# Patient Record
Sex: FEMALE | Race: WHITE | Employment: OTHER | URBAN - METROPOLITAN AREA
[De-identification: names, ages, dates, MRNs, and addresses within clinical notes are randomized per-mention and may not be internally consistent; named-entity substitution may affect disease eponyms.]

---

## 2017-02-03 ENCOUNTER — HOSPITAL ENCOUNTER (OUTPATIENT)
Dept: GENERAL RADIOLOGY | Age: 57
Discharge: HOME | End: 2017-02-03
Attending: INTERNAL MEDICINE | Admitting: INTERNAL MEDICINE

## 2017-02-03 LAB
ALBUMIN SERPL-MCNC: 4.6 GM/DL (ref 3.4–5)
ALP BLD-CCNC: 105 IU/L (ref 40–128)
ALT SERPL-CCNC: 29 U/L (ref 10–40)
ANION GAP SERPL CALCULATED.3IONS-SCNC: 16 MMOL/L (ref 4–16)
APTT: 28.2 SECONDS (ref 21.2–33)
AST SERPL-CCNC: 30 IU/L (ref 15–37)
BASOPHILS ABSOLUTE: 0.1 K/CU MM
BASOPHILS RELATIVE PERCENT: 0.7 % (ref 0–1)
BILIRUB SERPL-MCNC: 0.2 MG/DL (ref 0–1)
BUN BLDV-MCNC: 11 MG/DL (ref 6–23)
CALCIUM SERPL-MCNC: 10.2 MG/DL (ref 8.3–10.6)
CHLORIDE BLD-SCNC: 93 MMOL/L (ref 99–110)
CO2: 28 MMOL/L (ref 21–32)
CREAT SERPL-MCNC: 1 MG/DL (ref 0.6–1.1)
DIFFERENTIAL TYPE: ABNORMAL
EOSINOPHILS ABSOLUTE: 0.4 K/CU MM
EOSINOPHILS RELATIVE PERCENT: 4.3 % (ref 0–3)
GFR AFRICAN AMERICAN: >60 ML/MIN/1.73M2
GFR NON-AFRICAN AMERICAN: 58 ML/MIN/1.73M2
GLUCOSE FASTING: 128 MG/DL (ref 70–99)
HCT VFR BLD CALC: 41.2 % (ref 37–47)
HEMOGLOBIN: 12.4 GM/DL (ref 12.5–16)
IMMATURE NEUTROPHIL %: 0.3 % (ref 0–0.43)
INR BLD: 0.97 INDEX
LYMPHOCYTES ABSOLUTE: 2 K/CU MM
LYMPHOCYTES RELATIVE PERCENT: 20.6 % (ref 24–44)
Lab: 5 MINS (ref 2.3–9.5)
MCH RBC QN AUTO: 28.8 PG (ref 27–31)
MCHC RBC AUTO-ENTMCNC: 30.1 % (ref 32–36)
MCV RBC AUTO: 95.6 FL (ref 78–100)
MONOCYTES ABSOLUTE: 0.6 K/CU MM
MONOCYTES RELATIVE PERCENT: 5.8 % (ref 0–4)
NUCLEATED RBC %: 0 %
PDW BLD-RTO: 13.6 % (ref 11.7–14.9)
PLATELET # BLD: 268 K/CU MM (ref 140–440)
PMV BLD AUTO: 10.6 FL (ref 7.5–11.1)
POTASSIUM SERPL-SCNC: 4.2 MMOL/L (ref 3.5–5.1)
PROTHROMBIN TIME: 11 SECONDS
RBC # BLD: 4.31 M/CU MM (ref 4.2–5.4)
SEGMENTED NEUTROPHILS ABSOLUTE COUNT: 6.7 K/CU MM
SEGMENTED NEUTROPHILS RELATIVE PERCENT: 68.3 % (ref 36–66)
SODIUM BLD-SCNC: 137 MMOL/L (ref 135–145)
TOTAL IMMATURE NEUTOROPHIL: 0.03 K/CU MM
TOTAL NUCLEATED RBC: 0 K/CU MM
TOTAL PROTEIN: 7.4 GM/DL (ref 6.4–8.2)
WBC # BLD: 9.7 K/CU MM (ref 4–10.5)

## 2017-02-13 PROBLEM — J43.2 CENTRILOBULAR EMPHYSEMA (HCC): Status: ACTIVE | Noted: 2017-02-13

## 2017-02-14 ENCOUNTER — HOSPITAL ENCOUNTER (OUTPATIENT)
Dept: GENERAL RADIOLOGY | Age: 57
Discharge: HOME | End: 2017-02-14
Attending: INTERNAL MEDICINE | Admitting: INTERNAL MEDICINE

## 2017-02-14 DIAGNOSIS — J43.2 CENTRILOBULAR EMPHYSEMA (HCC): ICD-10-CM

## 2017-02-16 ENCOUNTER — INITIAL CONSULT (OUTPATIENT)
Dept: CARDIOLOGY CLINIC | Age: 57
End: 2017-02-16

## 2017-02-16 VITALS
BODY MASS INDEX: 32.06 KG/M2 | SYSTOLIC BLOOD PRESSURE: 116 MMHG | HEIGHT: 62 IN | HEART RATE: 82 BPM | WEIGHT: 174.2 LBS | DIASTOLIC BLOOD PRESSURE: 72 MMHG

## 2017-02-16 DIAGNOSIS — F41.9 ANXIETY: ICD-10-CM

## 2017-02-16 DIAGNOSIS — Z82.49 FH: CAD (CORONARY ARTERY DISEASE): ICD-10-CM

## 2017-02-16 DIAGNOSIS — R00.2 PALPITATIONS: ICD-10-CM

## 2017-02-16 DIAGNOSIS — R94.31 ABNORMAL EKG: ICD-10-CM

## 2017-02-16 DIAGNOSIS — I10 ESSENTIAL HYPERTENSION: ICD-10-CM

## 2017-02-16 DIAGNOSIS — E78.5 HYPERLIPIDEMIA LDL GOAL <100: ICD-10-CM

## 2017-02-16 DIAGNOSIS — M54.50 BACK PAIN AT L4-L5 LEVEL: ICD-10-CM

## 2017-02-16 DIAGNOSIS — M79.7 FIBROMYALGIA: ICD-10-CM

## 2017-02-16 DIAGNOSIS — Z01.818 PRE-OP EVALUATION: Primary | ICD-10-CM

## 2017-02-16 PROCEDURE — 3014F SCREEN MAMMO DOC REV: CPT | Performed by: INTERNAL MEDICINE

## 2017-02-16 PROCEDURE — G8484 FLU IMMUNIZE NO ADMIN: HCPCS | Performed by: INTERNAL MEDICINE

## 2017-02-16 PROCEDURE — 99203 OFFICE O/P NEW LOW 30 MIN: CPT | Performed by: INTERNAL MEDICINE

## 2017-02-16 PROCEDURE — G8427 DOCREV CUR MEDS BY ELIG CLIN: HCPCS | Performed by: INTERNAL MEDICINE

## 2017-02-16 PROCEDURE — 93000 ELECTROCARDIOGRAM COMPLETE: CPT | Performed by: INTERNAL MEDICINE

## 2017-02-16 PROCEDURE — 3017F COLORECTAL CA SCREEN DOC REV: CPT | Performed by: INTERNAL MEDICINE

## 2017-02-16 PROCEDURE — 1036F TOBACCO NON-USER: CPT | Performed by: INTERNAL MEDICINE

## 2017-02-16 PROCEDURE — G8417 CALC BMI ABV UP PARAM F/U: HCPCS | Performed by: INTERNAL MEDICINE

## 2017-02-16 RX ORDER — AMLODIPINE BESYLATE 10 MG/1
10 TABLET ORAL DAILY
Status: ON HOLD | COMMUNITY
End: 2017-07-21

## 2017-02-17 ENCOUNTER — TELEPHONE (OUTPATIENT)
Dept: CARDIOLOGY CLINIC | Age: 57
End: 2017-02-17

## 2017-02-20 ENCOUNTER — HOSPITAL ENCOUNTER (OUTPATIENT)
Dept: CARDIOLOGY | Age: 57
Discharge: HOME | End: 2017-02-20
Attending: INTERNAL MEDICINE | Admitting: INTERNAL MEDICINE

## 2017-02-20 DIAGNOSIS — Z01.818 PREOP EXAMINATION: ICD-10-CM

## 2017-02-20 DIAGNOSIS — R94.31 ABNORMAL ECG: ICD-10-CM

## 2017-02-20 LAB
LV EF: 76 %
LVEF MODALITY: NORMAL

## 2017-02-20 RX ORDER — SODIUM CHLORIDE 0.9 % (FLUSH) 0.9 %
10 SYRINGE (ML) INJECTION PRN
Status: DISCONTINUED | OUTPATIENT
Start: 2017-02-20 | End: 2017-02-21 | Stop reason: HOSPADM

## 2017-02-20 RX ORDER — AMINOPHYLLINE DIHYDRATE 25 MG/ML
75 INJECTION, SOLUTION INTRAVENOUS ONCE
Status: COMPLETED | OUTPATIENT
Start: 2017-02-20 | End: 2017-02-20

## 2017-02-20 RX ADMIN — Medication 30 MILLICURIE: at 12:19

## 2017-02-20 RX ADMIN — Medication 10 MILLICURIE: at 12:19

## 2017-02-20 RX ADMIN — AMINOPHYLLINE DIHYDRATE 75 MG: 25 INJECTION, SOLUTION INTRAVENOUS at 11:29

## 2017-02-20 RX ADMIN — Medication 10 ML: at 11:23

## 2017-02-22 ENCOUNTER — TELEPHONE (OUTPATIENT)
Dept: CARDIOLOGY CLINIC | Age: 57
End: 2017-02-22

## 2017-03-09 ENCOUNTER — TELEPHONE (OUTPATIENT)
Dept: CARDIOLOGY CLINIC | Age: 57
End: 2017-03-09

## 2017-03-30 ENCOUNTER — HOSPITAL ENCOUNTER (OUTPATIENT)
Dept: GENERAL RADIOLOGY | Age: 57
Discharge: HOME | End: 2017-03-30
Attending: INTERNAL MEDICINE | Admitting: INTERNAL MEDICINE

## 2017-03-30 LAB — THEOPHYLLINE LEVEL: 1.5 UG/ML (ref 10–20)

## 2017-07-13 ENCOUNTER — HOSPITAL ENCOUNTER (OUTPATIENT)
Dept: GENERAL RADIOLOGY | Age: 57
Discharge: HOME | End: 2017-07-13
Attending: INTERNAL MEDICINE | Admitting: INTERNAL MEDICINE

## 2017-07-13 LAB
THEOPHYLLINE LEVEL: 6.8 UG/ML (ref 10–20)
TSH HIGH SENSITIVITY: 1.4 UIU/ML (ref 0.27–4.2)

## 2017-08-04 ENCOUNTER — HOSPITAL ENCOUNTER (OUTPATIENT)
Dept: ULTRASOUND IMAGING | Age: 57
Discharge: HOME | End: 2017-08-04
Attending: INTERNAL MEDICINE | Admitting: INTERNAL MEDICINE

## 2017-08-04 DIAGNOSIS — R10.9 ABDOMINAL PAIN: ICD-10-CM

## 2017-08-04 DIAGNOSIS — R10.11 RUQ PAIN: ICD-10-CM

## 2017-09-01 ENCOUNTER — HOSPITAL ENCOUNTER (OUTPATIENT)
Dept: WOMENS IMAGING | Age: 57
Discharge: HOME | End: 2017-09-01
Attending: INTERNAL MEDICINE | Admitting: INTERNAL MEDICINE

## 2017-09-01 DIAGNOSIS — Z78.0 MENOPAUSE: ICD-10-CM

## 2017-09-01 DIAGNOSIS — Z12.31 VISIT FOR SCREENING MAMMOGRAM: ICD-10-CM

## 2017-09-18 ENCOUNTER — OFFICE VISIT (OUTPATIENT)
Dept: SURGERY | Age: 57
End: 2017-09-18

## 2017-09-18 VITALS
WEIGHT: 190.8 LBS | BODY MASS INDEX: 34.34 KG/M2 | HEART RATE: 108 BPM | SYSTOLIC BLOOD PRESSURE: 118 MMHG | RESPIRATION RATE: 16 BRPM | DIASTOLIC BLOOD PRESSURE: 76 MMHG

## 2017-09-18 DIAGNOSIS — R10.13 EPIGASTRIC ABDOMINAL PAIN: ICD-10-CM

## 2017-09-18 DIAGNOSIS — K80.20 CALCULUS OF GALLBLADDER WITHOUT CHOLECYSTITIS WITHOUT OBSTRUCTION: Primary | ICD-10-CM

## 2017-09-18 DIAGNOSIS — Z87.11 HISTORY OF GASTRIC ULCER: ICD-10-CM

## 2017-09-18 PROCEDURE — 3017F COLORECTAL CA SCREEN DOC REV: CPT | Performed by: SURGERY

## 2017-09-18 PROCEDURE — 1036F TOBACCO NON-USER: CPT | Performed by: SURGERY

## 2017-09-18 PROCEDURE — G8417 CALC BMI ABV UP PARAM F/U: HCPCS | Performed by: SURGERY

## 2017-09-18 PROCEDURE — G8427 DOCREV CUR MEDS BY ELIG CLIN: HCPCS | Performed by: SURGERY

## 2017-09-18 PROCEDURE — 99244 OFF/OP CNSLTJ NEW/EST MOD 40: CPT | Performed by: SURGERY

## 2017-09-18 PROCEDURE — 3014F SCREEN MAMMO DOC REV: CPT | Performed by: SURGERY

## 2017-09-18 RX ORDER — BUSPIRONE HYDROCHLORIDE 10 MG/1
10 TABLET ORAL 3 TIMES DAILY
Status: ON HOLD | COMMUNITY
End: 2019-03-08 | Stop reason: HOSPADM

## 2017-09-19 ASSESSMENT — ENCOUNTER SYMPTOMS
WHEEZING: 0
DIARRHEA: 0
RECTAL PAIN: 0
ANAL BLEEDING: 0
VOMITING: 0
CONSTIPATION: 0
ABDOMINAL DISTENTION: 0
SHORTNESS OF BREATH: 1
BLOOD IN STOOL: 0
COLOR CHANGE: 0
VOICE CHANGE: 0
ABDOMINAL PAIN: 1
COUGH: 1
NAUSEA: 1

## 2017-09-20 ENCOUNTER — HOSPITAL ENCOUNTER (OUTPATIENT)
Dept: GENERAL RADIOLOGY | Age: 57
Discharge: HOME | End: 2017-09-20
Attending: INTERNAL MEDICINE | Admitting: INTERNAL MEDICINE

## 2017-09-20 DIAGNOSIS — J43.2 CENTRILOBULAR EMPHYSEMA (HCC): ICD-10-CM

## 2017-09-20 LAB — THEOPHYLLINE LEVEL: 11.4 UG/ML (ref 10–20)

## 2017-10-09 ENCOUNTER — OFFICE VISIT (OUTPATIENT)
Dept: SURGERY | Age: 57
End: 2017-10-09

## 2017-10-09 VITALS
WEIGHT: 192.9 LBS | SYSTOLIC BLOOD PRESSURE: 136 MMHG | RESPIRATION RATE: 16 BRPM | HEIGHT: 63 IN | BODY MASS INDEX: 34.18 KG/M2 | OXYGEN SATURATION: 91 % | DIASTOLIC BLOOD PRESSURE: 80 MMHG | HEART RATE: 112 BPM

## 2017-10-09 DIAGNOSIS — K80.10 CCC (CHRONIC CALCULOUS CHOLECYSTITIS): Primary | ICD-10-CM

## 2017-10-09 DIAGNOSIS — J44.9 COPD, SEVERE (HCC): ICD-10-CM

## 2017-10-09 PROCEDURE — G8926 SPIRO NO PERF OR DOC: HCPCS | Performed by: SURGERY

## 2017-10-09 PROCEDURE — G8417 CALC BMI ABV UP PARAM F/U: HCPCS | Performed by: SURGERY

## 2017-10-09 PROCEDURE — 99214 OFFICE O/P EST MOD 30 MIN: CPT | Performed by: SURGERY

## 2017-10-09 PROCEDURE — 3017F COLORECTAL CA SCREEN DOC REV: CPT | Performed by: SURGERY

## 2017-10-09 PROCEDURE — G8427 DOCREV CUR MEDS BY ELIG CLIN: HCPCS | Performed by: SURGERY

## 2017-10-09 PROCEDURE — G8484 FLU IMMUNIZE NO ADMIN: HCPCS | Performed by: SURGERY

## 2017-10-09 PROCEDURE — 3023F SPIROM DOC REV: CPT | Performed by: SURGERY

## 2017-10-09 PROCEDURE — 3014F SCREEN MAMMO DOC REV: CPT | Performed by: SURGERY

## 2017-10-09 PROCEDURE — 1036F TOBACCO NON-USER: CPT | Performed by: SURGERY

## 2017-10-09 ASSESSMENT — ENCOUNTER SYMPTOMS
SHORTNESS OF BREATH: 1
WHEEZING: 0
VOICE CHANGE: 0
BLOOD IN STOOL: 0
COLOR CHANGE: 0
NAUSEA: 1
RECTAL PAIN: 0
VOMITING: 1
COUGH: 1
CONSTIPATION: 0
DIARRHEA: 0
ABDOMINAL DISTENTION: 0
ABDOMINAL PAIN: 1
ANAL BLEEDING: 0

## 2017-10-09 NOTE — PATIENT INSTRUCTIONS
Stay on a very low fat diet. LOW FAT DIET: Regular    FOODS ALLOWED FOODS TO AVOID   BEVERAGES: skim milk, buttermilk made from skim   milk, coffee, tea, carbonated beverages BEVERAGES:  Whole milk, evaporated milk, buttermilk made from whole milk, chocolate beverages. BREAD: White, whole wheat, rye, Bol[place]ian or [place], rolls BREAD:  Biscuits, muffins, cornbread, waffles, pancakes, sweet rolls  any made with large quantities of fat. CEREAL: All except those containing chocolate CEREAL:  None except those containing chocolate. DESSERTS: Fruits allowed, sherbets and puddings   made with, skim milk, gelatin desserts, shelbie food   cake, sugar wafers, popsicles, hard candy DESSERTS:  Ice cream, pies, butter cakes, sponge cakes, rich cookies, doughnuts, any others not listed under foods allowed. FATS:  Little or none FATS:  No oil, or butter, or fried foods. FRUIT: All except avocado and coconut, all fruit juices FRUIT:  Avocado and coconut;   MEAT & MEAT SUBSTITUTE:  Lean meat, fish & fowl, dry cottage cheese,  MEAT AND MEAT SUBSTITUTE:  All meat high in fat such as pork, werner, ham, goose, duck, fatty fish, sausage, bologna, ham-cheese loaf, etc.  cream and hard cheese, peanut butter, more egg yolks than allowance. POTATO OR SUBSTITUTE:  Baked, mashed (no butter or cream) or   boiled white potato, macaroni, rice, spaghetti, noodles. POTATO OR SUBSTITUTE:  Fried potatoes, creamed or escalloped potatoes, potato chips, corn chips; sweet potatoes if not tolerated by patient. SOUPS:  Fat free broths, cream soups made with   skim milk, vegetable soup made with vegetables   tolerated by patient. SOUPS:  Fatty broths, cream soups made with whole milk. VEGETABLES:  All vegetables, raw or cooked,   except gas forming vegetables not tolerated by patient   listed under Foods to Avoid.   (Green beans, green peas,  carrots, beets, squash, spinach, asparagus, tomatoes, lettuce) VEGETABLES:  Gas forming vegetables  if not tolerated by patient  such as broccoli, brussels sprouts, cabbage, cauliflower, green peppers, dried beans, dried peas, lima beans, turnips, radishes, onions, cucumbers, etc.    MISCELLANEOUS:  Sugar, jelly, honey, syrup,   mild tomato catsup, herbs, salt, vinegar-if tolerated. MISCELLANEOUS:  Pepper, spices, horseradish, mustard, hot catsup, olives, nuts, gravies, chocolate; highly seasoned foods (pizza and chili).

## 2017-10-09 NOTE — PROGRESS NOTES
Subjective:     Chief Complaint:    Chief Complaint   Patient presents with    Follow-up     consult donal pepe      HPI:  Tricia Paula presents for follow up from her previous appointment. Patient presents with RUQ abdominal pain. The pain is described as aching, intermittent, and occurs after eating fatty foods. The patient complains of associated nausea and vomiting. She has had an ultrasound which shows sludge. Patient also has a history of gastric ulcers. She has not had an EGD in several years. The patient was seen by her pulmonologist for pulmonary clearance for surgery. The patient presents today to discuss her surgical options. She continues to eat a high fat diet. Review of Systems   Constitutional: Negative for appetite change, fatigue, fever and unexpected weight change. HENT: Negative for voice change. Eyes: Negative for visual disturbance. Respiratory: Positive for cough and shortness of breath. Negative for wheezing. Cardiovascular: Negative for chest pain and leg swelling. Gastrointestinal: Positive for abdominal pain, nausea and vomiting. Negative for abdominal distention, anal bleeding, blood in stool, constipation, diarrhea and rectal pain. Endocrine: Negative for polydipsia and polyuria. Genitourinary: Negative for dysuria and flank pain. Musculoskeletal: Negative for arthralgias. Skin: Negative for color change, rash and wound. Allergic/Immunologic: Negative for environmental allergies and food allergies. Neurological: Negative for seizures, weakness and headaches. Hematological: Negative for adenopathy. Does not bruise/bleed easily. Psychiatric/Behavioral: Negative for confusion. The patient is nervous/anxious. Objective:      /80   Pulse 112   Resp 16   Ht 5' 2.52\" (1.588 m)   Wt 192 lb 14.4 oz (87.5 kg)   SpO2 91%   Breastfeeding? No   BMI 34.70 kg/m²     Physical Exam   Constitutional: She is oriented to person, place, and time.  She appears well-developed and well-nourished. HENT:   Head: Normocephalic and atraumatic. Neck: No thyromegaly present. Cardiovascular: Normal rate, regular rhythm and normal heart sounds. Pulmonary/Chest: Effort normal. She has wheezes. Abdominal: Soft. Bowel sounds are normal. She exhibits no distension and no mass. There is no hepatosplenomegaly. There is tenderness in the right upper quadrant. There is no rigidity, no rebound, no guarding and negative Soto's sign. No hernia. Musculoskeletal: She exhibits no edema. Lymphadenopathy:     She has no cervical adenopathy. Neurological: She is alert and oriented to person, place, and time. Skin: Skin is warm and dry. No rash noted. No erythema. Psychiatric: She has a normal mood and affect. Nursing note and vitals reviewed.       DATA:  CBC with Differential:    Lab Results   Component Value Date    WBC 13.3 07/21/2017    RBC 4.07 07/21/2017    HGB 11.6 07/21/2017    HCT 37.1 07/21/2017     07/21/2017    MCV 91.2 07/21/2017    MCH 28.5 07/21/2017    MCHC 31.3 07/21/2017    RDW 13.1 07/21/2017    SEGSPCT 81.0 07/21/2017    BANDSPCT 4 07/21/2017    LYMPHOPCT 9.0 07/21/2017    MONOPCT 5.0 07/21/2017    MYELOPCT 1 03/15/2011    BASOPCT 0.0 07/20/2017    MONOSABS 0.7 07/21/2017    LYMPHSABS 1.2 07/21/2017    EOSABS 0.0 07/20/2017    BASOSABS 0.0 07/20/2017    DIFFTYPE MANUAL DIFFERENTIAL 07/21/2017     BMP:    Lab Results   Component Value Date     07/21/2017    K 3.8 07/21/2017    CL 94 07/21/2017    CO2 34 07/21/2017    BUN 14 07/21/2017    LABALBU 3.8 07/18/2017    CREATININE 0.8 07/21/2017    CALCIUM 10.0 07/21/2017    GFRAA >60 07/21/2017    LABGLOM >60 07/21/2017    GLUCOSE 98 07/21/2017     Hepatic Function Panel:    Lab Results   Component Value Date    ALKPHOS 116 07/18/2017    ALT 30 07/18/2017    AST 23 07/18/2017    PROT 7.9 07/18/2017    PROT 6.9 01/23/2013    BILITOT 0.3 07/18/2017    BILIDIR 0.2 07/08/2016    IBILI 0.0 07/08/2016    LABALBU 3.8 07/18/2017     Radiology Review:  US RUQ;     Pulmonary function study results reviewed, as well as pulmonary clearance. I independently reviewed the above labs and images, as well as the radiology reports. Assessment & Plan:      1. CCC (chronic calculous cholecystitis)  -had long discussion with patient regarding her risk of surgery due to her lungs. -patient is extremely high risk from a pulmonary standpoint  -patient's FEV1 is 0.65  -due to her pulmonary function studies, I feel patient is too high risk to have surgery locally. Recommended patient go to a tertiary care facility like [facility].   -patient voiced understanding  - External Referral To General Surgery  -also discussed need to rule out peptic ulcer disease as cause for patient's symptoms, although, this is less likely  -last visit, patient was advised to stay on a very low fat diet. Since her last visit, she ate fried tomatoes today and experienced pain. She also has been eating ice cream, whole milk, and fried hamberger meat. After discussing her diet at length, it appears that the patient eats a very high fat diet. -reviewed low fat diet information with patient; patient provided with guidelines and examples of low fat food choices on her AVS  -greater than 50% of the visit was spent discussing patient's pulmonary risk factors for elective surgery and discussing dietary recommendations.     2. COPD, severe ([address]  -as above       Electronically signed by Michelet Marks DO on 10/9/2017 at 4:12 PM     Routed to referring provider

## 2017-12-19 ENCOUNTER — HOSPITAL ENCOUNTER (OUTPATIENT)
Dept: GENERAL RADIOLOGY | Age: 57
Discharge: HOME | End: 2017-12-19
Attending: INTERNAL MEDICINE | Admitting: INTERNAL MEDICINE

## 2017-12-19 LAB — TSH HIGH SENSITIVITY: 0.91 UIU/ML (ref 0.27–4.2)

## 2017-12-26 VITALS — BODY MASS INDEX: 34.39 KG/M2 | WEIGHT: 182 LBS

## 2017-12-26 RX ORDER — 0.9 % SODIUM CHLORIDE 0.9 %
10 VIAL (ML) INJECTION
Status: DISCONTINUED | OUTPATIENT
Start: 2017-12-26 | End: 2017-12-27 | Stop reason: HOSPADM

## 2017-12-26 RX ADMIN — Medication 6 MILLICURIE: at 09:00

## 2017-12-27 ENCOUNTER — HOSPITAL ENCOUNTER (OUTPATIENT)
Dept: NUCLEAR MEDICINE | Age: 57
Discharge: HOME | End: 2017-12-26
Attending: SURGERY | Admitting: SURGERY

## 2017-12-27 DIAGNOSIS — R10.13 ABDOMINAL PAIN, EPIGASTRIC: ICD-10-CM

## 2017-12-27 DIAGNOSIS — K25.9 GASTRIC ULCER, UNSPECIFIED CHRONICITY, UNSPECIFIED WHETHER GASTRIC ULCER HEMORRHAGE OR PERFORATION PRESENT: ICD-10-CM

## 2017-12-27 DIAGNOSIS — R10.13 EPIGASTRIC PAIN: ICD-10-CM

## 2018-03-09 ENCOUNTER — HOSPITAL ENCOUNTER (OUTPATIENT)
Dept: MRI IMAGING | Age: 58
Discharge: HOME | End: 2018-03-09
Attending: PSYCHIATRY & NEUROLOGY | Admitting: PSYCHIATRY & NEUROLOGY

## 2018-03-09 DIAGNOSIS — R25.8 POSTHEMIPLEGIC ATHETOSIS: ICD-10-CM

## 2018-03-09 DIAGNOSIS — G50.0 TRIGEMINAL NEURALGIA: ICD-10-CM

## 2018-03-09 LAB
GFR AFRICAN AMERICAN: >60 ML/MIN/1.73M2
GFR NON-AFRICAN AMERICAN: >60 ML/MIN/1.73M2
POC CREATININE: 0.9 MG/DL (ref 0.6–1.1)

## 2018-03-19 PROBLEM — R06.89 DYSPNEA AND RESPIRATORY ABNORMALITIES: Status: ACTIVE | Noted: 2018-03-19

## 2018-03-19 PROBLEM — R06.00 DYSPNEA AND RESPIRATORY ABNORMALITIES: Status: ACTIVE | Noted: 2018-03-19

## 2018-03-19 PROBLEM — J20.9 BRONCHITIS, ACUTE: Status: ACTIVE | Noted: 2018-03-19

## 2018-03-20 PROBLEM — G47.30 SLEEP APNEA: Status: ACTIVE | Noted: 2018-03-20

## 2018-03-20 PROBLEM — J96.91 RESPIRATORY FAILURE WITH HYPOXIA (HCC): Status: ACTIVE | Noted: 2018-03-20

## 2018-03-27 PROBLEM — J96.91 RESPIRATORY FAILURE WITH HYPOXIA (HCC): Status: RESOLVED | Noted: 2018-03-20 | Resolved: 2018-03-27

## 2018-03-27 PROBLEM — R06.89 DYSPNEA AND RESPIRATORY ABNORMALITIES: Status: RESOLVED | Noted: 2018-03-19 | Resolved: 2018-03-27

## 2018-03-27 PROBLEM — R06.00 DYSPNEA AND RESPIRATORY ABNORMALITIES: Status: RESOLVED | Noted: 2018-03-19 | Resolved: 2018-03-27

## 2018-03-27 PROBLEM — J20.9 BRONCHITIS, ACUTE: Status: RESOLVED | Noted: 2018-03-19 | Resolved: 2018-03-27

## 2018-08-30 PROBLEM — R41.82 ALTERED MENTAL STATE: Status: ACTIVE | Noted: 2018-08-30

## 2018-08-30 PROBLEM — R41.82 CHANGE IN MENTAL STATUS: Status: ACTIVE | Noted: 2018-08-30

## 2018-09-06 PROBLEM — E87.6 HYPOKALEMIA: Status: RESOLVED | Noted: 2018-09-02 | Resolved: 2018-09-06

## 2018-09-06 PROBLEM — E87.1 HYPONATREMIA: Status: RESOLVED | Noted: 2018-08-30 | Resolved: 2018-09-06

## 2018-09-06 PROBLEM — E87.1 HYPONATREMIA: Status: ACTIVE | Noted: 2018-08-30

## 2018-09-06 PROBLEM — E87.6 HYPOKALEMIA: Status: ACTIVE | Noted: 2018-09-02

## 2018-09-26 PROBLEM — Z01.818 PRE-OP EVALUATION: Status: RESOLVED | Noted: 2017-02-16 | Resolved: 2018-09-26

## 2018-10-22 ENCOUNTER — HOSPITAL ENCOUNTER (OUTPATIENT)
Dept: WOMENS IMAGING | Age: 58
Discharge: HOME | End: 2018-10-22
Payer: COMMERCIAL

## 2018-10-22 DIAGNOSIS — Z12.31 ENCOUNTER FOR SCREENING MAMMOGRAM FOR BREAST CANCER: ICD-10-CM

## 2018-10-22 PROCEDURE — 77067 SCR MAMMO BI INCL CAD: CPT

## 2018-12-14 ENCOUNTER — APPOINTMENT (OUTPATIENT)
Dept: GENERAL RADIOLOGY | Age: 58
DRG: 191 | End: 2018-12-14
Payer: COMMERCIAL

## 2018-12-14 ENCOUNTER — HOSPITAL ENCOUNTER (INPATIENT)
Age: 58
LOS: 4 days | Discharge: HOME | DRG: 191 | End: 2018-12-18
Attending: EMERGENCY MEDICINE | Admitting: INTERNAL MEDICINE
Payer: COMMERCIAL

## 2018-12-14 DIAGNOSIS — J44.1 COPD EXACERBATION (HCC): ICD-10-CM

## 2018-12-14 DIAGNOSIS — E87.6 HYPOKALEMIA: Primary | ICD-10-CM

## 2018-12-14 LAB
ALBUMIN SERPL-MCNC: 4.5 GM/DL (ref 3.4–5)
ALP BLD-CCNC: 91 IU/L (ref 40–129)
ALT SERPL-CCNC: 28 U/L (ref 10–40)
ANION GAP SERPL CALCULATED.3IONS-SCNC: 15 MMOL/L (ref 4–16)
AST SERPL-CCNC: 29 IU/L (ref 15–37)
BACTERIA: NEGATIVE /HPF
BASOPHILS ABSOLUTE: 0.1 K/CU MM
BASOPHILS RELATIVE PERCENT: 1.4 % (ref 0–1)
BILIRUB SERPL-MCNC: 0.2 MG/DL (ref 0–1)
BILIRUBIN URINE: NEGATIVE MG/DL
BLOOD, URINE: NEGATIVE
BUN BLDV-MCNC: 6 MG/DL (ref 6–23)
CALCIUM SERPL-MCNC: 9.4 MG/DL (ref 8.3–10.6)
CHLORIDE BLD-SCNC: 78 MMOL/L (ref 99–110)
CLARITY: CLEAR
CO2: 35 MMOL/L (ref 21–32)
COLOR: COLORLESS
CREAT SERPL-MCNC: 0.7 MG/DL (ref 0.6–1.1)
DIFFERENTIAL TYPE: ABNORMAL
EOSINOPHILS ABSOLUTE: 0.6 K/CU MM
EOSINOPHILS RELATIVE PERCENT: 7.5 % (ref 0–3)
GFR AFRICAN AMERICAN: >60 ML/MIN/1.73M2
GFR NON-AFRICAN AMERICAN: >60 ML/MIN/1.73M2
GLUCOSE BLD-MCNC: 104 MG/DL (ref 70–99)
GLUCOSE, URINE: NEGATIVE MG/DL
HCT VFR BLD CALC: 40.1 % (ref 37–47)
HEMOGLOBIN: 12.9 GM/DL (ref 12.5–16)
IMMATURE NEUTROPHIL %: 0.4 % (ref 0–0.43)
KETONES, URINE: NEGATIVE MG/DL
LEUKOCYTE ESTERASE, URINE: NEGATIVE
LYMPHOCYTES ABSOLUTE: 1.5 K/CU MM
LYMPHOCYTES RELATIVE PERCENT: 18.7 % (ref 24–44)
MCH RBC QN AUTO: 29.3 PG (ref 27–31)
MCHC RBC AUTO-ENTMCNC: 32.2 % (ref 32–36)
MCV RBC AUTO: 91.1 FL (ref 78–100)
MONOCYTES ABSOLUTE: 0.7 K/CU MM
MONOCYTES RELATIVE PERCENT: 8.4 % (ref 0–4)
NITRITE URINE, QUANTITATIVE: NEGATIVE
NUCLEATED RBC %: 0 %
PDW BLD-RTO: 12.7 % (ref 11.7–14.9)
PH, URINE: 7 (ref 5–8)
PLATELET # BLD: 310 K/CU MM (ref 140–440)
PMV BLD AUTO: 9.4 FL (ref 7.5–11.1)
POTASSIUM SERPL-SCNC: 2.5 MMOL/L (ref 3.5–5.1)
PRO-BNP: 507.6 PG/ML
PROTEIN UA: NEGATIVE MG/DL
RBC # BLD: 4.4 M/CU MM (ref 4.2–5.4)
RBC URINE: ABNORMAL /HPF (ref 0–6)
SEGMENTED NEUTROPHILS ABSOLUTE COUNT: 5.2 K/CU MM
SEGMENTED NEUTROPHILS RELATIVE PERCENT: 63.6 % (ref 36–66)
SODIUM BLD-SCNC: 128 MMOL/L (ref 135–145)
SPECIFIC GRAVITY UA: 1 (ref 1–1.03)
SQUAMOUS EPITHELIAL: <1 /HPF
TOTAL IMMATURE NEUTOROPHIL: 0.03 K/CU MM
TOTAL NUCLEATED RBC: 0 K/CU MM
TOTAL PROTEIN: 7.5 GM/DL (ref 6.4–8.2)
TRICHOMONAS: ABNORMAL /HPF
TROPONIN T: <0.01 NG/ML
UROBILINOGEN, URINE: NORMAL MG/DL (ref 0.2–1)
WBC # BLD: 8.1 K/CU MM (ref 4–10.5)
WBC UA: <1 /HPF (ref 0–5)

## 2018-12-14 PROCEDURE — C9113 INJ PANTOPRAZOLE SODIUM, VIA: HCPCS | Performed by: EMERGENCY MEDICINE

## 2018-12-14 PROCEDURE — 6370000000 HC RX 637 (ALT 250 FOR IP): Performed by: PHYSICIAN ASSISTANT

## 2018-12-14 PROCEDURE — 6360000002 HC RX W HCPCS: Performed by: EMERGENCY MEDICINE

## 2018-12-14 PROCEDURE — 96375 TX/PRO/DX INJ NEW DRUG ADDON: CPT

## 2018-12-14 PROCEDURE — 85025 COMPLETE CBC W/AUTO DIFF WBC: CPT

## 2018-12-14 PROCEDURE — 71046 X-RAY EXAM CHEST 2 VIEWS: CPT

## 2018-12-14 PROCEDURE — 80053 COMPREHEN METABOLIC PANEL: CPT

## 2018-12-14 PROCEDURE — 93005 ELECTROCARDIOGRAM TRACING: CPT | Performed by: EMERGENCY MEDICINE

## 2018-12-14 PROCEDURE — 84484 ASSAY OF TROPONIN QUANT: CPT

## 2018-12-14 PROCEDURE — 2700000000 HC OXYGEN THERAPY PER DAY

## 2018-12-14 PROCEDURE — 94761 N-INVAS EAR/PLS OXIMETRY MLT: CPT

## 2018-12-14 PROCEDURE — 2580000003 HC RX 258: Performed by: EMERGENCY MEDICINE

## 2018-12-14 PROCEDURE — 81001 URINALYSIS AUTO W/SCOPE: CPT

## 2018-12-14 PROCEDURE — 83880 ASSAY OF NATRIURETIC PEPTIDE: CPT

## 2018-12-14 PROCEDURE — 36415 COLL VENOUS BLD VENIPUNCTURE: CPT

## 2018-12-14 PROCEDURE — 6360000002 HC RX W HCPCS: Performed by: PHYSICIAN ASSISTANT

## 2018-12-14 PROCEDURE — 99285 EMERGENCY DEPT VISIT HI MDM: CPT

## 2018-12-14 PROCEDURE — 96365 THER/PROPH/DIAG IV INF INIT: CPT

## 2018-12-14 PROCEDURE — 6370000000 HC RX 637 (ALT 250 FOR IP): Performed by: EMERGENCY MEDICINE

## 2018-12-14 PROCEDURE — 1200000000 HC SEMI PRIVATE

## 2018-12-14 RX ORDER — 0.9 % SODIUM CHLORIDE 0.9 %
1000 INTRAVENOUS SOLUTION INTRAVENOUS ONCE
Status: COMPLETED | OUTPATIENT
Start: 2018-12-14 | End: 2018-12-15

## 2018-12-14 RX ORDER — LEVOFLOXACIN 5 MG/ML
500 INJECTION, SOLUTION INTRAVENOUS ONCE
Status: COMPLETED | OUTPATIENT
Start: 2018-12-14 | End: 2018-12-15

## 2018-12-14 RX ORDER — ACETAMINOPHEN 325 MG/1
650 TABLET ORAL EVERY 4 HOURS PRN
Status: DISCONTINUED | OUTPATIENT
Start: 2018-12-14 | End: 2018-12-18 | Stop reason: HOSPADM

## 2018-12-14 RX ORDER — PANTOPRAZOLE SODIUM 40 MG/10ML
40 INJECTION, POWDER, LYOPHILIZED, FOR SOLUTION INTRAVENOUS ONCE
Status: COMPLETED | OUTPATIENT
Start: 2018-12-14 | End: 2018-12-14

## 2018-12-14 RX ORDER — SODIUM CHLORIDE 0.9 % (FLUSH) 0.9 %
10 SYRINGE (ML) INJECTION EVERY 12 HOURS SCHEDULED
Status: DISCONTINUED | OUTPATIENT
Start: 2018-12-15 | End: 2018-12-18 | Stop reason: HOSPADM

## 2018-12-14 RX ORDER — METHYLPREDNISOLONE SODIUM SUCCINATE 125 MG/2ML
125 INJECTION, POWDER, LYOPHILIZED, FOR SOLUTION INTRAMUSCULAR; INTRAVENOUS ONCE
Status: COMPLETED | OUTPATIENT
Start: 2018-12-14 | End: 2018-12-14

## 2018-12-14 RX ORDER — POTASSIUM CHLORIDE 750 MG/1
40 TABLET, FILM COATED, EXTENDED RELEASE ORAL ONCE
Status: COMPLETED | OUTPATIENT
Start: 2018-12-14 | End: 2018-12-14

## 2018-12-14 RX ORDER — SODIUM CHLORIDE 0.9 % (FLUSH) 0.9 %
10 SYRINGE (ML) INJECTION PRN
Status: DISCONTINUED | OUTPATIENT
Start: 2018-12-14 | End: 2018-12-18 | Stop reason: HOSPADM

## 2018-12-14 RX ORDER — POTASSIUM CHLORIDE 7.45 MG/ML
10 INJECTION INTRAVENOUS
Status: DISPENSED | OUTPATIENT
Start: 2018-12-14 | End: 2018-12-15

## 2018-12-14 RX ORDER — MAGNESIUM HYDROXIDE/ALUMINUM HYDROXICE/SIMETHICONE 120; 1200; 1200 MG/30ML; MG/30ML; MG/30ML
15 SUSPENSION ORAL ONCE
Status: COMPLETED | OUTPATIENT
Start: 2018-12-14 | End: 2018-12-14

## 2018-12-14 RX ORDER — ONDANSETRON 2 MG/ML
4 INJECTION INTRAMUSCULAR; INTRAVENOUS EVERY 30 MIN PRN
Status: DISCONTINUED | OUTPATIENT
Start: 2018-12-14 | End: 2018-12-18 | Stop reason: HOSPADM

## 2018-12-14 RX ORDER — IPRATROPIUM BROMIDE AND ALBUTEROL SULFATE 2.5; .5 MG/3ML; MG/3ML
3 SOLUTION RESPIRATORY (INHALATION) ONCE
Status: COMPLETED | OUTPATIENT
Start: 2018-12-14 | End: 2018-12-14

## 2018-12-14 RX ADMIN — PANTOPRAZOLE SODIUM 40 MG: 40 INJECTION, POWDER, FOR SOLUTION INTRAVENOUS at 22:17

## 2018-12-14 RX ADMIN — ALUMINUM HYDROXIDE, MAGNESIUM HYDROXIDE, AND SIMETHICONE 15 ML: 200; 200; 20 SUSPENSION ORAL at 22:18

## 2018-12-14 RX ADMIN — METHYLPREDNISOLONE SODIUM SUCCINATE 125 MG: 125 INJECTION, POWDER, LYOPHILIZED, FOR SOLUTION INTRAMUSCULAR; INTRAVENOUS at 22:18

## 2018-12-14 RX ADMIN — IPRATROPIUM BROMIDE AND ALBUTEROL SULFATE 3 AMPULE: .5; 3 SOLUTION RESPIRATORY (INHALATION) at 22:00

## 2018-12-14 RX ADMIN — POTASSIUM CHLORIDE 10 MEQ: 7.46 INJECTION, SOLUTION INTRAVENOUS at 22:17

## 2018-12-14 RX ADMIN — POTASSIUM CHLORIDE 40 MEQ: 750 TABLET, FILM COATED, EXTENDED RELEASE ORAL at 22:18

## 2018-12-14 RX ADMIN — LIDOCAINE HYDROCHLORIDE 15 ML: 20 SOLUTION ORAL; TOPICAL at 22:18

## 2018-12-14 RX ADMIN — ONDANSETRON 4 MG: 2 INJECTION INTRAMUSCULAR; INTRAVENOUS at 22:20

## 2018-12-14 RX ADMIN — SODIUM CHLORIDE 1000 ML: 9 INJECTION, SOLUTION INTRAVENOUS at 22:17

## 2018-12-14 ASSESSMENT — PAIN DESCRIPTION - ORIENTATION: ORIENTATION: UPPER

## 2018-12-14 ASSESSMENT — PAIN DESCRIPTION - LOCATION: LOCATION: ABDOMEN

## 2018-12-14 ASSESSMENT — PAIN DESCRIPTION - PAIN TYPE: TYPE: ACUTE PAIN

## 2018-12-14 ASSESSMENT — PAIN SCALES - GENERAL: PAINLEVEL_OUTOF10: 2

## 2018-12-15 PROBLEM — E87.8 ELECTROLYTE IMBALANCE: Status: ACTIVE | Noted: 2018-12-15

## 2018-12-15 LAB
ADENOVIRUS DETECTION BY PCR: NOT DETECTED
ANION GAP SERPL CALCULATED.3IONS-SCNC: 11 MMOL/L (ref 4–16)
BORDETELLA PERTUSSIS PCR: NOT DETECTED
BUN BLDV-MCNC: 7 MG/DL (ref 6–23)
CALCIUM SERPL-MCNC: 9.2 MG/DL (ref 8.3–10.6)
CHLAMYDOPHILA PNEUMONIA PCR: NOT DETECTED
CHLORIDE BLD-SCNC: 86 MMOL/L (ref 99–110)
CO2: 36 MMOL/L (ref 21–32)
CORONAVIRUS 229E PCR: NOT DETECTED
CORONAVIRUS HKU1 PCR: NOT DETECTED
CORONAVIRUS NL63 PCR: NOT DETECTED
CORONAVIRUS OC43 PCR: NOT DETECTED
CREAT SERPL-MCNC: 0.8 MG/DL (ref 0.6–1.1)
GFR AFRICAN AMERICAN: >60 ML/MIN/1.73M2
GFR NON-AFRICAN AMERICAN: >60 ML/MIN/1.73M2
GLUCOSE BLD-MCNC: 186 MG/DL (ref 70–99)
HUMAN METAPNEUMOVIRUS PCR: NOT DETECTED
INFLUENZA A BY PCR: NOT DETECTED
INFLUENZA A H1 (2009) PCR: NOT DETECTED
INFLUENZA A H1 PANDEMIC PCR: NOT DETECTED
INFLUENZA A H3 PCR: NOT DETECTED
INFLUENZA B BY PCR: NOT DETECTED
MYCOPLASMA PNEUMONIAE PCR: NOT DETECTED
PARAINFLUENZA 1 PCR: NOT DETECTED
PARAINFLUENZA 2 PCR: NOT DETECTED
PARAINFLUENZA 3 PCR: NOT DETECTED
PARAINFLUENZA 4 PCR: NOT DETECTED
POTASSIUM SERPL-SCNC: 3.4 MMOL/L (ref 3.5–5.1)
RHINOVIRUS ENTEROVIRUS PCR: NOT DETECTED
RSV PCR: NOT DETECTED
SODIUM BLD-SCNC: 133 MMOL/L (ref 135–145)
THEOPHYLLINE LEVEL: 4 UG/ML (ref 10–20)

## 2018-12-15 PROCEDURE — 2580000003 HC RX 258: Performed by: INTERNAL MEDICINE

## 2018-12-15 PROCEDURE — 87077 CULTURE AEROBIC IDENTIFY: CPT

## 2018-12-15 PROCEDURE — 2580000003 HC RX 258

## 2018-12-15 PROCEDURE — 6360000002 HC RX W HCPCS: Performed by: INTERNAL MEDICINE

## 2018-12-15 PROCEDURE — 80198 ASSAY OF THEOPHYLLINE: CPT

## 2018-12-15 PROCEDURE — 87186 SC STD MICRODIL/AGAR DIL: CPT

## 2018-12-15 PROCEDURE — 94761 N-INVAS EAR/PLS OXIMETRY MLT: CPT

## 2018-12-15 PROCEDURE — 87070 CULTURE OTHR SPECIMN AEROBIC: CPT

## 2018-12-15 PROCEDURE — 80048 BASIC METABOLIC PNL TOTAL CA: CPT

## 2018-12-15 PROCEDURE — 6370000000 HC RX 637 (ALT 250 FOR IP): Performed by: INTERNAL MEDICINE

## 2018-12-15 PROCEDURE — G0378 HOSPITAL OBSERVATION PER HR: HCPCS

## 2018-12-15 PROCEDURE — 87205 SMEAR GRAM STAIN: CPT

## 2018-12-15 PROCEDURE — 94640 AIRWAY INHALATION TREATMENT: CPT

## 2018-12-15 PROCEDURE — 36415 COLL VENOUS BLD VENIPUNCTURE: CPT

## 2018-12-15 PROCEDURE — 2700000000 HC OXYGEN THERAPY PER DAY

## 2018-12-15 PROCEDURE — 6370000000 HC RX 637 (ALT 250 FOR IP): Performed by: PHYSICIAN ASSISTANT

## 2018-12-15 PROCEDURE — 87798 DETECT AGENT NOS DNA AMP: CPT

## 2018-12-15 PROCEDURE — 1200000000 HC SEMI PRIVATE

## 2018-12-15 PROCEDURE — 2580000003 HC RX 258: Performed by: PHYSICIAN ASSISTANT

## 2018-12-15 PROCEDURE — 6360000002 HC RX W HCPCS: Performed by: PHYSICIAN ASSISTANT

## 2018-12-15 RX ORDER — CARBAMAZEPINE 200 MG/1
200 TABLET ORAL 3 TIMES DAILY
Status: DISCONTINUED | OUTPATIENT
Start: 2018-12-15 | End: 2018-12-17

## 2018-12-15 RX ORDER — ORPHENADRINE CITRATE 100 MG/1
100 TABLET, EXTENDED RELEASE ORAL
Status: ON HOLD | COMMUNITY
End: 2018-12-18 | Stop reason: HOSPADM

## 2018-12-15 RX ORDER — POTASSIUM CHLORIDE 20 MEQ/1
40 TABLET, EXTENDED RELEASE ORAL PRN
Status: DISCONTINUED | OUTPATIENT
Start: 2018-12-15 | End: 2018-12-18 | Stop reason: HOSPADM

## 2018-12-15 RX ORDER — SODIUM CHLORIDE 9 MG/ML
INJECTION, SOLUTION INTRAVENOUS
Status: COMPLETED
Start: 2018-12-15 | End: 2018-12-15

## 2018-12-15 RX ORDER — METHYLPREDNISOLONE SODIUM SUCCINATE 40 MG/ML
40 INJECTION, POWDER, LYOPHILIZED, FOR SOLUTION INTRAMUSCULAR; INTRAVENOUS EVERY 12 HOURS
Status: DISCONTINUED | OUTPATIENT
Start: 2018-12-15 | End: 2018-12-15

## 2018-12-15 RX ORDER — FLUTICASONE PROPIONATE 50 MCG
2 SPRAY, SUSPENSION (ML) NASAL DAILY
Status: DISCONTINUED | OUTPATIENT
Start: 2018-12-15 | End: 2018-12-15

## 2018-12-15 RX ORDER — ORPHENADRINE CITRATE 100 MG/1
100 TABLET, EXTENDED RELEASE ORAL 2 TIMES DAILY
Status: DISCONTINUED | OUTPATIENT
Start: 2018-12-15 | End: 2018-12-18 | Stop reason: HOSPADM

## 2018-12-15 RX ORDER — BACLOFEN 10 MG/1
10 TABLET ORAL 3 TIMES DAILY
Status: DISCONTINUED | OUTPATIENT
Start: 2018-12-15 | End: 2018-12-18 | Stop reason: HOSPADM

## 2018-12-15 RX ORDER — MONTELUKAST SODIUM 10 MG/1
10 TABLET ORAL DAILY
Status: DISCONTINUED | OUTPATIENT
Start: 2018-12-15 | End: 2018-12-18 | Stop reason: HOSPADM

## 2018-12-15 RX ORDER — POTASSIUM CHLORIDE 20MEQ/15ML
40 LIQUID (ML) ORAL PRN
Status: DISCONTINUED | OUTPATIENT
Start: 2018-12-15 | End: 2018-12-18 | Stop reason: HOSPADM

## 2018-12-15 RX ORDER — DOCUSATE SODIUM 100 MG/1
100 CAPSULE, LIQUID FILLED ORAL 2 TIMES DAILY PRN
Status: DISCONTINUED | OUTPATIENT
Start: 2018-12-15 | End: 2018-12-18 | Stop reason: HOSPADM

## 2018-12-15 RX ORDER — TRIAMTERENE AND HYDROCHLOROTHIAZIDE 37.5; 25 MG/1; MG/1
TABLET ORAL
Status: ON HOLD | COMMUNITY
End: 2018-12-18 | Stop reason: HOSPADM

## 2018-12-15 RX ORDER — DOXYCYCLINE HYCLATE 100 MG
100 TABLET ORAL EVERY 12 HOURS SCHEDULED
Status: DISCONTINUED | OUTPATIENT
Start: 2018-12-15 | End: 2018-12-18 | Stop reason: HOSPADM

## 2018-12-15 RX ORDER — LIDOCAINE 4 G/G
1 PATCH TOPICAL DAILY
Status: DISCONTINUED | OUTPATIENT
Start: 2018-12-15 | End: 2018-12-18 | Stop reason: HOSPADM

## 2018-12-15 RX ORDER — THEOPHYLLINE 400 MG/1
400 TABLET, EXTENDED RELEASE ORAL DAILY
Status: DISCONTINUED | OUTPATIENT
Start: 2018-12-15 | End: 2018-12-18 | Stop reason: HOSPADM

## 2018-12-15 RX ORDER — AMLODIPINE BESYLATE 10 MG/1
10 TABLET ORAL DAILY
Status: DISCONTINUED | OUTPATIENT
Start: 2018-12-15 | End: 2018-12-18 | Stop reason: HOSPADM

## 2018-12-15 RX ORDER — CLONAZEPAM 0.5 MG/1
1 TABLET ORAL 3 TIMES DAILY
Status: DISCONTINUED | OUTPATIENT
Start: 2018-12-15 | End: 2018-12-18 | Stop reason: HOSPADM

## 2018-12-15 RX ORDER — TRIAMTERENE AND HYDROCHLOROTHIAZIDE 37.5; 25 MG/1; MG/1
1 TABLET ORAL DAILY
Status: DISCONTINUED | OUTPATIENT
Start: 2018-12-15 | End: 2018-12-15

## 2018-12-15 RX ORDER — SODIUM CHLORIDE 9 MG/ML
INJECTION, SOLUTION INTRAVENOUS CONTINUOUS
Status: DISCONTINUED | OUTPATIENT
Start: 2018-12-15 | End: 2018-12-15

## 2018-12-15 RX ORDER — IBUPROFEN 400 MG/1
400 TABLET ORAL
Status: DISCONTINUED | OUTPATIENT
Start: 2018-12-15 | End: 2018-12-15

## 2018-12-15 RX ORDER — PANTOPRAZOLE SODIUM 40 MG/1
40 TABLET, DELAYED RELEASE ORAL
Status: DISCONTINUED | OUTPATIENT
Start: 2018-12-16 | End: 2018-12-18 | Stop reason: HOSPADM

## 2018-12-15 RX ORDER — IPRATROPIUM BROMIDE AND ALBUTEROL SULFATE 2.5; .5 MG/3ML; MG/3ML
1 SOLUTION RESPIRATORY (INHALATION) EVERY 4 HOURS
Status: DISCONTINUED | OUTPATIENT
Start: 2018-12-15 | End: 2018-12-16

## 2018-12-15 RX ORDER — LEVOFLOXACIN 5 MG/ML
500 INJECTION, SOLUTION INTRAVENOUS EVERY 24 HOURS
Status: DISCONTINUED | OUTPATIENT
Start: 2018-12-15 | End: 2018-12-15 | Stop reason: ALTCHOICE

## 2018-12-15 RX ORDER — GUAIFENESIN 600 MG/1
600 TABLET, EXTENDED RELEASE ORAL 2 TIMES DAILY
Status: DISCONTINUED | OUTPATIENT
Start: 2018-12-15 | End: 2018-12-18 | Stop reason: HOSPADM

## 2018-12-15 RX ORDER — POTASSIUM CHLORIDE 20 MEQ/1
40 TABLET, EXTENDED RELEASE ORAL 2 TIMES DAILY WITH MEALS
Status: DISCONTINUED | OUTPATIENT
Start: 2018-12-15 | End: 2018-12-18 | Stop reason: HOSPADM

## 2018-12-15 RX ORDER — ASPIRIN 81 MG/1
81 TABLET, CHEWABLE ORAL DAILY
Status: DISCONTINUED | OUTPATIENT
Start: 2018-12-15 | End: 2018-12-18 | Stop reason: HOSPADM

## 2018-12-15 RX ORDER — TRAZODONE HYDROCHLORIDE 50 MG/1
50 TABLET ORAL NIGHTLY
Status: DISCONTINUED | OUTPATIENT
Start: 2018-12-15 | End: 2018-12-18 | Stop reason: HOSPADM

## 2018-12-15 RX ORDER — POTASSIUM CHLORIDE 7.45 MG/ML
10 INJECTION INTRAVENOUS PRN
Status: DISCONTINUED | OUTPATIENT
Start: 2018-12-15 | End: 2018-12-18 | Stop reason: HOSPADM

## 2018-12-15 RX ORDER — ATORVASTATIN CALCIUM 40 MG/1
40 TABLET, FILM COATED ORAL DAILY
Status: DISCONTINUED | OUTPATIENT
Start: 2018-12-15 | End: 2018-12-18 | Stop reason: HOSPADM

## 2018-12-15 RX ORDER — LEVOTHYROXINE SODIUM 0.07 MG/1
150 TABLET ORAL DAILY
Status: DISCONTINUED | OUTPATIENT
Start: 2018-12-15 | End: 2018-12-18 | Stop reason: HOSPADM

## 2018-12-15 RX ORDER — POTASSIUM CHLORIDE 7.45 MG/ML
10 INJECTION INTRAVENOUS
Status: COMPLETED | OUTPATIENT
Start: 2018-12-15 | End: 2018-12-15

## 2018-12-15 RX ORDER — METHYLPREDNISOLONE SODIUM SUCCINATE 40 MG/ML
40 INJECTION, POWDER, LYOPHILIZED, FOR SOLUTION INTRAMUSCULAR; INTRAVENOUS EVERY 6 HOURS
Status: DISCONTINUED | OUTPATIENT
Start: 2018-12-15 | End: 2018-12-16

## 2018-12-15 RX ORDER — MONTELUKAST SODIUM 10 MG/1
10 TABLET ORAL
Status: ON HOLD | COMMUNITY
End: 2018-12-18 | Stop reason: HOSPADM

## 2018-12-15 RX ORDER — IBUPROFEN 400 MG/1
400 TABLET ORAL
Status: DISCONTINUED | OUTPATIENT
Start: 2018-12-15 | End: 2018-12-16

## 2018-12-15 RX ORDER — BUSPIRONE HYDROCHLORIDE 10 MG/1
10 TABLET ORAL 3 TIMES DAILY
Status: DISCONTINUED | OUTPATIENT
Start: 2018-12-15 | End: 2018-12-18 | Stop reason: HOSPADM

## 2018-12-15 RX ADMIN — POTASSIUM CHLORIDE 10 MEQ: 7.46 INJECTION, SOLUTION INTRAVENOUS at 06:24

## 2018-12-15 RX ADMIN — LEVOTHYROXINE SODIUM 150 MCG: 75 TABLET ORAL at 12:43

## 2018-12-15 RX ADMIN — GUAIFENESIN 600 MG: 600 TABLET, EXTENDED RELEASE ORAL at 16:38

## 2018-12-15 RX ADMIN — ATORVASTATIN CALCIUM 40 MG: 40 TABLET, FILM COATED ORAL at 12:43

## 2018-12-15 RX ADMIN — CARBAMAZEPINE 200 MG: 200 TABLET ORAL at 20:01

## 2018-12-15 RX ADMIN — SODIUM CHLORIDE, PRESERVATIVE FREE 10 ML: 5 INJECTION INTRAVENOUS at 20:00

## 2018-12-15 RX ADMIN — AMLODIPINE BESYLATE 10 MG: 10 TABLET ORAL at 12:45

## 2018-12-15 RX ADMIN — METHYLPREDNISOLONE SODIUM SUCCINATE 40 MG: 40 INJECTION, POWDER, LYOPHILIZED, FOR SOLUTION INTRAMUSCULAR; INTRAVENOUS at 12:43

## 2018-12-15 RX ADMIN — IPRATROPIUM BROMIDE AND ALBUTEROL SULFATE 3 ML: .5; 3 SOLUTION RESPIRATORY (INHALATION) at 22:25

## 2018-12-15 RX ADMIN — BACLOFEN 10 MG: 10 TABLET ORAL at 16:38

## 2018-12-15 RX ADMIN — CARBAMAZEPINE 200 MG: 200 TABLET ORAL at 12:43

## 2018-12-15 RX ADMIN — MONTELUKAST SODIUM 10 MG: 10 TABLET, COATED ORAL at 12:44

## 2018-12-15 RX ADMIN — BUSPIRONE HYDROCHLORIDE 10 MG: 5 TABLET ORAL at 12:43

## 2018-12-15 RX ADMIN — SODIUM CHLORIDE, PRESERVATIVE FREE 10 ML: 5 INJECTION INTRAVENOUS at 09:21

## 2018-12-15 RX ADMIN — ASPIRIN 81 MG 81 MG: 81 TABLET ORAL at 12:44

## 2018-12-15 RX ADMIN — SODIUM CHLORIDE 250 ML: 9 INJECTION, SOLUTION INTRAVENOUS at 05:28

## 2018-12-15 RX ADMIN — BACLOFEN 10 MG: 10 TABLET ORAL at 20:01

## 2018-12-15 RX ADMIN — LEVOFLOXACIN 500 MG: 5 INJECTION, SOLUTION INTRAVENOUS at 03:02

## 2018-12-15 RX ADMIN — ORPHENADRINE CITRATE 100 MG: 100 TABLET, EXTENDED RELEASE ORAL at 12:44

## 2018-12-15 RX ADMIN — POTASSIUM CHLORIDE 40 MEQ: 20 TABLET, EXTENDED RELEASE ORAL at 12:43

## 2018-12-15 RX ADMIN — POTASSIUM CHLORIDE 10 MEQ: 7.46 INJECTION, SOLUTION INTRAVENOUS at 05:22

## 2018-12-15 RX ADMIN — GUAIFENESIN 600 MG: 600 TABLET, EXTENDED RELEASE ORAL at 20:01

## 2018-12-15 RX ADMIN — CLONAZEPAM 1 MG: 0.5 TABLET ORAL at 20:02

## 2018-12-15 RX ADMIN — DOXYCYCLINE HYCLATE 100 MG: 100 TABLET, COATED ORAL at 20:01

## 2018-12-15 RX ADMIN — METHYLPREDNISOLONE SODIUM SUCCINATE 40 MG: 40 INJECTION, POWDER, LYOPHILIZED, FOR SOLUTION INTRAMUSCULAR; INTRAVENOUS at 17:16

## 2018-12-15 RX ADMIN — TRAZODONE HYDROCHLORIDE 50 MG: 50 TABLET ORAL at 20:00

## 2018-12-15 RX ADMIN — POTASSIUM CHLORIDE 10 MEQ: 7.46 INJECTION, SOLUTION INTRAVENOUS at 04:11

## 2018-12-15 RX ADMIN — IPRATROPIUM BROMIDE AND ALBUTEROL SULFATE 3 ML: .5; 3 SOLUTION RESPIRATORY (INHALATION) at 16:07

## 2018-12-15 RX ADMIN — ACETAMINOPHEN 650 MG: 325 TABLET ORAL at 05:36

## 2018-12-15 RX ADMIN — BUSPIRONE HYDROCHLORIDE 10 MG: 5 TABLET ORAL at 20:00

## 2018-12-15 RX ADMIN — IPRATROPIUM BROMIDE AND ALBUTEROL SULFATE 3 ML: .5; 3 SOLUTION RESPIRATORY (INHALATION) at 12:09

## 2018-12-15 RX ADMIN — CLONAZEPAM 1 MG: 0.5 TABLET ORAL at 12:44

## 2018-12-15 RX ADMIN — POTASSIUM CHLORIDE 40 MEQ: 20 TABLET, EXTENDED RELEASE ORAL at 17:16

## 2018-12-15 RX ADMIN — CEFTRIAXONE 1 G: 1 INJECTION, POWDER, FOR SOLUTION INTRAMUSCULAR; INTRAVENOUS at 16:38

## 2018-12-15 RX ADMIN — THEOPHYLLINE 400 MG: 400 TABLET, EXTENDED RELEASE ORAL at 12:41

## 2018-12-15 RX ADMIN — TRIAMTERENE AND HYDROCHLOROTHIAZIDE 1 TABLET: 37.5; 25 TABLET ORAL at 12:41

## 2018-12-15 ASSESSMENT — PAIN DESCRIPTION - PAIN TYPE
TYPE: CHRONIC PAIN
TYPE: CHRONIC PAIN

## 2018-12-15 ASSESSMENT — PAIN DESCRIPTION - LOCATION
LOCATION: BACK;HIP;LEG
LOCATION: BACK;LEG;HIP

## 2018-12-15 ASSESSMENT — PAIN SCALES - GENERAL
PAINLEVEL_OUTOF10: 5
PAINLEVEL_OUTOF10: 6
PAINLEVEL_OUTOF10: 0

## 2018-12-15 ASSESSMENT — PAIN DESCRIPTION - ORIENTATION
ORIENTATION: RIGHT;LEFT
ORIENTATION: RIGHT;LEFT

## 2018-12-15 NOTE — H&P
O2SAT 94.8 03/26/2018       Radiology:  Chest Xray is normal  EKG:  Normal sinus rhythm   Biatrial enlargement   Right axis deviation   Pulmonary disease pattern   Incomplete right bundle branch block   Nonspecific ST and T wave abnormality   Abnormal ECG   ASSESSMENT AND PLAN:      Principal Problem:    Electrolyte imbalance  Active Problems:    Sleep apnea    Centrilobular emphysema (HCC)    Hypertension    Hyperlipidemia LDL goal <100    Anxiety    Fibromyalgia    Back pain at L4-L5 level    COPD exacerbation (HCC)  Resolved Problems:    * No resolved hospital problems.  *    IV NS-Na back to normal. Will stop it today  Home meds  Stop Maxide  IV Solumedrol  Duoneb  Hold Dulera and flonase while on IV Solumedrol  Consult Dr. Taniya Paul  12/15/2018  4:17 PM

## 2018-12-15 NOTE — ED PROVIDER NOTES
I independently examined and evaluated Rociokoffi Amor. In brief their history revealed COPD history has recently been given steorids, two zpacks. Not feeling better. Wears oxygen at home. Sees Dr. Dandre Gunderson. . States she has some epigastric burning. Their exam revealed anxious appearing female on 2 L of oxygen. Soft abdomen. Mild epigastric tenderness. No guarding or rebound. Normal bowel sounds. No pitting leg edema. . All diagnostic, treatment, and disposition decisions were made by myself in conjunction with the mid-level provider. Before disposition, questions were sought and answered with the patient. They have voiced understanding and agree with the plan: Patient's CBC is within normal limits. Elect brisa has a sodium of 128, potassium of 2.5. CO2 of 35.  UA clear. .  Troponin, BNP normal.  Chest x-ray is clear. Patient will be admitted for electrolyte abnormalities and further eval and treatment. For all further details of the patient's emergency department visit, please see the mid-level practitioner's documentation. The Ekg interpreted by me shows  normal sinus rhythm with a rate of 94  Axis is   Normal  QTc is  normal  Intervals and Durations are unremarkable.       ST Segments: no acute change  No significant change from prior EKG dated 8-           Ramona Onofre MD  12/15/18 9094
tablet Take 40 mg by mouth daily         ALLERGIES    Allergies   Allergen Reactions    Lisinopril Swelling     angioedema       SOCIAL & FAMILY HISTORY    Social History     Social History    Marital status:      Spouse name: N/A    Number of children: N/A    Years of education: N/A     Social History Main Topics    Smoking status: Former Smoker     Packs/day: 1.50     Years: 20.00     Quit date: 1/1/2008    Smokeless tobacco: Never Used    Alcohol use 1.2 oz/week     2 Shots of liquor per week      Comment: 3 shots of vodka 2-3 times per week     Drug use: No    Sexual activity: Yes     Partners: Male     Other Topics Concern    None     Social History Narrative    None     History reviewed. No pertinent family history. PHYSICAL EXAM    VITAL SIGNS: BP (!) 158/90   Pulse 91   Temp 98.4 °F (36.9 °C) (Oral)   Resp 20   Ht 5' 1.5\" (1.562 m)   Wt 171 lb (77.6 kg)   SpO2 95%   BMI 31.79 kg/m²    Constitutional:  Well developed, well nourished, no acute distress   HENT:  NC/AT. Ears, nose, mouth normal.  Neck:  Supple. Respiratory:  Lungs with expiratory wheezing bilaterally. No rhonchi. Respirations mildly labored. On nasal canula oxygen. Cardiovascular:  RRR. GI:  Soft, non-tender, bowel sounds active. Musculoskeletal:  No edema, no tenderness. DP intact and symmetric. Integument:  Warm and dry. Neurologic:  Alert & oriented. Psych:  Pleasant affect. EKG    Please see Dr. Pablo Bose note for interpretation.     RADIOLOGY/PROCEDURES    Labs Reviewed   CBC WITH AUTO DIFFERENTIAL - Abnormal; Notable for the following:        Result Value    Lymphocytes % 18.7 (*)     Monocytes % 8.4 (*)     Eosinophils % 7.5 (*)     Basophils % 1.4 (*)     All other components within normal limits   COMPREHENSIVE METABOLIC PANEL - Abnormal; Notable for the following:     Sodium 128 (*)     Potassium 2.5 (*)     Chloride 78 (*)     CO2 35 (*)     Glucose 104 (*)     All other components within

## 2018-12-16 PROBLEM — R10.13 EPIGASTRIC PAIN: Status: ACTIVE | Noted: 2018-12-14

## 2018-12-16 LAB
ALBUMIN SERPL-MCNC: 4.3 GM/DL (ref 3.4–5)
ALP BLD-CCNC: 78 IU/L (ref 40–129)
ALT SERPL-CCNC: 23 U/L (ref 10–40)
ANION GAP SERPL CALCULATED.3IONS-SCNC: 11 MMOL/L (ref 4–16)
AST SERPL-CCNC: 20 IU/L (ref 15–37)
BASOPHILS ABSOLUTE: 0 K/CU MM
BASOPHILS RELATIVE PERCENT: 0.1 % (ref 0–1)
BILIRUB SERPL-MCNC: 0.1 MG/DL (ref 0–1)
BUN BLDV-MCNC: 10 MG/DL (ref 6–23)
CALCIUM SERPL-MCNC: 9.3 MG/DL (ref 8.3–10.6)
CHLORIDE BLD-SCNC: 87 MMOL/L (ref 99–110)
CO2: 34 MMOL/L (ref 21–32)
CREAT SERPL-MCNC: 0.7 MG/DL (ref 0.6–1.1)
DIFFERENTIAL TYPE: ABNORMAL
EOSINOPHILS ABSOLUTE: 0 K/CU MM
EOSINOPHILS RELATIVE PERCENT: 0 % (ref 0–3)
GFR AFRICAN AMERICAN: >60 ML/MIN/1.73M2
GFR NON-AFRICAN AMERICAN: >60 ML/MIN/1.73M2
GLUCOSE BLD-MCNC: 175 MG/DL (ref 70–99)
HCT VFR BLD CALC: 36.7 % (ref 37–47)
HEMOGLOBIN: 11.6 GM/DL (ref 12.5–16)
IMMATURE NEUTROPHIL %: 0.8 % (ref 0–0.43)
LYMPHOCYTES ABSOLUTE: 0.4 K/CU MM
LYMPHOCYTES RELATIVE PERCENT: 4.6 % (ref 24–44)
MAGNESIUM: 1.6 MG/DL (ref 1.8–2.4)
MCH RBC QN AUTO: 29.4 PG (ref 27–31)
MCHC RBC AUTO-ENTMCNC: 31.6 % (ref 32–36)
MCV RBC AUTO: 93.1 FL (ref 78–100)
MONOCYTES ABSOLUTE: 0.4 K/CU MM
MONOCYTES RELATIVE PERCENT: 4.8 % (ref 0–4)
NUCLEATED RBC %: 0 %
PDW BLD-RTO: 12.9 % (ref 11.7–14.9)
PHOSPHORUS: 2.4 MG/DL (ref 2.5–4.9)
PLATELET # BLD: 269 K/CU MM (ref 140–440)
PMV BLD AUTO: 9.9 FL (ref 7.5–11.1)
POTASSIUM SERPL-SCNC: 3.7 MMOL/L (ref 3.5–5.1)
PROCALCITONIN: 0.02
RBC # BLD: 3.94 M/CU MM (ref 4.2–5.4)
SEGMENTED NEUTROPHILS ABSOLUTE COUNT: 6.8 K/CU MM
SEGMENTED NEUTROPHILS RELATIVE PERCENT: 89.7 % (ref 36–66)
SODIUM BLD-SCNC: 132 MMOL/L (ref 135–145)
THEOPHYLLINE LEVEL: 6.8 UG/ML (ref 10–20)
TOTAL IMMATURE NEUTOROPHIL: 0.06 K/CU MM
TOTAL NUCLEATED RBC: 0 K/CU MM
TOTAL PROTEIN: 6.3 GM/DL (ref 6.4–8.2)
TOTAL RETICULOCYTE COUNT: 0.08 K/CU MM
WBC # BLD: 7.6 K/CU MM (ref 4–10.5)

## 2018-12-16 PROCEDURE — 1200000000 HC SEMI PRIVATE

## 2018-12-16 PROCEDURE — 83735 ASSAY OF MAGNESIUM: CPT

## 2018-12-16 PROCEDURE — 94640 AIRWAY INHALATION TREATMENT: CPT

## 2018-12-16 PROCEDURE — 6370000000 HC RX 637 (ALT 250 FOR IP): Performed by: INTERNAL MEDICINE

## 2018-12-16 PROCEDURE — 85025 COMPLETE CBC W/AUTO DIFF WBC: CPT

## 2018-12-16 PROCEDURE — 94667 MNPJ CHEST WALL 1ST: CPT

## 2018-12-16 PROCEDURE — 94660 CPAP INITIATION&MGMT: CPT

## 2018-12-16 PROCEDURE — 93010 ELECTROCARDIOGRAM REPORT: CPT | Performed by: INTERNAL MEDICINE

## 2018-12-16 PROCEDURE — 84145 PROCALCITONIN (PCT): CPT

## 2018-12-16 PROCEDURE — 6360000002 HC RX W HCPCS: Performed by: INTERNAL MEDICINE

## 2018-12-16 PROCEDURE — 80053 COMPREHEN METABOLIC PANEL: CPT

## 2018-12-16 PROCEDURE — 36415 COLL VENOUS BLD VENIPUNCTURE: CPT

## 2018-12-16 PROCEDURE — 2700000000 HC OXYGEN THERAPY PER DAY

## 2018-12-16 PROCEDURE — 2580000003 HC RX 258: Performed by: PHYSICIAN ASSISTANT

## 2018-12-16 PROCEDURE — 94761 N-INVAS EAR/PLS OXIMETRY MLT: CPT

## 2018-12-16 PROCEDURE — 6370000000 HC RX 637 (ALT 250 FOR IP): Performed by: PHYSICIAN ASSISTANT

## 2018-12-16 PROCEDURE — 2580000003 HC RX 258: Performed by: INTERNAL MEDICINE

## 2018-12-16 PROCEDURE — 84100 ASSAY OF PHOSPHORUS: CPT

## 2018-12-16 PROCEDURE — 80198 ASSAY OF THEOPHYLLINE: CPT

## 2018-12-16 RX ORDER — IPRATROPIUM BROMIDE AND ALBUTEROL SULFATE 2.5; .5 MG/3ML; MG/3ML
1 SOLUTION RESPIRATORY (INHALATION)
Status: DISCONTINUED | OUTPATIENT
Start: 2018-12-16 | End: 2018-12-18 | Stop reason: HOSPADM

## 2018-12-16 RX ORDER — IPRATROPIUM BROMIDE AND ALBUTEROL SULFATE 2.5; .5 MG/3ML; MG/3ML
SOLUTION RESPIRATORY (INHALATION)
Status: DISCONTINUED
Start: 2018-12-16 | End: 2018-12-16

## 2018-12-16 RX ORDER — MAGNESIUM SULFATE IN WATER 40 MG/ML
2 INJECTION, SOLUTION INTRAVENOUS ONCE
Status: COMPLETED | OUTPATIENT
Start: 2018-12-16 | End: 2018-12-16

## 2018-12-16 RX ORDER — METHYLPREDNISOLONE SODIUM SUCCINATE 40 MG/ML
40 INJECTION, POWDER, LYOPHILIZED, FOR SOLUTION INTRAMUSCULAR; INTRAVENOUS EVERY 8 HOURS
Status: DISCONTINUED | OUTPATIENT
Start: 2018-12-16 | End: 2018-12-17

## 2018-12-16 RX ADMIN — BACLOFEN 10 MG: 10 TABLET ORAL at 20:13

## 2018-12-16 RX ADMIN — BACLOFEN 10 MG: 10 TABLET ORAL at 14:30

## 2018-12-16 RX ADMIN — METHYLPREDNISOLONE SODIUM SUCCINATE 40 MG: 40 INJECTION, POWDER, LYOPHILIZED, FOR SOLUTION INTRAMUSCULAR; INTRAVENOUS at 00:29

## 2018-12-16 RX ADMIN — CARBAMAZEPINE 200 MG: 200 TABLET ORAL at 20:13

## 2018-12-16 RX ADMIN — CLONAZEPAM 1 MG: 0.5 TABLET ORAL at 20:13

## 2018-12-16 RX ADMIN — SODIUM CHLORIDE, PRESERVATIVE FREE 10 ML: 5 INJECTION INTRAVENOUS at 14:31

## 2018-12-16 RX ADMIN — METHYLPREDNISOLONE SODIUM SUCCINATE 40 MG: 40 INJECTION, POWDER, LYOPHILIZED, FOR SOLUTION INTRAMUSCULAR; INTRAVENOUS at 14:30

## 2018-12-16 RX ADMIN — THEOPHYLLINE 400 MG: 400 TABLET, EXTENDED RELEASE ORAL at 08:57

## 2018-12-16 RX ADMIN — SODIUM CHLORIDE, PRESERVATIVE FREE 10 ML: 5 INJECTION INTRAVENOUS at 08:57

## 2018-12-16 RX ADMIN — IPRATROPIUM BROMIDE AND ALBUTEROL SULFATE 3 ML: .5; 3 SOLUTION RESPIRATORY (INHALATION) at 08:25

## 2018-12-16 RX ADMIN — CLONAZEPAM 1 MG: 0.5 TABLET ORAL at 08:54

## 2018-12-16 RX ADMIN — MONTELUKAST SODIUM 10 MG: 10 TABLET, COATED ORAL at 08:57

## 2018-12-16 RX ADMIN — GUAIFENESIN 600 MG: 600 TABLET, EXTENDED RELEASE ORAL at 09:03

## 2018-12-16 RX ADMIN — LEVOTHYROXINE SODIUM 150 MCG: 75 TABLET ORAL at 06:01

## 2018-12-16 RX ADMIN — POTASSIUM & SODIUM PHOSPHATES POWDER PACK 280-160-250 MG 250 MG: 280-160-250 PACK at 17:19

## 2018-12-16 RX ADMIN — METHYLPREDNISOLONE SODIUM SUCCINATE 40 MG: 40 INJECTION, POWDER, LYOPHILIZED, FOR SOLUTION INTRAMUSCULAR; INTRAVENOUS at 20:15

## 2018-12-16 RX ADMIN — ASPIRIN 81 MG 81 MG: 81 TABLET ORAL at 08:57

## 2018-12-16 RX ADMIN — DOXYCYCLINE HYCLATE 100 MG: 100 TABLET, COATED ORAL at 08:57

## 2018-12-16 RX ADMIN — IPRATROPIUM BROMIDE AND ALBUTEROL SULFATE 3 ML: .5; 3 SOLUTION RESPIRATORY (INHALATION) at 15:54

## 2018-12-16 RX ADMIN — ORPHENADRINE CITRATE 100 MG: 100 TABLET, EXTENDED RELEASE ORAL at 20:14

## 2018-12-16 RX ADMIN — DOXYCYCLINE HYCLATE 100 MG: 100 TABLET, COATED ORAL at 20:13

## 2018-12-16 RX ADMIN — BUSPIRONE HYDROCHLORIDE 10 MG: 5 TABLET ORAL at 14:30

## 2018-12-16 RX ADMIN — IPRATROPIUM BROMIDE AND ALBUTEROL SULFATE 3 ML: .5; 3 SOLUTION RESPIRATORY (INHALATION) at 12:07

## 2018-12-16 RX ADMIN — BACLOFEN 10 MG: 10 TABLET ORAL at 08:54

## 2018-12-16 RX ADMIN — CEFTRIAXONE 1 G: 1 INJECTION, POWDER, FOR SOLUTION INTRAMUSCULAR; INTRAVENOUS at 17:18

## 2018-12-16 RX ADMIN — AMLODIPINE BESYLATE 10 MG: 10 TABLET ORAL at 08:56

## 2018-12-16 RX ADMIN — BUSPIRONE HYDROCHLORIDE 10 MG: 5 TABLET ORAL at 20:13

## 2018-12-16 RX ADMIN — METHYLPREDNISOLONE SODIUM SUCCINATE 40 MG: 40 INJECTION, POWDER, LYOPHILIZED, FOR SOLUTION INTRAMUSCULAR; INTRAVENOUS at 06:01

## 2018-12-16 RX ADMIN — ATORVASTATIN CALCIUM 40 MG: 40 TABLET, FILM COATED ORAL at 08:57

## 2018-12-16 RX ADMIN — ACETAMINOPHEN 650 MG: 325 TABLET ORAL at 03:13

## 2018-12-16 RX ADMIN — POTASSIUM CHLORIDE 40 MEQ: 20 TABLET, EXTENDED RELEASE ORAL at 17:19

## 2018-12-16 RX ADMIN — SODIUM CHLORIDE, PRESERVATIVE FREE 10 ML: 5 INJECTION INTRAVENOUS at 20:18

## 2018-12-16 RX ADMIN — POTASSIUM CHLORIDE 40 MEQ: 20 TABLET, EXTENDED RELEASE ORAL at 08:57

## 2018-12-16 RX ADMIN — IBUPROFEN 400 MG: 400 TABLET ORAL at 08:55

## 2018-12-16 RX ADMIN — GUAIFENESIN 600 MG: 600 TABLET, EXTENDED RELEASE ORAL at 20:13

## 2018-12-16 RX ADMIN — IPRATROPIUM BROMIDE AND ALBUTEROL SULFATE 3 ML: .5; 3 SOLUTION RESPIRATORY (INHALATION) at 19:33

## 2018-12-16 RX ADMIN — CARBAMAZEPINE 200 MG: 200 TABLET ORAL at 08:57

## 2018-12-16 RX ADMIN — BUSPIRONE HYDROCHLORIDE 10 MG: 5 TABLET ORAL at 08:56

## 2018-12-16 RX ADMIN — SODIUM CHLORIDE, PRESERVATIVE FREE 10 ML: 5 INJECTION INTRAVENOUS at 06:01

## 2018-12-16 RX ADMIN — CLONAZEPAM 1 MG: 0.5 TABLET ORAL at 14:30

## 2018-12-16 RX ADMIN — CARBAMAZEPINE 200 MG: 200 TABLET ORAL at 14:30

## 2018-12-16 RX ADMIN — ORPHENADRINE CITRATE 100 MG: 100 TABLET, EXTENDED RELEASE ORAL at 08:56

## 2018-12-16 RX ADMIN — PANTOPRAZOLE SODIUM 40 MG: 40 TABLET, DELAYED RELEASE ORAL at 06:01

## 2018-12-16 RX ADMIN — MAGNESIUM SULFATE IN WATER 2 G: 40 INJECTION, SOLUTION INTRAVENOUS at 12:36

## 2018-12-16 RX ADMIN — IBUPROFEN 400 MG: 400 TABLET ORAL at 12:35

## 2018-12-16 ASSESSMENT — PAIN SCALES - GENERAL
PAINLEVEL_OUTOF10: 6
PAINLEVEL_OUTOF10: 0
PAINLEVEL_OUTOF10: 0
PAINLEVEL_OUTOF10: 4

## 2018-12-16 ASSESSMENT — PAIN DESCRIPTION - LOCATION: LOCATION: BACK

## 2018-12-16 ASSESSMENT — PAIN DESCRIPTION - PAIN TYPE: TYPE: CHRONIC PAIN

## 2018-12-16 NOTE — CONSULTS
[address] [place] [address], [address] [facility]                                  CONSULTATION    PATIENT NAME: Millicent Garcia                :        1960  MED REC NO:   [id number]                          ROOM:       6520  ACCOUNT NO:   [de-identified]                           ADMIT DATE: 2018  PROVIDER:     Yoana Kapoor MD    CONSULT DATE:  12/15/2018    HISTORY OF PRESENT ILLNESS:  The patient is a 59-year-old lady with  multiple medical problems including COPD, chronic respiratory failure,  obstructive sleep apnea, on CPAP, who was admitted through the emergency  room with complaints of increasing shortness of breath, cough productive  of whitish to yellow phlegm. She denies any hemoptysis. She denies any  fever or chills. She denies any nausea or vomiting. She denies any  chest pain. She received two courses of Z-Eber and steroids as  outpatient without much improvement. In the emergency room, she was  given breathing treatment with some relief, but she continued to have  shortness of breath and so it was decided to admit her for aggressive  therapy. PAST MEDICAL HISTORY:  Significant for COPD, chronic respiratory  failure, bipolar disorder, arthritis, obstructive sleep apnea, on CPAP,  fibromyalgia. PAST SURGICAL HISTORY:  Remarkable for tubal ligation, carpal tunnel  release surgery. FAMILY HISTORY:  Noncontributory. SOCIAL HISTORY:  Reveals that she quit smoking in 2008, but used to  smoke one and a half packs per day for 20 years prior to that. She  drinks alcohol off and on. No history of drug abuse. MEDICATIONS:  Reviewed. ALLERGIES:  She is allergic to LISINOPRIL. REVIEW OF SYSTEMS:  A 10-point review of systems is reviewed and is  negative except for what is mentioned in the history of present illness.     PHYSICAL EXAMINATION:  GENERAL:  The patient is alert, oriented x3, in no acute
Regular rate and rhythm, S1 and S2 normal, no murmur, rub   or gallop   Abdomen:     Soft, non-tender, bowel sounds active all four quadrants,     no masses, no organomegaly, no ascites    Rectal:    Deferred   Extremities:   Extremities normal, atraumatic, no cyanosis or edema   Pulses:   2+ and symmetric all extremities   Skin:   Skin color, texture, turgor normal, no rashes or lesions   Lymph nodes:   No abnormality   Neurologic:   No focal deficits, moving all four extremities            DATA:    ABGs: Lab Results   Component Value Date    PO2ART 76 03/26/2018    RXX8FFL 67.0 03/26/2018     CBC:   Recent Labs      12/14/18 2027 12/16/18 0447   WBC  8.1  7.6   HGB  12.9  11.6*   PLT  310  269     BMP:  Recent Labs      12/14/18   2027  12/15/18   1043  12/16/18 0447   NA  128*  133*  132*   K  2.5*  3.4*  3.7   CL  78*  86*  87*   CO2  35*  36*  34*   BUN  6  7  10   CREATININE  0.7  0.8  0.7   GLUCOSE  104*  186*  175*     Magnesium:   Lab Results   Component Value Date    MG 1.6 12/16/2018     Hepatic: Recent Labs      12/14/18 2027 12/16/18 0447   AST  29  20   ALT  28  23   BILITOT  0.2  0.1   ALKPHOS  91  78     No results for input(s): LIPASE, AMYLASE in the last 72 hours. No results for input(s): PROTIME, INR in the last 72 hours. No results for input(s): PTT in the last 72 hours. Lipids: No results for input(s): CHOL, HDL in the last 72 hours. Invalid input(s): LDLCALCU  INR: No results for input(s): INR in the last 72 hours.   TSH: No results found for: TSH    Intake/Output Summary (Last 24 hours) at 12/16/18 0843  Last data filed at 12/16/18 0458   Gross per 24 hour   Intake              460 ml   Output             1300 ml   Net             -840 ml         Imaging Studies:    Reviewed     IMPRESSION:      Patient Active Problem List   Diagnosis Code    Centrilobular emphysema ([facility] [address] J43.2    Hypertension I10    Hyperlipidemia LDL goal <100 E78.5    Abnormal EKG R94.31   

## 2018-12-17 LAB
EKG ATRIAL RATE: 94 BPM
EKG DIAGNOSIS: NORMAL
EKG P AXIS: 72 DEGREES
EKG P-R INTERVAL: 146 MS
EKG Q-T INTERVAL: 422 MS
EKG QRS DURATION: 106 MS
EKG QTC CALCULATION (BAZETT): 527 MS
EKG R AXIS: 110 DEGREES
EKG T AXIS: 56 DEGREES
EKG VENTRICULAR RATE: 94 BPM

## 2018-12-17 PROCEDURE — 2580000003 HC RX 258: Performed by: INTERNAL MEDICINE

## 2018-12-17 PROCEDURE — 94668 MNPJ CHEST WALL SBSQ: CPT

## 2018-12-17 PROCEDURE — 2700000000 HC OXYGEN THERAPY PER DAY

## 2018-12-17 PROCEDURE — 1200000000 HC SEMI PRIVATE

## 2018-12-17 PROCEDURE — 2580000003 HC RX 258: Performed by: PHYSICIAN ASSISTANT

## 2018-12-17 PROCEDURE — 6370000000 HC RX 637 (ALT 250 FOR IP): Performed by: INTERNAL MEDICINE

## 2018-12-17 PROCEDURE — 94761 N-INVAS EAR/PLS OXIMETRY MLT: CPT

## 2018-12-17 PROCEDURE — 6370000000 HC RX 637 (ALT 250 FOR IP): Performed by: PHYSICIAN ASSISTANT

## 2018-12-17 PROCEDURE — 6360000002 HC RX W HCPCS: Performed by: INTERNAL MEDICINE

## 2018-12-17 PROCEDURE — 94640 AIRWAY INHALATION TREATMENT: CPT

## 2018-12-17 RX ORDER — METHYLPREDNISOLONE SODIUM SUCCINATE 40 MG/ML
40 INJECTION, POWDER, LYOPHILIZED, FOR SOLUTION INTRAMUSCULAR; INTRAVENOUS EVERY 12 HOURS
Status: DISCONTINUED | OUTPATIENT
Start: 2018-12-17 | End: 2018-12-18 | Stop reason: HOSPADM

## 2018-12-17 RX ADMIN — ACETAMINOPHEN 650 MG: 325 TABLET ORAL at 16:23

## 2018-12-17 RX ADMIN — DOXYCYCLINE HYCLATE 100 MG: 100 TABLET, COATED ORAL at 09:09

## 2018-12-17 RX ADMIN — POTASSIUM & SODIUM PHOSPHATES POWDER PACK 280-160-250 MG 250 MG: 280-160-250 PACK at 09:16

## 2018-12-17 RX ADMIN — BUSPIRONE HYDROCHLORIDE 10 MG: 5 TABLET ORAL at 14:51

## 2018-12-17 RX ADMIN — ACETAMINOPHEN 650 MG: 325 TABLET ORAL at 09:08

## 2018-12-17 RX ADMIN — CEFTRIAXONE 1 G: 1 INJECTION, POWDER, FOR SOLUTION INTRAMUSCULAR; INTRAVENOUS at 16:27

## 2018-12-17 RX ADMIN — BUSPIRONE HYDROCHLORIDE 10 MG: 5 TABLET ORAL at 20:37

## 2018-12-17 RX ADMIN — IPRATROPIUM BROMIDE AND ALBUTEROL SULFATE 3 ML: .5; 3 SOLUTION RESPIRATORY (INHALATION) at 23:13

## 2018-12-17 RX ADMIN — CLONAZEPAM 1 MG: 0.5 TABLET ORAL at 22:55

## 2018-12-17 RX ADMIN — DOCUSATE SODIUM 100 MG: 100 CAPSULE, LIQUID FILLED ORAL at 09:09

## 2018-12-17 RX ADMIN — IPRATROPIUM BROMIDE AND ALBUTEROL SULFATE 3 ML: .5; 3 SOLUTION RESPIRATORY (INHALATION) at 07:14

## 2018-12-17 RX ADMIN — ATORVASTATIN CALCIUM 40 MG: 40 TABLET, FILM COATED ORAL at 09:09

## 2018-12-17 RX ADMIN — POTASSIUM CHLORIDE 40 MEQ: 20 TABLET, EXTENDED RELEASE ORAL at 09:17

## 2018-12-17 RX ADMIN — SODIUM CHLORIDE, PRESERVATIVE FREE 10 ML: 5 INJECTION INTRAVENOUS at 09:09

## 2018-12-17 RX ADMIN — GUAIFENESIN 600 MG: 600 TABLET, EXTENDED RELEASE ORAL at 09:09

## 2018-12-17 RX ADMIN — BUSPIRONE HYDROCHLORIDE 10 MG: 5 TABLET ORAL at 09:17

## 2018-12-17 RX ADMIN — METHYLPREDNISOLONE SODIUM SUCCINATE 40 MG: 40 INJECTION, POWDER, LYOPHILIZED, FOR SOLUTION INTRAMUSCULAR; INTRAVENOUS at 06:26

## 2018-12-17 RX ADMIN — BACLOFEN 10 MG: 10 TABLET ORAL at 20:37

## 2018-12-17 RX ADMIN — IPRATROPIUM BROMIDE AND ALBUTEROL SULFATE 3 ML: .5; 3 SOLUTION RESPIRATORY (INHALATION) at 15:20

## 2018-12-17 RX ADMIN — CARBAMAZEPINE 200 MG: 200 TABLET ORAL at 09:09

## 2018-12-17 RX ADMIN — CLONAZEPAM 1 MG: 0.5 TABLET ORAL at 14:43

## 2018-12-17 RX ADMIN — BACLOFEN 10 MG: 10 TABLET ORAL at 14:51

## 2018-12-17 RX ADMIN — POTASSIUM CHLORIDE 40 MEQ: 20 TABLET, EXTENDED RELEASE ORAL at 16:27

## 2018-12-17 RX ADMIN — AMLODIPINE BESYLATE 10 MG: 10 TABLET ORAL at 09:09

## 2018-12-17 RX ADMIN — MAGNESIUM HYDROXIDE 30 ML: 400 SUSPENSION ORAL at 09:08

## 2018-12-17 RX ADMIN — ORPHENADRINE CITRATE 100 MG: 100 TABLET, EXTENDED RELEASE ORAL at 09:18

## 2018-12-17 RX ADMIN — ORPHENADRINE CITRATE 100 MG: 100 TABLET, EXTENDED RELEASE ORAL at 20:39

## 2018-12-17 RX ADMIN — GUAIFENESIN 600 MG: 600 TABLET, EXTENDED RELEASE ORAL at 20:38

## 2018-12-17 RX ADMIN — TRAZODONE HYDROCHLORIDE 50 MG: 50 TABLET ORAL at 22:54

## 2018-12-17 RX ADMIN — CLONAZEPAM 1 MG: 0.5 TABLET ORAL at 09:08

## 2018-12-17 RX ADMIN — BACLOFEN 10 MG: 10 TABLET ORAL at 09:09

## 2018-12-17 RX ADMIN — MONTELUKAST SODIUM 10 MG: 10 TABLET, COATED ORAL at 09:09

## 2018-12-17 RX ADMIN — IPRATROPIUM BROMIDE AND ALBUTEROL SULFATE 3 ML: .5; 3 SOLUTION RESPIRATORY (INHALATION) at 11:15

## 2018-12-17 RX ADMIN — LEVOTHYROXINE SODIUM 150 MCG: 75 TABLET ORAL at 06:26

## 2018-12-17 RX ADMIN — THEOPHYLLINE 400 MG: 400 TABLET, EXTENDED RELEASE ORAL at 09:18

## 2018-12-17 RX ADMIN — DOXYCYCLINE HYCLATE 100 MG: 100 TABLET, COATED ORAL at 20:38

## 2018-12-17 RX ADMIN — METHYLPREDNISOLONE SODIUM SUCCINATE 40 MG: 40 INJECTION, POWDER, LYOPHILIZED, FOR SOLUTION INTRAMUSCULAR; INTRAVENOUS at 16:27

## 2018-12-17 RX ADMIN — ASPIRIN 81 MG 81 MG: 81 TABLET ORAL at 09:09

## 2018-12-17 RX ADMIN — PANTOPRAZOLE SODIUM 40 MG: 40 TABLET, DELAYED RELEASE ORAL at 06:26

## 2018-12-17 ASSESSMENT — PAIN SCALES - GENERAL
PAINLEVEL_OUTOF10: 7
PAINLEVEL_OUTOF10: 0
PAINLEVEL_OUTOF10: 0

## 2018-12-18 VITALS
TEMPERATURE: 98 F | WEIGHT: 177.19 LBS | OXYGEN SATURATION: 91 % | DIASTOLIC BLOOD PRESSURE: 66 MMHG | RESPIRATION RATE: 20 BRPM | SYSTOLIC BLOOD PRESSURE: 147 MMHG | HEIGHT: 62 IN | BODY MASS INDEX: 32.61 KG/M2 | HEART RATE: 79 BPM

## 2018-12-18 PROCEDURE — 6370000000 HC RX 637 (ALT 250 FOR IP): Performed by: INTERNAL MEDICINE

## 2018-12-18 PROCEDURE — 2580000003 HC RX 258: Performed by: PHYSICIAN ASSISTANT

## 2018-12-18 PROCEDURE — 6360000002 HC RX W HCPCS: Performed by: INTERNAL MEDICINE

## 2018-12-18 PROCEDURE — 94640 AIRWAY INHALATION TREATMENT: CPT

## 2018-12-18 PROCEDURE — 94761 N-INVAS EAR/PLS OXIMETRY MLT: CPT

## 2018-12-18 RX ORDER — DOXYCYCLINE HYCLATE 100 MG
100 TABLET ORAL EVERY 12 HOURS SCHEDULED
Qty: 20 TABLET | Refills: 0 | Status: SHIPPED | OUTPATIENT
Start: 2018-12-18 | End: 2018-12-28

## 2018-12-18 RX ORDER — CEFDINIR 300 MG/1
300 CAPSULE ORAL 2 TIMES DAILY
Qty: 14 CAPSULE | Refills: 0 | Status: SHIPPED | OUTPATIENT
Start: 2018-12-18 | End: 2018-12-25

## 2018-12-18 RX ORDER — GUAIFENESIN 600 MG/1
600 TABLET, EXTENDED RELEASE ORAL 2 TIMES DAILY
Qty: 60 TABLET | Refills: 2 | Status: SHIPPED | OUTPATIENT
Start: 2018-12-18 | End: 2019-03-18

## 2018-12-18 RX ORDER — BACLOFEN 10 MG/1
10 TABLET ORAL 3 TIMES DAILY
Qty: 30 TABLET | Refills: 0 | Status: ON HOLD | OUTPATIENT
Start: 2018-12-18 | End: 2021-05-04

## 2018-12-18 RX ORDER — METHYLPREDNISOLONE 4 MG/1
TABLET ORAL
Qty: 1 KIT | Refills: 0 | Status: SHIPPED | OUTPATIENT
Start: 2018-12-18 | End: 2018-12-24

## 2018-12-18 RX ADMIN — BACLOFEN 10 MG: 10 TABLET ORAL at 09:57

## 2018-12-18 RX ADMIN — CLONAZEPAM 1 MG: 0.5 TABLET ORAL at 13:32

## 2018-12-18 RX ADMIN — BUSPIRONE HYDROCHLORIDE 10 MG: 5 TABLET ORAL at 13:32

## 2018-12-18 RX ADMIN — THEOPHYLLINE 400 MG: 400 TABLET, EXTENDED RELEASE ORAL at 09:58

## 2018-12-18 RX ADMIN — MONTELUKAST SODIUM 10 MG: 10 TABLET, COATED ORAL at 09:57

## 2018-12-18 RX ADMIN — DOXYCYCLINE HYCLATE 100 MG: 100 TABLET, COATED ORAL at 09:57

## 2018-12-18 RX ADMIN — SODIUM CHLORIDE, PRESERVATIVE FREE 10 ML: 5 INJECTION INTRAVENOUS at 09:56

## 2018-12-18 RX ADMIN — CLONAZEPAM 1 MG: 0.5 TABLET ORAL at 09:57

## 2018-12-18 RX ADMIN — METHYLPREDNISOLONE SODIUM SUCCINATE 40 MG: 40 INJECTION, POWDER, LYOPHILIZED, FOR SOLUTION INTRAMUSCULAR; INTRAVENOUS at 06:06

## 2018-12-18 RX ADMIN — BUSPIRONE HYDROCHLORIDE 10 MG: 5 TABLET ORAL at 09:57

## 2018-12-18 RX ADMIN — ORPHENADRINE CITRATE 100 MG: 100 TABLET, EXTENDED RELEASE ORAL at 09:57

## 2018-12-18 RX ADMIN — ASPIRIN 81 MG 81 MG: 81 TABLET ORAL at 09:57

## 2018-12-18 RX ADMIN — GUAIFENESIN 600 MG: 600 TABLET, EXTENDED RELEASE ORAL at 09:57

## 2018-12-18 RX ADMIN — IPRATROPIUM BROMIDE AND ALBUTEROL SULFATE 3 ML: .5; 3 SOLUTION RESPIRATORY (INHALATION) at 07:48

## 2018-12-18 RX ADMIN — LEVOTHYROXINE SODIUM 150 MCG: 75 TABLET ORAL at 06:05

## 2018-12-18 RX ADMIN — POTASSIUM CHLORIDE 40 MEQ: 20 TABLET, EXTENDED RELEASE ORAL at 09:56

## 2018-12-18 RX ADMIN — IPRATROPIUM BROMIDE AND ALBUTEROL SULFATE 3 ML: .5; 3 SOLUTION RESPIRATORY (INHALATION) at 11:10

## 2018-12-18 RX ADMIN — ATORVASTATIN CALCIUM 40 MG: 40 TABLET, FILM COATED ORAL at 09:57

## 2018-12-18 RX ADMIN — AMLODIPINE BESYLATE 10 MG: 10 TABLET ORAL at 09:57

## 2018-12-18 RX ADMIN — PANTOPRAZOLE SODIUM 40 MG: 40 TABLET, DELAYED RELEASE ORAL at 06:08

## 2018-12-18 RX ADMIN — BACLOFEN 10 MG: 10 TABLET ORAL at 13:32

## 2018-12-18 RX ADMIN — POTASSIUM & SODIUM PHOSPHATES POWDER PACK 280-160-250 MG 250 MG: 280-160-250 PACK at 09:57

## 2018-12-18 ASSESSMENT — PAIN SCALES - GENERAL: PAINLEVEL_OUTOF10: 0

## 2018-12-18 NOTE — DISCHARGE SUMMARY
release tablet Comments:   Reason for Stopping:             Activity: activity as tolerated  Diet: regular diet      Follow-up with Dr. Amie Mason in 1 week    Signed:  Ghassna Calle  12/20/2018 12/20/2018  6:16 PM

## 2018-12-18 NOTE — PROGRESS NOTES
12/17/2018  2320  Pt wearing her own Cpap mchine throughout the night. Pt on room air. Pt tolerating well.  Pt wearing nasal pillows
rate and rhythm, S1, S2 normal, no murmur, click, rub or gallop  Abdomen: soft, no tenderness; bowel sounds normal; no masses,  no organomegaly  Extremities: extremities normal, atraumatic, no cyanosis or edema  Skin: Skin color, texture, turgor normal. No rashes or lesions        Labs and imaging reviewed.    CBC with Differential:    Lab Results   Component Value Date    WBC 7.6 12/16/2018    RBC 3.94 12/16/2018    HGB 11.6 12/16/2018    HCT 36.7 12/16/2018     12/16/2018    MCV 93.1 12/16/2018    MCH 29.4 12/16/2018    MCHC 31.6 12/16/2018    RDW 12.9 12/16/2018    SEGSPCT 89.7 12/16/2018    BANDSPCT 3 03/19/2018    LYMPHOPCT 4.6 12/16/2018    MONOPCT 4.8 12/16/2018    MYELOPCT 1 03/15/2011    BASOPCT 0.1 12/16/2018    MONOSABS 0.4 12/16/2018    LYMPHSABS 0.4 12/16/2018    EOSABS 0.0 12/16/2018    BASOSABS 0.0 12/16/2018    DIFFTYPE AUTOMATED DIFFERENTIAL 12/16/2018     CMP:    Lab Results   Component Value Date     12/16/2018    K 3.7 12/16/2018    CL 87 12/16/2018    CO2 34 12/16/2018    BUN 10 12/16/2018    CREATININE 0.7 12/16/2018    GFRAA >60 12/16/2018    LABGLOM >60 12/16/2018    GLUCOSE 175 12/16/2018    PROT 6.3 12/16/2018    PROT 6.9 01/23/2013    LABALBU 4.3 12/16/2018    CALCIUM 9.3 12/16/2018    BILITOT 0.1 12/16/2018    ALKPHOS 78 12/16/2018    AST 20 12/16/2018    ALT 23 12/16/2018     BMP:    Lab Results   Component Value Date     12/16/2018    K 3.7 12/16/2018    CL 87 12/16/2018    CO2 34 12/16/2018    BUN 10 12/16/2018    LABALBU 4.3 12/16/2018    CREATININE 0.7 12/16/2018    CALCIUM 9.3 12/16/2018    GFRAA >60 12/16/2018    LABGLOM >60 12/16/2018    GLUCOSE 175 12/16/2018     Sodium:    Lab Results   Component Value Date     12/16/2018     Potassium:    Lab Results   Component Value Date    K 3.7 12/16/2018     Calcium:    Lab Results   Component Value Date    CALCIUM 9.3 12/16/2018     Warfarin PT/INR:  No components found for: PTPATWAR, PTINRWAR  PTT:    Lab Results

## 2018-12-19 LAB
CULTURE: NORMAL
GRAM SMEAR: NORMAL
Lab: NORMAL
ORGANISM: NORMAL
REPORT STATUS: NORMAL
SPECIMEN: NORMAL

## 2019-02-02 ENCOUNTER — APPOINTMENT (OUTPATIENT)
Dept: GENERAL RADIOLOGY | Age: 59
DRG: 191 | End: 2019-02-02
Payer: COMMERCIAL

## 2019-02-02 ENCOUNTER — HOSPITAL ENCOUNTER (INPATIENT)
Age: 59
LOS: 12 days | Discharge: HOME | DRG: 191 | End: 2019-02-14
Attending: EMERGENCY MEDICINE | Admitting: INTERNAL MEDICINE
Payer: COMMERCIAL

## 2019-02-02 DIAGNOSIS — J44.1 COPD EXACERBATION (HCC): Primary | ICD-10-CM

## 2019-02-02 PROBLEM — J44.9 COPD (CHRONIC OBSTRUCTIVE PULMONARY DISEASE) (HCC): Status: ACTIVE | Noted: 2019-02-02

## 2019-02-02 LAB
ANION GAP SERPL CALCULATED.3IONS-SCNC: 14 MMOL/L (ref 4–16)
BASE EXCESS MIXED: 13.8 (ref 0–2.3)
BUN BLDV-MCNC: 9 MG/DL (ref 6–23)
CALCIUM SERPL-MCNC: 9.6 MG/DL (ref 8.3–10.6)
CHLORIDE BLD-SCNC: 83 MMOL/L (ref 99–110)
CO2: 34 MMOL/L (ref 21–32)
CREAT SERPL-MCNC: 0.8 MG/DL (ref 0.6–1.1)
GFR AFRICAN AMERICAN: >60 ML/MIN/1.73M2
GFR NON-AFRICAN AMERICAN: >60 ML/MIN/1.73M2
GLUCOSE BLD-MCNC: 82 MG/DL (ref 70–99)
HCO3 VENOUS: 40 MMOL/L (ref 19–25)
HCT VFR BLD CALC: 40 % (ref 37–47)
HEMOGLOBIN: 12.7 GM/DL (ref 12.5–16)
MAGNESIUM: 1.5 MG/DL (ref 1.8–2.4)
MCH RBC QN AUTO: 28.4 PG (ref 27–31)
MCHC RBC AUTO-ENTMCNC: 31.8 % (ref 32–36)
MCV RBC AUTO: 89.5 FL (ref 78–100)
O2 SAT, VEN: 82.9 % (ref 50–70)
PCO2, VEN: 55 MMHG (ref 38–52)
PDW BLD-RTO: 13 % (ref 11.7–14.9)
PH VENOUS: 7.47 (ref 7.32–7.42)
PLATELET # BLD: 275 K/CU MM (ref 140–440)
PMV BLD AUTO: 10.1 FL (ref 7.5–11.1)
PO2, VEN: 43 MMHG (ref 28–48)
POTASSIUM SERPL-SCNC: 3.4 MMOL/L (ref 3.5–5.1)
PRO-BNP: 485.7 PG/ML
RBC # BLD: 4.47 M/CU MM (ref 4.2–5.4)
SODIUM BLD-SCNC: 131 MMOL/L (ref 135–145)
TROPONIN T: <0.01 NG/ML
WBC # BLD: 5.3 K/CU MM (ref 4–10.5)

## 2019-02-02 PROCEDURE — 94761 N-INVAS EAR/PLS OXIMETRY MLT: CPT

## 2019-02-02 PROCEDURE — 6370000000 HC RX 637 (ALT 250 FOR IP): Performed by: EMERGENCY MEDICINE

## 2019-02-02 PROCEDURE — 94660 CPAP INITIATION&MGMT: CPT

## 2019-02-02 PROCEDURE — 6360000002 HC RX W HCPCS: Performed by: EMERGENCY MEDICINE

## 2019-02-02 PROCEDURE — 96375 TX/PRO/DX INJ NEW DRUG ADDON: CPT

## 2019-02-02 PROCEDURE — 83880 ASSAY OF NATRIURETIC PEPTIDE: CPT

## 2019-02-02 PROCEDURE — 96365 THER/PROPH/DIAG IV INF INIT: CPT

## 2019-02-02 PROCEDURE — 84484 ASSAY OF TROPONIN QUANT: CPT

## 2019-02-02 PROCEDURE — 94640 AIRWAY INHALATION TREATMENT: CPT

## 2019-02-02 PROCEDURE — 99285 EMERGENCY DEPT VISIT HI MDM: CPT

## 2019-02-02 PROCEDURE — 2060000000 HC ICU INTERMEDIATE R&B

## 2019-02-02 PROCEDURE — 83735 ASSAY OF MAGNESIUM: CPT

## 2019-02-02 PROCEDURE — 2700000000 HC OXYGEN THERAPY PER DAY

## 2019-02-02 PROCEDURE — 80048 BASIC METABOLIC PNL TOTAL CA: CPT

## 2019-02-02 PROCEDURE — 71046 X-RAY EXAM CHEST 2 VIEWS: CPT

## 2019-02-02 PROCEDURE — 85027 COMPLETE CBC AUTOMATED: CPT

## 2019-02-02 PROCEDURE — 82805 BLOOD GASES W/O2 SATURATION: CPT

## 2019-02-02 RX ORDER — SODIUM CHLORIDE 0.9 % (FLUSH) 0.9 %
10 SYRINGE (ML) INJECTION EVERY 12 HOURS SCHEDULED
Status: DISCONTINUED | OUTPATIENT
Start: 2019-02-02 | End: 2019-02-03

## 2019-02-02 RX ORDER — SODIUM CHLORIDE 0.9 % (FLUSH) 0.9 %
10 SYRINGE (ML) INJECTION PRN
Status: DISCONTINUED | OUTPATIENT
Start: 2019-02-02 | End: 2019-02-03

## 2019-02-02 RX ORDER — ALBUTEROL SULFATE 2.5 MG/3ML
SOLUTION RESPIRATORY (INHALATION)
Status: DISCONTINUED
Start: 2019-02-02 | End: 2019-02-03

## 2019-02-02 RX ORDER — ALBUTEROL SULFATE 2.5 MG/3ML
2.5 SOLUTION RESPIRATORY (INHALATION)
Status: DISCONTINUED | OUTPATIENT
Start: 2019-02-02 | End: 2019-02-14 | Stop reason: HOSPADM

## 2019-02-02 RX ORDER — MAGNESIUM SULFATE IN WATER 40 MG/ML
2 INJECTION, SOLUTION INTRAVENOUS ONCE
Status: COMPLETED | OUTPATIENT
Start: 2019-02-02 | End: 2019-02-02

## 2019-02-02 RX ORDER — IPRATROPIUM BROMIDE AND ALBUTEROL SULFATE 2.5; .5 MG/3ML; MG/3ML
1 SOLUTION RESPIRATORY (INHALATION) ONCE
Status: COMPLETED | OUTPATIENT
Start: 2019-02-02 | End: 2019-02-02

## 2019-02-02 RX ORDER — METHYLPREDNISOLONE SODIUM SUCCINATE 125 MG/2ML
80 INJECTION, POWDER, LYOPHILIZED, FOR SOLUTION INTRAMUSCULAR; INTRAVENOUS ONCE
Status: COMPLETED | OUTPATIENT
Start: 2019-02-02 | End: 2019-02-02

## 2019-02-02 RX ORDER — ALBUTEROL SULFATE 2.5 MG/3ML
5 SOLUTION RESPIRATORY (INHALATION) ONCE
Status: COMPLETED | OUTPATIENT
Start: 2019-02-02 | End: 2019-02-02

## 2019-02-02 RX ORDER — ACETAMINOPHEN 325 MG/1
650 TABLET ORAL EVERY 4 HOURS PRN
Status: DISCONTINUED | OUTPATIENT
Start: 2019-02-02 | End: 2019-02-14 | Stop reason: HOSPADM

## 2019-02-02 RX ORDER — ACETAMINOPHEN 500 MG
1000 TABLET ORAL ONCE
Status: COMPLETED | OUTPATIENT
Start: 2019-02-02 | End: 2019-02-02

## 2019-02-02 RX ADMIN — IPRATROPIUM BROMIDE AND ALBUTEROL SULFATE 1 AMPULE: .5; 3 SOLUTION RESPIRATORY (INHALATION) at 20:13

## 2019-02-02 RX ADMIN — IPRATROPIUM BROMIDE AND ALBUTEROL SULFATE 1 AMPULE: .5; 3 SOLUTION RESPIRATORY (INHALATION) at 20:12

## 2019-02-02 RX ADMIN — ALBUTEROL SULFATE 5 MG: 2.5 SOLUTION RESPIRATORY (INHALATION) at 22:05

## 2019-02-02 RX ADMIN — ACETAMINOPHEN 1000 MG: 500 TABLET ORAL at 21:30

## 2019-02-02 RX ADMIN — MAGNESIUM SULFATE HEPTAHYDRATE 2 G: 40 INJECTION, SOLUTION INTRAVENOUS at 21:52

## 2019-02-02 RX ADMIN — METHYLPREDNISOLONE SODIUM SUCCINATE 80 MG: 125 INJECTION, POWDER, LYOPHILIZED, FOR SOLUTION INTRAMUSCULAR; INTRAVENOUS at 20:47

## 2019-02-02 ASSESSMENT — ENCOUNTER SYMPTOMS
SHORTNESS OF BREATH: 1
VOMITING: 0
ABDOMINAL PAIN: 0
DIARRHEA: 0
WHEEZING: 1
BACK PAIN: 1
COUGH: 1

## 2019-02-02 ASSESSMENT — PAIN DESCRIPTION - LOCATION: LOCATION: HIP;BACK

## 2019-02-02 ASSESSMENT — PAIN SCALES - GENERAL: PAINLEVEL_OUTOF10: 3

## 2019-02-02 ASSESSMENT — PAIN - FUNCTIONAL ASSESSMENT: PAIN_FUNCTIONAL_ASSESSMENT: ACTIVITIES ARE NOT PREVENTED

## 2019-02-02 ASSESSMENT — PAIN DESCRIPTION - FREQUENCY: FREQUENCY: CONTINUOUS

## 2019-02-02 ASSESSMENT — PAIN DESCRIPTION - PAIN TYPE: TYPE: CHRONIC PAIN

## 2019-02-02 ASSESSMENT — PAIN DESCRIPTION - PROGRESSION: CLINICAL_PROGRESSION: NOT CHANGED

## 2019-02-02 ASSESSMENT — PAIN DESCRIPTION - DESCRIPTORS: DESCRIPTORS: ACHING

## 2019-02-02 ASSESSMENT — PAIN DESCRIPTION - ONSET: ONSET: ON-GOING

## 2019-02-03 PROBLEM — J44.1 COPD WITH ACUTE EXACERBATION (HCC): Status: ACTIVE | Noted: 2019-02-03

## 2019-02-03 PROBLEM — M48.061 SPINAL STENOSIS OF LUMBAR REGION WITH RADICULOPATHY: Status: ACTIVE | Noted: 2017-03-17

## 2019-02-03 PROBLEM — M48.061 SPINAL STENOSIS, LUMBAR REGION, WITHOUT NEUROGENIC CLAUDICATION: Status: ACTIVE | Noted: 2017-03-17

## 2019-02-03 PROBLEM — J18.9 PNEUMONIA DUE TO INFECTIOUS ORGANISM: Status: ACTIVE | Noted: 2019-02-03

## 2019-02-03 PROBLEM — K25.9 GASTRIC ULCER: Status: ACTIVE | Noted: 2017-11-29

## 2019-02-03 PROBLEM — M54.16 SPINAL STENOSIS OF LUMBAR REGION WITH RADICULOPATHY: Status: ACTIVE | Noted: 2017-03-17

## 2019-02-03 LAB
BASOPHILS ABSOLUTE: 0 K/CU MM
BASOPHILS RELATIVE PERCENT: 0 % (ref 0–1)
DIFFERENTIAL TYPE: ABNORMAL
EOSINOPHILS ABSOLUTE: 0 K/CU MM
EOSINOPHILS RELATIVE PERCENT: 0 % (ref 0–3)
HCT VFR BLD CALC: 38.5 % (ref 37–47)
HEMOGLOBIN: 12.1 GM/DL (ref 12.5–16)
IMMATURE NEUTROPHIL %: 0.5 % (ref 0–0.43)
LYMPHOCYTES ABSOLUTE: 0.7 K/CU MM
LYMPHOCYTES RELATIVE PERCENT: 11.5 % (ref 24–44)
MCH RBC QN AUTO: 28.9 PG (ref 27–31)
MCHC RBC AUTO-ENTMCNC: 31.4 % (ref 32–36)
MCV RBC AUTO: 91.9 FL (ref 78–100)
MONOCYTES ABSOLUTE: 0.8 K/CU MM
MONOCYTES RELATIVE PERCENT: 14.1 % (ref 0–4)
NUCLEATED RBC %: 0 %
PDW BLD-RTO: 13.1 % (ref 11.7–14.9)
PLATELET # BLD: 290 K/CU MM (ref 140–440)
PMV BLD AUTO: 9.9 FL (ref 7.5–11.1)
RBC # BLD: 4.19 M/CU MM (ref 4.2–5.4)
SEGMENTED NEUTROPHILS ABSOLUTE COUNT: 4.4 K/CU MM
SEGMENTED NEUTROPHILS RELATIVE PERCENT: 73.9 % (ref 36–66)
TOTAL IMMATURE NEUTOROPHIL: 0.03 K/CU MM
TOTAL NUCLEATED RBC: 0 K/CU MM
WBC # BLD: 5.9 K/CU MM (ref 4–10.5)

## 2019-02-03 PROCEDURE — 2700000000 HC OXYGEN THERAPY PER DAY

## 2019-02-03 PROCEDURE — 2500000003 HC RX 250 WO HCPCS: Performed by: INTERNAL MEDICINE

## 2019-02-03 PROCEDURE — 6370000000 HC RX 637 (ALT 250 FOR IP): Performed by: INTERNAL MEDICINE

## 2019-02-03 PROCEDURE — 2580000003 HC RX 258: Performed by: INTERNAL MEDICINE

## 2019-02-03 PROCEDURE — 94640 AIRWAY INHALATION TREATMENT: CPT

## 2019-02-03 PROCEDURE — 2060000000 HC ICU INTERMEDIATE R&B

## 2019-02-03 PROCEDURE — 94664 DEMO&/EVAL PT USE INHALER: CPT

## 2019-02-03 PROCEDURE — 85025 COMPLETE CBC W/AUTO DIFF WBC: CPT

## 2019-02-03 PROCEDURE — 36415 COLL VENOUS BLD VENIPUNCTURE: CPT

## 2019-02-03 PROCEDURE — 6360000002 HC RX W HCPCS: Performed by: INTERNAL MEDICINE

## 2019-02-03 PROCEDURE — 94761 N-INVAS EAR/PLS OXIMETRY MLT: CPT

## 2019-02-03 RX ORDER — IBUPROFEN 400 MG/1
400 TABLET ORAL
Status: DISCONTINUED | OUTPATIENT
Start: 2019-02-03 | End: 2019-02-14 | Stop reason: HOSPADM

## 2019-02-03 RX ORDER — CLONAZEPAM 1 MG/1
1 TABLET ORAL 3 TIMES DAILY
Status: DISCONTINUED | OUTPATIENT
Start: 2019-02-03 | End: 2019-02-03

## 2019-02-03 RX ORDER — BACLOFEN 10 MG/1
10 TABLET ORAL 3 TIMES DAILY
Status: DISCONTINUED | OUTPATIENT
Start: 2019-02-03 | End: 2019-02-14 | Stop reason: HOSPADM

## 2019-02-03 RX ORDER — BUSPIRONE HYDROCHLORIDE 7.5 MG/1
15 TABLET ORAL 2 TIMES DAILY
Status: DISCONTINUED | OUTPATIENT
Start: 2019-02-03 | End: 2019-02-03

## 2019-02-03 RX ORDER — LEVOTHYROXINE SODIUM 0.15 MG/1
150 TABLET ORAL DAILY
Status: DISCONTINUED | OUTPATIENT
Start: 2019-02-03 | End: 2019-02-14 | Stop reason: HOSPADM

## 2019-02-03 RX ORDER — BUSPIRONE HYDROCHLORIDE 7.5 MG/1
15 TABLET ORAL 2 TIMES DAILY
Status: DISCONTINUED | OUTPATIENT
Start: 2019-02-03 | End: 2019-02-14 | Stop reason: HOSPADM

## 2019-02-03 RX ORDER — CLONAZEPAM 1 MG/1
1 TABLET ORAL 2 TIMES DAILY
Status: DISCONTINUED | OUTPATIENT
Start: 2019-02-03 | End: 2019-02-14 | Stop reason: HOSPADM

## 2019-02-03 RX ORDER — PANTOPRAZOLE SODIUM 40 MG/1
40 TABLET, DELAYED RELEASE ORAL
Status: DISCONTINUED | OUTPATIENT
Start: 2019-02-03 | End: 2019-02-14 | Stop reason: HOSPADM

## 2019-02-03 RX ORDER — IPRATROPIUM BROMIDE AND ALBUTEROL SULFATE 2.5; .5 MG/3ML; MG/3ML
1 SOLUTION RESPIRATORY (INHALATION) EVERY 4 HOURS
Status: DISCONTINUED | OUTPATIENT
Start: 2019-02-03 | End: 2019-02-04

## 2019-02-03 RX ORDER — DEXTROSE, SODIUM CHLORIDE, AND POTASSIUM CHLORIDE 5; .45; .15 G/100ML; G/100ML; G/100ML
INJECTION INTRAVENOUS CONTINUOUS
Status: DISCONTINUED | OUTPATIENT
Start: 2019-02-03 | End: 2019-02-06

## 2019-02-03 RX ORDER — CITALOPRAM 40 MG/1
40 TABLET ORAL DAILY
Status: DISCONTINUED | OUTPATIENT
Start: 2019-02-03 | End: 2019-02-14 | Stop reason: HOSPADM

## 2019-02-03 RX ORDER — TRAZODONE HYDROCHLORIDE 50 MG/1
50 TABLET ORAL NIGHTLY
Status: DISCONTINUED | OUTPATIENT
Start: 2019-02-03 | End: 2019-02-14 | Stop reason: HOSPADM

## 2019-02-03 RX ORDER — BUSPIRONE HYDROCHLORIDE 7.5 MG/1
15 TABLET ORAL 3 TIMES DAILY
Status: DISCONTINUED | OUTPATIENT
Start: 2019-02-03 | End: 2019-02-03

## 2019-02-03 RX ORDER — ATORVASTATIN CALCIUM 40 MG/1
40 TABLET, FILM COATED ORAL DAILY
Status: DISCONTINUED | OUTPATIENT
Start: 2019-02-03 | End: 2019-02-14 | Stop reason: HOSPADM

## 2019-02-03 RX ORDER — ONDANSETRON 2 MG/ML
4 INJECTION INTRAMUSCULAR; INTRAVENOUS EVERY 6 HOURS PRN
Status: DISCONTINUED | OUTPATIENT
Start: 2019-02-03 | End: 2019-02-14 | Stop reason: HOSPADM

## 2019-02-03 RX ORDER — MONTELUKAST SODIUM 10 MG/1
10 TABLET ORAL DAILY
Status: DISCONTINUED | OUTPATIENT
Start: 2019-02-03 | End: 2019-02-14 | Stop reason: HOSPADM

## 2019-02-03 RX ORDER — SODIUM CHLORIDE 0.9 % (FLUSH) 0.9 %
10 SYRINGE (ML) INJECTION PRN
Status: DISCONTINUED | OUTPATIENT
Start: 2019-02-03 | End: 2019-02-14 | Stop reason: HOSPADM

## 2019-02-03 RX ORDER — LIDOCAINE 4 G/G
1 PATCH TOPICAL DAILY
Status: DISCONTINUED | OUTPATIENT
Start: 2019-02-03 | End: 2019-02-14 | Stop reason: HOSPADM

## 2019-02-03 RX ORDER — GUAIFENESIN 600 MG/1
600 TABLET, EXTENDED RELEASE ORAL 2 TIMES DAILY
Status: DISCONTINUED | OUTPATIENT
Start: 2019-02-03 | End: 2019-02-14 | Stop reason: HOSPADM

## 2019-02-03 RX ORDER — ASPIRIN 81 MG/1
81 TABLET, CHEWABLE ORAL DAILY
Status: DISCONTINUED | OUTPATIENT
Start: 2019-02-03 | End: 2019-02-14 | Stop reason: HOSPADM

## 2019-02-03 RX ORDER — AMLODIPINE BESYLATE 10 MG/1
10 TABLET ORAL DAILY
Status: DISCONTINUED | OUTPATIENT
Start: 2019-02-03 | End: 2019-02-14 | Stop reason: HOSPADM

## 2019-02-03 RX ORDER — METHYLPREDNISOLONE SODIUM SUCCINATE 40 MG/ML
40 INJECTION, POWDER, LYOPHILIZED, FOR SOLUTION INTRAMUSCULAR; INTRAVENOUS EVERY 12 HOURS
Status: DISCONTINUED | OUTPATIENT
Start: 2019-02-03 | End: 2019-02-04

## 2019-02-03 RX ORDER — SODIUM CHLORIDE 0.9 % (FLUSH) 0.9 %
10 SYRINGE (ML) INJECTION EVERY 12 HOURS SCHEDULED
Status: DISCONTINUED | OUTPATIENT
Start: 2019-02-03 | End: 2019-02-14 | Stop reason: HOSPADM

## 2019-02-03 RX ORDER — MAGNESIUM SULFATE 1 G/100ML
1 INJECTION INTRAVENOUS ONCE
Status: COMPLETED | OUTPATIENT
Start: 2019-02-03 | End: 2019-02-03

## 2019-02-03 RX ORDER — METHYLPREDNISOLONE SODIUM SUCCINATE 40 MG/ML
40 INJECTION, POWDER, LYOPHILIZED, FOR SOLUTION INTRAMUSCULAR; INTRAVENOUS DAILY
Status: DISCONTINUED | OUTPATIENT
Start: 2019-02-03 | End: 2019-02-03

## 2019-02-03 RX ORDER — TRIAMTERENE AND HYDROCHLOROTHIAZIDE 37.5; 25 MG/1; MG/1
1 TABLET ORAL DAILY
Status: DISCONTINUED | OUTPATIENT
Start: 2019-02-03 | End: 2019-02-14 | Stop reason: HOSPADM

## 2019-02-03 RX ORDER — POTASSIUM CHLORIDE 20 MEQ/1
40 TABLET, EXTENDED RELEASE ORAL 2 TIMES DAILY WITH MEALS
Status: DISCONTINUED | OUTPATIENT
Start: 2019-02-03 | End: 2019-02-14 | Stop reason: HOSPADM

## 2019-02-03 RX ORDER — CITALOPRAM 40 MG/1
40 TABLET ORAL DAILY
Status: ON HOLD | COMMUNITY
Start: 2018-12-24 | End: 2019-03-02

## 2019-02-03 RX ADMIN — IPRATROPIUM BROMIDE AND ALBUTEROL SULFATE 3 ML: .5; 3 SOLUTION RESPIRATORY (INHALATION) at 03:50

## 2019-02-03 RX ADMIN — POTASSIUM CHLORIDE, DEXTROSE MONOHYDRATE AND SODIUM CHLORIDE: 150; 5; 450 INJECTION, SOLUTION INTRAVENOUS at 15:46

## 2019-02-03 RX ADMIN — LEVOTHYROXINE SODIUM 150 MCG: 150 TABLET ORAL at 05:26

## 2019-02-03 RX ADMIN — SODIUM CHLORIDE, PRESERVATIVE FREE 10 ML: 5 INJECTION INTRAVENOUS at 20:05

## 2019-02-03 RX ADMIN — BACLOFEN 10 MG: 10 TABLET ORAL at 09:26

## 2019-02-03 RX ADMIN — TRAZODONE HYDROCHLORIDE 50 MG: 50 TABLET ORAL at 20:59

## 2019-02-03 RX ADMIN — SODIUM CHLORIDE, PRESERVATIVE FREE 10 ML: 5 INJECTION INTRAVENOUS at 00:08

## 2019-02-03 RX ADMIN — MONTELUKAST SODIUM 10 MG: 10 TABLET, COATED ORAL at 09:26

## 2019-02-03 RX ADMIN — IBUPROFEN 400 MG: 400 TABLET ORAL at 15:46

## 2019-02-03 RX ADMIN — ACETAMINOPHEN 650 MG: 325 TABLET ORAL at 00:07

## 2019-02-03 RX ADMIN — CITALOPRAM HYDROBROMIDE 40 MG: 40 TABLET ORAL at 09:25

## 2019-02-03 RX ADMIN — GUAIFENESIN 600 MG: 600 TABLET, EXTENDED RELEASE ORAL at 09:25

## 2019-02-03 RX ADMIN — BACLOFEN 10 MG: 10 TABLET ORAL at 13:27

## 2019-02-03 RX ADMIN — GUAIFENESIN 600 MG: 600 TABLET, EXTENDED RELEASE ORAL at 01:12

## 2019-02-03 RX ADMIN — CEFTRIAXONE 1 G: 1 INJECTION, POWDER, FOR SOLUTION INTRAMUSCULAR; INTRAVENOUS at 15:49

## 2019-02-03 RX ADMIN — MAGNESIUM SULFATE HEPTAHYDRATE 1 G: 1 INJECTION, SOLUTION INTRAVENOUS at 01:19

## 2019-02-03 RX ADMIN — TRIAMTERENE AND HYDROCHLOROTHIAZIDE 1 TABLET: 37.5; 25 TABLET ORAL at 09:24

## 2019-02-03 RX ADMIN — IPRATROPIUM BROMIDE AND ALBUTEROL SULFATE 3 ML: .5; 3 SOLUTION RESPIRATORY (INHALATION) at 12:12

## 2019-02-03 RX ADMIN — GUAIFENESIN 600 MG: 600 TABLET, EXTENDED RELEASE ORAL at 20:06

## 2019-02-03 RX ADMIN — TRAZODONE HYDROCHLORIDE 50 MG: 50 TABLET ORAL at 01:12

## 2019-02-03 RX ADMIN — BACLOFEN 10 MG: 10 TABLET ORAL at 20:06

## 2019-02-03 RX ADMIN — IPRATROPIUM BROMIDE AND ALBUTEROL SULFATE 3 ML: .5; 3 SOLUTION RESPIRATORY (INHALATION) at 15:58

## 2019-02-03 RX ADMIN — IPRATROPIUM BROMIDE AND ALBUTEROL SULFATE 3 ML: .5; 3 SOLUTION RESPIRATORY (INHALATION) at 19:55

## 2019-02-03 RX ADMIN — BUSPIRONE HYDROCHLORIDE 15 MG: 7.5 TABLET ORAL at 09:23

## 2019-02-03 RX ADMIN — POTASSIUM CHLORIDE 40 MEQ: 20 TABLET, EXTENDED RELEASE ORAL at 09:25

## 2019-02-03 RX ADMIN — PANTOPRAZOLE SODIUM 40 MG: 40 TABLET, DELAYED RELEASE ORAL at 05:27

## 2019-02-03 RX ADMIN — IPRATROPIUM BROMIDE AND ALBUTEROL SULFATE 3 ML: .5; 3 SOLUTION RESPIRATORY (INHALATION) at 08:12

## 2019-02-03 RX ADMIN — THEOPHYLLINE ANHYDROUS 400 MG: 200 CAPSULE, EXTENDED RELEASE ORAL at 09:24

## 2019-02-03 RX ADMIN — CLONAZEPAM 1 MG: 1 TABLET ORAL at 20:06

## 2019-02-03 RX ADMIN — BUSPIRONE HYDROCHLORIDE 15 MG: 7.5 TABLET ORAL at 20:59

## 2019-02-03 RX ADMIN — IBUPROFEN 400 MG: 400 TABLET ORAL at 12:02

## 2019-02-03 RX ADMIN — AZITHROMYCIN MONOHYDRATE 500 MG: 500 INJECTION, POWDER, LYOPHILIZED, FOR SOLUTION INTRAVENOUS at 01:32

## 2019-02-03 RX ADMIN — METHYLPREDNISOLONE SODIUM SUCCINATE 40 MG: 40 INJECTION, POWDER, LYOPHILIZED, FOR SOLUTION INTRAMUSCULAR; INTRAVENOUS at 09:24

## 2019-02-03 RX ADMIN — CLONAZEPAM 1 MG: 1 TABLET ORAL at 09:26

## 2019-02-03 RX ADMIN — POTASSIUM CHLORIDE, DEXTROSE MONOHYDRATE AND SODIUM CHLORIDE: 150; 5; 450 INJECTION, SOLUTION INTRAVENOUS at 01:13

## 2019-02-03 RX ADMIN — AMLODIPINE BESYLATE 10 MG: 10 TABLET ORAL at 09:25

## 2019-02-03 RX ADMIN — METHYLPREDNISOLONE SODIUM SUCCINATE 40 MG: 40 INJECTION, POWDER, LYOPHILIZED, FOR SOLUTION INTRAMUSCULAR; INTRAVENOUS at 20:05

## 2019-02-03 RX ADMIN — ATORVASTATIN CALCIUM 40 MG: 40 TABLET, FILM COATED ORAL at 09:25

## 2019-02-03 RX ADMIN — ASPIRIN 81 MG 81 MG: 81 TABLET ORAL at 09:25

## 2019-02-03 RX ADMIN — POTASSIUM CHLORIDE 40 MEQ: 20 TABLET, EXTENDED RELEASE ORAL at 15:46

## 2019-02-03 RX ADMIN — IBUPROFEN 400 MG: 400 TABLET ORAL at 09:26

## 2019-02-03 RX ADMIN — IPRATROPIUM BROMIDE AND ALBUTEROL SULFATE 3 ML: .5; 3 SOLUTION RESPIRATORY (INHALATION) at 00:56

## 2019-02-03 RX ADMIN — SODIUM CHLORIDE, PRESERVATIVE FREE 10 ML: 5 INJECTION INTRAVENOUS at 09:23

## 2019-02-03 ASSESSMENT — PAIN DESCRIPTION - FREQUENCY
FREQUENCY: INTERMITTENT
FREQUENCY: INTERMITTENT

## 2019-02-03 ASSESSMENT — PAIN SCALES - GENERAL
PAINLEVEL_OUTOF10: 0
PAINLEVEL_OUTOF10: 2
PAINLEVEL_OUTOF10: 2
PAINLEVEL_OUTOF10: 0
PAINLEVEL_OUTOF10: 2
PAINLEVEL_OUTOF10: 0
PAINLEVEL_OUTOF10: 3

## 2019-02-03 ASSESSMENT — PAIN DESCRIPTION - PROGRESSION
CLINICAL_PROGRESSION: NOT CHANGED
CLINICAL_PROGRESSION: NOT CHANGED

## 2019-02-03 ASSESSMENT — PAIN DESCRIPTION - PAIN TYPE
TYPE: CHRONIC PAIN
TYPE: CHRONIC PAIN

## 2019-02-03 ASSESSMENT — PAIN DESCRIPTION - LOCATION
LOCATION: BACK
LOCATION: BACK

## 2019-02-03 ASSESSMENT — PAIN DESCRIPTION - DESCRIPTORS
DESCRIPTORS: ACHING
DESCRIPTORS: ACHING

## 2019-02-03 ASSESSMENT — PAIN DESCRIPTION - ONSET: ONSET: ON-GOING

## 2019-02-04 ENCOUNTER — APPOINTMENT (OUTPATIENT)
Dept: GENERAL RADIOLOGY | Age: 59
DRG: 191 | End: 2019-02-04
Payer: COMMERCIAL

## 2019-02-04 PROBLEM — J40 BRONCHITIS: Status: ACTIVE | Noted: 2018-03-19

## 2019-02-04 LAB
ANION GAP SERPL CALCULATED.3IONS-SCNC: 10 MMOL/L (ref 4–16)
BUN BLDV-MCNC: 17 MG/DL (ref 6–23)
CALCIUM SERPL-MCNC: 9.8 MG/DL (ref 8.3–10.6)
CHLORIDE BLD-SCNC: 93 MMOL/L (ref 99–110)
CO2: 35 MMOL/L (ref 21–32)
CREAT SERPL-MCNC: 0.9 MG/DL (ref 0.6–1.1)
GFR AFRICAN AMERICAN: >60 ML/MIN/1.73M2
GFR NON-AFRICAN AMERICAN: >60 ML/MIN/1.73M2
GLUCOSE BLD-MCNC: 161 MG/DL (ref 70–99)
MAGNESIUM: 1.8 MG/DL (ref 1.8–2.4)
POTASSIUM SERPL-SCNC: 4.3 MMOL/L (ref 3.5–5.1)
SODIUM BLD-SCNC: 138 MMOL/L (ref 135–145)

## 2019-02-04 PROCEDURE — 80048 BASIC METABOLIC PNL TOTAL CA: CPT

## 2019-02-04 PROCEDURE — 94640 AIRWAY INHALATION TREATMENT: CPT

## 2019-02-04 PROCEDURE — 94761 N-INVAS EAR/PLS OXIMETRY MLT: CPT

## 2019-02-04 PROCEDURE — 6370000000 HC RX 637 (ALT 250 FOR IP): Performed by: INTERNAL MEDICINE

## 2019-02-04 PROCEDURE — 6360000002 HC RX W HCPCS: Performed by: INTERNAL MEDICINE

## 2019-02-04 PROCEDURE — 94668 MNPJ CHEST WALL SBSQ: CPT

## 2019-02-04 PROCEDURE — 83735 ASSAY OF MAGNESIUM: CPT

## 2019-02-04 PROCEDURE — 94667 MNPJ CHEST WALL 1ST: CPT

## 2019-02-04 PROCEDURE — 2500000003 HC RX 250 WO HCPCS: Performed by: INTERNAL MEDICINE

## 2019-02-04 PROCEDURE — 2700000000 HC OXYGEN THERAPY PER DAY

## 2019-02-04 PROCEDURE — 36415 COLL VENOUS BLD VENIPUNCTURE: CPT

## 2019-02-04 PROCEDURE — 2580000003 HC RX 258: Performed by: INTERNAL MEDICINE

## 2019-02-04 PROCEDURE — 71046 X-RAY EXAM CHEST 2 VIEWS: CPT

## 2019-02-04 PROCEDURE — 2060000000 HC ICU INTERMEDIATE R&B

## 2019-02-04 RX ORDER — IPRATROPIUM BROMIDE AND ALBUTEROL SULFATE 2.5; .5 MG/3ML; MG/3ML
1 SOLUTION RESPIRATORY (INHALATION) EVERY 4 HOURS PRN
Status: DISCONTINUED | OUTPATIENT
Start: 2019-02-04 | End: 2019-02-14 | Stop reason: HOSPADM

## 2019-02-04 RX ORDER — METHYLPREDNISOLONE SODIUM SUCCINATE 40 MG/ML
40 INJECTION, POWDER, LYOPHILIZED, FOR SOLUTION INTRAMUSCULAR; INTRAVENOUS EVERY 8 HOURS
Status: DISCONTINUED | OUTPATIENT
Start: 2019-02-04 | End: 2019-02-07

## 2019-02-04 RX ORDER — IPRATROPIUM BROMIDE AND ALBUTEROL SULFATE 2.5; .5 MG/3ML; MG/3ML
1 SOLUTION RESPIRATORY (INHALATION)
Status: DISCONTINUED | OUTPATIENT
Start: 2019-02-04 | End: 2019-02-14 | Stop reason: HOSPADM

## 2019-02-04 RX ADMIN — CITALOPRAM HYDROBROMIDE 40 MG: 40 TABLET ORAL at 08:55

## 2019-02-04 RX ADMIN — ASPIRIN 81 MG 81 MG: 81 TABLET ORAL at 08:55

## 2019-02-04 RX ADMIN — IBUPROFEN 400 MG: 400 TABLET ORAL at 13:30

## 2019-02-04 RX ADMIN — CLONAZEPAM 1 MG: 1 TABLET ORAL at 20:31

## 2019-02-04 RX ADMIN — CLONAZEPAM 1 MG: 1 TABLET ORAL at 09:53

## 2019-02-04 RX ADMIN — TRAZODONE HYDROCHLORIDE 50 MG: 50 TABLET ORAL at 20:31

## 2019-02-04 RX ADMIN — POTASSIUM CHLORIDE, DEXTROSE MONOHYDRATE AND SODIUM CHLORIDE: 150; 5; 450 INJECTION, SOLUTION INTRAVENOUS at 05:46

## 2019-02-04 RX ADMIN — IPRATROPIUM BROMIDE AND ALBUTEROL SULFATE 3 ML: .5; 3 SOLUTION RESPIRATORY (INHALATION) at 05:21

## 2019-02-04 RX ADMIN — IBUPROFEN 400 MG: 400 TABLET ORAL at 09:53

## 2019-02-04 RX ADMIN — IPRATROPIUM BROMIDE AND ALBUTEROL SULFATE 3 ML: .5; 3 SOLUTION RESPIRATORY (INHALATION) at 08:29

## 2019-02-04 RX ADMIN — SODIUM CHLORIDE, PRESERVATIVE FREE 10 ML: 5 INJECTION INTRAVENOUS at 10:20

## 2019-02-04 RX ADMIN — PANTOPRAZOLE SODIUM 40 MG: 40 TABLET, DELAYED RELEASE ORAL at 05:46

## 2019-02-04 RX ADMIN — BUSPIRONE HYDROCHLORIDE 15 MG: 7.5 TABLET ORAL at 09:52

## 2019-02-04 RX ADMIN — POTASSIUM CHLORIDE 40 MEQ: 20 TABLET, EXTENDED RELEASE ORAL at 16:26

## 2019-02-04 RX ADMIN — ENOXAPARIN SODIUM 40 MG: 40 INJECTION SUBCUTANEOUS at 08:56

## 2019-02-04 RX ADMIN — SODIUM CHLORIDE, PRESERVATIVE FREE 10 ML: 5 INJECTION INTRAVENOUS at 21:00

## 2019-02-04 RX ADMIN — BACLOFEN 10 MG: 10 TABLET ORAL at 13:30

## 2019-02-04 RX ADMIN — BENZOCAINE, MENTHOL 1 LOZENGE: 15; 3.6 LOZENGE ORAL at 22:16

## 2019-02-04 RX ADMIN — GUAIFENESIN 600 MG: 600 TABLET, EXTENDED RELEASE ORAL at 08:55

## 2019-02-04 RX ADMIN — BUSPIRONE HYDROCHLORIDE 15 MG: 7.5 TABLET ORAL at 20:31

## 2019-02-04 RX ADMIN — THEOPHYLLINE ANHYDROUS 400 MG: 200 CAPSULE, EXTENDED RELEASE ORAL at 08:53

## 2019-02-04 RX ADMIN — CEFTRIAXONE 1 G: 1 INJECTION, POWDER, FOR SOLUTION INTRAMUSCULAR; INTRAVENOUS at 13:30

## 2019-02-04 RX ADMIN — METHYLPREDNISOLONE SODIUM SUCCINATE 40 MG: 40 INJECTION, POWDER, LYOPHILIZED, FOR SOLUTION INTRAMUSCULAR; INTRAVENOUS at 16:26

## 2019-02-04 RX ADMIN — AZITHROMYCIN MONOHYDRATE 500 MG: 500 INJECTION, POWDER, LYOPHILIZED, FOR SOLUTION INTRAVENOUS at 01:52

## 2019-02-04 RX ADMIN — IBUPROFEN 400 MG: 400 TABLET ORAL at 17:38

## 2019-02-04 RX ADMIN — LEVOTHYROXINE SODIUM 150 MCG: 150 TABLET ORAL at 05:46

## 2019-02-04 RX ADMIN — BACLOFEN 10 MG: 10 TABLET ORAL at 08:54

## 2019-02-04 RX ADMIN — IPRATROPIUM BROMIDE AND ALBUTEROL SULFATE 3 ML: .5; 3 SOLUTION RESPIRATORY (INHALATION) at 20:46

## 2019-02-04 RX ADMIN — POTASSIUM CHLORIDE 40 MEQ: 20 TABLET, EXTENDED RELEASE ORAL at 08:56

## 2019-02-04 RX ADMIN — IPRATROPIUM BROMIDE AND ALBUTEROL SULFATE 3 ML: .5; 3 SOLUTION RESPIRATORY (INHALATION) at 12:29

## 2019-02-04 RX ADMIN — ATORVASTATIN CALCIUM 40 MG: 40 TABLET, FILM COATED ORAL at 08:54

## 2019-02-04 RX ADMIN — BACLOFEN 10 MG: 10 TABLET ORAL at 20:31

## 2019-02-04 RX ADMIN — AMLODIPINE BESYLATE 10 MG: 10 TABLET ORAL at 08:55

## 2019-02-04 RX ADMIN — GUAIFENESIN 600 MG: 600 TABLET, EXTENDED RELEASE ORAL at 20:31

## 2019-02-04 RX ADMIN — TRIAMTERENE AND HYDROCHLOROTHIAZIDE 1 TABLET: 37.5; 25 TABLET ORAL at 08:54

## 2019-02-04 RX ADMIN — MONTELUKAST SODIUM 10 MG: 10 TABLET, COATED ORAL at 08:54

## 2019-02-04 RX ADMIN — IPRATROPIUM BROMIDE AND ALBUTEROL SULFATE 3 ML: .5; 3 SOLUTION RESPIRATORY (INHALATION) at 16:44

## 2019-02-04 RX ADMIN — MAGNESIUM HYDROXIDE 30 ML: 400 SUSPENSION ORAL at 13:25

## 2019-02-04 ASSESSMENT — PAIN SCALES - GENERAL
PAINLEVEL_OUTOF10: 0
PAINLEVEL_OUTOF10: 4
PAINLEVEL_OUTOF10: 0
PAINLEVEL_OUTOF10: 6
PAINLEVEL_OUTOF10: 7

## 2019-02-05 LAB
ANION GAP SERPL CALCULATED.3IONS-SCNC: 8 MMOL/L (ref 4–16)
BUN BLDV-MCNC: 15 MG/DL (ref 6–23)
CALCIUM SERPL-MCNC: 9.1 MG/DL (ref 8.3–10.6)
CHLORIDE BLD-SCNC: 89 MMOL/L (ref 99–110)
CO2: 36 MMOL/L (ref 21–32)
CREAT SERPL-MCNC: 0.8 MG/DL (ref 0.6–1.1)
GFR AFRICAN AMERICAN: >60 ML/MIN/1.73M2
GFR NON-AFRICAN AMERICAN: >60 ML/MIN/1.73M2
GLUCOSE BLD-MCNC: 159 MG/DL (ref 70–99)
MAGNESIUM: 1.9 MG/DL (ref 1.8–2.4)
PHOSPHORUS: 3.2 MG/DL (ref 2.5–4.9)
POTASSIUM SERPL-SCNC: 4.2 MMOL/L (ref 3.5–5.1)
SODIUM BLD-SCNC: 133 MMOL/L (ref 135–145)
THEOPHYLLINE LEVEL: 6.9 UG/ML (ref 10–20)

## 2019-02-05 PROCEDURE — 2500000003 HC RX 250 WO HCPCS: Performed by: INTERNAL MEDICINE

## 2019-02-05 PROCEDURE — 84100 ASSAY OF PHOSPHORUS: CPT

## 2019-02-05 PROCEDURE — 2060000000 HC ICU INTERMEDIATE R&B

## 2019-02-05 PROCEDURE — 6360000002 HC RX W HCPCS: Performed by: INTERNAL MEDICINE

## 2019-02-05 PROCEDURE — 80048 BASIC METABOLIC PNL TOTAL CA: CPT

## 2019-02-05 PROCEDURE — 94640 AIRWAY INHALATION TREATMENT: CPT

## 2019-02-05 PROCEDURE — 6370000000 HC RX 637 (ALT 250 FOR IP): Performed by: INTERNAL MEDICINE

## 2019-02-05 PROCEDURE — 2580000003 HC RX 258: Performed by: INTERNAL MEDICINE

## 2019-02-05 PROCEDURE — 2700000000 HC OXYGEN THERAPY PER DAY

## 2019-02-05 PROCEDURE — 94668 MNPJ CHEST WALL SBSQ: CPT

## 2019-02-05 PROCEDURE — 80198 ASSAY OF THEOPHYLLINE: CPT

## 2019-02-05 PROCEDURE — 83735 ASSAY OF MAGNESIUM: CPT

## 2019-02-05 PROCEDURE — 94761 N-INVAS EAR/PLS OXIMETRY MLT: CPT

## 2019-02-05 PROCEDURE — 36415 COLL VENOUS BLD VENIPUNCTURE: CPT

## 2019-02-05 RX ADMIN — IBUPROFEN 400 MG: 400 TABLET ORAL at 16:24

## 2019-02-05 RX ADMIN — LEVOTHYROXINE SODIUM 150 MCG: 150 TABLET ORAL at 05:47

## 2019-02-05 RX ADMIN — IPRATROPIUM BROMIDE AND ALBUTEROL SULFATE 3 ML: .5; 3 SOLUTION RESPIRATORY (INHALATION) at 02:35

## 2019-02-05 RX ADMIN — CEFTRIAXONE 1 G: 1 INJECTION, POWDER, FOR SOLUTION INTRAMUSCULAR; INTRAVENOUS at 13:09

## 2019-02-05 RX ADMIN — METHYLPREDNISOLONE SODIUM SUCCINATE 40 MG: 40 INJECTION, POWDER, LYOPHILIZED, FOR SOLUTION INTRAMUSCULAR; INTRAVENOUS at 16:24

## 2019-02-05 RX ADMIN — TRIAMTERENE AND HYDROCHLOROTHIAZIDE 1 TABLET: 37.5; 25 TABLET ORAL at 08:28

## 2019-02-05 RX ADMIN — CITALOPRAM HYDROBROMIDE 40 MG: 40 TABLET ORAL at 08:28

## 2019-02-05 RX ADMIN — BUSPIRONE HYDROCHLORIDE 15 MG: 7.5 TABLET ORAL at 08:27

## 2019-02-05 RX ADMIN — IPRATROPIUM BROMIDE AND ALBUTEROL SULFATE 3 ML: .5; 3 SOLUTION RESPIRATORY (INHALATION) at 10:56

## 2019-02-05 RX ADMIN — POTASSIUM CHLORIDE 40 MEQ: 20 TABLET, EXTENDED RELEASE ORAL at 08:26

## 2019-02-05 RX ADMIN — BACLOFEN 10 MG: 10 TABLET ORAL at 20:34

## 2019-02-05 RX ADMIN — AZITHROMYCIN MONOHYDRATE 500 MG: 500 INJECTION, POWDER, LYOPHILIZED, FOR SOLUTION INTRAVENOUS at 02:11

## 2019-02-05 RX ADMIN — THEOPHYLLINE ANHYDROUS 200 MG: 200 CAPSULE, EXTENDED RELEASE ORAL at 20:34

## 2019-02-05 RX ADMIN — IBUPROFEN 400 MG: 400 TABLET ORAL at 08:27

## 2019-02-05 RX ADMIN — IBUPROFEN 400 MG: 400 TABLET ORAL at 11:20

## 2019-02-05 RX ADMIN — MONTELUKAST SODIUM 10 MG: 10 TABLET, COATED ORAL at 08:28

## 2019-02-05 RX ADMIN — ATORVASTATIN CALCIUM 40 MG: 40 TABLET, FILM COATED ORAL at 08:27

## 2019-02-05 RX ADMIN — TRAZODONE HYDROCHLORIDE 50 MG: 50 TABLET ORAL at 21:36

## 2019-02-05 RX ADMIN — ENOXAPARIN SODIUM 40 MG: 40 INJECTION SUBCUTANEOUS at 08:27

## 2019-02-05 RX ADMIN — CLONAZEPAM 1 MG: 1 TABLET ORAL at 08:27

## 2019-02-05 RX ADMIN — METHYLPREDNISOLONE SODIUM SUCCINATE 40 MG: 40 INJECTION, POWDER, LYOPHILIZED, FOR SOLUTION INTRAMUSCULAR; INTRAVENOUS at 02:11

## 2019-02-05 RX ADMIN — SODIUM CHLORIDE, PRESERVATIVE FREE 10 ML: 5 INJECTION INTRAVENOUS at 08:28

## 2019-02-05 RX ADMIN — GUAIFENESIN 600 MG: 600 TABLET, EXTENDED RELEASE ORAL at 08:26

## 2019-02-05 RX ADMIN — IPRATROPIUM BROMIDE AND ALBUTEROL SULFATE 3 ML: .5; 3 SOLUTION RESPIRATORY (INHALATION) at 07:58

## 2019-02-05 RX ADMIN — POTASSIUM CHLORIDE 40 MEQ: 20 TABLET, EXTENDED RELEASE ORAL at 16:24

## 2019-02-05 RX ADMIN — ASPIRIN 81 MG 81 MG: 81 TABLET ORAL at 08:26

## 2019-02-05 RX ADMIN — BACLOFEN 10 MG: 10 TABLET ORAL at 13:09

## 2019-02-05 RX ADMIN — METHYLPREDNISOLONE SODIUM SUCCINATE 40 MG: 40 INJECTION, POWDER, LYOPHILIZED, FOR SOLUTION INTRAMUSCULAR; INTRAVENOUS at 08:24

## 2019-02-05 RX ADMIN — PANTOPRAZOLE SODIUM 40 MG: 40 TABLET, DELAYED RELEASE ORAL at 05:47

## 2019-02-05 RX ADMIN — THEOPHYLLINE ANHYDROUS 400 MG: 200 CAPSULE, EXTENDED RELEASE ORAL at 08:27

## 2019-02-05 RX ADMIN — BACLOFEN 10 MG: 10 TABLET ORAL at 08:27

## 2019-02-05 RX ADMIN — BUSPIRONE HYDROCHLORIDE 15 MG: 7.5 TABLET ORAL at 20:34

## 2019-02-05 RX ADMIN — CLONAZEPAM 1 MG: 1 TABLET ORAL at 20:34

## 2019-02-05 RX ADMIN — IPRATROPIUM BROMIDE AND ALBUTEROL SULFATE 3 ML: .5; 3 SOLUTION RESPIRATORY (INHALATION) at 15:09

## 2019-02-05 RX ADMIN — IPRATROPIUM BROMIDE AND ALBUTEROL SULFATE 3 ML: .5; 3 SOLUTION RESPIRATORY (INHALATION) at 21:22

## 2019-02-05 RX ADMIN — GUAIFENESIN 600 MG: 600 TABLET, EXTENDED RELEASE ORAL at 20:34

## 2019-02-05 RX ADMIN — POTASSIUM CHLORIDE, DEXTROSE MONOHYDRATE AND SODIUM CHLORIDE: 150; 5; 450 INJECTION, SOLUTION INTRAVENOUS at 21:37

## 2019-02-05 RX ADMIN — POTASSIUM CHLORIDE, DEXTROSE MONOHYDRATE AND SODIUM CHLORIDE: 150; 5; 450 INJECTION, SOLUTION INTRAVENOUS at 05:47

## 2019-02-05 RX ADMIN — AMLODIPINE BESYLATE 10 MG: 10 TABLET ORAL at 08:28

## 2019-02-05 ASSESSMENT — PAIN SCALES - GENERAL
PAINLEVEL_OUTOF10: 0
PAINLEVEL_OUTOF10: 1
PAINLEVEL_OUTOF10: 1
PAINLEVEL_OUTOF10: 3

## 2019-02-06 LAB
ANION GAP SERPL CALCULATED.3IONS-SCNC: 9 MMOL/L (ref 4–16)
BUN BLDV-MCNC: 17 MG/DL (ref 6–23)
CALCIUM SERPL-MCNC: 9.3 MG/DL (ref 8.3–10.6)
CHLORIDE BLD-SCNC: 90 MMOL/L (ref 99–110)
CO2: 37 MMOL/L (ref 21–32)
CREAT SERPL-MCNC: 0.8 MG/DL (ref 0.6–1.1)
GFR AFRICAN AMERICAN: >60 ML/MIN/1.73M2
GFR NON-AFRICAN AMERICAN: >60 ML/MIN/1.73M2
GLUCOSE BLD-MCNC: 171 MG/DL (ref 70–99)
POTASSIUM SERPL-SCNC: 3.9 MMOL/L (ref 3.5–5.1)
SODIUM BLD-SCNC: 136 MMOL/L (ref 135–145)
THEOPHYLLINE LEVEL: 6.3 UG/ML (ref 10–20)

## 2019-02-06 PROCEDURE — 2060000000 HC ICU INTERMEDIATE R&B

## 2019-02-06 PROCEDURE — 6370000000 HC RX 637 (ALT 250 FOR IP): Performed by: INTERNAL MEDICINE

## 2019-02-06 PROCEDURE — 6360000002 HC RX W HCPCS: Performed by: INTERNAL MEDICINE

## 2019-02-06 PROCEDURE — 2500000003 HC RX 250 WO HCPCS: Performed by: INTERNAL MEDICINE

## 2019-02-06 PROCEDURE — 94761 N-INVAS EAR/PLS OXIMETRY MLT: CPT

## 2019-02-06 PROCEDURE — 94667 MNPJ CHEST WALL 1ST: CPT

## 2019-02-06 PROCEDURE — 80198 ASSAY OF THEOPHYLLINE: CPT

## 2019-02-06 PROCEDURE — 80048 BASIC METABOLIC PNL TOTAL CA: CPT

## 2019-02-06 PROCEDURE — 2580000003 HC RX 258: Performed by: INTERNAL MEDICINE

## 2019-02-06 PROCEDURE — 36415 COLL VENOUS BLD VENIPUNCTURE: CPT

## 2019-02-06 PROCEDURE — 2700000000 HC OXYGEN THERAPY PER DAY

## 2019-02-06 PROCEDURE — 94640 AIRWAY INHALATION TREATMENT: CPT

## 2019-02-06 PROCEDURE — 94668 MNPJ CHEST WALL SBSQ: CPT

## 2019-02-06 RX ORDER — FUROSEMIDE 10 MG/ML
20 INJECTION INTRAMUSCULAR; INTRAVENOUS ONCE
Status: COMPLETED | OUTPATIENT
Start: 2019-02-06 | End: 2019-02-06

## 2019-02-06 RX ORDER — CODEINE PHOSPHATE AND GUAIFENESIN 10; 100 MG/5ML; MG/5ML
5 SOLUTION ORAL EVERY 4 HOURS PRN
Status: DISCONTINUED | OUTPATIENT
Start: 2019-02-06 | End: 2019-02-11 | Stop reason: ALTCHOICE

## 2019-02-06 RX ORDER — CLONIDINE HYDROCHLORIDE 0.1 MG/1
0.1 TABLET ORAL 3 TIMES DAILY PRN
Status: DISCONTINUED | OUTPATIENT
Start: 2019-02-06 | End: 2019-02-14 | Stop reason: HOSPADM

## 2019-02-06 RX ORDER — MAGNESIUM HYDROXIDE/ALUMINUM HYDROXICE/SIMETHICONE 120; 1200; 1200 MG/30ML; MG/30ML; MG/30ML
30 SUSPENSION ORAL EVERY 6 HOURS PRN
Status: DISCONTINUED | OUTPATIENT
Start: 2019-02-06 | End: 2019-02-14 | Stop reason: HOSPADM

## 2019-02-06 RX ADMIN — BACLOFEN 10 MG: 10 TABLET ORAL at 14:05

## 2019-02-06 RX ADMIN — AZITHROMYCIN MONOHYDRATE 500 MG: 500 INJECTION, POWDER, LYOPHILIZED, FOR SOLUTION INTRAVENOUS at 01:28

## 2019-02-06 RX ADMIN — THEOPHYLLINE ANHYDROUS 200 MG: 200 CAPSULE, EXTENDED RELEASE ORAL at 09:22

## 2019-02-06 RX ADMIN — METHYLPREDNISOLONE SODIUM SUCCINATE 40 MG: 40 INJECTION, POWDER, LYOPHILIZED, FOR SOLUTION INTRAMUSCULAR; INTRAVENOUS at 16:41

## 2019-02-06 RX ADMIN — CEFTRIAXONE 1 G: 1 INJECTION, POWDER, FOR SOLUTION INTRAMUSCULAR; INTRAVENOUS at 14:05

## 2019-02-06 RX ADMIN — ONDANSETRON 4 MG: 2 INJECTION INTRAMUSCULAR; INTRAVENOUS at 19:08

## 2019-02-06 RX ADMIN — CITALOPRAM HYDROBROMIDE 40 MG: 40 TABLET ORAL at 08:21

## 2019-02-06 RX ADMIN — POTASSIUM CHLORIDE 40 MEQ: 20 TABLET, EXTENDED RELEASE ORAL at 08:21

## 2019-02-06 RX ADMIN — MAGNESIUM HYDROXIDE 30 ML: 400 SUSPENSION ORAL at 11:28

## 2019-02-06 RX ADMIN — IPRATROPIUM BROMIDE AND ALBUTEROL SULFATE 3 ML: .5; 3 SOLUTION RESPIRATORY (INHALATION) at 08:55

## 2019-02-06 RX ADMIN — CLONAZEPAM 1 MG: 1 TABLET ORAL at 20:15

## 2019-02-06 RX ADMIN — BUSPIRONE HYDROCHLORIDE 15 MG: 7.5 TABLET ORAL at 20:15

## 2019-02-06 RX ADMIN — POTASSIUM CHLORIDE, DEXTROSE MONOHYDRATE AND SODIUM CHLORIDE: 150; 5; 450 INJECTION, SOLUTION INTRAVENOUS at 11:12

## 2019-02-06 RX ADMIN — ALUMINUM HYDROXIDE, MAGNESIUM HYDROXIDE, AND SIMETHICONE 30 ML: 200; 200; 20 SUSPENSION ORAL at 23:17

## 2019-02-06 RX ADMIN — METHYLPREDNISOLONE SODIUM SUCCINATE 40 MG: 40 INJECTION, POWDER, LYOPHILIZED, FOR SOLUTION INTRAMUSCULAR; INTRAVENOUS at 08:29

## 2019-02-06 RX ADMIN — ENOXAPARIN SODIUM 40 MG: 40 INJECTION SUBCUTANEOUS at 08:29

## 2019-02-06 RX ADMIN — METHYLPREDNISOLONE SODIUM SUCCINATE 40 MG: 40 INJECTION, POWDER, LYOPHILIZED, FOR SOLUTION INTRAMUSCULAR; INTRAVENOUS at 01:28

## 2019-02-06 RX ADMIN — POTASSIUM CHLORIDE 40 MEQ: 20 TABLET, EXTENDED RELEASE ORAL at 16:41

## 2019-02-06 RX ADMIN — LEVOTHYROXINE SODIUM 150 MCG: 150 TABLET ORAL at 07:31

## 2019-02-06 RX ADMIN — IPRATROPIUM BROMIDE AND ALBUTEROL SULFATE 3 ML: .5; 3 SOLUTION RESPIRATORY (INHALATION) at 16:55

## 2019-02-06 RX ADMIN — THEOPHYLLINE ANHYDROUS 200 MG: 200 CAPSULE, EXTENDED RELEASE ORAL at 20:15

## 2019-02-06 RX ADMIN — TRAZODONE HYDROCHLORIDE 50 MG: 50 TABLET ORAL at 22:24

## 2019-02-06 RX ADMIN — BACLOFEN 10 MG: 10 TABLET ORAL at 20:16

## 2019-02-06 RX ADMIN — THEOPHYLLINE ANHYDROUS 200 MG: 200 CAPSULE, EXTENDED RELEASE ORAL at 14:05

## 2019-02-06 RX ADMIN — IPRATROPIUM BROMIDE AND ALBUTEROL SULFATE 3 ML: .5; 3 SOLUTION RESPIRATORY (INHALATION) at 13:25

## 2019-02-06 RX ADMIN — GUAIFENESIN 600 MG: 600 TABLET, EXTENDED RELEASE ORAL at 20:15

## 2019-02-06 RX ADMIN — AMLODIPINE BESYLATE 10 MG: 10 TABLET ORAL at 08:23

## 2019-02-06 RX ADMIN — IBUPROFEN 400 MG: 400 TABLET ORAL at 11:25

## 2019-02-06 RX ADMIN — FUROSEMIDE 20 MG: 10 INJECTION, SOLUTION INTRAVENOUS at 11:26

## 2019-02-06 RX ADMIN — ATORVASTATIN CALCIUM 40 MG: 40 TABLET, FILM COATED ORAL at 09:23

## 2019-02-06 RX ADMIN — PANTOPRAZOLE SODIUM 40 MG: 40 TABLET, DELAYED RELEASE ORAL at 07:31

## 2019-02-06 RX ADMIN — SODIUM CHLORIDE, PRESERVATIVE FREE 10 ML: 5 INJECTION INTRAVENOUS at 08:30

## 2019-02-06 RX ADMIN — BACLOFEN 10 MG: 10 TABLET ORAL at 08:20

## 2019-02-06 RX ADMIN — IBUPROFEN 400 MG: 400 TABLET ORAL at 08:21

## 2019-02-06 RX ADMIN — GUAIFENESIN AND CODEINE PHOSPHATE 5 ML: 10; 100 LIQUID ORAL at 22:24

## 2019-02-06 RX ADMIN — SODIUM CHLORIDE, PRESERVATIVE FREE 10 ML: 5 INJECTION INTRAVENOUS at 20:19

## 2019-02-06 RX ADMIN — IBUPROFEN 400 MG: 400 TABLET ORAL at 16:41

## 2019-02-06 RX ADMIN — ASPIRIN 81 MG 81 MG: 81 TABLET ORAL at 08:22

## 2019-02-06 RX ADMIN — MONTELUKAST SODIUM 10 MG: 10 TABLET, COATED ORAL at 09:22

## 2019-02-06 RX ADMIN — IPRATROPIUM BROMIDE AND ALBUTEROL SULFATE 3 ML: .5; 3 SOLUTION RESPIRATORY (INHALATION) at 20:55

## 2019-02-06 RX ADMIN — CLONAZEPAM 1 MG: 1 TABLET ORAL at 09:26

## 2019-02-06 RX ADMIN — BUSPIRONE HYDROCHLORIDE 15 MG: 7.5 TABLET ORAL at 08:31

## 2019-02-06 RX ADMIN — TRIAMTERENE AND HYDROCHLOROTHIAZIDE 1 TABLET: 37.5; 25 TABLET ORAL at 09:22

## 2019-02-06 RX ADMIN — GUAIFENESIN 600 MG: 600 TABLET, EXTENDED RELEASE ORAL at 08:20

## 2019-02-06 RX ADMIN — Medication 5 ML: at 23:17

## 2019-02-06 ASSESSMENT — PAIN SCALES - GENERAL
PAINLEVEL_OUTOF10: 4
PAINLEVEL_OUTOF10: 2
PAINLEVEL_OUTOF10: 6

## 2019-02-07 LAB
ANION GAP SERPL CALCULATED.3IONS-SCNC: 11 MMOL/L (ref 4–16)
BUN BLDV-MCNC: 21 MG/DL (ref 6–23)
CALCIUM SERPL-MCNC: 10 MG/DL (ref 8.3–10.6)
CHLORIDE BLD-SCNC: 85 MMOL/L (ref 99–110)
CO2: 38 MMOL/L (ref 21–32)
CREAT SERPL-MCNC: 0.8 MG/DL (ref 0.6–1.1)
GFR AFRICAN AMERICAN: >60 ML/MIN/1.73M2
GFR NON-AFRICAN AMERICAN: >60 ML/MIN/1.73M2
GLUCOSE BLD-MCNC: 219 MG/DL (ref 70–99)
POTASSIUM SERPL-SCNC: 4.1 MMOL/L (ref 3.5–5.1)
PRO-BNP: 606.2 PG/ML
SODIUM BLD-SCNC: 134 MMOL/L (ref 135–145)
THEOPHYLLINE LEVEL: 9.1 UG/ML (ref 10–20)
TROPONIN T: <0.01 NG/ML
TROPONIN T: <0.01 NG/ML

## 2019-02-07 PROCEDURE — 84484 ASSAY OF TROPONIN QUANT: CPT

## 2019-02-07 PROCEDURE — 6370000000 HC RX 637 (ALT 250 FOR IP): Performed by: INTERNAL MEDICINE

## 2019-02-07 PROCEDURE — 2700000000 HC OXYGEN THERAPY PER DAY

## 2019-02-07 PROCEDURE — 83880 ASSAY OF NATRIURETIC PEPTIDE: CPT

## 2019-02-07 PROCEDURE — 36415 COLL VENOUS BLD VENIPUNCTURE: CPT

## 2019-02-07 PROCEDURE — 2060000000 HC ICU INTERMEDIATE R&B

## 2019-02-07 PROCEDURE — 94761 N-INVAS EAR/PLS OXIMETRY MLT: CPT

## 2019-02-07 PROCEDURE — 80198 ASSAY OF THEOPHYLLINE: CPT

## 2019-02-07 PROCEDURE — 6360000002 HC RX W HCPCS: Performed by: INTERNAL MEDICINE

## 2019-02-07 PROCEDURE — 2580000003 HC RX 258: Performed by: INTERNAL MEDICINE

## 2019-02-07 PROCEDURE — 94640 AIRWAY INHALATION TREATMENT: CPT

## 2019-02-07 PROCEDURE — 94668 MNPJ CHEST WALL SBSQ: CPT

## 2019-02-07 PROCEDURE — 80048 BASIC METABOLIC PNL TOTAL CA: CPT

## 2019-02-07 RX ORDER — FUROSEMIDE 10 MG/ML
20 INJECTION INTRAMUSCULAR; INTRAVENOUS ONCE
Status: COMPLETED | OUTPATIENT
Start: 2019-02-07 | End: 2019-02-07

## 2019-02-07 RX ORDER — METHYLPREDNISOLONE SODIUM SUCCINATE 40 MG/ML
40 INJECTION, POWDER, LYOPHILIZED, FOR SOLUTION INTRAMUSCULAR; INTRAVENOUS EVERY 6 HOURS
Status: DISCONTINUED | OUTPATIENT
Start: 2019-02-07 | End: 2019-02-09

## 2019-02-07 RX ADMIN — PANTOPRAZOLE SODIUM 40 MG: 40 TABLET, DELAYED RELEASE ORAL at 06:10

## 2019-02-07 RX ADMIN — SODIUM CHLORIDE, PRESERVATIVE FREE 10 ML: 5 INJECTION INTRAVENOUS at 20:37

## 2019-02-07 RX ADMIN — BISACODYL 5 MG: 5 TABLET, COATED ORAL at 20:36

## 2019-02-07 RX ADMIN — GUAIFENESIN AND CODEINE PHOSPHATE 5 ML: 10; 100 LIQUID ORAL at 12:17

## 2019-02-07 RX ADMIN — GUAIFENESIN AND CODEINE PHOSPHATE 5 ML: 10; 100 LIQUID ORAL at 02:24

## 2019-02-07 RX ADMIN — IBUPROFEN 400 MG: 400 TABLET ORAL at 12:17

## 2019-02-07 RX ADMIN — CEFTRIAXONE 1 G: 1 INJECTION, POWDER, FOR SOLUTION INTRAMUSCULAR; INTRAVENOUS at 14:06

## 2019-02-07 RX ADMIN — Medication 5 ML: at 20:36

## 2019-02-07 RX ADMIN — BUSPIRONE HYDROCHLORIDE 15 MG: 7.5 TABLET ORAL at 20:36

## 2019-02-07 RX ADMIN — ALUMINUM HYDROXIDE, MAGNESIUM HYDROXIDE, AND SIMETHICONE 30 ML: 200; 200; 20 SUSPENSION ORAL at 06:17

## 2019-02-07 RX ADMIN — Medication 5 ML: at 12:17

## 2019-02-07 RX ADMIN — CITALOPRAM HYDROBROMIDE 40 MG: 40 TABLET ORAL at 08:18

## 2019-02-07 RX ADMIN — FUROSEMIDE 20 MG: 10 INJECTION, SOLUTION INTRAVENOUS at 14:23

## 2019-02-07 RX ADMIN — BUSPIRONE HYDROCHLORIDE 15 MG: 7.5 TABLET ORAL at 08:20

## 2019-02-07 RX ADMIN — AMLODIPINE BESYLATE 10 MG: 10 TABLET ORAL at 08:19

## 2019-02-07 RX ADMIN — GUAIFENESIN 600 MG: 600 TABLET, EXTENDED RELEASE ORAL at 20:36

## 2019-02-07 RX ADMIN — BACLOFEN 10 MG: 10 TABLET ORAL at 08:18

## 2019-02-07 RX ADMIN — LEVOTHYROXINE SODIUM 150 MCG: 150 TABLET ORAL at 06:10

## 2019-02-07 RX ADMIN — BACLOFEN 10 MG: 10 TABLET ORAL at 20:36

## 2019-02-07 RX ADMIN — METHYLPREDNISOLONE SODIUM SUCCINATE 40 MG: 40 INJECTION, POWDER, LYOPHILIZED, FOR SOLUTION INTRAMUSCULAR; INTRAVENOUS at 20:36

## 2019-02-07 RX ADMIN — CLONAZEPAM 1 MG: 1 TABLET ORAL at 08:18

## 2019-02-07 RX ADMIN — GUAIFENESIN AND CODEINE PHOSPHATE 5 ML: 10; 100 LIQUID ORAL at 20:36

## 2019-02-07 RX ADMIN — IPRATROPIUM BROMIDE AND ALBUTEROL SULFATE 3 ML: .5; 3 SOLUTION RESPIRATORY (INHALATION) at 12:36

## 2019-02-07 RX ADMIN — THEOPHYLLINE ANHYDROUS 200 MG: 200 CAPSULE, EXTENDED RELEASE ORAL at 08:18

## 2019-02-07 RX ADMIN — AZITHROMYCIN MONOHYDRATE 500 MG: 500 INJECTION, POWDER, LYOPHILIZED, FOR SOLUTION INTRAVENOUS at 02:12

## 2019-02-07 RX ADMIN — MONTELUKAST SODIUM 10 MG: 10 TABLET, COATED ORAL at 08:18

## 2019-02-07 RX ADMIN — METHYLPREDNISOLONE SODIUM SUCCINATE 40 MG: 40 INJECTION, POWDER, LYOPHILIZED, FOR SOLUTION INTRAMUSCULAR; INTRAVENOUS at 08:19

## 2019-02-07 RX ADMIN — THEOPHYLLINE ANHYDROUS 200 MG: 200 CAPSULE, EXTENDED RELEASE ORAL at 20:36

## 2019-02-07 RX ADMIN — IPRATROPIUM BROMIDE AND ALBUTEROL SULFATE 3 ML: .5; 3 SOLUTION RESPIRATORY (INHALATION) at 16:32

## 2019-02-07 RX ADMIN — ATORVASTATIN CALCIUM 40 MG: 40 TABLET, FILM COATED ORAL at 08:18

## 2019-02-07 RX ADMIN — POTASSIUM CHLORIDE 40 MEQ: 20 TABLET, EXTENDED RELEASE ORAL at 08:18

## 2019-02-07 RX ADMIN — THEOPHYLLINE ANHYDROUS 200 MG: 200 CAPSULE, EXTENDED RELEASE ORAL at 14:05

## 2019-02-07 RX ADMIN — ASPIRIN 81 MG 81 MG: 81 TABLET ORAL at 08:18

## 2019-02-07 RX ADMIN — GUAIFENESIN AND CODEINE PHOSPHATE 5 ML: 10; 100 LIQUID ORAL at 06:17

## 2019-02-07 RX ADMIN — TRIAMTERENE AND HYDROCHLOROTHIAZIDE 1 TABLET: 37.5; 25 TABLET ORAL at 08:19

## 2019-02-07 RX ADMIN — POTASSIUM CHLORIDE 40 MEQ: 20 TABLET, EXTENDED RELEASE ORAL at 18:41

## 2019-02-07 RX ADMIN — IPRATROPIUM BROMIDE AND ALBUTEROL SULFATE 3 ML: .5; 3 SOLUTION RESPIRATORY (INHALATION) at 20:22

## 2019-02-07 RX ADMIN — SODIUM CHLORIDE, PRESERVATIVE FREE 10 ML: 5 INJECTION INTRAVENOUS at 02:12

## 2019-02-07 RX ADMIN — IPRATROPIUM BROMIDE AND ALBUTEROL SULFATE 3 ML: .5; 3 SOLUTION RESPIRATORY (INHALATION) at 03:09

## 2019-02-07 RX ADMIN — BACLOFEN 10 MG: 10 TABLET ORAL at 14:05

## 2019-02-07 RX ADMIN — METHYLPREDNISOLONE SODIUM SUCCINATE 40 MG: 40 INJECTION, POWDER, LYOPHILIZED, FOR SOLUTION INTRAMUSCULAR; INTRAVENOUS at 14:05

## 2019-02-07 RX ADMIN — METHYLPREDNISOLONE SODIUM SUCCINATE 40 MG: 40 INJECTION, POWDER, LYOPHILIZED, FOR SOLUTION INTRAMUSCULAR; INTRAVENOUS at 02:12

## 2019-02-07 RX ADMIN — IBUPROFEN 400 MG: 400 TABLET ORAL at 08:17

## 2019-02-07 RX ADMIN — SODIUM CHLORIDE, PRESERVATIVE FREE 10 ML: 5 INJECTION INTRAVENOUS at 08:25

## 2019-02-07 RX ADMIN — GUAIFENESIN 600 MG: 600 TABLET, EXTENDED RELEASE ORAL at 08:18

## 2019-02-07 RX ADMIN — IPRATROPIUM BROMIDE AND ALBUTEROL SULFATE 3 ML: .5; 3 SOLUTION RESPIRATORY (INHALATION) at 08:17

## 2019-02-07 RX ADMIN — CLONAZEPAM 1 MG: 1 TABLET ORAL at 20:36

## 2019-02-07 RX ADMIN — ENOXAPARIN SODIUM 40 MG: 40 INJECTION SUBCUTANEOUS at 08:19

## 2019-02-07 RX ADMIN — TRAZODONE HYDROCHLORIDE 50 MG: 50 TABLET ORAL at 22:50

## 2019-02-07 RX ADMIN — IBUPROFEN 400 MG: 400 TABLET ORAL at 18:41

## 2019-02-07 ASSESSMENT — PAIN SCALES - GENERAL
PAINLEVEL_OUTOF10: 6
PAINLEVEL_OUTOF10: 4

## 2019-02-08 LAB
ANION GAP SERPL CALCULATED.3IONS-SCNC: 8 MMOL/L (ref 4–16)
BUN BLDV-MCNC: 20 MG/DL (ref 6–23)
CALCIUM SERPL-MCNC: 9.7 MG/DL (ref 8.3–10.6)
CHLORIDE BLD-SCNC: 87 MMOL/L (ref 99–110)
CO2: 40 MMOL/L (ref 21–32)
CREAT SERPL-MCNC: 0.9 MG/DL (ref 0.6–1.1)
GFR AFRICAN AMERICAN: >60 ML/MIN/1.73M2
GFR NON-AFRICAN AMERICAN: >60 ML/MIN/1.73M2
GLUCOSE BLD-MCNC: 157 MG/DL (ref 70–99)
POTASSIUM SERPL-SCNC: 4.4 MMOL/L (ref 3.5–5.1)
SODIUM BLD-SCNC: 135 MMOL/L (ref 135–145)
TROPONIN T: <0.01 NG/ML

## 2019-02-08 PROCEDURE — 2580000003 HC RX 258: Performed by: INTERNAL MEDICINE

## 2019-02-08 PROCEDURE — 2060000000 HC ICU INTERMEDIATE R&B

## 2019-02-08 PROCEDURE — 6360000002 HC RX W HCPCS: Performed by: INTERNAL MEDICINE

## 2019-02-08 PROCEDURE — 94640 AIRWAY INHALATION TREATMENT: CPT

## 2019-02-08 PROCEDURE — 94668 MNPJ CHEST WALL SBSQ: CPT

## 2019-02-08 PROCEDURE — 94761 N-INVAS EAR/PLS OXIMETRY MLT: CPT

## 2019-02-08 PROCEDURE — 6370000000 HC RX 637 (ALT 250 FOR IP): Performed by: INTERNAL MEDICINE

## 2019-02-08 PROCEDURE — 84484 ASSAY OF TROPONIN QUANT: CPT

## 2019-02-08 PROCEDURE — 36415 COLL VENOUS BLD VENIPUNCTURE: CPT

## 2019-02-08 PROCEDURE — 2700000000 HC OXYGEN THERAPY PER DAY

## 2019-02-08 PROCEDURE — 80048 BASIC METABOLIC PNL TOTAL CA: CPT

## 2019-02-08 RX ADMIN — BUSPIRONE HYDROCHLORIDE 15 MG: 7.5 TABLET ORAL at 07:46

## 2019-02-08 RX ADMIN — IPRATROPIUM BROMIDE AND ALBUTEROL SULFATE 3 ML: .5; 3 SOLUTION RESPIRATORY (INHALATION) at 08:26

## 2019-02-08 RX ADMIN — PANTOPRAZOLE SODIUM 40 MG: 40 TABLET, DELAYED RELEASE ORAL at 06:33

## 2019-02-08 RX ADMIN — IPRATROPIUM BROMIDE AND ALBUTEROL SULFATE 3 ML: .5; 3 SOLUTION RESPIRATORY (INHALATION) at 02:23

## 2019-02-08 RX ADMIN — BACLOFEN 10 MG: 10 TABLET ORAL at 21:03

## 2019-02-08 RX ADMIN — CLONAZEPAM 1 MG: 1 TABLET ORAL at 07:44

## 2019-02-08 RX ADMIN — THEOPHYLLINE ANHYDROUS 200 MG: 200 CAPSULE, EXTENDED RELEASE ORAL at 13:35

## 2019-02-08 RX ADMIN — Medication 5 ML: at 21:03

## 2019-02-08 RX ADMIN — LEVOTHYROXINE SODIUM 150 MCG: 150 TABLET ORAL at 06:33

## 2019-02-08 RX ADMIN — BUSPIRONE HYDROCHLORIDE 15 MG: 7.5 TABLET ORAL at 21:03

## 2019-02-08 RX ADMIN — IPRATROPIUM BROMIDE AND ALBUTEROL SULFATE 3 ML: .5; 3 SOLUTION RESPIRATORY (INHALATION) at 11:50

## 2019-02-08 RX ADMIN — SODIUM CHLORIDE, PRESERVATIVE FREE 10 ML: 5 INJECTION INTRAVENOUS at 07:47

## 2019-02-08 RX ADMIN — GUAIFENESIN AND CODEINE PHOSPHATE 5 ML: 10; 100 LIQUID ORAL at 11:57

## 2019-02-08 RX ADMIN — IBUPROFEN 400 MG: 400 TABLET ORAL at 07:44

## 2019-02-08 RX ADMIN — SODIUM CHLORIDE, PRESERVATIVE FREE 10 ML: 5 INJECTION INTRAVENOUS at 21:04

## 2019-02-08 RX ADMIN — IPRATROPIUM BROMIDE AND ALBUTEROL SULFATE 3 ML: .5; 3 SOLUTION RESPIRATORY (INHALATION) at 20:57

## 2019-02-08 RX ADMIN — CITALOPRAM HYDROBROMIDE 40 MG: 40 TABLET ORAL at 07:44

## 2019-02-08 RX ADMIN — THEOPHYLLINE ANHYDROUS 200 MG: 200 CAPSULE, EXTENDED RELEASE ORAL at 21:03

## 2019-02-08 RX ADMIN — CLONAZEPAM 1 MG: 1 TABLET ORAL at 21:03

## 2019-02-08 RX ADMIN — AMLODIPINE BESYLATE 10 MG: 10 TABLET ORAL at 07:46

## 2019-02-08 RX ADMIN — METHYLPREDNISOLONE SODIUM SUCCINATE 40 MG: 40 INJECTION, POWDER, LYOPHILIZED, FOR SOLUTION INTRAMUSCULAR; INTRAVENOUS at 21:03

## 2019-02-08 RX ADMIN — METHYLPREDNISOLONE SODIUM SUCCINATE 40 MG: 40 INJECTION, POWDER, LYOPHILIZED, FOR SOLUTION INTRAMUSCULAR; INTRAVENOUS at 02:38

## 2019-02-08 RX ADMIN — TRAZODONE HYDROCHLORIDE 50 MG: 50 TABLET ORAL at 23:49

## 2019-02-08 RX ADMIN — GUAIFENESIN 600 MG: 600 TABLET, EXTENDED RELEASE ORAL at 07:44

## 2019-02-08 RX ADMIN — MONTELUKAST SODIUM 10 MG: 10 TABLET, COATED ORAL at 07:44

## 2019-02-08 RX ADMIN — POTASSIUM CHLORIDE 40 MEQ: 20 TABLET, EXTENDED RELEASE ORAL at 07:43

## 2019-02-08 RX ADMIN — TRIAMTERENE AND HYDROCHLOROTHIAZIDE 1 TABLET: 37.5; 25 TABLET ORAL at 07:43

## 2019-02-08 RX ADMIN — IBUPROFEN 400 MG: 400 TABLET ORAL at 16:30

## 2019-02-08 RX ADMIN — GUAIFENESIN AND CODEINE PHOSPHATE 5 ML: 10; 100 LIQUID ORAL at 02:07

## 2019-02-08 RX ADMIN — METHYLPREDNISOLONE SODIUM SUCCINATE 40 MG: 40 INJECTION, POWDER, LYOPHILIZED, FOR SOLUTION INTRAMUSCULAR; INTRAVENOUS at 07:47

## 2019-02-08 RX ADMIN — GUAIFENESIN AND CODEINE PHOSPHATE 5 ML: 10; 100 LIQUID ORAL at 21:03

## 2019-02-08 RX ADMIN — Medication 5 ML: at 02:08

## 2019-02-08 RX ADMIN — IPRATROPIUM BROMIDE AND ALBUTEROL SULFATE 3 ML: .5; 3 SOLUTION RESPIRATORY (INHALATION) at 16:01

## 2019-02-08 RX ADMIN — POTASSIUM CHLORIDE 40 MEQ: 20 TABLET, EXTENDED RELEASE ORAL at 16:29

## 2019-02-08 RX ADMIN — THEOPHYLLINE ANHYDROUS 200 MG: 200 CAPSULE, EXTENDED RELEASE ORAL at 07:43

## 2019-02-08 RX ADMIN — ASPIRIN 81 MG 81 MG: 81 TABLET ORAL at 07:45

## 2019-02-08 RX ADMIN — Medication 5 ML: at 11:55

## 2019-02-08 RX ADMIN — ATORVASTATIN CALCIUM 40 MG: 40 TABLET, FILM COATED ORAL at 07:44

## 2019-02-08 RX ADMIN — ENOXAPARIN SODIUM 40 MG: 40 INJECTION SUBCUTANEOUS at 07:46

## 2019-02-08 RX ADMIN — IBUPROFEN 400 MG: 400 TABLET ORAL at 11:54

## 2019-02-08 RX ADMIN — GUAIFENESIN 600 MG: 600 TABLET, EXTENDED RELEASE ORAL at 21:03

## 2019-02-08 RX ADMIN — Medication 5 ML: at 06:35

## 2019-02-08 RX ADMIN — BACLOFEN 10 MG: 10 TABLET ORAL at 13:35

## 2019-02-08 RX ADMIN — CEFTRIAXONE 1 G: 1 INJECTION, POWDER, FOR SOLUTION INTRAMUSCULAR; INTRAVENOUS at 13:35

## 2019-02-08 RX ADMIN — METHYLPREDNISOLONE SODIUM SUCCINATE 40 MG: 40 INJECTION, POWDER, LYOPHILIZED, FOR SOLUTION INTRAMUSCULAR; INTRAVENOUS at 13:35

## 2019-02-08 RX ADMIN — BACLOFEN 10 MG: 10 TABLET ORAL at 07:45

## 2019-02-08 RX ADMIN — GUAIFENESIN AND CODEINE PHOSPHATE 5 ML: 10; 100 LIQUID ORAL at 06:33

## 2019-02-08 ASSESSMENT — PAIN SCALES - GENERAL
PAINLEVEL_OUTOF10: 3
PAINLEVEL_OUTOF10: 0
PAINLEVEL_OUTOF10: 2
PAINLEVEL_OUTOF10: 0

## 2019-02-09 LAB
ANION GAP SERPL CALCULATED.3IONS-SCNC: 10 MMOL/L (ref 4–16)
BUN BLDV-MCNC: 16 MG/DL (ref 6–23)
CALCIUM SERPL-MCNC: 9.7 MG/DL (ref 8.3–10.6)
CHLORIDE BLD-SCNC: 86 MMOL/L (ref 99–110)
CO2: 40 MMOL/L (ref 21–32)
CREAT SERPL-MCNC: 0.7 MG/DL (ref 0.6–1.1)
GFR AFRICAN AMERICAN: >60 ML/MIN/1.73M2
GFR NON-AFRICAN AMERICAN: >60 ML/MIN/1.73M2
GLUCOSE BLD-MCNC: 183 MG/DL (ref 70–99)
POTASSIUM SERPL-SCNC: 4 MMOL/L (ref 3.5–5.1)
SODIUM BLD-SCNC: 136 MMOL/L (ref 135–145)
THEOPHYLLINE LEVEL: 9.6 UG/ML (ref 10–20)

## 2019-02-09 PROCEDURE — 6370000000 HC RX 637 (ALT 250 FOR IP): Performed by: INTERNAL MEDICINE

## 2019-02-09 PROCEDURE — 6360000002 HC RX W HCPCS: Performed by: INTERNAL MEDICINE

## 2019-02-09 PROCEDURE — 76937 US GUIDE VASCULAR ACCESS: CPT

## 2019-02-09 PROCEDURE — 80048 BASIC METABOLIC PNL TOTAL CA: CPT

## 2019-02-09 PROCEDURE — 2060000000 HC ICU INTERMEDIATE R&B

## 2019-02-09 PROCEDURE — 94640 AIRWAY INHALATION TREATMENT: CPT

## 2019-02-09 PROCEDURE — 80198 ASSAY OF THEOPHYLLINE: CPT

## 2019-02-09 PROCEDURE — 2580000003 HC RX 258: Performed by: INTERNAL MEDICINE

## 2019-02-09 PROCEDURE — 94668 MNPJ CHEST WALL SBSQ: CPT

## 2019-02-09 PROCEDURE — 2700000000 HC OXYGEN THERAPY PER DAY

## 2019-02-09 PROCEDURE — 94761 N-INVAS EAR/PLS OXIMETRY MLT: CPT

## 2019-02-09 PROCEDURE — 36415 COLL VENOUS BLD VENIPUNCTURE: CPT

## 2019-02-09 RX ORDER — METHYLPREDNISOLONE SODIUM SUCCINATE 40 MG/ML
40 INJECTION, POWDER, LYOPHILIZED, FOR SOLUTION INTRAMUSCULAR; INTRAVENOUS EVERY 8 HOURS
Status: DISCONTINUED | OUTPATIENT
Start: 2019-02-09 | End: 2019-02-10

## 2019-02-09 RX ADMIN — CEFTRIAXONE 1 G: 1 INJECTION, POWDER, FOR SOLUTION INTRAMUSCULAR; INTRAVENOUS at 15:28

## 2019-02-09 RX ADMIN — AMLODIPINE BESYLATE 10 MG: 10 TABLET ORAL at 09:37

## 2019-02-09 RX ADMIN — IPRATROPIUM BROMIDE AND ALBUTEROL SULFATE 3 ML: .5; 3 SOLUTION RESPIRATORY (INHALATION) at 21:50

## 2019-02-09 RX ADMIN — METHYLPREDNISOLONE SODIUM SUCCINATE 40 MG: 40 INJECTION, POWDER, LYOPHILIZED, FOR SOLUTION INTRAMUSCULAR; INTRAVENOUS at 17:02

## 2019-02-09 RX ADMIN — TRAZODONE HYDROCHLORIDE 50 MG: 50 TABLET ORAL at 21:35

## 2019-02-09 RX ADMIN — GUAIFENESIN AND CODEINE PHOSPHATE 5 ML: 10; 100 LIQUID ORAL at 07:31

## 2019-02-09 RX ADMIN — THEOPHYLLINE ANHYDROUS 200 MG: 200 CAPSULE, EXTENDED RELEASE ORAL at 21:35

## 2019-02-09 RX ADMIN — Medication 5 ML: at 07:32

## 2019-02-09 RX ADMIN — PANTOPRAZOLE SODIUM 40 MG: 40 TABLET, DELAYED RELEASE ORAL at 07:31

## 2019-02-09 RX ADMIN — SODIUM CHLORIDE, PRESERVATIVE FREE 10 ML: 5 INJECTION INTRAVENOUS at 09:42

## 2019-02-09 RX ADMIN — ATORVASTATIN CALCIUM 40 MG: 40 TABLET, FILM COATED ORAL at 09:38

## 2019-02-09 RX ADMIN — LEVOTHYROXINE SODIUM 150 MCG: 150 TABLET ORAL at 07:31

## 2019-02-09 RX ADMIN — SODIUM CHLORIDE, PRESERVATIVE FREE 10 ML: 5 INJECTION INTRAVENOUS at 21:36

## 2019-02-09 RX ADMIN — ASPIRIN 81 MG 81 MG: 81 TABLET ORAL at 09:37

## 2019-02-09 RX ADMIN — Medication 5 ML: at 03:17

## 2019-02-09 RX ADMIN — SODIUM CHLORIDE, PRESERVATIVE FREE 10 ML: 5 INJECTION INTRAVENOUS at 16:01

## 2019-02-09 RX ADMIN — BUSPIRONE HYDROCHLORIDE 15 MG: 7.5 TABLET ORAL at 09:37

## 2019-02-09 RX ADMIN — BACLOFEN 10 MG: 10 TABLET ORAL at 21:35

## 2019-02-09 RX ADMIN — BUSPIRONE HYDROCHLORIDE 15 MG: 7.5 TABLET ORAL at 21:35

## 2019-02-09 RX ADMIN — IPRATROPIUM BROMIDE AND ALBUTEROL SULFATE 3 ML: .5; 3 SOLUTION RESPIRATORY (INHALATION) at 09:16

## 2019-02-09 RX ADMIN — IPRATROPIUM BROMIDE AND ALBUTEROL SULFATE 3 ML: .5; 3 SOLUTION RESPIRATORY (INHALATION) at 03:34

## 2019-02-09 RX ADMIN — CLONAZEPAM 1 MG: 1 TABLET ORAL at 10:05

## 2019-02-09 RX ADMIN — IPRATROPIUM BROMIDE AND ALBUTEROL SULFATE 3 ML: .5; 3 SOLUTION RESPIRATORY (INHALATION) at 12:25

## 2019-02-09 RX ADMIN — BACLOFEN 10 MG: 10 TABLET ORAL at 09:37

## 2019-02-09 RX ADMIN — GUAIFENESIN AND CODEINE PHOSPHATE 5 ML: 10; 100 LIQUID ORAL at 03:17

## 2019-02-09 RX ADMIN — THEOPHYLLINE ANHYDROUS 200 MG: 200 CAPSULE, EXTENDED RELEASE ORAL at 09:37

## 2019-02-09 RX ADMIN — ENOXAPARIN SODIUM 40 MG: 40 INJECTION SUBCUTANEOUS at 09:39

## 2019-02-09 RX ADMIN — METHYLPREDNISOLONE SODIUM SUCCINATE 40 MG: 40 INJECTION, POWDER, LYOPHILIZED, FOR SOLUTION INTRAMUSCULAR; INTRAVENOUS at 09:40

## 2019-02-09 RX ADMIN — BACLOFEN 10 MG: 10 TABLET ORAL at 14:26

## 2019-02-09 RX ADMIN — GUAIFENESIN AND CODEINE PHOSPHATE 5 ML: 10; 100 LIQUID ORAL at 21:40

## 2019-02-09 RX ADMIN — IBUPROFEN 400 MG: 400 TABLET ORAL at 12:35

## 2019-02-09 RX ADMIN — GUAIFENESIN 600 MG: 600 TABLET, EXTENDED RELEASE ORAL at 09:37

## 2019-02-09 RX ADMIN — CITALOPRAM HYDROBROMIDE 40 MG: 40 TABLET ORAL at 09:38

## 2019-02-09 RX ADMIN — POTASSIUM CHLORIDE 40 MEQ: 20 TABLET, EXTENDED RELEASE ORAL at 09:38

## 2019-02-09 RX ADMIN — GUAIFENESIN 600 MG: 600 TABLET, EXTENDED RELEASE ORAL at 21:35

## 2019-02-09 RX ADMIN — GUAIFENESIN AND CODEINE PHOSPHATE 5 ML: 10; 100 LIQUID ORAL at 14:05

## 2019-02-09 RX ADMIN — THEOPHYLLINE ANHYDROUS 200 MG: 200 CAPSULE, EXTENDED RELEASE ORAL at 14:26

## 2019-02-09 RX ADMIN — IBUPROFEN 400 MG: 400 TABLET ORAL at 17:02

## 2019-02-09 RX ADMIN — IPRATROPIUM BROMIDE AND ALBUTEROL SULFATE 3 ML: .5; 3 SOLUTION RESPIRATORY (INHALATION) at 16:52

## 2019-02-09 RX ADMIN — SODIUM CHLORIDE, PRESERVATIVE FREE 10 ML: 5 INJECTION INTRAVENOUS at 17:02

## 2019-02-09 RX ADMIN — Medication 5 ML: at 14:27

## 2019-02-09 RX ADMIN — CLONAZEPAM 1 MG: 1 TABLET ORAL at 21:35

## 2019-02-09 RX ADMIN — POTASSIUM CHLORIDE 40 MEQ: 20 TABLET, EXTENDED RELEASE ORAL at 17:02

## 2019-02-09 RX ADMIN — Medication 5 ML: at 21:36

## 2019-02-09 RX ADMIN — METHYLPREDNISOLONE SODIUM SUCCINATE 40 MG: 40 INJECTION, POWDER, LYOPHILIZED, FOR SOLUTION INTRAMUSCULAR; INTRAVENOUS at 03:13

## 2019-02-09 RX ADMIN — MONTELUKAST SODIUM 10 MG: 10 TABLET, COATED ORAL at 10:07

## 2019-02-09 RX ADMIN — IBUPROFEN 400 MG: 400 TABLET ORAL at 09:38

## 2019-02-09 RX ADMIN — TRIAMTERENE AND HYDROCHLOROTHIAZIDE 1 TABLET: 37.5; 25 TABLET ORAL at 09:37

## 2019-02-09 RX ADMIN — SODIUM CHLORIDE, PRESERVATIVE FREE 10 ML: 5 INJECTION INTRAVENOUS at 15:28

## 2019-02-09 ASSESSMENT — PAIN DESCRIPTION - DESCRIPTORS
DESCRIPTORS: TENDER;ACHING
DESCRIPTORS: ACHING;TENDER

## 2019-02-09 ASSESSMENT — PAIN DESCRIPTION - PROGRESSION

## 2019-02-09 ASSESSMENT — PAIN DESCRIPTION - LOCATION
LOCATION: GENERALIZED;NECK
LOCATION: GENERALIZED
LOCATION: GENERALIZED
LOCATION: GENERALIZED;NECK;BACK
LOCATION: GENERALIZED
LOCATION: GENERALIZED

## 2019-02-09 ASSESSMENT — PAIN SCALES - GENERAL
PAINLEVEL_OUTOF10: 3
PAINLEVEL_OUTOF10: 5
PAINLEVEL_OUTOF10: 5
PAINLEVEL_OUTOF10: 3
PAINLEVEL_OUTOF10: 0
PAINLEVEL_OUTOF10: 3
PAINLEVEL_OUTOF10: 3
PAINLEVEL_OUTOF10: 4

## 2019-02-09 ASSESSMENT — PAIN DESCRIPTION - FREQUENCY
FREQUENCY: CONTINUOUS

## 2019-02-09 ASSESSMENT — PAIN DESCRIPTION - PAIN TYPE
TYPE: CHRONIC PAIN

## 2019-02-09 ASSESSMENT — PAIN DESCRIPTION - ONSET
ONSET: ON-GOING

## 2019-02-09 ASSESSMENT — PAIN - FUNCTIONAL ASSESSMENT
PAIN_FUNCTIONAL_ASSESSMENT: ACTIVITIES ARE NOT PREVENTED

## 2019-02-09 ASSESSMENT — PAIN DESCRIPTION - ORIENTATION
ORIENTATION: MID;UPPER
ORIENTATION: MID;UPPER

## 2019-02-10 LAB
ANION GAP SERPL CALCULATED.3IONS-SCNC: 8 MMOL/L (ref 4–16)
BANDED NEUTROPHILS ABSOLUTE COUNT: 0.21 K/CU MM
BANDED NEUTROPHILS RELATIVE PERCENT: 2 % (ref 5–11)
BUN BLDV-MCNC: 18 MG/DL (ref 6–23)
CALCIUM SERPL-MCNC: 9.7 MG/DL (ref 8.3–10.6)
CHLORIDE BLD-SCNC: 86 MMOL/L (ref 99–110)
CO2: 41 MMOL/L (ref 21–32)
CREAT SERPL-MCNC: 0.9 MG/DL (ref 0.6–1.1)
DIFFERENTIAL TYPE: ABNORMAL
GFR AFRICAN AMERICAN: >60 ML/MIN/1.73M2
GFR NON-AFRICAN AMERICAN: >60 ML/MIN/1.73M2
GLUCOSE BLD-MCNC: 179 MG/DL (ref 70–99)
HCT VFR BLD CALC: 38.2 % (ref 37–47)
HEMOGLOBIN: 11.8 GM/DL (ref 12.5–16)
LYMPHOCYTES ABSOLUTE: 0.2 K/CU MM
LYMPHOCYTES RELATIVE PERCENT: 2 % (ref 24–44)
MCH RBC QN AUTO: 28.2 PG (ref 27–31)
MCHC RBC AUTO-ENTMCNC: 30.9 % (ref 32–36)
MCV RBC AUTO: 91.4 FL (ref 78–100)
METAMYELOCYTES ABSOLUTE COUNT: 0.1 K/CU MM
METAMYELOCYTES PERCENT: 1 %
MONOCYTES ABSOLUTE: 1.4 K/CU MM
MONOCYTES RELATIVE PERCENT: 13 % (ref 0–4)
MYELOCYTE PERCENT: 1 %
MYELOCYTES ABSOLUTE COUNT: 0.1 K/CU MM
PDW BLD-RTO: 13.2 % (ref 11.7–14.9)
PLATELET # BLD: 371 K/CU MM (ref 140–440)
PMV BLD AUTO: 9.8 FL (ref 7.5–11.1)
POTASSIUM SERPL-SCNC: 3.8 MMOL/L (ref 3.5–5.1)
RBC # BLD: 4.18 M/CU MM (ref 4.2–5.4)
RBC # BLD: SLIGHT 10*6/UL
SEGMENTED NEUTROPHILS ABSOLUTE COUNT: 8.4 K/CU MM
SEGMENTED NEUTROPHILS RELATIVE PERCENT: 81 % (ref 36–66)
SODIUM BLD-SCNC: 135 MMOL/L (ref 135–145)
WBC # BLD: 10.4 K/CU MM (ref 4–10.5)

## 2019-02-10 PROCEDURE — 85007 BL SMEAR W/DIFF WBC COUNT: CPT

## 2019-02-10 PROCEDURE — 6360000002 HC RX W HCPCS: Performed by: INTERNAL MEDICINE

## 2019-02-10 PROCEDURE — 2700000000 HC OXYGEN THERAPY PER DAY

## 2019-02-10 PROCEDURE — 80048 BASIC METABOLIC PNL TOTAL CA: CPT

## 2019-02-10 PROCEDURE — 94668 MNPJ CHEST WALL SBSQ: CPT

## 2019-02-10 PROCEDURE — 2580000003 HC RX 258: Performed by: INTERNAL MEDICINE

## 2019-02-10 PROCEDURE — 94640 AIRWAY INHALATION TREATMENT: CPT

## 2019-02-10 PROCEDURE — 6370000000 HC RX 637 (ALT 250 FOR IP): Performed by: INTERNAL MEDICINE

## 2019-02-10 PROCEDURE — 94761 N-INVAS EAR/PLS OXIMETRY MLT: CPT

## 2019-02-10 PROCEDURE — 2060000000 HC ICU INTERMEDIATE R&B

## 2019-02-10 PROCEDURE — 85027 COMPLETE CBC AUTOMATED: CPT

## 2019-02-10 PROCEDURE — 36415 COLL VENOUS BLD VENIPUNCTURE: CPT

## 2019-02-10 RX ORDER — METHYLPREDNISOLONE SODIUM SUCCINATE 40 MG/ML
40 INJECTION, POWDER, LYOPHILIZED, FOR SOLUTION INTRAMUSCULAR; INTRAVENOUS EVERY 12 HOURS
Status: DISCONTINUED | OUTPATIENT
Start: 2019-02-10 | End: 2019-02-14 | Stop reason: HOSPADM

## 2019-02-10 RX ADMIN — BACLOFEN 10 MG: 10 TABLET ORAL at 20:13

## 2019-02-10 RX ADMIN — GUAIFENESIN 600 MG: 600 TABLET, EXTENDED RELEASE ORAL at 08:17

## 2019-02-10 RX ADMIN — Medication 5 ML: at 20:13

## 2019-02-10 RX ADMIN — IBUPROFEN 400 MG: 400 TABLET ORAL at 08:16

## 2019-02-10 RX ADMIN — ENOXAPARIN SODIUM 40 MG: 40 INJECTION SUBCUTANEOUS at 08:18

## 2019-02-10 RX ADMIN — GUAIFENESIN AND CODEINE PHOSPHATE 5 ML: 10; 100 LIQUID ORAL at 14:10

## 2019-02-10 RX ADMIN — IBUPROFEN 400 MG: 400 TABLET ORAL at 17:53

## 2019-02-10 RX ADMIN — LEVOTHYROXINE SODIUM 150 MCG: 150 TABLET ORAL at 06:09

## 2019-02-10 RX ADMIN — AMLODIPINE BESYLATE 10 MG: 10 TABLET ORAL at 08:17

## 2019-02-10 RX ADMIN — ASPIRIN 81 MG 81 MG: 81 TABLET ORAL at 08:17

## 2019-02-10 RX ADMIN — METHYLPREDNISOLONE SODIUM SUCCINATE 40 MG: 40 INJECTION, POWDER, LYOPHILIZED, FOR SOLUTION INTRAMUSCULAR; INTRAVENOUS at 20:13

## 2019-02-10 RX ADMIN — THEOPHYLLINE ANHYDROUS 200 MG: 200 CAPSULE, EXTENDED RELEASE ORAL at 20:13

## 2019-02-10 RX ADMIN — ATORVASTATIN CALCIUM 40 MG: 40 TABLET, FILM COATED ORAL at 08:16

## 2019-02-10 RX ADMIN — GUAIFENESIN AND CODEINE PHOSPHATE 5 ML: 10; 100 LIQUID ORAL at 08:18

## 2019-02-10 RX ADMIN — Medication 5 ML: at 08:24

## 2019-02-10 RX ADMIN — IPRATROPIUM BROMIDE AND ALBUTEROL SULFATE 3 ML: .5; 3 SOLUTION RESPIRATORY (INHALATION) at 21:20

## 2019-02-10 RX ADMIN — THEOPHYLLINE ANHYDROUS 200 MG: 200 CAPSULE, EXTENDED RELEASE ORAL at 08:15

## 2019-02-10 RX ADMIN — CLONAZEPAM 1 MG: 1 TABLET ORAL at 08:17

## 2019-02-10 RX ADMIN — POTASSIUM CHLORIDE 40 MEQ: 20 TABLET, EXTENDED RELEASE ORAL at 08:16

## 2019-02-10 RX ADMIN — BUSPIRONE HYDROCHLORIDE 15 MG: 7.5 TABLET ORAL at 20:13

## 2019-02-10 RX ADMIN — BACLOFEN 10 MG: 10 TABLET ORAL at 14:04

## 2019-02-10 RX ADMIN — GUAIFENESIN AND CODEINE PHOSPHATE 5 ML: 10; 100 LIQUID ORAL at 20:15

## 2019-02-10 RX ADMIN — METHYLPREDNISOLONE SODIUM SUCCINATE 40 MG: 40 INJECTION, POWDER, LYOPHILIZED, FOR SOLUTION INTRAMUSCULAR; INTRAVENOUS at 01:31

## 2019-02-10 RX ADMIN — POTASSIUM CHLORIDE 40 MEQ: 20 TABLET, EXTENDED RELEASE ORAL at 17:54

## 2019-02-10 RX ADMIN — THEOPHYLLINE ANHYDROUS 200 MG: 200 CAPSULE, EXTENDED RELEASE ORAL at 14:03

## 2019-02-10 RX ADMIN — IPRATROPIUM BROMIDE AND ALBUTEROL SULFATE 3 ML: .5; 3 SOLUTION RESPIRATORY (INHALATION) at 15:31

## 2019-02-10 RX ADMIN — CLONAZEPAM 1 MG: 1 TABLET ORAL at 20:13

## 2019-02-10 RX ADMIN — TRAZODONE HYDROCHLORIDE 50 MG: 50 TABLET ORAL at 20:13

## 2019-02-10 RX ADMIN — BACLOFEN 10 MG: 10 TABLET ORAL at 08:17

## 2019-02-10 RX ADMIN — MONTELUKAST SODIUM 10 MG: 10 TABLET, COATED ORAL at 08:16

## 2019-02-10 RX ADMIN — IBUPROFEN 400 MG: 400 TABLET ORAL at 12:44

## 2019-02-10 RX ADMIN — Medication 5 ML: at 14:07

## 2019-02-10 RX ADMIN — SODIUM CHLORIDE, PRESERVATIVE FREE 10 ML: 5 INJECTION INTRAVENOUS at 20:13

## 2019-02-10 RX ADMIN — SODIUM CHLORIDE, PRESERVATIVE FREE 10 ML: 5 INJECTION INTRAVENOUS at 08:18

## 2019-02-10 RX ADMIN — CITALOPRAM HYDROBROMIDE 40 MG: 40 TABLET ORAL at 08:16

## 2019-02-10 RX ADMIN — METHYLPREDNISOLONE SODIUM SUCCINATE 40 MG: 40 INJECTION, POWDER, LYOPHILIZED, FOR SOLUTION INTRAMUSCULAR; INTRAVENOUS at 08:18

## 2019-02-10 RX ADMIN — TRIAMTERENE AND HYDROCHLOROTHIAZIDE 1 TABLET: 37.5; 25 TABLET ORAL at 08:15

## 2019-02-10 RX ADMIN — IPRATROPIUM BROMIDE AND ALBUTEROL SULFATE 3 ML: .5; 3 SOLUTION RESPIRATORY (INHALATION) at 11:41

## 2019-02-10 RX ADMIN — BUSPIRONE HYDROCHLORIDE 15 MG: 7.5 TABLET ORAL at 08:15

## 2019-02-10 RX ADMIN — CEFTRIAXONE 1 G: 1 INJECTION, POWDER, FOR SOLUTION INTRAMUSCULAR; INTRAVENOUS at 15:46

## 2019-02-10 RX ADMIN — GUAIFENESIN 600 MG: 600 TABLET, EXTENDED RELEASE ORAL at 20:13

## 2019-02-10 RX ADMIN — PANTOPRAZOLE SODIUM 40 MG: 40 TABLET, DELAYED RELEASE ORAL at 06:09

## 2019-02-10 RX ADMIN — IPRATROPIUM BROMIDE AND ALBUTEROL SULFATE 3 ML: .5; 3 SOLUTION RESPIRATORY (INHALATION) at 08:01

## 2019-02-10 ASSESSMENT — PAIN SCALES - GENERAL
PAINLEVEL_OUTOF10: 4
PAINLEVEL_OUTOF10: 4
PAINLEVEL_OUTOF10: 0
PAINLEVEL_OUTOF10: 4

## 2019-02-10 ASSESSMENT — PAIN DESCRIPTION - PAIN TYPE: TYPE: ACUTE PAIN

## 2019-02-10 ASSESSMENT — PAIN DESCRIPTION - LOCATION: LOCATION: GENERALIZED

## 2019-02-10 ASSESSMENT — PAIN DESCRIPTION - ONSET: ONSET: ON-GOING

## 2019-02-11 LAB
ANION GAP SERPL CALCULATED.3IONS-SCNC: 8 MMOL/L (ref 4–16)
BUN BLDV-MCNC: 21 MG/DL (ref 6–23)
CALCIUM SERPL-MCNC: 9.7 MG/DL (ref 8.3–10.6)
CHLORIDE BLD-SCNC: 88 MMOL/L (ref 99–110)
CO2: 38 MMOL/L (ref 21–32)
CREAT SERPL-MCNC: 0.8 MG/DL (ref 0.6–1.1)
GFR AFRICAN AMERICAN: >60 ML/MIN/1.73M2
GFR NON-AFRICAN AMERICAN: >60 ML/MIN/1.73M2
GLUCOSE BLD-MCNC: 220 MG/DL (ref 70–99)
POTASSIUM SERPL-SCNC: 3.7 MMOL/L (ref 3.5–5.1)
SODIUM BLD-SCNC: 134 MMOL/L (ref 135–145)

## 2019-02-11 PROCEDURE — 6370000000 HC RX 637 (ALT 250 FOR IP): Performed by: INTERNAL MEDICINE

## 2019-02-11 PROCEDURE — 36415 COLL VENOUS BLD VENIPUNCTURE: CPT

## 2019-02-11 PROCEDURE — 6360000002 HC RX W HCPCS: Performed by: INTERNAL MEDICINE

## 2019-02-11 PROCEDURE — 76937 US GUIDE VASCULAR ACCESS: CPT

## 2019-02-11 PROCEDURE — 94761 N-INVAS EAR/PLS OXIMETRY MLT: CPT

## 2019-02-11 PROCEDURE — 2060000000 HC ICU INTERMEDIATE R&B

## 2019-02-11 PROCEDURE — 2580000003 HC RX 258: Performed by: INTERNAL MEDICINE

## 2019-02-11 PROCEDURE — 94640 AIRWAY INHALATION TREATMENT: CPT

## 2019-02-11 PROCEDURE — 80048 BASIC METABOLIC PNL TOTAL CA: CPT

## 2019-02-11 PROCEDURE — 94668 MNPJ CHEST WALL SBSQ: CPT

## 2019-02-11 PROCEDURE — 2700000000 HC OXYGEN THERAPY PER DAY

## 2019-02-11 RX ORDER — PROMETHAZINE HYDROCHLORIDE AND CODEINE PHOSPHATE 6.25; 1 MG/5ML; MG/5ML
5 SYRUP ORAL EVERY 4 HOURS PRN
Status: DISCONTINUED | OUTPATIENT
Start: 2019-02-11 | End: 2019-02-14 | Stop reason: HOSPADM

## 2019-02-11 RX ORDER — FUROSEMIDE 10 MG/ML
20 INJECTION INTRAMUSCULAR; INTRAVENOUS ONCE
Status: COMPLETED | OUTPATIENT
Start: 2019-02-11 | End: 2019-02-11

## 2019-02-11 RX ADMIN — AMLODIPINE BESYLATE 10 MG: 10 TABLET ORAL at 09:02

## 2019-02-11 RX ADMIN — Medication 5 ML: at 09:00

## 2019-02-11 RX ADMIN — BACLOFEN 10 MG: 10 TABLET ORAL at 09:02

## 2019-02-11 RX ADMIN — ATORVASTATIN CALCIUM 40 MG: 40 TABLET, FILM COATED ORAL at 09:02

## 2019-02-11 RX ADMIN — IBUPROFEN 400 MG: 400 TABLET ORAL at 12:45

## 2019-02-11 RX ADMIN — METHYLPREDNISOLONE SODIUM SUCCINATE 40 MG: 40 INJECTION, POWDER, LYOPHILIZED, FOR SOLUTION INTRAMUSCULAR; INTRAVENOUS at 09:40

## 2019-02-11 RX ADMIN — FUROSEMIDE 20 MG: 10 INJECTION, SOLUTION INTRAVENOUS at 12:45

## 2019-02-11 RX ADMIN — THEOPHYLLINE ANHYDROUS 200 MG: 200 CAPSULE, EXTENDED RELEASE ORAL at 21:08

## 2019-02-11 RX ADMIN — THEOPHYLLINE ANHYDROUS 200 MG: 200 CAPSULE, EXTENDED RELEASE ORAL at 09:02

## 2019-02-11 RX ADMIN — CITALOPRAM HYDROBROMIDE 40 MG: 40 TABLET ORAL at 09:01

## 2019-02-11 RX ADMIN — GUAIFENESIN 600 MG: 600 TABLET, EXTENDED RELEASE ORAL at 09:02

## 2019-02-11 RX ADMIN — IPRATROPIUM BROMIDE AND ALBUTEROL SULFATE 3 ML: .5; 3 SOLUTION RESPIRATORY (INHALATION) at 16:26

## 2019-02-11 RX ADMIN — SODIUM CHLORIDE, PRESERVATIVE FREE 10 ML: 5 INJECTION INTRAVENOUS at 09:43

## 2019-02-11 RX ADMIN — IPRATROPIUM BROMIDE AND ALBUTEROL SULFATE 3 ML: .5; 3 SOLUTION RESPIRATORY (INHALATION) at 12:10

## 2019-02-11 RX ADMIN — TRIAMTERENE AND HYDROCHLOROTHIAZIDE 1 TABLET: 37.5; 25 TABLET ORAL at 09:02

## 2019-02-11 RX ADMIN — POTASSIUM CHLORIDE 40 MEQ: 20 TABLET, EXTENDED RELEASE ORAL at 18:30

## 2019-02-11 RX ADMIN — BACLOFEN 10 MG: 10 TABLET ORAL at 21:09

## 2019-02-11 RX ADMIN — Medication 5 ML: at 10:48

## 2019-02-11 RX ADMIN — CLONAZEPAM 1 MG: 1 TABLET ORAL at 21:09

## 2019-02-11 RX ADMIN — TRAZODONE HYDROCHLORIDE 50 MG: 50 TABLET ORAL at 21:09

## 2019-02-11 RX ADMIN — CLONAZEPAM 1 MG: 1 TABLET ORAL at 09:40

## 2019-02-11 RX ADMIN — MONTELUKAST SODIUM 10 MG: 10 TABLET, COATED ORAL at 09:02

## 2019-02-11 RX ADMIN — GUAIFENESIN 600 MG: 600 TABLET, EXTENDED RELEASE ORAL at 21:09

## 2019-02-11 RX ADMIN — ENOXAPARIN SODIUM 40 MG: 40 INJECTION SUBCUTANEOUS at 09:08

## 2019-02-11 RX ADMIN — LEVOTHYROXINE SODIUM 150 MCG: 150 TABLET ORAL at 06:27

## 2019-02-11 RX ADMIN — BACLOFEN 10 MG: 10 TABLET ORAL at 15:39

## 2019-02-11 RX ADMIN — PANTOPRAZOLE SODIUM 40 MG: 40 TABLET, DELAYED RELEASE ORAL at 06:27

## 2019-02-11 RX ADMIN — CEFTRIAXONE 1 G: 1 INJECTION, POWDER, FOR SOLUTION INTRAMUSCULAR; INTRAVENOUS at 15:30

## 2019-02-11 RX ADMIN — ASPIRIN 81 MG 81 MG: 81 TABLET ORAL at 09:02

## 2019-02-11 RX ADMIN — IPRATROPIUM BROMIDE AND ALBUTEROL SULFATE 3 ML: .5; 3 SOLUTION RESPIRATORY (INHALATION) at 20:13

## 2019-02-11 RX ADMIN — Medication 5 ML: at 21:17

## 2019-02-11 RX ADMIN — BUSPIRONE HYDROCHLORIDE 15 MG: 7.5 TABLET ORAL at 09:01

## 2019-02-11 RX ADMIN — BUSPIRONE HYDROCHLORIDE 15 MG: 7.5 TABLET ORAL at 21:58

## 2019-02-11 RX ADMIN — METHYLPREDNISOLONE SODIUM SUCCINATE 40 MG: 40 INJECTION, POWDER, LYOPHILIZED, FOR SOLUTION INTRAMUSCULAR; INTRAVENOUS at 21:17

## 2019-02-11 RX ADMIN — POTASSIUM CHLORIDE 40 MEQ: 20 TABLET, EXTENDED RELEASE ORAL at 09:01

## 2019-02-11 RX ADMIN — SODIUM CHLORIDE, PRESERVATIVE FREE 10 ML: 5 INJECTION INTRAVENOUS at 21:09

## 2019-02-11 RX ADMIN — IPRATROPIUM BROMIDE AND ALBUTEROL SULFATE 3 ML: .5; 3 SOLUTION RESPIRATORY (INHALATION) at 08:23

## 2019-02-11 RX ADMIN — THEOPHYLLINE ANHYDROUS 200 MG: 200 CAPSULE, EXTENDED RELEASE ORAL at 15:30

## 2019-02-11 RX ADMIN — IBUPROFEN 400 MG: 400 TABLET ORAL at 18:29

## 2019-02-11 RX ADMIN — IBUPROFEN 400 MG: 400 TABLET ORAL at 09:03

## 2019-02-11 ASSESSMENT — PAIN SCALES - GENERAL
PAINLEVEL_OUTOF10: 1
PAINLEVEL_OUTOF10: 3
PAINLEVEL_OUTOF10: 0

## 2019-02-12 LAB
ANION GAP SERPL CALCULATED.3IONS-SCNC: 7 MMOL/L (ref 4–16)
BUN BLDV-MCNC: 21 MG/DL (ref 6–23)
CALCIUM SERPL-MCNC: 9.3 MG/DL (ref 8.3–10.6)
CHLORIDE BLD-SCNC: 84 MMOL/L (ref 99–110)
CO2: 41 MMOL/L (ref 21–32)
CREAT SERPL-MCNC: 0.9 MG/DL (ref 0.6–1.1)
GFR AFRICAN AMERICAN: >60 ML/MIN/1.73M2
GFR NON-AFRICAN AMERICAN: >60 ML/MIN/1.73M2
GLUCOSE BLD-MCNC: 227 MG/DL (ref 70–99)
POTASSIUM SERPL-SCNC: 3.6 MMOL/L (ref 3.5–5.1)
PRO-BNP: 457.3 PG/ML
SODIUM BLD-SCNC: 132 MMOL/L (ref 135–145)
THEOPHYLLINE LEVEL: 10.7 UG/ML (ref 10–20)

## 2019-02-12 PROCEDURE — 6370000000 HC RX 637 (ALT 250 FOR IP): Performed by: INTERNAL MEDICINE

## 2019-02-12 PROCEDURE — 94668 MNPJ CHEST WALL SBSQ: CPT

## 2019-02-12 PROCEDURE — 6360000002 HC RX W HCPCS: Performed by: INTERNAL MEDICINE

## 2019-02-12 PROCEDURE — 94640 AIRWAY INHALATION TREATMENT: CPT

## 2019-02-12 PROCEDURE — 80198 ASSAY OF THEOPHYLLINE: CPT

## 2019-02-12 PROCEDURE — 80048 BASIC METABOLIC PNL TOTAL CA: CPT

## 2019-02-12 PROCEDURE — 83880 ASSAY OF NATRIURETIC PEPTIDE: CPT

## 2019-02-12 PROCEDURE — 36415 COLL VENOUS BLD VENIPUNCTURE: CPT

## 2019-02-12 PROCEDURE — 2580000003 HC RX 258: Performed by: INTERNAL MEDICINE

## 2019-02-12 PROCEDURE — 94761 N-INVAS EAR/PLS OXIMETRY MLT: CPT

## 2019-02-12 PROCEDURE — 2060000000 HC ICU INTERMEDIATE R&B

## 2019-02-12 PROCEDURE — 2700000000 HC OXYGEN THERAPY PER DAY

## 2019-02-12 RX ORDER — POTASSIUM CHLORIDE 7.45 MG/ML
10 INJECTION INTRAVENOUS PRN
Status: DISCONTINUED | OUTPATIENT
Start: 2019-02-12 | End: 2019-02-14 | Stop reason: HOSPADM

## 2019-02-12 RX ORDER — POTASSIUM CHLORIDE 20MEQ/15ML
40 LIQUID (ML) ORAL PRN
Status: DISCONTINUED | OUTPATIENT
Start: 2019-02-12 | End: 2019-02-14 | Stop reason: HOSPADM

## 2019-02-12 RX ORDER — FUROSEMIDE 10 MG/ML
20 INJECTION INTRAMUSCULAR; INTRAVENOUS ONCE
Status: COMPLETED | OUTPATIENT
Start: 2019-02-12 | End: 2019-02-12

## 2019-02-12 RX ORDER — POTASSIUM CHLORIDE 20 MEQ/1
40 TABLET, EXTENDED RELEASE ORAL PRN
Status: DISCONTINUED | OUTPATIENT
Start: 2019-02-12 | End: 2019-02-14 | Stop reason: HOSPADM

## 2019-02-12 RX ADMIN — ATORVASTATIN CALCIUM 40 MG: 40 TABLET, FILM COATED ORAL at 08:28

## 2019-02-12 RX ADMIN — Medication 5 ML: at 08:40

## 2019-02-12 RX ADMIN — MONTELUKAST SODIUM 10 MG: 10 TABLET, COATED ORAL at 13:54

## 2019-02-12 RX ADMIN — IPRATROPIUM BROMIDE AND ALBUTEROL SULFATE 3 ML: .5; 3 SOLUTION RESPIRATORY (INHALATION) at 11:24

## 2019-02-12 RX ADMIN — ASPIRIN 81 MG 81 MG: 81 TABLET ORAL at 08:28

## 2019-02-12 RX ADMIN — CLONAZEPAM 1 MG: 1 TABLET ORAL at 08:27

## 2019-02-12 RX ADMIN — IBUPROFEN 400 MG: 400 TABLET ORAL at 08:27

## 2019-02-12 RX ADMIN — BACLOFEN 10 MG: 10 TABLET ORAL at 13:54

## 2019-02-12 RX ADMIN — BUSPIRONE HYDROCHLORIDE 15 MG: 7.5 TABLET ORAL at 08:27

## 2019-02-12 RX ADMIN — POTASSIUM CHLORIDE 40 MEQ: 20 TABLET, EXTENDED RELEASE ORAL at 18:26

## 2019-02-12 RX ADMIN — CITALOPRAM HYDROBROMIDE 40 MG: 40 TABLET ORAL at 08:28

## 2019-02-12 RX ADMIN — Medication 5 ML: at 20:08

## 2019-02-12 RX ADMIN — Medication 5 ML: at 13:55

## 2019-02-12 RX ADMIN — METHYLPREDNISOLONE SODIUM SUCCINATE 40 MG: 40 INJECTION, POWDER, LYOPHILIZED, FOR SOLUTION INTRAMUSCULAR; INTRAVENOUS at 20:08

## 2019-02-12 RX ADMIN — THEOPHYLLINE ANHYDROUS 200 MG: 200 CAPSULE, EXTENDED RELEASE ORAL at 13:54

## 2019-02-12 RX ADMIN — BACLOFEN 10 MG: 10 TABLET ORAL at 20:08

## 2019-02-12 RX ADMIN — PANTOPRAZOLE SODIUM 40 MG: 40 TABLET, DELAYED RELEASE ORAL at 08:28

## 2019-02-12 RX ADMIN — IPRATROPIUM BROMIDE AND ALBUTEROL SULFATE 3 ML: .5; 3 SOLUTION RESPIRATORY (INHALATION) at 16:04

## 2019-02-12 RX ADMIN — IBUPROFEN 400 MG: 400 TABLET ORAL at 18:26

## 2019-02-12 RX ADMIN — IBUPROFEN 400 MG: 400 TABLET ORAL at 13:54

## 2019-02-12 RX ADMIN — TRAZODONE HYDROCHLORIDE 50 MG: 50 TABLET ORAL at 20:08

## 2019-02-12 RX ADMIN — GUAIFENESIN 600 MG: 600 TABLET, EXTENDED RELEASE ORAL at 08:28

## 2019-02-12 RX ADMIN — SODIUM CHLORIDE, PRESERVATIVE FREE 10 ML: 5 INJECTION INTRAVENOUS at 20:08

## 2019-02-12 RX ADMIN — METHYLPREDNISOLONE SODIUM SUCCINATE 40 MG: 40 INJECTION, POWDER, LYOPHILIZED, FOR SOLUTION INTRAMUSCULAR; INTRAVENOUS at 08:27

## 2019-02-12 RX ADMIN — LEVOTHYROXINE SODIUM 150 MCG: 150 TABLET ORAL at 08:39

## 2019-02-12 RX ADMIN — CEFTRIAXONE 1 G: 1 INJECTION, POWDER, FOR SOLUTION INTRAMUSCULAR; INTRAVENOUS at 13:55

## 2019-02-12 RX ADMIN — AMLODIPINE BESYLATE 10 MG: 10 TABLET ORAL at 08:27

## 2019-02-12 RX ADMIN — BACLOFEN 10 MG: 10 TABLET ORAL at 08:27

## 2019-02-12 RX ADMIN — IPRATROPIUM BROMIDE AND ALBUTEROL SULFATE 3 ML: .5; 3 SOLUTION RESPIRATORY (INHALATION) at 08:19

## 2019-02-12 RX ADMIN — Medication 5 ML: at 20:09

## 2019-02-12 RX ADMIN — THEOPHYLLINE ANHYDROUS 200 MG: 200 CAPSULE, EXTENDED RELEASE ORAL at 08:27

## 2019-02-12 RX ADMIN — THEOPHYLLINE ANHYDROUS 200 MG: 200 CAPSULE, EXTENDED RELEASE ORAL at 20:08

## 2019-02-12 RX ADMIN — POTASSIUM CHLORIDE 40 MEQ: 20 TABLET, EXTENDED RELEASE ORAL at 08:28

## 2019-02-12 RX ADMIN — CLONAZEPAM 1 MG: 1 TABLET ORAL at 20:08

## 2019-02-12 RX ADMIN — FUROSEMIDE 20 MG: 10 INJECTION, SOLUTION INTRAVENOUS at 13:55

## 2019-02-12 RX ADMIN — BUSPIRONE HYDROCHLORIDE 15 MG: 7.5 TABLET ORAL at 20:08

## 2019-02-12 RX ADMIN — GUAIFENESIN 600 MG: 600 TABLET, EXTENDED RELEASE ORAL at 20:08

## 2019-02-12 RX ADMIN — IPRATROPIUM BROMIDE AND ALBUTEROL SULFATE 3 ML: .5; 3 SOLUTION RESPIRATORY (INHALATION) at 19:59

## 2019-02-12 RX ADMIN — ENOXAPARIN SODIUM 40 MG: 40 INJECTION SUBCUTANEOUS at 08:28

## 2019-02-12 RX ADMIN — TRIAMTERENE AND HYDROCHLOROTHIAZIDE 1 TABLET: 37.5; 25 TABLET ORAL at 08:27

## 2019-02-12 ASSESSMENT — PAIN SCALES - GENERAL
PAINLEVEL_OUTOF10: 3
PAINLEVEL_OUTOF10: 1

## 2019-02-13 ENCOUNTER — APPOINTMENT (OUTPATIENT)
Dept: NUCLEAR MEDICINE | Age: 59
DRG: 191 | End: 2019-02-13
Payer: COMMERCIAL

## 2019-02-13 LAB
ANION GAP SERPL CALCULATED.3IONS-SCNC: 8 MMOL/L (ref 4–16)
BUN BLDV-MCNC: 29 MG/DL (ref 6–23)
CALCIUM SERPL-MCNC: 9.5 MG/DL (ref 8.3–10.6)
CHLORIDE BLD-SCNC: 86 MMOL/L (ref 99–110)
CO2: 40 MMOL/L (ref 21–32)
CREAT SERPL-MCNC: 0.9 MG/DL (ref 0.6–1.1)
GFR AFRICAN AMERICAN: >60 ML/MIN/1.73M2
GFR NON-AFRICAN AMERICAN: >60 ML/MIN/1.73M2
GLUCOSE BLD-MCNC: 235 MG/DL (ref 70–99)
LV EF: 66 %
LVEF MODALITY: NORMAL
POTASSIUM SERPL-SCNC: 4.5 MMOL/L (ref 3.5–5.1)
PRO-BNP: 367.2 PG/ML
SODIUM BLD-SCNC: 134 MMOL/L (ref 135–145)

## 2019-02-13 PROCEDURE — 3430000000 HC RX DIAGNOSTIC RADIOPHARMACEUTICAL: Performed by: INTERNAL MEDICINE

## 2019-02-13 PROCEDURE — 94640 AIRWAY INHALATION TREATMENT: CPT

## 2019-02-13 PROCEDURE — 6370000000 HC RX 637 (ALT 250 FOR IP): Performed by: INTERNAL MEDICINE

## 2019-02-13 PROCEDURE — 99254 IP/OBS CNSLTJ NEW/EST MOD 60: CPT | Performed by: INTERNAL MEDICINE

## 2019-02-13 PROCEDURE — 6360000002 HC RX W HCPCS: Performed by: INTERNAL MEDICINE

## 2019-02-13 PROCEDURE — 2700000000 HC OXYGEN THERAPY PER DAY

## 2019-02-13 PROCEDURE — 2580000003 HC RX 258: Performed by: INTERNAL MEDICINE

## 2019-02-13 PROCEDURE — 94668 MNPJ CHEST WALL SBSQ: CPT

## 2019-02-13 PROCEDURE — A9500 TC99M SESTAMIBI: HCPCS | Performed by: INTERNAL MEDICINE

## 2019-02-13 PROCEDURE — 83880 ASSAY OF NATRIURETIC PEPTIDE: CPT

## 2019-02-13 PROCEDURE — 78452 HT MUSCLE IMAGE SPECT MULT: CPT

## 2019-02-13 PROCEDURE — 80048 BASIC METABOLIC PNL TOTAL CA: CPT

## 2019-02-13 PROCEDURE — 6370000000 HC RX 637 (ALT 250 FOR IP): Performed by: NURSE PRACTITIONER

## 2019-02-13 PROCEDURE — APPSS60 APP SPLIT SHARED TIME 46-60 MINUTES: Performed by: NURSE PRACTITIONER

## 2019-02-13 PROCEDURE — 36415 COLL VENOUS BLD VENIPUNCTURE: CPT

## 2019-02-13 PROCEDURE — 2060000000 HC ICU INTERMEDIATE R&B

## 2019-02-13 PROCEDURE — 93017 CV STRESS TEST TRACING ONLY: CPT

## 2019-02-13 PROCEDURE — 94761 N-INVAS EAR/PLS OXIMETRY MLT: CPT

## 2019-02-13 RX ORDER — DILTIAZEM HYDROCHLORIDE 120 MG/1
120 CAPSULE, COATED, EXTENDED RELEASE ORAL DAILY
Status: DISCONTINUED | OUTPATIENT
Start: 2019-02-13 | End: 2019-02-14 | Stop reason: HOSPADM

## 2019-02-13 RX ADMIN — Medication 5 ML: at 11:48

## 2019-02-13 RX ADMIN — THEOPHYLLINE ANHYDROUS 200 MG: 200 CAPSULE, EXTENDED RELEASE ORAL at 20:23

## 2019-02-13 RX ADMIN — TRAZODONE HYDROCHLORIDE 50 MG: 50 TABLET ORAL at 20:23

## 2019-02-13 RX ADMIN — CITALOPRAM HYDROBROMIDE 40 MG: 40 TABLET ORAL at 11:42

## 2019-02-13 RX ADMIN — THEOPHYLLINE ANHYDROUS 200 MG: 200 CAPSULE, EXTENDED RELEASE ORAL at 14:33

## 2019-02-13 RX ADMIN — POTASSIUM CHLORIDE 40 MEQ: 20 TABLET, EXTENDED RELEASE ORAL at 11:43

## 2019-02-13 RX ADMIN — SODIUM CHLORIDE, PRESERVATIVE FREE 10 ML: 5 INJECTION INTRAVENOUS at 20:24

## 2019-02-13 RX ADMIN — IPRATROPIUM BROMIDE AND ALBUTEROL SULFATE 3 ML: .5; 3 SOLUTION RESPIRATORY (INHALATION) at 19:53

## 2019-02-13 RX ADMIN — Medication 5 ML: at 21:37

## 2019-02-13 RX ADMIN — DILTIAZEM HYDROCHLORIDE 120 MG: 120 CAPSULE, COATED, EXTENDED RELEASE ORAL at 18:04

## 2019-02-13 RX ADMIN — PANTOPRAZOLE SODIUM 40 MG: 40 TABLET, DELAYED RELEASE ORAL at 06:08

## 2019-02-13 RX ADMIN — CLONAZEPAM 1 MG: 1 TABLET ORAL at 20:24

## 2019-02-13 RX ADMIN — ASPIRIN 81 MG 81 MG: 81 TABLET ORAL at 11:43

## 2019-02-13 RX ADMIN — SODIUM CHLORIDE, PRESERVATIVE FREE 10 ML: 5 INJECTION INTRAVENOUS at 11:44

## 2019-02-13 RX ADMIN — IBUPROFEN 400 MG: 400 TABLET ORAL at 17:01

## 2019-02-13 RX ADMIN — LEVOTHYROXINE SODIUM 150 MCG: 150 TABLET ORAL at 06:08

## 2019-02-13 RX ADMIN — Medication 10 MILLICURIE: at 08:55

## 2019-02-13 RX ADMIN — BUSPIRONE HYDROCHLORIDE 15 MG: 7.5 TABLET ORAL at 20:23

## 2019-02-13 RX ADMIN — POTASSIUM CHLORIDE 40 MEQ: 20 TABLET, EXTENDED RELEASE ORAL at 17:01

## 2019-02-13 RX ADMIN — BUSPIRONE HYDROCHLORIDE 15 MG: 7.5 TABLET ORAL at 11:44

## 2019-02-13 RX ADMIN — Medication 5 ML: at 11:55

## 2019-02-13 RX ADMIN — TRIAMTERENE AND HYDROCHLOROTHIAZIDE 1 TABLET: 37.5; 25 TABLET ORAL at 11:41

## 2019-02-13 RX ADMIN — Medication 5 ML: at 17:01

## 2019-02-13 RX ADMIN — ATORVASTATIN CALCIUM 40 MG: 40 TABLET, FILM COATED ORAL at 11:42

## 2019-02-13 RX ADMIN — ACETAMINOPHEN 650 MG: 325 TABLET ORAL at 20:29

## 2019-02-13 RX ADMIN — METHYLPREDNISOLONE SODIUM SUCCINATE 40 MG: 40 INJECTION, POWDER, LYOPHILIZED, FOR SOLUTION INTRAMUSCULAR; INTRAVENOUS at 11:44

## 2019-02-13 RX ADMIN — GUAIFENESIN 600 MG: 600 TABLET, EXTENDED RELEASE ORAL at 11:43

## 2019-02-13 RX ADMIN — IPRATROPIUM BROMIDE AND ALBUTEROL SULFATE 3 ML: .5; 3 SOLUTION RESPIRATORY (INHALATION) at 16:48

## 2019-02-13 RX ADMIN — IPRATROPIUM BROMIDE AND ALBUTEROL SULFATE 3 ML: .5; 3 SOLUTION RESPIRATORY (INHALATION) at 08:47

## 2019-02-13 RX ADMIN — GUAIFENESIN 600 MG: 600 TABLET, EXTENDED RELEASE ORAL at 20:23

## 2019-02-13 RX ADMIN — BACLOFEN 10 MG: 10 TABLET ORAL at 20:23

## 2019-02-13 RX ADMIN — ACETAMINOPHEN 650 MG: 325 TABLET ORAL at 06:07

## 2019-02-13 RX ADMIN — BACLOFEN 10 MG: 10 TABLET ORAL at 14:33

## 2019-02-13 RX ADMIN — METHYLPREDNISOLONE SODIUM SUCCINATE 40 MG: 40 INJECTION, POWDER, LYOPHILIZED, FOR SOLUTION INTRAMUSCULAR; INTRAVENOUS at 20:23

## 2019-02-13 RX ADMIN — Medication 30 MILLICURIE: at 10:20

## 2019-02-13 RX ADMIN — CLONAZEPAM 1 MG: 1 TABLET ORAL at 11:44

## 2019-02-13 RX ADMIN — IBUPROFEN 400 MG: 400 TABLET ORAL at 11:43

## 2019-02-13 RX ADMIN — CEFTRIAXONE 1 G: 1 INJECTION, POWDER, FOR SOLUTION INTRAMUSCULAR; INTRAVENOUS at 14:33

## 2019-02-13 RX ADMIN — REGADENOSON 0.4 MG: 0.08 INJECTION, SOLUTION INTRAVENOUS at 10:17

## 2019-02-13 RX ADMIN — AMLODIPINE BESYLATE 10 MG: 10 TABLET ORAL at 11:43

## 2019-02-13 RX ADMIN — IPRATROPIUM BROMIDE AND ALBUTEROL SULFATE 3 ML: .5; 3 SOLUTION RESPIRATORY (INHALATION) at 12:03

## 2019-02-13 ASSESSMENT — ENCOUNTER SYMPTOMS
SINUS PRESSURE: 0
EYE ITCHING: 0
COLOR CHANGE: 0
BACK PAIN: 0
DIARRHEA: 0
COUGH: 1
BLOOD IN STOOL: 0
NAUSEA: 0
SHORTNESS OF BREATH: 1
EYE REDNESS: 0
CONSTIPATION: 0
CHEST TIGHTNESS: 0
VOMITING: 0
SINUS PAIN: 0

## 2019-02-13 ASSESSMENT — PAIN SCALES - GENERAL
PAINLEVEL_OUTOF10: 2
PAINLEVEL_OUTOF10: 1
PAINLEVEL_OUTOF10: 0
PAINLEVEL_OUTOF10: 5
PAINLEVEL_OUTOF10: 0
PAINLEVEL_OUTOF10: 5
PAINLEVEL_OUTOF10: 0
PAINLEVEL_OUTOF10: 2

## 2019-02-13 ASSESSMENT — PAIN DESCRIPTION - LOCATION: LOCATION: HEAD;NECK

## 2019-02-14 VITALS
HEIGHT: 62 IN | TEMPERATURE: 98.7 F | HEART RATE: 98 BPM | WEIGHT: 171.3 LBS | DIASTOLIC BLOOD PRESSURE: 59 MMHG | BODY MASS INDEX: 31.52 KG/M2 | RESPIRATION RATE: 20 BRPM | SYSTOLIC BLOOD PRESSURE: 159 MMHG | OXYGEN SATURATION: 95 %

## 2019-02-14 LAB
ANION GAP SERPL CALCULATED.3IONS-SCNC: 9 MMOL/L (ref 4–16)
BUN BLDV-MCNC: 25 MG/DL (ref 6–23)
CALCIUM SERPL-MCNC: 9.2 MG/DL (ref 8.3–10.6)
CHLORIDE BLD-SCNC: 87 MMOL/L (ref 99–110)
CO2: 37 MMOL/L (ref 21–32)
CREAT SERPL-MCNC: 1.1 MG/DL (ref 0.6–1.1)
GFR AFRICAN AMERICAN: >60 ML/MIN/1.73M2
GFR NON-AFRICAN AMERICAN: 51 ML/MIN/1.73M2
GLUCOSE BLD-MCNC: 238 MG/DL (ref 70–99)
POTASSIUM SERPL-SCNC: 3.9 MMOL/L (ref 3.5–5.1)
SODIUM BLD-SCNC: 133 MMOL/L (ref 135–145)

## 2019-02-14 PROCEDURE — 6370000000 HC RX 637 (ALT 250 FOR IP): Performed by: INTERNAL MEDICINE

## 2019-02-14 PROCEDURE — 2580000003 HC RX 258: Performed by: INTERNAL MEDICINE

## 2019-02-14 PROCEDURE — 6360000002 HC RX W HCPCS: Performed by: INTERNAL MEDICINE

## 2019-02-14 PROCEDURE — 36415 COLL VENOUS BLD VENIPUNCTURE: CPT

## 2019-02-14 PROCEDURE — 94640 AIRWAY INHALATION TREATMENT: CPT

## 2019-02-14 PROCEDURE — 6370000000 HC RX 637 (ALT 250 FOR IP): Performed by: NURSE PRACTITIONER

## 2019-02-14 PROCEDURE — 94761 N-INVAS EAR/PLS OXIMETRY MLT: CPT

## 2019-02-14 PROCEDURE — 80048 BASIC METABOLIC PNL TOTAL CA: CPT

## 2019-02-14 PROCEDURE — 99232 SBSQ HOSP IP/OBS MODERATE 35: CPT | Performed by: INTERNAL MEDICINE

## 2019-02-14 RX ORDER — METHYLPREDNISOLONE 4 MG/1
TABLET ORAL
Qty: 1 KIT | Refills: 0 | Status: SHIPPED | OUTPATIENT
Start: 2019-02-14 | End: 2019-02-20

## 2019-02-14 RX ORDER — PROMETHAZINE HYDROCHLORIDE AND CODEINE PHOSPHATE 6.25; 1 MG/5ML; MG/5ML
5 SYRUP ORAL EVERY 4 HOURS PRN
Qty: 200 ML | Refills: 0 | Status: SHIPPED | OUTPATIENT
Start: 2019-02-14 | End: 2019-02-21

## 2019-02-14 RX ORDER — DILTIAZEM HYDROCHLORIDE 120 MG/1
120 CAPSULE, COATED, EXTENDED RELEASE ORAL DAILY
Qty: 30 CAPSULE | Refills: 2 | Status: SHIPPED | OUTPATIENT
Start: 2019-02-15 | End: 2019-03-18

## 2019-02-14 RX ADMIN — IBUPROFEN 400 MG: 400 TABLET ORAL at 12:04

## 2019-02-14 RX ADMIN — AMLODIPINE BESYLATE 10 MG: 10 TABLET ORAL at 08:16

## 2019-02-14 RX ADMIN — Medication 5 ML: at 17:49

## 2019-02-14 RX ADMIN — IPRATROPIUM BROMIDE AND ALBUTEROL SULFATE 3 ML: .5; 3 SOLUTION RESPIRATORY (INHALATION) at 11:37

## 2019-02-14 RX ADMIN — Medication 5 ML: at 13:01

## 2019-02-14 RX ADMIN — ASPIRIN 81 MG 81 MG: 81 TABLET ORAL at 08:17

## 2019-02-14 RX ADMIN — IPRATROPIUM BROMIDE AND ALBUTEROL SULFATE 3 ML: .5; 3 SOLUTION RESPIRATORY (INHALATION) at 08:43

## 2019-02-14 RX ADMIN — ATORVASTATIN CALCIUM 40 MG: 40 TABLET, FILM COATED ORAL at 08:17

## 2019-02-14 RX ADMIN — MONTELUKAST SODIUM 10 MG: 10 TABLET, COATED ORAL at 08:16

## 2019-02-14 RX ADMIN — BUSPIRONE HYDROCHLORIDE 15 MG: 7.5 TABLET ORAL at 08:16

## 2019-02-14 RX ADMIN — GUAIFENESIN 600 MG: 600 TABLET, EXTENDED RELEASE ORAL at 08:17

## 2019-02-14 RX ADMIN — Medication 5 ML: at 08:17

## 2019-02-14 RX ADMIN — CEFTRIAXONE 1 G: 1 INJECTION, POWDER, FOR SOLUTION INTRAMUSCULAR; INTRAVENOUS at 13:56

## 2019-02-14 RX ADMIN — THEOPHYLLINE ANHYDROUS 200 MG: 200 CAPSULE, EXTENDED RELEASE ORAL at 13:56

## 2019-02-14 RX ADMIN — THEOPHYLLINE ANHYDROUS 200 MG: 200 CAPSULE, EXTENDED RELEASE ORAL at 08:17

## 2019-02-14 RX ADMIN — TRIAMTERENE AND HYDROCHLOROTHIAZIDE 1 TABLET: 37.5; 25 TABLET ORAL at 08:17

## 2019-02-14 RX ADMIN — SODIUM CHLORIDE, PRESERVATIVE FREE 10 ML: 5 INJECTION INTRAVENOUS at 08:16

## 2019-02-14 RX ADMIN — POTASSIUM CHLORIDE 40 MEQ: 20 TABLET, EXTENDED RELEASE ORAL at 16:09

## 2019-02-14 RX ADMIN — POTASSIUM CHLORIDE 40 MEQ: 20 TABLET, EXTENDED RELEASE ORAL at 08:29

## 2019-02-14 RX ADMIN — BACLOFEN 10 MG: 10 TABLET ORAL at 13:56

## 2019-02-14 RX ADMIN — CLONAZEPAM 1 MG: 1 TABLET ORAL at 08:16

## 2019-02-14 RX ADMIN — PANTOPRAZOLE SODIUM 40 MG: 40 TABLET, DELAYED RELEASE ORAL at 06:42

## 2019-02-14 RX ADMIN — Medication 5 ML: at 08:29

## 2019-02-14 RX ADMIN — DILTIAZEM HYDROCHLORIDE 120 MG: 120 CAPSULE, COATED, EXTENDED RELEASE ORAL at 08:17

## 2019-02-14 RX ADMIN — IBUPROFEN 400 MG: 400 TABLET ORAL at 08:29

## 2019-02-14 RX ADMIN — CITALOPRAM HYDROBROMIDE 40 MG: 40 TABLET ORAL at 08:17

## 2019-02-14 RX ADMIN — METHYLPREDNISOLONE SODIUM SUCCINATE 40 MG: 40 INJECTION, POWDER, LYOPHILIZED, FOR SOLUTION INTRAMUSCULAR; INTRAVENOUS at 08:17

## 2019-02-14 RX ADMIN — ACETAMINOPHEN 650 MG: 325 TABLET ORAL at 16:10

## 2019-02-14 RX ADMIN — LEVOTHYROXINE SODIUM 150 MCG: 150 TABLET ORAL at 06:42

## 2019-02-14 RX ADMIN — BACLOFEN 10 MG: 10 TABLET ORAL at 08:16

## 2019-02-14 ASSESSMENT — PAIN SCALES - GENERAL
PAINLEVEL_OUTOF10: 2
PAINLEVEL_OUTOF10: 3
PAINLEVEL_OUTOF10: 0

## 2019-02-17 ENCOUNTER — APPOINTMENT (OUTPATIENT)
Dept: GENERAL RADIOLOGY | Age: 59
DRG: 191 | End: 2019-02-17
Payer: COMMERCIAL

## 2019-02-17 ENCOUNTER — HOSPITAL ENCOUNTER (INPATIENT)
Age: 59
LOS: 2 days | Discharge: HOME | DRG: 191 | End: 2019-02-19
Attending: EMERGENCY MEDICINE | Admitting: INTERNAL MEDICINE
Payer: COMMERCIAL

## 2019-02-17 DIAGNOSIS — J44.1 COPD EXACERBATION (HCC): ICD-10-CM

## 2019-02-17 DIAGNOSIS — E87.1 HYPONATREMIA: Primary | ICD-10-CM

## 2019-02-17 PROBLEM — E87.6 ACUTE HYPOKALEMIA: Status: ACTIVE | Noted: 2019-02-17

## 2019-02-17 PROBLEM — E66.09 CLASS 1 OBESITY DUE TO EXCESS CALORIES WITH BODY MASS INDEX (BMI) OF 31.0 TO 31.9 IN ADULT: Status: ACTIVE | Noted: 2019-02-17

## 2019-02-17 LAB
ANION GAP SERPL CALCULATED.3IONS-SCNC: 13 MMOL/L (ref 4–16)
BASOPHILS ABSOLUTE: 0 K/CU MM
BASOPHILS RELATIVE PERCENT: 0.2 % (ref 0–1)
BUN BLDV-MCNC: 17 MG/DL (ref 6–23)
CALCIUM SERPL-MCNC: 8.7 MG/DL (ref 8.3–10.6)
CHLORIDE BLD-SCNC: 78 MMOL/L (ref 99–110)
CO2: 32 MMOL/L (ref 21–32)
CREAT SERPL-MCNC: 0.8 MG/DL (ref 0.6–1.1)
DIFFERENTIAL TYPE: ABNORMAL
EOSINOPHILS ABSOLUTE: 0 K/CU MM
EOSINOPHILS RELATIVE PERCENT: 0.1 % (ref 0–3)
GFR AFRICAN AMERICAN: >60 ML/MIN/1.73M2
GFR NON-AFRICAN AMERICAN: >60 ML/MIN/1.73M2
GLUCOSE BLD-MCNC: 134 MG/DL (ref 70–99)
HCT VFR BLD CALC: 38.6 % (ref 37–47)
HEMOGLOBIN: 12.7 GM/DL (ref 12.5–16)
IMMATURE NEUTROPHIL %: 2.5 % (ref 0–0.43)
LYMPHOCYTES ABSOLUTE: 0.9 K/CU MM
LYMPHOCYTES RELATIVE PERCENT: 4.9 % (ref 24–44)
MCH RBC QN AUTO: 28.7 PG (ref 27–31)
MCHC RBC AUTO-ENTMCNC: 32.9 % (ref 32–36)
MCV RBC AUTO: 87.1 FL (ref 78–100)
MONOCYTES ABSOLUTE: 1 K/CU MM
MONOCYTES RELATIVE PERCENT: 5.7 % (ref 0–4)
NUCLEATED RBC %: 0 %
PDW BLD-RTO: 13.5 % (ref 11.7–14.9)
PLATELET # BLD: 385 K/CU MM (ref 140–440)
PMV BLD AUTO: 9.5 FL (ref 7.5–11.1)
POTASSIUM SERPL-SCNC: 3.4 MMOL/L (ref 3.5–5.1)
RBC # BLD: 4.43 M/CU MM (ref 4.2–5.4)
SEGMENTED NEUTROPHILS ABSOLUTE COUNT: 15.8 K/CU MM
SEGMENTED NEUTROPHILS RELATIVE PERCENT: 86.6 % (ref 36–66)
SODIUM BLD-SCNC: 123 MMOL/L (ref 135–145)
TOTAL IMMATURE NEUTOROPHIL: 0.46 K/CU MM
TOTAL NUCLEATED RBC: 0 K/CU MM
WBC # BLD: 18.2 K/CU MM (ref 4–10.5)

## 2019-02-17 PROCEDURE — 6370000000 HC RX 637 (ALT 250 FOR IP): Performed by: NURSE PRACTITIONER

## 2019-02-17 PROCEDURE — 99285 EMERGENCY DEPT VISIT HI MDM: CPT

## 2019-02-17 PROCEDURE — 94761 N-INVAS EAR/PLS OXIMETRY MLT: CPT

## 2019-02-17 PROCEDURE — 2580000003 HC RX 258: Performed by: NURSE PRACTITIONER

## 2019-02-17 PROCEDURE — 85025 COMPLETE CBC W/AUTO DIFF WBC: CPT

## 2019-02-17 PROCEDURE — 36415 COLL VENOUS BLD VENIPUNCTURE: CPT

## 2019-02-17 PROCEDURE — 6370000000 HC RX 637 (ALT 250 FOR IP): Performed by: EMERGENCY MEDICINE

## 2019-02-17 PROCEDURE — 96361 HYDRATE IV INFUSION ADD-ON: CPT

## 2019-02-17 PROCEDURE — 94640 AIRWAY INHALATION TREATMENT: CPT

## 2019-02-17 PROCEDURE — 80048 BASIC METABOLIC PNL TOTAL CA: CPT

## 2019-02-17 PROCEDURE — 71045 X-RAY EXAM CHEST 1 VIEW: CPT

## 2019-02-17 PROCEDURE — 6360000002 HC RX W HCPCS: Performed by: NURSE PRACTITIONER

## 2019-02-17 PROCEDURE — 2700000000 HC OXYGEN THERAPY PER DAY

## 2019-02-17 PROCEDURE — 1200000000 HC SEMI PRIVATE

## 2019-02-17 PROCEDURE — 2580000003 HC RX 258: Performed by: EMERGENCY MEDICINE

## 2019-02-17 PROCEDURE — 93005 ELECTROCARDIOGRAM TRACING: CPT | Performed by: EMERGENCY MEDICINE

## 2019-02-17 PROCEDURE — 96360 HYDRATION IV INFUSION INIT: CPT

## 2019-02-17 RX ORDER — CITALOPRAM 40 MG/1
40 TABLET ORAL DAILY
Status: DISCONTINUED | OUTPATIENT
Start: 2019-02-18 | End: 2019-02-19 | Stop reason: HOSPADM

## 2019-02-17 RX ORDER — METHYLPREDNISOLONE SODIUM SUCCINATE 40 MG/ML
40 INJECTION, POWDER, LYOPHILIZED, FOR SOLUTION INTRAMUSCULAR; INTRAVENOUS DAILY
Status: DISCONTINUED | OUTPATIENT
Start: 2019-02-17 | End: 2019-02-18

## 2019-02-17 RX ORDER — PROMETHAZINE HYDROCHLORIDE AND CODEINE PHOSPHATE 6.25; 1 MG/5ML; MG/5ML
5 SYRUP ORAL EVERY 4 HOURS PRN
Status: DISCONTINUED | OUTPATIENT
Start: 2019-02-17 | End: 2019-02-19 | Stop reason: HOSPADM

## 2019-02-17 RX ORDER — POTASSIUM CHLORIDE 20 MEQ/1
40 TABLET, EXTENDED RELEASE ORAL 2 TIMES DAILY WITH MEALS
Status: DISCONTINUED | OUTPATIENT
Start: 2019-02-17 | End: 2019-02-19 | Stop reason: HOSPADM

## 2019-02-17 RX ORDER — SODIUM CHLORIDE 0.9 % (FLUSH) 0.9 %
10 SYRINGE (ML) INJECTION PRN
Status: DISCONTINUED | OUTPATIENT
Start: 2019-02-17 | End: 2019-02-19 | Stop reason: HOSPADM

## 2019-02-17 RX ORDER — IBUPROFEN 400 MG/1
400 TABLET ORAL
Status: DISCONTINUED | OUTPATIENT
Start: 2019-02-18 | End: 2019-02-19 | Stop reason: HOSPADM

## 2019-02-17 RX ORDER — TRAZODONE HYDROCHLORIDE 50 MG/1
50 TABLET ORAL NIGHTLY
Status: DISCONTINUED | OUTPATIENT
Start: 2019-02-17 | End: 2019-02-19 | Stop reason: HOSPADM

## 2019-02-17 RX ORDER — CLONAZEPAM 1 MG/1
1 TABLET ORAL 3 TIMES DAILY
Status: DISCONTINUED | OUTPATIENT
Start: 2019-02-17 | End: 2019-02-19 | Stop reason: HOSPADM

## 2019-02-17 RX ORDER — BACLOFEN 10 MG/1
10 TABLET ORAL 3 TIMES DAILY
Status: DISCONTINUED | OUTPATIENT
Start: 2019-02-17 | End: 2019-02-19 | Stop reason: HOSPADM

## 2019-02-17 RX ORDER — HYDRALAZINE HYDROCHLORIDE 20 MG/ML
10 INJECTION INTRAMUSCULAR; INTRAVENOUS EVERY 6 HOURS PRN
Status: DISCONTINUED | OUTPATIENT
Start: 2019-02-17 | End: 2019-02-19 | Stop reason: HOSPADM

## 2019-02-17 RX ORDER — MONTELUKAST SODIUM 10 MG/1
10 TABLET ORAL DAILY
Status: DISCONTINUED | OUTPATIENT
Start: 2019-02-17 | End: 2019-02-19 | Stop reason: HOSPADM

## 2019-02-17 RX ORDER — IPRATROPIUM BROMIDE AND ALBUTEROL SULFATE 2.5; .5 MG/3ML; MG/3ML
1 SOLUTION RESPIRATORY (INHALATION) ONCE
Status: COMPLETED | OUTPATIENT
Start: 2019-02-17 | End: 2019-02-17

## 2019-02-17 RX ORDER — BUSPIRONE HYDROCHLORIDE 7.5 MG/1
7.5 TABLET ORAL 4 TIMES DAILY
Status: DISCONTINUED | OUTPATIENT
Start: 2019-02-17 | End: 2019-02-19 | Stop reason: HOSPADM

## 2019-02-17 RX ORDER — SODIUM CHLORIDE 9 MG/ML
INJECTION, SOLUTION INTRAVENOUS CONTINUOUS
Status: DISCONTINUED | OUTPATIENT
Start: 2019-02-17 | End: 2019-02-17

## 2019-02-17 RX ORDER — MONTELUKAST SODIUM 10 MG/1
10 TABLET ORAL DAILY
Status: DISCONTINUED | OUTPATIENT
Start: 2019-02-17 | End: 2019-02-17

## 2019-02-17 RX ORDER — SODIUM CHLORIDE 9 MG/ML
INJECTION, SOLUTION INTRAVENOUS CONTINUOUS
Status: DISCONTINUED | OUTPATIENT
Start: 2019-02-17 | End: 2019-02-18

## 2019-02-17 RX ORDER — ATORVASTATIN CALCIUM 40 MG/1
40 TABLET, FILM COATED ORAL DAILY
Status: DISCONTINUED | OUTPATIENT
Start: 2019-02-17 | End: 2019-02-19 | Stop reason: HOSPADM

## 2019-02-17 RX ORDER — PANTOPRAZOLE SODIUM 40 MG/1
40 TABLET, DELAYED RELEASE ORAL
Status: DISCONTINUED | OUTPATIENT
Start: 2019-02-18 | End: 2019-02-19 | Stop reason: HOSPADM

## 2019-02-17 RX ORDER — LEVOTHYROXINE SODIUM 0.15 MG/1
150 TABLET ORAL DAILY
Status: DISCONTINUED | OUTPATIENT
Start: 2019-02-18 | End: 2019-02-19 | Stop reason: HOSPADM

## 2019-02-17 RX ORDER — AMLODIPINE BESYLATE 10 MG/1
10 TABLET ORAL DAILY
Status: DISCONTINUED | OUTPATIENT
Start: 2019-02-17 | End: 2019-02-19 | Stop reason: HOSPADM

## 2019-02-17 RX ORDER — METHYLPREDNISOLONE 4 MG/1
4 TABLET ORAL SEE ADMIN INSTRUCTIONS
Status: DISCONTINUED | OUTPATIENT
Start: 2019-02-17 | End: 2019-02-17

## 2019-02-17 RX ORDER — SODIUM CHLORIDE 0.9 % (FLUSH) 0.9 %
10 SYRINGE (ML) INJECTION EVERY 12 HOURS SCHEDULED
Status: DISCONTINUED | OUTPATIENT
Start: 2019-02-17 | End: 2019-02-19 | Stop reason: HOSPADM

## 2019-02-17 RX ORDER — BUSPIRONE HYDROCHLORIDE 10 MG/1
10 TABLET ORAL 3 TIMES DAILY
Status: DISCONTINUED | OUTPATIENT
Start: 2019-02-17 | End: 2019-02-17 | Stop reason: RX

## 2019-02-17 RX ORDER — ONDANSETRON 2 MG/ML
4 INJECTION INTRAMUSCULAR; INTRAVENOUS EVERY 6 HOURS PRN
Status: DISCONTINUED | OUTPATIENT
Start: 2019-02-17 | End: 2019-02-19 | Stop reason: HOSPADM

## 2019-02-17 RX ORDER — IPRATROPIUM BROMIDE AND ALBUTEROL SULFATE 2.5; .5 MG/3ML; MG/3ML
1 SOLUTION RESPIRATORY (INHALATION)
Status: DISCONTINUED | OUTPATIENT
Start: 2019-02-18 | End: 2019-02-19 | Stop reason: HOSPADM

## 2019-02-17 RX ORDER — ASPIRIN 81 MG/1
81 TABLET, CHEWABLE ORAL DAILY
Status: DISCONTINUED | OUTPATIENT
Start: 2019-02-17 | End: 2019-02-19 | Stop reason: HOSPADM

## 2019-02-17 RX ORDER — DILTIAZEM HYDROCHLORIDE 120 MG/1
120 CAPSULE, COATED, EXTENDED RELEASE ORAL DAILY
Status: DISCONTINUED | OUTPATIENT
Start: 2019-02-17 | End: 2019-02-19 | Stop reason: HOSPADM

## 2019-02-17 RX ORDER — GUAIFENESIN 600 MG/1
600 TABLET, EXTENDED RELEASE ORAL 2 TIMES DAILY
Status: DISCONTINUED | OUTPATIENT
Start: 2019-02-17 | End: 2019-02-19 | Stop reason: HOSPADM

## 2019-02-17 RX ORDER — FLUTICASONE PROPIONATE 50 MCG
2 SPRAY, SUSPENSION (ML) NASAL DAILY
Status: DISCONTINUED | OUTPATIENT
Start: 2019-02-18 | End: 2019-02-19 | Stop reason: HOSPADM

## 2019-02-17 RX ADMIN — SODIUM CHLORIDE: 9 INJECTION, SOLUTION INTRAVENOUS at 18:00

## 2019-02-17 RX ADMIN — ENOXAPARIN SODIUM 40 MG: 40 INJECTION SUBCUTANEOUS at 23:06

## 2019-02-17 RX ADMIN — METHYLPREDNISOLONE SODIUM SUCCINATE 40 MG: 40 INJECTION, POWDER, LYOPHILIZED, FOR SOLUTION INTRAMUSCULAR; INTRAVENOUS at 23:06

## 2019-02-17 RX ADMIN — IPRATROPIUM BROMIDE AND ALBUTEROL SULFATE 1 AMPULE: .5; 3 SOLUTION RESPIRATORY (INHALATION) at 16:45

## 2019-02-17 RX ADMIN — SODIUM CHLORIDE: 900 INJECTION INTRAVENOUS at 23:03

## 2019-02-17 RX ADMIN — BUSPIRONE HYDROCHLORIDE 7.5 MG: 7.5 TABLET ORAL at 23:05

## 2019-02-17 RX ADMIN — POTASSIUM CHLORIDE 40 MEQ: 20 TABLET, EXTENDED RELEASE ORAL at 23:04

## 2019-02-17 RX ADMIN — GUAIFENESIN 600 MG: 600 TABLET, EXTENDED RELEASE ORAL at 23:07

## 2019-02-17 RX ADMIN — CLONAZEPAM 1 MG: 1 TABLET ORAL at 23:05

## 2019-02-17 RX ADMIN — TRAZODONE HYDROCHLORIDE 50 MG: 50 TABLET ORAL at 23:06

## 2019-02-17 RX ADMIN — NYSTATIN 500000 UNITS: 100000 SUSPENSION ORAL at 23:03

## 2019-02-17 RX ADMIN — SODIUM CHLORIDE, PRESERVATIVE FREE 10 ML: 5 INJECTION INTRAVENOUS at 23:08

## 2019-02-17 RX ADMIN — BACLOFEN 10 MG: 10 TABLET ORAL at 23:04

## 2019-02-17 ASSESSMENT — PAIN DESCRIPTION - LOCATION: LOCATION: HEAD

## 2019-02-17 ASSESSMENT — PAIN SCALES - GENERAL
PAINLEVEL_OUTOF10: 6
PAINLEVEL_OUTOF10: 4

## 2019-02-17 ASSESSMENT — PAIN DESCRIPTION - PAIN TYPE: TYPE: ACUTE PAIN

## 2019-02-18 ENCOUNTER — APPOINTMENT (OUTPATIENT)
Dept: CT IMAGING | Age: 59
DRG: 191 | End: 2019-02-18
Payer: COMMERCIAL

## 2019-02-18 LAB
ADENOVIRUS DETECTION BY PCR: NOT DETECTED
ANION GAP SERPL CALCULATED.3IONS-SCNC: 9 MMOL/L (ref 4–16)
BASOPHILS ABSOLUTE: 0 K/CU MM
BASOPHILS RELATIVE PERCENT: 0.1 % (ref 0–1)
BORDETELLA PERTUSSIS PCR: NOT DETECTED
BUN BLDV-MCNC: 15 MG/DL (ref 6–23)
CALCIUM SERPL-MCNC: 8.6 MG/DL (ref 8.3–10.6)
CHLAMYDOPHILA PNEUMONIA PCR: NOT DETECTED
CHLORIDE BLD-SCNC: 89 MMOL/L (ref 99–110)
CO2: 35 MMOL/L (ref 21–32)
CORONAVIRUS 229E PCR: NOT DETECTED
CORONAVIRUS HKU1 PCR: NOT DETECTED
CORONAVIRUS NL63 PCR: NOT DETECTED
CORONAVIRUS OC43 PCR: NOT DETECTED
CREAT SERPL-MCNC: 1 MG/DL (ref 0.6–1.1)
D DIMER: <200 NG/ML(DDU)
DIFFERENTIAL TYPE: ABNORMAL
EOSINOPHILS ABSOLUTE: 0 K/CU MM
EOSINOPHILS RELATIVE PERCENT: 0.3 % (ref 0–3)
GFR AFRICAN AMERICAN: >60 ML/MIN/1.73M2
GFR NON-AFRICAN AMERICAN: 57 ML/MIN/1.73M2
GLUCOSE BLD-MCNC: 193 MG/DL (ref 70–99)
HCT VFR BLD CALC: 35.5 % (ref 37–47)
HEMOGLOBIN: 11.3 GM/DL (ref 12.5–16)
HUMAN METAPNEUMOVIRUS PCR: NOT DETECTED
IMMATURE NEUTROPHIL %: 2.1 % (ref 0–0.43)
INFLUENZA A BY PCR: NOT DETECTED
INFLUENZA A H1 (2009) PCR: NOT DETECTED
INFLUENZA A H1 PANDEMIC PCR: NOT DETECTED
INFLUENZA A H3 PCR: NOT DETECTED
INFLUENZA B BY PCR: NOT DETECTED
LYMPHOCYTES ABSOLUTE: 0.4 K/CU MM
LYMPHOCYTES RELATIVE PERCENT: 4.4 % (ref 24–44)
MCH RBC QN AUTO: 28.7 PG (ref 27–31)
MCHC RBC AUTO-ENTMCNC: 31.8 % (ref 32–36)
MCV RBC AUTO: 90.1 FL (ref 78–100)
MONOCYTES ABSOLUTE: 0.3 K/CU MM
MONOCYTES RELATIVE PERCENT: 3.4 % (ref 0–4)
MYCOPLASMA PNEUMONIAE PCR: NOT DETECTED
NUCLEATED RBC %: 0 %
OSMOLALITY URINE: 591 MOS/L (ref 292–1090)
OSMOLALITY: 288 MOS/L (ref 280–300)
PARAINFLUENZA 1 PCR: NOT DETECTED
PARAINFLUENZA 2 PCR: NOT DETECTED
PARAINFLUENZA 3 PCR: NOT DETECTED
PARAINFLUENZA 4 PCR: NOT DETECTED
PDW BLD-RTO: 13.4 % (ref 11.7–14.9)
PLATELET # BLD: 287 K/CU MM (ref 140–440)
PMV BLD AUTO: 9.6 FL (ref 7.5–11.1)
POTASSIUM SERPL-SCNC: 3.6 MMOL/L (ref 3.5–5.1)
RBC # BLD: 3.94 M/CU MM (ref 4.2–5.4)
RHINOVIRUS ENTEROVIRUS PCR: NOT DETECTED
RSV PCR: NOT DETECTED
SEGMENTED NEUTROPHILS ABSOLUTE COUNT: 8.7 K/CU MM
SEGMENTED NEUTROPHILS RELATIVE PERCENT: 89.7 % (ref 36–66)
SODIUM BLD-SCNC: 133 MMOL/L (ref 135–145)
SODIUM URINE: 39 MMOL/L (ref 35–167)
TOTAL IMMATURE NEUTOROPHIL: 0.2 K/CU MM
TOTAL NUCLEATED RBC: 0 K/CU MM
WBC # BLD: 9.7 K/CU MM (ref 4–10.5)

## 2019-02-18 PROCEDURE — 94667 MNPJ CHEST WALL 1ST: CPT

## 2019-02-18 PROCEDURE — 2580000003 HC RX 258: Performed by: NURSE PRACTITIONER

## 2019-02-18 PROCEDURE — 6360000002 HC RX W HCPCS: Performed by: NURSE PRACTITIONER

## 2019-02-18 PROCEDURE — 87798 DETECT AGENT NOS DNA AMP: CPT

## 2019-02-18 PROCEDURE — 87486 CHLMYD PNEUM DNA AMP PROBE: CPT

## 2019-02-18 PROCEDURE — 1200000000 HC SEMI PRIVATE

## 2019-02-18 PROCEDURE — 80048 BASIC METABOLIC PNL TOTAL CA: CPT

## 2019-02-18 PROCEDURE — 85025 COMPLETE CBC W/AUTO DIFF WBC: CPT

## 2019-02-18 PROCEDURE — 6370000000 HC RX 637 (ALT 250 FOR IP): Performed by: INTERNAL MEDICINE

## 2019-02-18 PROCEDURE — 6370000000 HC RX 637 (ALT 250 FOR IP): Performed by: NURSE PRACTITIONER

## 2019-02-18 PROCEDURE — 93010 ELECTROCARDIOGRAM REPORT: CPT | Performed by: INTERNAL MEDICINE

## 2019-02-18 PROCEDURE — 85379 FIBRIN DEGRADATION QUANT: CPT

## 2019-02-18 PROCEDURE — 94761 N-INVAS EAR/PLS OXIMETRY MLT: CPT

## 2019-02-18 PROCEDURE — 71275 CT ANGIOGRAPHY CHEST: CPT

## 2019-02-18 PROCEDURE — 84300 ASSAY OF URINE SODIUM: CPT

## 2019-02-18 PROCEDURE — 6360000002 HC RX W HCPCS: Performed by: INTERNAL MEDICINE

## 2019-02-18 PROCEDURE — 87581 M.PNEUMON DNA AMP PROBE: CPT

## 2019-02-18 PROCEDURE — C1751 CATH, INF, PER/CENT/MIDLINE: HCPCS

## 2019-02-18 PROCEDURE — 76937 US GUIDE VASCULAR ACCESS: CPT

## 2019-02-18 PROCEDURE — 94640 AIRWAY INHALATION TREATMENT: CPT

## 2019-02-18 PROCEDURE — 83935 ASSAY OF URINE OSMOLALITY: CPT

## 2019-02-18 PROCEDURE — 2580000003 HC RX 258: Performed by: INTERNAL MEDICINE

## 2019-02-18 PROCEDURE — 2700000000 HC OXYGEN THERAPY PER DAY

## 2019-02-18 PROCEDURE — 83930 ASSAY OF BLOOD OSMOLALITY: CPT

## 2019-02-18 PROCEDURE — 36415 COLL VENOUS BLD VENIPUNCTURE: CPT

## 2019-02-18 PROCEDURE — 6360000004 HC RX CONTRAST MEDICATION: Performed by: INTERNAL MEDICINE

## 2019-02-18 RX ORDER — ACETAMINOPHEN 325 MG/1
650 TABLET ORAL EVERY 6 HOURS PRN
Status: DISCONTINUED | OUTPATIENT
Start: 2019-02-18 | End: 2019-02-19 | Stop reason: HOSPADM

## 2019-02-18 RX ORDER — SODIUM CHLORIDE 0.9 % (FLUSH) 0.9 %
10 SYRINGE (ML) INJECTION EVERY 12 HOURS SCHEDULED
Status: DISCONTINUED | OUTPATIENT
Start: 2019-02-18 | End: 2019-02-19 | Stop reason: HOSPADM

## 2019-02-18 RX ORDER — SODIUM CHLORIDE 0.9 % (FLUSH) 0.9 %
10 SYRINGE (ML) INJECTION 2 TIMES DAILY
Status: DISCONTINUED | OUTPATIENT
Start: 2019-02-18 | End: 2019-02-19 | Stop reason: HOSPADM

## 2019-02-18 RX ORDER — SODIUM CHLORIDE 0.9 % (FLUSH) 0.9 %
10 SYRINGE (ML) INJECTION PRN
Status: DISCONTINUED | OUTPATIENT
Start: 2019-02-18 | End: 2019-02-19 | Stop reason: HOSPADM

## 2019-02-18 RX ORDER — METHYLPREDNISOLONE SODIUM SUCCINATE 40 MG/ML
40 INJECTION, POWDER, LYOPHILIZED, FOR SOLUTION INTRAMUSCULAR; INTRAVENOUS EVERY 12 HOURS
Status: DISCONTINUED | OUTPATIENT
Start: 2019-02-18 | End: 2019-02-19 | Stop reason: HOSPADM

## 2019-02-18 RX ORDER — LIDOCAINE HYDROCHLORIDE 10 MG/ML
5 INJECTION, SOLUTION EPIDURAL; INFILTRATION; INTRACAUDAL; PERINEURAL ONCE
Status: DISCONTINUED | OUTPATIENT
Start: 2019-02-18 | End: 2019-02-19 | Stop reason: HOSPADM

## 2019-02-18 RX ADMIN — IOPAMIDOL 75 ML: 755 INJECTION, SOLUTION INTRAVENOUS at 11:51

## 2019-02-18 RX ADMIN — BACLOFEN 10 MG: 10 TABLET ORAL at 14:12

## 2019-02-18 RX ADMIN — NYSTATIN 500000 UNITS: 100000 SUSPENSION ORAL at 13:24

## 2019-02-18 RX ADMIN — DILTIAZEM HYDROCHLORIDE 120 MG: 120 CAPSULE, COATED, EXTENDED RELEASE ORAL at 09:29

## 2019-02-18 RX ADMIN — ATORVASTATIN CALCIUM 40 MG: 40 TABLET, FILM COATED ORAL at 20:37

## 2019-02-18 RX ADMIN — PANTOPRAZOLE SODIUM 40 MG: 40 TABLET, DELAYED RELEASE ORAL at 06:28

## 2019-02-18 RX ADMIN — SODIUM CHLORIDE: 900 INJECTION INTRAVENOUS at 04:15

## 2019-02-18 RX ADMIN — FLUTICASONE PROPIONATE 2 SPRAY: 50 SPRAY, METERED NASAL at 09:30

## 2019-02-18 RX ADMIN — BUSPIRONE HYDROCHLORIDE 7.5 MG: 7.5 TABLET ORAL at 22:26

## 2019-02-18 RX ADMIN — BUSPIRONE HYDROCHLORIDE 7.5 MG: 7.5 TABLET ORAL at 18:27

## 2019-02-18 RX ADMIN — CLONAZEPAM 1 MG: 1 TABLET ORAL at 20:37

## 2019-02-18 RX ADMIN — ACETAMINOPHEN 650 MG: 325 TABLET ORAL at 10:55

## 2019-02-18 RX ADMIN — IBUPROFEN 400 MG: 400 TABLET ORAL at 18:27

## 2019-02-18 RX ADMIN — BUSPIRONE HYDROCHLORIDE 7.5 MG: 7.5 TABLET ORAL at 06:28

## 2019-02-18 RX ADMIN — POTASSIUM CHLORIDE 40 MEQ: 20 TABLET, EXTENDED RELEASE ORAL at 09:27

## 2019-02-18 RX ADMIN — IBUPROFEN 400 MG: 400 TABLET ORAL at 13:25

## 2019-02-18 RX ADMIN — ASPIRIN 81 MG 81 MG: 81 TABLET ORAL at 09:29

## 2019-02-18 RX ADMIN — CITALOPRAM HYDROBROMIDE 40 MG: 40 TABLET ORAL at 09:28

## 2019-02-18 RX ADMIN — GUAIFENESIN 600 MG: 600 TABLET, EXTENDED RELEASE ORAL at 09:29

## 2019-02-18 RX ADMIN — CLONAZEPAM 1 MG: 1 TABLET ORAL at 15:12

## 2019-02-18 RX ADMIN — AZITHROMYCIN MONOHYDRATE 500 MG: 500 INJECTION, POWDER, LYOPHILIZED, FOR SOLUTION INTRAVENOUS at 15:12

## 2019-02-18 RX ADMIN — MAGNESIUM HYDROXIDE 30 ML: 400 SUSPENSION ORAL at 10:54

## 2019-02-18 RX ADMIN — LEVOTHYROXINE SODIUM 150 MCG: 150 TABLET ORAL at 06:28

## 2019-02-18 RX ADMIN — MONTELUKAST SODIUM 10 MG: 10 TABLET, FILM COATED ORAL at 20:37

## 2019-02-18 RX ADMIN — CLONAZEPAM 1 MG: 1 TABLET ORAL at 10:03

## 2019-02-18 RX ADMIN — POTASSIUM CHLORIDE 40 MEQ: 20 TABLET, EXTENDED RELEASE ORAL at 18:27

## 2019-02-18 RX ADMIN — IPRATROPIUM BROMIDE AND ALBUTEROL SULFATE 1 AMPULE: .5; 3 SOLUTION RESPIRATORY (INHALATION) at 15:36

## 2019-02-18 RX ADMIN — IPRATROPIUM BROMIDE AND ALBUTEROL SULFATE 1 AMPULE: .5; 3 SOLUTION RESPIRATORY (INHALATION) at 11:58

## 2019-02-18 RX ADMIN — IBUPROFEN 400 MG: 400 TABLET ORAL at 09:29

## 2019-02-18 RX ADMIN — TRAZODONE HYDROCHLORIDE 50 MG: 50 TABLET ORAL at 20:37

## 2019-02-18 RX ADMIN — AMLODIPINE BESYLATE 10 MG: 10 TABLET ORAL at 09:29

## 2019-02-18 RX ADMIN — THEOPHYLLINE ANHYDROUS 400 MG: 200 CAPSULE, EXTENDED RELEASE ORAL at 18:27

## 2019-02-18 RX ADMIN — GUAIFENESIN 600 MG: 600 TABLET, EXTENDED RELEASE ORAL at 20:37

## 2019-02-18 RX ADMIN — BACLOFEN 10 MG: 10 TABLET ORAL at 20:37

## 2019-02-18 RX ADMIN — IPRATROPIUM BROMIDE AND ALBUTEROL SULFATE 1 AMPULE: .5; 3 SOLUTION RESPIRATORY (INHALATION) at 08:52

## 2019-02-18 RX ADMIN — BUSPIRONE HYDROCHLORIDE 7.5 MG: 7.5 TABLET ORAL at 10:54

## 2019-02-18 RX ADMIN — NYSTATIN 500000 UNITS: 100000 SUSPENSION ORAL at 09:47

## 2019-02-18 RX ADMIN — NYSTATIN 500000 UNITS: 100000 SUSPENSION ORAL at 22:26

## 2019-02-18 RX ADMIN — BACLOFEN 10 MG: 10 TABLET ORAL at 09:28

## 2019-02-18 RX ADMIN — ENOXAPARIN SODIUM 40 MG: 40 INJECTION SUBCUTANEOUS at 09:30

## 2019-02-18 RX ADMIN — METHYLPREDNISOLONE SODIUM SUCCINATE 40 MG: 40 INJECTION, POWDER, LYOPHILIZED, FOR SOLUTION INTRAMUSCULAR; INTRAVENOUS at 20:37

## 2019-02-18 RX ADMIN — SODIUM CHLORIDE, PRESERVATIVE FREE 10 ML: 5 INJECTION INTRAVENOUS at 20:38

## 2019-02-18 ASSESSMENT — PAIN DESCRIPTION - LOCATION: LOCATION: HEAD

## 2019-02-18 ASSESSMENT — PAIN SCALES - GENERAL
PAINLEVEL_OUTOF10: 4
PAINLEVEL_OUTOF10: 5
PAINLEVEL_OUTOF10: 0
PAINLEVEL_OUTOF10: 3
PAINLEVEL_OUTOF10: 3
PAINLEVEL_OUTOF10: 4

## 2019-02-18 ASSESSMENT — PAIN DESCRIPTION - PAIN TYPE
TYPE: ACUTE PAIN
TYPE: ACUTE PAIN

## 2019-02-18 ASSESSMENT — PAIN DESCRIPTION - FREQUENCY: FREQUENCY: CONTINUOUS

## 2019-02-18 ASSESSMENT — PAIN DESCRIPTION - DESCRIPTORS: DESCRIPTORS: ACHING;DULL

## 2019-02-18 ASSESSMENT — PAIN DESCRIPTION - ORIENTATION: ORIENTATION: MID;UPPER

## 2019-02-19 VITALS
TEMPERATURE: 98.2 F | HEIGHT: 62 IN | RESPIRATION RATE: 20 BRPM | OXYGEN SATURATION: 94 % | DIASTOLIC BLOOD PRESSURE: 86 MMHG | WEIGHT: 174 LBS | SYSTOLIC BLOOD PRESSURE: 148 MMHG | HEART RATE: 104 BPM | BODY MASS INDEX: 32.02 KG/M2

## 2019-02-19 LAB
ALBUMIN SERPL-MCNC: 4.1 GM/DL (ref 3.4–5)
ALP BLD-CCNC: 63 IU/L (ref 40–128)
ALT SERPL-CCNC: 35 U/L (ref 10–40)
ANION GAP SERPL CALCULATED.3IONS-SCNC: 9 MMOL/L (ref 4–16)
AST SERPL-CCNC: 16 IU/L (ref 15–37)
BASOPHILS ABSOLUTE: 0 K/CU MM
BASOPHILS RELATIVE PERCENT: 0.1 % (ref 0–1)
BILIRUB SERPL-MCNC: 0.4 MG/DL (ref 0–1)
BUN BLDV-MCNC: 12 MG/DL (ref 6–23)
CALCIUM SERPL-MCNC: 9.2 MG/DL (ref 8.3–10.6)
CHLORIDE BLD-SCNC: 89 MMOL/L (ref 99–110)
CO2: 38 MMOL/L (ref 21–32)
CREAT SERPL-MCNC: 0.7 MG/DL (ref 0.6–1.1)
DIFFERENTIAL TYPE: ABNORMAL
EOSINOPHILS ABSOLUTE: 0 K/CU MM
EOSINOPHILS RELATIVE PERCENT: 0 % (ref 0–3)
GFR AFRICAN AMERICAN: >60 ML/MIN/1.73M2
GFR NON-AFRICAN AMERICAN: >60 ML/MIN/1.73M2
GLUCOSE BLD-MCNC: 180 MG/DL (ref 70–99)
HCT VFR BLD CALC: 37.1 % (ref 37–47)
HEMOGLOBIN: 11.8 GM/DL (ref 12.5–16)
IMMATURE NEUTROPHIL %: 1.8 % (ref 0–0.43)
LYMPHOCYTES ABSOLUTE: 0.7 K/CU MM
LYMPHOCYTES RELATIVE PERCENT: 5.2 % (ref 24–44)
MAGNESIUM: 2.1 MG/DL (ref 1.8–2.4)
MCH RBC QN AUTO: 28.8 PG (ref 27–31)
MCHC RBC AUTO-ENTMCNC: 31.8 % (ref 32–36)
MCV RBC AUTO: 90.5 FL (ref 78–100)
MONOCYTES ABSOLUTE: 0.6 K/CU MM
MONOCYTES RELATIVE PERCENT: 4.9 % (ref 0–4)
NUCLEATED RBC %: 0 %
PDW BLD-RTO: 13.7 % (ref 11.7–14.9)
PHOSPHORUS: 2.3 MG/DL (ref 2.5–4.9)
PLATELET # BLD: 332 K/CU MM (ref 140–440)
PMV BLD AUTO: 9.9 FL (ref 7.5–11.1)
POTASSIUM SERPL-SCNC: 3.7 MMOL/L (ref 3.5–5.1)
PRO-BNP: 687.3 PG/ML
PROCALCITONIN: 0.04
RBC # BLD: 4.1 M/CU MM (ref 4.2–5.4)
SEGMENTED NEUTROPHILS ABSOLUTE COUNT: 11.3 K/CU MM
SEGMENTED NEUTROPHILS RELATIVE PERCENT: 88 % (ref 36–66)
SODIUM BLD-SCNC: 136 MMOL/L (ref 135–145)
THEOPHYLLINE LEVEL: 9.4 UG/ML (ref 10–20)
TOTAL IMMATURE NEUTOROPHIL: 0.23 K/CU MM
TOTAL NUCLEATED RBC: 0 K/CU MM
TOTAL PROTEIN: 6 GM/DL (ref 6.4–8.2)
WBC # BLD: 12.8 K/CU MM (ref 4–10.5)

## 2019-02-19 PROCEDURE — 84145 PROCALCITONIN (PCT): CPT

## 2019-02-19 PROCEDURE — 6370000000 HC RX 637 (ALT 250 FOR IP): Performed by: INTERNAL MEDICINE

## 2019-02-19 PROCEDURE — 6360000002 HC RX W HCPCS: Performed by: INTERNAL MEDICINE

## 2019-02-19 PROCEDURE — 83735 ASSAY OF MAGNESIUM: CPT

## 2019-02-19 PROCEDURE — 2580000003 HC RX 258: Performed by: INTERNAL MEDICINE

## 2019-02-19 PROCEDURE — 37799 UNLISTED PX VASCULAR SURGERY: CPT

## 2019-02-19 PROCEDURE — 80053 COMPREHEN METABOLIC PANEL: CPT

## 2019-02-19 PROCEDURE — 94668 MNPJ CHEST WALL SBSQ: CPT

## 2019-02-19 PROCEDURE — 6370000000 HC RX 637 (ALT 250 FOR IP): Performed by: NURSE PRACTITIONER

## 2019-02-19 PROCEDURE — 80198 ASSAY OF THEOPHYLLINE: CPT

## 2019-02-19 PROCEDURE — 83880 ASSAY OF NATRIURETIC PEPTIDE: CPT

## 2019-02-19 PROCEDURE — 94640 AIRWAY INHALATION TREATMENT: CPT

## 2019-02-19 PROCEDURE — 84100 ASSAY OF PHOSPHORUS: CPT

## 2019-02-19 PROCEDURE — 6360000002 HC RX W HCPCS: Performed by: NURSE PRACTITIONER

## 2019-02-19 PROCEDURE — 85025 COMPLETE CBC W/AUTO DIFF WBC: CPT

## 2019-02-19 RX ORDER — HYDRALAZINE HYDROCHLORIDE 50 MG/1
50 TABLET, FILM COATED ORAL 3 TIMES DAILY
Qty: 90 TABLET | Refills: 1 | Status: SHIPPED | OUTPATIENT
Start: 2019-02-19 | End: 2019-03-21 | Stop reason: SDUPTHER

## 2019-02-19 RX ORDER — AZITHROMYCIN 250 MG/1
250 TABLET, FILM COATED ORAL DAILY
Qty: 5 TABLET | Refills: 0 | Status: SHIPPED | OUTPATIENT
Start: 2019-02-19 | End: 2019-02-24

## 2019-02-19 RX ADMIN — GUAIFENESIN 600 MG: 600 TABLET, EXTENDED RELEASE ORAL at 09:48

## 2019-02-19 RX ADMIN — SODIUM CHLORIDE, PRESERVATIVE FREE 10 ML: 5 INJECTION INTRAVENOUS at 09:50

## 2019-02-19 RX ADMIN — POTASSIUM CHLORIDE 40 MEQ: 20 TABLET, EXTENDED RELEASE ORAL at 09:49

## 2019-02-19 RX ADMIN — NYSTATIN 500000 UNITS: 100000 SUSPENSION ORAL at 09:51

## 2019-02-19 RX ADMIN — NYSTATIN 500000 UNITS: 100000 SUSPENSION ORAL at 12:31

## 2019-02-19 RX ADMIN — BUSPIRONE HYDROCHLORIDE 7.5 MG: 7.5 TABLET ORAL at 06:14

## 2019-02-19 RX ADMIN — METHYLPREDNISOLONE SODIUM SUCCINATE 40 MG: 40 INJECTION, POWDER, LYOPHILIZED, FOR SOLUTION INTRAMUSCULAR; INTRAVENOUS at 09:50

## 2019-02-19 RX ADMIN — BACLOFEN 10 MG: 10 TABLET ORAL at 09:50

## 2019-02-19 RX ADMIN — AMLODIPINE BESYLATE 10 MG: 10 TABLET ORAL at 09:49

## 2019-02-19 RX ADMIN — SODIUM CHLORIDE, PRESERVATIVE FREE 10 ML: 5 INJECTION INTRAVENOUS at 09:52

## 2019-02-19 RX ADMIN — ASPIRIN 81 MG 81 MG: 81 TABLET ORAL at 09:48

## 2019-02-19 RX ADMIN — FLUTICASONE PROPIONATE 2 SPRAY: 50 SPRAY, METERED NASAL at 09:50

## 2019-02-19 RX ADMIN — IPRATROPIUM BROMIDE AND ALBUTEROL SULFATE 1 AMPULE: .5; 3 SOLUTION RESPIRATORY (INHALATION) at 11:17

## 2019-02-19 RX ADMIN — DILTIAZEM HYDROCHLORIDE 120 MG: 120 CAPSULE, COATED, EXTENDED RELEASE ORAL at 09:49

## 2019-02-19 RX ADMIN — BUSPIRONE HYDROCHLORIDE 7.5 MG: 7.5 TABLET ORAL at 12:30

## 2019-02-19 RX ADMIN — CITALOPRAM HYDROBROMIDE 40 MG: 40 TABLET ORAL at 09:48

## 2019-02-19 RX ADMIN — IPRATROPIUM BROMIDE AND ALBUTEROL SULFATE 1 AMPULE: .5; 3 SOLUTION RESPIRATORY (INHALATION) at 07:42

## 2019-02-19 RX ADMIN — POTASSIUM & SODIUM PHOSPHATES POWDER PACK 280-160-250 MG 250 MG: 280-160-250 PACK at 12:31

## 2019-02-19 RX ADMIN — CLONAZEPAM 1 MG: 1 TABLET ORAL at 09:50

## 2019-02-19 RX ADMIN — MAGNESIUM HYDROXIDE 30 ML: 400 SUSPENSION ORAL at 04:16

## 2019-02-19 RX ADMIN — ENOXAPARIN SODIUM 40 MG: 40 INJECTION SUBCUTANEOUS at 09:50

## 2019-02-19 RX ADMIN — IBUPROFEN 400 MG: 400 TABLET ORAL at 09:48

## 2019-02-19 RX ADMIN — IBUPROFEN 400 MG: 400 TABLET ORAL at 12:30

## 2019-02-19 RX ADMIN — LEVOTHYROXINE SODIUM 150 MCG: 150 TABLET ORAL at 06:14

## 2019-02-19 RX ADMIN — PANTOPRAZOLE SODIUM 40 MG: 40 TABLET, DELAYED RELEASE ORAL at 06:14

## 2019-02-19 ASSESSMENT — PAIN DESCRIPTION - ORIENTATION: ORIENTATION: RIGHT;LEFT

## 2019-02-19 ASSESSMENT — PAIN SCALES - GENERAL
PAINLEVEL_OUTOF10: 0
PAINLEVEL_OUTOF10: 4
PAINLEVEL_OUTOF10: 0

## 2019-02-19 ASSESSMENT — PAIN DESCRIPTION - PAIN TYPE: TYPE: CHRONIC PAIN

## 2019-02-19 ASSESSMENT — PAIN DESCRIPTION - DESCRIPTORS: DESCRIPTORS: ACHING;OTHER (COMMENT)

## 2019-02-19 ASSESSMENT — PAIN DESCRIPTION - LOCATION: LOCATION: ELBOW

## 2019-02-22 LAB
EKG ATRIAL RATE: 100 BPM
EKG DIAGNOSIS: NORMAL
EKG P AXIS: 64 DEGREES
EKG P-R INTERVAL: 118 MS
EKG Q-T INTERVAL: 366 MS
EKG QRS DURATION: 96 MS
EKG QTC CALCULATION (BAZETT): 472 MS
EKG R AXIS: 119 DEGREES
EKG T AXIS: 36 DEGREES
EKG VENTRICULAR RATE: 100 BPM

## 2019-02-24 ENCOUNTER — APPOINTMENT (OUTPATIENT)
Dept: GENERAL RADIOLOGY | Age: 59
End: 2019-02-24
Payer: COMMERCIAL

## 2019-02-24 ENCOUNTER — APPOINTMENT (OUTPATIENT)
Dept: ULTRASOUND IMAGING | Age: 59
End: 2019-02-24
Payer: COMMERCIAL

## 2019-02-24 ENCOUNTER — HOSPITAL ENCOUNTER (EMERGENCY)
Age: 59
Discharge: HOME | End: 2019-02-24
Attending: EMERGENCY MEDICINE
Payer: COMMERCIAL

## 2019-02-24 VITALS
HEIGHT: 62 IN | SYSTOLIC BLOOD PRESSURE: 158 MMHG | DIASTOLIC BLOOD PRESSURE: 77 MMHG | RESPIRATION RATE: 18 BRPM | HEART RATE: 98 BPM | BODY MASS INDEX: 32.2 KG/M2 | WEIGHT: 175 LBS | OXYGEN SATURATION: 98 % | TEMPERATURE: 98.3 F

## 2019-02-24 DIAGNOSIS — R52 ACHES: ICD-10-CM

## 2019-02-24 DIAGNOSIS — E87.1 HYPONATREMIA: ICD-10-CM

## 2019-02-24 DIAGNOSIS — E87.6 HYPOKALEMIA: ICD-10-CM

## 2019-02-24 DIAGNOSIS — M79.89 LEG SWELLING: Primary | ICD-10-CM

## 2019-02-24 LAB
ALBUMIN SERPL-MCNC: 3.5 GM/DL (ref 3.4–5)
ALP BLD-CCNC: 52 IU/L (ref 40–129)
ALT SERPL-CCNC: 39 U/L (ref 10–40)
ANION GAP SERPL CALCULATED.3IONS-SCNC: 15 MMOL/L (ref 4–16)
AST SERPL-CCNC: 19 IU/L (ref 15–37)
BACTERIA: NEGATIVE /HPF
BASOPHILS ABSOLUTE: 0 K/CU MM
BASOPHILS RELATIVE PERCENT: 0.2 % (ref 0–1)
BILIRUB SERPL-MCNC: 0.5 MG/DL (ref 0–1)
BILIRUBIN URINE: NEGATIVE MG/DL
BLOOD, URINE: NEGATIVE
BUN BLDV-MCNC: 12 MG/DL (ref 6–23)
CALCIUM SERPL-MCNC: 8 MG/DL (ref 8.3–10.6)
CHLORIDE BLD-SCNC: 91 MMOL/L (ref 99–110)
CLARITY: CLEAR
CO2: 26 MMOL/L (ref 21–32)
COLOR: ABNORMAL
CREAT SERPL-MCNC: 0.9 MG/DL (ref 0.6–1.1)
DIFFERENTIAL TYPE: ABNORMAL
EOSINOPHILS ABSOLUTE: 0.8 K/CU MM
EOSINOPHILS RELATIVE PERCENT: 6.5 % (ref 0–3)
GFR AFRICAN AMERICAN: >60 ML/MIN/1.73M2
GFR NON-AFRICAN AMERICAN: >60 ML/MIN/1.73M2
GLUCOSE BLD-MCNC: 77 MG/DL (ref 70–99)
GLUCOSE, URINE: NEGATIVE MG/DL
HCT VFR BLD CALC: 33.6 % (ref 37–47)
HEMOGLOBIN: 10.5 GM/DL (ref 12.5–16)
IMMATURE NEUTROPHIL %: 0.5 % (ref 0–0.43)
KETONES, URINE: NEGATIVE MG/DL
LEUKOCYTE ESTERASE, URINE: NEGATIVE
LYMPHOCYTES ABSOLUTE: 1.5 K/CU MM
LYMPHOCYTES RELATIVE PERCENT: 12.5 % (ref 24–44)
MCH RBC QN AUTO: 28.9 PG (ref 27–31)
MCHC RBC AUTO-ENTMCNC: 31.3 % (ref 32–36)
MCV RBC AUTO: 92.6 FL (ref 78–100)
MONOCYTES ABSOLUTE: 0.8 K/CU MM
MONOCYTES RELATIVE PERCENT: 6.8 % (ref 0–4)
MUCUS: ABNORMAL HPF
NITRITE URINE, QUANTITATIVE: NEGATIVE
NUCLEATED RBC %: 0 %
PDW BLD-RTO: 15.3 % (ref 11.7–14.9)
PH, URINE: 5 (ref 5–8)
PLATELET # BLD: 232 K/CU MM (ref 140–440)
PMV BLD AUTO: 9.4 FL (ref 7.5–11.1)
POTASSIUM SERPL-SCNC: 2.7 MMOL/L (ref 3.5–5.1)
PRO-BNP: 879.7 PG/ML
PROTEIN UA: NEGATIVE MG/DL
RAPID INFLUENZA  B AGN: NEGATIVE
RAPID INFLUENZA A AGN: NEGATIVE
RBC # BLD: 3.63 M/CU MM (ref 4.2–5.4)
RBC URINE: <1 /HPF (ref 0–6)
SEGMENTED NEUTROPHILS ABSOLUTE COUNT: 9 K/CU MM
SEGMENTED NEUTROPHILS RELATIVE PERCENT: 73.5 % (ref 36–66)
SODIUM BLD-SCNC: 132 MMOL/L (ref 135–145)
SPECIFIC GRAVITY UA: 1.01 (ref 1–1.03)
TOTAL IMMATURE NEUTOROPHIL: 0.06 K/CU MM
TOTAL NUCLEATED RBC: 0 K/CU MM
TOTAL PROTEIN: 5.5 GM/DL (ref 6.4–8.2)
TRICHOMONAS: ABNORMAL /HPF
TROPONIN T: <0.01 NG/ML
UROBILINOGEN, URINE: NORMAL MG/DL (ref 0.2–1)
WBC # BLD: 12.2 K/CU MM (ref 4–10.5)
WBC UA: <1 /HPF (ref 0–5)

## 2019-02-24 PROCEDURE — 80053 COMPREHEN METABOLIC PANEL: CPT

## 2019-02-24 PROCEDURE — 84484 ASSAY OF TROPONIN QUANT: CPT

## 2019-02-24 PROCEDURE — 93005 ELECTROCARDIOGRAM TRACING: CPT | Performed by: PHYSICIAN ASSISTANT

## 2019-02-24 PROCEDURE — 87804 INFLUENZA ASSAY W/OPTIC: CPT

## 2019-02-24 PROCEDURE — 6360000002 HC RX W HCPCS: Performed by: PHYSICIAN ASSISTANT

## 2019-02-24 PROCEDURE — 83880 ASSAY OF NATRIURETIC PEPTIDE: CPT

## 2019-02-24 PROCEDURE — 93970 EXTREMITY STUDY: CPT

## 2019-02-24 PROCEDURE — 4500000002 HC ER NO CHARGE

## 2019-02-24 PROCEDURE — 96360 HYDRATION IV INFUSION INIT: CPT

## 2019-02-24 PROCEDURE — 71046 X-RAY EXAM CHEST 2 VIEWS: CPT

## 2019-02-24 PROCEDURE — 6370000000 HC RX 637 (ALT 250 FOR IP): Performed by: PHYSICIAN ASSISTANT

## 2019-02-24 PROCEDURE — 96361 HYDRATE IV INFUSION ADD-ON: CPT

## 2019-02-24 PROCEDURE — 2580000003 HC RX 258: Performed by: PHYSICIAN ASSISTANT

## 2019-02-24 PROCEDURE — 81001 URINALYSIS AUTO W/SCOPE: CPT

## 2019-02-24 PROCEDURE — 85025 COMPLETE CBC W/AUTO DIFF WBC: CPT

## 2019-02-24 PROCEDURE — 99284 EMERGENCY DEPT VISIT MOD MDM: CPT

## 2019-02-24 RX ORDER — 0.9 % SODIUM CHLORIDE 0.9 %
1000 INTRAVENOUS SOLUTION INTRAVENOUS ONCE
Status: COMPLETED | OUTPATIENT
Start: 2019-02-24 | End: 2019-02-24

## 2019-02-24 RX ORDER — POTASSIUM CHLORIDE 20 MEQ/1
40 TABLET, EXTENDED RELEASE ORAL ONCE
Status: COMPLETED | OUTPATIENT
Start: 2019-02-24 | End: 2019-02-24

## 2019-02-24 RX ORDER — POTASSIUM CHLORIDE 7.45 MG/ML
10 INJECTION INTRAVENOUS
Status: DISPENSED | OUTPATIENT
Start: 2019-02-24 | End: 2019-02-24

## 2019-02-24 RX ADMIN — POTASSIUM CHLORIDE 40 MEQ: 20 TABLET, EXTENDED RELEASE ORAL at 20:37

## 2019-02-24 RX ADMIN — POTASSIUM CHLORIDE 10 MEQ: 7.46 INJECTION, SOLUTION INTRAVENOUS at 20:37

## 2019-02-24 RX ADMIN — SODIUM CHLORIDE 1000 ML: 9 INJECTION, SOLUTION INTRAVENOUS at 20:37

## 2019-02-24 ASSESSMENT — PAIN SCALES - GENERAL: PAINLEVEL_OUTOF10: 7

## 2019-02-24 ASSESSMENT — PAIN DESCRIPTION - LOCATION: LOCATION: MOUTH

## 2019-02-25 PROCEDURE — 93010 ELECTROCARDIOGRAM REPORT: CPT | Performed by: INTERNAL MEDICINE

## 2019-02-28 ENCOUNTER — APPOINTMENT (OUTPATIENT)
Dept: ULTRASOUND IMAGING | Age: 59
DRG: 193 | End: 2019-02-28
Payer: COMMERCIAL

## 2019-02-28 ENCOUNTER — HOSPITAL ENCOUNTER (EMERGENCY)
Age: 59
Discharge: HOME | DRG: 193 | End: 2019-02-28
Attending: EMERGENCY MEDICINE
Payer: COMMERCIAL

## 2019-02-28 ENCOUNTER — APPOINTMENT (OUTPATIENT)
Dept: GENERAL RADIOLOGY | Age: 59
DRG: 193 | End: 2019-02-28
Payer: COMMERCIAL

## 2019-02-28 VITALS
WEIGHT: 175 LBS | OXYGEN SATURATION: 98 % | DIASTOLIC BLOOD PRESSURE: 70 MMHG | TEMPERATURE: 97.9 F | SYSTOLIC BLOOD PRESSURE: 123 MMHG | BODY MASS INDEX: 32.01 KG/M2 | HEART RATE: 83 BPM | RESPIRATION RATE: 15 BRPM

## 2019-02-28 DIAGNOSIS — L03.113 CELLULITIS OF RIGHT UPPER EXTREMITY: ICD-10-CM

## 2019-02-28 DIAGNOSIS — L03.113 RIGHT ARM CELLULITIS: Primary | ICD-10-CM

## 2019-02-28 DIAGNOSIS — R07.9 CHEST PAIN, UNSPECIFIED TYPE: ICD-10-CM

## 2019-02-28 DIAGNOSIS — M79.89 LEFT LEG SWELLING: ICD-10-CM

## 2019-02-28 DIAGNOSIS — R53.83 FATIGUE, UNSPECIFIED TYPE: ICD-10-CM

## 2019-02-28 LAB
ALBUMIN SERPL-MCNC: 3.6 GM/DL (ref 3.4–5)
ALP BLD-CCNC: 61 IU/L (ref 40–129)
ALT SERPL-CCNC: 45 U/L (ref 10–40)
ANION GAP SERPL CALCULATED.3IONS-SCNC: 12 MMOL/L (ref 4–16)
AST SERPL-CCNC: 25 IU/L (ref 15–37)
AST SERPL-CCNC: 25 IU/L (ref 15–37)
BACTERIA: NEGATIVE /HPF
BASOPHILS ABSOLUTE: 0 K/CU MM
BASOPHILS RELATIVE PERCENT: 0.3 % (ref 0–1)
BILIRUB SERPL-MCNC: 0.2 MG/DL (ref 0–1)
BILIRUBIN URINE: NEGATIVE MG/DL
BLOOD, URINE: NEGATIVE
BUN BLDV-MCNC: 9 MG/DL (ref 6–23)
CALCIUM SERPL-MCNC: 8.5 MG/DL (ref 8.3–10.6)
CHLORIDE BLD-SCNC: 97 MMOL/L (ref 99–110)
CLARITY: CLEAR
CO2: 28 MMOL/L (ref 21–32)
COLOR: YELLOW
CREAT SERPL-MCNC: 0.7 MG/DL (ref 0.6–1.1)
DIFFERENTIAL TYPE: ABNORMAL
EKG ATRIAL RATE: 90 BPM
EKG ATRIAL RATE: 94 BPM
EKG DIAGNOSIS: NORMAL
EKG DIAGNOSIS: NORMAL
EKG P AXIS: 81 DEGREES
EKG P AXIS: 82 DEGREES
EKG P-R INTERVAL: 118 MS
EKG P-R INTERVAL: 144 MS
EKG Q-T INTERVAL: 390 MS
EKG Q-T INTERVAL: 434 MS
EKG QRS DURATION: 84 MS
EKG QRS DURATION: 96 MS
EKG QTC CALCULATION (BAZETT): 487 MS
EKG QTC CALCULATION (BAZETT): 530 MS
EKG R AXIS: 102 DEGREES
EKG R AXIS: 107 DEGREES
EKG T AXIS: 68 DEGREES
EKG T AXIS: 76 DEGREES
EKG VENTRICULAR RATE: 90 BPM
EKG VENTRICULAR RATE: 94 BPM
EOSINOPHILS ABSOLUTE: 0.3 K/CU MM
EOSINOPHILS RELATIVE PERCENT: 4.6 % (ref 0–3)
ERYTHROCYTE SEDIMENTATION RATE: 49 MM/HR (ref 0–30)
GFR AFRICAN AMERICAN: >60 ML/MIN/1.73M2
GFR NON-AFRICAN AMERICAN: >60 ML/MIN/1.73M2
GLUCOSE BLD-MCNC: 102 MG/DL (ref 70–99)
GLUCOSE, URINE: NEGATIVE MG/DL
HCT VFR BLD CALC: 32.9 % (ref 37–47)
HEMOGLOBIN: 10 GM/DL (ref 12.5–16)
IMMATURE NEUTROPHIL %: 0.3 % (ref 0–0.43)
KETONES, URINE: NEGATIVE MG/DL
LEUKOCYTE ESTERASE, URINE: NEGATIVE
LYMPHOCYTES ABSOLUTE: 0.9 K/CU MM
LYMPHOCYTES RELATIVE PERCENT: 12.8 % (ref 24–44)
MCH RBC QN AUTO: 28.9 PG (ref 27–31)
MCHC RBC AUTO-ENTMCNC: 30.4 % (ref 32–36)
MCV RBC AUTO: 95.1 FL (ref 78–100)
MONOCYTES ABSOLUTE: 0.6 K/CU MM
MONOCYTES RELATIVE PERCENT: 8.4 % (ref 0–4)
MUCUS: ABNORMAL HPF
NITRITE URINE, QUANTITATIVE: NEGATIVE
NUCLEATED RBC %: 0 %
PDW BLD-RTO: 16 % (ref 11.7–14.9)
PH, URINE: 6 (ref 5–8)
PLATELET # BLD: 173 K/CU MM (ref 140–440)
PMV BLD AUTO: 9.2 FL (ref 7.5–11.1)
POTASSIUM SERPL-SCNC: 3.5 MMOL/L (ref 3.5–5.1)
PROTEIN UA: NEGATIVE MG/DL
RBC # BLD: 3.46 M/CU MM (ref 4.2–5.4)
RBC URINE: ABNORMAL /HPF (ref 0–6)
SEGMENTED NEUTROPHILS ABSOLUTE COUNT: 5 K/CU MM
SEGMENTED NEUTROPHILS RELATIVE PERCENT: 73.6 % (ref 36–66)
SODIUM BLD-SCNC: 137 MMOL/L (ref 135–145)
SPECIFIC GRAVITY UA: 1.01 (ref 1–1.03)
SQUAMOUS EPITHELIAL: 1 /HPF
TOTAL CK: 74 IU/L (ref 26–140)
TOTAL IMMATURE NEUTOROPHIL: 0.02 K/CU MM
TOTAL NUCLEATED RBC: 0 K/CU MM
TOTAL PROTEIN: 6 GM/DL (ref 6.4–8.2)
TRICHOMONAS: ABNORMAL /HPF
TROPONIN T: <0.01 NG/ML
TROPONIN T: <0.01 NG/ML
UROBILINOGEN, URINE: NORMAL MG/DL (ref 0.2–1)
WBC # BLD: 6.8 K/CU MM (ref 4–10.5)
WBC UA: <1 /HPF (ref 0–5)

## 2019-02-28 PROCEDURE — 93971 EXTREMITY STUDY: CPT

## 2019-02-28 PROCEDURE — 71046 X-RAY EXAM CHEST 2 VIEWS: CPT

## 2019-02-28 PROCEDURE — 80053 COMPREHEN METABOLIC PANEL: CPT

## 2019-02-28 PROCEDURE — 6370000000 HC RX 637 (ALT 250 FOR IP): Performed by: PHYSICIAN ASSISTANT

## 2019-02-28 PROCEDURE — 84484 ASSAY OF TROPONIN QUANT: CPT

## 2019-02-28 PROCEDURE — 93010 ELECTROCARDIOGRAM REPORT: CPT | Performed by: INTERNAL MEDICINE

## 2019-02-28 PROCEDURE — 82550 ASSAY OF CK (CPK): CPT

## 2019-02-28 PROCEDURE — 99285 EMERGENCY DEPT VISIT HI MDM: CPT

## 2019-02-28 PROCEDURE — 93005 ELECTROCARDIOGRAM TRACING: CPT | Performed by: EMERGENCY MEDICINE

## 2019-02-28 PROCEDURE — 84450 TRANSFERASE (AST) (SGOT): CPT

## 2019-02-28 PROCEDURE — 85652 RBC SED RATE AUTOMATED: CPT

## 2019-02-28 PROCEDURE — 6370000000 HC RX 637 (ALT 250 FOR IP): Performed by: EMERGENCY MEDICINE

## 2019-02-28 PROCEDURE — 81001 URINALYSIS AUTO W/SCOPE: CPT

## 2019-02-28 PROCEDURE — 36415 COLL VENOUS BLD VENIPUNCTURE: CPT

## 2019-02-28 PROCEDURE — 85025 COMPLETE CBC W/AUTO DIFF WBC: CPT

## 2019-02-28 RX ORDER — CEPHALEXIN 250 MG/1
500 CAPSULE ORAL ONCE
Status: COMPLETED | OUTPATIENT
Start: 2019-02-28 | End: 2019-02-28

## 2019-02-28 RX ORDER — FAMOTIDINE 20 MG/1
20 TABLET, FILM COATED ORAL ONCE
Status: COMPLETED | OUTPATIENT
Start: 2019-02-28 | End: 2019-02-28

## 2019-02-28 RX ORDER — FAMOTIDINE 20 MG/1
20 TABLET, FILM COATED ORAL 2 TIMES DAILY
Qty: 60 TABLET | Refills: 0 | Status: ON HOLD | OUTPATIENT
Start: 2019-02-28 | End: 2019-03-02

## 2019-02-28 RX ORDER — SULFAMETHOXAZOLE AND TRIMETHOPRIM 800; 160 MG/1; MG/1
TABLET ORAL
Qty: 40 TABLET | Refills: 0 | Status: ON HOLD | OUTPATIENT
Start: 2019-02-28 | End: 2019-03-08 | Stop reason: HOSPADM

## 2019-02-28 RX ORDER — SULFAMETHOXAZOLE AND TRIMETHOPRIM 800; 160 MG/1; MG/1
2 TABLET ORAL ONCE
Status: COMPLETED | OUTPATIENT
Start: 2019-02-28 | End: 2019-02-28

## 2019-02-28 RX ORDER — CEPHALEXIN 500 MG/1
500 CAPSULE ORAL 4 TIMES DAILY
Qty: 40 CAPSULE | Refills: 0 | Status: ON HOLD | OUTPATIENT
Start: 2019-02-28 | End: 2019-03-08 | Stop reason: HOSPADM

## 2019-02-28 RX ORDER — MAGNESIUM HYDROXIDE/ALUMINUM HYDROXICE/SIMETHICONE 120; 1200; 1200 MG/30ML; MG/30ML; MG/30ML
30 SUSPENSION ORAL ONCE
Status: COMPLETED | OUTPATIENT
Start: 2019-02-28 | End: 2019-02-28

## 2019-02-28 RX ADMIN — FAMOTIDINE 20 MG: 20 TABLET ORAL at 19:51

## 2019-02-28 RX ADMIN — SULFAMETHOXAZOLE AND TRIMETHOPRIM 2 TABLET: 800; 160 TABLET ORAL at 20:33

## 2019-02-28 RX ADMIN — CEPHALEXIN 500 MG: 250 CAPSULE ORAL at 20:33

## 2019-02-28 RX ADMIN — ALUMINUM HYDROXIDE, MAGNESIUM HYDROXIDE, AND SIMETHICONE 30 ML: 200; 200; 20 SUSPENSION ORAL at 19:51

## 2019-02-28 ASSESSMENT — PAIN DESCRIPTION - LOCATION: LOCATION: ABDOMEN;CHEST

## 2019-02-28 ASSESSMENT — PAIN DESCRIPTION - PAIN TYPE: TYPE: ACUTE PAIN

## 2019-02-28 ASSESSMENT — PAIN SCALES - GENERAL: PAINLEVEL_OUTOF10: 6

## 2019-02-28 ASSESSMENT — HEART SCORE: ECG: 0

## 2019-03-01 LAB
EKG ATRIAL RATE: 88 BPM
EKG DIAGNOSIS: NORMAL
EKG P-R INTERVAL: 136 MS
EKG Q-T INTERVAL: 378 MS
EKG QRS DURATION: 76 MS
EKG QTC CALCULATION (BAZETT): 457 MS
EKG R AXIS: -51 DEGREES
EKG T AXIS: -28 DEGREES
EKG VENTRICULAR RATE: 88 BPM

## 2019-03-01 PROCEDURE — 93010 ELECTROCARDIOGRAM REPORT: CPT | Performed by: INTERNAL MEDICINE

## 2019-03-02 ENCOUNTER — HOSPITAL ENCOUNTER (INPATIENT)
Age: 59
LOS: 6 days | Discharge: HOME | DRG: 193 | End: 2019-03-08
Attending: EMERGENCY MEDICINE | Admitting: INTERNAL MEDICINE
Payer: COMMERCIAL

## 2019-03-02 ENCOUNTER — APPOINTMENT (OUTPATIENT)
Dept: GENERAL RADIOLOGY | Age: 59
DRG: 193 | End: 2019-03-02
Payer: COMMERCIAL

## 2019-03-02 DIAGNOSIS — J18.9 PNEUMONIA DUE TO ORGANISM: ICD-10-CM

## 2019-03-02 DIAGNOSIS — J44.1 COPD EXACERBATION (HCC): Primary | ICD-10-CM

## 2019-03-02 DIAGNOSIS — E87.5 HYPERKALEMIA: ICD-10-CM

## 2019-03-02 LAB
ALBUMIN SERPL-MCNC: 4.2 GM/DL (ref 3.4–5)
ALP BLD-CCNC: 86 IU/L (ref 40–129)
ALT SERPL-CCNC: 65 U/L (ref 10–40)
ANION GAP SERPL CALCULATED.3IONS-SCNC: 9 MMOL/L (ref 4–16)
AST SERPL-CCNC: 33 IU/L (ref 15–37)
BASE EXCESS MIXED: 2.4 (ref 0–2.3)
BASOPHILS ABSOLUTE: 0 K/CU MM
BASOPHILS RELATIVE PERCENT: 0.3 % (ref 0–1)
BILIRUB SERPL-MCNC: 0.1 MG/DL (ref 0–1)
BUN BLDV-MCNC: 10 MG/DL (ref 6–23)
CALCIUM SERPL-MCNC: 8.9 MG/DL (ref 8.3–10.6)
CHLORIDE BLD-SCNC: 98 MMOL/L (ref 99–110)
CO2: 28 MMOL/L (ref 21–32)
CREAT SERPL-MCNC: 0.9 MG/DL (ref 0.6–1.1)
DIFFERENTIAL TYPE: ABNORMAL
EOSINOPHILS ABSOLUTE: 0.2 K/CU MM
EOSINOPHILS RELATIVE PERCENT: 2 % (ref 0–3)
GFR AFRICAN AMERICAN: >60 ML/MIN/1.73M2
GFR NON-AFRICAN AMERICAN: >60 ML/MIN/1.73M2
GLUCOSE BLD-MCNC: 161 MG/DL (ref 70–99)
HCO3 VENOUS: 29.9 MMOL/L (ref 19–25)
HCT VFR BLD CALC: 34.3 % (ref 37–47)
HEMOGLOBIN: 9.9 GM/DL (ref 12.5–16)
IMMATURE NEUTROPHIL %: 0.4 % (ref 0–0.43)
LACTATE: 1.6 MMOL/L (ref 0.4–2)
LYMPHOCYTES ABSOLUTE: 0.5 K/CU MM
LYMPHOCYTES RELATIVE PERCENT: 6.5 % (ref 24–44)
MCH RBC QN AUTO: 28.3 PG (ref 27–31)
MCHC RBC AUTO-ENTMCNC: 28.9 % (ref 32–36)
MCV RBC AUTO: 98 FL (ref 78–100)
MONOCYTES ABSOLUTE: 0.4 K/CU MM
MONOCYTES RELATIVE PERCENT: 5.4 % (ref 0–4)
NUCLEATED RBC %: 0 %
O2 SAT, VEN: 52.2 % (ref 50–70)
PCO2, VEN: 58 MMHG (ref 38–52)
PDW BLD-RTO: 16.2 % (ref 11.7–14.9)
PH VENOUS: 7.32 (ref 7.32–7.42)
PLATELET # BLD: 182 K/CU MM (ref 140–440)
PMV BLD AUTO: 9.5 FL (ref 7.5–11.1)
PO2, VEN: 27 MMHG (ref 28–48)
POTASSIUM SERPL-SCNC: 5.5 MMOL/L (ref 3.5–5.1)
POTASSIUM SERPL-SCNC: 6.3 MMOL/L (ref 3.5–5.1)
RBC # BLD: 3.5 M/CU MM (ref 4.2–5.4)
SEGMENTED NEUTROPHILS ABSOLUTE COUNT: 6.5 K/CU MM
SEGMENTED NEUTROPHILS RELATIVE PERCENT: 85.4 % (ref 36–66)
SODIUM BLD-SCNC: 135 MMOL/L (ref 135–145)
TOTAL IMMATURE NEUTOROPHIL: 0.03 K/CU MM
TOTAL NUCLEATED RBC: 0 K/CU MM
TOTAL PROTEIN: 6.7 GM/DL (ref 6.4–8.2)
TROPONIN T: <0.01 NG/ML
WBC # BLD: 7.6 K/CU MM (ref 4–10.5)

## 2019-03-02 PROCEDURE — 71045 X-RAY EXAM CHEST 1 VIEW: CPT

## 2019-03-02 PROCEDURE — 2580000003 HC RX 258: Performed by: EMERGENCY MEDICINE

## 2019-03-02 PROCEDURE — 6370000000 HC RX 637 (ALT 250 FOR IP): Performed by: EMERGENCY MEDICINE

## 2019-03-02 PROCEDURE — 94761 N-INVAS EAR/PLS OXIMETRY MLT: CPT

## 2019-03-02 PROCEDURE — 83605 ASSAY OF LACTIC ACID: CPT

## 2019-03-02 PROCEDURE — 94660 CPAP INITIATION&MGMT: CPT

## 2019-03-02 PROCEDURE — 82805 BLOOD GASES W/O2 SATURATION: CPT

## 2019-03-02 PROCEDURE — 84132 ASSAY OF SERUM POTASSIUM: CPT

## 2019-03-02 PROCEDURE — 85025 COMPLETE CBC W/AUTO DIFF WBC: CPT

## 2019-03-02 PROCEDURE — 94640 AIRWAY INHALATION TREATMENT: CPT

## 2019-03-02 PROCEDURE — 51798 US URINE CAPACITY MEASURE: CPT

## 2019-03-02 PROCEDURE — 80053 COMPREHEN METABOLIC PANEL: CPT

## 2019-03-02 PROCEDURE — 2060000000 HC ICU INTERMEDIATE R&B

## 2019-03-02 PROCEDURE — 96365 THER/PROPH/DIAG IV INF INIT: CPT

## 2019-03-02 PROCEDURE — 2700000000 HC OXYGEN THERAPY PER DAY

## 2019-03-02 PROCEDURE — 87040 BLOOD CULTURE FOR BACTERIA: CPT

## 2019-03-02 PROCEDURE — 6360000002 HC RX W HCPCS: Performed by: EMERGENCY MEDICINE

## 2019-03-02 PROCEDURE — 99285 EMERGENCY DEPT VISIT HI MDM: CPT

## 2019-03-02 PROCEDURE — 6360000002 HC RX W HCPCS: Performed by: INTERNAL MEDICINE

## 2019-03-02 PROCEDURE — 36415 COLL VENOUS BLD VENIPUNCTURE: CPT

## 2019-03-02 PROCEDURE — 84484 ASSAY OF TROPONIN QUANT: CPT

## 2019-03-02 PROCEDURE — 2580000003 HC RX 258: Performed by: INTERNAL MEDICINE

## 2019-03-02 PROCEDURE — 6370000000 HC RX 637 (ALT 250 FOR IP): Performed by: INTERNAL MEDICINE

## 2019-03-02 RX ORDER — MONTELUKAST SODIUM 10 MG/1
10 TABLET ORAL DAILY
COMMUNITY
End: 2020-07-21 | Stop reason: SDUPTHER

## 2019-03-02 RX ORDER — MAGNESIUM SULFATE IN WATER 40 MG/ML
2 INJECTION, SOLUTION INTRAVENOUS ONCE
Status: COMPLETED | OUTPATIENT
Start: 2019-03-02 | End: 2019-03-02

## 2019-03-02 RX ORDER — ACETAMINOPHEN 325 MG/1
650 TABLET ORAL EVERY 4 HOURS PRN
Status: DISCONTINUED | OUTPATIENT
Start: 2019-03-02 | End: 2019-03-08 | Stop reason: HOSPADM

## 2019-03-02 RX ORDER — GUAIFENESIN 600 MG/1
600 TABLET, EXTENDED RELEASE ORAL 2 TIMES DAILY
Status: DISCONTINUED | OUTPATIENT
Start: 2019-03-02 | End: 2019-03-03 | Stop reason: SDUPTHER

## 2019-03-02 RX ORDER — CITALOPRAM 40 MG/1
40 TABLET ORAL DAILY
COMMUNITY
End: 2020-01-20 | Stop reason: CLARIF

## 2019-03-02 RX ORDER — TRAZODONE HYDROCHLORIDE 50 MG/1
50 TABLET ORAL NIGHTLY
Status: DISCONTINUED | OUTPATIENT
Start: 2019-03-02 | End: 2019-03-08 | Stop reason: HOSPADM

## 2019-03-02 RX ORDER — HYDRALAZINE HYDROCHLORIDE 50 MG/1
50 TABLET, FILM COATED ORAL 3 TIMES DAILY
Status: DISCONTINUED | OUTPATIENT
Start: 2019-03-02 | End: 2019-03-08 | Stop reason: HOSPADM

## 2019-03-02 RX ORDER — POTASSIUM CHLORIDE 20 MEQ/1
40 TABLET, EXTENDED RELEASE ORAL PRN
Status: DISCONTINUED | OUTPATIENT
Start: 2019-03-02 | End: 2019-03-08 | Stop reason: HOSPADM

## 2019-03-02 RX ORDER — CELECOXIB 200 MG/1
200 CAPSULE ORAL 2 TIMES DAILY
Status: DISCONTINUED | OUTPATIENT
Start: 2019-03-02 | End: 2019-03-08 | Stop reason: HOSPADM

## 2019-03-02 RX ORDER — ASPIRIN 81 MG/1
81 TABLET, CHEWABLE ORAL DAILY
Status: DISCONTINUED | OUTPATIENT
Start: 2019-03-03 | End: 2019-03-08 | Stop reason: HOSPADM

## 2019-03-02 RX ORDER — ATORVASTATIN CALCIUM 40 MG/1
40 TABLET, FILM COATED ORAL DAILY
Status: DISCONTINUED | OUTPATIENT
Start: 2019-03-03 | End: 2019-03-08 | Stop reason: HOSPADM

## 2019-03-02 RX ORDER — DILTIAZEM HYDROCHLORIDE 120 MG/1
120 CAPSULE, COATED, EXTENDED RELEASE ORAL DAILY
Status: DISCONTINUED | OUTPATIENT
Start: 2019-03-03 | End: 2019-03-08 | Stop reason: HOSPADM

## 2019-03-02 RX ORDER — CLONAZEPAM 1 MG/1
1 TABLET ORAL 3 TIMES DAILY PRN
COMMUNITY
End: 2020-12-10

## 2019-03-02 RX ORDER — SODIUM CHLORIDE 0.9 % (FLUSH) 0.9 %
10 SYRINGE (ML) INJECTION PRN
Status: DISCONTINUED | OUTPATIENT
Start: 2019-03-02 | End: 2019-03-02

## 2019-03-02 RX ORDER — CLONAZEPAM 0.5 MG/1
1 TABLET ORAL 3 TIMES DAILY PRN
Status: DISCONTINUED | OUTPATIENT
Start: 2019-03-02 | End: 2019-03-08 | Stop reason: HOSPADM

## 2019-03-02 RX ORDER — BUSPIRONE HYDROCHLORIDE 7.5 MG/1
15 TABLET ORAL 2 TIMES DAILY
Status: DISCONTINUED | OUTPATIENT
Start: 2019-03-02 | End: 2019-03-08 | Stop reason: HOSPADM

## 2019-03-02 RX ORDER — CELECOXIB 200 MG/1
200 CAPSULE ORAL 2 TIMES DAILY
Status: ON HOLD | COMMUNITY
End: 2020-10-30 | Stop reason: HOSPADM

## 2019-03-02 RX ORDER — MONTELUKAST SODIUM 10 MG/1
10 TABLET ORAL DAILY
Status: DISCONTINUED | OUTPATIENT
Start: 2019-03-03 | End: 2019-03-08 | Stop reason: HOSPADM

## 2019-03-02 RX ORDER — IPRATROPIUM BROMIDE AND ALBUTEROL SULFATE 2.5; .5 MG/3ML; MG/3ML
1 SOLUTION RESPIRATORY (INHALATION) ONCE
Status: COMPLETED | OUTPATIENT
Start: 2019-03-02 | End: 2019-03-02

## 2019-03-02 RX ORDER — LEVOTHYROXINE SODIUM 0.15 MG/1
150 TABLET ORAL EVERY MORNING
Status: DISCONTINUED | OUTPATIENT
Start: 2019-03-03 | End: 2019-03-08 | Stop reason: HOSPADM

## 2019-03-02 RX ORDER — ESOMEPRAZOLE MAGNESIUM 40 MG/1
40 FOR SUSPENSION ORAL DAILY
Status: ON HOLD | COMMUNITY
End: 2019-03-02

## 2019-03-02 RX ORDER — SODIUM CHLORIDE 0.9 % (FLUSH) 0.9 %
10 SYRINGE (ML) INJECTION PRN
Status: DISCONTINUED | OUTPATIENT
Start: 2019-03-02 | End: 2019-03-08 | Stop reason: HOSPADM

## 2019-03-02 RX ORDER — TRAZODONE HYDROCHLORIDE 50 MG/1
100 TABLET ORAL NIGHTLY
COMMUNITY
End: 2022-09-22

## 2019-03-02 RX ORDER — TRIAMTERENE AND HYDROCHLOROTHIAZIDE 37.5; 25 MG/1; MG/1
1 TABLET ORAL DAILY
Status: DISCONTINUED | OUTPATIENT
Start: 2019-03-03 | End: 2019-03-05

## 2019-03-02 RX ORDER — ONDANSETRON 2 MG/ML
4 INJECTION INTRAMUSCULAR; INTRAVENOUS EVERY 6 HOURS PRN
Status: DISCONTINUED | OUTPATIENT
Start: 2019-03-02 | End: 2019-03-08 | Stop reason: HOSPADM

## 2019-03-02 RX ORDER — BUSPIRONE HYDROCHLORIDE 30 MG/1
10 TABLET ORAL 3 TIMES DAILY
Refills: 3 | Status: ON HOLD | COMMUNITY
Start: 2019-02-20 | End: 2019-03-02

## 2019-03-02 RX ORDER — SODIUM CHLORIDE 9 MG/ML
INJECTION, SOLUTION INTRAVENOUS CONTINUOUS
Status: DISCONTINUED | OUTPATIENT
Start: 2019-03-02 | End: 2019-03-08 | Stop reason: HOSPADM

## 2019-03-02 RX ORDER — PREDNISONE 20 MG/1
60 TABLET ORAL ONCE
Status: COMPLETED | OUTPATIENT
Start: 2019-03-02 | End: 2019-03-02

## 2019-03-02 RX ORDER — METHYLPREDNISOLONE SODIUM SUCCINATE 40 MG/ML
40 INJECTION, POWDER, LYOPHILIZED, FOR SOLUTION INTRAMUSCULAR; INTRAVENOUS EVERY 12 HOURS
Status: DISCONTINUED | OUTPATIENT
Start: 2019-03-02 | End: 2019-03-04

## 2019-03-02 RX ORDER — ORPHENADRINE CITRATE 100 MG/1
100 TABLET, EXTENDED RELEASE ORAL 2 TIMES DAILY PRN
Status: DISCONTINUED | OUTPATIENT
Start: 2019-03-02 | End: 2019-03-08 | Stop reason: HOSPADM

## 2019-03-02 RX ORDER — ACETAMINOPHEN 325 MG/1
650 TABLET ORAL EVERY 4 HOURS PRN
Status: DISCONTINUED | OUTPATIENT
Start: 2019-03-02 | End: 2019-03-02

## 2019-03-02 RX ORDER — ORPHENADRINE CITRATE 100 MG/1
100 TABLET, EXTENDED RELEASE ORAL 2 TIMES DAILY PRN
Status: ON HOLD | COMMUNITY
End: 2019-03-08 | Stop reason: HOSPADM

## 2019-03-02 RX ORDER — IPRATROPIUM BROMIDE AND ALBUTEROL SULFATE 2.5; .5 MG/3ML; MG/3ML
1 SOLUTION RESPIRATORY (INHALATION) EVERY 4 HOURS
Status: DISCONTINUED | OUTPATIENT
Start: 2019-03-03 | End: 2019-03-08 | Stop reason: HOSPADM

## 2019-03-02 RX ORDER — FLUTICASONE PROPIONATE 50 MCG
2 SPRAY, SUSPENSION (ML) NASAL DAILY
Status: DISCONTINUED | OUTPATIENT
Start: 2019-03-03 | End: 2019-03-08 | Stop reason: HOSPADM

## 2019-03-02 RX ORDER — POTASSIUM CHLORIDE 7.45 MG/ML
10 INJECTION INTRAVENOUS PRN
Status: DISCONTINUED | OUTPATIENT
Start: 2019-03-02 | End: 2019-03-08 | Stop reason: HOSPADM

## 2019-03-02 RX ORDER — POTASSIUM CHLORIDE 1.5 G/1.77G
40 POWDER, FOR SOLUTION ORAL PRN
Status: DISCONTINUED | OUTPATIENT
Start: 2019-03-02 | End: 2019-03-08 | Stop reason: HOSPADM

## 2019-03-02 RX ORDER — AZITHROMYCIN 250 MG/1
500 TABLET, FILM COATED ORAL ONCE
Status: COMPLETED | OUTPATIENT
Start: 2019-03-02 | End: 2019-03-02

## 2019-03-02 RX ORDER — CITALOPRAM 40 MG/1
40 TABLET ORAL DAILY
Status: DISCONTINUED | OUTPATIENT
Start: 2019-03-03 | End: 2019-03-08 | Stop reason: HOSPADM

## 2019-03-02 RX ORDER — CLOTRIMAZOLE 10 MG/1
10 LOZENGE ORAL; TOPICAL
Status: DISCONTINUED | OUTPATIENT
Start: 2019-03-02 | End: 2019-03-05 | Stop reason: ALTCHOICE

## 2019-03-02 RX ORDER — AMLODIPINE BESYLATE 10 MG/1
10 TABLET ORAL DAILY
Status: DISCONTINUED | OUTPATIENT
Start: 2019-03-03 | End: 2019-03-08 | Stop reason: HOSPADM

## 2019-03-02 RX ORDER — TRIAMTERENE AND HYDROCHLOROTHIAZIDE 37.5; 25 MG/1; MG/1
1 TABLET ORAL DAILY
COMMUNITY
End: 2019-04-08

## 2019-03-02 RX ORDER — SODIUM CHLORIDE 0.9 % (FLUSH) 0.9 %
10 SYRINGE (ML) INJECTION EVERY 12 HOURS SCHEDULED
Status: DISCONTINUED | OUTPATIENT
Start: 2019-03-02 | End: 2019-03-08 | Stop reason: HOSPADM

## 2019-03-02 RX ORDER — SODIUM CHLORIDE 0.9 % (FLUSH) 0.9 %
10 SYRINGE (ML) INJECTION EVERY 12 HOURS SCHEDULED
Status: DISCONTINUED | OUTPATIENT
Start: 2019-03-02 | End: 2019-03-02

## 2019-03-02 RX ORDER — BUSPIRONE HYDROCHLORIDE 10 MG/1
10 TABLET ORAL 3 TIMES DAILY
Status: DISCONTINUED | OUTPATIENT
Start: 2019-03-02 | End: 2019-03-02

## 2019-03-02 RX ORDER — LEVOTHYROXINE SODIUM 0.15 MG/1
150 TABLET ORAL EVERY MORNING
Refills: 0 | COMMUNITY
Start: 2019-01-18 | End: 2019-09-26 | Stop reason: CLARIF

## 2019-03-02 RX ORDER — FUROSEMIDE 10 MG/ML
40 INJECTION INTRAMUSCULAR; INTRAVENOUS ONCE
Status: COMPLETED | OUTPATIENT
Start: 2019-03-02 | End: 2019-03-02

## 2019-03-02 RX ADMIN — CLOTRIMAZOLE 10 MG: 10 LOZENGE ORAL at 22:02

## 2019-03-02 RX ADMIN — CELECOXIB 200 MG: 200 CAPSULE ORAL at 21:59

## 2019-03-02 RX ADMIN — FUROSEMIDE 40 MG: 10 INJECTION, SOLUTION INTRAMUSCULAR; INTRAVENOUS at 21:57

## 2019-03-02 RX ADMIN — IPRATROPIUM BROMIDE AND ALBUTEROL SULFATE 1 AMPULE: .5; 3 SOLUTION RESPIRATORY (INHALATION) at 15:38

## 2019-03-02 RX ADMIN — GUAIFENESIN 600 MG: 600 TABLET, EXTENDED RELEASE ORAL at 21:57

## 2019-03-02 RX ADMIN — SODIUM CHLORIDE, PRESERVATIVE FREE 10 ML: 5 INJECTION INTRAVENOUS at 22:00

## 2019-03-02 RX ADMIN — MAGNESIUM SULFATE HEPTAHYDRATE 2 G: 40 INJECTION, SOLUTION INTRAVENOUS at 15:51

## 2019-03-02 RX ADMIN — HYDRALAZINE HYDROCHLORIDE 50 MG: 50 TABLET, FILM COATED ORAL at 21:58

## 2019-03-02 RX ADMIN — AZITHROMYCIN 500 MG: 250 TABLET, FILM COATED ORAL at 18:10

## 2019-03-02 RX ADMIN — SODIUM CHLORIDE: 9 INJECTION, SOLUTION INTRAVENOUS at 22:01

## 2019-03-02 RX ADMIN — IPRATROPIUM BROMIDE AND ALBUTEROL SULFATE 1 AMPULE: .5; 3 SOLUTION RESPIRATORY (INHALATION) at 15:37

## 2019-03-02 RX ADMIN — TRAZODONE HYDROCHLORIDE 50 MG: 50 TABLET ORAL at 21:57

## 2019-03-02 RX ADMIN — CEFTRIAXONE SODIUM 1 G: 1 INJECTION, POWDER, FOR SOLUTION INTRAMUSCULAR; INTRAVENOUS at 18:10

## 2019-03-02 RX ADMIN — BUSPIRONE HYDROCHLORIDE 15 MG: 7.5 TABLET ORAL at 21:59

## 2019-03-02 RX ADMIN — PREDNISONE 60 MG: 20 TABLET ORAL at 15:55

## 2019-03-02 RX ADMIN — METHYLPREDNISOLONE SODIUM SUCCINATE 40 MG: 40 INJECTION, POWDER, LYOPHILIZED, FOR SOLUTION INTRAMUSCULAR; INTRAVENOUS at 21:58

## 2019-03-02 RX ADMIN — CLONAZEPAM 1 MG: 1 TABLET ORAL at 21:58

## 2019-03-02 RX ADMIN — IPRATROPIUM BROMIDE AND ALBUTEROL SULFATE 1 AMPULE: .5; 3 SOLUTION RESPIRATORY (INHALATION) at 15:35

## 2019-03-02 RX ADMIN — ORPHENADRINE CITRATE 100 MG: 100 TABLET, EXTENDED RELEASE ORAL at 21:57

## 2019-03-02 ASSESSMENT — PAIN DESCRIPTION - PROGRESSION: CLINICAL_PROGRESSION: NOT CHANGED

## 2019-03-02 ASSESSMENT — PAIN DESCRIPTION - FREQUENCY: FREQUENCY: CONTINUOUS

## 2019-03-02 ASSESSMENT — PAIN SCALES - GENERAL
PAINLEVEL_OUTOF10: 5
PAINLEVEL_OUTOF10: 0

## 2019-03-02 ASSESSMENT — PAIN DESCRIPTION - LOCATION: LOCATION: BACK;GENERALIZED

## 2019-03-02 ASSESSMENT — PAIN - FUNCTIONAL ASSESSMENT: PAIN_FUNCTIONAL_ASSESSMENT: PREVENTS OR INTERFERES WITH ALL ACTIVE AND SOME PASSIVE ACTIVITIES

## 2019-03-02 ASSESSMENT — PAIN DESCRIPTION - ONSET: ONSET: ON-GOING

## 2019-03-02 ASSESSMENT — PAIN DESCRIPTION - DESCRIPTORS: DESCRIPTORS: ACHING;CONSTANT

## 2019-03-02 ASSESSMENT — PAIN DESCRIPTION - PAIN TYPE: TYPE: CHRONIC PAIN

## 2019-03-03 LAB
ADENOVIRUS DETECTION BY PCR: NOT DETECTED
ALBUMIN SERPL-MCNC: 3.9 GM/DL (ref 3.4–5)
ALP BLD-CCNC: 84 IU/L (ref 40–129)
ALT SERPL-CCNC: 57 U/L (ref 10–40)
ANION GAP SERPL CALCULATED.3IONS-SCNC: 11 MMOL/L (ref 4–16)
ANION GAP SERPL CALCULATED.3IONS-SCNC: 11 MMOL/L (ref 4–16)
AST SERPL-CCNC: 26 IU/L (ref 15–37)
BACTERIA: NEGATIVE /HPF
BASOPHILS ABSOLUTE: 0 K/CU MM
BASOPHILS RELATIVE PERCENT: 0 % (ref 0–1)
BILIRUB SERPL-MCNC: 0.2 MG/DL (ref 0–1)
BILIRUBIN URINE: NEGATIVE MG/DL
BLOOD, URINE: NEGATIVE
BORDETELLA PERTUSSIS PCR: NOT DETECTED
BUN BLDV-MCNC: 10 MG/DL (ref 6–23)
BUN BLDV-MCNC: 10 MG/DL (ref 6–23)
CALCIUM SERPL-MCNC: 8.8 MG/DL (ref 8.3–10.6)
CALCIUM SERPL-MCNC: 8.9 MG/DL (ref 8.3–10.6)
CHLAMYDOPHILA PNEUMONIA PCR: NOT DETECTED
CHLORIDE BLD-SCNC: 95 MMOL/L (ref 99–110)
CHLORIDE BLD-SCNC: 96 MMOL/L (ref 99–110)
CLARITY: CLEAR
CO2: 28 MMOL/L (ref 21–32)
CO2: 29 MMOL/L (ref 21–32)
COLOR: COLORLESS
CORONAVIRUS 229E PCR: NOT DETECTED
CORONAVIRUS HKU1 PCR: NOT DETECTED
CORONAVIRUS NL63 PCR: NOT DETECTED
CORONAVIRUS OC43 PCR: NOT DETECTED
CREAT SERPL-MCNC: 0.9 MG/DL (ref 0.6–1.1)
CREAT SERPL-MCNC: 0.9 MG/DL (ref 0.6–1.1)
DIFFERENTIAL TYPE: ABNORMAL
EOSINOPHILS ABSOLUTE: 0 K/CU MM
EOSINOPHILS RELATIVE PERCENT: 0 % (ref 0–3)
GFR AFRICAN AMERICAN: >60 ML/MIN/1.73M2
GFR AFRICAN AMERICAN: >60 ML/MIN/1.73M2
GFR NON-AFRICAN AMERICAN: >60 ML/MIN/1.73M2
GFR NON-AFRICAN AMERICAN: >60 ML/MIN/1.73M2
GLUCOSE BLD-MCNC: 147 MG/DL (ref 70–99)
GLUCOSE BLD-MCNC: 149 MG/DL (ref 70–99)
GLUCOSE, URINE: NEGATIVE MG/DL
HCT VFR BLD CALC: 31.3 % (ref 37–47)
HEMOGLOBIN: 9.4 GM/DL (ref 12.5–16)
HUMAN METAPNEUMOVIRUS PCR: NOT DETECTED
IMMATURE NEUTROPHIL %: 0.5 % (ref 0–0.43)
INFLUENZA A BY PCR: NOT DETECTED
INFLUENZA A H1 (2009) PCR: NOT DETECTED
INFLUENZA A H1 PANDEMIC PCR: NOT DETECTED
INFLUENZA A H3 PCR: NOT DETECTED
INFLUENZA B BY PCR: NOT DETECTED
KETONES, URINE: NEGATIVE MG/DL
LEUKOCYTE ESTERASE, URINE: NEGATIVE
LYMPHOCYTES ABSOLUTE: 0.2 K/CU MM
LYMPHOCYTES RELATIVE PERCENT: 3.4 % (ref 24–44)
MCH RBC QN AUTO: 28.8 PG (ref 27–31)
MCHC RBC AUTO-ENTMCNC: 30 % (ref 32–36)
MCV RBC AUTO: 96 FL (ref 78–100)
MONOCYTES ABSOLUTE: 0 K/CU MM
MONOCYTES RELATIVE PERCENT: 0.9 % (ref 0–4)
MYCOPLASMA PNEUMONIAE PCR: NOT DETECTED
NITRITE URINE, QUANTITATIVE: NEGATIVE
NUCLEATED RBC %: 0 %
PARAINFLUENZA 1 PCR: NOT DETECTED
PARAINFLUENZA 2 PCR: NOT DETECTED
PARAINFLUENZA 3 PCR: NOT DETECTED
PARAINFLUENZA 4 PCR: NOT DETECTED
PDW BLD-RTO: 15.9 % (ref 11.7–14.9)
PH, URINE: 6 (ref 5–8)
PLATELET # BLD: 154 K/CU MM (ref 140–440)
PMV BLD AUTO: 9.5 FL (ref 7.5–11.1)
POTASSIUM SERPL-SCNC: 4 MMOL/L (ref 3.5–5.1)
POTASSIUM SERPL-SCNC: 4.4 MMOL/L (ref 3.5–5.1)
PROTEIN UA: NEGATIVE MG/DL
RBC # BLD: 3.26 M/CU MM (ref 4.2–5.4)
RBC URINE: ABNORMAL /HPF (ref 0–6)
RHINOVIRUS ENTEROVIRUS PCR: NOT DETECTED
RSV PCR: NOT DETECTED
SEGMENTED NEUTROPHILS ABSOLUTE COUNT: 4.2 K/CU MM
SEGMENTED NEUTROPHILS RELATIVE PERCENT: 95.2 % (ref 36–66)
SODIUM BLD-SCNC: 134 MMOL/L (ref 135–145)
SODIUM BLD-SCNC: 136 MMOL/L (ref 135–145)
SPECIFIC GRAVITY UA: 1 (ref 1–1.03)
TOTAL IMMATURE NEUTOROPHIL: 0.02 K/CU MM
TOTAL NUCLEATED RBC: 0 K/CU MM
TOTAL PROTEIN: 5.9 GM/DL (ref 6.4–8.2)
TRICHOMONAS: ABNORMAL /HPF
UROBILINOGEN, URINE: NORMAL MG/DL (ref 0.2–1)
WBC # BLD: 4.4 K/CU MM (ref 4–10.5)
WBC UA: ABNORMAL /HPF (ref 0–5)

## 2019-03-03 PROCEDURE — 87205 SMEAR GRAM STAIN: CPT

## 2019-03-03 PROCEDURE — 2580000003 HC RX 258: Performed by: INTERNAL MEDICINE

## 2019-03-03 PROCEDURE — 36415 COLL VENOUS BLD VENIPUNCTURE: CPT

## 2019-03-03 PROCEDURE — 94660 CPAP INITIATION&MGMT: CPT

## 2019-03-03 PROCEDURE — 2700000000 HC OXYGEN THERAPY PER DAY

## 2019-03-03 PROCEDURE — 51702 INSERT TEMP BLADDER CATH: CPT

## 2019-03-03 PROCEDURE — 94640 AIRWAY INHALATION TREATMENT: CPT

## 2019-03-03 PROCEDURE — 87798 DETECT AGENT NOS DNA AMP: CPT

## 2019-03-03 PROCEDURE — 80048 BASIC METABOLIC PNL TOTAL CA: CPT

## 2019-03-03 PROCEDURE — 81001 URINALYSIS AUTO W/SCOPE: CPT

## 2019-03-03 PROCEDURE — 87486 CHLMYD PNEUM DNA AMP PROBE: CPT

## 2019-03-03 PROCEDURE — 94761 N-INVAS EAR/PLS OXIMETRY MLT: CPT

## 2019-03-03 PROCEDURE — 85025 COMPLETE CBC W/AUTO DIFF WBC: CPT

## 2019-03-03 PROCEDURE — 87581 M.PNEUMON DNA AMP PROBE: CPT

## 2019-03-03 PROCEDURE — 6370000000 HC RX 637 (ALT 250 FOR IP): Performed by: INTERNAL MEDICINE

## 2019-03-03 PROCEDURE — 6360000002 HC RX W HCPCS: Performed by: INTERNAL MEDICINE

## 2019-03-03 PROCEDURE — 2060000000 HC ICU INTERMEDIATE R&B

## 2019-03-03 PROCEDURE — 87070 CULTURE OTHR SPECIMN AEROBIC: CPT

## 2019-03-03 PROCEDURE — 80053 COMPREHEN METABOLIC PANEL: CPT

## 2019-03-03 PROCEDURE — 87086 URINE CULTURE/COLONY COUNT: CPT

## 2019-03-03 RX ORDER — GUAIFENESIN 600 MG/1
600 TABLET, EXTENDED RELEASE ORAL 2 TIMES DAILY
Status: DISCONTINUED | OUTPATIENT
Start: 2019-03-03 | End: 2019-03-08 | Stop reason: HOSPADM

## 2019-03-03 RX ORDER — HYDROCODONE BITARTRATE AND ACETAMINOPHEN 5; 325 MG/1; MG/1
1 TABLET ORAL EVERY 6 HOURS PRN
Status: DISCONTINUED | OUTPATIENT
Start: 2019-03-03 | End: 2019-03-08 | Stop reason: HOSPADM

## 2019-03-03 RX ORDER — CALCIUM CARBONATE 200(500)MG
500 TABLET,CHEWABLE ORAL 3 TIMES DAILY PRN
Status: DISCONTINUED | OUTPATIENT
Start: 2019-03-03 | End: 2019-03-08 | Stop reason: HOSPADM

## 2019-03-03 RX ORDER — PANTOPRAZOLE SODIUM 40 MG/1
40 TABLET, DELAYED RELEASE ORAL
Status: DISCONTINUED | OUTPATIENT
Start: 2019-03-03 | End: 2019-03-08 | Stop reason: HOSPADM

## 2019-03-03 RX ORDER — FUROSEMIDE 10 MG/ML
40 INJECTION INTRAMUSCULAR; INTRAVENOUS ONCE
Status: COMPLETED | OUTPATIENT
Start: 2019-03-03 | End: 2019-03-03

## 2019-03-03 RX ADMIN — HYDRALAZINE HYDROCHLORIDE 50 MG: 50 TABLET, FILM COATED ORAL at 21:19

## 2019-03-03 RX ADMIN — SODIUM CHLORIDE, PRESERVATIVE FREE 10 ML: 5 INJECTION INTRAVENOUS at 21:38

## 2019-03-03 RX ADMIN — CLONAZEPAM 1 MG: 1 TABLET ORAL at 12:45

## 2019-03-03 RX ADMIN — METHYLPREDNISOLONE SODIUM SUCCINATE 40 MG: 40 INJECTION, POWDER, LYOPHILIZED, FOR SOLUTION INTRAMUSCULAR; INTRAVENOUS at 21:37

## 2019-03-03 RX ADMIN — DILTIAZEM HYDROCHLORIDE 120 MG: 120 CAPSULE, COATED, EXTENDED RELEASE ORAL at 09:27

## 2019-03-03 RX ADMIN — TRIAMTERENE AND HYDROCHLOROTHIAZIDE 1 TABLET: 37.5; 25 TABLET ORAL at 09:27

## 2019-03-03 RX ADMIN — IPRATROPIUM BROMIDE AND ALBUTEROL SULFATE 3 ML: .5; 3 SOLUTION RESPIRATORY (INHALATION) at 15:42

## 2019-03-03 RX ADMIN — ASPIRIN 81 MG 81 MG: 81 TABLET ORAL at 09:27

## 2019-03-03 RX ADMIN — CALCIUM CARBONATE 500 MG: 500 TABLET, CHEWABLE ORAL at 17:42

## 2019-03-03 RX ADMIN — BUSPIRONE HYDROCHLORIDE 15 MG: 7.5 TABLET ORAL at 21:13

## 2019-03-03 RX ADMIN — IPRATROPIUM BROMIDE AND ALBUTEROL SULFATE 3 ML: .5; 3 SOLUTION RESPIRATORY (INHALATION) at 11:53

## 2019-03-03 RX ADMIN — BUSPIRONE HYDROCHLORIDE 15 MG: 7.5 TABLET ORAL at 09:27

## 2019-03-03 RX ADMIN — CELECOXIB 200 MG: 200 CAPSULE ORAL at 09:26

## 2019-03-03 RX ADMIN — CELECOXIB 200 MG: 200 CAPSULE ORAL at 21:13

## 2019-03-03 RX ADMIN — ORPHENADRINE CITRATE 100 MG: 100 TABLET, EXTENDED RELEASE ORAL at 21:13

## 2019-03-03 RX ADMIN — HYDRALAZINE HYDROCHLORIDE 50 MG: 50 TABLET, FILM COATED ORAL at 09:27

## 2019-03-03 RX ADMIN — PANTOPRAZOLE SODIUM 40 MG: 40 TABLET, DELAYED RELEASE ORAL at 17:42

## 2019-03-03 RX ADMIN — CITALOPRAM HYDROBROMIDE 40 MG: 40 TABLET ORAL at 09:27

## 2019-03-03 RX ADMIN — CLOTRIMAZOLE 10 MG: 10 LOZENGE ORAL at 19:09

## 2019-03-03 RX ADMIN — METHYLPREDNISOLONE SODIUM SUCCINATE 40 MG: 40 INJECTION, POWDER, LYOPHILIZED, FOR SOLUTION INTRAMUSCULAR; INTRAVENOUS at 09:27

## 2019-03-03 RX ADMIN — MONTELUKAST SODIUM 10 MG: 10 TABLET, COATED ORAL at 09:27

## 2019-03-03 RX ADMIN — IPRATROPIUM BROMIDE AND ALBUTEROL SULFATE 3 ML: .5; 3 SOLUTION RESPIRATORY (INHALATION) at 19:49

## 2019-03-03 RX ADMIN — AMLODIPINE BESYLATE 10 MG: 10 TABLET ORAL at 09:28

## 2019-03-03 RX ADMIN — NYSTATIN 500000 UNITS: 100000 SUSPENSION ORAL at 17:08

## 2019-03-03 RX ADMIN — ATORVASTATIN CALCIUM 40 MG: 40 TABLET, FILM COATED ORAL at 09:27

## 2019-03-03 RX ADMIN — NYSTATIN 500000 UNITS: 100000 SUSPENSION ORAL at 12:45

## 2019-03-03 RX ADMIN — FUROSEMIDE 40 MG: 10 INJECTION, SOLUTION INTRAMUSCULAR; INTRAVENOUS at 12:45

## 2019-03-03 RX ADMIN — CLOTRIMAZOLE 10 MG: 10 LOZENGE ORAL at 09:26

## 2019-03-03 RX ADMIN — AZITHROMYCIN MONOHYDRATE 500 MG: 500 INJECTION, POWDER, LYOPHILIZED, FOR SOLUTION INTRAVENOUS at 17:08

## 2019-03-03 RX ADMIN — ENOXAPARIN SODIUM 40 MG: 40 INJECTION SUBCUTANEOUS at 09:28

## 2019-03-03 RX ADMIN — FLUTICASONE PROPIONATE 2 SPRAY: 50 SPRAY, METERED NASAL at 09:27

## 2019-03-03 RX ADMIN — TRAZODONE HYDROCHLORIDE 50 MG: 50 TABLET ORAL at 21:13

## 2019-03-03 RX ADMIN — IPRATROPIUM BROMIDE AND ALBUTEROL SULFATE 3 ML: .5; 3 SOLUTION RESPIRATORY (INHALATION) at 23:36

## 2019-03-03 RX ADMIN — GUAIFENESIN 600 MG: 600 TABLET, EXTENDED RELEASE ORAL at 21:13

## 2019-03-03 RX ADMIN — CEFTRIAXONE 1 G: 1 INJECTION, POWDER, FOR SOLUTION INTRAMUSCULAR; INTRAVENOUS at 17:08

## 2019-03-03 RX ADMIN — CLOTRIMAZOLE 10 MG: 10 LOZENGE ORAL at 21:37

## 2019-03-03 RX ADMIN — HYDROCODONE BITARTRATE AND ACETAMINOPHEN 1 TABLET: 5; 325 TABLET ORAL at 17:48

## 2019-03-03 RX ADMIN — CLOTRIMAZOLE 10 MG: 10 LOZENGE ORAL at 14:50

## 2019-03-03 RX ADMIN — HYDRALAZINE HYDROCHLORIDE 50 MG: 50 TABLET, FILM COATED ORAL at 14:50

## 2019-03-03 RX ADMIN — LEVOTHYROXINE SODIUM 150 MCG: 150 TABLET ORAL at 07:44

## 2019-03-03 RX ADMIN — IPRATROPIUM BROMIDE AND ALBUTEROL SULFATE 3 ML: .5; 3 SOLUTION RESPIRATORY (INHALATION) at 08:10

## 2019-03-03 RX ADMIN — SODIUM CHLORIDE: 9 INJECTION, SOLUTION INTRAVENOUS at 12:16

## 2019-03-03 RX ADMIN — CLONAZEPAM 1 MG: 1 TABLET ORAL at 03:35

## 2019-03-03 RX ADMIN — SODIUM CHLORIDE: 9 INJECTION, SOLUTION INTRAVENOUS at 21:49

## 2019-03-03 RX ADMIN — ACETAMINOPHEN 650 MG: 325 TABLET ORAL at 14:50

## 2019-03-03 RX ADMIN — NYSTATIN 500000 UNITS: 100000 SUSPENSION ORAL at 09:27

## 2019-03-03 RX ADMIN — CLONAZEPAM 1 MG: 1 TABLET ORAL at 21:46

## 2019-03-03 RX ADMIN — THEOPHYLLINE ANHYDROUS 400 MG: 200 CAPSULE, EXTENDED RELEASE ORAL at 09:27

## 2019-03-03 RX ADMIN — SODIUM CHLORIDE, PRESERVATIVE FREE 10 ML: 5 INJECTION INTRAVENOUS at 09:28

## 2019-03-03 RX ADMIN — CLOTRIMAZOLE 10 MG: 10 LOZENGE ORAL at 07:44

## 2019-03-03 RX ADMIN — GUAIFENESIN 600 MG: 600 TABLET, EXTENDED RELEASE ORAL at 09:27

## 2019-03-03 ASSESSMENT — PAIN SCALES - GENERAL
PAINLEVEL_OUTOF10: 7
PAINLEVEL_OUTOF10: 5
PAINLEVEL_OUTOF10: 3
PAINLEVEL_OUTOF10: 0

## 2019-03-03 ASSESSMENT — PAIN DESCRIPTION - LOCATION
LOCATION: HEAD
LOCATION: HEAD

## 2019-03-03 ASSESSMENT — PAIN DESCRIPTION - ORIENTATION: ORIENTATION: LEFT

## 2019-03-03 ASSESSMENT — PAIN DESCRIPTION - DESCRIPTORS
DESCRIPTORS: ACHING
DESCRIPTORS: ACHING

## 2019-03-03 ASSESSMENT — PAIN DESCRIPTION - PAIN TYPE
TYPE: ACUTE PAIN
TYPE: ACUTE PAIN

## 2019-03-04 LAB
CULTURE: NORMAL
Lab: NORMAL
REPORT STATUS: NORMAL
SPECIMEN: NORMAL

## 2019-03-04 PROCEDURE — 6370000000 HC RX 637 (ALT 250 FOR IP): Performed by: INTERNAL MEDICINE

## 2019-03-04 PROCEDURE — 6360000002 HC RX W HCPCS: Performed by: INTERNAL MEDICINE

## 2019-03-04 PROCEDURE — 94640 AIRWAY INHALATION TREATMENT: CPT

## 2019-03-04 PROCEDURE — 90670 PCV13 VACCINE IM: CPT | Performed by: INTERNAL MEDICINE

## 2019-03-04 PROCEDURE — 2060000000 HC ICU INTERMEDIATE R&B

## 2019-03-04 PROCEDURE — 2580000003 HC RX 258: Performed by: INTERNAL MEDICINE

## 2019-03-04 PROCEDURE — 2700000000 HC OXYGEN THERAPY PER DAY

## 2019-03-04 PROCEDURE — G0009 ADMIN PNEUMOCOCCAL VACCINE: HCPCS | Performed by: INTERNAL MEDICINE

## 2019-03-04 PROCEDURE — 94660 CPAP INITIATION&MGMT: CPT

## 2019-03-04 PROCEDURE — 94761 N-INVAS EAR/PLS OXIMETRY MLT: CPT

## 2019-03-04 RX ORDER — METHYLPREDNISOLONE SODIUM SUCCINATE 40 MG/ML
40 INJECTION, POWDER, LYOPHILIZED, FOR SOLUTION INTRAMUSCULAR; INTRAVENOUS EVERY 8 HOURS
Status: DISCONTINUED | OUTPATIENT
Start: 2019-03-04 | End: 2019-03-06

## 2019-03-04 RX ADMIN — HYDRALAZINE HYDROCHLORIDE 50 MG: 50 TABLET, FILM COATED ORAL at 19:54

## 2019-03-04 RX ADMIN — HYDROCODONE BITARTRATE AND ACETAMINOPHEN 1 TABLET: 5; 325 TABLET ORAL at 12:59

## 2019-03-04 RX ADMIN — TRIAMTERENE AND HYDROCHLOROTHIAZIDE 1 TABLET: 37.5; 25 TABLET ORAL at 09:04

## 2019-03-04 RX ADMIN — METHYLPREDNISOLONE SODIUM SUCCINATE 40 MG: 40 INJECTION, POWDER, LYOPHILIZED, FOR SOLUTION INTRAMUSCULAR; INTRAVENOUS at 09:02

## 2019-03-04 RX ADMIN — PANTOPRAZOLE SODIUM 40 MG: 40 TABLET, DELAYED RELEASE ORAL at 06:08

## 2019-03-04 RX ADMIN — SODIUM CHLORIDE: 9 INJECTION, SOLUTION INTRAVENOUS at 18:20

## 2019-03-04 RX ADMIN — SODIUM CHLORIDE: 9 INJECTION, SOLUTION INTRAVENOUS at 06:10

## 2019-03-04 RX ADMIN — HYDRALAZINE HYDROCHLORIDE 50 MG: 50 TABLET, FILM COATED ORAL at 09:04

## 2019-03-04 RX ADMIN — FLUTICASONE PROPIONATE 2 SPRAY: 50 SPRAY, METERED NASAL at 09:02

## 2019-03-04 RX ADMIN — HYDROCODONE BITARTRATE AND ACETAMINOPHEN 1 TABLET: 5; 325 TABLET ORAL at 06:07

## 2019-03-04 RX ADMIN — CITALOPRAM HYDROBROMIDE 40 MG: 40 TABLET ORAL at 09:04

## 2019-03-04 RX ADMIN — CLOTRIMAZOLE 10 MG: 10 LOZENGE ORAL at 21:03

## 2019-03-04 RX ADMIN — AZITHROMYCIN MONOHYDRATE 500 MG: 500 INJECTION, POWDER, LYOPHILIZED, FOR SOLUTION INTRAVENOUS at 19:16

## 2019-03-04 RX ADMIN — HYDROCODONE BITARTRATE AND ACETAMINOPHEN 1 TABLET: 5; 325 TABLET ORAL at 19:54

## 2019-03-04 RX ADMIN — HYDRALAZINE HYDROCHLORIDE 50 MG: 50 TABLET, FILM COATED ORAL at 15:43

## 2019-03-04 RX ADMIN — CLONAZEPAM 1 MG: 1 TABLET ORAL at 09:06

## 2019-03-04 RX ADMIN — CLOTRIMAZOLE 10 MG: 10 LOZENGE ORAL at 19:55

## 2019-03-04 RX ADMIN — ATORVASTATIN CALCIUM 40 MG: 40 TABLET, FILM COATED ORAL at 09:03

## 2019-03-04 RX ADMIN — GUAIFENESIN 600 MG: 600 TABLET, EXTENDED RELEASE ORAL at 19:54

## 2019-03-04 RX ADMIN — BUSPIRONE HYDROCHLORIDE 15 MG: 7.5 TABLET ORAL at 19:53

## 2019-03-04 RX ADMIN — GUAIFENESIN 600 MG: 600 TABLET, EXTENDED RELEASE ORAL at 09:04

## 2019-03-04 RX ADMIN — ORPHENADRINE CITRATE 100 MG: 100 TABLET, EXTENDED RELEASE ORAL at 15:59

## 2019-03-04 RX ADMIN — CEFTRIAXONE 1 G: 1 INJECTION, POWDER, FOR SOLUTION INTRAMUSCULAR; INTRAVENOUS at 18:19

## 2019-03-04 RX ADMIN — IPRATROPIUM BROMIDE AND ALBUTEROL SULFATE 3 ML: .5; 3 SOLUTION RESPIRATORY (INHALATION) at 11:52

## 2019-03-04 RX ADMIN — NYSTATIN 500000 UNITS: 100000 SUSPENSION ORAL at 18:18

## 2019-03-04 RX ADMIN — IPRATROPIUM BROMIDE AND ALBUTEROL SULFATE 3 ML: .5; 3 SOLUTION RESPIRATORY (INHALATION) at 03:36

## 2019-03-04 RX ADMIN — CLOTRIMAZOLE 10 MG: 10 LOZENGE ORAL at 06:08

## 2019-03-04 RX ADMIN — SODIUM CHLORIDE, PRESERVATIVE FREE 10 ML: 5 INJECTION INTRAVENOUS at 09:13

## 2019-03-04 RX ADMIN — LEVOTHYROXINE SODIUM 150 MCG: 150 TABLET ORAL at 06:08

## 2019-03-04 RX ADMIN — IPRATROPIUM BROMIDE AND ALBUTEROL SULFATE 3 ML: .5; 3 SOLUTION RESPIRATORY (INHALATION) at 22:05

## 2019-03-04 RX ADMIN — IPRATROPIUM BROMIDE AND ALBUTEROL SULFATE 3 ML: .5; 3 SOLUTION RESPIRATORY (INHALATION) at 15:10

## 2019-03-04 RX ADMIN — PNEUMOCOCCAL 13-VALENT CONJUGATE VACCINE 0.5 ML: 2.2; 2.2; 2.2; 2.2; 2.2; 4.4; 2.2; 2.2; 2.2; 2.2; 2.2; 2.2; 2.2 INJECTION, SUSPENSION INTRAMUSCULAR at 19:16

## 2019-03-04 RX ADMIN — IPRATROPIUM BROMIDE AND ALBUTEROL SULFATE 3 ML: .5; 3 SOLUTION RESPIRATORY (INHALATION) at 07:46

## 2019-03-04 RX ADMIN — MONTELUKAST SODIUM 10 MG: 10 TABLET, COATED ORAL at 09:06

## 2019-03-04 RX ADMIN — SODIUM CHLORIDE, PRESERVATIVE FREE 10 ML: 5 INJECTION INTRAVENOUS at 21:00

## 2019-03-04 RX ADMIN — DILTIAZEM HYDROCHLORIDE 120 MG: 120 CAPSULE, COATED, EXTENDED RELEASE ORAL at 09:04

## 2019-03-04 RX ADMIN — CELECOXIB 200 MG: 200 CAPSULE ORAL at 09:03

## 2019-03-04 RX ADMIN — BUSPIRONE HYDROCHLORIDE 15 MG: 7.5 TABLET ORAL at 09:04

## 2019-03-04 RX ADMIN — TRAZODONE HYDROCHLORIDE 50 MG: 50 TABLET ORAL at 19:55

## 2019-03-04 RX ADMIN — ENOXAPARIN SODIUM 40 MG: 40 INJECTION SUBCUTANEOUS at 09:02

## 2019-03-04 RX ADMIN — CELECOXIB 200 MG: 200 CAPSULE ORAL at 19:54

## 2019-03-04 RX ADMIN — THEOPHYLLINE ANHYDROUS 400 MG: 200 CAPSULE, EXTENDED RELEASE ORAL at 09:04

## 2019-03-04 RX ADMIN — NYSTATIN 500000 UNITS: 100000 SUSPENSION ORAL at 09:02

## 2019-03-04 RX ADMIN — METHYLPREDNISOLONE SODIUM SUCCINATE 40 MG: 40 INJECTION, POWDER, LYOPHILIZED, FOR SOLUTION INTRAMUSCULAR; INTRAVENOUS at 18:18

## 2019-03-04 RX ADMIN — ASPIRIN 81 MG 81 MG: 81 TABLET ORAL at 09:04

## 2019-03-04 RX ADMIN — AMLODIPINE BESYLATE 10 MG: 10 TABLET ORAL at 09:04

## 2019-03-04 RX ADMIN — NYSTATIN 500000 UNITS: 100000 SUSPENSION ORAL at 12:59

## 2019-03-04 RX ADMIN — CLONAZEPAM 1 MG: 1 TABLET ORAL at 18:18

## 2019-03-04 ASSESSMENT — PAIN DESCRIPTION - PROGRESSION
CLINICAL_PROGRESSION: NOT CHANGED

## 2019-03-04 ASSESSMENT — PAIN DESCRIPTION - DESCRIPTORS
DESCRIPTORS: ACHING
DESCRIPTORS: ACHING

## 2019-03-04 ASSESSMENT — PAIN DESCRIPTION - ONSET
ONSET: ON-GOING
ONSET: ON-GOING

## 2019-03-04 ASSESSMENT — PAIN DESCRIPTION - PAIN TYPE
TYPE: ACUTE PAIN
TYPE: ACUTE PAIN

## 2019-03-04 ASSESSMENT — PAIN DESCRIPTION - LOCATION
LOCATION: GENERALIZED
LOCATION: GENERALIZED

## 2019-03-04 ASSESSMENT — PAIN SCALES - GENERAL
PAINLEVEL_OUTOF10: 3
PAINLEVEL_OUTOF10: 3
PAINLEVEL_OUTOF10: 6
PAINLEVEL_OUTOF10: 6
PAINLEVEL_OUTOF10: 5
PAINLEVEL_OUTOF10: 5

## 2019-03-04 ASSESSMENT — PAIN DESCRIPTION - ORIENTATION
ORIENTATION: RIGHT;LEFT
ORIENTATION: RIGHT;LEFT

## 2019-03-04 ASSESSMENT — PAIN DESCRIPTION - FREQUENCY
FREQUENCY: CONTINUOUS
FREQUENCY: CONTINUOUS

## 2019-03-05 LAB
ALBUMIN SERPL-MCNC: 3.7 GM/DL (ref 3.4–5)
ALP BLD-CCNC: 70 IU/L (ref 40–129)
ALT SERPL-CCNC: 42 U/L (ref 10–40)
ANION GAP SERPL CALCULATED.3IONS-SCNC: 10 MMOL/L (ref 4–16)
AST SERPL-CCNC: 18 IU/L (ref 15–37)
BILIRUB SERPL-MCNC: 0.1 MG/DL (ref 0–1)
BUN BLDV-MCNC: 13 MG/DL (ref 6–23)
CALCIUM SERPL-MCNC: 8.7 MG/DL (ref 8.3–10.6)
CHLORIDE BLD-SCNC: 93 MMOL/L (ref 99–110)
CO2: 34 MMOL/L (ref 21–32)
CREAT SERPL-MCNC: 0.8 MG/DL (ref 0.6–1.1)
CULTURE: NORMAL
ERYTHROCYTE SEDIMENTATION RATE: 25 MM/HR (ref 0–30)
GFR AFRICAN AMERICAN: >60 ML/MIN/1.73M2
GFR NON-AFRICAN AMERICAN: >60 ML/MIN/1.73M2
GLUCOSE BLD-MCNC: 205 MG/DL (ref 70–99)
GRAM SMEAR: NORMAL
Lab: NORMAL
POTASSIUM SERPL-SCNC: 3.2 MMOL/L (ref 3.5–5.1)
POTASSIUM SERPL-SCNC: 3.3 MMOL/L (ref 3.5–5.1)
PRO-BNP: 2019 PG/ML
PROCALCITONIN: 0.04
REPORT STATUS: NORMAL
SODIUM BLD-SCNC: 137 MMOL/L (ref 135–145)
SPECIMEN: NORMAL
THEOPHYLLINE LEVEL: 11.6 UG/ML (ref 10–20)
TOTAL PROTEIN: 5.4 GM/DL (ref 6.4–8.2)

## 2019-03-05 PROCEDURE — 94761 N-INVAS EAR/PLS OXIMETRY MLT: CPT

## 2019-03-05 PROCEDURE — 84132 ASSAY OF SERUM POTASSIUM: CPT

## 2019-03-05 PROCEDURE — 6370000000 HC RX 637 (ALT 250 FOR IP): Performed by: INTERNAL MEDICINE

## 2019-03-05 PROCEDURE — 2700000000 HC OXYGEN THERAPY PER DAY

## 2019-03-05 PROCEDURE — 83880 ASSAY OF NATRIURETIC PEPTIDE: CPT

## 2019-03-05 PROCEDURE — 6360000002 HC RX W HCPCS: Performed by: INTERNAL MEDICINE

## 2019-03-05 PROCEDURE — 80053 COMPREHEN METABOLIC PANEL: CPT

## 2019-03-05 PROCEDURE — 2060000000 HC ICU INTERMEDIATE R&B

## 2019-03-05 PROCEDURE — 2580000003 HC RX 258: Performed by: INTERNAL MEDICINE

## 2019-03-05 PROCEDURE — 36415 COLL VENOUS BLD VENIPUNCTURE: CPT

## 2019-03-05 PROCEDURE — 85652 RBC SED RATE AUTOMATED: CPT

## 2019-03-05 PROCEDURE — 94660 CPAP INITIATION&MGMT: CPT

## 2019-03-05 PROCEDURE — 94640 AIRWAY INHALATION TREATMENT: CPT

## 2019-03-05 PROCEDURE — 80198 ASSAY OF THEOPHYLLINE: CPT

## 2019-03-05 PROCEDURE — 84145 PROCALCITONIN (PCT): CPT

## 2019-03-05 RX ORDER — FUROSEMIDE 10 MG/ML
20 INJECTION INTRAMUSCULAR; INTRAVENOUS ONCE
Status: COMPLETED | OUTPATIENT
Start: 2019-03-05 | End: 2019-03-05

## 2019-03-05 RX ADMIN — METHYLPREDNISOLONE SODIUM SUCCINATE 40 MG: 40 INJECTION, POWDER, LYOPHILIZED, FOR SOLUTION INTRAMUSCULAR; INTRAVENOUS at 08:22

## 2019-03-05 RX ADMIN — IPRATROPIUM BROMIDE AND ALBUTEROL SULFATE 3 ML: .5; 3 SOLUTION RESPIRATORY (INHALATION) at 08:03

## 2019-03-05 RX ADMIN — CALCIUM CARBONATE 500 MG: 500 TABLET, CHEWABLE ORAL at 18:39

## 2019-03-05 RX ADMIN — SODIUM CHLORIDE, PRESERVATIVE FREE 10 ML: 5 INJECTION INTRAVENOUS at 20:11

## 2019-03-05 RX ADMIN — CLOTRIMAZOLE 10 MG: 10 LOZENGE ORAL at 16:57

## 2019-03-05 RX ADMIN — CEFTRIAXONE 1 G: 1 INJECTION, POWDER, FOR SOLUTION INTRAMUSCULAR; INTRAVENOUS at 16:56

## 2019-03-05 RX ADMIN — AZITHROMYCIN MONOHYDRATE 500 MG: 500 INJECTION, POWDER, LYOPHILIZED, FOR SOLUTION INTRAVENOUS at 18:33

## 2019-03-05 RX ADMIN — AMLODIPINE BESYLATE 10 MG: 10 TABLET ORAL at 08:21

## 2019-03-05 RX ADMIN — HYDRALAZINE HYDROCHLORIDE 50 MG: 50 TABLET, FILM COATED ORAL at 16:57

## 2019-03-05 RX ADMIN — LEVOTHYROXINE SODIUM 150 MCG: 150 TABLET ORAL at 06:28

## 2019-03-05 RX ADMIN — POTASSIUM CHLORIDE 40 MEQ: 1500 TABLET, EXTENDED RELEASE ORAL at 10:49

## 2019-03-05 RX ADMIN — METHYLPREDNISOLONE SODIUM SUCCINATE 40 MG: 40 INJECTION, POWDER, LYOPHILIZED, FOR SOLUTION INTRAMUSCULAR; INTRAVENOUS at 01:51

## 2019-03-05 RX ADMIN — GUAIFENESIN 600 MG: 600 TABLET, EXTENDED RELEASE ORAL at 08:21

## 2019-03-05 RX ADMIN — BUSPIRONE HYDROCHLORIDE 15 MG: 7.5 TABLET ORAL at 08:20

## 2019-03-05 RX ADMIN — ASPIRIN 81 MG 81 MG: 81 TABLET ORAL at 08:20

## 2019-03-05 RX ADMIN — SODIUM CHLORIDE, PRESERVATIVE FREE 10 ML: 5 INJECTION INTRAVENOUS at 08:23

## 2019-03-05 RX ADMIN — FUROSEMIDE 20 MG: 10 INJECTION, SOLUTION INTRAVENOUS at 13:26

## 2019-03-05 RX ADMIN — TRIAMTERENE AND HYDROCHLOROTHIAZIDE 1 TABLET: 37.5; 25 TABLET ORAL at 08:20

## 2019-03-05 RX ADMIN — METHYLPREDNISOLONE SODIUM SUCCINATE 40 MG: 40 INJECTION, POWDER, LYOPHILIZED, FOR SOLUTION INTRAMUSCULAR; INTRAVENOUS at 16:56

## 2019-03-05 RX ADMIN — DILTIAZEM HYDROCHLORIDE 120 MG: 120 CAPSULE, COATED, EXTENDED RELEASE ORAL at 08:20

## 2019-03-05 RX ADMIN — IPRATROPIUM BROMIDE AND ALBUTEROL SULFATE 3 ML: .5; 3 SOLUTION RESPIRATORY (INHALATION) at 20:25

## 2019-03-05 RX ADMIN — CLOTRIMAZOLE 10 MG: 10 LOZENGE ORAL at 06:28

## 2019-03-05 RX ADMIN — HYDROCODONE BITARTRATE AND ACETAMINOPHEN 1 TABLET: 5; 325 TABLET ORAL at 10:49

## 2019-03-05 RX ADMIN — CLONAZEPAM 1 MG: 1 TABLET ORAL at 18:39

## 2019-03-05 RX ADMIN — CELECOXIB 200 MG: 200 CAPSULE ORAL at 08:20

## 2019-03-05 RX ADMIN — CLONAZEPAM 1 MG: 1 TABLET ORAL at 10:49

## 2019-03-05 RX ADMIN — NYSTATIN 500000 UNITS: 100000 SUSPENSION ORAL at 13:30

## 2019-03-05 RX ADMIN — HYDRALAZINE HYDROCHLORIDE 50 MG: 50 TABLET, FILM COATED ORAL at 08:21

## 2019-03-05 RX ADMIN — CLOTRIMAZOLE 10 MG: 10 LOZENGE ORAL at 13:26

## 2019-03-05 RX ADMIN — CITALOPRAM HYDROBROMIDE 40 MG: 40 TABLET ORAL at 08:21

## 2019-03-05 RX ADMIN — SODIUM CHLORIDE: 9 INJECTION, SOLUTION INTRAVENOUS at 06:31

## 2019-03-05 RX ADMIN — IPRATROPIUM BROMIDE AND ALBUTEROL SULFATE 3 ML: .5; 3 SOLUTION RESPIRATORY (INHALATION) at 00:38

## 2019-03-05 RX ADMIN — GUAIFENESIN 600 MG: 600 TABLET, EXTENDED RELEASE ORAL at 20:09

## 2019-03-05 RX ADMIN — HYDRALAZINE HYDROCHLORIDE 50 MG: 50 TABLET, FILM COATED ORAL at 20:10

## 2019-03-05 RX ADMIN — PANTOPRAZOLE SODIUM 40 MG: 40 TABLET, DELAYED RELEASE ORAL at 06:28

## 2019-03-05 RX ADMIN — FLUTICASONE PROPIONATE 2 SPRAY: 50 SPRAY, METERED NASAL at 08:23

## 2019-03-05 RX ADMIN — IPRATROPIUM BROMIDE AND ALBUTEROL SULFATE 3 ML: .5; 3 SOLUTION RESPIRATORY (INHALATION) at 04:28

## 2019-03-05 RX ADMIN — CELECOXIB 200 MG: 200 CAPSULE ORAL at 20:09

## 2019-03-05 RX ADMIN — ATORVASTATIN CALCIUM 40 MG: 40 TABLET, FILM COATED ORAL at 08:21

## 2019-03-05 RX ADMIN — NYSTATIN 500000 UNITS: 100000 SUSPENSION ORAL at 16:56

## 2019-03-05 RX ADMIN — NYSTATIN 500000 UNITS: 100000 SUSPENSION ORAL at 08:22

## 2019-03-05 RX ADMIN — MONTELUKAST SODIUM 10 MG: 10 TABLET, COATED ORAL at 08:21

## 2019-03-05 RX ADMIN — ENOXAPARIN SODIUM 40 MG: 40 INJECTION SUBCUTANEOUS at 08:22

## 2019-03-05 RX ADMIN — IPRATROPIUM BROMIDE AND ALBUTEROL SULFATE 3 ML: .5; 3 SOLUTION RESPIRATORY (INHALATION) at 11:36

## 2019-03-05 RX ADMIN — TRAZODONE HYDROCHLORIDE 50 MG: 50 TABLET ORAL at 20:10

## 2019-03-05 RX ADMIN — THEOPHYLLINE ANHYDROUS 400 MG: 200 CAPSULE, EXTENDED RELEASE ORAL at 08:20

## 2019-03-05 RX ADMIN — IPRATROPIUM BROMIDE AND ALBUTEROL SULFATE 3 ML: .5; 3 SOLUTION RESPIRATORY (INHALATION) at 23:55

## 2019-03-05 RX ADMIN — IPRATROPIUM BROMIDE AND ALBUTEROL SULFATE 3 ML: .5; 3 SOLUTION RESPIRATORY (INHALATION) at 16:05

## 2019-03-05 RX ADMIN — BUSPIRONE HYDROCHLORIDE 15 MG: 7.5 TABLET ORAL at 20:09

## 2019-03-05 ASSESSMENT — PAIN DESCRIPTION - PAIN TYPE: TYPE: ACUTE PAIN

## 2019-03-05 ASSESSMENT — PAIN DESCRIPTION - ONSET: ONSET: ON-GOING

## 2019-03-05 ASSESSMENT — PAIN DESCRIPTION - FREQUENCY: FREQUENCY: CONTINUOUS

## 2019-03-05 ASSESSMENT — PAIN DESCRIPTION - LOCATION: LOCATION: HEAD

## 2019-03-05 ASSESSMENT — PAIN DESCRIPTION - DESCRIPTORS: DESCRIPTORS: ACHING

## 2019-03-05 ASSESSMENT — PAIN DESCRIPTION - PROGRESSION
CLINICAL_PROGRESSION: NOT CHANGED

## 2019-03-05 ASSESSMENT — PAIN SCALES - GENERAL
PAINLEVEL_OUTOF10: 5
PAINLEVEL_OUTOF10: 5
PAINLEVEL_OUTOF10: 4
PAINLEVEL_OUTOF10: 4

## 2019-03-05 ASSESSMENT — PAIN - FUNCTIONAL ASSESSMENT: PAIN_FUNCTIONAL_ASSESSMENT: PREVENTS OR INTERFERES SOME ACTIVE ACTIVITIES AND ADLS

## 2019-03-06 LAB
ANION GAP SERPL CALCULATED.3IONS-SCNC: 9 MMOL/L (ref 4–16)
BUN BLDV-MCNC: 13 MG/DL (ref 6–23)
CALCIUM SERPL-MCNC: 8.7 MG/DL (ref 8.3–10.6)
CHLORIDE BLD-SCNC: 90 MMOL/L (ref 99–110)
CO2: 41 MMOL/L (ref 21–32)
CREAT SERPL-MCNC: 0.7 MG/DL (ref 0.6–1.1)
GFR AFRICAN AMERICAN: >60 ML/MIN/1.73M2
GFR NON-AFRICAN AMERICAN: >60 ML/MIN/1.73M2
GLUCOSE BLD-MCNC: 139 MG/DL (ref 70–99)
LV EF: 53 %
LVEF MODALITY: NORMAL
POTASSIUM SERPL-SCNC: 3.2 MMOL/L (ref 3.5–5.1)
POTASSIUM SERPL-SCNC: 3.4 MMOL/L (ref 3.5–5.1)
SODIUM BLD-SCNC: 140 MMOL/L (ref 135–145)

## 2019-03-06 PROCEDURE — 6370000000 HC RX 637 (ALT 250 FOR IP): Performed by: INTERNAL MEDICINE

## 2019-03-06 PROCEDURE — 93306 TTE W/DOPPLER COMPLETE: CPT

## 2019-03-06 PROCEDURE — 36415 COLL VENOUS BLD VENIPUNCTURE: CPT

## 2019-03-06 PROCEDURE — 94660 CPAP INITIATION&MGMT: CPT

## 2019-03-06 PROCEDURE — 6360000002 HC RX W HCPCS: Performed by: INTERNAL MEDICINE

## 2019-03-06 PROCEDURE — 2700000000 HC OXYGEN THERAPY PER DAY

## 2019-03-06 PROCEDURE — 94761 N-INVAS EAR/PLS OXIMETRY MLT: CPT

## 2019-03-06 PROCEDURE — 80048 BASIC METABOLIC PNL TOTAL CA: CPT

## 2019-03-06 PROCEDURE — 84132 ASSAY OF SERUM POTASSIUM: CPT

## 2019-03-06 PROCEDURE — 2060000000 HC ICU INTERMEDIATE R&B

## 2019-03-06 PROCEDURE — 2580000003 HC RX 258: Performed by: INTERNAL MEDICINE

## 2019-03-06 PROCEDURE — 94640 AIRWAY INHALATION TREATMENT: CPT

## 2019-03-06 RX ORDER — FUROSEMIDE 10 MG/ML
40 INJECTION INTRAMUSCULAR; INTRAVENOUS ONCE
Status: COMPLETED | OUTPATIENT
Start: 2019-03-06 | End: 2019-03-06

## 2019-03-06 RX ORDER — METHYLPREDNISOLONE SODIUM SUCCINATE 40 MG/ML
40 INJECTION, POWDER, LYOPHILIZED, FOR SOLUTION INTRAMUSCULAR; INTRAVENOUS EVERY 12 HOURS
Status: DISCONTINUED | OUTPATIENT
Start: 2019-03-06 | End: 2019-03-08 | Stop reason: HOSPADM

## 2019-03-06 RX ADMIN — CLONAZEPAM 1 MG: 1 TABLET ORAL at 08:53

## 2019-03-06 RX ADMIN — IPRATROPIUM BROMIDE AND ALBUTEROL SULFATE 3 ML: .5; 3 SOLUTION RESPIRATORY (INHALATION) at 08:51

## 2019-03-06 RX ADMIN — GUAIFENESIN 600 MG: 600 TABLET, EXTENDED RELEASE ORAL at 08:53

## 2019-03-06 RX ADMIN — SODIUM CHLORIDE, PRESERVATIVE FREE 10 ML: 5 INJECTION INTRAVENOUS at 21:01

## 2019-03-06 RX ADMIN — HYDRALAZINE HYDROCHLORIDE 50 MG: 50 TABLET, FILM COATED ORAL at 21:09

## 2019-03-06 RX ADMIN — IPRATROPIUM BROMIDE AND ALBUTEROL SULFATE 3 ML: .5; 3 SOLUTION RESPIRATORY (INHALATION) at 03:40

## 2019-03-06 RX ADMIN — THEOPHYLLINE ANHYDROUS 400 MG: 200 CAPSULE, EXTENDED RELEASE ORAL at 08:52

## 2019-03-06 RX ADMIN — ATORVASTATIN CALCIUM 40 MG: 40 TABLET, FILM COATED ORAL at 08:53

## 2019-03-06 RX ADMIN — NYSTATIN 500000 UNITS: 100000 SUSPENSION ORAL at 16:21

## 2019-03-06 RX ADMIN — CLONAZEPAM 1 MG: 1 TABLET ORAL at 16:21

## 2019-03-06 RX ADMIN — METHYLPREDNISOLONE SODIUM SUCCINATE 40 MG: 40 INJECTION, POWDER, LYOPHILIZED, FOR SOLUTION INTRAMUSCULAR; INTRAVENOUS at 01:19

## 2019-03-06 RX ADMIN — SODIUM CHLORIDE, PRESERVATIVE FREE 10 ML: 5 INJECTION INTRAVENOUS at 08:54

## 2019-03-06 RX ADMIN — AMLODIPINE BESYLATE 10 MG: 10 TABLET ORAL at 08:53

## 2019-03-06 RX ADMIN — CLONAZEPAM 1 MG: 1 TABLET ORAL at 21:09

## 2019-03-06 RX ADMIN — FLUTICASONE PROPIONATE 2 SPRAY: 50 SPRAY, METERED NASAL at 09:16

## 2019-03-06 RX ADMIN — NYSTATIN 500000 UNITS: 100000 SUSPENSION ORAL at 14:09

## 2019-03-06 RX ADMIN — POTASSIUM CHLORIDE 40 MEQ: 1500 TABLET, EXTENDED RELEASE ORAL at 09:16

## 2019-03-06 RX ADMIN — MONTELUKAST SODIUM 10 MG: 10 TABLET, COATED ORAL at 08:53

## 2019-03-06 RX ADMIN — CELECOXIB 200 MG: 200 CAPSULE ORAL at 08:53

## 2019-03-06 RX ADMIN — GUAIFENESIN 600 MG: 600 TABLET, EXTENDED RELEASE ORAL at 20:59

## 2019-03-06 RX ADMIN — PANTOPRAZOLE SODIUM 40 MG: 40 TABLET, DELAYED RELEASE ORAL at 09:11

## 2019-03-06 RX ADMIN — BUSPIRONE HYDROCHLORIDE 15 MG: 7.5 TABLET ORAL at 20:59

## 2019-03-06 RX ADMIN — FUROSEMIDE 40 MG: 10 INJECTION, SOLUTION INTRAMUSCULAR; INTRAVENOUS at 16:21

## 2019-03-06 RX ADMIN — NYSTATIN 500000 UNITS: 100000 SUSPENSION ORAL at 08:54

## 2019-03-06 RX ADMIN — AZITHROMYCIN MONOHYDRATE 500 MG: 500 INJECTION, POWDER, LYOPHILIZED, FOR SOLUTION INTRAVENOUS at 17:05

## 2019-03-06 RX ADMIN — CITALOPRAM HYDROBROMIDE 40 MG: 40 TABLET ORAL at 08:52

## 2019-03-06 RX ADMIN — HYDROCODONE BITARTRATE AND ACETAMINOPHEN 1 TABLET: 5; 325 TABLET ORAL at 21:02

## 2019-03-06 RX ADMIN — HYDRALAZINE HYDROCHLORIDE 50 MG: 50 TABLET, FILM COATED ORAL at 08:53

## 2019-03-06 RX ADMIN — HYDRALAZINE HYDROCHLORIDE 50 MG: 50 TABLET, FILM COATED ORAL at 14:24

## 2019-03-06 RX ADMIN — ASPIRIN 81 MG 81 MG: 81 TABLET ORAL at 08:52

## 2019-03-06 RX ADMIN — CALCIUM CARBONATE 500 MG: 500 TABLET, CHEWABLE ORAL at 11:27

## 2019-03-06 RX ADMIN — IPRATROPIUM BROMIDE AND ALBUTEROL SULFATE 3 ML: .5; 3 SOLUTION RESPIRATORY (INHALATION) at 11:45

## 2019-03-06 RX ADMIN — CELECOXIB 200 MG: 200 CAPSULE ORAL at 20:59

## 2019-03-06 RX ADMIN — BUSPIRONE HYDROCHLORIDE 15 MG: 7.5 TABLET ORAL at 09:11

## 2019-03-06 RX ADMIN — METHYLPREDNISOLONE SODIUM SUCCINATE 40 MG: 40 INJECTION, POWDER, LYOPHILIZED, FOR SOLUTION INTRAMUSCULAR; INTRAVENOUS at 08:52

## 2019-03-06 RX ADMIN — LEVOTHYROXINE SODIUM 150 MCG: 150 TABLET ORAL at 09:11

## 2019-03-06 RX ADMIN — NYSTATIN 500000 UNITS: 100000 SUSPENSION ORAL at 21:09

## 2019-03-06 RX ADMIN — DILTIAZEM HYDROCHLORIDE 120 MG: 120 CAPSULE, COATED, EXTENDED RELEASE ORAL at 08:53

## 2019-03-06 RX ADMIN — TRAZODONE HYDROCHLORIDE 50 MG: 50 TABLET ORAL at 21:01

## 2019-03-06 RX ADMIN — CEFTRIAXONE 1 G: 1 INJECTION, POWDER, FOR SOLUTION INTRAMUSCULAR; INTRAVENOUS at 16:21

## 2019-03-06 RX ADMIN — METHYLPREDNISOLONE SODIUM SUCCINATE 40 MG: 40 INJECTION, POWDER, LYOPHILIZED, FOR SOLUTION INTRAMUSCULAR; INTRAVENOUS at 21:00

## 2019-03-06 RX ADMIN — IPRATROPIUM BROMIDE AND ALBUTEROL SULFATE 3 ML: .5; 3 SOLUTION RESPIRATORY (INHALATION) at 20:05

## 2019-03-06 RX ADMIN — ENOXAPARIN SODIUM 40 MG: 40 INJECTION SUBCUTANEOUS at 08:53

## 2019-03-06 ASSESSMENT — PAIN SCALES - GENERAL
PAINLEVEL_OUTOF10: 6
PAINLEVEL_OUTOF10: 5
PAINLEVEL_OUTOF10: 6

## 2019-03-06 ASSESSMENT — PAIN DESCRIPTION - LOCATION: LOCATION: BACK;GENERALIZED

## 2019-03-06 ASSESSMENT — PAIN DESCRIPTION - PAIN TYPE: TYPE: CHRONIC PAIN

## 2019-03-06 ASSESSMENT — PAIN DESCRIPTION - FREQUENCY: FREQUENCY: INTERMITTENT

## 2019-03-06 ASSESSMENT — PAIN DESCRIPTION - DESCRIPTORS: DESCRIPTORS: ACHING

## 2019-03-07 ENCOUNTER — APPOINTMENT (OUTPATIENT)
Dept: ULTRASOUND IMAGING | Age: 59
DRG: 193 | End: 2019-03-07
Payer: COMMERCIAL

## 2019-03-07 LAB
ANION GAP SERPL CALCULATED.3IONS-SCNC: 8 MMOL/L (ref 4–16)
BUN BLDV-MCNC: 15 MG/DL (ref 6–23)
CALCIUM SERPL-MCNC: 9.1 MG/DL (ref 8.3–10.6)
CHLORIDE BLD-SCNC: 84 MMOL/L (ref 99–110)
CO2: 45 MMOL/L (ref 21–32)
CREAT SERPL-MCNC: 1 MG/DL (ref 0.6–1.1)
CULTURE: NORMAL
CULTURE: NORMAL
GFR AFRICAN AMERICAN: >60 ML/MIN/1.73M2
GFR NON-AFRICAN AMERICAN: 57 ML/MIN/1.73M2
GLUCOSE BLD-MCNC: 282 MG/DL (ref 70–99)
Lab: NORMAL
Lab: NORMAL
POTASSIUM SERPL-SCNC: 3.3 MMOL/L (ref 3.5–5.1)
POTASSIUM SERPL-SCNC: 3.6 MMOL/L (ref 3.5–5.1)
PRO-BNP: 1776 PG/ML
REPORT STATUS: NORMAL
REPORT STATUS: NORMAL
SODIUM BLD-SCNC: 137 MMOL/L (ref 135–145)
SPECIMEN: NORMAL
SPECIMEN: NORMAL

## 2019-03-07 PROCEDURE — 6360000002 HC RX W HCPCS: Performed by: INTERNAL MEDICINE

## 2019-03-07 PROCEDURE — 6370000000 HC RX 637 (ALT 250 FOR IP): Performed by: INTERNAL MEDICINE

## 2019-03-07 PROCEDURE — 2580000003 HC RX 258: Performed by: INTERNAL MEDICINE

## 2019-03-07 PROCEDURE — 97535 SELF CARE MNGMENT TRAINING: CPT

## 2019-03-07 PROCEDURE — 83880 ASSAY OF NATRIURETIC PEPTIDE: CPT

## 2019-03-07 PROCEDURE — 93970 EXTREMITY STUDY: CPT

## 2019-03-07 PROCEDURE — 80048 BASIC METABOLIC PNL TOTAL CA: CPT

## 2019-03-07 PROCEDURE — 94761 N-INVAS EAR/PLS OXIMETRY MLT: CPT

## 2019-03-07 PROCEDURE — 84132 ASSAY OF SERUM POTASSIUM: CPT

## 2019-03-07 PROCEDURE — 97162 PT EVAL MOD COMPLEX 30 MIN: CPT

## 2019-03-07 PROCEDURE — 97165 OT EVAL LOW COMPLEX 30 MIN: CPT

## 2019-03-07 PROCEDURE — 94640 AIRWAY INHALATION TREATMENT: CPT

## 2019-03-07 PROCEDURE — 97116 GAIT TRAINING THERAPY: CPT

## 2019-03-07 PROCEDURE — 6360000002 HC RX W HCPCS

## 2019-03-07 PROCEDURE — 6360000004 HC RX CONTRAST MEDICATION

## 2019-03-07 PROCEDURE — 36415 COLL VENOUS BLD VENIPUNCTURE: CPT

## 2019-03-07 PROCEDURE — 2700000000 HC OXYGEN THERAPY PER DAY

## 2019-03-07 PROCEDURE — 1200000000 HC SEMI PRIVATE

## 2019-03-07 RX ORDER — FUROSEMIDE 10 MG/ML
20 INJECTION INTRAMUSCULAR; INTRAVENOUS ONCE
Status: COMPLETED | OUTPATIENT
Start: 2019-03-07 | End: 2019-03-07

## 2019-03-07 RX ADMIN — IPRATROPIUM BROMIDE AND ALBUTEROL SULFATE 3 ML: .5; 3 SOLUTION RESPIRATORY (INHALATION) at 07:05

## 2019-03-07 RX ADMIN — GUAIFENESIN 600 MG: 600 TABLET, EXTENDED RELEASE ORAL at 09:07

## 2019-03-07 RX ADMIN — METHYLPREDNISOLONE SODIUM SUCCINATE 40 MG: 40 INJECTION, POWDER, LYOPHILIZED, FOR SOLUTION INTRAMUSCULAR; INTRAVENOUS at 22:23

## 2019-03-07 RX ADMIN — MONTELUKAST SODIUM 10 MG: 10 TABLET, COATED ORAL at 09:08

## 2019-03-07 RX ADMIN — HYDRALAZINE HYDROCHLORIDE 50 MG: 50 TABLET, FILM COATED ORAL at 09:07

## 2019-03-07 RX ADMIN — IPRATROPIUM BROMIDE AND ALBUTEROL SULFATE 3 ML: .5; 3 SOLUTION RESPIRATORY (INHALATION) at 00:28

## 2019-03-07 RX ADMIN — IPRATROPIUM BROMIDE AND ALBUTEROL SULFATE 3 ML: .5; 3 SOLUTION RESPIRATORY (INHALATION) at 11:00

## 2019-03-07 RX ADMIN — HYDROCODONE BITARTRATE AND ACETAMINOPHEN 1 TABLET: 5; 325 TABLET ORAL at 09:07

## 2019-03-07 RX ADMIN — DILTIAZEM HYDROCHLORIDE 120 MG: 120 CAPSULE, COATED, EXTENDED RELEASE ORAL at 09:07

## 2019-03-07 RX ADMIN — ASPIRIN 81 MG 81 MG: 81 TABLET ORAL at 09:08

## 2019-03-07 RX ADMIN — NYSTATIN 500000 UNITS: 100000 SUSPENSION ORAL at 22:22

## 2019-03-07 RX ADMIN — BUSPIRONE HYDROCHLORIDE 15 MG: 7.5 TABLET ORAL at 09:15

## 2019-03-07 RX ADMIN — POTASSIUM CHLORIDE 40 MEQ: 1500 TABLET, EXTENDED RELEASE ORAL at 03:54

## 2019-03-07 RX ADMIN — ATORVASTATIN CALCIUM 40 MG: 40 TABLET, FILM COATED ORAL at 09:07

## 2019-03-07 RX ADMIN — HYDROCODONE BITARTRATE AND ACETAMINOPHEN 1 TABLET: 5; 325 TABLET ORAL at 22:27

## 2019-03-07 RX ADMIN — NYSTATIN 500000 UNITS: 100000 SUSPENSION ORAL at 09:07

## 2019-03-07 RX ADMIN — FUROSEMIDE 20 MG: 10 INJECTION, SOLUTION INTRAVENOUS at 12:32

## 2019-03-07 RX ADMIN — SODIUM CHLORIDE, PRESERVATIVE FREE 10 ML: 5 INJECTION INTRAVENOUS at 09:08

## 2019-03-07 RX ADMIN — CELECOXIB 200 MG: 200 CAPSULE ORAL at 09:07

## 2019-03-07 RX ADMIN — CEFTRIAXONE 1 G: 1 INJECTION, POWDER, FOR SOLUTION INTRAMUSCULAR; INTRAVENOUS at 17:24

## 2019-03-07 RX ADMIN — PANTOPRAZOLE SODIUM 40 MG: 40 TABLET, DELAYED RELEASE ORAL at 03:56

## 2019-03-07 RX ADMIN — FLUTICASONE PROPIONATE 2 SPRAY: 50 SPRAY, METERED NASAL at 09:15

## 2019-03-07 RX ADMIN — IPRATROPIUM BROMIDE AND ALBUTEROL SULFATE 3 ML: .5; 3 SOLUTION RESPIRATORY (INHALATION) at 16:40

## 2019-03-07 RX ADMIN — ENOXAPARIN SODIUM 40 MG: 40 INJECTION SUBCUTANEOUS at 09:08

## 2019-03-07 RX ADMIN — NYSTATIN 500000 UNITS: 100000 SUSPENSION ORAL at 17:24

## 2019-03-07 RX ADMIN — HYDRALAZINE HYDROCHLORIDE 50 MG: 50 TABLET, FILM COATED ORAL at 22:21

## 2019-03-07 RX ADMIN — HYDROCODONE BITARTRATE AND ACETAMINOPHEN 1 TABLET: 5; 325 TABLET ORAL at 15:07

## 2019-03-07 RX ADMIN — LEVOTHYROXINE SODIUM 150 MCG: 150 TABLET ORAL at 03:55

## 2019-03-07 RX ADMIN — CLONAZEPAM 1 MG: 1 TABLET ORAL at 17:45

## 2019-03-07 RX ADMIN — TRAZODONE HYDROCHLORIDE 50 MG: 50 TABLET ORAL at 22:22

## 2019-03-07 RX ADMIN — METHYLPREDNISOLONE SODIUM SUCCINATE 40 MG: 40 INJECTION, POWDER, LYOPHILIZED, FOR SOLUTION INTRAMUSCULAR; INTRAVENOUS at 09:07

## 2019-03-07 RX ADMIN — SODIUM CHLORIDE, PRESERVATIVE FREE 10 ML: 5 INJECTION INTRAVENOUS at 22:27

## 2019-03-07 RX ADMIN — CELECOXIB 200 MG: 200 CAPSULE ORAL at 22:20

## 2019-03-07 RX ADMIN — AZITHROMYCIN MONOHYDRATE 500 MG: 500 INJECTION, POWDER, LYOPHILIZED, FOR SOLUTION INTRAVENOUS at 17:24

## 2019-03-07 RX ADMIN — BUSPIRONE HYDROCHLORIDE 15 MG: 7.5 TABLET ORAL at 22:20

## 2019-03-07 RX ADMIN — CITALOPRAM HYDROBROMIDE 40 MG: 40 TABLET ORAL at 09:07

## 2019-03-07 RX ADMIN — THEOPHYLLINE ANHYDROUS 400 MG: 200 CAPSULE, EXTENDED RELEASE ORAL at 09:08

## 2019-03-07 RX ADMIN — IPRATROPIUM BROMIDE AND ALBUTEROL SULFATE 3 ML: .5; 3 SOLUTION RESPIRATORY (INHALATION) at 21:51

## 2019-03-07 RX ADMIN — CLONAZEPAM 1 MG: 1 TABLET ORAL at 09:07

## 2019-03-07 RX ADMIN — AMLODIPINE BESYLATE 10 MG: 10 TABLET ORAL at 09:07

## 2019-03-07 RX ADMIN — NYSTATIN 500000 UNITS: 100000 SUSPENSION ORAL at 12:27

## 2019-03-07 RX ADMIN — GUAIFENESIN 600 MG: 600 TABLET, EXTENDED RELEASE ORAL at 22:21

## 2019-03-07 RX ADMIN — HYDRALAZINE HYDROCHLORIDE 50 MG: 50 TABLET, FILM COATED ORAL at 13:58

## 2019-03-07 ASSESSMENT — PAIN SCALES - WONG BAKER
WONGBAKER_NUMERICALRESPONSE: 0

## 2019-03-07 ASSESSMENT — PAIN DESCRIPTION - LOCATION
LOCATION: BACK
LOCATION: GENERALIZED
LOCATION: GENERALIZED

## 2019-03-07 ASSESSMENT — PAIN DESCRIPTION - PAIN TYPE: TYPE: CHRONIC PAIN

## 2019-03-07 ASSESSMENT — PAIN SCALES - GENERAL
PAINLEVEL_OUTOF10: 1
PAINLEVEL_OUTOF10: 5
PAINLEVEL_OUTOF10: 6
PAINLEVEL_OUTOF10: 5
PAINLEVEL_OUTOF10: 6

## 2019-03-07 ASSESSMENT — PAIN DESCRIPTION - ORIENTATION: ORIENTATION: MID;UPPER

## 2019-03-07 ASSESSMENT — PAIN DESCRIPTION - DESCRIPTORS: DESCRIPTORS: ACHING

## 2019-03-08 VITALS
HEART RATE: 105 BPM | DIASTOLIC BLOOD PRESSURE: 74 MMHG | TEMPERATURE: 99.2 F | SYSTOLIC BLOOD PRESSURE: 150 MMHG | OXYGEN SATURATION: 92 % | WEIGHT: 189.82 LBS | RESPIRATION RATE: 16 BRPM | BODY MASS INDEX: 35.84 KG/M2 | HEIGHT: 61 IN

## 2019-03-08 PROBLEM — R41.82 ALTERED MENTAL STATE: Status: RESOLVED | Noted: 2018-08-30 | Resolved: 2019-03-08

## 2019-03-08 PROBLEM — K25.9 GASTRIC ULCER: Status: RESOLVED | Noted: 2017-11-29 | Resolved: 2019-03-08

## 2019-03-08 PROBLEM — E87.6 ACUTE HYPOKALEMIA: Status: RESOLVED | Noted: 2019-02-17 | Resolved: 2019-03-08

## 2019-03-08 PROBLEM — R41.82 CHANGE IN MENTAL STATUS: Status: RESOLVED | Noted: 2018-08-30 | Resolved: 2019-03-08

## 2019-03-08 PROBLEM — E87.1 CHRONIC HYPONATREMIA: Status: RESOLVED | Noted: 2019-02-17 | Resolved: 2019-03-08

## 2019-03-08 PROBLEM — J40 BRONCHITIS: Status: RESOLVED | Noted: 2018-03-19 | Resolved: 2019-03-08

## 2019-03-08 PROCEDURE — 6370000000 HC RX 637 (ALT 250 FOR IP): Performed by: INTERNAL MEDICINE

## 2019-03-08 PROCEDURE — 6360000002 HC RX W HCPCS: Performed by: INTERNAL MEDICINE

## 2019-03-08 PROCEDURE — 94640 AIRWAY INHALATION TREATMENT: CPT

## 2019-03-08 PROCEDURE — 2700000000 HC OXYGEN THERAPY PER DAY

## 2019-03-08 PROCEDURE — 94761 N-INVAS EAR/PLS OXIMETRY MLT: CPT

## 2019-03-08 RX ORDER — CEFDINIR 300 MG/1
300 CAPSULE ORAL 2 TIMES DAILY
Qty: 14 CAPSULE | Refills: 0 | Status: SHIPPED | OUTPATIENT
Start: 2019-03-08 | End: 2019-03-15

## 2019-03-08 RX ORDER — METHYLPREDNISOLONE 4 MG/1
TABLET ORAL
Qty: 1 KIT | Refills: 0 | Status: SHIPPED | OUTPATIENT
Start: 2019-03-08 | End: 2019-03-14

## 2019-03-08 RX ORDER — FAMOTIDINE 20 MG/1
20 TABLET, FILM COATED ORAL 2 TIMES DAILY
Qty: 60 TABLET | Refills: 3 | Status: ON HOLD | OUTPATIENT
Start: 2019-03-08 | End: 2020-11-26 | Stop reason: HOSPADM

## 2019-03-08 RX ORDER — AZITHROMYCIN 500 MG/1
500 TABLET, FILM COATED ORAL DAILY
Qty: 7 TABLET | Refills: 0 | Status: SHIPPED | OUTPATIENT
Start: 2019-03-08 | End: 2019-03-15

## 2019-03-08 RX ORDER — BUSPIRONE HYDROCHLORIDE 15 MG/1
15 TABLET ORAL 2 TIMES DAILY
Qty: 60 TABLET | Refills: 2 | Status: ON HOLD | OUTPATIENT
Start: 2019-03-08 | End: 2019-04-23 | Stop reason: HOSPADM

## 2019-03-08 RX ORDER — FUROSEMIDE 40 MG/1
40 TABLET ORAL DAILY
Qty: 30 TABLET | Refills: 0 | Status: SHIPPED | OUTPATIENT
Start: 2019-03-08 | End: 2019-03-20 | Stop reason: SDUPTHER

## 2019-03-08 RX ADMIN — THEOPHYLLINE ANHYDROUS 400 MG: 200 CAPSULE, EXTENDED RELEASE ORAL at 09:16

## 2019-03-08 RX ADMIN — AMLODIPINE BESYLATE 10 MG: 10 TABLET ORAL at 09:17

## 2019-03-08 RX ADMIN — BUSPIRONE HYDROCHLORIDE 15 MG: 7.5 TABLET ORAL at 09:17

## 2019-03-08 RX ADMIN — ASPIRIN 81 MG 81 MG: 81 TABLET ORAL at 09:17

## 2019-03-08 RX ADMIN — CITALOPRAM HYDROBROMIDE 40 MG: 40 TABLET ORAL at 09:17

## 2019-03-08 RX ADMIN — HYDRALAZINE HYDROCHLORIDE 50 MG: 50 TABLET, FILM COATED ORAL at 14:08

## 2019-03-08 RX ADMIN — NYSTATIN 500000 UNITS: 100000 SUSPENSION ORAL at 14:03

## 2019-03-08 RX ADMIN — CLONAZEPAM 1 MG: 1 TABLET ORAL at 14:11

## 2019-03-08 RX ADMIN — DILTIAZEM HYDROCHLORIDE 120 MG: 120 CAPSULE, COATED, EXTENDED RELEASE ORAL at 09:17

## 2019-03-08 RX ADMIN — NYSTATIN 500000 UNITS: 100000 SUSPENSION ORAL at 09:23

## 2019-03-08 RX ADMIN — PANTOPRAZOLE SODIUM 40 MG: 40 TABLET, DELAYED RELEASE ORAL at 09:17

## 2019-03-08 RX ADMIN — HYDRALAZINE HYDROCHLORIDE 50 MG: 50 TABLET, FILM COATED ORAL at 09:16

## 2019-03-08 RX ADMIN — LEVOTHYROXINE SODIUM 150 MCG: 150 TABLET ORAL at 09:23

## 2019-03-08 RX ADMIN — IPRATROPIUM BROMIDE AND ALBUTEROL SULFATE 3 ML: .5; 3 SOLUTION RESPIRATORY (INHALATION) at 16:25

## 2019-03-08 RX ADMIN — ENOXAPARIN SODIUM 40 MG: 40 INJECTION SUBCUTANEOUS at 09:17

## 2019-03-08 RX ADMIN — ATORVASTATIN CALCIUM 40 MG: 40 TABLET, FILM COATED ORAL at 09:17

## 2019-03-08 RX ADMIN — MONTELUKAST SODIUM 10 MG: 10 TABLET, COATED ORAL at 09:16

## 2019-03-08 RX ADMIN — IPRATROPIUM BROMIDE AND ALBUTEROL SULFATE 3 ML: .5; 3 SOLUTION RESPIRATORY (INHALATION) at 12:20

## 2019-03-08 RX ADMIN — CELECOXIB 200 MG: 200 CAPSULE ORAL at 09:23

## 2019-03-08 RX ADMIN — GUAIFENESIN 600 MG: 600 TABLET, EXTENDED RELEASE ORAL at 09:17

## 2019-03-08 RX ADMIN — IPRATROPIUM BROMIDE AND ALBUTEROL SULFATE 3 ML: .5; 3 SOLUTION RESPIRATORY (INHALATION) at 04:25

## 2019-03-08 RX ADMIN — HYDROCODONE BITARTRATE AND ACETAMINOPHEN 1 TABLET: 5; 325 TABLET ORAL at 18:08

## 2019-03-08 RX ADMIN — IPRATROPIUM BROMIDE AND ALBUTEROL SULFATE 3 ML: .5; 3 SOLUTION RESPIRATORY (INHALATION) at 08:38

## 2019-03-08 RX ADMIN — METHYLPREDNISOLONE SODIUM SUCCINATE 40 MG: 40 INJECTION, POWDER, LYOPHILIZED, FOR SOLUTION INTRAMUSCULAR; INTRAVENOUS at 09:17

## 2019-03-08 ASSESSMENT — PAIN SCALES - GENERAL
PAINLEVEL_OUTOF10: 2
PAINLEVEL_OUTOF10: 0
PAINLEVEL_OUTOF10: 4
PAINLEVEL_OUTOF10: 0
PAINLEVEL_OUTOF10: 8

## 2019-03-08 ASSESSMENT — PAIN DESCRIPTION - PROGRESSION
CLINICAL_PROGRESSION: GRADUALLY IMPROVING
CLINICAL_PROGRESSION: NOT CHANGED

## 2019-03-08 ASSESSMENT — PAIN DESCRIPTION - PAIN TYPE
TYPE: CHRONIC PAIN
TYPE: CHRONIC PAIN

## 2019-03-08 ASSESSMENT — PAIN DESCRIPTION - ONSET
ONSET: ON-GOING
ONSET: ON-GOING

## 2019-03-08 ASSESSMENT — PAIN - FUNCTIONAL ASSESSMENT
PAIN_FUNCTIONAL_ASSESSMENT: PREVENTS OR INTERFERES SOME ACTIVE ACTIVITIES AND ADLS
PAIN_FUNCTIONAL_ASSESSMENT: PREVENTS OR INTERFERES WITH MANY ACTIVE NOT PASSIVE ACTIVITIES

## 2019-03-08 ASSESSMENT — PAIN DESCRIPTION - FREQUENCY
FREQUENCY: INTERMITTENT
FREQUENCY: INTERMITTENT

## 2019-03-08 ASSESSMENT — PAIN DESCRIPTION - LOCATION
LOCATION: GENERALIZED;HEAD
LOCATION: GENERALIZED;HEAD

## 2019-03-08 ASSESSMENT — PAIN DESCRIPTION - DESCRIPTORS
DESCRIPTORS: ACHING
DESCRIPTORS: ACHING

## 2019-03-18 ENCOUNTER — NURSE ONLY (OUTPATIENT)
Dept: CARDIOLOGY CLINIC | Age: 59
End: 2019-03-18
Payer: COMMERCIAL

## 2019-03-18 ENCOUNTER — OFFICE VISIT (OUTPATIENT)
Dept: CARDIOLOGY CLINIC | Age: 59
End: 2019-03-18
Payer: COMMERCIAL

## 2019-03-18 VITALS
HEART RATE: 92 BPM | DIASTOLIC BLOOD PRESSURE: 70 MMHG | HEIGHT: 62 IN | BODY MASS INDEX: 31.83 KG/M2 | SYSTOLIC BLOOD PRESSURE: 120 MMHG | WEIGHT: 173 LBS

## 2019-03-18 DIAGNOSIS — R00.2 PALPITATIONS: Primary | ICD-10-CM

## 2019-03-18 PROCEDURE — G8428 CUR MEDS NOT DOCUMENT: HCPCS | Performed by: INTERNAL MEDICINE

## 2019-03-18 PROCEDURE — G8417 CALC BMI ABV UP PARAM F/U: HCPCS | Performed by: INTERNAL MEDICINE

## 2019-03-18 PROCEDURE — 1111F DSCHRG MED/CURRENT MED MERGE: CPT | Performed by: INTERNAL MEDICINE

## 2019-03-18 PROCEDURE — 0298T PR EXT ECG > 48HR TO 21 DAY REVIEW AND INTERPRETATN: CPT | Performed by: INTERNAL MEDICINE

## 2019-03-18 PROCEDURE — 99214 OFFICE O/P EST MOD 30 MIN: CPT | Performed by: INTERNAL MEDICINE

## 2019-03-18 PROCEDURE — 0296T PR EXT ECG > 48HR TO 21 DAY RCRD W/CONECT INTL RCRD: CPT | Performed by: INTERNAL MEDICINE

## 2019-03-18 PROCEDURE — 1036F TOBACCO NON-USER: CPT | Performed by: INTERNAL MEDICINE

## 2019-03-18 PROCEDURE — 3017F COLORECTAL CA SCREEN DOC REV: CPT | Performed by: INTERNAL MEDICINE

## 2019-03-18 PROCEDURE — G8482 FLU IMMUNIZE ORDER/ADMIN: HCPCS | Performed by: INTERNAL MEDICINE

## 2019-03-18 RX ORDER — METOPROLOL SUCCINATE 25 MG/1
25 TABLET, EXTENDED RELEASE ORAL DAILY
Qty: 30 TABLET | Refills: 3 | Status: SHIPPED | OUTPATIENT
Start: 2019-03-18 | End: 2020-12-10

## 2019-03-21 ENCOUNTER — NURSE ONLY (OUTPATIENT)
Dept: CARDIOLOGY CLINIC | Age: 59
End: 2019-03-21

## 2019-03-21 ENCOUNTER — TELEPHONE (OUTPATIENT)
Dept: CARDIOLOGY CLINIC | Age: 59
End: 2019-03-21

## 2019-03-21 DIAGNOSIS — R00.2 PALPITATIONS: Primary | ICD-10-CM

## 2019-03-21 RX ORDER — FUROSEMIDE 40 MG/1
40 TABLET ORAL DAILY
Qty: 30 TABLET | Refills: 1 | Status: SHIPPED | OUTPATIENT
Start: 2019-03-21 | End: 2019-09-26 | Stop reason: CLARIF

## 2019-03-21 RX ORDER — HYDRALAZINE HYDROCHLORIDE 50 MG/1
50 TABLET, FILM COATED ORAL 3 TIMES DAILY
Qty: 90 TABLET | Refills: 1 | Status: ON HOLD
Start: 2019-03-21 | End: 2020-07-15 | Stop reason: HOSPADM

## 2019-04-05 ENCOUNTER — PROCEDURE VISIT (OUTPATIENT)
Dept: CARDIOLOGY CLINIC | Age: 59
End: 2019-04-05
Payer: COMMERCIAL

## 2019-04-05 DIAGNOSIS — M79.89 LEG SWELLING: Primary | ICD-10-CM

## 2019-04-05 DIAGNOSIS — R00.2 PALPITATIONS: ICD-10-CM

## 2019-04-05 PROCEDURE — 93970 EXTREMITY STUDY: CPT | Performed by: INTERNAL MEDICINE

## 2019-04-08 ENCOUNTER — APPOINTMENT (OUTPATIENT)
Dept: GENERAL RADIOLOGY | Age: 59
DRG: 189 | End: 2019-04-08
Payer: COMMERCIAL

## 2019-04-08 ENCOUNTER — HOSPITAL ENCOUNTER (INPATIENT)
Age: 59
LOS: 7 days | Discharge: HOME | DRG: 189 | End: 2019-04-15
Attending: EMERGENCY MEDICINE | Admitting: INTERNAL MEDICINE
Payer: COMMERCIAL

## 2019-04-08 ENCOUNTER — APPOINTMENT (OUTPATIENT)
Dept: CT IMAGING | Age: 59
DRG: 189 | End: 2019-04-08
Payer: COMMERCIAL

## 2019-04-08 DIAGNOSIS — J44.1 COPD EXACERBATION (HCC): ICD-10-CM

## 2019-04-08 DIAGNOSIS — R09.02 HYPOXIA: ICD-10-CM

## 2019-04-08 DIAGNOSIS — R06.89 HYPERCARBIA: ICD-10-CM

## 2019-04-08 DIAGNOSIS — J90 LOCULATED PLEURAL EFFUSION: ICD-10-CM

## 2019-04-08 DIAGNOSIS — J18.9 PNEUMONIA DUE TO ORGANISM: Primary | ICD-10-CM

## 2019-04-08 LAB
ALBUMIN SERPL-MCNC: 3.5 GM/DL (ref 3.4–5)
ALP BLD-CCNC: 115 IU/L (ref 40–129)
ALT SERPL-CCNC: 40 U/L (ref 10–40)
ANION GAP SERPL CALCULATED.3IONS-SCNC: 8 MMOL/L (ref 4–16)
AST SERPL-CCNC: 52 IU/L (ref 15–37)
BASE EXCESS MIXED: ABNORMAL (ref 0–2.3)
BASE EXCESS MIXED: ABNORMAL (ref 0–2.3)
BASOPHILS ABSOLUTE: 0.1 K/CU MM
BASOPHILS RELATIVE PERCENT: 0.6 % (ref 0–1)
BILIRUB SERPL-MCNC: 0.3 MG/DL (ref 0–1)
BUN BLDV-MCNC: 12 MG/DL (ref 6–23)
CALCIUM SERPL-MCNC: 8.6 MG/DL (ref 8.3–10.6)
CARBON MONOXIDE, BLOOD: 1.8 % (ref 0–5)
CHLORIDE BLD-SCNC: 97 MMOL/L (ref 99–110)
CO2 CONTENT: 37.3 MMOL/L (ref 19–24)
CO2: 34 MMOL/L (ref 21–32)
COMMENT: ABNORMAL
CREAT SERPL-MCNC: 1 MG/DL (ref 0.6–1.1)
DIFFERENTIAL TYPE: ABNORMAL
EOSINOPHILS ABSOLUTE: 0.2 K/CU MM
EOSINOPHILS RELATIVE PERCENT: 1.9 % (ref 0–3)
GFR AFRICAN AMERICAN: >60 ML/MIN/1.73M2
GFR NON-AFRICAN AMERICAN: 57 ML/MIN/1.73M2
GLUCOSE BLD-MCNC: 125 MG/DL (ref 70–99)
HCG QUALITATIVE: NEGATIVE
HCO3 ARTERIAL: 35.5 MMOL/L (ref 18–23)
HCO3 VENOUS: 38.1 MMOL/L (ref 19–25)
HCT VFR BLD CALC: 34.1 % (ref 37–47)
HEMOGLOBIN: 9.6 GM/DL (ref 12.5–16)
IMMATURE NEUTROPHIL %: 1.3 % (ref 0–0.43)
LACTATE: 0.6 MMOL/L (ref 0.4–2)
LIPASE: 15 IU/L (ref 13–60)
LYMPHOCYTES ABSOLUTE: 0.9 K/CU MM
LYMPHOCYTES RELATIVE PERCENT: 11.7 % (ref 24–44)
MCH RBC QN AUTO: 27.5 PG (ref 27–31)
MCHC RBC AUTO-ENTMCNC: 28.2 % (ref 32–36)
MCV RBC AUTO: 97.7 FL (ref 78–100)
METHEMOGLOBIN ARTERIAL: 1.9 %
MONOCYTES ABSOLUTE: 0.8 K/CU MM
MONOCYTES RELATIVE PERCENT: 10.3 % (ref 0–4)
NUCLEATED RBC %: 0 %
O2 SAT, VEN: 77.4 % (ref 50–70)
O2 SATURATION: 94.2 % (ref 96–97)
PCO2 ARTERIAL: 60 MMHG (ref 32–45)
PCO2, VEN: 83 MMHG (ref 38–52)
PDW BLD-RTO: 15.3 % (ref 11.7–14.9)
PH BLOOD: 7.38 (ref 7.34–7.45)
PH VENOUS: 7.27 (ref 7.32–7.42)
PLATELET # BLD: 321 K/CU MM (ref 140–440)
PMV BLD AUTO: 9.4 FL (ref 7.5–11.1)
PO2 ARTERIAL: 84 MMHG (ref 75–100)
PO2, VEN: 46 MMHG (ref 28–48)
POTASSIUM SERPL-SCNC: 4.9 MMOL/L (ref 3.5–5.1)
PRO-BNP: 925 PG/ML
RBC # BLD: 3.49 M/CU MM (ref 4.2–5.4)
SEGMENTED NEUTROPHILS ABSOLUTE COUNT: 5.9 K/CU MM
SEGMENTED NEUTROPHILS RELATIVE PERCENT: 74.2 % (ref 36–66)
SODIUM BLD-SCNC: 139 MMOL/L (ref 135–145)
TOTAL IMMATURE NEUTOROPHIL: 0.1 K/CU MM
TOTAL NUCLEATED RBC: 0 K/CU MM
TOTAL PROTEIN: 6.4 GM/DL (ref 6.4–8.2)
TROPONIN T: <0.01 NG/ML
WBC # BLD: 7.9 K/CU MM (ref 4–10.5)

## 2019-04-08 PROCEDURE — 96375 TX/PRO/DX INJ NEW DRUG ADDON: CPT

## 2019-04-08 PROCEDURE — 2580000003 HC RX 258: Performed by: INTERNAL MEDICINE

## 2019-04-08 PROCEDURE — 2060000000 HC ICU INTERMEDIATE R&B

## 2019-04-08 PROCEDURE — 6360000002 HC RX W HCPCS: Performed by: INTERNAL MEDICINE

## 2019-04-08 PROCEDURE — 93010 ELECTROCARDIOGRAM REPORT: CPT | Performed by: INTERNAL MEDICINE

## 2019-04-08 PROCEDURE — 94761 N-INVAS EAR/PLS OXIMETRY MLT: CPT

## 2019-04-08 PROCEDURE — 84484 ASSAY OF TROPONIN QUANT: CPT

## 2019-04-08 PROCEDURE — 74177 CT ABD & PELVIS W/CONTRAST: CPT

## 2019-04-08 PROCEDURE — 2580000003 HC RX 258: Performed by: PHYSICIAN ASSISTANT

## 2019-04-08 PROCEDURE — 6360000004 HC RX CONTRAST MEDICATION: Performed by: EMERGENCY MEDICINE

## 2019-04-08 PROCEDURE — 83690 ASSAY OF LIPASE: CPT

## 2019-04-08 PROCEDURE — 6370000000 HC RX 637 (ALT 250 FOR IP): Performed by: INTERNAL MEDICINE

## 2019-04-08 PROCEDURE — 6360000002 HC RX W HCPCS: Performed by: PHYSICIAN ASSISTANT

## 2019-04-08 PROCEDURE — 6370000000 HC RX 637 (ALT 250 FOR IP): Performed by: PHYSICIAN ASSISTANT

## 2019-04-08 PROCEDURE — 94660 CPAP INITIATION&MGMT: CPT

## 2019-04-08 PROCEDURE — 80053 COMPREHEN METABOLIC PANEL: CPT

## 2019-04-08 PROCEDURE — 82803 BLOOD GASES ANY COMBINATION: CPT

## 2019-04-08 PROCEDURE — 93005 ELECTROCARDIOGRAM TRACING: CPT | Performed by: PHYSICIAN ASSISTANT

## 2019-04-08 PROCEDURE — 94640 AIRWAY INHALATION TREATMENT: CPT

## 2019-04-08 PROCEDURE — 82805 BLOOD GASES W/O2 SATURATION: CPT

## 2019-04-08 PROCEDURE — 84703 CHORIONIC GONADOTROPIN ASSAY: CPT

## 2019-04-08 PROCEDURE — 99285 EMERGENCY DEPT VISIT HI MDM: CPT

## 2019-04-08 PROCEDURE — 71045 X-RAY EXAM CHEST 1 VIEW: CPT

## 2019-04-08 PROCEDURE — 83880 ASSAY OF NATRIURETIC PEPTIDE: CPT

## 2019-04-08 PROCEDURE — 83605 ASSAY OF LACTIC ACID: CPT

## 2019-04-08 PROCEDURE — 96374 THER/PROPH/DIAG INJ IV PUSH: CPT

## 2019-04-08 PROCEDURE — 85025 COMPLETE CBC W/AUTO DIFF WBC: CPT

## 2019-04-08 RX ORDER — HYDRALAZINE HYDROCHLORIDE 50 MG/1
50 TABLET, FILM COATED ORAL 3 TIMES DAILY
Status: DISCONTINUED | OUTPATIENT
Start: 2019-04-08 | End: 2019-04-15 | Stop reason: HOSPADM

## 2019-04-08 RX ORDER — AMLODIPINE BESYLATE 10 MG/1
10 TABLET ORAL DAILY
Status: DISCONTINUED | OUTPATIENT
Start: 2019-04-08 | End: 2019-04-15 | Stop reason: HOSPADM

## 2019-04-08 RX ORDER — CELECOXIB 200 MG/1
200 CAPSULE ORAL 2 TIMES DAILY
Status: DISCONTINUED | OUTPATIENT
Start: 2019-04-08 | End: 2019-04-15 | Stop reason: HOSPADM

## 2019-04-08 RX ORDER — ATORVASTATIN CALCIUM 40 MG/1
40 TABLET, FILM COATED ORAL NIGHTLY
Status: DISCONTINUED | OUTPATIENT
Start: 2019-04-08 | End: 2019-04-15 | Stop reason: HOSPADM

## 2019-04-08 RX ORDER — BACLOFEN 10 MG/1
10 TABLET ORAL ONCE
Status: COMPLETED | OUTPATIENT
Start: 2019-04-08 | End: 2019-04-08

## 2019-04-08 RX ORDER — FAMOTIDINE 20 MG/1
40 TABLET, FILM COATED ORAL 2 TIMES DAILY
Status: DISCONTINUED | OUTPATIENT
Start: 2019-04-08 | End: 2019-04-15 | Stop reason: HOSPADM

## 2019-04-08 RX ORDER — LEVOTHYROXINE SODIUM 0.15 MG/1
150 TABLET ORAL EVERY MORNING
Status: DISCONTINUED | OUTPATIENT
Start: 2019-04-09 | End: 2019-04-11

## 2019-04-08 RX ORDER — TRAZODONE HYDROCHLORIDE 50 MG/1
75 TABLET ORAL NIGHTLY
Status: DISCONTINUED | OUTPATIENT
Start: 2019-04-08 | End: 2019-04-15 | Stop reason: HOSPADM

## 2019-04-08 RX ORDER — ACETAMINOPHEN 80 MG
TABLET,CHEWABLE ORAL
Status: COMPLETED
Start: 2019-04-08 | End: 2019-04-08

## 2019-04-08 RX ORDER — MONTELUKAST SODIUM 10 MG/1
10 TABLET ORAL DAILY
Status: DISCONTINUED | OUTPATIENT
Start: 2019-04-08 | End: 2019-04-15 | Stop reason: HOSPADM

## 2019-04-08 RX ORDER — LEVOFLOXACIN 5 MG/ML
750 INJECTION, SOLUTION INTRAVENOUS ONCE
Status: COMPLETED | OUTPATIENT
Start: 2019-04-08 | End: 2019-04-08

## 2019-04-08 RX ORDER — SODIUM CHLORIDE 0.9 % (FLUSH) 0.9 %
10 SYRINGE (ML) INJECTION PRN
Status: DISCONTINUED | OUTPATIENT
Start: 2019-04-08 | End: 2019-04-15 | Stop reason: HOSPADM

## 2019-04-08 RX ORDER — CLONAZEPAM 0.5 MG/1
1 TABLET ORAL ONCE
Status: COMPLETED | OUTPATIENT
Start: 2019-04-08 | End: 2019-04-08

## 2019-04-08 RX ORDER — IPRATROPIUM BROMIDE AND ALBUTEROL SULFATE 2.5; .5 MG/3ML; MG/3ML
1 SOLUTION RESPIRATORY (INHALATION) EVERY 4 HOURS
Status: DISCONTINUED | OUTPATIENT
Start: 2019-04-08 | End: 2019-04-15 | Stop reason: HOSPADM

## 2019-04-08 RX ORDER — METHYLPREDNISOLONE SODIUM SUCCINATE 125 MG/2ML
80 INJECTION, POWDER, LYOPHILIZED, FOR SOLUTION INTRAMUSCULAR; INTRAVENOUS ONCE
Status: COMPLETED | OUTPATIENT
Start: 2019-04-08 | End: 2019-04-08

## 2019-04-08 RX ORDER — CLONAZEPAM 1 MG/1
1 TABLET ORAL 3 TIMES DAILY PRN
Status: DISCONTINUED | OUTPATIENT
Start: 2019-04-08 | End: 2019-04-15 | Stop reason: HOSPADM

## 2019-04-08 RX ORDER — METOPROLOL SUCCINATE 25 MG/1
25 TABLET, EXTENDED RELEASE ORAL DAILY
Status: DISCONTINUED | OUTPATIENT
Start: 2019-04-08 | End: 2019-04-15 | Stop reason: HOSPADM

## 2019-04-08 RX ORDER — BACLOFEN 10 MG/1
10 TABLET ORAL 3 TIMES DAILY
Status: DISCONTINUED | OUTPATIENT
Start: 2019-04-08 | End: 2019-04-15 | Stop reason: HOSPADM

## 2019-04-08 RX ORDER — METHYLPREDNISOLONE SODIUM SUCCINATE 40 MG/ML
40 INJECTION, POWDER, LYOPHILIZED, FOR SOLUTION INTRAMUSCULAR; INTRAVENOUS EVERY 8 HOURS
Status: DISCONTINUED | OUTPATIENT
Start: 2019-04-08 | End: 2019-04-09

## 2019-04-08 RX ORDER — IPRATROPIUM BROMIDE AND ALBUTEROL SULFATE 2.5; .5 MG/3ML; MG/3ML
3 SOLUTION RESPIRATORY (INHALATION) ONCE
Status: COMPLETED | OUTPATIENT
Start: 2019-04-08 | End: 2019-04-08

## 2019-04-08 RX ORDER — ONDANSETRON 2 MG/ML
4 INJECTION INTRAMUSCULAR; INTRAVENOUS EVERY 6 HOURS PRN
Status: DISCONTINUED | OUTPATIENT
Start: 2019-04-08 | End: 2019-04-15 | Stop reason: HOSPADM

## 2019-04-08 RX ORDER — FUROSEMIDE 40 MG/1
40 TABLET ORAL DAILY
Status: DISCONTINUED | OUTPATIENT
Start: 2019-04-08 | End: 2019-04-15 | Stop reason: HOSPADM

## 2019-04-08 RX ORDER — SODIUM CHLORIDE 0.9 % (FLUSH) 0.9 %
10 SYRINGE (ML) INJECTION EVERY 12 HOURS SCHEDULED
Status: DISCONTINUED | OUTPATIENT
Start: 2019-04-08 | End: 2019-04-15 | Stop reason: HOSPADM

## 2019-04-08 RX ORDER — BUSPIRONE HYDROCHLORIDE 10 MG/1
10 TABLET ORAL 3 TIMES DAILY
Status: DISCONTINUED | OUTPATIENT
Start: 2019-04-08 | End: 2019-04-15 | Stop reason: HOSPADM

## 2019-04-08 RX ORDER — FLUTICASONE PROPIONATE 50 MCG
2 SPRAY, SUSPENSION (ML) NASAL DAILY
Status: DISCONTINUED | OUTPATIENT
Start: 2019-04-08 | End: 2019-04-15 | Stop reason: HOSPADM

## 2019-04-08 RX ORDER — CITALOPRAM 40 MG/1
40 TABLET ORAL DAILY
Status: DISCONTINUED | OUTPATIENT
Start: 2019-04-08 | End: 2019-04-15 | Stop reason: HOSPADM

## 2019-04-08 RX ORDER — ASPIRIN 81 MG/1
81 TABLET, CHEWABLE ORAL DAILY
Status: DISCONTINUED | OUTPATIENT
Start: 2019-04-08 | End: 2019-04-15 | Stop reason: HOSPADM

## 2019-04-08 RX ORDER — IPRATROPIUM BROMIDE AND ALBUTEROL SULFATE 2.5; .5 MG/3ML; MG/3ML
1 SOLUTION RESPIRATORY (INHALATION) EVERY 4 HOURS
Status: DISCONTINUED | OUTPATIENT
Start: 2019-04-08 | End: 2019-04-08

## 2019-04-08 RX ADMIN — SODIUM CHLORIDE, PRESERVATIVE FREE 10 ML: 5 INJECTION INTRAVENOUS at 21:07

## 2019-04-08 RX ADMIN — MONTELUKAST SODIUM 10 MG: 10 TABLET, FILM COATED ORAL at 21:08

## 2019-04-08 RX ADMIN — IPRATROPIUM BROMIDE AND ALBUTEROL SULFATE 3 ML: .5; 3 SOLUTION RESPIRATORY (INHALATION) at 19:25

## 2019-04-08 RX ADMIN — IPRATROPIUM BROMIDE AND ALBUTEROL SULFATE 3 ML: .5; 3 SOLUTION RESPIRATORY (INHALATION) at 23:32

## 2019-04-08 RX ADMIN — CEFEPIME 2 G: 2 INJECTION, POWDER, FOR SOLUTION INTRAVENOUS at 13:27

## 2019-04-08 RX ADMIN — ATORVASTATIN CALCIUM 40 MG: 40 TABLET, FILM COATED ORAL at 19:55

## 2019-04-08 RX ADMIN — IOPAMIDOL 80 ML: 755 INJECTION, SOLUTION INTRAVENOUS at 12:00

## 2019-04-08 RX ADMIN — CITALOPRAM HYDROBROMIDE 40 MG: 40 TABLET ORAL at 21:09

## 2019-04-08 RX ADMIN — HYDRALAZINE HYDROCHLORIDE 50 MG: 50 TABLET, FILM COATED ORAL at 19:56

## 2019-04-08 RX ADMIN — IPRATROPIUM BROMIDE AND ALBUTEROL SULFATE 3 AMPULE: .5; 3 SOLUTION RESPIRATORY (INHALATION) at 10:24

## 2019-04-08 RX ADMIN — Medication: at 21:30

## 2019-04-08 RX ADMIN — BACLOFEN 10 MG: 10 TABLET ORAL at 19:55

## 2019-04-08 RX ADMIN — METHYLPREDNISOLONE SODIUM SUCCINATE 40 MG: 40 INJECTION, POWDER, LYOPHILIZED, FOR SOLUTION INTRAMUSCULAR; INTRAVENOUS at 21:09

## 2019-04-08 RX ADMIN — TRAZODONE HYDROCHLORIDE 75 MG: 50 TABLET ORAL at 21:08

## 2019-04-08 RX ADMIN — CELECOXIB 200 MG: 200 CAPSULE ORAL at 19:56

## 2019-04-08 RX ADMIN — CEFTRIAXONE 1 G: 1 INJECTION, POWDER, FOR SOLUTION INTRAMUSCULAR; INTRAVENOUS at 19:57

## 2019-04-08 RX ADMIN — METOPROLOL SUCCINATE 25 MG: 25 TABLET, EXTENDED RELEASE ORAL at 21:09

## 2019-04-08 RX ADMIN — CLONAZEPAM 1 MG: 1 TABLET ORAL at 21:08

## 2019-04-08 RX ADMIN — FAMOTIDINE 40 MG: 20 TABLET ORAL at 19:55

## 2019-04-08 RX ADMIN — ENOXAPARIN SODIUM 40 MG: 40 INJECTION SUBCUTANEOUS at 21:09

## 2019-04-08 RX ADMIN — CLONAZEPAM 1 MG: 0.5 TABLET ORAL at 13:47

## 2019-04-08 RX ADMIN — SODIUM CHLORIDE, PRESERVATIVE FREE 10 ML: 5 INJECTION INTRAVENOUS at 19:57

## 2019-04-08 RX ADMIN — METHYLPREDNISOLONE SODIUM SUCCINATE 80 MG: 125 INJECTION, POWDER, LYOPHILIZED, FOR SOLUTION INTRAMUSCULAR; INTRAVENOUS at 10:36

## 2019-04-08 RX ADMIN — THEOPHYLLINE ANHYDROUS 400 MG: 200 CAPSULE, EXTENDED RELEASE ORAL at 21:08

## 2019-04-08 RX ADMIN — ENOXAPARIN SODIUM 40 MG: 40 INJECTION SUBCUTANEOUS at 16:56

## 2019-04-08 RX ADMIN — BACLOFEN 10 MG: 10 TABLET ORAL at 13:47

## 2019-04-08 RX ADMIN — FUROSEMIDE 40 MG: 40 TABLET ORAL at 19:15

## 2019-04-08 RX ADMIN — LEVOFLOXACIN 750 MG: 5 INJECTION, SOLUTION INTRAVENOUS at 14:07

## 2019-04-08 RX ADMIN — VANCOMYCIN HYDROCHLORIDE 1500 MG: 5 INJECTION, POWDER, LYOPHILIZED, FOR SOLUTION INTRAVENOUS at 16:55

## 2019-04-08 RX ADMIN — FLUTICASONE PROPIONATE 2 SPRAY: 50 SPRAY, METERED NASAL at 21:14

## 2019-04-08 ASSESSMENT — PAIN DESCRIPTION - LOCATION
LOCATION: ABDOMEN
LOCATION: ABDOMEN

## 2019-04-08 ASSESSMENT — PAIN DESCRIPTION - ORIENTATION
ORIENTATION: MID;UPPER
ORIENTATION: MID;UPPER

## 2019-04-08 ASSESSMENT — PAIN DESCRIPTION - DESCRIPTORS
DESCRIPTORS: DULL;DISCOMFORT;CONSTANT
DESCRIPTORS: DULL

## 2019-04-08 ASSESSMENT — PAIN DESCRIPTION - PAIN TYPE
TYPE: ACUTE PAIN
TYPE: ACUTE PAIN

## 2019-04-08 ASSESSMENT — PAIN SCALES - GENERAL
PAINLEVEL_OUTOF10: 6
PAINLEVEL_OUTOF10: 4
PAINLEVEL_OUTOF10: 5
PAINLEVEL_OUTOF10: 5
PAINLEVEL_OUTOF10: 3
PAINLEVEL_OUTOF10: 3

## 2019-04-08 NOTE — CONSULTS
PHARMACY TO DOSE VANCOMYCIN PER KIRBY JEFFERSON    HISTORY OF PRESENT ILLNESS AND CONSULT:   Ms. Victor M Cerrato is a 66 y.o. (185 lb (83.9 kg);5' 1\" (1.549 m)) who presented to [facility] with the below chief complaint. Chief Complaint   Patient presents with    Abdominal Pain    Shortness of Breath        Pharmacy has been consulted to dose the vancomycin for empiric treatment of possible pneumonia with goal trough b/t 15-20 mg/L. Concurrent antimicrobials: Cefepime, levofloxacin. Past Medical History:   Diagnosis Date    Anxiety 2/16/2017    Arthritis     Back pain at L4-L5 level 2/16/2017    Dr Lui Esters    Bipolar 1 disorder [facility])     COPD (chronic obstructive pulmonary disease) ([facility])     Emphysema of lung ([facility])     FH: CAD (coronary artery disease) 2/16/2017    Father had in his forties, sister in her thirties     Fibromyalgia 2/16/2017    Fibromyalgia     H/O echocardiogram 01/14/2015    EF50-55% Normal- see media    Hyperlipidemia LDL goal <100     Hypertension     Obstructive sleep apnea     Osteoarthritis     Thyroid disease        OBJECTIVE FINDINGS:   Pertinent clinical findings include:   Recent Labs     04/08/19  1033   WBC 7.9   BUN 12   CREATININE 1.0   LACTATE 0.6     Creatinine Clearance = 62 mL/min (based on Cockcroft-Gault)  -Radiological transcription/findings per ChestXR taken 4/8/2019: New dense right lower lobe airspace consolidation likely representing pneumonia. Small associated right pleural effusion. Recommend follow-up to resolution.    -Cultures: No cultures collect at time of consult    VITALS:  /66   Pulse 93   Temp 98.3 °F (36.8 °C) (Oral)   Resp 21   Ht 5' 1\" (1.549 m)   Wt 185 lb (83.9 kg)   SpO2 100%   BMI 34.96 kg/m²    No intake or output data in the 24 hours ending 04/08/19 1233    ASSESSMENT:   Ms. Victor M Cerrato (23 y.o.) is presenting to the ED with pneumonia as evident by ChestXR and SOB.      VANCOMYCIN DOSING PLAN:   (1) Administer one-time loading dose of vancomycin 1500mg (18 mg/kg) while in the Emergency Department. Please note, Pharmacy will order a one-time dose of vancomycin for the Emergency Department. The [facility] pharmacy to dose consult will need to be re-ordered if vanco is to continue upon admision. Thank you for the consult & the opportunity to serve you and your patient.   Celeste Alexis, PharmD Candidate  Phone: [phone]  4/8/2019   12:33 PM  _______________________________________

## 2019-04-08 NOTE — PROGRESS NOTES
Medication History  [facility]    Patient Name: Ronel Reeder 1960     Medication history has been completed by: Mayur Rogers CPhT    Source(s) of information: Patient and Insurance claims    Primary Care Physician: Sophie Tucker MD     Pharmacy: [facility]    Allergies as of 04/08/2019 - Review Complete 04/08/2019   Allergen Reaction Noted    Lisinopril Swelling and Rash 07/18/2017        Prior to Admission medications    Medication Sig Start Date End Date Taking? Authorizing Provider   hydrALAZINE (APRESOLINE) 50 MG tablet Take 1 tablet by mouth 3 times daily 3/21/19   BRIAN Cevallos CNP   furosemide (LASIX) 40 MG tablet Take 1 tablet by mouth daily 3/21/19   BRIAN Cevallos CNP   metoprolol succinate (TOPROL XL) 25 MG extended release tablet Take 1 tablet by mouth daily 3/18/19   Stevie President, MD   nystatin (MYCOSTATIN) 594104 UNIT/ML suspension Take 5 mLs by mouth 4 times daily 3/8/19   Souleymane Wilson MD   famotidine (PEPCID) 20 MG tablet Take 1 tablet by mouth 2 times daily 3/8/19   Souleymane Wilson MD   busPIRone (BUSPAR) 15 MG tablet Take 15 mg by mouth 2 times daily 3/8/19   Souleymaen Wilson MD   celecoxib (CELEBREX) 200 MG capsule Take 200 mg by mouth 2 times daily    Historical Provider, MD   traZODone (DESYREL) 50 MG tablet Take 75 mg by mouth nightly     Historical Provider, MD   levothyroxine (SYNTHROID) 150 MCG tablet Take 150 mcg by mouth every morning 1/18/19   Historical Provider, MD   triamterene-hydrochlorothiazide (MAXZIDE-25) 37.5-25 MG per tablet Take 1 tablet by mouth daily    Historical Provider, MD   citalopram (CELEXA) 40 MG tablet Take 40 mg by mouth daily    Historical Provider, MD   clonazePAM (KLONOPIN) 1 MG tablet Take 1 mg by mouth 3 times daily as needed.     Historical Provider, MD   montelukast (SINGULAIR) 10 MG tablet Take 10 mg by mouth daily    Historical Provider, MD   theophylline (MIAH-24) 400 MG extended release capsule Take 400 mg by mouth daily    Historical Provider, MD   albuterol-ipratropium (COMBIVENT RESPIMAT)  MCG/ACT AERS inhaler Inhale 1 puff into the lungs every 4 hours as needed for Wheezing 2/14/19   Antonietta Velazco MD   baclofen (LIORESAL) 10 MG tablet Take 1 tablet by mouth 3 times daily 12/18/18   Antonietta Velazco MD   aspirin 81 MG chewable tablet Take 81 mg by mouth daily    Historical Provider, MD   budesonide-formoterol (SYMBICORT) 160-4.5 MCG/ACT AERO Inhale 2 puffs into the lungs 2 times daily    Historical Provider, MD   fluticasone (FLONASE) 50 MCG/ACT nasal spray 2 sprays by Nasal route daily 3/28/18   Antonietta Velazco MD   amLODIPine (NORVASC) 10 MG tablet Take 1 tablet by mouth daily 7/21/17   Shyanne Unger MD   ipratropium-albuterol (DUONEB) 0.5-2.5 (3) MG/3ML SOLN nebulizer solution Inhale 1 vial into the lungs every 4 hours    Historical Provider, MD   atorvastatin (LIPITOR) 40 MG tablet Take 40 mg by mouth daily    Historical Provider, MD       Other Comments:  · Unable to assess patient as she does not know what medications she is taking and states she was just released and med list should be up to date  · Patient has recent claims for potassium and carbamazepine but unknown if she is taking  · Patient recently filled 2 different strengths of levothyroxine.  150 mcg on 3/12/19 for 90 day supply and 125 mcg on 3/26/19 for 30 day supply    To my knowledge the above medication history is accurate as of 4/8/2019 12:44 PM.   Ruthell Nissen, CPhT   4/8/2019 12:44 PM

## 2019-04-08 NOTE — ED PROVIDER NOTES
I independently examined and evaluated Flaco Viveros. In brief, Patient presenting with multiple complaints, presents on [facility] which to BiPAP here. Focused exam revealed Tachypnea,, Slight distress,Is hypoxic, tachypneic    ED course: Workup initiated, she was placed on BiPAP on arrival, imaging ordered labs ordered she is retaining CO2,Questionable pneumonia on x-ray broad-spectrum antibiotic Initiated, culture sent Will admit for further workup and treatment    EKG shows sinus rhythm rate of 82 normal [place] and QRS intervals no ST elevation no old EKG is available for comparison    All diagnostic, treatment, and disposition decisions were made by myself in conjunction with the advanced practice provider. For all further details of the patient's emergency department visit, please see the advanced practice provider's documentation. Comment: Please note this report has been produced using speech recognition software and may contain errors related to that system including errors in grammar, punctuation, and spelling, as well as words and phrases that may be inappropriate. If there are any questions or concerns please feel free to contact the dictating provider for clarification.         Krzysztof Roque MD  04/08/19 [id number]

## 2019-04-08 NOTE — ED NOTES
Patient presents to ER with c/o shortness of breath and upper abdominal pain for the past couple days.       Douglas Jacob RN  04/08/19 1015

## 2019-04-08 NOTE — ED PROVIDER NOTES
history:  has a past surgical history that includes Carpal tunnel release and Tubal ligation. Home medications:   Prior to Admission medications    Medication Sig Start Date End Date Taking? Authorizing Provider   hydrALAZINE (APRESOLINE) 50 MG tablet Take 1 tablet by mouth 3 times daily 3/21/19  Yes BRIAN HUSSEIN - CNP   furosemide (LASIX) 40 MG tablet Take 1 tablet by mouth daily 3/21/19  Yes BRIAN HUSSEIN - CNP   metoprolol succinate (TOPROL XL) 25 MG extended release tablet Take 1 tablet by mouth daily 3/18/19  Yes Shell Mohamud MD   famotidine (PEPCID) 20 MG tablet Take 1 tablet by mouth 2 times daily  Patient taking differently: Take 40 mg by mouth 2 times daily  3/8/19  Yes Nga Barnes MD   busPIRone (BUSPAR) 15 MG tablet Take 15 mg by mouth 2 times daily  Patient taking differently: Take 10 mg by mouth 3 times daily  3/8/19  Yes Nga Barnes MD   celecoxib (CELEBREX) 200 MG capsule Take 200 mg by mouth 2 times daily   Yes Historical Provider, MD   traZODone (DESYREL) 50 MG tablet Take 75 mg by mouth nightly    Yes Historical Provider, MD   levothyroxine (SYNTHROID) 150 MCG tablet Take 150 mcg by mouth every morning 1/18/19  Yes Historical Provider, MD   citalopram (CELEXA) 40 MG tablet Take 40 mg by mouth daily   Yes Historical Provider, MD   clonazePAM (KLONOPIN) 1 MG tablet Take 1 mg by mouth 3 times daily as needed.    Yes Historical Provider, MD   montelukast (SINGULAIR) 10 MG tablet Take 10 mg by mouth daily   Yes Historical Provider, MD   theophylline (MIAH-24) 400 MG extended release capsule Take 400 mg by mouth daily   Yes Historical Provider, MD   albuterol-ipratropium (COMBIVENT RESPIMAT)  MCG/ACT AERS inhaler Inhale 1 puff into the lungs every 4 hours as needed for Wheezing 2/14/19  Yes Nga Barnes MD   baclofen (LIORESAL) 10 MG tablet Take 1 tablet by mouth 3 times daily 12/18/18  Yes Nga Barnes MD   aspirin 81 MG chewable tablet Take 81 mg by mouth daily Yes Historical Provider, MD   budesonide-formoterol (SYMBICORT) 160-4.5 MCG/ACT AERO Inhale 2 puffs into the lungs 2 times daily   Yes Historical Provider, MD   fluticasone (FLONASE) 50 MCG/ACT nasal spray 2 sprays by Nasal route daily 3/28/18  Yes Murali Rizo MD   amLODIPine (NORVASC) 10 MG tablet Take 1 tablet by mouth daily 7/21/17  Yes Zeus Cole MD   ipratropium-albuterol (DUONEB) 0.5-2.5 (3) MG/3ML SOLN nebulizer solution Inhale 1 vial into the lungs every 4 hours   Yes Historical Provider, MD   atorvastatin (LIPITOR) 40 MG tablet Take 40 mg by mouth daily   Yes Historical Provider, MD       Social history:  reports that she quit smoking about 11 years ago. She has a 30.00 pack-year smoking history. She has never used smokeless tobacco. She reports that she drank about 1.2 oz of alcohol per week. She reports that she does not use drugs. Family history:    Family History   Problem Relation Age of Onset    No Known Problems Mother     No Known Problems Father          Exam  /66   Pulse 93   Temp 98.3 °F (36.8 °C) (Oral)   Resp 21   Ht 5' 1\" (1.549 m)   Wt 185 lb (83.9 kg)   SpO2 100%   BMI 34.96 kg/m²   Nursing note and vitals reviewed. Constitutional: Well developed, well nourished. No acute distress. HENT:      Head: Normocephalic and atraumatic. Ears: External ears normal.      Nose: Nose normal.     Mouth: Membrane mucosa moist and pink. No posterior oropharynx erythema or tonsillar edema  Eyes: Anicteric sclera. No discharge, PERRL  Neck: Supple. Trachea midline. Cardiovascular: RRR, no murmurs, rubs, or gallops, radial pulses 2+ bilaterally. Pulmonary/Chest: Increased effort. Lung sounds decreased throughout. No wheezing or rales or stridor. Abdominal: Soft. Moderate epigastric TTP noted. No distension. No guarding, rebound tenderness, or evidence of ascites. : No CVA tenderness. Musculoskeletal: Moves all extremities. No gross deformity.     Neurological: Alert and oriented to person, place, and time. Normal muscle tone. Skin: Warm and dry. No rash. Psychiatric: Normal mood and affect. Behavior is normal.    Procedures      EKG   Please see Dr. Aline Torrez note for EKG read. Radiographs (if obtained):  [] The following radiograph was interpreted by myself in the absence of a radiologist:   [x] Radiologist's Report Reviewed:  CT ABDOMEN PELVIS W IV CONTRAST   Preliminary Result   1. Diverticulosis. 2. Right renal cortical atrophy and thinning, nonspecific. Multiple small   right renal cortical cysts. 3. Partially loculated right pleural effusion with right middle and lower   lobe scarring and/or atelectasis. XR CHEST PORTABLE   Final Result   New dense right lower lobe airspace consolidation likely representing   pneumonia. Small associated right pleural effusion. Recommend follow-up to   resolution.                 Labs  Results for orders placed or performed during the hospital encounter of 04/08/19   CBC Auto Differential   Result Value Ref Range    WBC 7.9 4.0 - 10.5 K/CU MM    RBC 3.49 (L) 4.2 - 5.4 M/CU MM    Hemoglobin 9.6 (L) 12.5 - 16.0 GM/DL    Hematocrit 34.1 (L) 37 - 47 %    MCV 97.7 78 - 100 FL    MCH 27.5 27 - 31 PG    MCHC 28.2 (L) 32.0 - 36.0 %    RDW 15.3 (H) 11.7 - 14.9 %    Platelets 832 168 - 957 K/CU MM    MPV 9.4 7.5 - 11.1 FL    Differential Type AUTOMATED DIFFERENTIAL     Segs Relative 74.2 (H) 36 - 66 %    Lymphocytes % 11.7 (L) 24 - 44 %    Monocytes % 10.3 (H) 0 - 4 %    Eosinophils % 1.9 0 - 3 %    Basophils % 0.6 0 - 1 %    Segs Absolute 5.9 K/CU MM    Lymphocytes # 0.9 K/CU MM    Monocytes # 0.8 K/CU MM    Eosinophils # 0.2 K/CU MM    Basophils # 0.1 K/CU MM    Nucleated RBC % 0.0 %    Total Nucleated RBC 0.0 K/CU MM    Total Immature Neutrophil 0.10 K/CU MM    Immature Neutrophil % 1.3 (H) 0 - 0.43 %   CMP   Result Value Ref Range    Sodium 139 135 - 145 MMOL/L    Potassium 4.9 3.5 - 5.1 MMOL/L    Chloride 97 (L) 99 - 110 mMol/L    CO2 34 (H) 21 - 32 MMOL/L    BUN 12 6 - 23 MG/DL    CREATININE 1.0 0.6 - 1.1 MG/DL    Glucose 125 (H) 70 - 99 MG/DL    Calcium 8.6 8.3 - 10.6 MG/DL    Alb 3.5 3.4 - 5.0 GM/DL    Total Protein 6.4 6.4 - 8.2 GM/DL    Total Bilirubin 0.3 0.0 - 1.0 MG/DL    ALT 40 10 - 40 U/L    AST 52 (H) 15 - 37 IU/L    Alkaline Phosphatase 115 40 - 129 IU/L    GFR Non- 57 (L) >60 mL/min/1.73m2    GFR African American >60 >60 mL/min/1.73m2    Anion Gap 8 4 - 16   Lactic Acid, Plasma   Result Value Ref Range    Lactate 0.6 0.4 - 2.0 mMOL/L   Blood Gas, Venous   Result Value Ref Range    pH, Topher 7.27 (L) 7.32 - 7.42    pCO2, Topher 83 (H) 38 - 52 mmHG    pO2, Topher 46 28 - 48 mmHG    Base Exc, Mixed 7.9  PLUS   (H) 0 - 2.3    HCO3, Venous 38.1 (H) 19 - 25 MMOL/L    O2 Sat, Topher 77.4 (H) 50 - 70 %    Comment VB    Troponin   Result Value Ref Range    Troponin T <0.010 <0.01 NG/ML   Brain Natriuretic Peptide   Result Value Ref Range    Pro-.0 (H) <300 PG/ML   HCG Serum, Qualitative   Result Value Ref Range    hCG Qual NEGATIVE    Lipase   Result Value Ref Range    Lipase 15 13 - 60 IU/L   EKG 12 Lead   Result Value Ref Range    Ventricular Rate 82 BPM    Atrial Rate 82 BPM    P-R Interval 120 ms    QRS Duration 90 ms    Q-T Interval 388 ms    QTc Calculation (Bazett) 453 ms    P Axis 66 degrees    R Axis 95 degrees    T Axis 39 degrees    Diagnosis       Normal sinus rhythm  Rightward axis  Low voltage QRS  Borderline ECG  When compared with ECG of 28-FEB-2019 20:01,  QRS axis shifted right  Criteria for Septal infarct are no longer present  Criteria for Inferior infarct are no longer present  Nonspecific T wave abnormality has replaced inverted T waves in Inferior leads           MDM  Patient presents for shortness of breath primarily with some chronic epigastric pain. She is hypoxic in the fields, improved here. Placed on BiPAP and then was weaned to 3 L which is her home oxygen level.   After multiple breathing treatments and steroids patient was ambulated, dropped almost 50% going from bed to commode. Placed back on BiPAP after venous blood gas returned showing hypercarbia. Chest x-ray shows right lower lobe pneumonia, CT abdomen shows a loculated pleural effusion without pneumonia in the same region. Plan is to admit for pneumonia versus COPD exacerbation and hypoxia with CO2 retention. Consult placed to Dr. Chang Garcia who agreed to admit. This patient was also seen and evaluated by Dr. Delia Patel. Final Impression  1. Pneumonia due to organism    2. Loculated pleural effusion    3. Hypoxia    4. Hypercarbia    5. COPD exacerbation    Blood pressure 124/66, pulse 93, temperature 98.3 °F (36.8 °C), temperature source Oral, resp. rate 21, height 5' 1\" (1.549 m), weight 185 lb (83.9 kg), SpO2 100 %, not currently breastfeeding. Disposition:  Admit to step-down telemetry in stable condition. Patient was given scripts for the following medications. I counseled patient how to take these medications. New Prescriptions    No medications on file       This chart was generated using the [address] [place] [address] dictation system. I created this record but it may contain dictation errors given the limitations of this technology.         Diony Burch PA-C  04/08/19 7066

## 2019-04-08 NOTE — ED NOTES
Report called to Wiliam Allan on 2E. Patient to be transported to room 2018. Levaquin infusion to continue.       Albert Adhikari RN  04/08/19 [id number]

## 2019-04-08 NOTE — ED NOTES
Patient updated on plan of care at this time. Denies needs. Call light within reach.       Durga Donnelly RN  04/08/19 6250

## 2019-04-08 NOTE — PROGRESS NOTES
Pt arrived to room 2018 via cart. Pt A&Ox4 and on a non-rebreather at 15L and a Levaquin infusion running. Pt son at bedside. Pt able to walk from cart to bed with no assistance needed. Pt in street clothes and changed into hospital gown. 2 person skin assessment done with Jeniffer Mera from ED. No open areas. Skin is clean with scattered bruising. RT came up with BiPAP and placed on pt. Pt doing well at this time. Pt has a cell phone with her along with pants, shirt, underwear, socks, slippers, and a jacket. Pt oriented to room. Bed alarm on, bed in lowest position, call light in reach.

## 2019-04-09 ENCOUNTER — TELEPHONE (OUTPATIENT)
Dept: CARDIOLOGY CLINIC | Age: 59
End: 2019-04-09

## 2019-04-09 ENCOUNTER — APPOINTMENT (OUTPATIENT)
Dept: CT IMAGING | Age: 59
DRG: 189 | End: 2019-04-09
Payer: COMMERCIAL

## 2019-04-09 PROBLEM — J90 PLEURAL EFFUSION: Status: ACTIVE | Noted: 2019-04-08

## 2019-04-09 PROBLEM — J98.11 ATELECTASIS: Status: ACTIVE | Noted: 2019-04-08

## 2019-04-09 PROBLEM — J96.92 RESPIRATORY FAILURE WITH HYPOXIA AND HYPERCAPNIA (HCC): Status: ACTIVE | Noted: 2019-04-08

## 2019-04-09 PROBLEM — R91.8 PULMONARY NODULES: Status: ACTIVE | Noted: 2019-04-09

## 2019-04-09 PROBLEM — J96.91 RESPIRATORY FAILURE WITH HYPOXIA AND HYPERCAPNIA (HCC): Status: ACTIVE | Noted: 2019-04-08

## 2019-04-09 PROCEDURE — 71250 CT THORAX DX C-: CPT

## 2019-04-09 PROCEDURE — 6370000000 HC RX 637 (ALT 250 FOR IP): Performed by: INTERNAL MEDICINE

## 2019-04-09 PROCEDURE — 2060000000 HC ICU INTERMEDIATE R&B

## 2019-04-09 PROCEDURE — 2580000003 HC RX 258: Performed by: INTERNAL MEDICINE

## 2019-04-09 PROCEDURE — 94640 AIRWAY INHALATION TREATMENT: CPT

## 2019-04-09 PROCEDURE — 94761 N-INVAS EAR/PLS OXIMETRY MLT: CPT

## 2019-04-09 PROCEDURE — 6360000002 HC RX W HCPCS: Performed by: INTERNAL MEDICINE

## 2019-04-09 PROCEDURE — 87081 CULTURE SCREEN ONLY: CPT

## 2019-04-09 PROCEDURE — 2700000000 HC OXYGEN THERAPY PER DAY

## 2019-04-09 PROCEDURE — 94660 CPAP INITIATION&MGMT: CPT

## 2019-04-09 RX ORDER — METHYLPREDNISOLONE SODIUM SUCCINATE 40 MG/ML
40 INJECTION, POWDER, LYOPHILIZED, FOR SOLUTION INTRAMUSCULAR; INTRAVENOUS EVERY 6 HOURS
Status: DISCONTINUED | OUTPATIENT
Start: 2019-04-09 | End: 2019-04-11

## 2019-04-09 RX ORDER — ACETAMINOPHEN 325 MG/1
650 TABLET ORAL EVERY 6 HOURS PRN
Status: DISCONTINUED | OUTPATIENT
Start: 2019-04-09 | End: 2019-04-15 | Stop reason: HOSPADM

## 2019-04-09 RX ORDER — GUAIFENESIN 600 MG/1
600 TABLET, EXTENDED RELEASE ORAL 2 TIMES DAILY
Status: DISCONTINUED | OUTPATIENT
Start: 2019-04-09 | End: 2019-04-15 | Stop reason: HOSPADM

## 2019-04-09 RX ADMIN — CLONAZEPAM 1 MG: 1 TABLET ORAL at 17:16

## 2019-04-09 RX ADMIN — IPRATROPIUM BROMIDE AND ALBUTEROL SULFATE 3 ML: .5; 3 SOLUTION RESPIRATORY (INHALATION) at 19:30

## 2019-04-09 RX ADMIN — METHYLPREDNISOLONE SODIUM SUCCINATE 40 MG: 40 INJECTION, POWDER, LYOPHILIZED, FOR SOLUTION INTRAMUSCULAR; INTRAVENOUS at 23:52

## 2019-04-09 RX ADMIN — ACETAMINOPHEN 650 MG: 325 TABLET ORAL at 19:25

## 2019-04-09 RX ADMIN — IPRATROPIUM BROMIDE AND ALBUTEROL SULFATE 3 ML: .5; 3 SOLUTION RESPIRATORY (INHALATION) at 09:15

## 2019-04-09 RX ADMIN — HYDRALAZINE HYDROCHLORIDE 50 MG: 50 TABLET, FILM COATED ORAL at 21:31

## 2019-04-09 RX ADMIN — CLONAZEPAM 1 MG: 1 TABLET ORAL at 09:23

## 2019-04-09 RX ADMIN — CEFEPIME HYDROCHLORIDE 1 G: 1 INJECTION, POWDER, FOR SOLUTION INTRAMUSCULAR; INTRAVENOUS at 14:00

## 2019-04-09 RX ADMIN — BUSPIRONE HYDROCHLORIDE 10 MG: 10 TABLET ORAL at 09:29

## 2019-04-09 RX ADMIN — GUAIFENESIN 600 MG: 600 TABLET, EXTENDED RELEASE ORAL at 21:30

## 2019-04-09 RX ADMIN — CELECOXIB 200 MG: 200 CAPSULE ORAL at 09:22

## 2019-04-09 RX ADMIN — FUROSEMIDE 40 MG: 40 TABLET ORAL at 09:23

## 2019-04-09 RX ADMIN — ATORVASTATIN CALCIUM 40 MG: 40 TABLET, FILM COATED ORAL at 21:30

## 2019-04-09 RX ADMIN — BACLOFEN 10 MG: 10 TABLET ORAL at 21:31

## 2019-04-09 RX ADMIN — CELECOXIB 200 MG: 200 CAPSULE ORAL at 21:31

## 2019-04-09 RX ADMIN — BACLOFEN 10 MG: 10 TABLET ORAL at 09:23

## 2019-04-09 RX ADMIN — TRAZODONE HYDROCHLORIDE 75 MG: 50 TABLET ORAL at 21:30

## 2019-04-09 RX ADMIN — SODIUM CHLORIDE, PRESERVATIVE FREE 10 ML: 5 INJECTION INTRAVENOUS at 21:47

## 2019-04-09 RX ADMIN — FAMOTIDINE 40 MG: 20 TABLET ORAL at 09:24

## 2019-04-09 RX ADMIN — METHYLPREDNISOLONE SODIUM SUCCINATE 40 MG: 40 INJECTION, POWDER, LYOPHILIZED, FOR SOLUTION INTRAMUSCULAR; INTRAVENOUS at 11:41

## 2019-04-09 RX ADMIN — FAMOTIDINE 40 MG: 20 TABLET ORAL at 21:31

## 2019-04-09 RX ADMIN — AMLODIPINE BESYLATE 10 MG: 10 TABLET ORAL at 09:23

## 2019-04-09 RX ADMIN — METHYLPREDNISOLONE SODIUM SUCCINATE 40 MG: 40 INJECTION, POWDER, LYOPHILIZED, FOR SOLUTION INTRAMUSCULAR; INTRAVENOUS at 17:16

## 2019-04-09 RX ADMIN — IPRATROPIUM BROMIDE AND ALBUTEROL SULFATE 3 ML: .5; 3 SOLUTION RESPIRATORY (INHALATION) at 16:25

## 2019-04-09 RX ADMIN — IPRATROPIUM BROMIDE AND ALBUTEROL SULFATE 3 ML: .5; 3 SOLUTION RESPIRATORY (INHALATION) at 12:28

## 2019-04-09 RX ADMIN — IPRATROPIUM BROMIDE AND ALBUTEROL SULFATE 3 ML: .5; 3 SOLUTION RESPIRATORY (INHALATION) at 03:40

## 2019-04-09 RX ADMIN — IPRATROPIUM BROMIDE AND ALBUTEROL SULFATE 3 ML: .5; 3 SOLUTION RESPIRATORY (INHALATION) at 23:42

## 2019-04-09 RX ADMIN — ACETAMINOPHEN 650 MG: 325 TABLET ORAL at 23:58

## 2019-04-09 RX ADMIN — MONTELUKAST SODIUM 10 MG: 10 TABLET, FILM COATED ORAL at 09:23

## 2019-04-09 RX ADMIN — SODIUM CHLORIDE, PRESERVATIVE FREE 10 ML: 5 INJECTION INTRAVENOUS at 09:24

## 2019-04-09 RX ADMIN — ASPIRIN 81 MG 81 MG: 81 TABLET ORAL at 09:24

## 2019-04-09 RX ADMIN — GUAIFENESIN 600 MG: 600 TABLET, EXTENDED RELEASE ORAL at 11:41

## 2019-04-09 RX ADMIN — THEOPHYLLINE ANHYDROUS 400 MG: 200 CAPSULE, EXTENDED RELEASE ORAL at 09:29

## 2019-04-09 RX ADMIN — METOPROLOL SUCCINATE 25 MG: 25 TABLET, EXTENDED RELEASE ORAL at 09:23

## 2019-04-09 RX ADMIN — HYDRALAZINE HYDROCHLORIDE 50 MG: 50 TABLET, FILM COATED ORAL at 14:01

## 2019-04-09 RX ADMIN — CEFEPIME HYDROCHLORIDE 1 G: 1 INJECTION, POWDER, FOR SOLUTION INTRAMUSCULAR; INTRAVENOUS at 21:32

## 2019-04-09 RX ADMIN — METHYLPREDNISOLONE SODIUM SUCCINATE 40 MG: 40 INJECTION, POWDER, LYOPHILIZED, FOR SOLUTION INTRAMUSCULAR; INTRAVENOUS at 02:24

## 2019-04-09 RX ADMIN — BUSPIRONE HYDROCHLORIDE 10 MG: 10 TABLET ORAL at 21:32

## 2019-04-09 RX ADMIN — BUSPIRONE HYDROCHLORIDE 10 MG: 10 TABLET ORAL at 13:59

## 2019-04-09 RX ADMIN — SODIUM CHLORIDE, PRESERVATIVE FREE 10 ML: 5 INJECTION INTRAVENOUS at 00:30

## 2019-04-09 RX ADMIN — FLUTICASONE PROPIONATE 2 SPRAY: 50 SPRAY, METERED NASAL at 09:26

## 2019-04-09 RX ADMIN — CITALOPRAM HYDROBROMIDE 40 MG: 40 TABLET ORAL at 09:23

## 2019-04-09 RX ADMIN — BACLOFEN 10 MG: 10 TABLET ORAL at 13:59

## 2019-04-09 RX ADMIN — LEVOTHYROXINE SODIUM 150 MCG: 150 TABLET ORAL at 09:23

## 2019-04-09 RX ADMIN — HYDRALAZINE HYDROCHLORIDE 50 MG: 50 TABLET, FILM COATED ORAL at 09:23

## 2019-04-09 ASSESSMENT — PAIN SCALES - GENERAL
PAINLEVEL_OUTOF10: 5
PAINLEVEL_OUTOF10: 3
PAINLEVEL_OUTOF10: 0
PAINLEVEL_OUTOF10: 0

## 2019-04-09 ASSESSMENT — PAIN DESCRIPTION - LOCATION: LOCATION: HEAD

## 2019-04-09 ASSESSMENT — PAIN DESCRIPTION - PAIN TYPE: TYPE: ACUTE PAIN

## 2019-04-09 ASSESSMENT — PAIN DESCRIPTION - ORIENTATION: ORIENTATION: RIGHT;LEFT

## 2019-04-09 ASSESSMENT — PAIN DESCRIPTION - DESCRIPTORS: DESCRIPTORS: ACHING

## 2019-04-09 NOTE — H&P
stated age  EYES:  Lids and lashes normal, pupils equal, round and reactive to light, extra ocular muscles intact, sclera clear, conjunctiva normal  ENT:  Normocephalic, without obvious abnormality, atraumatic, sinuses nontender on palpation, external ears without lesions, oral pharynx with moist mucus membranes, tonsils without erythema or exudates, gums normal and good dentition. NECK:  Supple, symmetrical, trachea midline, no adenopathy, thyroid symmetric, not enlarged and no tenderness, skin normal  HEMATOLOGIC/LYMPHATICS:  no cervical lymphadenopathy  BACK:  Symmetric, no curvature, spinous processes are non-tender on palpation, paraspinous muscles are non-tender on palpation, no costal vertebral tenderness  LUNGS:  No increased work of breathing, good air exchange, clear to auscultation bilaterally, no crackles or wheezing  CARDIOVASCULAR:  Normal apical impulse, regular rate and rhythm, normal S1 and S2, no S3 or S4, and no murmur noted  ABDOMEN:  No scars, normal bowel sounds, soft, non-distended, non-tender, no masses palpated, no hepatosplenomegally  MUSCULOSKELETAL:  There is no redness, warmth, or swelling of the joints. Full range of motion noted. Motor strength is 5 out of 5 all extremities bilaterally. Tone is normal.  NEUROLOGIC:  Awake, alert, oriented to name, place and time. Cranial nerves II-XII are grossly intact. Motor is 5 out of 5 bilaterally. Cerebellar finger to nose, heel to shin intact. Sensory is intact.   Babinski down going, Romberg negative, and gait is normal.  SKIN:  no bruising or bleeding    DATA:  CBC with Differential:    Lab Results   Component Value Date    WBC 7.9 04/08/2019    RBC 3.49 04/08/2019    HGB 9.6 04/08/2019    HCT 34.1 04/08/2019     04/08/2019    MCV 97.7 04/08/2019    MCH 27.5 04/08/2019    MCHC 28.2 04/08/2019    RDW 15.3 04/08/2019    SEGSPCT 74.2 04/08/2019    BANDSPCT 2 02/10/2019    LYMPHOPCT 11.7 04/08/2019    MONOPCT 10.3 04/08/2019 with atelectasis   in the right middle lobe   2. Trace left pleural effusion   3. New right upper lobe pulmonary nodules with a dominant 1.1 cm spiculated   lesion.  These are likely inflammatory given the rate of appearance. Recommend follow-up chest CT in 3-4 months         EKG:  Normal sinus rhythm   Rightward axis   Low voltage QRS   Borderline ECG   ASSESSMENT AND PLAN:      Principal Problem:    Respiratory failure with hypoxia and hypercapnia (HCC)  Active Problems:    Sleep apnea    Pleural effusion    Atelectasis    Pulmonary nodules    Centrilobular emphysema (HCC)    Hypertension    Hyperlipidemia LDL goal <100    Anxiety    FH: CAD (coronary artery disease)    Fibromyalgia    Back pain at L4-L5 level    COPD exacerbation (HCC)    Spinal stenosis of lumbar region with radiculopathy    COPD with acute exacerbation (HCC)  Resolved Problems:    * No resolved hospital problems.  *      IV Rocephin  IV Solumedrol  Breathing treatments  Mable Joaquin covering for me from 04/10/2019 to 04/15/2019 AM    [address]  4/9/2019  2:47 PM

## 2019-04-09 NOTE — TELEPHONE ENCOUNTER
No evidence of DVT or SVT in the bilateral common femoral vein, femoral    vein, popliteal vein, greater saphenous vein or small saphenous vein.    No evidence of significant venous insufficiency noted in the bilateral lower    extremities .         Patient currently in hospital

## 2019-04-09 NOTE — CONSULTS
[address] [place] [address], [address] [facility]                                  CONSULTATION    PATIENT NAME: Lidia Santos                :        1960  MED REC NO:   [id number]                          ROOM:       2018  ACCOUNT NO:   [de-identified]                           ADMIT DATE: 2019  PROVIDER:     Jaret Ramos MD    CONSULT DATE:  2019    HISTORY OF PRESENT ILLNESS:  The patient is a 82-year-old lady with  multiple medical problems including COPD, chronic respiratory failure,  on home oxygen; hypertension, bipolar disorder, arthritis, who was  admitted through the emergency room with complaints of increasing  shortness of breath. She was also complaining of wheezing. She denies  any hemoptysis. She denies any fever or chills. She denies any nausea  or vomiting. She denies any chest pains. In the emergency room, she  was hypoxic. She was placed on the CPAP with improvement. She was  given breathing treatment with some relief, but she continued to have  shortness of breath and so it was decided to admit her for aggressive  therapy. PAST MEDICAL HISTORY:  Significant for COPD, chronic respiratory  failure, on home oxygen; coronary artery disease, hypertension,  hyperlipidemia, arthritis and anxiety disorder. PAST SURGICAL HISTORY:  Remarkable for tubal ligation, carpal tunnel  release surgery. FAMILY HISTORY:  Noncontributory. SOCIAL HISTORY:  Reveals that she quit smoking in , but used to  smoke one and half packs per day for 20 years prior to that. She drinks  alcohol off and on. No history of drug abuse. MEDICATIONS:  Reviewed. ALLERGIES:  She is allergic to LISINOPRIL. REVIEW OF SYSTEMS:  A 10- to 14-point review of systems were reviewed  and are negative except for what is mentioned in the history of present  illness.     PHYSICAL EXAMINATION:  GENERAL:  The patient is alert, oriented x3, in no acute respiratory  distress. VITAL SIGNS:  Her blood pressure is 134/86 mmHg, pulse of 87 per minute  and respiratory rate of 20 per minute. She is afebrile. Her saturation  is 99% on 4 L nasal cannula. HEENT:  Essentially unremarkable. NECK:  There is no JVD. No lymphadenopathy. The neck is supple. LUNGS:  Revealed diminished breath sounds, occasional bilateral rhonchi  and right lower lobe crackles. HEART:  Showed normal S1, S2. There was no S3 or S4 noted. ABDOMEN:  Benign. There is no evidence of any organomegaly. The bowel  sounds are present. NEUROLOGIC:  Reveals that she is awake and responsive, answering  questions appropriately and moving her extremities. DIAGNOSTIC DATA:  Her chest x-ray showed right lower lobe infiltrate and  possible small right effusion. Her CAT scan of the abdomen showed partially loculated right pleural  effusion and right mid lobe and lower lobe scarring versus atelectasis. Her ABG showed a pH of 7.38, pCO2 of 60, pO2 of 84 with 94% saturation. Her proBNP was 925. Her electrolytes showed a sodium of 139, potassium  4.9, chloride 97, carbon dioxide 34, BUN 12 and creatinine 1.0. Her CBC  showed a white count of 7.9, hemoglobin 9.6 and hematocrit of 34.1. IMPRESSION:  1. Acute-on-chronic respiratory failure secondary to acute exacerbation  of COPD and right lower lobe pneumonia. 2.  Acute exacerbation of COPD. 3.  Right lower lobe pneumonia. 4.  Small right pleural effusion. PLAN:  1. We will start the patient on Maxipime. 2.  Increase Solu-Medrol to 40 q.6 hours. 3. MRSA nasal screen. 4.  We will get CAT scan of the chest without contrast.  5.  Check theophylline level. 6.  Respiratory disease panel, PCR.  7.  Sputum for culture and sensitivity. 8.  We will get home BiPAP eval prior to discharge. 9.  As per orders.         Kade Arellano MD    D: 04/09/2019 10:43:23       T: 04/09/2019 16:03:51     [email or web address]  Job#: [id number]     Doc#: [id number]    CC:

## 2019-04-10 LAB
ALBUMIN SERPL-MCNC: 3.4 GM/DL (ref 3.4–5)
ALP BLD-CCNC: 84 IU/L (ref 40–129)
ALT SERPL-CCNC: 24 U/L (ref 10–40)
ANION GAP SERPL CALCULATED.3IONS-SCNC: 7 MMOL/L (ref 4–16)
AST SERPL-CCNC: 18 IU/L (ref 15–37)
BASOPHILS ABSOLUTE: 0 K/CU MM
BASOPHILS RELATIVE PERCENT: 0.1 % (ref 0–1)
BILIRUB SERPL-MCNC: 0.2 MG/DL (ref 0–1)
BUN BLDV-MCNC: 17 MG/DL (ref 6–23)
CALCIUM SERPL-MCNC: 8.7 MG/DL (ref 8.3–10.6)
CHLORIDE BLD-SCNC: 87 MMOL/L (ref 99–110)
CO2: 40 MMOL/L (ref 21–32)
CREAT SERPL-MCNC: 1.1 MG/DL (ref 0.6–1.1)
CULTURE: NORMAL
DIFFERENTIAL TYPE: ABNORMAL
DOSE AMOUNT: NORMAL
DOSE TIME: NORMAL
EOSINOPHILS ABSOLUTE: 0 K/CU MM
EOSINOPHILS RELATIVE PERCENT: 0 % (ref 0–3)
GFR AFRICAN AMERICAN: >60 ML/MIN/1.73M2
GFR NON-AFRICAN AMERICAN: 51 ML/MIN/1.73M2
GLUCOSE BLD-MCNC: 217 MG/DL (ref 70–99)
HCT VFR BLD CALC: 30.2 % (ref 37–47)
HEMOGLOBIN: 8.8 GM/DL (ref 12.5–16)
IMMATURE NEUTROPHIL %: 0.7 % (ref 0–0.43)
LYMPHOCYTES ABSOLUTE: 0.4 K/CU MM
LYMPHOCYTES RELATIVE PERCENT: 5 % (ref 24–44)
Lab: NORMAL
MAGNESIUM: 1.8 MG/DL (ref 1.8–2.4)
MCH RBC QN AUTO: 27.2 PG (ref 27–31)
MCHC RBC AUTO-ENTMCNC: 29.1 % (ref 32–36)
MCV RBC AUTO: 93.2 FL (ref 78–100)
MONOCYTES ABSOLUTE: 0.5 K/CU MM
MONOCYTES RELATIVE PERCENT: 6.1 % (ref 0–4)
NUCLEATED RBC %: 0 %
PDW BLD-RTO: 14.9 % (ref 11.7–14.9)
PHOSPHORUS: 3.1 MG/DL (ref 2.5–4.9)
PLATELET # BLD: 325 K/CU MM (ref 140–440)
PMV BLD AUTO: 10 FL (ref 7.5–11.1)
POTASSIUM SERPL-SCNC: 3.5 MMOL/L (ref 3.5–5.1)
PRO-BNP: 1717 PG/ML
PROCALCITONIN: 0.04
RBC # BLD: 3.24 M/CU MM (ref 4.2–5.4)
SEGMENTED NEUTROPHILS ABSOLUTE COUNT: 7.4 K/CU MM
SEGMENTED NEUTROPHILS RELATIVE PERCENT: 88.1 % (ref 36–66)
SODIUM BLD-SCNC: 134 MMOL/L (ref 135–145)
SPECIMEN: NORMAL
THEOPHYLLINE LEVEL: 17.5 UG/ML (ref 10–20)
TOTAL IMMATURE NEUTOROPHIL: 0.06 K/CU MM
TOTAL NUCLEATED RBC: 0 K/CU MM
TOTAL PROTEIN: 5.5 GM/DL (ref 6.4–8.2)
WBC # BLD: 8.4 K/CU MM (ref 4–10.5)

## 2019-04-10 PROCEDURE — 85025 COMPLETE CBC W/AUTO DIFF WBC: CPT

## 2019-04-10 PROCEDURE — 2060000000 HC ICU INTERMEDIATE R&B

## 2019-04-10 PROCEDURE — 6370000000 HC RX 637 (ALT 250 FOR IP): Performed by: INTERNAL MEDICINE

## 2019-04-10 PROCEDURE — 83880 ASSAY OF NATRIURETIC PEPTIDE: CPT

## 2019-04-10 PROCEDURE — 6360000002 HC RX W HCPCS: Performed by: INTERNAL MEDICINE

## 2019-04-10 PROCEDURE — 2580000003 HC RX 258: Performed by: INTERNAL MEDICINE

## 2019-04-10 PROCEDURE — 2700000000 HC OXYGEN THERAPY PER DAY

## 2019-04-10 PROCEDURE — 94660 CPAP INITIATION&MGMT: CPT

## 2019-04-10 PROCEDURE — 94761 N-INVAS EAR/PLS OXIMETRY MLT: CPT

## 2019-04-10 PROCEDURE — 94640 AIRWAY INHALATION TREATMENT: CPT

## 2019-04-10 PROCEDURE — 83735 ASSAY OF MAGNESIUM: CPT

## 2019-04-10 PROCEDURE — 80053 COMPREHEN METABOLIC PANEL: CPT

## 2019-04-10 PROCEDURE — 84145 PROCALCITONIN (PCT): CPT

## 2019-04-10 PROCEDURE — 80198 ASSAY OF THEOPHYLLINE: CPT

## 2019-04-10 PROCEDURE — 94669 MECHANICAL CHEST WALL OSCILL: CPT

## 2019-04-10 PROCEDURE — 94667 MNPJ CHEST WALL 1ST: CPT

## 2019-04-10 PROCEDURE — 36415 COLL VENOUS BLD VENIPUNCTURE: CPT

## 2019-04-10 PROCEDURE — 84100 ASSAY OF PHOSPHORUS: CPT

## 2019-04-10 RX ADMIN — MONTELUKAST SODIUM 10 MG: 10 TABLET, FILM COATED ORAL at 08:36

## 2019-04-10 RX ADMIN — ENOXAPARIN SODIUM 40 MG: 40 INJECTION SUBCUTANEOUS at 08:40

## 2019-04-10 RX ADMIN — IPRATROPIUM BROMIDE AND ALBUTEROL SULFATE 3 ML: .5; 3 SOLUTION RESPIRATORY (INHALATION) at 11:10

## 2019-04-10 RX ADMIN — TRAZODONE HYDROCHLORIDE 75 MG: 50 TABLET ORAL at 20:32

## 2019-04-10 RX ADMIN — CLONAZEPAM 1 MG: 1 TABLET ORAL at 12:15

## 2019-04-10 RX ADMIN — SODIUM CHLORIDE, PRESERVATIVE FREE 10 ML: 5 INJECTION INTRAVENOUS at 20:34

## 2019-04-10 RX ADMIN — THEOPHYLLINE ANHYDROUS 400 MG: 200 CAPSULE, EXTENDED RELEASE ORAL at 08:36

## 2019-04-10 RX ADMIN — FLUTICASONE PROPIONATE 2 SPRAY: 50 SPRAY, METERED NASAL at 08:49

## 2019-04-10 RX ADMIN — BACLOFEN 10 MG: 10 TABLET ORAL at 14:12

## 2019-04-10 RX ADMIN — CEFEPIME HYDROCHLORIDE 1 G: 1 INJECTION, POWDER, FOR SOLUTION INTRAMUSCULAR; INTRAVENOUS at 14:14

## 2019-04-10 RX ADMIN — LEVOTHYROXINE SODIUM 150 MCG: 150 TABLET ORAL at 08:38

## 2019-04-10 RX ADMIN — IPRATROPIUM BROMIDE AND ALBUTEROL SULFATE 3 ML: .5; 3 SOLUTION RESPIRATORY (INHALATION) at 07:37

## 2019-04-10 RX ADMIN — ASPIRIN 81 MG 81 MG: 81 TABLET ORAL at 08:39

## 2019-04-10 RX ADMIN — BUSPIRONE HYDROCHLORIDE 10 MG: 10 TABLET ORAL at 08:40

## 2019-04-10 RX ADMIN — HYDRALAZINE HYDROCHLORIDE 50 MG: 50 TABLET, FILM COATED ORAL at 08:37

## 2019-04-10 RX ADMIN — CELECOXIB 200 MG: 200 CAPSULE ORAL at 20:33

## 2019-04-10 RX ADMIN — IPRATROPIUM BROMIDE AND ALBUTEROL SULFATE 3 ML: .5; 3 SOLUTION RESPIRATORY (INHALATION) at 19:45

## 2019-04-10 RX ADMIN — BACLOFEN 10 MG: 10 TABLET ORAL at 08:38

## 2019-04-10 RX ADMIN — ATORVASTATIN CALCIUM 40 MG: 40 TABLET, FILM COATED ORAL at 20:31

## 2019-04-10 RX ADMIN — ACETAMINOPHEN 650 MG: 325 TABLET ORAL at 12:14

## 2019-04-10 RX ADMIN — BUSPIRONE HYDROCHLORIDE 10 MG: 10 TABLET ORAL at 14:12

## 2019-04-10 RX ADMIN — CELECOXIB 200 MG: 200 CAPSULE ORAL at 08:37

## 2019-04-10 RX ADMIN — GUAIFENESIN 600 MG: 600 TABLET, EXTENDED RELEASE ORAL at 08:38

## 2019-04-10 RX ADMIN — CEFEPIME HYDROCHLORIDE 1 G: 1 INJECTION, POWDER, FOR SOLUTION INTRAMUSCULAR; INTRAVENOUS at 20:32

## 2019-04-10 RX ADMIN — METHYLPREDNISOLONE SODIUM SUCCINATE 40 MG: 40 INJECTION, POWDER, LYOPHILIZED, FOR SOLUTION INTRAMUSCULAR; INTRAVENOUS at 11:50

## 2019-04-10 RX ADMIN — CEFEPIME HYDROCHLORIDE 1 G: 1 INJECTION, POWDER, FOR SOLUTION INTRAMUSCULAR; INTRAVENOUS at 04:56

## 2019-04-10 RX ADMIN — IPRATROPIUM BROMIDE AND ALBUTEROL SULFATE 3 ML: .5; 3 SOLUTION RESPIRATORY (INHALATION) at 23:28

## 2019-04-10 RX ADMIN — FAMOTIDINE 40 MG: 20 TABLET ORAL at 08:39

## 2019-04-10 RX ADMIN — HYDRALAZINE HYDROCHLORIDE 50 MG: 50 TABLET, FILM COATED ORAL at 14:12

## 2019-04-10 RX ADMIN — AMLODIPINE BESYLATE 10 MG: 10 TABLET ORAL at 08:35

## 2019-04-10 RX ADMIN — METOPROLOL SUCCINATE 25 MG: 25 TABLET, EXTENDED RELEASE ORAL at 08:38

## 2019-04-10 RX ADMIN — FUROSEMIDE 40 MG: 40 TABLET ORAL at 08:39

## 2019-04-10 RX ADMIN — CLONAZEPAM 1 MG: 1 TABLET ORAL at 20:48

## 2019-04-10 RX ADMIN — SODIUM CHLORIDE, PRESERVATIVE FREE 10 ML: 5 INJECTION INTRAVENOUS at 08:42

## 2019-04-10 RX ADMIN — BUSPIRONE HYDROCHLORIDE 10 MG: 10 TABLET ORAL at 20:32

## 2019-04-10 RX ADMIN — METHYLPREDNISOLONE SODIUM SUCCINATE 40 MG: 40 INJECTION, POWDER, LYOPHILIZED, FOR SOLUTION INTRAMUSCULAR; INTRAVENOUS at 17:35

## 2019-04-10 RX ADMIN — BACLOFEN 10 MG: 10 TABLET ORAL at 20:31

## 2019-04-10 RX ADMIN — GUAIFENESIN 600 MG: 600 TABLET, EXTENDED RELEASE ORAL at 20:33

## 2019-04-10 RX ADMIN — IPRATROPIUM BROMIDE AND ALBUTEROL SULFATE 3 ML: .5; 3 SOLUTION RESPIRATORY (INHALATION) at 15:00

## 2019-04-10 RX ADMIN — FAMOTIDINE 40 MG: 20 TABLET ORAL at 20:31

## 2019-04-10 RX ADMIN — METHYLPREDNISOLONE SODIUM SUCCINATE 40 MG: 40 INJECTION, POWDER, LYOPHILIZED, FOR SOLUTION INTRAMUSCULAR; INTRAVENOUS at 05:34

## 2019-04-10 RX ADMIN — CITALOPRAM HYDROBROMIDE 40 MG: 40 TABLET ORAL at 08:38

## 2019-04-10 RX ADMIN — IPRATROPIUM BROMIDE AND ALBUTEROL SULFATE 3 ML: .5; 3 SOLUTION RESPIRATORY (INHALATION) at 03:22

## 2019-04-10 ASSESSMENT — PAIN SCALES - GENERAL
PAINLEVEL_OUTOF10: 3
PAINLEVEL_OUTOF10: 3
PAINLEVEL_OUTOF10: 0
PAINLEVEL_OUTOF10: 6
PAINLEVEL_OUTOF10: 0
PAINLEVEL_OUTOF10: 4
PAINLEVEL_OUTOF10: 3
PAINLEVEL_OUTOF10: 3
PAINLEVEL_OUTOF10: 0
PAINLEVEL_OUTOF10: 6
PAINLEVEL_OUTOF10: 0
PAINLEVEL_OUTOF10: 0
PAINLEVEL_OUTOF10: 4
PAINLEVEL_OUTOF10: 6

## 2019-04-10 ASSESSMENT — PAIN DESCRIPTION - LOCATION
LOCATION: ABDOMEN;LEG
LOCATION: ABDOMEN;LEG
LOCATION: NECK

## 2019-04-10 ASSESSMENT — PAIN DESCRIPTION - PROGRESSION

## 2019-04-10 ASSESSMENT — PAIN DESCRIPTION - PAIN TYPE
TYPE: ACUTE PAIN

## 2019-04-10 ASSESSMENT — PAIN DESCRIPTION - FREQUENCY: FREQUENCY: CONTINUOUS

## 2019-04-10 ASSESSMENT — PAIN - FUNCTIONAL ASSESSMENT: PAIN_FUNCTIONAL_ASSESSMENT: ACTIVITIES ARE NOT PREVENTED

## 2019-04-10 ASSESSMENT — PAIN DESCRIPTION - DESCRIPTORS
DESCRIPTORS: ACHING;CONSTANT;DISCOMFORT
DESCRIPTORS: ACHING

## 2019-04-10 ASSESSMENT — PAIN DESCRIPTION - ORIENTATION
ORIENTATION: MID;LOWER
ORIENTATION: LOWER;MID

## 2019-04-10 ASSESSMENT — PAIN DESCRIPTION - ONSET: ONSET: ON-GOING

## 2019-04-10 NOTE — CARE COORDINATION
Went to talk to [facility]. She has IMDI, nebulizer, C-Pap and oxygen at home. She uses her oxygen continuously at 3 lpm.  Her  washes her C-Pap in shampoo/conditioner all in one. Discussed what to use and how to clean the C-Pap mask and nebulizer. Went over when to use, how to use and what order to use a IMDI. Shake first, how to synchronize the inhalation with each puff, holding breath, waiting time between puffs, rinse mouth and how to make sure the canister is not empty. Went over nebulizer how the pieces come apart and go back together, care of the nebulizer set between each treatment, washing it each day, cold sterilization (distilled white vinegar and water) once or twice a week and air dry. Gave a paper hand-out with these instructions. Went over how to take apart the C-Pap mask and washing it each day. Weekly care of the mask assembly. Gave a paper hand-out with these instructions. Reviewed using the incentive spirometer and the benefits of using it along with deep breathing and coughing throughout the day. Explained using Pursed lip breathing and the Tri Pod Position when short of breath. Gave a COPD stop light. Reviewed what to look for in the beginning of a flare-up and when to call the doctor. Reviewed COPD, gave him a blue booklet and my business card in case he came up with any questions.     COPD CHECKLIST    Was the COPD Order set used      Yes and Pneumonia  Pneumonia/COPD Stoplight Education   Yes  Blood Cultures drawn before 1st antibiotic given   No  Antibiotic given within 24 hours    Yes  O2 Saturation on admission     100% Bi-Pap    Consults:             Smoking Cessation      N/A   Pulmonary       Yes  Med Assist Consult      No  Home Health Care Consult     No pt refused  Palliative Care Consult     No  Respiratory Consult      Yes  (including bedside instructions on nebulizers, inhalers, and assessment of oxygen and equipment needs at

## 2019-04-10 NOTE — PLAN OF CARE
Problem: Pain:  Goal: Pain level will decrease  Description  Pain level will decrease  Outcome: Ongoing  Goal: Control of acute pain  Description  Control of acute pain  Outcome: Ongoing  Goal: Control of chronic pain  Description  Control of chronic pain  Outcome: Ongoing     Problem: Discharge Planning:  Goal: Discharged to appropriate level of care  Description  Discharged to appropriate level of care  Outcome: Ongoing     Problem:  Activity Intolerance:  Goal: Ability to tolerate increased activity will improve  Description  Ability to tolerate increased activity will improve  Outcome: Ongoing     Problem: Airway Clearance - Ineffective:  Goal: Ability to maintain a clear airway will improve  Description  Ability to maintain a clear airway will improve  Outcome: Ongoing     Problem: Breathing Pattern - Ineffective:  Goal: Ability to achieve and maintain a regular respiratory rate will improve  Description  Ability to achieve and maintain a regular respiratory rate will improve  Outcome: Ongoing     Problem: Gas Exchange - Impaired:  Goal: Levels of oxygenation will improve  Description  Levels of oxygenation will improve  Outcome: Ongoing     Problem: Falls - Risk of:  Goal: Will remain free from falls  Description  Will remain free from falls  Outcome: Ongoing  Goal: Absence of physical injury  Description  Absence of physical injury  Outcome: Ongoing

## 2019-04-10 NOTE — PROGRESS NOTES
Pulmonary and Critical Care  Progress Note    Subjective: The patient has improved. CT chest reviewed. Shortness of breath has improved  Chest pain none  Addressing respiratory complaints Patient is negative for  hemoptysis and cyanosis  CONSTITUTIONAL:  negative for fevers and chills      Past Medical History:     has a past medical history of Anxiety, Arthritis, Back pain at L4-L5 level, Bipolar 1 disorder (HCC), COPD (chronic obstructive pulmonary disease) ([address] Emphysema of lung [address] [address] FH: CAD (coronary artery disease), Fibromyalgia, Fibromyalgia, H/O Doppler ultrasound, H/O echocardiogram, Hyperlipidemia LDL goal <100, Hypertension, Obstructive sleep apnea, Osteoarthritis, and Thyroid disease. has a past surgical history that includes Carpal tunnel release and Tubal ligation. reports that she quit smoking about 11 years ago. She has a 30.00 pack-year smoking history. She has never used smokeless tobacco. She reports that she drank about 1.2 oz of alcohol per week. She reports that she does not use drugs. Family history:  family history includes No Known Problems in her father and mother. Allergies   Allergen Reactions    Lisinopril Swelling and Rash     angioedema     Social History:    Reviewed; no changes    Objective:   PHYSICAL EXAM:        VITALS:  /63   Pulse 79   Temp 98.2 °F (36.8 °C) (Oral)   Resp 19   Ht 5' 1\" (1.549 m)   Wt 172 lb 2.9 oz (78.1 kg)   SpO2 98%   BMI 32.53 kg/m²     24HR INTAKE/OUTPUT:      Intake/Output Summary (Last 24 hours) at 4/10/2019 1050  Last data filed at 4/10/2019 0745  Gross per 24 hour   Intake 550 ml   Output 1050 ml   Net -500 ml       CONSTITUTIONAL:  awake, alert, cooperative, no apparent distress, and appears stated age  LUNGS:  Moving air better. CARDIOVASCULAR:  normal S1 and S2 and positive JVD  ABD:Abdomen soft, non-tender. BS normal. No masses,  No organomegaly  NEURO:Alert and oriented x3.  Gait normal. Reflexes and motor strength normal and symmetric. Cranial nerves 2-12 and sensation grossly intact. DATA:    CBC:  Recent Labs     04/08/19  1033 04/10/19  0309   WBC 7.9 8.4   RBC 3.49* 3.24*   HGB 9.6* 8.8*   HCT 34.1* 30.2*    325   MCV 97.7 93.2   MCH 27.5 27.2   MCHC 28.2* 29.1*   RDW 15.3* 14.9   SEGSPCT 74.2* 88.1*      BMP:  Recent Labs     04/08/19  1033 04/10/19  0309    134*   K 4.9 3.5   CL 97* 87*   CO2 34* 40*   BUN 12 17   CREATININE 1.0 1.1   CALCIUM 8.6 8.7   GLUCOSE 125* 217*      ABG:  Recent Labs     04/08/19  1230   PH 7.38   PO2ART 84   PBF1IRU 60.0*   O2SAT 94.2*     Lab Results   Component Value Date    PROBNP 1,717 (H) 04/10/2019    PROBNP 925.0 (H) 04/08/2019    PROBNP 1,776 (H) 03/07/2019    THEOPH 17.5 04/10/2019     No results found for: [facility]    Radiology Review:  Pertinent images / reports were reviewed as a part of this visit. Assessment:     Patient Active Problem List   Diagnosis    Centrilobular emphysema ([address]    Hypertension    Hyperlipidemia LDL goal <100    Abnormal EKG    Palpitations    Anxiety    FH: CAD (coronary artery disease)    Fibromyalgia    Back pain at L4-L5 level    Sleep apnea    COPD exacerbation (HCC)    Electrolyte imbalance    Epigastric pain    COPD (chronic obstructive pulmonary disease) ([address]    Spinal stenosis of lumbar region with radiculopathy    Spinal stenosis, lumbar region, without neurogenic claudication    COPD with acute exacerbation ([address]    Pneumonia due to infectious organism    SVT [facility]ra[facility] tachycardia) ([facility])    Class 1 obesity due to excess calories with body mass index (BMI) of 31.0 to 31.9 in adult    Pneumonia    Pleural effusion    Atelectasis    Pulmonary nodules    Respiratory failure with hypoxia and hypercapnia ([address]       Plan:   1. Overall the patient has improved. 2. Gentle diuresis. 3. Home Bipap eval prior to D/C.     Ulysses Hacking MD  4/10/2019  10:50 AM

## 2019-04-10 NOTE — PROGRESS NOTES
Pt doing well at this time. Pt resting quietly in bed watching TV. Call light in reach, bed alarm on, bed in lowest position.

## 2019-04-10 NOTE — PROGRESS NOTES
Family Medicine Progress Note  4/10/2019 7:14 AM  Subjective:   Admit Date: 4/8/2019  PCP: Melonie Scott MD  Diet: DIET GENERAL;  Pain is:None  Nausea:None  Bowel Movement/Flatus yes    Interval History: Admitted for COPD execration and acute on chronic Respiratory failure. Currently on antibiotics and IV steroids and O2. On home O2, but states she uses it prn. . Pulmonary consult by Candi Trotter reviewed. Has h/o arthritis an currently on multiple meds, requests narcotics, explained policy on narcotics and that DJD was a chronic condition that needs managed with alternate therapies. Will consider Toradol injection if needed. Denies any chest pains,  palpitations. Denies nausea or vomiting. No bowel or bladder symptoms. Restful night. Rest of ROS -ve    Data:   Scheduled Meds:   methylPREDNISolone  40 mg Intravenous Q6H    cefepime  1 g Intravenous Q8H    guaiFENesin  600 mg Oral BID    sodium chloride flush  10 mL Intravenous 2 times per day    amLODIPine  10 mg Oral Daily    aspirin  81 mg Oral Daily    atorvastatin  40 mg Oral Nightly    baclofen  10 mg Oral TID    busPIRone  10 mg Oral TID    celecoxib  200 mg Oral BID    citalopram  40 mg Oral Daily    famotidine  40 mg Oral BID    fluticasone  2 spray Nasal Daily    furosemide  40 mg Oral Daily    hydrALAZINE  50 mg Oral TID    levothyroxine  150 mcg Oral QAM    metoprolol succinate  25 mg Oral Daily    montelukast  10 mg Oral Daily    theophylline  400 mg Oral Daily    traZODone  75 mg Oral Nightly    sodium chloride flush  10 mL Intravenous 2 times per day    enoxaparin  40 mg Subcutaneous Daily    ipratropium-albuterol  1 vial Inhalation Q4H     Continuous Infusions:  PRN Meds:acetaminophen, sodium chloride flush, clonazePAM, sodium chloride flush, magnesium hydroxide, ondansetron  I/O last 3 completed shifts: In: 550 [P.O.:450; IV Piggyback:100]  Out: 900 [Urine:900]  No intake/output data recorded.     Intake/Output Summary (Last 24 hours) at 4/10/2019 0714  Last data filed at 4/10/2019 0534  Gross per 24 hour   Intake 550 ml   Output 900 ml   Net -350 ml     CBC:   Recent Labs     04/08/19  1033 04/10/19  0309   WBC 7.9 8.4   HGB 9.6* 8.8*    325     BMP:    Recent Labs     04/08/19  1033 04/10/19  0309    134*   K 4.9 3.5   CL 97* 87*   CO2 34* 40*   BUN 12 17   CREATININE 1.0 1.1   GLUCOSE 125* 217*     Hepatic:   Recent Labs     04/08/19  1033 04/10/19  0309   AST 52* 18   ALT 40 24   BILITOT 0.3 0.2   ALKPHOS 115 84     Troponin:   Recent Labs     04/08/19  1033   TROPONINT <0.010     BNP:   Recent Labs     04/08/19  1033 04/10/19  0309   PROBNP 925.0* 1,717*       Objective:   Vitals: BP (!) 105/48   Pulse 77   Temp 98.2 °F (36.8 °C) (Oral)   Resp 17   Ht 5' 1\" (1.549 m)   Wt 172 lb 2.9 oz (78.1 kg)   SpO2 97%   BMI 32.53 kg/m²   General appearance: alert and cooperative with exam. On Nasal O2  HEENT: Head: Normal, normocephalic, atraumatic. Eye: Normal external eye, conjunctiva, lids cornea, VERÓNICA. Nose: Normal external nose, mucus membranes and septum. Neck: no adenopathy,  supple, symmetrical, trachea midline and thyroid not enlarged, symmetric, no tenderness/mass/nodules  Lungs: distant breath sounds  Heart:  regular rate and rhythm  Abdomen: soft, non-tender; bowel sounds normal; no masses,  no organomegaly  Extremities: extremities normal, atraumatic, no cyanosis or edema  Neurologic: Mental status: Alert, oriented, thought content appropriate    Assessment and Plan:   Principal Problem:   Respiratory failure with hypoxia and hypercapnia ([address] - Secondary to COPD  Consult notes reviewed, will continue monitoring.     Electronically signed by Milton Balbuena MD on 4/10/2019 at 7:14 AM

## 2019-04-11 PROBLEM — D64.9 ANEMIA: Status: ACTIVE | Noted: 2019-04-11

## 2019-04-11 LAB
ANION GAP SERPL CALCULATED.3IONS-SCNC: 7 MMOL/L (ref 4–16)
BACTERIA: NEGATIVE /HPF
BILIRUB SERPL-MCNC: 0.3 MG/DL (ref 0–1)
BILIRUBIN DIRECT: 0.2 MG/DL (ref 0–0.3)
BILIRUBIN URINE: NEGATIVE MG/DL
BILIRUBIN, INDIRECT: 0.1 MG/DL (ref 0–0.7)
BLOOD, URINE: NEGATIVE
BUN BLDV-MCNC: 21 MG/DL (ref 6–23)
CALCIUM SERPL-MCNC: 9.2 MG/DL (ref 8.3–10.6)
CHLORIDE BLD-SCNC: 92 MMOL/L (ref 99–110)
CLARITY: CLEAR
CO2: 39 MMOL/L (ref 21–32)
COLOR: YELLOW
CREAT SERPL-MCNC: 1.1 MG/DL (ref 0.6–1.1)
FERRITIN: 39 NG/ML (ref 15–150)
GFR AFRICAN AMERICAN: >60 ML/MIN/1.73M2
GFR NON-AFRICAN AMERICAN: 51 ML/MIN/1.73M2
GLUCOSE BLD-MCNC: 249 MG/DL (ref 70–99)
GLUCOSE, URINE: NEGATIVE MG/DL
IRON: 30 UG/DL (ref 37–145)
KETONES, URINE: NEGATIVE MG/DL
LACTATE DEHYDROGENASE: 328 IU/L (ref 120–246)
LEUKOCYTE ESTERASE, URINE: NEGATIVE
NITRITE URINE, QUANTITATIVE: NEGATIVE
PCT TRANSFERRIN: 10 % (ref 10–44)
PH, URINE: 6 (ref 5–8)
POTASSIUM SERPL-SCNC: 3.9 MMOL/L (ref 3.5–5.1)
PRO-BNP: 1322 PG/ML
PROTEIN UA: NEGATIVE MG/DL
RBC URINE: NORMAL /HPF (ref 0–6)
RETICULOCYTE COUNT PCT: 4.2 % (ref 0.2–2.2)
SODIUM BLD-SCNC: 138 MMOL/L (ref 135–145)
SPECIFIC GRAVITY UA: 1.01 (ref 1–1.03)
SQUAMOUS EPITHELIAL: <1 /HPF
TOTAL IRON BINDING CAPACITY: 310 UG/DL (ref 250–450)
TRICHOMONAS: NORMAL /HPF
TSH HIGH SENSITIVITY: <0.01 UIU/ML (ref 0.27–4.2)
UNSATURATED IRON BINDING CAPACITY: 280 UG/DL (ref 110–370)
UROBILINOGEN, URINE: NORMAL MG/DL (ref 0.2–1)
WBC UA: 1 /HPF (ref 0–5)

## 2019-04-11 PROCEDURE — 94667 MNPJ CHEST WALL 1ST: CPT

## 2019-04-11 PROCEDURE — 82248 BILIRUBIN DIRECT: CPT

## 2019-04-11 PROCEDURE — 83615 LACTATE (LD) (LDH) ENZYME: CPT

## 2019-04-11 PROCEDURE — 6360000002 HC RX W HCPCS: Performed by: INTERNAL MEDICINE

## 2019-04-11 PROCEDURE — 6370000000 HC RX 637 (ALT 250 FOR IP): Performed by: INTERNAL MEDICINE

## 2019-04-11 PROCEDURE — 94761 N-INVAS EAR/PLS OXIMETRY MLT: CPT

## 2019-04-11 PROCEDURE — 80048 BASIC METABOLIC PNL TOTAL CA: CPT

## 2019-04-11 PROCEDURE — 2060000000 HC ICU INTERMEDIATE R&B

## 2019-04-11 PROCEDURE — 2700000000 HC OXYGEN THERAPY PER DAY

## 2019-04-11 PROCEDURE — 2580000003 HC RX 258: Performed by: INTERNAL MEDICINE

## 2019-04-11 PROCEDURE — 36415 COLL VENOUS BLD VENIPUNCTURE: CPT

## 2019-04-11 PROCEDURE — 94640 AIRWAY INHALATION TREATMENT: CPT

## 2019-04-11 PROCEDURE — 87077 CULTURE AEROBIC IDENTIFY: CPT

## 2019-04-11 PROCEDURE — 87186 SC STD MICRODIL/AGAR DIL: CPT

## 2019-04-11 PROCEDURE — 81001 URINALYSIS AUTO W/SCOPE: CPT

## 2019-04-11 PROCEDURE — 83540 ASSAY OF IRON: CPT

## 2019-04-11 PROCEDURE — 87205 SMEAR GRAM STAIN: CPT

## 2019-04-11 PROCEDURE — 82803 BLOOD GASES ANY COMBINATION: CPT

## 2019-04-11 PROCEDURE — 82247 BILIRUBIN TOTAL: CPT

## 2019-04-11 PROCEDURE — 94668 MNPJ CHEST WALL SBSQ: CPT

## 2019-04-11 PROCEDURE — 85045 AUTOMATED RETICULOCYTE COUNT: CPT

## 2019-04-11 PROCEDURE — 84165 PROTEIN E-PHORESIS SERUM: CPT

## 2019-04-11 PROCEDURE — 87070 CULTURE OTHR SPECIMN AEROBIC: CPT

## 2019-04-11 PROCEDURE — 84443 ASSAY THYROID STIM HORMONE: CPT

## 2019-04-11 PROCEDURE — 83010 ASSAY OF HAPTOGLOBIN QUANT: CPT

## 2019-04-11 PROCEDURE — 82728 ASSAY OF FERRITIN: CPT

## 2019-04-11 PROCEDURE — 83880 ASSAY OF NATRIURETIC PEPTIDE: CPT

## 2019-04-11 PROCEDURE — 83550 IRON BINDING TEST: CPT

## 2019-04-11 RX ORDER — METHYLPREDNISOLONE SODIUM SUCCINATE 40 MG/ML
40 INJECTION, POWDER, LYOPHILIZED, FOR SOLUTION INTRAMUSCULAR; INTRAVENOUS EVERY 8 HOURS
Status: DISCONTINUED | OUTPATIENT
Start: 2019-04-11 | End: 2019-04-13

## 2019-04-11 RX ORDER — LEVOTHYROXINE SODIUM 0.15 MG/1
150 TABLET ORAL EVERY MORNING
Status: DISCONTINUED | OUTPATIENT
Start: 2019-04-11 | End: 2019-04-15 | Stop reason: HOSPADM

## 2019-04-11 RX ORDER — FERROUS GLUCONATE 324(37.5)
324 TABLET ORAL 2 TIMES DAILY
Status: DISCONTINUED | OUTPATIENT
Start: 2019-04-11 | End: 2019-04-15 | Stop reason: HOSPADM

## 2019-04-11 RX ADMIN — GUAIFENESIN 600 MG: 600 TABLET, EXTENDED RELEASE ORAL at 21:49

## 2019-04-11 RX ADMIN — FERROUS GLUCONATE TAB 324 MG (37.5 MG ELEMENTAL IRON) 324 MG: 324 (37.5 FE) TAB at 21:49

## 2019-04-11 RX ADMIN — CEFEPIME HYDROCHLORIDE 1 G: 1 INJECTION, POWDER, FOR SOLUTION INTRAMUSCULAR; INTRAVENOUS at 05:29

## 2019-04-11 RX ADMIN — BACLOFEN 10 MG: 10 TABLET ORAL at 09:12

## 2019-04-11 RX ADMIN — CEFEPIME HYDROCHLORIDE 1 G: 1 INJECTION, POWDER, FOR SOLUTION INTRAMUSCULAR; INTRAVENOUS at 21:50

## 2019-04-11 RX ADMIN — CLONAZEPAM 1 MG: 1 TABLET ORAL at 16:57

## 2019-04-11 RX ADMIN — MONTELUKAST SODIUM 10 MG: 10 TABLET, FILM COATED ORAL at 09:12

## 2019-04-11 RX ADMIN — SODIUM CHLORIDE, PRESERVATIVE FREE 10 ML: 5 INJECTION INTRAVENOUS at 09:14

## 2019-04-11 RX ADMIN — CITALOPRAM HYDROBROMIDE 40 MG: 40 TABLET ORAL at 09:12

## 2019-04-11 RX ADMIN — AMLODIPINE BESYLATE 10 MG: 10 TABLET ORAL at 09:12

## 2019-04-11 RX ADMIN — FAMOTIDINE 40 MG: 20 TABLET ORAL at 21:50

## 2019-04-11 RX ADMIN — FLUTICASONE PROPIONATE 2 SPRAY: 50 SPRAY, METERED NASAL at 09:13

## 2019-04-11 RX ADMIN — BACLOFEN 10 MG: 10 TABLET ORAL at 13:42

## 2019-04-11 RX ADMIN — BUSPIRONE HYDROCHLORIDE 10 MG: 10 TABLET ORAL at 13:49

## 2019-04-11 RX ADMIN — CEFEPIME HYDROCHLORIDE 1 G: 1 INJECTION, POWDER, FOR SOLUTION INTRAMUSCULAR; INTRAVENOUS at 13:42

## 2019-04-11 RX ADMIN — METHYLPREDNISOLONE SODIUM SUCCINATE 40 MG: 40 INJECTION, POWDER, LYOPHILIZED, FOR SOLUTION INTRAMUSCULAR; INTRAVENOUS at 05:30

## 2019-04-11 RX ADMIN — BUSPIRONE HYDROCHLORIDE 10 MG: 10 TABLET ORAL at 21:50

## 2019-04-11 RX ADMIN — HYDRALAZINE HYDROCHLORIDE 50 MG: 50 TABLET, FILM COATED ORAL at 09:12

## 2019-04-11 RX ADMIN — SODIUM CHLORIDE, PRESERVATIVE FREE 10 ML: 5 INJECTION INTRAVENOUS at 09:13

## 2019-04-11 RX ADMIN — CELECOXIB 200 MG: 200 CAPSULE ORAL at 09:12

## 2019-04-11 RX ADMIN — TRAZODONE HYDROCHLORIDE 75 MG: 50 TABLET ORAL at 21:49

## 2019-04-11 RX ADMIN — METHYLPREDNISOLONE SODIUM SUCCINATE 40 MG: 40 INJECTION, POWDER, LYOPHILIZED, FOR SOLUTION INTRAMUSCULAR; INTRAVENOUS at 21:50

## 2019-04-11 RX ADMIN — IPRATROPIUM BROMIDE AND ALBUTEROL SULFATE 3 ML: .5; 3 SOLUTION RESPIRATORY (INHALATION) at 12:18

## 2019-04-11 RX ADMIN — SODIUM CHLORIDE, PRESERVATIVE FREE 10 ML: 5 INJECTION INTRAVENOUS at 22:14

## 2019-04-11 RX ADMIN — BACLOFEN 10 MG: 10 TABLET ORAL at 21:50

## 2019-04-11 RX ADMIN — METHYLPREDNISOLONE SODIUM SUCCINATE 40 MG: 40 INJECTION, POWDER, LYOPHILIZED, FOR SOLUTION INTRAMUSCULAR; INTRAVENOUS at 00:28

## 2019-04-11 RX ADMIN — ENOXAPARIN SODIUM 40 MG: 40 INJECTION SUBCUTANEOUS at 09:13

## 2019-04-11 RX ADMIN — GUAIFENESIN 600 MG: 600 TABLET, EXTENDED RELEASE ORAL at 09:12

## 2019-04-11 RX ADMIN — CLONAZEPAM 1 MG: 1 TABLET ORAL at 22:12

## 2019-04-11 RX ADMIN — IPRATROPIUM BROMIDE AND ALBUTEROL SULFATE 3 ML: .5; 3 SOLUTION RESPIRATORY (INHALATION) at 23:55

## 2019-04-11 RX ADMIN — ASPIRIN 81 MG 81 MG: 81 TABLET ORAL at 09:12

## 2019-04-11 RX ADMIN — FUROSEMIDE 40 MG: 40 TABLET ORAL at 09:12

## 2019-04-11 RX ADMIN — ACETAMINOPHEN 650 MG: 325 TABLET ORAL at 22:12

## 2019-04-11 RX ADMIN — ACETAMINOPHEN 650 MG: 325 TABLET ORAL at 14:36

## 2019-04-11 RX ADMIN — ATORVASTATIN CALCIUM 40 MG: 40 TABLET, FILM COATED ORAL at 21:50

## 2019-04-11 RX ADMIN — LEVOTHYROXINE SODIUM 150 MCG: 150 TABLET ORAL at 06:15

## 2019-04-11 RX ADMIN — FAMOTIDINE 40 MG: 20 TABLET ORAL at 09:12

## 2019-04-11 RX ADMIN — BUSPIRONE HYDROCHLORIDE 10 MG: 10 TABLET ORAL at 09:13

## 2019-04-11 RX ADMIN — IPRATROPIUM BROMIDE AND ALBUTEROL SULFATE 3 ML: .5; 3 SOLUTION RESPIRATORY (INHALATION) at 03:15

## 2019-04-11 RX ADMIN — IPRATROPIUM BROMIDE AND ALBUTEROL SULFATE 3 ML: .5; 3 SOLUTION RESPIRATORY (INHALATION) at 07:32

## 2019-04-11 RX ADMIN — SODIUM CHLORIDE, PRESERVATIVE FREE 10 ML: 5 INJECTION INTRAVENOUS at 21:50

## 2019-04-11 RX ADMIN — METOPROLOL SUCCINATE 25 MG: 25 TABLET, EXTENDED RELEASE ORAL at 09:12

## 2019-04-11 RX ADMIN — CELECOXIB 200 MG: 200 CAPSULE ORAL at 21:49

## 2019-04-11 RX ADMIN — CLONAZEPAM 1 MG: 1 TABLET ORAL at 09:24

## 2019-04-11 RX ADMIN — METHYLPREDNISOLONE SODIUM SUCCINATE 40 MG: 40 INJECTION, POWDER, LYOPHILIZED, FOR SOLUTION INTRAMUSCULAR; INTRAVENOUS at 13:42

## 2019-04-11 RX ADMIN — SODIUM CHLORIDE, PRESERVATIVE FREE 10 ML: 5 INJECTION INTRAVENOUS at 05:29

## 2019-04-11 RX ADMIN — IPRATROPIUM BROMIDE AND ALBUTEROL SULFATE 3 ML: .5; 3 SOLUTION RESPIRATORY (INHALATION) at 20:05

## 2019-04-11 ASSESSMENT — PAIN DESCRIPTION - PROGRESSION
CLINICAL_PROGRESSION: NOT CHANGED

## 2019-04-11 ASSESSMENT — PAIN SCALES - GENERAL
PAINLEVEL_OUTOF10: 3
PAINLEVEL_OUTOF10: 0
PAINLEVEL_OUTOF10: 0
PAINLEVEL_OUTOF10: 5
PAINLEVEL_OUTOF10: 5

## 2019-04-11 ASSESSMENT — PAIN DESCRIPTION - PAIN TYPE: TYPE: ACUTE PAIN

## 2019-04-11 ASSESSMENT — PAIN DESCRIPTION - FREQUENCY: FREQUENCY: INTERMITTENT

## 2019-04-11 ASSESSMENT — PAIN DESCRIPTION - LOCATION: LOCATION: LEG

## 2019-04-11 ASSESSMENT — PAIN DESCRIPTION - ORIENTATION: ORIENTATION: LEFT

## 2019-04-11 ASSESSMENT — PAIN DESCRIPTION - DESCRIPTORS: DESCRIPTORS: ACHING;DISCOMFORT

## 2019-04-11 NOTE — PROGRESS NOTES
Patient was seen in hospital for acute on chronic hypoxic hypercarbic resp. failure. I am prescribing Non-invasive ventilator because the diagnosis and testing requires the patient to have Non-invasive ventilator in the home. Conditions will improve or be benefited by the use of Non-invasive ventilator. CPAP/Bipap will not effectively treat this patient's hypercapnia. I have determined through testing and evaluation that DALTON is not the primary cause of this patients hypercapnia.

## 2019-04-11 NOTE — CARE COORDINATION
attempt to speak with patient regarding discharge planning. Patient up in bedside chair chatting with a female visitor. Patient request for CM to come back another time when her visitor is not here. Per chart review patient is from home with her spouse Nirmal Weinberg and son . Patient  wears home oxygen. Patient has PCP Dr Malissa Marti and [facility] and was independent at home prior to admission. No needs identified at this time. Case Management to follow in case needs would arise.

## 2019-04-11 NOTE — CONSULTS
[facility]  Consult Note  2019  1:53 PM    Patient:    Vikas Mcmillan  : 1960   66 y.o. MRN: [id number]  Admitted: 2019 10:10 AM ATT: Kesha Jacome MD   -A  AdmitDx: Hypercarbia [R06.89]  Pneumonia due to organism [J18.9]  Hypoxia [R09.02]  COPD exacerbation ([address] [J44.1]  Loculated pleural effusion [J90]  COPD exacerbation (HCC) [J44.1]  PCP: Kar Madrid MD    Reason for Consult:  Anemia    Requesting Physician:  Kesha Jacome MD      History Obtained From:  Patient and review of all records    HISTORY OF PRESENT ILLNESS:    Very pleasant female reported increased sob, LE edema, chest pressure, palpitations and dizziness. Denied any cough or any fever. Reported lower abdominal pain. No nausea or emesis. Reported LE edema. No overt bleeding. Bruises easily. She was being followed by Dr Quique Montiel for anemia, last seen . At one point she developed cold agglutinins associated with pna    19: Ct abdomen and pelvis:   Impression   1. Diverticulosis. 2. Right renal cortical atrophy and thinning, nonspecific.  Multiple small   right renal cortical cysts. 3. Partially loculated right pleural effusion with right middle and lower   lobe scarring and/or atelectasis. 19: CT chest:  1. Small right pleural effusion which is likely loculated, with atelectasis   in the right middle lobe   2. Trace left pleural effusion   3. New right upper lobe pulmonary nodules with a dominant 1.1 cm spiculated   lesion.  These are likely inflammatory given the rate of appearance. Recommend follow-up chest CT in 3-4 months     19:   Left Impression   No evidence of DVT or SVT in the left common femoral vein, femoral vein,   popliteal vein, greater saphenous vein or small saphenous vein. Normal compressibility of veins visualized in the left lower extremity. Respirophasic venous flow of the veins visualized in the left leg.    No significant reflux noted in the veins of the BID    fluticasone  2 spray Nasal Daily    furosemide  40 mg Oral Daily    hydrALAZINE  50 mg Oral TID    metoprolol succinate  25 mg Oral Daily    montelukast  10 mg Oral Daily    traZODone  75 mg Oral Nightly    sodium chloride flush  10 mL Intravenous 2 times per day    enoxaparin  40 mg Subcutaneous Daily    ipratropium-albuterol  1 vial Inhalation Q4H     PRN Medications: acetaminophen, sodium chloride flush, clonazePAM, sodium chloride flush, magnesium hydroxide, ondansetron      Allergies:  Lisinopril    Social History:   TOBACCO:   reports that she quit smoking about 11 years ago. She has a 30.00 pack-year smoking history. She has never used smokeless tobacco.  ETOH:   reports that she drank about 1.2 oz of alcohol per week. Family History:       Problem Relation Age of Onset    No Known Problems Mother     No Known Problems Father          REVIEW OF SYSTEMS:      PHYSICAL EXAM:      Vitals:    BP (!) 98/53   Pulse 83   Temp 98.9 °F (37.2 °C) (Oral)   Resp 18   Ht 5' 1\" (1.549 m)   Wt 175 lb 4.3 oz (79.5 kg)   SpO2 100%   BMI 33.12 kg/m²   CONSTITUTIONAL: awake, alert, tired appearing  EYES: eusebia, no palor  ENT:AT NC  HEMATOLOGIC/LYMPHATIC: no cervical, supraclavicular or axillary lymphadenopathy   LUNGS: poor ins effort  CARDIOVASCULAR: regular rate and rhythm, normal S1 and S2, no murmur noted  ABDOMEN:soft mild ttp lower abdomen, bs pos, no masses  NEUROLOGIC: awake, alert, oriented to name, place and time.  Motor skills grossly intact.   SKIN:Echymosis  EXTREMITIES:mild LE edema    DATA:    ABGs:   Lab Results   Component Value Date    PO2ART 84 04/08/2019    GHN0SCY 60.0 04/08/2019     CBC:   Recent Labs     04/10/19  0309   WBC 8.4   HGB 8.8*        BMP:    Recent Labs     04/10/19  0309 04/11/19  0206   * 138   K 3.5 3.9   CL 87* 92*   CO2 40* 39*   BUN 17 21   CREATININE 1.1 1.1   GLUCOSE 217* 249*     Magnesium:   Lab Results   Component Value Date    MG 1.8 04/10/2019     Hepatic:   Recent Labs     04/10/19  0309 04/11/19  0940   AST 18  --    ALT 24  --    BILITOT 0.2 0.3   ALKPHOS 84  --      No results for input(s): LIPASE, AMYLASE in the last 72 hours. No results for input(s): PROTIME, INR in the last 72 hours. No results for input(s): PTT in the last 72 hours. Lipids: No results for input(s): CHOL, HDL in the last 72 hours. Invalid input(s): LDLCALCU  INR: No results for input(s): INR in the last 72 hours. TSH: No results found for: TSH    Intake/Output Summary (Last 24 hours) at 4/11/2019 1353  Last data filed at 4/11/2019 0556  Gross per 24 hour   Intake 870 ml   Output 500 ml   Net 370 ml   PS with anisocytosis, poikilocytosis, polychromasia , hypochromia. . Few plt clumping, few giant platelets. Anemia: Normocytic. Iron indices and ferritin suggestive of NEVIN. R/O blood loss. Oral iron. R/O hemolysis, monoclonal gammopathy. Other w/u in progress. Transfuse if Hb <8. SIDNEY pending. Easy bruising: Pt/aptt pending    Continue other medical care    Thank you for letting us be part of the care and will follow along    Discussed the findings and plan and she verbalized understanding.      Martina Lama MD  4/11/2019  1:53 PM

## 2019-04-11 NOTE — PROGRESS NOTES
Pulmonary and Critical Care  Progress Note    Subjective: The patient is better. Shortness of breath has improved. Chest pain none. Addressing respiratory complaints Patient is negative for  hemoptysis and cyanosis. CONSTITUTIONAL:  negative for fevers and chills. Past Medical History:     has a past medical history of Anxiety, Arthritis, Back pain at L4-L5 level, Bipolar 1 disorder ([address] COPD (chronic obstructive pulmonary disease) ([facility] [address] [facility] Emphysema of lung ([facility] [address] FH: CAD (coronary artery disease), Fibromyalgia, Fibromyalgia, H/O Doppler ultrasound, H/O echocardiogram, Hyperlipidemia LDL goal <100, Hypertension, Obstructive sleep apnea, Osteoarthritis, and Thyroid disease. has a past surgical history that includes Carpal tunnel release and Tubal ligation. reports that she quit smoking about 11 years ago. She has a 30.00 pack-year smoking history. She has never used smokeless tobacco. She reports that she drank about 1.2 oz of alcohol per week. She reports that she does not use drugs. Family history:  family history includes No Known Problems in her father and mother. Allergies   Allergen Reactions    Lisinopril Swelling and Rash     angioedema     Social History:    Reviewed; no changes    Objective:   PHYSICAL EXAM:        VITALS:  /70   Pulse 83   Temp 97.9 °F (36.6 °C) (Oral)   Resp 21   Ht 5' 1\" (1.549 m)   Wt 175 lb 4.3 oz (79.5 kg)   SpO2 95%   BMI 33.12 kg/m²     24HR INTAKE/OUTPUT:      Intake/Output Summary (Last 24 hours) at 4/11/2019 1030  Last data filed at 4/11/2019 0556  Gross per 24 hour   Intake 1110 ml   Output 500 ml   Net 610 ml       CONSTITUTIONAL:  awake, alert, cooperative, no apparent distress, and appears stated age  LUNGS:  Moving air better. CARDIOVASCULAR:  normal S1 and S2 and positive JVD  ABD:Abdomen soft, non-tender. BS normal. No masses,  No organomegaly  NEURO:Alert and oriented x3. Gait normal. Reflexes and motor strength normal and symmetric.

## 2019-04-12 LAB
ALBUMIN ELP: 3.3 GM/DL (ref 3.2–5.6)
ALPHA-1-GLOBULIN: 0.3 GM/DL (ref 0.1–0.4)
ALPHA-2-GLOBULIN: 0.9 GM/DL (ref 0.4–1.2)
APTT: 25.3 SECONDS (ref 21.2–33)
BETA GLOBULIN: 0.9 GM/DL (ref 0.5–1.3)
DAT IGG: NEGATIVE
GAMMA GLOBULIN: 0.5 GM/DL (ref 0.5–1.6)
HAPTOGLOBIN: 274 MG/DL (ref 30–200)
HAPTOGLOBIN: ABNORMAL MG/DL (ref 30–200)
INR BLD: 1.01 INDEX
PROTHROMBIN TIME: 11.5 SECONDS (ref 9.12–12.5)
SPEP INTERPRETATION: ABNORMAL
TOTAL PROTEIN: 5.9 GM/DL (ref 6.4–8.2)

## 2019-04-12 PROCEDURE — 36415 COLL VENOUS BLD VENIPUNCTURE: CPT

## 2019-04-12 PROCEDURE — 85730 THROMBOPLASTIN TIME PARTIAL: CPT

## 2019-04-12 PROCEDURE — 85610 PROTHROMBIN TIME: CPT

## 2019-04-12 PROCEDURE — 2700000000 HC OXYGEN THERAPY PER DAY

## 2019-04-12 PROCEDURE — G0328 FECAL BLOOD SCRN IMMUNOASSAY: HCPCS

## 2019-04-12 PROCEDURE — 6360000002 HC RX W HCPCS: Performed by: INTERNAL MEDICINE

## 2019-04-12 PROCEDURE — 6370000000 HC RX 637 (ALT 250 FOR IP): Performed by: INTERNAL MEDICINE

## 2019-04-12 PROCEDURE — 2060000000 HC ICU INTERMEDIATE R&B

## 2019-04-12 PROCEDURE — 94668 MNPJ CHEST WALL SBSQ: CPT

## 2019-04-12 PROCEDURE — 94640 AIRWAY INHALATION TREATMENT: CPT

## 2019-04-12 PROCEDURE — 86880 COOMBS TEST DIRECT: CPT

## 2019-04-12 PROCEDURE — 94761 N-INVAS EAR/PLS OXIMETRY MLT: CPT

## 2019-04-12 PROCEDURE — 2580000003 HC RX 258: Performed by: INTERNAL MEDICINE

## 2019-04-12 RX ADMIN — BACLOFEN 10 MG: 10 TABLET ORAL at 10:43

## 2019-04-12 RX ADMIN — BUSPIRONE HYDROCHLORIDE 10 MG: 10 TABLET ORAL at 22:04

## 2019-04-12 RX ADMIN — FAMOTIDINE 40 MG: 20 TABLET ORAL at 22:05

## 2019-04-12 RX ADMIN — FERROUS GLUCONATE TAB 324 MG (37.5 MG ELEMENTAL IRON) 324 MG: 324 (37.5 FE) TAB at 22:05

## 2019-04-12 RX ADMIN — ACETAMINOPHEN 650 MG: 325 TABLET ORAL at 22:35

## 2019-04-12 RX ADMIN — AMLODIPINE BESYLATE 10 MG: 10 TABLET ORAL at 10:42

## 2019-04-12 RX ADMIN — CELECOXIB 200 MG: 200 CAPSULE ORAL at 22:03

## 2019-04-12 RX ADMIN — BACLOFEN 10 MG: 10 TABLET ORAL at 13:17

## 2019-04-12 RX ADMIN — CEFEPIME HYDROCHLORIDE 1 G: 1 INJECTION, POWDER, FOR SOLUTION INTRAMUSCULAR; INTRAVENOUS at 22:59

## 2019-04-12 RX ADMIN — IPRATROPIUM BROMIDE AND ALBUTEROL SULFATE 3 ML: .5; 3 SOLUTION RESPIRATORY (INHALATION) at 17:25

## 2019-04-12 RX ADMIN — BACLOFEN 10 MG: 10 TABLET ORAL at 22:05

## 2019-04-12 RX ADMIN — CITALOPRAM HYDROBROMIDE 40 MG: 40 TABLET ORAL at 10:43

## 2019-04-12 RX ADMIN — CEFEPIME HYDROCHLORIDE 1 G: 1 INJECTION, POWDER, FOR SOLUTION INTRAMUSCULAR; INTRAVENOUS at 13:13

## 2019-04-12 RX ADMIN — CLONAZEPAM 1 MG: 1 TABLET ORAL at 22:35

## 2019-04-12 RX ADMIN — FLUTICASONE PROPIONATE 2 SPRAY: 50 SPRAY, METERED NASAL at 10:57

## 2019-04-12 RX ADMIN — GUAIFENESIN 600 MG: 600 TABLET, EXTENDED RELEASE ORAL at 22:04

## 2019-04-12 RX ADMIN — IPRATROPIUM BROMIDE AND ALBUTEROL SULFATE 3 ML: .5; 3 SOLUTION RESPIRATORY (INHALATION) at 19:37

## 2019-04-12 RX ADMIN — SODIUM CHLORIDE, PRESERVATIVE FREE 10 ML: 5 INJECTION INTRAVENOUS at 10:47

## 2019-04-12 RX ADMIN — SODIUM CHLORIDE, PRESERVATIVE FREE 10 ML: 5 INJECTION INTRAVENOUS at 10:48

## 2019-04-12 RX ADMIN — METHYLPREDNISOLONE SODIUM SUCCINATE 40 MG: 40 INJECTION, POWDER, LYOPHILIZED, FOR SOLUTION INTRAMUSCULAR; INTRAVENOUS at 13:13

## 2019-04-12 RX ADMIN — CLONAZEPAM 1 MG: 1 TABLET ORAL at 11:05

## 2019-04-12 RX ADMIN — METOPROLOL SUCCINATE 25 MG: 25 TABLET, EXTENDED RELEASE ORAL at 11:04

## 2019-04-12 RX ADMIN — ASPIRIN 81 MG 81 MG: 81 TABLET ORAL at 10:43

## 2019-04-12 RX ADMIN — HYDRALAZINE HYDROCHLORIDE 50 MG: 50 TABLET, FILM COATED ORAL at 11:04

## 2019-04-12 RX ADMIN — HYDRALAZINE HYDROCHLORIDE 50 MG: 50 TABLET, FILM COATED ORAL at 22:04

## 2019-04-12 RX ADMIN — ENOXAPARIN SODIUM 40 MG: 40 INJECTION SUBCUTANEOUS at 10:45

## 2019-04-12 RX ADMIN — FERROUS GLUCONATE TAB 324 MG (37.5 MG ELEMENTAL IRON) 324 MG: 324 (37.5 FE) TAB at 10:45

## 2019-04-12 RX ADMIN — CELECOXIB 200 MG: 200 CAPSULE ORAL at 10:42

## 2019-04-12 RX ADMIN — IRON SUCROSE 300 MG: 20 INJECTION, SOLUTION INTRAVENOUS at 14:12

## 2019-04-12 RX ADMIN — BUSPIRONE HYDROCHLORIDE 10 MG: 10 TABLET ORAL at 13:18

## 2019-04-12 RX ADMIN — CEFEPIME HYDROCHLORIDE 1 G: 1 INJECTION, POWDER, FOR SOLUTION INTRAMUSCULAR; INTRAVENOUS at 04:41

## 2019-04-12 RX ADMIN — FUROSEMIDE 40 MG: 40 TABLET ORAL at 10:45

## 2019-04-12 RX ADMIN — MONTELUKAST SODIUM 10 MG: 10 TABLET, FILM COATED ORAL at 11:05

## 2019-04-12 RX ADMIN — FAMOTIDINE 40 MG: 20 TABLET ORAL at 10:44

## 2019-04-12 RX ADMIN — IPRATROPIUM BROMIDE AND ALBUTEROL SULFATE 3 ML: .5; 3 SOLUTION RESPIRATORY (INHALATION) at 23:17

## 2019-04-12 RX ADMIN — METHYLPREDNISOLONE SODIUM SUCCINATE 40 MG: 40 INJECTION, POWDER, LYOPHILIZED, FOR SOLUTION INTRAMUSCULAR; INTRAVENOUS at 05:15

## 2019-04-12 RX ADMIN — METHYLPREDNISOLONE SODIUM SUCCINATE 40 MG: 40 INJECTION, POWDER, LYOPHILIZED, FOR SOLUTION INTRAMUSCULAR; INTRAVENOUS at 22:59

## 2019-04-12 RX ADMIN — LEVOTHYROXINE SODIUM 150 MCG: 150 TABLET ORAL at 07:31

## 2019-04-12 RX ADMIN — ACETAMINOPHEN 650 MG: 325 TABLET ORAL at 11:05

## 2019-04-12 RX ADMIN — BUSPIRONE HYDROCHLORIDE 10 MG: 10 TABLET ORAL at 10:45

## 2019-04-12 RX ADMIN — IPRATROPIUM BROMIDE AND ALBUTEROL SULFATE 3 ML: .5; 3 SOLUTION RESPIRATORY (INHALATION) at 03:40

## 2019-04-12 RX ADMIN — GUAIFENESIN 600 MG: 600 TABLET, EXTENDED RELEASE ORAL at 11:05

## 2019-04-12 RX ADMIN — IPRATROPIUM BROMIDE AND ALBUTEROL SULFATE 3 ML: .5; 3 SOLUTION RESPIRATORY (INHALATION) at 12:10

## 2019-04-12 RX ADMIN — TRAZODONE HYDROCHLORIDE 75 MG: 50 TABLET ORAL at 22:05

## 2019-04-12 RX ADMIN — ATORVASTATIN CALCIUM 40 MG: 40 TABLET, FILM COATED ORAL at 22:05

## 2019-04-12 ASSESSMENT — PAIN DESCRIPTION - ONSET
ONSET: ON-GOING
ONSET: ON-GOING

## 2019-04-12 ASSESSMENT — PAIN SCALES - GENERAL
PAINLEVEL_OUTOF10: 0
PAINLEVEL_OUTOF10: 0
PAINLEVEL_OUTOF10: 3
PAINLEVEL_OUTOF10: 0
PAINLEVEL_OUTOF10: 0
PAINLEVEL_OUTOF10: 5
PAINLEVEL_OUTOF10: 5
PAINLEVEL_OUTOF10: 3
PAINLEVEL_OUTOF10: 0

## 2019-04-12 ASSESSMENT — PAIN DESCRIPTION - FREQUENCY
FREQUENCY: CONTINUOUS
FREQUENCY: CONTINUOUS

## 2019-04-12 ASSESSMENT — PAIN DESCRIPTION - PAIN TYPE
TYPE: CHRONIC PAIN
TYPE: CHRONIC PAIN

## 2019-04-12 ASSESSMENT — PAIN - FUNCTIONAL ASSESSMENT
PAIN_FUNCTIONAL_ASSESSMENT: ACTIVITIES ARE NOT PREVENTED
PAIN_FUNCTIONAL_ASSESSMENT: ACTIVITIES ARE NOT PREVENTED

## 2019-04-12 ASSESSMENT — PAIN DESCRIPTION - DESCRIPTORS
DESCRIPTORS: ACHING
DESCRIPTORS: ACHING

## 2019-04-12 ASSESSMENT — PAIN DESCRIPTION - PROGRESSION
CLINICAL_PROGRESSION: GRADUALLY WORSENING
CLINICAL_PROGRESSION: GRADUALLY WORSENING

## 2019-04-12 ASSESSMENT — PAIN DESCRIPTION - LOCATION
LOCATION: ARM
LOCATION: BACK

## 2019-04-12 ASSESSMENT — PAIN DESCRIPTION - ORIENTATION
ORIENTATION: LEFT
ORIENTATION: RIGHT;LEFT;MID;LOWER

## 2019-04-12 NOTE — PROGRESS NOTES
Pulmonary and Critical Care  Progress Note    Subjective: The patient has improved. NIV being arranged. Shortness of breath has improved  Chest pain none  Addressing respiratory complaints Patient is negative for  hemoptysis and cyanosis  CONSTITUTIONAL:  negative for fevers and chills      Past Medical History:     has a past medical history of Anxiety, Arthritis, Back pain at L4-L5 level, Bipolar 1 disorder (HCC), COPD (chronic obstructive pulmonary disease) ([facility] [address] Emphysema of lung ([facility] [address] FH: CAD (coronary artery disease), Fibromyalgia, Fibromyalgia, H/O Doppler ultrasound, H/O echocardiogram, Hyperlipidemia LDL goal <100, Hypertension, Obstructive sleep apnea, Osteoarthritis, and Thyroid disease. has a past surgical history that includes Carpal tunnel release and Tubal ligation. reports that she quit smoking about 11 years ago. She has a 30.00 pack-year smoking history. She has never used smokeless tobacco. She reports that she drank about 1.2 oz of alcohol per week. She reports that she does not use drugs. Family history:  family history includes No Known Problems in her father and mother. Allergies   Allergen Reactions    Lisinopril Swelling and Rash     angioedema     Social History:    Reviewed; no changes    Objective:   PHYSICAL EXAM:        VITALS:  /69   Pulse 80   Temp 98.4 °F (36.9 °C) (Oral)   Resp 22   Ht 5' 1\" (1.549 m)   Wt 177 lb 7.5 oz (80.5 kg)   SpO2 97%   BMI 33.53 kg/m²     24HR INTAKE/OUTPUT:      Intake/Output Summary (Last 24 hours) at 4/12/2019 1245  Last data filed at 4/12/2019 1118  Gross per 24 hour   Intake 1320 ml   Output 700 ml   Net 620 ml       CONSTITUTIONAL:  awake, alert, cooperative, no apparent distress, and appears stated age  LUNGS:  Moving air better. CARDIOVASCULAR:  normal S1 and S2 and positive JVD  ABD:Abdomen soft, non-tender. BS normal. No masses,  No organomegaly  NEURO:Alert and oriented x3.  Gait normal. Reflexes and motor strength normal and symmetric. Cranial nerves 2-12 and sensation grossly intact. DATA:    CBC:  Recent Labs     04/10/19  0309   WBC 8.4   RBC 3.24*   HGB 8.8*   HCT 30.2*      MCV 93.2   MCH 27.2   MCHC 29.1*   RDW 14.9   SEGSPCT 88.1*      BMP:  Recent Labs     04/10/19  0309 04/11/19  0206   * 138   K 3.5 3.9   CL 87* 92*   CO2 40* 39*   BUN 17 21   CREATININE 1.1 1.1   CALCIUM 8.7 9.2   GLUCOSE 217* 249*      ABG:  No results for input(s): PH, PO2ART, ALA6XYH, HCO3, BEART, O2SAT in the last 72 hours. Lab Results   Component Value Date    PROBNP 1,322 (H) 04/11/2019    PROBNP 1,717 (H) 04/10/2019    PROBNP 925.0 (H) 04/08/2019    THEOPH 17.5 04/10/2019     No results found for: [facility]    Radiology Review:  Pertinent images / reports were reviewed as a part of this visit. Assessment:     Patient Active Problem List   Diagnosis    Centrilobular emphysema ([address]    Hypertension    Hyperlipidemia LDL goal <100    Abnormal EKG    Palpitations    Anxiety    FH: CAD (coronary artery disease)    Fibromyalgia    Back pain at L4-L5 level    Sleep apnea    COPD exacerbation (HCC)    Electrolyte imbalance    Epigastric pain    COPD (chronic obstructive pulmonary disease) ([address]    Spinal stenosis of lumbar region with radiculopathy    Spinal stenosis, lumbar region, without neurogenic claudication    COPD with acute exacerbation ([address]    Pneumonia due to infectious organism    SVT (supraventricular tachycardia) (HCC)    Class 1 obesity due to excess calories with body mass index (BMI) of 31.0 to 31.9 in adult    Pneumonia    Pleural effusion    Atelectasis    Pulmonary nodules    Respiratory failure with hypoxia and hypercapnia (HCC)    Anemia       Plan:   1. Overall the patient has improved. 2. Inc. activity. 3. Arrange NIV Trelegy.     Pilar Reza MD  4/12/2019  12:45 PM

## 2019-04-12 NOTE — DISCHARGE INSTR - OTHER ORDERS
Follow-up With  Details  Why  Contact Info   Kaye Ortiz MD  Go on 4/16/2019  appointment at 2:45 pm  [address]  [phone]

## 2019-04-12 NOTE — PROGRESS NOTES
Family Medicine Progress Note  4/12/2019 7:10 AM  Subjective:   Admit Date: 4/8/2019  PCP: Ronnie Aschoff, MD  Diet: DIET GENERAL;  Pain is:None  Nausea:None  Bowel Movement/Flatus yes    Interval History: Admitted for COPD execration and acute on chronic Respiratory failure. Currently on antibiotics and IV steroids and O2. On home O2, but states she uses it prn. . Pulmonary consult by Itzel Gill reviewed. Has h/o arthritis an currently on multiple meds, requests narcotics, explained policy on narcotics and that DJD was a chronic condition that needs managed with alternate therapies. Will consider Toradol injection if needed. H/H has dropped, appears she has a long history of anemia and been treated by Madalyn Boss in 2014. D/w . Denies any chest pains,  palpitations. Denies nausea or vomiting. No bowel or bladder symptoms. Restful night. Rest of ROS -ve    Data:   Scheduled Meds:   levothyroxine  150 mcg Oral QAM    methylPREDNISolone  40 mg Intravenous Q8H    ferrous gluconate  324 mg Oral BID    cefepime  1 g Intravenous Q8H    guaiFENesin  600 mg Oral BID    sodium chloride flush  10 mL Intravenous 2 times per day    amLODIPine  10 mg Oral Daily    aspirin  81 mg Oral Daily    atorvastatin  40 mg Oral Nightly    baclofen  10 mg Oral TID    busPIRone  10 mg Oral TID    celecoxib  200 mg Oral BID    citalopram  40 mg Oral Daily    famotidine  40 mg Oral BID    fluticasone  2 spray Nasal Daily    furosemide  40 mg Oral Daily    hydrALAZINE  50 mg Oral TID    metoprolol succinate  25 mg Oral Daily    montelukast  10 mg Oral Daily    traZODone  75 mg Oral Nightly    sodium chloride flush  10 mL Intravenous 2 times per day    enoxaparin  40 mg Subcutaneous Daily    ipratropium-albuterol  1 vial Inhalation Q4H     Continuous Infusions:  PRN Meds:acetaminophen, sodium chloride flush, clonazePAM, sodium chloride flush, magnesium hydroxide, ondansetron  I/O last 3 completed shifts:   In: 1620 [P.O.:1520; I.V.:50; IV Piggyback:50]  Out: -   No intake/output data recorded. Intake/Output Summary (Last 24 hours) at 4/12/2019 0710  Last data filed at 4/12/2019 0601  Gross per 24 hour   Intake 1620 ml   Output --   Net 1620 ml     CBC:   Recent Labs     04/10/19  0309   WBC 8.4   HGB 8.8*        BMP:    Recent Labs     04/10/19  0309 04/11/19  0206   * 138   K 3.5 3.9   CL 87* 92*   CO2 40* 39*   BUN 17 21   CREATININE 1.1 1.1   GLUCOSE 217* 249*     Hepatic:   Recent Labs     04/10/19  0309 04/11/19  0940   AST 18  --    ALT 24  --    BILITOT 0.2 0.3   ALKPHOS 84  --      Troponin:   No results for input(s): TROPONINT in the last 72 hours. BNP:   Recent Labs     04/10/19  0309 04/11/19  0206   PROBNP 1,717* 1,322*       Objective:   Vitals: /68   Pulse 83   Temp 98.3 °F (36.8 °C) (Oral)   Resp 14   Ht 5' 1\" (1.549 m)   Wt 177 lb 7.5 oz (80.5 kg)   SpO2 96%   BMI 33.53 kg/m²   General appearance: alert and cooperative with exam. On Nasal O2  HEENT: Head: Normal, normocephalic, atraumatic. Eye: Normal external eye, conjunctiva, lids cornea, VERÓNICA. Nose: Normal external nose, mucus membranes and septum. Neck: no adenopathy,  supple, symmetrical, trachea midline and thyroid not enlarged, symmetric, no tenderness/mass/nodules  Lungs: distant breath sounds  Heart:  regular rate and rhythm  Abdomen: soft, non-tender; bowel sounds normal; no masses,  no organomegaly  Extremities: extremities normal, atraumatic, no cyanosis or edema  Neurologic: Mental status: Alert, oriented, thought content appropriate    Assessment and Plan:   Principal Problem:   Respiratory failure with hypoxia and hypercapnia ([address] - Secondary to COPD  Anemia -  Hematology consult reviewed, d/w ,   Consult notes reviewed, will continue monitoring.     Electronically signed by Jose Noonan MD on 4/12/2019 at 7:10 AM

## 2019-04-12 NOTE — PROGRESS NOTES
Pt seen and examined  Continues to feel tired  No bleeding    aao x 3  ctab  s1s2   Soft nd bs pos  LE Edema      SIDNEY pending  FOB pending  UA No blood  SPEP pending  TSH <0.010  Bili normal, RC 4.2, , Hapto pending  Sats 10%, ferritin 39   PT 11.5, PTT 25.3    4/8/19: Ct abdomen and pelvis:   Impression   1. Diverticulosis. 2. Right renal cortical atrophy and thinning, nonspecific.  Multiple small   right renal cortical cysts. 3. Partially loculated right pleural effusion with right middle and lower   lobe scarring and/or atelectasis.      4/9/19: CT chest:  1. Small right pleural effusion which is likely loculated, with atelectasis   in the right middle lobe   2. Trace left pleural effusion   3. New right upper lobe pulmonary nodules with a dominant 1.1 cm spiculated   lesion.  These are likely inflammatory given the rate of appearance. Recommend follow-up chest CT in 3-4 months      4/5/19:   Left Impression   No evidence of DVT or SVT in the left common femoral vein, femoral vein,   popliteal vein, greater saphenous vein or small saphenous vein. Normal compressibility of veins visualized in the left lower extremity. Respirophasic venous flow of the veins visualized in the left leg. No significant reflux noted in the veins of the left lower extremity.      Right Impression   No evidence of DVT or SVT in the right common femoral vein, femoral vein,   popliteal vein, greater saphenous vein or small saphenous vein. Normal compressibility of veins visualized in the right lower extremity. Respirophasic venous flow of the veins visualized in the right leg.    No significant reflux noted in the veins of the right lower extremity.      2/18/2019 CBC with a WBC of 9.7 hemoglobin of 11.3 hematocrit of 35.5 MCV of 90.1 platelets of 141     3/2/2019 CBC with hemoglobin of 9.9 hematocrit 34.3.    4/10/2019 CBC with WBC of 8.4 hemoglobin of 8.8 hematocrit 30.2 MCV of 93.2 platelets of 204      PS with

## 2019-04-13 LAB
ALBUMIN SERPL-MCNC: 4.1 GM/DL (ref 3.4–5)
ALP BLD-CCNC: 89 IU/L (ref 40–129)
ALT SERPL-CCNC: 26 U/L (ref 10–40)
ANION GAP SERPL CALCULATED.3IONS-SCNC: 10 MMOL/L (ref 4–16)
AST SERPL-CCNC: 15 IU/L (ref 15–37)
BILIRUB SERPL-MCNC: 0.3 MG/DL (ref 0–1)
BUN BLDV-MCNC: 18 MG/DL (ref 6–23)
CALCIUM SERPL-MCNC: 9.3 MG/DL (ref 8.3–10.6)
CHLORIDE BLD-SCNC: 91 MMOL/L (ref 99–110)
CO2: 40 MMOL/L (ref 21–32)
CREAT SERPL-MCNC: 0.7 MG/DL (ref 0.6–1.1)
DIFFERENTIAL TYPE: ABNORMAL
GFR AFRICAN AMERICAN: >60 ML/MIN/1.73M2
GFR NON-AFRICAN AMERICAN: >60 ML/MIN/1.73M2
GLUCOSE BLD-MCNC: 160 MG/DL (ref 70–99)
HCT VFR BLD CALC: 36 % (ref 37–47)
HEMOCCULT SP1 STL QL: NEGATIVE
HEMOGLOBIN: 10.4 GM/DL (ref 12.5–16)
LYMPHOCYTES ABSOLUTE: 0.9 K/CU MM
LYMPHOCYTES RELATIVE PERCENT: 7 % (ref 24–44)
MCH RBC QN AUTO: 26.9 PG (ref 27–31)
MCHC RBC AUTO-ENTMCNC: 28.9 % (ref 32–36)
MCV RBC AUTO: 93 FL (ref 78–100)
MONOCYTES ABSOLUTE: 1.1 K/CU MM
MONOCYTES RELATIVE PERCENT: 9 % (ref 0–4)
PDW BLD-RTO: 14.6 % (ref 11.7–14.9)
PLATELET # BLD: 337 K/CU MM (ref 140–440)
PLT MORPHOLOGY: ABNORMAL
PMV BLD AUTO: 10 FL (ref 7.5–11.1)
POTASSIUM SERPL-SCNC: 3.2 MMOL/L (ref 3.5–5.1)
RBC # BLD: 3.87 M/CU MM (ref 4.2–5.4)
RBC # BLD: ABNORMAL 10*6/UL
SEGMENTED NEUTROPHILS ABSOLUTE COUNT: 10.2 K/CU MM
SEGMENTED NEUTROPHILS RELATIVE PERCENT: 84 % (ref 36–66)
SODIUM BLD-SCNC: 141 MMOL/L (ref 135–145)
TOTAL PROTEIN: 6.2 GM/DL (ref 6.4–8.2)
WBC # BLD: 12.2 K/CU MM (ref 4–10.5)

## 2019-04-13 PROCEDURE — 80053 COMPREHEN METABOLIC PANEL: CPT

## 2019-04-13 PROCEDURE — 94761 N-INVAS EAR/PLS OXIMETRY MLT: CPT

## 2019-04-13 PROCEDURE — 6360000002 HC RX W HCPCS: Performed by: INTERNAL MEDICINE

## 2019-04-13 PROCEDURE — 94660 CPAP INITIATION&MGMT: CPT

## 2019-04-13 PROCEDURE — 85007 BL SMEAR W/DIFF WBC COUNT: CPT

## 2019-04-13 PROCEDURE — 2580000003 HC RX 258: Performed by: INTERNAL MEDICINE

## 2019-04-13 PROCEDURE — 94668 MNPJ CHEST WALL SBSQ: CPT

## 2019-04-13 PROCEDURE — 36415 COLL VENOUS BLD VENIPUNCTURE: CPT

## 2019-04-13 PROCEDURE — 2060000000 HC ICU INTERMEDIATE R&B

## 2019-04-13 PROCEDURE — 85027 COMPLETE CBC AUTOMATED: CPT

## 2019-04-13 PROCEDURE — 6370000000 HC RX 637 (ALT 250 FOR IP): Performed by: INTERNAL MEDICINE

## 2019-04-13 PROCEDURE — 94640 AIRWAY INHALATION TREATMENT: CPT

## 2019-04-13 PROCEDURE — 2700000000 HC OXYGEN THERAPY PER DAY

## 2019-04-13 RX ORDER — METHYLPREDNISOLONE SODIUM SUCCINATE 40 MG/ML
40 INJECTION, POWDER, LYOPHILIZED, FOR SOLUTION INTRAMUSCULAR; INTRAVENOUS EVERY 12 HOURS
Status: DISCONTINUED | OUTPATIENT
Start: 2019-04-13 | End: 2019-04-15 | Stop reason: HOSPADM

## 2019-04-13 RX ORDER — ACETAMINOPHEN 80 MG
TABLET,CHEWABLE ORAL
Status: COMPLETED
Start: 2019-04-13 | End: 2019-04-14

## 2019-04-13 RX ORDER — POTASSIUM CHLORIDE 20 MEQ/1
20 TABLET, EXTENDED RELEASE ORAL
Status: DISCONTINUED | OUTPATIENT
Start: 2019-04-14 | End: 2019-04-15 | Stop reason: HOSPADM

## 2019-04-13 RX ADMIN — HYDRALAZINE HYDROCHLORIDE 50 MG: 50 TABLET, FILM COATED ORAL at 20:41

## 2019-04-13 RX ADMIN — FERROUS GLUCONATE TAB 324 MG (37.5 MG ELEMENTAL IRON) 324 MG: 324 (37.5 FE) TAB at 10:54

## 2019-04-13 RX ADMIN — TRAZODONE HYDROCHLORIDE 75 MG: 50 TABLET ORAL at 20:40

## 2019-04-13 RX ADMIN — AMLODIPINE BESYLATE 10 MG: 10 TABLET ORAL at 10:55

## 2019-04-13 RX ADMIN — CEFEPIME HYDROCHLORIDE 1 G: 1 INJECTION, POWDER, FOR SOLUTION INTRAMUSCULAR; INTRAVENOUS at 14:43

## 2019-04-13 RX ADMIN — IPRATROPIUM BROMIDE AND ALBUTEROL SULFATE 3 ML: .5; 3 SOLUTION RESPIRATORY (INHALATION) at 04:10

## 2019-04-13 RX ADMIN — IPRATROPIUM BROMIDE AND ALBUTEROL SULFATE 3 ML: .5; 3 SOLUTION RESPIRATORY (INHALATION) at 11:14

## 2019-04-13 RX ADMIN — ATORVASTATIN CALCIUM 40 MG: 40 TABLET, FILM COATED ORAL at 20:41

## 2019-04-13 RX ADMIN — SODIUM CHLORIDE, PRESERVATIVE FREE 10 ML: 5 INJECTION INTRAVENOUS at 10:59

## 2019-04-13 RX ADMIN — THEOPHYLLINE ANHYDROUS 100 MG: 100 CAPSULE, EXTENDED RELEASE ORAL at 20:40

## 2019-04-13 RX ADMIN — THEOPHYLLINE ANHYDROUS 100 MG: 100 CAPSULE, EXTENDED RELEASE ORAL at 14:49

## 2019-04-13 RX ADMIN — METHYLPREDNISOLONE SODIUM SUCCINATE 40 MG: 40 INJECTION, POWDER, LYOPHILIZED, FOR SOLUTION INTRAMUSCULAR; INTRAVENOUS at 18:00

## 2019-04-13 RX ADMIN — SODIUM CHLORIDE, PRESERVATIVE FREE 10 ML: 5 INJECTION INTRAVENOUS at 20:42

## 2019-04-13 RX ADMIN — CEFEPIME HYDROCHLORIDE 1 G: 1 INJECTION, POWDER, FOR SOLUTION INTRAMUSCULAR; INTRAVENOUS at 05:02

## 2019-04-13 RX ADMIN — FAMOTIDINE 40 MG: 20 TABLET ORAL at 20:40

## 2019-04-13 RX ADMIN — MAGNESIUM HYDROXIDE 30 ML: 400 SUSPENSION ORAL at 22:28

## 2019-04-13 RX ADMIN — CEFEPIME HYDROCHLORIDE 1 G: 1 INJECTION, POWDER, FOR SOLUTION INTRAMUSCULAR; INTRAVENOUS at 20:41

## 2019-04-13 RX ADMIN — BUSPIRONE HYDROCHLORIDE 10 MG: 10 TABLET ORAL at 14:42

## 2019-04-13 RX ADMIN — CELECOXIB 200 MG: 200 CAPSULE ORAL at 10:54

## 2019-04-13 RX ADMIN — FERROUS GLUCONATE TAB 324 MG (37.5 MG ELEMENTAL IRON) 324 MG: 324 (37.5 FE) TAB at 20:41

## 2019-04-13 RX ADMIN — BACLOFEN 10 MG: 10 TABLET ORAL at 10:53

## 2019-04-13 RX ADMIN — HYDRALAZINE HYDROCHLORIDE 50 MG: 50 TABLET, FILM COATED ORAL at 14:42

## 2019-04-13 RX ADMIN — IPRATROPIUM BROMIDE AND ALBUTEROL SULFATE 3 ML: .5; 3 SOLUTION RESPIRATORY (INHALATION) at 19:36

## 2019-04-13 RX ADMIN — ENOXAPARIN SODIUM 40 MG: 40 INJECTION SUBCUTANEOUS at 10:57

## 2019-04-13 RX ADMIN — SODIUM CHLORIDE, PRESERVATIVE FREE 10 ML: 5 INJECTION INTRAVENOUS at 20:41

## 2019-04-13 RX ADMIN — HYDRALAZINE HYDROCHLORIDE 50 MG: 50 TABLET, FILM COATED ORAL at 10:55

## 2019-04-13 RX ADMIN — MONTELUKAST SODIUM 10 MG: 10 TABLET, FILM COATED ORAL at 10:53

## 2019-04-13 RX ADMIN — BACLOFEN 10 MG: 10 TABLET ORAL at 14:42

## 2019-04-13 RX ADMIN — ACETAMINOPHEN 650 MG: 325 TABLET ORAL at 22:24

## 2019-04-13 RX ADMIN — GUAIFENESIN 600 MG: 600 TABLET, EXTENDED RELEASE ORAL at 10:53

## 2019-04-13 RX ADMIN — GUAIFENESIN 600 MG: 600 TABLET, EXTENDED RELEASE ORAL at 20:41

## 2019-04-13 RX ADMIN — ACETAMINOPHEN 650 MG: 325 TABLET ORAL at 10:53

## 2019-04-13 RX ADMIN — METOPROLOL SUCCINATE 25 MG: 25 TABLET, EXTENDED RELEASE ORAL at 10:56

## 2019-04-13 RX ADMIN — FLUTICASONE PROPIONATE 2 SPRAY: 50 SPRAY, METERED NASAL at 10:57

## 2019-04-13 RX ADMIN — BACLOFEN 10 MG: 10 TABLET ORAL at 20:41

## 2019-04-13 RX ADMIN — SODIUM CHLORIDE, PRESERVATIVE FREE 10 ML: 5 INJECTION INTRAVENOUS at 10:58

## 2019-04-13 RX ADMIN — CITALOPRAM HYDROBROMIDE 40 MG: 40 TABLET ORAL at 10:55

## 2019-04-13 RX ADMIN — FAMOTIDINE 40 MG: 20 TABLET ORAL at 10:54

## 2019-04-13 RX ADMIN — LEVOTHYROXINE SODIUM 150 MCG: 150 TABLET ORAL at 05:40

## 2019-04-13 RX ADMIN — CLONAZEPAM 1 MG: 1 TABLET ORAL at 10:55

## 2019-04-13 RX ADMIN — CLONAZEPAM 1 MG: 1 TABLET ORAL at 22:24

## 2019-04-13 RX ADMIN — SODIUM CHLORIDE, PRESERVATIVE FREE 10 ML: 5 INJECTION INTRAVENOUS at 05:02

## 2019-04-13 RX ADMIN — CELECOXIB 200 MG: 200 CAPSULE ORAL at 20:41

## 2019-04-13 RX ADMIN — FUROSEMIDE 40 MG: 40 TABLET ORAL at 10:55

## 2019-04-13 RX ADMIN — IPRATROPIUM BROMIDE AND ALBUTEROL SULFATE 3 ML: .5; 3 SOLUTION RESPIRATORY (INHALATION) at 08:45

## 2019-04-13 RX ADMIN — IPRATROPIUM BROMIDE AND ALBUTEROL SULFATE 3 ML: .5; 3 SOLUTION RESPIRATORY (INHALATION) at 15:37

## 2019-04-13 RX ADMIN — BUSPIRONE HYDROCHLORIDE 10 MG: 10 TABLET ORAL at 10:56

## 2019-04-13 RX ADMIN — ASPIRIN 81 MG 81 MG: 81 TABLET ORAL at 10:55

## 2019-04-13 RX ADMIN — BUSPIRONE HYDROCHLORIDE 10 MG: 10 TABLET ORAL at 20:41

## 2019-04-13 RX ADMIN — METHYLPREDNISOLONE SODIUM SUCCINATE 40 MG: 40 INJECTION, POWDER, LYOPHILIZED, FOR SOLUTION INTRAMUSCULAR; INTRAVENOUS at 05:02

## 2019-04-13 ASSESSMENT — PAIN SCALES - GENERAL
PAINLEVEL_OUTOF10: 0
PAINLEVEL_OUTOF10: 2
PAINLEVEL_OUTOF10: 0
PAINLEVEL_OUTOF10: 3
PAINLEVEL_OUTOF10: 0

## 2019-04-13 ASSESSMENT — PAIN DESCRIPTION - PROGRESSION
CLINICAL_PROGRESSION: NOT CHANGED
CLINICAL_PROGRESSION: NOT CHANGED

## 2019-04-13 ASSESSMENT — PAIN DESCRIPTION - LOCATION
LOCATION: HEAD
LOCATION: OTHER (COMMENT)

## 2019-04-13 ASSESSMENT — PAIN DESCRIPTION - DESCRIPTORS
DESCRIPTORS: ACHING;CONSTANT
DESCRIPTORS: ACHING

## 2019-04-13 ASSESSMENT — PAIN - FUNCTIONAL ASSESSMENT: PAIN_FUNCTIONAL_ASSESSMENT: ACTIVITIES ARE NOT PREVENTED

## 2019-04-13 ASSESSMENT — PAIN DESCRIPTION - ONSET
ONSET: ON-GOING
ONSET: GRADUAL

## 2019-04-13 ASSESSMENT — PAIN DESCRIPTION - FREQUENCY
FREQUENCY: INTERMITTENT
FREQUENCY: CONTINUOUS

## 2019-04-13 ASSESSMENT — PAIN DESCRIPTION - ORIENTATION
ORIENTATION: MID
ORIENTATION: OTHER (COMMENT)

## 2019-04-13 ASSESSMENT — PAIN DESCRIPTION - PAIN TYPE
TYPE: CHRONIC PAIN
TYPE: ACUTE PAIN

## 2019-04-13 NOTE — PROGRESS NOTES
Pulmonary and Critical Care  Progress Note    Subjective: The patient is better. Shortness of breath has improved. Chest pain none. Addressing respiratory complaints Patient is negative for  hemoptysis and cyanosis. CONSTITUTIONAL:  negative for fevers and chills. Past Medical History:     has a past medical history of Anxiety, Arthritis, Back pain at L4-L5 level, Bipolar 1 disorder ([address] COPD (chronic obstructive pulmonary disease) ([facility] [address] Emphysema of lung ([facility] [address] FH: CAD (coronary artery disease), Fibromyalgia, Fibromyalgia, H/O Doppler ultrasound, H/O echocardiogram, Hyperlipidemia LDL goal <100, Hypertension, Obstructive sleep apnea, Osteoarthritis, and Thyroid disease. has a past surgical history that includes Carpal tunnel release and Tubal ligation. reports that she quit smoking about 11 years ago. She has a 30.00 pack-year smoking history. She has never used smokeless tobacco. She reports that she drank about 1.2 oz of alcohol per week. She reports that she does not use drugs. Family history:  family history includes No Known Problems in her father and mother. Allergies   Allergen Reactions    Lisinopril Swelling and Rash     angioedema     Social History:    Reviewed; no changes    Objective:   PHYSICAL EXAM:        VITALS:  /70   Pulse 80   Temp 98.8 °F (37.1 °C) (Oral)   Resp 18   Ht 5' 1\" (1.549 m)   Wt 173 lb 4.5 oz (78.6 kg)   SpO2 100%   BMI 32.74 kg/m²     24HR INTAKE/OUTPUT:      Intake/Output Summary (Last 24 hours) at 4/13/2019 1043  Last data filed at 4/13/2019 0827  Gross per 24 hour   Intake 853.39 ml   Output 2600 ml   Net -1746.61 ml       CONSTITUTIONAL:  awake, alert, cooperative, no apparent distress, and appears stated age  LUNGS:  Moving air better. CARDIOVASCULAR:  normal S1 and S2 and positive JVD  ABD:Abdomen soft, non-tender. BS normal. No masses,  No organomegaly  NEURO:Alert and oriented x3.  Gait normal. Reflexes and motor strength normal and

## 2019-04-13 NOTE — PROGRESS NOTES
4/13/2019 0827  Gross per 24 hour   Intake 853.39 ml   Output 2900 ml   Net -2046.61 ml     CBC:   No results for input(s): WBC, HGB, PLT in the last 72 hours. BMP:    Recent Labs     04/11/19  0206      K 3.9   CL 92*   CO2 39*   BUN 21   CREATININE 1.1   GLUCOSE 249*     Hepatic:   Recent Labs     04/11/19  0940   BILITOT 0.3     Troponin:   No results for input(s): TROPONINT in the last 72 hours. BNP:   Recent Labs     04/11/19  0206   PROBNP 1,322*       Objective:   Vitals: /70   Pulse 80   Temp 98.8 °F (37.1 °C) (Oral)   Resp 18   Ht 5' 1\" (1.549 m)   Wt 173 lb 4.5 oz (78.6 kg)   SpO2 100%   BMI 32.74 kg/m²   General appearance: alert and cooperative with exam. On Nasal O2  HEENT: Head: Normal, normocephalic, atraumatic. Eye: Normal external eye, conjunctiva, lids cornea, VERÓNICA. Nose: Normal external nose, mucus membranes and septum. Neck: no adenopathy,  supple, symmetrical, trachea midline and thyroid not enlarged, symmetric, no tenderness/mass/nodules  Lungs: distant breath sounds  Heart:  regular rate and rhythm  Abdomen: soft, non-tender; bowel sounds normal; no masses,  no organomegaly  Extremities: extremities normal, atraumatic, no cyanosis or edema  Neurologic: Mental status: Alert, oriented, thought content appropriate    Assessment and Plan:   Principal Problem:   Respiratory failure with hypoxia and hypercapnia ([address] - Secondary to COPD  Anemia -  Hematology consult per ,   Consult notes reviewed, will continue monitoring.   Will consult case management for discharge planning    Electronically signed by Jose Noonan MD on 4/13/2019 at 8:30 AM

## 2019-04-13 NOTE — PROGRESS NOTES
Dr. Jus Garcia paged per request of lab d/t direct antiglobulin test ordered today. Per lab, this test was done on 4/11 and results are in the chart. Dr. Jus Garcia does not want test redone.

## 2019-04-13 NOTE — CARE COORDINATION
Consult received for discharge planning. Patient seen by CM on 4.11; no needs identified. CM will revisit if need identified.  Monster Hua RN

## 2019-04-14 PROCEDURE — 2060000000 HC ICU INTERMEDIATE R&B

## 2019-04-14 PROCEDURE — 6360000002 HC RX W HCPCS: Performed by: INTERNAL MEDICINE

## 2019-04-14 PROCEDURE — 94668 MNPJ CHEST WALL SBSQ: CPT

## 2019-04-14 PROCEDURE — 6370000000 HC RX 637 (ALT 250 FOR IP): Performed by: INTERNAL MEDICINE

## 2019-04-14 PROCEDURE — 2580000003 HC RX 258: Performed by: INTERNAL MEDICINE

## 2019-04-14 PROCEDURE — 94660 CPAP INITIATION&MGMT: CPT

## 2019-04-14 PROCEDURE — 94640 AIRWAY INHALATION TREATMENT: CPT

## 2019-04-14 PROCEDURE — 2700000000 HC OXYGEN THERAPY PER DAY

## 2019-04-14 PROCEDURE — 94761 N-INVAS EAR/PLS OXIMETRY MLT: CPT

## 2019-04-14 RX ORDER — POTASSIUM CHLORIDE 20 MEQ/1
40 TABLET, EXTENDED RELEASE ORAL PRN
Status: DISCONTINUED | OUTPATIENT
Start: 2019-04-14 | End: 2019-04-15 | Stop reason: HOSPADM

## 2019-04-14 RX ORDER — POTASSIUM CHLORIDE 1.5 G/1.77G
40 POWDER, FOR SOLUTION ORAL PRN
Status: DISCONTINUED | OUTPATIENT
Start: 2019-04-14 | End: 2019-04-15 | Stop reason: HOSPADM

## 2019-04-14 RX ORDER — POTASSIUM CHLORIDE 7.45 MG/ML
10 INJECTION INTRAVENOUS PRN
Status: DISCONTINUED | OUTPATIENT
Start: 2019-04-14 | End: 2019-04-15 | Stop reason: HOSPADM

## 2019-04-14 RX ADMIN — ENOXAPARIN SODIUM 40 MG: 40 INJECTION SUBCUTANEOUS at 10:06

## 2019-04-14 RX ADMIN — FAMOTIDINE 40 MG: 20 TABLET ORAL at 21:08

## 2019-04-14 RX ADMIN — MONTELUKAST SODIUM 10 MG: 10 TABLET, FILM COATED ORAL at 10:06

## 2019-04-14 RX ADMIN — BACLOFEN 10 MG: 10 TABLET ORAL at 15:01

## 2019-04-14 RX ADMIN — Medication: at 06:20

## 2019-04-14 RX ADMIN — GUAIFENESIN 600 MG: 600 TABLET, EXTENDED RELEASE ORAL at 10:06

## 2019-04-14 RX ADMIN — HYDRALAZINE HYDROCHLORIDE 50 MG: 50 TABLET, FILM COATED ORAL at 15:02

## 2019-04-14 RX ADMIN — CEFEPIME HYDROCHLORIDE 1 G: 1 INJECTION, POWDER, FOR SOLUTION INTRAMUSCULAR; INTRAVENOUS at 13:37

## 2019-04-14 RX ADMIN — FUROSEMIDE 40 MG: 40 TABLET ORAL at 10:04

## 2019-04-14 RX ADMIN — LEVOTHYROXINE SODIUM 150 MCG: 150 TABLET ORAL at 05:19

## 2019-04-14 RX ADMIN — BACLOFEN 10 MG: 10 TABLET ORAL at 10:05

## 2019-04-14 RX ADMIN — IPRATROPIUM BROMIDE AND ALBUTEROL SULFATE 3 ML: .5; 3 SOLUTION RESPIRATORY (INHALATION) at 08:37

## 2019-04-14 RX ADMIN — CLONAZEPAM 1 MG: 1 TABLET ORAL at 15:09

## 2019-04-14 RX ADMIN — BACLOFEN 10 MG: 10 TABLET ORAL at 21:08

## 2019-04-14 RX ADMIN — IPRATROPIUM BROMIDE AND ALBUTEROL SULFATE 3 ML: .5; 3 SOLUTION RESPIRATORY (INHALATION) at 12:15

## 2019-04-14 RX ADMIN — CEFEPIME HYDROCHLORIDE 1 G: 1 INJECTION, POWDER, FOR SOLUTION INTRAMUSCULAR; INTRAVENOUS at 21:08

## 2019-04-14 RX ADMIN — HYDRALAZINE HYDROCHLORIDE 50 MG: 50 TABLET, FILM COATED ORAL at 21:08

## 2019-04-14 RX ADMIN — METHYLPREDNISOLONE SODIUM SUCCINATE 40 MG: 40 INJECTION, POWDER, LYOPHILIZED, FOR SOLUTION INTRAMUSCULAR; INTRAVENOUS at 05:19

## 2019-04-14 RX ADMIN — METOPROLOL SUCCINATE 25 MG: 25 TABLET, EXTENDED RELEASE ORAL at 10:05

## 2019-04-14 RX ADMIN — CITALOPRAM HYDROBROMIDE 40 MG: 40 TABLET ORAL at 10:04

## 2019-04-14 RX ADMIN — CEFEPIME HYDROCHLORIDE 1 G: 1 INJECTION, POWDER, FOR SOLUTION INTRAMUSCULAR; INTRAVENOUS at 05:19

## 2019-04-14 RX ADMIN — METHYLPREDNISOLONE SODIUM SUCCINATE 40 MG: 40 INJECTION, POWDER, LYOPHILIZED, FOR SOLUTION INTRAMUSCULAR; INTRAVENOUS at 17:32

## 2019-04-14 RX ADMIN — HYDRALAZINE HYDROCHLORIDE 50 MG: 50 TABLET, FILM COATED ORAL at 10:04

## 2019-04-14 RX ADMIN — FAMOTIDINE 40 MG: 20 TABLET ORAL at 10:04

## 2019-04-14 RX ADMIN — THEOPHYLLINE ANHYDROUS 100 MG: 100 CAPSULE, EXTENDED RELEASE ORAL at 10:03

## 2019-04-14 RX ADMIN — FERROUS GLUCONATE TAB 324 MG (37.5 MG ELEMENTAL IRON) 324 MG: 324 (37.5 FE) TAB at 10:04

## 2019-04-14 RX ADMIN — BUSPIRONE HYDROCHLORIDE 10 MG: 10 TABLET ORAL at 15:01

## 2019-04-14 RX ADMIN — FERROUS GLUCONATE TAB 324 MG (37.5 MG ELEMENTAL IRON) 324 MG: 324 (37.5 FE) TAB at 21:08

## 2019-04-14 RX ADMIN — SODIUM CHLORIDE, PRESERVATIVE FREE 10 ML: 5 INJECTION INTRAVENOUS at 10:07

## 2019-04-14 RX ADMIN — CELECOXIB 200 MG: 200 CAPSULE ORAL at 21:08

## 2019-04-14 RX ADMIN — SODIUM CHLORIDE, PRESERVATIVE FREE 10 ML: 5 INJECTION INTRAVENOUS at 21:08

## 2019-04-14 RX ADMIN — BUSPIRONE HYDROCHLORIDE 10 MG: 10 TABLET ORAL at 10:06

## 2019-04-14 RX ADMIN — ACETAMINOPHEN 650 MG: 325 TABLET ORAL at 10:03

## 2019-04-14 RX ADMIN — ACETAMINOPHEN 650 MG: 325 TABLET ORAL at 16:13

## 2019-04-14 RX ADMIN — FLUTICASONE PROPIONATE 2 SPRAY: 50 SPRAY, METERED NASAL at 10:11

## 2019-04-14 RX ADMIN — AMLODIPINE BESYLATE 10 MG: 10 TABLET ORAL at 10:05

## 2019-04-14 RX ADMIN — BUSPIRONE HYDROCHLORIDE 10 MG: 10 TABLET ORAL at 21:08

## 2019-04-14 RX ADMIN — ASPIRIN 81 MG 81 MG: 81 TABLET ORAL at 10:06

## 2019-04-14 RX ADMIN — POTASSIUM CHLORIDE 20 MEQ: 20 TABLET, EXTENDED RELEASE ORAL at 10:05

## 2019-04-14 RX ADMIN — TRAZODONE HYDROCHLORIDE 75 MG: 50 TABLET ORAL at 21:07

## 2019-04-14 RX ADMIN — IPRATROPIUM BROMIDE AND ALBUTEROL SULFATE 3 ML: .5; 3 SOLUTION RESPIRATORY (INHALATION) at 15:41

## 2019-04-14 RX ADMIN — IPRATROPIUM BROMIDE AND ALBUTEROL SULFATE 3 ML: .5; 3 SOLUTION RESPIRATORY (INHALATION) at 19:44

## 2019-04-14 RX ADMIN — THEOPHYLLINE ANHYDROUS 100 MG: 100 CAPSULE, EXTENDED RELEASE ORAL at 21:07

## 2019-04-14 RX ADMIN — CLONAZEPAM 1 MG: 1 TABLET ORAL at 10:03

## 2019-04-14 RX ADMIN — CELECOXIB 200 MG: 200 CAPSULE ORAL at 10:05

## 2019-04-14 RX ADMIN — ATORVASTATIN CALCIUM 40 MG: 40 TABLET, FILM COATED ORAL at 21:08

## 2019-04-14 RX ADMIN — GUAIFENESIN 600 MG: 600 TABLET, EXTENDED RELEASE ORAL at 21:08

## 2019-04-14 ASSESSMENT — PAIN SCALES - GENERAL
PAINLEVEL_OUTOF10: 0
PAINLEVEL_OUTOF10: 3
PAINLEVEL_OUTOF10: 0
PAINLEVEL_OUTOF10: 3
PAINLEVEL_OUTOF10: 3
PAINLEVEL_OUTOF10: 0
PAINLEVEL_OUTOF10: 0

## 2019-04-14 ASSESSMENT — PAIN DESCRIPTION - LOCATION
LOCATION: HEAD
LOCATION: OTHER (COMMENT)
LOCATION: HEAD

## 2019-04-14 ASSESSMENT — PAIN DESCRIPTION - PROGRESSION
CLINICAL_PROGRESSION: NOT CHANGED
CLINICAL_PROGRESSION: NOT CHANGED
CLINICAL_PROGRESSION: GRADUALLY WORSENING

## 2019-04-14 ASSESSMENT — PAIN DESCRIPTION - ONSET
ONSET: GRADUAL
ONSET: ON-GOING
ONSET: GRADUAL

## 2019-04-14 ASSESSMENT — PAIN DESCRIPTION - FREQUENCY
FREQUENCY: INTERMITTENT
FREQUENCY: CONTINUOUS
FREQUENCY: CONTINUOUS

## 2019-04-14 ASSESSMENT — PAIN DESCRIPTION - PAIN TYPE
TYPE: ACUTE PAIN
TYPE: CHRONIC PAIN
TYPE: ACUTE PAIN

## 2019-04-14 ASSESSMENT — PAIN DESCRIPTION - ORIENTATION: ORIENTATION: MID

## 2019-04-14 ASSESSMENT — PAIN DESCRIPTION - DESCRIPTORS
DESCRIPTORS: HEADACHE
DESCRIPTORS: ACHING
DESCRIPTORS: ACHING

## 2019-04-14 NOTE — PROGRESS NOTES
Family Medicine Progress Note  4/14/2019 10:27 AM  Subjective:   Admit Date: 4/8/2019  PCP: Elizabeth Leonard MD  Diet: DIET GENERAL;  Pain is:None  Nausea:None  Bowel Movement/Flatus yes    Interval History: Admitted for COPD execration and acute on chronic Respiratory failure. Currently on antibiotics and IV steroids and O2. On home O2, but states she uses it prn. . Pulmonary consult by Norris Smith reviewed. H/H has dropped, appears she has a long history of anemia and been treated by Akila Dickson in 2014. D/w , sp iron tranfusion. Denies any chest pains,  palpitations. Denies nausea or vomiting. No bowel or bladder symptoms. Potssium low, being supplemented. Restful night.    Rest of ROS -ve    Data:   Scheduled Meds:   methylPREDNISolone  40 mg Intravenous Q12H    theophylline  100 mg Oral BID    potassium chloride  20 mEq Oral Daily with breakfast    levothyroxine  150 mcg Oral QAM    ferrous gluconate  324 mg Oral BID    cefepime  1 g Intravenous Q8H    guaiFENesin  600 mg Oral BID    sodium chloride flush  10 mL Intravenous 2 times per day    amLODIPine  10 mg Oral Daily    aspirin  81 mg Oral Daily    atorvastatin  40 mg Oral Nightly    baclofen  10 mg Oral TID    busPIRone  10 mg Oral TID    celecoxib  200 mg Oral BID    citalopram  40 mg Oral Daily    famotidine  40 mg Oral BID    fluticasone  2 spray Nasal Daily    furosemide  40 mg Oral Daily    hydrALAZINE  50 mg Oral TID    metoprolol succinate  25 mg Oral Daily    montelukast  10 mg Oral Daily    traZODone  75 mg Oral Nightly    sodium chloride flush  10 mL Intravenous 2 times per day    enoxaparin  40 mg Subcutaneous Daily    ipratropium-albuterol  1 vial Inhalation Q4H     Continuous Infusions:  PRN Meds:potassium chloride **OR** potassium alternative oral replacement **OR** potassium chloride, acetaminophen, sodium chloride flush, clonazePAM, sodium chloride flush, magnesium hydroxide, ondansetron  I/O last 3 completed shifts: In: 56 [P.O.:440; IV Piggyback:50]  Out: 1200 [Urine:1200]  No intake/output data recorded. Intake/Output Summary (Last 24 hours) at 4/14/2019 1027  Last data filed at 4/14/2019 0419  Gross per 24 hour   Intake 490 ml   Output 900 ml   Net -410 ml     CBC:   Recent Labs     04/13/19  1432   WBC 12.2*   HGB 10.4*        BMP:    Recent Labs     04/13/19  1432      K 3.2*   CL 91*   CO2 40*   BUN 18   CREATININE 0.7   GLUCOSE 160*     Hepatic:   Recent Labs     04/13/19  1432   AST 15   ALT 26   BILITOT 0.3   ALKPHOS 89     Troponin:   No results for input(s): TROPONINT in the last 72 hours. BNP:   No results for input(s): PROBNP in the last 72 hours. Objective:   Vitals: /68   Pulse 75   Temp 98.8 °F (37.1 °C) (Oral)   Resp 19   Ht 5' 1\" (1.549 m)   Wt 127 lb 10.3 oz (57.9 kg)   SpO2 94%   BMI 24.12 kg/m²   General appearance: alert and cooperative with exam. On Nasal O2  HEENT: Head: Normal, normocephalic, atraumatic. Eye: Normal external eye, conjunctiva, lids cornea, VERÓNICA. Nose: Normal external nose, mucus membranes and septum. Neck: no adenopathy,  supple, symmetrical, trachea midline and thyroid not enlarged, symmetric, no tenderness/mass/nodules  Lungs: distant breath sounds  Heart:  regular rate and rhythm  Abdomen: soft, non-tender; bowel sounds normal; no masses,  no organomegaly  Extremities: extremities normal, atraumatic, no cyanosis or edema  Neurologic: Mental status: Alert, oriented, thought content appropriate    Assessment and Plan:   Principal Problem:   Respiratory failure with hypoxia and hypercapnia (HCC) - Secondary to COPD  Anemia -  Hematology consult per , - s/p iron transfusion, H/H improved  Hypokalemia - being supplemented   Consult notes reviewed, will continue monitoring.   Discharge planning - discharge when released by pulmonary, d/w spouse, states she is at baseline    Electronically signed by Dion Driver MD on 4/14/2019 at

## 2019-04-14 NOTE — PROGRESS NOTES
Pulmonary and Critical Care  Progress Note    Subjective: The patient has improved. Ambulating. Shortness of breath has improved. Chest pain none  Addressing respiratory complaints Patient is negative for  hemoptysis and cyanosis  CONSTITUTIONAL:  negative for fevers and chills      Past Medical History:     has a past medical history of Anxiety, Arthritis, Back pain at L4-L5 level, Bipolar 1 disorder (HCC), COPD (chronic obstructive pulmonary disease) ([facility] [address] Emphysema of lung ([facility] [address] FH: CAD (coronary artery disease), Fibromyalgia, Fibromyalgia, H/O Doppler ultrasound, H/O echocardiogram, Hyperlipidemia LDL goal <100, Hypertension, Obstructive sleep apnea, Osteoarthritis, and Thyroid disease. has a past surgical history that includes Carpal tunnel release and Tubal ligation. reports that she quit smoking about 11 years ago. She has a 30.00 pack-year smoking history. She has never used smokeless tobacco. She reports that she drank about 1.2 oz of alcohol per week. She reports that she does not use drugs. Family history:  family history includes No Known Problems in her father and mother. Allergies   Allergen Reactions    Lisinopril Swelling and Rash     angioedema     Social History:    Reviewed; no changes    Objective:   PHYSICAL EXAM:        VITALS:  /73   Pulse 72   Temp 98.8 °F (37.1 °C) (Oral)   Resp 19   Ht 5' 1\" (1.549 m)   Wt 127 lb 10.3 oz (57.9 kg)   SpO2 96%   BMI 24.12 kg/m²     24HR INTAKE/OUTPUT:      Intake/Output Summary (Last 24 hours) at 4/14/2019 1205  Last data filed at 4/14/2019 0419  Gross per 24 hour   Intake 490 ml   Output 900 ml   Net -410 ml       CONSTITUTIONAL:  awake, alert, cooperative, no apparent distress, and appears stated age  LUNGS:  Moving air better. CARDIOVASCULAR:  normal S1 and S2 and positive JVD  ABD:Abdomen soft, non-tender. BS normal. No masses,  No organomegaly  NEURO:Alert and oriented x3.  Gait normal. Reflexes and motor strength normal and symmetric. Cranial nerves 2-12 and sensation grossly intact. DATA:    CBC:  Recent Labs     04/13/19  1432   WBC 12.2*   RBC 3.87*   HGB 10.4*   HCT 36.0*      MCV 93.0   MCH 26.9*   MCHC 28.9*   RDW 14.6   SEGSPCT 84.0*      BMP:  Recent Labs     04/13/19  1432      K 3.2*   CL 91*   CO2 40*   BUN 18   CREATININE 0.7   CALCIUM 9.3   GLUCOSE 160*      ABG:  No results for input(s): PH, PO2ART, XNY0WVW, HCO3, BEART, O2SAT in the last 72 hours. Lab Results   Component Value Date    PROBNP 1,322 (H) 04/11/2019    PROBNP 1,717 (H) 04/10/2019    PROBNP 925.0 (H) 04/08/2019    THEOPH 17.5 04/10/2019     No results found for: [facility]    Radiology Review:  Pertinent images / reports were reviewed as a part of this visit. Assessment:     Patient Active Problem List   Diagnosis    Centrilobular emphysema ([address]    Hypertension    Hyperlipidemia LDL goal <100    Abnormal EKG    Palpitations    Anxiety    FH: CAD (coronary artery disease)    Fibromyalgia    Back pain at L4-L5 level    Sleep apnea    COPD exacerbation (HCC)    Electrolyte imbalance    Epigastric pain    COPD (chronic obstructive pulmonary disease) ([address]    Spinal stenosis of lumbar region with radiculopathy    Spinal stenosis, lumbar region, without neurogenic claudication    COPD with acute exacerbation ([address]    Pneumonia due to infectious organism    SV[facility]ra[facility]ricular tachycardia) ([facility])    Class 1 obesity due to excess calories with body mass index (BMI) of 31.0 to 31.9 in adult    Pneumonia    Pleural effusion    Atelectasis    Pulmonary nodules    Respiratory failure with hypoxia and hypercapnia (HCC)    Anemia       Plan:   1. Overall the patient has improved. 2. Inc. activity. 3. NIV Trelegy being arranged. 4. Discussed with the family.   Robin Cortés MD  4/14/2019  12:05 PM

## 2019-04-15 VITALS
HEART RATE: 86 BPM | DIASTOLIC BLOOD PRESSURE: 60 MMHG | WEIGHT: 127.65 LBS | HEIGHT: 61 IN | RESPIRATION RATE: 20 BRPM | TEMPERATURE: 97.6 F | BODY MASS INDEX: 24.1 KG/M2 | SYSTOLIC BLOOD PRESSURE: 94 MMHG | OXYGEN SATURATION: 100 %

## 2019-04-15 LAB
BASOPHILS ABSOLUTE: 0 K/CU MM
BASOPHILS RELATIVE PERCENT: 0.1 % (ref 0–1)
CULTURE: ABNORMAL
DIFFERENTIAL TYPE: ABNORMAL
DOSE AMOUNT: ABNORMAL
DOSE TIME: ABNORMAL
EOSINOPHILS ABSOLUTE: 0 K/CU MM
EOSINOPHILS RELATIVE PERCENT: 0 % (ref 0–3)
GRAM SMEAR: ABNORMAL
HCT VFR BLD CALC: 32.8 % (ref 37–47)
HEMOGLOBIN: 9.5 GM/DL (ref 12.5–16)
IMMATURE NEUTROPHIL %: 1.2 % (ref 0–0.43)
LYMPHOCYTES ABSOLUTE: 0.4 K/CU MM
LYMPHOCYTES RELATIVE PERCENT: 3.4 % (ref 24–44)
Lab: ABNORMAL
MCH RBC QN AUTO: 27.4 PG (ref 27–31)
MCHC RBC AUTO-ENTMCNC: 29 % (ref 32–36)
MCV RBC AUTO: 94.5 FL (ref 78–100)
MONOCYTES ABSOLUTE: 0.7 K/CU MM
MONOCYTES RELATIVE PERCENT: 6.4 % (ref 0–4)
NUCLEATED RBC %: 0 %
PDW BLD-RTO: 14.7 % (ref 11.7–14.9)
PLATELET # BLD: 262 K/CU MM (ref 140–440)
PMV BLD AUTO: 10.8 FL (ref 7.5–11.1)
RBC # BLD: 3.47 M/CU MM (ref 4.2–5.4)
SEGMENTED NEUTROPHILS ABSOLUTE COUNT: 10.2 K/CU MM
SEGMENTED NEUTROPHILS RELATIVE PERCENT: 88.9 % (ref 36–66)
SPECIMEN: ABNORMAL
THEOPHYLLINE LEVEL: 5 UG/ML (ref 10–20)
TOTAL IMMATURE NEUTOROPHIL: 0.14 K/CU MM
TOTAL NUCLEATED RBC: 0 K/CU MM
WBC # BLD: 11.4 K/CU MM (ref 4–10.5)

## 2019-04-15 PROCEDURE — 36415 COLL VENOUS BLD VENIPUNCTURE: CPT

## 2019-04-15 PROCEDURE — 94761 N-INVAS EAR/PLS OXIMETRY MLT: CPT

## 2019-04-15 PROCEDURE — 6360000002 HC RX W HCPCS: Performed by: INTERNAL MEDICINE

## 2019-04-15 PROCEDURE — 2580000003 HC RX 258: Performed by: INTERNAL MEDICINE

## 2019-04-15 PROCEDURE — 94640 AIRWAY INHALATION TREATMENT: CPT

## 2019-04-15 PROCEDURE — 85025 COMPLETE CBC W/AUTO DIFF WBC: CPT

## 2019-04-15 PROCEDURE — 2700000000 HC OXYGEN THERAPY PER DAY

## 2019-04-15 PROCEDURE — 80198 ASSAY OF THEOPHYLLINE: CPT

## 2019-04-15 PROCEDURE — 6370000000 HC RX 637 (ALT 250 FOR IP): Performed by: INTERNAL MEDICINE

## 2019-04-15 RX ORDER — POTASSIUM CHLORIDE 20 MEQ/1
20 TABLET, EXTENDED RELEASE ORAL
Qty: 60 TABLET | Refills: 0 | Status: ON HOLD | OUTPATIENT
Start: 2019-04-16 | End: 2020-07-15 | Stop reason: HOSPADM

## 2019-04-15 RX ORDER — LEVOFLOXACIN 500 MG/1
500 TABLET, FILM COATED ORAL DAILY
Qty: 10 TABLET | Refills: 0 | Status: ON HOLD | OUTPATIENT
Start: 2019-04-15 | End: 2019-04-23 | Stop reason: HOSPADM

## 2019-04-15 RX ORDER — METHYLPREDNISOLONE 4 MG/1
TABLET ORAL
Qty: 1 KIT | Refills: 0 | Status: ON HOLD | OUTPATIENT
Start: 2019-04-15 | End: 2019-04-23

## 2019-04-15 RX ORDER — CEFDINIR 300 MG/1
300 CAPSULE ORAL 2 TIMES DAILY
Qty: 14 CAPSULE | Refills: 0 | Status: ON HOLD | OUTPATIENT
Start: 2019-04-15 | End: 2019-04-23 | Stop reason: HOSPADM

## 2019-04-15 RX ORDER — FERROUS GLUCONATE 324(37.5)
324 TABLET ORAL 2 TIMES DAILY
Qty: 60 TABLET | Refills: 2 | Status: SHIPPED | OUTPATIENT
Start: 2019-04-15 | End: 2020-12-10

## 2019-04-15 RX ORDER — GUAIFENESIN 600 MG/1
600 TABLET, EXTENDED RELEASE ORAL 2 TIMES DAILY
Qty: 60 TABLET | Refills: 2 | Status: ON HOLD | OUTPATIENT
Start: 2019-04-15 | End: 2019-04-23 | Stop reason: HOSPADM

## 2019-04-15 RX ADMIN — SODIUM CHLORIDE, PRESERVATIVE FREE 10 ML: 5 INJECTION INTRAVENOUS at 09:11

## 2019-04-15 RX ADMIN — AMLODIPINE BESYLATE 10 MG: 10 TABLET ORAL at 09:00

## 2019-04-15 RX ADMIN — IPRATROPIUM BROMIDE AND ALBUTEROL SULFATE 3 ML: .5; 3 SOLUTION RESPIRATORY (INHALATION) at 11:20

## 2019-04-15 RX ADMIN — ENOXAPARIN SODIUM 40 MG: 40 INJECTION SUBCUTANEOUS at 09:00

## 2019-04-15 RX ADMIN — FUROSEMIDE 40 MG: 40 TABLET ORAL at 08:59

## 2019-04-15 RX ADMIN — FAMOTIDINE 40 MG: 20 TABLET ORAL at 08:58

## 2019-04-15 RX ADMIN — BUSPIRONE HYDROCHLORIDE 10 MG: 10 TABLET ORAL at 09:00

## 2019-04-15 RX ADMIN — FERROUS GLUCONATE TAB 324 MG (37.5 MG ELEMENTAL IRON) 324 MG: 324 (37.5 FE) TAB at 08:59

## 2019-04-15 RX ADMIN — CLONAZEPAM 1 MG: 1 TABLET ORAL at 09:09

## 2019-04-15 RX ADMIN — CELECOXIB 200 MG: 200 CAPSULE ORAL at 08:59

## 2019-04-15 RX ADMIN — IRON SUCROSE 300 MG: 20 INJECTION, SOLUTION INTRAVENOUS at 11:52

## 2019-04-15 RX ADMIN — THEOPHYLLINE ANHYDROUS 100 MG: 100 CAPSULE, EXTENDED RELEASE ORAL at 08:59

## 2019-04-15 RX ADMIN — LEVOTHYROXINE SODIUM 150 MCG: 150 TABLET ORAL at 05:34

## 2019-04-15 RX ADMIN — HYDRALAZINE HYDROCHLORIDE 50 MG: 50 TABLET, FILM COATED ORAL at 08:59

## 2019-04-15 RX ADMIN — IPRATROPIUM BROMIDE AND ALBUTEROL SULFATE 3 ML: .5; 3 SOLUTION RESPIRATORY (INHALATION) at 07:38

## 2019-04-15 RX ADMIN — IPRATROPIUM BROMIDE AND ALBUTEROL SULFATE 3 ML: .5; 3 SOLUTION RESPIRATORY (INHALATION) at 15:48

## 2019-04-15 RX ADMIN — METHYLPREDNISOLONE SODIUM SUCCINATE 40 MG: 40 INJECTION, POWDER, LYOPHILIZED, FOR SOLUTION INTRAMUSCULAR; INTRAVENOUS at 05:34

## 2019-04-15 RX ADMIN — ACETAMINOPHEN 650 MG: 325 TABLET ORAL at 09:09

## 2019-04-15 RX ADMIN — BACLOFEN 10 MG: 10 TABLET ORAL at 16:14

## 2019-04-15 RX ADMIN — SODIUM CHLORIDE, PRESERVATIVE FREE 10 ML: 5 INJECTION INTRAVENOUS at 09:12

## 2019-04-15 RX ADMIN — GUAIFENESIN 600 MG: 600 TABLET, EXTENDED RELEASE ORAL at 08:59

## 2019-04-15 RX ADMIN — METOPROLOL SUCCINATE 25 MG: 25 TABLET, EXTENDED RELEASE ORAL at 08:59

## 2019-04-15 RX ADMIN — CITALOPRAM HYDROBROMIDE 40 MG: 40 TABLET ORAL at 08:59

## 2019-04-15 RX ADMIN — BACLOFEN 10 MG: 10 TABLET ORAL at 08:59

## 2019-04-15 RX ADMIN — POTASSIUM CHLORIDE 20 MEQ: 20 TABLET, EXTENDED RELEASE ORAL at 08:58

## 2019-04-15 RX ADMIN — FLUTICASONE PROPIONATE 2 SPRAY: 50 SPRAY, METERED NASAL at 09:10

## 2019-04-15 RX ADMIN — ASPIRIN 81 MG 81 MG: 81 TABLET ORAL at 09:00

## 2019-04-15 RX ADMIN — CEFEPIME HYDROCHLORIDE 1 G: 1 INJECTION, POWDER, FOR SOLUTION INTRAMUSCULAR; INTRAVENOUS at 05:34

## 2019-04-15 RX ADMIN — MONTELUKAST SODIUM 10 MG: 10 TABLET, FILM COATED ORAL at 09:00

## 2019-04-15 RX ADMIN — HYDRALAZINE HYDROCHLORIDE 50 MG: 50 TABLET, FILM COATED ORAL at 16:14

## 2019-04-15 RX ADMIN — BUSPIRONE HYDROCHLORIDE 10 MG: 10 TABLET ORAL at 16:16

## 2019-04-15 RX ADMIN — THEOPHYLLINE ANHYDROUS 100 MG: 100 CAPSULE, EXTENDED RELEASE ORAL at 16:14

## 2019-04-15 ASSESSMENT — PAIN DESCRIPTION - FREQUENCY: FREQUENCY: CONTINUOUS

## 2019-04-15 ASSESSMENT — PAIN SCALES - GENERAL
PAINLEVEL_OUTOF10: 0
PAINLEVEL_OUTOF10: 3
PAINLEVEL_OUTOF10: 0

## 2019-04-15 ASSESSMENT — PAIN DESCRIPTION - PROGRESSION: CLINICAL_PROGRESSION: GRADUALLY WORSENING

## 2019-04-15 ASSESSMENT — PAIN - FUNCTIONAL ASSESSMENT: PAIN_FUNCTIONAL_ASSESSMENT: ACTIVITIES ARE NOT PREVENTED

## 2019-04-15 ASSESSMENT — PAIN DESCRIPTION - DESCRIPTORS: DESCRIPTORS: HEADACHE

## 2019-04-15 ASSESSMENT — PAIN DESCRIPTION - PAIN TYPE: TYPE: ACUTE PAIN

## 2019-04-15 ASSESSMENT — PAIN DESCRIPTION - LOCATION: LOCATION: HEAD

## 2019-04-15 ASSESSMENT — PAIN DESCRIPTION - ONSET: ONSET: GRADUAL

## 2019-04-15 NOTE — DISCHARGE SUMMARY
Physician Discharge Summary     Patient ID:  Rosaura Echevarria  [id number]  66 y.o.  1960    Admit date: 4/8/2019    Discharge date and time: 4/15/2019  4:27 PM     Admitting Physician: Antonietta Velazco MD     Discharge Physician: Quique Larsen      Admission Diagnoses: Hypercarbia [R06.89]  Pneumonia due to organism [J18.9]  Hypoxia [R09.02]  COPD exacerbation ([address] [J44.1]  Loculated pleural effusion [J90]  COPD exacerbation ([address] [J44.1]    Discharge Diagnoses: Hypercarbia [R06.89]  Pneumonia due to organism [J18.9]  Hypoxia [R09.02]  COPD exacerbation ([address] [J44.1]  Loculated pleural effusion [J90]  COPD exacerbation ([address] [J44.1]      Hospital Course: Patient admitted with shortness of breath and admitted for pneumonia and Hypoxia. She was treated with IV antibiotics, IV Solumedrol, Duo neb and BIPAP. She improved and did well during hospital and was discharged in stable condition on po levaquin and medrol dose pack and CPAP. Discharged Condition: good    Consults: pulmonary/intensive care    Significant Diagnostic Studies: labs: Sputum cultureBordetella bronchiseptica     and radiology: CT Abdomen:    1. Diverticulosis. 2. Right renal cortical atrophy and thinning, nonspecific.  Multiple small   right renal cortical cysts. 3. Partially loculated right pleural effusion with right middle and lower   lobe scarring and/or atelectasis. CT Chest:   1. Small right pleural effusion which is likely loculated, with atelectasis   in the right middle lobe   2. Trace left pleural effusion   3. New right upper lobe pulmonary nodules with a dominant 1.1 cm spiculated   lesion.  These are likely inflammatory given the rate of appearance.    Recommend follow-up chest CT in 3-4 months         Treatments: antibiotics: ceftriaxone and Maxipime, steroids: solu-medrol and respiratory therapy: O2 and albuterol/atropine nebulizer    Disposition: home    Patient Instructions:   Current Discharge Medication List START taking these medications    Details   guaiFENesin (MUCINEX) 600 MG extended release tablet Take 1 tablet by mouth 2 times daily  Qty: 60 tablet, Refills: 2      ferrous gluconate 324 (37.5 Fe) MG TABS Take 1 tablet by mouth 2 times daily  Qty: 60 tablet, Refills: 2      potassium chloride (KLOR-CON M) 20 MEQ extended release tablet Take 1 tablet by mouth daily (with breakfast)  Qty: 60 tablet, Refills: 0      cefdinir (OMNICEF) 300 MG capsule Take 1 capsule by mouth 2 times daily for 7 days  Qty: 14 capsule, Refills: 0      methylPREDNISolone (MEDROL DOSEPACK) 4 MG tablet Take by mouth. Qty: 1 kit, Refills: 0         CONTINUE these medications which have NOT CHANGED    Details   hydrALAZINE (APRESOLINE) 50 MG tablet Take 1 tablet by mouth 3 times daily  Qty: 90 tablet, Refills: 1      furosemide (LASIX) 40 MG tablet Take 1 tablet by mouth daily  Qty: 30 tablet, Refills: 1      metoprolol succinate (TOPROL XL) 25 MG extended release tablet Take 1 tablet by mouth daily  Qty: 30 tablet, Refills: 3      famotidine (PEPCID) 20 MG tablet Take 1 tablet by mouth 2 times daily  Qty: 60 tablet, Refills: 3      busPIRone (BUSPAR) 15 MG tablet Take 15 mg by mouth 2 times daily  Qty: 60 tablet, Refills: 2      celecoxib (CELEBREX) 200 MG capsule Take 200 mg by mouth 2 times daily      traZODone (DESYREL) 50 MG tablet Take 75 mg by mouth nightly       levothyroxine (SYNTHROID) 150 MCG tablet Take 150 mcg by mouth every morning  Refills: 0      citalopram (CELEXA) 40 MG tablet Take 40 mg by mouth daily      clonazePAM (KLONOPIN) 1 MG tablet Take 1 mg by mouth 3 times daily as needed.       montelukast (SINGULAIR) 10 MG tablet Take 10 mg by mouth daily      theophylline (MIAH-24) 400 MG extended release capsule Take 400 mg by mouth daily      albuterol-ipratropium (COMBIVENT RESPIMAT)  MCG/ACT AERS inhaler Inhale 1 puff into the lungs every 4 hours as needed for Wheezing  Qty: 3 Inhaler, Refills: 3      baclofen (LIORESAL) 10 MG tablet Take 1 tablet by mouth 3 times daily  Qty: 30 tablet, Refills: 0      aspirin 81 MG chewable tablet Take 81 mg by mouth daily      budesonide-formoterol (SYMBICORT) 160-4.5 MCG/ACT AERO Inhale 2 puffs into the lungs 2 times daily      fluticasone (FLONASE) 50 MCG/ACT nasal spray 2 sprays by Nasal route daily  Qty: 1 Bottle, Refills: 0      amLODIPine (NORVASC) 10 MG tablet Take 1 tablet by mouth daily  Qty: 90 tablet, Refills: 3      ipratropium-albuterol (DUONEB) 0.5-2.5 (3) MG/3ML SOLN nebulizer solution Inhale 1 vial into the lungs every 4 hours      atorvastatin (LIPITOR) 40 MG tablet Take 40 mg by mouth daily         STOP taking these medications       nystatin (MYCOSTATIN) 510556 UNIT/ML suspension Comments:   Reason for Stopping:         triamterene-hydrochlorothiazide (MAXZIDE-25) 37.5-25 MG per tablet Comments:   Reason for Stopping:             Activity: activity as tolerated  Diet: regular diet      Follow-up with Dr. Janelle Raman in 1 week    Signed:  Jeremy Rudolph    4/25/2019  3:56 PM

## 2019-04-15 NOTE — PROGRESS NOTES
Discharge instructions and prescriptions given to patient, all questions answered, verbalized understanding. Patient taken to car in wheelchair with all belongings, including cell phone, by Opal Gander, to be driven home by .

## 2019-04-15 NOTE — PROGRESS NOTES
Pulmonary and Critical Care  Progress Note    Subjective: The patient is better. Shortness of breath has improved. Chest pain none. Addressing respiratory complaints Patient is negative for  hemoptysis and cyanosis. CONSTITUTIONAL:  negative for fevers and chills. Past Medical History:     has a past medical history of Anxiety, Arthritis, Back pain at L4-L5 level, Bipolar 1 disorder ([address] COPD (chronic obstructive pulmonary disease) ([facility] Emphysema of lung ([facility] FH: CAD (coronary artery disease), Fibromyalgia, Fibromyalgia, H/O Doppler ultrasound, H/O echocardiogram, Hyperlipidemia LDL goal <100, Hypertension, Obstructive sleep apnea, Osteoarthritis, and Thyroid disease. has a past surgical history that includes Carpal tunnel release and Tubal ligation. reports that she quit smoking about 11 years ago. She has a 30.00 pack-year smoking history. She has never used smokeless tobacco. She reports that she drank about 1.2 oz of alcohol per week. She reports that she does not use drugs. Family history:  family history includes No Known Problems in her father and mother. Allergies   Allergen Reactions    Lisinopril Swelling and Rash     angioedema     Social History:    Reviewed; no changes    Objective:   PHYSICAL EXAM:        VITALS:  BP (!) 145/71   Pulse 83   Temp 98.8 °F (37.1 °C) (Oral)   Resp 17   Ht 5' 1\" (1.549 m)   Wt 127 lb 10.3 oz (57.9 kg)   SpO2 93%   BMI 24.12 kg/m²     24HR INTAKE/OUTPUT:      Intake/Output Summary (Last 24 hours) at 4/15/2019 0948  Last data filed at 4/14/2019 2100  Gross per 24 hour   Intake 170.73 ml   Output --   Net 170.73 ml       CONSTITUTIONAL:  awake, alert, cooperative, no apparent distress, and appears stated age  LUNGS:  Moving air better. CARDIOVASCULAR:  normal S1 and S2 and positive JVD  ABD:Abdomen soft, non-tender. BS normal. No masses,  No organomegaly  NEURO:Alert and oriented x3.  Gait normal. Reflexes and motor strength normal and symmetric. Cranial nerves 2-12 and sensation grossly intact. DATA:    CBC:  Recent Labs     04/13/19  1432   WBC 12.2*   RBC 3.87*   HGB 10.4*   HCT 36.0*      MCV 93.0   MCH 26.9*   MCHC 28.9*   RDW 14.6   SEGSPCT 84.0*      BMP:  Recent Labs     04/13/19  1432      K 3.2*   CL 91*   CO2 40*   BUN 18   CREATININE 0.7   CALCIUM 9.3   GLUCOSE 160*      ABG:  No results for input(s): PH, PO2ART, SWT2PFG, HCO3, BEART, O2SAT in the last 72 hours. Lab Results   Component Value Date    PROBNP 1,322 (H) 04/11/2019    PROBNP 1,717 (H) 04/10/2019    PROBNP 925.0 (H) 04/08/2019    THEOPH 5.0 (L) 04/15/2019     No results found for: [facility]    Radiology Review:  Pertinent images / reports were reviewed as a part of this visit. Assessment:     Patient Active Problem List   Diagnosis    Centrilobular emphysema ([address]    Hypertension    Hyperlipidemia LDL goal <100    Abnormal EKG    Palpitations    Anxiety    FH: CAD (coronary artery disease)    Fibromyalgia    Back pain at L4-L5 level    Sleep apnea    COPD exacerbation (HCC)    Electrolyte imbalance    Epigastric pain    COPD (chronic obstructive pulmonary disease) ([address]    Spinal stenosis of lumbar region with radiculopathy    Spinal stenosis, lumbar region, without neurogenic claudication    COPD with acute exacerbation ([address]    Pneumonia due to infectious organism    SVT (supraventricular tachycardia) (HCC)    Class 1 obesity due to excess calories with body mass index (BMI) of 31.0 to 31.9 in adult    Pneumonia    Pleural effusion    Atelectasis    Pulmonary nodules    Respiratory failure with hypoxia and hypercapnia (HCC)    Anemia       Plan:   1. Overall the patient has improved. 2. Inc. theodur. 3. Inc. Activity.   Alie Horvath MD  4/15/2019  9:48 AM

## 2019-04-15 NOTE — PROGRESS NOTES
Pt seen and examined  Was feeling better but this am sob  Cough with green sputum  No LE edema  No fever     aao x 3  Dec air entry bilaterally  s1s2   Soft nd bs pos  LE Edema min        SIDNEY nrg  FOB neg  UA No blood  SPEP wnl  TSH <0.010  Bili normal, RC 4.2, , Hapto pending  Sats 10%, ferritin 39   PT 11.5, PTT 25.3     4/8/19: Ct abdomen and pelvis:   Impression   1. Diverticulosis. 2. Right renal cortical atrophy and thinning, nonspecific.  Multiple small   right renal cortical cysts. 3. Partially loculated right pleural effusion with right middle and lower   lobe scarring and/or atelectasis.      4/9/19: CT chest:  1. Small right pleural effusion which is likely loculated, with atelectasis   in the right middle lobe   2. Trace left pleural effusion   3. New right upper lobe pulmonary nodules with a dominant 1.1 cm spiculated   lesion.  These are likely inflammatory given the rate of appearance. Recommend follow-up chest CT in 3-4 months      4/5/19:   Left Impression   No evidence of DVT or SVT in the left common femoral vein, femoral vein,   popliteal vein, greater saphenous vein or small saphenous vein. Normal compressibility of veins visualized in the left lower extremity. Respirophasic venous flow of the veins visualized in the left leg. No significant reflux noted in the veins of the left lower extremity.      Right Impression   No evidence of DVT or SVT in the right common femoral vein, femoral vein,   popliteal vein, greater saphenous vein or small saphenous vein. Normal compressibility of veins visualized in the right lower extremity. Respirophasic venous flow of the veins visualized in the right leg. No significant reflux noted in the veins of the right lower extremity.      2/18/2019 CBC with a WBC of 9.7 hemoglobin of 11.3 hematocrit of 35.5 MCV of 90.1 platelets of 829     3/2/2019 CBC with hemoglobin of 9.9 hematocrit 34. 3.     4/10/2019 CBC with WBC of 8.4 hemoglobin of

## 2019-04-16 LAB
EKG ATRIAL RATE: 82 BPM
EKG DIAGNOSIS: NORMAL
EKG P AXIS: 66 DEGREES
EKG P-R INTERVAL: 120 MS
EKG Q-T INTERVAL: 388 MS
EKG QRS DURATION: 90 MS
EKG QTC CALCULATION (BAZETT): 453 MS
EKG R AXIS: 95 DEGREES
EKG T AXIS: 39 DEGREES
EKG VENTRICULAR RATE: 82 BPM

## 2019-04-18 ENCOUNTER — APPOINTMENT (OUTPATIENT)
Dept: ULTRASOUND IMAGING | Age: 59
DRG: 193 | End: 2019-04-18
Payer: COMMERCIAL

## 2019-04-18 ENCOUNTER — APPOINTMENT (OUTPATIENT)
Dept: CT IMAGING | Age: 59
DRG: 193 | End: 2019-04-18
Payer: COMMERCIAL

## 2019-04-18 ENCOUNTER — APPOINTMENT (OUTPATIENT)
Dept: GENERAL RADIOLOGY | Age: 59
DRG: 193 | End: 2019-04-18
Payer: COMMERCIAL

## 2019-04-18 ENCOUNTER — HOSPITAL ENCOUNTER (INPATIENT)
Age: 59
LOS: 4 days | Discharge: HOME | DRG: 193 | End: 2019-04-23
Attending: EMERGENCY MEDICINE | Admitting: INTERNAL MEDICINE
Payer: COMMERCIAL

## 2019-04-18 DIAGNOSIS — R06.03 ACUTE RESPIRATORY DISTRESS: ICD-10-CM

## 2019-04-18 DIAGNOSIS — J18.9 PNEUMONIA DUE TO ORGANISM: ICD-10-CM

## 2019-04-18 DIAGNOSIS — J44.1 COPD EXACERBATION (HCC): Primary | ICD-10-CM

## 2019-04-18 LAB
ALBUMIN SERPL-MCNC: 3.7 GM/DL (ref 3.4–5)
ALP BLD-CCNC: 81 IU/L (ref 40–129)
ALT SERPL-CCNC: 44 U/L (ref 10–40)
ANION GAP SERPL CALCULATED.3IONS-SCNC: 5 MMOL/L (ref 4–16)
AST SERPL-CCNC: 25 IU/L (ref 15–37)
BANDED NEUTROPHILS ABSOLUTE COUNT: 0.32 K/CU MM
BANDED NEUTROPHILS RELATIVE PERCENT: 3 % (ref 5–11)
BASE EXCESS MIXED: ABNORMAL (ref 0–2.3)
BASOPHILIC STIPPLING: ABNORMAL
BILIRUB SERPL-MCNC: 0.3 MG/DL (ref 0–1)
BUN BLDV-MCNC: 10 MG/DL (ref 6–23)
CALCIUM SERPL-MCNC: 9.2 MG/DL (ref 8.3–10.6)
CHLORIDE BLD-SCNC: 92 MMOL/L (ref 99–110)
CO2: 42 MMOL/L (ref 21–32)
COMMENT: ABNORMAL
CREAT SERPL-MCNC: 0.6 MG/DL (ref 0.6–1.1)
DIFFERENTIAL TYPE: ABNORMAL
GFR AFRICAN AMERICAN: >60 ML/MIN/1.73M2
GFR NON-AFRICAN AMERICAN: >60 ML/MIN/1.73M2
GLUCOSE BLD-MCNC: 210 MG/DL (ref 70–99)
HCO3 VENOUS: 46.4 MMOL/L (ref 19–25)
HCT VFR BLD CALC: 37.3 % (ref 37–47)
HEMOGLOBIN: 10.5 GM/DL (ref 12.5–16)
HYPOCHROMIA: ABNORMAL
LACTATE: 1.3 MMOL/L (ref 0.4–2)
LYMPHOCYTES ABSOLUTE: 0.8 K/CU MM
LYMPHOCYTES RELATIVE PERCENT: 8 % (ref 24–44)
MCH RBC QN AUTO: 27.1 PG (ref 27–31)
MCHC RBC AUTO-ENTMCNC: 28.2 % (ref 32–36)
MCV RBC AUTO: 96.1 FL (ref 78–100)
MONOCYTES ABSOLUTE: 0.4 K/CU MM
MONOCYTES RELATIVE PERCENT: 4 % (ref 0–4)
O2 SAT, VEN: 93.8 % (ref 50–70)
PCO2, VEN: 53 MMHG (ref 38–52)
PDW BLD-RTO: 15.9 % (ref 11.7–14.9)
PH VENOUS: 7.55 (ref 7.32–7.42)
PLATELET # BLD: 217 K/CU MM (ref 140–440)
PMV BLD AUTO: 10.6 FL (ref 7.5–11.1)
PO2, VEN: 122 MMHG (ref 28–48)
POTASSIUM SERPL-SCNC: 4.7 MMOL/L (ref 3.5–5.1)
PRO-BNP: 870.6 PG/ML
RBC # BLD: 3.88 M/CU MM (ref 4.2–5.4)
SEGMENTED NEUTROPHILS ABSOLUTE COUNT: 9.1 K/CU MM
SEGMENTED NEUTROPHILS RELATIVE PERCENT: 85 % (ref 36–66)
SODIUM BLD-SCNC: 139 MMOL/L (ref 135–145)
TOTAL PROTEIN: 6.2 GM/DL (ref 6.4–8.2)
TROPONIN T: <0.01 NG/ML
WBC # BLD: 10.6 K/CU MM (ref 4–10.5)
WBC # BLD: ABNORMAL 10*3/UL

## 2019-04-18 PROCEDURE — 71275 CT ANGIOGRAPHY CHEST: CPT

## 2019-04-18 PROCEDURE — 83880 ASSAY OF NATRIURETIC PEPTIDE: CPT

## 2019-04-18 PROCEDURE — 94761 N-INVAS EAR/PLS OXIMETRY MLT: CPT

## 2019-04-18 PROCEDURE — 96367 TX/PROPH/DG ADDL SEQ IV INF: CPT

## 2019-04-18 PROCEDURE — 6370000000 HC RX 637 (ALT 250 FOR IP): Performed by: EMERGENCY MEDICINE

## 2019-04-18 PROCEDURE — 84484 ASSAY OF TROPONIN QUANT: CPT

## 2019-04-18 PROCEDURE — 80053 COMPREHEN METABOLIC PANEL: CPT

## 2019-04-18 PROCEDURE — 93005 ELECTROCARDIOGRAM TRACING: CPT | Performed by: EMERGENCY MEDICINE

## 2019-04-18 PROCEDURE — 6360000004 HC RX CONTRAST MEDICATION: Performed by: EMERGENCY MEDICINE

## 2019-04-18 PROCEDURE — 99285 EMERGENCY DEPT VISIT HI MDM: CPT

## 2019-04-18 PROCEDURE — 71046 X-RAY EXAM CHEST 2 VIEWS: CPT

## 2019-04-18 PROCEDURE — 85007 BL SMEAR W/DIFF WBC COUNT: CPT

## 2019-04-18 PROCEDURE — 93971 EXTREMITY STUDY: CPT

## 2019-04-18 PROCEDURE — 94640 AIRWAY INHALATION TREATMENT: CPT

## 2019-04-18 PROCEDURE — 83605 ASSAY OF LACTIC ACID: CPT

## 2019-04-18 PROCEDURE — 2580000003 HC RX 258: Performed by: EMERGENCY MEDICINE

## 2019-04-18 PROCEDURE — 6360000002 HC RX W HCPCS: Performed by: EMERGENCY MEDICINE

## 2019-04-18 PROCEDURE — 87040 BLOOD CULTURE FOR BACTERIA: CPT

## 2019-04-18 PROCEDURE — 85027 COMPLETE CBC AUTOMATED: CPT

## 2019-04-18 PROCEDURE — 36415 COLL VENOUS BLD VENIPUNCTURE: CPT

## 2019-04-18 PROCEDURE — 96365 THER/PROPH/DIAG IV INF INIT: CPT

## 2019-04-18 PROCEDURE — 82805 BLOOD GASES W/O2 SATURATION: CPT

## 2019-04-18 PROCEDURE — 2700000000 HC OXYGEN THERAPY PER DAY

## 2019-04-18 RX ORDER — LEVOFLOXACIN 5 MG/ML
750 INJECTION, SOLUTION INTRAVENOUS ONCE
Status: COMPLETED | OUTPATIENT
Start: 2019-04-18 | End: 2019-04-19

## 2019-04-18 RX ORDER — IPRATROPIUM BROMIDE AND ALBUTEROL SULFATE 2.5; .5 MG/3ML; MG/3ML
1 SOLUTION RESPIRATORY (INHALATION) ONCE
Status: COMPLETED | OUTPATIENT
Start: 2019-04-18 | End: 2019-04-18

## 2019-04-18 RX ORDER — PREDNISONE 20 MG/1
60 TABLET ORAL ONCE
Status: COMPLETED | OUTPATIENT
Start: 2019-04-18 | End: 2019-04-18

## 2019-04-18 RX ORDER — MAGNESIUM SULFATE IN WATER 40 MG/ML
2 INJECTION, SOLUTION INTRAVENOUS ONCE
Status: COMPLETED | OUTPATIENT
Start: 2019-04-18 | End: 2019-04-18

## 2019-04-18 RX ORDER — SODIUM CHLORIDE 0.9 % (FLUSH) 0.9 %
10 SYRINGE (ML) INJECTION
Status: COMPLETED | OUTPATIENT
Start: 2019-04-18 | End: 2019-04-18

## 2019-04-18 RX ADMIN — IOPAMIDOL 80 ML: 755 INJECTION, SOLUTION INTRAVENOUS at 21:13

## 2019-04-18 RX ADMIN — PREDNISONE 60 MG: 20 TABLET ORAL at 18:07

## 2019-04-18 RX ADMIN — CEFEPIME HYDROCHLORIDE 2 G: 2 INJECTION, POWDER, FOR SOLUTION INTRAVENOUS at 23:45

## 2019-04-18 RX ADMIN — IPRATROPIUM BROMIDE AND ALBUTEROL SULFATE 1 AMPULE: .5; 3 SOLUTION RESPIRATORY (INHALATION) at 22:42

## 2019-04-18 RX ADMIN — IPRATROPIUM BROMIDE AND ALBUTEROL SULFATE 1 AMPULE: .5; 3 SOLUTION RESPIRATORY (INHALATION) at 17:36

## 2019-04-18 RX ADMIN — SODIUM CHLORIDE, PRESERVATIVE FREE 10 ML: 5 INJECTION INTRAVENOUS at 21:13

## 2019-04-18 RX ADMIN — MAGNESIUM SULFATE HEPTAHYDRATE 2 G: 40 INJECTION, SOLUTION INTRAVENOUS at 22:34

## 2019-04-18 NOTE — ED PROVIDER NOTES
eMERGENCY dEPARTMENT eNCOUnter        PCP: Delicia Castanon MD    [facility]    Chief Complaint   Patient presents with    Shortness of Breath     Hx of COPD and d/c on Monday. Patient displays dyspnea and slight tachypnea at rest. Lungs diminished with expiratory wheezing. Patient has a non-productive cough. Patient wears 3 lpm of O2 via NC 24/7 and SpO2 is 95%. No CP. Patient nauseated. pa       HPI      Brook Florentino is a 66 y.o. female who presents with shortness of breath. She states she was just recent discharged from the hospital after admission for COPD. She states that ever since she went home she feels more short of breath. She states that she been wheezing more. She states she feels like she is up with fluid. Reports cough which is weak and only sometimes productive. Date that she was supposed to get a BiPAP for home, but they came out and asked for $165 which they did not have, thus they did not give than the BiPAP. She states that then they were supposed to communicate with the doctor's office to get a different BiPAP, but that has not happened yet. Reports increase in lower extremity edema, left more than right. REVIEW OF SYSTEMS    Constitutional:  Denies fever, chills. HENT:  Denies sore throat or ear pain   Cardiovascular:  Denies Chest Pain, Denies palpitations,  Denies syncope, no extremity edema.   Respiratory:   + cough, shortness of breath  GI:  Denies abdominal pain, nausea, vomiting, or diarrhea  :  Denies any urinary symptoms  Musculoskeletal:  Denies back pain, + extremity swelling  Skin:  Denies rash  Neurologic:  Denies lightheadedness, dizziness, headache, focal weakness or sensory changes   Endocrine:  Denies polyuria or polydypsia   Lymphatic:  Denies edema    All other review of systems are negative  See HPI and nursing notes for additional information         PAST MEDICAL & SURGICAL HISTORY    Past Medical History:   Diagnosis Date    Anxiety 2/16/2017    Arthritis     Back pain at L4-L5 level 2/16/2017    Dr Ally Alcantara   [facility] Bipolar 1 disorder ([facility]     COPD (chronic obstructive pulmonary disease) ([facility] [facility]     [facility]     FH: CAD (coronary artery disease) 2/16/2017    Father had in his forties, sister in her thirties    [facility] Fibromyalgia 2/16/2017    Fibromyalgia     H/O Doppler ultrasound 04/05/2019    venous- no DVT or reflux    H/O echocardiogram 01/14/2015    EF50-55% Normal- see media    Hyperlipidemia LDL goal <100     Hypertension     Obstructive sleep apnea     Osteoarthritis     Thyroid disease      Past Surgical History:   Procedure Laterality Date    CARPAL TUNNEL RELEASE      TUBAL LIGATION         CURRENT MEDICATIONS    Current Outpatient Rx   Medication Sig Dispense Refill    guaiFENesin (MUCINEX) 600 MG extended release tablet Take 1 tablet by mouth 2 times daily 60 tablet 2    ferrous gluconate 324 (37.5 Fe) MG TABS Take 1 tablet by mouth 2 times daily 60 tablet 2    potassium chloride (KLOR-CON M) 20 MEQ extended release tablet Take 1 tablet by mouth daily (with breakfast) 60 tablet 0    cefdinir (OMNICEF) 300 MG capsule Take 1 capsule by mouth 2 times daily for 7 days 14 capsule 0    methylPREDNISolone (MEDROL DOSEPACK) 4 MG tablet Take by mouth.  1 kit 0    levofloxacin (LEVAQUIN) 500 MG tablet Take 1 tablet by mouth daily for 10 days Stop Omnicef 10 tablet 0    hydrALAZINE (APRESOLINE) 50 MG tablet Take 1 tablet by mouth 3 times daily 90 tablet 1    furosemide (LASIX) 40 MG tablet Take 1 tablet by mouth daily 30 tablet 1    metoprolol succinate (TOPROL XL) 25 MG extended release tablet Take 1 tablet by mouth daily 30 tablet 3    famotidine (PEPCID) 20 MG tablet Take 1 tablet by mouth 2 times daily (Patient taking differently: Take 40 mg by mouth 2 times daily ) 60 tablet 3    busPIRone (BUSPAR) 15 MG tablet Take 15 mg by mouth 2 times daily (Patient taking differently: Take 10 mg by mouth 3 times daily ) 60 tablet 2 Abnormal; Notable for the following components:    WBC 10.6 (*)     RBC 3.88 (*)     Hemoglobin 10.5 (*)     MCHC 28.2 (*)     RDW 15.9 (*)     Bands Relative 3 (*)     Segs Relative 85.0 (*)     Lymphocytes % 8.0 (*)     All other components within normal limits   BRAIN NATRIURETIC PEPTIDE - Abnormal; Notable for the following components:    Pro-.6 (*)     All other components within normal limits   TROPONIN       EKG Interpretation  This EKG was interpreted by me. Rate is 86, rhythm is sinus. NH and QT intervals are within normal limits. There is no ST segment or T wave changes. Incomplete right bundle-branch block. This EKG was compared to previous EKG from date 4/8/19. No significant change from previous EKG. RADIOLOGY/PROCEDURES      CXR:   Xr Chest Standard (2 Vw)    Result Date: 4/18/2019  EXAMINATION: TWO VIEWS OF THE CHEST 4/18/2019 5:21 pm COMPARISON: April 8, 2019 HISTORY: ORDERING SYSTEM PROVIDED HISTORY: Chest pain TECHNOLOGIST PROVIDED HISTORY: Reason for exam:->Chest pain Ordering Physician Provided Reason for Exam: cough, cp Acuity: Unknown Type of Exam: Initial Additional signs and symptoms: none Relevant Medical/Surgical History: copd FINDINGS: Frontal and lateral view of the chest demonstrates no lines or tubes. Stable cardiomediastinal silhouette. Emphysematous changes are seen. Redemonstration of a right base opacity, improved since the prior study. The left lung is grossly clear. No pneumothorax. No pulmonary edema. No acute osseous abnormality. 1. Improving right base opacity, compatible with resolving infection. 2. COPD. Ct Chest Wo Contrast    Result Date: 4/9/2019  EXAMINATION: CT OF THE CHEST WITHOUT CONTRAST 4/9/2019 11:57 am TECHNIQUE: CT of the chest was performed without the administration of intravenous contrast. Multiplanar reformatted images are provided for review.  Dose modulation, iterative reconstruction, and/or weight based adjustment of the mA/kV was Patient Name       Nai Uribe  Date of study       04/05/2019                     JORGE   Date of Birth      1960         Gender              Female   Age                66                 Race                   Patient Number     [id number]           Room Number   Visit Number       [id number]          Height   Corporate ID       [id number]           Weight   Accession Number   [id number]   Ordering Physician Soo Singh MD                          RDCS, RVT                                         Interpreting        Bushra Clipjulio                                        Physician           Yanelis Peter MD  Procedure Type of Study:   Veins:Lower Extremities Venous Insufficiency, VL LOWER EXTREMITY BILATERAL  VENOUS. Indications for Study:Leg swelling. Conclusions   Summary   No evidence of DVT or SVT in the bilateral common femoral vein, femoral  vein, popliteal vein, greater saphenous vein or small saphenous vein. No evidence of significant venous insufficiency noted in the bilateral lower  extremities . Recommendations   optimize medications   Signature   ------------------------------------------------------------------  Electronically signed by Sorin Wetzel MD  (Interpreting physician) on 04/08/2019 at 09:11 AM  ------------------------------------------------------------------  Impressions Right Impression No evidence of DVT or SVT in the right common femoral vein, femoral vein, popliteal vein, greater saphenous vein or small saphenous vein. Normal compressibility of veins visualized in the right lower extremity. Respirophasic venous flow of the veins visualized in the right leg. No significant reflux noted in the veins of the right lower extremity.  Diameters (cm): GSV at Junction: 0.48 GSV Mid Thigh: 0.34 GSV Knee: 0.38 GSV Mid Calf: 0.28 GSV Distal Calf: 0.30 SSV Prox: 0.20 SSV Mid: 0.18 SSV Distal: 0.16 Left Impression No evidence of DVT or SVT in the left common femoral vein, femoral vein, popliteal vein, greater saphenous vein or small saphenous vein. Normal compressibility of veins visualized in the left lower extremity. Respirophasic venous flow of the veins visualized in the left leg. No significant reflux noted in the veins of the left lower extremity. Diameters (cm): GSV at Junction: 0.39 GSV Mid Thigh: 0.35 GSV Knee: 0.31 GSV Mid Calf: 0.15 GSV Distal Calf: 0.21 SSV Prox: 0.25 SSV Mid: 0.31 SSV Distal: 0.24 Study Location:[facility]. Technical Quality:Adequate visualization. Velocities are measured in cm/s ; Diameters are measured in cm Right Doppler Measurements +--------------+------+------+------------+ ! Location      ! Signal!Reflux! Reflux (sec)! +--------------+------+------+------------+ ! GSV Thigh     ! Phasic! No    !            ! +--------------+------+------+------------+ ! Femoral       !Phasic! No    !            ! +--------------+------+------+------------+ ! Mid Femoral   !Phasic! No    !            ! +--------------+------+------+------------+ ! Popliteal     !Phasic! No    !            ! +--------------+------+------+------------+ ! GSV Below Knee! Phasic! No    !            ! +--------------+------+------+------------+ ! GSV Calf      ! Phasic! No    !            ! +--------------+------+------+------------+ ! SSV           ! Phasic! No    !            ! +--------------+------+------+------------+ Left Doppler Measurements +--------------+------+------+------------+ ! Location      ! Signal!Reflux! Reflux (sec)! +--------------+------+------+------------+ ! GSV Thigh     ! Phasic! No    !            ! +--------------+------+------+------------+ ! Femoral       !Phasic! No    !            ! +--------------+------+------+------------+ ! Mid Femoral   !Phasic! No    !            ! +--------------+------+------+------------+ ! Popliteal     !Phasic! No    !            ! +--------------+------+------+------------+ !GSV Below Knee! Phasic! No    !            ! +--------------+------+------+------------+ ! GSV Calf      ! Phasic! No    !            ! +--------------+------+------+------------+ ! SSV           ! Phasic! No    !            ! +--------------+------+------+------------+          ED COURSE & MEDICAL DECISION MAKING    Pertinent Labs & Imaging studies reviewed and interpreted. (See chart for details)     Vital signs and nursing notes reviewed during ED course. All pertinent Lab data and radiographic results reviewed with patient at bedside. The patient and/or the family were informed of the results of any tests/labs/imaging, the treatment plan, and time was allotted to answer questions. 66year old female with history of COPD and CHF who presented with shortness of breath. She has pursed lip breathing on initial evaluation, poor air entry and wheezing. She is given 3 breathing treatments, dose of steroids. Workup initiated. Laboratory workup reveals chronically elevated CO2. Hemoglobin stable at 10.5. Troponin is negative. BNP within normal range for age. Chest x-ray shows improvement of previous opacity. Patient does have lower extremity edema, left much greater than right, the CTA was ordered as well as venous ultrasound given the patient's recent hospitalization. Care endorsed to oncoming provider Dr. Diego Pedro for disposition.      Clinical  IMPRESSION    Shortness of breath, history of COPD           Lulu Jay DO  04/18/19 1065

## 2019-04-18 NOTE — DISCHARGE INSTR - COC
Continuity of Care Form    Patient Name: Michael Ledezma   :  1960  MRN:  [id number]    Admit date:  2019  Discharge date:  ***    Code Status Order: Prior   Advance Directives:     Admitting Physician:  No admitting provider for patient encounter.   PCP: Janice Arnold MD    Discharging Nurse: [facility] Unit/Room#: ED30/ED-30  Discharging Unit Phone Number: ***    Emergency Contact:   Extended Emergency Contact Information  Primary Emergency Contact: Eddie Torres  Address: [address] [place] [address], [address] Phone: [phone]  Relation: Spouse  Secondary Emergency Contact: Lisch,Shawn  Address: [address] [place] [address], [address] [place] [address] Phone: [phone]  Relation: Child    Past Surgical History:  Past Surgical History:   Procedure Laterality Date    CARPAL TUNNEL RELEASE      TUBAL LIGATION         Immunization History:   Immunization History   Administered Date(s) Administered    Influenza, Triv, 3 Years and older, IM (Afluria (5 yrs and older) 10/09/2018    Pneumococcal 13-valent Conjugate (Myrla Dieudonne) 2019       Active Problems:  Patient Active Problem List   Diagnosis Code    Centrilobular emphysema ([facility] J43.2    Hypertension I10    Hyperlipidemia LDL goal <100 E78.5    Abnormal EKG R94.31    Palpitations R00.2    Anxiety F41.9    FH: CAD (coronary artery disease) Z82.49    Fibromyalgia M79.7    Back pain at L4-L5 level M54.5    Sleep apnea G47.30    COPD exacerbation ([address] J44.1    Electrolyte imbalance E87.8    Epigastric pain R10.13    COPD (chronic obstructive pulmonary disease) ([address] J44.9    Spinal stenosis of lumbar region with radiculopathy M48.061, M54.16    Spinal stenosis, lumbar region, without neurogenic claudication M48.061    COPD with acute exacerbation ([address] J44.1    Pneumonia due to infectious organism J18.9    SVT (supraventricular tachycardia) ([facility] I47.1    Class 1 obesity due to excess calories with body mass index (BMI) of 31.0 to 31.9 in adult E66.09, Z68.31    Pneumonia J18.9    Pleural effusion J90    Atelectasis J98.11    Pulmonary nodules R91.8    Respiratory failure with hypoxia and hypercapnia (HCC) J96.91, J96.92    Anemia D64.9       Isolation/Infection:   Isolation          No Isolation            Nurse Assessment:  Last Vital Signs: /73   Pulse 82   Temp 98 °F (36.7 °C) (Oral)   Resp 20   Ht 5' 1\" (1.549 m)   Wt 127 lb (57.6 kg)   SpO2 98%   BMI 24.00 kg/m²     Last documented pain score (0-10 scale):    Last Weight:   Wt Readings from Last 1 Encounters:   04/18/19 127 lb (57.6 kg)     Mental Status:  {IP PT MENTAL STATUS:23854}    IV Access:  { JAIMIE IV ACCESS:541704641}    Nursing Mobility/ADLs:  Walking   {CHP DME NUHN:334779257}  Transfer  {CHP DME PAJB:789901598}  Bathing  {CHP DME XAXM:421559773}  Dressing  {CHP DME MGDQ:801300483}  Toileting  {CHP DME YBBX:729124821}  Feeding  {CHP DME LIQF:456577084}  Med Admin  {P DME LVND:454897919}  Med Delivery   { JAIMIE MED Delivery:781899600}    Wound Care Documentation and Therapy:        Elimination:  Continence:   · Bowel: {YES / CE:33756}  · Bladder: {YES / PJ:59862}  Urinary Catheter: {Urinary Catheter:024601809}   Colostomy/Ileostomy/Ileal Conduit: {YES / GR:43041}       Date of Last BM: ***  No intake or output data in the 24 hours ending 04/18/19 1848  No intake/output data recorded.     Safety Concerns:     [employer] Safety Concerns:313912350}    Impairments/Disabilities:      [employer] Impairments/Disabilities:133072933}    Nutrition Therapy:  Current Nutrition Therapy:   [employer] Diet List:879671000}    Routes of Feeding: {P DME Other Feedings:553265594}  Liquids: {Slp liquid thickness:08456}  Daily Fluid Restriction: {CHP DME Yes amt example:174442473}  Last Modified Barium Swallow with Video (Video Swallowing Test): {Done Not Done KNIA:591294747}    Treatments at the Time of Hospital Discharge:   Respiratory Treatments: ***  Oxygen Therapy:  {Therapy; copd oxygen:75224}  Ventilator:    {[facility] Vent DGOQ:125999725}    Rehab Therapies: {THERAPEUTIC INTERVENTION:6957019709}  Weight Bearing Status/Restrictions: {[facility] Weight Bearin}  Other Medical Equipment (for information only, NOT a DME order):  {EQUIPMENT:076481034}  Other Treatments: ***    Patient's personal belongings (please select all that are sent with patient):  {[facility] DME Belongings:133811543}    RN SIGNATURE:  {Esignature:066463223}    CASE MANAGEMENT/SOCIAL WORK SECTION    Inpatient Status Date: ***    Readmission Risk Assessment Score:  Readmission Risk              Risk of Unplanned Readmission:        0           Discharging to Facility/ Agency   · Name:   · Address:  · Phone:  · Fax:    Dialysis Facility (if applicable)   · Name:  · Address:  · Dialysis Schedule:  · Phone:  · Fax:    / signature: {Esignature:935969593}    PHYSICIAN SECTION    Prognosis: {Prognosis:3031156121}    Condition at Discharge: [address] [place] [address] Patient Condition:932427763}    Rehab Potential (if transferring to Rehab): {Prognosis:3508607016}    Recommended Labs or Other Treatments After Discharge: ***    Physician Certification: I certify the above information and transfer of Brittanie Zhu  is necessary for the continuing treatment of the diagnosis listed and that she requires {Admit to Appropriate Level of Care:18177} for {GREATER/LESS:332792337} 30 days.      Update Admission H&P: {CHP DME Changes in NNUXF:579406381}    PHYSICIAN SIGNATURE:  {Esignature:453285943}

## 2019-04-18 NOTE — ED NOTES
Bed: H-07  Expected date:   Expected time:   Means of arrival:   Comments:  Medic: MERT Blackmon RN  04/18/19 2105

## 2019-04-18 NOTE — ED PROVIDER NOTES
6:51 PM  Yaneth Walsh was checked out to me by Dr. Elayne Baron for CT chest, dopplers. Recent dc home with bipap for home but company required money up front and she couldn't afford it. Wears 3 L oxygen a;; the time, pursed lip breathing, SOB. Deidre Melanie Please see his/her initial documentation for details of the patient's ED presentation, physical exam and completed studies. Patient had previously been given prednisone p.o. and 3 DuoNeb treatments. Her Doppler is negative. Her CT scan of her chest shows no PE but does show increased bilateral infiltrates and no nodularities since her admission. Started her on Levaquin and Maxipime as she states she does have a productive cough. States she still feels short of breath. Did order another DuoNeb and magnesium IV. We'll admit her PCP for further eval and treatment. CRITICAL CARE NOTE:  There was a high probability of clinically significant life-threatening deterioration of the patient's condition requiring my urgent intervention due to dyspnea, concern for pneumonia. IV antibiotics, DuoNeb treatment, reevaluation, chart review, admit was performed to address this. Total critical care time is at least  30 minutes. This includes vital sign monitoring, pulse oximetry monitoring, telemetry monitoring, clinical response to the IV medications, reviewing the nursing notes, consultation time, dictation/documentation time, and interpretation of the lab work. This time excludes time spent performing procedures and separately billable procedures and family discussion time.               Wilbur Brannon MD  04/18/19 [id number]       Wilbur Brannon MD  04/19/19 Miles Conley MD  04/19/19 8110

## 2019-04-19 LAB
ANION GAP SERPL CALCULATED.3IONS-SCNC: 11 MMOL/L (ref 4–16)
BUN BLDV-MCNC: 10 MG/DL (ref 6–23)
CALCIUM SERPL-MCNC: 9.2 MG/DL (ref 8.3–10.6)
CHLORIDE BLD-SCNC: 91 MMOL/L (ref 99–110)
CO2: 39 MMOL/L (ref 21–32)
CREAT SERPL-MCNC: 0.5 MG/DL (ref 0.6–1.1)
GFR AFRICAN AMERICAN: >60 ML/MIN/1.73M2
GFR NON-AFRICAN AMERICAN: >60 ML/MIN/1.73M2
GLUCOSE BLD-MCNC: 176 MG/DL (ref 70–99)
HCT VFR BLD CALC: 37.6 % (ref 37–47)
HEMOGLOBIN: 10.7 GM/DL (ref 12.5–16)
MCH RBC QN AUTO: 26.9 PG (ref 27–31)
MCHC RBC AUTO-ENTMCNC: 28.5 % (ref 32–36)
MCV RBC AUTO: 94.5 FL (ref 78–100)
PDW BLD-RTO: 15.5 % (ref 11.7–14.9)
PLATELET # BLD: 191 K/CU MM (ref 140–440)
PMV BLD AUTO: 10.7 FL (ref 7.5–11.1)
POTASSIUM SERPL-SCNC: 4.1 MMOL/L (ref 3.5–5.1)
RBC # BLD: 3.98 M/CU MM (ref 4.2–5.4)
SODIUM BLD-SCNC: 141 MMOL/L (ref 135–145)
WBC # BLD: 8.2 K/CU MM (ref 4–10.5)

## 2019-04-19 PROCEDURE — 80048 BASIC METABOLIC PNL TOTAL CA: CPT

## 2019-04-19 PROCEDURE — 2580000003 HC RX 258

## 2019-04-19 PROCEDURE — 85027 COMPLETE CBC AUTOMATED: CPT

## 2019-04-19 PROCEDURE — 94660 CPAP INITIATION&MGMT: CPT

## 2019-04-19 PROCEDURE — 2500000003 HC RX 250 WO HCPCS: Performed by: INTERNAL MEDICINE

## 2019-04-19 PROCEDURE — 6370000000 HC RX 637 (ALT 250 FOR IP): Performed by: INTERNAL MEDICINE

## 2019-04-19 PROCEDURE — 6360000002 HC RX W HCPCS: Performed by: INTERNAL MEDICINE

## 2019-04-19 PROCEDURE — 2580000003 HC RX 258: Performed by: INTERNAL MEDICINE

## 2019-04-19 PROCEDURE — 96367 TX/PROPH/DG ADDL SEQ IV INF: CPT

## 2019-04-19 PROCEDURE — 2700000000 HC OXYGEN THERAPY PER DAY

## 2019-04-19 PROCEDURE — 87040 BLOOD CULTURE FOR BACTERIA: CPT

## 2019-04-19 PROCEDURE — 94669 MECHANICAL CHEST WALL OSCILL: CPT

## 2019-04-19 PROCEDURE — 6360000002 HC RX W HCPCS: Performed by: EMERGENCY MEDICINE

## 2019-04-19 PROCEDURE — 94640 AIRWAY INHALATION TREATMENT: CPT

## 2019-04-19 PROCEDURE — 94761 N-INVAS EAR/PLS OXIMETRY MLT: CPT

## 2019-04-19 PROCEDURE — 2140000000 HC CCU INTERMEDIATE R&B

## 2019-04-19 RX ORDER — METHYLPREDNISOLONE SODIUM SUCCINATE 40 MG/ML
40 INJECTION, POWDER, LYOPHILIZED, FOR SOLUTION INTRAMUSCULAR; INTRAVENOUS EVERY 8 HOURS
Status: DISCONTINUED | OUTPATIENT
Start: 2019-04-19 | End: 2019-04-23 | Stop reason: HOSPADM

## 2019-04-19 RX ORDER — CITALOPRAM 40 MG/1
40 TABLET ORAL DAILY
Status: DISCONTINUED | OUTPATIENT
Start: 2019-04-19 | End: 2019-04-23 | Stop reason: HOSPADM

## 2019-04-19 RX ORDER — SODIUM CHLORIDE 0.9 % (FLUSH) 0.9 %
10 SYRINGE (ML) INJECTION PRN
Status: DISCONTINUED | OUTPATIENT
Start: 2019-04-19 | End: 2019-04-19 | Stop reason: SDUPTHER

## 2019-04-19 RX ORDER — FUROSEMIDE 40 MG/1
40 TABLET ORAL DAILY
Status: DISCONTINUED | OUTPATIENT
Start: 2019-04-19 | End: 2019-04-23 | Stop reason: HOSPADM

## 2019-04-19 RX ORDER — TRAZODONE HYDROCHLORIDE 50 MG/1
75 TABLET ORAL NIGHTLY
Status: DISCONTINUED | OUTPATIENT
Start: 2019-04-19 | End: 2019-04-23 | Stop reason: HOSPADM

## 2019-04-19 RX ORDER — LEVOTHYROXINE SODIUM 0.12 MG/1
125 TABLET ORAL
Status: DISCONTINUED | OUTPATIENT
Start: 2019-04-19 | End: 2019-04-23 | Stop reason: HOSPADM

## 2019-04-19 RX ORDER — SODIUM CHLORIDE 9 MG/ML
INJECTION, SOLUTION INTRAVENOUS
Status: COMPLETED
Start: 2019-04-19 | End: 2019-04-19

## 2019-04-19 RX ORDER — ACETAMINOPHEN 325 MG/1
650 TABLET ORAL EVERY 4 HOURS PRN
Status: DISCONTINUED | OUTPATIENT
Start: 2019-04-19 | End: 2019-04-19 | Stop reason: SDUPTHER

## 2019-04-19 RX ORDER — LEVOTHYROXINE SODIUM 0.12 MG/1
125 TABLET ORAL
Status: DISCONTINUED | OUTPATIENT
Start: 2019-04-19 | End: 2019-04-19

## 2019-04-19 RX ORDER — GUAIFENESIN 600 MG/1
600 TABLET, EXTENDED RELEASE ORAL 2 TIMES DAILY
Status: DISCONTINUED | OUTPATIENT
Start: 2019-04-19 | End: 2019-04-23 | Stop reason: HOSPADM

## 2019-04-19 RX ORDER — FERROUS GLUCONATE 324(37.5)
324 TABLET ORAL 2 TIMES DAILY WITH MEALS
Status: DISCONTINUED | OUTPATIENT
Start: 2019-04-19 | End: 2019-04-23 | Stop reason: HOSPADM

## 2019-04-19 RX ORDER — SODIUM CHLORIDE 0.9 % (FLUSH) 0.9 %
10 SYRINGE (ML) INJECTION EVERY 12 HOURS SCHEDULED
Status: DISCONTINUED | OUTPATIENT
Start: 2019-04-19 | End: 2019-04-23 | Stop reason: HOSPADM

## 2019-04-19 RX ORDER — CELECOXIB 200 MG/1
200 CAPSULE ORAL 2 TIMES DAILY WITH MEALS
Status: DISCONTINUED | OUTPATIENT
Start: 2019-04-19 | End: 2019-04-23 | Stop reason: HOSPADM

## 2019-04-19 RX ORDER — METHYLPREDNISOLONE SODIUM SUCCINATE 40 MG/ML
40 INJECTION, POWDER, LYOPHILIZED, FOR SOLUTION INTRAMUSCULAR; INTRAVENOUS EVERY 12 HOURS
Status: DISCONTINUED | OUTPATIENT
Start: 2019-04-19 | End: 2019-04-19

## 2019-04-19 RX ORDER — BACLOFEN 10 MG/1
10 TABLET ORAL 3 TIMES DAILY
Status: DISCONTINUED | OUTPATIENT
Start: 2019-04-19 | End: 2019-04-23 | Stop reason: HOSPADM

## 2019-04-19 RX ORDER — MONTELUKAST SODIUM 10 MG/1
10 TABLET ORAL NIGHTLY
Status: DISCONTINUED | OUTPATIENT
Start: 2019-04-19 | End: 2019-04-23 | Stop reason: HOSPADM

## 2019-04-19 RX ORDER — HYDRALAZINE HYDROCHLORIDE 50 MG/1
50 TABLET, FILM COATED ORAL 3 TIMES DAILY
Status: DISCONTINUED | OUTPATIENT
Start: 2019-04-19 | End: 2019-04-23 | Stop reason: HOSPADM

## 2019-04-19 RX ORDER — IPRATROPIUM BROMIDE AND ALBUTEROL SULFATE 2.5; .5 MG/3ML; MG/3ML
1 SOLUTION RESPIRATORY (INHALATION)
Status: DISCONTINUED | OUTPATIENT
Start: 2019-04-19 | End: 2019-04-23 | Stop reason: HOSPADM

## 2019-04-19 RX ORDER — CLONAZEPAM 1 MG/1
1 TABLET ORAL 3 TIMES DAILY PRN
Status: DISCONTINUED | OUTPATIENT
Start: 2019-04-19 | End: 2019-04-23 | Stop reason: HOSPADM

## 2019-04-19 RX ORDER — THEOPHYLLINE 400 MG/1
400 TABLET, EXTENDED RELEASE ORAL DAILY
Status: DISCONTINUED | OUTPATIENT
Start: 2019-04-19 | End: 2019-04-23 | Stop reason: HOSPADM

## 2019-04-19 RX ORDER — IPRATROPIUM BROMIDE AND ALBUTEROL SULFATE 2.5; .5 MG/3ML; MG/3ML
1 SOLUTION RESPIRATORY (INHALATION) EVERY 4 HOURS
Status: DISCONTINUED | OUTPATIENT
Start: 2019-04-19 | End: 2019-04-19 | Stop reason: SDUPTHER

## 2019-04-19 RX ORDER — ONDANSETRON 2 MG/ML
4 INJECTION INTRAMUSCULAR; INTRAVENOUS EVERY 6 HOURS PRN
Status: DISCONTINUED | OUTPATIENT
Start: 2019-04-19 | End: 2019-04-23 | Stop reason: HOSPADM

## 2019-04-19 RX ORDER — FLUTICASONE PROPIONATE 50 MCG
2 SPRAY, SUSPENSION (ML) NASAL DAILY
Status: DISCONTINUED | OUTPATIENT
Start: 2019-04-19 | End: 2019-04-23 | Stop reason: HOSPADM

## 2019-04-19 RX ORDER — ACETAMINOPHEN 325 MG/1
650 TABLET ORAL EVERY 4 HOURS PRN
Status: DISCONTINUED | OUTPATIENT
Start: 2019-04-19 | End: 2019-04-23 | Stop reason: HOSPADM

## 2019-04-19 RX ORDER — BUSPIRONE HYDROCHLORIDE 10 MG/1
10 TABLET ORAL 3 TIMES DAILY
Status: DISCONTINUED | OUTPATIENT
Start: 2019-04-19 | End: 2019-04-23 | Stop reason: HOSPADM

## 2019-04-19 RX ORDER — ATORVASTATIN CALCIUM 40 MG/1
40 TABLET, FILM COATED ORAL NIGHTLY
Status: DISCONTINUED | OUTPATIENT
Start: 2019-04-19 | End: 2019-04-23 | Stop reason: HOSPADM

## 2019-04-19 RX ORDER — LEVOFLOXACIN 5 MG/ML
750 INJECTION, SOLUTION INTRAVENOUS EVERY 24 HOURS
Status: DISCONTINUED | OUTPATIENT
Start: 2019-04-20 | End: 2019-04-21

## 2019-04-19 RX ORDER — SODIUM CHLORIDE 0.9 % (FLUSH) 0.9 %
10 SYRINGE (ML) INJECTION EVERY 12 HOURS SCHEDULED
Status: DISCONTINUED | OUTPATIENT
Start: 2019-04-19 | End: 2019-04-19 | Stop reason: SDUPTHER

## 2019-04-19 RX ORDER — AMLODIPINE BESYLATE 10 MG/1
10 TABLET ORAL DAILY
Status: DISCONTINUED | OUTPATIENT
Start: 2019-04-19 | End: 2019-04-23 | Stop reason: HOSPADM

## 2019-04-19 RX ORDER — SODIUM CHLORIDE 9 MG/ML
1000 INJECTION, SOLUTION INTRAVENOUS CONTINUOUS
Status: DISCONTINUED | OUTPATIENT
Start: 2019-04-19 | End: 2019-04-20

## 2019-04-19 RX ORDER — FAMOTIDINE 20 MG/1
40 TABLET, FILM COATED ORAL DAILY
Status: DISCONTINUED | OUTPATIENT
Start: 2019-04-19 | End: 2019-04-23 | Stop reason: HOSPADM

## 2019-04-19 RX ORDER — METOPROLOL SUCCINATE 25 MG/1
25 TABLET, EXTENDED RELEASE ORAL DAILY
Status: DISCONTINUED | OUTPATIENT
Start: 2019-04-19 | End: 2019-04-23 | Stop reason: HOSPADM

## 2019-04-19 RX ORDER — LEVOTHYROXINE SODIUM 0.15 MG/1
150 TABLET ORAL
Status: DISCONTINUED | OUTPATIENT
Start: 2019-04-19 | End: 2019-04-19

## 2019-04-19 RX ORDER — ASPIRIN 81 MG/1
81 TABLET, CHEWABLE ORAL DAILY
Status: DISCONTINUED | OUTPATIENT
Start: 2019-04-19 | End: 2019-04-23 | Stop reason: HOSPADM

## 2019-04-19 RX ORDER — SODIUM CHLORIDE 0.9 % (FLUSH) 0.9 %
10 SYRINGE (ML) INJECTION PRN
Status: DISCONTINUED | OUTPATIENT
Start: 2019-04-19 | End: 2019-04-23 | Stop reason: HOSPADM

## 2019-04-19 RX ORDER — POTASSIUM CHLORIDE 20 MEQ/1
20 TABLET, EXTENDED RELEASE ORAL
Status: DISCONTINUED | OUTPATIENT
Start: 2019-04-19 | End: 2019-04-23 | Stop reason: HOSPADM

## 2019-04-19 RX ADMIN — IPRATROPIUM BROMIDE AND ALBUTEROL SULFATE 1 AMPULE: .5; 3 SOLUTION RESPIRATORY (INHALATION) at 16:33

## 2019-04-19 RX ADMIN — LEVOFLOXACIN 750 MG: 5 INJECTION, SOLUTION INTRAVENOUS at 00:19

## 2019-04-19 RX ADMIN — FLUTICASONE PROPIONATE 2 SPRAY: 50 SPRAY, METERED NASAL at 15:19

## 2019-04-19 RX ADMIN — GUAIFENESIN 600 MG: 600 TABLET, EXTENDED RELEASE ORAL at 11:23

## 2019-04-19 RX ADMIN — FUROSEMIDE 40 MG: 40 TABLET ORAL at 11:23

## 2019-04-19 RX ADMIN — BACLOFEN 10 MG: 10 TABLET ORAL at 21:14

## 2019-04-19 RX ADMIN — METHYLPREDNISOLONE SODIUM SUCCINATE 40 MG: 40 INJECTION, POWDER, LYOPHILIZED, FOR SOLUTION INTRAMUSCULAR; INTRAVENOUS at 15:19

## 2019-04-19 RX ADMIN — SODIUM CHLORIDE, PRESERVATIVE FREE 10 ML: 5 INJECTION INTRAVENOUS at 21:15

## 2019-04-19 RX ADMIN — METHYLPREDNISOLONE SODIUM SUCCINATE 40 MG: 40 INJECTION, POWDER, FOR SOLUTION INTRAMUSCULAR; INTRAVENOUS at 06:25

## 2019-04-19 RX ADMIN — CEFEPIME HYDROCHLORIDE 2 G: 2 INJECTION, POWDER, FOR SOLUTION INTRAVENOUS at 10:10

## 2019-04-19 RX ADMIN — FERROUS GLUCONATE TAB 324 MG (37.5 MG ELEMENTAL IRON) 324 MG: 324 (37.5 FE) TAB at 18:29

## 2019-04-19 RX ADMIN — SODIUM CHLORIDE 1000 ML: 9 INJECTION, SOLUTION INTRAVENOUS at 11:30

## 2019-04-19 RX ADMIN — THEOPHYLLINE 400 MG: 400 TABLET, EXTENDED RELEASE ORAL at 11:44

## 2019-04-19 RX ADMIN — IPRATROPIUM BROMIDE AND ALBUTEROL SULFATE 1 AMPULE: .5; 3 SOLUTION RESPIRATORY (INHALATION) at 21:10

## 2019-04-19 RX ADMIN — AMLODIPINE BESYLATE 10 MG: 10 TABLET ORAL at 11:20

## 2019-04-19 RX ADMIN — ACETAMINOPHEN 650 MG: 325 TABLET ORAL at 18:32

## 2019-04-19 RX ADMIN — ASPIRIN 81 MG 81 MG: 81 TABLET ORAL at 11:20

## 2019-04-19 RX ADMIN — CLONAZEPAM 1 MG: 0.5 TABLET ORAL at 11:19

## 2019-04-19 RX ADMIN — ATORVASTATIN CALCIUM 40 MG: 40 TABLET, FILM COATED ORAL at 21:14

## 2019-04-19 RX ADMIN — CELECOXIB 200 MG: 200 CAPSULE ORAL at 18:29

## 2019-04-19 RX ADMIN — ENOXAPARIN SODIUM 40 MG: 40 INJECTION SUBCUTANEOUS at 10:10

## 2019-04-19 RX ADMIN — LEVOTHYROXINE SODIUM 125 MCG: 0.12 TABLET ORAL at 15:18

## 2019-04-19 RX ADMIN — POTASSIUM CHLORIDE 20 MEQ: 1500 TABLET, EXTENDED RELEASE ORAL at 11:41

## 2019-04-19 RX ADMIN — CLONAZEPAM 1 MG: 0.5 TABLET ORAL at 18:32

## 2019-04-19 RX ADMIN — TAZOBACTAM SODIUM AND PIPERACILLIN SODIUM 3.38 G: 375; 3 INJECTION, SOLUTION INTRAVENOUS at 11:41

## 2019-04-19 RX ADMIN — IPRATROPIUM BROMIDE AND ALBUTEROL SULFATE 1 AMPULE: .5; 3 SOLUTION RESPIRATORY (INHALATION) at 08:18

## 2019-04-19 RX ADMIN — CITALOPRAM HYDROBROMIDE 40 MG: 40 TABLET ORAL at 11:19

## 2019-04-19 RX ADMIN — SODIUM CHLORIDE, PRESERVATIVE FREE 10 ML: 5 INJECTION INTRAVENOUS at 10:10

## 2019-04-19 RX ADMIN — METOPROLOL SUCCINATE 25 MG: 25 TABLET, EXTENDED RELEASE ORAL at 11:41

## 2019-04-19 RX ADMIN — METHYLPREDNISOLONE SODIUM SUCCINATE 40 MG: 40 INJECTION, POWDER, LYOPHILIZED, FOR SOLUTION INTRAMUSCULAR; INTRAVENOUS at 21:15

## 2019-04-19 RX ADMIN — TAZOBACTAM SODIUM AND PIPERACILLIN SODIUM 3.38 G: 375; 3 INJECTION, SOLUTION INTRAVENOUS at 21:14

## 2019-04-19 RX ADMIN — BUSPIRONE HYDROCHLORIDE 10 MG: 10 TABLET ORAL at 15:18

## 2019-04-19 RX ADMIN — HYDRALAZINE HYDROCHLORIDE 50 MG: 50 TABLET, FILM COATED ORAL at 21:15

## 2019-04-19 RX ADMIN — MONTELUKAST 10 MG: 10 TABLET, FILM COATED ORAL at 18:29

## 2019-04-19 RX ADMIN — HYDRALAZINE HYDROCHLORIDE 50 MG: 50 TABLET, FILM COATED ORAL at 15:18

## 2019-04-19 RX ADMIN — BACLOFEN 10 MG: 10 TABLET ORAL at 15:18

## 2019-04-19 RX ADMIN — FAMOTIDINE 40 MG: 20 TABLET ORAL at 11:23

## 2019-04-19 RX ADMIN — IPRATROPIUM BROMIDE AND ALBUTEROL SULFATE 1 AMPULE: .5; 3 SOLUTION RESPIRATORY (INHALATION) at 12:04

## 2019-04-19 RX ADMIN — GUAIFENESIN 600 MG: 600 TABLET, EXTENDED RELEASE ORAL at 21:14

## 2019-04-19 RX ADMIN — IPRATROPIUM BROMIDE AND ALBUTEROL SULFATE 1 AMPULE: .5; 3 SOLUTION RESPIRATORY (INHALATION) at 04:30

## 2019-04-19 RX ADMIN — TRAZODONE HYDROCHLORIDE 75 MG: 50 TABLET ORAL at 21:15

## 2019-04-19 RX ADMIN — BUSPIRONE HYDROCHLORIDE 10 MG: 10 TABLET ORAL at 21:14

## 2019-04-19 ASSESSMENT — PAIN DESCRIPTION - PROGRESSION: CLINICAL_PROGRESSION: GRADUALLY WORSENING

## 2019-04-19 ASSESSMENT — PAIN SCALES - GENERAL
PAINLEVEL_OUTOF10: 0
PAINLEVEL_OUTOF10: 3
PAINLEVEL_OUTOF10: 3

## 2019-04-19 NOTE — CARE COORDINATION
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 She told her that she did not want her help. Matt Diane left info and told pt to call [facility] if she changed her mind.   TE

## 2019-04-19 NOTE — CARE COORDINATION
Went to talk to [facility]. She is sleeping. [facility] has her Trilogy NIV set up at home for when she is discharged. Charmaine Fall 04/23/19    Went to talk with [facility]. She appeared up set. She does not want to talk now.   Will follow Anuradha Vergara 04/19/19    COPD CHECKLIST    Was the COPD Order set used      No-Pneumonia  Pneumonia/COPD Stoplight Education   No  Blood Cultures drawn before 1st antibiotic given   Yes  Antibiotic given within 24 hours    Yes  O2 Saturation on admission     98% 3 lpm    Consults:             Smoking Cessation      N/A Quit 1-1-2008  Pulmonary       Yes  Med [address] Consult     No  Palliative Care Consult     No  Respiratory Consult      Yes  (including bedside instructions on nebulizers, inhalers, and assessment of oxygen and equipment needs at home)    Discharge:             Pneumococcal Vaccine given before discharge  UTD  Flu Vaccine given before discharge    UTD  Inhalers       Yes  Spacer        Yes  Steroids       No  Follow-up appointment scheduled within 5-7 days  Yes  Cardio/Pulmonary Wellness program consult  No  Home Oxygen       Yes  Nebulizer, bi-pap, c-pap at home    Yes  Home air quality assessment ([facility]) No    Where do you live     Primary Physician:               [x] Home     [x] Dr. Cash Lei               [] Independent [facility]              [facility]               [] Assisted Living    [] [address] [facility]               [] [address]       [] Homeless/Homeless Shelter              Pulmonary Physician:    Consulted:  [] Charisma Moerno     [x] Yes   [] Stacy     [] No  [x] [facility]  [] Eloisa Shelton  [] Other:     Pharmacy:      Meds to Beds:  [] [facility]                               [] Yes   [] [facility]       [x] No   [] [facility]   [] [employer]      [] [facility]    [] [facility]   [] [facility]   [] [facility]   [x] Other: [address]:       SNF:  [] Yes, Name:                  [] Yes, Name:  [x] No                     [x] No     Do you have            DME Company:  [x] [address] [facility]    [] CM DME    [x] Trilogy NIV - [place]         [x] [employer]  [x] Nebulizer      [x] [place]  [] None of the above     [] Other    Home COPD Medications:  Inhalers:   yes               Nebulizers:      yes               Other: oxygen and Trilogy NIV    Do you have all your home medications? Can you pay for medication?   [x] Yes         [x] Yes Had trouble with making Bi-Pap monthly fee, was ordered Trilogy   []  No [] No     What time do you prefer your appointments:  Do you have transportation:    [] AM                                                                          [x] Yes  [x] PM                                                                          [] No  [] Anytime

## 2019-04-19 NOTE — PROGRESS NOTES
Dr. Pedro Keller notified via [employer] of need to order home medications due to pt being unable to verify all home medications and dosages with this RN

## 2019-04-19 NOTE — ED NOTES
Respiratory and lab at bedside      Jordan [facility]  04/18/19 2573

## 2019-04-19 NOTE — PROGRESS NOTES
Pt states, \"I'm ready to be pt on the bipap, that's what I came here for\". Reviewed with patient that this has not been ordered at this time and eval from ER physician is consistent with oxygen per nasal cannula. Advised pt that RT will come to eval for breathing treatments as ordered and provide further assessment.

## 2019-04-19 NOTE — PROGRESS NOTES
Secure message sent to Dr. Mali Asif regarding additional admission orders. New orders received including consultation with Dr. Grayson Clifton. Pt updated on plan of care and RT notified.

## 2019-04-19 NOTE — CARE COORDINATION
- room # 30 for pt with ER complaint of shortness of breath . Pt at risk of readmission although pt is still in the evaluation process ,it does not appear that  will be able to bring a higher level l of care for this pt --pt does not want or need  [facility] at present -her insurance is private as she is unsure of deductibles. Pt is  , living with her  at home who is still working , and he holds [employer]. Pt was suggested to get C-Pap -but again has difficulty with the deductibles from private insurance   . Pt is currently on 3 liters of Oxygen at home and has stated no further needs at this time . Pt is currently a potential admission .   Anushka West / [facility] / [facility]

## 2019-04-19 NOTE — PROGRESS NOTES
04/19/19 0130   NIV Type   $NIV $Daily Charge   Equipment Type Autopap   Mode CPAP   Mask Type Full face mask   Mask Size Medium   Bonnet size Medium   Settings/Measurements   Comfort Level Good   Using Accessory Muscles No   CPAP 5 cmH2O   Resp 19   SpO2 93   Vt Exhaled 452 mL

## 2019-04-19 NOTE — ED NOTES
Respiratory called for breathing treatment. Lab called for culture and lactic.       Shahid Irby RN  04/18/19 1391

## 2019-04-19 NOTE — CARE COORDINATION
Referral from [address] briefly with patient and her . 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 She denied any assistance but her  was willing to take a [facility] brochure and our contact information should the need arises.

## 2019-04-19 NOTE — DISCHARGE INSTR - OTHER ORDERS
You have a follow up appointment with Dr. America Farrell April 25, 2019 at 3:30 pm.  If you are not able to make this appointment call the office as soon as possible.   [address]  [phone]

## 2019-04-19 NOTE — H&P
History and Physical        CHIEF COMPLAINT:  Increasing shortness of breath      HISTORY OF PRESENT ILLNESS:      The patient is a 66 y.o. female with significant past medical history of COPD on home oxygen, anxiety, Bipolar disorder, fibromyalgia and sleep apnea was released last week and was set up to see me in my office today. However when she went to ambulte she became very shortness of breath and could not come in to see me. Also she says she was unable to get CPAP machine over the weekend. She was advised to go to ER and she was admitted for further management.      Past Medical History:        Diagnosis Date    Anxiety 2/16/2017    Arthritis     Back pain at L4-L5 level 2/16/2017    Dr Eufemia Curry Bipolar 1 disorder ([facility] [address]     COPD (chronic obstructive pulmonary disease) ([facility] [address]     Emphysema of lung ([facility] [address] [facility]     FH: CAD (coronary artery disease) 2/16/2017    Father had in his forties, sister in her thirties    Marta Curry Fibromyalgia 2/16/2017    Fibromyalgia     H/O Doppler ultrasound 04/05/2019    venous- no DVT or reflux    H/O echocardiogram 01/14/2015    EF50-55% Normal- see media    Hyperlipidemia LDL goal <100     Hypertension     Obstructive sleep apnea     Osteoarthritis     Thyroid disease      Past Surgical History:        Procedure Laterality Date    CARPAL TUNNEL RELEASE      TUBAL LIGATION         Medications Prior to Admission:   Medications Prior to Admission: guaiFENesin (MUCINEX) 600 MG extended release tablet, Take 1 tablet by mouth 2 times daily  ferrous gluconate 324 (37.5 Fe) MG TABS, Take 1 tablet by mouth 2 times daily  potassium chloride (KLOR-CON M) 20 MEQ extended release tablet, Take 1 tablet by mouth daily (with breakfast)  cefdinir (OMNICEF) 300 MG capsule, Take 1 capsule by mouth 2 times daily for 7 days  methylPREDNISolone (MEDROL DOSEPACK) 4 MG tablet, Take by mouth.  levofloxacin (LEVAQUIN) 500 MG tablet, Take 1 tablet by mouth daily for 10 days Stop Omnicef  hydrALAZINE (APRESOLINE) 50 MG tablet, Take 1 tablet by mouth 3 times daily  furosemide (LASIX) 40 MG tablet, Take 1 tablet by mouth daily  metoprolol succinate (TOPROL XL) 25 MG extended release tablet, Take 1 tablet by mouth daily  famotidine (PEPCID) 20 MG tablet, Take 1 tablet by mouth 2 times daily (Patient taking differently: Take 40 mg by mouth 2 times daily )  busPIRone (BUSPAR) 15 MG tablet, Take 15 mg by mouth 2 times daily (Patient taking differently: Take 10 mg by mouth 3 times daily )  celecoxib (CELEBREX) 200 MG capsule, Take 200 mg by mouth 2 times daily  traZODone (DESYREL) 50 MG tablet, Take 75 mg by mouth nightly   levothyroxine (SYNTHROID) 150 MCG tablet, Take 150 mcg by mouth every morning  citalopram (CELEXA) 40 MG tablet, Take 40 mg by mouth daily  clonazePAM (KLONOPIN) 1 MG tablet, Take 1 mg by mouth 3 times daily as needed. montelukast (SINGULAIR) 10 MG tablet, Take 10 mg by mouth daily  theophylline (MIAH-24) 400 MG extended release capsule, Take 400 mg by mouth daily  albuterol-ipratropium (COMBIVENT RESPIMAT)  MCG/ACT AERS inhaler, Inhale 1 puff into the lungs every 4 hours as needed for Wheezing  baclofen (LIORESAL) 10 MG tablet, Take 1 tablet by mouth 3 times daily  aspirin 81 MG chewable tablet, Take 81 mg by mouth daily  budesonide-formoterol (SYMBICORT) 160-4.5 MCG/ACT AERO, Inhale 2 puffs into the lungs 2 times daily  fluticasone (FLONASE) 50 MCG/ACT nasal spray, 2 sprays by Nasal route daily  amLODIPine (NORVASC) 10 MG tablet, Take 1 tablet by mouth daily  atorvastatin (LIPITOR) 40 MG tablet, Take 40 mg by mouth daily  ipratropium-albuterol (DUONEB) 0.5-2.5 (3) MG/3ML SOLN nebulizer solution, Inhale 1 vial into the lungs every 4 hours    Allergies:  Lisinopril    Social History:   TOBACCO:   reports that she quit smoking about 11 years ago. She has a 30.00 pack-year smoking history.  She has never used smokeless tobacco.  ETOH:   reports that she drank about 1.2 oz of alcohol per to name, place and time. Cranial nerves II-XII are grossly intact. Motor is 5 out of 5 bilaterally. Cerebellar finger to nose, heel to shin intact. Sensory is intact.   Babinski down going, Romberg negative, and gait is normal.  SKIN:  no bruising or bleeding    DATA:  CBC with Differential:    Lab Results   Component Value Date    WBC 8.2 04/19/2019    RBC 3.98 04/19/2019    HGB 10.7 04/19/2019    HCT 37.6 04/19/2019     04/19/2019    MCV 94.5 04/19/2019    MCH 26.9 04/19/2019    MCHC 28.5 04/19/2019    RDW 15.5 04/19/2019    SEGSPCT 85.0 04/18/2019    BANDSPCT 3 04/18/2019    LYMPHOPCT 8.0 04/18/2019    MONOPCT 4.0 04/18/2019    MYELOPCT 1 02/10/2019    BASOPCT 0.1 04/15/2019    MONOSABS 0.4 04/18/2019    LYMPHSABS 0.8 04/18/2019    EOSABS 0.0 04/15/2019    BASOSABS 0.0 04/15/2019    DIFFTYPE MANUAL DIFFERENTIAL 04/18/2019     CMP:    Lab Results   Component Value Date     04/19/2019    K 4.1 04/19/2019    CL 91 04/19/2019    CO2 39 04/19/2019    BUN 10 04/19/2019    CREATININE 0.5 04/19/2019    GFRAA >60 04/19/2019    LABGLOM >60 04/19/2019    GLUCOSE 176 04/19/2019    PROT 6.2 04/18/2019    PROT 6.9 01/23/2013    LABALBU 3.7 04/18/2019    CALCIUM 9.2 04/19/2019    BILITOT 0.3 04/18/2019    ALKPHOS 81 04/18/2019    AST 25 04/18/2019    ALT 44 04/18/2019     PT/INR:    Lab Results   Component Value Date    PROTIME 11.5 04/12/2019    INR 1.01 04/12/2019     Last 3 Troponin:    Lab Results   Component Value Date    TROPONINI 0.007 03/25/2014     U/A:    Lab Results   Component Value Date    COLORU YELLOW 04/11/2019    PROTEINU NEGATIVE 04/11/2019    WBCUA 1 04/11/2019    RBCUA NONE SEEN 04/11/2019    MUCUS RARE 02/28/2019    TRICHOMONAS NONE SEEN 04/11/2019    BACTERIA NEGATIVE 04/11/2019    CLARITYU CLEAR 04/11/2019    SPECGRAV 1.008 04/11/2019    LEUKOCYTESUR NEGATIVE 04/11/2019    UROBILINOGEN NORMAL 04/11/2019    BILIRUBINUR NEGATIVE 04/11/2019    BLOODU NEGATIVE 04/11/2019     ABG:    Lab Results   Component Value Date    PH 7.38 04/08/2019    O2SAT 94.2 04/08/2019     TSH:  No results found for: TSH    Radiology:  Chest Xray:    1. Improving right base opacity, compatible with resolving infection. 2. COPD.           CT Chest:    No PE identified.       Bilateral interstitial and nodular pulmonary infiltrates, significantly   increased since the previous exam of 04/09/2019. Venous doppler-negative for DVT      EKG:  Normal sinus rhythm   Rightward axis   Low voltage QRS   Borderline ECG   ASSESSMENT AND PLAN:      Principal Problem:    Respiratory failure with hypoxia and hypercapnia (HCC)  Active Problems:    Sleep apnea    Centrilobular emphysema (HCC)    Hypertension    Hyperlipidemia LDL goal <100    Anxiety    Fibromyalgia    COPD exacerbation (HCC)    Pneumonia  Resolved Problems:    * No resolved hospital problems.  *      IV Zosyn and Levaquin started based on last admission sensitivity results  IV Solumedrol  Breathing treatments  Destiny Velasquez covering for me from 04/20/2019 to 04/22/2019 AM    [address]  4/19/2019  7:09 PM

## 2019-04-19 NOTE — PROGRESS NOTES
Pt admitted to room 3121 from ED. Skin assessment complete with Donnella Hatchet RN. Pt with multiple areas of ecchymosis on arms and admission from recent medical interventions.  No other skin issues identified at this time

## 2019-04-19 NOTE — CONSULTS
[address], [address] [facility]                                  CONSULTATION    PATIENT NAME: Oscar Metz                :        1960  MED REC NO:   [id number]                          ROOM:       3968  ACCOUNT NO:   [de-identified]                           ADMIT DATE: 2019  PROVIDER:     Vicki Montoya MD    CONSULT DATE:  2019    HISTORY OF PRESENT ILLNESS:  The patient is a 53-year-old lady with  multiple medical problems including COPD, chronic respiratory failure,  on home oxygen; bipolar disorder, fibromyalgia, obstructive sleep apnea,  who was recently discharged from the hospital few days ago. She was  arranged with the BiPAP with [facility]. The patient had some more balance  from [address], which she was not able to pay and so she did not get the  BiPAP. While at home, the patient became increasingly short of breath,  she was having nonproductive cough and wheezing, so she came back to the  emergency room. In the emergency room, she was given breathing  treatment with some relief, but she continued to have shortness of  breath and so it was decided to admit her for aggressive therapy. She  had a chest x-ray, which showed improving right lower lobe infiltrate  and COPD changes. There was no history of hemoptysis, hematemesis,  nausea or vomiting. No history of chest pains. PAST MEDICAL HISTORY:  Significant for COPD, chronic respiratory  failure, on home oxygen; bipolar disorder, hypertension, hyperlipidemia  and fibromyalgia. PAST SURGICAL HISTORY:  Remarkable for tubal ligation and carpal tunnel  release surgery. FAMILY HISTORY:  Noncontributory. SOCIAL HISTORY:  Reveals that she quit smoking in , but used to  smoke one and half packs per day for 20 years prior to that. She has  history of alcohol abuse in the past.  No history of drug abuse.     MEDICATIONS: Reviewed. ALLERGIES:  She is allergic to LISINOPRIL. REVIEW OF SYSTEMS:  A 10- to 14-point review of systems were reviewed  and are negative except for what is mentioned in the history of present  illness. PHYSICAL EXAMINATION:  GENERAL:  The patient is alert, oriented x3, in no acute respiratory  distress. VITAL SIGNS:  Her blood pressure was 152/72 mmHg, pulse of 85 per minute  and respiratory rate of 19 per minute. She is afebrile. Her saturation  is 96% to 97% on 3 L nasal cannula. HEENT:  Essentially unremarkable. NECK:  There is no JVD. No lymphadenopathy. The neck is supple. LUNGS:  Revealed diminished breath sounds, very occasional bilateral  rhonchi. HEART:  Showed normal S1, S2. There was no S3 or S4 noted. ABDOMEN:  Benign. There is no evidence of any organomegaly. The bowel  sounds are present. NEUROLOGIC:  Reveals that she is awake and responsive, answering  questions appropriately and moving her extremities. DIAGNOSTIC DATA:  Her electrolytes showed a sodium of 139, potassium  4.7, chloride 92, carbon dioxide 42, BUN 10 and creatinine 0.6. Her  proBNP was 870. Her CBC showed a white count of 10.6, hemoglobin of  10.5 and hematocrit of 37.3. Her chest x-ray showed improving right lower lobe infiltrates. Her duplex leg scan was negative for DVT in the left lower extremity. Her ABGs showed a pH of 7.55, pCO2 of 53, pO2 of 122 with 93%  saturation. Her CAT scan of the chest showed no evidence of PE and interstitial  changes and pulmonary infiltrates, slightly increased compared to the  CAT scan which was done a year ago. IMPRESSION:  1. Acute-on-chronic respiratory failure secondary to acute exacerbation  of COPD. 2.  Acute exacerbation of COPD. 3.  Improving right lower lobe pneumonia. 4.  Noncompliance with BiPAP. 5.  History of bipolar disorder. PLAN:  1. Continue present antibiotic therapy. 2.  Solu-Medrol 40 q.6 hours.   3.  We will try to arrange for the BiPAP again with a different company. 4.  Check theophylline level. 5.  Check BNP level. 6.  Check procalcitonin level. 7.  Check mag and phos. 8.  Increase activity. 9.  BiPAP 15/5. 10.  As per orders.         Devorah Thurman MD    D: 04/19/2019 11:03:56       T: 04/19/2019 14:23:09     [email or web address]  Job#: [id number]     Doc#: [id number]    CC:

## 2019-04-19 NOTE — CARE COORDINATION
ED CM Michele Odom RN met with pt for d/c planning. Pt is from home with spouse. Noted that pt is unable to afford the co-pay for her bi-pap. Referral made to Velma/Kevyn Lopez to see if [facility] can help pt. Pt had denied any other needs and refused [address] per ED CM's note. D/c plan is home with spouse, no other needs. CM will continue to follow.    TE

## 2019-04-19 NOTE — ED NOTES
Narrative   EXAMINATION:   DUPLEX VENOUS ULTRASOUND OF THE LEFT LOWER EXTREMITY       4/18/2019 7:26 pm       TECHNIQUE:   Duplex ultrasound and Doppler images were obtained of the left lower   extremity.       COMPARISON:   02/28/2019, 03/07/2019       HISTORY:   ORDERING SYSTEM PROVIDED HISTORY: lower extremity edema   TECHNOLOGIST PROVIDED HISTORY:   Reason for exam:->lower extremity edema   Ordering Physician Provided Reason for Exam: left leg swelling   Acuity: Unknown   Type of Exam: Initial       FINDINGS:   The visualized veins of the left lower extremity are patent and free of   echogenic thrombus. The veins are normally compressible and have normal   phasic flow.           Impression   No evidence of DVT in the left lower extremity.         Dorothy Moreno RN  04/18/19 2019

## 2019-04-20 LAB
ADENOVIRUS DETECTION BY PCR: NOT DETECTED
ALBUMIN SERPL-MCNC: 3.9 GM/DL (ref 3.4–5)
ALP BLD-CCNC: 78 IU/L (ref 40–128)
ALT SERPL-CCNC: 38 U/L (ref 10–40)
ANION GAP SERPL CALCULATED.3IONS-SCNC: 11 MMOL/L (ref 4–16)
ANISOCYTOSIS: ABNORMAL
AST SERPL-CCNC: 19 IU/L (ref 15–37)
BANDED NEUTROPHILS ABSOLUTE COUNT: 0.44 K/CU MM
BANDED NEUTROPHILS RELATIVE PERCENT: 5 % (ref 5–11)
BASOPHILIC STIPPLING: PRESENT
BILIRUB SERPL-MCNC: 0.4 MG/DL (ref 0–1)
BORDETELLA PERTUSSIS PCR: NOT DETECTED
BUN BLDV-MCNC: 12 MG/DL (ref 6–23)
CALCIUM SERPL-MCNC: 9.4 MG/DL (ref 8.3–10.6)
CHLAMYDOPHILA PNEUMONIA PCR: NOT DETECTED
CHLORIDE BLD-SCNC: 90 MMOL/L (ref 99–110)
CO2: 40 MMOL/L (ref 21–32)
CORONAVIRUS 229E PCR: NOT DETECTED
CORONAVIRUS HKU1 PCR: NOT DETECTED
CORONAVIRUS NL63 PCR: NOT DETECTED
CORONAVIRUS OC43 PCR: NOT DETECTED
CREAT SERPL-MCNC: 0.7 MG/DL (ref 0.6–1.1)
DIFFERENTIAL TYPE: ABNORMAL
DOSE AMOUNT: NORMAL
DOSE TIME: NORMAL
GFR AFRICAN AMERICAN: >60 ML/MIN/1.73M2
GFR NON-AFRICAN AMERICAN: >60 ML/MIN/1.73M2
GLUCOSE BLD-MCNC: 102 MG/DL (ref 70–99)
HCT VFR BLD CALC: 36.7 % (ref 37–47)
HEMOGLOBIN: 10.5 GM/DL (ref 12.5–16)
HUMAN METAPNEUMOVIRUS PCR: ABNORMAL
INFLUENZA A BY PCR: NOT DETECTED
INFLUENZA A H1 (2009) PCR: NOT DETECTED
INFLUENZA A H1 PANDEMIC PCR: NOT DETECTED
INFLUENZA A H3 PCR: NOT DETECTED
INFLUENZA B BY PCR: NOT DETECTED
LYMPHOCYTES ABSOLUTE: 0.9 K/CU MM
LYMPHOCYTES RELATIVE PERCENT: 10 % (ref 24–44)
MAGNESIUM: 1.9 MG/DL (ref 1.8–2.4)
MCH RBC QN AUTO: 26.9 PG (ref 27–31)
MCHC RBC AUTO-ENTMCNC: 28.6 % (ref 32–36)
MCV RBC AUTO: 94.1 FL (ref 78–100)
METAMYELOCYTES ABSOLUTE COUNT: 0.18 K/CU MM
METAMYELOCYTES PERCENT: 2 %
MONOCYTES ABSOLUTE: 0.6 K/CU MM
MONOCYTES RELATIVE PERCENT: 7 % (ref 0–4)
MYCOPLASMA PNEUMONIAE PCR: NOT DETECTED
MYELOCYTE PERCENT: 1 %
MYELOCYTES ABSOLUTE COUNT: 0.09 K/CU MM
PARAINFLUENZA 1 PCR: NOT DETECTED
PARAINFLUENZA 2 PCR: NOT DETECTED
PARAINFLUENZA 3 PCR: NOT DETECTED
PARAINFLUENZA 4 PCR: NOT DETECTED
PDW BLD-RTO: 15.3 % (ref 11.7–14.9)
PHOSPHORUS: 3.2 MG/DL (ref 2.5–4.9)
PLATELET # BLD: 219 K/CU MM (ref 140–440)
PLT MORPHOLOGY: ABNORMAL
PMV BLD AUTO: 10.7 FL (ref 7.5–11.1)
POLYCHROMASIA: ABNORMAL
POTASSIUM SERPL-SCNC: 3.6 MMOL/L (ref 3.5–5.1)
PRO-BNP: 865.3 PG/ML
PROCALCITONIN: 0.03
RBC # BLD: 3.9 M/CU MM (ref 4.2–5.4)
RHINOVIRUS ENTEROVIRUS PCR: NOT DETECTED
RSV PCR: NOT DETECTED
SEGMENTED NEUTROPHILS ABSOLUTE COUNT: 6.6 K/CU MM
SEGMENTED NEUTROPHILS RELATIVE PERCENT: 75 % (ref 36–66)
SODIUM BLD-SCNC: 141 MMOL/L (ref 135–145)
STOMATOCYTES: ABNORMAL
THEOPHYLLINE LEVEL: 10.4 UG/ML (ref 10–20)
TOTAL PROTEIN: 5.7 GM/DL (ref 6.4–8.2)
WBC # BLD: 8.8 K/CU MM (ref 4–10.5)
WBC # BLD: ABNORMAL 10*3/UL

## 2019-04-20 PROCEDURE — 84145 PROCALCITONIN (PCT): CPT

## 2019-04-20 PROCEDURE — 6360000002 HC RX W HCPCS: Performed by: INTERNAL MEDICINE

## 2019-04-20 PROCEDURE — 94669 MECHANICAL CHEST WALL OSCILL: CPT

## 2019-04-20 PROCEDURE — 2140000000 HC CCU INTERMEDIATE R&B

## 2019-04-20 PROCEDURE — 36415 COLL VENOUS BLD VENIPUNCTURE: CPT

## 2019-04-20 PROCEDURE — 87486 CHLMYD PNEUM DNA AMP PROBE: CPT

## 2019-04-20 PROCEDURE — 94761 N-INVAS EAR/PLS OXIMETRY MLT: CPT

## 2019-04-20 PROCEDURE — 94660 CPAP INITIATION&MGMT: CPT

## 2019-04-20 PROCEDURE — 2500000003 HC RX 250 WO HCPCS: Performed by: INTERNAL MEDICINE

## 2019-04-20 PROCEDURE — 6370000000 HC RX 637 (ALT 250 FOR IP): Performed by: FAMILY MEDICINE

## 2019-04-20 PROCEDURE — 87798 DETECT AGENT NOS DNA AMP: CPT

## 2019-04-20 PROCEDURE — 83735 ASSAY OF MAGNESIUM: CPT

## 2019-04-20 PROCEDURE — 84100 ASSAY OF PHOSPHORUS: CPT

## 2019-04-20 PROCEDURE — 80198 ASSAY OF THEOPHYLLINE: CPT

## 2019-04-20 PROCEDURE — 83880 ASSAY OF NATRIURETIC PEPTIDE: CPT

## 2019-04-20 PROCEDURE — 87581 M.PNEUMON DNA AMP PROBE: CPT

## 2019-04-20 PROCEDURE — 85027 COMPLETE CBC AUTOMATED: CPT

## 2019-04-20 PROCEDURE — 94667 MNPJ CHEST WALL 1ST: CPT

## 2019-04-20 PROCEDURE — 2700000000 HC OXYGEN THERAPY PER DAY

## 2019-04-20 PROCEDURE — 80048 BASIC METABOLIC PNL TOTAL CA: CPT

## 2019-04-20 PROCEDURE — 94640 AIRWAY INHALATION TREATMENT: CPT

## 2019-04-20 PROCEDURE — 2580000003 HC RX 258

## 2019-04-20 PROCEDURE — 80053 COMPREHEN METABOLIC PANEL: CPT

## 2019-04-20 PROCEDURE — 6370000000 HC RX 637 (ALT 250 FOR IP): Performed by: INTERNAL MEDICINE

## 2019-04-20 PROCEDURE — 94668 MNPJ CHEST WALL SBSQ: CPT

## 2019-04-20 PROCEDURE — 85007 BL SMEAR W/DIFF WBC COUNT: CPT

## 2019-04-20 PROCEDURE — 2580000003 HC RX 258: Performed by: INTERNAL MEDICINE

## 2019-04-20 RX ORDER — LACTOBACILLUS RHAMNOSUS GG 10B CELL
1 CAPSULE ORAL
Status: DISCONTINUED | OUTPATIENT
Start: 2019-04-20 | End: 2019-04-23 | Stop reason: HOSPADM

## 2019-04-20 RX ADMIN — IPRATROPIUM BROMIDE AND ALBUTEROL SULFATE 1 AMPULE: .5; 3 SOLUTION RESPIRATORY (INHALATION) at 12:59

## 2019-04-20 RX ADMIN — METHYLPREDNISOLONE SODIUM SUCCINATE 40 MG: 40 INJECTION, POWDER, LYOPHILIZED, FOR SOLUTION INTRAMUSCULAR; INTRAVENOUS at 14:26

## 2019-04-20 RX ADMIN — HYDRALAZINE HYDROCHLORIDE 50 MG: 50 TABLET, FILM COATED ORAL at 14:26

## 2019-04-20 RX ADMIN — Medication 1 CAPSULE: at 10:15

## 2019-04-20 RX ADMIN — POTASSIUM CHLORIDE 20 MEQ: 1500 TABLET, EXTENDED RELEASE ORAL at 08:17

## 2019-04-20 RX ADMIN — CELECOXIB 200 MG: 200 CAPSULE ORAL at 17:11

## 2019-04-20 RX ADMIN — CLONAZEPAM 1 MG: 0.5 TABLET ORAL at 21:05

## 2019-04-20 RX ADMIN — ENOXAPARIN SODIUM 40 MG: 40 INJECTION SUBCUTANEOUS at 08:17

## 2019-04-20 RX ADMIN — FERROUS GLUCONATE TAB 324 MG (37.5 MG ELEMENTAL IRON) 324 MG: 324 (37.5 FE) TAB at 17:11

## 2019-04-20 RX ADMIN — CELECOXIB 200 MG: 200 CAPSULE ORAL at 08:16

## 2019-04-20 RX ADMIN — METOPROLOL SUCCINATE 25 MG: 25 TABLET, EXTENDED RELEASE ORAL at 08:17

## 2019-04-20 RX ADMIN — ASPIRIN 81 MG 81 MG: 81 TABLET ORAL at 08:17

## 2019-04-20 RX ADMIN — CLONAZEPAM 1 MG: 0.5 TABLET ORAL at 17:11

## 2019-04-20 RX ADMIN — HYDRALAZINE HYDROCHLORIDE 50 MG: 50 TABLET, FILM COATED ORAL at 08:16

## 2019-04-20 RX ADMIN — ATORVASTATIN CALCIUM 40 MG: 40 TABLET, FILM COATED ORAL at 21:06

## 2019-04-20 RX ADMIN — FUROSEMIDE 40 MG: 40 TABLET ORAL at 08:17

## 2019-04-20 RX ADMIN — BUSPIRONE HYDROCHLORIDE 10 MG: 10 TABLET ORAL at 21:06

## 2019-04-20 RX ADMIN — MONTELUKAST 10 MG: 10 TABLET, FILM COATED ORAL at 17:11

## 2019-04-20 RX ADMIN — BACLOFEN 10 MG: 10 TABLET ORAL at 08:17

## 2019-04-20 RX ADMIN — LEVOTHYROXINE SODIUM 125 MCG: 0.12 TABLET ORAL at 05:55

## 2019-04-20 RX ADMIN — CITALOPRAM HYDROBROMIDE 40 MG: 40 TABLET ORAL at 08:16

## 2019-04-20 RX ADMIN — NYSTATIN 500000 UNITS: 100000 SUSPENSION ORAL at 10:55

## 2019-04-20 RX ADMIN — IPRATROPIUM BROMIDE AND ALBUTEROL SULFATE 1 AMPULE: .5; 3 SOLUTION RESPIRATORY (INHALATION) at 17:09

## 2019-04-20 RX ADMIN — SODIUM CHLORIDE, PRESERVATIVE FREE 10 ML: 5 INJECTION INTRAVENOUS at 21:06

## 2019-04-20 RX ADMIN — BUSPIRONE HYDROCHLORIDE 10 MG: 10 TABLET ORAL at 14:32

## 2019-04-20 RX ADMIN — IPRATROPIUM BROMIDE AND ALBUTEROL SULFATE 1 AMPULE: .5; 3 SOLUTION RESPIRATORY (INHALATION) at 08:31

## 2019-04-20 RX ADMIN — THEOPHYLLINE 400 MG: 400 TABLET, EXTENDED RELEASE ORAL at 08:16

## 2019-04-20 RX ADMIN — IPRATROPIUM BROMIDE AND ALBUTEROL SULFATE 1 AMPULE: .5; 3 SOLUTION RESPIRATORY (INHALATION) at 05:13

## 2019-04-20 RX ADMIN — LEVOFLOXACIN 750 MG: 5 INJECTION, SOLUTION INTRAVENOUS at 23:21

## 2019-04-20 RX ADMIN — ACETAMINOPHEN 650 MG: 325 TABLET ORAL at 09:00

## 2019-04-20 RX ADMIN — METHYLPREDNISOLONE SODIUM SUCCINATE 40 MG: 40 INJECTION, POWDER, LYOPHILIZED, FOR SOLUTION INTRAMUSCULAR; INTRAVENOUS at 05:55

## 2019-04-20 RX ADMIN — ACETAMINOPHEN 650 MG: 325 TABLET ORAL at 21:05

## 2019-04-20 RX ADMIN — GUAIFENESIN 600 MG: 600 TABLET, EXTENDED RELEASE ORAL at 21:06

## 2019-04-20 RX ADMIN — GUAIFENESIN 600 MG: 600 TABLET, EXTENDED RELEASE ORAL at 08:17

## 2019-04-20 RX ADMIN — TAZOBACTAM SODIUM AND PIPERACILLIN SODIUM 3.38 G: 375; 3 INJECTION, SOLUTION INTRAVENOUS at 11:46

## 2019-04-20 RX ADMIN — TAZOBACTAM SODIUM AND PIPERACILLIN SODIUM 3.38 G: 375; 3 INJECTION, SOLUTION INTRAVENOUS at 04:31

## 2019-04-20 RX ADMIN — IPRATROPIUM BROMIDE AND ALBUTEROL SULFATE 1 AMPULE: .5; 3 SOLUTION RESPIRATORY (INHALATION) at 19:55

## 2019-04-20 RX ADMIN — FERROUS GLUCONATE TAB 324 MG (37.5 MG ELEMENTAL IRON) 324 MG: 324 (37.5 FE) TAB at 08:17

## 2019-04-20 RX ADMIN — HYDRALAZINE HYDROCHLORIDE 50 MG: 50 TABLET, FILM COATED ORAL at 21:06

## 2019-04-20 RX ADMIN — TRAZODONE HYDROCHLORIDE 75 MG: 50 TABLET ORAL at 21:06

## 2019-04-20 RX ADMIN — BACLOFEN 10 MG: 10 TABLET ORAL at 14:25

## 2019-04-20 RX ADMIN — LEVOFLOXACIN 750 MG: 5 INJECTION, SOLUTION INTRAVENOUS at 01:15

## 2019-04-20 RX ADMIN — METHYLPREDNISOLONE SODIUM SUCCINATE 40 MG: 40 INJECTION, POWDER, LYOPHILIZED, FOR SOLUTION INTRAMUSCULAR; INTRAVENOUS at 21:06

## 2019-04-20 RX ADMIN — FLUTICASONE PROPIONATE 2 SPRAY: 50 SPRAY, METERED NASAL at 08:52

## 2019-04-20 RX ADMIN — AMLODIPINE BESYLATE 10 MG: 10 TABLET ORAL at 08:16

## 2019-04-20 RX ADMIN — FAMOTIDINE 40 MG: 20 TABLET ORAL at 08:17

## 2019-04-20 RX ADMIN — NYSTATIN 500000 UNITS: 100000 SUSPENSION ORAL at 17:17

## 2019-04-20 RX ADMIN — TAZOBACTAM SODIUM AND PIPERACILLIN SODIUM 3.38 G: 375; 3 INJECTION, SOLUTION INTRAVENOUS at 21:12

## 2019-04-20 RX ADMIN — BACLOFEN 10 MG: 10 TABLET ORAL at 21:06

## 2019-04-20 RX ADMIN — CLONAZEPAM 1 MG: 0.5 TABLET ORAL at 09:00

## 2019-04-20 RX ADMIN — BUSPIRONE HYDROCHLORIDE 10 MG: 10 TABLET ORAL at 08:51

## 2019-04-20 ASSESSMENT — PAIN SCALES - GENERAL
PAINLEVEL_OUTOF10: 2
PAINLEVEL_OUTOF10: 0
PAINLEVEL_OUTOF10: 0
PAINLEVEL_OUTOF10: 3
PAINLEVEL_OUTOF10: 0
PAINLEVEL_OUTOF10: 3

## 2019-04-20 NOTE — PROGRESS NOTES
Pulmonary and Critical Care  Progress Note    Subjective: The patient is better. Shortness of breath has improved. Chest pain none. Addressing respiratory complaints Patient is negative for  hemoptysis and cyanosis. CONSTITUTIONAL:  negative for fevers and chills. Past Medical History:     has a past medical history of Anxiety, Arthritis, Back pain at L4-L5 level, Bipolar 1 disorder ([address] COPD (chronic obstructive pulmonary disease) ([facility] [address] Emphysema of lung ([facility] [address] FH: CAD (coronary artery disease), Fibromyalgia, Fibromyalgia, H/O Doppler ultrasound, H/O echocardiogram, Hyperlipidemia LDL goal <100, Hypertension, Obstructive sleep apnea, Osteoarthritis, and Thyroid disease. has a past surgical history that includes Carpal tunnel release and Tubal ligation. reports that she quit smoking about 11 years ago. She has a 30.00 pack-year smoking history. She has never used smokeless tobacco. She reports that she drank about 1.2 oz of alcohol per week. She reports that she does not use drugs. Family history:  family history includes No Known Problems in her father and mother. Allergies   Allergen Reactions    Lisinopril Swelling and Rash     angioedema     Social History:    Reviewed; no changes    Objective:   PHYSICAL EXAM:        VITALS:  BP (!) 140/73   Pulse 86   Temp 98 °F (36.7 °C) (Oral)   Resp 18   Ht 5' 1\" (1.549 m)   Wt 171 lb 9.6 oz (77.8 kg)   SpO2 100%   BMI 32.42 kg/m²     24HR INTAKE/OUTPUT:      Intake/Output Summary (Last 24 hours) at 4/20/2019 1139  Last data filed at 4/20/2019 1016  Gross per 24 hour   Intake 1560.5 ml   Output 600 ml   Net 960.5 ml       CONSTITUTIONAL:  awake, alert, cooperative, no apparent distress, and appears stated age  LUNGS:  Moving air better. CARDIOVASCULAR:  normal S1 and S2 and positive JVD  ABD:Abdomen soft, non-tender. BS normal. No masses,  No organomegaly  NEURO:Alert and oriented x3.  Gait normal. Reflexes and motor strength normal and

## 2019-04-20 NOTE — PROGRESS NOTES
Family Medicine Progress Note  4/20/2019 8:16 AM  Subjective:   Admit Date: 4/18/2019  PCP: Deborah Lamb MD  Diet: DIET GENERAL;  Pain is:None  Nausea:None  Bowel Movement/Flatus yes    Interval History: admitted for COPD exacerbation and failure of ability to get outpatient BIPAP. Restarted on intensive pulmonary treatments by Ambika Palmer  Denies any chest pains, palpitations. Denies nausea or vomiting. No bowel or bladder symptoms. Restful night. Rest of ROS -ve    Data:   Scheduled Meds:   enoxaparin  40 mg Subcutaneous Daily    levofloxacin  750 mg Intravenous Q24H    ipratropium-albuterol  1 ampule Inhalation Q4H WA    amLODIPine  10 mg Oral Daily    aspirin  81 mg Oral Daily    atorvastatin  40 mg Oral Nightly    baclofen  10 mg Oral TID    busPIRone  10 mg Oral TID    celecoxib  200 mg Oral BID WC    citalopram  40 mg Oral Daily    famotidine  40 mg Oral Daily    ferrous gluconate  324 mg Oral BID WC    fluticasone  2 spray Nasal Daily    furosemide  40 mg Oral Daily    guaiFENesin  600 mg Oral BID    hydrALAZINE  50 mg Oral TID    metoprolol succinate  25 mg Oral Daily    montelukast  10 mg Oral Nightly    potassium chloride  20 mEq Oral Daily with breakfast    theophylline  400 mg Oral Daily    traZODone  75 mg Oral Nightly    sodium chloride flush  10 mL Intravenous 2 times per day    piperacillin-tazobactam  3.375 g Intravenous Q8H    methylPREDNISolone  40 mg Intravenous Q8H    levothyroxine  125 mcg Oral QAM AC     Continuous Infusions:   sodium chloride 1,000 mL (04/19/19 1130)     PRN Meds:clonazePAM, sodium chloride flush, magnesium hydroxide, ondansetron, acetaminophen  I/O last 3 completed shifts: In: 1560.5 [P.O.:240; I.V.:1270.5; IV Piggyback:50]  Out: -   No intake/output data recorded.     Intake/Output Summary (Last 24 hours) at 4/20/2019 0816  Last data filed at 4/20/2019 0331  Gross per 24 hour   Intake 1560.5 ml   Output --   Net 1560.5 ml     CBC:   Recent Labs     04/18/19  1734 04/19/19  0408 04/20/19  0533   WBC 10.6* 8.2 8.8   HGB 10.5* 10.7* 10.5*    191 219     BMP:    Recent Labs     04/18/19  1734 04/19/19  0408 04/20/19  0533    141 141   K 4.7 4.1 3.6   CL 92* 91* 90*   CO2 42* 39* 40*   BUN 10 10 12   CREATININE 0.6 0.5* 0.7   GLUCOSE 210* 176* 102*     Hepatic:   Recent Labs     04/18/19  1734 04/20/19  0533   AST 25 19   ALT 44* 38   BILITOT 0.3 0.4   ALKPHOS 81 78     Troponin:   Recent Labs     04/18/19 1734   TROPONINT <0.010     BNP:   Recent Labs     04/18/19 1734 04/20/19  0533   PROBNP 870.6* 865.3*     Lipids: No results for input(s): CHOL, HDL in the last 72 hours. Invalid input(s): LDLCALCU  INR: No results for input(s): INR in the last 72 hours. Objective:   Vitals: BP (!) 140/73   Pulse 87   Temp 98 °F (36.7 °C) (Oral)   Resp 18   Ht 5' 1\" (1.549 m)   Wt 171 lb 9.6 oz (77.8 kg)   SpO2 100%   BMI 32.42 kg/m²   General appearance: alert and cooperative with exam  HEENT: Head: Normal, normocephalic, atraumatic. Eye: Normal external eye, conjunctiva, lids cornea, VER[place]NICA. Nose: Normal external nose, mucus membranes and septum. Neck: no adenopathy,  supple, symmetrical, trachea midline and thyroid not enlarged, symmetric, no tenderness/mass/nodules  Lungs: distant coarse  to auscultation bilaterally  Heart:  regular rate and rhythm  Abdomen: soft, non-tender; bowel sounds normal; no masses,  no organomegaly  Extremities: extremities normal, atraumatic, no cyanosis or edema  Neurologic: Mental status: Alert, oriented, thought content appropriate    Assessment and Plan:   Principal Problem:   Respiratory failure with hypoxia and hypercapnia ([address]   Thrush - start   Plan to improve ability to breathe and make arrangements for Bipap again, will consult case management. Appears she just got it delivered home from [facility].   Electronically signed by Aurea Odonnlel MD on 4/20/2019 at 8:16 AM

## 2019-04-20 NOTE — CARE COORDINATION
Consult received for assistance with Bipap. Med Assist offered assistance with cost of Bipap but patient refused assistance. RT will set up for home Bipap.  Ronaldo Hammond RN

## 2019-04-21 PROCEDURE — 2140000000 HC CCU INTERMEDIATE R&B

## 2019-04-21 PROCEDURE — 6370000000 HC RX 637 (ALT 250 FOR IP): Performed by: INTERNAL MEDICINE

## 2019-04-21 PROCEDURE — 94761 N-INVAS EAR/PLS OXIMETRY MLT: CPT

## 2019-04-21 PROCEDURE — 2580000003 HC RX 258: Performed by: INTERNAL MEDICINE

## 2019-04-21 PROCEDURE — 2700000000 HC OXYGEN THERAPY PER DAY

## 2019-04-21 PROCEDURE — 94660 CPAP INITIATION&MGMT: CPT

## 2019-04-21 PROCEDURE — 6370000000 HC RX 637 (ALT 250 FOR IP): Performed by: FAMILY MEDICINE

## 2019-04-21 PROCEDURE — 6360000002 HC RX W HCPCS: Performed by: INTERNAL MEDICINE

## 2019-04-21 PROCEDURE — 94669 MECHANICAL CHEST WALL OSCILL: CPT

## 2019-04-21 PROCEDURE — 94640 AIRWAY INHALATION TREATMENT: CPT

## 2019-04-21 PROCEDURE — 2500000003 HC RX 250 WO HCPCS: Performed by: INTERNAL MEDICINE

## 2019-04-21 PROCEDURE — 93010 ELECTROCARDIOGRAM REPORT: CPT | Performed by: INTERNAL MEDICINE

## 2019-04-21 PROCEDURE — 76937 US GUIDE VASCULAR ACCESS: CPT

## 2019-04-21 RX ADMIN — IPRATROPIUM BROMIDE AND ALBUTEROL SULFATE 1 AMPULE: .5; 3 SOLUTION RESPIRATORY (INHALATION) at 16:30

## 2019-04-21 RX ADMIN — CITALOPRAM HYDROBROMIDE 40 MG: 40 TABLET ORAL at 08:58

## 2019-04-21 RX ADMIN — METHYLPREDNISOLONE SODIUM SUCCINATE 40 MG: 40 INJECTION, POWDER, LYOPHILIZED, FOR SOLUTION INTRAMUSCULAR; INTRAVENOUS at 06:18

## 2019-04-21 RX ADMIN — METHYLPREDNISOLONE SODIUM SUCCINATE 40 MG: 40 INJECTION, POWDER, LYOPHILIZED, FOR SOLUTION INTRAMUSCULAR; INTRAVENOUS at 21:17

## 2019-04-21 RX ADMIN — FERROUS GLUCONATE TAB 324 MG (37.5 MG ELEMENTAL IRON) 324 MG: 324 (37.5 FE) TAB at 18:40

## 2019-04-21 RX ADMIN — METHYLPREDNISOLONE SODIUM SUCCINATE 40 MG: 40 INJECTION, POWDER, LYOPHILIZED, FOR SOLUTION INTRAMUSCULAR; INTRAVENOUS at 15:29

## 2019-04-21 RX ADMIN — HYDRALAZINE HYDROCHLORIDE 50 MG: 50 TABLET, FILM COATED ORAL at 13:36

## 2019-04-21 RX ADMIN — LEVOTHYROXINE SODIUM 125 MCG: 0.12 TABLET ORAL at 06:18

## 2019-04-21 RX ADMIN — BACLOFEN 10 MG: 10 TABLET ORAL at 13:36

## 2019-04-21 RX ADMIN — BACLOFEN 10 MG: 10 TABLET ORAL at 09:01

## 2019-04-21 RX ADMIN — SODIUM CHLORIDE, PRESERVATIVE FREE 10 ML: 5 INJECTION INTRAVENOUS at 09:03

## 2019-04-21 RX ADMIN — FAMOTIDINE 40 MG: 20 TABLET ORAL at 09:00

## 2019-04-21 RX ADMIN — ASPIRIN 81 MG 81 MG: 81 TABLET ORAL at 09:01

## 2019-04-21 RX ADMIN — CELECOXIB 200 MG: 200 CAPSULE ORAL at 18:39

## 2019-04-21 RX ADMIN — TRAZODONE HYDROCHLORIDE 75 MG: 50 TABLET ORAL at 21:18

## 2019-04-21 RX ADMIN — IPRATROPIUM BROMIDE AND ALBUTEROL SULFATE 1 AMPULE: .5; 3 SOLUTION RESPIRATORY (INHALATION) at 07:31

## 2019-04-21 RX ADMIN — HYDRALAZINE HYDROCHLORIDE 50 MG: 50 TABLET, FILM COATED ORAL at 08:58

## 2019-04-21 RX ADMIN — TAZOBACTAM SODIUM AND PIPERACILLIN SODIUM 3.38 G: 375; 3 INJECTION, SOLUTION INTRAVENOUS at 06:18

## 2019-04-21 RX ADMIN — THEOPHYLLINE 400 MG: 400 TABLET, EXTENDED RELEASE ORAL at 09:00

## 2019-04-21 RX ADMIN — CELECOXIB 200 MG: 200 CAPSULE ORAL at 09:01

## 2019-04-21 RX ADMIN — ENOXAPARIN SODIUM 40 MG: 40 INJECTION SUBCUTANEOUS at 09:01

## 2019-04-21 RX ADMIN — NYSTATIN 500000 UNITS: 100000 SUSPENSION ORAL at 21:17

## 2019-04-21 RX ADMIN — IPRATROPIUM BROMIDE AND ALBUTEROL SULFATE 1 AMPULE: .5; 3 SOLUTION RESPIRATORY (INHALATION) at 12:04

## 2019-04-21 RX ADMIN — CLONAZEPAM 1 MG: 0.5 TABLET ORAL at 13:49

## 2019-04-21 RX ADMIN — NYSTATIN 500000 UNITS: 100000 SUSPENSION ORAL at 13:38

## 2019-04-21 RX ADMIN — Medication 1 CAPSULE: at 08:58

## 2019-04-21 RX ADMIN — GUAIFENESIN 600 MG: 600 TABLET, EXTENDED RELEASE ORAL at 21:21

## 2019-04-21 RX ADMIN — HYDRALAZINE HYDROCHLORIDE 50 MG: 50 TABLET, FILM COATED ORAL at 21:21

## 2019-04-21 RX ADMIN — TAZOBACTAM SODIUM AND PIPERACILLIN SODIUM 3.38 G: 375; 3 INJECTION, SOLUTION INTRAVENOUS at 21:16

## 2019-04-21 RX ADMIN — SODIUM CHLORIDE, PRESERVATIVE FREE 10 ML: 5 INJECTION INTRAVENOUS at 21:21

## 2019-04-21 RX ADMIN — GUAIFENESIN 600 MG: 600 TABLET, EXTENDED RELEASE ORAL at 09:00

## 2019-04-21 RX ADMIN — CLONAZEPAM 1 MG: 0.5 TABLET ORAL at 09:26

## 2019-04-21 RX ADMIN — METOPROLOL SUCCINATE 25 MG: 25 TABLET, EXTENDED RELEASE ORAL at 09:00

## 2019-04-21 RX ADMIN — ATORVASTATIN CALCIUM 40 MG: 40 TABLET, FILM COATED ORAL at 21:21

## 2019-04-21 RX ADMIN — FUROSEMIDE 40 MG: 40 TABLET ORAL at 09:01

## 2019-04-21 RX ADMIN — FLUTICASONE PROPIONATE 2 SPRAY: 50 SPRAY, METERED NASAL at 09:35

## 2019-04-21 RX ADMIN — NYSTATIN 500000 UNITS: 100000 SUSPENSION ORAL at 18:39

## 2019-04-21 RX ADMIN — CLONAZEPAM 1 MG: 0.5 TABLET ORAL at 21:32

## 2019-04-21 RX ADMIN — IPRATROPIUM BROMIDE AND ALBUTEROL SULFATE 1 AMPULE: .5; 3 SOLUTION RESPIRATORY (INHALATION) at 21:55

## 2019-04-21 RX ADMIN — POTASSIUM CHLORIDE 20 MEQ: 1500 TABLET, EXTENDED RELEASE ORAL at 09:01

## 2019-04-21 RX ADMIN — NYSTATIN 500000 UNITS: 100000 SUSPENSION ORAL at 08:58

## 2019-04-21 RX ADMIN — BACLOFEN 10 MG: 10 TABLET ORAL at 21:17

## 2019-04-21 RX ADMIN — BUSPIRONE HYDROCHLORIDE 10 MG: 10 TABLET ORAL at 09:00

## 2019-04-21 RX ADMIN — AMLODIPINE BESYLATE 10 MG: 10 TABLET ORAL at 09:01

## 2019-04-21 RX ADMIN — FERROUS GLUCONATE TAB 324 MG (37.5 MG ELEMENTAL IRON) 324 MG: 324 (37.5 FE) TAB at 09:00

## 2019-04-21 RX ADMIN — MONTELUKAST 10 MG: 10 TABLET, FILM COATED ORAL at 18:39

## 2019-04-21 RX ADMIN — BUSPIRONE HYDROCHLORIDE 10 MG: 10 TABLET ORAL at 21:17

## 2019-04-21 RX ADMIN — IPRATROPIUM BROMIDE AND ALBUTEROL SULFATE 1 AMPULE: .5; 3 SOLUTION RESPIRATORY (INHALATION) at 02:11

## 2019-04-21 RX ADMIN — TAZOBACTAM SODIUM AND PIPERACILLIN SODIUM 3.38 G: 375; 3 INJECTION, SOLUTION INTRAVENOUS at 15:28

## 2019-04-21 RX ADMIN — BUSPIRONE HYDROCHLORIDE 10 MG: 10 TABLET ORAL at 13:44

## 2019-04-21 ASSESSMENT — PAIN SCALES - GENERAL
PAINLEVEL_OUTOF10: 3
PAINLEVEL_OUTOF10: 3
PAINLEVEL_OUTOF10: 4
PAINLEVEL_OUTOF10: 0

## 2019-04-21 ASSESSMENT — PAIN DESCRIPTION - LOCATION: LOCATION: HEAD

## 2019-04-21 ASSESSMENT — PAIN DESCRIPTION - FREQUENCY: FREQUENCY: CONTINUOUS

## 2019-04-21 ASSESSMENT — PAIN DESCRIPTION - DESCRIPTORS: DESCRIPTORS: ACHING

## 2019-04-21 ASSESSMENT — PAIN DESCRIPTION - PAIN TYPE: TYPE: ACUTE PAIN

## 2019-04-21 ASSESSMENT — PAIN - FUNCTIONAL ASSESSMENT: PAIN_FUNCTIONAL_ASSESSMENT: PREVENTS OR INTERFERES SOME ACTIVE ACTIVITIES AND ADLS

## 2019-04-21 NOTE — CONSULTS
Consult for IV access. #20 angio inserted into left forearm on first attempt with ultrasound guidance.  Froy Mcdonald

## 2019-04-21 NOTE — PLAN OF CARE
Problem: Falls - Risk of:  Goal: Will remain free from falls  Description  Will remain free from falls  Outcome: Ongoing  Goal: Absence of physical injury  Description  Absence of physical injury  Outcome: Ongoing     Problem: Pain:  Goal: Pain level will decrease  Description  Pain level will decrease  Outcome: Ongoing  Goal: Control of acute pain  Description  Control of acute pain  Outcome: Ongoing  Goal: Control of chronic pain  Description  Control of chronic pain  Outcome: Ongoing     Problem: Breathing Pattern - Ineffective:  Goal: Ability to achieve and maintain a regular respiratory rate will improve  Description  Ability to achieve and maintain a regular respiratory rate will improve  Outcome: Ongoing

## 2019-04-22 ENCOUNTER — TELEPHONE (OUTPATIENT)
Dept: CARDIOLOGY CLINIC | Age: 59
End: 2019-04-22

## 2019-04-22 DIAGNOSIS — I47.1 SVT (SUPRAVENTRICULAR TACHYCARDIA) (HCC): Primary | ICD-10-CM

## 2019-04-22 DIAGNOSIS — I47.20 V-TACH: ICD-10-CM

## 2019-04-22 LAB
EKG ATRIAL RATE: 86 BPM
EKG DIAGNOSIS: NORMAL
EKG P AXIS: 82 DEGREES
EKG P-R INTERVAL: 116 MS
EKG Q-T INTERVAL: 388 MS
EKG QRS DURATION: 96 MS
EKG QTC CALCULATION (BAZETT): 464 MS
EKG R AXIS: 113 DEGREES
EKG T AXIS: 72 DEGREES
EKG VENTRICULAR RATE: 86 BPM

## 2019-04-22 PROCEDURE — 94640 AIRWAY INHALATION TREATMENT: CPT

## 2019-04-22 PROCEDURE — 2580000003 HC RX 258: Performed by: INTERNAL MEDICINE

## 2019-04-22 PROCEDURE — 6360000002 HC RX W HCPCS: Performed by: INTERNAL MEDICINE

## 2019-04-22 PROCEDURE — 94761 N-INVAS EAR/PLS OXIMETRY MLT: CPT

## 2019-04-22 PROCEDURE — 2500000003 HC RX 250 WO HCPCS: Performed by: INTERNAL MEDICINE

## 2019-04-22 PROCEDURE — 6370000000 HC RX 637 (ALT 250 FOR IP): Performed by: FAMILY MEDICINE

## 2019-04-22 PROCEDURE — 2700000000 HC OXYGEN THERAPY PER DAY

## 2019-04-22 PROCEDURE — 94668 MNPJ CHEST WALL SBSQ: CPT

## 2019-04-22 PROCEDURE — 6370000000 HC RX 637 (ALT 250 FOR IP): Performed by: INTERNAL MEDICINE

## 2019-04-22 PROCEDURE — 2140000000 HC CCU INTERMEDIATE R&B

## 2019-04-22 RX ORDER — ACETAMINOPHEN 80 MG
TABLET,CHEWABLE ORAL
Status: COMPLETED
Start: 2019-04-22 | End: 2019-04-23

## 2019-04-22 RX ORDER — GUAIFENESIN/DEXTROMETHORPHAN 100-10MG/5
5 SYRUP ORAL EVERY 6 HOURS PRN
Status: DISCONTINUED | OUTPATIENT
Start: 2019-04-22 | End: 2019-04-23 | Stop reason: HOSPADM

## 2019-04-22 RX ADMIN — LEVOTHYROXINE SODIUM 125 MCG: 0.12 TABLET ORAL at 05:18

## 2019-04-22 RX ADMIN — HYDRALAZINE HYDROCHLORIDE 50 MG: 50 TABLET, FILM COATED ORAL at 14:24

## 2019-04-22 RX ADMIN — TAZOBACTAM SODIUM AND PIPERACILLIN SODIUM 3.38 G: 375; 3 INJECTION, SOLUTION INTRAVENOUS at 14:24

## 2019-04-22 RX ADMIN — BACLOFEN 10 MG: 10 TABLET ORAL at 21:58

## 2019-04-22 RX ADMIN — ACETAMINOPHEN 650 MG: 325 TABLET ORAL at 17:16

## 2019-04-22 RX ADMIN — Medication 1 CAPSULE: at 09:38

## 2019-04-22 RX ADMIN — BUSPIRONE HYDROCHLORIDE 10 MG: 10 TABLET ORAL at 14:24

## 2019-04-22 RX ADMIN — CLONAZEPAM 1 MG: 0.5 TABLET ORAL at 22:18

## 2019-04-22 RX ADMIN — FLUTICASONE PROPIONATE 2 SPRAY: 50 SPRAY, METERED NASAL at 09:37

## 2019-04-22 RX ADMIN — NYSTATIN 500000 UNITS: 100000 SUSPENSION ORAL at 09:38

## 2019-04-22 RX ADMIN — METHYLPREDNISOLONE SODIUM SUCCINATE 40 MG: 40 INJECTION, POWDER, LYOPHILIZED, FOR SOLUTION INTRAMUSCULAR; INTRAVENOUS at 05:18

## 2019-04-22 RX ADMIN — BACLOFEN 10 MG: 10 TABLET ORAL at 09:37

## 2019-04-22 RX ADMIN — GUAIFENESIN 600 MG: 600 TABLET, EXTENDED RELEASE ORAL at 09:38

## 2019-04-22 RX ADMIN — ASPIRIN 81 MG 81 MG: 81 TABLET ORAL at 09:38

## 2019-04-22 RX ADMIN — METHYLPREDNISOLONE SODIUM SUCCINATE 40 MG: 40 INJECTION, POWDER, LYOPHILIZED, FOR SOLUTION INTRAMUSCULAR; INTRAVENOUS at 21:57

## 2019-04-22 RX ADMIN — TAZOBACTAM SODIUM AND PIPERACILLIN SODIUM 3.38 G: 375; 3 INJECTION, SOLUTION INTRAVENOUS at 21:59

## 2019-04-22 RX ADMIN — METHYLPREDNISOLONE SODIUM SUCCINATE 40 MG: 40 INJECTION, POWDER, LYOPHILIZED, FOR SOLUTION INTRAMUSCULAR; INTRAVENOUS at 14:24

## 2019-04-22 RX ADMIN — TRAZODONE HYDROCHLORIDE 75 MG: 50 TABLET ORAL at 21:58

## 2019-04-22 RX ADMIN — POTASSIUM CHLORIDE 20 MEQ: 1500 TABLET, EXTENDED RELEASE ORAL at 09:37

## 2019-04-22 RX ADMIN — CLONAZEPAM 1 MG: 0.5 TABLET ORAL at 09:37

## 2019-04-22 RX ADMIN — CELECOXIB 200 MG: 200 CAPSULE ORAL at 09:37

## 2019-04-22 RX ADMIN — CLONAZEPAM 1 MG: 0.5 TABLET ORAL at 17:16

## 2019-04-22 RX ADMIN — THEOPHYLLINE 400 MG: 400 TABLET, EXTENDED RELEASE ORAL at 09:37

## 2019-04-22 RX ADMIN — SODIUM CHLORIDE, PRESERVATIVE FREE 10 ML: 5 INJECTION INTRAVENOUS at 05:18

## 2019-04-22 RX ADMIN — AMLODIPINE BESYLATE 10 MG: 10 TABLET ORAL at 09:37

## 2019-04-22 RX ADMIN — GUAIFENESIN 600 MG: 600 TABLET, EXTENDED RELEASE ORAL at 21:58

## 2019-04-22 RX ADMIN — CELECOXIB 200 MG: 200 CAPSULE ORAL at 17:16

## 2019-04-22 RX ADMIN — SODIUM CHLORIDE, PRESERVATIVE FREE 10 ML: 5 INJECTION INTRAVENOUS at 09:39

## 2019-04-22 RX ADMIN — TAZOBACTAM SODIUM AND PIPERACILLIN SODIUM 3.38 G: 375; 3 INJECTION, SOLUTION INTRAVENOUS at 05:18

## 2019-04-22 RX ADMIN — METOPROLOL SUCCINATE 25 MG: 25 TABLET, EXTENDED RELEASE ORAL at 09:37

## 2019-04-22 RX ADMIN — IPRATROPIUM BROMIDE AND ALBUTEROL SULFATE 1 AMPULE: .5; 3 SOLUTION RESPIRATORY (INHALATION) at 16:45

## 2019-04-22 RX ADMIN — NYSTATIN 500000 UNITS: 100000 SUSPENSION ORAL at 17:16

## 2019-04-22 RX ADMIN — BUSPIRONE HYDROCHLORIDE 10 MG: 10 TABLET ORAL at 09:38

## 2019-04-22 RX ADMIN — BUSPIRONE HYDROCHLORIDE 10 MG: 10 TABLET ORAL at 22:18

## 2019-04-22 RX ADMIN — MONTELUKAST 10 MG: 10 TABLET, FILM COATED ORAL at 17:16

## 2019-04-22 RX ADMIN — IPRATROPIUM BROMIDE AND ALBUTEROL SULFATE 1 AMPULE: .5; 3 SOLUTION RESPIRATORY (INHALATION) at 12:00

## 2019-04-22 RX ADMIN — FERROUS GLUCONATE TAB 324 MG (37.5 MG ELEMENTAL IRON) 324 MG: 324 (37.5 FE) TAB at 17:16

## 2019-04-22 RX ADMIN — SODIUM CHLORIDE, PRESERVATIVE FREE 10 ML: 5 INJECTION INTRAVENOUS at 21:59

## 2019-04-22 RX ADMIN — ATORVASTATIN CALCIUM 40 MG: 40 TABLET, FILM COATED ORAL at 21:58

## 2019-04-22 RX ADMIN — HYDRALAZINE HYDROCHLORIDE 50 MG: 50 TABLET, FILM COATED ORAL at 09:38

## 2019-04-22 RX ADMIN — NYSTATIN 500000 UNITS: 100000 SUSPENSION ORAL at 14:24

## 2019-04-22 RX ADMIN — FUROSEMIDE 40 MG: 40 TABLET ORAL at 09:37

## 2019-04-22 RX ADMIN — IPRATROPIUM BROMIDE AND ALBUTEROL SULFATE 1 AMPULE: .5; 3 SOLUTION RESPIRATORY (INHALATION) at 21:07

## 2019-04-22 RX ADMIN — NYSTATIN 500000 UNITS: 100000 SUSPENSION ORAL at 22:17

## 2019-04-22 RX ADMIN — HYDRALAZINE HYDROCHLORIDE 50 MG: 50 TABLET, FILM COATED ORAL at 21:58

## 2019-04-22 RX ADMIN — IPRATROPIUM BROMIDE AND ALBUTEROL SULFATE 1 AMPULE: .5; 3 SOLUTION RESPIRATORY (INHALATION) at 08:18

## 2019-04-22 RX ADMIN — IPRATROPIUM BROMIDE AND ALBUTEROL SULFATE 1 AMPULE: .5; 3 SOLUTION RESPIRATORY (INHALATION) at 03:47

## 2019-04-22 RX ADMIN — FAMOTIDINE 40 MG: 20 TABLET ORAL at 09:37

## 2019-04-22 RX ADMIN — FERROUS GLUCONATE TAB 324 MG (37.5 MG ELEMENTAL IRON) 324 MG: 324 (37.5 FE) TAB at 09:38

## 2019-04-22 RX ADMIN — BACLOFEN 10 MG: 10 TABLET ORAL at 14:24

## 2019-04-22 RX ADMIN — CITALOPRAM HYDROBROMIDE 40 MG: 40 TABLET ORAL at 09:37

## 2019-04-22 RX ADMIN — ACETAMINOPHEN 650 MG: 325 TABLET ORAL at 22:18

## 2019-04-22 ASSESSMENT — PAIN SCALES - GENERAL
PAINLEVEL_OUTOF10: 3

## 2019-04-22 ASSESSMENT — PAIN DESCRIPTION - PAIN TYPE: TYPE: ACUTE PAIN

## 2019-04-22 ASSESSMENT — PAIN DESCRIPTION - DESCRIPTORS: DESCRIPTORS: ACHING

## 2019-04-22 NOTE — PROGRESS NOTES
Progress note    Perla Howe    4/22/2019    Subjective:     Patient has complaints of shortness of breath. Medication side effects: none. Scheduled Meds:   nystatin  5 mL Oral 4x Daily    lactobacillus  1 capsule Oral Daily with breakfast    enoxaparin  40 mg Subcutaneous Daily    ipratropium-albuterol  1 ampule Inhalation Q4H WA    amLODIPine  10 mg Oral Daily    aspirin  81 mg Oral Daily    atorvastatin  40 mg Oral Nightly    baclofen  10 mg Oral TID    busPIRone  10 mg Oral TID    celecoxib  200 mg Oral BID WC    citalopram  40 mg Oral Daily    famotidine  40 mg Oral Daily    ferrous gluconate  324 mg Oral BID WC    fluticasone  2 spray Nasal Daily    furosemide  40 mg Oral Daily    guaiFENesin  600 mg Oral BID    hydrALAZINE  50 mg Oral TID    metoprolol succinate  25 mg Oral Daily    montelukast  10 mg Oral Nightly    potassium chloride  20 mEq Oral Daily with breakfast    theophylline  400 mg Oral Daily    traZODone  75 mg Oral Nightly    sodium chloride flush  10 mL Intravenous 2 times per day    piperacillin-tazobactam  3.375 g Intravenous Q8H    methylPREDNISolone  40 mg Intravenous Q8H    levothyroxine  125 mcg Oral QAM AC     Continuous Infusions:  PRN Meds:clonazePAM, sodium chloride flush, magnesium hydroxide, ondansetron, acetaminophen    Review of Systems  Pertinent items are noted in HPI. Objective:     Patient Vitals for the past 8 hrs:   BP Temp Temp src Pulse Resp SpO2   04/22/19 0930 (!) 161/76 97.8 °F (36.6 °C) Oral 103 21 95 %   04/22/19 0818 -- -- -- -- 18 95 %   04/22/19 0500 -- -- -- 78 12 96 %     I/O last 3 completed shifts: In: 48 [IV Piggyback:50]  Out: -   No intake/output data recorded. BP (!) 161/76   Pulse 103   Temp 97.8 °F (36.6 °C) (Oral)   Resp 21   Ht 5' 1\" (1.549 m)   Wt 177 lb 1.6 oz (80.3 kg)   SpO2 95%   BMI 33.46 kg/m²   General appearance: alert and cooperative.   Lungs:  Patient shortness of breath unable to complete a sentence. Bilateral rhonchi present  Heart: regular rate and rhythm, S1, S2 normal, no murmur, click, rub or gallop  Abdomen: soft, non-tender; bowel sounds normal; no masses,  no organomegaly  Extremities: extremities normal, atraumatic, no cyanosis or edema  Skin: Skin color, texture, turgor normal. No rashes or lesions        Labs and imaging reviewed.     CBC with Differential:    Lab Results   Component Value Date    WBC 8.8 04/20/2019    RBC 3.90 04/20/2019    HGB 10.5 04/20/2019    HCT 36.7 04/20/2019     04/20/2019    MCV 94.1 04/20/2019    MCH 26.9 04/20/2019    MCHC 28.6 04/20/2019    RDW 15.3 04/20/2019    SEGSPCT 75.0 04/20/2019    BANDSPCT 5 04/20/2019    LYMPHOPCT 10.0 04/20/2019    MONOPCT 7.0 04/20/2019    MYELOPCT 1 04/20/2019    BASOPCT 0.1 04/15/2019    MONOSABS 0.6 04/20/2019    LYMPHSABS 0.9 04/20/2019    EOSABS 0.0 04/15/2019    BASOSABS 0.0 04/15/2019    DIFFTYPE MANUAL DIFFERENTIAL 04/20/2019     CMP:    Lab Results   Component Value Date     04/20/2019    K 3.6 04/20/2019    CL 90 04/20/2019    CO2 40 04/20/2019    BUN 12 04/20/2019    CREATININE 0.7 04/20/2019    GFRAA >60 04/20/2019    LABGLOM >60 04/20/2019    GLUCOSE 102 04/20/2019    PROT 5.7 04/20/2019    PROT 6.9 01/23/2013    LABALBU 3.9 04/20/2019    CALCIUM 9.4 04/20/2019    BILITOT 0.4 04/20/2019    ALKPHOS 78 04/20/2019    AST 19 04/20/2019    ALT 38 04/20/2019     BMP:    Lab Results   Component Value Date     04/20/2019    K 3.6 04/20/2019    CL 90 04/20/2019    CO2 40 04/20/2019    BUN 12 04/20/2019    LABALBU 3.9 04/20/2019    CREATININE 0.7 04/20/2019    CALCIUM 9.4 04/20/2019    GFRAA >60 04/20/2019    LABGLOM >60 04/20/2019    GLUCOSE 102 04/20/2019     Sodium:    Lab Results   Component Value Date     04/20/2019     Potassium:    Lab Results   Component Value Date    K 3.6 04/20/2019     Calcium:    Lab Results   Component Value Date    CALCIUM 9.4 04/20/2019     Warfarin PT/INR:  No components found for: Roger Friend  PTT:    Lab Results   Component Value Date    APTT 25.3 04/12/2019   [APTT  Last 3 Troponin:    Lab Results   Component Value Date    TROPONINI 0.007 03/25/2014     ABG:    Lab Results   Component Value Date    LJH4YBD 60.0 04/08/2019    PO2ART 84 04/08/2019    XFJ7JTT 35.5 04/08/2019     TSH:  No results found for: TSH  VITAMIN B12: No components found for: B12  FOLATE:    Lab Results   Component Value Date    FOLATE >24.0 07/30/2013     IRON:    Lab Results   Component Value Date    IRON 30 04/11/2019     Iron Saturation:  No components found for: PERCENTFE  TIBC:    Lab Results   Component Value Date    TIBC 310 04/11/2019     FERRITIN:    Lab Results   Component Value Date    FERRITIN 39 04/11/2019       Assessment:     Principal Problem:    Respiratory failure with hypoxia and hypercapnia (HCC)  Active Problems:    Sleep apnea    Centrilobular emphysema (HCC)    Hypertension    Hyperlipidemia LDL goal <100    Anxiety    Fibromyalgia    COPD exacerbation (HCC)    Pneumonia  Resolved Problems:    * No resolved hospital problems. *      Plan:   Noted Dr. Gina Shen recommendation  Steroid taper  Increase activity and if stable can discharge home tomorrow.       Hallie DE SANTIAGO M.D.  4/22/2019

## 2019-04-22 NOTE — PLAN OF CARE
Problem: Falls - Risk of:  Goal: Will remain free from falls  Description  Will remain free from falls  4/22/2019 1005 by Violet Loyd RN  Outcome: Ongoing  4/21/2019 2325 by Cyndee Douglas RN  Outcome: Ongoing  Goal: Absence of physical injury  Description  Absence of physical injury  4/22/2019 1005 by Violet Loyd RN  Outcome: Ongoing  4/21/2019 2325 by Cyndee Douglas RN  Outcome: Ongoing     Problem: Pain:  Goal: Pain level will decrease  Description  Pain level will decrease  4/22/2019 1005 by Violet Loyd RN  Outcome: Ongoing  4/21/2019 2325 by Cyndee Douglas RN  Outcome: Ongoing  Goal: Control of acute pain  Description  Control of acute pain  4/22/2019 1005 by Violet Loyd RN  Outcome: Ongoing  4/21/2019 2325 by Cyndee Douglas RN  Outcome: Ongoing  Goal: Control of chronic pain  Description  Control of chronic pain  4/22/2019 1005 by Violet Loyd RN  Outcome: Ongoing  4/21/2019 2325 by Cyndee Douglas RN  Outcome: Ongoing     Problem: Breathing Pattern - Ineffective:  Goal: Ability to achieve and maintain a regular respiratory rate will improve  Description  Ability to achieve and maintain a regular respiratory rate will improve  4/22/2019 1005 by Violet Loyd RN  Outcome: Ongoing  4/21/2019 2325 by Cyndee Douglas RN  Outcome: Ongoing     Problem: Discharge Planning:  Goal: Discharged to appropriate level of care  Description  Discharged to appropriate level of care  Outcome: Ongoing     Problem:  Activity Intolerance:  Goal: Ability to tolerate increased activity will improve  Description  Ability to tolerate increased activity will improve  Outcome: Ongoing     Problem: Airway Clearance - Ineffective:  Goal: Ability to maintain a clear airway will improve  Description  Ability to maintain a clear airway will improve  Outcome: Ongoing     Problem: Gas Exchange - Impaired:  Goal: Levels of oxygenation will improve  Description  Levels of oxygenation will improve  Outcome: Ongoing

## 2019-04-22 NOTE — PROGRESS NOTES
Pulmonary and Critical Care  Progress Note    Subjective: The patient has improved. Shortness of breath has improved. Chest pain none. Addressing respiratory complaints Patient is negative for  hemoptysis and cyanosis. CONSTITUTIONAL:  negative for fevers and chills. Past Medical History:     has a past medical history of Anxiety, Arthritis, Back pain at L4-L5 level, Bipolar 1 disorder ([address] COPD (chronic obstructive pulmonary disease) ([facility] Emphysema of lung ([facility] FH: CAD (coronary artery disease), Fibromyalgia, Fibromyalgia, H/O Doppler ultrasound, H/O echocardiogram, Hyperlipidemia LDL goal <100, Hypertension, Obstructive sleep apnea, Osteoarthritis, and Thyroid disease. has a past surgical history that includes Carpal tunnel release and Tubal ligation. reports that she quit smoking about 11 years ago. She has a 30.00 pack-year smoking history. She has never used smokeless tobacco. She reports that she drank about 1.2 oz of alcohol per week. She reports that she does not use drugs. Family history:  family history includes No Known Problems in her father and mother. Allergies   Allergen Reactions    Lisinopril Swelling and Rash     angioedema     Social History:    Reviewed; no changes    Objective:   PHYSICAL EXAM:        VITALS:  BP (!) 161/76   Pulse 103   Temp 97.8 °F (36.6 °C) (Oral)   Resp 21   Ht 5' 1\" (1.549 m)   Wt 177 lb 1.6 oz (80.3 kg)   SpO2 95%   BMI 33.46 kg/m²     24HR INTAKE/OUTPUT:      Intake/Output Summary (Last 24 hours) at 4/22/2019 1049  Last data filed at 4/22/2019 0117  Gross per 24 hour   Intake 50 ml   Output --   Net 50 ml       CONSTITUTIONAL:  awake, alert, cooperative, no apparent distress, and appears stated age  LUNGS:  Moving air better. CARDIOVASCULAR:  normal S1 and S2 and positive JVD  ABD:Abdomen soft, non-tender. BS normal. No masses,  No organomegaly  NEURO:Alert and oriented x3. Gait normal. Reflexes and motor strength normal and symmetric. Cranial nerves 2-12 and sensation grossly intact. DATA:    CBC:  Recent Labs     04/20/19  0533   WBC 8.8   RBC 3.90*   HGB 10.5*   HCT 36.7*      MCV 94.1   MCH 26.9*   MCHC 28.6*   RDW 15.3*   SEGSPCT 75.0*   BANDSPCT 5      BMP:  Recent Labs     04/20/19  0533      K 3.6   CL 90*   CO2 40*   BUN 12   CREATININE 0.7   CALCIUM 9.4   GLUCOSE 102*      ABG:  No results for input(s): PH, PO2ART, NXR2WVG, HCO3, BEART, O2SAT in the last 72 hours. Lab Results   Component Value Date    PROBNP 865.3 (H) 04/20/2019    PROBNP 870.6 (H) 04/18/2019    PROBNP 1,322 (H) 04/11/2019    THEOPH 10.4 04/20/2019     No results found for: [facility]    Radiology Review:  Pertinent images / reports were reviewed as a part of this visit. Assessment:     Patient Active Problem List   Diagnosis    Centrilobular emphysema ([address]    Hypertension    Hyperlipidemia LDL goal <100    Abnormal EKG    Palpitations    Anxiety    FH: CAD (coronary artery disease)    Fibromyalgia    Back pain at L4-L5 level    Sleep apnea    COPD exacerbation (HCC)    Electrolyte imbalance    Epigastric pain    COPD (chronic obstructive pulmonary disease) ([address]    Spinal stenosis of lumbar region with radiculopathy    Spinal stenosis, lumbar region, without neurogenic claudication    COPD with acute exacerbation ([address]    Pneumonia due to infectious organism    SV[facility]ra[facility]    Class 1 obesity due to excess calories with body mass index (BMI) of 31.0 to 31.9 in adult    Pneumonia    Pleural effusion    Atelectasis    Pulmonary nodules    Respiratory failure with hypoxia and hypercapnia ([facility])    Anemia       Plan:   1. Overall the patient has improved. 2. Taper steroids. 3. Inc. Activity. 4. D/C soon.   Sheryl Segovia MD  4/22/2019  10:49 AM

## 2019-04-23 VITALS
RESPIRATION RATE: 22 BRPM | DIASTOLIC BLOOD PRESSURE: 55 MMHG | HEIGHT: 61 IN | BODY MASS INDEX: 33.44 KG/M2 | OXYGEN SATURATION: 99 % | TEMPERATURE: 97.1 F | SYSTOLIC BLOOD PRESSURE: 121 MMHG | HEART RATE: 92 BPM | WEIGHT: 177.1 LBS

## 2019-04-23 LAB
CULTURE: NORMAL
CULTURE: NORMAL
Lab: NORMAL
Lab: NORMAL
SPECIMEN: NORMAL
SPECIMEN: NORMAL

## 2019-04-23 PROCEDURE — 6370000000 HC RX 637 (ALT 250 FOR IP): Performed by: FAMILY MEDICINE

## 2019-04-23 PROCEDURE — 2500000003 HC RX 250 WO HCPCS: Performed by: INTERNAL MEDICINE

## 2019-04-23 PROCEDURE — 94660 CPAP INITIATION&MGMT: CPT

## 2019-04-23 PROCEDURE — 94669 MECHANICAL CHEST WALL OSCILL: CPT

## 2019-04-23 PROCEDURE — 6370000000 HC RX 637 (ALT 250 FOR IP): Performed by: INTERNAL MEDICINE

## 2019-04-23 PROCEDURE — 94640 AIRWAY INHALATION TREATMENT: CPT

## 2019-04-23 PROCEDURE — 6360000002 HC RX W HCPCS: Performed by: INTERNAL MEDICINE

## 2019-04-23 PROCEDURE — 94761 N-INVAS EAR/PLS OXIMETRY MLT: CPT

## 2019-04-23 PROCEDURE — 2700000000 HC OXYGEN THERAPY PER DAY

## 2019-04-23 RX ORDER — AMOXICILLIN AND CLAVULANATE POTASSIUM 875; 125 MG/1; MG/1
1 TABLET, FILM COATED ORAL 2 TIMES DAILY
Qty: 14 TABLET | Refills: 0 | Status: SHIPPED | OUTPATIENT
Start: 2019-04-23 | End: 2019-04-30

## 2019-04-23 RX ORDER — GUAIFENESIN/DEXTROMETHORPHAN 100-10MG/5
5 SYRUP ORAL EVERY 6 HOURS PRN
Qty: 120 ML | Refills: 0 | Status: SHIPPED | OUTPATIENT
Start: 2019-04-23 | End: 2019-05-03

## 2019-04-23 RX ORDER — ACETAMINOPHEN 80 MG
TABLET,CHEWABLE ORAL
Status: DISCONTINUED
Start: 2019-04-23 | End: 2019-04-23 | Stop reason: WASHOUT

## 2019-04-23 RX ORDER — BUSPIRONE HYDROCHLORIDE 10 MG/1
10 TABLET ORAL 3 TIMES DAILY
Qty: 90 TABLET | Refills: 0 | Status: ON HOLD | OUTPATIENT
Start: 2019-04-23 | End: 2021-05-04 | Stop reason: SDUPTHER

## 2019-04-23 RX ORDER — METHYLPREDNISOLONE 4 MG/1
TABLET ORAL
Qty: 1 KIT | Refills: 0 | Status: SHIPPED | OUTPATIENT
Start: 2019-04-23 | End: 2019-04-29

## 2019-04-23 RX ADMIN — CITALOPRAM HYDROBROMIDE 40 MG: 40 TABLET ORAL at 09:53

## 2019-04-23 RX ADMIN — ACETAMINOPHEN 650 MG: 325 TABLET ORAL at 09:49

## 2019-04-23 RX ADMIN — NYSTATIN 500000 UNITS: 100000 SUSPENSION ORAL at 16:13

## 2019-04-23 RX ADMIN — TAZOBACTAM SODIUM AND PIPERACILLIN SODIUM 3.38 G: 375; 3 INJECTION, SOLUTION INTRAVENOUS at 06:30

## 2019-04-23 RX ADMIN — POTASSIUM CHLORIDE 20 MEQ: 1500 TABLET, EXTENDED RELEASE ORAL at 09:50

## 2019-04-23 RX ADMIN — CLONAZEPAM 1 MG: 0.5 TABLET ORAL at 16:13

## 2019-04-23 RX ADMIN — BUSPIRONE HYDROCHLORIDE 10 MG: 10 TABLET ORAL at 16:13

## 2019-04-23 RX ADMIN — IPRATROPIUM BROMIDE AND ALBUTEROL SULFATE 1 AMPULE: .5; 3 SOLUTION RESPIRATORY (INHALATION) at 12:18

## 2019-04-23 RX ADMIN — ASPIRIN 81 MG 81 MG: 81 TABLET ORAL at 09:52

## 2019-04-23 RX ADMIN — HYDRALAZINE HYDROCHLORIDE 50 MG: 50 TABLET, FILM COATED ORAL at 09:51

## 2019-04-23 RX ADMIN — AMLODIPINE BESYLATE 10 MG: 10 TABLET ORAL at 09:57

## 2019-04-23 RX ADMIN — METHYLPREDNISOLONE SODIUM SUCCINATE 40 MG: 40 INJECTION, POWDER, LYOPHILIZED, FOR SOLUTION INTRAMUSCULAR; INTRAVENOUS at 16:13

## 2019-04-23 RX ADMIN — CLONAZEPAM 1 MG: 0.5 TABLET ORAL at 09:49

## 2019-04-23 RX ADMIN — FUROSEMIDE 40 MG: 40 TABLET ORAL at 09:52

## 2019-04-23 RX ADMIN — IPRATROPIUM BROMIDE AND ALBUTEROL SULFATE 1 AMPULE: .5; 3 SOLUTION RESPIRATORY (INHALATION) at 16:23

## 2019-04-23 RX ADMIN — NYSTATIN 500000 UNITS: 100000 SUSPENSION ORAL at 11:41

## 2019-04-23 RX ADMIN — FAMOTIDINE 40 MG: 20 TABLET ORAL at 09:53

## 2019-04-23 RX ADMIN — ACETAMINOPHEN 650 MG: 325 TABLET ORAL at 16:13

## 2019-04-23 RX ADMIN — FLUTICASONE PROPIONATE 2 SPRAY: 50 SPRAY, METERED NASAL at 11:42

## 2019-04-23 RX ADMIN — TAZOBACTAM SODIUM AND PIPERACILLIN SODIUM 3.38 G: 375; 3 INJECTION, SOLUTION INTRAVENOUS at 14:30

## 2019-04-23 RX ADMIN — IPRATROPIUM BROMIDE AND ALBUTEROL SULFATE 1 AMPULE: .5; 3 SOLUTION RESPIRATORY (INHALATION) at 08:03

## 2019-04-23 RX ADMIN — BACLOFEN 10 MG: 10 TABLET ORAL at 09:52

## 2019-04-23 RX ADMIN — METOPROLOL SUCCINATE 25 MG: 25 TABLET, EXTENDED RELEASE ORAL at 09:53

## 2019-04-23 RX ADMIN — BACLOFEN 10 MG: 10 TABLET ORAL at 14:26

## 2019-04-23 RX ADMIN — GUAIFENESIN AND DEXTROMETHORPHAN 5 ML: 100; 10 SYRUP ORAL at 02:33

## 2019-04-23 RX ADMIN — HYDRALAZINE HYDROCHLORIDE 50 MG: 50 TABLET, FILM COATED ORAL at 14:27

## 2019-04-23 RX ADMIN — FERROUS GLUCONATE TAB 324 MG (37.5 MG ELEMENTAL IRON) 324 MG: 324 (37.5 FE) TAB at 09:52

## 2019-04-23 RX ADMIN — CELECOXIB 200 MG: 200 CAPSULE ORAL at 16:14

## 2019-04-23 RX ADMIN — BUSPIRONE HYDROCHLORIDE 10 MG: 10 TABLET ORAL at 11:42

## 2019-04-23 RX ADMIN — Medication 1 CAPSULE: at 09:50

## 2019-04-23 RX ADMIN — LEVOTHYROXINE SODIUM 125 MCG: 0.12 TABLET ORAL at 06:30

## 2019-04-23 RX ADMIN — MONTELUKAST 10 MG: 10 TABLET, FILM COATED ORAL at 17:26

## 2019-04-23 RX ADMIN — METHYLPREDNISOLONE SODIUM SUCCINATE 40 MG: 40 INJECTION, POWDER, LYOPHILIZED, FOR SOLUTION INTRAMUSCULAR; INTRAVENOUS at 06:30

## 2019-04-23 RX ADMIN — Medication: at 02:22

## 2019-04-23 RX ADMIN — FERROUS GLUCONATE TAB 324 MG (37.5 MG ELEMENTAL IRON) 324 MG: 324 (37.5 FE) TAB at 16:16

## 2019-04-23 RX ADMIN — ACETAMINOPHEN 650 MG: 325 TABLET ORAL at 14:26

## 2019-04-23 RX ADMIN — THEOPHYLLINE 400 MG: 400 TABLET, EXTENDED RELEASE ORAL at 09:52

## 2019-04-23 RX ADMIN — CELECOXIB 200 MG: 200 CAPSULE ORAL at 09:51

## 2019-04-23 RX ADMIN — GUAIFENESIN 600 MG: 600 TABLET, EXTENDED RELEASE ORAL at 09:50

## 2019-04-23 ASSESSMENT — PAIN SCALES - GENERAL
PAINLEVEL_OUTOF10: 0
PAINLEVEL_OUTOF10: 4
PAINLEVEL_OUTOF10: 3

## 2019-04-23 NOTE — PLAN OF CARE
Problem: Falls - Risk of:  Goal: Will remain free from falls  Description  Will remain free from falls  Outcome: Ongoing  Goal: Absence of physical injury  Description  Absence of physical injury  Outcome: Ongoing     Problem: Pain:  Goal: Pain level will decrease  Description  Pain level will decrease  Outcome: Ongoing  Goal: Control of acute pain  Description  Control of acute pain  Outcome: Ongoing  Goal: Control of chronic pain  Description  Control of chronic pain  Outcome: Ongoing     Problem: Breathing Pattern - Ineffective:  Goal: Ability to achieve and maintain a regular respiratory rate will improve  Description  Ability to achieve and maintain a regular respiratory rate will improve  4/23/2019 1513 by Paul Frausto RN  Outcome: Ongoing  4/23/2019 1024 by Talia Amaya  Outcome: Ongoing     Problem: Discharge Planning:  Goal: Discharged to appropriate level of care  Description  Discharged to appropriate level of care  Outcome: Ongoing     Problem:  Activity Intolerance:  Goal: Ability to tolerate increased activity will improve  Description  Ability to tolerate increased activity will improve  Outcome: Ongoing     Problem: Airway Clearance - Ineffective:  Goal: Ability to maintain a clear airway will improve  Description  Ability to maintain a clear airway will improve  Outcome: Ongoing     Problem: Gas Exchange - Impaired:  Goal: Levels of oxygenation will improve  Description  Levels of oxygenation will improve  Outcome: Ongoing

## 2019-04-23 NOTE — PROGRESS NOTES
Pulmonary and Critical Care  Progress Note    Subjective: The patient is better. Shortness of breath has improved. Chest pain none. Addressing respiratory complaints Patient is negative for  hemoptysis and cyanosis. CONSTITUTIONAL:  negative for fevers and chills. Past Medical History:     has a past medical history of Anxiety, Arthritis, Back pain at L4-L5 level, Bipolar 1 disorder ([address] COPD (chronic obstructive pulmonary disease) ([facility] [address] Emphysema of lung ([facility] [address] FH: CAD (coronary artery disease), Fibromyalgia, Fibromyalgia, H/O Doppler ultrasound, H/O echocardiogram, Hyperlipidemia LDL goal <100, Hypertension, Obstructive sleep apnea, Osteoarthritis, and Thyroid disease. has a past surgical history that includes Carpal tunnel release and Tubal ligation. reports that she quit smoking about 11 years ago. She has a 30.00 pack-year smoking history. She has never used smokeless tobacco. She reports that she drank about 1.2 oz of alcohol per week. She reports that she does not use drugs. Family history:  family history includes No Known Problems in her father and mother. Allergies   Allergen Reactions    Lisinopril Swelling and Rash     angioedema     Social History:    Reviewed; no changes    Objective:   PHYSICAL EXAM:        VITALS:  /78   Pulse 92   Temp 97.8 °F (36.6 °C) (Oral)   Resp 26   Ht 5' 1\" (1.549 m)   Wt 177 lb 1.6 oz (80.3 kg)   SpO2 99%   BMI 33.46 kg/m²     24HR INTAKE/OUTPUT:    No intake or output data in the 24 hours ending 04/23/19 1045    CONSTITUTIONAL:  awake, alert, cooperative, no apparent distress, and appears stated age  LUNGS:  Moving air better. CARDIOVASCULAR:  normal S1 and S2 and positive JVD  ABD:Abdomen soft, non-tender. BS normal. No masses,  No organomegaly  NEURO:Alert and oriented x3. Gait normal. Reflexes and motor strength normal and symmetric. Cranial nerves 2-12 and sensation grossly intact.   DATA:    CBC:  No results for input(s): WBC, RBC,

## 2019-04-23 NOTE — CARE COORDINATION
Spoke with Brie Sheriff at [employer]. She said Trilogy NIV was delivered to patient in the hospital.  Patients  took machine home and [facility] RT went to the home to set-up and educate. Brie Sheriff stated that the patient's home O2 is provided by [employer], but they provide the patients vent machine.

## 2019-04-23 NOTE — PLAN OF CARE
Problem: Breathing Pattern - Ineffective:  Goal: Ability to achieve and maintain a regular respiratory rate will improve  Description  Ability to achieve and maintain a regular respiratory rate will improve  Outcome: Ongoing

## 2019-04-23 NOTE — PROGRESS NOTES
04/23/19 0327   NIV Type   Equipment Type v60   Mode BIPAP   Mask Type Full face mask   Mask Size Medium   Bonnet size Medium   Settings/Measurements   Comfort Level Good   Using Accessory Muscles No   IPAP 15 cmH20   EPAP 5 cmH2O   Rate Ordered 12   Resp 14   SpO2 98   FiO2  30 %   I Time/ I Time % 1.2 s   Vt Exhaled 768 mL   Mask Leak (lpm) 0 lpm   Patient Observation   Observations pt sleeping   Alarm Settings   Alarms On Y   Press Low Alarm 4 cmH2O   High Pressure Alarm 20 cmH2O   Delay Alarm 20 sec(s)   Apnea (secs) 20 secs   Resp Rate Low Alarm 12   High Respiratory Rate 40 br/min

## 2019-04-23 NOTE — DISCHARGE SUMMARY
Physician Discharge Summary     Patient ID:  Jose Ramon Mccormick  [id number]  66 y.o.  1960    Admit date: 4/18/2019    Discharge date and time: 4/23/2019  6:35 PM     Admitting Physician: Herbert Damon MD     Discharge Physician: Magen Andrade      Admission Diagnoses: Acute respiratory distress [R06.03]  Pneumonia due to organism [J18.9]  COPD exacerbation ([address] [J44.1]  Pneumonia [J18.9]    Discharge Diagnoses:   Acute on chronic respiratory failure  Acute respiratory distress [R06.03]  Pneumonia due to organism [J18.9]  COPD exacerbation ([address] [J44.1]  Anxiety  HTN  Oral candidiasis    Hospital Course: Patient readmitted for respiratory failure as she could not get CPAP machine at home when she left the hospital due to financial issues. She was placed on BIPAP and given Zosyn and Levaquin, Solumedrol and Duo neb treatments. She did well and recovered and discharged home in stable condition and she had the CPAP machine arranged when she left hospital.  Advised ehr to follow up as outpatient with in 1 week. Discharged Condition: good    Consults: pulmonary/intensive care    Significant Diagnostic Studies: radiology: CXR: 1. Improving right base opacity, compatible with resolving infection. CTA:    No PE identified.       Bilateral interstitial and nodular pulmonary infiltrates, significantly   increased since the previous exam of 04/09/2019.        Sputum Culture:  Human Metapneumovirus     Treatments: antibiotics: Zosyn and Levaquin, steroids: solu-medrol and respiratory therapy: O2 and albuterol/atropine nebulizer    Disposition: home    Patient Instructions:   Current Discharge Medication List      START taking these medications    Details   guaiFENesin-dextromethorphan (ROBITUSSIN DM) 100-10 MG/5ML syrup Take 5 mLs by mouth every 6 hours as needed for Cough  Qty: 120 mL, Refills: 0      nystatin (MYCOSTATIN) 505065 UNIT/ML suspension Take 5 mLs by mouth 4 times daily for 10 days  Qty: 200 mL, Refills: 0      amoxicillin-clavulanate (AUGMENTIN) 875-125 MG per tablet Take 1 tablet by mouth 2 times daily for 7 days  Qty: 14 tablet, Refills: 0         CONTINUE these medications which have CHANGED    Details   busPIRone (BUSPAR) 10 MG tablet Take 1 tablet by mouth 3 times daily  Qty: 90 tablet, Refills: 0      methylPREDNISolone (MEDROL DOSEPACK) 4 MG tablet Take by mouth. Qty: 1 kit, Refills: 0         CONTINUE these medications which have NOT CHANGED    Details   ferrous gluconate 324 (37.5 Fe) MG TABS Take 1 tablet by mouth 2 times daily  Qty: 60 tablet, Refills: 2      potassium chloride (KLOR-CON M) 20 MEQ extended release tablet Take 1 tablet by mouth daily (with breakfast)  Qty: 60 tablet, Refills: 0      hydrALAZINE (APRESOLINE) 50 MG tablet Take 1 tablet by mouth 3 times daily  Qty: 90 tablet, Refills: 1      furosemide (LASIX) 40 MG tablet Take 1 tablet by mouth daily  Qty: 30 tablet, Refills: 1      metoprolol succinate (TOPROL XL) 25 MG extended release tablet Take 1 tablet by mouth daily  Qty: 30 tablet, Refills: 3      famotidine (PEPCID) 20 MG tablet Take 1 tablet by mouth 2 times daily  Qty: 60 tablet, Refills: 3      celecoxib (CELEBREX) 200 MG capsule Take 200 mg by mouth 2 times daily      traZODone (DESYREL) 50 MG tablet Take 75 mg by mouth nightly       levothyroxine (SYNTHROID) 150 MCG tablet Take 150 mcg by mouth every morning  Refills: 0      citalopram (CELEXA) 40 MG tablet Take 40 mg by mouth daily      clonazePAM (KLONOPIN) 1 MG tablet Take 1 mg by mouth 3 times daily as needed.       montelukast (SINGULAIR) 10 MG tablet Take 10 mg by mouth daily      theophylline (MIAH-24) 400 MG extended release capsule Take 400 mg by mouth daily      albuterol-ipratropium (COMBIVENT RESPIMAT)  MCG/ACT AERS inhaler Inhale 1 puff into the lungs every 4 hours as needed for Wheezing  Qty: 3 Inhaler, Refills: 3      baclofen (LIORESAL) 10 MG tablet Take 1 tablet by mouth 3 times daily  Qty: 30 tablet, Refills: 0      aspirin 81 MG chewable tablet Take 81 mg by mouth daily      budesonide-formoterol (SYMBICORT) 160-4.5 MCG/ACT AERO Inhale 2 puffs into the lungs 2 times daily      fluticasone (FLONASE) 50 MCG/ACT nasal spray 2 sprays by Nasal route daily  Qty: 1 Bottle, Refills: 0      amLODIPine (NORVASC) 10 MG tablet Take 1 tablet by mouth daily  Qty: 90 tablet, Refills: 3      atorvastatin (LIPITOR) 40 MG tablet Take 40 mg by mouth daily      ipratropium-albuterol (DUONEB) 0.5-2.5 (3) MG/3ML SOLN nebulizer solution Inhale 1 vial into the lungs every 4 hours         STOP taking these medications       guaiFENesin (MUCINEX) 600 MG extended release tablet Comments:   Reason for Stopping:         cefdinir (OMNICEF) 300 MG capsule Comments:   Reason for Stopping:         levofloxacin (LEVAQUIN) 500 MG tablet Comments:   Reason for Stopping:             Activity: activity as tolerated  Diet: regular diet      Follow-up with Dr. Weber Form in 1 week    Signed:  Sukhjinder Donnelly    4/25/2019  3:42 PM

## 2019-04-23 NOTE — PROGRESS NOTES
Progress note    Rosaura Echevarria    4/23/2019    Subjective:     Patient is doing better, ready to go home. Per nursing she has been ambulating well. Medication side effects: none. Scheduled Meds:   nystatin  5 mL Oral 4x Daily    lactobacillus  1 capsule Oral Daily with breakfast    enoxaparin  40 mg Subcutaneous Daily    ipratropium-albuterol  1 ampule Inhalation Q4H WA    amLODIPine  10 mg Oral Daily    aspirin  81 mg Oral Daily    atorvastatin  40 mg Oral Nightly    baclofen  10 mg Oral TID    busPIRone  10 mg Oral TID    celecoxib  200 mg Oral BID WC    citalopram  40 mg Oral Daily    famotidine  40 mg Oral Daily    ferrous gluconate  324 mg Oral BID WC    fluticasone  2 spray Nasal Daily    furosemide  40 mg Oral Daily    guaiFENesin  600 mg Oral BID    hydrALAZINE  50 mg Oral TID    metoprolol succinate  25 mg Oral Daily    montelukast  10 mg Oral Nightly    potassium chloride  20 mEq Oral Daily with breakfast    theophylline  400 mg Oral Daily    traZODone  75 mg Oral Nightly    sodium chloride flush  10 mL Intravenous 2 times per day    piperacillin-tazobactam  3.375 g Intravenous Q8H    methylPREDNISolone  40 mg Intravenous Q8H    levothyroxine  125 mcg Oral QAM AC     Continuous Infusions:  PRN Meds:guaiFENesin-dextromethorphan, clonazePAM, sodium chloride flush, magnesium hydroxide, ondansetron, acetaminophen    Review of Systems  Pertinent items are noted in HPI. Objective:     Patient Vitals for the past 8 hrs:   BP Temp Temp src Pulse Resp SpO2   04/23/19 1632 -- -- -- -- 22 --   04/23/19 1426 (!) 121/55 -- -- -- -- --   04/23/19 1218 -- -- -- -- -- 99 %   04/23/19 0949 136/78 97.1 °F (36.2 °C) Oral 92 26 --   04/23/19 0945 -- -- -- 93 26 99 %   04/23/19 0930 -- -- -- 97 -- --     No intake/output data recorded. No intake/output data recorded.     BP (!) 121/55   Pulse 92   Temp 97.1 °F (36.2 °C) (Oral)   Resp 22   Ht 5' 1\" (1.549 m)   Wt 177 lb 1.6 oz (80.3 kg)   SpO2 99%   BMI 33.46 kg/m²   General appearance: alert and cooperative. Lungs:  Clear to auscultation  Heart: regular rate and rhythm, S1, S2 normal, no murmur, click, rub or gallop  Abdomen: soft, non-tender; bowel sounds normal; no masses,  no organomegaly  Extremities: extremities normal, atraumatic, no cyanosis or edema  Skin: Skin color, texture, turgor normal. No rashes or lesions        Labs and imaging reviewed.     CBC with Differential:    Lab Results   Component Value Date    WBC 8.8 04/20/2019    RBC 3.90 04/20/2019    HGB 10.5 04/20/2019    HCT 36.7 04/20/2019     04/20/2019    MCV 94.1 04/20/2019    MCH 26.9 04/20/2019    MCHC 28.6 04/20/2019    RDW 15.3 04/20/2019    SEGSPCT 75.0 04/20/2019    BANDSPCT 5 04/20/2019    LYMPHOPCT 10.0 04/20/2019    MONOPCT 7.0 04/20/2019    MYELOPCT 1 04/20/2019    BASOPCT 0.1 04/15/2019    MONOSABS 0.6 04/20/2019    LYMPHSABS 0.9 04/20/2019    EOSABS 0.0 04/15/2019    BASOSABS 0.0 04/15/2019    DIFFTYPE MANUAL DIFFERENTIAL 04/20/2019     CMP:    Lab Results   Component Value Date     04/20/2019    K 3.6 04/20/2019    CL 90 04/20/2019    CO2 40 04/20/2019    BUN 12 04/20/2019    CREATININE 0.7 04/20/2019    GFRAA >60 04/20/2019    LABGLOM >60 04/20/2019    GLUCOSE 102 04/20/2019    PROT 5.7 04/20/2019    PROT 6.9 01/23/2013    LABALBU 3.9 04/20/2019    CALCIUM 9.4 04/20/2019    BILITOT 0.4 04/20/2019    ALKPHOS 78 04/20/2019    AST 19 04/20/2019    ALT 38 04/20/2019     BMP:    Lab Results   Component Value Date     04/20/2019    K 3.6 04/20/2019    CL 90 04/20/2019    CO2 40 04/20/2019    BUN 12 04/20/2019    LABALBU 3.9 04/20/2019    CREATININE 0.7 04/20/2019    CALCIUM 9.4 04/20/2019    GFRAA >60 04/20/2019    LABGLOM >60 04/20/2019    GLUCOSE 102 04/20/2019     Sodium:    Lab Results   Component Value Date     04/20/2019     Potassium:    Lab Results   Component Value Date    K 3.6 04/20/2019     Calcium:    Lab Results   Component Value Date    CALCIUM 9.4 04/20/2019     Warfarin PT/INR:  No components found for: Dagmar Chavez  PTT:    Lab Results   Component Value Date    APTT 25.3 04/12/2019   [APTT  Last 3 Troponin:    Lab Results   Component Value Date    TROPONINI 0.007 03/25/2014     ABG:    Lab Results   Component Value Date    VNK5PSZ 60.0 04/08/2019    PO2ART 84 04/08/2019    POD1VET 35.5 04/08/2019     TSH:  No results found for: TSH  VITAMIN B12: No components found for: B12  FOLATE:    Lab Results   Component Value Date    FOLATE >24.0 07/30/2013     IRON:    Lab Results   Component Value Date    IRON 30 04/11/2019     Iron Saturation:  No components found for: PERCENTFE  TIBC:    Lab Results   Component Value Date    TIBC 310 04/11/2019     FERRITIN:    Lab Results   Component Value Date    FERRITIN 39 04/11/2019       Assessment:     Principal Problem:    Respiratory failure with hypoxia and hypercapnia (HCC)  Active Problems:    Sleep apnea    Centrilobular emphysema (HCC)    Hypertension    Hyperlipidemia LDL goal <100    Anxiety    Fibromyalgia    COPD exacerbation (HCC)    Pneumonia  Resolved Problems:    * No resolved hospital problems.  *      Plan:   Discharge home today on Augmentin and medrol dose pack  Continue CPAP at home    Emily DE SANTIAGO M.D.  4/23/2019

## 2019-04-26 ENCOUNTER — OFFICE VISIT (OUTPATIENT)
Dept: CARDIOLOGY CLINIC | Age: 59
End: 2019-04-26
Payer: COMMERCIAL

## 2019-04-26 VITALS
WEIGHT: 173 LBS | RESPIRATION RATE: 24 BRPM | HEART RATE: 64 BPM | BODY MASS INDEX: 32.69 KG/M2 | DIASTOLIC BLOOD PRESSURE: 58 MMHG | SYSTOLIC BLOOD PRESSURE: 116 MMHG

## 2019-04-26 DIAGNOSIS — I10 ESSENTIAL HYPERTENSION: Primary | ICD-10-CM

## 2019-04-26 PROCEDURE — 1111F DSCHRG MED/CURRENT MED MERGE: CPT | Performed by: INTERNAL MEDICINE

## 2019-04-26 PROCEDURE — 3017F COLORECTAL CA SCREEN DOC REV: CPT | Performed by: INTERNAL MEDICINE

## 2019-04-26 PROCEDURE — 1036F TOBACCO NON-USER: CPT | Performed by: INTERNAL MEDICINE

## 2019-04-26 PROCEDURE — G8427 DOCREV CUR MEDS BY ELIG CLIN: HCPCS | Performed by: INTERNAL MEDICINE

## 2019-04-26 PROCEDURE — 99214 OFFICE O/P EST MOD 30 MIN: CPT | Performed by: INTERNAL MEDICINE

## 2019-04-26 PROCEDURE — G8417 CALC BMI ABV UP PARAM F/U: HCPCS | Performed by: INTERNAL MEDICINE

## 2019-04-26 NOTE — PROGRESS NOTES
Jason Gutierrez MD        OFFICE  FOLLOWUP NOTE    Chief complaints: patient is here for management of leg swelling,SVT, bipolar, fibromyalgia, chest pain, rt lung pneumonia      Subjective: patient feels better, no chest pain, no shortness of breath, no dizziness, no palpitations    HPI Alysha Hernandez is a 66 y. o.year old who  has a past medical history of Anxiety, Arthritis, Back pain at L4-L5 level, Bipolar 1 disorder ([address] COPD (chronic obstructive pulmonary disease) ([address] Emphysema of lung ([address] FH: CAD (coronary artery disease), Fibromyalgia, Fibromyalgia, H/O Doppler ultrasound, H/O echocardiogram, Hyperlipidemia LDL goal <100, Hypertension, Obstructive sleep apnea, Osteoarthritis, and Thyroid disease. and presents for management of leg swelling,SVT, bipolar, fibromyalgia, chest pain, rt lung pneumonia which are well controlled, she is recently discharged from hospital after treatment of pneumonia, she also has anemia, she feels tired and had IV iron. Current Outpatient Medications   Medication Sig Dispense Refill    busPIRone (BUSPAR) 10 MG tablet Take 1 tablet by mouth 3 times daily 90 tablet 0    methylPREDNISolone (MEDROL DOSEPACK) 4 MG tablet Take by mouth.  1 kit 0    guaiFENesin-dextromethorphan (ROBITUSSIN DM) 100-10 MG/5ML syrup Take 5 mLs by mouth every 6 hours as needed for Cough 120 mL 0    nystatin (MYCOSTATIN) 949391 UNIT/ML suspension Take 5 mLs by mouth 4 times daily for 10 days 200 mL 0    amoxicillin-clavulanate (AUGMENTIN) 875-125 MG per tablet Take 1 tablet by mouth 2 times daily for 7 days 14 tablet 0    ferrous gluconate 324 (37.5 Fe) MG TABS Take 1 tablet by mouth 2 times daily 60 tablet 2    potassium chloride (KLOR-CON M) 20 MEQ extended release tablet Take 1 tablet by mouth daily (with breakfast) 60 tablet 0    hydrALAZINE (APRESOLINE) 50 MG tablet Take 1 tablet by mouth 3 times daily 90 tablet 1    metoprolol succinate (TOPROL XL) 25 MG extended release TRIG 173 (H) 09/30/2016    HDL 80 09/30/2016    LDLDIRECT 85 09/30/2016     Lab Results   Component Value Date    ALT 38 04/20/2019    AST 19 04/20/2019     TSH:  No results found for: TSH    Impression:  Yaneth Kramer is a 66 y. o.year old who  has a past medical history of Anxiety, Arthritis, Back pain at L4-L5 level, Bipolar 1 disorder ([address] COPD (chronic obstructive pulmonary disease) [address] Emphysema of lung ([facility] FH: CAD (coronary artery disease), Fibromyalgia, Fibromyalgia, H/O Doppler ultrasound, H/O echocardiogram, Hyperlipidemia LDL goal <100, Hypertension, Obstructive sleep apnea, Osteoarthritis, and Thyroid disease. and presents with     Plan:  1. Pnuemonia: stable, continue augmentin and steroids  2. COPD: stable, continue inhalers, and steroids  3. Anemia: agree with follow up with hematology  4. Bipolar: stable  5. Fibromyalgia: stable  6. Health maintenance: exerise and diet  All labs, medications and tests reviewed, continue all other medications of all above medical condition listed as is.     @[facility]@

## 2019-04-29 ENCOUNTER — TELEPHONE (OUTPATIENT)
Dept: CARDIOLOGY CLINIC | Age: 59
End: 2019-04-29

## 2019-05-13 ENCOUNTER — HOSPITAL ENCOUNTER (OUTPATIENT)
Age: 59
Setting detail: SPECIMEN
Discharge: HOME | End: 2019-05-13
Payer: COMMERCIAL

## 2019-05-13 LAB
ALBUMIN SERPL-MCNC: 4.3 GM/DL (ref 3.4–5)
ALP BLD-CCNC: 91 IU/L (ref 40–129)
ALT SERPL-CCNC: 15 U/L (ref 10–40)
ANION GAP SERPL CALCULATED.3IONS-SCNC: 9 MMOL/L (ref 4–16)
AST SERPL-CCNC: 13 IU/L (ref 15–37)
BILIRUB SERPL-MCNC: 0.2 MG/DL (ref 0–1)
BUN BLDV-MCNC: 10 MG/DL (ref 6–23)
CALCIUM SERPL-MCNC: 9.1 MG/DL (ref 8.3–10.6)
CHLORIDE BLD-SCNC: 97 MMOL/L (ref 99–110)
CO2: 37 MMOL/L (ref 21–32)
CREAT SERPL-MCNC: 0.9 MG/DL (ref 0.6–1.1)
FERRITIN: 148 NG/ML (ref 15–150)
GFR AFRICAN AMERICAN: >60 ML/MIN/1.73M2
GFR NON-AFRICAN AMERICAN: >60 ML/MIN/1.73M2
GLUCOSE BLD-MCNC: 100 MG/DL (ref 70–99)
IRON: 43 UG/DL (ref 37–145)
PCT TRANSFERRIN: 15 % (ref 10–44)
POTASSIUM SERPL-SCNC: 3.6 MMOL/L (ref 3.5–5.1)
RETICULOCYTE COUNT PCT: 3.8 % (ref 0.2–2.2)
SODIUM BLD-SCNC: 143 MMOL/L (ref 135–145)
TOTAL IRON BINDING CAPACITY: 293 UG/DL (ref 250–450)
TOTAL PROTEIN: 6.2 GM/DL (ref 6.4–8.2)
UNSATURATED IRON BINDING CAPACITY: 250 UG/DL (ref 110–370)

## 2019-05-13 PROCEDURE — 85045 AUTOMATED RETICULOCYTE COUNT: CPT

## 2019-05-13 PROCEDURE — 80053 COMPREHEN METABOLIC PANEL: CPT

## 2019-05-13 PROCEDURE — 83540 ASSAY OF IRON: CPT

## 2019-05-13 PROCEDURE — 82728 ASSAY OF FERRITIN: CPT

## 2019-05-13 PROCEDURE — 83550 IRON BINDING TEST: CPT

## 2019-05-15 ENCOUNTER — HOSPITAL ENCOUNTER (OUTPATIENT)
Dept: ULTRASOUND IMAGING | Age: 59
Discharge: HOME | End: 2019-05-15
Payer: COMMERCIAL

## 2019-05-15 DIAGNOSIS — R60.0 LOWER EXTREMITY EDEMA: ICD-10-CM

## 2019-05-15 PROCEDURE — 93971 EXTREMITY STUDY: CPT

## 2019-05-22 ENCOUNTER — OFFICE VISIT (OUTPATIENT)
Dept: CARDIOLOGY CLINIC | Age: 59
End: 2019-05-22
Payer: COMMERCIAL

## 2019-05-22 VITALS
SYSTOLIC BLOOD PRESSURE: 116 MMHG | DIASTOLIC BLOOD PRESSURE: 76 MMHG | BODY MASS INDEX: 32.68 KG/M2 | OXYGEN SATURATION: 92 % | HEART RATE: 76 BPM | HEIGHT: 62 IN | WEIGHT: 177.6 LBS

## 2019-05-22 DIAGNOSIS — I47.1 SVT (SUPRAVENTRICULAR TACHYCARDIA) (HCC): Primary | ICD-10-CM

## 2019-05-22 PROCEDURE — 99213 OFFICE O/P EST LOW 20 MIN: CPT | Performed by: INTERNAL MEDICINE

## 2019-05-22 PROCEDURE — 3017F COLORECTAL CA SCREEN DOC REV: CPT | Performed by: INTERNAL MEDICINE

## 2019-05-22 PROCEDURE — G8428 CUR MEDS NOT DOCUMENT: HCPCS | Performed by: INTERNAL MEDICINE

## 2019-05-22 PROCEDURE — 1036F TOBACCO NON-USER: CPT | Performed by: INTERNAL MEDICINE

## 2019-05-22 PROCEDURE — 93000 ELECTROCARDIOGRAM COMPLETE: CPT | Performed by: INTERNAL MEDICINE

## 2019-05-22 PROCEDURE — 1111F DSCHRG MED/CURRENT MED MERGE: CPT | Performed by: INTERNAL MEDICINE

## 2019-05-22 PROCEDURE — G8417 CALC BMI ABV UP PARAM F/U: HCPCS | Performed by: INTERNAL MEDICINE

## 2019-05-22 NOTE — PROGRESS NOTES
Patient here in office and educated on EP Study/SVT ablation , schedule for 6/13/19 @ 1300, with arrival @ 1100, @ [facility]; risk explained; and consents signed. Also copy of orders given for labs and CXR due 6/11/19 at [facility]. Instruction given to patient to :  NPO after midnight the night before procedure; call hospital at [phone] to pre-register. May take rest of morning meds of procedure. Hold anti-anxiety medications the morning before procedure. Patient voiced understanding. Copies of consent & info scanned in chart.

## 2019-05-22 NOTE — LETTER
[facility]     Dr. [facility] Bui     PROCEDURE: Electrophysiology Study/Supraventricular Tachycardia Ablation      Date of Procedure: 19  Time: 1:00  Arrival Time: 11:00    Patient Name: Tammie Montano  : 1960   MRN# [id number]    HOSPITAL: [facility])  Call to [facility] at [phone]  2 days before your procedure    x Please have blood work and chest-x-ray done 19 at [facility], [address] [place] [address] or [facility].    x Please do not have anything by mouth after midnight prior to or 8 hours  before the procedure.    x You may take your medications with a sip of water in the morning before your procedure or take them with you unless listed below. X  Hold anti-anxiety medications the day if the procedure. Patient Signature:  _______________________  Staff: Cecy Gonzalez     Staff Given Instructions:___________________________                                      [place] [facility]     Dr. Majano Coop:  Electrophysiology Study/Supraventricular Tachycardia Ablation     Date of Procedure: 19 Time: 1300 Arrival Time: 1100    Patient Name: Tammie Montano  : 1960   MRN# [id number]    Day of Procedure Cath Lab Holding area Pre op Orders:      ? IV peripheral saline lock x 2 sites (at least one inpreferred left arm). ? Type & Screen STAT on arrival.  ? If taking Coumadin, PT INR STAT on arrival day of procedure. ? Insert Degroot catheter (male patients >>please use coude degroot catheter). ? Female patients <=48years of age >> Please do urine HCG test.  ? Diabetic patients >> Accu check Blood sugar check. ? Document home medications in EPIC and include date and time of last dose.  ? NPO  ? Notify Dr. Harjit Robles of abnormal lab results. ? Chest Prep> Clip hair anterior chest and posterior back. ? Groin Prep> Clip hair bilateral groins. Physician Signature:_____________________Date:__________Time:________                                           [facility] ([facility]) Informed Consent for Anesthesia/Sedation, Surgery, Invasive Procedures, and other High-risk Interventions and Medication use     *This consent is applicable for 30 days following patient signature*    Procedure(s)   IJatin authorize, Dr. Jacklyn Foster   and the associate(s) or assistant(s) of his/her choice, to perform the following procedure(s):Electrophysiology Study/Supraventricular Tachycardia Ablation    I know that unexpected conditions may require additional or different procedures than those above. I authorize the above named practitioner(s) perform these as necessary and desirable. This is based on the practitioners professional judgment. The above named practitioner has discussed the above procedure(s) with me, including:  ? Potential benefits, including likelihood of success of the procedure(s) goals  ? Risks  ? Side effects, risk of death, and risk of infection  ? Any potential problems that might occur during recuperation or healing post-procedure  ? Reasonable alternatives  ? Risks of NOT performing the procedure(s)    I acknowledge that no warranty or guarantee has been made to the results the procedure(s). I consent to the above named practitioner(s) providing additional services to me as deemed reasonable and necessary, including but not limited to:    ? Use of medications for anesthesia or sedation. ? All anesthesia and sedation carry risks. My practitioner has discussed my anticipated anesthesia and/or sedation and the risks of using, risk of not using, benefits, side effects, and alternatives. ? Use of pathology  ? I authorize [facility]) to dispose of tissues, specimens or organs when pathology is complete. ? Use of radiology  ? A contrast agent may be required for radiology procedures.   My is a minor, or has a court-appointed Guardian:  Patients Representative Name (Print):  ____________________________________      Relationship (Akhiok one):    Guardian   Parent    Spouse    HCPOA   Child   Sibling  Next-of-Kin Friend    Patients Representative Signature: _______________________________________              Date: ______________  Time: __________    An  was used.  name/ID: _________________________________      [facility] ([facility]) Witness________________________  Date: ________   Time: _________    Physician/Practitioner _______________________  Date: ________   Time: _________           Revision 2017      Ruperto Martins   PATIENT NAME:    Isaac Ahn                               :   1960  PROCEDURE:  Electrophysiology Study/Supraventricular Tachycardia Ablation       DATE OF PROCEDURE: 19  DIAGNOSIS:   Supraventricular Tachycardia    X MAG    X PHOS       X CBC       X  BMP    X    PT   X  PTT       X     Chest x-Ray PA & Lateral View      ? PLEASE CALL ABNORMAL RESULTS TO THE REQUESTING PHYSICIAN? ATTENTION PATIENTS:  You do not have to fast for the lab work.       You must go to the [facility], [address] [place] [address] or [facility]         Physician Signature:________________________Date:_________Time:_________                                      Ruperto Martins      PROCEDURE: Electrophysiology Study/Supraventricular Tachycardia Ablation    Patient Name: Isaac Ahn  : 1960   MRN# [id number]    Home Phone Number: [phone]   Weight:    Wt Readings from Last 3 Encounters:   19 177 lb 9.6 oz (80.6 kg)   19 173 lb (78.5 kg)   19 173 lb (78.5 kg)        Insurance: Payor: [employer] / Plan: [address] [place] [address] / Product Type: *No Product type* /     Date of Procedure: 19 Time: 1300 Arrival Time: 1100 Diagnosis:  Supraventricular Tachycardia  Allergies:    Allergies   Allergen Reactions    Lisinopril Swelling and Rash     angioedema        1) Call [facility] scheduling ([phone]) or Instant Message  CONFIRMED WITH    PHONE OR   INSTANT MESSAGE  2) PREAUTHORIZATION NUMBER:   Spoke to:     From date:    expiration date:       Dinesh Calix

## 2019-05-22 NOTE — PROGRESS NOTES
Emphysema of lung ([facility] [address]     FH: CAD (coronary artery disease) 2/16/2017    Father had in his forties, sister in her thirties    [facility] Fibromyalgia 2/16/2017    Fibromyalgia     H/O Doppler ultrasound 04/05/2019    venous- no DVT or reflux    H/O echocardiogram 01/14/2015    EF50-55% Normal- see media    Hyperlipidemia LDL goal <100     Hypertension     Obstructive sleep apnea     Osteoarthritis     Thyroid disease        Surgical history :  Past Surgical History:   Procedure Laterality Date    CARPAL TUNNEL RELEASE      TUBAL LIGATION         Family history: no sudden cardiac death  Social history :  reports that she quit smoking about 11 years ago. She has a 30.00 pack-year smoking history. She has never used smokeless tobacco. She reports that she drank about 1.2 oz of alcohol per week. She reports that she does not use drugs.     Allergies   Allergen Reactions    Lisinopril Swelling and Rash     angioedema       Current Outpatient Medications on File Prior to Visit   Medication Sig Dispense Refill    albuterol-ipratropium (COMBIVENT RESPIMAT)  MCG/ACT AERS inhaler Inhale 1 puff into the lungs every 4 hours as needed for Wheezing 3 Inhaler 3    busPIRone (BUSPAR) 10 MG tablet Take 1 tablet by mouth 3 times daily 90 tablet 0    ferrous gluconate 324 (37.5 Fe) MG TABS Take 1 tablet by mouth 2 times daily 60 tablet 2    potassium chloride (KLOR-CON M) 20 MEQ extended release tablet Take 1 tablet by mouth daily (with breakfast) 60 tablet 0    hydrALAZINE (APRESOLINE) 50 MG tablet Take 1 tablet by mouth 3 times daily 90 tablet 1    furosemide (LASIX) 40 MG tablet Take 1 tablet by mouth daily 30 tablet 1    metoprolol succinate (TOPROL XL) 25 MG extended release tablet Take 1 tablet by mouth daily 30 tablet 3    famotidine (PEPCID) 20 MG tablet Take 1 tablet by mouth 2 times daily (Patient taking differently: Take 40 mg by mouth 2 times daily ) 60 tablet 3    celecoxib (CELEBREX) 200 MG capsule Take 200 mg by mouth 2 times daily      traZODone (DESYREL) 50 MG tablet Take 75 mg by mouth nightly       levothyroxine (SYNTHROID) 150 MCG tablet Take 150 mcg by mouth every morning  0    citalopram (CELEXA) 40 MG tablet Take 40 mg by mouth daily      clonazePAM (KLONOPIN) 1 MG tablet Take 1 mg by mouth 3 times daily as needed.  montelukast (SINGULAIR) 10 MG tablet Take 10 mg by mouth daily      theophylline (MIAH-24) 400 MG extended release capsule Take 400 mg by mouth daily      baclofen (LIORESAL) 10 MG tablet Take 1 tablet by mouth 3 times daily 30 tablet 0    aspirin 81 MG chewable tablet Take 81 mg by mouth daily      budesonide-formoterol (SYMBICORT) 160-4.5 MCG/ACT AERO Inhale 2 puffs into the lungs 2 times daily      fluticasone (FLONASE) 50 MCG/ACT nasal spray 2 sprays by Nasal route daily 1 Bottle 0    amLODIPine (NORVASC) 10 MG tablet Take 1 tablet by mouth daily 90 tablet 3    ipratropium-albuterol (DUONEB) 0.5-2.5 (3) MG/3ML SOLN nebulizer solution Inhale 1 vial into the lungs every 4 hours      atorvastatin (LIPITOR) 40 MG tablet Take 40 mg by mouth daily       No current facility-administered medications on file prior to visit. Review of Systems:   Review of Systems   Constitutional: Negative for activity change, appetite change, chills, fatigue and fever. HENT: Negative for congestion, sinus pain and sinus pressure. Eyes: Negative for redness and itching. Respiratory: shortness of breath. Negative for chest tightness. Cardiovascular: Positive for palpitations. Negative for leg swelling. Gastrointestinal: Negative for blood in stool, constipation, diarrhea, nausea and vomiting. Genitourinary: Negative for difficulty urinating and dysuria. Musculoskeletal: Negative for arthralgias, back pain and gait problem. Skin: Negative for color change, pallor, rash and wound. Neurological: Negative for dizziness, syncope, weakness and light-headedness. Psychiatric/Behavioral: Negative for confusion. The patient is nervous/anxious. Physical Examination:    /76   Pulse 76   Ht 5' 1.5\" (1.562 m)   Wt 177 lb 9.6 oz (80.6 kg)   SpO2 92%   BMI 33.01 kg/m²    Wt Readings from Last 3 Encounters:   05/22/19 177 lb 9.6 oz (80.6 kg)   05/09/19 173 lb (78.5 kg)   04/26/19 173 lb (78.5 kg)     Body mass index is 33.01 kg/m². Physical Exam   Constitutional: She is oriented to person, place, and time. She appears well-developed and well-nourished. HENT:   Head: Normocephalic and atraumatic. Eyes: Pupils are equal, round, and reactive to light. Conjunctivae are normal. Right eye exhibits no discharge. Left eye exhibits no discharge. Neck: Neck supple. No JVD present. No thyromegaly present. Cardiovascular: Normal rate, regular rhythm, normal heart sounds and intact distal pulses. Exam reveals no gallop and no friction rub. No murmur heard. Pulmonary/Chest: Effort normal and breath sounds normal. No respiratory distress. She has no wheezes. She has no rales. She exhibits no tenderness. Abdominal: Soft. Bowel sounds are normal. She exhibits no distension. There is no tenderness. Musculoskeletal: She exhibits no edema or deformity. Neurological: She is alert and oriented to person, place, and time. Skin: Skin is warm and dry.    Psychiatric: Judgment and thought content normal.       CBC:   Lab Results   Component Value Date    WBC 8.8 04/20/2019    HGB 10.5 04/20/2019    HCT 36.7 04/20/2019     04/20/2019     Lipids:   Lab Results   Component Value Date    CHOL 193 09/30/2016    TRIG 173 (H) 09/30/2016    HDL 80 09/30/2016    LDLDIRECT 85 09/30/2016     PT/INR:   Lab Results   Component Value Date    INR 1.01 04/12/2019        BMP:    Lab Results   Component Value Date     05/13/2019    K 3.6 05/13/2019    CL 97 (L) 05/13/2019    CO2 37 (H) 05/13/2019    BUN 10 05/13/2019     CMP:   Lab Results   Component Value Date    AST 13

## 2019-06-05 ENCOUNTER — TELEPHONE (OUTPATIENT)
Dept: CARDIOLOGY CLINIC | Age: 59
End: 2019-06-05

## 2019-06-06 NOTE — TELEPHONE ENCOUNTER
Spoke with patient regarding scheduled procedure of Electorphysiology Study/Supraventricular Tachycardia Ablation scheduled with Dr Alie Patel on 6/13/2019 with an arrival time of 1100 and a scheduled start time of 1300. Patient confirmed date and time. Also discussed: Patient is to have lab work and chest xray completed on 6/11/2019. Also patient is to call pre registration at number provided to pre register 2 days prior to procedure. Patient voiced understanding. Patient advised where to check in upon arriving to [facility] morning of procedure. Patient instructed to not have anything by mouth after midnight night prior to or at least 8 hours before procedure. Patient may take medications morning of procedure with a sip of water, except patient is to HOLD all anxiety medication morning of procedure. Patient again voiced understanding. Patient advised to prepare to stay overnight following procedure for observation. Patient also advised that once back to pre op holding IV will be initiated, medical history and medications reviewed, may insert a degroot catheter at this time as well as complete any other testing that may be ordered. Patient voiced understanding. Also discussed medical history of:    Patient does report using a CPAP. Patient advised to bring to hospital morning of procedure to use during overnight stay. Patient also currently using 3L 02 via nasal cannula continuous. Patient denies history of diabetes. Medications discussed, please see above. Denies history of Implant or Device. Denies history of bypass or valve repair. Does report has been told she has leaky valves. Denies history of knee or hip replacement. Reports following back surgery her BP dropped very low and she was kept in recovery for 6 hours. Denies difficulty swallowing. Denies recent or active bleeding.     Denies history of urinary problems or problems with degroot catheter in the past.    Denies open skin areas sores or rashes. Denies recent or current use of antibiotics. Denies recent illness, fever, or infection.

## 2019-06-11 ENCOUNTER — HOSPITAL ENCOUNTER (OUTPATIENT)
Age: 59
Discharge: HOME | End: 2019-06-11
Payer: COMMERCIAL

## 2019-06-11 ENCOUNTER — HOSPITAL ENCOUNTER (OUTPATIENT)
Dept: GENERAL RADIOLOGY | Age: 59
Discharge: HOME | End: 2019-06-11
Payer: COMMERCIAL

## 2019-06-11 DIAGNOSIS — I47.1 SUPRAVENTRICULAR TACHYCARDIA (HCC): ICD-10-CM

## 2019-06-11 LAB
ALBUMIN SERPL-MCNC: 4.3 GM/DL (ref 3.4–5)
ALP BLD-CCNC: 84 IU/L (ref 40–129)
ALT SERPL-CCNC: 11 U/L (ref 10–40)
ANION GAP SERPL CALCULATED.3IONS-SCNC: 9 MMOL/L (ref 4–16)
APTT: 30.7 SECONDS (ref 21.2–33)
AST SERPL-CCNC: 13 IU/L (ref 15–37)
BASOPHILS ABSOLUTE: 0.1 K/CU MM
BASOPHILS RELATIVE PERCENT: 0.9 % (ref 0–1)
BILIRUB SERPL-MCNC: 0.1 MG/DL (ref 0–1)
BUN BLDV-MCNC: 12 MG/DL (ref 6–23)
CALCIUM SERPL-MCNC: 9.7 MG/DL (ref 8.3–10.6)
CHLORIDE BLD-SCNC: 98 MMOL/L (ref 99–110)
CO2: 36 MMOL/L (ref 21–32)
CREAT SERPL-MCNC: 1 MG/DL (ref 0.6–1.1)
DIFFERENTIAL TYPE: ABNORMAL
EOSINOPHILS ABSOLUTE: 0.3 K/CU MM
EOSINOPHILS RELATIVE PERCENT: 3.3 % (ref 0–3)
GFR AFRICAN AMERICAN: >60 ML/MIN/1.73M2
GFR NON-AFRICAN AMERICAN: 57 ML/MIN/1.73M2
GLUCOSE BLD-MCNC: 99 MG/DL (ref 70–99)
HCT VFR BLD CALC: 36.1 % (ref 37–47)
HEMOGLOBIN: 10.3 GM/DL (ref 12.5–16)
IMMATURE NEUTROPHIL %: 0.4 % (ref 0–0.43)
INR BLD: 0.94 INDEX
LYMPHOCYTES ABSOLUTE: 1.3 K/CU MM
LYMPHOCYTES RELATIVE PERCENT: 17.5 % (ref 24–44)
MAGNESIUM: 1.7 MG/DL (ref 1.8–2.4)
MCH RBC QN AUTO: 27.2 PG (ref 27–31)
MCHC RBC AUTO-ENTMCNC: 28.5 % (ref 32–36)
MCV RBC AUTO: 95.3 FL (ref 78–100)
MONOCYTES ABSOLUTE: 0.7 K/CU MM
MONOCYTES RELATIVE PERCENT: 9.2 % (ref 0–4)
NUCLEATED RBC %: 0 %
PDW BLD-RTO: 14.5 % (ref 11.7–14.9)
PHOSPHORUS: 3.2 MG/DL (ref 2.5–4.9)
PLATELET # BLD: 178 K/CU MM (ref 140–440)
PMV BLD AUTO: 10.3 FL (ref 7.5–11.1)
POTASSIUM SERPL-SCNC: 3.9 MMOL/L (ref 3.5–5.1)
PROTHROMBIN TIME: 10.7 SECONDS (ref 9.12–12.5)
RBC # BLD: 3.79 M/CU MM (ref 4.2–5.4)
SEGMENTED NEUTROPHILS ABSOLUTE COUNT: 5.2 K/CU MM
SEGMENTED NEUTROPHILS RELATIVE PERCENT: 68.7 % (ref 36–66)
SODIUM BLD-SCNC: 143 MMOL/L (ref 135–145)
TOTAL IMMATURE NEUTOROPHIL: 0.03 K/CU MM
TOTAL NUCLEATED RBC: 0 K/CU MM
TOTAL PROTEIN: 6.3 GM/DL (ref 6.4–8.2)
TSH HIGH SENSITIVITY: 0.01 UIU/ML (ref 0.27–4.2)
WBC # BLD: 7.6 K/CU MM (ref 4–10.5)

## 2019-06-11 PROCEDURE — 36415 COLL VENOUS BLD VENIPUNCTURE: CPT

## 2019-06-11 PROCEDURE — 85730 THROMBOPLASTIN TIME PARTIAL: CPT

## 2019-06-11 PROCEDURE — 71046 X-RAY EXAM CHEST 2 VIEWS: CPT

## 2019-06-11 PROCEDURE — 85025 COMPLETE CBC W/AUTO DIFF WBC: CPT

## 2019-06-11 PROCEDURE — 80053 COMPREHEN METABOLIC PANEL: CPT

## 2019-06-11 PROCEDURE — 84100 ASSAY OF PHOSPHORUS: CPT

## 2019-06-11 PROCEDURE — 83735 ASSAY OF MAGNESIUM: CPT

## 2019-06-11 PROCEDURE — 84443 ASSAY THYROID STIM HORMONE: CPT

## 2019-06-11 PROCEDURE — 85610 PROTHROMBIN TIME: CPT

## 2019-06-13 ENCOUNTER — CLINICAL DOCUMENTATION (OUTPATIENT)
Dept: CARDIOLOGY CLINIC | Age: 59
End: 2019-06-13

## 2019-06-13 ENCOUNTER — TELEPHONE (OUTPATIENT)
Dept: CARDIOLOGY CLINIC | Age: 59
End: 2019-06-13

## 2019-06-13 NOTE — TELEPHONE ENCOUNTER
Ana Howell from [facility] called aabout the patient procedure being cancelled and they need to speak with someone about why and what is going on with the patient so they can let her PCP know what is going on as well.      Ana Howell- [phone]

## 2019-06-19 ENCOUNTER — TELEPHONE (OUTPATIENT)
Dept: CARDIOLOGY CLINIC | Age: 59
End: 2019-06-19

## 2019-06-19 NOTE — TELEPHONE ENCOUNTER
Left message for patient not sure who called but no one from our office. If any questions she can call us back when available.

## 2019-06-19 NOTE — TELEPHONE ENCOUNTER
Patient stated that someone was trying to reach her wasn't sure if you were tyring to set up a procedure

## 2019-06-21 ENCOUNTER — HOSPITAL ENCOUNTER (OUTPATIENT)
Age: 59
Discharge: HOME | End: 2019-06-21
Payer: COMMERCIAL

## 2019-06-21 LAB
ESTIMATED AVERAGE GLUCOSE: 100 MG/DL
FERRITIN: 97 NG/ML (ref 15–150)
FOLATE: 6.7 NG/ML (ref 3.1–17.5)
HBA1C MFR BLD: 5.1 % (ref 4.2–6.3)
HCT VFR BLD CALC: 37.2 % (ref 37–47)
MAGNESIUM: 2 MG/DL (ref 1.8–2.4)
T4 FREE: 1.51 NG/DL (ref 0.9–1.8)
TSH HIGH SENSITIVITY: 0.03 UIU/ML (ref 0.27–4.2)
VITAMIN B-12: 494 PG/ML (ref 211–911)

## 2019-06-21 PROCEDURE — 36415 COLL VENOUS BLD VENIPUNCTURE: CPT

## 2019-06-21 PROCEDURE — 82607 VITAMIN B-12: CPT

## 2019-06-21 PROCEDURE — 82746 ASSAY OF FOLIC ACID SERUM: CPT

## 2019-06-21 PROCEDURE — 85014 HEMATOCRIT: CPT

## 2019-06-21 PROCEDURE — 83036 HEMOGLOBIN GLYCOSYLATED A1C: CPT

## 2019-06-21 PROCEDURE — 83735 ASSAY OF MAGNESIUM: CPT

## 2019-06-21 PROCEDURE — 82728 ASSAY OF FERRITIN: CPT

## 2019-06-21 PROCEDURE — 84439 ASSAY OF FREE THYROXINE: CPT

## 2019-06-21 PROCEDURE — 83540 ASSAY OF IRON: CPT

## 2019-06-21 PROCEDURE — 84443 ASSAY THYROID STIM HORMONE: CPT

## 2019-06-21 PROCEDURE — 83550 IRON BINDING TEST: CPT

## 2019-06-22 LAB
IRON: 39 UG/DL (ref 37–145)
PCT TRANSFERRIN: 13 % (ref 10–44)
TOTAL IRON BINDING CAPACITY: 308 UG/DL (ref 250–450)
UNSATURATED IRON BINDING CAPACITY: 269 UG/DL (ref 110–370)

## 2019-07-01 ENCOUNTER — HOSPITAL ENCOUNTER (OUTPATIENT)
Age: 59
Setting detail: SPECIMEN
Discharge: HOME | End: 2019-07-01
Payer: COMMERCIAL

## 2019-07-01 LAB
ALBUMIN SERPL-MCNC: 4.6 GM/DL (ref 3.4–5)
ALP BLD-CCNC: 90 IU/L (ref 40–129)
ALT SERPL-CCNC: 11 U/L (ref 10–40)
ANION GAP SERPL CALCULATED.3IONS-SCNC: 10 MMOL/L (ref 4–16)
AST SERPL-CCNC: 14 IU/L (ref 15–37)
BILIRUB SERPL-MCNC: 0.2 MG/DL (ref 0–1)
BUN BLDV-MCNC: 17 MG/DL (ref 6–23)
CALCIUM SERPL-MCNC: 9.7 MG/DL (ref 8.3–10.6)
CHLORIDE BLD-SCNC: 95 MMOL/L (ref 99–110)
CO2: 35 MMOL/L (ref 21–32)
CREAT SERPL-MCNC: 1.1 MG/DL (ref 0.6–1.1)
FERRITIN: 96 NG/ML (ref 15–150)
FOLATE: 11.3 NG/ML (ref 3.1–17.5)
GFR AFRICAN AMERICAN: >60 ML/MIN/1.73M2
GFR NON-AFRICAN AMERICAN: 51 ML/MIN/1.73M2
GLUCOSE BLD-MCNC: 95 MG/DL (ref 70–99)
IRON: 32 UG/DL (ref 37–145)
PCT TRANSFERRIN: 11 % (ref 10–44)
POTASSIUM SERPL-SCNC: 3.9 MMOL/L (ref 3.5–5.1)
RETICULOCYTE COUNT PCT: 1.4 % (ref 0.2–2.2)
SODIUM BLD-SCNC: 140 MMOL/L (ref 135–145)
TOTAL IRON BINDING CAPACITY: 292 UG/DL (ref 250–450)
TOTAL PROTEIN: 6.5 GM/DL (ref 6.4–8.2)
UNSATURATED IRON BINDING CAPACITY: 260 UG/DL (ref 110–370)
VITAMIN B-12: 495.8 PG/ML (ref 211–911)

## 2019-07-01 PROCEDURE — 82728 ASSAY OF FERRITIN: CPT

## 2019-07-01 PROCEDURE — 82607 VITAMIN B-12: CPT

## 2019-07-01 PROCEDURE — 85045 AUTOMATED RETICULOCYTE COUNT: CPT

## 2019-07-01 PROCEDURE — 80053 COMPREHEN METABOLIC PANEL: CPT

## 2019-07-01 PROCEDURE — 83550 IRON BINDING TEST: CPT

## 2019-07-01 PROCEDURE — 83540 ASSAY OF IRON: CPT

## 2019-07-01 PROCEDURE — 82746 ASSAY OF FOLIC ACID SERUM: CPT

## 2019-07-02 ENCOUNTER — HOSPITAL ENCOUNTER (OUTPATIENT)
Dept: ULTRASOUND IMAGING | Age: 59
Discharge: HOME | End: 2019-07-02
Payer: COMMERCIAL

## 2019-07-02 DIAGNOSIS — E03.8 SUBCLINICAL HYPOTHYROIDISM: ICD-10-CM

## 2019-07-02 PROCEDURE — 76536 US EXAM OF HEAD AND NECK: CPT

## 2019-07-12 ENCOUNTER — HOSPITAL ENCOUNTER (OUTPATIENT)
Age: 59
Discharge: HOME | End: 2019-07-12
Payer: COMMERCIAL

## 2019-07-12 LAB — TSH HIGH SENSITIVITY: 6.97 UIU/ML (ref 0.27–4.2)

## 2019-07-12 PROCEDURE — 36415 COLL VENOUS BLD VENIPUNCTURE: CPT

## 2019-07-12 PROCEDURE — 84443 ASSAY THYROID STIM HORMONE: CPT

## 2019-08-19 ENCOUNTER — HOSPITAL ENCOUNTER (OUTPATIENT)
Age: 59
Discharge: HOME | End: 2019-08-19
Payer: COMMERCIAL

## 2019-08-19 LAB
ALBUMIN SERPL-MCNC: 4.5 GM/DL (ref 3.4–5)
ALP BLD-CCNC: 87 IU/L (ref 40–128)
ALT SERPL-CCNC: 23 U/L (ref 10–40)
ANION GAP SERPL CALCULATED.3IONS-SCNC: 10 MMOL/L (ref 4–16)
AST SERPL-CCNC: 17 IU/L (ref 15–37)
BANDED NEUTROPHILS ABSOLUTE COUNT: 0.4 K/CU MM
BANDED NEUTROPHILS RELATIVE PERCENT: 4 % (ref 5–11)
BASOPHILS ABSOLUTE: 0.1 K/CU MM
BASOPHILS RELATIVE PERCENT: 1 % (ref 0–1)
BILIRUB SERPL-MCNC: 0.2 MG/DL (ref 0–1)
BUN BLDV-MCNC: 12 MG/DL (ref 6–23)
CALCIUM SERPL-MCNC: 9.7 MG/DL (ref 8.3–10.6)
CHLORIDE BLD-SCNC: 100 MMOL/L (ref 99–110)
CO2: 32 MMOL/L (ref 21–32)
CREAT SERPL-MCNC: 1 MG/DL (ref 0.6–1.1)
DIFFERENTIAL TYPE: ABNORMAL
FERRITIN: 74 NG/ML (ref 15–150)
GFR AFRICAN AMERICAN: >60 ML/MIN/1.73M2
GFR NON-AFRICAN AMERICAN: 57 ML/MIN/1.73M2
GLUCOSE BLD-MCNC: 107 MG/DL (ref 70–99)
HCT VFR BLD CALC: 37.2 % (ref 37–47)
HEMOGLOBIN: 10.7 GM/DL (ref 12.5–16)
HYPOCHROMIA: ABNORMAL
IRON: 44 UG/DL (ref 37–145)
LYMPHOCYTES ABSOLUTE: 0.9 K/CU MM
LYMPHOCYTES RELATIVE PERCENT: 9 % (ref 24–44)
MCH RBC QN AUTO: 27 PG (ref 27–31)
MCHC RBC AUTO-ENTMCNC: 28.8 % (ref 32–36)
MCV RBC AUTO: 93.9 FL (ref 78–100)
MONOCYTES ABSOLUTE: 0.3 K/CU MM
MONOCYTES RELATIVE PERCENT: 3 % (ref 0–4)
PCT TRANSFERRIN: 15 % (ref 10–44)
PDW BLD-RTO: 14.8 % (ref 11.7–14.9)
PLATELET # BLD: 236 K/CU MM (ref 140–440)
PMV BLD AUTO: 10.2 FL (ref 7.5–11.1)
POLYCHROMASIA: ABNORMAL
POTASSIUM SERPL-SCNC: 4.5 MMOL/L (ref 3.5–5.1)
RBC # BLD: 3.96 M/CU MM (ref 4.2–5.4)
RETICULOCYTE COUNT PCT: 2.4 % (ref 0.2–2.2)
SEGMENTED NEUTROPHILS ABSOLUTE COUNT: 8.4 K/CU MM
SEGMENTED NEUTROPHILS RELATIVE PERCENT: 83 % (ref 36–66)
SODIUM BLD-SCNC: 142 MMOL/L (ref 135–145)
TOTAL IRON BINDING CAPACITY: 291 UG/DL (ref 250–450)
TOTAL PROTEIN: 6.4 GM/DL (ref 6.4–8.2)
TSH HIGH SENSITIVITY: 0.83 UIU/ML (ref 0.27–4.2)
UNSATURATED IRON BINDING CAPACITY: 247 UG/DL (ref 110–370)
VITAMIN D 25-HYDROXY: 35.58 NG/ML
WBC # BLD: 10.1 K/CU MM (ref 4–10.5)

## 2019-08-19 PROCEDURE — 85007 BL SMEAR W/DIFF WBC COUNT: CPT

## 2019-08-19 PROCEDURE — 36415 COLL VENOUS BLD VENIPUNCTURE: CPT

## 2019-08-19 PROCEDURE — 82728 ASSAY OF FERRITIN: CPT

## 2019-08-19 PROCEDURE — 83550 IRON BINDING TEST: CPT

## 2019-08-19 PROCEDURE — 82306 VITAMIN D 25 HYDROXY: CPT

## 2019-08-19 PROCEDURE — 85027 COMPLETE CBC AUTOMATED: CPT

## 2019-08-19 PROCEDURE — 84443 ASSAY THYROID STIM HORMONE: CPT

## 2019-08-19 PROCEDURE — 85045 AUTOMATED RETICULOCYTE COUNT: CPT

## 2019-08-19 PROCEDURE — 83540 ASSAY OF IRON: CPT

## 2019-08-19 PROCEDURE — 82652 VIT D 1 25-DIHYDROXY: CPT

## 2019-08-19 PROCEDURE — 80053 COMPREHEN METABOLIC PANEL: CPT

## 2019-08-21 LAB
VITAMIN D 1,25-DIHYDROXY: 47.1 PG/ML (ref 19.9–79.3)
VITAMIN D 1,25-DIHYDROXY: NORMAL PG/ML (ref 19.9–79.3)

## 2019-10-09 ENCOUNTER — APPOINTMENT (OUTPATIENT)
Dept: GENERAL RADIOLOGY | Age: 59
DRG: 190 | End: 2019-10-09
Payer: COMMERCIAL

## 2019-10-09 ENCOUNTER — HOSPITAL ENCOUNTER (INPATIENT)
Age: 59
LOS: 3 days | Discharge: HOME | DRG: 190 | End: 2019-10-13
Attending: EMERGENCY MEDICINE | Admitting: INTERNAL MEDICINE
Payer: COMMERCIAL

## 2019-10-09 ENCOUNTER — APPOINTMENT (OUTPATIENT)
Dept: ULTRASOUND IMAGING | Age: 59
DRG: 190 | End: 2019-10-09
Payer: COMMERCIAL

## 2019-10-09 ENCOUNTER — APPOINTMENT (OUTPATIENT)
Dept: CT IMAGING | Age: 59
DRG: 190 | End: 2019-10-09
Payer: COMMERCIAL

## 2019-10-09 DIAGNOSIS — J44.1 COPD EXACERBATION (HCC): Primary | ICD-10-CM

## 2019-10-09 LAB
ALBUMIN SERPL-MCNC: 4.4 GM/DL (ref 3.4–5)
ALP BLD-CCNC: 65 IU/L (ref 40–128)
ALT SERPL-CCNC: 14 U/L (ref 10–40)
ANION GAP SERPL CALCULATED.3IONS-SCNC: 8 MMOL/L (ref 4–16)
AST SERPL-CCNC: 18 IU/L (ref 15–37)
BASE EXCESS MIXED: ABNORMAL (ref 0–2.3)
BASOPHILS ABSOLUTE: 0.1 K/CU MM
BASOPHILS RELATIVE PERCENT: 1 % (ref 0–1)
BILIRUB SERPL-MCNC: 0.2 MG/DL (ref 0–1)
BUN BLDV-MCNC: 12 MG/DL (ref 6–23)
CALCIUM SERPL-MCNC: 9.4 MG/DL (ref 8.3–10.6)
CHLORIDE BLD-SCNC: 91 MMOL/L (ref 99–110)
CO2: 33 MMOL/L (ref 21–32)
COMMENT: ABNORMAL
CREAT SERPL-MCNC: 0.9 MG/DL (ref 0.6–1.1)
DIFFERENTIAL TYPE: ABNORMAL
EOSINOPHILS ABSOLUTE: 0.5 K/CU MM
EOSINOPHILS RELATIVE PERCENT: 5.4 % (ref 0–3)
GFR AFRICAN AMERICAN: >60 ML/MIN/1.73M2
GFR NON-AFRICAN AMERICAN: >60 ML/MIN/1.73M2
GLUCOSE BLD-MCNC: 98 MG/DL (ref 70–99)
HCO3 VENOUS: 36.9 MMOL/L (ref 19–25)
HCT VFR BLD CALC: 34.8 % (ref 37–47)
HEMOGLOBIN: 10.1 GM/DL (ref 12.5–16)
IMMATURE NEUTROPHIL %: 0.4 % (ref 0–0.43)
LACTATE: 0.8 MMOL/L (ref 0.4–2)
LYMPHOCYTES ABSOLUTE: 1.1 K/CU MM
LYMPHOCYTES RELATIVE PERCENT: 13.5 % (ref 24–44)
MCH RBC QN AUTO: 28.8 PG (ref 27–31)
MCHC RBC AUTO-ENTMCNC: 29 % (ref 32–36)
MCV RBC AUTO: 99.1 FL (ref 78–100)
MONOCYTES ABSOLUTE: 0.9 K/CU MM
MONOCYTES RELATIVE PERCENT: 10.6 % (ref 0–4)
NUCLEATED RBC %: 0 %
O2 SAT, VEN: 84.1 % (ref 50–70)
PCO2, VEN: 75 MMHG (ref 38–52)
PDW BLD-RTO: 14.4 % (ref 11.7–14.9)
PH VENOUS: 7.3 (ref 7.32–7.42)
PLATELET # BLD: 206 K/CU MM (ref 140–440)
PMV BLD AUTO: 9.5 FL (ref 7.5–11.1)
PO2, VEN: 46 MMHG (ref 28–48)
POTASSIUM SERPL-SCNC: 4.6 MMOL/L (ref 3.5–5.1)
PRO-BNP: 1023 PG/ML
RBC # BLD: 3.51 M/CU MM (ref 4.2–5.4)
SEGMENTED NEUTROPHILS ABSOLUTE COUNT: 5.7 K/CU MM
SEGMENTED NEUTROPHILS RELATIVE PERCENT: 69.1 % (ref 36–66)
SODIUM BLD-SCNC: 132 MMOL/L (ref 135–145)
TOTAL IMMATURE NEUTOROPHIL: 0.03 K/CU MM
TOTAL NUCLEATED RBC: 0 K/CU MM
TOTAL PROTEIN: 7 GM/DL (ref 6.4–8.2)
TROPONIN T: 0.01 NG/ML
WBC # BLD: 8.3 K/CU MM (ref 4–10.5)

## 2019-10-09 PROCEDURE — 85025 COMPLETE CBC W/AUTO DIFF WBC: CPT

## 2019-10-09 PROCEDURE — 93005 ELECTROCARDIOGRAM TRACING: CPT | Performed by: FAMILY MEDICINE

## 2019-10-09 PROCEDURE — 71046 X-RAY EXAM CHEST 2 VIEWS: CPT

## 2019-10-09 PROCEDURE — 71250 CT THORAX DX C-: CPT

## 2019-10-09 PROCEDURE — 83605 ASSAY OF LACTIC ACID: CPT

## 2019-10-09 PROCEDURE — 84484 ASSAY OF TROPONIN QUANT: CPT

## 2019-10-09 PROCEDURE — 6370000000 HC RX 637 (ALT 250 FOR IP): Performed by: EMERGENCY MEDICINE

## 2019-10-09 PROCEDURE — 83880 ASSAY OF NATRIURETIC PEPTIDE: CPT

## 2019-10-09 PROCEDURE — 93971 EXTREMITY STUDY: CPT

## 2019-10-09 PROCEDURE — 82805 BLOOD GASES W/O2 SATURATION: CPT

## 2019-10-09 PROCEDURE — 80053 COMPREHEN METABOLIC PANEL: CPT

## 2019-10-09 PROCEDURE — 6370000000 HC RX 637 (ALT 250 FOR IP)

## 2019-10-09 PROCEDURE — 94640 AIRWAY INHALATION TREATMENT: CPT

## 2019-10-09 PROCEDURE — 99285 EMERGENCY DEPT VISIT HI MDM: CPT

## 2019-10-09 PROCEDURE — 36415 COLL VENOUS BLD VENIPUNCTURE: CPT

## 2019-10-09 RX ORDER — ACETAMINOPHEN 325 MG/1
650 TABLET ORAL ONCE
Status: COMPLETED | OUTPATIENT
Start: 2019-10-09 | End: 2019-10-09

## 2019-10-09 RX ORDER — PREDNISONE 20 MG/1
60 TABLET ORAL ONCE
Status: COMPLETED | OUTPATIENT
Start: 2019-10-09 | End: 2019-10-09

## 2019-10-09 RX ORDER — IPRATROPIUM BROMIDE AND ALBUTEROL SULFATE 2.5; .5 MG/3ML; MG/3ML
1 SOLUTION RESPIRATORY (INHALATION) ONCE
Status: COMPLETED | OUTPATIENT
Start: 2019-10-09 | End: 2019-10-09

## 2019-10-09 RX ORDER — ACETAMINOPHEN 325 MG/1
TABLET ORAL
Status: COMPLETED
Start: 2019-10-09 | End: 2019-10-09

## 2019-10-09 RX ADMIN — IPRATROPIUM BROMIDE AND ALBUTEROL SULFATE 1 AMPULE: .5; 3 SOLUTION RESPIRATORY (INHALATION) at 21:05

## 2019-10-09 RX ADMIN — IPRATROPIUM BROMIDE AND ALBUTEROL SULFATE 1 AMPULE: .5; 3 SOLUTION RESPIRATORY (INHALATION) at 21:00

## 2019-10-09 RX ADMIN — PREDNISONE 60 MG: 20 TABLET ORAL at 21:32

## 2019-10-09 RX ADMIN — ACETAMINOPHEN 650 MG: 325 TABLET ORAL at 23:13

## 2019-10-09 ASSESSMENT — PAIN SCALES - GENERAL: PAINLEVEL_OUTOF10: 8

## 2019-10-10 PROBLEM — J44.1 COPD WITH EXACERBATION (HCC): Status: ACTIVE | Noted: 2019-10-10

## 2019-10-10 LAB
ADENOVIRUS DETECTION BY PCR: NOT DETECTED
BORDETELLA PERTUSSIS PCR: NOT DETECTED
CHLAMYDOPHILA PNEUMONIA PCR: NOT DETECTED
CORONAVIRUS 229E PCR: NOT DETECTED
CORONAVIRUS HKU1 PCR: NOT DETECTED
CORONAVIRUS NL63 PCR: NOT DETECTED
CORONAVIRUS OC43 PCR: NOT DETECTED
HUMAN METAPNEUMOVIRUS PCR: NOT DETECTED
INFLUENZA A BY PCR: NOT DETECTED
INFLUENZA A H1 (2009) PCR: NOT DETECTED
INFLUENZA A H1 PANDEMIC PCR: NOT DETECTED
INFLUENZA A H3 PCR: NOT DETECTED
INFLUENZA B BY PCR: NOT DETECTED
MYCOPLASMA PNEUMONIAE PCR: NOT DETECTED
PARAINFLUENZA 1 PCR: NOT DETECTED
PARAINFLUENZA 2 PCR: NOT DETECTED
PARAINFLUENZA 3 PCR: NOT DETECTED
PARAINFLUENZA 4 PCR: NOT DETECTED
RHINOVIRUS ENTEROVIRUS PCR: NOT DETECTED
RSV PCR: NOT DETECTED
TROPONIN T: <0.01 NG/ML

## 2019-10-10 PROCEDURE — 6370000000 HC RX 637 (ALT 250 FOR IP): Performed by: INTERNAL MEDICINE

## 2019-10-10 PROCEDURE — 87798 DETECT AGENT NOS DNA AMP: CPT

## 2019-10-10 PROCEDURE — 2580000003 HC RX 258: Performed by: INTERNAL MEDICINE

## 2019-10-10 PROCEDURE — 94761 N-INVAS EAR/PLS OXIMETRY MLT: CPT

## 2019-10-10 PROCEDURE — 1200000000 HC SEMI PRIVATE

## 2019-10-10 PROCEDURE — 87633 RESP VIRUS 12-25 TARGETS: CPT

## 2019-10-10 PROCEDURE — 94660 CPAP INITIATION&MGMT: CPT

## 2019-10-10 PROCEDURE — 87486 CHLMYD PNEUM DNA AMP PROBE: CPT

## 2019-10-10 PROCEDURE — 94640 AIRWAY INHALATION TREATMENT: CPT

## 2019-10-10 PROCEDURE — 93010 ELECTROCARDIOGRAM REPORT: CPT | Performed by: INTERNAL MEDICINE

## 2019-10-10 PROCEDURE — 87581 M.PNEUMON DNA AMP PROBE: CPT

## 2019-10-10 PROCEDURE — 6360000002 HC RX W HCPCS: Performed by: INTERNAL MEDICINE

## 2019-10-10 PROCEDURE — 94664 DEMO&/EVAL PT USE INHALER: CPT

## 2019-10-10 PROCEDURE — 94667 MNPJ CHEST WALL 1ST: CPT

## 2019-10-10 RX ORDER — CLONAZEPAM 1 MG/1
1 TABLET ORAL 3 TIMES DAILY
Status: DISCONTINUED | OUTPATIENT
Start: 2019-10-10 | End: 2019-10-13 | Stop reason: HOSPADM

## 2019-10-10 RX ORDER — ONDANSETRON 2 MG/ML
4 INJECTION INTRAMUSCULAR; INTRAVENOUS EVERY 6 HOURS PRN
Status: DISCONTINUED | OUTPATIENT
Start: 2019-10-10 | End: 2019-10-13 | Stop reason: HOSPADM

## 2019-10-10 RX ORDER — IPRATROPIUM BROMIDE AND ALBUTEROL SULFATE 2.5; .5 MG/3ML; MG/3ML
1 SOLUTION RESPIRATORY (INHALATION) EVERY 4 HOURS
Status: DISCONTINUED | OUTPATIENT
Start: 2019-10-10 | End: 2019-10-10

## 2019-10-10 RX ORDER — SODIUM CHLORIDE 0.9 % (FLUSH) 0.9 %
10 SYRINGE (ML) INJECTION PRN
Status: DISCONTINUED | OUTPATIENT
Start: 2019-10-10 | End: 2019-10-13 | Stop reason: HOSPADM

## 2019-10-10 RX ORDER — GUAIFENESIN 600 MG/1
600 TABLET, EXTENDED RELEASE ORAL 2 TIMES DAILY
Status: DISCONTINUED | OUTPATIENT
Start: 2019-10-10 | End: 2019-10-13 | Stop reason: HOSPADM

## 2019-10-10 RX ORDER — MONTELUKAST SODIUM 10 MG/1
10 TABLET ORAL NIGHTLY
Status: DISCONTINUED | OUTPATIENT
Start: 2019-10-10 | End: 2019-10-13 | Stop reason: HOSPADM

## 2019-10-10 RX ORDER — IPRATROPIUM BROMIDE AND ALBUTEROL SULFATE 2.5; .5 MG/3ML; MG/3ML
1 SOLUTION RESPIRATORY (INHALATION) EVERY 4 HOURS
Status: DISCONTINUED | OUTPATIENT
Start: 2019-10-10 | End: 2019-10-13 | Stop reason: HOSPADM

## 2019-10-10 RX ORDER — SODIUM CHLORIDE 0.9 % (FLUSH) 0.9 %
10 SYRINGE (ML) INJECTION EVERY 12 HOURS SCHEDULED
Status: DISCONTINUED | OUTPATIENT
Start: 2019-10-10 | End: 2019-10-13 | Stop reason: HOSPADM

## 2019-10-10 RX ORDER — CELECOXIB 200 MG/1
200 CAPSULE ORAL 2 TIMES DAILY
Status: DISCONTINUED | OUTPATIENT
Start: 2019-10-10 | End: 2019-10-13 | Stop reason: HOSPADM

## 2019-10-10 RX ORDER — AMLODIPINE BESYLATE 10 MG/1
10 TABLET ORAL DAILY
Status: DISCONTINUED | OUTPATIENT
Start: 2019-10-10 | End: 2019-10-10

## 2019-10-10 RX ORDER — ATORVASTATIN CALCIUM 40 MG/1
40 TABLET, FILM COATED ORAL NIGHTLY
Status: DISCONTINUED | OUTPATIENT
Start: 2019-10-10 | End: 2019-10-13 | Stop reason: HOSPADM

## 2019-10-10 RX ORDER — METHYLPREDNISOLONE SODIUM SUCCINATE 40 MG/ML
40 INJECTION, POWDER, LYOPHILIZED, FOR SOLUTION INTRAMUSCULAR; INTRAVENOUS EVERY 8 HOURS
Status: DISCONTINUED | OUTPATIENT
Start: 2019-10-10 | End: 2019-10-13 | Stop reason: HOSPADM

## 2019-10-10 RX ORDER — CITALOPRAM 40 MG/1
40 TABLET ORAL DAILY
Status: DISCONTINUED | OUTPATIENT
Start: 2019-10-10 | End: 2019-10-13 | Stop reason: HOSPADM

## 2019-10-10 RX ORDER — ASPIRIN 81 MG/1
81 TABLET, CHEWABLE ORAL DAILY
Status: DISCONTINUED | OUTPATIENT
Start: 2019-10-10 | End: 2019-10-13 | Stop reason: HOSPADM

## 2019-10-10 RX ORDER — LEVOTHYROXINE SODIUM 0.1 MG/1
100 TABLET ORAL DAILY
Status: DISCONTINUED | OUTPATIENT
Start: 2019-10-10 | End: 2019-10-10

## 2019-10-10 RX ORDER — TRAZODONE HYDROCHLORIDE 50 MG/1
75 TABLET ORAL NIGHTLY
Status: DISCONTINUED | OUTPATIENT
Start: 2019-10-10 | End: 2019-10-13 | Stop reason: HOSPADM

## 2019-10-10 RX ORDER — FERROUS GLUCONATE 324(37.5)
324 TABLET ORAL 2 TIMES DAILY
Status: DISCONTINUED | OUTPATIENT
Start: 2019-10-10 | End: 2019-10-13 | Stop reason: HOSPADM

## 2019-10-10 RX ORDER — PREDNISONE 20 MG/1
60 TABLET ORAL DAILY
Qty: 12 TABLET | Refills: 0 | Status: SHIPPED | OUTPATIENT
Start: 2019-10-10 | End: 2019-10-10

## 2019-10-10 RX ORDER — AMLODIPINE BESYLATE 10 MG/1
10 TABLET ORAL DAILY
Status: DISCONTINUED | OUTPATIENT
Start: 2019-10-10 | End: 2019-10-13 | Stop reason: HOSPADM

## 2019-10-10 RX ORDER — BUSPIRONE HYDROCHLORIDE 10 MG/1
10 TABLET ORAL 3 TIMES DAILY
Status: DISCONTINUED | OUTPATIENT
Start: 2019-10-10 | End: 2019-10-13 | Stop reason: HOSPADM

## 2019-10-10 RX ORDER — LEVOTHYROXINE SODIUM 0.1 MG/1
100 TABLET ORAL DAILY
Status: DISCONTINUED | OUTPATIENT
Start: 2019-10-10 | End: 2019-10-13 | Stop reason: HOSPADM

## 2019-10-10 RX ORDER — POTASSIUM CHLORIDE 20 MEQ/1
20 TABLET, EXTENDED RELEASE ORAL
Status: DISCONTINUED | OUTPATIENT
Start: 2019-10-10 | End: 2019-10-13 | Stop reason: HOSPADM

## 2019-10-10 RX ORDER — HYDRALAZINE HYDROCHLORIDE 50 MG/1
50 TABLET, FILM COATED ORAL 3 TIMES DAILY
Status: DISCONTINUED | OUTPATIENT
Start: 2019-10-10 | End: 2019-10-13 | Stop reason: HOSPADM

## 2019-10-10 RX ORDER — METOPROLOL SUCCINATE 25 MG/1
25 TABLET, EXTENDED RELEASE ORAL DAILY
Status: DISCONTINUED | OUTPATIENT
Start: 2019-10-10 | End: 2019-10-13 | Stop reason: HOSPADM

## 2019-10-10 RX ORDER — BACLOFEN 10 MG/1
10 TABLET ORAL 3 TIMES DAILY
Status: DISCONTINUED | OUTPATIENT
Start: 2019-10-10 | End: 2019-10-13 | Stop reason: HOSPADM

## 2019-10-10 RX ORDER — CLONAZEPAM 1 MG/1
1 TABLET ORAL 3 TIMES DAILY PRN
Status: DISCONTINUED | OUTPATIENT
Start: 2019-10-10 | End: 2019-10-13 | Stop reason: HOSPADM

## 2019-10-10 RX ORDER — FAMOTIDINE 20 MG/1
40 TABLET, FILM COATED ORAL NIGHTLY
Status: DISCONTINUED | OUTPATIENT
Start: 2019-10-10 | End: 2019-10-13 | Stop reason: HOSPADM

## 2019-10-10 RX ORDER — ACETAMINOPHEN 325 MG/1
650 TABLET ORAL EVERY 4 HOURS PRN
Status: DISCONTINUED | OUTPATIENT
Start: 2019-10-10 | End: 2019-10-13 | Stop reason: HOSPADM

## 2019-10-10 RX ORDER — POTASSIUM CHLORIDE 20 MEQ/1
20 TABLET, EXTENDED RELEASE ORAL
Status: DISCONTINUED | OUTPATIENT
Start: 2019-10-11 | End: 2019-10-10

## 2019-10-10 RX ORDER — FLUTICASONE PROPIONATE 50 MCG
2 SPRAY, SUSPENSION (ML) NASAL DAILY
Status: DISCONTINUED | OUTPATIENT
Start: 2019-10-10 | End: 2019-10-13 | Stop reason: HOSPADM

## 2019-10-10 RX ORDER — FAMOTIDINE 20 MG/1
40 TABLET, FILM COATED ORAL NIGHTLY
Status: DISCONTINUED | OUTPATIENT
Start: 2019-10-10 | End: 2019-10-10

## 2019-10-10 RX ORDER — BACLOFEN 10 MG/1
10 TABLET ORAL 3 TIMES DAILY
Status: DISCONTINUED | OUTPATIENT
Start: 2019-10-10 | End: 2019-10-10

## 2019-10-10 RX ORDER — HYDRALAZINE HYDROCHLORIDE 50 MG/1
50 TABLET, FILM COATED ORAL EVERY 8 HOURS SCHEDULED
Status: DISCONTINUED | OUTPATIENT
Start: 2019-10-10 | End: 2019-10-10

## 2019-10-10 RX ADMIN — ACETAMINOPHEN 650 MG: 325 TABLET ORAL at 12:29

## 2019-10-10 RX ADMIN — CELECOXIB 200 MG: 200 CAPSULE ORAL at 12:30

## 2019-10-10 RX ADMIN — BACLOFEN 10 MG: 10 TABLET ORAL at 21:12

## 2019-10-10 RX ADMIN — METOPROLOL SUCCINATE 25 MG: 25 TABLET, EXTENDED RELEASE ORAL at 12:30

## 2019-10-10 RX ADMIN — METHYLPREDNISOLONE SODIUM SUCCINATE 40 MG: 40 INJECTION, POWDER, LYOPHILIZED, FOR SOLUTION INTRAMUSCULAR; INTRAVENOUS at 02:57

## 2019-10-10 RX ADMIN — HYDRALAZINE HYDROCHLORIDE 50 MG: 50 TABLET, FILM COATED ORAL at 06:55

## 2019-10-10 RX ADMIN — CELECOXIB 200 MG: 200 CAPSULE ORAL at 21:12

## 2019-10-10 RX ADMIN — ASPIRIN 81 MG 81 MG: 81 TABLET ORAL at 12:30

## 2019-10-10 RX ADMIN — FAMOTIDINE 40 MG: 20 TABLET ORAL at 02:57

## 2019-10-10 RX ADMIN — AZITHROMYCIN MONOHYDRATE 500 MG: 500 INJECTION, POWDER, LYOPHILIZED, FOR SOLUTION INTRAVENOUS at 12:31

## 2019-10-10 RX ADMIN — IPRATROPIUM BROMIDE AND ALBUTEROL SULFATE 1 AMPULE: .5; 3 SOLUTION RESPIRATORY (INHALATION) at 12:01

## 2019-10-10 RX ADMIN — SODIUM CHLORIDE, PRESERVATIVE FREE 10 ML: 5 INJECTION INTRAVENOUS at 10:07

## 2019-10-10 RX ADMIN — POTASSIUM CHLORIDE 20 MEQ: 1500 TABLET, EXTENDED RELEASE ORAL at 18:59

## 2019-10-10 RX ADMIN — BUSPIRONE HYDROCHLORIDE 10 MG: 10 TABLET ORAL at 12:30

## 2019-10-10 RX ADMIN — ATORVASTATIN CALCIUM 40 MG: 40 TABLET, FILM COATED ORAL at 21:13

## 2019-10-10 RX ADMIN — BACLOFEN 10 MG: 10 TABLET ORAL at 15:39

## 2019-10-10 RX ADMIN — CLONAZEPAM 1 MG: 1 TABLET ORAL at 15:38

## 2019-10-10 RX ADMIN — SODIUM CHLORIDE, PRESERVATIVE FREE 10 ML: 5 INJECTION INTRAVENOUS at 21:14

## 2019-10-10 RX ADMIN — FERROUS GLUCONATE TAB 324 MG (37.5 MG ELEMENTAL IRON) 324 MG: 324 (37.5 FE) TAB at 12:30

## 2019-10-10 RX ADMIN — IPRATROPIUM BROMIDE AND ALBUTEROL SULFATE 1 AMPULE: .5; 3 SOLUTION RESPIRATORY (INHALATION) at 08:36

## 2019-10-10 RX ADMIN — BUSPIRONE HYDROCHLORIDE 10 MG: 10 TABLET ORAL at 15:43

## 2019-10-10 RX ADMIN — CITALOPRAM HYDROBROMIDE 40 MG: 40 TABLET ORAL at 12:30

## 2019-10-10 RX ADMIN — TRAZODONE HYDROCHLORIDE 75 MG: 50 TABLET ORAL at 21:11

## 2019-10-10 RX ADMIN — BUSPIRONE HYDROCHLORIDE 10 MG: 10 TABLET ORAL at 21:13

## 2019-10-10 RX ADMIN — THEOPHYLLINE ANHYDROUS 400 MG: 200 CAPSULE, EXTENDED RELEASE ORAL at 12:29

## 2019-10-10 RX ADMIN — IPRATROPIUM BROMIDE AND ALBUTEROL SULFATE 1 AMPULE: .5; 3 SOLUTION RESPIRATORY (INHALATION) at 15:55

## 2019-10-10 RX ADMIN — IPRATROPIUM BROMIDE AND ALBUTEROL SULFATE 1 AMPULE: .5; 3 SOLUTION RESPIRATORY (INHALATION) at 01:51

## 2019-10-10 RX ADMIN — MAGNESIUM HYDROXIDE 30 ML: 400 SUSPENSION ORAL at 12:29

## 2019-10-10 RX ADMIN — BACLOFEN 10 MG: 10 TABLET ORAL at 10:04

## 2019-10-10 RX ADMIN — LEVOTHYROXINE SODIUM 100 MCG: 100 TABLET ORAL at 06:55

## 2019-10-10 RX ADMIN — CLONAZEPAM 1 MG: 1 TABLET ORAL at 10:03

## 2019-10-10 RX ADMIN — IPRATROPIUM BROMIDE AND ALBUTEROL SULFATE 1 AMPULE: .5; 3 SOLUTION RESPIRATORY (INHALATION) at 20:33

## 2019-10-10 RX ADMIN — MONTELUKAST 10 MG: 10 TABLET, FILM COATED ORAL at 21:13

## 2019-10-10 RX ADMIN — FERROUS GLUCONATE TAB 324 MG (37.5 MG ELEMENTAL IRON) 324 MG: 324 (37.5 FE) TAB at 21:12

## 2019-10-10 RX ADMIN — AMLODIPINE BESYLATE 10 MG: 10 TABLET ORAL at 10:04

## 2019-10-10 RX ADMIN — HYDRALAZINE HYDROCHLORIDE 50 MG: 50 TABLET, FILM COATED ORAL at 21:13

## 2019-10-10 RX ADMIN — HYDRALAZINE HYDROCHLORIDE 50 MG: 50 TABLET, FILM COATED ORAL at 15:38

## 2019-10-10 RX ADMIN — CLONAZEPAM 1 MG: 1 TABLET ORAL at 21:14

## 2019-10-10 RX ADMIN — METHYLPREDNISOLONE SODIUM SUCCINATE 40 MG: 40 INJECTION, POWDER, LYOPHILIZED, FOR SOLUTION INTRAMUSCULAR; INTRAVENOUS at 18:59

## 2019-10-10 RX ADMIN — FAMOTIDINE 40 MG: 20 TABLET ORAL at 21:13

## 2019-10-10 RX ADMIN — GUAIFENESIN 600 MG: 600 TABLET, EXTENDED RELEASE ORAL at 21:11

## 2019-10-10 RX ADMIN — METHYLPREDNISOLONE SODIUM SUCCINATE 40 MG: 40 INJECTION, POWDER, LYOPHILIZED, FOR SOLUTION INTRAMUSCULAR; INTRAVENOUS at 10:06

## 2019-10-10 RX ADMIN — GUAIFENESIN 600 MG: 600 TABLET, EXTENDED RELEASE ORAL at 12:30

## 2019-10-10 RX ADMIN — FLUTICASONE PROPIONATE 2 SPRAY: 50 SPRAY, METERED NASAL at 12:41

## 2019-10-10 ASSESSMENT — PAIN SCALES - GENERAL
PAINLEVEL_OUTOF10: 8
PAINLEVEL_OUTOF10: 4
PAINLEVEL_OUTOF10: 6
PAINLEVEL_OUTOF10: 4

## 2019-10-10 ASSESSMENT — PAIN DESCRIPTION - PROGRESSION
CLINICAL_PROGRESSION: NOT CHANGED
CLINICAL_PROGRESSION: NOT CHANGED

## 2019-10-10 ASSESSMENT — PAIN DESCRIPTION - PAIN TYPE
TYPE: ACUTE PAIN
TYPE: CHRONIC PAIN

## 2019-10-10 ASSESSMENT — PAIN DESCRIPTION - ONSET
ONSET: ON-GOING
ONSET: ON-GOING

## 2019-10-10 ASSESSMENT — PAIN DESCRIPTION - LOCATION
LOCATION: KNEE
LOCATION: KNEE
LOCATION: HEAD
LOCATION: KNEE;LEG

## 2019-10-10 ASSESSMENT — PAIN DESCRIPTION - ORIENTATION
ORIENTATION: LEFT

## 2019-10-10 ASSESSMENT — PAIN DESCRIPTION - DESCRIPTORS
DESCRIPTORS: ACHING;CONSTANT
DESCRIPTORS: ACHING;CONSTANT;DISCOMFORT

## 2019-10-10 ASSESSMENT — PAIN DESCRIPTION - FREQUENCY
FREQUENCY: CONTINUOUS
FREQUENCY: CONTINUOUS

## 2019-10-11 LAB
ALBUMIN SERPL-MCNC: 4.1 GM/DL (ref 3.4–5)
ALP BLD-CCNC: 63 IU/L (ref 40–128)
ALT SERPL-CCNC: 14 U/L (ref 10–40)
ANION GAP SERPL CALCULATED.3IONS-SCNC: 8 MMOL/L (ref 4–16)
AST SERPL-CCNC: 16 IU/L (ref 15–37)
BASE EXCESS MIXED: ABNORMAL (ref 0–2.3)
BASOPHILS ABSOLUTE: 0 K/CU MM
BASOPHILS RELATIVE PERCENT: 0 % (ref 0–1)
BILIRUB SERPL-MCNC: 0.2 MG/DL (ref 0–1)
BUN BLDV-MCNC: 20 MG/DL (ref 6–23)
CALCIUM SERPL-MCNC: 9.1 MG/DL (ref 8.3–10.6)
CARBON MONOXIDE, BLOOD: 1.9 % (ref 0–5)
CHLORIDE BLD-SCNC: 97 MMOL/L (ref 99–110)
CO2 CONTENT: 38.6 MMOL/L (ref 19–24)
CO2: 34 MMOL/L (ref 21–32)
COMMENT: ABNORMAL
CREAT SERPL-MCNC: 0.9 MG/DL (ref 0.6–1.1)
DIFFERENTIAL TYPE: ABNORMAL
EOSINOPHILS ABSOLUTE: 0 K/CU MM
EOSINOPHILS RELATIVE PERCENT: 0 % (ref 0–3)
GFR AFRICAN AMERICAN: >60 ML/MIN/1.73M2
GFR NON-AFRICAN AMERICAN: >60 ML/MIN/1.73M2
GLUCOSE BLD-MCNC: 211 MG/DL (ref 70–99)
HCO3 ARTERIAL: 36.7 MMOL/L (ref 18–23)
HCT VFR BLD CALC: 31.8 % (ref 37–47)
HEMOGLOBIN: 9.2 GM/DL (ref 12.5–16)
IMMATURE NEUTROPHIL %: 0.5 % (ref 0–0.43)
LYMPHOCYTES ABSOLUTE: 0.4 K/CU MM
LYMPHOCYTES RELATIVE PERCENT: 5.7 % (ref 24–44)
MAGNESIUM: 2.2 MG/DL (ref 1.8–2.4)
MCH RBC QN AUTO: 28.1 PG (ref 27–31)
MCHC RBC AUTO-ENTMCNC: 28.9 % (ref 32–36)
MCV RBC AUTO: 97.2 FL (ref 78–100)
METHEMOGLOBIN ARTERIAL: 1.3 %
MONOCYTES ABSOLUTE: 0.4 K/CU MM
MONOCYTES RELATIVE PERCENT: 6 % (ref 0–4)
O2 SATURATION: 76.6 % (ref 96–97)
PCO2 ARTERIAL: 62 MMHG (ref 32–45)
PDW BLD-RTO: 13.8 % (ref 11.7–14.9)
PH BLOOD: 7.38 (ref 7.34–7.45)
PHOSPHORUS: 2.5 MG/DL (ref 2.5–4.9)
PLATELET # BLD: 206 K/CU MM (ref 140–440)
PMV BLD AUTO: 10.2 FL (ref 7.5–11.1)
PO2 ARTERIAL: 37 MMHG (ref 75–100)
POTASSIUM SERPL-SCNC: 4.6 MMOL/L (ref 3.5–5.1)
RBC # BLD: 3.27 M/CU MM (ref 4.2–5.4)
SEGMENTED NEUTROPHILS ABSOLUTE COUNT: 5.7 K/CU MM
SEGMENTED NEUTROPHILS RELATIVE PERCENT: 87.8 % (ref 36–66)
SODIUM BLD-SCNC: 139 MMOL/L (ref 135–145)
TOTAL IMMATURE NEUTOROPHIL: 0.03 K/CU MM
TOTAL PROTEIN: 5.8 GM/DL (ref 6.4–8.2)
WBC # BLD: 6.5 K/CU MM (ref 4–10.5)

## 2019-10-11 PROCEDURE — 6370000000 HC RX 637 (ALT 250 FOR IP): Performed by: INTERNAL MEDICINE

## 2019-10-11 PROCEDURE — 82803 BLOOD GASES ANY COMBINATION: CPT

## 2019-10-11 PROCEDURE — 80053 COMPREHEN METABOLIC PANEL: CPT

## 2019-10-11 PROCEDURE — 84100 ASSAY OF PHOSPHORUS: CPT

## 2019-10-11 PROCEDURE — 2580000003 HC RX 258: Performed by: INTERNAL MEDICINE

## 2019-10-11 PROCEDURE — 94640 AIRWAY INHALATION TREATMENT: CPT

## 2019-10-11 PROCEDURE — 85025 COMPLETE CBC W/AUTO DIFF WBC: CPT

## 2019-10-11 PROCEDURE — 94761 N-INVAS EAR/PLS OXIMETRY MLT: CPT

## 2019-10-11 PROCEDURE — 94668 MNPJ CHEST WALL SBSQ: CPT

## 2019-10-11 PROCEDURE — 83735 ASSAY OF MAGNESIUM: CPT

## 2019-10-11 PROCEDURE — 1200000000 HC SEMI PRIVATE

## 2019-10-11 PROCEDURE — 6360000002 HC RX W HCPCS: Performed by: INTERNAL MEDICINE

## 2019-10-11 PROCEDURE — 2700000000 HC OXYGEN THERAPY PER DAY

## 2019-10-11 PROCEDURE — 36415 COLL VENOUS BLD VENIPUNCTURE: CPT

## 2019-10-11 PROCEDURE — 36600 WITHDRAWAL OF ARTERIAL BLOOD: CPT

## 2019-10-11 RX ADMIN — SODIUM CHLORIDE, PRESERVATIVE FREE 10 ML: 5 INJECTION INTRAVENOUS at 21:29

## 2019-10-11 RX ADMIN — BACLOFEN 10 MG: 10 TABLET ORAL at 14:06

## 2019-10-11 RX ADMIN — METOPROLOL SUCCINATE 25 MG: 25 TABLET, EXTENDED RELEASE ORAL at 09:16

## 2019-10-11 RX ADMIN — ACETAMINOPHEN 650 MG: 325 TABLET ORAL at 03:07

## 2019-10-11 RX ADMIN — GUAIFENESIN 600 MG: 600 TABLET, EXTENDED RELEASE ORAL at 21:27

## 2019-10-11 RX ADMIN — ASPIRIN 81 MG 81 MG: 81 TABLET ORAL at 09:15

## 2019-10-11 RX ADMIN — HYDRALAZINE HYDROCHLORIDE 50 MG: 50 TABLET, FILM COATED ORAL at 14:07

## 2019-10-11 RX ADMIN — METHYLPREDNISOLONE SODIUM SUCCINATE 40 MG: 40 INJECTION, POWDER, LYOPHILIZED, FOR SOLUTION INTRAMUSCULAR; INTRAVENOUS at 02:59

## 2019-10-11 RX ADMIN — FERROUS GLUCONATE TAB 324 MG (37.5 MG ELEMENTAL IRON) 324 MG: 324 (37.5 FE) TAB at 09:17

## 2019-10-11 RX ADMIN — BUSPIRONE HYDROCHLORIDE 10 MG: 10 TABLET ORAL at 09:16

## 2019-10-11 RX ADMIN — BACLOFEN 10 MG: 10 TABLET ORAL at 09:14

## 2019-10-11 RX ADMIN — IPRATROPIUM BROMIDE AND ALBUTEROL SULFATE 1 AMPULE: .5; 3 SOLUTION RESPIRATORY (INHALATION) at 02:10

## 2019-10-11 RX ADMIN — HYDRALAZINE HYDROCHLORIDE 50 MG: 50 TABLET, FILM COATED ORAL at 21:27

## 2019-10-11 RX ADMIN — CLONAZEPAM 1 MG: 1 TABLET ORAL at 21:27

## 2019-10-11 RX ADMIN — LEVOTHYROXINE SODIUM 100 MCG: 100 TABLET ORAL at 06:37

## 2019-10-11 RX ADMIN — IPRATROPIUM BROMIDE AND ALBUTEROL SULFATE 1 AMPULE: .5; 3 SOLUTION RESPIRATORY (INHALATION) at 12:51

## 2019-10-11 RX ADMIN — CLONAZEPAM 1 MG: 1 TABLET ORAL at 10:30

## 2019-10-11 RX ADMIN — BUSPIRONE HYDROCHLORIDE 10 MG: 10 TABLET ORAL at 21:27

## 2019-10-11 RX ADMIN — BACLOFEN 10 MG: 10 TABLET ORAL at 21:27

## 2019-10-11 RX ADMIN — GUAIFENESIN 600 MG: 600 TABLET, EXTENDED RELEASE ORAL at 09:16

## 2019-10-11 RX ADMIN — FLUTICASONE PROPIONATE 2 SPRAY: 50 SPRAY, METERED NASAL at 09:36

## 2019-10-11 RX ADMIN — CELECOXIB 200 MG: 200 CAPSULE ORAL at 09:14

## 2019-10-11 RX ADMIN — AMLODIPINE BESYLATE 10 MG: 10 TABLET ORAL at 09:17

## 2019-10-11 RX ADMIN — AZITHROMYCIN MONOHYDRATE 500 MG: 500 INJECTION, POWDER, LYOPHILIZED, FOR SOLUTION INTRAVENOUS at 12:27

## 2019-10-11 RX ADMIN — ATORVASTATIN CALCIUM 40 MG: 40 TABLET, FILM COATED ORAL at 21:27

## 2019-10-11 RX ADMIN — IPRATROPIUM BROMIDE AND ALBUTEROL SULFATE 1 AMPULE: .5; 3 SOLUTION RESPIRATORY (INHALATION) at 16:46

## 2019-10-11 RX ADMIN — FERROUS GLUCONATE TAB 324 MG (37.5 MG ELEMENTAL IRON) 324 MG: 324 (37.5 FE) TAB at 21:27

## 2019-10-11 RX ADMIN — MONTELUKAST 10 MG: 10 TABLET, FILM COATED ORAL at 21:27

## 2019-10-11 RX ADMIN — ACETAMINOPHEN 650 MG: 325 TABLET ORAL at 21:28

## 2019-10-11 RX ADMIN — POTASSIUM CHLORIDE 20 MEQ: 1500 TABLET, EXTENDED RELEASE ORAL at 09:16

## 2019-10-11 RX ADMIN — SODIUM CHLORIDE, PRESERVATIVE FREE 10 ML: 5 INJECTION INTRAVENOUS at 09:27

## 2019-10-11 RX ADMIN — IPRATROPIUM BROMIDE AND ALBUTEROL SULFATE 1 AMPULE: .5; 3 SOLUTION RESPIRATORY (INHALATION) at 08:54

## 2019-10-11 RX ADMIN — BUSPIRONE HYDROCHLORIDE 10 MG: 10 TABLET ORAL at 14:07

## 2019-10-11 RX ADMIN — FAMOTIDINE 40 MG: 20 TABLET ORAL at 21:27

## 2019-10-11 RX ADMIN — CITALOPRAM HYDROBROMIDE 40 MG: 40 TABLET ORAL at 09:16

## 2019-10-11 RX ADMIN — HYDRALAZINE HYDROCHLORIDE 50 MG: 50 TABLET, FILM COATED ORAL at 09:17

## 2019-10-11 RX ADMIN — METHYLPREDNISOLONE SODIUM SUCCINATE 40 MG: 40 INJECTION, POWDER, LYOPHILIZED, FOR SOLUTION INTRAMUSCULAR; INTRAVENOUS at 11:38

## 2019-10-11 RX ADMIN — IPRATROPIUM BROMIDE AND ALBUTEROL SULFATE 1 AMPULE: .5; 3 SOLUTION RESPIRATORY (INHALATION) at 20:15

## 2019-10-11 RX ADMIN — ACETAMINOPHEN 650 MG: 325 TABLET ORAL at 12:26

## 2019-10-11 RX ADMIN — THEOPHYLLINE ANHYDROUS 400 MG: 200 CAPSULE, EXTENDED RELEASE ORAL at 09:36

## 2019-10-11 RX ADMIN — TRAZODONE HYDROCHLORIDE 75 MG: 50 TABLET ORAL at 21:27

## 2019-10-11 RX ADMIN — METHYLPREDNISOLONE SODIUM SUCCINATE 40 MG: 40 INJECTION, POWDER, LYOPHILIZED, FOR SOLUTION INTRAMUSCULAR; INTRAVENOUS at 18:24

## 2019-10-11 RX ADMIN — CLONAZEPAM 1 MG: 1 TABLET ORAL at 15:58

## 2019-10-11 RX ADMIN — SODIUM CHLORIDE, PRESERVATIVE FREE 10 ML: 5 INJECTION INTRAVENOUS at 10:30

## 2019-10-11 RX ADMIN — CELECOXIB 200 MG: 200 CAPSULE ORAL at 21:27

## 2019-10-11 ASSESSMENT — PAIN DESCRIPTION - ONSET
ONSET: ON-GOING
ONSET: ON-GOING

## 2019-10-11 ASSESSMENT — PAIN DESCRIPTION - PROGRESSION
CLINICAL_PROGRESSION: NOT CHANGED

## 2019-10-11 ASSESSMENT — PAIN DESCRIPTION - PAIN TYPE
TYPE: CHRONIC PAIN;ACUTE PAIN
TYPE: CHRONIC PAIN

## 2019-10-11 ASSESSMENT — PAIN DESCRIPTION - DESCRIPTORS
DESCRIPTORS: ACHING;CONSTANT
DESCRIPTORS: ACHING;CONSTANT

## 2019-10-11 ASSESSMENT — PAIN DESCRIPTION - FREQUENCY
FREQUENCY: CONTINUOUS
FREQUENCY: CONTINUOUS

## 2019-10-11 ASSESSMENT — PAIN SCALES - GENERAL
PAINLEVEL_OUTOF10: 4
PAINLEVEL_OUTOF10: 5
PAINLEVEL_OUTOF10: 3
PAINLEVEL_OUTOF10: 6
PAINLEVEL_OUTOF10: 3

## 2019-10-11 ASSESSMENT — PAIN DESCRIPTION - LOCATION
LOCATION: KNEE;LEG
LOCATION: BACK;HEAD

## 2019-10-11 ASSESSMENT — PAIN DESCRIPTION - ORIENTATION: ORIENTATION: LEFT

## 2019-10-12 LAB
DOSE AMOUNT: ABNORMAL
DOSE TIME: ABNORMAL
THEOPHYLLINE LEVEL: 8.1 UG/ML (ref 10–20)

## 2019-10-12 PROCEDURE — 2700000000 HC OXYGEN THERAPY PER DAY

## 2019-10-12 PROCEDURE — 1200000000 HC SEMI PRIVATE

## 2019-10-12 PROCEDURE — 94668 MNPJ CHEST WALL SBSQ: CPT

## 2019-10-12 PROCEDURE — 2580000003 HC RX 258: Performed by: INTERNAL MEDICINE

## 2019-10-12 PROCEDURE — 94761 N-INVAS EAR/PLS OXIMETRY MLT: CPT

## 2019-10-12 PROCEDURE — 80198 ASSAY OF THEOPHYLLINE: CPT

## 2019-10-12 PROCEDURE — 36415 COLL VENOUS BLD VENIPUNCTURE: CPT

## 2019-10-12 PROCEDURE — 6370000000 HC RX 637 (ALT 250 FOR IP): Performed by: INTERNAL MEDICINE

## 2019-10-12 PROCEDURE — 6360000002 HC RX W HCPCS: Performed by: INTERNAL MEDICINE

## 2019-10-12 PROCEDURE — 94660 CPAP INITIATION&MGMT: CPT

## 2019-10-12 PROCEDURE — 94640 AIRWAY INHALATION TREATMENT: CPT

## 2019-10-12 RX ADMIN — SODIUM CHLORIDE, PRESERVATIVE FREE 10 ML: 5 INJECTION INTRAVENOUS at 10:10

## 2019-10-12 RX ADMIN — TRAZODONE HYDROCHLORIDE 75 MG: 50 TABLET ORAL at 21:09

## 2019-10-12 RX ADMIN — FAMOTIDINE 40 MG: 20 TABLET ORAL at 21:09

## 2019-10-12 RX ADMIN — CLONAZEPAM 1 MG: 1 TABLET ORAL at 21:09

## 2019-10-12 RX ADMIN — CELECOXIB 200 MG: 200 CAPSULE ORAL at 08:40

## 2019-10-12 RX ADMIN — MONTELUKAST 10 MG: 10 TABLET, FILM COATED ORAL at 21:07

## 2019-10-12 RX ADMIN — IPRATROPIUM BROMIDE AND ALBUTEROL SULFATE 1 AMPULE: .5; 3 SOLUTION RESPIRATORY (INHALATION) at 01:00

## 2019-10-12 RX ADMIN — CELECOXIB 200 MG: 200 CAPSULE ORAL at 21:08

## 2019-10-12 RX ADMIN — FLUTICASONE PROPIONATE 2 SPRAY: 50 SPRAY, METERED NASAL at 08:37

## 2019-10-12 RX ADMIN — BACLOFEN 10 MG: 10 TABLET ORAL at 08:40

## 2019-10-12 RX ADMIN — ATORVASTATIN CALCIUM 40 MG: 40 TABLET, FILM COATED ORAL at 21:09

## 2019-10-12 RX ADMIN — IPRATROPIUM BROMIDE AND ALBUTEROL SULFATE 1 AMPULE: .5; 3 SOLUTION RESPIRATORY (INHALATION) at 19:44

## 2019-10-12 RX ADMIN — GUAIFENESIN 600 MG: 600 TABLET, EXTENDED RELEASE ORAL at 21:08

## 2019-10-12 RX ADMIN — IPRATROPIUM BROMIDE AND ALBUTEROL SULFATE 1 AMPULE: .5; 3 SOLUTION RESPIRATORY (INHALATION) at 04:35

## 2019-10-12 RX ADMIN — AMLODIPINE BESYLATE 10 MG: 10 TABLET ORAL at 08:39

## 2019-10-12 RX ADMIN — IPRATROPIUM BROMIDE AND ALBUTEROL SULFATE 1 AMPULE: .5; 3 SOLUTION RESPIRATORY (INHALATION) at 11:54

## 2019-10-12 RX ADMIN — ACETAMINOPHEN 650 MG: 325 TABLET ORAL at 17:34

## 2019-10-12 RX ADMIN — HYDRALAZINE HYDROCHLORIDE 50 MG: 50 TABLET, FILM COATED ORAL at 14:29

## 2019-10-12 RX ADMIN — BACLOFEN 10 MG: 10 TABLET ORAL at 14:29

## 2019-10-12 RX ADMIN — POTASSIUM CHLORIDE 20 MEQ: 1500 TABLET, EXTENDED RELEASE ORAL at 07:48

## 2019-10-12 RX ADMIN — CLONAZEPAM 1 MG: 1 TABLET ORAL at 15:21

## 2019-10-12 RX ADMIN — BUSPIRONE HYDROCHLORIDE 10 MG: 10 TABLET ORAL at 21:09

## 2019-10-12 RX ADMIN — FERROUS GLUCONATE TAB 324 MG (37.5 MG ELEMENTAL IRON) 324 MG: 324 (37.5 FE) TAB at 08:39

## 2019-10-12 RX ADMIN — LEVOTHYROXINE SODIUM 100 MCG: 100 TABLET ORAL at 06:54

## 2019-10-12 RX ADMIN — ACETAMINOPHEN 650 MG: 325 TABLET ORAL at 12:42

## 2019-10-12 RX ADMIN — IPRATROPIUM BROMIDE AND ALBUTEROL SULFATE 1 AMPULE: .5; 3 SOLUTION RESPIRATORY (INHALATION) at 09:01

## 2019-10-12 RX ADMIN — ACETAMINOPHEN 650 MG: 325 TABLET ORAL at 21:17

## 2019-10-12 RX ADMIN — FERROUS GLUCONATE TAB 324 MG (37.5 MG ELEMENTAL IRON) 324 MG: 324 (37.5 FE) TAB at 21:08

## 2019-10-12 RX ADMIN — GUAIFENESIN 600 MG: 600 TABLET, EXTENDED RELEASE ORAL at 08:40

## 2019-10-12 RX ADMIN — THEOPHYLLINE ANHYDROUS 400 MG: 200 CAPSULE, EXTENDED RELEASE ORAL at 08:39

## 2019-10-12 RX ADMIN — BUSPIRONE HYDROCHLORIDE 10 MG: 10 TABLET ORAL at 14:29

## 2019-10-12 RX ADMIN — BACLOFEN 10 MG: 10 TABLET ORAL at 21:08

## 2019-10-12 RX ADMIN — AZITHROMYCIN MONOHYDRATE 500 MG: 500 INJECTION, POWDER, LYOPHILIZED, FOR SOLUTION INTRAVENOUS at 11:34

## 2019-10-12 RX ADMIN — IPRATROPIUM BROMIDE AND ALBUTEROL SULFATE 1 AMPULE: .5; 3 SOLUTION RESPIRATORY (INHALATION) at 16:35

## 2019-10-12 RX ADMIN — CLONAZEPAM 1 MG: 1 TABLET ORAL at 10:13

## 2019-10-12 RX ADMIN — METOPROLOL SUCCINATE 25 MG: 25 TABLET, EXTENDED RELEASE ORAL at 08:39

## 2019-10-12 RX ADMIN — METHYLPREDNISOLONE SODIUM SUCCINATE 40 MG: 40 INJECTION, POWDER, LYOPHILIZED, FOR SOLUTION INTRAMUSCULAR; INTRAVENOUS at 17:56

## 2019-10-12 RX ADMIN — HYDRALAZINE HYDROCHLORIDE 50 MG: 50 TABLET, FILM COATED ORAL at 08:39

## 2019-10-12 RX ADMIN — BUSPIRONE HYDROCHLORIDE 10 MG: 10 TABLET ORAL at 08:39

## 2019-10-12 RX ADMIN — METHYLPREDNISOLONE SODIUM SUCCINATE 40 MG: 40 INJECTION, POWDER, LYOPHILIZED, FOR SOLUTION INTRAMUSCULAR; INTRAVENOUS at 02:24

## 2019-10-12 RX ADMIN — CITALOPRAM HYDROBROMIDE 40 MG: 40 TABLET ORAL at 08:39

## 2019-10-12 RX ADMIN — ASPIRIN 81 MG 81 MG: 81 TABLET ORAL at 08:39

## 2019-10-12 RX ADMIN — METHYLPREDNISOLONE SODIUM SUCCINATE 40 MG: 40 INJECTION, POWDER, LYOPHILIZED, FOR SOLUTION INTRAMUSCULAR; INTRAVENOUS at 11:29

## 2019-10-12 ASSESSMENT — PAIN SCALES - GENERAL
PAINLEVEL_OUTOF10: 5
PAINLEVEL_OUTOF10: 0
PAINLEVEL_OUTOF10: 5
PAINLEVEL_OUTOF10: 3
PAINLEVEL_OUTOF10: 0
PAINLEVEL_OUTOF10: 6
PAINLEVEL_OUTOF10: 0

## 2019-10-12 ASSESSMENT — PAIN DESCRIPTION - LOCATION: LOCATION: BACK;HEAD

## 2019-10-12 ASSESSMENT — PAIN DESCRIPTION - PROGRESSION

## 2019-10-12 ASSESSMENT — PAIN DESCRIPTION - DESCRIPTORS: DESCRIPTORS: ACHING;CONSTANT;DISCOMFORT

## 2019-10-12 ASSESSMENT — PAIN DESCRIPTION - FREQUENCY: FREQUENCY: CONTINUOUS

## 2019-10-12 ASSESSMENT — PAIN DESCRIPTION - ONSET: ONSET: ON-GOING

## 2019-10-12 ASSESSMENT — PAIN DESCRIPTION - PAIN TYPE: TYPE: CHRONIC PAIN;ACUTE PAIN

## 2019-10-12 ASSESSMENT — PAIN DESCRIPTION - ORIENTATION: ORIENTATION: LEFT

## 2019-10-12 ASSESSMENT — PAIN - FUNCTIONAL ASSESSMENT: PAIN_FUNCTIONAL_ASSESSMENT: ACTIVITIES ARE NOT PREVENTED

## 2019-10-13 VITALS
BODY MASS INDEX: 36.59 KG/M2 | TEMPERATURE: 97.6 F | OXYGEN SATURATION: 97 % | DIASTOLIC BLOOD PRESSURE: 68 MMHG | HEART RATE: 89 BPM | WEIGHT: 193.8 LBS | SYSTOLIC BLOOD PRESSURE: 126 MMHG | HEIGHT: 61 IN | RESPIRATION RATE: 18 BRPM

## 2019-10-13 PROCEDURE — 2700000000 HC OXYGEN THERAPY PER DAY

## 2019-10-13 PROCEDURE — 94761 N-INVAS EAR/PLS OXIMETRY MLT: CPT

## 2019-10-13 PROCEDURE — 6370000000 HC RX 637 (ALT 250 FOR IP): Performed by: INTERNAL MEDICINE

## 2019-10-13 PROCEDURE — 94660 CPAP INITIATION&MGMT: CPT

## 2019-10-13 PROCEDURE — 2580000003 HC RX 258: Performed by: INTERNAL MEDICINE

## 2019-10-13 PROCEDURE — 94640 AIRWAY INHALATION TREATMENT: CPT

## 2019-10-13 PROCEDURE — 6360000002 HC RX W HCPCS: Performed by: INTERNAL MEDICINE

## 2019-10-13 PROCEDURE — 94668 MNPJ CHEST WALL SBSQ: CPT

## 2019-10-13 RX ORDER — METHYLPREDNISOLONE 4 MG/1
TABLET ORAL
Qty: 1 KIT | Refills: 0 | Status: SHIPPED | OUTPATIENT
Start: 2019-10-13 | End: 2019-10-19

## 2019-10-13 RX ORDER — AZITHROMYCIN 500 MG/1
500 TABLET, FILM COATED ORAL DAILY
Qty: 1 PACKET | Refills: 0 | Status: SHIPPED | OUTPATIENT
Start: 2019-10-13 | End: 2019-10-16

## 2019-10-13 RX ORDER — GUAIFENESIN 600 MG/1
600 TABLET, EXTENDED RELEASE ORAL 2 TIMES DAILY
Qty: 60 TABLET | Refills: 0 | Status: SHIPPED | OUTPATIENT
Start: 2019-10-13 | End: 2019-11-12

## 2019-10-13 RX ADMIN — HYDRALAZINE HYDROCHLORIDE 50 MG: 50 TABLET, FILM COATED ORAL at 08:57

## 2019-10-13 RX ADMIN — SODIUM CHLORIDE, PRESERVATIVE FREE 10 ML: 5 INJECTION INTRAVENOUS at 03:58

## 2019-10-13 RX ADMIN — ACETAMINOPHEN 650 MG: 325 TABLET ORAL at 15:42

## 2019-10-13 RX ADMIN — CITALOPRAM HYDROBROMIDE 40 MG: 40 TABLET ORAL at 08:56

## 2019-10-13 RX ADMIN — BACLOFEN 10 MG: 10 TABLET ORAL at 08:57

## 2019-10-13 RX ADMIN — THEOPHYLLINE ANHYDROUS 400 MG: 200 CAPSULE, EXTENDED RELEASE ORAL at 08:56

## 2019-10-13 RX ADMIN — CELECOXIB 200 MG: 200 CAPSULE ORAL at 08:56

## 2019-10-13 RX ADMIN — ASPIRIN 81 MG 81 MG: 81 TABLET ORAL at 08:56

## 2019-10-13 RX ADMIN — CLONAZEPAM 1 MG: 1 TABLET ORAL at 14:11

## 2019-10-13 RX ADMIN — BUSPIRONE HYDROCHLORIDE 10 MG: 10 TABLET ORAL at 14:11

## 2019-10-13 RX ADMIN — BACLOFEN 10 MG: 10 TABLET ORAL at 14:11

## 2019-10-13 RX ADMIN — CLONAZEPAM 1 MG: 1 TABLET ORAL at 08:56

## 2019-10-13 RX ADMIN — FERROUS GLUCONATE TAB 324 MG (37.5 MG ELEMENTAL IRON) 324 MG: 324 (37.5 FE) TAB at 08:56

## 2019-10-13 RX ADMIN — SODIUM CHLORIDE, PRESERVATIVE FREE 10 ML: 5 INJECTION INTRAVENOUS at 09:00

## 2019-10-13 RX ADMIN — FLUTICASONE PROPIONATE 2 SPRAY: 50 SPRAY, METERED NASAL at 08:57

## 2019-10-13 RX ADMIN — METHYLPREDNISOLONE SODIUM SUCCINATE 40 MG: 40 INJECTION, POWDER, LYOPHILIZED, FOR SOLUTION INTRAMUSCULAR; INTRAVENOUS at 11:08

## 2019-10-13 RX ADMIN — IPRATROPIUM BROMIDE AND ALBUTEROL SULFATE 1 AMPULE: .5; 3 SOLUTION RESPIRATORY (INHALATION) at 12:44

## 2019-10-13 RX ADMIN — AMLODIPINE BESYLATE 10 MG: 10 TABLET ORAL at 08:57

## 2019-10-13 RX ADMIN — METOPROLOL SUCCINATE 25 MG: 25 TABLET, EXTENDED RELEASE ORAL at 08:56

## 2019-10-13 RX ADMIN — LEVOTHYROXINE SODIUM 100 MCG: 100 TABLET ORAL at 06:22

## 2019-10-13 RX ADMIN — IPRATROPIUM BROMIDE AND ALBUTEROL SULFATE 1 AMPULE: .5; 3 SOLUTION RESPIRATORY (INHALATION) at 08:02

## 2019-10-13 RX ADMIN — AZITHROMYCIN MONOHYDRATE 500 MG: 500 INJECTION, POWDER, LYOPHILIZED, FOR SOLUTION INTRAVENOUS at 11:08

## 2019-10-13 RX ADMIN — GUAIFENESIN 600 MG: 600 TABLET, EXTENDED RELEASE ORAL at 08:57

## 2019-10-13 RX ADMIN — ACETAMINOPHEN 650 MG: 325 TABLET ORAL at 11:30

## 2019-10-13 RX ADMIN — HYDRALAZINE HYDROCHLORIDE 50 MG: 50 TABLET, FILM COATED ORAL at 14:11

## 2019-10-13 RX ADMIN — METHYLPREDNISOLONE SODIUM SUCCINATE 40 MG: 40 INJECTION, POWDER, LYOPHILIZED, FOR SOLUTION INTRAMUSCULAR; INTRAVENOUS at 03:04

## 2019-10-13 RX ADMIN — BUSPIRONE HYDROCHLORIDE 10 MG: 10 TABLET ORAL at 08:57

## 2019-10-13 RX ADMIN — Medication 10 ML: at 03:04

## 2019-10-13 RX ADMIN — POTASSIUM CHLORIDE 20 MEQ: 1500 TABLET, EXTENDED RELEASE ORAL at 08:57

## 2019-10-13 ASSESSMENT — PAIN SCALES - GENERAL
PAINLEVEL_OUTOF10: 4
PAINLEVEL_OUTOF10: 3
PAINLEVEL_OUTOF10: 4
PAINLEVEL_OUTOF10: 6

## 2019-10-15 LAB
EKG ATRIAL RATE: 80 BPM
EKG DIAGNOSIS: NORMAL
EKG P AXIS: 73 DEGREES
EKG P-R INTERVAL: 150 MS
EKG Q-T INTERVAL: 378 MS
EKG QRS DURATION: 82 MS
EKG QTC CALCULATION (BAZETT): 435 MS
EKG R AXIS: 110 DEGREES
EKG T AXIS: 84 DEGREES
EKG VENTRICULAR RATE: 80 BPM

## 2019-10-16 ENCOUNTER — TELEPHONE (OUTPATIENT)
Dept: CARDIOLOGY CLINIC | Age: 59
End: 2019-10-16

## 2019-10-23 ENCOUNTER — HOSPITAL ENCOUNTER (OUTPATIENT)
Dept: WOMENS IMAGING | Age: 59
Discharge: HOME | End: 2019-10-23
Payer: COMMERCIAL

## 2019-10-23 DIAGNOSIS — N95.1 SYMPTOMATIC MENOPAUSAL OR FEMALE CLIMACTERIC STATES: ICD-10-CM

## 2019-10-23 DIAGNOSIS — Z13.820 SCREENING FOR OSTEOPOROSIS: ICD-10-CM

## 2019-10-23 DIAGNOSIS — Z12.39 BREAST CANCER SCREENING: ICD-10-CM

## 2019-10-23 PROCEDURE — 77067 SCR MAMMO BI INCL CAD: CPT

## 2019-10-23 PROCEDURE — 77080 DXA BONE DENSITY AXIAL: CPT

## 2019-11-06 ENCOUNTER — HOSPITAL ENCOUNTER (OUTPATIENT)
Dept: CARDIAC REHAB | Age: 59
Setting detail: THERAPIES SERIES
Discharge: HOME | End: 2019-11-06
Payer: COMMERCIAL

## 2019-11-15 ENCOUNTER — HOSPITAL ENCOUNTER (OUTPATIENT)
Age: 59
Discharge: HOME | End: 2019-11-15
Payer: COMMERCIAL

## 2019-11-15 LAB — TSH HIGH SENSITIVITY: 2 UIU/ML (ref 0.27–4.2)

## 2019-11-15 PROCEDURE — 36415 COLL VENOUS BLD VENIPUNCTURE: CPT

## 2019-11-15 PROCEDURE — 84443 ASSAY THYROID STIM HORMONE: CPT

## 2019-12-10 ENCOUNTER — ANESTHESIA EVENT (OUTPATIENT)
Dept: ENDOSCOPY | Age: 59
End: 2019-12-10
Payer: COMMERCIAL

## 2019-12-11 RX ORDER — DULOXETIN HYDROCHLORIDE 60 MG/1
60 CAPSULE, DELAYED RELEASE ORAL DAILY
COMMUNITY

## 2019-12-11 ASSESSMENT — COPD QUESTIONNAIRES: CAT_SEVERITY: MODERATE

## 2019-12-12 ENCOUNTER — ANESTHESIA (OUTPATIENT)
Dept: ENDOSCOPY | Age: 59
End: 2019-12-12
Payer: COMMERCIAL

## 2019-12-12 ENCOUNTER — HOSPITAL ENCOUNTER (OUTPATIENT)
Age: 59
Setting detail: OUTPATIENT SURGERY
Discharge: HOME | End: 2019-12-12
Attending: INTERNAL MEDICINE | Admitting: INTERNAL MEDICINE
Payer: COMMERCIAL

## 2019-12-12 VITALS
DIASTOLIC BLOOD PRESSURE: 68 MMHG | WEIGHT: 188 LBS | SYSTOLIC BLOOD PRESSURE: 130 MMHG | HEIGHT: 62 IN | OXYGEN SATURATION: 94 % | BODY MASS INDEX: 34.6 KG/M2 | RESPIRATION RATE: 16 BRPM | TEMPERATURE: 98.4 F | HEART RATE: 80 BPM

## 2019-12-12 VITALS — SYSTOLIC BLOOD PRESSURE: 105 MMHG | DIASTOLIC BLOOD PRESSURE: 77 MMHG | OXYGEN SATURATION: 100 %

## 2019-12-12 PROCEDURE — 6360000002 HC RX W HCPCS: Performed by: NURSE ANESTHETIST, CERTIFIED REGISTERED

## 2019-12-12 PROCEDURE — 2500000003 HC RX 250 WO HCPCS: Performed by: NURSE ANESTHETIST, CERTIFIED REGISTERED

## 2019-12-12 PROCEDURE — 3700000000 HC ANESTHESIA ATTENDED CARE: Performed by: INTERNAL MEDICINE

## 2019-12-12 PROCEDURE — 3700000001 HC ADD 15 MINUTES (ANESTHESIA): Performed by: INTERNAL MEDICINE

## 2019-12-12 PROCEDURE — 7100000010 HC PHASE II RECOVERY - FIRST 15 MIN: Performed by: INTERNAL MEDICINE

## 2019-12-12 PROCEDURE — 2709999900 HC NON-CHARGEABLE SUPPLY: Performed by: INTERNAL MEDICINE

## 2019-12-12 PROCEDURE — 7100000011 HC PHASE II RECOVERY - ADDTL 15 MIN: Performed by: INTERNAL MEDICINE

## 2019-12-12 PROCEDURE — 2580000003 HC RX 258: Performed by: INTERNAL MEDICINE

## 2019-12-12 PROCEDURE — 2580000003 HC RX 258: Performed by: ANESTHESIOLOGY

## 2019-12-12 PROCEDURE — 3609027000 HC COLONOSCOPY: Performed by: INTERNAL MEDICINE

## 2019-12-12 RX ORDER — PROPOFOL 10 MG/ML
INJECTION, EMULSION INTRAVENOUS PRN
Status: DISCONTINUED | OUTPATIENT
Start: 2019-12-12 | End: 2019-12-12 | Stop reason: SDUPTHER

## 2019-12-12 RX ORDER — SODIUM CHLORIDE, SODIUM LACTATE, POTASSIUM CHLORIDE, CALCIUM CHLORIDE 600; 310; 30; 20 MG/100ML; MG/100ML; MG/100ML; MG/100ML
INJECTION, SOLUTION INTRAVENOUS CONTINUOUS
Status: DISCONTINUED | OUTPATIENT
Start: 2019-12-12 | End: 2019-12-12 | Stop reason: HOSPADM

## 2019-12-12 RX ORDER — LIDOCAINE HYDROCHLORIDE 20 MG/ML
INJECTION, SOLUTION EPIDURAL; INFILTRATION; INTRACAUDAL; PERINEURAL PRN
Status: DISCONTINUED | OUTPATIENT
Start: 2019-12-12 | End: 2019-12-12 | Stop reason: SDUPTHER

## 2019-12-12 RX ADMIN — PROPOFOL 20 MG: 10 INJECTION, EMULSION INTRAVENOUS at 10:00

## 2019-12-12 RX ADMIN — SODIUM CHLORIDE, POTASSIUM CHLORIDE, SODIUM LACTATE AND CALCIUM CHLORIDE: 600; 310; 30; 20 INJECTION, SOLUTION INTRAVENOUS at 09:50

## 2019-12-12 RX ADMIN — PROPOFOL 30 MG: 10 INJECTION, EMULSION INTRAVENOUS at 09:57

## 2019-12-12 RX ADMIN — PROPOFOL 50 MG: 10 INJECTION, EMULSION INTRAVENOUS at 09:58

## 2019-12-12 RX ADMIN — PROPOFOL 100 MG: 10 INJECTION, EMULSION INTRAVENOUS at 09:55

## 2019-12-12 RX ADMIN — SODIUM CHLORIDE, POTASSIUM CHLORIDE, SODIUM LACTATE AND CALCIUM CHLORIDE: 600; 310; 30; 20 INJECTION, SOLUTION INTRAVENOUS at 08:45

## 2019-12-12 RX ADMIN — LIDOCAINE HYDROCHLORIDE 200 MG: 20 INJECTION, SOLUTION EPIDURAL; INFILTRATION; INTRACAUDAL; PERINEURAL at 09:55

## 2019-12-12 ASSESSMENT — PAIN SCALES - GENERAL
PAINLEVEL_OUTOF10: 0

## 2019-12-12 ASSESSMENT — PAIN - FUNCTIONAL ASSESSMENT: PAIN_FUNCTIONAL_ASSESSMENT: 0-10

## 2019-12-20 ENCOUNTER — HOSPITAL ENCOUNTER (OUTPATIENT)
Age: 59
Setting detail: SPECIMEN
Discharge: HOME | End: 2019-12-20
Payer: COMMERCIAL

## 2019-12-20 LAB
ALBUMIN SERPL-MCNC: 4.5 GM/DL (ref 3.4–5)
ALP BLD-CCNC: 66 IU/L (ref 40–128)
ALT SERPL-CCNC: 12 U/L (ref 10–40)
ANION GAP SERPL CALCULATED.3IONS-SCNC: 10 MMOL/L (ref 4–16)
AST SERPL-CCNC: 17 IU/L (ref 15–37)
BILIRUB SERPL-MCNC: 0.2 MG/DL (ref 0–1)
BUN BLDV-MCNC: 13 MG/DL (ref 6–23)
CALCIUM SERPL-MCNC: 9.3 MG/DL (ref 8.3–10.6)
CHLORIDE BLD-SCNC: 91 MMOL/L (ref 99–110)
CO2: 30 MMOL/L (ref 21–32)
CREAT SERPL-MCNC: 1 MG/DL (ref 0.6–1.1)
FERRITIN: 110 NG/ML (ref 15–150)
FOLATE: 11.7 NG/ML (ref 3.1–17.5)
GFR AFRICAN AMERICAN: >60 ML/MIN/1.73M2
GFR NON-AFRICAN AMERICAN: 57 ML/MIN/1.73M2
GLUCOSE BLD-MCNC: 100 MG/DL (ref 70–99)
IRON: 57 UG/DL (ref 37–145)
LACTATE DEHYDROGENASE: 187 IU/L (ref 120–246)
PCT TRANSFERRIN: 19 % (ref 10–44)
POTASSIUM SERPL-SCNC: 4.5 MMOL/L (ref 3.5–5.1)
SODIUM BLD-SCNC: 131 MMOL/L (ref 135–145)
TOTAL IRON BINDING CAPACITY: 305 UG/DL (ref 250–450)
TOTAL PROTEIN: 6.3 GM/DL (ref 6.4–8.2)
UNSATURATED IRON BINDING CAPACITY: 248 UG/DL (ref 110–370)
VITAMIN B-12: 397.3 PG/ML (ref 211–911)

## 2019-12-20 PROCEDURE — 83540 ASSAY OF IRON: CPT

## 2019-12-20 PROCEDURE — 83550 IRON BINDING TEST: CPT

## 2019-12-20 PROCEDURE — 80053 COMPREHEN METABOLIC PANEL: CPT

## 2019-12-20 PROCEDURE — 82607 VITAMIN B-12: CPT

## 2019-12-20 PROCEDURE — 83615 LACTATE (LD) (LDH) ENZYME: CPT

## 2019-12-20 PROCEDURE — 82746 ASSAY OF FOLIC ACID SERUM: CPT

## 2019-12-20 PROCEDURE — 82728 ASSAY OF FERRITIN: CPT

## 2019-12-20 PROCEDURE — 83010 ASSAY OF HAPTOGLOBIN QUANT: CPT

## 2019-12-22 LAB
HAPTOGLOBIN: 133 MG/DL (ref 30–200)
HAPTOGLOBIN: NORMAL MG/DL (ref 30–200)

## 2020-04-08 ENCOUNTER — HOSPITAL ENCOUNTER (OUTPATIENT)
Age: 60
Discharge: HOME | End: 2020-04-08
Payer: COMMERCIAL

## 2020-04-08 LAB
ALBUMIN SERPL-MCNC: 4.4 GM/DL (ref 3.4–5)
ALP BLD-CCNC: 81 IU/L (ref 40–128)
ALT SERPL-CCNC: 13 U/L (ref 10–40)
ANION GAP SERPL CALCULATED.3IONS-SCNC: 12 MMOL/L (ref 4–16)
AST SERPL-CCNC: 18 IU/L (ref 15–37)
BILIRUB SERPL-MCNC: 0.2 MG/DL (ref 0–1)
BUN BLDV-MCNC: 10 MG/DL (ref 6–23)
CALCIUM SERPL-MCNC: 10.2 MG/DL (ref 8.3–10.6)
CHLORIDE BLD-SCNC: 96 MMOL/L (ref 99–110)
CHOLESTEROL, FASTING: 180 MG/DL
CO2: 32 MMOL/L (ref 21–32)
CREAT SERPL-MCNC: 1 MG/DL (ref 0.6–1.1)
GFR AFRICAN AMERICAN: >60 ML/MIN/1.73M2
GFR NON-AFRICAN AMERICAN: 57 ML/MIN/1.73M2
GLUCOSE BLD-MCNC: 91 MG/DL (ref 70–99)
HDLC SERPL-MCNC: 83 MG/DL
LDL CHOLESTEROL DIRECT: 86 MG/DL
POTASSIUM SERPL-SCNC: 4.1 MMOL/L (ref 3.5–5.1)
SODIUM BLD-SCNC: 140 MMOL/L (ref 135–145)
TOTAL PROTEIN: 6.5 GM/DL (ref 6.4–8.2)
TRIGLYCERIDE, FASTING: 102 MG/DL
TSH HIGH SENSITIVITY: 11.27 UIU/ML (ref 0.27–4.2)

## 2020-04-08 PROCEDURE — 80061 LIPID PANEL: CPT

## 2020-04-08 PROCEDURE — 84443 ASSAY THYROID STIM HORMONE: CPT

## 2020-04-08 PROCEDURE — 36415 COLL VENOUS BLD VENIPUNCTURE: CPT

## 2020-04-08 PROCEDURE — 80053 COMPREHEN METABOLIC PANEL: CPT

## 2020-04-14 PROBLEM — D59.9 ACQUIRED HEMOLYTIC ANEMIA, UNSPECIFIED (HCC): Status: ACTIVE | Noted: 2020-04-14

## 2020-04-14 PROBLEM — N18.1 CHRONIC KIDNEY DISEASE, STAGE I: Status: ACTIVE | Noted: 2020-04-14

## 2020-04-14 PROBLEM — D72.829 LEUCOCYTOSIS: Status: ACTIVE | Noted: 2020-04-14

## 2020-05-07 ENCOUNTER — HOSPITAL ENCOUNTER (OUTPATIENT)
Age: 60
Discharge: HOME | End: 2020-05-07
Payer: COMMERCIAL

## 2020-05-07 LAB
ALBUMIN SERPL-MCNC: 4.7 GM/DL (ref 3.4–5)
ALP BLD-CCNC: 90 IU/L (ref 40–129)
ALT SERPL-CCNC: 11 U/L (ref 10–40)
ANION GAP SERPL CALCULATED.3IONS-SCNC: 11 MMOL/L (ref 4–16)
AST SERPL-CCNC: 15 IU/L (ref 15–37)
BILIRUB SERPL-MCNC: 0.2 MG/DL (ref 0–1)
BUN BLDV-MCNC: 16 MG/DL (ref 6–23)
CALCIUM SERPL-MCNC: 9.8 MG/DL (ref 8.3–10.6)
CHLORIDE BLD-SCNC: 96 MMOL/L (ref 99–110)
CO2: 33 MMOL/L (ref 21–32)
CREAT SERPL-MCNC: 0.9 MG/DL (ref 0.6–1.1)
FERRITIN: 87 NG/ML (ref 15–150)
FOLATE: 12.2 NG/ML (ref 3.1–17.5)
GFR AFRICAN AMERICAN: >60 ML/MIN/1.73M2
GFR NON-AFRICAN AMERICAN: >60 ML/MIN/1.73M2
GLUCOSE BLD-MCNC: 100 MG/DL (ref 70–99)
IRON: 48 UG/DL (ref 37–145)
LACTATE DEHYDROGENASE: 195 IU/L (ref 120–246)
PCT TRANSFERRIN: 16 % (ref 10–44)
POTASSIUM SERPL-SCNC: 4.6 MMOL/L (ref 3.5–5.1)
SODIUM BLD-SCNC: 140 MMOL/L (ref 135–145)
TOTAL IRON BINDING CAPACITY: 300 UG/DL (ref 250–450)
TOTAL PROTEIN: 6.8 GM/DL (ref 6.4–8.2)
TSH HIGH SENSITIVITY: 4.04 UIU/ML (ref 0.27–4.2)
UNSATURATED IRON BINDING CAPACITY: 252 UG/DL (ref 110–370)
VITAMIN B-12: 518.1 PG/ML (ref 211–911)

## 2020-05-07 PROCEDURE — 85025 COMPLETE CBC W/AUTO DIFF WBC: CPT

## 2020-05-07 PROCEDURE — 82728 ASSAY OF FERRITIN: CPT

## 2020-05-07 PROCEDURE — 82607 VITAMIN B-12: CPT

## 2020-05-07 PROCEDURE — 82746 ASSAY OF FOLIC ACID SERUM: CPT

## 2020-05-07 PROCEDURE — 83615 LACTATE (LD) (LDH) ENZYME: CPT

## 2020-05-07 PROCEDURE — 83540 ASSAY OF IRON: CPT

## 2020-05-07 PROCEDURE — 36415 COLL VENOUS BLD VENIPUNCTURE: CPT

## 2020-05-07 PROCEDURE — 84443 ASSAY THYROID STIM HORMONE: CPT

## 2020-05-07 PROCEDURE — 80053 COMPREHEN METABOLIC PANEL: CPT

## 2020-05-07 PROCEDURE — 83550 IRON BINDING TEST: CPT

## 2020-05-08 LAB
BASOPHILIC STIPPLING: PRESENT
BASOPHILS ABSOLUTE: 0.1 K/CU MM
BASOPHILS RELATIVE PERCENT: 1 % (ref 0–1)
DIFFERENTIAL TYPE: ABNORMAL
EOSINOPHILS ABSOLUTE: 0.6 K/CU MM
EOSINOPHILS RELATIVE PERCENT: 5 % (ref 0–3)
HCT VFR BLD CALC: 36.9 % (ref 37–47)
HEMOGLOBIN: 11.1 GM/DL (ref 12.5–16)
LYMPHOCYTES ABSOLUTE: 1.6 K/CU MM
LYMPHOCYTES RELATIVE PERCENT: 13 % (ref 24–44)
MCH RBC QN AUTO: 29.6 PG (ref 27–31)
MCHC RBC AUTO-ENTMCNC: 30.1 % (ref 32–36)
MCV RBC AUTO: 98.4 FL (ref 78–100)
MONOCYTES ABSOLUTE: 0.6 K/CU MM
MONOCYTES RELATIVE PERCENT: 5 % (ref 0–4)
PDW BLD-RTO: 12.8 % (ref 11.7–14.9)
PLATELET # BLD: 238 K/CU MM (ref 140–440)
PMV BLD AUTO: 11.1 FL (ref 7.5–11.1)
POLYCHROMASIA: ABNORMAL
RBC # BLD: 3.75 M/CU MM (ref 4.2–5.4)
SEGMENTED NEUTROPHILS ABSOLUTE COUNT: 9.3 K/CU MM
SEGMENTED NEUTROPHILS RELATIVE PERCENT: 76 % (ref 36–66)
STOMATOCYTES: ABNORMAL
WBC # BLD: 12.2 K/CU MM (ref 4–10.5)

## 2020-07-11 ENCOUNTER — HOSPITAL ENCOUNTER (INPATIENT)
Age: 60
LOS: 3 days | Discharge: HOME | DRG: 190 | End: 2020-07-15
Attending: EMERGENCY MEDICINE | Admitting: INTERNAL MEDICINE
Payer: COMMERCIAL

## 2020-07-11 PROCEDURE — 99285 EMERGENCY DEPT VISIT HI MDM: CPT

## 2020-07-12 ENCOUNTER — APPOINTMENT (OUTPATIENT)
Dept: CT IMAGING | Age: 60
DRG: 190 | End: 2020-07-12
Payer: COMMERCIAL

## 2020-07-12 ENCOUNTER — APPOINTMENT (OUTPATIENT)
Dept: GENERAL RADIOLOGY | Age: 60
DRG: 190 | End: 2020-07-12
Payer: COMMERCIAL

## 2020-07-12 PROBLEM — E87.1 HYPONATREMIA: Status: ACTIVE | Noted: 2020-07-12

## 2020-07-12 LAB
ALBUMIN SERPL-MCNC: 4.3 GM/DL (ref 3.4–5)
ALCOHOL SCREEN SERUM: 0.09 %WT/VOL
ALP BLD-CCNC: 80 IU/L (ref 40–129)
ALT SERPL-CCNC: 27 U/L (ref 10–40)
ANION GAP SERPL CALCULATED.3IONS-SCNC: 8 MMOL/L (ref 4–16)
ANION GAP SERPL CALCULATED.3IONS-SCNC: 9 MMOL/L (ref 4–16)
AST SERPL-CCNC: 33 IU/L (ref 15–37)
BASOPHILS ABSOLUTE: 0.1 K/CU MM
BASOPHILS RELATIVE PERCENT: 0.7 % (ref 0–1)
BILIRUB SERPL-MCNC: 0.3 MG/DL (ref 0–1)
BUN BLDV-MCNC: 7 MG/DL (ref 6–23)
BUN BLDV-MCNC: 8 MG/DL (ref 6–23)
CALCIUM SERPL-MCNC: 8.7 MG/DL (ref 8.3–10.6)
CALCIUM SERPL-MCNC: 9.3 MG/DL (ref 8.3–10.6)
CHLORIDE BLD-SCNC: 76 MMOL/L (ref 99–110)
CHLORIDE BLD-SCNC: 81 MMOL/L (ref 99–110)
CO2: 34 MMOL/L (ref 21–32)
CO2: 35 MMOL/L (ref 21–32)
CORTISOL, PLASMA: 11.6
CREAT SERPL-MCNC: 0.6 MG/DL (ref 0.6–1.1)
CREAT SERPL-MCNC: 0.7 MG/DL (ref 0.6–1.1)
DIFFERENTIAL TYPE: ABNORMAL
EKG ATRIAL RATE: 92 BPM
EKG DIAGNOSIS: NORMAL
EKG P AXIS: 67 DEGREES
EKG P-R INTERVAL: 162 MS
EKG Q-T INTERVAL: 398 MS
EKG QRS DURATION: 92 MS
EKG QTC CALCULATION (BAZETT): 492 MS
EKG R AXIS: 102 DEGREES
EKG T AXIS: 70 DEGREES
EKG VENTRICULAR RATE: 92 BPM
EOSINOPHILS ABSOLUTE: 0.4 K/CU MM
EOSINOPHILS RELATIVE PERCENT: 2.6 % (ref 0–3)
GFR AFRICAN AMERICAN: >60 ML/MIN/1.73M2
GFR AFRICAN AMERICAN: >60 ML/MIN/1.73M2
GFR NON-AFRICAN AMERICAN: >60 ML/MIN/1.73M2
GFR NON-AFRICAN AMERICAN: >60 ML/MIN/1.73M2
GLUCOSE BLD-MCNC: 108 MG/DL (ref 70–99)
GLUCOSE BLD-MCNC: 109 MG/DL (ref 70–99)
HCT VFR BLD CALC: 34.2 % (ref 37–47)
HEMOGLOBIN: 10.8 GM/DL (ref 12.5–16)
IMMATURE NEUTROPHIL %: 0.4 % (ref 0–0.43)
LYMPHOCYTES ABSOLUTE: 2.4 K/CU MM
LYMPHOCYTES RELATIVE PERCENT: 15.7 % (ref 24–44)
MAGNESIUM: 1.7 MG/DL (ref 1.8–2.4)
MAGNESIUM: 1.9 MG/DL (ref 1.8–2.4)
MCH RBC QN AUTO: 28.6 PG (ref 27–31)
MCHC RBC AUTO-ENTMCNC: 31.6 % (ref 32–36)
MCV RBC AUTO: 90.5 FL (ref 78–100)
MONOCYTES ABSOLUTE: 1.4 K/CU MM
MONOCYTES RELATIVE PERCENT: 9.2 % (ref 0–4)
NUCLEATED RBC %: 0 %
OSMOLALITY URINE: 462 MOS/L (ref 292–1090)
PDW BLD-RTO: 12.2 % (ref 11.7–14.9)
PLATELET # BLD: 324 K/CU MM (ref 140–440)
PMV BLD AUTO: 9.3 FL (ref 7.5–11.1)
POTASSIUM SERPL-SCNC: 3 MMOL/L (ref 3.5–5.1)
POTASSIUM SERPL-SCNC: 4 MMOL/L (ref 3.5–5.1)
PRO-BNP: 331.9 PG/ML
RBC # BLD: 3.78 M/CU MM (ref 4.2–5.4)
SEGMENTED NEUTROPHILS ABSOLUTE COUNT: 11 K/CU MM
SEGMENTED NEUTROPHILS RELATIVE PERCENT: 71.4 % (ref 36–66)
SODIUM BLD-SCNC: 120 MMOL/L (ref 135–145)
SODIUM BLD-SCNC: 123 MMOL/L (ref 135–145)
TOTAL IMMATURE NEUTOROPHIL: 0.06 K/CU MM
TOTAL NUCLEATED RBC: 0 K/CU MM
TOTAL PROTEIN: 7.1 GM/DL (ref 6.4–8.2)
TROPONIN T: <0.01 NG/ML
TSH HIGH SENSITIVITY: 0.65 UIU/ML (ref 0.27–4.2)
WBC # BLD: 15.4 K/CU MM (ref 4–10.5)

## 2020-07-12 PROCEDURE — 2580000003 HC RX 258: Performed by: INTERNAL MEDICINE

## 2020-07-12 PROCEDURE — 6370000000 HC RX 637 (ALT 250 FOR IP): Performed by: INTERNAL MEDICINE

## 2020-07-12 PROCEDURE — G0480 DRUG TEST DEF 1-7 CLASSES: HCPCS

## 2020-07-12 PROCEDURE — 6360000002 HC RX W HCPCS: Performed by: INTERNAL MEDICINE

## 2020-07-12 PROCEDURE — 94640 AIRWAY INHALATION TREATMENT: CPT

## 2020-07-12 PROCEDURE — 6370000000 HC RX 637 (ALT 250 FOR IP): Performed by: HOSPITALIST

## 2020-07-12 PROCEDURE — 71045 X-RAY EXAM CHEST 1 VIEW: CPT

## 2020-07-12 PROCEDURE — 80053 COMPREHEN METABOLIC PANEL: CPT

## 2020-07-12 PROCEDURE — 83880 ASSAY OF NATRIURETIC PEPTIDE: CPT

## 2020-07-12 PROCEDURE — 96365 THER/PROPH/DIAG IV INF INIT: CPT

## 2020-07-12 PROCEDURE — 94761 N-INVAS EAR/PLS OXIMETRY MLT: CPT

## 2020-07-12 PROCEDURE — 84443 ASSAY THYROID STIM HORMONE: CPT

## 2020-07-12 PROCEDURE — 85025 COMPLETE CBC W/AUTO DIFF WBC: CPT

## 2020-07-12 PROCEDURE — 36415 COLL VENOUS BLD VENIPUNCTURE: CPT

## 2020-07-12 PROCEDURE — 6360000002 HC RX W HCPCS: Performed by: HOSPITALIST

## 2020-07-12 PROCEDURE — 82533 TOTAL CORTISOL: CPT

## 2020-07-12 PROCEDURE — C9113 INJ PANTOPRAZOLE SODIUM, VIA: HCPCS | Performed by: PHYSICIAN ASSISTANT

## 2020-07-12 PROCEDURE — 96375 TX/PRO/DX INJ NEW DRUG ADDON: CPT

## 2020-07-12 PROCEDURE — 83735 ASSAY OF MAGNESIUM: CPT

## 2020-07-12 PROCEDURE — 93005 ELECTROCARDIOGRAM TRACING: CPT | Performed by: PHYSICIAN ASSISTANT

## 2020-07-12 PROCEDURE — 2580000003 HC RX 258: Performed by: PHYSICIAN ASSISTANT

## 2020-07-12 PROCEDURE — 6360000002 HC RX W HCPCS: Performed by: PHYSICIAN ASSISTANT

## 2020-07-12 PROCEDURE — 2140000000 HC CCU INTERMEDIATE R&B

## 2020-07-12 PROCEDURE — 80048 BASIC METABOLIC PNL TOTAL CA: CPT

## 2020-07-12 PROCEDURE — 96366 THER/PROPH/DIAG IV INF ADDON: CPT

## 2020-07-12 PROCEDURE — 2500000003 HC RX 250 WO HCPCS: Performed by: INTERNAL MEDICINE

## 2020-07-12 PROCEDURE — 93010 ELECTROCARDIOGRAM REPORT: CPT | Performed by: INTERNAL MEDICINE

## 2020-07-12 PROCEDURE — 71250 CT THORAX DX C-: CPT

## 2020-07-12 PROCEDURE — 94660 CPAP INITIATION&MGMT: CPT

## 2020-07-12 PROCEDURE — 6370000000 HC RX 637 (ALT 250 FOR IP): Performed by: PHYSICIAN ASSISTANT

## 2020-07-12 PROCEDURE — 2700000000 HC OXYGEN THERAPY PER DAY

## 2020-07-12 PROCEDURE — 84484 ASSAY OF TROPONIN QUANT: CPT

## 2020-07-12 PROCEDURE — 83935 ASSAY OF URINE OSMOLALITY: CPT

## 2020-07-12 PROCEDURE — 87040 BLOOD CULTURE FOR BACTERIA: CPT

## 2020-07-12 RX ORDER — CELECOXIB 200 MG/1
200 CAPSULE ORAL 2 TIMES DAILY
Status: DISCONTINUED | OUTPATIENT
Start: 2020-07-12 | End: 2020-07-12

## 2020-07-12 RX ORDER — POTASSIUM CHLORIDE 750 MG/1
40 TABLET, FILM COATED, EXTENDED RELEASE ORAL ONCE
Status: COMPLETED | OUTPATIENT
Start: 2020-07-12 | End: 2020-07-12

## 2020-07-12 RX ORDER — AMLODIPINE BESYLATE 5 MG/1
10 TABLET ORAL DAILY
Status: DISCONTINUED | OUTPATIENT
Start: 2020-07-12 | End: 2020-07-14

## 2020-07-12 RX ORDER — FLUTICASONE PROPIONATE 50 MCG
2 SPRAY, SUSPENSION (ML) NASAL DAILY
Status: DISCONTINUED | OUTPATIENT
Start: 2020-07-12 | End: 2020-07-15 | Stop reason: HOSPADM

## 2020-07-12 RX ORDER — ACETAMINOPHEN 325 MG/1
650 TABLET ORAL EVERY 6 HOURS PRN
Status: DISCONTINUED | OUTPATIENT
Start: 2020-07-12 | End: 2020-07-15 | Stop reason: HOSPADM

## 2020-07-12 RX ORDER — ONDANSETRON 2 MG/ML
4 INJECTION INTRAMUSCULAR; INTRAVENOUS ONCE
Status: COMPLETED | OUTPATIENT
Start: 2020-07-12 | End: 2020-07-12

## 2020-07-12 RX ORDER — LEVOTHYROXINE SODIUM 0.1 MG/1
100 TABLET ORAL DAILY
Status: DISCONTINUED | OUTPATIENT
Start: 2020-07-12 | End: 2020-07-15 | Stop reason: HOSPADM

## 2020-07-12 RX ORDER — METOPROLOL SUCCINATE 25 MG/1
25 TABLET, EXTENDED RELEASE ORAL DAILY
Status: DISCONTINUED | OUTPATIENT
Start: 2020-07-12 | End: 2020-07-15 | Stop reason: HOSPADM

## 2020-07-12 RX ORDER — ACETAMINOPHEN 650 MG/1
650 SUPPOSITORY RECTAL EVERY 6 HOURS PRN
Status: DISCONTINUED | OUTPATIENT
Start: 2020-07-12 | End: 2020-07-15 | Stop reason: HOSPADM

## 2020-07-12 RX ORDER — PANTOPRAZOLE SODIUM 40 MG/10ML
40 INJECTION, POWDER, LYOPHILIZED, FOR SOLUTION INTRAVENOUS DAILY
Status: DISCONTINUED | OUTPATIENT
Start: 2020-07-13 | End: 2020-07-15 | Stop reason: HOSPADM

## 2020-07-12 RX ORDER — ONDANSETRON 2 MG/ML
4 INJECTION INTRAMUSCULAR; INTRAVENOUS EVERY 6 HOURS PRN
Status: DISCONTINUED | OUTPATIENT
Start: 2020-07-12 | End: 2020-07-15 | Stop reason: HOSPADM

## 2020-07-12 RX ORDER — BUDESONIDE AND FORMOTEROL FUMARATE DIHYDRATE 160; 4.5 UG/1; UG/1
2 AEROSOL RESPIRATORY (INHALATION) 2 TIMES DAILY
Status: DISCONTINUED | OUTPATIENT
Start: 2020-07-12 | End: 2020-07-15 | Stop reason: HOSPADM

## 2020-07-12 RX ORDER — METHYLPREDNISOLONE SODIUM SUCCINATE 40 MG/ML
40 INJECTION, POWDER, LYOPHILIZED, FOR SOLUTION INTRAMUSCULAR; INTRAVENOUS EVERY 12 HOURS
Status: DISCONTINUED | OUTPATIENT
Start: 2020-07-12 | End: 2020-07-13

## 2020-07-12 RX ORDER — CLONAZEPAM 1 MG/1
1 TABLET ORAL 3 TIMES DAILY
Status: DISCONTINUED | OUTPATIENT
Start: 2020-07-12 | End: 2020-07-15 | Stop reason: HOSPADM

## 2020-07-12 RX ORDER — PANTOPRAZOLE SODIUM 40 MG/10ML
40 INJECTION, POWDER, LYOPHILIZED, FOR SOLUTION INTRAVENOUS ONCE
Status: COMPLETED | OUTPATIENT
Start: 2020-07-12 | End: 2020-07-12

## 2020-07-12 RX ORDER — MONTELUKAST SODIUM 10 MG/1
10 TABLET ORAL DAILY
Status: DISCONTINUED | OUTPATIENT
Start: 2020-07-12 | End: 2020-07-15 | Stop reason: HOSPADM

## 2020-07-12 RX ORDER — CLONAZEPAM 0.5 MG/1
1 TABLET ORAL 3 TIMES DAILY PRN
Status: DISCONTINUED | OUTPATIENT
Start: 2020-07-12 | End: 2020-07-15 | Stop reason: HOSPADM

## 2020-07-12 RX ORDER — POLYETHYLENE GLYCOL 3350 17 G/17G
17 POWDER, FOR SOLUTION ORAL DAILY PRN
Status: DISCONTINUED | OUTPATIENT
Start: 2020-07-12 | End: 2020-07-15 | Stop reason: HOSPADM

## 2020-07-12 RX ORDER — ALBUTEROL SULFATE 90 UG/1
2 AEROSOL, METERED RESPIRATORY (INHALATION) EVERY 4 HOURS PRN
Status: DISCONTINUED | OUTPATIENT
Start: 2020-07-12 | End: 2020-07-12

## 2020-07-12 RX ORDER — MAGNESIUM SULFATE IN WATER 40 MG/ML
2 INJECTION, SOLUTION INTRAVENOUS ONCE
Status: COMPLETED | OUTPATIENT
Start: 2020-07-12 | End: 2020-07-12

## 2020-07-12 RX ORDER — TRAZODONE HYDROCHLORIDE 50 MG/1
75 TABLET ORAL NIGHTLY
Status: DISCONTINUED | OUTPATIENT
Start: 2020-07-12 | End: 2020-07-15 | Stop reason: HOSPADM

## 2020-07-12 RX ORDER — POTASSIUM CHLORIDE 20 MEQ/1
20 TABLET, EXTENDED RELEASE ORAL
Status: DISCONTINUED | OUTPATIENT
Start: 2020-07-12 | End: 2020-07-12

## 2020-07-12 RX ORDER — SODIUM CHLORIDE 0.9 % (FLUSH) 0.9 %
10 SYRINGE (ML) INJECTION EVERY 12 HOURS SCHEDULED
Status: DISCONTINUED | OUTPATIENT
Start: 2020-07-12 | End: 2020-07-15 | Stop reason: HOSPADM

## 2020-07-12 RX ORDER — THIAMINE MONONITRATE (VIT B1) 100 MG
100 TABLET ORAL DAILY
Status: DISCONTINUED | OUTPATIENT
Start: 2020-07-12 | End: 2020-07-15 | Stop reason: HOSPADM

## 2020-07-12 RX ORDER — HYDRALAZINE HYDROCHLORIDE 25 MG/1
50 TABLET, FILM COATED ORAL 3 TIMES DAILY
Status: DISCONTINUED | OUTPATIENT
Start: 2020-07-12 | End: 2020-07-14

## 2020-07-12 RX ORDER — FOLIC ACID 1 MG/1
1 TABLET ORAL DAILY
Status: DISCONTINUED | OUTPATIENT
Start: 2020-07-12 | End: 2020-07-15 | Stop reason: HOSPADM

## 2020-07-12 RX ORDER — BUSPIRONE HYDROCHLORIDE 10 MG/1
10 TABLET ORAL 2 TIMES DAILY
Status: DISCONTINUED | OUTPATIENT
Start: 2020-07-12 | End: 2020-07-15 | Stop reason: HOSPADM

## 2020-07-12 RX ORDER — ASPIRIN 81 MG/1
81 TABLET, CHEWABLE ORAL DAILY
Status: DISCONTINUED | OUTPATIENT
Start: 2020-07-12 | End: 2020-07-15 | Stop reason: HOSPADM

## 2020-07-12 RX ORDER — ALBUTEROL SULFATE 90 UG/1
1 AEROSOL, METERED RESPIRATORY (INHALATION) EVERY 4 HOURS PRN
Status: DISCONTINUED | OUTPATIENT
Start: 2020-07-12 | End: 2020-07-15 | Stop reason: HOSPADM

## 2020-07-12 RX ORDER — ALBUTEROL SULFATE 90 UG/1
2 AEROSOL, METERED RESPIRATORY (INHALATION) ONCE
Status: COMPLETED | OUTPATIENT
Start: 2020-07-12 | End: 2020-07-12

## 2020-07-12 RX ORDER — SODIUM CHLORIDE 9 MG/ML
INJECTION, SOLUTION INTRAVENOUS CONTINUOUS
Status: DISCONTINUED | OUTPATIENT
Start: 2020-07-12 | End: 2020-07-13

## 2020-07-12 RX ORDER — POTASSIUM CHLORIDE 7.45 MG/ML
10 INJECTION INTRAVENOUS PRN
Status: DISCONTINUED | OUTPATIENT
Start: 2020-07-12 | End: 2020-07-15 | Stop reason: HOSPADM

## 2020-07-12 RX ORDER — ACETAMINOPHEN 500 MG
1000 TABLET ORAL ONCE
Status: COMPLETED | OUTPATIENT
Start: 2020-07-12 | End: 2020-07-12

## 2020-07-12 RX ORDER — SODIUM CHLORIDE 9 MG/ML
INJECTION, SOLUTION INTRAVENOUS CONTINUOUS
Status: DISCONTINUED | OUTPATIENT
Start: 2020-07-12 | End: 2020-07-12

## 2020-07-12 RX ORDER — SODIUM CHLORIDE 0.9 % (FLUSH) 0.9 %
10 SYRINGE (ML) INJECTION PRN
Status: DISCONTINUED | OUTPATIENT
Start: 2020-07-12 | End: 2020-07-15 | Stop reason: HOSPADM

## 2020-07-12 RX ORDER — FERROUS GLUCONATE 324(37.5)
324 TABLET ORAL 2 TIMES DAILY
Status: DISCONTINUED | OUTPATIENT
Start: 2020-07-12 | End: 2020-07-15 | Stop reason: HOSPADM

## 2020-07-12 RX ORDER — IPRATROPIUM BROMIDE AND ALBUTEROL SULFATE 2.5; .5 MG/3ML; MG/3ML
1 SOLUTION RESPIRATORY (INHALATION) EVERY 4 HOURS
Status: DISCONTINUED | OUTPATIENT
Start: 2020-07-12 | End: 2020-07-14

## 2020-07-12 RX ORDER — PROMETHAZINE HYDROCHLORIDE 25 MG/1
12.5 TABLET ORAL EVERY 6 HOURS PRN
Status: DISCONTINUED | OUTPATIENT
Start: 2020-07-12 | End: 2020-07-15 | Stop reason: HOSPADM

## 2020-07-12 RX ORDER — DULOXETIN HYDROCHLORIDE 30 MG/1
60 CAPSULE, DELAYED RELEASE ORAL DAILY
Status: DISCONTINUED | OUTPATIENT
Start: 2020-07-12 | End: 2020-07-15 | Stop reason: HOSPADM

## 2020-07-12 RX ORDER — BACLOFEN 10 MG/1
10 TABLET ORAL 3 TIMES DAILY
Status: DISCONTINUED | OUTPATIENT
Start: 2020-07-12 | End: 2020-07-15 | Stop reason: HOSPADM

## 2020-07-12 RX ORDER — BUSPIRONE HYDROCHLORIDE 10 MG/1
10 TABLET ORAL 3 TIMES DAILY
Status: DISCONTINUED | OUTPATIENT
Start: 2020-07-12 | End: 2020-07-12

## 2020-07-12 RX ORDER — ATORVASTATIN CALCIUM 40 MG/1
40 TABLET, FILM COATED ORAL NIGHTLY
Status: DISCONTINUED | OUTPATIENT
Start: 2020-07-12 | End: 2020-07-15 | Stop reason: HOSPADM

## 2020-07-12 RX ADMIN — ENOXAPARIN SODIUM 40 MG: 40 INJECTION SUBCUTANEOUS at 09:46

## 2020-07-12 RX ADMIN — METHYLPREDNISOLONE SODIUM SUCCINATE 40 MG: 40 INJECTION, POWDER, FOR SOLUTION INTRAMUSCULAR; INTRAVENOUS at 10:21

## 2020-07-12 RX ADMIN — ACETAMINOPHEN 650 MG: 325 TABLET ORAL at 10:24

## 2020-07-12 RX ADMIN — ACETAMINOPHEN 1000 MG: 500 TABLET ORAL at 04:28

## 2020-07-12 RX ADMIN — CLONAZEPAM 1 MG: 0.5 TABLET ORAL at 10:21

## 2020-07-12 RX ADMIN — DULOXETINE HYDROCHLORIDE 60 MG: 30 CAPSULE, DELAYED RELEASE ORAL at 09:46

## 2020-07-12 RX ADMIN — BUDESONIDE AND FORMOTEROL FUMARATE DIHYDRATE 2 PUFF: 160; 4.5 AEROSOL RESPIRATORY (INHALATION) at 08:20

## 2020-07-12 RX ADMIN — ATORVASTATIN CALCIUM 40 MG: 40 TABLET, FILM COATED ORAL at 22:25

## 2020-07-12 RX ADMIN — POTASSIUM CHLORIDE 40 MEQ: 750 TABLET, FILM COATED, EXTENDED RELEASE ORAL at 02:28

## 2020-07-12 RX ADMIN — BACLOFEN 10 MG: 10 TABLET ORAL at 09:46

## 2020-07-12 RX ADMIN — IPRATROPIUM BROMIDE AND ALBUTEROL SULFATE 3 ML: .5; 3 SOLUTION RESPIRATORY (INHALATION) at 22:57

## 2020-07-12 RX ADMIN — SODIUM CHLORIDE, PRESERVATIVE FREE 10 ML: 5 INJECTION INTRAVENOUS at 09:47

## 2020-07-12 RX ADMIN — BUSPIRONE HYDROCHLORIDE 10 MG: 10 TABLET ORAL at 22:24

## 2020-07-12 RX ADMIN — FERROUS GLUCONATE TAB 324 MG (37.5 MG ELEMENTAL IRON) 324 MG: 324 (37.5 FE) TAB at 22:24

## 2020-07-12 RX ADMIN — Medication 100 MG: at 09:46

## 2020-07-12 RX ADMIN — ALBUTEROL SULFATE 2 PUFF: 90 AEROSOL, METERED RESPIRATORY (INHALATION) at 03:29

## 2020-07-12 RX ADMIN — CLONAZEPAM 1 MG: 1 TABLET ORAL at 22:24

## 2020-07-12 RX ADMIN — ASPIRIN 81 MG CHEWABLE TABLET 81 MG: 81 TABLET CHEWABLE at 09:46

## 2020-07-12 RX ADMIN — BUDESONIDE AND FORMOTEROL FUMARATE DIHYDRATE 2 PUFF: 160; 4.5 AEROSOL RESPIRATORY (INHALATION) at 19:38

## 2020-07-12 RX ADMIN — SODIUM CHLORIDE: 9 INJECTION, SOLUTION INTRAVENOUS at 17:19

## 2020-07-12 RX ADMIN — HYDRALAZINE HYDROCHLORIDE 50 MG: 25 TABLET, FILM COATED ORAL at 22:23

## 2020-07-12 RX ADMIN — AMLODIPINE BESYLATE 10 MG: 5 TABLET ORAL at 09:46

## 2020-07-12 RX ADMIN — BACLOFEN 10 MG: 10 TABLET ORAL at 13:35

## 2020-07-12 RX ADMIN — HYDRALAZINE HYDROCHLORIDE 50 MG: 25 TABLET, FILM COATED ORAL at 09:46

## 2020-07-12 RX ADMIN — CLONAZEPAM 1 MG: 1 TABLET ORAL at 13:35

## 2020-07-12 RX ADMIN — ONDANSETRON 4 MG: 2 INJECTION INTRAMUSCULAR; INTRAVENOUS at 03:41

## 2020-07-12 RX ADMIN — IPRATROPIUM BROMIDE AND ALBUTEROL SULFATE 3 ML: .5; 3 SOLUTION RESPIRATORY (INHALATION) at 08:20

## 2020-07-12 RX ADMIN — FOLIC ACID: 5 INJECTION, SOLUTION INTRAMUSCULAR; INTRAVENOUS; SUBCUTANEOUS at 13:37

## 2020-07-12 RX ADMIN — METOPROLOL SUCCINATE 25 MG: 25 TABLET, EXTENDED RELEASE ORAL at 09:46

## 2020-07-12 RX ADMIN — BACLOFEN 10 MG: 10 TABLET ORAL at 22:25

## 2020-07-12 RX ADMIN — POTASSIUM CHLORIDE 40 MEQ: 750 TABLET, FILM COATED, EXTENDED RELEASE ORAL at 10:24

## 2020-07-12 RX ADMIN — FOLIC ACID 1 MG: 1 TABLET ORAL at 09:46

## 2020-07-12 RX ADMIN — MAGNESIUM SULFATE 2 G: 2 INJECTION INTRAVENOUS at 02:30

## 2020-07-12 RX ADMIN — PANTOPRAZOLE SODIUM 40 MG: 40 INJECTION, POWDER, FOR SOLUTION INTRAVENOUS at 03:41

## 2020-07-12 RX ADMIN — MONTELUKAST 10 MG: 10 TABLET, FILM COATED ORAL at 22:31

## 2020-07-12 RX ADMIN — FLUTICASONE PROPIONATE 2 SPRAY: 50 SPRAY, METERED NASAL at 09:47

## 2020-07-12 RX ADMIN — IPRATROPIUM BROMIDE AND ALBUTEROL SULFATE 3 ML: .5; 3 SOLUTION RESPIRATORY (INHALATION) at 11:57

## 2020-07-12 RX ADMIN — THEOPHYLLINE ANHYDROUS 400 MG: 200 CAPSULE, EXTENDED RELEASE ORAL at 09:46

## 2020-07-12 RX ADMIN — IPRATROPIUM BROMIDE AND ALBUTEROL SULFATE 3 ML: .5; 3 SOLUTION RESPIRATORY (INHALATION) at 19:33

## 2020-07-12 RX ADMIN — SODIUM CHLORIDE: 900 INJECTION INTRAVENOUS at 02:29

## 2020-07-12 RX ADMIN — METHYLPREDNISOLONE SODIUM SUCCINATE 40 MG: 40 INJECTION, POWDER, FOR SOLUTION INTRAMUSCULAR; INTRAVENOUS at 22:23

## 2020-07-12 RX ADMIN — TRAZODONE HYDROCHLORIDE 75 MG: 50 TABLET ORAL at 22:25

## 2020-07-12 RX ADMIN — ACETAMINOPHEN 650 MG: 325 TABLET ORAL at 17:19

## 2020-07-12 RX ADMIN — FERROUS GLUCONATE TAB 324 MG (37.5 MG ELEMENTAL IRON) 324 MG: 324 (37.5 FE) TAB at 09:46

## 2020-07-12 RX ADMIN — BUSPIRONE HYDROCHLORIDE 10 MG: 10 TABLET ORAL at 09:46

## 2020-07-12 ASSESSMENT — PAIN SCALES - GENERAL
PAINLEVEL_OUTOF10: 7
PAINLEVEL_OUTOF10: 8
PAINLEVEL_OUTOF10: 7
PAINLEVEL_OUTOF10: 7
PAINLEVEL_OUTOF10: 8
PAINLEVEL_OUTOF10: 5

## 2020-07-12 ASSESSMENT — PAIN DESCRIPTION - ORIENTATION
ORIENTATION: LEFT
ORIENTATION: LEFT

## 2020-07-12 ASSESSMENT — PAIN DESCRIPTION - ONSET
ONSET: ON-GOING
ONSET: ON-GOING

## 2020-07-12 ASSESSMENT — PAIN DESCRIPTION - LOCATION
LOCATION: KNEE

## 2020-07-12 ASSESSMENT — PAIN - FUNCTIONAL ASSESSMENT: PAIN_FUNCTIONAL_ASSESSMENT: ACTIVITIES ARE NOT PREVENTED

## 2020-07-12 ASSESSMENT — PAIN DESCRIPTION - FREQUENCY
FREQUENCY: CONTINUOUS
FREQUENCY: CONTINUOUS

## 2020-07-12 ASSESSMENT — PAIN DESCRIPTION - DESCRIPTORS
DESCRIPTORS: ACHING
DESCRIPTORS: ACHING

## 2020-07-12 ASSESSMENT — PAIN DESCRIPTION - PAIN TYPE
TYPE: CHRONIC PAIN
TYPE: CHRONIC PAIN

## 2020-07-12 ASSESSMENT — PAIN DESCRIPTION - PROGRESSION: CLINICAL_PROGRESSION: NOT CHANGED

## 2020-07-12 NOTE — PROGRESS NOTES
petechiae or ecchymoses. MSK Spontaneous movement of all extremities. No gross joint deformities. SKIN Normal coloration, warm, dry. NEURO Cranial nerves appear grossly intact, normal speech, no lateralizing weakness. PSYCH Awake, alert, oriented x 4. Affect appropriate. INTAKE: In: -   Out: 575   OUTPUT: In: -   Out: 575 [Urine:575]    LABS  Recent Labs     07/12/20  0005   WBC 15.4*   HGB 10.8*   HCT 34.2*         Recent Labs     07/12/20  0005   *   K 3.0*   CL 76*   CO2 35*   BUN 7   CREATININE 0.7     Recent Labs     07/12/20  0005   AST 33   ALT 27   BILITOT 0.3   ALKPHOS 80     No results for input(s): INR in the last 72 hours.   Recent Labs     07/12/20  0005   TROPONINT <0.010          Abnormal labs for today noted      Imaging:     ECHO:    Microbiology:  Blood culture:    Urine culture:    Sputum culture:    Procedures done this admission:    MEDS  Scheduled Meds:   atorvastatin  40 mg Oral Nightly    amLODIPine  10 mg Oral Daily    fluticasone  2 spray Nasal Daily    aspirin  81 mg Oral Daily    baclofen  10 mg Oral TID    traZODone  75 mg Oral Nightly    montelukast  10 mg Oral Daily    metoprolol succinate  25 mg Oral Daily    hydrALAZINE  50 mg Oral TID    ferrous gluconate  324 mg Oral BID    potassium chloride  20 mEq Oral Daily with breakfast    levothyroxine  100 mcg Oral Daily    DULoxetine  60 mg Oral Daily    ipratropium-albuterol  1 vial Inhalation Q4H    budesonide-formoterol  2 puff Inhalation BID    theophylline  400 mg Oral Daily    sodium chloride flush  10 mL Intravenous 2 times per day    enoxaparin  40 mg Subcutaneous Daily    thiamine  100 mg Oral Daily    folic acid  1 mg Oral Daily    [START ON 7/13/2020] pantoprazole  40 mg Intravenous Daily    busPIRone  10 mg Oral BID    potassium chloride  40 mEq Oral Once    clonazePAM  1 mg Oral TID     Continuous Infusions:   sodium chloride 75 mL/hr at 07/12/20 0657     PRN Meds:sodium chloride flush, acetaminophen **OR** acetaminophen, polyethylene glycol, promethazine **OR** ondansetron, potassium chloride, clonazePAM, albuterol sulfate HFA **AND** ipratropium **AND** MDI Treatment        ASSESSMENT and PLAN  Hospital Day: 2    1-Probable COPD exacerbation with shortness of breath- at baseline home oxygen of 3 liters- treat with steroids, nebs, given hyponatremia- get CT chest to see if there is any mass  2-Hyponatremia-  sodium of 120 on admission- unclear etiology, getting IVF challenge and serial labs  3-SIRS with tachypnea and leukocytosis- rule out sepsis- blood culture and lactic acid  4-Alcoholism with alcohol dependence- CIWA protocol    Other chronic issues  -chronic hypoxic respiratory failure due to COPD on home oxygen  -GERD  -DALTON- bipap nightly  -CAD  -anxiety  -HTN                  Disp:     Diet DIET CARDIAC;   DVT Prophylaxis [] Lovenox, []  Heparin, [] SCDs, [] Ambulation   GI Prophylaxis [] PPI,  [] H2 Blocker,  [] Carafate,  [] Diet/Tube Feeds   Code Status Full Code   Disposition Patient requires continued admission due to hyponatremia, COPD exacerbation   CMS Level of Risk [] Low, [x] Moderate,[]  High  Patient's risk as above due to hyponatremia, COPD exacerbation     JOSE RAMON RUIZ MD 7/12/2020 10:11 AM

## 2020-07-12 NOTE — ED NOTES
In and out cath done per v.o. admitting dr harden 175 ml clear urine out      Giana Serna, ELIZABETH  07/12/20 [id number]

## 2020-07-12 NOTE — ED PROVIDER NOTES
eMERGENCY dEPARTMENT eNCOUnter        [address] [facility]    Chief Complaint   Patient presents with    Shortness of Breath     increased sob x3 days with bilateral edema to both lower legs , hx emphysemia       HPI    Aishwarya Locke is a 64 y.o. female who presents with concern for being fluid overloaded. Patient is a poor historian, has to be redirected to current history on multiple occasions. She believes that she is more edematous than normal and feels more SOB that normal.  She admits this is a chronic issue. States her PCP only has her on 20 mg Lasix qday. States she has been compliant with this. She denies chest pain or cough. Denies fever or chills. Denies n/v/d. She also has a history of COPD. She states she was sent to [facility] for a lung transplant but they didn't do it and she isn't sure why. She admits to drinking 2 [employer] today. REVIEW OF SYSTEMS    Constitutional:  Denies fever. HENT:  Denies headache, nasal congestion, sore throat. Neck:  Denies neck pain. Respiratory:  + shortness of breath. Cardiovascular:  Denies chest pain. GI:  Denies abdominal pain, nausea, vomiting, diarrhea. :  Denies urinary symptoms. Musculoskeletal:  + extremity swelling. Integument:  Denies skin changes. Neurologic:  Denies any LOC. All other systems reviewed and are negative.     PAST MEDICAL & SURGICAL HISTORY    Past Medical History:   Diagnosis Date    Anxiety 2/16/2017    Arthritis     Back pain at L4-L5 level 2/16/2017    Dr David Hernandez Bipolar 1 disorder ([address]     COPD (chronic obstructive pulmonary disease) ([facility] [address]     Emphysema of lung ([facility] [address]     FH: CAD (coronary artery disease) 2/16/2017    Father had in his forties, sister in her thirties    Marissa Hernandez Fibromyalgia 2/16/2017    H/O Doppler ultrasound 04/05/2019    venous- no DVT or reflux    H/O echocardiogram 01/14/2015    EF50-55% Normal- see media    History of blood transfusion     Hyperlipidemia LDL goal <100     Take 75 mg by mouth nightly       clonazePAM (KLONOPIN) 1 MG tablet Take 1 mg by mouth 3 times daily as needed.       montelukast (SINGULAIR) 10 MG tablet Take 10 mg by mouth daily      baclofen (LIORESAL) 10 MG tablet Take 1 tablet by mouth 3 times daily 30 tablet 0    aspirin 81 MG chewable tablet Take 81 mg by mouth daily      fluticasone (FLONASE) 50 MCG/ACT nasal spray 2 sprays by Nasal route daily 1 Bottle 0    amLODIPine (NORVASC) 10 MG tablet Take 1 tablet by mouth daily 90 tablet 3    atorvastatin (LIPITOR) 40 MG tablet Take 40 mg by mouth nightly          ALLERGIES    Allergies   Allergen Reactions    Lisinopril Swelling and Rash     angioedema       SOCIAL & FAMILY HISTORY    Social History     Socioeconomic History    Marital status:      Spouse name: Not on file    Number of children: Not on file    Years of education: Not on file    Highest education level: Not on file   Occupational History    Not on file   Social Needs    Financial resource strain: Not on file    Food insecurity     Worry: Not on file     Inability: Not on file    Transportation needs     Medical: Not on file     Non-medical: Not on file   Tobacco Use    Smoking status: Former Smoker     Packs/day: 1.50     Years: 20.00     Pack years: 30.00     Last attempt to quit: 2008     Years since quittin.5    Smokeless tobacco: Never Used   Substance and Sexual Activity    Alcohol use: Not Currently     Alcohol/week: 2.0 standard drinks     Types: 2 Shots of liquor per week    Drug use: No    Sexual activity: Yes     Partners: Male   Lifestyle    Physical activity     Days per week: Not on file     Minutes per session: Not on file    Stress: Not on file   Relationships    Social connections     Talks on phone: Not on file     Gets together: Not on file     Attends [employer] service: Not on file     Active member of club or organization: Not on file     Attends meetings of clubs or organizations: Not on file     Relationship status: Not on file    Intimate partner violence     Fear of current or ex partner: Not on file     Emotionally abused: Not on file     Physically abused: Not on file     Forced sexual activity: Not on file   Other Topics Concern    Not on file   Social History Narrative    Not on file     Family History   Problem Relation Age of Onset    No Known Problems Mother     No Known Problems Father        PHYSICAL EXAM    VITAL SIGNS: /84   Pulse 98   Temp 98.3 °F (36.8 °C) (Oral)   Resp 22   Ht 5' 2\" (1.575 m)   Wt 163 lb (73.9 kg)   SpO2 100%   BMI 29.81 kg/m²    Constitutional:  Well developed, well nourished, no acute distress   HENT:  NC/AT. Ears, nose, mouth normal.  Neck:  Supple. Respiratory:  Lungs CTAB, respirations unlabored. On her normal 3L nasal canula oxygen. Cardiovascular:  RRR. Ronold Kanner GI:  Soft, non-tender, bowel sounds active. Musculoskeletal:  Trace non-pitting edema, no tenderness. DP intact and symmetric. Integument:  Warm and dry. Neurologic:  Alert & oriented. EKG    Please see Dr. Norma Haq note for interpretation. RADIOLOGY/PROCEDURES    Xr Chest Portable    Result Date: 7/12/2020  EXAMINATION: ONE XRAY VIEW OF THE CHEST 7/12/2020 12:52 am COMPARISON: 10/09/2019 HISTORY: ORDERING SYSTEM PROVIDED HISTORY: sob TECHNOLOGIST PROVIDED HISTORY: Reason for exam:->sob Reason for Exam: SOB Acuity: Unknown Type of Exam: Initial FINDINGS: Cardiac silhouette appears within normal limits for size. Pulmonary vascularity appear stable. Minimal bibasilar airspace disease likely representing atelectasis. Haziness overlying the lower lung fields bilaterally without definite pleural effusion. No acute osseous abnormality. Degenerative changes are seen in the shoulders. Minimal haziness seen overlying the lung bases bilaterally, which could represent atelectasis and/or pleural fluid. Assessment for fluid would be best assessed with a lateral radiograph.  No focal consolidation or pneumothorax. No overt pulmonary edema. Xr Chest 1 Vw    Result Date: 7/12/2020  EXAMINATION: ONE XRAY VIEW OF THE CHEST 7/12/2020 2:29 am COMPARISON: 07/12/2020, 10/09/2019 HISTORY: ORDERING SYSTEM PROVIDED HISTORY: lateral view for further eval TECHNOLOGIST PROVIDED HISTORY: Reason for exam:->lateral view for further eval Reason for Exam: lateral view for further eval Acuity: Unknown Type of Exam: Subsequent/Follow-up FINDINGS: A lateral view of the chest was obtained, which reveals diffuse osteopenia without acute osseous fracture. Mild degenerative changes are seen in the spine. No posterior layering pleural effusion is identified. Some fissural fluid seen on the previous examination is no longer present. No focal wedge-shaped opacity is seen to suggest partial lobar collapse. No focal infiltrate is seen. Haziness over the lung bases bilaterally following evaluation of the lateral view are felt related to overlying soft tissue, with no evidence of pneumothorax or focal infiltrate seen.      Labs Reviewed   CBC WITH AUTO DIFFERENTIAL - Abnormal; Notable for the following components:       Result Value    WBC 15.4 (*)     RBC 3.78 (*)     Hemoglobin 10.8 (*)     Hematocrit 34.2 (*)     MCHC 31.6 (*)     Segs Relative 71.4 (*)     Lymphocytes % 15.7 (*)     Monocytes % 9.2 (*)     All other components within normal limits   COMPREHENSIVE METABOLIC PANEL W/ REFLEX TO MG FOR LOW K - Abnormal; Notable for the following components:    Sodium 120 (*)     Potassium 3.0 (*)     Chloride 76 (*)     CO2 35 (*)     Glucose 108 (*)     All other components within normal limits   BRAIN NATRIURETIC PEPTIDE - Abnormal; Notable for the following components:    Pro-.9 (*)     All other components within normal limits   MAGNESIUM - Abnormal; Notable for the following components:    Magnesium 1.7 (*)     All other components within normal limits   ETHANOL - Abnormal; Notable for the following components:    Alcohol Scrn 0.09 (*)     All other components within normal limits   TROPONIN   URINE ELECTROLYTES   OSMOLALITY, SERUM   OSMOLALITY, URINE     ED COURSE & MEDICAL DECISION MAKING    Pertinent Labs & Imaging studies reviewed and interpreted. (See chart for details)  -  Patient seen and evaluated in the emergency department. -  Triage and nursing notes reviewed and incorporated. -  Old chart records reviewed and incorporated. -  Supervising physician was Dr. Neli Herzog. Patient was seen independently. -  Differential diagnosis includes: ACS, COPD exacerbation, CHF, MI, PE, pneumonia, pneumothorax, and others  -  Work-up included:  See above  -  ED treatment included:  NS, Tylenol, Kdur, Protonix, Zofran, Magnesium  -  Results were discussed with patient. Na is 120, K 3.0, Mg 1.7. She has a leukocytosis of 15,400 but no infectious symptoms. CXR without acute process. ETOH is 0.09. I do note on chart review that she was refused lung transplant at [facility] due to history of ETOH abuse. She is very vague when telling me how much she drinks per day. I spoke with Dr. Africa Morales, hospitalist, who will admit for further management. In light of current events, I did utilize appropriate PPE (including surgical face mask, safety glasses, and gloves, as recommended by the health facility/national standard best practice, during my bedside interactions with the patient. FINAL IMPRESSION    1. Hyponatremia    2. Leukocytosis, unspecified type    3. Shortness of breath    4.  Hypokalemia              Madalyn Segura PA-C  07/12/20 [id number]

## 2020-07-12 NOTE — ED NOTES
Bed: 02TR-02  Expected date:   Expected time:   Means of arrival:   Comments:  EVS mop     Sweetie Mcmullen  07/11/20 [id number]

## 2020-07-12 NOTE — ED NOTES
Pt c/o headache , lights out wants some tylenol , skin w/d pink a/ox4 no distress     Nick Redd, ELIZABETH  07/12/20 [id number]

## 2020-07-12 NOTE — ED NOTES
Called respiratory regarding this patient's albuterol treatment. They said they would be down in a bit.      Mario Alberto Rendon RN  07/12/20 [id number]

## 2020-07-12 NOTE — PROGRESS NOTES
Reviewed chart , seen by me in past and aware na low and adjsuted meds and agree with ns ivf, gave extra K, and fu repeat bmp at 11am and adjsut as want correct na slowly. Full note to follow.

## 2020-07-12 NOTE — PLAN OF CARE
Problem: Pain:  Goal: Pain level will decrease  Description: Pain level will decrease  Outcome: Ongoing  Goal: Control of acute pain  Description: Control of acute pain  Outcome: Ongoing  Goal: Control of chronic pain  Description: Control of chronic pain  Outcome: Ongoing     Problem: Skin Integrity:  Goal: Will show no infection signs and symptoms  Description: Will show no infection signs and symptoms  Outcome: Ongoing  Goal: Absence of new skin breakdown  Description: Absence of new skin breakdown  Outcome: Ongoing     Problem: Falls - Risk of:  Goal: Will remain free from falls  Description: Will remain free from falls  Outcome: Ongoing  Goal: Absence of physical injury  Description: Absence of physical injury  Outcome: Ongoing

## 2020-07-12 NOTE — H&P
History and Physical      Name:  Yash Woodruff /Age/Sex: 1960  (64 y.o. female)   MRN & CSN:  [id number] & [id number] Admission Date/Time: 2020 11:50 PM   [facility] PCP: Esau Ann, [address] Sanchez Day: 2    History of Present Illness:   Patient is a poor historian due to Alcoholism. Chief Complaint: leg swelling  Yash Woodruff is a 64 y.o.  female  who presents with complaints of leg swelling on going for 2 months but worsening for the past 2 days. He takes 20 mg of water pill. She has COPD, uses 3 L oxygen at home seems like her breathing is getting worse. She says felt nauseated earlier. Denies having any fever, chills, abdominal pain, urinary symptoms. She had 2 large lemonade cocktail today. Drinks 3-4 days per week. Denies having any alcohol withdrawals in the past.      Ten point ROS reviewed negative, unless as noted above    Objective:   No intake or output data in the 24 hours ending 20 0432   Vitals:   Vitals:    20 0343   BP: 120/60   Pulse: 86   Resp:    Temp:    SpO2:      Physical Exam:   General Appearance: alert and oriented to person, place and time, in no acute distress  Cardiovascular: normal rate, regular rhythm, normal S1 and S2, 3+ pitting edema of the legs  Pulmonary/Chest: Decreased breath sounds with wheezing.   Abdomen: soft, non-tender, non-distended, normal bowel sounds, no masses   Extremities: no cyanosis, clubbing or edema, pulse   Skin: warm and dry, no rash or erythema  Head: normocephalic and atraumatic  Eyes: pupils equal, round, and reactive to light  Neck: supple and non-tender without mass, no thyromegaly   Musculoskeletal: normal range of motion, no joint swelling, deformity or tenderness  Neurological: alert, oriented, normal speech, no focal findings or movement disorder noted    Past Medical History:      Past Medical History:   Diagnosis Date    Anxiety 2017    Arthritis     Back pain at L4-L5 level 2017    Dr Carlos Quesada   [employer] Bipolar 1 disorder ([facility] [facility]     COPD [facility]chronic obstructive pulmonary disease) ([facility]     Emphysema of lung ([facility]     FH: CAD (coronary artery disease) 2017    Father had in his forties, sister in her thirties    [employer] Fibromyalgia 2017    H/O Doppler ultrasound 2019    venous- no DVT or reflux    H/O echocardiogram 2015    EF50-55% Normal- see media    History of blood transfusion     Hyperlipidemia LDL goal <100     Hypertension     Obstructive sleep apnea     Osteoarthritis     Thyroid disease      PSHX:  has a past surgical history that includes Carpal tunnel release; Tubal ligation; back surgery; Cardiac catheterization; and Colonoscopy (N/A, 2019). Allergies: Allergies   Allergen Reactions    Lisinopril Swelling and Rash     angioedema       FAM HX: family history includes No Known Problems in her father and mother.   Soc HX:   Social History     Socioeconomic History    Marital status:      Spouse name: Not on file    Number of children: Not on file    Years of education: Not on file    Highest education level: Not on file   Occupational History    Not on file   Social Needs    Financial resource strain: Not on file    Food insecurity     Worry: Not on file     Inability: Not on file    Transportation needs     Medical: Not on file     Non-medical: Not on file   Tobacco Use    Smoking status: Former Smoker     Packs/day: 1.50     Years: 20.00     Pack years: 30.00     Last attempt to quit: 2008     Years since quittin.5    Smokeless tobacco: Never Used   Substance and Sexual Activity    Alcohol use: Not Currently     Alcohol/week: 2.0 standard drinks     Types: 2 Shots of liquor per week    Drug use: No    Sexual activity: Yes     Partners: Male   Lifestyle    Physical activity     Days per week: Not on file     Minutes per session: Not on file    Stress: Not on file   Relationships    Social connections     Talks on phone: Not on file     Gets together: Not on file     Attends Spiritism service: Not on file     Active member of club or organization: Not on file     Attends meetings of clubs or organizations: Not on file     Relationship status: Not on file    Intimate partner violence     Fear of current or ex partner: Not on file     Emotionally abused: Not on file     Physically abused: Not on file     Forced sexual activity: Not on file   Other Topics Concern    Not on file   Social History Narrative    Not on file       Medications:   Medications:    Infusions:    sodium chloride 75 mL/hr at 07/12/20 0229     PRN Meds:      Labs:     CBC:   Lab Results   Component Value Date    WBC 15.4 07/12/2020    RBC 3.78 07/12/2020    HGB 10.8 07/12/2020    HCT 34.2 07/12/2020     07/12/2020    MCV 90.5 07/12/2020     BMP:    Lab Results   Component Value Date     07/12/2020    K 3.0 07/12/2020    CL 76 07/12/2020    CO2 35 07/12/2020    BUN 7 07/12/2020    CREATININE 0.7 07/12/2020    GLUCOSE 108 07/12/2020    CALCIUM 9.3 07/12/2020     Hepatic Function Panel:    Lab Results   Component Value Date    ALKPHOS 80 07/12/2020    AST 33 07/12/2020    ALT 27 07/12/2020    PROT 7.1 07/12/2020    PROT 6.9 01/23/2013    LABALBU 4.3 07/12/2020    BILITOT 0.3 07/12/2020     Magnesium:    Lab Results   Component Value Date    MG 1.7 07/12/2020     Cardiac Enzymes:   Lab Results   Component Value Date    CKTOTAL 74 02/28/2019    CKTOTAL 150 (H) 12/11/2016    TROPONINI 0.007 03/25/2014     LDH:    Lab Results   Component Value Date     05/07/2020     PT/INR:    Lab Results   Component Value Date    PROTIME 10.7 06/11/2019    INR 0.94 06/11/2019     U/A:   Lab Results   Component Value Date    LEUKOCYTESUR NEGATIVE 04/11/2019    WBCUA 1 04/11/2019    RBCUA NONE SEEN 04/11/2019    BACTERIA NEGATIVE 04/11/2019    SPECGRAV 1.008 04/11/2019    BLOODU NEGATIVE 04/11/2019     ABG:    Lab Results   Component Value Date    GVS9ENB 62.0 10/11/2019    PO2ART 37 10/11/2019    DOK2NSP 36.7 10/11/2019     TSH:  No results found for: TSH  Cardiac Enzymes:   Lab Results   Component Value Date    CKTOTAL 74 02/28/2019    CKTOTAL 150 (H) 12/11/2016    TROPONINI 0.007 03/25/2014       Assessment and Plan:   Socorro Gilliam is a 64 y.o.  female  who presents with leg swelling    Hyponatremia, Severe  -Likely from the alcoholism  -plasma osmolality  -urine osmolality  -NPO for now  -Neuro checks  -IVF with NS at 75 cc/hr  BMP Q6  -Monitor Na levels frequently (q4 for now) with target increase of 0.5 mEq/L/hr  Nephrology consulted    Hypokalemia  Replacements ordered  Monitor BMP closely    Alcohol abuse  Alcohol intoxication  Advise to quit  Watch for withdrawal symptoms, if any add CIWA  Thiamine and Folate added    GERD  Had EGD done on 1/16/2018 with chronic gastritis  Protonix    COPD. severe  On 3L oxygen   [facility]    Sleep apnea  CPAP    Anxiety  Klonopin, Trazodone and Cymbalta    Hypertension  Continue Metoprolol, Norvasc    Leukocytosis  Likely reactive, watch off abx     Non Occlusive CAD  Recent LHC done on 10/14/2019 with non occlusive CAD  Continue Aspirin, BB and Statin    Diet No diet orders on file   DVT Prophylaxis [x] Lovenox, []  Heparin, [] SCDs, [] Ambulation   GI Prophylaxis [x] PPI,  [] H2 Blocker,  [] Carafate,  [] Diet/Tube Feeds   Code Status Prior   Disposition Patient requires continued admission due to Hyponatremia   MDM [] Low, [x] Moderate,[]  High       Electronically signed by Stanley Huff MD on 7/12/2020 at 4:32 AM

## 2020-07-12 NOTE — CONSULTS
Nephrology Service Consultation    Patient:  Ethel Sims  MRN: [id number]  Consulting physician:  Kirstin Amaro MD  Reason for Consult: hyponatremia    History Obtained From:  patient, electronic medical record  PCP: Nidia Espinosa MD    HISTORY OF PRESENT ILLNESS:   The patient is a 64 y.o. female who presents with leg swelling and weakness on diuretic therapy. Pt with known copd and per chart as not admit to me excess etoh use as well. Now present with weakness and low na in settnig etoh use. Concern for withdrawal and was to fu osu with copd as gotten worse.,  And with cad and eric. I saw pt few year ago with same issue and also with bipolar and has sister with her last time admitted.  I belive also has spouse    Past Medical History:        Diagnosis Date    Anxiety 2/16/2017    Arthritis     Back pain at L4-L5 level 2/16/2017    Dr Hakan Haque Bipolar 1 disorder ([address]     COPD (chronic obstructive pulmonary disease) ([address]     Emphysema of lung ([address]     FH: CAD (coronary artery disease) 2/16/2017    Father had in his forties, sister in her thirties    Daryn Haque Fibromyalgia 2/16/2017    H/O Doppler ultrasound 04/05/2019    venous- no DVT or reflux    H/O echocardiogram 01/14/2015    EF50-55% Normal- see media    History of blood transfusion     Hyperlipidemia LDL goal <100     Hypertension     Obstructive sleep apnea     Osteoarthritis     Thyroid disease        Past Surgical History:        Procedure Laterality Date    BACK SURGERY      CARDIAC CATHETERIZATION      10/2019    CARPAL TUNNEL RELEASE      COLONOSCOPY N/A 12/12/2019    COLONOSCOPY DIAGNOSTIC performed by Heather Garcia MD at [address] [place] [address]         Medications:   Scheduled Meds:   atorvastatin  40 mg Oral Nightly    amLODIPine  10 mg Oral Daily    fluticasone  2 spray Nasal Daily    aspirin  81 mg Oral Daily    baclofen  10 mg Oral TID    traZODone  75 mg Oral Nightly    montelukast  10 mg Oral Daily    metoprolol succinate  25 mg Oral Daily    hydrALAZINE  50 mg Oral TID    ferrous gluconate  324 mg Oral BID    potassium chloride  20 mEq Oral Daily with breakfast    levothyroxine  100 mcg Oral Daily    DULoxetine  60 mg Oral Daily    ipratropium-albuterol  1 vial Inhalation Q4H    budesonide-formoterol  2 puff Inhalation BID    theophylline  400 mg Oral Daily    sodium chloride flush  10 mL Intravenous 2 times per day    enoxaparin  40 mg Subcutaneous Daily    thiamine  100 mg Oral Daily    folic acid  1 mg Oral Daily    [START ON 7/13/2020] pantoprazole  40 mg Intravenous Daily    busPIRone  10 mg Oral BID    clonazePAM  1 mg Oral TID    methylPREDNISolone  40 mg Intravenous Q12H     Continuous Infusions:   sodium chloride 75 mL/hr at 07/12/20 0657     PRN Meds:.sodium chloride flush, acetaminophen **OR** acetaminophen, polyethylene glycol, promethazine **OR** ondansetron, potassium chloride, clonazePAM, albuterol sulfate HFA **AND** ipratropium **AND** MDI Treatment    Allergies:  Lisinopril    Social History:   TOBACCO:   reports that she quit smoking about 12 years ago. She has a 30.00 pack-year smoking history. She has never used smokeless tobacco.  ETOH:   reports previous alcohol use of about 2.0 standard drinks of alcohol per week. OCCUPATION:      Family History:       Problem Relation Age of Onset    No Known Problems Mother     No Known Problems Father        REVIEW OF SYSTEMS:  Negative except for weak anxious confused . Physical Exam:    Vitals: BP (!) 154/85   Pulse 93   Temp 98.2 °F (36.8 °C) (Oral)   Resp 19   Ht 5' 2\" (1.575 m)   Wt 163 lb (73.9 kg)   SpO2 96%   BMI 29.81 kg/m²   General appearance: awake weak  HEENT: Head: Normal, normocephalic, atraumatic.   Neck: supple, symmetrical, trachea midline  Lungs: diminished breath sounds bilaterally  Heart: S1, S2 normal  Abdomen: abnormal findings:  hypoactive bowel sounds  Extremities: edema trace  Neurologic: D72.829    Chronic kidney disease, stage I N18.1    Hyponatremia E87.1     Imp/plan  1 hypovolemic hyponatremia  2 possible etoh abuse  3 bipolar with confusion  4 hypokalemia  5 htn-U  6 low mg    Plan  1 na low from 3 factor- dehydration, etoh abuse and combo of meds with SSRI. Adjusted meds, no current alcohol and give ivf,.  Slowly correct na 0.5/hr  2 watch DT and on ciwa replete eldctrolyte as need or banana bag  3 monitor affect and for now maintain current meds  4 K improved as replete  5 bp monitor as also from agitation for now  6 replete mg  Will follow      Electronically signed by Wilian Weinberg MD on 7/12/2020 at 11:36 AM

## 2020-07-13 LAB
ALBUMIN SERPL-MCNC: 4.3 GM/DL (ref 3.4–5)
ANION GAP SERPL CALCULATED.3IONS-SCNC: 12 MMOL/L (ref 4–16)
BASE EXCESS MIXED: 9 (ref 0–2.3)
BASOPHILS ABSOLUTE: 0 K/CU MM
BASOPHILS RELATIVE PERCENT: 0 % (ref 0–1)
BUN BLDV-MCNC: 11 MG/DL (ref 6–23)
BUN BLDV-MCNC: 7 MG/DL (ref 6–23)
BUN BLDV-MCNC: 7 MG/DL (ref 6–23)
BUN BLDV-MCNC: 8 MG/DL (ref 6–23)
CALCIUM SERPL-MCNC: 8.9 MG/DL (ref 8.3–10.6)
CALCIUM SERPL-MCNC: 9.1 MG/DL (ref 8.3–10.6)
CALCIUM SERPL-MCNC: 9.2 MG/DL (ref 8.3–10.6)
CALCIUM SERPL-MCNC: 9.3 MG/DL (ref 8.3–10.6)
CARBON MONOXIDE, BLOOD: 1.7 % (ref 0–5)
CHLORIDE BLD-SCNC: 85 MMOL/L (ref 99–110)
CHLORIDE BLD-SCNC: 91 MMOL/L (ref 99–110)
CHLORIDE BLD-SCNC: 94 MMOL/L (ref 99–110)
CHLORIDE BLD-SCNC: 95 MMOL/L (ref 99–110)
CO2 CONTENT: 37.7 MMOL/L (ref 19–24)
CO2: 31 MMOL/L (ref 21–32)
CO2: 32 MMOL/L (ref 21–32)
COMMENT: ABNORMAL
CREAT SERPL-MCNC: 0.6 MG/DL (ref 0.6–1.1)
CREAT SERPL-MCNC: 0.7 MG/DL (ref 0.6–1.1)
DIFFERENTIAL TYPE: ABNORMAL
EOSINOPHILS ABSOLUTE: 0 K/CU MM
EOSINOPHILS RELATIVE PERCENT: 0 % (ref 0–3)
GFR AFRICAN AMERICAN: >60 ML/MIN/1.73M2
GFR NON-AFRICAN AMERICAN: >60 ML/MIN/1.73M2
GLUCOSE BLD-MCNC: 157 MG/DL (ref 70–99)
GLUCOSE BLD-MCNC: 160 MG/DL (ref 70–99)
GLUCOSE BLD-MCNC: 176 MG/DL (ref 70–99)
GLUCOSE BLD-MCNC: 178 MG/DL (ref 70–99)
HCO3 ARTERIAL: 35.9 MMOL/L (ref 18–23)
HCT VFR BLD CALC: 32.4 % (ref 37–47)
HEMOGLOBIN: 9.9 GM/DL (ref 12.5–16)
IMMATURE NEUTROPHIL %: 0.8 % (ref 0–0.43)
LYMPHOCYTES ABSOLUTE: 0.2 K/CU MM
LYMPHOCYTES RELATIVE PERCENT: 3.2 % (ref 24–44)
MCH RBC QN AUTO: 28.6 PG (ref 27–31)
MCHC RBC AUTO-ENTMCNC: 30.6 % (ref 32–36)
MCV RBC AUTO: 93.6 FL (ref 78–100)
METHEMOGLOBIN ARTERIAL: 1.2 %
MONOCYTES ABSOLUTE: 0.2 K/CU MM
MONOCYTES RELATIVE PERCENT: 2.3 % (ref 0–4)
NUCLEATED RBC %: 0 %
O2 SATURATION: 83 % (ref 96–97)
PCO2 ARTERIAL: 58 MMHG (ref 32–45)
PDW BLD-RTO: 12.2 % (ref 11.7–14.9)
PH BLOOD: 7.4 (ref 7.34–7.45)
PHOSPHORUS: 1.7 MG/DL (ref 2.5–4.9)
PLATELET # BLD: 255 K/CU MM (ref 140–440)
PMV BLD AUTO: 9.6 FL (ref 7.5–11.1)
PO2 ARTERIAL: 42 MMHG (ref 75–100)
POTASSIUM SERPL-SCNC: 3.7 MMOL/L (ref 3.5–5.1)
POTASSIUM SERPL-SCNC: 3.7 MMOL/L (ref 3.5–5.1)
POTASSIUM SERPL-SCNC: 4.2 MMOL/L (ref 3.5–5.1)
POTASSIUM SERPL-SCNC: 4.3 MMOL/L (ref 3.5–5.1)
RBC # BLD: 3.46 M/CU MM (ref 4.2–5.4)
SEGMENTED NEUTROPHILS ABSOLUTE COUNT: 6.2 K/CU MM
SEGMENTED NEUTROPHILS RELATIVE PERCENT: 93.7 % (ref 36–66)
SODIUM BLD-SCNC: 128 MMOL/L (ref 135–145)
SODIUM BLD-SCNC: 135 MMOL/L (ref 135–145)
SODIUM BLD-SCNC: 138 MMOL/L (ref 135–145)
SODIUM BLD-SCNC: 139 MMOL/L (ref 135–145)
TOTAL IMMATURE NEUTOROPHIL: 0.05 K/CU MM
TOTAL NUCLEATED RBC: 0 K/CU MM
WBC # BLD: 6.6 K/CU MM (ref 4–10.5)

## 2020-07-13 PROCEDURE — 85025 COMPLETE CBC W/AUTO DIFF WBC: CPT

## 2020-07-13 PROCEDURE — 2580000003 HC RX 258: Performed by: INTERNAL MEDICINE

## 2020-07-13 PROCEDURE — 94640 AIRWAY INHALATION TREATMENT: CPT

## 2020-07-13 PROCEDURE — 36415 COLL VENOUS BLD VENIPUNCTURE: CPT

## 2020-07-13 PROCEDURE — 82803 BLOOD GASES ANY COMBINATION: CPT

## 2020-07-13 PROCEDURE — 6360000002 HC RX W HCPCS: Performed by: PHYSICIAN ASSISTANT

## 2020-07-13 PROCEDURE — 6370000000 HC RX 637 (ALT 250 FOR IP): Performed by: NURSE PRACTITIONER

## 2020-07-13 PROCEDURE — 2140000000 HC CCU INTERMEDIATE R&B

## 2020-07-13 PROCEDURE — 6360000002 HC RX W HCPCS: Performed by: INTERNAL MEDICINE

## 2020-07-13 PROCEDURE — 6370000000 HC RX 637 (ALT 250 FOR IP): Performed by: PHYSICIAN ASSISTANT

## 2020-07-13 PROCEDURE — 6370000000 HC RX 637 (ALT 250 FOR IP): Performed by: INTERNAL MEDICINE

## 2020-07-13 PROCEDURE — 36600 WITHDRAWAL OF ARTERIAL BLOOD: CPT

## 2020-07-13 PROCEDURE — 87040 BLOOD CULTURE FOR BACTERIA: CPT

## 2020-07-13 PROCEDURE — 6370000000 HC RX 637 (ALT 250 FOR IP): Performed by: HOSPITALIST

## 2020-07-13 PROCEDURE — C9113 INJ PANTOPRAZOLE SODIUM, VIA: HCPCS | Performed by: INTERNAL MEDICINE

## 2020-07-13 PROCEDURE — 97162 PT EVAL MOD COMPLEX 30 MIN: CPT

## 2020-07-13 PROCEDURE — 94664 DEMO&/EVAL PT USE INHALER: CPT

## 2020-07-13 PROCEDURE — 6360000002 HC RX W HCPCS: Performed by: HOSPITALIST

## 2020-07-13 PROCEDURE — 80048 BASIC METABOLIC PNL TOTAL CA: CPT

## 2020-07-13 PROCEDURE — 80069 RENAL FUNCTION PANEL: CPT

## 2020-07-13 PROCEDURE — 97116 GAIT TRAINING THERAPY: CPT

## 2020-07-13 RX ORDER — KETOROLAC TROMETHAMINE 30 MG/ML
15 INJECTION, SOLUTION INTRAMUSCULAR; INTRAVENOUS ONCE
Status: COMPLETED | OUTPATIENT
Start: 2020-07-13 | End: 2020-07-13

## 2020-07-13 RX ORDER — DEXTROSE MONOHYDRATE 50 MG/ML
INJECTION, SOLUTION INTRAVENOUS CONTINUOUS
Status: DISPENSED | OUTPATIENT
Start: 2020-07-13 | End: 2020-07-13

## 2020-07-13 RX ORDER — METHYLPREDNISOLONE SODIUM SUCCINATE 40 MG/ML
40 INJECTION, POWDER, LYOPHILIZED, FOR SOLUTION INTRAMUSCULAR; INTRAVENOUS EVERY 8 HOURS
Status: DISCONTINUED | OUTPATIENT
Start: 2020-07-13 | End: 2020-07-15

## 2020-07-13 RX ORDER — BENZONATATE 100 MG/1
200 CAPSULE ORAL 3 TIMES DAILY PRN
Status: DISCONTINUED | OUTPATIENT
Start: 2020-07-13 | End: 2020-07-15 | Stop reason: HOSPADM

## 2020-07-13 RX ORDER — GUAIFENESIN 600 MG/1
600 TABLET, EXTENDED RELEASE ORAL 2 TIMES DAILY
Status: DISCONTINUED | OUTPATIENT
Start: 2020-07-13 | End: 2020-07-15 | Stop reason: HOSPADM

## 2020-07-13 RX ADMIN — TRAZODONE HYDROCHLORIDE 75 MG: 50 TABLET ORAL at 21:04

## 2020-07-13 RX ADMIN — AZITHROMYCIN MONOHYDRATE 500 MG: 500 INJECTION, POWDER, LYOPHILIZED, FOR SOLUTION INTRAVENOUS at 13:13

## 2020-07-13 RX ADMIN — THEOPHYLLINE ANHYDROUS 400 MG: 200 CAPSULE, EXTENDED RELEASE ORAL at 09:45

## 2020-07-13 RX ADMIN — BACLOFEN 10 MG: 10 TABLET ORAL at 13:13

## 2020-07-13 RX ADMIN — IPRATROPIUM BROMIDE AND ALBUTEROL SULFATE 3 ML: .5; 3 SOLUTION RESPIRATORY (INHALATION) at 21:32

## 2020-07-13 RX ADMIN — BACLOFEN 10 MG: 10 TABLET ORAL at 09:49

## 2020-07-13 RX ADMIN — CLONAZEPAM 1 MG: 1 TABLET ORAL at 09:45

## 2020-07-13 RX ADMIN — METHYLPREDNISOLONE SODIUM SUCCINATE 40 MG: 40 INJECTION, POWDER, FOR SOLUTION INTRAMUSCULAR; INTRAVENOUS at 09:51

## 2020-07-13 RX ADMIN — HYDRALAZINE HYDROCHLORIDE 50 MG: 25 TABLET, FILM COATED ORAL at 21:04

## 2020-07-13 RX ADMIN — METHYLPREDNISOLONE SODIUM SUCCINATE 40 MG: 40 INJECTION, POWDER, FOR SOLUTION INTRAMUSCULAR; INTRAVENOUS at 16:27

## 2020-07-13 RX ADMIN — BENZONATATE 200 MG: 100 CAPSULE ORAL at 22:10

## 2020-07-13 RX ADMIN — METOPROLOL SUCCINATE 25 MG: 25 TABLET, EXTENDED RELEASE ORAL at 09:50

## 2020-07-13 RX ADMIN — ACETAMINOPHEN 650 MG: 325 TABLET ORAL at 21:04

## 2020-07-13 RX ADMIN — FERROUS GLUCONATE TAB 324 MG (37.5 MG ELEMENTAL IRON) 324 MG: 324 (37.5 FE) TAB at 21:05

## 2020-07-13 RX ADMIN — AMLODIPINE BESYLATE 10 MG: 5 TABLET ORAL at 09:49

## 2020-07-13 RX ADMIN — FOLIC ACID 1 MG: 1 TABLET ORAL at 09:50

## 2020-07-13 RX ADMIN — PANTOPRAZOLE SODIUM 40 MG: 40 INJECTION, POWDER, FOR SOLUTION INTRAVENOUS at 09:44

## 2020-07-13 RX ADMIN — ASPIRIN 81 MG CHEWABLE TABLET 81 MG: 81 TABLET CHEWABLE at 09:45

## 2020-07-13 RX ADMIN — ACETAMINOPHEN 650 MG: 325 TABLET ORAL at 13:12

## 2020-07-13 RX ADMIN — Medication 100 MG: at 09:50

## 2020-07-13 RX ADMIN — DEXTROSE MONOHYDRATE: 50 INJECTION, SOLUTION INTRAVENOUS at 09:54

## 2020-07-13 RX ADMIN — ENOXAPARIN SODIUM 40 MG: 40 INJECTION SUBCUTANEOUS at 09:50

## 2020-07-13 RX ADMIN — FERROUS GLUCONATE TAB 324 MG (37.5 MG ELEMENTAL IRON) 324 MG: 324 (37.5 FE) TAB at 09:50

## 2020-07-13 RX ADMIN — DULOXETINE HYDROCHLORIDE 60 MG: 30 CAPSULE, DELAYED RELEASE ORAL at 09:51

## 2020-07-13 RX ADMIN — SODIUM CHLORIDE, PRESERVATIVE FREE 10 ML: 5 INJECTION INTRAVENOUS at 09:54

## 2020-07-13 RX ADMIN — LEVOTHYROXINE SODIUM 100 MCG: 100 TABLET ORAL at 06:16

## 2020-07-13 RX ADMIN — CLONAZEPAM 1 MG: 1 TABLET ORAL at 21:04

## 2020-07-13 RX ADMIN — SODIUM CHLORIDE, PRESERVATIVE FREE 10 ML: 5 INJECTION INTRAVENOUS at 21:05

## 2020-07-13 RX ADMIN — BACLOFEN 10 MG: 10 TABLET ORAL at 21:04

## 2020-07-13 RX ADMIN — FLUTICASONE PROPIONATE 2 SPRAY: 50 SPRAY, METERED NASAL at 09:44

## 2020-07-13 RX ADMIN — CLONAZEPAM 1 MG: 1 TABLET ORAL at 13:12

## 2020-07-13 RX ADMIN — HYDRALAZINE HYDROCHLORIDE 50 MG: 25 TABLET, FILM COATED ORAL at 09:50

## 2020-07-13 RX ADMIN — MONTELUKAST 10 MG: 10 TABLET, FILM COATED ORAL at 21:04

## 2020-07-13 RX ADMIN — BUSPIRONE HYDROCHLORIDE 10 MG: 10 TABLET ORAL at 21:05

## 2020-07-13 RX ADMIN — KETOROLAC TROMETHAMINE 15 MG: 30 INJECTION, SOLUTION INTRAMUSCULAR; INTRAVENOUS at 22:10

## 2020-07-13 RX ADMIN — BUSPIRONE HYDROCHLORIDE 10 MG: 10 TABLET ORAL at 09:45

## 2020-07-13 RX ADMIN — GUAIFENESIN 600 MG: 600 TABLET, EXTENDED RELEASE ORAL at 22:10

## 2020-07-13 RX ADMIN — ATORVASTATIN CALCIUM 40 MG: 40 TABLET, FILM COATED ORAL at 21:05

## 2020-07-13 RX ADMIN — BUDESONIDE AND FORMOTEROL FUMARATE DIHYDRATE 2 PUFF: 160; 4.5 AEROSOL RESPIRATORY (INHALATION) at 21:33

## 2020-07-13 RX ADMIN — HYDRALAZINE HYDROCHLORIDE 50 MG: 25 TABLET, FILM COATED ORAL at 13:12

## 2020-07-13 RX ADMIN — IPRATROPIUM BROMIDE AND ALBUTEROL SULFATE 3 ML: .5; 3 SOLUTION RESPIRATORY (INHALATION) at 15:55

## 2020-07-13 ASSESSMENT — PAIN DESCRIPTION - PROGRESSION
CLINICAL_PROGRESSION: NOT CHANGED

## 2020-07-13 ASSESSMENT — PAIN SCALES - GENERAL
PAINLEVEL_OUTOF10: 0
PAINLEVEL_OUTOF10: 0
PAINLEVEL_OUTOF10: 4
PAINLEVEL_OUTOF10: 7
PAINLEVEL_OUTOF10: 3
PAINLEVEL_OUTOF10: 3
PAINLEVEL_OUTOF10: 0
PAINLEVEL_OUTOF10: 7

## 2020-07-13 NOTE — PROGRESS NOTES
This RN in to change purewick urinary device. Pt stands at bedside and performs own ingrid care. Reviewed plan of care with patient including the fact that patient is continent of urine and is urinating into the purewick to avoid ambulation. Reviewed with patient need to ambulate and risk of infection and skin breakdown due to moisture from urination. Patient voices frustration with this RN stating that she does not want to get out of bed. Patient removed oxygen and telemetry and this RN assisted patient with ambulation to bathroom. Patient tolerated well. Upon returning to bed patient oxygen was 89% before quickly returning to mid 90s upon reapplying oxygen. Heartrate did increase to 120s when ambulating. Patient remains upset and emotional support provided. Education regarding need to use call light for assistance to bathroom as well as unit policy to have bed alarm activated for fall prevention.  Pt does voice understanding of above

## 2020-07-13 NOTE — PROGRESS NOTES
Physical Therapy    Facility/Department: [address] [place] [address]  Initial Assessment    NAME: Michaell Cheadle  : 1960  MRN: [id number]    Date of Service: 2020    Discharge Recommendations:  S Level 1, Home with Home health PT, 24 hour supervision or assist       Functional Outcome Measure:    Acute Care Index of Function (ACIF):    Score: 0.82 (0.71 or greater = appropriate for home with home PT and 24 hour supervision/assist)      Assessment   Assessment: Pt is a 64 y.o. female with medical history, surgical history, co-morbidities, and personal factors including Anxiety, Arthritis, Back pain at L4-L5 level, Bipolar 1 disorder, COPD (chronic obstructive pulmonary disease), Emphysema of lung, FH: CAD (coronary artery disease), Fibromyalgia, H/O Doppler ultrasound, H/O echocardiogram, History of blood transfusion, Hyperlipidemia LDL goal <100, Hypertension, Obstructive sleep apnea, Osteoarthritis, Thyroid disease, Carpal tunnel release; Tubal ligation; back surgery; Cardiac catheterization; and Colonoscopy (N/A, 2019) with admission for Hyponatremia, Leukocytosis, Shortness of breath, Hypokalemia, and Acute alcoholic intoxication without complication. Prior to admission, pt was modified independent with functional mobility and ADLs. Examination of body systems reveals decreased endurance and decreased independence with functional mobility. Prognosis: Good  Decision Making: Medium Complexity  Clinical Presentation: evolving  PT Education: Goals;PT Role;General Safety;Gait Training;Plan of Care; Functional Mobility Training;Equipment;Transfer Training  REQUIRES PT FOLLOW UP: Yes  Activity Tolerance  Activity Tolerance: Patient Tolerated treatment well       Restrictions  Restrictions/Precautions  Restrictions/Precautions: General Precautions     Vision/Hearing  Vision: Within Functional Limits  Hearing: Within functional limits     Subjective  General  Chart Reviewed: Yes  Patient assessed for rehabilitation services?: Yes  Family / Caregiver Present: No  Follows Commands: Within Functional Limits  Pain Screening  Patient Currently in Pain: Denies  Vital Signs  Patient Currently in Pain: Denies       Orientation  Orientation  Overall Orientation Status: Within Normal Limits     Social/Functional History  Social/Functional History  Lives With: Spouse, Son, Daughter  Type of Home: House  Home Layout: One level  Home Access: Stairs to enter with rails  Entrance Stairs - Number of Steps: 4  Bathroom Shower/Tub: Tub/Shower unit  Bathroom Toilet: Standard  Bathroom Equipment: Shower chair, Grab bars in shower  Bathroom Accessibility: Accessible  Home Equipment: Oxygen, Grab bars, 4 wheeled walker  Receives Help From: Family  ADL Assistance: Independent  Homemaking Assistance: Needs assistance  Ambulation Assistance: Independent  Transfer Assistance: Independent  Active : Yes  Occupation: Retired     Cognition   Cognition  Overall Cognitive Status: WNL    Objective             Strength RLE  Strength RLE: WFL  Strength LLE  Strength LLE: WFL     Sensation  Overall Sensation Status: WNL  Bed mobility  Rolling to Left: Independent  Rolling to Right: Independent  Supine to Sit: Independent  Sit to Supine: Independent  Transfers  Sit to Stand: Independent  Stand to sit:  Independent  Ambulation  Ambulation?: Yes  Ambulation 1  Surface: level tile  Device: Rolling Walker  Assistance: Stand by assistance  Quality of Gait: decreased gait speed, decreased step length bilaterally  Distance: 150 feet  Comments: with initial verbal and tactile cues for BLE placement, walker placement, and sequence throughout ambulation; with initial verbal cues for directions in order to successfully navigate hallway and return to correct room; with initial verbal cues to practice pursed lip breathing throughout ambulation; increased time for task completion     Balance  Posture: Fair(forward head; rounded shoulders)  Sitting - Static:

## 2020-07-13 NOTE — PROGRESS NOTES
07/13/20 0329   NIV Type   NIV Started/Stopped On   Equipment Type resmed   Mode AVAPS   Mask Type Full face mask   Mask Size Medium   Settings/Measurements   Resp 14   O2 Flow Rate (L/min) 4 L/min   Using Accessory Muscles No   SpO2 99

## 2020-07-13 NOTE — PROGRESS NOTES
Occupational Therapy  Per the physical rehabilitation process, the OT order will be discontinued. Current charting does not demonstrate need for skilled therapy services. Pt is independent with self care this admission per flowhseet. Defer to PT for gait and endurance training.

## 2020-07-13 NOTE — CONSULTS
[address] [place] [address], [address] [facility]                                  CONSULTATION    PATIENT NAME: Chhaya Ramos                :        1960  MED REC NO:   [id number]                          ROOM:       3110  ACCOUNT NO:   [de-identified]                           ADMIT DATE: 2020  PROVIDER:     Jonathan Demarco MD    CONSULT DATE:  2020    HISTORY OF PRESENT ILLNESS:  The patient is a 61-year-old lady with  multiple medical problems including COPD, chronic respiratory failure,  on home oxygen, and history of alcohol abuse, who came to the emergency  room with complaints of increasing shortness of breath and progressive  swelling of the lower extremities. She was also complaining of  occasional cough. She denies any hemoptysis. She denies any fever or  chills. She denies any nausea or vomiting. She denies any chest pains. In the emergency room, she was found to be hyponatremic. She had a CAT  scan of the chest, which showed mild bibasilar atelectasis and COPD  changes and residual nodular scarring in the right lower lobe, less  prominent than 10/2019. The patient was given breathing treatment,  diuretics, and was subsequently admitted to the hospital.  Nephrology  has been consulted for hyponatremia. PAST MEDICAL HISTORY:  Significant for COPD; chronic respiratory  failure, on home oxygen; obstructive sleep apnea, on BiPAP; bipolar  disorder; and arthritis. PAST SURGICAL HISTORY:  Remarkable for tubal ligation, colonoscopy,  carpal tunnel release surgery, cardiac catheterization, and back  surgery. FAMILY HISTORY:  Noncontributory. SOCIAL HISTORY:  Reveals that she quit smoking in , but used to  smoke one and a half packs per day for 20 years prior to that. She has  history of alcohol abuse in the past.  No history of drug abuse. MEDICATIONS:  Reviewed.     ALLERGIES:  As in [facility].    REVIEW OF SYSTEMS:  A 10- to 14-point review of systems was reviewed and  is negative except for what is mentioned in the history of present  illness. PHYSICAL EXAMINATION:  GENERAL:  The patient is alert and oriented x3, in no acute respiratory  distress. VITAL SIGNS:  Her blood pressure is 141/66 mmHg, pulse of 103 per  minute, and respiratory rate of 22 per minute. She is afebrile. Her  saturation is 98% on 3 liters nasal cannula. HEENT:  Essentially unremarkable. NECK:  There is no JVD. No lymphadenopathy. The neck is supple. LUNGS:  Revealed diminished breath sounds. HEART:  Showed normal S1 and S2. There was no S3 or S4 noted. ABDOMEN:  Benign. There is no evidence of any organomegaly. The bowel  sounds are present. NEUROLOGIC:  Reveals that she is awake and responsive, answering  questions appropriately, and moving her extremities. LABORATORY DATA:  Her admission sodium was 123, her sodium this morning  was 139, potassium 3.7, chloride 95, carbon dioxide 32, BUN 7,  creatinine 0.6. Her TSH was 0.652. Her CBC showed a white count of  6.6, hemoglobin 9.9, hematocrit 32.4. Her CAT scan of the chest revealed mild bibasilar atelectasis, small  residual nodular scarring, measuring 9 mm at the right costophrenic  angle, less conspicuous than 10/2019. IMPRESSION:  1. Acute-on-chronic respiratory failure secondary to acute exacerbation  of COPD. 2.  Acute exacerbation of COPD. 3.  Hyponatremia. 4.  History of tobacco abuse in the past.  5.  History of alcohol abuse in the past.    PLAN:  1. We will increase Solu-Medrol to 40 q.8 h.  2.  We will start Zithromax 500 mg once a day. 3.  Sputum for culture and sensitivity. 4.  Respiratory disease panel PCR. 5.  Electrolyte management as per Nephrology. 6.  Check theophylline level. 7.  As per orders.         Nay Schroeder MD    D: 07/13/2020 10:52:46       T: 07/13/2020 13:11:10     /[email or web address]  Job#: [id number]     Doc#: [id number]    CC:

## 2020-07-13 NOTE — PROGRESS NOTES
Nephrology Progress Note  7/13/2020 9:35 AM  Subjective:   Admit Date: 7/11/2020    PCP: Ambrosio Osler, MD    Interval History: Patient is voicing no pressing concerns during our visit  -due to recent rise in serum sodium: will start patient on D5 at 75 for 8 hours. Diet: DIET CARDIAC;    Data:   Scheduled Meds:   atorvastatin  40 mg Oral Nightly    amLODIPine  10 mg Oral Daily    fluticasone  2 spray Nasal Daily    aspirin  81 mg Oral Daily    baclofen  10 mg Oral TID    traZODone  75 mg Oral Nightly    montelukast  10 mg Oral Daily    metoprolol succinate  25 mg Oral Daily    hydrALAZINE  50 mg Oral TID    ferrous gluconate  324 mg Oral BID    levothyroxine  100 mcg Oral Daily    DULoxetine  60 mg Oral Daily    ipratropium-albuterol  1 vial Inhalation Q4H    budesonide-formoterol  2 puff Inhalation BID    theophylline  400 mg Oral Daily    sodium chloride flush  10 mL Intravenous 2 times per day    enoxaparin  40 mg Subcutaneous Daily    thiamine  100 mg Oral Daily    folic acid  1 mg Oral Daily    pantoprazole  40 mg Intravenous Daily    busPIRone  10 mg Oral BID    clonazePAM  1 mg Oral TID    methylPREDNISolone  40 mg Intravenous Q12H     Continuous Infusions:   dextrose       PRN Meds:sodium chloride flush, acetaminophen **OR** acetaminophen, polyethylene glycol, promethazine **OR** ondansetron, potassium chloride, clonazePAM, albuterol sulfate HFA **AND** ipratropium **AND** MDI Treatment  I/O last 3 completed shifts: In: 480 [P.O.:480]  Out: 3300 [Urine:3300]  No intake/output data recorded.     Intake/Output Summary (Last 24 hours) at 7/13/2020 0935  Last data filed at 7/13/2020 0026  Gross per 24 hour   Intake 480 ml   Output 3300 ml   Net -2820 ml     CBC:   Recent Labs     07/12/20  0005 07/13/20  0421   WBC 15.4* 6.6   HGB 10.8* 9.9*    255     BMP:    Recent Labs     07/12/20  0005 07/12/20  0901 07/13/20  0421   * 123* 139  138   K 3.0* 4.0 3.7  3.7   CL 76* 81* 95*  94*   CO2 35* 34* 32  32   BUN 7 8 7  7   CREATININE 0.7 0.6 0.6  0.6   GLUCOSE 108* 109* 178*  176*     Hepatic:   Recent Labs     07/12/20  0005   AST 33   ALT 27   BILITOT 0.3   ALKPHOS 80     Troponin: No results for input(s): TROPONINI in the last 72 hours. BNP: No results for input(s): BNP in the last 72 hours. Lipids: No results for input(s): CHOL, HDL in the last 72 hours. Invalid input(s): LDLCALCU  ABGs:   Lab Results   Component Value Date    PO2ART 37 10/11/2019    QYA5BLV 62.0 10/11/2019     INR: No results for input(s): INR in the last 72 hours.   Renal Labs  Albumin:    Lab Results   Component Value Date    LABALBU 4.3 07/13/2020     Calcium:    Lab Results   Component Value Date    CALCIUM 9.2 07/13/2020    CALCIUM 9.1 07/13/2020     Phosphorus:    Lab Results   Component Value Date    PHOS 1.7 07/13/2020     U/A:    Lab Results   Component Value Date    NITRU NEGATIVE 04/11/2019    COLORU YELLOW 04/11/2019    WBCUA 1 04/11/2019    RBCUA NONE SEEN 04/11/2019    MUCUS RARE 02/28/2019    TRICHOMONAS NONE SEEN 04/11/2019    BACTERIA NEGATIVE 04/11/2019    CLARITYU CLEAR 04/11/2019    SPECGRAV 1.008 04/11/2019    UROBILINOGEN NORMAL 04/11/2019    BILIRUBINUR NEGATIVE 04/11/2019    BLOODU NEGATIVE 04/11/2019    KETUA NEGATIVE 04/11/2019     ABG:    Lab Results   Component Value Date    OWA9CPP 62.0 10/11/2019    PO2ART 37 10/11/2019    XCZ1CYF 36.7 10/11/2019     HgBA1c:    Lab Results   Component Value Date    LABA1C 5.1 06/21/2019     Microalbumen/Creatinine ratio:  No components found for: RUCREAT  TSH:  No results found for: TSH  IRON:    Lab Results   Component Value Date    IRON 48 05/07/2020     Iron Saturation:  No components found for: PERCENTFE  TIBC:    Lab Results   Component Value Date    TIBC 300 05/07/2020     FERRITIN:    Lab Results   Component Value Date    FERRITIN 87 05/07/2020     RPR:  No results found for: RPR  VITALY:    Lab Results   Component Value Date    VITALY None withdrawal symptoms via CIWA scale   3.   -monitor affect and sensorium and mood   4   -latest serum potassium: 3.7; following trend  5. -BP trend: systolic BP's have recently been 126 - 132  -on hydralazine / amlodipine and metoprolol   6.   -latest ma.9    Electronically signed by BRIAN Andrews - FINESSE          Nephrology Attending Progress Note  2020 10:58 AM  Subjective:   Admit Date: 2020  PCP: Zoey Saenz MD    Interval History: I have personally performed face to face diagnostic evaluation on this patient. I have personally reviewed pertinent labs and imaging and agree with the care plan above.  My additional findings are as follows:  Pt appear anxious and weak today, na improve and changed ivf    Objective:   Vitals: BP (!) 141/66   Pulse 114   Temp 98.8 °F (37.1 °C) (Oral)   Resp 22   Ht 5' 2\" (1.575 m)   Wt 196 lb 9.6 oz (89.2 kg)   SpO2 98%   BMI 35.96 kg/m²   Awake weak   anxiuous  Soft nt  Trace edema thick legs    Assessment and Plan:  IMP:  As stated above    Plan     1 bp currently stable monitor  2 na increase and concern rapid- change ivf to d5w for few hours and check na at 1pm  3 prior etoh and treat for DT  4 monitor affect and maintain meds  5 K stable  Will follow             Electronically signed by Joey Hernandez MD on 2020 at 10:58 AM

## 2020-07-13 NOTE — CARE COORDINATION
.CM has reviewed pt's chart for needs. CM screening shows that pt has PCP, insurance and is independent PTA. If needs arise please contact DERIK.   TE

## 2020-07-13 NOTE — PLAN OF CARE
Problem: Pain:  Description: Pain management should include both nonpharmacologic and pharmacologic interventions.   Goal: Pain level will decrease  Description: Pain level will decrease  7/12/2020 2022 by Cindy Rodriguez RN  Outcome: Ongoing  7/12/2020 1002 by Edna Carpenter LPN  Outcome: Ongoing  Goal: Control of acute pain  Description: Control of acute pain  7/12/2020 2022 by Cindy Rodriguez RN  Outcome: Ongoing  7/12/2020 1002 by Edna Carpenter LPN  Outcome: Ongoing  Goal: Control of chronic pain  Description: Control of chronic pain  7/12/2020 2022 by Cindy Rodriguez RN  Outcome: Ongoing  7/12/2020 1002 by Edna Carpenter LPN  Outcome: Ongoing     Problem: Skin Integrity:  Goal: Will show no infection signs and symptoms  Description: Will show no infection signs and symptoms  7/12/2020 2022 by Cindy Rodriguez RN  Outcome: Ongoing  7/12/2020 1002 by Edna Carpenter LPN  Outcome: Ongoing  Goal: Absence of new skin breakdown  Description: Absence of new skin breakdown  7/12/2020 2022 by Cindy Rodriguez RN  Outcome: Ongoing  7/12/2020 1002 by Edna Carpenter LPN  Outcome: Ongoing     Problem: Falls - Risk of:  Goal: Will remain free from falls  Description: Will remain free from falls  7/12/2020 2022 by Cindy Rodriguez RN  Outcome: Ongoing  7/12/2020 1002 by Edna Carpenter LPN  Outcome: Ongoing  Goal: Absence of physical injury  Description: Absence of physical injury  7/12/2020 2022 by Cindy Rodriguez RN  Outcome: Ongoing  7/12/2020 1002 by Edna Carpenter LPN  Outcome: Ongoing

## 2020-07-13 NOTE — PROGRESS NOTES
[address] [place] [address]  HOSPITALIST PROGRESS NOTE                       Name:  Gio Bolton /Age/Sex: 1960  (64 y.o. female)   MRN & CSN:  [id number] & [id number] Admission Date/Time: 2020 11:50 PM   Location:  [facility]0/3110-A Attending:  Eileen Swan MD                                                  HPI  Gio Bolton is a 64 y.o. female who presents with shortness of breath    SUBJECTIVE  - feels better, less shortness of breath, at baseline home oxygen    10 point review of systems reviewed and negative unless noted above. ALLERGIES:   Allergies   Allergen Reactions    Lisinopril Swelling and Rash     angioedema       PCP: Kosta Glass MD    PAST MEDICAL HISTORY, SURGICAL HISTORY, SOCIAL HISTORY and  HOME MEDICATIONS all reviewed. OBJECTIVE  Vitals:    20 2223 20 0023 20 0329 20 0615   BP: 132/73 (!) 126/53  126/66   Pulse:  104  103   Resp:  14 14 20   Temp:  98.8 °F (37.1 °C)     TempSrc:  Oral     SpO2:  96%     Weight:       Height:           PHYSICAL EXAM   GEN Awake female, sitting upright in bed in no apparent distress. Appears given age. EYES Pupils are equally round. No scleral erythema, discharge, or conjunctivitis. HENT Mucous membranes are moist. Oral pharynx without exudates, no evidence of thrush. NECK Supple, no apparent thyromegaly or masses. RESP Unlabored respiration, bilateral rhonchi/wheezy  CARDIO/VASC S1/S2 auscultated. Regular rate without appreciable murmurs, rubs, or gallops. No JVD or carotid bruits. Peripheral pulses equal bilaterally and palpable. Positive peripheral edema. GI Abdomen is soft without significant tenderness, masses, or guarding. Bowel sounds are normoactive. Rectal exam deferred.  No costovertebral angle tenderness. Normal appearing external genitalia. HEME/LYMPH No palpable cervical lymphadenopathy and no hepatosplenomegaly. No petechiae or ecchymoses. MSK Spontaneous movement of all extremities.   No gross joint deformities. SKIN Normal coloration, warm, dry. NEURO Cranial nerves appear grossly intact, normal speech, no lateralizing weakness. PSYCH Awake, alert, oriented x 4. Affect appropriate. INTAKE: In: 480 [P.O.:480]  Out: 2000   OUTPUT: In: 480   Out: 2000 [Urine:2000]    LABS  Recent Labs     07/12/20  0005 07/13/20  0421   WBC 15.4* 6.6   HGB 10.8* 9.9*   HCT 34.2* 32.4*    255      Recent Labs     07/12/20  0005 07/12/20  0901 07/13/20  0421   * 123* 139  138   K 3.0* 4.0 3.7  3.7   CL 76* 81* 95*  94*   CO2 35* 34* 32  32   PHOS  --   --  1.7*   BUN 7 8 7  7   CREATININE 0.7 0.6 0.6  0.6     Recent Labs     07/12/20  0005   AST 33   ALT 27   BILITOT 0.3   ALKPHOS 80     No results for input(s): INR in the last 72 hours.   Recent Labs     07/12/20  0005   TROPONINT <0.010          Abnormal labs for today noted      Imaging:     ECHO:    Microbiology:  Blood culture:    Urine culture:    Sputum culture:    Procedures done this admission:    MEDS  Scheduled Meds:   atorvastatin  40 mg Oral Nightly    amLODIPine  10 mg Oral Daily    fluticasone  2 spray Nasal Daily    aspirin  81 mg Oral Daily    baclofen  10 mg Oral TID    traZODone  75 mg Oral Nightly    montelukast  10 mg Oral Daily    metoprolol succinate  25 mg Oral Daily    hydrALAZINE  50 mg Oral TID    ferrous gluconate  324 mg Oral BID    levothyroxine  100 mcg Oral Daily    DULoxetine  60 mg Oral Daily    ipratropium-albuterol  1 vial Inhalation Q4H    budesonide-formoterol  2 puff Inhalation BID    theophylline  400 mg Oral Daily    sodium chloride flush  10 mL Intravenous 2 times per day    enoxaparin  40 mg Subcutaneous Daily    thiamine  100 mg Oral Daily    folic acid  1 mg Oral Daily    pantoprazole  40 mg Intravenous Daily    busPIRone  10 mg Oral BID    clonazePAM  1 mg Oral TID    methylPREDNISolone  40 mg Intravenous Q12H     Continuous Infusions:   dextrose       PRN Meds:sodium chloride flush, acetaminophen **OR** acetaminophen, polyethylene glycol, promethazine **OR** ondansetron, potassium chloride, clonazePAM, albuterol sulfate HFA **AND** ipratropium **AND** MDI Treatment        ASSESSMENT and PLAN  Hospital Day: 3    1-Probable COPD exacerbation with shortness of breath- at baseline home oxygen of 3 liters- treat with steroids, nebs, noted CT chest with no obvious or pneumonia- one more days of steroids and nebs  2-Hyponatremia- likely hypovolemia- improved, nephro managing  3-SIRS with tachypnea and leukocytosis- rule out sepsis- blood culture and lactic acid  4-Alcoholism with alcohol dependence- [facility] protocol- no signs of withdrawal    Other chronic issues  -chronic hypoxic respiratory failure due to COPD on home oxygen  -GERD  -DALTON- bipap nightly  -CAD  -anxiety  -HTN           Will get PT/OT, potential discharge in am       Disp:     Diet DIET CARDIAC;   DVT Prophylaxis [] Lovenox, []  Heparin, [] SCDs, [] Ambulation   GI Prophylaxis [] PPI,  [] H2 Blocker,  [] Carafate,  [] Diet/Tube Feeds   Code Status Full Code   Disposition Patient requires continued admission due to hyponatremia, COPD exacerbation   CMS Level of Risk [] Low, [x] Moderate,[]  High  Patient's risk as above due to hyponatremia, COPD exacerbation     JOSE RAMON RUIZ MD 7/13/2020 9:28 AM

## 2020-07-14 LAB
ADENOVIRUS DETECTION BY PCR: NOT DETECTED
ALBUMIN SERPL-MCNC: 4.3 GM/DL (ref 3.4–5)
ANION GAP SERPL CALCULATED.3IONS-SCNC: 10 MMOL/L (ref 4–16)
ANION GAP SERPL CALCULATED.3IONS-SCNC: 10 MMOL/L (ref 4–16)
ANION GAP SERPL CALCULATED.3IONS-SCNC: 12 MMOL/L (ref 4–16)
BASOPHILS ABSOLUTE: 0 K/CU MM
BASOPHILS RELATIVE PERCENT: 0.1 % (ref 0–1)
BORDETELLA PARAPERTUSSIS BY PCR: NOT DETECTED
BORDETELLA PERTUSSIS PCR: NOT DETECTED
BUN BLDV-MCNC: 11 MG/DL (ref 6–23)
BUN BLDV-MCNC: 11 MG/DL (ref 6–23)
BUN BLDV-MCNC: 9 MG/DL (ref 6–23)
CALCIUM SERPL-MCNC: 9.1 MG/DL (ref 8.3–10.6)
CALCIUM SERPL-MCNC: 9.4 MG/DL (ref 8.3–10.6)
CALCIUM SERPL-MCNC: 9.6 MG/DL (ref 8.3–10.6)
CHLAMYDOPHILA PNEUMONIA PCR: NOT DETECTED
CHLORIDE BLD-SCNC: 87 MMOL/L (ref 99–110)
CHLORIDE BLD-SCNC: 88 MMOL/L (ref 99–110)
CHLORIDE BLD-SCNC: 92 MMOL/L (ref 99–110)
CO2: 32 MMOL/L (ref 21–32)
CO2: 35 MMOL/L (ref 21–32)
CO2: 35 MMOL/L (ref 21–32)
CORONAVIRUS 229E PCR: NOT DETECTED
CORONAVIRUS HKU1 PCR: NOT DETECTED
CORONAVIRUS NL63 PCR: NOT DETECTED
CORONAVIRUS OC43 PCR: NOT DETECTED
CREAT SERPL-MCNC: 0.6 MG/DL (ref 0.6–1.1)
CREAT SERPL-MCNC: 0.7 MG/DL (ref 0.6–1.1)
CREAT SERPL-MCNC: 0.7 MG/DL (ref 0.6–1.1)
DIFFERENTIAL TYPE: ABNORMAL
DOSE AMOUNT: NORMAL
DOSE TIME: NORMAL
EOSINOPHILS ABSOLUTE: 0 K/CU MM
EOSINOPHILS RELATIVE PERCENT: 0 % (ref 0–3)
GFR AFRICAN AMERICAN: >60 ML/MIN/1.73M2
GFR NON-AFRICAN AMERICAN: >60 ML/MIN/1.73M2
GLUCOSE BLD-MCNC: 175 MG/DL (ref 70–99)
GLUCOSE BLD-MCNC: 187 MG/DL (ref 70–99)
GLUCOSE BLD-MCNC: 232 MG/DL (ref 70–99)
HCT VFR BLD CALC: 32.6 % (ref 37–47)
HEMOGLOBIN: 9.9 GM/DL (ref 12.5–16)
HUMAN METAPNEUMOVIRUS PCR: NOT DETECTED
IMMATURE NEUTROPHIL %: 1.4 % (ref 0–0.43)
INFLUENZA A BY PCR: NOT DETECTED
INFLUENZA A H1 (2009) PCR: NOT DETECTED
INFLUENZA A H1 PANDEMIC PCR: NOT DETECTED
INFLUENZA A H3 PCR: NOT DETECTED
INFLUENZA B BY PCR: NOT DETECTED
LYMPHOCYTES ABSOLUTE: 0.3 K/CU MM
LYMPHOCYTES RELATIVE PERCENT: 3 % (ref 24–44)
MAGNESIUM: 2.2 MG/DL (ref 1.8–2.4)
MCH RBC QN AUTO: 28.5 PG (ref 27–31)
MCHC RBC AUTO-ENTMCNC: 30.4 % (ref 32–36)
MCV RBC AUTO: 93.9 FL (ref 78–100)
MONOCYTES ABSOLUTE: 0.5 K/CU MM
MONOCYTES RELATIVE PERCENT: 4.7 % (ref 0–4)
MYCOPLASMA PNEUMONIAE PCR: NOT DETECTED
NUCLEATED RBC %: 0 %
PARAINFLUENZA 1 PCR: NOT DETECTED
PARAINFLUENZA 2 PCR: NOT DETECTED
PARAINFLUENZA 3 PCR: NOT DETECTED
PARAINFLUENZA 4 PCR: NOT DETECTED
PDW BLD-RTO: 12.5 % (ref 11.7–14.9)
PHOSPHORUS: 1.2 MG/DL (ref 2.5–4.9)
PLATELET # BLD: 258 K/CU MM (ref 140–440)
PMV BLD AUTO: 9.3 FL (ref 7.5–11.1)
POTASSIUM SERPL-SCNC: 3.8 MMOL/L (ref 3.5–5.1)
POTASSIUM SERPL-SCNC: 3.9 MMOL/L (ref 3.5–5.1)
POTASSIUM SERPL-SCNC: 4.4 MMOL/L (ref 3.5–5.1)
PRO-BNP: 1333 PG/ML
PROCALCITONIN: 0.04
RBC # BLD: 3.47 M/CU MM (ref 4.2–5.4)
RHINOVIRUS ENTEROVIRUS PCR: NOT DETECTED
RSV PCR: NOT DETECTED
SEGMENTED NEUTROPHILS ABSOLUTE COUNT: 10 K/CU MM
SEGMENTED NEUTROPHILS RELATIVE PERCENT: 90.8 % (ref 36–66)
SODIUM BLD-SCNC: 131 MMOL/L (ref 135–145)
SODIUM BLD-SCNC: 133 MMOL/L (ref 135–145)
SODIUM BLD-SCNC: 137 MMOL/L (ref 135–145)
THEOPHYLLINE LEVEL: 11.6 UG/ML (ref 10–20)
TOTAL IMMATURE NEUTOROPHIL: 0.15 K/CU MM
TOTAL NUCLEATED RBC: 0 K/CU MM
WBC # BLD: 11 K/CU MM (ref 4–10.5)

## 2020-07-14 PROCEDURE — 6370000000 HC RX 637 (ALT 250 FOR IP): Performed by: INTERNAL MEDICINE

## 2020-07-14 PROCEDURE — 2580000003 HC RX 258: Performed by: INTERNAL MEDICINE

## 2020-07-14 PROCEDURE — 83735 ASSAY OF MAGNESIUM: CPT

## 2020-07-14 PROCEDURE — 6360000002 HC RX W HCPCS: Performed by: INTERNAL MEDICINE

## 2020-07-14 PROCEDURE — 80048 BASIC METABOLIC PNL TOTAL CA: CPT

## 2020-07-14 PROCEDURE — 87070 CULTURE OTHR SPECIMN AEROBIC: CPT

## 2020-07-14 PROCEDURE — 6370000000 HC RX 637 (ALT 250 FOR IP): Performed by: HOSPITALIST

## 2020-07-14 PROCEDURE — 2140000000 HC CCU INTERMEDIATE R&B

## 2020-07-14 PROCEDURE — 6370000000 HC RX 637 (ALT 250 FOR IP): Performed by: PHYSICIAN ASSISTANT

## 2020-07-14 PROCEDURE — 83880 ASSAY OF NATRIURETIC PEPTIDE: CPT

## 2020-07-14 PROCEDURE — 2700000000 HC OXYGEN THERAPY PER DAY

## 2020-07-14 PROCEDURE — 80198 ASSAY OF THEOPHYLLINE: CPT

## 2020-07-14 PROCEDURE — 87798 DETECT AGENT NOS DNA AMP: CPT

## 2020-07-14 PROCEDURE — 6370000000 HC RX 637 (ALT 250 FOR IP): Performed by: NURSE PRACTITIONER

## 2020-07-14 PROCEDURE — 87205 SMEAR GRAM STAIN: CPT

## 2020-07-14 PROCEDURE — 80069 RENAL FUNCTION PANEL: CPT

## 2020-07-14 PROCEDURE — 87486 CHLMYD PNEUM DNA AMP PROBE: CPT

## 2020-07-14 PROCEDURE — 85025 COMPLETE CBC W/AUTO DIFF WBC: CPT

## 2020-07-14 PROCEDURE — 84145 PROCALCITONIN (PCT): CPT

## 2020-07-14 PROCEDURE — 94660 CPAP INITIATION&MGMT: CPT

## 2020-07-14 PROCEDURE — 87633 RESP VIRUS 12-25 TARGETS: CPT

## 2020-07-14 PROCEDURE — 36415 COLL VENOUS BLD VENIPUNCTURE: CPT

## 2020-07-14 PROCEDURE — 87581 M.PNEUMON DNA AMP PROBE: CPT

## 2020-07-14 PROCEDURE — 2580000003 HC RX 258: Performed by: NURSE PRACTITIONER

## 2020-07-14 PROCEDURE — 94761 N-INVAS EAR/PLS OXIMETRY MLT: CPT

## 2020-07-14 PROCEDURE — 2500000003 HC RX 250 WO HCPCS: Performed by: NURSE PRACTITIONER

## 2020-07-14 PROCEDURE — C9113 INJ PANTOPRAZOLE SODIUM, VIA: HCPCS | Performed by: INTERNAL MEDICINE

## 2020-07-14 PROCEDURE — 94640 AIRWAY INHALATION TREATMENT: CPT

## 2020-07-14 PROCEDURE — 99254 IP/OBS CNSLTJ NEW/EST MOD 60: CPT | Performed by: INTERNAL MEDICINE

## 2020-07-14 RX ORDER — IPRATROPIUM BROMIDE AND ALBUTEROL SULFATE 2.5; .5 MG/3ML; MG/3ML
1 SOLUTION RESPIRATORY (INHALATION)
Status: DISCONTINUED | OUTPATIENT
Start: 2020-07-15 | End: 2020-07-15 | Stop reason: HOSPADM

## 2020-07-14 RX ORDER — HYDRALAZINE HYDROCHLORIDE 25 MG/1
25 TABLET, FILM COATED ORAL 3 TIMES DAILY
Status: DISCONTINUED | OUTPATIENT
Start: 2020-07-14 | End: 2020-07-15

## 2020-07-14 RX ORDER — HYDROCODONE POLISTIREX AND CHLORPHENIRAMINE POLISTIREX 10; 8 MG/5ML; MG/5ML
5 SUSPENSION, EXTENDED RELEASE ORAL EVERY 12 HOURS PRN
Status: DISCONTINUED | OUTPATIENT
Start: 2020-07-14 | End: 2020-07-15 | Stop reason: HOSPADM

## 2020-07-14 RX ADMIN — FERROUS GLUCONATE TAB 324 MG (37.5 MG ELEMENTAL IRON) 324 MG: 324 (37.5 FE) TAB at 22:36

## 2020-07-14 RX ADMIN — BACLOFEN 10 MG: 10 TABLET ORAL at 13:08

## 2020-07-14 RX ADMIN — FERROUS GLUCONATE TAB 324 MG (37.5 MG ELEMENTAL IRON) 324 MG: 324 (37.5 FE) TAB at 10:23

## 2020-07-14 RX ADMIN — MONTELUKAST 10 MG: 10 TABLET, FILM COATED ORAL at 22:35

## 2020-07-14 RX ADMIN — BUSPIRONE HYDROCHLORIDE 10 MG: 10 TABLET ORAL at 22:35

## 2020-07-14 RX ADMIN — METHYLPREDNISOLONE SODIUM SUCCINATE 40 MG: 40 INJECTION, POWDER, FOR SOLUTION INTRAMUSCULAR; INTRAVENOUS at 01:00

## 2020-07-14 RX ADMIN — TRAZODONE HYDROCHLORIDE 75 MG: 50 TABLET ORAL at 22:36

## 2020-07-14 RX ADMIN — IPRATROPIUM BROMIDE AND ALBUTEROL SULFATE 3 ML: .5; 3 SOLUTION RESPIRATORY (INHALATION) at 04:30

## 2020-07-14 RX ADMIN — PANTOPRAZOLE SODIUM 40 MG: 40 INJECTION, POWDER, FOR SOLUTION INTRAVENOUS at 10:22

## 2020-07-14 RX ADMIN — IPRATROPIUM BROMIDE AND ALBUTEROL SULFATE 3 ML: .5; 3 SOLUTION RESPIRATORY (INHALATION) at 09:06

## 2020-07-14 RX ADMIN — FLUTICASONE PROPIONATE 2 SPRAY: 50 SPRAY, METERED NASAL at 10:25

## 2020-07-14 RX ADMIN — AMLODIPINE BESYLATE 10 MG: 5 TABLET ORAL at 10:23

## 2020-07-14 RX ADMIN — HYDRALAZINE HYDROCHLORIDE 25 MG: 25 TABLET, FILM COATED ORAL at 10:23

## 2020-07-14 RX ADMIN — CLONAZEPAM 1 MG: 1 TABLET ORAL at 10:24

## 2020-07-14 RX ADMIN — PHENOL 1 SPRAY: 1.4 LIQUID ORAL at 23:37

## 2020-07-14 RX ADMIN — ASPIRIN 81 MG CHEWABLE TABLET 81 MG: 81 TABLET CHEWABLE at 10:24

## 2020-07-14 RX ADMIN — BUDESONIDE AND FORMOTEROL FUMARATE DIHYDRATE 2 PUFF: 160; 4.5 AEROSOL RESPIRATORY (INHALATION) at 09:08

## 2020-07-14 RX ADMIN — LEVOTHYROXINE SODIUM 100 MCG: 100 TABLET ORAL at 05:26

## 2020-07-14 RX ADMIN — DULOXETINE HYDROCHLORIDE 60 MG: 30 CAPSULE, DELAYED RELEASE ORAL at 10:23

## 2020-07-14 RX ADMIN — HYDRALAZINE HYDROCHLORIDE 25 MG: 25 TABLET, FILM COATED ORAL at 22:34

## 2020-07-14 RX ADMIN — POTASSIUM PHOSPHATE, MONOBASIC AND POTASSIUM PHOSPHATE, DIBASIC 30 MMOL: 224; 236 INJECTION, SOLUTION INTRAVENOUS at 10:22

## 2020-07-14 RX ADMIN — IPRATROPIUM BROMIDE AND ALBUTEROL SULFATE 3 ML: .5; 3 SOLUTION RESPIRATORY (INHALATION) at 12:18

## 2020-07-14 RX ADMIN — BUDESONIDE AND FORMOTEROL FUMARATE DIHYDRATE 2 PUFF: 160; 4.5 AEROSOL RESPIRATORY (INHALATION) at 20:26

## 2020-07-14 RX ADMIN — METHYLPREDNISOLONE SODIUM SUCCINATE 40 MG: 40 INJECTION, POWDER, FOR SOLUTION INTRAMUSCULAR; INTRAVENOUS at 10:22

## 2020-07-14 RX ADMIN — SODIUM CHLORIDE, PRESERVATIVE FREE 10 ML: 5 INJECTION INTRAVENOUS at 10:22

## 2020-07-14 RX ADMIN — Medication 100 MG: at 10:22

## 2020-07-14 RX ADMIN — METHYLPREDNISOLONE SODIUM SUCCINATE 40 MG: 40 INJECTION, POWDER, FOR SOLUTION INTRAMUSCULAR; INTRAVENOUS at 17:25

## 2020-07-14 RX ADMIN — ENOXAPARIN SODIUM 40 MG: 40 INJECTION SUBCUTANEOUS at 10:22

## 2020-07-14 RX ADMIN — ACETAMINOPHEN 650 MG: 325 TABLET ORAL at 22:44

## 2020-07-14 RX ADMIN — GUAIFENESIN 600 MG: 600 TABLET, EXTENDED RELEASE ORAL at 22:35

## 2020-07-14 RX ADMIN — CLONAZEPAM 1 MG: 1 TABLET ORAL at 22:35

## 2020-07-14 RX ADMIN — BACLOFEN 10 MG: 10 TABLET ORAL at 10:23

## 2020-07-14 RX ADMIN — SODIUM CHLORIDE, PRESERVATIVE FREE 10 ML: 5 INJECTION INTRAVENOUS at 22:34

## 2020-07-14 RX ADMIN — Medication 5 ML: at 13:08

## 2020-07-14 RX ADMIN — BACLOFEN 10 MG: 10 TABLET ORAL at 22:35

## 2020-07-14 RX ADMIN — GUAIFENESIN 600 MG: 600 TABLET, EXTENDED RELEASE ORAL at 10:23

## 2020-07-14 RX ADMIN — HYDRALAZINE HYDROCHLORIDE 25 MG: 25 TABLET, FILM COATED ORAL at 13:08

## 2020-07-14 RX ADMIN — IPRATROPIUM BROMIDE AND ALBUTEROL SULFATE 3 ML: .5; 3 SOLUTION RESPIRATORY (INHALATION) at 20:26

## 2020-07-14 RX ADMIN — DILTIAZEM HYDROCHLORIDE 30 MG: 30 TABLET, FILM COATED ORAL at 22:34

## 2020-07-14 RX ADMIN — ACETAMINOPHEN 650 MG: 325 TABLET ORAL at 10:23

## 2020-07-14 RX ADMIN — IPRATROPIUM BROMIDE AND ALBUTEROL SULFATE 3 ML: .5; 3 SOLUTION RESPIRATORY (INHALATION) at 16:01

## 2020-07-14 RX ADMIN — FOLIC ACID 1 MG: 1 TABLET ORAL at 10:24

## 2020-07-14 RX ADMIN — CLONAZEPAM 1 MG: 1 TABLET ORAL at 13:08

## 2020-07-14 RX ADMIN — ATORVASTATIN CALCIUM 40 MG: 40 TABLET, FILM COATED ORAL at 22:35

## 2020-07-14 RX ADMIN — Medication 5 ML: at 23:37

## 2020-07-14 RX ADMIN — METOPROLOL SUCCINATE 25 MG: 25 TABLET, EXTENDED RELEASE ORAL at 10:24

## 2020-07-14 RX ADMIN — AZITHROMYCIN MONOHYDRATE 500 MG: 500 INJECTION, POWDER, LYOPHILIZED, FOR SOLUTION INTRAVENOUS at 11:51

## 2020-07-14 RX ADMIN — BUSPIRONE HYDROCHLORIDE 10 MG: 10 TABLET ORAL at 10:23

## 2020-07-14 RX ADMIN — THEOPHYLLINE ANHYDROUS 400 MG: 200 CAPSULE, EXTENDED RELEASE ORAL at 10:22

## 2020-07-14 ASSESSMENT — PAIN DESCRIPTION - DESCRIPTORS: DESCRIPTORS: ACHING

## 2020-07-14 ASSESSMENT — PAIN DESCRIPTION - PROGRESSION
CLINICAL_PROGRESSION: NOT CHANGED

## 2020-07-14 ASSESSMENT — PAIN DESCRIPTION - PAIN TYPE: TYPE: CHRONIC PAIN

## 2020-07-14 ASSESSMENT — PAIN DESCRIPTION - ORIENTATION: ORIENTATION: MID;LOWER

## 2020-07-14 ASSESSMENT — PAIN DESCRIPTION - LOCATION: LOCATION: BACK

## 2020-07-14 ASSESSMENT — PAIN - FUNCTIONAL ASSESSMENT: PAIN_FUNCTIONAL_ASSESSMENT: ACTIVITIES ARE NOT PREVENTED

## 2020-07-14 ASSESSMENT — PAIN SCALES - GENERAL
PAINLEVEL_OUTOF10: 0
PAINLEVEL_OUTOF10: 3

## 2020-07-14 ASSESSMENT — PAIN DESCRIPTION - FREQUENCY: FREQUENCY: CONTINUOUS

## 2020-07-14 ASSESSMENT — PAIN DESCRIPTION - ONSET: ONSET: ON-GOING

## 2020-07-14 NOTE — PROGRESS NOTES
1. Tachycardia  2. COPD exacerbation  3. End stage lung disease  4. HTN  5. HLD  6. DALTON  7. Bipolar disorder  8. ETOH abuse. Patient is an acute exacerbation of COPD. She had an extensive work-up done for lung transplant at [facility]. She was unable to get lung transplant due to insufficient familial support. This is as per patient  Patient has been wearing BiPAP continuously to help her breathe better. Patient had a cath and echo in [facility] as a pre-lung transplant assessment and normal cath and normal LVEF was noted. Patient did have a history of SVTs in the past and also has sinus tachycardia probably because of end-stage lung disease. Patient also is on theophylline which can contribute for tachycardia too. Patient was supposed to be on Cardizem in the past but I do not feel Cardizem here I would recommend to stop amlodipine and start him on Cardizem. . Patient is on low-dose metoprolol. Her underlying lung disease is the reason for her arrhythmias I am not sure if EP study is going to help anyways because patient with end-stage lung disease tend to have multifocal atrial tachycardia which usually is not ablated will. Patient is a high risk for atrial fibrillation     Full note to follow.

## 2020-07-14 NOTE — PROGRESS NOTES
Pulmonary and Critical Care  Progress Note    Subjective: The patient has improved  Shortness of breath has improved  Chest pain none  Addressing respiratory complaints Patient is negative for  hemoptysis and cyanosis  CONSTITUTIONAL:  negative for fevers and chills      Past Medical History:     has a past medical history of Anxiety, Arthritis, Back pain at L4-L5 level, Bipolar 1 disorder (HCC), COPD (chronic obstructive pulmonary disease) ([facility] [address] Emphysema of lung ([facility] [address] FH: CAD (coronary artery disease), Fibromyalgia, H/O Doppler ultrasound, H/O echocardiogram, History of blood transfusion, Hyperlipidemia LDL goal <100, Hypertension, Obstructive sleep apnea, Osteoarthritis, and Thyroid disease. has a past surgical history that includes Carpal tunnel release; Tubal ligation; back surgery; Cardiac catheterization; and Colonoscopy (N/A, 12/12/2019). reports that she quit smoking about 12 years ago. She has a 30.00 pack-year smoking history. She has never used smokeless tobacco. She reports previous alcohol use of about 2.0 standard drinks of alcohol per week. She reports that she does not use drugs. Family history:  family history includes No Known Problems in her father and mother. Allergies   Allergen Reactions    Lisinopril Swelling and Rash     angioedema     Social History:    Reviewed; no changes    Objective:   PHYSICAL EXAM:        VITALS:  BP (!) 115/51   Pulse 100   Temp 97.3 °F (36.3 °C) (Axillary)   Resp 21   Ht 5' 2\" (1.575 m)   Wt 189 lb 7 oz (85.9 kg)   SpO2 99%   BMI 34.65 kg/m²     24HR INTAKE/OUTPUT:  No intake or output data in the 24 hours ending 07/14/20 1054    CONSTITUTIONAL:  awake, alert, cooperative, no apparent distress, and appears stated age  LUNGS:  Moving air better. CARDIOVASCULAR:  normal S1 and S2 and neg JVD  ABD:Abdomen soft, non-tender. BS normal. No masses,  No organomegaly  NEURO:Alert and oriented x3.  Gait normal. Reflexes and motor strength normal and symmetric. Cranial nerves 2-12 and sensation grossly intact. DATA:    CBC:  Recent Labs     07/12/20  0005 07/13/20  0421 07/14/20  0314   WBC 15.4* 6.6 11.0*   RBC 3.78* 3.46* 3.47*   HGB 10.8* 9.9* 9.9*   HCT 34.2* 32.4* 32.6*    255 258   MCV 90.5 93.6 93.9   MCH 28.6 28.6 28.5   MCHC 31.6* 30.6* 30.4*   RDW 12.2 12.2 12.5   SEGSPCT 71.4* 93.7* 90.8*      BMP:  Recent Labs     07/13/20  1946 07/14/20  0314 07/14/20  0830   * 137 133*   K 4.2 3.9 3.8   CL 85* 92* 88*   CO2 31 35* 35*   BUN 11 11 9   CREATININE 0.7 0.7 0.6   CALCIUM 8.9 9.6 9.4   GLUCOSE 157* 187* 232*      ABG:  Recent Labs     07/13/20  1100   PH 7.40   PO2ART 42*   [id number] 58.0*   O2SAT 83.0*     Lab Results   Component Value Date    PROBNP 1,333 (H) 07/14/2020    PROBNP 331.9 (H) 07/12/2020    PROBNP 1,023 (H) 10/09/2019    THEOPH 11.6 07/14/2020     No results found for: [facility]    Radiology Review:  Pertinent images / reports were reviewed as a part of this visit.     Assessment:     Patient Active Problem List   Diagnosis    Centrilobular emphysema ([address]    Hypertension    Hyperlipidemia LDL goal <100    Abnormal EKG    Palpitations    Anxiety    FH: CAD (coronary artery disease)    Fibromyalgia    Back pain at L4-L5 level    Sleep apnea    [facility] ([facility])    Electrolyte imbalance    Epigastric pain    COPD (chronic obstructive pulmonary disease) ([address]    Spinal stenosis of lumbar region with radiculopathy    Spinal stenosis, lumbar region, without neurogenic claudication    COPD with acute exacerbation ([address]    Pneumonia due to infectious organism    SVT [facility]supraventricular tachycardia) ([facility])    Class 1 obesity due to excess calories with body mass index (BMI) of 31.0 to 31.9 in adult    Pneumonia    Pleural effusion    Atelectasis    Pulmonary nodules    Respiratory failure with hypoxia and hypercapnia (HCC)    Anemia    COPD with exacerbation (HCC)    Acquired hemolytic anemia, unspecified ([facility] [address]    Leucocytosis    Chronic kidney disease, stage I    Hyponatremia       Plan:   1. Overall the patient has improved. 2. Inc. activity.       Hugo Lancaster MD  7/14/2020  10:54 AM

## 2020-07-14 NOTE — CONSULTS
Cardiology Consult Note      Reason for consultation:  Tachycardia    Chief complaint :  had concerns including Shortness of Breath (increased sob x3 days with bilateral edema to both lower legs , hx emphysemia). Referring physician:  John Harden      Primary care physician: Isaura Castellon MD      History of Present Illness:     Patient is a 42-year-old female with a history of severe emphysema COPD family history of coronary artery disease hypertension hyperlipidemia obstructive sleep apnea, thyroid dysfunction and SVT presented to the hospital with worsening shortness of breath and while she was in the hospital she had tachycardia for which cardiology consult was placed. Patient reports she has chronic COPD and she has shortness of breath but this was more than her usual.  She has shortness of breath at rest and with minimal exertion. No orthopnea or PND patient reports she was evaluated for lung transplant at [address] [place] [address] but according to her she did not receive the transplant because of the poor support system she has. Patient has on and off palpitations. Patient denies any chest pain, dizziness or syncope. Her left heart cath was performed in October 2019 did not show any significant coronary artery disease but did show elevated left ventricular end-diastolic pressures.            Past Medical History:   Diagnosis Date    Anxiety 2/16/2017    Arthritis     Back pain at L4-L5 level 2/16/2017    Dr Charley Jeter   [facility] Bipolar 1 disorder ([facility]     COPD (chronic obstructive pulmonary disease) ([facility]     Emphysema of lung ([facility]     FH: CAD (coronary artery disease) 2/16/2017    Father had in his forties, sister in her thirties    [facility] Fibromyalgia 2/16/2017    H/O Doppler ultrasound 04/05/2019    venous- no DVT or reflux    H/O echocardiogram 01/14/2015    EF50-55% Normal- see media    History of blood transfusion     Hyperlipidemia LDL goal <100     Hypertension     Obstructive sleep apnea     Osteoarthritis     Thyroid disease        Past Surgical History:   Procedure Laterality Date    BACK SURGERY      CARDIAC CATHETERIZATION      10/2019    CARPAL TUNNEL RELEASE      COLONOSCOPY N/A 12/12/2019    COLONOSCOPY DIAGNOSTIC performed by Prince Camejo MD at [address] [place] [address]         Family History   Problem Relation Age of Onset    No Known Problems Mother     No Known Problems Father          reports that she quit smoking about 12 years ago. She has a 30.00 pack-year smoking history. She has never used smokeless tobacco. She reports previous alcohol use of about 2.0 standard drinks of alcohol per week. She reports that she does not use drugs.     Allergies   Allergen Reactions    Lisinopril Swelling and Rash     angioedema       Current Facility-Administered Medications   Medication Dose Route Frequency Provider Last Rate Last Dose    hydrALAZINE (APRESOLINE) tablet 25 mg  25 mg Oral TID BRIAN Bingham CNP   25 mg at 07/14/20 1308    HYDROcodone-chlorpheniramine (TUSSIONEX) 10-8 MG/5ML oral suspension 5 mL  5 mL Oral Q12H PRN Angela Washington MD   5 mL at 07/14/20 1308    methylPREDNISolone sodium (SOLU-MEDROL) injection 40 mg  40 mg Intravenous Q8H Max Desouza MD   40 mg at 07/14/20 1022    azithromycin (ZITHROMAX) 500 mg in dextrose 5 % 250 mL IVPB  500 mg Intravenous Q24H Max Young MD   Stopped at 07/14/20 1158    guaiFENesin (MUCINEX) extended release tablet 600 mg  600 mg Oral BID Ela Bello PA-C   600 mg at 07/14/20 1023    benzonatate (TESSALON) capsule 200 mg  200 mg Oral TID PRN Moni Mcclendon PA-C   200 mg at 07/13/20 2210    atorvastatin (LIPITOR) tablet 40 mg  40 mg Oral Nightly Ronald Main MD   40 mg at 07/13/20 2105    amLODIPine (NORVASC) tablet 10 mg  10 mg Oral Daily BRIAN Bingham CNP   10 mg at 07/14/20 1023    fluticasone (FLONASE) 50 MCG/ACT nasal spray 2 spray  2 spray Nasal Daily Silvino Rachel MD   2 spray at 07/14/20 1025    aspirin chewable tablet 81 mg  81 mg Oral Daily Silvino Rachel MD   81 mg at 07/14/20 1024    baclofen (LIORESAL) tablet 10 mg  10 mg Oral TID Silvino Rachel MD   10 mg at 07/14/20 1308    traZODone (DESYREL) tablet 75 mg  75 mg Oral Nightly Silvino Rachel MD   75 mg at 07/13/20 2104    montelukast (SINGULAIR) tablet 10 mg  10 mg Oral Daily Silvino Rachel MD   10 mg at 07/13/20 2104    metoprolol succinate (TOPROL XL) extended release tablet 25 mg  25 mg Oral Daily Silvino Rachel MD   25 mg at 07/14/20 1024    ferrous gluconate 324 (37.5 Fe) MG tablet 324 mg  324 mg Oral BID Silvino Rachel MD   324 mg at 07/14/20 1023    levothyroxine (SYNTHROID) tablet 100 mcg  100 mcg Oral Daily Silvino Rachel MD   100 mcg at 07/14/20 0526    DULoxetine (CYMBALTA) extended release capsule 60 mg  60 mg Oral Daily Silvino Rachel MD   60 mg at 07/14/20 1023    ipratropium-albuterol (DUONEB) nebulizer solution 3 mL  1 vial Inhalation Q4H Silvino Rachel MD   3 mL at 07/14/20 1218    budesonide-formoterol (SYMBICORT) 160-4.5 MCG/ACT inhaler 2 puff  2 puff Inhalation BID Silvino Rachel MD   2 puff at 07/14/20 0908    theophylline (MIAH-24) extended release capsule 400 mg  400 mg Oral Daily Silvino Rachel MD   400 mg at 07/14/20 1022    sodium chloride flush 0.9 % injection 10 mL  10 mL Intravenous 2 times per day Silvino Rachel MD   10 mL at 07/14/20 1022    sodium chloride flush 0.9 % injection 10 mL  10 mL Intravenous PRN Silvino Rachel MD        acetaminophen (TYLENOL) tablet 650 mg  650 mg Oral Q6H PRN Silvino Rachel MD   650 mg at 07/14/20 1023    Or    acetaminophen (TYLENOL) suppository 650 mg  650 mg Rectal Q6H PRN Silvino Rachel MD        polyethylene glycol (GLYCOLAX) packet 17 g  17 g Oral Daily PRN Quiana Jarrett MD        promethazine (PHENERGAN) tablet 12.5 mg  12.5 mg Oral Q6H PRN Quiana Jarrett MD        Or    ondansetron [facility]) injection 4 mg  4 mg Intravenous Q6H PRN Quiana Jarrett MD        enoxaparin (LOVENOX) injection 40 mg  40 mg Subcutaneous Daily Quiana Jarrett MD   40 mg at 07/14/20 1022    potassium chloride 10 mEq/100 mL IVPB (Peripheral Line)  10 mEq Intravenous PRN Quiana Jarrett MD        vitamin B-1 (THIAMINE) tablet 100 mg  100 mg Oral Daily Quiana Jarrett MD   100 mg at 23/41/41 8845    folic acid (FOLVITE) tablet 1 mg  1 mg Oral Daily Quiana Jarrett MD   1 mg at 07/14/20 1024    clonazePAM (KLONOPIN) tablet 1 mg  1 mg Oral TID PRN Quiana Jarrett MD   1 mg at 07/12/20 1021    pantoprazole (PROTONIX) injection 40 mg  40 mg Intravenous Daily Quiana Jarrett MD   40 mg at 07/14/20 1022    albuterol sulfate  (90 Base) MCG/ACT inhaler 1 puff  1 puff Inhalation Q4H PRN Quiana Jarrett MD        And    ipratropium (ATROVENT HFA) 17 MCG/ACT inhaler 1 puff  1 puff Inhalation Q4H PRN Quiana Jarrett MD        busPIRone (BUSPAR) tablet 10 mg  10 mg Oral BID Valdez Resendiz MD   10 mg at 07/14/20 1023    clonazePAM (KLONOPIN) tablet 1 mg  1 mg Oral TID Brittney Benson MD   1 mg at 07/14/20 1308       Review of Systems:   Review of Systems   Constitutional: Positive for fatigue. Negative for activity change, chills and fever. HENT: Negative for congestion, ear pain and tinnitus. Eyes: Negative for photophobia, pain and visual disturbance. Respiratory: Positive for shortness of breath. Negative for cough, chest tightness and wheezing. Cardiovascular: Positive for palpitations. Negative for chest pain and leg swelling. Gastrointestinal: Negative for abdominal pain, blood in stool, constipation, diarrhea, nausea and vomiting.    Endocrine: Negative for cold dry.      Findings: No erythema. Neurological:      General: No focal deficit present. Mental Status: She is alert and oriented to person, place, and time. Cranial Nerves: No cranial nerve deficit. Psychiatric:         Behavior: Behavior normal.             CBC:  Lab Results   Component Value Date    WBC 11.0 07/14/2020    HGB 9.9 07/14/2020    HCT 32.6 07/14/2020     07/14/2020     Lipids:   Lab Results   Component Value Date    CHOL 193 09/30/2016    TRIG 173 (H) 09/30/2016    HDL 83 04/08/2020    LDLDIRECT 86 04/08/2020     PT/INR:   Lab Results   Component Value Date    INR 0.94 06/11/2019        BMP:    Lab Results   Component Value Date     (L) 07/14/2020    K 3.8 07/14/2020    CL 88 (L) 07/14/2020    CO2 35 (H) 07/14/2020    BUN 9 07/14/2020     CMP:   Lab Results   Component Value Date    AST 33 07/12/2020    PROT 7.1 07/12/2020    BILITOT 0.3 07/12/2020    ALKPHOS 80 07/12/2020     TSH:  No results found for: TSH    EKGINTERPRETATION - EKG Interpretation:        IMPRESSION / RECOMMENDATIONS:     1. Tachycardia  2. COPD exacerbation  3. End stage lung disease  4. HTN  5. HLD  6. DALTON  7. Bipolar disorder  8. ETOH abuse.     Patient is an acute exacerbation of COPD. She had an extensive work-up done for lung transplant at [facility]. She was unable to get lung transplant due to insufficient familial support. This is as per patient  Patient has been wearing BiPAP continuously to help her breathe better.     Patient had a cath and echo in [facility] as a pre-lung transplant assessment and normal cath and normal LVEF was noted.     Patient did have a history of SVTs in the past and also has sinus tachycardia probably because of end-stage lung disease. Patient also is on theophylline which can contribute for tachycardia too.     Patient was supposed to be on Cardizem in the past but I do not feel Cardizem here I would recommend to stop amlodipine and start him on Cardizem. . Patient is on low-dose metoprolol.     Her underlying lung disease is the reason for her arrhythmias I am not sure if EP study is going to help anyways because patient with end-stage lung disease tend to have multifocal atrial tachycardia which usually is not ablated will. Patient is a high risk for atrial fibrillation     Thanks again for allowing me to participate in care of this patient. Please call me if you have any questions. With best regards.       Nikki Marte MD, 7/14/2020 3:12 PM

## 2020-07-14 NOTE — PROGRESS NOTES
Physical Therapy  Attempted PT treatment this PM.  Patient stated that she was too tired at this time. Will re-attempt as able and appropriate.     Asa Portillo, PT, DPT  License #: [id number]

## 2020-07-14 NOTE — PROGRESS NOTES
Hospitalist Progress Note      Name:  Yash Woodruff /Age/Sex: 1960  (64 y.o. female)   MRN & CSN:  [id number] & [id number] Admission Date/Time: 2020 11:50 PM   Location:  [facility] PCP: Esau Ann MD         Hospital Day: 4    Assessment and Plan:   Yash Woodruff is a 64 y.o.  female  who presents with Hyponatremia    1. COPD and acute exacerbation:  · Still with shortness of breath on oxygen at 3.5 L by nasal cannula   · CT chest with mild bibasilar atelectasis, small residual nodular area of scarring measuring 9 mm at the right costophrenic angle  · on steroid and azithromycin  · Bronchodilators as needed  2. Hyponatremia: Sodium 133 this morning. Will monitor. 3. Alcohol use disorder with withdrawal: CIWA protocol. Tachycardic this morning. Ativan PRN. 4. Chronic respiratory failure with hypoxia due to COPD  5. Gastroesophageal reflux disease: Continue PPI  6. Obstructive sleep apnea: Continue BiPAP  7. Coronary artery disease  8. Anxiety disorder:  9. Hypertension  10. Elevated BNP: No congestion on chest imaging. Echocardiogram with normal ejection fraction. Will monitor. Diet DIET CARDIAC; Daily Fluid Restriction: 1500 ml   DVT Prophylaxis [x] Lovenox, []  Heparin, [] SCDs, [] Warfarin  [] NOAC     GI Prophylaxis [x] PPI,  [] H2 Blocker,  [] Carafate,  [] Diet/Tube Feeds   Code Status Full Code   MDM [] Low, [x] Moderate,[]  High     History of Present Illness:     Chief Complaint: Hyponatremia    Seen and examined today. Has palpitation. Still with cough. Shortness of breath getting better. Still on oxygen 3.5 L via nasal cannula. Ten point ROS reviewed negative, unless as noted above    Objective:   No intake or output data in the 24 hours ending 20 1419   Vitals:   Vitals:    20 1230   BP: 124/66   Pulse: 112   Resp: 19   Temp: 98 °F (36.7 °C)   SpO2: 100%     Physical Exam:   GEN Awake female, sitting upright in bed in no apparent distress. Appears given age. EYES Pupils are equally round. No scleral erythema, discharge, or conjunctivitis. HENT Mucous membranes are moist. Oral pharynx without exudates, no evidence of thrush. NECK Supple, no apparent thyromegaly or masses. RESP Clear to auscultation, no wheezes, rales or rhonchi. Symmetric chest movement while on room air. CARDIO/VASC S1/S2 auscultated. Tachycardic no JVD or carotid bruits. Peripheral pulses equal bilaterally and palpable. No peripheral edema. GI Abdomen is soft without significant tenderness, masses, or guarding. Bowel sounds are normoactive. Rectal exam deferred. MSK No gross joint deformities. SKIN Normal coloration, warm, dry. NEURO Cranial nerves appear grossly intact, normal speech, no lateralizing weakness. PSYCH Awake, alert, oriented x 4. Affect appropriate.     Medications:   Medications:    hydrALAZINE  25 mg Oral TID    methylPREDNISolone  40 mg Intravenous Q8H    azithromycin  500 mg Intravenous Q24H    guaiFENesin  600 mg Oral BID    atorvastatin  40 mg Oral Nightly    amLODIPine  10 mg Oral Daily    fluticasone  2 spray Nasal Daily    aspirin  81 mg Oral Daily    baclofen  10 mg Oral TID    traZODone  75 mg Oral Nightly    montelukast  10 mg Oral Daily    metoprolol succinate  25 mg Oral Daily    ferrous gluconate  324 mg Oral BID    levothyroxine  100 mcg Oral Daily    DULoxetine  60 mg Oral Daily    ipratropium-albuterol  1 vial Inhalation Q4H    budesonide-formoterol  2 puff Inhalation BID    theophylline  400 mg Oral Daily    sodium chloride flush  10 mL Intravenous 2 times per day    enoxaparin  40 mg Subcutaneous Daily    thiamine  100 mg Oral Daily    folic acid  1 mg Oral Daily    pantoprazole  40 mg Intravenous Daily    busPIRone  10 mg Oral BID    clonazePAM  1 mg Oral TID      Infusions:   PRN Meds: HYDROcodone-chlorpheniramine, 5 mL, Q12H PRN  benzonatate, 200 mg, TID PRN  sodium chloride flush, 10 mL, PRN  acetaminophen, 650 mg, Q6H PRN    Or  acetaminophen, 650 mg, Q6H PRN  polyethylene glycol, 17 g, Daily PRN  promethazine, 12.5 mg, Q6H PRN    Or  ondansetron, 4 mg, Q6H PRN  potassium chloride, 10 mEq, PRN  clonazePAM, 1 mg, TID PRN  albuterol sulfate HFA, 1 puff, Q4H PRN    And  ipratropium, 1 puff, Q4H PRN        Recent Labs     07/12/20  0005 07/13/20 0421 07/14/20 0314   WBC 15.4* 6.6 11.0*   HGB 10.8* 9.9* 9.9*   HCT 34.2* 32.4* 32.6*    255 258      Recent Labs     07/13/20 0421 07/13/20  1946 07/14/20 0314 07/14/20  0830     138   < > 128* 137 133*   K 3.7  3.7   < > 4.2 3.9 3.8   CL 95*  94*   < > 85* 92* 88*   CO2 32  32   < > 31 35* 35*   PHOS 1.7*  --   --  1.2*  --    BUN 7  7   < > 11 11 9   CREATININE 0.6  0.6   < > 0.7 0.7 0.6    < > = values in this interval not displayed. Recent Labs     07/12/20  0005   AST 33   ALT 27   BILITOT 0.3   ALKPHOS 80     No results for input(s): INR in the last 72 hours.   Recent Labs     07/12/20  0005   TROPONINT <0.010      Imaging reviewed    Electronically signed by Ioana Rae MD on 7/14/2020 at 2:19 PM

## 2020-07-14 NOTE — PROGRESS NOTES
07/14/20 0005   NIV Type   NIV Started/Stopped On   Equipment Type v60   Mode Bilevel   Mask Type Full face mask   Mask Size Medium   Bonnet size Medium   Settings/Measurements   IPAP 15 cmH20   CPAP/EPAP 5 cmH2O   Resp 18   FiO2  35 %   I Time/ I Time % 1.15 s   Vt Exhaled 532 mL   Minute Volume 18 Liters   Mask Leak (lpm) 0 lpm   Comfort Level Good   Using Accessory Muscles No   SpO2 100   Patient Observation   Observations Pt sleeping   Alarm Settings   Alarms On Y   Press Low Alarm 5 cmH2O   High Pressure Alarm 25 cmH2O   Delay Alarm 20 sec(s)   Apnea (secs) 20 secs   Resp Rate Low Alarm 12   High Respiratory Rate 40 br/min

## 2020-07-14 NOTE — PROGRESS NOTES
11   CREATININE 0.6 0.7 0.7   GLUCOSE 160* 157* 187*     Hepatic:   Recent Labs     07/12/20  0005   AST 33   ALT 27   BILITOT 0.3   ALKPHOS 80     Troponin: No results for input(s): TROPONINI in the last 72 hours. BNP: No results for input(s): BNP in the last 72 hours. Lipids: No results for input(s): CHOL, HDL in the last 72 hours. Invalid input(s): LDLCALCU  ABGs:   Lab Results   Component Value Date    PO2ART 42 07/13/2020    GWE5MHE 58.0 07/13/2020     INR: No results for input(s): INR in the last 72 hours.   Renal Labs  Albumin:    Lab Results   Component Value Date    LABALBU 4.3 07/14/2020     Calcium:    Lab Results   Component Value Date    CALCIUM 9.6 07/14/2020     Phosphorus:    Lab Results   Component Value Date    PHOS 1.2 07/14/2020     U/A:    Lab Results   Component Value Date    NITRU NEGATIVE 04/11/2019    COLORU YELLOW 04/11/2019    WBCUA 1 04/11/2019    RBCUA NONE SEEN 04/11/2019    MUCUS RARE 02/28/2019    TRICHOMONAS NONE SEEN 04/11/2019    BACTERIA NEGATIVE 04/11/2019    CLARITYU CLEAR 04/11/2019    SPECGRAV 1.008 04/11/2019    UROBILINOGEN NORMAL 04/11/2019    BILIRUBINUR NEGATIVE 04/11/2019    BLOODU NEGATIVE 04/11/2019    KETUA NEGATIVE 04/11/2019     ABG:    Lab Results   Component Value Date    LQU0WAQ 58.0 07/13/2020    PO2ART 42 07/13/2020    YRF0SFC 35.9 07/13/2020     HgBA1c:    Lab Results   Component Value Date    LABA1C 5.1 06/21/2019     Microalbumen/Creatinine ratio:  No components found for: RUCREAT  TSH:  No results found for: TSH  IRON:    Lab Results   Component Value Date    IRON 48 05/07/2020     Iron Saturation:  No components found for: PERCENTFE  TIBC:    Lab Results   Component Value Date    TIBC 300 05/07/2020     FERRITIN:    Lab Results   Component Value Date    FERRITIN 87 05/07/2020     RPR:  No results found for: RPR  VITALY:    Lab Results   Component Value Date    VITALY None Detected 12/09/2015     Objective:   Patient Active Problem List:     Centrilobular emphysema (HCC)     Hypertension     Hyperlipidemia LDL goal <100     Abnormal EKG     Palpitations     Anxiety     FH: CAD (coronary artery disease)     Fibromyalgia     Back pain at L4-L5 level     Sleep apnea     COPD exacerbation (HCC)     Electrolyte imbalance     Epigastric pain     COPD (chronic obstructive pulmonary disease) (HCC)     Spinal stenosis of lumbar region with radiculopathy     Spinal stenosis, lumbar region, without neurogenic claudication     COPD with acute exacerbation ([address]     Pneumonia due to infectious organism     SVT (supraventricular tachycardia) ([facility]     Class 1 obesity due to excess calories with body mass index (BMI) of 31.0 to 31.9 in adult     Pneumonia     Pleural effusion     Atelectasis     Pulmonary nodules     Respiratory failure with hypoxia and hypercapnia (HCC)     Anemia     COPD with exacerbation (HCC)     Acquired hemolytic anemia, unspecified (HCC)     Leucocytosis     Chronic kidney disease, stage I     Hyponatremia    BP (!) 115/51   Pulse 100   Temp 97.3 °F (36.3 °C) (Axillary)   Resp 16   Ht 5' 2\" (1.575 m)   Wt 189 lb 7 oz (85.9 kg)   SpO2 99%   BMI 34.65 kg/m²     General appearance:   awake and verbally interactive   HEENT: Head: Normal, normocephalic, atraumatic. Neck: supple, symmetrical, trachea midline  Cardiovascular: S1 and S2: normal / no rub  Pulmonary: diminished lung sounds bilaterally   Abdomen:  soft / non-tender   Extremities: + edema to the bilateral lower legs     Impression and Plan:  1 hypovolemic hyponatremia  2 possible etoh abuse  3 bipolar with confusion  4 hypokalemia  5 htn-U  6 low mag  7. Hypophosphatemia     Plan  1.   -latest serum sodium:137; following trend  -not currently on IV fluids   -fluid restriction 1.5 liters per day; explained the rationale for water restriction    2.    -PRN Lorazepam for withdrawal symptoms via CIWA scale   -discuss with patient need to pursue sobriety and / or   Look toward treatment for substance abuse   3.    -monitor affect and sensorium and mood   4.   -latest serum potassium: 3.9; following trend  5. -BP trend: systolic BP's have recently been 115 - 134  -on hydralazine / amlodipine and metoprolol   -hydralazine dose has been reduced   6.   -latest ma.2  7.   -IV phosphorous repletion ordered for today   -encourage oral intake     I discussed with patient preventive measures for   Reducing the risk for hyponatremia in the future including  Avoiding excessive amounts of water intake and   Making certain to maintain adequate consumption of protein. Electronically signed by BRIAN Spivey - CNP          Nephrology Attending Progress Note  2020 4:52 PM  Subjective:   Admit Date: 2020  PCP: Nidia Espinosa MD    Interval History: I have personally performed face to face diagnostic evaluation on this patient. I have personally reviewed pertinent labs and imaging and agree with the care plan above. My additional findings are as follows:  Pt state doing ok and denies new concerns.  Still with shakes    Objective:   Vitals: /66   Pulse 112   Temp 98 °F (36.7 °C) (Oral)   Resp 19   Ht 5' 2\" (1.575 m)   Wt 189 lb 7 oz (85.9 kg)   SpO2 99%   BMI 34.65 kg/m²   awkae weak  Soft nt obese   Thick legs    Assessment and Plan:  IMP:  As stated above    Plan     1 bp stable overall  2 monitor for withdrawal and affect  3 na low stable  Renal stable  Will monitor and improved             Electronically signed by Michelle Granados MD on 2020 at 4:52 PM

## 2020-07-15 VITALS
HEART RATE: 97 BPM | WEIGHT: 200.6 LBS | RESPIRATION RATE: 18 BRPM | BODY MASS INDEX: 36.91 KG/M2 | OXYGEN SATURATION: 97 % | HEIGHT: 62 IN | SYSTOLIC BLOOD PRESSURE: 142 MMHG | DIASTOLIC BLOOD PRESSURE: 82 MMHG | TEMPERATURE: 98 F

## 2020-07-15 LAB
ALBUMIN SERPL-MCNC: 4 GM/DL (ref 3.4–5)
ANION GAP SERPL CALCULATED.3IONS-SCNC: 10 MMOL/L (ref 4–16)
ANION GAP SERPL CALCULATED.3IONS-SCNC: 11 MMOL/L (ref 4–16)
ANION GAP SERPL CALCULATED.3IONS-SCNC: 8 MMOL/L (ref 4–16)
BUN BLDV-MCNC: 11 MG/DL (ref 6–23)
CALCIUM SERPL-MCNC: 9 MG/DL (ref 8.3–10.6)
CALCIUM SERPL-MCNC: 9.1 MG/DL (ref 8.3–10.6)
CALCIUM SERPL-MCNC: 9.2 MG/DL (ref 8.3–10.6)
CHLORIDE BLD-SCNC: 88 MMOL/L (ref 99–110)
CHLORIDE BLD-SCNC: 88 MMOL/L (ref 99–110)
CHLORIDE BLD-SCNC: 89 MMOL/L (ref 99–110)
CO2: 32 MMOL/L (ref 21–32)
CO2: 33 MMOL/L (ref 21–32)
CO2: 36 MMOL/L (ref 21–32)
CREAT SERPL-MCNC: 0.7 MG/DL (ref 0.6–1.1)
GFR AFRICAN AMERICAN: >60 ML/MIN/1.73M2
GFR NON-AFRICAN AMERICAN: >60 ML/MIN/1.73M2
GLUCOSE BLD-MCNC: 145 MG/DL (ref 70–99)
GLUCOSE BLD-MCNC: 214 MG/DL (ref 70–99)
GLUCOSE BLD-MCNC: 322 MG/DL (ref 70–99)
GLUCOSE BLD-MCNC: 328 MG/DL (ref 70–99)
PHOSPHORUS: 1.8 MG/DL (ref 2.5–4.9)
POTASSIUM SERPL-SCNC: 3.9 MMOL/L (ref 3.5–5.1)
POTASSIUM SERPL-SCNC: 3.9 MMOL/L (ref 3.5–5.1)
POTASSIUM SERPL-SCNC: 4.4 MMOL/L (ref 3.5–5.1)
SODIUM BLD-SCNC: 131 MMOL/L (ref 135–145)
SODIUM BLD-SCNC: 132 MMOL/L (ref 135–145)
SODIUM BLD-SCNC: 132 MMOL/L (ref 135–145)

## 2020-07-15 PROCEDURE — 6370000000 HC RX 637 (ALT 250 FOR IP): Performed by: INTERNAL MEDICINE

## 2020-07-15 PROCEDURE — 94660 CPAP INITIATION&MGMT: CPT

## 2020-07-15 PROCEDURE — 6360000002 HC RX W HCPCS: Performed by: INTERNAL MEDICINE

## 2020-07-15 PROCEDURE — C9113 INJ PANTOPRAZOLE SODIUM, VIA: HCPCS | Performed by: INTERNAL MEDICINE

## 2020-07-15 PROCEDURE — 2580000003 HC RX 258: Performed by: INTERNAL MEDICINE

## 2020-07-15 PROCEDURE — 36415 COLL VENOUS BLD VENIPUNCTURE: CPT

## 2020-07-15 PROCEDURE — 6370000000 HC RX 637 (ALT 250 FOR IP): Performed by: PHYSICIAN ASSISTANT

## 2020-07-15 PROCEDURE — 94761 N-INVAS EAR/PLS OXIMETRY MLT: CPT

## 2020-07-15 PROCEDURE — 6370000000 HC RX 637 (ALT 250 FOR IP): Performed by: NURSE PRACTITIONER

## 2020-07-15 PROCEDURE — 6370000000 HC RX 637 (ALT 250 FOR IP): Performed by: HOSPITALIST

## 2020-07-15 PROCEDURE — 94640 AIRWAY INHALATION TREATMENT: CPT

## 2020-07-15 PROCEDURE — 2700000000 HC OXYGEN THERAPY PER DAY

## 2020-07-15 PROCEDURE — 80048 BASIC METABOLIC PNL TOTAL CA: CPT

## 2020-07-15 PROCEDURE — 82962 GLUCOSE BLOOD TEST: CPT

## 2020-07-15 RX ORDER — METHYLPREDNISOLONE SODIUM SUCCINATE 40 MG/ML
40 INJECTION, POWDER, LYOPHILIZED, FOR SOLUTION INTRAMUSCULAR; INTRAVENOUS EVERY 12 HOURS
Status: DISCONTINUED | OUTPATIENT
Start: 2020-07-15 | End: 2020-07-15 | Stop reason: HOSPADM

## 2020-07-15 RX ORDER — LANOLIN ALCOHOL/MO/W.PET/CERES
100 CREAM (GRAM) TOPICAL DAILY
Qty: 30 TABLET | Refills: 3 | Status: SHIPPED | OUTPATIENT
Start: 2020-07-16

## 2020-07-15 RX ORDER — BENZONATATE 200 MG/1
200 CAPSULE ORAL 3 TIMES DAILY PRN
Qty: 21 CAPSULE | Refills: 0 | Status: SHIPPED | OUTPATIENT
Start: 2020-07-15 | End: 2020-07-22

## 2020-07-15 RX ORDER — FOLIC ACID 1 MG/1
1 TABLET ORAL DAILY
Qty: 30 TABLET | Refills: 3 | Status: SHIPPED | OUTPATIENT
Start: 2020-07-16

## 2020-07-15 RX ORDER — PREDNISONE 20 MG/1
TABLET ORAL
Qty: 20 TABLET | Refills: 0 | Status: SHIPPED | OUTPATIENT
Start: 2020-07-15 | End: 2020-08-04

## 2020-07-15 RX ORDER — HYDRALAZINE HYDROCHLORIDE 25 MG/1
25 TABLET, FILM COATED ORAL 3 TIMES DAILY
Qty: 90 TABLET | Refills: 3 | Status: SHIPPED | OUTPATIENT
Start: 2020-07-15 | End: 2020-07-15 | Stop reason: HOSPADM

## 2020-07-15 RX ORDER — HYDRALAZINE HYDROCHLORIDE 10 MG/1
10 TABLET, FILM COATED ORAL 3 TIMES DAILY
Status: DISCONTINUED | OUTPATIENT
Start: 2020-07-15 | End: 2020-07-15 | Stop reason: HOSPADM

## 2020-07-15 RX ORDER — HYDRALAZINE HYDROCHLORIDE 10 MG/1
10 TABLET, FILM COATED ORAL 3 TIMES DAILY
Qty: 90 TABLET | Refills: 3 | Status: ON HOLD | OUTPATIENT
Start: 2020-07-15 | End: 2020-10-30 | Stop reason: HOSPADM

## 2020-07-15 RX ORDER — GUAIFENESIN 600 MG/1
600 TABLET, EXTENDED RELEASE ORAL 2 TIMES DAILY
Qty: 30 TABLET | Refills: 0 | Status: SHIPPED | OUTPATIENT
Start: 2020-07-15 | End: 2020-07-21 | Stop reason: SDUPTHER

## 2020-07-15 RX ADMIN — LEVOTHYROXINE SODIUM 100 MCG: 100 TABLET ORAL at 05:31

## 2020-07-15 RX ADMIN — FLUTICASONE PROPIONATE 2 SPRAY: 50 SPRAY, METERED NASAL at 08:49

## 2020-07-15 RX ADMIN — METHYLPREDNISOLONE SODIUM SUCCINATE 40 MG: 40 INJECTION, POWDER, FOR SOLUTION INTRAMUSCULAR; INTRAVENOUS at 01:31

## 2020-07-15 RX ADMIN — HYDRALAZINE HYDROCHLORIDE 25 MG: 25 TABLET, FILM COATED ORAL at 08:43

## 2020-07-15 RX ADMIN — BUSPIRONE HYDROCHLORIDE 10 MG: 10 TABLET ORAL at 08:43

## 2020-07-15 RX ADMIN — SODIUM CHLORIDE, PRESERVATIVE FREE 10 ML: 5 INJECTION INTRAVENOUS at 08:44

## 2020-07-15 RX ADMIN — FERROUS GLUCONATE TAB 324 MG (37.5 MG ELEMENTAL IRON) 324 MG: 324 (37.5 FE) TAB at 08:43

## 2020-07-15 RX ADMIN — DILTIAZEM HYDROCHLORIDE 30 MG: 30 TABLET, FILM COATED ORAL at 11:47

## 2020-07-15 RX ADMIN — ASPIRIN 81 MG CHEWABLE TABLET 81 MG: 81 TABLET CHEWABLE at 08:42

## 2020-07-15 RX ADMIN — CLONAZEPAM 1 MG: 1 TABLET ORAL at 08:43

## 2020-07-15 RX ADMIN — FOLIC ACID 1 MG: 1 TABLET ORAL at 08:43

## 2020-07-15 RX ADMIN — BUDESONIDE AND FORMOTEROL FUMARATE DIHYDRATE 2 PUFF: 160; 4.5 AEROSOL RESPIRATORY (INHALATION) at 07:47

## 2020-07-15 RX ADMIN — PANTOPRAZOLE SODIUM 40 MG: 40 INJECTION, POWDER, FOR SOLUTION INTRAVENOUS at 08:42

## 2020-07-15 RX ADMIN — IPRATROPIUM BROMIDE AND ALBUTEROL SULFATE 3 ML: .5; 3 SOLUTION RESPIRATORY (INHALATION) at 07:47

## 2020-07-15 RX ADMIN — DILTIAZEM HYDROCHLORIDE 30 MG: 30 TABLET, FILM COATED ORAL at 05:30

## 2020-07-15 RX ADMIN — Medication 5 ML: at 11:24

## 2020-07-15 RX ADMIN — ENOXAPARIN SODIUM 40 MG: 40 INJECTION SUBCUTANEOUS at 08:42

## 2020-07-15 RX ADMIN — Medication 100 MG: at 08:43

## 2020-07-15 RX ADMIN — BACLOFEN 10 MG: 10 TABLET ORAL at 08:43

## 2020-07-15 RX ADMIN — METOPROLOL SUCCINATE 25 MG: 25 TABLET, EXTENDED RELEASE ORAL at 08:43

## 2020-07-15 RX ADMIN — GUAIFENESIN 600 MG: 600 TABLET, EXTENDED RELEASE ORAL at 08:43

## 2020-07-15 RX ADMIN — IPRATROPIUM BROMIDE AND ALBUTEROL SULFATE 3 ML: .5; 3 SOLUTION RESPIRATORY (INHALATION) at 12:28

## 2020-07-15 RX ADMIN — DULOXETINE HYDROCHLORIDE 60 MG: 30 CAPSULE, DELAYED RELEASE ORAL at 08:43

## 2020-07-15 RX ADMIN — BENZONATATE 200 MG: 100 CAPSULE ORAL at 08:43

## 2020-07-15 RX ADMIN — THEOPHYLLINE ANHYDROUS 400 MG: 200 CAPSULE, EXTENDED RELEASE ORAL at 08:42

## 2020-07-15 ASSESSMENT — PAIN SCALES - GENERAL
PAINLEVEL_OUTOF10: 0
PAINLEVEL_OUTOF10: 0

## 2020-07-15 NOTE — PROGRESS NOTES
Nephrology Progress Note  7/15/2020 10:00 AM  Subjective:   Admit Date: 7/11/2020    PCP: Lula Ji MD    Interval History: patient is likely to be discharged today. Patient is voicing no pressing concerns during our visit    Diet: DIET CARDIAC; Daily Fluid Restriction: 1500 ml    Data:   Scheduled Meds:   hydrALAZINE  25 mg Oral TID    dilTIAZem  30 mg Oral 4 times per day    ipratropium-albuterol  1 vial Inhalation Q4H WA    methylPREDNISolone  40 mg Intravenous Q8H    azithromycin  500 mg Intravenous Q24H    guaiFENesin  600 mg Oral BID    atorvastatin  40 mg Oral Nightly    fluticasone  2 spray Nasal Daily    aspirin  81 mg Oral Daily    baclofen  10 mg Oral TID    traZODone  75 mg Oral Nightly    montelukast  10 mg Oral Daily    metoprolol succinate  25 mg Oral Daily    ferrous gluconate  324 mg Oral BID    levothyroxine  100 mcg Oral Daily    DULoxetine  60 mg Oral Daily    budesonide-formoterol  2 puff Inhalation BID    theophylline  400 mg Oral Daily    sodium chloride flush  10 mL Intravenous 2 times per day    enoxaparin  40 mg Subcutaneous Daily    thiamine  100 mg Oral Daily    folic acid  1 mg Oral Daily    pantoprazole  40 mg Intravenous Daily    busPIRone  10 mg Oral BID    clonazePAM  1 mg Oral TID     Continuous Infusions:    PRN Meds:HYDROcodone-chlorpheniramine, phenol, benzonatate, sodium chloride flush, acetaminophen **OR** acetaminophen, polyethylene glycol, promethazine **OR** ondansetron, potassium chloride, clonazePAM, albuterol sulfate HFA **AND** ipratropium **AND** MDI Treatment  No intake/output data recorded. No intake/output data recorded.   No intake or output data in the 24 hours ending 07/15/20 1000  CBC:   Recent Labs     07/13/20  0421 07/14/20  0314   WBC 6.6 11.0*   HGB 9.9* 9.9*    258     BMP:    Recent Labs     07/14/20  0830 07/14/20  1544 07/14/20  2350   * 131* 131*  132*   K 3.8 4.4 3.9  3.9   CL 88* 87* 88*  89*   CO2 35* 32 32  33*   BUN 9 11 11  11   CREATININE 0.6 0.7 0.7  0.7   GLUCOSE 232* 175* 322*  328*     Hepatic:   No results for input(s): AST, ALT, ALB, BILITOT, ALKPHOS in the last 72 hours. Troponin: No results for input(s): TROPONINI in the last 72 hours. BNP: No results for input(s): BNP in the last 72 hours. Lipids: No results for input(s): CHOL, HDL in the last 72 hours. Invalid input(s): LDLCALCU  ABGs:   Lab Results   Component Value Date    PO2ART 42 07/13/2020    MID0DMJ 58.0 07/13/2020     INR: No results for input(s): INR in the last 72 hours.   Renal Labs  Albumin:    Lab Results   Component Value Date    LABALBU 4.0 07/14/2020     Calcium:    Lab Results   Component Value Date    CALCIUM 9.0 07/14/2020    CALCIUM 9.1 07/14/2020     Phosphorus:    Lab Results   Component Value Date    PHOS 1.8 07/14/2020     U/A:    Lab Results   Component Value Date    NITRU NEGATIVE 04/11/2019    COLORU YELLOW 04/11/2019    WBCUA 1 04/11/2019    RBCUA NONE SEEN 04/11/2019    MUCUS RARE 02/28/2019    TRICHOMONAS NONE SEEN 04/11/2019    BACTERIA NEGATIVE 04/11/2019    CLARITYU CLEAR 04/11/2019    SPECGRAV 1.008 04/11/2019    UROBILINOGEN NORMAL 04/11/2019    BILIRUBINUR NEGATIVE 04/11/2019    BLOODU NEGATIVE 04/11/2019    KETUA NEGATIVE 04/11/2019     ABG:    Lab Results   Component Value Date    QWX1XJF 58.0 07/13/2020    PO2ART 42 07/13/2020    XAL2DPL 35.9 07/13/2020     HgBA1c:    Lab Results   Component Value Date    LABA1C 5.1 06/21/2019     Microalbumen/Creatinine ratio:  No components found for: RUCREAT  TSH:  No results found for: TSH  IRON:    Lab Results   Component Value Date    IRON 48 05/07/2020     Iron Saturation:  No components found for: PERCENTFE  TIBC:    Lab Results   Component Value Date    TIBC 300 05/07/2020     FERRITIN:    Lab Results   Component Value Date    FERRITIN 87 05/07/2020     RPR:  No results found for: RPR  VITALY:    Lab Results   Component Value Date    VITALY None Detected 12/09/2015 Objective:   Patient Active Problem List:     Centrilobular emphysema (HCC)     Hypertension     Hyperlipidemia LDL goal <100     Abnormal EKG     Palpitations     Anxiety     FH: CAD (coronary artery disease)     Fibromyalgia     Back pain at L4-L5 level     Sleep apnea     COPD exacerbation (HCC)     Electrolyte imbalance     Epigastric pain     COPD (chronic obstructive pulmonary disease) (HCC)     Spinal stenosis of lumbar region with radiculopathy     Spinal stenosis, lumbar region, without neurogenic claudication     COPD with acute exacerbation ([facility] [address]     Pneumonia due to infectious organism     SVT (supraventricular tachycardia) ([facility])     Class 1 obesity due to excess calories with body mass index (BMI) of 31.0 to 31.9 in adult     Pneumonia     Pleural effusion     Atelectasis     Pulmonary nodules     Respiratory failure with hypoxia and hypercapnia ([facility])     Anemia     COPD with exacerbation (HCC)     Acquired hemolytic anemia, unspecified ([facility])     Leucocytosis     Chronic kidney disease, stage I     Hyponatremia    /73   Pulse 108   Temp 97.9 °F (36.6 °C) (Oral)   Resp 19   Ht 5' 2\" (1.575 m)   Wt 200 lb 9.6 oz (91 kg)   SpO2 96%   BMI 36.69 kg/m²     General appearance:   awake and verbally interactive   HEENT: Head: Normal, normocephalic, atraumatic. Neck: supple, symmetrical, trachea midline  Cardiovascular: S1 and S2: normal / no rub  Pulmonary: diminished lung sounds bilaterally   Abdomen:  soft / non-tender   Extremities: + edema to the bilateral lower legs     Impression and Plan:  1 hypovolemic hyponatremia  2 possible etoh abuse  3 bipolar with confusion  4 hypokalemia  5 htn-U  6.  Hypophosphatemia     Plan  1.   -latest serum sodium:131; following trend   -fluid restriction 1.5 liters per day; reviewed the rationale for water restriction    2.   -discuss with patient need to pursue sobriety and / or   Look toward treatment for substance abuse   3.   -monitor affect and mood  4.   -latest serum potassium: 3.9; following trend  5. -BP trend: systolic BP's have recently been 103 - 137  -recent addition of diltiazem QID   -on amlodipine / hydralazine and metoprolol   -hydralazine dose was reduced today   6.   -patient has been encouraged to increase protein   In diet as a preventive measure in regard to hyponatremia     I reviewed with the patient on the importance of scheduling a  post-hospital follow-up with Nephrology as well as completing the  ordered lab work several days before the office visit. Electronically signed by Lemuel Babinski, APRN - CNP          Nephrology Attending Progress Note  7/15/2020 5:20 PM  Subjective:   Admit Date: 7/11/2020  PCP: Julien York MD    Interval History: I have personally performed face to face diagnostic evaluation on this patient. I have personally reviewed pertinent labs and imaging and agree with the care plan above. My additional findings are as follows:   Pt weak and stable for dc     Objective:   Vitals: BP (!) 142/82   Pulse 97   Temp 98 °F (36.7 °C) (Oral)   Resp 18   Ht 5' 2\" (1.575 m)   Wt 200 lb 9.6 oz (91 kg)   SpO2 97%   BMI 36.69 kg/m²   Awake weak  Soft nt  Trace edema    Assessment and Plan:  IMP:  As stated above    Plan     1 bp stable monitor  2 na low stable  3 fu affect and appear better  Fu outpt           Electronically signed by Ari Collier MD on 7/15/2020 at 5:20 PM

## 2020-07-15 NOTE — PROGRESS NOTES
Physician Progress Note      PATIENT:               Garrison Salinas  CSN #:                  [id number]  :                       1960  ADMIT DATE:       2020 11:50 PM  [address] [place] DATE:        7/15/2020 2:01 PM  RESPONDING  PROVIDER #:        Juice Ibanez MD          QUERY TEXT:    Patient admitted with AECOPD. Noted documentation of Acute-on-chronic   respiratory failure per pulmonology consult note and chronic hypoxic   respiratory failure per attending PNs. . Pt uses 3L O2 at home, has been on   3-4L during hospitalization, with SpO2 % and RR 14-28 . If possible, please document in progress notes and discharge summary:    The medical record reflects the following:  Risk Factors: AECOPD, DALTON, CAD  Clinical Indicators: 58 pulmonology consult- Acute-on-chronic respiratory   failure secondary to acute exacerbation  of COPD. Acute exacerbation of COPD.  SpO2 % on 3-4L O2 per NC with RR 14-28, sob without documented   respiratory distress or increased wob  Treatment: supplemental O2 at 3-4L, pulmonology consult, IS, steroids, nebs,   Zithromax, imaging, labs, monitoring    Thank you,    Gloria Howell RN BSN CDS  [phone]  Options provided:  -- Acute on chronic respiratory failure present as evidenced by, Please   document evidence.   -- Acute exacerbation of COPD with chronic hypoxic respiratory failure present   acute respiratory failure was ruled out  -- Refer to Clinical Documentation Reviewer    PROVIDER RESPONSE TEXT:    Acute on chronic respiratory failure is present as evidenced by on 3.5 Liter   in the hospital, on 3 L at [facility]    Query created by: Summer Reza on 7/15/2020 11:30 AM      Electronically signed by:  Juice Ibanez MD 7/15/2020 3:45 PM

## 2020-07-15 NOTE — FLOWSHEET NOTE
No follow up appointment made for patient due to patient having a follow up appointment scheduled with Dr. Ashly Lim on July 21st.

## 2020-07-15 NOTE — PROGRESS NOTES
Pulmonary and Critical Care  Progress Note    Subjective: The patient is feeling better. Shortness of breath has improved. Chest pain none  Addressing respiratory complaints Patient is negative for  hemoptysis and cyanosis  CONSTITUTIONAL:  negative for fevers and chills      Past Medical History:     has a past medical history of Anxiety, Arthritis, Back pain at L4-L5 level, Bipolar 1 disorder (HCC), COPD (chronic obstructive pulmonary disease) ([facility]), Emphysema of lung ([facility]), FH: CAD (coronary artery disease), Fibromyalgia, H/O Doppler ultrasound, H/O echocardiogram, History of blood transfusion, Hyperlipidemia LDL goal <100, Hypertension, Obstructive sleep apnea, Osteoarthritis, and Thyroid disease. has a past surgical history that includes Carpal tunnel release; Tubal ligation; back surgery; Cardiac catheterization; and Colonoscopy (N/A, 12/12/2019). reports that she quit smoking about 12 years ago. She has a 30.00 pack-year smoking history. She has never used smokeless tobacco. She reports previous alcohol use of about 2.0 standard drinks of alcohol per week. She reports that she does not use drugs. Family history:  family history includes No Known Problems in her father and mother. Allergies   Allergen Reactions    Lisinopril Swelling and Rash     angioedema     Social History:    Reviewed; no changes    Objective:   PHYSICAL EXAM:        VITALS:  /73   Pulse 108   Temp 97.9 °F (36.6 °C) (Oral)   Resp 19   Ht 5' 2\" (1.575 m)   Wt 200 lb 9.6 oz (91 kg)   SpO2 96%   BMI 36.69 kg/m²     24HR INTAKE/OUTPUT:  No intake or output data in the 24 hours ending 07/15/20 1043    CONSTITUTIONAL:  awake, alert, cooperative, no apparent distress, and appears stated age  LUNGS:  Moving air better. CARDIOVASCULAR:  normal S1 and S2 and neg JVD  ABD:Abdomen soft, non-tender. BS normal. No masses,  No organomegaly  NEURO:Alert and oriented x3.  Gait normal. Reflexes and motor strength normal and symmetric. Cranial nerves 2-12 and sensation grossly intact. DATA:    CBC:  Recent Labs     07/13/20  0421 07/14/20  0314   WBC 6.6 11.0*   RBC 3.46* 3.47*   HGB 9.9* 9.9*   HCT 32.4* 32.6*    258   MCV 93.6 93.9   MCH 28.6 28.5   MCHC 30.6* 30.4*   RDW 12.2 12.5   SEGSPCT 93.7* 90.8*      BMP:  Recent Labs     07/14/20  0830 07/14/20  1544 07/14/20  2350   * 131* 131*  132*   K 3.8 4.4 3.9  3.9   CL 88* 87* 88*  89*   CO2 35* 32 32  33*   BUN 9 11 11  11   CREATININE 0.6 0.7 0.7  0.7   CALCIUM 9.4 9.1 9.0  9.1   GLUCOSE 232* 175* 322*  328*      ABG:  Recent Labs     07/13/20  1100   PH 7.40   PO2ART 42*   IZW2DGT 58.0*   O2SAT 83.0*     Lab Results   Component Value Date    PROBNP 1,333 (H) 07/14/2020    PROBNP 331.9 (H) 07/12/2020    PROBNP 1,023 (H) 10/09/2019    THEOPH 11.6 07/14/2020     No results found for: [facility]    Radiology Review:  Pertinent images / reports were reviewed as a part of this visit.     Assessment:     Patient Active Problem List   Diagnosis    Centrilobular emphysema ([address]    Hypertension    Hyperlipidemia LDL goal <100    Abnormal EKG    Palpitations    Anxiety    FH: CAD (coronary artery disease)    Fibromyalgia    Back pain at L4-L5 level    Sleep apnea    COPD exacerbation (HCC)    Electrolyte imbalance    Epigastric pain    COPD (chronic obstructive pulmonary disease) ([address]    Spinal stenosis of lumbar region with radiculopathy    Spinal stenosis, lumbar region, without neurogenic claudication    COPD with acute exacerbation ([address]    Pneumonia due to infectious organism    SVT [facility]ra[facility]ricular tachycardia) ([facility]    Class 1 obesity due to excess calories with body mass index (BMI) of 31.0 to 31.9 in adult    Pneumonia    Pleural effusion    Atelectasis    Pulmonary nodules    Respiratory failure with hypoxia and hypercapnia (HCC)    Anemia    COPD with exacerbation (HCC)    Acquired hemolytic anemia, unspecified (HCC)    Leucocytosis  Chronic kidney disease, stage I    Hyponatremia       Plan:   1. Overall the patient has improved. 2. Inc. activity. 3. Taper steroids. 4. D/C soon.     Kelly Art MD  7/15/2020  10:43 AM

## 2020-07-15 NOTE — DISCHARGE SUMMARY
Discharge Summary    Name:  Suzan Born /Age/Sex: 1960  (64 y.o. female)   MRN & CSN:  [id number] & [id number] Admission Date/Time: 2020 11:50 PM   Attending:  Mireille Ennis MD Discharging Physician: Mireille Ennis MD     Hospital Course:   Suzan Born is a 64 y.o.  female  who presents with Hyponatremia    1. COPD and acute exacerbation:  · CT chest with mild bibasilar atelectasis, small residual nodular area of scarring measuring 9 mm at the right costophrenic angle  · on steroid and azithromycin  · Discharged on tapering dose of steroid. · Bronchodilators as needed  2. Hyponatremia: Sodium 132 this morning. 3. Alcohol use disorder with withdrawal: CIWA protocol. Tachycardic this morning. Ativan PRN. 4. Chronic respiratory failure with hypoxia due to COPD  5. Gastroesophageal reflux disease: Continue PPI  6. Obstructive sleep apnea: Continue BiPAP  7. Coronary artery disease  8. Anxiety disorder:  9. Hypertension  10. Elevated BNP: No congestion on chest imaging. Echocardiogram with normal ejection fraction. Will monitor. The patient expressed appropriate understanding of and agreement with the discharge recommendations, medications, and plan.      Consults this admission:  IP CONSULT TO HOSPITALIST  IP CONSULT TO NEPHROLOGY  IP CONSULT TO NEPHROLOGY  IP CONSULT TO PULMONOLOGY  IP CONSULT TO CARDIOLOGY    Discharge Instruction:   Follow up appointments: Pulmonology, nephrology  Primary care physician:  within 2 weeks    Diet:  cardiac diet   Activity: activity as tolerated  Disposition: Discharged to:   [x]Home, []C, []SNF, []Acute Rehab, []Hospice   Condition on discharge: Stable    Discharge Medications:      Tavia Early   Home Medication Instructions [facility]:[id number]    Printed on:07/15/20 1006   Medication Information                      albuterol-ipratropium (COMBIVENT RESPIMAT)  MCG/ACT AERS inhaler  Inhale 1 puff into the lungs every 4 hours as needed for Wheezing             aspirin 81 MG chewable tablet  Take 81 mg by mouth daily             atorvastatin (LIPITOR) 40 MG tablet  Take 40 mg by mouth nightly              baclofen (LIORESAL) 10 MG tablet  Take 1 tablet by mouth 3 times daily             benzonatate (TESSALON) 200 MG capsule  Take 1 capsule by mouth 3 times daily as needed for Cough             budesonide-formoterol (SYMBICORT) 160-4.5 MCG/ACT AERO  Inhale 2 puffs into the lungs 2 times daily             busPIRone (BUSPAR) 10 MG tablet  Take 1 tablet by mouth 3 times daily             celecoxib (CELEBREX) 200 MG capsule  Take 200 mg by mouth 2 times daily             clonazePAM (KLONOPIN) 1 MG tablet  Take 1 mg by mouth 3 times daily as needed.              dilTIAZem (CARDIZEM) 30 MG tablet  Take 1 tablet by mouth 4 times daily             DULoxetine (CYMBALTA) 60 MG extended release capsule  Take 60 mg by mouth daily             famotidine (PEPCID) 20 MG tablet  Take 1 tablet by mouth 2 times daily             ferrous gluconate 324 (37.5 Fe) MG TABS  Take 1 tablet by mouth 2 times daily             fluticasone (FLONASE) 50 MCG/ACT nasal spray  2 sprays by Nasal route daily             folic acid (FOLVITE) 1 MG tablet  Take 1 tablet by mouth daily             guaiFENesin (MUCINEX) 600 MG extended release tablet  Take 1 tablet by mouth 2 times daily             hydrALAZINE (APRESOLINE) 25 MG tablet  Take 1 tablet by mouth 3 times daily             ipratropium-albuterol (DUONEB) 0.5-2.5 (3) MG/3ML SOLN nebulizer solution  Inhale 3 mLs into the lungs every 4 hours             levothyroxine (SYNTHROID) 100 MCG tablet  Take 100 mcg by mouth Daily             metoprolol succinate (TOPROL XL) 25 MG extended release tablet  Take 1 tablet by mouth daily             montelukast (SINGULAIR) 10 MG tablet  Take 10 mg by mouth daily             predniSONE (DELTASONE) 20 MG tablet  Take 2 tablets by mouth 2 times daily for 5 days, THEN 2 tablets daily for 5 days, THEN 1 tablet daily for 5 days, THEN 0.5 tablets daily for 5 days. theophylline (MIAH-24) 400 MG extended release capsule  Take 1 capsule by mouth daily             traZODone (DESYREL) 50 MG tablet  Take 75 mg by mouth nightly              vitamin B-1 100 MG tablet  Take 1 tablet by mouth daily                 Objective Findings at Discharge:   /73   Pulse 108   Temp 97.9 °F (36.6 °C) (Oral)   Resp 19   Ht 5' 2\" (1.575 m)   Wt 200 lb 9.6 oz (91 kg)   SpO2 96%   BMI 36.69 kg/m²            PHYSICAL EXAM   GEN    Awake female, sitting upright in bed in no apparent distress. Appears given age. EYES   Pupils are equally round. No scleral erythema, discharge, or conjunctivitis. HENT  Mucous membranes are moist. Oral pharynx without exudates, no evidence of thrush. NECK  Supple, no apparent thyromegaly or masses. RESP  Clear to auscultation, no wheezes, rales or rhonchi. Symmetric chest movement while on room air. CARDIO/VASC           S1/S2 auscultated. Tachycardic no JVD or carotid bruits. Peripheral pulses equal bilaterally and palpable. No peripheral edema. GI        Abdomen is soft without significant tenderness, masses, or guarding. Bowel sounds are normoactive. Rectal exam deferred. MSK    No gross joint deformities. SKIN    Normal coloration, warm, dry. NEURO           Cranial nerves appear grossly intact, normal speech, no lateralizing weakness. PSYCH            Awake, alert, oriented x 4. Affect appropriate.     BMP/CBC  Recent Labs     07/13/20  0421  07/14/20  0314 07/14/20  0830 07/14/20  1544 07/14/20  2350     138   < > 137 133* 131* 131*  132*   K 3.7  3.7   < > 3.9 3.8 4.4 3.9  3.9   CL 95*  94*   < > 92* 88* 87* 88*  89*   CO2 32  32   < > 35* 35* 32 32  33*   BUN 7  7   < > 11 9 11 11  11   CREATININE 0.6  0.6   < > 0.7 0.6 0.7 0.7  0.7   WBC 6.6  --  11.0*  --   --   --    HCT 32.4*  --  32.6*  --   --   --      --  258 --   --   --     < > = values in this interval not displayed. IMAGING:  Ct Chest Wo Contrast    Result Date: 7/12/2020  EXAMINATION: CT OF THE CHEST WITHOUT CONTRAST 7/12/2020 11:12 am TECHNIQUE: CT of the chest was performed without the administration of intravenous contrast. Multiplanar reformatted images are provided for review. Dose modulation, iterative reconstruction, and/or weight based adjustment of the mA/kV was utilized to reduce the radiation dose to as low as reasonably achievable. COMPARISON: 10/09/2019 HISTORY: ORDERING SYSTEM PROVIDED HISTORY: shortness of breath TECHNOLOGIST PROVIDED HISTORY: Reason for exam:->shortness of breath 28-year-old female with shortness of breath FINDINGS: Mediastinum: Coronary artery disease. No pericardial or pleural effusions. No periaortic or mediastinal hemorrhage. Visualized thyroid gland grossly unremarkable in appearance. No axillary, mediastinal, or hilar lymphadenopathy. Atherosclerotic calcification of the aorta. Cardiomegaly. Lungs/pleura: Trachea and proximal central airways appear patent. No pneumothorax. Respiratory motion. Mild bibasilar atelectasis, parenchymal banding and respiratory motion. Small residual nodular area of scarring measuring 9 mm at the right costophrenic angle on image 118, series 3, similar to less conspicuous when compared with 10/09/2019. Upper Abdomen: Atherosclerotic calcification of the upper abdominal aorta. Soft Tissues/Bones: Mild diffuse degenerative changes throughout the spine. Remote healed left-sided rib fracture deformities. 1. Mild bibasilar atelectasis, parenchymal banding and respiratory motion. Mild emphysema. 2. Coronary artery disease. Cardiomegaly. Atherosclerotic calcification of the aorta. 3. Small residual nodular area of scarring measuring 9 mm at the right costophrenic angle, similar to less conspicuous compared with 10/09/2019.      Xr Chest Portable    Result Date: 7/12/2020  EXAMINATION: ONE XRAY VIEW OF THE CHEST 7/12/2020 12:52 am COMPARISON: 10/09/2019 HISTORY: ORDERING SYSTEM PROVIDED HISTORY: sob TECHNOLOGIST PROVIDED HISTORY: Reason for exam:->sob Reason for Exam: SOB Acuity: Unknown Type of Exam: Initial FINDINGS: Cardiac silhouette appears within normal limits for size. Pulmonary vascularity appear stable. Minimal bibasilar airspace disease likely representing atelectasis. Haziness overlying the lower lung fields bilaterally without definite pleural effusion. No acute osseous abnormality. Degenerative changes are seen in the shoulders. Minimal haziness seen overlying the lung bases bilaterally, which could represent atelectasis and/or pleural fluid. Assessment for fluid would be best assessed with a lateral radiograph. No focal consolidation or pneumothorax. No overt pulmonary edema. Xr Chest 1 Vw    Result Date: 7/12/2020  EXAMINATION: ONE XRAY VIEW OF THE CHEST 7/12/2020 2:29 am COMPARISON: 07/12/2020, 10/09/2019 HISTORY: ORDERING SYSTEM PROVIDED HISTORY: lateral view for further eval TECHNOLOGIST PROVIDED HISTORY: Reason for exam:->lateral view for further eval Reason for Exam: lateral view for further eval Acuity: Unknown Type of Exam: Subsequent/Follow-up FINDINGS: A lateral view of the chest was obtained, which reveals diffuse osteopenia without acute osseous fracture. Mild degenerative changes are seen in the spine. No posterior layering pleural effusion is identified. Some fissural fluid seen on the previous examination is no longer present. No focal wedge-shaped opacity is seen to suggest partial lobar collapse. No focal infiltrate is seen. Haziness over the lung bases bilaterally following evaluation of the lateral view are felt related to overlying soft tissue, with no evidence of pneumothorax or focal infiltrate seen.        Discharge Time of 20 minutes    Electronically signed by Anastasia Santamaria MD on 7/15/2020 at 10:06 AM

## 2020-07-16 ENCOUNTER — CARE COORDINATION (OUTPATIENT)
Dept: CASE MANAGEMENT | Age: 60
End: 2020-07-16

## 2020-07-16 LAB
CULTURE: NORMAL
GRAM SMEAR: NORMAL
Lab: NORMAL
SPECIMEN: NORMAL

## 2020-07-17 ASSESSMENT — ENCOUNTER SYMPTOMS
WHEEZING: 0
ABDOMINAL PAIN: 0
BLOOD IN STOOL: 0
CHEST TIGHTNESS: 0
BACK PAIN: 0
VOMITING: 0
PHOTOPHOBIA: 0
EYE PAIN: 0
DIARRHEA: 0
CONSTIPATION: 0
COLOR CHANGE: 0
COUGH: 0
SHORTNESS OF BREATH: 1
NAUSEA: 0

## 2020-07-18 ENCOUNTER — CARE COORDINATION (OUTPATIENT)
Dept: CASE MANAGEMENT | Age: 60
End: 2020-07-18

## 2020-07-18 LAB
CULTURE: NORMAL
CULTURE: NORMAL
Lab: NORMAL
Lab: NORMAL
SPECIMEN: NORMAL
SPECIMEN: NORMAL

## 2020-07-18 NOTE — CARE COORDINATION
Covid-19 2nd Outreach call, no answer.   Left VM with contact information and request  for return call at [address], [address] [place] [address] Coordination Transition

## 2020-07-30 ENCOUNTER — OFFICE VISIT (OUTPATIENT)
Dept: CARDIOLOGY CLINIC | Age: 60
End: 2020-07-30
Payer: COMMERCIAL

## 2020-07-30 VITALS
WEIGHT: 187.4 LBS | SYSTOLIC BLOOD PRESSURE: 160 MMHG | DIASTOLIC BLOOD PRESSURE: 84 MMHG | HEIGHT: 61 IN | BODY MASS INDEX: 35.38 KG/M2 | HEART RATE: 104 BPM

## 2020-07-30 PROCEDURE — G8926 SPIRO NO PERF OR DOC: HCPCS | Performed by: NURSE PRACTITIONER

## 2020-07-30 PROCEDURE — 1036F TOBACCO NON-USER: CPT | Performed by: NURSE PRACTITIONER

## 2020-07-30 PROCEDURE — G8417 CALC BMI ABV UP PARAM F/U: HCPCS | Performed by: NURSE PRACTITIONER

## 2020-07-30 PROCEDURE — 1111F DSCHRG MED/CURRENT MED MERGE: CPT | Performed by: NURSE PRACTITIONER

## 2020-07-30 PROCEDURE — 99213 OFFICE O/P EST LOW 20 MIN: CPT | Performed by: NURSE PRACTITIONER

## 2020-07-30 PROCEDURE — 3017F COLORECTAL CA SCREEN DOC REV: CPT | Performed by: NURSE PRACTITIONER

## 2020-07-30 PROCEDURE — 3023F SPIROM DOC REV: CPT | Performed by: NURSE PRACTITIONER

## 2020-07-30 PROCEDURE — G8427 DOCREV CUR MEDS BY ELIG CLIN: HCPCS | Performed by: NURSE PRACTITIONER

## 2020-07-30 RX ORDER — POTASSIUM CHLORIDE 1.5 G/1.77G
20 POWDER, FOR SOLUTION ORAL DAILY
COMMUNITY
End: 2020-08-21

## 2020-07-30 RX ORDER — TRIAMTERENE AND HYDROCHLOROTHIAZIDE 37.5; 25 MG/1; MG/1
CAPSULE ORAL
COMMUNITY
Start: 2020-06-16 | End: 2020-07-30

## 2020-07-30 RX ORDER — DILTIAZEM HYDROCHLORIDE 240 MG/1
240 CAPSULE, COATED, EXTENDED RELEASE ORAL DAILY
Qty: 30 CAPSULE | Refills: 3 | Status: ON HOLD | OUTPATIENT
Start: 2020-07-30 | End: 2020-10-30 | Stop reason: HOSPADM

## 2020-07-30 ASSESSMENT — ENCOUNTER SYMPTOMS
BLOOD IN STOOL: 0
SHORTNESS OF BREATH: 1
NAUSEA: 0
EYE REDNESS: 0
ABDOMINAL PAIN: 0
SORE THROAT: 0
DIARRHEA: 0
EYE ITCHING: 0
CHEST TIGHTNESS: 0
COLOR CHANGE: 0
BACK PAIN: 0
SINUS PAIN: 0
VOMITING: 0
CONSTIPATION: 0
ABDOMINAL DISTENTION: 0
SINUS PRESSURE: 0
COUGH: 0

## 2020-07-31 NOTE — PROGRESS NOTES
CARDIOLOGY  NOTE      7/30/2020    RE: Yohana Burdick  (1960)                               TO:  Dr. Trina Arechiga MD  The primary cardiologist is Dr Kenny Shepard    CC:  Denies the following:  Chest Pain  Palpitations  Edema  Dizziness  Syncope    Complains of shortness of breath and intermittent episodes of tachycardia    HPI: Thank you for involving me in taking care of your patient Yohana Burdick, who is a  64y.o. year old female with a history of HTN, HLD, SVT, and COPD, and end stage lung disease. She is seen today for a follow up on recent hospital visit with complaints of shortness of breath, lower leg swelling, and tachycardia. Patient reports that she has chronic shortness of breath with exertion and with speech. She is on chronic 02. She tells me that when she went to the Emergency Room she was more short of breath than usual.  Additionally she complained of tachycardia. She states that she has had HR's at high as 130. She tells me that she has had tachycardia in the past.   She was seen by Dr Deborah Easley in the hospital. She was started on oral Cardizem. She also is on toprol xl. She during this visit  denies chest pain, palpitations, edema, dizziness or syncope.       Current Outpatient Medications   Medication Sig Dispense Refill    potassium chloride (KLOR-CON) 20 MEQ packet Take 20 mEq by mouth daily      dilTIAZem (CARDIZEM CD) 240 MG extended release capsule Take 1 capsule by mouth daily 30 capsule 3    ipratropium-albuterol (DUONEB) 0.5-2.5 (3) MG/3ML SOLN nebulizer solution Inhale 3 mLs into the lungs every 4 hours 1080 mL 3    guaiFENesin (MUCINEX) 600 MG extended release tablet Take 1 tablet by mouth 2 times daily 30 tablet 0    budesonide-formoterol (SYMBICORT) 160-4.5 MCG/ACT AERO Inhale 2 puffs into the lungs 2 times daily 3 Inhaler 3    montelukast (SINGULAIR) 10 MG tablet Take 1 tablet by mouth daily 30 tablet 3    levothyroxine (SYNTHROID) 100 MCG tablet Take 1 tablet by mouth Daily 30 tablet 2    folic acid (FOLVITE) 1 MG tablet Take 1 tablet by mouth daily 30 tablet 3    predniSONE (DELTASONE) 20 MG tablet Take 2 tablets by mouth 2 times daily for 5 days, THEN 2 tablets daily for 5 days, THEN 1 tablet daily for 5 days, THEN 0.5 tablets daily for 5 days. 20 tablet 0    vitamin B-1 100 MG tablet Take 1 tablet by mouth daily 30 tablet 3    hydrALAZINE (APRESOLINE) 10 MG tablet Take 1 tablet by mouth 3 times daily 90 tablet 3    albuterol-ipratropium (COMBIVENT RESPIMAT)  MCG/ACT AERS inhaler Inhale 1 puff into the lungs every 4 hours as needed for Wheezing 3 Inhaler 0    theophylline (MIAH-24) 400 MG extended release capsule Take 1 capsule by mouth daily 90 capsule 0    DULoxetine (CYMBALTA) 60 MG extended release capsule Take 60 mg by mouth daily      busPIRone (BUSPAR) 10 MG tablet Take 1 tablet by mouth 3 times daily 90 tablet 0    ferrous gluconate 324 (37.5 Fe) MG TABS Take 1 tablet by mouth 2 times daily 60 tablet 2    metoprolol succinate (TOPROL XL) 25 MG extended release tablet Take 1 tablet by mouth daily 30 tablet 3    famotidine (PEPCID) 20 MG tablet Take 1 tablet by mouth 2 times daily (Patient taking differently: Take 40 mg by mouth nightly ) 60 tablet 3    celecoxib (CELEBREX) 200 MG capsule Take 200 mg by mouth 2 times daily      traZODone (DESYREL) 50 MG tablet Take 75 mg by mouth nightly       clonazePAM (KLONOPIN) 1 MG tablet Take 1 mg by mouth 3 times daily as needed.  baclofen (LIORESAL) 10 MG tablet Take 1 tablet by mouth 3 times daily 30 tablet 0    aspirin 81 MG chewable tablet Take 81 mg by mouth daily      fluticasone (FLONASE) 50 MCG/ACT nasal spray 2 sprays by Nasal route daily 1 Bottle 0     No current facility-administered medications for this visit.       Allergies: Lisinopril  Past Medical History:   Diagnosis Date    Anxiety 2/16/2017    Arthritis     Back pain at L4-L5 level 2017    Dr Elodia Cummins   Galina Heredia B[facility] 1 disorder ([facility] [address]     COPD (chronic obstructive pulmonary disease) ([facility] [address]     Emphysema of lung ([facility] [address]     FH: CAD (coronary artery disease) 2017    Father had in his forties, sister in her thirties    Galina Heredia Fibromyalgia 2017    H/O Doppler ultrasound 2019    venous- no DVT or reflux    H/O echocardiogram 2015    EF50-55% Normal- see media    History of blood transfusion     Hyperlipidemia LDL goal <100     Hypertension     Obstructive sleep apnea     Osteoarthritis     Thyroid disease      Past Surgical History:   Procedure Laterality Date    BACK SURGERY      CARDIAC CATHETERIZATION      10/2019    CARPAL TUNNEL RELEASE      COLONOSCOPY N/A 2019    COLONOSCOPY DIAGNOSTIC performed by Gurpreet Alexis MD at [address] [place] [address]       Family History   Problem Relation Age of Onset    No Known Problems Mother     No Known Problems Father      Social History     Tobacco Use    Smoking status: Former Smoker     Packs/day: 1.50     Years: 20.00     Pack years: 30.00     Last attempt to quit: 2008     Years since quittin.5    Smokeless tobacco: Never Used   Substance Use Topics    Alcohol use: Not Currently     Alcohol/week: 2.0 standard drinks     Types: 2 Shots of liquor per week        Review of Systems   Constitutional: Negative for activity change, appetite change and fatigue. HENT: Negative for congestion, sinus pressure, sinus pain and sore throat. Eyes: Negative for redness and itching. Respiratory: Positive for shortness of breath. Negative for cough and chest tightness. Cardiovascular: Positive for palpitations. Negative for chest pain and leg swelling. Gastrointestinal: Negative for abdominal distention, abdominal pain, blood in stool, constipation, diarrhea, nausea and vomiting. Genitourinary: Negative for difficulty urinating and dysuria. Musculoskeletal: Positive for arthralgias. Negative for back pain and gait problem. Skin: Negative for color change, pallor, rash and wound. Neurological: Negative for dizziness, syncope and light-headedness. Hematological: Bruises/bleeds easily. Psychiatric/Behavioral: Negative for confusion. The patient is not nervous/anxious. Objective:      Physical Exam:  BP (!) 160/84   Pulse 104   Ht 5' 1\" (1.549 m)   Wt 187 lb 6.4 oz (85 kg)   BMI 35.41 kg/m²   Wt Readings from Last 3 Encounters:   07/30/20 187 lb 6.4 oz (85 kg)   07/21/20 200 lb (90.7 kg)   07/15/20 200 lb 9.6 oz (91 kg)     Body mass index is 35.41 kg/m². Physical Exam  Constitutional:       Appearance: Normal appearance. She is not ill-appearing or diaphoretic. HENT:      Head: Normocephalic and atraumatic. Right Ear: External ear normal.      Left Ear: External ear normal.      Nose: No congestion or rhinorrhea. Mouth/Throat:      Mouth: Mucous membranes are moist.      Pharynx: Oropharynx is clear. No oropharyngeal exudate or posterior oropharyngeal erythema. Eyes:      General:         Right eye: No discharge. Left eye: No discharge. Pupils: Pupils are equal, round, and reactive to light. Neck:      Musculoskeletal: Neck supple. No muscular tenderness. Vascular: No carotid bruit. Cardiovascular:      Rate and Rhythm: Regular rhythm. Tachycardia present. Pulses: Normal pulses. Heart sounds: Normal heart sounds. No murmur. No friction rub. No gallop. Pulmonary:      Effort: Respiratory distress (dyspnea with speech, this is baseline for patient.) present. Breath sounds: Wheezing and rales present. Abdominal:      General: Abdomen is flat. Bowel sounds are normal. There is no distension. Tenderness: There is no abdominal tenderness. Musculoskeletal:      Right lower leg: No edema. Left lower leg: No edema. Skin:     General: Skin is warm and dry. Findings: Bruising present.    Neurological:      Mental Status: She is alert and oriented to person, place, and time. Psychiatric:         Mood and Affect: Mood normal.         Behavior: Behavior normal.         Thought Content: Thought content normal.         Judgment: Judgment normal.         DATA:  Lab Results   Component Value Date    CKTOTAL 74 02/28/2019    TROPONINI 0.007 03/25/2014     BNP:  No results found for: BNP  PT/INR:  No results found for: IT'SUGAR  Lab Results   Component Value Date    LABA1C 5.1 06/21/2019     Lab Results   Component Value Date    CHOL 193 09/30/2016    TRIG 173 (H) 09/30/2016    HDL 83 04/08/2020    LDLDIRECT 86 04/08/2020     Lab Results   Component Value Date    ALT 27 07/12/2020    AST 33 07/12/2020     TSH:  No results found for: TSH      Assessment/ Plan:    Patient seen, interviewed and examined. Testing was reviewed. HTN  BP is elevated  Will change cardizem 30 q4 hours to cardizem  mg daily  If BP is not better controlled at next visit in 2 weeks, will change toprol xl to lopressor and increase the dosage    SVT  Patient is tachycardic today  Will uptitrate Cardizem to Cardizem  mg daily    end stage lung disease. Per pulmonary  mostlikely driving force behind tachycardia    CAD  Had LHC at [facility] in 2019  No significant CAD noted    Patient to follow up in 2 month with Dr Merline Burton     Patient is encouraged to exercise even a brisk walk for 30 minutes at least 3 to 4 times a week. Lifestyle and risk factor modificatons discussed. Various goals are discussed and questions answered. Continue current medications. Appropriate prescriptions are addressed. Questions answered and patient verbalizes understanding. Call for any problems, questions, or concerns.

## 2020-08-21 ENCOUNTER — OFFICE VISIT (OUTPATIENT)
Dept: CARDIOLOGY CLINIC | Age: 60
End: 2020-08-21
Payer: COMMERCIAL

## 2020-08-21 VITALS
BODY MASS INDEX: 35.38 KG/M2 | HEART RATE: 112 BPM | DIASTOLIC BLOOD PRESSURE: 72 MMHG | WEIGHT: 187.4 LBS | HEIGHT: 61 IN | SYSTOLIC BLOOD PRESSURE: 138 MMHG

## 2020-08-21 PROCEDURE — 99214 OFFICE O/P EST MOD 30 MIN: CPT | Performed by: INTERNAL MEDICINE

## 2020-08-21 PROCEDURE — 93000 ELECTROCARDIOGRAM COMPLETE: CPT | Performed by: INTERNAL MEDICINE

## 2020-08-21 PROCEDURE — 3017F COLORECTAL CA SCREEN DOC REV: CPT | Performed by: INTERNAL MEDICINE

## 2020-08-21 PROCEDURE — G8427 DOCREV CUR MEDS BY ELIG CLIN: HCPCS | Performed by: INTERNAL MEDICINE

## 2020-08-21 PROCEDURE — 1036F TOBACCO NON-USER: CPT | Performed by: INTERNAL MEDICINE

## 2020-08-21 PROCEDURE — G8417 CALC BMI ABV UP PARAM F/U: HCPCS | Performed by: INTERNAL MEDICINE

## 2020-08-21 NOTE — PROGRESS NOTES
Jose Cornell MD        OFFICE  FOLLOWUP NOTE    Chief complaints: patient is here for management of leg swelling,SVT, bipolar, fibromyalgia, chest pain, rt lung, end stage lung disease and COPD      Subjective: patient had epistaxis a couple of weeks ago    HPI David Pedro is a 64 y. o.year old who  has a past medical history of Anxiety, Arthritis, Back pain at L4-L5 level, Bipolar 1 disorder ([address] COPD (chronic obstructive pulmonary disease) ([facility] [address] Emphysema of lung ([facility] [address] FH: CAD (coronary artery disease), Fibromyalgia, H/O Doppler ultrasound, H/O echocardiogram, History of blood transfusion, Hyperlipidemia LDL goal <100, Hypertension, Obstructive sleep apnea, Osteoarthritis, and Thyroid disease. and presents for management of leg swelling,SVT, bipolar, fibromyalgia, chest pain, which are well controlled, she had repeated admission to hospital for COPD and breathing issues, started on cardizem for tachycardia by EP.     HER lhc SHOWED Mild CAD, RHC showed mean PCWP was 35, PA MEAN 41, ra MEAN 17  LAST yr 10/2019  Current Outpatient Medications   Medication Sig Dispense Refill    dilTIAZem (CARDIZEM CD) 240 MG extended release capsule Take 1 capsule by mouth daily 30 capsule 3    ipratropium-albuterol (DUONEB) 0.5-2.5 (3) MG/3ML SOLN nebulizer solution Inhale 3 mLs into the lungs every 4 hours 1080 mL 3    guaiFENesin (MUCINEX) 600 MG extended release tablet Take 1 tablet by mouth 2 times daily 30 tablet 0    budesonide-formoterol (SYMBICORT) 160-4.5 MCG/ACT AERO Inhale 2 puffs into the lungs 2 times daily 3 Inhaler 3    montelukast (SINGULAIR) 10 MG tablet Take 1 tablet by mouth daily 30 tablet 3    levothyroxine (SYNTHROID) 100 MCG tablet Take 1 tablet by mouth Daily 30 tablet 2    folic acid (FOLVITE) 1 MG tablet Take 1 tablet by mouth daily 30 tablet 3    vitamin B-1 100 MG tablet Take 1 tablet by mouth daily 30 tablet 3    hydrALAZINE (APRESOLINE) 10 MG tablet Take 1 tablet by mouth 3 times daily 90 tablet 3    albuterol-ipratropium (COMBIVENT RESPIMAT)  MCG/ACT AERS inhaler Inhale 1 puff into the lungs every 4 hours as needed for Wheezing 3 Inhaler 0    theophylline (MIAH-24) 400 MG extended release capsule Take 1 capsule by mouth daily 90 capsule 0    DULoxetine (CYMBALTA) 60 MG extended release capsule Take 60 mg by mouth daily      busPIRone (BUSPAR) 10 MG tablet Take 1 tablet by mouth 3 times daily 90 tablet 0    ferrous gluconate 324 (37.5 Fe) MG TABS Take 1 tablet by mouth 2 times daily 60 tablet 2    metoprolol succinate (TOPROL XL) 25 MG extended release tablet Take 1 tablet by mouth daily 30 tablet 3    famotidine (PEPCID) 20 MG tablet Take 1 tablet by mouth 2 times daily (Patient taking differently: Take 40 mg by mouth nightly ) 60 tablet 3    traZODone (DESYREL) 50 MG tablet Take 100 mg by mouth nightly       clonazePAM (KLONOPIN) 1 MG tablet Take 1 mg by mouth 3 times daily as needed.  baclofen (LIORESAL) 10 MG tablet Take 1 tablet by mouth 3 times daily 30 tablet 0    aspirin 81 MG chewable tablet Take 81 mg by mouth daily      fluticasone (FLONASE) 50 MCG/ACT nasal spray 2 sprays by Nasal route daily 1 Bottle 0    celecoxib (CELEBREX) 200 MG capsule Take 200 mg by mouth 2 times daily       No current facility-administered medications for this visit.       Allergies: Lisinopril  Past Medical History:   Diagnosis Date    Anxiety 2/16/2017    Arthritis     Back pain at L4-L5 level 2/16/2017    Dr Margot Dailey Bipolar 1 disorder ([facility] [address]     COPD (chronic obstructive pulmonary disease) ([facility] [address]     Emphysema of lung ([facility] [address]     FH: CAD (coronary artery disease) 2/16/2017    Father had in his forties, sister in her thirties    Mohinder Dailey Fibromyalgia 2/16/2017    H/O Doppler ultrasound 04/05/2019    venous- no DVT or reflux    H/O echocardiogram 01/14/2015    EF50-55% Normal- see media    History of blood transfusion     Hyperlipidemia LDL goal <100     Hypertension     6.4 oz (85 kg)   07/21/20 200 lb (90.7 kg)     Body mass index is 35.41 kg/m². GENERAL - Alert, oriented, pleasant, in no apparent distress,normal grooming  HEENT - pupils are reactive to light and accomodation, cornea intact, conjunctive normal color, ears are normal in exam,throat exam in normal, teeth, gum and palate are normal, oral mucosa is normal without any notation of pallor or cyanosis  Neck - Supple. No jugular venous distention noted. No carotid bruits, no apical -carotid delay  Respiratory:  REDUCED  breath sounds, No respiratory distress, + wheezing, No chest tenderness. ,no use of accessory muscles, diaphragm movement is normal  Cardiovascular: (PMI) apex non displaced,no lifts no thrills, no s3,no s4, Normal heart rate, Normal rhythm, No murmurs, No rubs, No gallops. Carotid arteries pulse and amplitude are normal no bruit, no abdominal bruit noted ( normal abdominal aorta ausculation), femoral arteries pulse and amplitude are normal no bruit, pedal pulses are normal  Femoral pulses have normal amplitude, no bruits   Extremities - No cyanosis, clubbing, or significant edema, no varicose veins    Abdomen - No masses, tenderness, or organomegaly, no hepato-splenomegally, no bruits  Musculoskeletal - No significant edema, no kyphosis or scoliosis, no deformity in any extremity noted, muscle strength and tone are normal  Skin: no ulcer,no scar,no stasis dermatitis   Neurologic - alert oriented times 3,Cranial nerves II through XII are grossly intact. There were no gross focal neurologic abnormalities.  All sensory and motor nerves examined and were normal  Psychiatric: normal mood and affect    Lab Results   Component Value Date    CKTOTAL 74 02/28/2019    TROPONINI 0.007 03/25/2014     BNP:  No results found for: BNP  PT/INR:  No results found for: [employer]  Lab Results   Component Value Date    LABA1C 5.1 06/21/2019     Lab Results   Component Value Date    CHOL 193 09/30/2016    TRIG 173 (H) 09/30/2016 HDL 83 04/08/2020    LDLDIRECT 86 04/08/2020     Lab Results   Component Value Date    ALT 27 07/12/2020    AST 33 07/12/2020     TSH:  No results found for: TSH    Impression:  Sabino Rai is a 64 y. o.year old who  has a past medical history of Anxiety, Arthritis, Back pain at L4-L5 level, Bipolar 1 disorder ([facility] [address] COPD (chronic obstructive pulmonary disease) ([facility] [address] Emphysema of lung ([facility] [address] FH: CAD (coronary artery disease), Fibromyalgia, H/O Doppler ultrasound, H/O echocardiogram, History of blood transfusion, Hyperlipidemia LDL goal <100, Hypertension, Obstructive sleep apnea, Osteoarthritis, and Thyroid disease. and presents with     Plan:  1. Sinus tachycardia: seen by EP  In hospital, had SVT and sinus tachycardia and was started on cardizem, will continue it  2. She is not in CHF, LAST YR RHC at [facility] showed fluid overload with PCWP of 35, but there is no need for lasix at this time  3. End stage COPD: stable, continue inhalers, and steroids  4. Anemia: agree with follow up with hematology  5. Bipolar: stable  6. Fibromyalgia: stableHealth maintenance: exerise and diet  All labs, medications and tests reviewed, continue all other medications of all above medical condition listed as is.     @[facility]IV[facility]@

## 2020-10-19 ENCOUNTER — APPOINTMENT (OUTPATIENT)
Dept: CT IMAGING | Age: 60
DRG: 853 | End: 2020-10-19
Payer: COMMERCIAL

## 2020-10-19 ENCOUNTER — APPOINTMENT (OUTPATIENT)
Dept: GENERAL RADIOLOGY | Age: 60
DRG: 853 | End: 2020-10-19
Payer: COMMERCIAL

## 2020-10-19 ENCOUNTER — HOSPITAL ENCOUNTER (INPATIENT)
Age: 60
LOS: 9 days | Discharge: HOME HEALTH | DRG: 853 | End: 2020-10-30
Attending: EMERGENCY MEDICINE | Admitting: STUDENT IN AN ORGANIZED HEALTH CARE EDUCATION/TRAINING PROGRAM
Payer: COMMERCIAL

## 2020-10-19 LAB
ALBUMIN SERPL-MCNC: 3.3 GM/DL (ref 3.4–5)
ALP BLD-CCNC: 86 IU/L (ref 40–129)
ALT SERPL-CCNC: 55 U/L (ref 10–40)
ANION GAP SERPL CALCULATED.3IONS-SCNC: 15 MMOL/L (ref 4–16)
AST SERPL-CCNC: 45 IU/L (ref 15–37)
BASOPHILS ABSOLUTE: 0.1 K/CU MM
BASOPHILS RELATIVE PERCENT: 0.3 % (ref 0–1)
BILIRUB SERPL-MCNC: 0.3 MG/DL (ref 0–1)
BUN BLDV-MCNC: 14 MG/DL (ref 6–23)
CALCIUM SERPL-MCNC: 9.2 MG/DL (ref 8.3–10.6)
CHLORIDE BLD-SCNC: 89 MMOL/L (ref 99–110)
CO2: 31 MMOL/L (ref 21–32)
CREAT SERPL-MCNC: 1.1 MG/DL (ref 0.6–1.1)
DIFFERENTIAL TYPE: ABNORMAL
EOSINOPHILS ABSOLUTE: 0.1 K/CU MM
EOSINOPHILS RELATIVE PERCENT: 0.3 % (ref 0–3)
GFR AFRICAN AMERICAN: >60 ML/MIN/1.73M2
GFR NON-AFRICAN AMERICAN: 51 ML/MIN/1.73M2
GLUCOSE BLD-MCNC: 200 MG/DL (ref 70–99)
HCT VFR BLD CALC: 39 % (ref 37–47)
HEMOGLOBIN: 11.2 GM/DL (ref 12.5–16)
IMMATURE NEUTROPHIL %: 1.2 % (ref 0–0.43)
LACTATE: 3.6 MMOL/L (ref 0.4–2)
LIPASE: 11 IU/L (ref 13–60)
LYMPHOCYTES ABSOLUTE: 1.6 K/CU MM
LYMPHOCYTES RELATIVE PERCENT: 6.1 % (ref 24–44)
MCH RBC QN AUTO: 28.6 PG (ref 27–31)
MCHC RBC AUTO-ENTMCNC: 28.7 % (ref 32–36)
MCV RBC AUTO: 99.5 FL (ref 78–100)
MONOCYTES ABSOLUTE: 1.4 K/CU MM
MONOCYTES RELATIVE PERCENT: 5.3 % (ref 0–4)
NUCLEATED RBC %: 0 %
PDW BLD-RTO: 13.5 % (ref 11.7–14.9)
PLATELET # BLD: 643 K/CU MM (ref 140–440)
PMV BLD AUTO: 10.2 FL (ref 7.5–11.1)
POTASSIUM SERPL-SCNC: 4 MMOL/L (ref 3.5–5.1)
RBC # BLD: 3.92 M/CU MM (ref 4.2–5.4)
SEGMENTED NEUTROPHILS ABSOLUTE COUNT: 22.7 K/CU MM
SEGMENTED NEUTROPHILS RELATIVE PERCENT: 86.8 % (ref 36–66)
SODIUM BLD-SCNC: 135 MMOL/L (ref 135–145)
TOTAL IMMATURE NEUTOROPHIL: 0.32 K/CU MM
TOTAL NUCLEATED RBC: 0 K/CU MM
TOTAL PROTEIN: 6.2 GM/DL (ref 6.4–8.2)
WBC # BLD: 26.1 K/CU MM (ref 4–10.5)

## 2020-10-19 PROCEDURE — 6360000004 HC RX CONTRAST MEDICATION: Performed by: EMERGENCY MEDICINE

## 2020-10-19 PROCEDURE — 6360000002 HC RX W HCPCS: Performed by: EMERGENCY MEDICINE

## 2020-10-19 PROCEDURE — 6370000000 HC RX 637 (ALT 250 FOR IP): Performed by: EMERGENCY MEDICINE

## 2020-10-19 PROCEDURE — 96375 TX/PRO/DX INJ NEW DRUG ADDON: CPT

## 2020-10-19 PROCEDURE — 83690 ASSAY OF LIPASE: CPT

## 2020-10-19 PROCEDURE — 83605 ASSAY OF LACTIC ACID: CPT

## 2020-10-19 PROCEDURE — 74177 CT ABD & PELVIS W/CONTRAST: CPT

## 2020-10-19 PROCEDURE — 85025 COMPLETE CBC W/AUTO DIFF WBC: CPT

## 2020-10-19 PROCEDURE — 99285 EMERGENCY DEPT VISIT HI MDM: CPT

## 2020-10-19 PROCEDURE — 71045 X-RAY EXAM CHEST 1 VIEW: CPT

## 2020-10-19 PROCEDURE — 96361 HYDRATE IV INFUSION ADD-ON: CPT

## 2020-10-19 PROCEDURE — 80053 COMPREHEN METABOLIC PANEL: CPT

## 2020-10-19 PROCEDURE — 2580000003 HC RX 258: Performed by: EMERGENCY MEDICINE

## 2020-10-19 RX ORDER — KETOROLAC TROMETHAMINE 30 MG/ML
30 INJECTION, SOLUTION INTRAMUSCULAR; INTRAVENOUS ONCE
Status: COMPLETED | OUTPATIENT
Start: 2020-10-19 | End: 2020-10-19

## 2020-10-19 RX ORDER — ONDANSETRON 2 MG/ML
8 INJECTION INTRAMUSCULAR; INTRAVENOUS ONCE
Status: COMPLETED | OUTPATIENT
Start: 2020-10-19 | End: 2020-10-19

## 2020-10-19 RX ORDER — AZITHROMYCIN 250 MG/1
500 TABLET, FILM COATED ORAL ONCE
Status: COMPLETED | OUTPATIENT
Start: 2020-10-19 | End: 2020-10-20

## 2020-10-19 RX ORDER — 0.9 % SODIUM CHLORIDE 0.9 %
500 INTRAVENOUS SOLUTION INTRAVENOUS ONCE
Status: COMPLETED | OUTPATIENT
Start: 2020-10-19 | End: 2020-10-20

## 2020-10-19 RX ORDER — ACETAMINOPHEN 325 MG/1
650 TABLET ORAL ONCE
Status: COMPLETED | OUTPATIENT
Start: 2020-10-19 | End: 2020-10-19

## 2020-10-19 RX ORDER — SODIUM CHLORIDE 0.9 % (FLUSH) 0.9 %
10 SYRINGE (ML) INJECTION 2 TIMES DAILY
Status: DISCONTINUED | OUTPATIENT
Start: 2020-10-19 | End: 2020-10-20

## 2020-10-19 RX ADMIN — IOPAMIDOL 75 ML: 755 INJECTION, SOLUTION INTRAVENOUS at 23:59

## 2020-10-19 RX ADMIN — KETOROLAC TROMETHAMINE 30 MG: 30 INJECTION, SOLUTION INTRAMUSCULAR; INTRAVENOUS at 23:00

## 2020-10-19 RX ADMIN — ACETAMINOPHEN 650 MG: 325 TABLET ORAL at 22:55

## 2020-10-19 RX ADMIN — SODIUM CHLORIDE 500 ML: 9 INJECTION, SOLUTION INTRAVENOUS at 22:54

## 2020-10-19 RX ADMIN — ONDANSETRON 8 MG: 2 INJECTION INTRAMUSCULAR; INTRAVENOUS at 22:55

## 2020-10-19 ASSESSMENT — PAIN DESCRIPTION - ORIENTATION: ORIENTATION: MID

## 2020-10-19 ASSESSMENT — PAIN DESCRIPTION - LOCATION: LOCATION: ABDOMEN

## 2020-10-19 ASSESSMENT — PAIN SCALES - GENERAL
PAINLEVEL_OUTOF10: 8

## 2020-10-19 ASSESSMENT — PAIN DESCRIPTION - PAIN TYPE: TYPE: ACUTE PAIN

## 2020-10-20 PROBLEM — A41.9 SEPSIS (HCC): Status: ACTIVE | Noted: 2020-10-20

## 2020-10-20 PROBLEM — J18.9 PNA (PNEUMONIA): Status: ACTIVE | Noted: 2020-10-20

## 2020-10-20 LAB
ALBUMIN SERPL-MCNC: 3.6 GM/DL (ref 3.4–5)
ALP BLD-CCNC: 82 IU/L (ref 40–128)
ALT SERPL-CCNC: 504 U/L (ref 10–40)
ANION GAP SERPL CALCULATED.3IONS-SCNC: 15 MMOL/L (ref 4–16)
AST SERPL-CCNC: 560 IU/L (ref 15–37)
BILIRUB SERPL-MCNC: 0.3 MG/DL (ref 0–1)
BUN BLDV-MCNC: 18 MG/DL (ref 6–23)
CALCIUM SERPL-MCNC: 9.4 MG/DL (ref 8.3–10.6)
CHLORIDE BLD-SCNC: 90 MMOL/L (ref 99–110)
CO2: 29 MMOL/L (ref 21–32)
CREAT SERPL-MCNC: 1.5 MG/DL (ref 0.6–1.1)
GFR AFRICAN AMERICAN: 43 ML/MIN/1.73M2
GFR NON-AFRICAN AMERICAN: 36 ML/MIN/1.73M2
GLUCOSE BLD-MCNC: 137 MG/DL (ref 70–99)
HCT VFR BLD CALC: 37 % (ref 37–47)
HEMOGLOBIN: 10.7 GM/DL (ref 12.5–16)
HIGH SENSITIVE C-REACTIVE PROTEIN: 145.6 MG/L
LACTATE: 1.5 MMOL/L (ref 0.4–2)
LV EF: 53 %
LVEF MODALITY: NORMAL
MCH RBC QN AUTO: 28.7 PG (ref 27–31)
MCHC RBC AUTO-ENTMCNC: 28.9 % (ref 32–36)
MCV RBC AUTO: 99.2 FL (ref 78–100)
PDW BLD-RTO: 13.6 % (ref 11.7–14.9)
PLATELET # BLD: 541 K/CU MM (ref 140–440)
PMV BLD AUTO: 10.1 FL (ref 7.5–11.1)
POTASSIUM SERPL-SCNC: 4.3 MMOL/L (ref 3.5–5.1)
PROCALCITONIN: 0.24
RBC # BLD: 3.73 M/CU MM (ref 4.2–5.4)
SARS-COV-2, NAAT: NOT DETECTED
SODIUM BLD-SCNC: 134 MMOL/L (ref 135–145)
SOURCE: NORMAL
TOTAL PROTEIN: 5.7 GM/DL (ref 6.4–8.2)
TSH HIGH SENSITIVITY: 4.7 UIU/ML (ref 0.27–4.2)
WBC # BLD: 24.8 K/CU MM (ref 4–10.5)

## 2020-10-20 PROCEDURE — 36415 COLL VENOUS BLD VENIPUNCTURE: CPT

## 2020-10-20 PROCEDURE — 6370000000 HC RX 637 (ALT 250 FOR IP): Performed by: NURSE PRACTITIONER

## 2020-10-20 PROCEDURE — G0378 HOSPITAL OBSERVATION PER HR: HCPCS

## 2020-10-20 PROCEDURE — 93306 TTE W/DOPPLER COMPLETE: CPT

## 2020-10-20 PROCEDURE — 83605 ASSAY OF LACTIC ACID: CPT

## 2020-10-20 PROCEDURE — 87040 BLOOD CULTURE FOR BACTERIA: CPT

## 2020-10-20 PROCEDURE — 96366 THER/PROPH/DIAG IV INF ADDON: CPT

## 2020-10-20 PROCEDURE — U0002 COVID-19 LAB TEST NON-CDC: HCPCS

## 2020-10-20 PROCEDURE — 96372 THER/PROPH/DIAG INJ SC/IM: CPT

## 2020-10-20 PROCEDURE — 96365 THER/PROPH/DIAG IV INF INIT: CPT

## 2020-10-20 PROCEDURE — 99254 IP/OBS CNSLTJ NEW/EST MOD 60: CPT | Performed by: INTERNAL MEDICINE

## 2020-10-20 PROCEDURE — 86141 C-REACTIVE PROTEIN HS: CPT

## 2020-10-20 PROCEDURE — 2700000000 HC OXYGEN THERAPY PER DAY

## 2020-10-20 PROCEDURE — 94640 AIRWAY INHALATION TREATMENT: CPT

## 2020-10-20 PROCEDURE — 2580000003 HC RX 258: Performed by: STUDENT IN AN ORGANIZED HEALTH CARE EDUCATION/TRAINING PROGRAM

## 2020-10-20 PROCEDURE — 96376 TX/PRO/DX INJ SAME DRUG ADON: CPT

## 2020-10-20 PROCEDURE — 6370000000 HC RX 637 (ALT 250 FOR IP): Performed by: STUDENT IN AN ORGANIZED HEALTH CARE EDUCATION/TRAINING PROGRAM

## 2020-10-20 PROCEDURE — 84443 ASSAY THYROID STIM HORMONE: CPT

## 2020-10-20 PROCEDURE — 80053 COMPREHEN METABOLIC PANEL: CPT

## 2020-10-20 PROCEDURE — 6360000002 HC RX W HCPCS: Performed by: STUDENT IN AN ORGANIZED HEALTH CARE EDUCATION/TRAINING PROGRAM

## 2020-10-20 PROCEDURE — 85027 COMPLETE CBC AUTOMATED: CPT

## 2020-10-20 PROCEDURE — 2580000003 HC RX 258: Performed by: EMERGENCY MEDICINE

## 2020-10-20 PROCEDURE — 84145 PROCALCITONIN (PCT): CPT

## 2020-10-20 PROCEDURE — 6360000002 HC RX W HCPCS: Performed by: EMERGENCY MEDICINE

## 2020-10-20 PROCEDURE — 6370000000 HC RX 637 (ALT 250 FOR IP): Performed by: EMERGENCY MEDICINE

## 2020-10-20 PROCEDURE — 94761 N-INVAS EAR/PLS OXIMETRY MLT: CPT

## 2020-10-20 RX ORDER — FERROUS GLUCONATE 324(37.5)
324 TABLET ORAL 2 TIMES DAILY
Status: DISCONTINUED | OUTPATIENT
Start: 2020-10-20 | End: 2020-10-30 | Stop reason: HOSPADM

## 2020-10-20 RX ORDER — SODIUM CHLORIDE 0.9 % (FLUSH) 0.9 %
10 SYRINGE (ML) INJECTION PRN
Status: DISCONTINUED | OUTPATIENT
Start: 2020-10-20 | End: 2020-10-30 | Stop reason: HOSPADM

## 2020-10-20 RX ORDER — THIAMINE MONONITRATE (VIT B1) 100 MG
100 TABLET ORAL DAILY
Status: DISCONTINUED | OUTPATIENT
Start: 2020-10-20 | End: 2020-10-30 | Stop reason: HOSPADM

## 2020-10-20 RX ORDER — METOPROLOL SUCCINATE 25 MG/1
25 TABLET, EXTENDED RELEASE ORAL DAILY
Status: DISCONTINUED | OUTPATIENT
Start: 2020-10-20 | End: 2020-10-30 | Stop reason: HOSPADM

## 2020-10-20 RX ORDER — ASPIRIN 81 MG/1
81 TABLET, CHEWABLE ORAL DAILY
Status: DISCONTINUED | OUTPATIENT
Start: 2020-10-20 | End: 2020-10-30 | Stop reason: HOSPADM

## 2020-10-20 RX ORDER — FAMOTIDINE 20 MG/1
40 TABLET, FILM COATED ORAL NIGHTLY
Status: DISCONTINUED | OUTPATIENT
Start: 2020-10-20 | End: 2020-10-22

## 2020-10-20 RX ORDER — MONTELUKAST SODIUM 10 MG/1
10 TABLET ORAL DAILY
Status: DISCONTINUED | OUTPATIENT
Start: 2020-10-20 | End: 2020-10-30 | Stop reason: HOSPADM

## 2020-10-20 RX ORDER — PROMETHAZINE HYDROCHLORIDE 12.5 MG/1
12.5 TABLET ORAL EVERY 6 HOURS PRN
Status: DISCONTINUED | OUTPATIENT
Start: 2020-10-20 | End: 2020-10-30 | Stop reason: HOSPADM

## 2020-10-20 RX ORDER — ACETAMINOPHEN 325 MG/1
650 TABLET ORAL EVERY 6 HOURS PRN
Status: DISCONTINUED | OUTPATIENT
Start: 2020-10-20 | End: 2020-10-30 | Stop reason: HOSPADM

## 2020-10-20 RX ORDER — BUDESONIDE AND FORMOTEROL FUMARATE DIHYDRATE 160; 4.5 UG/1; UG/1
2 AEROSOL RESPIRATORY (INHALATION) 2 TIMES DAILY
Status: DISCONTINUED | OUTPATIENT
Start: 2020-10-20 | End: 2020-10-30 | Stop reason: HOSPADM

## 2020-10-20 RX ORDER — HYDROCODONE BITARTRATE AND ACETAMINOPHEN 5; 325 MG/1; MG/1
1 TABLET ORAL ONCE
Status: COMPLETED | OUTPATIENT
Start: 2020-10-20 | End: 2020-10-20

## 2020-10-20 RX ORDER — BACLOFEN 10 MG/1
10 TABLET ORAL 3 TIMES DAILY
Status: DISCONTINUED | OUTPATIENT
Start: 2020-10-20 | End: 2020-10-30 | Stop reason: HOSPADM

## 2020-10-20 RX ORDER — IPRATROPIUM BROMIDE AND ALBUTEROL SULFATE 2.5; .5 MG/3ML; MG/3ML
1 SOLUTION RESPIRATORY (INHALATION) EVERY 4 HOURS PRN
Status: DISCONTINUED | OUTPATIENT
Start: 2020-10-20 | End: 2020-10-21

## 2020-10-20 RX ORDER — DULOXETIN HYDROCHLORIDE 30 MG/1
60 CAPSULE, DELAYED RELEASE ORAL DAILY
Status: DISCONTINUED | OUTPATIENT
Start: 2020-10-20 | End: 2020-10-30 | Stop reason: HOSPADM

## 2020-10-20 RX ORDER — POLYETHYLENE GLYCOL 3350 17 G/17G
17 POWDER, FOR SOLUTION ORAL DAILY PRN
Status: DISCONTINUED | OUTPATIENT
Start: 2020-10-20 | End: 2020-10-30 | Stop reason: HOSPADM

## 2020-10-20 RX ORDER — LEVOTHYROXINE SODIUM 0.1 MG/1
100 TABLET ORAL DAILY
Status: DISCONTINUED | OUTPATIENT
Start: 2020-10-20 | End: 2020-10-30 | Stop reason: HOSPADM

## 2020-10-20 RX ORDER — CLONAZEPAM 1 MG/1
1 TABLET ORAL 3 TIMES DAILY PRN
Status: DISCONTINUED | OUTPATIENT
Start: 2020-10-20 | End: 2020-10-30 | Stop reason: HOSPADM

## 2020-10-20 RX ORDER — ONDANSETRON 2 MG/ML
4 INJECTION INTRAMUSCULAR; INTRAVENOUS EVERY 6 HOURS PRN
Status: DISCONTINUED | OUTPATIENT
Start: 2020-10-20 | End: 2020-10-30 | Stop reason: HOSPADM

## 2020-10-20 RX ORDER — FOLIC ACID 1 MG/1
1 TABLET ORAL DAILY
Status: DISCONTINUED | OUTPATIENT
Start: 2020-10-20 | End: 2020-10-30 | Stop reason: HOSPADM

## 2020-10-20 RX ORDER — TRAZODONE HYDROCHLORIDE 50 MG/1
100 TABLET ORAL NIGHTLY
Status: DISCONTINUED | OUTPATIENT
Start: 2020-10-20 | End: 2020-10-30 | Stop reason: HOSPADM

## 2020-10-20 RX ORDER — HYDRALAZINE HYDROCHLORIDE 10 MG/1
10 TABLET, FILM COATED ORAL 3 TIMES DAILY
Status: DISCONTINUED | OUTPATIENT
Start: 2020-10-20 | End: 2020-10-30

## 2020-10-20 RX ORDER — IPRATROPIUM BROMIDE AND ALBUTEROL SULFATE 2.5; .5 MG/3ML; MG/3ML
1 SOLUTION RESPIRATORY (INHALATION) EVERY 4 HOURS
Status: DISCONTINUED | OUTPATIENT
Start: 2020-10-20 | End: 2020-10-21

## 2020-10-20 RX ORDER — OXYCODONE HYDROCHLORIDE AND ACETAMINOPHEN 5; 325 MG/1; MG/1
1 TABLET ORAL EVERY 6 HOURS PRN
Status: DISCONTINUED | OUTPATIENT
Start: 2020-10-20 | End: 2020-10-30 | Stop reason: HOSPADM

## 2020-10-20 RX ORDER — FLUTICASONE PROPIONATE 50 MCG
2 SPRAY, SUSPENSION (ML) NASAL DAILY
Status: DISCONTINUED | OUTPATIENT
Start: 2020-10-20 | End: 2020-10-30 | Stop reason: HOSPADM

## 2020-10-20 RX ORDER — GUAIFENESIN/DEXTROMETHORPHAN 100-10MG/5
5 SYRUP ORAL EVERY 4 HOURS PRN
Status: DISCONTINUED | OUTPATIENT
Start: 2020-10-20 | End: 2020-10-30 | Stop reason: HOSPADM

## 2020-10-20 RX ORDER — BUSPIRONE HYDROCHLORIDE 10 MG/1
10 TABLET ORAL 3 TIMES DAILY
Status: DISCONTINUED | OUTPATIENT
Start: 2020-10-20 | End: 2020-10-30 | Stop reason: HOSPADM

## 2020-10-20 RX ORDER — SODIUM CHLORIDE 0.9 % (FLUSH) 0.9 %
10 SYRINGE (ML) INJECTION EVERY 12 HOURS SCHEDULED
Status: DISCONTINUED | OUTPATIENT
Start: 2020-10-20 | End: 2020-10-30 | Stop reason: HOSPADM

## 2020-10-20 RX ADMIN — IPRATROPIUM BROMIDE AND ALBUTEROL SULFATE 3 ML: .5; 3 SOLUTION RESPIRATORY (INHALATION) at 20:30

## 2020-10-20 RX ADMIN — THEOPHYLLINE ANHYDROUS 400 MG: 200 CAPSULE, EXTENDED RELEASE ORAL at 08:33

## 2020-10-20 RX ADMIN — OXYCODONE HYDROCHLORIDE AND ACETAMINOPHEN 1 TABLET: 5; 325 TABLET ORAL at 20:40

## 2020-10-20 RX ADMIN — HYDROCODONE BITARTRATE AND ACETAMINOPHEN 1 TABLET: 5; 325 TABLET ORAL at 02:32

## 2020-10-20 RX ADMIN — BUDESONIDE AND FORMOTEROL FUMARATE DIHYDRATE 2 PUFF: 160; 4.5 AEROSOL RESPIRATORY (INHALATION) at 20:30

## 2020-10-20 RX ADMIN — FERROUS GLUCONATE TAB 324 MG (37.5 MG ELEMENTAL IRON) 324 MG: 324 (37.5 FE) TAB at 20:39

## 2020-10-20 RX ADMIN — CLONAZEPAM 1 MG: 1 TABLET ORAL at 19:27

## 2020-10-20 RX ADMIN — FERROUS GLUCONATE TAB 324 MG (37.5 MG ELEMENTAL IRON) 324 MG: 324 (37.5 FE) TAB at 08:33

## 2020-10-20 RX ADMIN — FAMOTIDINE 40 MG: 20 TABLET ORAL at 20:39

## 2020-10-20 RX ADMIN — CLONAZEPAM 1 MG: 1 TABLET ORAL at 03:18

## 2020-10-20 RX ADMIN — HYDRALAZINE HYDROCHLORIDE 10 MG: 10 TABLET, FILM COATED ORAL at 14:08

## 2020-10-20 RX ADMIN — CLONAZEPAM 1 MG: 1 TABLET ORAL at 10:34

## 2020-10-20 RX ADMIN — IPRATROPIUM BROMIDE AND ALBUTEROL SULFATE 3 ML: .5; 3 SOLUTION RESPIRATORY (INHALATION) at 23:41

## 2020-10-20 RX ADMIN — IPRATROPIUM BROMIDE AND ALBUTEROL SULFATE 3 ML: .5; 3 SOLUTION RESPIRATORY (INHALATION) at 10:42

## 2020-10-20 RX ADMIN — LEVOTHYROXINE SODIUM 100 MCG: 100 TABLET ORAL at 08:32

## 2020-10-20 RX ADMIN — CEFTRIAXONE 1 G: 1 INJECTION, POWDER, FOR SOLUTION INTRAMUSCULAR; INTRAVENOUS at 00:45

## 2020-10-20 RX ADMIN — CEFTRIAXONE 1 G: 1 INJECTION, POWDER, FOR SOLUTION INTRAMUSCULAR; INTRAVENOUS at 22:22

## 2020-10-20 RX ADMIN — ENOXAPARIN SODIUM 40 MG: 40 INJECTION SUBCUTANEOUS at 08:33

## 2020-10-20 RX ADMIN — SODIUM CHLORIDE, PRESERVATIVE FREE 10 ML: 5 INJECTION INTRAVENOUS at 03:11

## 2020-10-20 RX ADMIN — TRAZODONE HYDROCHLORIDE 100 MG: 50 TABLET ORAL at 20:40

## 2020-10-20 RX ADMIN — BACLOFEN 10 MG: 10 TABLET ORAL at 03:05

## 2020-10-20 RX ADMIN — HYDRALAZINE HYDROCHLORIDE 10 MG: 10 TABLET, FILM COATED ORAL at 08:32

## 2020-10-20 RX ADMIN — GUAIFENESIN AND DEXTROMETHORPHAN 5 ML: 100; 10 SYRUP ORAL at 20:38

## 2020-10-20 RX ADMIN — BUSPIRONE HYDROCHLORIDE 10 MG: 10 TABLET ORAL at 08:33

## 2020-10-20 RX ADMIN — ACETAMINOPHEN 650 MG: 325 TABLET ORAL at 14:07

## 2020-10-20 RX ADMIN — AZITHROMYCIN 500 MG: 250 TABLET, FILM COATED ORAL at 00:45

## 2020-10-20 RX ADMIN — BUSPIRONE HYDROCHLORIDE 10 MG: 10 TABLET ORAL at 20:39

## 2020-10-20 RX ADMIN — BUSPIRONE HYDROCHLORIDE 10 MG: 10 TABLET ORAL at 14:07

## 2020-10-20 RX ADMIN — IPRATROPIUM BROMIDE AND ALBUTEROL SULFATE 3 ML: .5; 3 SOLUTION RESPIRATORY (INHALATION) at 15:50

## 2020-10-20 RX ADMIN — BACLOFEN 10 MG: 10 TABLET ORAL at 20:39

## 2020-10-20 RX ADMIN — BACLOFEN 10 MG: 10 TABLET ORAL at 14:08

## 2020-10-20 RX ADMIN — FLUTICASONE PROPIONATE 2 SPRAY: 50 SPRAY, METERED NASAL at 08:34

## 2020-10-20 RX ADMIN — BUSPIRONE HYDROCHLORIDE 10 MG: 10 TABLET ORAL at 03:05

## 2020-10-20 RX ADMIN — ASPIRIN 81 MG CHEWABLE TABLET 81 MG: 81 TABLET CHEWABLE at 08:33

## 2020-10-20 RX ADMIN — SODIUM CHLORIDE, PRESERVATIVE FREE 10 ML: 5 INJECTION INTRAVENOUS at 00:30

## 2020-10-20 RX ADMIN — BUDESONIDE AND FORMOTEROL FUMARATE DIHYDRATE 2 PUFF: 160; 4.5 AEROSOL RESPIRATORY (INHALATION) at 10:50

## 2020-10-20 RX ADMIN — ONDANSETRON 4 MG: 2 INJECTION INTRAMUSCULAR; INTRAVENOUS at 20:34

## 2020-10-20 RX ADMIN — METOPROLOL SUCCINATE 25 MG: 25 TABLET, EXTENDED RELEASE ORAL at 08:33

## 2020-10-20 RX ADMIN — HYDRALAZINE HYDROCHLORIDE 10 MG: 10 TABLET, FILM COATED ORAL at 20:39

## 2020-10-20 RX ADMIN — SODIUM CHLORIDE, PRESERVATIVE FREE 10 ML: 5 INJECTION INTRAVENOUS at 22:26

## 2020-10-20 RX ADMIN — Medication 100 MG: at 08:33

## 2020-10-20 RX ADMIN — BACLOFEN 10 MG: 10 TABLET ORAL at 08:33

## 2020-10-20 RX ADMIN — DULOXETINE HYDROCHLORIDE 60 MG: 30 CAPSULE, DELAYED RELEASE ORAL at 08:32

## 2020-10-20 RX ADMIN — ONDANSETRON 4 MG: 2 INJECTION INTRAMUSCULAR; INTRAVENOUS at 03:10

## 2020-10-20 RX ADMIN — FOLIC ACID 1 MG: 1 TABLET ORAL at 08:32

## 2020-10-20 RX ADMIN — IPRATROPIUM BROMIDE AND ALBUTEROL SULFATE 3 ML: .5; 3 SOLUTION RESPIRATORY (INHALATION) at 04:06

## 2020-10-20 RX ADMIN — SODIUM CHLORIDE, PRESERVATIVE FREE 10 ML: 5 INJECTION INTRAVENOUS at 08:34

## 2020-10-20 RX ADMIN — MONTELUKAST 10 MG: 10 TABLET, FILM COATED ORAL at 08:33

## 2020-10-20 ASSESSMENT — PAIN SCALES - GENERAL
PAINLEVEL_OUTOF10: 8
PAINLEVEL_OUTOF10: 7
PAINLEVEL_OUTOF10: 8
PAINLEVEL_OUTOF10: 6

## 2020-10-20 ASSESSMENT — ENCOUNTER SYMPTOMS
VOMITING: 0
ABDOMINAL PAIN: 1
SORE THROAT: 0
COUGH: 0
BACK PAIN: 0
DIARRHEA: 0
NAUSEA: 1
SHORTNESS OF BREATH: 1
RHINORRHEA: 0
COLOR CHANGE: 0
CHEST TIGHTNESS: 0
WHEEZING: 0

## 2020-10-20 ASSESSMENT — PAIN DESCRIPTION - PAIN TYPE: TYPE: ACUTE PAIN

## 2020-10-20 ASSESSMENT — PAIN DESCRIPTION - ORIENTATION: ORIENTATION: MID;LEFT

## 2020-10-20 ASSESSMENT — PAIN DESCRIPTION - LOCATION: LOCATION: ABDOMEN

## 2020-10-20 NOTE — CARE COORDINATION
Reviewed chart and called pt's room , she didn't feel well enough to speak with CM . She gave CM permission to call . Called pt's , Latasha Cash, he states pt lives with him. PTA pt's  and daughter assists her with ADL's.  provides pt's transportation. Pt has a RW that she uses for community distance,  a Shower chair and home O2 thru [address]. Pt has a PCP and insurance that covers medications.  states they are having trouble with affording medications. Gave him info about [facility]. Also placed [facility] contact info in AVS.  Discussed Home care, and SNU, he is not sure pt will need or agree to either one. CM will continue to follow for needs.

## 2020-10-20 NOTE — ED PROVIDER NOTES
Triage Chief Complaint:   Cough; Headache; and Chills    [place]:  Leona Zuleta is a 64 y.o. female that presents from home via EMS with complaints of chills, sweats, fatigue, left upper quadrant abdominal pain, nausea, nonbilious nonbloody emesis, headaches. Denies sick contacts or recent travel. She also has had a cough and worsening shortness of breath. She states that she feels like she has pneumonia. Denies any urinary symptoms, diarrhea or constipation. States that her left upper quadrant pain is sharp and constant in nature without alleviating or exacerbating factors and she has a mild throbbing headache. ROS:  At least 14 systems reviewed and otherwise acutely negative except as in the [address]     Past Medical History:   Diagnosis Date    Anxiety 2/16/2017    Arthritis     Back pain at L4-L5 level 2/16/2017    Dr Sarah Beth Payan   [facility] Bipolar 1 disorder ([facility]     COPD (chronic obstructive pulmonary disease) ([facility]     Emphysema of lung ([facility] [address] [facility]     FH: CAD (coronary artery disease) 2/16/2017    Father had in his forties, sister in her thirties    [facility] Fibromyalgia 2/16/2017    H/O Doppler ultrasound 04/05/2019    venous- no DVT or reflux    H/O echocardiogram 01/14/2015    EF50-55% Normal- see media    History of blood transfusion     Hyperlipidemia LDL goal <100     Hypertension     Obstructive sleep apnea     Osteoarthritis     Thyroid disease      Past Surgical History:   Procedure Laterality Date    BACK SURGERY      CARDIAC CATHETERIZATION      10/2019    CARPAL TUNNEL RELEASE      COLONOSCOPY N/A 12/12/2019    COLONOSCOPY DIAGNOSTIC performed by Deacon Romero MD at [address] [place] [address]       Family History   Problem Relation Age of Onset    No Known Problems Mother     No Known Problems Father      Social History     Socioeconomic History    Marital status:      Spouse name: Not on file    Number of children: Not on file    Years of education: Not on file    Highest education level: Not on file   Occupational History    Not on file   Social Needs    Financial resource strain: Not on file    Food insecurity     Worry: Not on file     Inability: Not on file    Transportation needs     Medical: Not on file     Non-medical: Not on file   Tobacco Use    Smoking status: Former Smoker     Packs/day: 1.50     Years: 20.00     Pack years: 30.00     Last attempt to quit: 2008     Years since quittin.8    Smokeless tobacco: Never Used   Substance and Sexual Activity    Alcohol use: Not Currently     Alcohol/week: 2.0 standard drinks     Types: 2 Shots of liquor per week    Drug use: No    Sexual activity: Yes     Partners: Male   Lifestyle    Physical activity     Days per week: Not on file     Minutes per session: Not on file    Stress: Not on file   Relationships    Social connections     Talks on phone: Not on file     Gets together: Not on file     Attends Orthodox service: Not on file     Active member of club or organization: Not on file     Attends meetings of clubs or organizations: Not on file     Relationship status: Not on file    Intimate partner violence     Fear of current or ex partner: Not on file     Emotionally abused: Not on file     Physically abused: Not on file     Forced sexual activity: Not on file   Other Topics Concern    Not on file   Social History Narrative    Not on file     Current Facility-Administered Medications   Medication Dose Route Frequency Provider Last Rate Last Dose    cefTRIAXone (ROCEPHIN) 1 g IVPB in 50 mL D5W minibag  1 g Intravenous Once Marcella Williamson MD        azithromycin [facility]) tablet 500 mg  500 mg Oral Once Marcella Williamson MD        sodium chloride flush 0.9 % injection 10 mL  10 mL Intravenous BID Marcella Williamson MD         Current Outpatient Medications   Medication Sig Dispense Refill    budesonide-formoterol (SYMBICORT) 160-4.5 MCG/ACT AERO USE 2 INHALATIONS TWICE A DAY 30.6 g 3    MIAH-24 400 MG extended release capsule TAKE 1 CAPSULE DAILY 90 capsule 1    dilTIAZem (CARDIZEM CD) 240 MG extended release capsule Take 1 capsule by mouth daily 30 capsule 3    ipratropium-albuterol (DUONEB) 0.5-2.5 (3) MG/3ML SOLN nebulizer solution Inhale 3 mLs into the lungs every 4 hours 1080 mL 3    guaiFENesin (MUCINEX) 600 MG extended release tablet Take 1 tablet by mouth 2 times daily 30 tablet 0    montelukast (SINGULAIR) 10 MG tablet Take 1 tablet by mouth daily 30 tablet 3    levothyroxine (SYNTHROID) 100 MCG tablet Take 1 tablet by mouth Daily 30 tablet 2    folic acid (FOLVITE) 1 MG tablet Take 1 tablet by mouth daily 30 tablet 3    vitamin B-1 100 MG tablet Take 1 tablet by mouth daily 30 tablet 3    hydrALAZINE (APRESOLINE) 10 MG tablet Take 1 tablet by mouth 3 times daily 90 tablet 3    albuterol-ipratropium (COMBIVENT RESPIMAT)  MCG/ACT AERS inhaler Inhale 1 puff into the lungs every 4 hours as needed for Wheezing 3 Inhaler 0    DULoxetine (CYMBALTA) 60 MG extended release capsule Take 60 mg by mouth daily      busPIRone (BUSPAR) 10 MG tablet Take 1 tablet by mouth 3 times daily 90 tablet 0    ferrous gluconate 324 (37.5 Fe) MG TABS Take 1 tablet by mouth 2 times daily 60 tablet 2    metoprolol succinate (TOPROL XL) 25 MG extended release tablet Take 1 tablet by mouth daily 30 tablet 3    famotidine (PEPCID) 20 MG tablet Take 1 tablet by mouth 2 times daily (Patient taking differently: Take 40 mg by mouth nightly ) 60 tablet 3    celecoxib (CELEBREX) 200 MG capsule Take 200 mg by mouth 2 times daily      traZODone (DESYREL) 50 MG tablet Take 100 mg by mouth nightly       clonazePAM (KLONOPIN) 1 MG tablet Take 1 mg by mouth 3 times daily as needed.       baclofen (LIORESAL) 10 MG tablet Take 1 tablet by mouth 3 times daily 30 tablet 0    aspirin 81 MG chewable tablet Take 81 mg by mouth daily      fluticasone (FLONASE) 50 MCG/ACT nasal spray 2 sprays by Nasal route daily 1 Bottle 0     Allergies Allergen Reactions    Lisinopril Swelling and Rash     angioedema       Nursing Notes Reviewed    Physical Exam:  ED Triage Vitals [10/19/20 2207]   Enc Vitals Group      BP       Pulse       Resp       Temp       Temp src       SpO2       Weight 185 lb (83.9 kg)      Height 5' 2\" (1.575 m)      Head Circumference       Peak Flow       Pain Score       Pain Loc       Pain Edu? Excl. in [address]? GENERAL APPEARANCE: Awake and alert. Cooperative. No acute distress. Appears fatigued  HEAD: Normocephalic. Atraumatic. EYES: EOM's grossly intact. Sclera anicteric. ENT: Mucous membranes are moist. Tolerates saliva. No trismus. NECK: No meningismus. HEART:  Extremities pink  LUNGS: Respirations unlabored. Even chest rise bilaterally  ABDOMEN: Non distended. Left upper quadrant tenderness. No rebound or guarding  EXTREMITIES: No acute deformities. SKIN: Dry  NEUROLOGICAL: No gross facial drooping. Moves all 4 extremities spontaneously. PSYCHIATRIC: Normal mood.     I have reviewed and interpreted all of the currently available lab results from this visit (if applicable):  Results for orders placed or performed during the hospital encounter of 10/19/20   CBC Auto Differential   Result Value Ref Range    WBC 26.1 (H) 4.0 - 10.5 K/CU MM    RBC 3.92 (L) 4.2 - 5.4 M/CU MM    Hemoglobin 11.2 (L) 12.5 - 16.0 GM/DL    Hematocrit 39.0 37 - 47 %    MCV 99.5 78 - 100 FL    MCH 28.6 27 - 31 PG    MCHC 28.7 (L) 32.0 - 36.0 %    RDW 13.5 11.7 - 14.9 %    Platelets 651 (H) 113 - 440 K/CU MM    MPV 10.2 7.5 - 11.1 FL    Differential Type AUTOMATED DIFFERENTIAL     Segs Relative 86.8 (H) 36 - 66 %    Lymphocytes % 6.1 (L) 24 - 44 %    Monocytes % 5.3 (H) 0 - 4 %    Eosinophils % 0.3 0 - 3 %    Basophils % 0.3 0 - 1 %    Segs Absolute 22.7 K/CU MM    Lymphocytes Absolute 1.6 K/CU MM    Monocytes Absolute 1.4 K/CU MM    Eosinophils Absolute 0.1 K/CU MM    Basophils Absolute 0.1 K/CU MM    Nucleated RBC % 0.0 %    Total shows moderate sized pericardial effusion. The patient remains hemodynamically stable here. She is admitted for further management. Clinical Impression:  1. Sepsis due to pneumonia ([address]    2.  Pericardial effusion      (Please note that portions of this note may have been completed with a voice recognition program. Efforts were made to edit the dictations but occasionally words are mis-transcribed.)    MD Lester Villarreal MD  10/20/20 0021

## 2020-10-20 NOTE — ED TRIAGE NOTES
Pt to Ed with complaints of abd pain. Chills. Nausea. Vomiting x1 day. Pt has hx of emphysema and wears 3L of O2 all of the time.

## 2020-10-20 NOTE — PLAN OF CARE
56-year-old female was admitted due to sepsis from right lower lobe pneumonia also found to have pericardial effusion. Vitals:    10/20/20 1504   BP: (!) 146/68   Pulse: 107   Resp: 18   Temp: 98.3 °F (36.8 °C)   SpO2: 98%     Awake, not in distress  Chest clear to auscultation  Abdomen soft nontender  Regular rate and rhythm  Lower extremity with edema. Assessment and plan    Sepsis due to pneumonia: Continue antibiotics. Monitor procalcitonin and CRP. Pericardial effusion: Pretamponade physiology per echocardiogram.  Likely needs pericardiocentesis tomorrow per cardiology.     Gilford Rivet, MD

## 2020-10-20 NOTE — CONSULTS
busPIRone (BUSPAR) tablet 10 mg  10 mg Oral TID Zee Dick MD   10 mg at 10/20/20 1407    clonazePAM (KLONOPIN) tablet 1 mg  1 mg Oral TID PRN Zee Dick MD   1 mg at 10/20/20 1034    DULoxetine (CYMBALTA) extended release capsule 60 mg  60 mg Oral Daily Zee Dick MD   60 mg at 10/20/20 0832    famotidine (PEPCID) tablet 40 mg  40 mg Oral Nightly Zee Dick MD        ferrous gluconate 324 (37.5 Fe) MG tablet 324 mg  324 mg Oral BID Zee Dick MD   324 mg at 10/20/20 9118    fluticasone (FLONASE) 50 MCG/ACT nasal spray 2 spray  2 spray Nasal Daily Zee Dick MD   2 spray at 73/66/95 9237    folic acid (FOLVITE) tablet 1 mg  1 mg Oral Daily Zee Dick MD   1 mg at 10/20/20 3664    hydrALAZINE (APRESOLINE) tablet 10 mg  10 mg Oral TID Zee Dick MD   10 mg at 10/20/20 1408    ipratropium-albuterol (DUONEB) nebulizer solution 3 mL  1 vial Inhalation Q4H Zee Dick MD   3 mL at 10/20/20 1550    levothyroxine (SYNTHROID) tablet 100 mcg  100 mcg Oral Daily Zee Dick MD   100 mcg at 10/20/20 8316    metoprolol succinate (TOPROL XL) extended release tablet 25 mg  25 mg Oral Daily Zee Dick MD   25 mg at 10/20/20 9718    montelukast (SINGULAIR) tablet 10 mg  10 mg Oral Daily Zee Dick MD   10 mg at 10/20/20 3986    theophylline (MIAH-24) extended release capsule 400 mg  400 mg Oral Daily Zee Dick MD   400 mg at 10/20/20 1541    traZODone (DESYREL) tablet 100 mg  100 mg Oral Nightly Zee Dick MD        vitamin B-1 (THIAMINE) tablet 100 mg  100 mg Oral Daily Zee Dick MD   100 mg at 10/20/20 9660    sodium chloride flush 0.9 % injection 10 mL  10 mL Intravenous 2 times per day Zee Dick MD   10 mL at 10/20/20 0834    sodium chloride flush 0.9 % injection 10 mL  10 mL Intravenous PRN Zee Dick MD   10 mL at 10/20/20 0311    acetaminophen (TYLENOL) tablet 650 mg  650 mg Oral Q6H PRN Zee Dick MD   650 mg at 10/20/20 1407    Or    acetaminophen (TYLENOL) suppository 650 mg  650 mg Rectal Q6H PRN Edgar Sneed MD        polyethylene glycol (GLYCOLAX) packet 17 g  17 g Oral Daily PRN Edgar Sneed MD        promethazine (PHENERGAN) tablet 12.5 mg  12.5 mg Oral Q6H PRN Edgar Sneed MD        Or    ondansetron [facility]) injection 4 mg  4 mg Intravenous Q6H PRN Edgar Sneed MD   4 mg at 10/20/20 0310    [Held by provider] enoxaparin (LOVENOX) injection 40 mg  40 mg Subcutaneous Daily Edgar Sneed MD   40 mg at 10/20/20 6275    azithromycin (ZITHROMAX) 500 mg in dextrose 5 % 250 mL IVPB  500 mg Intravenous Q24H Edgar Sneed MD        And    cefTRIAXone (ROCEPHIN) 1 g IVPB in 50 mL D5W minibag  1 g Intravenous Q24H Edgar Sneed MD         Review of Systems:   · Constitutional: No Fever or Weight Loss   · Eyes: No Decreased Vision  · ENT: No Headaches, Hearing Loss or Vertigo  · Cardiovascular: +chest pain, +dyspnea on exertion, palpitations or loss of consciousness  · Respiratory: No cough or wheezing    · Gastrointestinal: No abdominal pain, appetite loss, blood in stools, constipation, diarrhea or heartburn  · Genitourinary: No dysuria, trouble voiding, or hematuria  · Musculoskeletal:  No gait disturbance, weakness or joint complaints  · Integumentary: No rash or pruritis  · Neurological: No TIA or stroke symptoms  · Psychiatric: No anxiety or depression  · Endocrine: No malaise, fatigue or temperature intolerance  · Hematologic/Lymphatic: No bleeding problems, blood clots or swollen lymph nodes  · Allergic/Immunologic: No nasal congestion or hives  All systems negative except as marked.      ·   ·      Physical Examination:    Vitals:    10/20/20 1504   BP: (!) 146/68   Pulse: 107   Resp: 18   Temp: 98.3 °F (36.8 °C)   SpO2: 98%      Wt Readings from Last 3 Encounters:   10/20/20 183 lb 6.8 oz (83.2 kg)   08/21/20 187 lb 6.4 oz (85 kg)   07/30/20 187 lb 6.4 oz (85 kg)     Body mass index is 33.55 kg/m². General Appearance:  No distress, conversant    Constitutional:  Well developed, Well nourished, No acute distress, Non-toxic appearance. HENT:  Normocephalic, Atraumatic, Bilateral external ears normal, Oropharynx moist, No oral exudates, Nose normal. Neck- Normal range of motion, No tenderness, Supple, No stridor,no apical-carotid delay, no carotid bruit  Eyes:  PERRL, EOMI, Conjunctiva normal, No discharge. Respiratory:  Normal breath sounds, No respiratory distress, No wheezing, No chest tenderness. ,no use of accessory muscles, diaphragm movement is normal  Cardiovascular: (PMI) apex non displaced,no lifts no thrills, no s3,no s4, Normal heart rate, Normal rhythm, No murmurs, No rubs, No gallops. Carotid arteries pulse and amplitude are normal no bruit, no abdominal bruit noted ( normal abdominal aorta ausculation), femoral arteries pulse and amplitude are normal no bruit, pedal pulses are normal  GI:  Bowel sounds normal, Soft, No tenderness, No masses, No pulsatile masses, no hepatosplenomegally, no bruits  : External genitalia appear normal, No masses or lesions. No discharge. No CVA tenderness. Musculoskeletal:  Intact distal pulses, No edema, No tenderness, No cyanosis, No clubbing. Good range of motion in all major joints. No tenderness to palpation or major deformities noted. Back- No tenderness. Integument:  Warm, Dry, No erythema, No rash. Skin: no rash, no ulcers  Lymphatic:  No lymphadenopathy noted. Neurologic:  Alert & oriented x 3, Normal motor function, Normal sensory function, No focal deficits noted.    Psychiatric:  Affect normal, Judgment normal, Mood normal.   Lab Review   Recent Labs     10/20/20  0422   WBC 24.8*   HGB 10.7*   HCT 37.0   *      Recent Labs     10/20/20  0422   *   K 4.3   CL 90*   CO2 29   BUN 18   CREATININE 1.5*     Recent Labs     10/20/20  0422   *   *   BILITOT 0.3   ALKPHOS 82     No results for input(s): TROPONINI in the last 72 hours. No results found for: BNP  Lab Results   Component Value Date    INR 0.94 06/11/2019    PROTIME 10.7 06/11/2019         EKG:nsr    Chest Xray: rt lower lobe infiltrate    ECHO:NORMAL LVEF, moderate to large pericardial effusion  Labs, echo, meds reviewed  Assessment: 64 y. o.year old with PMH of  has a past medical history of Anxiety, Arthritis, Back pain at L4-L5 level, Bipolar 1 disorder ([facility] COPD (chronic obstructive pulmonary disease) ([facility] [address] Emphysema of lung ([facility] [address] FH: CAD (coronary artery disease), Fibromyalgia, H/O Doppler ultrasound, H/O echocardiogram, History of blood transfusion, Hyperlipidemia LDL goal <100, Hypertension, Obstructive sleep apnea, Osteoarthritis, and Thyroid disease. Recommendations:    1. Moderate to large pericardial effusion: will plan for pericardiocetesis tomorrow is she does not have sepsis, her Wbc is very high, KEEP NPO  2. Sepsis: ? Could be from pneumonia, will recommend to treat aggressivley  3. Chest pain; atypical.last yr stress test was normal./  4. End stage COPD: stable, continue inhalers, and steroids  5. Bipolar: stable  6. Fibromyalgia: stable  7. Health maintenance: exerise and diet  All labs, medications and tests reviewed, continue all other medications of all above medical condition listed as is.          Brant Garnett MD, 10/20/2020 5:29 PM

## 2020-10-21 ENCOUNTER — APPOINTMENT (OUTPATIENT)
Dept: ULTRASOUND IMAGING | Age: 60
DRG: 853 | End: 2020-10-21
Payer: COMMERCIAL

## 2020-10-21 LAB
ADENOVIRUS DETECTION BY PCR: NOT DETECTED
ALBUMIN SERPL-MCNC: 3.5 GM/DL (ref 3.4–5)
ALP BLD-CCNC: 89 IU/L (ref 40–128)
ALT SERPL-CCNC: 776 U/L (ref 10–40)
ANION GAP SERPL CALCULATED.3IONS-SCNC: 11 MMOL/L (ref 4–16)
AST SERPL-CCNC: 654 IU/L (ref 15–37)
BILIRUB SERPL-MCNC: 0.2 MG/DL (ref 0–1)
BORDETELLA PARAPERTUSSIS BY PCR: NOT DETECTED
BORDETELLA PERTUSSIS PCR: NOT DETECTED
BUN BLDV-MCNC: 16 MG/DL (ref 6–23)
CALCIUM SERPL-MCNC: 9.4 MG/DL (ref 8.3–10.6)
CHLAMYDOPHILA PNEUMONIA PCR: NOT DETECTED
CHLORIDE BLD-SCNC: 88 MMOL/L (ref 99–110)
CO2: 34 MMOL/L (ref 21–32)
CORONAVIRUS 229E PCR: NOT DETECTED
CORONAVIRUS HKU1 PCR: NOT DETECTED
CORONAVIRUS NL63 PCR: NOT DETECTED
CORONAVIRUS OC43 PCR: NOT DETECTED
CREAT SERPL-MCNC: 1.1 MG/DL (ref 0.6–1.1)
GFR AFRICAN AMERICAN: >60 ML/MIN/1.73M2
GFR NON-AFRICAN AMERICAN: 51 ML/MIN/1.73M2
GLUCOSE BLD-MCNC: 141 MG/DL (ref 70–99)
HCT VFR BLD CALC: 33.6 % (ref 37–47)
HEMOGLOBIN: 9.9 GM/DL (ref 12.5–16)
HUMAN METAPNEUMOVIRUS PCR: NOT DETECTED
INFLUENZA A BY PCR: NOT DETECTED
INFLUENZA A H1 (2009) PCR: NOT DETECTED
INFLUENZA A H1 PANDEMIC PCR: NOT DETECTED
INFLUENZA A H3 PCR: NOT DETECTED
INFLUENZA B BY PCR: NOT DETECTED
MCH RBC QN AUTO: 28.7 PG (ref 27–31)
MCHC RBC AUTO-ENTMCNC: 29.5 % (ref 32–36)
MCV RBC AUTO: 97.4 FL (ref 78–100)
MYCOPLASMA PNEUMONIAE PCR: NOT DETECTED
PARAINFLUENZA 1 PCR: NOT DETECTED
PARAINFLUENZA 2 PCR: NOT DETECTED
PARAINFLUENZA 3 PCR: NOT DETECTED
PARAINFLUENZA 4 PCR: NOT DETECTED
PDW BLD-RTO: 13.5 % (ref 11.7–14.9)
PLATELET # BLD: 456 K/CU MM (ref 140–440)
PMV BLD AUTO: 10.2 FL (ref 7.5–11.1)
POTASSIUM SERPL-SCNC: 4.1 MMOL/L (ref 3.5–5.1)
RBC # BLD: 3.45 M/CU MM (ref 4.2–5.4)
RHINOVIRUS ENTEROVIRUS PCR: NOT DETECTED
RSV PCR: NOT DETECTED
SARS-COV-2: NOT DETECTED
SODIUM BLD-SCNC: 133 MMOL/L (ref 135–145)
TOTAL PROTEIN: 5.6 GM/DL (ref 6.4–8.2)
WBC # BLD: 22 K/CU MM (ref 4–10.5)

## 2020-10-21 PROCEDURE — 80053 COMPREHEN METABOLIC PANEL: CPT

## 2020-10-21 PROCEDURE — 2700000000 HC OXYGEN THERAPY PER DAY

## 2020-10-21 PROCEDURE — 6370000000 HC RX 637 (ALT 250 FOR IP): Performed by: NURSE PRACTITIONER

## 2020-10-21 PROCEDURE — 96367 TX/PROPH/DG ADDL SEQ IV INF: CPT

## 2020-10-21 PROCEDURE — 76705 ECHO EXAM OF ABDOMEN: CPT

## 2020-10-21 PROCEDURE — 94640 AIRWAY INHALATION TREATMENT: CPT

## 2020-10-21 PROCEDURE — 6360000002 HC RX W HCPCS: Performed by: STUDENT IN AN ORGANIZED HEALTH CARE EDUCATION/TRAINING PROGRAM

## 2020-10-21 PROCEDURE — G0378 HOSPITAL OBSERVATION PER HR: HCPCS

## 2020-10-21 PROCEDURE — 87040 BLOOD CULTURE FOR BACTERIA: CPT

## 2020-10-21 PROCEDURE — 6370000000 HC RX 637 (ALT 250 FOR IP): Performed by: STUDENT IN AN ORGANIZED HEALTH CARE EDUCATION/TRAINING PROGRAM

## 2020-10-21 PROCEDURE — 6360000002 HC RX W HCPCS: Performed by: INTERNAL MEDICINE

## 2020-10-21 PROCEDURE — 2580000003 HC RX 258: Performed by: STUDENT IN AN ORGANIZED HEALTH CARE EDUCATION/TRAINING PROGRAM

## 2020-10-21 PROCEDURE — 36415 COLL VENOUS BLD VENIPUNCTURE: CPT

## 2020-10-21 PROCEDURE — 0202U NFCT DS 22 TRGT SARS-COV-2: CPT

## 2020-10-21 PROCEDURE — 2580000003 HC RX 258: Performed by: INTERNAL MEDICINE

## 2020-10-21 PROCEDURE — 94761 N-INVAS EAR/PLS OXIMETRY MLT: CPT

## 2020-10-21 PROCEDURE — 85027 COMPLETE CBC AUTOMATED: CPT

## 2020-10-21 PROCEDURE — 94664 DEMO&/EVAL PT USE INHALER: CPT

## 2020-10-21 PROCEDURE — 1200000000 HC SEMI PRIVATE

## 2020-10-21 PROCEDURE — 6370000000 HC RX 637 (ALT 250 FOR IP): Performed by: INTERNAL MEDICINE

## 2020-10-21 RX ORDER — ALBUTEROL SULFATE 90 UG/1
2 AEROSOL, METERED RESPIRATORY (INHALATION) EVERY 4 HOURS
Status: DISCONTINUED | OUTPATIENT
Start: 2020-10-21 | End: 2020-10-24

## 2020-10-21 RX ORDER — MORPHINE SULFATE 2 MG/ML
2 INJECTION, SOLUTION INTRAMUSCULAR; INTRAVENOUS EVERY 6 HOURS PRN
Status: DISCONTINUED | OUTPATIENT
Start: 2020-10-21 | End: 2020-10-24

## 2020-10-21 RX ORDER — SODIUM CHLORIDE 9 MG/ML
INJECTION, SOLUTION INTRAVENOUS CONTINUOUS
Status: DISCONTINUED | OUTPATIENT
Start: 2020-10-21 | End: 2020-10-25

## 2020-10-21 RX ADMIN — TRAZODONE HYDROCHLORIDE 100 MG: 50 TABLET ORAL at 19:37

## 2020-10-21 RX ADMIN — BUSPIRONE HYDROCHLORIDE 10 MG: 10 TABLET ORAL at 19:37

## 2020-10-21 RX ADMIN — METOPROLOL SUCCINATE 25 MG: 25 TABLET, EXTENDED RELEASE ORAL at 12:29

## 2020-10-21 RX ADMIN — ALBUTEROL SULFATE 2 PUFF: 90 AEROSOL, METERED RESPIRATORY (INHALATION) at 19:21

## 2020-10-21 RX ADMIN — SODIUM CHLORIDE, PRESERVATIVE FREE 10 ML: 5 INJECTION INTRAVENOUS at 12:26

## 2020-10-21 RX ADMIN — OXYCODONE HYDROCHLORIDE AND ACETAMINOPHEN 1 TABLET: 5; 325 TABLET ORAL at 05:54

## 2020-10-21 RX ADMIN — BACLOFEN 10 MG: 10 TABLET ORAL at 15:08

## 2020-10-21 RX ADMIN — BACLOFEN 10 MG: 10 TABLET ORAL at 19:37

## 2020-10-21 RX ADMIN — FAMOTIDINE 40 MG: 20 TABLET ORAL at 19:37

## 2020-10-21 RX ADMIN — BUSPIRONE HYDROCHLORIDE 10 MG: 10 TABLET ORAL at 15:08

## 2020-10-21 RX ADMIN — BUDESONIDE AND FORMOTEROL FUMARATE DIHYDRATE 2 PUFF: 160; 4.5 AEROSOL RESPIRATORY (INHALATION) at 08:39

## 2020-10-21 RX ADMIN — IPRATROPIUM BROMIDE AND ALBUTEROL SULFATE 3 ML: .5; 3 SOLUTION RESPIRATORY (INHALATION) at 11:46

## 2020-10-21 RX ADMIN — HYDRALAZINE HYDROCHLORIDE 10 MG: 10 TABLET, FILM COATED ORAL at 12:30

## 2020-10-21 RX ADMIN — SODIUM CHLORIDE, PRESERVATIVE FREE 10 ML: 5 INJECTION INTRAVENOUS at 19:38

## 2020-10-21 RX ADMIN — Medication 2 PUFF: at 19:22

## 2020-10-21 RX ADMIN — CLONAZEPAM 1 MG: 1 TABLET ORAL at 12:35

## 2020-10-21 RX ADMIN — BUDESONIDE AND FORMOTEROL FUMARATE DIHYDRATE 2 PUFF: 160; 4.5 AEROSOL RESPIRATORY (INHALATION) at 19:23

## 2020-10-21 RX ADMIN — SODIUM CHLORIDE: 9 INJECTION, SOLUTION INTRAVENOUS at 14:02

## 2020-10-21 RX ADMIN — IPRATROPIUM BROMIDE AND ALBUTEROL SULFATE 3 ML: .5; 3 SOLUTION RESPIRATORY (INHALATION) at 08:38

## 2020-10-21 RX ADMIN — LEVOTHYROXINE SODIUM 100 MCG: 100 TABLET ORAL at 05:54

## 2020-10-21 RX ADMIN — MORPHINE SULFATE 2 MG: 2 INJECTION, SOLUTION INTRAMUSCULAR; INTRAVENOUS at 18:13

## 2020-10-21 RX ADMIN — AZITHROMYCIN MONOHYDRATE 500 MG: 500 INJECTION, POWDER, LYOPHILIZED, FOR SOLUTION INTRAVENOUS at 22:14

## 2020-10-21 RX ADMIN — CEFTRIAXONE 1 G: 1 INJECTION, POWDER, FOR SOLUTION INTRAMUSCULAR; INTRAVENOUS at 23:02

## 2020-10-21 RX ADMIN — OXYCODONE HYDROCHLORIDE AND ACETAMINOPHEN 1 TABLET: 5; 325 TABLET ORAL at 12:40

## 2020-10-21 RX ADMIN — AZITHROMYCIN MONOHYDRATE 500 MG: 500 INJECTION, POWDER, LYOPHILIZED, FOR SOLUTION INTRAVENOUS at 00:09

## 2020-10-21 RX ADMIN — FERROUS GLUCONATE TAB 324 MG (37.5 MG ELEMENTAL IRON) 324 MG: 324 (37.5 FE) TAB at 19:37

## 2020-10-21 RX ADMIN — IPRATROPIUM BROMIDE AND ALBUTEROL SULFATE 3 ML: .5; 3 SOLUTION RESPIRATORY (INHALATION) at 03:43

## 2020-10-21 RX ADMIN — OXYCODONE HYDROCHLORIDE AND ACETAMINOPHEN 1 TABLET: 5; 325 TABLET ORAL at 19:33

## 2020-10-21 RX ADMIN — HYDRALAZINE HYDROCHLORIDE 10 MG: 10 TABLET, FILM COATED ORAL at 19:37

## 2020-10-21 RX ADMIN — ASPIRIN 81 MG CHEWABLE TABLET 81 MG: 81 TABLET CHEWABLE at 12:30

## 2020-10-21 ASSESSMENT — PAIN DESCRIPTION - PROGRESSION: CLINICAL_PROGRESSION: NOT CHANGED

## 2020-10-21 ASSESSMENT — PAIN DESCRIPTION - PAIN TYPE
TYPE: ACUTE PAIN
TYPE: ACUTE PAIN

## 2020-10-21 ASSESSMENT — PAIN DESCRIPTION - FREQUENCY: FREQUENCY: CONTINUOUS

## 2020-10-21 ASSESSMENT — PAIN DESCRIPTION - DESCRIPTORS: DESCRIPTORS: ACHING

## 2020-10-21 ASSESSMENT — PAIN DESCRIPTION - LOCATION
LOCATION: ABDOMEN;CHEST
LOCATION: ABDOMEN

## 2020-10-21 ASSESSMENT — PAIN SCALES - GENERAL
PAINLEVEL_OUTOF10: 6
PAINLEVEL_OUTOF10: 6
PAINLEVEL_OUTOF10: 8
PAINLEVEL_OUTOF10: 0
PAINLEVEL_OUTOF10: 7
PAINLEVEL_OUTOF10: 6

## 2020-10-21 ASSESSMENT — PAIN DESCRIPTION - ONSET: ONSET: ON-GOING

## 2020-10-21 NOTE — PROGRESS NOTES
Hospitalist Progress Note      Name:  Juan Ramon Escobedo /Age/Sex: 1960  (64 y.o. female)   MRN & CSN:  [id number] & [id number] Admission Date/Time: 10/19/2020 10:07 PM   Location:  [facility] PCP: Korey Parr, [address] Day: 3    History of Present Illness:     Chief Complaint: Sepsis ([address]  Juan Ramon Escobedo is a 64 y.o.  female  who presents with abdominal pain     The patient seen and examined at bedside after right upper quadrant ultrasound. She says has been having epigastric abdominal pain. .  She says does not feel good. Patient said has history of upper respiratory symptoms recently with nasal congestion and fevers overnight. .  Ten point ROS reviewed negative, unless as noted above    Objective:        Intake/Output Summary (Last 24 hours) at 10/21/2020 1330  Last data filed at 10/21/2020 0009  Gross per 24 hour   Intake 490 ml   Output --   Net 490 ml      Vitals:   Vitals:    10/21/20 1221   BP: (!) 156/77   Pulse: 116   Resp: 18   Temp: 98.2 °F (36.8 °C)   SpO2: 99%     Physical Exam:   General Appearance: alert and oriented to person, place and time, in no acute distress  Cardiovascular: normal rate, regular rhythm, normal S1 and S2, heart sounds distant  Pulmonary/Chest: clear to auscultation bilaterally  Abdomen: soft,+ tenderness in the right upper quadrant and epigastrium, non-distended, normal bowel sounds, no masses   Extremities: no cyanosis, clubbing or edema, pulse   Skin: warm and dry, no rash or erythema  Head: normocephalic and atraumatic  Eyes: pupils equal, round, and reactive to light  Neck: supple and non-tender without mass, no thyromegaly   Musculoskeletal: normal range of motion, no joint swelling, deformity or tenderness  Neurological: alert, oriented, normal speech, no focal findings or movement disorder noted    Medications:   Medications:    aspirin  81 mg Oral Daily    baclofen  10 mg Oral TID    budesonide-formoterol  2 puff Inhalation BID    busPIRone  10 mg Oral TID    DULoxetine  60 mg Oral Daily    famotidine  40 mg Oral Nightly    ferrous gluconate  324 mg Oral BID    fluticasone  2 spray Nasal Daily    folic acid  1 mg Oral Daily    hydrALAZINE  10 mg Oral TID    ipratropium-albuterol  1 vial Inhalation Q4H    levothyroxine  100 mcg Oral Daily    metoprolol succinate  25 mg Oral Daily    montelukast  10 mg Oral Daily    theophylline  400 mg Oral Daily    traZODone  100 mg Oral Nightly    thiamine  100 mg Oral Daily    sodium chloride flush  10 mL Intravenous 2 times per day    [Held by provider] enoxaparin  40 mg Subcutaneous Daily    azithromycin  500 mg Intravenous Q24H    And    cefTRIAXone (ROCEPHIN) IV  1 g Intravenous Q24H      Infusions:    sodium chloride       PRN Meds: ipratropium-albuterol, 1 ampule, Q4H PRN  clonazePAM, 1 mg, TID PRN  sodium chloride flush, 10 mL, PRN  acetaminophen, 650 mg, Q6H PRN    Or  acetaminophen, 650 mg, Q6H PRN  polyethylene glycol, 17 g, Daily PRN  promethazine, 12.5 mg, Q6H PRN    Or  ondansetron, 4 mg, Q6H PRN  oxyCODONE-acetaminophen, 1 tablet, Q6H PRN  guaiFENesin-dextromethorphan, 5 mL, Q4H PRN            Pertinent New Labs & Imaging Studies     CBC with Differential:    Lab Results   Component Value Date    WBC 22.0 10/21/2020    RBC 3.45 10/21/2020    HGB 9.9 10/21/2020    HCT 33.6 10/21/2020     10/21/2020    MCV 97.4 10/21/2020    MCH 28.7 10/21/2020    MCHC 29.5 10/21/2020    RDW 13.5 10/21/2020    SEGSPCT 86.8 10/19/2020    BANDSPCT 4 08/19/2019    LYMPHOPCT 6.1 10/19/2020    MONOPCT 5.3 10/19/2020    MYELOPCT 1 04/20/2019    BASOPCT 0.3 10/19/2020    MONOSABS 1.4 10/19/2020    LYMPHSABS 1.6 10/19/2020    EOSABS 0.1 10/19/2020    BASOSABS 0.1 10/19/2020    DIFFTYPE AUTOMATED DIFFERENTIAL 10/19/2020     CMP:    Lab Results   Component Value Date     10/21/2020    K 4.1 10/21/2020    CL 88 10/21/2020    CO2 34 10/21/2020    BUN 16 10/21/2020    CREATININE 1.1 10/21/2020 GFRAA >60 10/21/2020    LABGLOM 51 10/21/2020    GLUCOSE 141 10/21/2020    PROT 5.6 10/21/2020    PROT 6.9 01/23/2013    LABALBU 3.5 10/21/2020    CALCIUM 9.4 10/21/2020    BILITOT 0.2 10/21/2020    ALKPHOS 89 10/21/2020     10/21/2020     10/21/2020     Ct Abdomen Pelvis W Iv Contrast Additional Contrast? None    Result Date: 10/21/2020  EXAMINATION: CT OF THE ABDOMEN AND PELVIS WITH CONTRAST, 10/19/2020 11:32 pm TECHNIQUE: CT of the abdomen and pelvis was performed with the administration of intravenous contrast. Multiplanar reformatted images are provided for review. Dose modulation, iterative reconstruction, and/or weight based adjustment of the mA/kV was utilized to reduce the radiation dose to as low as reasonably achievable. COMPARISON: None HISTORY: ORDERING SYSTEM PROVIDED HISTORY: LUQ pain TECHNOLOGIST PROVIDED HISTORY: IV contrast only. Thank you. Additional Contrast?->None Reason for exam:->LUQ pain Reason for Exam: COPD/SOB FINDINGS: Lower Chest: Emphysematous changes are seen. Bibasilar atelectasis is noted. Moderate-sized pericardial effusion is noted. Organs: Minimal periportal edema is noted. Mild diffuse fatty infiltration of the liver is noted. There is reflux of contrast into the IVC and the hepatic veins, suggestive for right heart failure. Reticulations are seen involving the liver which could be related to hepatic congestion. Gallbladder wall thickening is noted. The spleen, pancreas, adrenal glands, and kidneys are unremarkable. GI/Bowel: There is no evidence of bowel obstruction. Colonic diverticulosis is seen. The appendix is not visualized. Pelvis: There is a small amount of free fluid within the pelvis. Bladder is unremarkable. Peritoneum/Retroperitoneum: There is no free air or lymphadenopathy. Minimal ascites is seen within the upper abdomen. Bones/Soft Tissues: No destructive osseous lesions are identified. 1. Moderate-sized pericardial effusion is noted. Contrast is seen refluxing into the hepatic veins and IVC, likely related to right heart failure. 2. Findings are suggestive for hepatic congestion and fatty infiltration. Periportal edema and gallbladder wall edema are noted, likely due to fluid overload. 3. Minimal ascites is seen within the upper abdomen and pelvis. 4. Colonic diverticulosis. Xr Chest Portable    Result Date: 10/19/2020  EXAMINATION: ONE XRAY VIEW OF THE CHEST 10/19/2020 10:13 pm COMPARISON: 07/12/2020 HISTORY: ORDERING SYSTEM PROVIDED HISTORY: cough TECHNOLOGIST PROVIDED HISTORY: Reason for exam:->cough Reason for Exam: cough Acuity: Acute Type of Exam: Initial Additional signs and symptoms: na Relevant Medical/Surgical History: COPD FINDINGS: Monitor wires overlie the chest.  The trachea is midline. There is stable cardiomegaly. There are patchy airspace changes in the right lung base that is concerning for an infiltrate or atelectasis. There is hyperinflation of the lung fields. There are healing left-sided rib fractures. There are small pleural effusions. Stable cardiomegaly. Patchy airspace changes in the right lung base that are concerning for early infiltrate. Small pleural effusions. Follow up to resolution is suggested. Assessment and Plan:   Kat Perez is a 64 y.o.  female  who presents with Sepsis ([address]    Sepsis likely from pneumonia versus cholecystitis  Leukocytosis trending down  Continue Rocephin and azithromycin  Monitor vitals closely  Blood cultures pending  Trending of liver enzymes question if she had any cholangitis as there is no elevated alkaline phosphatase  Even though ultrasound abdomen showed signs of acute calculus cholecystitis, there is edema of the gallbladder could be coming from congestion.   GI consulted, pending recommendations    Pericardial effusion  Concern for early tamponade  CTA abdomen showed moderate pericardial effusion  Echo with right atrial contraction, concern for early tamponade  Cardiology recommendations appreciated, discussed in details with Dr Ale Hussein as he is concerned about cholecystitis, agree with elevated liver enzymes needed right upper quadrant ultrasound, which showed a calculus cholecystitis but unlikely patient has cholangitis given normal alkaline phosphatase  Continue fluids  Monitor closely for any hemodynamic instability    Transaminitis  Worsening liver enzymes  Concern for liver congestion versus hepatitis  Consulted GI, pending recommendations    LUZ MARIA-resolved  Likely from prerenal  Currently resolved    Hypertension  Continue metoprolol, hydralazine    Hypothyroidism  Continue Synthroid    Anemia of chronic disorders  Monitor hemoglobin    Chronic back pain  Continue baclofen    Anxiety  Continue Cymbalta and Buspar    Diet Diet NPO, After Midnight   DVT Prophylaxis [] Lovenox, []  Heparin, [x] SCDs, [] Ambulation   GI Prophylaxis [] PPI,  [x] H2 Blocker,  [] Carafate,  [] Diet/Tube Feeds   Code Status Full Code   Disposition Patient requires continued admission due to sepsis   MDM [] Low, [x] Moderate,[]  High     Electronically signed by Enmanuel Goode MD on 10/21/2020 at 1:30 PM

## 2020-10-21 NOTE — PROGRESS NOTES
Pt seen and echo evaluated d/w hospitalitis team . Pt c/o having epigastric pain , chills and rigors, LFT abnormal and lactic acid is high  Echo shows fibrinous material suggestive chronic and right side is not showing signs of failure , although there is right atrial collapse   Clinically she may be having cholangitis causing her symptoms and lactic aidosis, clinically she is not in temponade  Consider getting culture  Ultrasound abdomen   And  Broad spectrum antibiotics  Consider surgical eval/ GI eval  She was admitted for chest pressure and symptoms at [facility] last week   Copying summary from there    Diet: Regular   Activity: Regular  Disposition: Discharged to [x]Home, []HHC, []SNF, []Hospice  Condition on discharge: [address] [place] [address]:   Raymond Pitts is a 64y.o. year old female who presented to [facility] on 10/6/2020 10:10 PM for chest pain. [place] to [place]  when she started experiencing chest pain. EMS was called and patient was given 1g nitroglycerin en route. In ED her trops were negative and EKG was consistent with No ST elevations. CT PE was obtained Which did not show any evidence of PE. Patient did have an initial leukocytosis which resolved on without any antibiotics. Patient was seen by cardiology who indicated no further cardiac workup was warranted on her. Patient was treated for her COPD with O2 and Bipap (home regimen), Hypertension, Hypothyroidism And anxiety with her home regimen. On the last day of hospital stay, The patient expressed appropriate understanding of and agreement with the discharge recommendations, medications, and plan.

## 2020-10-22 LAB
ALBUMIN SERPL-MCNC: 3.5 GM/DL (ref 3.4–5)
ALP BLD-CCNC: 111 IU/L (ref 40–129)
ALT SERPL-CCNC: 781 U/L (ref 10–40)
ANION GAP SERPL CALCULATED.3IONS-SCNC: 11 MMOL/L (ref 4–16)
AST SERPL-CCNC: 560 IU/L (ref 15–37)
BASOPHILS ABSOLUTE: 0.1 K/CU MM
BASOPHILS RELATIVE PERCENT: 0.2 % (ref 0–1)
BILIRUB SERPL-MCNC: 0.3 MG/DL (ref 0–1)
BUN BLDV-MCNC: 13 MG/DL (ref 6–23)
CALCIUM SERPL-MCNC: 9 MG/DL (ref 8.3–10.6)
CHLORIDE BLD-SCNC: 88 MMOL/L (ref 99–110)
CO2: 32 MMOL/L (ref 21–32)
CREAT SERPL-MCNC: 0.9 MG/DL (ref 0.6–1.1)
DIFFERENTIAL TYPE: ABNORMAL
EOSINOPHILS ABSOLUTE: 0 K/CU MM
EOSINOPHILS RELATIVE PERCENT: 0 % (ref 0–3)
GFR AFRICAN AMERICAN: >60 ML/MIN/1.73M2
GFR NON-AFRICAN AMERICAN: >60 ML/MIN/1.73M2
GLUCOSE BLD-MCNC: 101 MG/DL (ref 70–99)
HCT VFR BLD CALC: 33 % (ref 37–47)
HEMOGLOBIN: 9.4 GM/DL (ref 12.5–16)
IMMATURE NEUTROPHIL %: 1.5 % (ref 0–0.43)
LYMPHOCYTES ABSOLUTE: 0.8 K/CU MM
LYMPHOCYTES RELATIVE PERCENT: 3.2 % (ref 24–44)
MCH RBC QN AUTO: 28.1 PG (ref 27–31)
MCHC RBC AUTO-ENTMCNC: 28.5 % (ref 32–36)
MCV RBC AUTO: 98.5 FL (ref 78–100)
MONOCYTES ABSOLUTE: 1.7 K/CU MM
MONOCYTES RELATIVE PERCENT: 6.6 % (ref 0–4)
NUCLEATED RBC %: 0.1 %
PDW BLD-RTO: 13.7 % (ref 11.7–14.9)
PLATELET # BLD: 424 K/CU MM (ref 140–440)
PMV BLD AUTO: 10.3 FL (ref 7.5–11.1)
POTASSIUM SERPL-SCNC: 4.9 MMOL/L (ref 3.5–5.1)
RBC # BLD: 3.35 M/CU MM (ref 4.2–5.4)
SEGMENTED NEUTROPHILS ABSOLUTE COUNT: 22.6 K/CU MM
SEGMENTED NEUTROPHILS RELATIVE PERCENT: 88.5 % (ref 36–66)
SODIUM BLD-SCNC: 131 MMOL/L (ref 135–145)
TOTAL IMMATURE NEUTOROPHIL: 0.38 K/CU MM
TOTAL NUCLEATED RBC: 0 K/CU MM
TOTAL PROTEIN: 5.8 GM/DL (ref 6.4–8.2)
WBC # BLD: 25.5 K/CU MM (ref 4–10.5)

## 2020-10-22 PROCEDURE — 6360000002 HC RX W HCPCS: Performed by: NURSE PRACTITIONER

## 2020-10-22 PROCEDURE — 85025 COMPLETE CBC W/AUTO DIFF WBC: CPT

## 2020-10-22 PROCEDURE — 6360000002 HC RX W HCPCS: Performed by: INTERNAL MEDICINE

## 2020-10-22 PROCEDURE — APPSS60 APP SPLIT SHARED TIME 46-60 MINUTES: Performed by: NURSE PRACTITIONER

## 2020-10-22 PROCEDURE — 2700000000 HC OXYGEN THERAPY PER DAY

## 2020-10-22 PROCEDURE — 94761 N-INVAS EAR/PLS OXIMETRY MLT: CPT

## 2020-10-22 PROCEDURE — 2580000003 HC RX 258: Performed by: STUDENT IN AN ORGANIZED HEALTH CARE EDUCATION/TRAINING PROGRAM

## 2020-10-22 PROCEDURE — 6370000000 HC RX 637 (ALT 250 FOR IP): Performed by: NURSE PRACTITIONER

## 2020-10-22 PROCEDURE — 94640 AIRWAY INHALATION TREATMENT: CPT

## 2020-10-22 PROCEDURE — C9113 INJ PANTOPRAZOLE SODIUM, VIA: HCPCS | Performed by: NURSE PRACTITIONER

## 2020-10-22 PROCEDURE — 6370000000 HC RX 637 (ALT 250 FOR IP): Performed by: INTERNAL MEDICINE

## 2020-10-22 PROCEDURE — 6360000002 HC RX W HCPCS: Performed by: STUDENT IN AN ORGANIZED HEALTH CARE EDUCATION/TRAINING PROGRAM

## 2020-10-22 PROCEDURE — 2140000000 HC CCU INTERMEDIATE R&B

## 2020-10-22 PROCEDURE — 6370000000 HC RX 637 (ALT 250 FOR IP): Performed by: STUDENT IN AN ORGANIZED HEALTH CARE EDUCATION/TRAINING PROGRAM

## 2020-10-22 PROCEDURE — 99232 SBSQ HOSP IP/OBS MODERATE 35: CPT | Performed by: INTERNAL MEDICINE

## 2020-10-22 PROCEDURE — 80053 COMPREHEN METABOLIC PANEL: CPT

## 2020-10-22 PROCEDURE — 2580000003 HC RX 258: Performed by: INTERNAL MEDICINE

## 2020-10-22 PROCEDURE — 36415 COLL VENOUS BLD VENIPUNCTURE: CPT

## 2020-10-22 RX ORDER — PANTOPRAZOLE SODIUM 40 MG/10ML
40 INJECTION, POWDER, LYOPHILIZED, FOR SOLUTION INTRAVENOUS DAILY
Status: DISCONTINUED | OUTPATIENT
Start: 2020-10-22 | End: 2020-10-30

## 2020-10-22 RX ORDER — AMLODIPINE BESYLATE 10 MG/1
10 TABLET ORAL DAILY
Status: DISCONTINUED | OUTPATIENT
Start: 2020-10-22 | End: 2020-10-30 | Stop reason: HOSPADM

## 2020-10-22 RX ORDER — LORAZEPAM 0.5 MG/1
0.5 TABLET ORAL EVERY 8 HOURS PRN
Status: DISCONTINUED | OUTPATIENT
Start: 2020-10-22 | End: 2020-10-30 | Stop reason: HOSPADM

## 2020-10-22 RX ADMIN — ALBUTEROL SULFATE 2 PUFF: 90 AEROSOL, METERED RESPIRATORY (INHALATION) at 13:25

## 2020-10-22 RX ADMIN — LORAZEPAM 0.5 MG: 0.5 TABLET ORAL at 11:43

## 2020-10-22 RX ADMIN — ALBUTEROL SULFATE 2 PUFF: 90 AEROSOL, METERED RESPIRATORY (INHALATION) at 09:15

## 2020-10-22 RX ADMIN — Medication 2 PUFF: at 00:40

## 2020-10-22 RX ADMIN — Medication 2 PUFF: at 21:17

## 2020-10-22 RX ADMIN — OXYCODONE HYDROCHLORIDE AND ACETAMINOPHEN 1 TABLET: 5; 325 TABLET ORAL at 04:26

## 2020-10-22 RX ADMIN — HYDRALAZINE HYDROCHLORIDE 10 MG: 10 TABLET, FILM COATED ORAL at 14:23

## 2020-10-22 RX ADMIN — BACLOFEN 10 MG: 10 TABLET ORAL at 14:23

## 2020-10-22 RX ADMIN — METOPROLOL SUCCINATE 25 MG: 25 TABLET, EXTENDED RELEASE ORAL at 08:48

## 2020-10-22 RX ADMIN — GUAIFENESIN AND DEXTROMETHORPHAN 5 ML: 100; 10 SYRUP ORAL at 04:29

## 2020-10-22 RX ADMIN — BUSPIRONE HYDROCHLORIDE 10 MG: 10 TABLET ORAL at 14:23

## 2020-10-22 RX ADMIN — Medication 100 MG: at 08:48

## 2020-10-22 RX ADMIN — BUDESONIDE AND FORMOTEROL FUMARATE DIHYDRATE 2 PUFF: 160; 4.5 AEROSOL RESPIRATORY (INHALATION) at 21:18

## 2020-10-22 RX ADMIN — Medication 2 PUFF: at 04:21

## 2020-10-22 RX ADMIN — BACLOFEN 10 MG: 10 TABLET ORAL at 08:48

## 2020-10-22 RX ADMIN — CEFEPIME 2 G: 2 INJECTION, POWDER, FOR SOLUTION INTRAVENOUS at 15:00

## 2020-10-22 RX ADMIN — SODIUM CHLORIDE: 9 INJECTION, SOLUTION INTRAVENOUS at 03:47

## 2020-10-22 RX ADMIN — THEOPHYLLINE ANHYDROUS 400 MG: 200 CAPSULE, EXTENDED RELEASE ORAL at 08:48

## 2020-10-22 RX ADMIN — SODIUM CHLORIDE, PRESERVATIVE FREE 10 ML: 5 INJECTION INTRAVENOUS at 08:48

## 2020-10-22 RX ADMIN — ASPIRIN 81 MG CHEWABLE TABLET 81 MG: 81 TABLET CHEWABLE at 08:48

## 2020-10-22 RX ADMIN — Medication 2 PUFF: at 13:26

## 2020-10-22 RX ADMIN — BUSPIRONE HYDROCHLORIDE 10 MG: 10 TABLET ORAL at 21:51

## 2020-10-22 RX ADMIN — ALBUTEROL SULFATE 2 PUFF: 90 AEROSOL, METERED RESPIRATORY (INHALATION) at 21:17

## 2020-10-22 RX ADMIN — ALBUTEROL SULFATE 2 PUFF: 90 AEROSOL, METERED RESPIRATORY (INHALATION) at 16:07

## 2020-10-22 RX ADMIN — Medication 2 PUFF: at 16:07

## 2020-10-22 RX ADMIN — CLONAZEPAM 1 MG: 1 TABLET ORAL at 04:42

## 2020-10-22 RX ADMIN — LEVOTHYROXINE SODIUM 100 MCG: 100 TABLET ORAL at 06:37

## 2020-10-22 RX ADMIN — FERROUS GLUCONATE TAB 324 MG (37.5 MG ELEMENTAL IRON) 324 MG: 324 (37.5 FE) TAB at 08:48

## 2020-10-22 RX ADMIN — SODIUM CHLORIDE, PRESERVATIVE FREE 10 ML: 5 INJECTION INTRAVENOUS at 21:52

## 2020-10-22 RX ADMIN — DULOXETINE HYDROCHLORIDE 60 MG: 30 CAPSULE, DELAYED RELEASE ORAL at 08:48

## 2020-10-22 RX ADMIN — Medication 2 PUFF: at 09:16

## 2020-10-22 RX ADMIN — ALBUTEROL SULFATE 2 PUFF: 90 AEROSOL, METERED RESPIRATORY (INHALATION) at 04:22

## 2020-10-22 RX ADMIN — SODIUM CHLORIDE: 9 INJECTION, SOLUTION INTRAVENOUS at 14:22

## 2020-10-22 RX ADMIN — MONTELUKAST 10 MG: 10 TABLET, FILM COATED ORAL at 08:48

## 2020-10-22 RX ADMIN — TRAZODONE HYDROCHLORIDE 100 MG: 50 TABLET ORAL at 21:50

## 2020-10-22 RX ADMIN — BACLOFEN 10 MG: 10 TABLET ORAL at 21:51

## 2020-10-22 RX ADMIN — BUSPIRONE HYDROCHLORIDE 10 MG: 10 TABLET ORAL at 08:48

## 2020-10-22 RX ADMIN — MORPHINE SULFATE 2 MG: 2 INJECTION, SOLUTION INTRAMUSCULAR; INTRAVENOUS at 08:53

## 2020-10-22 RX ADMIN — BUDESONIDE AND FORMOTEROL FUMARATE DIHYDRATE 2 PUFF: 160; 4.5 AEROSOL RESPIRATORY (INHALATION) at 10:15

## 2020-10-22 RX ADMIN — AMLODIPINE BESYLATE 10 MG: 10 TABLET ORAL at 15:17

## 2020-10-22 RX ADMIN — ONDANSETRON 4 MG: 2 INJECTION INTRAMUSCULAR; INTRAVENOUS at 17:34

## 2020-10-22 RX ADMIN — HYDRALAZINE HYDROCHLORIDE 10 MG: 10 TABLET, FILM COATED ORAL at 21:50

## 2020-10-22 RX ADMIN — FOLIC ACID 1 MG: 1 TABLET ORAL at 08:48

## 2020-10-22 RX ADMIN — FERROUS GLUCONATE TAB 324 MG (37.5 MG ELEMENTAL IRON) 324 MG: 324 (37.5 FE) TAB at 21:51

## 2020-10-22 RX ADMIN — HYDRALAZINE HYDROCHLORIDE 10 MG: 10 TABLET, FILM COATED ORAL at 08:48

## 2020-10-22 RX ADMIN — OXYCODONE HYDROCHLORIDE AND ACETAMINOPHEN 1 TABLET: 5; 325 TABLET ORAL at 21:55

## 2020-10-22 RX ADMIN — LORAZEPAM 0.5 MG: 0.5 TABLET ORAL at 21:51

## 2020-10-22 RX ADMIN — PANTOPRAZOLE SODIUM 40 MG: 40 INJECTION, POWDER, FOR SOLUTION INTRAVENOUS at 17:28

## 2020-10-22 ASSESSMENT — PAIN DESCRIPTION - ONSET
ONSET: ON-GOING
ONSET: ON-GOING

## 2020-10-22 ASSESSMENT — PAIN DESCRIPTION - PROGRESSION: CLINICAL_PROGRESSION: GRADUALLY WORSENING

## 2020-10-22 ASSESSMENT — PAIN DESCRIPTION - ORIENTATION
ORIENTATION: RIGHT;LEFT
ORIENTATION: RIGHT;LEFT

## 2020-10-22 ASSESSMENT — PAIN SCALES - GENERAL
PAINLEVEL_OUTOF10: 4
PAINLEVEL_OUTOF10: 0
PAINLEVEL_OUTOF10: 8
PAINLEVEL_OUTOF10: 7
PAINLEVEL_OUTOF10: 5
PAINLEVEL_OUTOF10: 6

## 2020-10-22 ASSESSMENT — PAIN DESCRIPTION - LOCATION
LOCATION: OTHER (COMMENT)
LOCATION: OTHER (COMMENT)
LOCATION: ABDOMEN

## 2020-10-22 ASSESSMENT — PAIN DESCRIPTION - PAIN TYPE
TYPE: ACUTE PAIN

## 2020-10-22 ASSESSMENT — PAIN DESCRIPTION - FREQUENCY
FREQUENCY: CONTINUOUS
FREQUENCY: CONTINUOUS

## 2020-10-22 ASSESSMENT — PAIN DESCRIPTION - DESCRIPTORS
DESCRIPTORS: ACHING
DESCRIPTORS: BURNING
DESCRIPTORS: BURNING

## 2020-10-22 ASSESSMENT — ENCOUNTER SYMPTOMS: SHORTNESS OF BREATH: 1

## 2020-10-22 NOTE — CONSULTS
[facility]  Gastroenterology Consultation    10/22/2020  6:57 AM    Patient:    Alton Freeman  : 1960   64 y.o. MRN: [id number]  Admitted: 10/19/2020 10:07 PM ATT: Allie Murillo,*   [id number]  AdmitDx: Pericardial effusion [I31.3]  PNA (pneumonia) [J18.9]  Sepsis due to pneumonia ([facility] [J18.9, A41.9]  PNA (pneumonia) [J18.9]  PCP: Hakan Cross MD    Reason for Consult:  Elevated liver enzymes, concern for cholangitis     Requesting Physician:  Allie Murillo,*    History Obtained From:  Patient and review of all records    HISTORY OF PRESENT ILLNESS:                The patient is a 64 y.o. female with significant   Past Medical History:   Diagnosis Date    Anxiety 2017    Arthritis     Back pain at L4-L5 level 2017    Dr Jules Jiang Bipolar 1 disorder ([facility]     COPD (chronic obstructive pulmonary disease) ([facility]     Emphysema of lung ([facility]     FH: CAD (coronary artery disease) 2017    Father had in his forties, sister in her thirties    Tamera Jiang Fibromyalgia 2017    H/O Doppler ultrasound 2019    venous- no DVT or reflux    H/O echocardiogram 2015    EF50-55% Normal- see media    History of blood transfusion     Hyperlipidemia LDL goal <100     Hypertension     Obstructive sleep apnea     Osteoarthritis     Thyroid disease     who presented to [facility] ED for sudden abdominal pain for 1 week, describes as sharp pressure at mid epigastric area with nausea and occasional  vomiting. Hx of emphysema. Was on lung transplant list, but was removed, 3L O2 dependent with bipap at night at home. Epigastric abdominal pain  N/V, GERD, dyspepsia, dysphagia   Last BM 4 days ago     History of EGD 2018 Chronic gastritis.  Biopsied, path negative, no h.pylori   History of colonoscopy 2019 Moderate left sided diverticulos, Grade II hemorrhoids    ASA 81 mg    Celebrex 200 mg BID   NO Anti-platelet therapy    Past Smoker  Occasional Alcohol intake in the past on the weekends, denies current alcohol use     Past Medical History:        Diagnosis Date    Anxiety 2/16/2017    Arthritis     Back pain at L4-L5 level 2/16/2017    Dr Shantel Webber Bipolar 1 disorder ([facility]     COPD (chronic obstructive [facility] disease) ([facility]     Emphysema of lung ([facility]     FH: CAD (coronary artery disease) 2/16/2017    Father had in his forties, sister in her thirties    Leann Webber Fibromyalgia 2/16/2017    H/O Doppler ultrasound 04/05/2019    venous- no DVT or reflux    H/O echocardiogram 01/14/2015    EF50-55% Normal- see media    History of blood transfusion     Hyperlipidemia LDL goal <100     Hypertension     Obstructive sleep apnea     Osteoarthritis     Thyroid disease        Past Surgical History:        Procedure Laterality Date    BACK SURGERY      CARDIAC CATHETERIZATION      10/2019    CARPAL TUNNEL RELEASE      COLONOSCOPY N/A 12/12/2019    COLONOSCOPY DIAGNOSTIC performed by Huseyin Castro MD at [address] [place] [address]           Current Medications:    Medications    Scheduled Medications:    albuterol sulfate HFA  2 puff Inhalation Q4H    ipratropium  2 puff Inhalation Q4H    aspirin  81 mg Oral Daily    baclofen  10 mg Oral TID    budesonide-formoterol  2 puff Inhalation BID    busPIRone  10 mg Oral TID    DULoxetine  60 mg Oral Daily    famotidine  40 mg Oral Nightly    ferrous gluconate  324 mg Oral BID    fluticasone  2 spray Nasal Daily    folic acid  1 mg Oral Daily    hydrALAZINE  10 mg Oral TID    levothyroxine  100 mcg Oral Daily    metoprolol succinate  25 mg Oral Daily    montelukast  10 mg Oral Daily    theophylline  400 mg Oral Daily    traZODone  100 mg Oral Nightly    thiamine  100 mg Oral Daily    sodium chloride flush  10 mL Intravenous 2 times per day    [Held by provider] enoxaparin  40 mg Subcutaneous Daily    azithromycin  500 mg Intravenous Q24H    And    cefTRIAXone (ROCEPHIN) IV  1 g Intravenous Q24H     PRN Medications: morphine, clonazePAM, sodium chloride flush, acetaminophen **OR** acetaminophen, polyethylene glycol, promethazine **OR** ondansetron, oxyCODONE-acetaminophen, guaiFENesin-dextromethorphan      Allergies:  Lisinopril    Social History:   TOBACCO:   reports that she quit smoking about 12 years ago. She has a 30.00 pack-year smoking history. She has never used smokeless tobacco.  ETOH:   reports previous alcohol use of about 2.0 standard drinks of alcohol per week. Family History:       Problem Relation Age of Onset    No Known Problems Mother     No Known Problems Father      No family history of colon cancer, Crohn's disease, or ulcerative colitis. REVIEW OF SYSTEMS:    The positive ROS will be identified in bold, otherwise ROS are negative     CONSTITUTIONAL:  Neg   Recent weight changes, fatigue, fever, chills or night sweats  EYES:  Neg  Blurriness, earing, itching or acute change in vision  EARS:  Neg  hearing loss, tinnitus, vertigo, discharge or earache. NOSE:  Neg  Rhinorrhea, sneezing, itching, allergy or epistaxis  MOUTH/THROAT:  Neg  bleeding gums, hoarseness or sore throat. RESPIRATORY:   Neg SOB, wheeze, cough, sputum, hemoptysis or bronchitis  CARDIOVASCULAR:  Neg chest pain, palpitations, dyspnea on exertion, orthopnea, paroxysmal nocturnal dyspnea or edema  GASTROINTESTINAL:  SEE HPI  GENITOURINARY:  Neg  Urinary frequency, hesitancy, urgency, polyuria, dysuria, hematuria, nocturia, or incontinence. HEMATOLOGIC/LYMPHATIC:  Neg  Anemia, bleeding tendency  MUSCULOSKELETAL:    New myalgias, bone pain, joint pain, swelling or stiffness and has had change in gait. NEUROLOGICAL:  Neg  Loss of Consciousness, memory loss, forgetfulness, periods of confusion, difficulty concentrating, seizures, decline in intellect, nervousness, insomina, aphasia or dysarthria.   SKIN:  Neg  skin or hair changes, and has no itching, rashes, or sores.  PSYCHIATRIC:  Neg depression, personality changes, anxiety. ENDOCRINE:  Neg polydipsia, polyuria, abnormal weight changes, heat /cold intolerance.   ALL/IMM:  Neg reactions to drugs other than listed    PHYSICAL EXAM:      Vitals:    BP (!) 160/87   Pulse 116   Temp 98.6 °F (37 °C) (Oral)   Resp 20   Ht 5' 2\" (1.575 m)   Wt 194 lb 14.2 oz (88.4 kg)   SpO2 92%   BMI 35.65 kg/m²     General Appearance:    Alert, cooperative, no distress, appears stated age   HEENT:    Normocephalic, atraumatic, Conjunctiva clear, Lips, mucosa dry, tongue normal; gums normal, lower dentures    Neck:   Supple, symmetrical, trachea midline   Lungs:     Clear to auscultation bilaterally, respirations unlabored   Chest Wall:    No tenderness or deformity    Heart:    Regular rate and rhythm, S1 and S2 normal, no murmur, rub   or gallop   Abdomen:     Soft, distended, mid epigastric tenderness , bowel sounds active all four quadrants, no masses, no organomegaly, no ascites    Rectal:    Deferred   Extremities:   Extremities normal, atraumatic, no cyanosis or edema   Pulses:   2+ and symmetric all extremities   Skin:   Skin scattered ecchymosis, texture, turgor normal, no rashes or lesions   Lymph nodes:   No abnormality   Neurologic:   No focal deficits, moving all four extremities      DATA:    ABGs:   Lab Results   Component Value Date    PO2ART 42 07/13/2020    YUJ3ZQA 58.0 07/13/2020     CBC:   Recent Labs     10/19/20  2243 10/20/20  0422 10/21/20  0411   WBC 26.1* 24.8* 22.0*   HGB 11.2* 10.7* 9.9*   * 541* 456*     BMP:    Recent Labs     10/19/20  2243 10/20/20  0422 10/21/20  0411    134* 133*   K 4.0 4.3 4.1   CL 89* 90* 88*   CO2 31 29 34*   BUN 14 18 16   CREATININE 1.1 1.5* 1.1   GLUCOSE 200* 137* 141*     Magnesium:   Lab Results   Component Value Date    MG 2.2 07/14/2020     Hepatic:   Recent Labs     10/19/20  2243 10/20/20  0422 10/21/20  0411   AST 45* 560* 654*   ALT 55* 504* 776*   BILITOT claudication M48.061    COPD with acute exacerbation (HCC) J44.1    Pneumonia due to infectious organism J18.9    SVT (supraventricular tachycardia) (HCC) I47.1    Class 1 obesity due to excess calories with body mass index (BMI) of 31.0 to 31.9 in adult E66.09, Z68.31    Pneumonia J18.9    Pleural effusion J90    Atelectasis J98.11    Pulmonary nodules R91.8    Respiratory failure with hypoxia and hypercapnia (HCC) J96.91, J96.92    Anemia D64.9    COPD with exacerbation (HCC) J44.1    Acquired hemolytic anemia, unspecified (HCC) D59.9    Leucocytosis D72.829    Chronic kidney disease, stage I N18.1    Hyponatremia E87.1    PNA (pneumonia) J18.9    Sepsis (HCC) A41.9    Pericardial effusion I31.3       ASSESSMENT/RECOMMENDATIONS:    Elevated Liver Enzymes, concern for cholangitis:   May be s/t possible acalculous cholecystitis per US,   congestive hepatomegaly, DILI   Recommend surgery consult   D/C azithromycin, start cefepime 2 g BID   Continue optimizing cardiac output   Will order chronic liver w/u  Start amitiza BID and PPI    Use medications that can cause hepatotoxicity cautiously, i.e. amlodipine     Discussed plan of care with patient and Dori Cotter RN     Patient clinical, biochemical, and radiological information discussed with Dr. Tin Mane. He agrees with the assessment and plan. Buffy Zapata CNP  10/22/2020  6:57 AM     LFTS abnormality sec to acalculous cholecystitis  Recommend antibiotics  Recommend surgical consult    I have seen and examined this patient personally, and independently of the nurse practitioner. The plan was developed mutually at the time of the visit with the patient. Soledad Doe and myself have spoken with patient, nursing staff and provided written and verbal instructions .     The above note has been reviewed and I agree with the Assessment,  Diagnosis, and Treatment plan as suggested by Bashir Mccormack

## 2020-10-22 NOTE — PROGRESS NOTES
Hospitalist Progress Note      Name:  Carter Casarez /Age/Sex: 1960  (64 y.o. female)   MRN & CSN:  [id number] & [id number] Admission Date/Time: 10/19/2020 10:07 PM   Location:  [address] [place] [address] PCP: Christian Sanchez, [employer] [address] Day: 4    History of Present Illness:     Chief Complaint: Sepsis ([address]  Carter Casarez is a 64 y.o.  female  who presents with abdominal pain     The patient seen and examined at bedside. Still has upper abdominal pain. Ten point ROS reviewed negative, unless as noted above    Objective:        Intake/Output Summary (Last 24 hours) at 10/22/2020 1142  Last data filed at 10/22/2020 0432  Gross per 24 hour   Intake 1235 ml   Output 600 ml   Net 635 ml      Vitals:   Vitals:    10/22/20 0915   BP:    Pulse:    Resp: 20   Temp:    SpO2: 97%     Physical Exam:   General Appearance: alert and oriented to person, place and time, in no acute distress  Cardiovascular: normal rate, regular rhythm, normal S1 and S2, heart sounds distant  Pulmonary/Chest: clear to auscultation bilaterally  Abdomen: soft,+ tenderness in the right upper quadrant and epigastrium, non-distended, normal bowel sounds, no masses   Extremities: no cyanosis, clubbing or edema, pulse   Skin: warm and dry, no rash or erythema  Head: normocephalic and atraumatic  Eyes: pupils equal, round, and reactive to light  Neck: supple and non-tender without mass, no thyromegaly   Musculoskeletal: normal range of motion, no joint swelling, deformity or tenderness  Neurological: alert, oriented, normal speech, no focal findings or movement disorder noted    Medications:   Medications:    albuterol sulfate HFA  2 puff Inhalation Q4H    ipratropium  2 puff Inhalation Q4H    aspirin  81 mg Oral Daily    baclofen  10 mg Oral TID    budesonide-formoterol  2 puff Inhalation BID    busPIRone  10 mg Oral TID    DULoxetine  60 mg Oral Daily    famotidine  40 mg Oral Nightly    ferrous gluconate  324 mg Oral BID    CALCIUM 9.4 10/21/2020    BILITOT 0.2 10/21/2020    ALKPHOS 89 10/21/2020     10/21/2020     10/21/2020     Ct Abdomen Pelvis W Iv Contrast Additional Contrast? None    Result Date: 10/21/2020  EXAMINATION: CT OF THE ABDOMEN AND PELVIS WITH CONTRAST, 10/19/2020 11:32 pm TECHNIQUE: CT of the abdomen and pelvis was performed with the administration of intravenous contrast. Multiplanar reformatted images are provided for review. Dose modulation, iterative reconstruction, and/or weight based adjustment of the mA/kV was utilized to reduce the radiation dose to as low as reasonably achievable. COMPARISON: None HISTORY: ORDERING SYSTEM PROVIDED HISTORY: LUQ pain TECHNOLOGIST PROVIDED HISTORY: IV contrast only. Thank you. Additional Contrast?->None Reason for exam:->LUQ pain Reason for Exam: COPD/SOB FINDINGS: Lower Chest: Emphysematous changes are seen. Bibasilar atelectasis is noted. Moderate-sized pericardial effusion is noted. Organs: Minimal periportal edema is noted. Mild diffuse fatty infiltration of the liver is noted. There is reflux of contrast into the IVC and the hepatic veins, suggestive for right heart failure. Reticulations are seen involving the liver which could be related to hepatic congestion. Gallbladder wall thickening is noted. The spleen, pancreas, adrenal glands, and kidneys are unremarkable. GI/Bowel: There is no evidence of bowel obstruction. Colonic diverticulosis is seen. The appendix is not visualized. Pelvis: There is a small amount of free fluid within the pelvis. Bladder is unremarkable. Peritoneum/Retroperitoneum: There is no free air or lymphadenopathy. Minimal ascites is seen within the upper abdomen. Bones/Soft Tissues: No destructive osseous lesions are identified. 1. Moderate-sized pericardial effusion is noted. Contrast is seen refluxing into the hepatic veins and IVC, likely related to right heart failure.  2. Findings are suggestive for hepatic congestion and fatty infiltration. Periportal edema and gallbladder wall edema are noted, likely due to fluid overload. 3. Minimal ascites is seen within the upper abdomen and pelvis. 4. Colonic diverticulosis. Xr Chest Portable    Result Date: 10/19/2020  EXAMINATION: ONE XRAY VIEW OF THE CHEST 10/19/2020 10:13 pm COMPARISON: 07/12/2020 HISTORY: ORDERING SYSTEM PROVIDED HISTORY: cough TECHNOLOGIST PROVIDED HISTORY: Reason for exam:->cough Reason for Exam: cough Acuity: Acute Type of Exam: Initial Additional signs and symptoms: na Relevant Medical/Surgical History: COPD FINDINGS: Monitor wires overlie the chest.  The trachea is midline. There is stable cardiomegaly. There are patchy airspace changes in the right lung base that is concerning for an infiltrate or atelectasis. There is hyperinflation of the lung fields. There are healing left-sided rib fractures. There are small pleural effusions. Stable cardiomegaly. Patchy airspace changes in the right lung base that are concerning for early infiltrate. Small pleural effusions. Follow up to resolution is suggested. Assessment and Plan:   Red De Jesus is a 64 y.o.  female  who presents with Sepsis ([address]    Sepsis likely from pneumonia versus cholecystitis  Leukocytosis trending down  Continue Rocephin and azithromycin  Monitor vitals closely  Blood cultures pending  Trending of liver enzymes question if she had any cholangitis as there is no elevated alkaline phosphatase  Even though ultrasound abdomen showed signs of acute calculus cholecystitis, there is edema of the gallbladder could be coming from congestion.   GI consulted, pending recommendations  Pending morning labs    Pericardial effusion  Concern for early tamponade  CTA abdomen showed moderate pericardial effusion  Echo with right atrial contraction, concern for early tamponade  Cardiology recommendations appreciated, discussed in details with Dr Heaven Arboleda as he is concerned about cholecystitis, agree with elevated liver enzymes needed right upper quadrant ultrasound, which showed a calculus cholecystitis but unlikely patient has cholangitis given normal alkaline phosphatase  Continue fluids  Monitor closely for any hemodynamic instability    Transaminitis  Worsening liver enzymes  Concern for liver congestion versus hepatitis  Consulted GI, pending recommendations    LUZ MARIA-resolved  Likely from prerenal  Currently resolved    Hypertension  Continue metoprolol, hydralazine    Hypothyroidism  Continue Synthroid    Anemia of chronic disorders  Monitor hemoglobin    Chronic back pain  Continue baclofen    Anxiety  Continue Cymbalta and Buspar    Diet DIET GENERAL;   DVT Prophylaxis [] Lovenox, []  Heparin, [x] SCDs, [] Ambulation   GI Prophylaxis [] PPI,  [x] H2 Blocker,  [] Carafate,  [] Diet/Tube Feeds   Code Status Full Code   Disposition Patient requires continued admission due to sepsis   MDM [] Low, [x] Moderate,[]  High     Electronically signed by Zena Flor MD on 10/22/2020 at 11:42 AM

## 2020-10-22 NOTE — PROGRESS NOTES
Cardiology Progress Note     Today's Plan:[facility]     Admit Date:  10/19/2020    Consult reason/ Seen today for: Chest pain    Subjective and  Overnight Events: Patient reports being weak and fatigued  Telemetry-SR      Assessment / Plan / Recommendation:     1. Chest pain: Pericardial effusion chronic, right atrial collapse from secondary source? 2. Sepsis: elevated LFT, respiratory disease panel negative. U/S negative for cholecystitis. Possible pneumonia. 3. COPD: end stage,   4. HTN: Controlled on hydralazine and metoprolol. 5. DVT prophylaxis if not contraindicated. History of Presenting Illness:    Chief complain on admission : 64 y. o.year old who is admitted for  Chief Complaint   Patient presents with    Cough    Headache    Chills        Past medical history:    has a past medical history of Anxiety, Arthritis, Back pain at L4-L5 level, Bipolar 1 disorder ([facility] COPD (chronic obstructive pulmonary disease) ([facility] Emphysema of lung ([facility] FH: CAD (coronary artery disease), Fibromyalgia, H/O Doppler ultrasound, H/O echocardiogram, History of blood transfusion, Hyperlipidemia LDL goal <100, Hypertension, Obstructive sleep apnea, Osteoarthritis, and Thyroid disease. Past surgical history:   has a past surgical history that includes Carpal tunnel release; Tubal ligation; back surgery; Cardiac catheterization; and Colonoscopy (N/A, 12/12/2019). Social History:   reports that she quit smoking about 12 years ago. She has a 30.00 pack-year smoking history. She has never used smokeless tobacco. She reports previous alcohol use of about 2.0 standard drinks of alcohol per week. She reports that she does not use drugs. Family history:  family history includes No Known Problems in her father and mother.     Allergies   Allergen Reactions    Lisinopril Swelling and Rash     angioedema       Review of Systems:  Review of Systems Constitutional: Positive for fatigue. Respiratory: Positive for shortness of breath. Neurological: Positive for weakness. All other systems reviewed and are negative. BP (!) 209/89   Pulse 106   Temp 98.4 °F (36.9 °C) (Oral)   Resp 22   Ht 5' 2\" (1.575 m)   Wt 194 lb 14.2 oz (88.4 kg)   SpO2 97%   BMI 35.65 kg/m²       Intake/Output Summary (Last 24 hours) at 10/22/2020 1449  Last data filed at 10/22/2020 1145  Gross per 24 hour   Intake 1355 ml   Output 1000 ml   Net 355 ml       Physical Exam:  Constitutional:  Well developed, Well nourished, No acute distress  HENT:  Normocephalic, Atraumatic, Bilateral external ears normal,  Nose normal.   Neck- trachea is midline  Eyes:  PERRL, Conjunctiva normal  Respiratory:  Normal breath sounds, No respiratory distress, No wheezing, No chest tenderness. Cardiovascular:  Normal heart rate, Normal rhythm, no murmurs appreciated, No rubs appreciated, No gallops appreciated, JVP not elevated  Abdomen/GI:  Soft, No tenderness  Musculoskeletal:  Intact distal pulses, trace edema, No tenderness, No cyanosis, No clubbing. Integument:  Warm, Dry  Lymphatic:  No lymphadenopathy noted.    Neurologic:  Alert & oriented  Psychiatric:  Affect and Mood :pleasant     Medications:    amLODIPine  10 mg Oral Daily    cefepime  2 g Intravenous Q12H    albuterol sulfate HFA  2 puff Inhalation Q4H    ipratropium  2 puff Inhalation Q4H    aspirin  81 mg Oral Daily    baclofen  10 mg Oral TID    budesonide-formoterol  2 puff Inhalation BID    busPIRone  10 mg Oral TID    DULoxetine  60 mg Oral Daily    famotidine  40 mg Oral Nightly    ferrous gluconate  324 mg Oral BID    fluticasone  2 spray Nasal Daily    folic acid  1 mg Oral Daily    hydrALAZINE  10 mg Oral TID    levothyroxine  100 mcg Oral Daily    metoprolol succinate  25 mg Oral Daily    montelukast  10 mg Oral Daily    theophylline  400 mg Oral Daily    traZODone  100 mg Oral Nightly    follows     CARDIOLOGY ATTENDING ADDENDUM    HPI:  I have reviewed the above HPI  And agree with above   Re Dick is a 64 y. o.year old who and presents with had concerns including Cough; Headache; and Chills.   Chief Complaint   Patient presents with    Cough    Headache    Chills     Interval history:  ok    Physical Exam:  General:  Awake, alert, NAD  Head:normal  Eye:normal  Neck:  No JVD   Chest:  Clear to auscultation, respiration easy  Cardiovascular:  RRR S1S2  Abdomen:   nontender  Extremities:  no edema  Pulses; palpable  Neuro: grossly normal      MEDICAL DECISION MAKING;    I agree with the above plan, which was planned by myself and discussed with NP.

## 2020-10-23 ENCOUNTER — ANESTHESIA EVENT (OUTPATIENT)
Dept: OPERATING ROOM | Age: 60
DRG: 853 | End: 2020-10-23
Payer: COMMERCIAL

## 2020-10-23 ENCOUNTER — APPOINTMENT (OUTPATIENT)
Dept: GENERAL RADIOLOGY | Age: 60
DRG: 853 | End: 2020-10-23
Payer: COMMERCIAL

## 2020-10-23 LAB
ALBUMIN SERPL-MCNC: 2.9 GM/DL (ref 3.4–5)
ALP BLD-CCNC: 99 IU/L (ref 40–128)
ALT SERPL-CCNC: 523 U/L (ref 10–40)
AMYLASE FLUID: 13 U/L
ANION GAP SERPL CALCULATED.3IONS-SCNC: 11 MMOL/L (ref 4–16)
AST SERPL-CCNC: 255 IU/L (ref 15–37)
BASOPHILS ABSOLUTE: 0 K/CU MM
BASOPHILS RELATIVE PERCENT: 0.1 % (ref 0–1)
BILIRUB SERPL-MCNC: 0.2 MG/DL (ref 0–1)
BUN BLDV-MCNC: 11 MG/DL (ref 6–23)
CALCIUM SERPL-MCNC: 8.6 MG/DL (ref 8.3–10.6)
CHLORIDE BLD-SCNC: 94 MMOL/L (ref 99–110)
CO2: 32 MMOL/L (ref 21–32)
CREAT SERPL-MCNC: 0.7 MG/DL (ref 0.6–1.1)
DIFFERENTIAL TYPE: ABNORMAL
EKG ATRIAL RATE: 314 BPM
EKG DIAGNOSIS: NORMAL
EKG P AXIS: 244 DEGREES
EKG Q-T INTERVAL: 254 MS
EKG QRS DURATION: 86 MS
EKG QTC CALCULATION (BAZETT): 410 MS
EKG R AXIS: 126 DEGREES
EKG T AXIS: 230 DEGREES
EKG VENTRICULAR RATE: 157 BPM
EOSINOPHILS ABSOLUTE: 0 K/CU MM
EOSINOPHILS RELATIVE PERCENT: 0.1 % (ref 0–3)
FERRITIN: 1617 NG/ML (ref 15–150)
FLUID SPECIFIC GRAVITY: 1.03
FLUID TYPE: NORMAL INDEX
GFR AFRICAN AMERICAN: >60 ML/MIN/1.73M2
GFR NON-AFRICAN AMERICAN: >60 ML/MIN/1.73M2
GLUCOSE BLD-MCNC: 79 MG/DL (ref 70–99)
GLUCOSE, FLUID: 86 MG/DL
HAV IGM SER IA-ACNC: NON REACTIVE
HCT VFR BLD CALC: 27.7 % (ref 37–47)
HEMOGLOBIN: 8 GM/DL (ref 12.5–16)
HEPATITIS B CORE IGM ANTIBODY: NON REACTIVE
HEPATITIS B SURFACE ANTIGEN: NON REACTIVE
HEPATITIS C ANTIBODY: NON REACTIVE
IGA: 145 MG/DL (ref 69–382)
IGG,SERUM: 406 MG/DL (ref 723–1685)
IGM,SERUM: 83 MG/DL (ref 62–277)
IMMATURE NEUTROPHIL %: 1.3 % (ref 0–0.43)
INR BLD: 1.29 INDEX
IRON: 58 UG/DL (ref 37–145)
LACTATE DEHYDROGENASE, FLUID: 354 IU/L
LYMPHOCYTES ABSOLUTE: 0.6 K/CU MM
LYMPHOCYTES RELATIVE PERCENT: 3.7 % (ref 24–44)
LYMPHOCYTES, BODY FLUID: 59 %
MCH RBC QN AUTO: 28.2 PG (ref 27–31)
MCHC RBC AUTO-ENTMCNC: 28.9 % (ref 32–36)
MCV RBC AUTO: 97.5 FL (ref 78–100)
MONOCYTE, FLUID: 2 %
MONOCYTES ABSOLUTE: 1.4 K/CU MM
MONOCYTES RELATIVE PERCENT: 8 % (ref 0–4)
NEUTROPHIL, FLUID: 39 %
NUCLEATED RBC %: 0 %
PCT TRANSFERRIN: 36 % (ref 10–44)
PDW BLD-RTO: 13.7 % (ref 11.7–14.9)
PLATELET # BLD: 349 K/CU MM (ref 140–440)
PMV BLD AUTO: 10.1 FL (ref 7.5–11.1)
POTASSIUM SERPL-SCNC: 4.3 MMOL/L (ref 3.5–5.1)
PROTEIN FLUID: 4.2 GM/DL
PROTHROMBIN TIME: 15.7 SECONDS (ref 11.7–14.5)
RBC # BLD: 2.84 M/CU MM (ref 4.2–5.4)
RBC FLUID: NORMAL /CU MM
SEGMENTED NEUTROPHILS ABSOLUTE COUNT: 14.9 K/CU MM
SEGMENTED NEUTROPHILS RELATIVE PERCENT: 86.8 % (ref 36–66)
SODIUM BLD-SCNC: 137 MMOL/L (ref 135–145)
TOTAL IMMATURE NEUTOROPHIL: 0.22 K/CU MM
TOTAL IRON BINDING CAPACITY: 160 UG/DL (ref 250–450)
TOTAL NUCLEATED RBC: 0 K/CU MM
TOTAL PROTEIN: 5 GM/DL (ref 6.4–8.2)
UNSATURATED IRON BINDING CAPACITY: 102 UG/DL (ref 110–370)
WBC # BLD: 17.1 K/CU MM (ref 4–10.5)
WBC FLUID: 904 /CU MM

## 2020-10-23 PROCEDURE — 2000000000 HC ICU R&B

## 2020-10-23 PROCEDURE — 86665 EPSTEIN-BARR CAPSID VCA: CPT

## 2020-10-23 PROCEDURE — 88108 CYTOPATH CONCENTRATE TECH: CPT | Performed by: PATHOLOGY

## 2020-10-23 PROCEDURE — 86644 CMV ANTIBODY: CPT

## 2020-10-23 PROCEDURE — C1769 GUIDE WIRE: HCPCS

## 2020-10-23 PROCEDURE — 2700000000 HC OXYGEN THERAPY PER DAY

## 2020-10-23 PROCEDURE — 82945 GLUCOSE OTHER FLUID: CPT

## 2020-10-23 PROCEDURE — 71045 X-RAY EXAM CHEST 1 VIEW: CPT

## 2020-10-23 PROCEDURE — 51702 INSERT TEMP BLADDER CATH: CPT

## 2020-10-23 PROCEDURE — 86663 EPSTEIN-BARR ANTIBODY: CPT

## 2020-10-23 PROCEDURE — 94640 AIRWAY INHALATION TREATMENT: CPT

## 2020-10-23 PROCEDURE — 6360000002 HC RX W HCPCS: Performed by: HOSPITALIST

## 2020-10-23 PROCEDURE — 93005 ELECTROCARDIOGRAM TRACING: CPT | Performed by: INTERNAL MEDICINE

## 2020-10-23 PROCEDURE — 82390 ASSAY OF CERULOPLASMIN: CPT

## 2020-10-23 PROCEDURE — 82103 ALPHA-1-ANTITRYPSIN TOTAL: CPT

## 2020-10-23 PROCEDURE — 89051 BODY FLUID CELL COUNT: CPT

## 2020-10-23 PROCEDURE — 6360000002 HC RX W HCPCS: Performed by: INTERNAL MEDICINE

## 2020-10-23 PROCEDURE — 80053 COMPREHEN METABOLIC PANEL: CPT

## 2020-10-23 PROCEDURE — 6370000000 HC RX 637 (ALT 250 FOR IP): Performed by: STUDENT IN AN ORGANIZED HEALTH CARE EDUCATION/TRAINING PROGRAM

## 2020-10-23 PROCEDURE — 2709999900 HC NON-CHARGEABLE SUPPLY

## 2020-10-23 PROCEDURE — 87205 SMEAR GRAM STAIN: CPT

## 2020-10-23 PROCEDURE — 93010 ELECTROCARDIOGRAM REPORT: CPT | Performed by: INTERNAL MEDICINE

## 2020-10-23 PROCEDURE — 93308 TTE F-UP OR LMTD: CPT

## 2020-10-23 PROCEDURE — 6370000000 HC RX 637 (ALT 250 FOR IP): Performed by: INTERNAL MEDICINE

## 2020-10-23 PROCEDURE — 2500000003 HC RX 250 WO HCPCS: Performed by: HOSPITALIST

## 2020-10-23 PROCEDURE — 86038 ANTINUCLEAR ANTIBODIES: CPT

## 2020-10-23 PROCEDURE — 84315 BODY FLUID SPECIFIC GRAVITY: CPT

## 2020-10-23 PROCEDURE — 33016 PERICARDIOCENTESIS W/IMAGING: CPT

## 2020-10-23 PROCEDURE — 86708 HEPATITIS A ANTIBODY: CPT

## 2020-10-23 PROCEDURE — 94761 N-INVAS EAR/PLS OXIMETRY MLT: CPT

## 2020-10-23 PROCEDURE — 6370000000 HC RX 637 (ALT 250 FOR IP): Performed by: NURSE PRACTITIONER

## 2020-10-23 PROCEDURE — 2580000003 HC RX 258: Performed by: INTERNAL MEDICINE

## 2020-10-23 PROCEDURE — 88305 TISSUE EXAM BY PATHOLOGIST: CPT | Performed by: PATHOLOGY

## 2020-10-23 PROCEDURE — 87070 CULTURE OTHR SPECIMN AEROBIC: CPT

## 2020-10-23 PROCEDURE — 87102 FUNGUS ISOLATION CULTURE: CPT

## 2020-10-23 PROCEDURE — 82784 ASSAY IGA/IGD/IGG/IGM EACH: CPT

## 2020-10-23 PROCEDURE — 80074 ACUTE HEPATITIS PANEL: CPT

## 2020-10-23 PROCEDURE — 93005 ELECTROCARDIOGRAM TRACING: CPT | Performed by: HOSPITALIST

## 2020-10-23 PROCEDURE — 2500000003 HC RX 250 WO HCPCS

## 2020-10-23 PROCEDURE — 36415 COLL VENOUS BLD VENIPUNCTURE: CPT

## 2020-10-23 PROCEDURE — 6360000002 HC RX W HCPCS: Performed by: STUDENT IN AN ORGANIZED HEALTH CARE EDUCATION/TRAINING PROGRAM

## 2020-10-23 PROCEDURE — 86664 EPSTEIN-BARR NUCLEAR ANTIGEN: CPT

## 2020-10-23 PROCEDURE — 0W9D3ZZ DRAINAGE OF PERICARDIAL CAVITY, PERCUTANEOUS APPROACH: ICD-10-PCS | Performed by: INTERNAL MEDICINE

## 2020-10-23 PROCEDURE — 6360000002 HC RX W HCPCS: Performed by: NURSE PRACTITIONER

## 2020-10-23 PROCEDURE — 84157 ASSAY OF PROTEIN OTHER: CPT

## 2020-10-23 PROCEDURE — 6360000002 HC RX W HCPCS

## 2020-10-23 PROCEDURE — 33017 PRCRD DRG 6YR+ W/O CGEN CAR: CPT | Performed by: INTERNAL MEDICINE

## 2020-10-23 PROCEDURE — 82150 ASSAY OF AMYLASE: CPT

## 2020-10-23 PROCEDURE — 82728 ASSAY OF FERRITIN: CPT

## 2020-10-23 PROCEDURE — 83516 IMMUNOASSAY NONANTIBODY: CPT

## 2020-10-23 PROCEDURE — C1894 INTRO/SHEATH, NON-LASER: HCPCS

## 2020-10-23 PROCEDURE — 85610 PROTHROMBIN TIME: CPT

## 2020-10-23 PROCEDURE — 83615 LACTATE (LD) (LDH) ENZYME: CPT

## 2020-10-23 PROCEDURE — 86256 FLUORESCENT ANTIBODY TITER: CPT

## 2020-10-23 PROCEDURE — 99253 IP/OBS CNSLTJ NEW/EST LOW 45: CPT | Performed by: SURGERY

## 2020-10-23 PROCEDURE — 85025 COMPLETE CBC W/AUTO DIFF WBC: CPT

## 2020-10-23 PROCEDURE — 83540 ASSAY OF IRON: CPT

## 2020-10-23 PROCEDURE — 99233 SBSQ HOSP IP/OBS HIGH 50: CPT | Performed by: INTERNAL MEDICINE

## 2020-10-23 PROCEDURE — C9113 INJ PANTOPRAZOLE SODIUM, VIA: HCPCS | Performed by: NURSE PRACTITIONER

## 2020-10-23 PROCEDURE — 87116 MYCOBACTERIA CULTURE: CPT

## 2020-10-23 PROCEDURE — 83550 IRON BINDING TEST: CPT

## 2020-10-23 RX ORDER — AMIODARONE HYDROCHLORIDE 200 MG/1
400 TABLET ORAL 2 TIMES DAILY
Status: DISCONTINUED | OUTPATIENT
Start: 2020-10-23 | End: 2020-10-27

## 2020-10-23 RX ORDER — LUBIPROSTONE 24 UG/1
24 CAPSULE, GELATIN COATED ORAL 2 TIMES DAILY WITH MEALS
Status: DISCONTINUED | OUTPATIENT
Start: 2020-10-23 | End: 2020-10-30 | Stop reason: HOSPADM

## 2020-10-23 RX ORDER — DILTIAZEM HYDROCHLORIDE 5 MG/ML
10 INJECTION INTRAVENOUS ONCE
Status: COMPLETED | OUTPATIENT
Start: 2020-10-23 | End: 2020-10-23

## 2020-10-23 RX ORDER — DIGOXIN 0.25 MG/ML
500 INJECTION INTRAMUSCULAR; INTRAVENOUS ONCE
Status: COMPLETED | OUTPATIENT
Start: 2020-10-23 | End: 2020-10-23

## 2020-10-23 RX ADMIN — BUDESONIDE AND FORMOTEROL FUMARATE DIHYDRATE 2 PUFF: 160; 4.5 AEROSOL RESPIRATORY (INHALATION) at 10:19

## 2020-10-23 RX ADMIN — DIGOXIN 500 MCG: 0.25 INJECTION INTRAMUSCULAR; INTRAVENOUS at 10:12

## 2020-10-23 RX ADMIN — METOPROLOL SUCCINATE 25 MG: 25 TABLET, EXTENDED RELEASE ORAL at 09:16

## 2020-10-23 RX ADMIN — ALBUTEROL SULFATE 2 PUFF: 90 AEROSOL, METERED RESPIRATORY (INHALATION) at 18:33

## 2020-10-23 RX ADMIN — CEFEPIME 2 G: 2 INJECTION, POWDER, FOR SOLUTION INTRAVENOUS at 02:53

## 2020-10-23 RX ADMIN — ONDANSETRON 4 MG: 2 INJECTION INTRAMUSCULAR; INTRAVENOUS at 10:30

## 2020-10-23 RX ADMIN — DULOXETINE HYDROCHLORIDE 60 MG: 30 CAPSULE, DELAYED RELEASE ORAL at 09:17

## 2020-10-23 RX ADMIN — CLONAZEPAM 1 MG: 1 TABLET ORAL at 21:14

## 2020-10-23 RX ADMIN — FERROUS GLUCONATE TAB 324 MG (37.5 MG ELEMENTAL IRON) 324 MG: 324 (37.5 FE) TAB at 21:04

## 2020-10-23 RX ADMIN — FERROUS GLUCONATE TAB 324 MG (37.5 MG ELEMENTAL IRON) 324 MG: 324 (37.5 FE) TAB at 09:15

## 2020-10-23 RX ADMIN — BACLOFEN 10 MG: 10 TABLET ORAL at 09:16

## 2020-10-23 RX ADMIN — BACLOFEN 10 MG: 10 TABLET ORAL at 14:04

## 2020-10-23 RX ADMIN — ALBUTEROL SULFATE 2 PUFF: 90 AEROSOL, METERED RESPIRATORY (INHALATION) at 20:51

## 2020-10-23 RX ADMIN — FOLIC ACID 1 MG: 1 TABLET ORAL at 09:15

## 2020-10-23 RX ADMIN — LORAZEPAM 0.5 MG: 0.5 TABLET ORAL at 16:24

## 2020-10-23 RX ADMIN — ALBUTEROL SULFATE 2 PUFF: 90 AEROSOL, METERED RESPIRATORY (INHALATION) at 04:07

## 2020-10-23 RX ADMIN — AMLODIPINE BESYLATE 10 MG: 10 TABLET ORAL at 09:16

## 2020-10-23 RX ADMIN — OXYCODONE HYDROCHLORIDE AND ACETAMINOPHEN 1 TABLET: 5; 325 TABLET ORAL at 18:25

## 2020-10-23 RX ADMIN — BUDESONIDE AND FORMOTEROL FUMARATE DIHYDRATE 2 PUFF: 160; 4.5 AEROSOL RESPIRATORY (INHALATION) at 20:51

## 2020-10-23 RX ADMIN — Medication 2 PUFF: at 18:34

## 2020-10-23 RX ADMIN — LEVOTHYROXINE SODIUM 100 MCG: 100 TABLET ORAL at 06:55

## 2020-10-23 RX ADMIN — Medication 2 PUFF: at 10:21

## 2020-10-23 RX ADMIN — PANTOPRAZOLE SODIUM 40 MG: 40 INJECTION, POWDER, FOR SOLUTION INTRAVENOUS at 06:55

## 2020-10-23 RX ADMIN — LUBIPROSTONE 24 MCG: 24 CAPSULE, GELATIN COATED ORAL at 09:19

## 2020-10-23 RX ADMIN — DILTIAZEM HYDROCHLORIDE 10 MG: 5 INJECTION INTRAVENOUS at 10:11

## 2020-10-23 RX ADMIN — BUSPIRONE HYDROCHLORIDE 10 MG: 10 TABLET ORAL at 14:03

## 2020-10-23 RX ADMIN — SODIUM CHLORIDE: 9 INJECTION, SOLUTION INTRAVENOUS at 00:22

## 2020-10-23 RX ADMIN — CLONAZEPAM 1 MG: 1 TABLET ORAL at 04:34

## 2020-10-23 RX ADMIN — ASPIRIN 81 MG CHEWABLE TABLET 81 MG: 81 TABLET CHEWABLE at 09:15

## 2020-10-23 RX ADMIN — AMIODARONE HYDROCHLORIDE 400 MG: 200 TABLET ORAL at 21:04

## 2020-10-23 RX ADMIN — BUSPIRONE HYDROCHLORIDE 10 MG: 10 TABLET ORAL at 21:04

## 2020-10-23 RX ADMIN — BACLOFEN 10 MG: 10 TABLET ORAL at 21:04

## 2020-10-23 RX ADMIN — LUBIPROSTONE 24 MCG: 24 CAPSULE, GELATIN COATED ORAL at 16:27

## 2020-10-23 RX ADMIN — Medication 2 PUFF: at 04:08

## 2020-10-23 RX ADMIN — SODIUM CHLORIDE: 9 INJECTION, SOLUTION INTRAVENOUS at 10:32

## 2020-10-23 RX ADMIN — TRAZODONE HYDROCHLORIDE 100 MG: 50 TABLET ORAL at 21:04

## 2020-10-23 RX ADMIN — Medication 100 MG: at 09:16

## 2020-10-23 RX ADMIN — BUSPIRONE HYDROCHLORIDE 10 MG: 10 TABLET ORAL at 09:16

## 2020-10-23 RX ADMIN — ALBUTEROL SULFATE 2 PUFF: 90 AEROSOL, METERED RESPIRATORY (INHALATION) at 10:21

## 2020-10-23 RX ADMIN — CEFEPIME 2 G: 2 INJECTION, POWDER, FOR SOLUTION INTRAVENOUS at 14:03

## 2020-10-23 RX ADMIN — MONTELUKAST 10 MG: 10 TABLET, FILM COATED ORAL at 09:16

## 2020-10-23 RX ADMIN — Medication 2 PUFF: at 20:51

## 2020-10-23 RX ADMIN — FLUTICASONE PROPIONATE 2 SPRAY: 50 SPRAY, METERED NASAL at 10:13

## 2020-10-23 RX ADMIN — LORAZEPAM 0.5 MG: 0.5 TABLET ORAL at 08:32

## 2020-10-23 ASSESSMENT — PAIN SCALES - GENERAL
PAINLEVEL_OUTOF10: 0
PAINLEVEL_OUTOF10: 5
PAINLEVEL_OUTOF10: 0
PAINLEVEL_OUTOF10: 8
PAINLEVEL_OUTOF10: 0
PAINLEVEL_OUTOF10: 0

## 2020-10-23 ASSESSMENT — PAIN DESCRIPTION - PAIN TYPE
TYPE: ACUTE PAIN
TYPE: ACUTE PAIN;SURGICAL PAIN

## 2020-10-23 ASSESSMENT — PAIN DESCRIPTION - LOCATION: LOCATION: CHEST

## 2020-10-23 ASSESSMENT — PAIN DESCRIPTION - PROGRESSION: CLINICAL_PROGRESSION: NOT CHANGED

## 2020-10-23 ASSESSMENT — PAIN DESCRIPTION - ORIENTATION: ORIENTATION: MID;LOWER

## 2020-10-23 ASSESSMENT — PAIN DESCRIPTION - ONSET: ONSET: GRADUAL

## 2020-10-23 ASSESSMENT — ENCOUNTER SYMPTOMS: SHORTNESS OF BREATH: 1

## 2020-10-23 ASSESSMENT — PAIN - FUNCTIONAL ASSESSMENT: PAIN_FUNCTIONAL_ASSESSMENT: PREVENTS OR INTERFERES SOME ACTIVE ACTIVITIES AND ADLS

## 2020-10-23 ASSESSMENT — PAIN DESCRIPTION - FREQUENCY: FREQUENCY: INTERMITTENT

## 2020-10-23 ASSESSMENT — PAIN DESCRIPTION - DESCRIPTORS: DESCRIPTORS: ACHING;DULL

## 2020-10-23 NOTE — PROGRESS NOTES
Cardiology Progress Note     Today's Plan:patient went into afib RVR, repeat echo showed persistent moderate to large pericardial effusion with rt atrial diastolic collapse ( early tamponde physiology), cardizem bolus and dig 500mcg IVP given which converted afib into sinus, will proceed with pericardiocentesis. Admit Date:  10/19/2020    Consult reason/ Seen today for: Chest pain    Subjective and  Overnight Events: Patient reports being weak and fatigued  Telemetry-SR      Assessment / Plan / Recommendation:     1. Chest pain: Pericardial effusion chronic, right atrial collapse from secondary source? 2. Sepsis: elevated LFT, respiratory disease panel negative. U/S negative for cholecystitis. Possible pneumonia. 3. COPD: end stage,   4. HTN: Controlled on hydralazine and metoprolol. 5. DVT prophylaxis if not contraindicated. History of Presenting Illness:    Chief complain on admission : 64 y. o.year old who is admitted for  Chief Complaint   Patient presents with    Cough    Headache    Chills        Past medical history:    has a past medical history of Anxiety, Arthritis, Back pain at L4-L5 level, Bipolar 1 disorder ([address] COPD (chronic obstructive pulmonary disease) ([facility] [address] [facility] Emphysema of lung ([address] FH: CAD (coronary artery disease), Fibromyalgia, H/O Doppler ultrasound, H/O echocardiogram, History of blood transfusion, Hyperlipidemia LDL goal <100, Hypertension, Obstructive sleep apnea, Osteoarthritis, and Thyroid disease. Past surgical history:   has a past surgical history that includes Carpal tunnel release; Tubal ligation; back surgery; Cardiac catheterization; and Colonoscopy (N/A, 12/12/2019). Social History:   reports that she quit smoking about 12 years ago. She has a 30.00 pack-year smoking history.  She has never used smokeless tobacco. She reports previous alcohol use of about 2.0 standard drinks of alcohol per week. She reports that she does not use drugs. Family history:  family history includes No Known Problems in her father and mother. Allergies   Allergen Reactions    Lisinopril Swelling and Rash     angioedema       Review of Systems:  Review of Systems   Constitutional: Positive for fatigue. Respiratory: Positive for shortness of breath. Neurological: Positive for weakness. All other systems reviewed and are negative. BP (!) 152/100   Pulse 117   Temp 98.3 °F (36.8 °C) (Oral)   Resp 19   Ht 5' 2\" (1.575 m)   Wt 194 lb 14.2 oz (88.4 kg)   SpO2 97%   BMI 35.65 kg/m²       Intake/Output Summary (Last 24 hours) at 10/23/2020 1028  Last data filed at 10/23/2020 0026  Gross per 24 hour   Intake 2314 ml   Output 850 ml   Net 1464 ml       Physical Exam:  Constitutional:  Well developed, Well nourished, No acute distress  HENT:  Normocephalic, Atraumatic, Bilateral external ears normal,  Nose normal.   Neck- trachea is midline  Eyes:  PERRL, Conjunctiva normal  Respiratory:  Normal breath sounds, No respiratory distress, No wheezing, No chest tenderness. Cardiovascular:  Normal heart rate, Normal rhythm, no murmurs appreciated, No rubs appreciated, No gallops appreciated, JVP not elevated  Abdomen/GI:  Soft, No tenderness  Musculoskeletal:  Intact distal pulses, trace edema, No tenderness, No cyanosis, No clubbing. Integument:  Warm, Dry  Lymphatic:  No lymphadenopathy noted.    Neurologic:  Alert & oriented  Psychiatric:  Affect and Mood :pleasant     Medications:    lubiprostone  24 mcg Oral BID WC    amLODIPine  10 mg Oral Daily    cefepime  2 g Intravenous Q12H    pantoprazole  40 mg Intravenous Daily    albuterol sulfate HFA  2 puff Inhalation Q4H    ipratropium  2 puff Inhalation Q4H    aspirin  81 mg Oral Daily    baclofen  10 mg Oral TID    budesonide-formoterol  2 puff Inhalation BID    busPIRone  10 mg Oral TID    DULoxetine  60 mg Oral Daily    ferrous gluconate  324 mg Oral BID    fluticasone  2 spray Nasal Daily    folic acid  1 mg Oral Daily    [Held by provider] hydrALAZINE  10 mg Oral TID    levothyroxine  100 mcg Oral Daily    metoprolol succinate  25 mg Oral Daily    montelukast  10 mg Oral Daily    [Held by provider] theophylline  400 mg Oral Daily    traZODone  100 mg Oral Nightly    thiamine  100 mg Oral Daily    sodium chloride flush  10 mL Intravenous 2 times per day    [Held by provider] enoxaparin  40 mg Subcutaneous Daily      diltiazem (CARDIZEM) 100 mg in dextrose 5% 100 mL ([facility]) 5 mg/hr (10/23/20 1013)    sodium chloride 100 mL/hr at 10/23/20 1021     LORazepam, morphine, clonazePAM, sodium chloride flush, acetaminophen **OR** acetaminophen, polyethylene glycol, promethazine **OR** ondansetron, oxyCODONE-acetaminophen, guaiFENesin-dextromethorphan    Lab Data:  CBC:   Recent Labs     10/21/20  0411 10/22/20  1123 10/23/20  0016   WBC 22.0* 25.5* 17.1*   HGB 9.9* 9.4* 8.0*   HCT 33.6* 33.0* 27.7*   MCV 97.4 98.5 97.5   * 424 349     BMP:   Recent Labs     10/21/20  0411 10/22/20  1123 10/23/20  0016   * 131* 137   K 4.1 4.9 4.3   CL 88* 88* 94*   CO2 34* 32 32   BUN 16 13 11   CREATININE 1.1 0.9 0.7     PT/INR:   Recent Labs     10/23/20  0016   PROTIME 15.7*   INR 1.29     BNP:  No results for input(s): PROBNP in the last 72 hours. TROPONIN: No results for input(s): TROPONINT in the last 72 hours. Impression:  Principal Problem:    Sepsis ([address]  Active Problems:    PNA (pneumonia)    Pericardial effusion  Resolved Problems:    * No resolved hospital problems. *       All labs, medications and tests reviewed by myself, continue all other medications of all above medical condition listed as is except for changes mentioned above. Thank you   Please call with questions.     Electronically signed by Martina Parrish MD on 10/23/2020 at 10:28 AM     I have seen ,spoken to  and examined this patient personally, independently of the nurse practitioner. I have reviewed the hospital care given to date and reviewed all pertinent labs and imaging. The plan was developed mutually at the time of the visit with the patient,  NP  and myself. I have spoken with patient, nursing staff and provided written and verbal instructions . The above note has been reviewed and I agree with the assessment, diagnosis, and treatment plan with changes made by me as follows     CARDIOLOGY ATTENDING ADDENDUM    HPI:  I have reviewed the above HPI  And agree with above   Tenzin Fuller is a 64 y. o.year old who and presents with had concerns including Cough; Headache; and Chills.   Chief Complaint   Patient presents with    Cough    Headache    Chills     Interval history:  ok    Physical Exam:  General:  Awake, alert, NAD  Head:normal  Eye:normal  Neck:  No JVD   Chest:  Clear to auscultation, respiration easy  Cardiovascular:  RRR S1S2  Abdomen:   nontender  Extremities:  no edema  Pulses; palpable  Neuro: grossly normal      MEDICAL DECISION MAKING;    I agree with the above plan, which was planned by myself and discussed with NP.

## 2020-10-23 NOTE — PROCEDURES
rocedure: Pericardiocentesis    Indication: Tamponade physiology,dyspnea    Procedure:    ECHO  FLOUROSCOPE      Procedure:    Under sterile precuations, Echo guided and floroscopy guidance, subxyphoid area was anesthetized with 2% lidocaine and then micropuncture needle was used to access the pericardial sac, yellow tinged pericardial effusio came out and then under flouro guidance mircopuncture wire was advanced into the pericardia sac and micropuncture sheath was advanced into the pericardial sac, subsequently, micropuncture wire was withdrawn and then exchanged with regular wire and dilator was used to dilate the pericardial sac and then pericardiocentesis sheath was introduced and started to drain, more than 500 cc fluids was collected,  bag was attached and labs were sent. Pericardiocetesis sheath was left in for overnight drainage, sutured and taped. Imrpession: successful percardiocentesis under echo and flouro guidance, no complications.     Plan: follow up labs

## 2020-10-23 NOTE — PROGRESS NOTES
1155  Pt arrived into room 2117 from cath lab (room 3110 previously). Monitors, oxygen applied. Call light given. Assessment completed. Pleurix vac sutured into place mid chest; serosanguinous drainage at site; outlined with marker; bag secured to bed frame with clip to allow gravity flow. Denies pain. Will continue to monitor.

## 2020-10-23 NOTE — PROGRESS NOTES
0  Pt asking for urinary catheter to be placed since she has to keep urinating and is incontinent. Writer talked with Dr Javier Parker. Order received. 18Fr urinary catheter placed without difficulty using sterile procedure. Clear pale yellow urine immediately received. 10ml balloon filled with sterile water. Tubing secured to leg with product securing device. Pt states relief of \"I gotta pee. \"

## 2020-10-23 NOTE — CONSULTS
SURGICAL CONSULTATION    Date of Admission:  10/19/2020  Date of Consultation:  10/23/2020    PCP:  Umberto Henson MD  Thank you Dr. Makenzie Correa MD very much for your consultation, it's always appreciated. I had the privilege today to see your patient Hellen Cruz. Chief Complaint / History of Present Illness:  Hellen Cruz is a very pleasant 64 y.o. female who presents with pain in the Epigastrium and RUQ, and is acute, improving and dull compared to patient's normal condition. It is severe in intensity for a duration of 4 days and is intermittent with modifying factors increased by or worse with fatty food. Workup revealed acute acalculous cholecystitis, LFTs improving, needed pericardiocentesis, successfully performed today, will follow her clinically, and follow her LFTs, and proceed with Lap cholecystectomy if needed, when stable and cardiology clears her. PMH:   has a past medical history of Anxiety (2/16/2017), Arthritis, Back pain at L4-L5 level (2/16/2017), Bipolar 1 disorder ([facility] [address] COPD (chronic obstructive pulmonary disease) ([facility] [address] Emphysema of lung ([facility] [address] FH: CAD (coronary artery disease) (2/16/2017), Fibromyalgia (2/16/2017), H/O Doppler ultrasound (04/05/2019), H/O echocardiogram (01/14/2015), History of blood transfusion, Hyperlipidemia LDL goal <100, Hypertension, Obstructive sleep apnea, Osteoarthritis, and Thyroid disease. PSH:   has a past surgical history that includes Carpal tunnel release; Tubal ligation; back surgery; Cardiac catheterization; and Colonoscopy (N/A, 12/12/2019). Allergies: Allergies   Allergen Reactions    Lisinopril Swelling and Rash     angioedema        Home Meds:    Prior to Admission medications    Medication Sig Start Date End Date Taking?  Authorizing Provider   budesonide-formoterol (SYMBICORT) 160-4.5 MCG/ACT AERO USE 2 INHALATIONS TWICE A DAY 10/2/20   Max Salas MD   MIAH-24 400 MG extended release capsule TAKE 1 CAPSULE DAILY 9/14/20   Max Null MD   dilTIAZem (CARDIZEM CD) 240 MG extended release capsule Take 1 capsule by mouth daily 7/30/20   BRIAN Jacob - FINESSE   ipratropium-albuterol (DUONEB) 0.5-2.5 (3) MG/3ML SOLN nebulizer solution Inhale 3 mLs into the lungs every 4 hours 7/21/20   Hilary Rodríguez MD   guaiFENesin (MUCINEX) 600 MG extended release tablet Take 1 tablet by mouth 2 times daily 7/21/20   Hilary Rodríguez MD   montelukast (SINGULAIR) 10 MG tablet Take 1 tablet by mouth daily 7/21/20   Hilary Rodríguez MD   levothyroxine (SYNTHROID) 100 MCG tablet Take 1 tablet by mouth Daily 7/21/20   Hilary Rodríguez MD   folic acid (FOLVITE) 1 MG tablet Take 1 tablet by mouth daily 7/16/20   Misti Berg MD   vitamin B-1 100 MG tablet Take 1 tablet by mouth daily 7/16/20   Misti Berg MD   hydrALAZINE (APRESOLINE) 10 MG tablet Take 1 tablet by mouth 3 times daily 7/15/20   Misti Berg MD   albuterol-ipratropium (COMBIVENT RESPIMAT)  MCG/ACT AERS inhaler Inhale 1 puff into the lungs every 4 hours as needed for Wheezing 7/1/20   Hilary Rodríguez MD   DULoxetine (CYMBALTA) 60 MG extended release capsule Take 60 mg by mouth daily    Historical Provider, MD   busPIRone (BUSPAR) 10 MG tablet Take 1 tablet by mouth 3 times daily 4/23/19   Khalida Jones MD   ferrous gluconate 324 (37.5 Fe) MG TABS Take 1 tablet by mouth 2 times daily 4/15/19   Khalida Jones MD   metoprolol succinate (TOPROL XL) 25 MG extended release tablet Take 1 tablet by mouth daily 3/18/19   Carolyn Perez MD   famotidine (PEPCID) 20 MG tablet Take 1 tablet by mouth 2 times daily  Patient taking differently: Take 40 mg by mouth nightly  3/8/19   Khalida Jones MD   celecoxib (CELEBREX) 200 MG capsule Take 200 mg by mouth 2 times daily    Historical Provider, MD   traZODone (DESYREL) 50 MG tablet Take 100 mg by mouth nightly     Historical Provider, MD   clonazePAM (KLONOPIN) 1 MG tablet Take 1 mg by mouth 3 times daily as needed.     Historical Provider, MD   baclofen (LIORESAL) 10 MG tablet Take 1 tablet by mouth 3 times daily 12/18/18   Prem Barlow MD   aspirin 81 MG chewable tablet Take 81 mg by mouth daily    Historical Provider, MD   fluticasone (FLONASE) 50 MCG/ACT nasal spray 2 sprays by Nasal route daily 3/28/18   Prem Barlow MD        [facility] Med[facility]:    Current Facility-Administered Medications   Medication Dose Route Frequency Provider Last Rate Last Dose    lubiprostone (AMITIZA) capsule 24 mcg  24 mcg Oral BID  Becky Ansari APRN - CNP        LORazepam (ATIVAN) tablet 0.5 mg  0.5 mg Oral Q8H PRN Jatin Hernandez MD   0.5 mg at 10/22/20 2151    amLODIPine (NORVASC) tablet 10 mg  10 mg Oral Daily Nacho Luz MD   10 mg at 10/22/20 1517    cefepime (MAXIPIME) 2 g IVPB minibag  2 g Intravenous Q12H Jatin Hernandez MD   Stopped at 10/23/20 0323    pantoprazole (PROTONIX) injection 40 mg  40 mg Intravenous Daily BRIAN Saha - CNP   40 mg at 10/23/20 0655    0.9 % sodium chloride infusion   Intravenous Continuous Jatin Hernandez  mL/hr at 10/23/20 0022      morphine (PF) injection 2 mg  2 mg Intravenous Q6H PRN Jatin Hernandez MD   2 mg at 10/22/20 0853    albuterol sulfate  (90 Base) MCG/ACT inhaler 2 puff  2 puff Inhalation Q4H Jatin Hernandez MD   2 puff at 10/23/20 0407    ipratropium (ATROVENT HFA) 17 MCG/ACT inhaler 2 puff  2 puff Inhalation Q4H Jatin Hernandez MD   2 puff at 10/23/20 0408    aspirin chewable tablet 81 mg  81 mg Oral Daily Che Valero MD   81 mg at 10/22/20 0848    baclofen (LIORESAL) tablet 10 mg  10 mg Oral TID Che Valero MD   10 mg at 10/22/20 2151    budesonide-formoterol (SYMBICORT) 160-4.5 MCG/ACT inhaler 2 puff  2 puff Inhalation BID Che Valero MD   2 puff at 10/22/20 2118    busPIRone (BUSPAR) tablet 10 mg  10 mg Oral TID Jung Feldman MD   10 mg at 10/22/20 2151    clonazePAM (KLONOPIN) tablet 1 mg  1 mg Oral TID PRN Jung Feldman MD   1 mg at 10/23/20 0434    DULoxetine (CYMBALTA) extended release capsule 60 mg  60 mg Oral Daily Jung Feldman MD   60 mg at 10/22/20 0848    ferrous gluconate 324 (37.5 Fe) MG tablet 324 mg  324 mg Oral BID Jung Feldman MD   324 mg at 10/22/20 2151    fluticasone (FLONASE) 50 MCG/ACT nasal spray 2 spray  2 spray Nasal Daily Jung Feldman MD   2 spray at 06/00/54 1092    folic acid (FOLVITE) tablet 1 mg  1 mg Oral Daily Jung Feldman MD   1 mg at 10/22/20 0848    hydrALAZINE (APRESOLINE) tablet 10 mg  10 mg Oral TID Jung Feldman MD   10 mg at 10/22/20 2150    levothyroxine (SYNTHROID) tablet 100 mcg  100 mcg Oral Daily Jung Feldman MD   100 mcg at 10/23/20 0222    metoprolol succinate (TOPROL XL) extended release tablet 25 mg  25 mg Oral Daily Jung Feldman MD   25 mg at 10/22/20 0848    montelukast (SINGULAIR) tablet 10 mg  10 mg Oral Daily Jung Feldman MD   10 mg at 10/22/20 0848    theophylline (MIAH-24) extended release capsule 400 mg  400 mg Oral Daily Jung Feldman MD   400 mg at 10/22/20 0848    traZODone (DESYREL) tablet 100 mg  100 mg Oral Nightly Jung Feldman MD   100 mg at 10/22/20 2150    vitamin B-1 (THIAMINE) tablet 100 mg  100 mg Oral Daily Jung Feldman MD   100 mg at 10/22/20 0848    sodium chloride flush 0.9 % injection 10 mL  10 mL Intravenous 2 times per day Jung Feldman MD   10 mL at 10/22/20 2152    sodium chloride flush 0.9 % injection 10 mL  10 mL Intravenous PRN Jung Feldman MD   10 mL at 10/20/20 0311    acetaminophen (TYLENOL) tablet 650 mg  650 mg Oral Q6H PRN Jung Feldman MD   650 mg at 10/20/20 1407    Or    acetaminophen (TYLENOL) suppository 650 mg  650 mg Rectal Q6H PRN Jung Feldman MD        polyethylene glycol (GLYCOLAX) packet 17 g  17 g Oral Daily PRN Jung Feldman MD        [facility]) tablet 12.5 mg  12.5 mg Oral Q6H PRN Kvean Chowdray MD        Or    ondansetron [facility] injection 4 mg  4 mg Intravenous Q6H PRN Kevan Chowdary MD   4 mg at 10/22/20 1734    [Held by provider] enoxaparin (LOVENOX) injection 40 mg  40 mg Subcutaneous Daily Kevan Chowdary MD   40 mg at 10/20/20 2384    oxyCODONE-acetaminophen (PERCOCET) 5-325 MG per tablet 1 tablet  1 tablet Oral Q6H PRN Arline Saliva, APRN - CNP   1 tablet at 10/22/20 2155    guaiFENesin-dextromethorphan (ROBITUSSIN DM) 100-10 MG/5ML syrup 5 mL  5 mL Oral Q4H PRN Cheyanne Dapilah, APRN - CNP   5 mL at 10/22/20 0429      sodium chloride 100 mL/hr at 10/23/20 0022       Social History / Family History:     Social History     Socioeconomic History    Marital status:      Spouse name: None    Number of children: None    Years of education: None    Highest education level: None   Occupational History    None   Social Needs    Financial resource strain: None    Food insecurity     Worry: None     Inability: None    Transportation needs     Medical: None     Non-medical: None   Tobacco Use    Smoking status: Former Smoker     Packs/day: 1.50     Years: 20.00     Pack years: 30.00     Last attempt to quit: 2008     Years since quittin.8    Smokeless tobacco: Never Used   Substance and Sexual Activity    Alcohol use: Not Currently     Alcohol/week: 2.0 standard drinks     Types: 2 Shots of liquor per week    Drug use: No    Sexual activity: Yes     Partners: Male   Lifestyle    Physical activity     Days per week: None     Minutes per session: None    Stress: None   Relationships    Social connections     Talks on phone: None     Gets together: None     Attends Cheondoism service: None     Active member of club or organization: None     Attends meetings of clubs or organizations: None     Relationship status: None    Intimate partner violence     Fear of current or ex partner: None     Emotionally abused: None Physically abused: None     Forced sexual activity: None   Other Topics Concern    None   Social History Narrative    None      Family History   Problem Relation Age of Onset    No Known Problems Mother     No Known Problems Father     otherwise irrelevant to this surgical issue. Review of Systems:  Constitutional: Negative for fever, chills, diaphoresis, appetite change and fatigue. HENT: Negative for sore throat, trouble swallowing and voice change. Respiratory: Negative for cough, positive for shortness of breath no wheezing. Cardiovascular: Negative for chest pain positive for SOB with one flight of stairs exertion, no pitting LE edema. Gastrointestinal: Positive for Epigastric and RUQ pain, nausea, vomiting, no abdominal distention, constipation, no diarrhea, blood in stool, anal bleeding or rectal pain. Musculoskeletal: Negative for joint swelling and arthralgias. Skin: Warm and dry, well perfused. Neurological: Negative for seizures, syncope, speech difficulty and weakness. Hematological/Lymphatic: Negative for adenopathy. No history of DVT/PE. Does not bruise/bleed easily. Psychiatric/Behavioral: Negative for agitation. All others reviewed and negative. Physical Exam:  Vital Signs:   Vitals:    10/23/20 0740   BP: (!) 111/56   Pulse: 88   Resp: 12   Temp: 98.3 °F (36.8 °C)   SpO2:      Body mass index is 35.65 kg/m². General appearance: Pt Alert and oriented, in no apparent acute distress. HEENT:  MARLYS, Conjunctiva clear. EOMI, No JVDs, Bruits, Megalies: neither thyroid nor lymph nodes. Lungs: Clear to auscultation bilaterally. Heart: Regular rate and rhythm, S1, S2 normal, no murmur, rub or gallop. Abdomen: Significantly tender RUQ, positive Soto sign. Non distended. Positive bowel sounds. No hernias noted, no masses palpable. Extremities: Warm, well perfused, no cyanosis or edema.   Skin: Skin color, texture, turgor normal.  Neurologic: Grossly normal, Cranial nerves from II to XII intact, Deep tendon reflexes normal.  Lymph nodes: No palpable LNs, Cervical, groins, abdominal.  Musculoskeletal: Bilateral Upper and lower extremities ROM within normal limits. Radiologic / Imaging / TESTING  US Gall bladder:     Impression    1. Gallbladder wall thickening and evidence for edema in the gallbladder wall    suggesting the possibility of acalculous cholecystitis    2. Otherwise, unremarkable right upper quadrant ultrasound      I reviewed.      Labs:    Recent Results (from the past 24 hour(s))   Comprehensive Metabolic Panel w/ Reflex to MG    Collection Time: 10/22/20 11:23 AM   Result Value Ref Range    Sodium 131 (L) 135 - 145 MMOL/L    Potassium 4.9 3.5 - 5.1 MMOL/L    Chloride 88 (L) 99 - 110 mMol/L    CO2 32 21 - 32 MMOL/L    BUN 13 6 - 23 MG/DL    CREATININE 0.9 0.6 - 1.1 MG/DL    Glucose 101 (H) 70 - 99 MG/DL    Calcium 9.0 8.3 - 10.6 MG/DL    Alb 3.5 3.4 - 5.0 GM/DL    Total Protein 5.8 (L) 6.4 - 8.2 GM/DL    Total Bilirubin 0.3 0.0 - 1.0 MG/DL     (H) 10 - 40 U/L     (H) 15 - 37 IU/L    Alkaline Phosphatase 111 40 - 129 IU/L    GFR Non-African American >60 >60 mL/min/1.73m2    GFR African American >60 >60 mL/min/1.73m2    Anion Gap 11 4 - 16   CBC Auto Differential    Collection Time: 10/22/20 11:23 AM   Result Value Ref Range    WBC 25.5 (H) 4.0 - 10.5 K/CU MM    RBC 3.35 (L) 4.2 - 5.4 M/CU MM    Hemoglobin 9.4 (L) 12.5 - 16.0 GM/DL    Hematocrit 33.0 (L) 37 - 47 %    MCV 98.5 78 - 100 FL    MCH 28.1 27 - 31 PG    MCHC 28.5 (L) 32.0 - 36.0 %    RDW 13.7 11.7 - 14.9 %    Platelets 559 612 - 826 K/CU MM    MPV 10.3 7.5 - 11.1 FL    Differential Type AUTOMATED DIFFERENTIAL     Segs Relative 88.5 (H) 36 - 66 %    Lymphocytes % 3.2 (L) 24 - 44 %    Monocytes % 6.6 (H) 0 - 4 %    Eosinophils % 0.0 0 - 3 %    Basophils % 0.2 0 - 1 %    Segs Absolute 22.6 K/CU MM    Lymphocytes Absolute 0.8 K/CU MM    Monocytes Absolute 1.7 K/CU MM    Eosinophils Absolute NON REACTIVE    Hep B Core Ab, IgM NON REACTIVE NON REACTIVE    Hepatitis C Ab NON REACTIVE NON REACTIVE   Ferritin    Collection Time: 10/23/20 12:16 AM   Result Value Ref Range    Ferritin 1,617 (H) 15 - 150 NG/ML   IgG, IgA, IgM    Collection Time: 10/23/20 12:16 AM   Result Value Ref Range    IgG, Serum 406 (L) 723 - 1,685 MG/DL    IgA 145 69 - 382 MG/DL    IgM,Serum 83 62 - 277 MG/DL   Protime-INR    Collection Time: 10/23/20 12:16 AM   Result Value Ref Range    Protime 15.7 (H) 11.7 - 14.5 SECONDS    INR 1.29 INDEX       Diagnosis:  Patient Active Problem List   Diagnosis    Centrilobular emphysema (HCC)    Hypertension    Hyperlipidemia LDL goal <100    Abnormal EKG    Palpitations    Anxiety    FH: CAD (coronary artery disease)    Fibromyalgia    Back pain at L4-L5 level    Sleep apnea    COPD exacerbation (HCC)    Electrolyte imbalance    Epigastric pain    COPD (chronic obstructive pulmonary disease) (HCC)    Spinal stenosis of lumbar region with radiculopathy    Spinal stenosis, lumbar region, without neurogenic claudication    COPD with acute exacerbation (HCC)    Pneumonia due to infectious organism    SVT (supraventricular tachycardia) (HCC)    Class 1 obesity due to excess calories with body mass index (BMI) of 31.0 to 31.9 in adult    Pneumonia    Pleural effusion    Atelectasis    Pulmonary nodules    Respiratory failure with hypoxia and hypercapnia (HCC)    Anemia    COPD with exacerbation (HCC)    Acquired hemolytic anemia, unspecified (HCC)    Leucocytosis    Chronic kidney disease, stage I    Hyponatremia    PNA (pneumonia)    Sepsis (HCC)    Pericardial effusion       Assessment & plan:  Derek Collazo is a very pleasant 64 y.o. female presenting with workup revealing acute acalculous cholecystitis, LFTs improving, needed pericardiocentesis, successfully performed today, will follow her clinically, and follow her LFTs, and proceed with Lap cholecystectomy if needed, when stable and cardiology clears her. .    Continue no fat diet, IV Fluids, Pain control, Nausea control, IV Antibiotics. Thank you doctor Harrison Logan MD very much for your consultation and for the opportunity to take care of Mrs Juan Ramon Escobedo with you, I'll follow along with you and I'll update you on any new events in her care.    ____________________________________________    Galindo Mccollum MD, FACS, FICS    10/23/2020  8:05 AM      Patient was seen with total face to face time of 45 minutes. More than 50% of this visit was counseling and education as above in my assessment and plan section of my note.

## 2020-10-23 NOTE — PROGRESS NOTES
1155- Patient transferred to 2117 post placement of pericardial drain and removal of 475 ml pericardial fluid (sent to lab). Drain sutured in place by Dr. Regina Bess and covered with gauze and tegaderm. Small amount of drainage on dressing. Site check and bedside report with Karla Brewer RN in ICU. Vitals stable. Call light within reach.

## 2020-10-23 NOTE — PROGRESS NOTES
[address] [facility] [address] brought down from room 3110 to room 2117 including:  Dentures, phone, ,glasses,glasses case, candy, undergarments, pants, bingo card, comb, slippers, power shailesh bottle, sweatshirt, socks, shirt. Pt states that is all her belongings. Placed in pt locker in room 2117.

## 2020-10-23 NOTE — PROGRESS NOTES
Hospitalist Progress Note      Name:  Milda Buerger /Age/Sex: 1960  (64 y.o. female)   MRN & CSN:  [id number] & [id number] Admission Date/Time: 10/19/2020 10:07 PM   Location:  [address] [place] [address] PCP: Margaretta Gowers, MD         Hospital Day: 5    History of Present Illness:     Chief Complaint: Sepsis ([address]  Milda Buerger is a 64 y.o.  female  who presents with abdominal pain     -as per nurse patient was getting tired going to the bathroom, he heart rate was becoming tachycardic into the 180s  -patient states she feels short of breath every time she gets up, she takes cardizem at home    Ten point ROS reviewed negative, unless as noted above    Objective:        Intake/Output Summary (Last 24 hours) at 10/23/2020 0847  Last data filed at 10/23/2020 0026  Gross per 24 hour   Intake 2314 ml   Output 850 ml   Net 1464 ml      Vitals:   Vitals:    10/23/20 0740   BP: (!) 111/56   Pulse: 88   Resp: 12   Temp: 98.3 °F (36.8 °C)   SpO2:      Physical Exam:   General Appearance: alert and oriented to in no acute distress  Cardiovascular: tachycardic  Pulmonary/Chest: shallow breathing  Abdomen: soft,+ tenderness in the right upper quadrant and epigastrium, non-distended, normal bowel sounds, no masses   Extremities: no cyanosis, clubbing or edema, pulse   Skin: warm and dry, no rash or erythema  Head: normocephalic and atraumatic  Eyes: no discharge  Neck: no thyromegaly   Musculoskeletal: normal range of motion, no joint swelling, deformity or tenderness  Neurological: no gross deficits    Medications:   Medications:    lubiprostone  24 mcg Oral BID WC    amLODIPine  10 mg Oral Daily    cefepime  2 g Intravenous Q12H    pantoprazole  40 mg Intravenous Daily    albuterol sulfate HFA  2 puff Inhalation Q4H    ipratropium  2 puff Inhalation Q4H    aspirin  81 mg Oral Daily    baclofen  10 mg Oral TID    budesonide-formoterol  2 puff Inhalation BID    busPIRone  10 mg Oral TID    DULoxetine  60 mg Oral Daily    ferrous gluconate  324 mg Oral BID    fluticasone  2 spray Nasal Daily    folic acid  1 mg Oral Daily    hydrALAZINE  10 mg Oral TID    levothyroxine  100 mcg Oral Daily    metoprolol succinate  25 mg Oral Daily    montelukast  10 mg Oral Daily    theophylline  400 mg Oral Daily    traZODone  100 mg Oral Nightly    thiamine  100 mg Oral Daily    sodium chloride flush  10 mL Intravenous 2 times per day    [Held by provider] enoxaparin  40 mg Subcutaneous Daily      Infusions:    sodium chloride 100 mL/hr at 10/23/20 0022     PRN Meds: LORazepam, 0.5 mg, Q8H PRN  morphine, 2 mg, Q6H PRN  clonazePAM, 1 mg, TID PRN  sodium chloride flush, 10 mL, PRN  acetaminophen, 650 mg, Q6H PRN    Or  acetaminophen, 650 mg, Q6H PRN  polyethylene glycol, 17 g, Daily PRN  promethazine, 12.5 mg, Q6H PRN    Or  ondansetron, 4 mg, Q6H PRN  oxyCODONE-acetaminophen, 1 tablet, Q6H PRN  guaiFENesin-dextromethorphan, 5 mL, Q4H PRN            Pertinent New Labs & Imaging Studies     CBC with Differential:    Lab Results   Component Value Date    WBC 17.1 10/23/2020    RBC 2.84 10/23/2020    HGB 8.0 10/23/2020    HCT 27.7 10/23/2020     10/23/2020    MCV 97.5 10/23/2020    MCH 28.2 10/23/2020    MCHC 28.9 10/23/2020    RDW 13.7 10/23/2020    SEGSPCT 86.8 10/23/2020    BANDSPCT 4 08/19/2019    LYMPHOPCT 3.7 10/23/2020    MONOPCT 8.0 10/23/2020    MYELOPCT 1 04/20/2019    BASOPCT 0.1 10/23/2020    MONOSABS 1.4 10/23/2020    LYMPHSABS 0.6 10/23/2020    EOSABS 0.0 10/23/2020    BASOSABS 0.0 10/23/2020    DIFFTYPE AUTOMATED DIFFERENTIAL 10/23/2020     CMP:    Lab Results   Component Value Date     10/23/2020    K 4.3 10/23/2020    CL 94 10/23/2020    CO2 32 10/23/2020    BUN 11 10/23/2020    CREATININE 0.7 10/23/2020    GFRAA >60 10/23/2020    LABGLOM >60 10/23/2020    GLUCOSE 79 10/23/2020    PROT 5.0 10/23/2020    PROT 6.9 01/23/2013    LABALBU 2.9 10/23/2020    CALCIUM 8.6 10/23/2020    BILITOT 0.2 10/23/2020 ALKPHOS 99 10/23/2020     10/23/2020     10/23/2020     Ct Abdomen Pelvis W Iv Contrast Additional Contrast? None    Result Date: 10/21/2020  EXAMINATION: CT OF THE ABDOMEN AND PELVIS WITH CONTRAST, 10/19/2020 11:32 pm TECHNIQUE: CT of the abdomen and pelvis was performed with the administration of intravenous contrast. Multiplanar reformatted images are provided for review. Dose modulation, iterative reconstruction, and/or weight based adjustment of the mA/kV was utilized to reduce the radiation dose to as low as reasonably achievable. COMPARISON: None HISTORY: ORDERING SYSTEM PROVIDED HISTORY: LUQ pain TECHNOLOGIST PROVIDED HISTORY: IV contrast only. Thank you. Additional Contrast?->None Reason for exam:->LUQ pain Reason for Exam: COPD/SOB FINDINGS: Lower Chest: Emphysematous changes are seen. Bibasilar atelectasis is noted. Moderate-sized pericardial effusion is noted. Organs: Minimal periportal edema is noted. Mild diffuse fatty infiltration of the liver is noted. There is reflux of contrast into the IVC and the hepatic veins, suggestive for right heart failure. Reticulations are seen involving the liver which could be related to hepatic congestion. Gallbladder wall thickening is noted. The spleen, pancreas, adrenal glands, and kidneys are unremarkable. GI/Bowel: There is no evidence of bowel obstruction. Colonic diverticulosis is seen. The appendix is not visualized. Pelvis: There is a small amount of free fluid within the pelvis. Bladder is unremarkable. Peritoneum/Retroperitoneum: There is no free air or lymphadenopathy. Minimal ascites is seen within the upper abdomen. Bones/Soft Tissues: No destructive osseous lesions are identified. 1. Moderate-sized pericardial effusion is noted. Contrast is seen refluxing into the hepatic veins and IVC, likely related to right heart failure. 2. Findings are suggestive for hepatic congestion and fatty infiltration.  Periportal edema and

## 2020-10-23 NOTE — PROGRESS NOTES
[facility]        Progress Note       10/23/2020  9:01 AM    Patient:    Kole Jorge  : 1960   64 y.o. MRN: [id number]  Admitted: 10/19/2020 10:07 PM ATT: Maria Eugenia Patel MD   [id number]  AdmitDx: Pericardial effusion [I31.3]  PNA (pneumonia) [J18.9]  Sepsis due to pneumonia ([address] [J18.9, A41.9]  PNA (pneumonia) [J18.9]  PCP: Mychal Herrera MD    SUBJECTIVE:  Chart reviewed, events noted  Patient feeling worse today. More RUQ pain reported per RN. No N/V. No BM. Went into afib w/rvr this am, tachypneic, SOB, 4 L NC, desat in 70's, stat echo at bedside, cardizem bolus, dig IVP ordered by Dr. Heather Vaelro.        ROS:  The positive ROS will be identified in bold     CONSTITUTIONAL:  Neg  Weight loss, fatigue, fever  MOUTH/THROAT:  Neg  Bleeding gums, hoarseness or sore throat  RESPIRATORY:   Neg SOB, wheeze, cough, hemoptysis or bronchitis  CARDIOVASCULAR:  Neg Chest pain, palpitations, dyspnea on exertion, edema  GASTROINTESTINAL:  SEE HPI  HEMATOLOGIC/LYMPHATIC:  Neg  Anemia, bleeding tendency  MUSCULOSKELETAL: Neg  New myalgias, joint pain, swelling or stiffness  NEUROLOGICAL:  Neg  Loss of Consciousness, memory loss, forgetfulness, periods of confusion, difficulty concentrating, seizures, insomnia, aphasia    SKIN:  Neg No itching, rashes, or sores  PSYCHIATRIC:  Neg Depression, personality changes, anxiety    OBJECTIVE:      BP (!) 111/56   Pulse 88   Temp 98.3 °F (36.8 °C) (Oral)   Resp 12   Ht 5' 2\" (1.575 m)   Wt 194 lb 14.2 oz (88.4 kg)   SpO2 97%   BMI 35.65 kg/m²     Mild distress, tachypneic, sitting up in bed  Lips and mucous membranes pink and moist  Rapid afib  Lungs diminished bilaterally, respirations shallow and labored   Abdomen soft, distended, mid epigastric tenderness, no HSM, bowel sounds normal  Scattered ecchymosis, skin warm, dry and CR brisk   No edema bilateral lower extremities   AAOx3    CBC:   Recent Labs     10/21/20  0411 10/22/20  1123 10/23/20  0016   WBC 22.0* 25.5* 17.1*   HGB 9.9* 9.4* 8.0*   * 424 349     BMP:    Recent Labs     10/21/20  0411 10/22/20  1123 10/23/20  0016   * 131* 137   K 4.1 4.9 4.3   CL 88* 88* 94*   CO2 34* 32 32   BUN 16 13 11   CREATININE 1.1 0.9 0.7   GLUCOSE 141* 101* 79     Magnesium:   Lab Results   Component Value Date    MG 2.2 07/14/2020     Hepatic:   Recent Labs     10/21/20  0411 10/22/20  1123 10/23/20  0016   * 560* 255*   * 781* 523*   BILITOT 0.2 0.3 0.2   ALKPHOS 89 111 99     INR:   Recent Labs     10/23/20  0016   INR 1.29         Intake/Output Summary (Last 24 hours) at 10/23/2020 0901  Last data filed at 10/23/2020 0026  Gross per 24 hour   Intake 2314 ml   Output 850 ml   Net 1464 ml         Medications    Scheduled Medications:    lubiprostone  24 mcg Oral BID WC    amLODIPine  10 mg Oral Daily    cefepime  2 g Intravenous Q12H    pantoprazole  40 mg Intravenous Daily    albuterol sulfate HFA  2 puff Inhalation Q4H    ipratropium  2 puff Inhalation Q4H    aspirin  81 mg Oral Daily    baclofen  10 mg Oral TID    budesonide-formoterol  2 puff Inhalation BID    busPIRone  10 mg Oral TID    DULoxetine  60 mg Oral Daily    ferrous gluconate  324 mg Oral BID    fluticasone  2 spray Nasal Daily    folic acid  1 mg Oral Daily    hydrALAZINE  10 mg Oral TID    levothyroxine  100 mcg Oral Daily    metoprolol succinate  25 mg Oral Daily    montelukast  10 mg Oral Daily    theophylline  400 mg Oral Daily    traZODone  100 mg Oral Nightly    thiamine  100 mg Oral Daily    sodium chloride flush  10 mL Intravenous 2 times per day    [Held by provider] enoxaparin  40 mg Subcutaneous Daily     PRN Medications: LORazepam, morphine, clonazePAM, sodium chloride flush, acetaminophen **OR** acetaminophen, polyethylene glycol, promethazine **OR** ondansetron, oxyCODONE-acetaminophen, guaiFENesin-dextromethorphan  Infusions:    sodium chloride 100 mL/hr at 10/23/20 0022 Patient Active Problem List   Diagnosis Code    Centrilobular emphysema ([facility] [address] J43.2    Hypertension I10    Hyperlipidemia LDL goal <100 E78.5    Abnormal EKG R94.31    Palpitations R00.2    Anxiety F41.9    FH: CAD (coronary artery disease) Z82.49    Fibromyalgia M79.7    Back pain at L4-L5 level M54.5    Sleep apnea G47.30    COPD exacerbation ([facility]) J44.1    Electrolyte imbalance E87.8    Epigastric pain R10.13    COPD (chronic obstructive pulmonary disease) (HCC) J44.9    Spinal stenosis of lumbar region with radiculopathy M48.061, M54.16    Spinal stenosis, lumbar region, without neurogenic claudication M48.061    COPD with acute exacerbation (HCC) J44.1    Pneumonia due to infectious organism J18.9    SVT (supraventricular tachycardia) ([facility]) I47.1    Class 1 obesity due to excess calories with body mass index (BMI) of 31.0 to 31.9 in adult E66.09, Z68.31    Pneumonia J18.9    Pleural effusion J90    Atelectasis J98.11    Pulmonary nodules R91.8    Respiratory failure with hypoxia and hypercapnia (HCC) J96.91, J96.92    Anemia D64.9    COPD with exacerbation (HCC) J44.1    Acquired hemolytic anemia, unspecified (HCC) D59.9    Leucocytosis D72.829    Chronic kidney disease, stage I N18.1    Hyponatremia E87.1    PNA (pneumonia) J18.9    Sepsis ([facility]) A41.9    Pericardial effusion I31.3        ASSESSMENT AND RECOMMENDATIONS:    Elevated Liver Enzymes, concern for ACALCULOUS CHOLECYSTITIS:   US CONFIRMING the same  Surgery on board await recommendations   Maintain cefepime 2 g BID, leukocytosis and LFT's improving   Continue optimizing cardiac output, new aflutter, cardiac tamponade- cardio on board, pericardiocentesis today  Chronic liver w/u pending, acute hepatitis panel negative   Continue Amitiza and PPI BID, pain and nausea medication PRN     Dr. Dominguez Melendez covering for the weekend     Discussed plan of care with patient and family     Patient clinical, biochemical, and

## 2020-10-23 NOTE — PROGRESS NOTES
Patient got up to bedside commode and desat to low 70's and HR spiked to 170s. Desat has been ongoing issue but HR is a new issue. I let patient rest for about 30min and HR still 160s. Notified hospitalist Dr. Tran  and obtained EKG.

## 2020-10-23 NOTE — ANESTHESIA PRE PROCEDURE
Department of Anesthesiology  Preprocedure Note       Name:  Red De Jesus   Age:  64 y.o.  :  1960                                          MRN:  [id number]         Date:  10/23/2020      Surgeon: Michael Vásquez):  Mariia Snyder MD    Procedure: Procedure(s):  CHOLECYSTECTOMY LAPAROSCOPIC    Medications prior to admission:   Prior to Admission medications    Medication Sig Start Date End Date Taking?  Authorizing Provider   budesonide-formoterol (SYMBICORT) 160-4.5 MCG/ACT AERO USE 2 INHALATIONS TWICE A DAY 10/2/20   Max Leon MD   MIAH-24 400 MG extended release capsule TAKE 1 CAPSULE DAILY 20   Max Leon MD   dilTIAZem (CARDIZEM CD) 240 MG extended release capsule Take 1 capsule by mouth daily 20   BRIAN Saucedo CNP   ipratropium-albuterol (DUONEB) 0.5-2.5 (3) MG/3ML SOLN nebulizer solution Inhale 3 mLs into the lungs every 4 hours 20   Migue Tariq MD   guaiFENesin (MUCINEX) 600 MG extended release tablet Take 1 tablet by mouth 2 times daily 20   Migue Tariq MD   montelukast (SINGULAIR) 10 MG tablet Take 1 tablet by mouth daily 20   Migue Tariq MD   levothyroxine (SYNTHROID) 100 MCG tablet Take 1 tablet by mouth Daily 20   Migue Tariq MD   folic acid (FOLVITE) 1 MG tablet Take 1 tablet by mouth daily 20   Jazzmine Tillman MD   vitamin B-1 100 MG tablet Take 1 tablet by mouth daily 20   Jazzmine Tillman MD   hydrALAZINE (APRESOLINE) 10 MG tablet Take 1 tablet by mouth 3 times daily 7/15/20   Jazzmine Tillman MD   albuterol-ipratropium (COMBIVENT RESPIMAT)  MCG/ACT AERS inhaler Inhale 1 puff into the lungs every 4 hours as needed for Wheezing 20   Migue Tariq MD   DULoxetine (CYMBALTA) 60 MG extended release capsule Take 60 mg by mouth daily    Historical Provider, MD   busPIRone (BUSPAR) 10 MG tablet Take 1 tablet by mouth 3 times daily 19   Wendy Tay, MD   ferrous gluconate 324 (37.5 Fe) MG TABS Take 1 tablet by mouth 2 times daily 4/15/19   Ting Mario MD   metoprolol succinate (TOPROL XL) 25 MG extended release tablet Take 1 tablet by mouth daily 3/18/19   Ehsan Tapia MD   famotidine (PEPCID) 20 MG tablet Take 1 tablet by mouth 2 times daily  Patient taking differently: Take 40 mg by mouth nightly  3/8/19   Ting Mario MD   celecoxib (CELEBREX) 200 MG capsule Take 200 mg by mouth 2 times daily    Historical Provider, MD   traZODone (DESYREL) 50 MG tablet Take 100 mg by mouth nightly     Historical Provider, MD   clonazePAM (KLONOPIN) 1 MG tablet Take 1 mg by mouth 3 times daily as needed.     Historical Provider, MD   baclofen (LIORESAL) 10 MG tablet Take 1 tablet by mouth 3 times daily 12/18/18   Ting Mario MD   aspirin 81 MG chewable tablet Take 81 mg by mouth daily    Historical Provider, MD   fluticasone (FLONASE) 50 MCG/ACT nasal spray 2 sprays by Nasal route daily 3/28/18   Ting Mario MD       Current medications:    Current Facility-Administered Medications   Medication Dose Route Frequency Provider Last Rate Last Dose    lubiprostone (AMITIZA) capsule 24 mcg  24 mcg Oral BID  BRIAN Barrera CNP   24 mcg at 10/23/20 0919    dilTIAZem 100 mg in dextrose 5 % 100 mL infusion ([facility])  5 mg/hr Intravenous Continuous Suyapa Arnett MD        LORazepam (ATIVAN) tablet 0.5 mg  0.5 mg Oral Q8H PRN Harrison Logan MD   0.5 mg at 10/23/20 0832    amLODIPine (NORVASC) tablet 10 mg  10 mg Oral Daily Ehsan Tapia MD   10 mg at 10/23/20 0916    cefepime (MAXIPIME) 2 g IVPB minibag  2 g Intravenous Q12H Harrison Logan MD   Stopped at 10/23/20 0323    pantoprazole (PROTONIX) injection 40 mg  40 mg Intravenous Daily BRIAN Barrera CNP   40 mg at 10/23/20 0655    0.9 % sodium chloride infusion   Intravenous Continuous Harrison Logan  mL/hr at 10/23/20 1032      morphine (PF) injection 2 mg  2 mg Intravenous Q6H PRN Makenzie Correa MD   2 mg at 10/22/20 0853    albuterol sulfate  (90 Base) MCG/ACT inhaler 2 puff  2 puff Inhalation Q4H Patricia Donnelly MD   2 puff at 10/23/20 1021    ipratropium (ATROVENT HFA) 17 MCG/ACT inhaler 2 puff  2 puff Inhalation Q4H Makenzie Correa MD   2 puff at 10/23/20 1021    aspirin chewable tablet 81 mg  81 mg Oral Daily Edgar Sneed MD   81 mg at 10/23/20 0915    baclofen (LIORESAL) tablet 10 mg  10 mg Oral TID Edgar Sneed MD   10 mg at 10/23/20 0916    budesonide-formoterol (SYMBICORT) 160-4.5 MCG/ACT inhaler 2 puff  2 puff Inhalation BID Edgar Sneed MD   2 puff at 10/23/20 1019    busPIRone (BUSPAR) tablet 10 mg  10 mg Oral TID Edgar Sneed MD   10 mg at 10/23/20 0916    clonazePAM (KLONOPIN) tablet 1 mg  1 mg Oral TID PRN Edgar Sneed MD   1 mg at 10/23/20 0434    DULoxetine (CYMBALTA) extended release capsule 60 mg  60 mg Oral Daily Edgar Sneed MD   60 mg at 10/23/20 5327    ferrous gluconate 324 (37.5 Fe) MG tablet 324 mg  324 mg Oral BID Edgar Sneed MD   324 mg at 10/23/20 0915    fluticasone (FLONASE) 50 MCG/ACT nasal spray 2 spray  2 spray Nasal Daily Edgar Sneed MD   2 spray at 51/87/35 1027    folic acid (FOLVITE) tablet 1 mg  1 mg Oral Daily Edgar Sneed MD   1 mg at 10/23/20 0915    [Held by provider] hydrALAZINE (APRESOLINE) tablet 10 mg  10 mg Oral TID Edgar Sneed MD   10 mg at 10/22/20 2150    levothyroxine (SYNTHROID) tablet 100 mcg  100 mcg Oral Daily Edgar Sneed MD   100 mcg at 10/23/20 3982    metoprolol succinate (TOPROL XL) extended release tablet 25 mg  25 mg Oral Daily Edgar Sneed MD   25 mg at 10/23/20 0916    montelukast (SINGULAIR) tablet 10 mg  10 mg Oral Daily Edagr Sneed MD   10 mg at 10/23/20 0916    [Held by provider] theophylline (MIAH-24) extended release capsule 400 mg  400 mg Oral Daily Edgar Sneed MD   400 mg at 10/22/20 0848    traZODone (DESYREL) tablet 100 mg  100 mg Oral Nightly Analisa Abdi MD   100 mg at 10/22/20 2150    vitamin B-1 (THIAMINE) tablet 100 mg  100 mg Oral Daily Analisa Abdi MD   100 mg at 10/23/20 7532    sodium chloride flush 0.9 % injection 10 mL  10 mL Intravenous 2 times per day Analisa Abdi MD   10 mL at 10/22/20 2152    sodium chloride flush 0.9 % injection 10 mL  10 mL Intravenous PRN Analisa Abdi MD   10 mL at 10/20/20 0311    acetaminophen (TYLENOL) tablet 650 mg  650 mg Oral Q6H PRN Analisa Abdi MD   650 mg at 10/20/20 1407    Or    acetaminophen (TYLENOL) suppository 650 mg  650 mg Rectal Q6H PRN Analisa Abdi MD        polyethylene glycol (GLYCOLAX) packet 17 g  17 g Oral Daily PRN Analisa Abdi MD        promethazine (PHENERGAN) tablet 12.5 mg  12.5 mg Oral Q6H PRN Analisa Abdi MD        Or    [facility]ron [facility]) [facility] 4 mg  4 mg Intravenous Q6H PRN Analisa Abdi MD   4 mg at 10/23/20 1030    [Held by provider] enoxaparin (LOVENOX) injection 40 mg  40 mg Subcutaneous Daily Analisa Abdi MD   40 mg at 10/20/20 0833    oxyCODONE-acetaminophen (PERCOCET) 5-325 MG per tablet 1 tablet  1 tablet Oral Q6H PRN Darryle Shave, APRN - CNP   1 tablet at 10/22/20 2155    guaiFENesin-dextromethorphan (ROBITUSSIN DM) 100-10 MG/5ML syrup 5 mL  5 mL Oral Q4H PRN Cheyanne BRIAN Hay - CNP   5 mL at 10/22/20 0429       Allergies:     Allergies   Allergen Reactions    Lisinopril Swelling and Rash     angioedema       Problem List:    Patient Active Problem List   Diagnosis Code    Centrilobular emphysema (HCC) J43.2    Hypertension I10    Hyperlipidemia LDL goal <100 E78.5    Abnormal EKG R94.31    Palpitations R00.2    Anxiety F41.9    FH: CAD (coronary artery disease) Z82.49    Fibromyalgia M79.7    Back pain at L4-L5 level M54.5    Sleep apnea G47.30    COPD exacerbation (HCC) J44.1    Electrolyte imbalance E87.8    Epigastric pain R10.13    COPD (chronic obstructive pulmonary disease) ([facility]) J44.9    Spinal stenosis of lumbar region with radiculopathy M48.061, M54.16    Spinal stenosis, lumbar region, without neurogenic claudication M48.061    COPD with acute exacerbation ([facility]) J44.1    Pneumonia due to infectious organism J18.9    SVT (supraventricular tachycardia) ([facility]) I47.1    Class 1 obesity due to excess calories with body mass index (BMI) of 31.0 to 31.9 in adult E66.09, Z68.31    Pneumonia J18.9    Pleural effusion J90    Atelectasis J98.11    Pulmonary nodules R91.8    Respiratory failure with hypoxia and hypercapnia ([facility]) J96.91, J96.92    Anemia D64.9    COPD with exacerbation ([facility]) J44.1    Acquired hemolytic anemia, unspecified ([facility]) D59.9    Leucocytosis D72.829    Chronic kidney disease, stage I N18.1    Hyponatremia E87.1    PNA (pneumonia) J18.9    Sepsis ([facility]) A41.9    Pericardial effusion I31.3       Past Medical History:        Diagnosis Date    Anxiety 2/16/2017    Arthritis     Back pain at L4-L5 level 2/16/2017    Dr Serna [facility] Bipolar 1 disorder ([facility] [address]     COPD (chronic obstructive pulmonary disease) ([facility])     Emphysema of lung ([facility] [address]     FH: CAD (coronary artery disease) 2/16/2017    Father had in his forties, sister in her thirties    [facility] Fibromyalgia 2/16/2017    H/O Doppler ultrasound 04/05/2019    venous- no DVT or reflux    H/O echocardiogram 01/14/2015    EF50-55% Normal- see media    History of blood transfusion     Hyperlipidemia LDL goal <100     Hypertension     Obstructive sleep apnea     Osteoarthritis     Thyroid disease        Past Surgical History:        Procedure Laterality Date    BACK SURGERY      CARDIAC CATHETERIZATION      10/2019    CARPAL TUNNEL RELEASE      COLONOSCOPY N/A 12/12/2019    COLONOSCOPY DIAGNOSTIC performed by Asia Villafana MD at [address] [place] [address]         Social History:    Social History     Tobacco Use    Smoking status: Former Smoker     Packs/day: 1.50 (If Applicable):   Lab Results   Component Value Date    PREGTESTUR NEGATIVE 03/25/2014        ABGs:   Lab Results   Component Value Date    PO2ART 42 07/13/2020    FIR7IEK 58.0 07/13/2020    NRQ2AQI 35.9 07/13/2020        Type & Screen (If Applicable):  No results found for: LABABO, LABRH    Drug/Infectious Status (If Applicable):  Lab Results   Component Value Date    HEPCAB NON REACTIVE 10/23/2020       COVID-19 Screening (If Applicable):   Lab Results   Component Value Date    COVID19 NOT DETECTED 10/21/2020         Anesthesia Evaluation  Patient summary reviewed  Airway: Mallampati: Unable to assess / NA        Dental:          Pulmonary:   (+) pneumonia:  COPD:  sleep apnea:  decreased breath sounds,                            ROS comment: See anesthesia MD note re pulmonary status today   Cardiovascular:    (+) hypertension:,         Rhythm: regular                      Neuro/Psych:   (+) neuromuscular disease:, psychiatric history:            GI/Hepatic/Renal:             Endo/Other:                     Abdominal:   (+) obese,         Vascular:                                      Anesthesia Plan      general     ASA 4 - emergent     (Chart review. Consider Tap block or erector spinae block for post op pain management.)  Induction: intravenous. MIPS: Postoperative opioids intended and Postoperative ventilation. Anesthetic plan and risks discussed with patient. Plan discussed with CRNA.     Attending anesthesiologist reviewed and agrees with Pre Eval content            BRIAN Lopez - CRNA   10/23/2020

## 2020-10-24 ENCOUNTER — ANESTHESIA (OUTPATIENT)
Dept: OPERATING ROOM | Age: 60
DRG: 853 | End: 2020-10-24
Payer: COMMERCIAL

## 2020-10-24 ENCOUNTER — APPOINTMENT (OUTPATIENT)
Dept: GENERAL RADIOLOGY | Age: 60
DRG: 853 | End: 2020-10-24
Payer: COMMERCIAL

## 2020-10-24 LAB
ALBUMIN SERPL-MCNC: 3 GM/DL (ref 3.4–5)
ALBUMIN SERPL-MCNC: 3 GM/DL (ref 3.4–5)
ALP BLD-CCNC: 123 IU/L (ref 40–129)
ALP BLD-CCNC: 123 IU/L (ref 40–129)
ALPHA-1 ANTITRYPSIN: 275 MG/DL (ref 90–200)
ALT SERPL-CCNC: 352 U/L (ref 10–40)
ALT SERPL-CCNC: 352 U/L (ref 10–40)
ANION GAP SERPL CALCULATED.3IONS-SCNC: 12 MMOL/L (ref 4–16)
ANTI-NUCLEAR ANTIBODY (ANA): NORMAL
AST SERPL-CCNC: 98 IU/L (ref 15–37)
AST SERPL-CCNC: 98 IU/L (ref 15–37)
BASOPHILS ABSOLUTE: 0 K/CU MM
BASOPHILS RELATIVE PERCENT: 0.2 % (ref 0–1)
BILIRUB SERPL-MCNC: 0.3 MG/DL (ref 0–1)
BILIRUB SERPL-MCNC: 0.3 MG/DL (ref 0–1)
BILIRUBIN DIRECT: 0.2 MG/DL (ref 0–0.3)
BILIRUBIN, INDIRECT: 0.1 MG/DL (ref 0–0.7)
BUN BLDV-MCNC: 5 MG/DL (ref 6–23)
CALCIUM SERPL-MCNC: 8.6 MG/DL (ref 8.3–10.6)
CHLORIDE BLD-SCNC: 93 MMOL/L (ref 99–110)
CO2: 35 MMOL/L (ref 21–32)
CREAT SERPL-MCNC: 0.6 MG/DL (ref 0.6–1.1)
CYTOMEGALOVIRUS IGG ANTIBODY: >10 U/ML
DIFFERENTIAL TYPE: ABNORMAL
EBV EARLY ANTIGEN AB, IGG: <5 U/ML (ref 0–10.9)
EBV NUCLEAR AG AB: <3 U/ML (ref 0–21.9)
EOSINOPHILS ABSOLUTE: 0 K/CU MM
EOSINOPHILS RELATIVE PERCENT: 0.3 % (ref 0–3)
EPSTEIN-BARR VCA IGG: 67 U/ML (ref 0–21.9)
EPSTEIN-BARR VCA IGM: <10 U/ML (ref 0–43.9)
F-ACTIN AB, IGG: 3 UNITS (ref 0–19)
GFR AFRICAN AMERICAN: >60 ML/MIN/1.73M2
GFR NON-AFRICAN AMERICAN: >60 ML/MIN/1.73M2
GLUCOSE BLD-MCNC: 78 MG/DL (ref 70–99)
HAV AB SERPL IA-ACNC: NEGATIVE
HCT VFR BLD CALC: 32.2 % (ref 37–47)
HEMOGLOBIN: 9.3 GM/DL (ref 12.5–16)
IMMATURE NEUTROPHIL %: 0.6 % (ref 0–0.43)
LIPASE: 9 IU/L (ref 13–60)
LYMPHOCYTES ABSOLUTE: 0.8 K/CU MM
LYMPHOCYTES RELATIVE PERCENT: 6.6 % (ref 24–44)
MAGNESIUM: 1.7 MG/DL (ref 1.8–2.4)
MCH RBC QN AUTO: 28.8 PG (ref 27–31)
MCHC RBC AUTO-ENTMCNC: 28.9 % (ref 32–36)
MCV RBC AUTO: 99.7 FL (ref 78–100)
MONOCYTES ABSOLUTE: 1.4 K/CU MM
MONOCYTES RELATIVE PERCENT: 10.8 % (ref 0–4)
NUCLEATED RBC %: 0 %
PDW BLD-RTO: 13.7 % (ref 11.7–14.9)
PLATELET # BLD: 385 K/CU MM (ref 140–440)
PMV BLD AUTO: 9.9 FL (ref 7.5–11.1)
POTASSIUM SERPL-SCNC: 3.5 MMOL/L (ref 3.5–5.1)
RBC # BLD: 3.23 M/CU MM (ref 4.2–5.4)
SARS-COV-2, NAAT: NOT DETECTED
SEGMENTED NEUTROPHILS ABSOLUTE COUNT: 10.2 K/CU MM
SEGMENTED NEUTROPHILS RELATIVE PERCENT: 81.5 % (ref 36–66)
SODIUM BLD-SCNC: 140 MMOL/L (ref 135–145)
TOTAL IMMATURE NEUTOROPHIL: 0.07 K/CU MM
TOTAL NUCLEATED RBC: 0 K/CU MM
TOTAL PROTEIN: 5.3 GM/DL (ref 6.4–8.2)
TOTAL PROTEIN: 5.3 GM/DL (ref 6.4–8.2)
WBC # BLD: 12.5 K/CU MM (ref 4–10.5)

## 2020-10-24 PROCEDURE — 2580000003 HC RX 258: Performed by: INTERNAL MEDICINE

## 2020-10-24 PROCEDURE — 80053 COMPREHEN METABOLIC PANEL: CPT

## 2020-10-24 PROCEDURE — 2700000000 HC OXYGEN THERAPY PER DAY

## 2020-10-24 PROCEDURE — 6360000002 HC RX W HCPCS: Performed by: INTERNAL MEDICINE

## 2020-10-24 PROCEDURE — 71045 X-RAY EXAM CHEST 1 VIEW: CPT

## 2020-10-24 PROCEDURE — 94660 CPAP INITIATION&MGMT: CPT

## 2020-10-24 PROCEDURE — 82248 BILIRUBIN DIRECT: CPT

## 2020-10-24 PROCEDURE — 94640 AIRWAY INHALATION TREATMENT: CPT

## 2020-10-24 PROCEDURE — 83735 ASSAY OF MAGNESIUM: CPT

## 2020-10-24 PROCEDURE — 99233 SBSQ HOSP IP/OBS HIGH 50: CPT | Performed by: INTERNAL MEDICINE

## 2020-10-24 PROCEDURE — 6360000002 HC RX W HCPCS: Performed by: NURSE PRACTITIONER

## 2020-10-24 PROCEDURE — 2580000003 HC RX 258: Performed by: STUDENT IN AN ORGANIZED HEALTH CARE EDUCATION/TRAINING PROGRAM

## 2020-10-24 PROCEDURE — C9113 INJ PANTOPRAZOLE SODIUM, VIA: HCPCS | Performed by: NURSE PRACTITIONER

## 2020-10-24 PROCEDURE — 82150 ASSAY OF AMYLASE: CPT

## 2020-10-24 PROCEDURE — 36415 COLL VENOUS BLD VENIPUNCTURE: CPT

## 2020-10-24 PROCEDURE — 94761 N-INVAS EAR/PLS OXIMETRY MLT: CPT

## 2020-10-24 PROCEDURE — 83690 ASSAY OF LIPASE: CPT

## 2020-10-24 PROCEDURE — 85025 COMPLETE CBC W/AUTO DIFF WBC: CPT

## 2020-10-24 PROCEDURE — 6360000002 HC RX W HCPCS: Performed by: FAMILY MEDICINE

## 2020-10-24 PROCEDURE — 6370000000 HC RX 637 (ALT 250 FOR IP): Performed by: NURSE PRACTITIONER

## 2020-10-24 PROCEDURE — 2500000003 HC RX 250 WO HCPCS: Performed by: INTERNAL MEDICINE

## 2020-10-24 PROCEDURE — U0002 COVID-19 LAB TEST NON-CDC: HCPCS

## 2020-10-24 PROCEDURE — 2500000003 HC RX 250 WO HCPCS: Performed by: FAMILY MEDICINE

## 2020-10-24 PROCEDURE — 6360000002 HC RX W HCPCS: Performed by: STUDENT IN AN ORGANIZED HEALTH CARE EDUCATION/TRAINING PROGRAM

## 2020-10-24 PROCEDURE — 6370000000 HC RX 637 (ALT 250 FOR IP): Performed by: STUDENT IN AN ORGANIZED HEALTH CARE EDUCATION/TRAINING PROGRAM

## 2020-10-24 PROCEDURE — 2000000000 HC ICU R&B

## 2020-10-24 PROCEDURE — 6370000000 HC RX 637 (ALT 250 FOR IP): Performed by: INTERNAL MEDICINE

## 2020-10-24 RX ORDER — LORAZEPAM 2 MG/ML
0.5 INJECTION INTRAMUSCULAR ONCE
Status: COMPLETED | OUTPATIENT
Start: 2020-10-24 | End: 2020-10-24

## 2020-10-24 RX ORDER — MORPHINE SULFATE 2 MG/ML
2 INJECTION, SOLUTION INTRAMUSCULAR; INTRAVENOUS EVERY 4 HOURS PRN
Status: DISCONTINUED | OUTPATIENT
Start: 2020-10-24 | End: 2020-10-24

## 2020-10-24 RX ORDER — DIGOXIN 0.25 MG/ML
250 INJECTION INTRAMUSCULAR; INTRAVENOUS EVERY 6 HOURS
Status: COMPLETED | OUTPATIENT
Start: 2020-10-24 | End: 2020-10-24

## 2020-10-24 RX ORDER — ALBUTEROL SULFATE 90 UG/1
2 AEROSOL, METERED RESPIRATORY (INHALATION)
Status: DISCONTINUED | OUTPATIENT
Start: 2020-10-25 | End: 2020-10-26

## 2020-10-24 RX ORDER — DILTIAZEM HYDROCHLORIDE 5 MG/ML
10 INJECTION INTRAVENOUS ONCE
Status: COMPLETED | OUTPATIENT
Start: 2020-10-24 | End: 2020-10-24

## 2020-10-24 RX ORDER — MORPHINE SULFATE 2 MG/ML
2 INJECTION, SOLUTION INTRAMUSCULAR; INTRAVENOUS
Status: DISCONTINUED | OUTPATIENT
Start: 2020-10-24 | End: 2020-10-27

## 2020-10-24 RX ADMIN — Medication 2 PUFF: at 21:03

## 2020-10-24 RX ADMIN — AMIODARONE HYDROCHLORIDE 400 MG: 200 TABLET ORAL at 20:55

## 2020-10-24 RX ADMIN — BACLOFEN 10 MG: 10 TABLET ORAL at 12:05

## 2020-10-24 RX ADMIN — Medication 2 PUFF: at 00:37

## 2020-10-24 RX ADMIN — CEFEPIME 2 G: 2 INJECTION, POWDER, FOR SOLUTION INTRAVENOUS at 15:47

## 2020-10-24 RX ADMIN — OXYCODONE HYDROCHLORIDE AND ACETAMINOPHEN 1 TABLET: 5; 325 TABLET ORAL at 21:27

## 2020-10-24 RX ADMIN — OXYCODONE HYDROCHLORIDE AND ACETAMINOPHEN 1 TABLET: 5; 325 TABLET ORAL at 15:59

## 2020-10-24 RX ADMIN — MORPHINE SULFATE 2 MG: 2 INJECTION, SOLUTION INTRAMUSCULAR; INTRAVENOUS at 04:00

## 2020-10-24 RX ADMIN — DEXTROSE MONOHYDRATE 5 MG/HR: 50 INJECTION, SOLUTION INTRAVENOUS at 12:29

## 2020-10-24 RX ADMIN — FERROUS GLUCONATE TAB 324 MG (37.5 MG ELEMENTAL IRON) 324 MG: 324 (37.5 FE) TAB at 20:55

## 2020-10-24 RX ADMIN — ASPIRIN 81 MG CHEWABLE TABLET 81 MG: 81 TABLET CHEWABLE at 12:03

## 2020-10-24 RX ADMIN — ALBUTEROL SULFATE 2 PUFF: 90 AEROSOL, METERED RESPIRATORY (INHALATION) at 21:03

## 2020-10-24 RX ADMIN — FERROUS GLUCONATE TAB 324 MG (37.5 MG ELEMENTAL IRON) 324 MG: 324 (37.5 FE) TAB at 12:03

## 2020-10-24 RX ADMIN — CEFEPIME 2 G: 2 INJECTION, POWDER, FOR SOLUTION INTRAVENOUS at 02:41

## 2020-10-24 RX ADMIN — PANTOPRAZOLE SODIUM 40 MG: 40 INJECTION, POWDER, FOR SOLUTION INTRAVENOUS at 12:27

## 2020-10-24 RX ADMIN — BUSPIRONE HYDROCHLORIDE 10 MG: 10 TABLET ORAL at 12:03

## 2020-10-24 RX ADMIN — Medication 2 PUFF: at 04:03

## 2020-10-24 RX ADMIN — METOPROLOL SUCCINATE 25 MG: 25 TABLET, EXTENDED RELEASE ORAL at 12:04

## 2020-10-24 RX ADMIN — Medication 2 PUFF: at 11:20

## 2020-10-24 RX ADMIN — ALBUTEROL SULFATE 2 PUFF: 90 AEROSOL, METERED RESPIRATORY (INHALATION) at 00:37

## 2020-10-24 RX ADMIN — AMIODARONE HYDROCHLORIDE 400 MG: 200 TABLET ORAL at 12:04

## 2020-10-24 RX ADMIN — AMLODIPINE BESYLATE 10 MG: 10 TABLET ORAL at 12:04

## 2020-10-24 RX ADMIN — SODIUM CHLORIDE: 9 INJECTION, SOLUTION INTRAVENOUS at 15:47

## 2020-10-24 RX ADMIN — BUSPIRONE HYDROCHLORIDE 10 MG: 10 TABLET ORAL at 20:55

## 2020-10-24 RX ADMIN — FOLIC ACID 1 MG: 1 TABLET ORAL at 12:03

## 2020-10-24 RX ADMIN — DIGOXIN 250 MCG: 0.25 INJECTION INTRAMUSCULAR; INTRAVENOUS at 18:21

## 2020-10-24 RX ADMIN — ONDANSETRON 4 MG: 2 INJECTION INTRAMUSCULAR; INTRAVENOUS at 06:46

## 2020-10-24 RX ADMIN — BACLOFEN 10 MG: 10 TABLET ORAL at 20:55

## 2020-10-24 RX ADMIN — LORAZEPAM 0.5 MG: 2 INJECTION INTRAMUSCULAR; INTRAVENOUS at 06:39

## 2020-10-24 RX ADMIN — ALBUTEROL SULFATE 2 PUFF: 90 AEROSOL, METERED RESPIRATORY (INHALATION) at 15:53

## 2020-10-24 RX ADMIN — Medication 100 MG: at 12:04

## 2020-10-24 RX ADMIN — TRAZODONE HYDROCHLORIDE 100 MG: 50 TABLET ORAL at 20:55

## 2020-10-24 RX ADMIN — ALBUTEROL SULFATE 2 PUFF: 90 AEROSOL, METERED RESPIRATORY (INHALATION) at 11:20

## 2020-10-24 RX ADMIN — SODIUM CHLORIDE, PRESERVATIVE FREE 10 ML: 5 INJECTION INTRAVENOUS at 20:56

## 2020-10-24 RX ADMIN — LUBIPROSTONE 24 MCG: 24 CAPSULE, GELATIN COATED ORAL at 12:03

## 2020-10-24 RX ADMIN — MONTELUKAST 10 MG: 10 TABLET, FILM COATED ORAL at 12:04

## 2020-10-24 RX ADMIN — Medication 2 PUFF: at 15:53

## 2020-10-24 RX ADMIN — DILTIAZEM HYDROCHLORIDE 10 MG: 5 INJECTION INTRAVENOUS at 12:28

## 2020-10-24 RX ADMIN — SODIUM CHLORIDE: 9 INJECTION, SOLUTION INTRAVENOUS at 06:46

## 2020-10-24 RX ADMIN — DIGOXIN 250 MCG: 0.25 INJECTION INTRAMUSCULAR; INTRAVENOUS at 12:07

## 2020-10-24 RX ADMIN — DULOXETINE HYDROCHLORIDE 60 MG: 30 CAPSULE, DELAYED RELEASE ORAL at 12:02

## 2020-10-24 RX ADMIN — BUDESONIDE AND FORMOTEROL FUMARATE DIHYDRATE 2 PUFF: 160; 4.5 AEROSOL RESPIRATORY (INHALATION) at 11:21

## 2020-10-24 RX ADMIN — LEVOTHYROXINE SODIUM 100 MCG: 100 TABLET ORAL at 12:05

## 2020-10-24 RX ADMIN — BUDESONIDE AND FORMOTEROL FUMARATE DIHYDRATE 2 PUFF: 160; 4.5 AEROSOL RESPIRATORY (INHALATION) at 21:03

## 2020-10-24 RX ADMIN — MORPHINE SULFATE 2 MG: 2 INJECTION, SOLUTION INTRAMUSCULAR; INTRAVENOUS at 10:08

## 2020-10-24 RX ADMIN — ALBUTEROL SULFATE 2 PUFF: 90 AEROSOL, METERED RESPIRATORY (INHALATION) at 04:03

## 2020-10-24 ASSESSMENT — PAIN SCALES - GENERAL
PAINLEVEL_OUTOF10: 3
PAINLEVEL_OUTOF10: 6
PAINLEVEL_OUTOF10: 0
PAINLEVEL_OUTOF10: 7
PAINLEVEL_OUTOF10: 7
PAINLEVEL_OUTOF10: 6
PAINLEVEL_OUTOF10: 0

## 2020-10-24 ASSESSMENT — PAIN DESCRIPTION - PAIN TYPE: TYPE: ACUTE PAIN

## 2020-10-24 ASSESSMENT — PAIN DESCRIPTION - DESCRIPTORS: DESCRIPTORS: DISCOMFORT

## 2020-10-24 ASSESSMENT — PAIN DESCRIPTION - FREQUENCY: FREQUENCY: CONTINUOUS

## 2020-10-24 NOTE — PROGRESS NOTES
GENERAL SURGERY PROGRESS NOTE    CC/HPI:           Patient feels better from yesterday's procedure. Vitals:    10/24/20 0602 10/24/20 0700 10/24/20 0800 10/24/20 0900   BP: (!) 144/71 122/74 (!) 148/77 (!) 144/84   Pulse: 113 147 143 145   Resp: 14 13 19 22   Temp:       TempSrc:       SpO2:  98% 98%    Weight:       Height:         I/O last 3 completed shifts: In: 5121 [I.V.:1478]  Out: 0772 [Urine:2500; Drains:110]  No intake/output data recorded.     Diet NPO, After Midnight    Recent Results (from the past 48 hour(s))   Comprehensive Metabolic Panel w/ Reflex to MG    Collection Time: 10/22/20 11:23 AM   Result Value Ref Range    Sodium 131 (L) 135 - 145 MMOL/L    Potassium 4.9 3.5 - 5.1 MMOL/L    Chloride 88 (L) 99 - 110 mMol/L    CO2 32 21 - 32 MMOL/L    BUN 13 6 - 23 MG/DL    CREATININE 0.9 0.6 - 1.1 MG/DL    Glucose 101 (H) 70 - 99 MG/DL    Calcium 9.0 8.3 - 10.6 MG/DL    Alb 3.5 3.4 - 5.0 GM/DL    Total Protein 5.8 (L) 6.4 - 8.2 GM/DL    Total Bilirubin 0.3 0.0 - 1.0 MG/DL     (H) 10 - 40 U/L     (H) 15 - 37 IU/L    Alkaline Phosphatase 111 40 - 129 IU/L    GFR Non-African American >60 >60 mL/min/1.73m2    GFR African American >60 >60 mL/min/1.73m2    Anion Gap 11 4 - 16   CBC Auto Differential    Collection Time: 10/22/20 11:23 AM   Result Value Ref Range    WBC 25.5 (H) 4.0 - 10.5 K/CU MM    RBC 3.35 (L) 4.2 - 5.4 M/CU MM    Hemoglobin 9.4 (L) 12.5 - 16.0 GM/DL    Hematocrit 33.0 (L) 37 - 47 %    MCV 98.5 78 - 100 FL    MCH 28.1 27 - 31 PG    MCHC 28.5 (L) 32.0 - 36.0 %    RDW 13.7 11.7 - 14.9 %    Platelets 244 034 - 398 K/CU MM    MPV 10.3 7.5 - 11.1 FL    Differential Type AUTOMATED DIFFERENTIAL     Segs Relative 88.5 (H) 36 - 66 %    Lymphocytes % 3.2 (L) 24 - 44 %    Monocytes % 6.6 (H) 0 - 4 %    Eosinophils % 0.0 0 - 3 %    Basophils % 0.2 0 - 1 %    Segs Absolute 22.6 K/CU MM    Lymphocytes Absolute 0.8 K/CU MM    Monocytes Absolute 1.7 K/CU MM    Eosinophils Absolute 0.0 K/CU MM    Basophils Absolute 0.1 K/CU MM    Nucleated RBC % 0.1 %    Total Nucleated RBC 0.0 K/CU MM    Total Immature Neutrophil 0.38 K/CU MM    Immature Neutrophil % 1.5 (H) 0 - 0.43 %   Comprehensive Metabolic Panel w/ Reflex to MG    Collection Time: 10/23/20 12:16 AM   Result Value Ref Range    Sodium 137 135 - 145 MMOL/L    Potassium 4.3 3.5 - 5.1 MMOL/L    Chloride 94 (L) 99 - 110 mMol/L    CO2 32 21 - 32 MMOL/L    BUN 11 6 - 23 MG/DL    CREATININE 0.7 0.6 - 1.1 MG/DL    Glucose 79 70 - 99 MG/DL    Calcium 8.6 8.3 - 10.6 MG/DL    Alb 2.9 (L) 3.4 - 5.0 GM/DL    Total Protein 5.0 (L) 6.4 - 8.2 GM/DL    Total Bilirubin 0.2 0.0 - 1.0 MG/DL     (H) 10 - 40 U/L     (H) 15 - 37 IU/L    Alkaline Phosphatase 99 40 - 128 IU/L    GFR Non-African American >60 >60 mL/min/1.73m2    GFR African American >60 >60 mL/min/1.73m2    Anion Gap 11 4 - 16   CBC Auto Differential    Collection Time: 10/23/20 12:16 AM   Result Value Ref Range    WBC 17.1 (H) 4.0 - 10.5 K/CU MM    RBC 2.84 (L) 4.2 - 5.4 M/CU MM    Hemoglobin 8.0 (L) 12.5 - 16.0 GM/DL    Hematocrit 27.7 (L) 37 - 47 %    MCV 97.5 78 - 100 FL    MCH 28.2 27 - 31 PG    MCHC 28.9 (L) 32.0 - 36.0 %    RDW 13.7 11.7 - 14.9 %    Platelets 843 431 - 628 K/CU MM    MPV 10.1 7.5 - 11.1 FL    Differential Type AUTOMATED DIFFERENTIAL     Segs Relative 86.8 (H) 36 - 66 %    Lymphocytes % 3.7 (L) 24 - 44 %    Monocytes % 8.0 (H) 0 - 4 %    Eosinophils % 0.1 0 - 3 %    Basophils % 0.1 0 - 1 %    Segs Absolute 14.9 K/CU MM    Lymphocytes Absolute 0.6 K/CU MM    Monocytes Absolute 1.4 K/CU MM    Eosinophils Absolute 0.0 K/CU MM    Basophils Absolute 0.0 K/CU MM    Nucleated RBC % 0.0 %    Total Nucleated RBC 0.0 K/CU MM    Total Immature Neutrophil 0.22 K/CU MM    Immature Neutrophil % 1.3 (H) 0 - 0.43 %   Hepatitis A Antibody, Total    Collection Time: 10/23/20 12:16 AM   Result Value Ref Range    Hep A Total Ab Negative Negative   Hepatitis Panel, Acute Collection Time: 10/23/20 12:16 AM   Result Value Ref Range    Hepatitis B Surface Ag NON REACTIVE NON REACTIVE    Hep A IgM NON REACTIVE NON REACTIVE    Hep B Core Ab, IgM NON REACTIVE NON REACTIVE    Hepatitis C Ab NON REACTIVE NON REACTIVE   Ferritin    Collection Time: 10/23/20 12:16 AM   Result Value Ref Range    Ferritin 1,617 (H) 15 - 150 NG/ML   Iron and TIBC    Collection Time: 10/23/20 12:16 AM   Result Value Ref Range    Iron 58 37 - 145 ug/dL    UIBC 102 (L) 110 - 370 ug/dL    TIBC 160 (L) 250 - 450 ug/dL    Transferrin % 36 10 - 44 %   Alpha-1-Antitrypsin    Collection Time: 10/23/20 12:16 AM   Result Value Ref Range    A-1 Antitrypsin 275 (H) 90 - 200 mg/dL   IgG, IgA, IgM    Collection Time: 10/23/20 12:16 AM   Result Value Ref Range    IgG, Serum 406 (L) 723 - 1,685 MG/DL    IgA 145 69 - 382 MG/DL    IgM,Serum 83 62 - 277 MG/DL   Protime-INR    Collection Time: 10/23/20 12:16 AM   Result Value Ref Range    Protime 15.7 (H) 11.7 - 14.5 SECONDS    INR 1.29 INDEX   EKG 12 Lead    Collection Time: 10/23/20  8:45 AM   Result Value Ref Range    Ventricular Rate 157 BPM    Atrial Rate 314 BPM    QRS Duration 86 ms    Q-T Interval 254 ms    QTc Calculation (Bazett) 410 ms    P Axis 244 degrees    R Axis 126 degrees    T Axis 230 degrees    Diagnosis       Atrial flutter with variable AV block  Possible Right ventricular hypertrophy  Cannot rule out Inferior infarct , age undetermined  Abnormal ECG  When compared with ECG of 12-JUL-2020 00:32,  Significant changes have occurred  Confirmed by Arlene Dickson MD, Kaylin Fernandez ([id number]) on 10/23/2020 3:14:03 PM     Glucose, Body Fluid    Collection Time: 10/23/20 11:30 AM   Result Value Ref Range    Glucose, Fluid 86 MG/DL   Body Fluid Cell Count with Differential    Collection Time: 10/23/20 11:30 AM   Result Value Ref Range    Fluid Type PERICARDIAL FLUID INDEX    RBC, Fluid 98,000 /CU MM    WBC, Fluid 904 /CU MM    Neutrophil Count, Fluid 39 %    Lymphocytes, Body Fluid 59 %    Monocyte Count, Fluid 2 %   Lactate Dehydrogenase, Body Fluid    Collection Time: 10/23/20 11:30 AM   Result Value Ref Range    LD, Fluid 354 IU/L   Protein, Body Fluid    Collection Time: 10/23/20 11:30 AM   Result Value Ref Range    Protein, Fluid 4.2 GM/DL   Amylase, Body Fluid    Collection Time: 10/23/20 11:30 AM   Result Value Ref Range    Amylase, Fluid 13 U/L   Specific Gravity, Body Fluid    Collection Time: 10/23/20 11:30 AM   Result Value Ref Range    Fl.  Specific Gravity 1.027    EKG 12 Lead    Collection Time: 10/23/20  7:34 PM   Result Value Ref Range    Ventricular Rate 147 BPM    Atrial Rate 150 BPM    QRS Duration 142 ms    Q-T Interval 336 ms    QTc Calculation (Bazett) 525 ms    R Axis 157 degrees    T Axis -73 degrees    Diagnosis       Wide QRS tachycardia  Right bundle branch block  Left posterior fascicular block   Bifascicular block   Abnormal ECG  When compared with ECG of 23-OCT-2020 08:45,  Wide QRS tachycardia has replaced Atrial flutter     Comprehensive Metabolic Panel w/ Reflex to MG    Collection Time: 10/24/20  4:30 AM   Result Value Ref Range    Sodium 140 135 - 145 MMOL/L    Potassium 3.5 3.5 - 5.1 MMOL/L    Chloride 93 (L) 99 - 110 mMol/L    CO2 35 (H) 21 - 32 MMOL/L    BUN 5 (L) 6 - 23 MG/DL    CREATININE 0.6 0.6 - 1.1 MG/DL    Glucose 78 70 - 99 MG/DL    Calcium 8.6 8.3 - 10.6 MG/DL    Alb 3.0 (L) 3.4 - 5.0 GM/DL    Total Protein 5.3 (L) 6.4 - 8.2 GM/DL    Total Bilirubin 0.3 0.0 - 1.0 MG/DL     (H) 10 - 40 U/L    AST 98 (H) 15 - 37 IU/L    Alkaline Phosphatase 123 40 - 129 IU/L    GFR Non-African American >60 >60 mL/min/1.73m2    GFR African American >60 >60 mL/min/1.73m2    Anion Gap 12 4 - 16   CBC Auto Differential    Collection Time: 10/24/20  4:30 AM   Result Value Ref Range    WBC 12.5 (H) 4.0 - 10.5 K/CU MM    RBC 3.23 (L) 4.2 - 5.4 M/CU MM    Hemoglobin 9.3 (L) 12.5 - 16.0 GM/DL    Hematocrit 32.2 (L) 37 - 47 %    MCV 99.7 78 - 100 FL    MCH 28.8 27 - 31 PG    MCHC 28.9 (L) 32.0 - 36.0 %    RDW 13.7 11.7 - 14.9 %    Platelets 245 412 - 885 K/CU MM    MPV 9.9 7.5 - 11.1 FL    Differential Type AUTOMATED DIFFERENTIAL     Segs Relative 81.5 (H) 36 - 66 %    Lymphocytes % 6.6 (L) 24 - 44 %    Monocytes % 10.8 (H) 0 - 4 %    Eosinophils % 0.3 0 - 3 %    Basophils % 0.2 0 - 1 %    Segs Absolute 10.2 K/CU MM    Lymphocytes Absolute 0.8 K/CU MM    Monocytes Absolute 1.4 K/CU MM    Eosinophils Absolute 0.0 K/CU MM    Basophils Absolute 0.0 K/CU MM    Nucleated RBC % 0.0 %    Total Nucleated RBC 0.0 K/CU MM    Total Immature Neutrophil 0.07 K/CU MM    Immature Neutrophil % 0.6 (H) 0 - 0.43 %   Hepatic function panel    Collection Time: 10/24/20  4:30 AM   Result Value Ref Range    Alb 3.0 (L) 3.4 - 5.0 GM/DL    Total Bilirubin 0.3 0.0 - 1.0 MG/DL    Bilirubin, Direct 0.2 0.0 - 0.3 MG/DL    Bilirubin, Indirect 0.1 0 - 0.7 MG/DL    Alkaline Phosphatase 123 40 - 129 IU/L    AST 98 (H) 15 - 37 IU/L     (H) 10 - 40 U/L    Total Protein 5.3 (L) 6.4 - 8.2 GM/DL   Lipase    Collection Time: 10/24/20  4:30 AM   Result Value Ref Range    Lipase 9 (L) 13 - 60 IU/L       Scheduled Meds:   lubiprostone  24 mcg Oral BID WC    amiodarone  400 mg Oral BID    amLODIPine  10 mg Oral Daily    cefepime  2 g Intravenous Q12H    pantoprazole  40 mg Intravenous Daily    albuterol sulfate HFA  2 puff Inhalation Q4H    ipratropium  2 puff Inhalation Q4H    aspirin  81 mg Oral Daily    baclofen  10 mg Oral TID    budesonide-formoterol  2 puff Inhalation BID    busPIRone  10 mg Oral TID    DULoxetine  60 mg Oral Daily    ferrous gluconate  324 mg Oral BID    fluticasone  2 spray Nasal Daily    folic acid  1 mg Oral Daily    [Held by provider] hydrALAZINE  10 mg Oral TID    levothyroxine  100 mcg Oral Daily    metoprolol succinate  25 mg Oral Daily    montelukast  10 mg Oral Daily    [Held by provider] theophylline  400 mg Oral Daily    traZODone  100 mg Oral Nightly    thiamine  100 mg Oral Daily    sodium chloride flush  10 mL Intravenous 2 times per day    [Held by provider] enoxaparin  40 mg Subcutaneous Daily       Continuous Infusions:   diltiazem (CARDIZEM) 100 mg in dextrose 5% 100 mL (ADD-[place])      sodium chloride 100 mL/hr at 10/24/20 0646       Physical Exam:  HEENT: Anicteric sclerae, Oropharyngeal mucosae moist, pink and intact. Heart:  Normal S1 and S2, RRR  Lungs: Clear to auscultation bilaterally, No audible Wheezes or Rales. Extremities: No edema. Neuro: Alert and Oriented x 3, Non focal.  Abdomen: Soft, Benign, RUQ tender, and positive Soto sign, Non distended, Positive bowel sounds. Principal Problem:    Sepsis ([address]  Active Problems:    PNA (pneumonia)    Pericardial effusion  Resolved Problems:    * No resolved hospital problems. *      Assessment and Plan:  Raul Monroe is a 64 y.o. female with Workup revealed acute acalculous cholecystitis, LFTs improving, needed pericardiocentesis, successfully performed yesterday, will follow her clinically, and follow her LFTs, and proceed with Lap cholecystectomy it is needed, when stable and cardiology clears her. Anesthesia d/w cardiology, and wanting to wait 24 hrs to better control her tachycardia before surgery, will proceed in am.    Increase Ambulation to at least 4x/day walk in the hallways with assistance. Respiratory ingrid-operative care: Incentive Spirometry / deep breathing and coughing 10x/hr while awake. Continue DVT prophylaxis with Teds and SCDs and SC Lovenox. Continue GI prophylaxis with Protonix IV till able to tolerate PO.   Continue ingrid-op Abx for 24 hrs after surgery then dc.    ___________________________________________    Madalyn Bowie MD, FACS, FICS  10/24/2020  10:32 AM

## 2020-10-24 NOTE — PROGRESS NOTES
Hospitalist Progress Note      Name:  Juan Ramon Escobedo /Age/Sex: 1960  (64 y.o. female)   MRN & CSN:  [id number] & [id number] Admission Date/Time: 10/19/2020 10:07 PM   Location:  -A PCP: Korey Parr MD       Juan Ramon Escobedo is a 64 y.o.  female  who presents with Cough; Headache; and Chills      Assessment and Plan:   Sepsis likely from pneumonia versus cholecystitis  - Ultrasound abdomen showed signs of acute calculus cholecystitis, there is edema of the gallbladder could be coming from congestion.  - On cefepime   - BCx: 10/21: NGTD. - surgery consulted and plan for lap afshin tomorrow once HR stabilized      Pericardial effusion, Concern for early tamponade  - s/p pericardiocentesis and removed 500 mL, patient has a pericardial drain.  - CTA abdomen showed moderate pericardial effusion  - Echo ef 50-55%, Mild to mod pericardial effusion  - cardiology following     A. fib with RVR  - Likely due to pericardial effusion.   - On metoprolol. - cardizem gtt  - cardio consulted  - hold [facility] for now as going to OR tomorrow     Transaminitis  - Concern for liver congestion versus hepatitis  - GI following. LFTs improving. Acute hepatitis panel negative. - Abdominal ultrasound suggestive of possible acalculous cholecystitis. General surgery consult.     Hypoxia  -Likely multifactorial with sepsis, pericardial effusion, and A. fib with RVR. wean NC O2 as tolerated     LUZ MARIA-resolved  - Likely from prerenal       Hypertension  Hypothyroidism  Anemia of chronic disorders  Chronic back pain  Anxiety              Diet DIET LOW FAT;   Diet NPO, After Midnight   Code Status Full Code     Medications:   Medications:    lubiprostone  24 mcg Oral BID WC    amiodarone  400 mg Oral BID    amLODIPine  10 mg Oral Daily    cefepime  2 g Intravenous Q12H    pantoprazole  40 mg Intravenous Daily    albuterol sulfate HFA  2 puff Inhalation Q4H    ipratropium  2 puff Inhalation Q4H    aspirin Labs     10/22/20  1123 10/23/20  0016 10/24/20  0430   WBC 25.5* 17.1* 12.5*   HGB 9.4* 8.0* 9.3*   HCT 33.0* 27.7* 32.2*    349 385      BMP   Recent Labs     10/22/20  1123 10/23/20  0016 10/24/20  0430   * 137 140   K 4.9 4.3 3.5   CL 88* 94* 93*   CO2 32 32 35*   BUN 13 11 5*   CREATININE 0.9 0.7 0.6         Electronically signed by Jaqui Hernandez MD on 10/24/2020 at 10:51 AM

## 2020-10-24 NOTE — PROGRESS NOTES
[facility] - [address] [place] [address]       Progress Note       10/24/2020  9:53 AM    Patient:    Milda Buerger  : 1960   64 y.o.              MRN: [id number]  Admitted: 10/19/2020 10:07 PM ATT: Julien Salvador MD   -A  AdmitDx: Pericardial effusion [I31.3]  PNA (pneumonia) [J18.9]  Sepsis due to pneumonia ([address] [J18.9, A41.9]  PNA (pneumonia) [J18.9]  PCP: Margaretta Gowers, MD    ASSESSMENT AND PLAN :  Improving  Planning cholecystectomy    Patient Active Problem List   Diagnosis Code    Centrilobular emphysema ([address] J43.2    Hypertension I10    Hyperlipidemia LDL goal <100 E78.5    Abnormal EKG R94.31    Palpitations R00.2    Anxiety F41.9    FH: CAD (coronary artery disease) Z82.49    Fibromyalgia M79.7    Back pain at L4-L5 level M54.5    Sleep apnea G47.30    COPD exacerbation ([address] J44.1    Electrolyte imbalance E87.8    Epigastric pain R10.13    COPD (chronic obstructive pulmonary disease) ([address] J44.9    Spinal stenosis of lumbar region with radiculopathy M48.061, M54.16    Spinal stenosis, lumbar region, without neurogenic claudication M48.061    COPD with acute exacerbation ([address] J44.1    Pneumonia due to infectious organism J18.9    SVT (supraventricular tachycardia) (HCC) I47.1    Class 1 obesity due to excess calories with body mass index (BMI) of 31.0 to 31.9 in adult E66.09, Z68.31    Pneumonia J18.9    Pleural effusion J90    Atelectasis J98.11    Pulmonary nodules R91.8    Respiratory failure with hypoxia and hypercapnia (HCC) J96.91, J96.92    Anemia D64.9    COPD with exacerbation (HCC) J44.1    Acquired hemolytic anemia, unspecified (HCC) D59.9    Leucocytosis D72.829    Chronic kidney disease, stage I N18.1    Hyponatremia E87.1    PNA (pneumonia) J18.9    Sepsis (HCC) A41.9    Pericardial effusion I31.3         [facility]  10/24/2020  9:53 AM      SUBJECTIVE:  Chart reviewed, events noted  Patient still in pain  Having BM. Tolerating NPO diet. No no N/V. Having  abdominal pain. ROS:  Cardiovascular ROS: No chest pain  Gastrointestinal ROS: see above  Respiratory ROS: No SOB    OBJECTIVE:      BP (!) 144/84   Pulse 145   Temp 97.4 °F (36.3 °C) (Oral)   Resp 22   Ht 5' 2\" (1.575 m)   Wt 186 lb 1.1 oz (84.4 kg)   SpO2 98%   BMI 34.03 kg/m²     NAD  RRR, Nl s1s2  Lungs CTA Bilaterally, normal effort  Abdomen soft, tender upper abd     CBC:   Recent Labs     10/22/20  1123 10/23/20  0016 10/24/20  0430   WBC 25.5* 17.1* 12.5*   HGB 9.4* 8.0* 9.3*    349 385     BMP:    Recent Labs     10/22/20  1123 10/23/20  0016 10/24/20  0430   * 137 140   K 4.9 4.3 3.5   CL 88* 94* 93*   CO2 32 32 35*   BUN 13 11 5*   CREATININE 0.9 0.7 0.6   GLUCOSE 101* 79 78     Magnesium:   Lab Results   Component Value Date    MG 2.2 07/14/2020     Hepatic:   Recent Labs     10/22/20  1123 10/23/20  0016 10/24/20  0430   * 255* 98*  98*   * 523* 352*  352*   BILITOT 0.3 0.2 0.3  0.3   ALKPHOS 111 99 123  123     INR:   Recent Labs     10/23/20  0016   INR 1.29         Intake/Output Summary (Last 24 hours) at 10/24/2020 7289  Last data filed at 10/24/2020 0408  Gross per 24 hour   Intake 1478 ml   Output 2610 ml   Net -1132 ml         Medications      Prior to Admission medications    Medication Sig Start Date End Date Taking?  Authorizing Provider   budesonide-formoterol (SYMBICORT) 160-4.5 MCG/ACT AERO USE 2 INHALATIONS TWICE A DAY 10/2/20   Max Irby MD   MIAH-24 400 MG extended release capsule TAKE 1 CAPSULE DAILY 9/14/20   Max Irby MD   dilTIAZem (CARDIZEM CD) 240 MG extended release capsule Take 1 capsule by mouth daily 7/30/20   BRAIN Celaya - CNP   ipratropium-albuterol (DUONEB) 0.5-2.5 (3) MG/3ML SOLN nebulizer solution Inhale 3 mLs into the lungs every 4 hours 7/21/20   Zbigniew Mcneil MD   guaiFENesin (MUCINEX) 600 MG extended release tablet Medications:    lubiprostone  24 mcg Oral BID WC    amiodarone  400 mg Oral BID    amLODIPine  10 mg Oral Daily    cefepime  2 g Intravenous Q12H    pantoprazole  40 mg Intravenous Daily    albuterol sulfate HFA  2 puff Inhalation Q4H    ipratropium  2 puff Inhalation Q4H    aspirin  81 mg Oral Daily    baclofen  10 mg Oral TID    budesonide-formoterol  2 puff Inhalation BID    busPIRone  10 mg Oral TID    DULoxetine  60 mg Oral Daily    ferrous gluconate  324 mg Oral BID    fluticasone  2 spray Nasal Daily    folic acid  1 mg Oral Daily    [Held by provider] hydrALAZINE  10 mg Oral TID    levothyroxine  100 mcg Oral Daily    metoprolol succinate  25 mg Oral Daily    montelukast  10 mg Oral Daily    [Held by provider] theophylline  400 mg Oral Daily    traZODone  100 mg Oral Nightly    thiamine  100 mg Oral Daily    sodium chloride flush  10 mL Intravenous 2 times per day    [Held by provider] enoxaparin  40 mg Subcutaneous Daily     Infusions:    diltiazem (CARDIZEM) 100 mg in dextrose 5% 100 mL (ADD-[place])      sodium chloride 100 mL/hr at 10/24/20 0646     PRN Medications: morphine, LORazepam, clonazePAM, sodium chloride flush, acetaminophen **OR** acetaminophen, polyethylene glycol, promethazine **OR** ondansetron, oxyCODONE-acetaminophen, guaiFENesin-dextromethorphan  Allergies:    Allergies   Allergen Reactions    Lisinopril Swelling and Rash     angioedema

## 2020-10-24 NOTE — PROGRESS NOTES
1040 surgery cancelled and rescheduled for tomorrow per Dr Quiros Shoulder patient may have a low fat diet and NPO after midnight.    18 Dr Gallagher [place] in to visit order at bedside order received to discontinue Cardizem drip   1700 resting quietly no signs of distress noted

## 2020-10-24 NOTE — PROGRESS NOTES
Today's plan:  Dig 250mcg IVP Q6HRS X 2 doses, cardizem 10mg IVP x1 and oral amiodarone 400mg bid for aflutter, continue IV fluids      Admit Date:  10/19/2020    Subjective:      Chief complaints on admission  Chief Complaint   Patient presents with    Cough    Headache    Chills         History of present illness:Nelly is a 64 y. o.year old who  presents with had concerns including Cough; Headache; and Chills. Past medical history:    has a past medical history of Anxiety, Arthritis, Back pain at L4-L5 level, Bipolar 1 disorder ([address] COPD (chronic obstructive pulmonary disease) ([facility] [address] Emphysema of lung ([facility] [address] FH: CAD (coronary artery disease), Fibromyalgia, H/O Doppler ultrasound, H/O echocardiogram, History of blood transfusion, Hyperlipidemia LDL goal <100, Hypertension, Obstructive sleep apnea, Osteoarthritis, and Thyroid disease. Past surgical history:   has a past surgical history that includes Carpal tunnel release; Tubal ligation; back surgery; Cardiac catheterization; and Colonoscopy (N/A, 12/12/2019). Social History:   reports that she quit smoking about 12 years ago. She has a 30.00 pack-year smoking history. She has never used smokeless tobacco. She reports previous alcohol use of about 2.0 standard drinks of alcohol per week. She reports that she does not use drugs. Family history:  family history includes No Known Problems in her father and mother.     Allergies   Allergen Reactions    Lisinopril Swelling and Rash     angioedema         Objective:   BP (!) 144/84   Pulse 145   Temp 97.4 °F (36.3 °C) (Oral)   Resp 22   Ht 5' 2\" (1.575 m)   Wt 186 lb 1.1 oz (84.4 kg)   SpO2 98%   BMI 34.03 kg/m²       Intake/Output Summary (Last 24 hours) at 10/24/2020 1129  Last data filed at 10/24/2020 0800  Gross per 24 hour   Intake 1478 ml   Output 2610 ml   Net -1132 ml       TELEMETRY:aflutter    Physical Exam:  Constitutional:  Well developed, Well nourished, No acute distress, Non-toxic appearance. HENT:  Normocephalic, Atraumatic, Bilateral external ears normal, Oropharynx moist, No oral exudates, Nose normal. Neck- Normal range of motion, No tenderness, Supple, No stridor. Eyes:  PERRL, EOMI, Conjunctiva normal, No discharge. Respiratory:  Normal breath sounds, No respiratory distress, No wheezing, No chest tenderness. ,no use of accessory muscles, diaphragm movement is normal  Cardiovascular: (PMI) apex non displaced,no lifts no thrills, no s3,no s4, Normal heart rate, Normal rhythm, No murmurs, No rubs, No gallops. Carotid arteries pulse and amplitude are normal no bruit, no abdominal bruit noted ( normal abdominal aorta ausculation), femoral arteries pulse and amplitude are normal no bruit, pedal pulses are normal  GI:  Bowel sounds normal, Soft, No tenderness, No masses, No pulsatile masses. : External genitalia appear normal, No masses or lesions. No discharge. No CVA tenderness. Musculoskeletal:  Intact distal pulses, No edema, No tenderness, No cyanosis, No clubbing. Good range of motion in all major joints. No tenderness to palpation or major deformities noted. Back- No tenderness. Integument:  Warm, Dry, No erythema, No rash. Lymphatic:  No lymphadenopathy noted. Neurologic:  Alert & oriented x 3, Normal motor function, Normal sensory function, No focal deficits noted.    Psychiatric:  Affect normal, Judgment normal, Mood normal.     Medications:    digoxin  250 mcg Intravenous Q6H    dilTIAZem  10 mg Intravenous Once    lubiprostone  24 mcg Oral BID WC    amiodarone  400 mg Oral BID    amLODIPine  10 mg Oral Daily    cefepime  2 g Intravenous Q12H    pantoprazole  40 mg Intravenous Daily    albuterol sulfate HFA  2 puff Inhalation Q4H    ipratropium  2 puff Inhalation Q4H    aspirin  81 mg Oral Daily    baclofen  10 mg Oral TID    budesonide-formoterol  2 puff Inhalation BID    busPIRone  10 mg Oral TID    DULoxetine  60 mg Oral Daily    ferrous gluconate 324 mg Oral BID    fluticasone  2 spray Nasal Daily    folic acid  1 mg Oral Daily    [Held by provider] hydrALAZINE  10 mg Oral TID    levothyroxine  100 mcg Oral Daily    metoprolol succinate  25 mg Oral Daily    montelukast  10 mg Oral Daily    [Held by provider] theophylline  400 mg Oral Daily    traZODone  100 mg Oral Nightly    thiamine  100 mg Oral Daily    sodium chloride flush  10 mL Intravenous 2 times per day    [Held by provider] enoxaparin  40 mg Subcutaneous Daily      diltiazem (CARDIZEM) 100 mg in dextrose 5% 100 mL ([facility])      sodium chloride 100 mL/hr at 10/24/20 0646     morphine, LORazepam, clonazePAM, sodium chloride flush, acetaminophen **OR** acetaminophen, polyethylene glycol, promethazine **OR** ondansetron, oxyCODONE-acetaminophen, guaiFENesin-dextromethorphan    Lab Data:  CBC:   Recent Labs     10/22/20  1123 10/23/20  0016 10/24/20  0430   WBC 25.5* 17.1* 12.5*   HGB 9.4* 8.0* 9.3*   HCT 33.0* 27.7* 32.2*   MCV 98.5 97.5 99.7    349 385     BMP:   Recent Labs     10/22/20  1123 10/23/20  0016 10/24/20  0430   * 137 140   K 4.9 4.3 3.5   CL 88* 94* 93*   CO2 32 32 35*   BUN 13 11 5*   CREATININE 0.9 0.7 0.6     LIVER PROFILE:   Recent Labs     10/22/20  1123 10/23/20  0016 10/24/20  0430   * 255* 98*  98*   * 523* 352*  352*   LIPASE  --   --  9*   BILIDIR  --   --  0.2   BILITOT 0.3 0.2 0.3  0.3   ALKPHOS 111 99 123  123     PT/INR:   Recent Labs     10/23/20  0016   PROTIME 15.7*   INR 1.29     APTT: No results for input(s): APTT in the last 72 hours. BNP:  No results for input(s): BNP in the last 72 hours. TROPONIN: @TROPONINI:3@      Assessment:  64 y. o.year old who is admitted for          Plan:  A/p pericardiocentesis;' cxr ordered, it was 10cc drain last night, will dc drain today if no more drainage is seen and CXR does not show enlarged pericardium  Preop: discussed with anesthesia, will try to optimize heart rate before sx  Aflutter: as above and sheri need anticoagulation, add lovenox full dose after pericardial drain comes out. All labs, medications and tests reviewed, continue all other medications of all above medical condition listed as is.       Annemarie Godoy MD 10/24/2020 11:29 AM

## 2020-10-25 ENCOUNTER — APPOINTMENT (OUTPATIENT)
Dept: GENERAL RADIOLOGY | Age: 60
DRG: 853 | End: 2020-10-25
Payer: COMMERCIAL

## 2020-10-25 VITALS
TEMPERATURE: 98.6 F | SYSTOLIC BLOOD PRESSURE: 123 MMHG | OXYGEN SATURATION: 96 % | RESPIRATION RATE: 12 BRPM | DIASTOLIC BLOOD PRESSURE: 61 MMHG

## 2020-10-25 LAB
ALBUMIN SERPL-MCNC: 2.8 GM/DL (ref 3.4–5)
ALP BLD-CCNC: 105 IU/L (ref 40–128)
ALT SERPL-CCNC: 260 U/L (ref 10–40)
ANION GAP SERPL CALCULATED.3IONS-SCNC: 6 MMOL/L (ref 4–16)
ANTI-MITOCHON TITER: 2.9 UNITS (ref 0–24.9)
AST SERPL-CCNC: 71 IU/L (ref 15–37)
BASE EXCESS MIXED: 7.7 (ref 0–2.3)
BASE EXCESS MIXED: 8 (ref 0–2.3)
BASOPHILS ABSOLUTE: 0 K/CU MM
BASOPHILS RELATIVE PERCENT: 0.4 % (ref 0–1)
BILIRUB SERPL-MCNC: 0.3 MG/DL (ref 0–1)
BUN BLDV-MCNC: 5 MG/DL (ref 6–23)
CALCIUM SERPL-MCNC: 8.6 MG/DL (ref 8.3–10.6)
CARBON MONOXIDE, BLOOD: 2 % (ref 0–5)
CARBON MONOXIDE, BLOOD: 2.6 % (ref 0–5)
CERULOPLASMIN: 32 MG/DL (ref 17–54)
CHLORIDE BLD-SCNC: 96 MMOL/L (ref 99–110)
CO2 CONTENT: 39.7 MMOL/L (ref 19–24)
CO2 CONTENT: 41.4 MMOL/L (ref 19–24)
CO2: 37 MMOL/L (ref 21–32)
COMMENT: ABNORMAL
COMMENT: ABNORMAL
CREAT SERPL-MCNC: 0.7 MG/DL (ref 0.6–1.1)
CULTURE: NORMAL
CULTURE: NORMAL
DIFFERENTIAL TYPE: ABNORMAL
EKG ATRIAL RATE: 150 BPM
EKG DIAGNOSIS: NORMAL
EKG Q-T INTERVAL: 336 MS
EKG QRS DURATION: 142 MS
EKG QTC CALCULATION (BAZETT): 525 MS
EKG R AXIS: 157 DEGREES
EKG T AXIS: -73 DEGREES
EKG VENTRICULAR RATE: 147 BPM
EOSINOPHILS ABSOLUTE: 0.1 K/CU MM
EOSINOPHILS RELATIVE PERCENT: 1.1 % (ref 0–3)
GFR AFRICAN AMERICAN: >60 ML/MIN/1.73M2
GFR NON-AFRICAN AMERICAN: >60 ML/MIN/1.73M2
GLUCOSE BLD-MCNC: 117 MG/DL (ref 70–99)
HCO3 ARTERIAL: 37.4 MMOL/L (ref 18–23)
HCO3 ARTERIAL: 38.6 MMOL/L (ref 18–23)
HCT VFR BLD CALC: 31.8 % (ref 37–47)
HEMOGLOBIN: 9.2 GM/DL (ref 12.5–16)
IMMATURE NEUTROPHIL %: 0.9 % (ref 0–0.43)
LYMPHOCYTES ABSOLUTE: 0.9 K/CU MM
LYMPHOCYTES RELATIVE PERCENT: 8.4 % (ref 24–44)
Lab: NORMAL
Lab: NORMAL
MAGNESIUM: 1.7 MG/DL (ref 1.8–2.4)
MCH RBC QN AUTO: 29 PG (ref 27–31)
MCHC RBC AUTO-ENTMCNC: 28.9 % (ref 32–36)
MCV RBC AUTO: 100.3 FL (ref 78–100)
METHEMOGLOBIN ARTERIAL: 1.1 %
METHEMOGLOBIN ARTERIAL: 1.7 %
MONOCYTES ABSOLUTE: 1.2 K/CU MM
MONOCYTES RELATIVE PERCENT: 11.9 % (ref 0–4)
NUCLEATED RBC %: 0.2 %
O2 SATURATION: 89.8 % (ref 96–97)
O2 SATURATION: 94.3 % (ref 96–97)
PCO2 ARTERIAL: 76 MMHG (ref 32–45)
PCO2 ARTERIAL: 90 MMHG (ref 32–45)
PDW BLD-RTO: 13.7 % (ref 11.7–14.9)
PH BLOOD: 7.24 (ref 7.34–7.45)
PH BLOOD: 7.3 (ref 7.34–7.45)
PLATELET # BLD: 355 K/CU MM (ref 140–440)
PMV BLD AUTO: 9.4 FL (ref 7.5–11.1)
PO2 ARTERIAL: 60 MMHG (ref 75–100)
PO2 ARTERIAL: 85 MMHG (ref 75–100)
POTASSIUM SERPL-SCNC: 3.4 MMOL/L (ref 3.5–5.1)
RBC # BLD: 3.17 M/CU MM (ref 4.2–5.4)
SEGMENTED NEUTROPHILS ABSOLUTE COUNT: 8.1 K/CU MM
SEGMENTED NEUTROPHILS RELATIVE PERCENT: 77.3 % (ref 36–66)
SODIUM BLD-SCNC: 139 MMOL/L (ref 135–145)
SPECIMEN: NORMAL
SPECIMEN: NORMAL
TOTAL IMMATURE NEUTOROPHIL: 0.09 K/CU MM
TOTAL NUCLEATED RBC: 0 K/CU MM
TOTAL PROTEIN: 4.9 GM/DL (ref 6.4–8.2)
WBC # BLD: 10.5 K/CU MM (ref 4–10.5)

## 2020-10-25 PROCEDURE — 2500000003 HC RX 250 WO HCPCS: Performed by: NURSE ANESTHETIST, CERTIFIED REGISTERED

## 2020-10-25 PROCEDURE — 3600000014 HC SURGERY LEVEL 4 ADDTL 15MIN: Performed by: SURGERY

## 2020-10-25 PROCEDURE — 71045 X-RAY EXAM CHEST 1 VIEW: CPT

## 2020-10-25 PROCEDURE — 94640 AIRWAY INHALATION TREATMENT: CPT

## 2020-10-25 PROCEDURE — 36415 COLL VENOUS BLD VENIPUNCTURE: CPT

## 2020-10-25 PROCEDURE — 80053 COMPREHEN METABOLIC PANEL: CPT

## 2020-10-25 PROCEDURE — 94761 N-INVAS EAR/PLS OXIMETRY MLT: CPT

## 2020-10-25 PROCEDURE — 6360000004 HC RX CONTRAST MEDICATION: Performed by: SURGERY

## 2020-10-25 PROCEDURE — 99232 SBSQ HOSP IP/OBS MODERATE 35: CPT | Performed by: INTERNAL MEDICINE

## 2020-10-25 PROCEDURE — C9113 INJ PANTOPRAZOLE SODIUM, VIA: HCPCS | Performed by: NURSE PRACTITIONER

## 2020-10-25 PROCEDURE — 6370000000 HC RX 637 (ALT 250 FOR IP): Performed by: SURGERY

## 2020-10-25 PROCEDURE — 82150 ASSAY OF AMYLASE: CPT

## 2020-10-25 PROCEDURE — 3700000001 HC ADD 15 MINUTES (ANESTHESIA): Performed by: SURGERY

## 2020-10-25 PROCEDURE — 0FT44ZZ RESECTION OF GALLBLADDER, PERCUTANEOUS ENDOSCOPIC APPROACH: ICD-10-PCS | Performed by: SURGERY

## 2020-10-25 PROCEDURE — 36600 WITHDRAWAL OF ARTERIAL BLOOD: CPT

## 2020-10-25 PROCEDURE — 2000000000 HC ICU R&B

## 2020-10-25 PROCEDURE — 94660 CPAP INITIATION&MGMT: CPT

## 2020-10-25 PROCEDURE — 3600000004 HC SURGERY LEVEL 4 BASE: Performed by: SURGERY

## 2020-10-25 PROCEDURE — 2720000010 HC SURG SUPPLY STERILE: Performed by: SURGERY

## 2020-10-25 PROCEDURE — 6360000002 HC RX W HCPCS: Performed by: NURSE ANESTHETIST, CERTIFIED REGISTERED

## 2020-10-25 PROCEDURE — 76000 FLUOROSCOPY <1 HR PHYS/QHP: CPT

## 2020-10-25 PROCEDURE — 82803 BLOOD GASES ANY COMBINATION: CPT

## 2020-10-25 PROCEDURE — 6370000000 HC RX 637 (ALT 250 FOR IP): Performed by: INTERNAL MEDICINE

## 2020-10-25 PROCEDURE — 2580000003 HC RX 258: Performed by: INTERNAL MEDICINE

## 2020-10-25 PROCEDURE — 2709999900 HC NON-CHARGEABLE SUPPLY: Performed by: SURGERY

## 2020-10-25 PROCEDURE — 47563 LAPARO CHOLECYSTECTOMY/GRAPH: CPT | Performed by: SURGERY

## 2020-10-25 PROCEDURE — 6360000002 HC RX W HCPCS: Performed by: SURGERY

## 2020-10-25 PROCEDURE — 83735 ASSAY OF MAGNESIUM: CPT

## 2020-10-25 PROCEDURE — 6360000002 HC RX W HCPCS: Performed by: NURSE PRACTITIONER

## 2020-10-25 PROCEDURE — BF121ZZ FLUOROSCOPY OF GALLBLADDER USING LOW OSMOLAR CONTRAST: ICD-10-PCS | Performed by: SURGERY

## 2020-10-25 PROCEDURE — 2580000003 HC RX 258: Performed by: SURGERY

## 2020-10-25 PROCEDURE — 2700000000 HC OXYGEN THERAPY PER DAY

## 2020-10-25 PROCEDURE — 99233 SBSQ HOSP IP/OBS HIGH 50: CPT | Performed by: SURGERY

## 2020-10-25 PROCEDURE — 2500000003 HC RX 250 WO HCPCS: Performed by: SURGERY

## 2020-10-25 PROCEDURE — 3700000000 HC ANESTHESIA ATTENDED CARE: Performed by: SURGERY

## 2020-10-25 PROCEDURE — 6360000002 HC RX W HCPCS: Performed by: INTERNAL MEDICINE

## 2020-10-25 PROCEDURE — 88304 TISSUE EXAM BY PATHOLOGIST: CPT

## 2020-10-25 PROCEDURE — C1894 INTRO/SHEATH, NON-LASER: HCPCS | Performed by: SURGERY

## 2020-10-25 PROCEDURE — 93010 ELECTROCARDIOGRAM REPORT: CPT | Performed by: INTERNAL MEDICINE

## 2020-10-25 PROCEDURE — 85025 COMPLETE CBC W/AUTO DIFF WBC: CPT

## 2020-10-25 PROCEDURE — 6370000000 HC RX 637 (ALT 250 FOR IP): Performed by: FAMILY MEDICINE

## 2020-10-25 RX ORDER — PROPOFOL 10 MG/ML
INJECTION, EMULSION INTRAVENOUS PRN
Status: DISCONTINUED | OUTPATIENT
Start: 2020-10-25 | End: 2020-10-25 | Stop reason: SDUPTHER

## 2020-10-25 RX ORDER — FENTANYL CITRATE 50 UG/ML
INJECTION, SOLUTION INTRAMUSCULAR; INTRAVENOUS PRN
Status: DISCONTINUED | OUTPATIENT
Start: 2020-10-25 | End: 2020-10-25 | Stop reason: SDUPTHER

## 2020-10-25 RX ORDER — LIDOCAINE HYDROCHLORIDE 20 MG/ML
INJECTION, SOLUTION INTRAVENOUS PRN
Status: DISCONTINUED | OUTPATIENT
Start: 2020-10-25 | End: 2020-10-25 | Stop reason: SDUPTHER

## 2020-10-25 RX ORDER — ROCURONIUM BROMIDE 10 MG/ML
INJECTION, SOLUTION INTRAVENOUS PRN
Status: DISCONTINUED | OUTPATIENT
Start: 2020-10-25 | End: 2020-10-25 | Stop reason: SDUPTHER

## 2020-10-25 RX ORDER — KETAMINE HYDROCHLORIDE 10 MG/ML
INJECTION, SOLUTION INTRAMUSCULAR; INTRAVENOUS PRN
Status: DISCONTINUED | OUTPATIENT
Start: 2020-10-25 | End: 2020-10-25 | Stop reason: SDUPTHER

## 2020-10-25 RX ORDER — DEXAMETHASONE SODIUM PHOSPHATE 4 MG/ML
INJECTION, SOLUTION INTRA-ARTICULAR; INTRALESIONAL; INTRAMUSCULAR; INTRAVENOUS; SOFT TISSUE PRN
Status: DISCONTINUED | OUTPATIENT
Start: 2020-10-25 | End: 2020-10-25 | Stop reason: SDUPTHER

## 2020-10-25 RX ORDER — SODIUM CHLORIDE 9 MG/ML
INJECTION, SOLUTION INTRAVENOUS CONTINUOUS
Status: DISCONTINUED | OUTPATIENT
Start: 2020-10-25 | End: 2020-10-26

## 2020-10-25 RX ORDER — ONDANSETRON 2 MG/ML
INJECTION INTRAMUSCULAR; INTRAVENOUS PRN
Status: DISCONTINUED | OUTPATIENT
Start: 2020-10-25 | End: 2020-10-25 | Stop reason: SDUPTHER

## 2020-10-25 RX ADMIN — LIDOCAINE HYDROCHLORIDE 100 MG: 20 INJECTION, SOLUTION INTRAVENOUS at 13:14

## 2020-10-25 RX ADMIN — PANTOPRAZOLE SODIUM 40 MG: 40 INJECTION, POWDER, FOR SOLUTION INTRAVENOUS at 07:23

## 2020-10-25 RX ADMIN — SODIUM CHLORIDE: 9 INJECTION, SOLUTION INTRAVENOUS at 11:38

## 2020-10-25 RX ADMIN — Medication 2 PUFF: at 08:56

## 2020-10-25 RX ADMIN — CEFEPIME 2 G: 2 INJECTION, POWDER, FOR SOLUTION INTRAVENOUS at 03:15

## 2020-10-25 RX ADMIN — ROCURONIUM BROMIDE 50 MG: 10 INJECTION INTRAVENOUS at 13:14

## 2020-10-25 RX ADMIN — BUSPIRONE HYDROCHLORIDE 10 MG: 10 TABLET ORAL at 20:29

## 2020-10-25 RX ADMIN — KETAMINE HYDROCHLORIDE 20 MG: 10 INJECTION INTRAMUSCULAR; INTRAVENOUS at 13:31

## 2020-10-25 RX ADMIN — FENTANYL CITRATE 50 MCG: 50 INJECTION INTRAMUSCULAR; INTRAVENOUS at 13:14

## 2020-10-25 RX ADMIN — LORAZEPAM 0.5 MG: 0.5 TABLET ORAL at 03:15

## 2020-10-25 RX ADMIN — SUGAMMADEX 200 MG: 100 INJECTION, SOLUTION INTRAVENOUS at 13:59

## 2020-10-25 RX ADMIN — SODIUM CHLORIDE: 9 INJECTION, SOLUTION INTRAVENOUS at 19:28

## 2020-10-25 RX ADMIN — DEXAMETHASONE SODIUM PHOSPHATE 8 MG: 4 INJECTION, SOLUTION INTRAMUSCULAR; INTRAVENOUS at 13:14

## 2020-10-25 RX ADMIN — ALBUTEROL SULFATE 2 PUFF: 90 AEROSOL, METERED RESPIRATORY (INHALATION) at 08:55

## 2020-10-25 RX ADMIN — FERROUS GLUCONATE TAB 324 MG (37.5 MG ELEMENTAL IRON) 324 MG: 324 (37.5 FE) TAB at 20:19

## 2020-10-25 RX ADMIN — MORPHINE SULFATE 2 MG: 2 INJECTION, SOLUTION INTRAMUSCULAR; INTRAVENOUS at 15:22

## 2020-10-25 RX ADMIN — SODIUM CHLORIDE: 9 INJECTION, SOLUTION INTRAVENOUS at 03:15

## 2020-10-25 RX ADMIN — AMIODARONE HYDROCHLORIDE 400 MG: 200 TABLET ORAL at 20:19

## 2020-10-25 RX ADMIN — ALBUTEROL SULFATE 2 PUFF: 90 AEROSOL, METERED RESPIRATORY (INHALATION) at 16:05

## 2020-10-25 RX ADMIN — KETAMINE HYDROCHLORIDE 30 MG: 10 INJECTION INTRAMUSCULAR; INTRAVENOUS at 13:14

## 2020-10-25 RX ADMIN — CEFEPIME 2 G: 2 INJECTION, POWDER, FOR SOLUTION INTRAVENOUS at 15:22

## 2020-10-25 RX ADMIN — FENTANYL CITRATE 50 MCG: 50 INJECTION INTRAMUSCULAR; INTRAVENOUS at 13:26

## 2020-10-25 RX ADMIN — Medication 2 PUFF: at 21:43

## 2020-10-25 RX ADMIN — BACLOFEN 10 MG: 10 TABLET ORAL at 20:19

## 2020-10-25 RX ADMIN — OXYCODONE HYDROCHLORIDE AND ACETAMINOPHEN 1 TABLET: 5; 325 TABLET ORAL at 18:05

## 2020-10-25 RX ADMIN — ALBUTEROL SULFATE 2 PUFF: 90 AEROSOL, METERED RESPIRATORY (INHALATION) at 21:42

## 2020-10-25 RX ADMIN — LUBIPROSTONE 24 MCG: 24 CAPSULE, GELATIN COATED ORAL at 15:22

## 2020-10-25 RX ADMIN — Medication 2 PUFF: at 16:05

## 2020-10-25 RX ADMIN — BUDESONIDE AND FORMOTEROL FUMARATE DIHYDRATE 2 PUFF: 160; 4.5 AEROSOL RESPIRATORY (INHALATION) at 21:43

## 2020-10-25 RX ADMIN — BUDESONIDE AND FORMOTEROL FUMARATE DIHYDRATE 2 PUFF: 160; 4.5 AEROSOL RESPIRATORY (INHALATION) at 08:57

## 2020-10-25 RX ADMIN — ONDANSETRON 4 MG: 2 INJECTION INTRAMUSCULAR; INTRAVENOUS at 13:14

## 2020-10-25 RX ADMIN — TRAZODONE HYDROCHLORIDE 100 MG: 50 TABLET ORAL at 20:19

## 2020-10-25 RX ADMIN — PROPOFOL 130 MG: 10 INJECTION, EMULSION INTRAVENOUS at 13:14

## 2020-10-25 ASSESSMENT — PULMONARY FUNCTION TESTS
PIF_VALUE: 25
PIF_VALUE: 25
PIF_VALUE: 8
PIF_VALUE: 27
PIF_VALUE: 35
PIF_VALUE: 35
PIF_VALUE: 33
PIF_VALUE: 1
PIF_VALUE: 35
PIF_VALUE: 8
PIF_VALUE: 26
PIF_VALUE: 32
PIF_VALUE: 8
PIF_VALUE: 35
PIF_VALUE: 26
PIF_VALUE: 26
PIF_VALUE: 20
PIF_VALUE: 0
PIF_VALUE: 35
PIF_VALUE: 24
PIF_VALUE: 0
PIF_VALUE: 35
PIF_VALUE: 30
PIF_VALUE: 35
PIF_VALUE: 0
PIF_VALUE: 32
PIF_VALUE: 5
PIF_VALUE: 4
PIF_VALUE: 32
PIF_VALUE: 18
PIF_VALUE: 20
PIF_VALUE: 24
PIF_VALUE: 8
PIF_VALUE: 33
PIF_VALUE: 31
PIF_VALUE: 33
PIF_VALUE: 4
PIF_VALUE: 25
PIF_VALUE: 25
PIF_VALUE: 13
PIF_VALUE: 35
PIF_VALUE: 32
PIF_VALUE: 35
PIF_VALUE: 35
PIF_VALUE: 0
PIF_VALUE: 9
PIF_VALUE: 35
PIF_VALUE: 31
PIF_VALUE: 0
PIF_VALUE: 24
PIF_VALUE: 20
PIF_VALUE: 30
PIF_VALUE: 22
PIF_VALUE: 30
PIF_VALUE: 35
PIF_VALUE: 3
PIF_VALUE: 35
PIF_VALUE: 0
PIF_VALUE: 35
PIF_VALUE: 33
PIF_VALUE: 35
PIF_VALUE: 8
PIF_VALUE: 35
PIF_VALUE: 32
PIF_VALUE: 20
PIF_VALUE: 26
PIF_VALUE: 35
PIF_VALUE: 5
PIF_VALUE: 35
PIF_VALUE: 31
PIF_VALUE: 21
PIF_VALUE: 4
PIF_VALUE: 33
PIF_VALUE: 35
PIF_VALUE: 32
PIF_VALUE: 35
PIF_VALUE: 33
PIF_VALUE: 0
PIF_VALUE: 4
PIF_VALUE: 20
PIF_VALUE: 0
PIF_VALUE: 20

## 2020-10-25 ASSESSMENT — PAIN DESCRIPTION - ORIENTATION: ORIENTATION: MID;LOWER

## 2020-10-25 ASSESSMENT — PAIN DESCRIPTION - DESCRIPTORS: DESCRIPTORS: ACHING;CONSTANT

## 2020-10-25 ASSESSMENT — PAIN SCALES - GENERAL
PAINLEVEL_OUTOF10: 8
PAINLEVEL_OUTOF10: 7
PAINLEVEL_OUTOF10: 3
PAINLEVEL_OUTOF10: 7

## 2020-10-25 ASSESSMENT — PAIN DESCRIPTION - FREQUENCY: FREQUENCY: CONTINUOUS

## 2020-10-25 ASSESSMENT — PAIN - FUNCTIONAL ASSESSMENT: PAIN_FUNCTIONAL_ASSESSMENT: ACTIVITIES ARE NOT PREVENTED

## 2020-10-25 ASSESSMENT — PAIN DESCRIPTION - LOCATION: LOCATION: ABDOMEN

## 2020-10-25 ASSESSMENT — PAIN DESCRIPTION - PAIN TYPE: TYPE: ACUTE PAIN;SURGICAL PAIN

## 2020-10-25 ASSESSMENT — PAIN DESCRIPTION - ONSET: ONSET: ON-GOING

## 2020-10-25 ASSESSMENT — PAIN DESCRIPTION - PROGRESSION: CLINICAL_PROGRESSION: NOT CHANGED

## 2020-10-25 NOTE — PROGRESS NOTES
Pt stated that she wears cpap at home. Placed pt on cpap with 4L bled in but patient started desating into the 80's with respirations of 6. Placed pt on bipap and titrated settings to 18/8 to achieve tidal volumes of 300-400. Pt now with saturations of 96-98%.

## 2020-10-25 NOTE — PROGRESS NOTES
Today's plan:  Pericardial drain removed, s/p lap JULIA, continue amiodarone, will start anticoagulaion if ok with sx and medicine      Admit Date:  10/19/2020    Subjective: ok      Chief complaints on admission  Chief Complaint   Patient presents with    Cough    Headache    Chills         History of present illness:Nelly is a 64 y. o.year old who  presents with had concerns including Cough; Headache; and Chills. Past medical history:    has a past medical history of Anxiety, Arthritis, Back pain at L4-L5 level, Bipolar 1 disorder ([address] COPD (chronic obstructive pulmonary disease) ([facility] [address] Emphysema of lung ([facility] [address] FH: CAD (coronary artery disease), Fibromyalgia, H/O Doppler ultrasound, H/O echocardiogram, History of blood transfusion, Hyperlipidemia LDL goal <100, Hypertension, Obstructive sleep apnea, Osteoarthritis, and Thyroid disease. Past surgical history:   has a past surgical history that includes Carpal tunnel release; Tubal ligation; back surgery; Cardiac catheterization; and Colonoscopy (N/A, 12/12/2019). Social History:   reports that she quit smoking about 12 years ago. She has a 30.00 pack-year smoking history. She has never used smokeless tobacco. She reports previous alcohol use of about 2.0 standard drinks of alcohol per week. She reports that she does not use drugs. Family history:  family history includes No Known Problems in her father and mother.     Allergies   Allergen Reactions    Lisinopril Swelling and Rash     angioedema         Objective:   /67   Pulse 93   Temp 97.5 °F (36.4 °C) (Rectal)   Resp 20   Ht 5' 2\" (1.575 m)   Wt 186 lb 1.1 oz (84.4 kg)   SpO2 92%   BMI 34.03 kg/m²       Intake/Output Summary (Last 24 hours) at 10/25/2020 1619  Last data filed at 10/25/2020 1425  Gross per 24 hour   Intake 2847.28 ml   Output 980 ml   Net 1867.28 ml       TELEMETRY:aflutter    Physical Exam:  Constitutional:  Well developed, Well nourished, No acute distress, Non-toxic appearance. HENT:  Normocephalic, Atraumatic, Bilateral external ears normal, Oropharynx moist, No oral exudates, Nose normal. Neck- Normal range of motion, No tenderness, Supple, No stridor. Eyes:  PERRL, EOMI, Conjunctiva normal, No discharge. Respiratory:  Normal breath sounds, No respiratory distress, No wheezing, No chest tenderness. ,no use of accessory muscles, diaphragm movement is normal  Cardiovascular: (PMI) apex non displaced,no lifts no thrills, no s3,no s4, Normal heart rate, Normal rhythm, No murmurs, No rubs, No gallops. Carotid arteries pulse and amplitude are normal no bruit, no abdominal bruit noted ( normal abdominal aorta ausculation), femoral arteries pulse and amplitude are normal no bruit, pedal pulses are normal  GI:  Bowel sounds normal, Soft, No tenderness, No masses, No pulsatile masses. : External genitalia appear normal, No masses or lesions. No discharge. No CVA tenderness. Musculoskeletal:  Intact distal pulses, No edema, No tenderness, No cyanosis, No clubbing. Good range of motion in all major joints. No tenderness to palpation or major deformities noted. Back- No tenderness. Integument:  Warm, Dry, No erythema, No rash. Lymphatic:  No lymphadenopathy noted. Neurologic:  Alert & oriented x 3, Normal motor function, Normal sensory function, No focal deficits noted.    Psychiatric:  Affect normal, Judgment normal, Mood normal.     Medications:    [START ON 10/26/2020] enoxaparin  40 mg Subcutaneous Daily    albuterol sulfate HFA  2 puff Inhalation Q4H WA    ipratropium  2 puff Inhalation Q4H WA    lubiprostone  24 mcg Oral BID     amiodarone  400 mg Oral BID    amLODIPine  10 mg Oral Daily    cefepime  2 g Intravenous Q12H    pantoprazole  40 mg Intravenous Daily    aspirin  81 mg Oral Daily    baclofen  10 mg Oral TID    budesonide-formoterol  2 puff Inhalation BID    busPIRone  10 mg Oral TID    DULoxetine  60 mg Oral Daily    ferrous gluconate  324 mg Oral BID    fluticasone  2 spray Nasal Daily    folic acid  1 mg Oral Daily    [Held by provider] hydrALAZINE  10 mg Oral TID    levothyroxine  100 mcg Oral Daily    metoprolol succinate  25 mg Oral Daily    montelukast  10 mg Oral Daily    [Held by provider] theophylline  400 mg Oral Daily    traZODone  100 mg Oral Nightly    thiamine  100 mg Oral Daily    sodium chloride flush  10 mL Intravenous 2 times per day      sodium chloride 50 mL/hr at 10/25/20 1435     morphine, LORazepam, clonazePAM, sodium chloride flush, acetaminophen **OR** acetaminophen, polyethylene glycol, promethazine **OR** ondansetron, oxyCODONE-acetaminophen, guaiFENesin-dextromethorphan    Lab Data:  CBC:   Recent Labs     10/23/20  0016 10/24/20  0430 10/25/20  0305   WBC 17.1* 12.5* 10.5   HGB 8.0* 9.3* 9.2*   HCT 27.7* 32.2* 31.8*   MCV 97.5 99.7 100.3*    385 355     BMP:   Recent Labs     10/23/20  0016 10/24/20  0430 10/25/20  0305    140 139   K 4.3 3.5 3.4*   CL 94* 93* 96*   CO2 32 35* 37*   BUN 11 5* 5*   CREATININE 0.7 0.6 0.7     LIVER PROFILE:   Recent Labs     10/23/20  0016 10/24/20  0430 10/25/20  0305   * 98*  98* 71*   * 352*  352* 260*   LIPASE  --  9*  --    BILIDIR  --  0.2  --    BILITOT 0.2 0.3  0.3 0.3   ALKPHOS 99 123  123 105     PT/INR:   Recent Labs     10/23/20  0016   PROTIME 15.7*   INR 1.29     APTT: No results for input(s): APTT in the last 72 hours. BNP:  No results for input(s): BNP in the last 72 hours. TROPONIN: @TROPONINI:3@      Assessment:  64 y. o.year old who is admitted for          Plan:  A/p pericardiocentesis;' cxr ordered, it was 10cc drain last night, will dc drain today if no more drainage is seen and CXR does not show enlarged pericardium  Preop: discussed with anesthesia, will try to optimize heart rate before sx  Aflutter: as above and sheri need anticoagulation, add lovenox full dose after pericardial drain comes out.   All labs, medications and tests reviewed, continue all other medications of all above medical condition listed as is.       Bárbara Browning MD 10/25/2020 4:19 PM

## 2020-10-25 NOTE — OP NOTE
cholecystectomy  if conditions are not favorable. Description of procedure: The patient was appropriately identified in the holding area. Appropriate IV antibiotic was given on call to OR (operating room). The patient was then brought to the operating room, and placed on the operating table in the supine position. A time-out was completed verifying correct patient and procedure. An orogastric tube was placed by anesthesia, and connected to suction to decompress the stomach. Anesthesia endotracheally intubated the patient uneventfully and general endotracheal anesthesia was started. The abdomen was prepped and draped in the usual sterile fashion. The local anesthetic mixture mentioned above was used to infiltrate the planned incisions sites starting  intradermally raising a wheal at the skin level, and through the layers all the way to the preperitoneal level where another wheal was raised, preemptively before making any incisions and post operatively at the end of the procedure. A 1cm incision was made in a natural skin crease about 3 finger-breadth superior to the umbilicus in the midline, dissection was carried down to the fascia, the fascia and peritoneum were opened under direct visualization and the peritoneal cavity was entered safely; careful inspection revealed no bowels or solid organs noted in the vicinity of the incision. A figure-of-eight suture with 0-Vicryl was placed for future closure of this fascial defect at the end of the procedure. A 12-mm port was introduced through the fascia and pneumoperitoneum was instituted using CO2 (carbon dioxide) insufflation, up to 12-14 mmHg pressure. The patient tolerated insufflation well. A 5-mm/30 degree scope was introduced through the port, the abdomen was inspected and no injuries from initial trocar placement were noted.   Then under direct visualization three 5-mm ports were placed in the right upper quadrant / subcostal region: 2 finger-breadths below and parallel to the right costal margin, 8-10 cm apart one from the other: in the epigastrium, midclavicular line and anterior axillary line. The table was then placed in the reverse Trendelenburg position with the right side up. The fundus of the gallbladder was grasped with an atraumatic grasper passed through the lateral-most port and retracted superiorly and laterally. The  Mata's pouch (infundibulum of the gall bladder) was also grasped with an atraumatic grasper through the midclavicular port and retracted laterally. These maneuvers exposed Calots triangle nicely. The anterior, and then the posterior peritoneum overlying the gallbladder's infundibulum were then incised meticulously and the cystic duct and cystic artery were both clearly identified and circumferentially skeletonized with gentle dissection. The cystic duct was then milked from the CBD towards the gall bladder without evidence of cystic duct stones, then it was clipped at the junction between the cystic duct and the gall bladder, then a partial ductotomy was performed, through which a Taut catheter introduced through a 14-Gauge angiocath introduced subcostally between the lateral most 2 ports, and was used to canulate the cystic duct without any difficulty. It was then secured in place using a 5mm clip, then an intra operative cholangiogram performed under C-arm direct fluoroscopy using Conray (Mixed with saline 50%/50%):   Normal size CBD was noted, no filling defects noted, normal anatomy of the hepatic biliary tree all the way to the tertiary biliary radicals, freely flowing contrast into the duodenum.   The Taut catheter was then retrieved uneventfully, and discarded, after its securing 5 mm clip was retrieved as well and discarded, and the cystic duct was then further triply clipped and sharply divided close to the gallbladder, leaving 3 clips on the common bile duct side and one on the gall bladder side of the cystic duct. Likewise, the cystic artery was triply clipped and divided using electrocautery, leaving 2 clips on the hepatic artery side and one on the gall bladder side of the cystic artery. The gallbladder was then dissected from its peritoneal attachments and from its gall bladder liver bed using the hook electrocautery. Hemostasis was secured all along the dissection and the gallbladder was retrieved in its entirety using the endo-catch bag (the endoscopic retrieval bag) introduced through the umbilical port after switching the camera to the epigastric / subxiphoid port . The gallbladder was passed off the table as a specimen after it was palpated for any neoplastic abnormalities, and none were noted. The gallbladder fossa was re-inspected, and irrigated as needed with saline and perfectly adequate hemostasis was noted. There was no evidence of bleeding from the gallbladder fossa or cystic artery or leakage of the bile from the cystic duct stump or the liver bed. The orogastric tube was removed uneventfully   All three subcostal trocars were removed under direct visualization without any evidence of port site bleeding. The laparoscope was withdrawn and the umbilical trocar removed. The pneumoperitoneum was evacuated. The pre-placed 0-Vicryl suture was tied to approximate the fascial defect of the 12-mm trocar site. Further more local anesthetic was injected to all trocar sites. The skin was then  approximated using 4-0 Vicryl in a subcuticular fashion, followed by the application of \"Dermabond\" /  topical skin adhesive. The patient tolerated the procedure very well without any complications, was extubated in the OR and was taken to the PACU (postanesthesia care unit) in stable condition.     ___________________________________________    Yesenia Narvaez MD, FACS, FICS  10/25/2020  1:57 PM

## 2020-10-25 NOTE — PROGRESS NOTES
Hospitalist Progress Note      Name:  Thierno Cedeño /Age/Sex: 1960  (64 y.o. female)   MRN & CSN:  [id number] & [id number] Admission Date/Time: 10/19/2020 10:07 PM   Location:  -A PCP: Sandra Olivas, [address] Day: 7    History of Present Illness:     Chief Complaint: Sepsis ([address]  Thierno Cedeño is a 64 y.o.  female  who presents with abdominal pain     -came back from surgery today  -having difficulty breathing following extubation and placed on bipap  -not much output from chest drain as per nurse    Ten point ROS reviewed negative, unless as noted above    Objective:        Intake/Output Summary (Last 24 hours) at 10/25/2020 1521  Last data filed at 10/25/2020 1425  Gross per 24 hour   Intake 2847.28 ml   Output 980 ml   Net 1867.28 ml      Vitals:   Vitals:    10/25/20 1500   BP:    Pulse:    Resp: 20   Temp:    SpO2:      Physical Exam:   General Appearance: alert and oriented to in no acute distress,   Cardiovascular: RRR  Pulmonary/Chest: tight and wheezing  Abdomen: soft, nontender, non-distended,  no masses, has surgical wounds on abdomen  Extremities: no edema, nontender  Skin: warm and dry, no rash or erythema  Head: normocephalic and atraumatic  Eyes: no discharge  Neck: no thyromegaly   Musculoskeletal: normal range of motion, no joint swelling, deformity or tenderness  Neurological: no gross deficits    Medications:   Medications:    [START ON 10/26/2020] enoxaparin  40 mg Subcutaneous Daily    albuterol sulfate HFA  2 puff Inhalation Q4H WA    ipratropium  2 puff Inhalation Q4H WA    lubiprostone  24 mcg Oral BID WC    amiodarone  400 mg Oral BID    amLODIPine  10 mg Oral Daily    cefepime  2 g Intravenous Q12H    pantoprazole  40 mg Intravenous Daily    aspirin  81 mg Oral Daily    baclofen  10 mg Oral TID    budesonide-formoterol  2 puff Inhalation BID    busPIRone  10 mg Oral TID    DULoxetine  60 mg Oral Daily    ferrous gluconate  324 mg Oral BID    fluticasone  2 spray Nasal Daily    folic acid  1 mg Oral Daily    [Held by provider] hydrALAZINE  10 mg Oral TID    levothyroxine  100 mcg Oral Daily    metoprolol succinate  25 mg Oral Daily    montelukast  10 mg Oral Daily    [Held by provider] theophylline  400 mg Oral Daily    traZODone  100 mg Oral Nightly    thiamine  100 mg Oral Daily    sodium chloride flush  10 mL Intravenous 2 times per day      Infusions:    sodium chloride 50 mL/hr at 10/25/20 1435     PRN Meds: morphine, 2 mg, Q3H PRN  LORazepam, 0.5 mg, Q8H PRN  clonazePAM, 1 mg, TID PRN  sodium chloride flush, 10 mL, PRN  acetaminophen, 650 mg, Q6H PRN    Or  acetaminophen, 650 mg, Q6H PRN  polyethylene glycol, 17 g, Daily PRN  promethazine, 12.5 mg, Q6H PRN    Or  ondansetron, 4 mg, Q6H PRN  oxyCODONE-acetaminophen, 1 tablet, Q6H PRN  guaiFENesin-dextromethorphan, 5 mL, Q4H PRN            Pertinent New Labs & Imaging Studies     CBC with Differential:    Lab Results   Component Value Date    WBC 10.5 10/25/2020    RBC 3.17 10/25/2020    HGB 9.2 10/25/2020    HCT 31.8 10/25/2020     10/25/2020    .3 10/25/2020    MCH 29.0 10/25/2020    MCHC 28.9 10/25/2020    RDW 13.7 10/25/2020    SEGSPCT 77.3 10/25/2020    BANDSPCT 4 08/19/2019    LYMPHOPCT 8.4 10/25/2020    MONOPCT 11.9 10/25/2020    MYELOPCT 1 04/20/2019    BASOPCT 0.4 10/25/2020    MONOSABS 1.2 10/25/2020    LYMPHSABS 0.9 10/25/2020    EOSABS 0.1 10/25/2020    BASOSABS 0.0 10/25/2020    DIFFTYPE AUTOMATED DIFFERENTIAL 10/25/2020     CMP:    Lab Results   Component Value Date     10/25/2020    K 3.4 10/25/2020    CL 96 10/25/2020    CO2 37 10/25/2020    BUN 5 10/25/2020    CREATININE 0.7 10/25/2020    GFRAA >60 10/25/2020    LABGLOM >60 10/25/2020    GLUCOSE 117 10/25/2020    PROT 4.9 10/25/2020    PROT 6.9 01/23/2013    LABALBU 2.8 10/25/2020    CALCIUM 8.6 10/25/2020    BILITOT 0.3 10/25/2020    ALKPHOS 105 10/25/2020    AST 71 10/25/2020     3. Minimal ascites is seen within the upper abdomen and pelvis. 4. Colonic diverticulosis. Xr Chest Portable    Result Date: 10/19/2020  EXAMINATION: ONE XRAY VIEW OF THE CHEST 10/19/2020 10:13 pm COMPARISON: 07/12/2020 HISTORY: ORDERING SYSTEM PROVIDED HISTORY: cough TECHNOLOGIST PROVIDED HISTORY: Reason for exam:->cough Reason for Exam: cough Acuity: Acute Type of Exam: Initial Additional signs and symptoms: na Relevant Medical/Surgical History: COPD FINDINGS: Monitor wires overlie the chest.  The trachea is midline. There is stable cardiomegaly. There are patchy airspace changes in the right lung base that is concerning for an infiltrate or atelectasis. There is hyperinflation of the lung fields. There are healing left-sided rib fractures. There are small pleural effusions. Stable cardiomegaly. Patchy airspace changes in the right lung base that are concerning for early infiltrate. Small pleural effusions. Follow up to resolution is suggested. Assessment and Plan:   Thierno Cedeño is a 64 y.o.  female  who presents with Sepsis ([address]    Sepsis likely from pneumonia versus acalculous cholecystitis  question if she had any cholangitis as there is no elevated alkaline phosphatase  Ultrasound abdomen showed signs of acute calculus cholecystitis, there is edema of the gallbladder could be coming from congestion. On cefepime and off azithromycin and Rocephin. BCx: 10/21: NGTD. GI following and recommending surgical evaluation. -BCx: 10/20: No growth. Pericardial effusion status post drain placement 10/23  CTA abdomen showed moderate pericardial effusion  Echo with right atrial contraction, concern for early tamponade  Continue fluids  Due to A. fib with RVR. Repeat echo showed persistent moderate to large pericardial effusion with right atrial diastolic collapse. Cardiology performed pericardiocentesis and removed 500 mL, patient has a pericardial drain.  -Pericardial culture:  No growth to date. Pericardial drain removed 10/25. Atrial flutter with RVR  Likely due to pericardial effusion. On metoprolol. Patient converted to sinus rhythm with dig and Cardizem bolus. Anticoagulation as per cardiology. Continue amiodarone. Start anticoagulation if okay with surgery. Transaminitis likely due to acute acalculous cholecystitis  Concern for liver congestion versus hepatitis  GI following. Acute hepatitis panel negative. Does have right upper quadrant abdominal pain. Abdominal ultrasound suggestive of possible acalculous cholecystitis. General surgery consult. -LFTs improved. Acute respiratory failure with hypoxia and hypercapnia  Likely multifactorial with sepsis, pericardial effusion, and A. fib with RVR. Wean once hemodynamically stable. -Postop patient is in respiratory acidosis. On BiPAP. Pulmonary consult. LUZ MARIA-resolved  Likely from prerenal  Currently resolved    Hypertension  Continue metoprolol,   Holding hydralazine due to A. fib with RVR. Hypothyroidism  Continue Synthroid    Anemia of chronic disorders  Monitor hemoglobin  -hemoglobin 9.2. Monitor.     Chronic back pain  Continue baclofen    Anxiety  Continue Cymbalta and Buspar    Diet DIET LOW FAT;   DVT Prophylaxis [] Lovenox, []  Heparin, [x] SCDs, [] Ambulation   GI Prophylaxis [] PPI,  [x] H2 Blocker,  [] Carafate,  [] Diet/Tube Feeds   Code Status Full Code   Disposition Patient requires continued admission due to sepsis and pericardial effusion   MDM [] Low, [x] Moderate,[]  High     Electronically signed by Maria Eugenia Patel MD on 10/25/2020 at 3:21 PM

## 2020-10-25 NOTE — PROGRESS NOTES
Anesthesia Progress note    D/W Dr Vishal Ernandez, he wishes to proceed with afshin he feels emergent in nature as delay in surgery will be complicated by pts pre existing co-morbidities. Feels etiology of pts sxs from acalculous cholecystitis. So he does not want to delay surgery. As below anesthesia recommends further evaluation of sepsis including  respiratory status and possibly worsening pneumonia, as well as etiology of pericarditis with 600ml pericardial fluid drained by Dr Rachel Benjamin 3 days ago. Will proceed for lap afshin per Dr Vishal Ernandez. Pt informed anesthesia consent given with Risk and Benefits, including prolonged intubation. Pt understands and agrees to anesthesia    10/25/20-update respiratory status    RT note:    Pt stated that she wears cpap at home. Placed pt on cpap with 4L bled in but patient started desating into the 80's with respirations of 6. Placed pt on bipap and titrated settings to 18/8 to achieve tidal volumes of 300-400. Pt now with saturations of 96-98%. -Weaned off BiPAP later this am, to O2 NC    Worsening CXR-    Rec: optimization of respiratory status at this time unless declared emergent prior to surgery requiring intubation as risk of prolonged intubation post operatively. Etiology of source infection may be pulmonary as indicated  by medicine notes as  CT scan shows gallbladder wall thickening only without cholelithiasis or definitive choledocholithiasis  No elevation of alk phos. Rec: Further evaluation of etiology of sepsis and pericarditis      10/24/20    Discussed case with cardiology, agree with anesthesia to await resolution of Afib/RVR prior to anesthesia unless case deemed emergent. Issues of sepsis possibly related to Pneumonia (hypoxia), Afib RVR, HR currently 143     CT scan shows gallbladder wall thickening only without cholelithiasis or definitive choledocholithiasis  No elevation of alk phos.   Lower Chest: Emphysematous changes are seen.  Bibasilar atelectasis is noted.    Moderate-sized pericardial effusion is noted.         Organs: Minimal periportal edema is noted.  Mild diffuse fatty infiltration    of the liver is noted.  There is reflux of contrast into the IVC and the    hepatic veins, suggestive for right heart failure.  Reticulations are seen    involving the liver which could be related to hepatic congestion.         Gallbladder wall thickening is noted.  The spleen, pancreas, adrenal glands,    and kidneys are unremarkable. Will await optimization of pt from cardiovascular and pulmonary perspective prior to non emergent cholecystectomy. Other issues as below. Sepsis likely from pneumonia versus cholecystitis  question if she had any cholangitis as there is no elevated alkaline phosphatase  Ultrasound abdomen showed signs of acute calculus cholecystitis, there is edema of the gallbladder could be coming from congestion.  -On cefepime and off azithromycin and Rocephin. BCx: 10/21: NGTD. GI following and recommending surgical evaluation.     Pericardial effusion  Concern for early tamponade  CTA abdomen showed moderate pericardial effusion  Echo with right atrial contraction, concern for early tamponade  Continue fluids  -Due to A. fib with RVR. Repeat echo showed persistent moderate to large pericardial effusion with right atrial diastolic collapse. Cardiology performed pericardiocentesis and removed 500 mL, patient has a pericardial drain.     A. fib with RVR  -Likely due to pericardial effusion. On metoprolol. Patient converted to sinus rhythm with dig and Cardizem bolus. Anticoagulation as per cardiology.     Transaminitis  Concern for liver congestion versus hepatitis  -GI following. LFTs improving. Acute hepatitis panel negative. Does have right upper quadrant abdominal pain. Abdominal ultrasound suggestive of possible acalculous cholecystitis.   General surgery consult.     Hypoxia  -Likely multifactorial with sepsis, pericardial effusion, and A. fib with RVR. On 4 L nasal cannula.   Wean once hemodynamically stable.     LUZ MARIA-resolved  Likely from prerenal  Currently resolved     Hypertension  Continue metoprolol,   -holding hydralazine due to A. fib with RVR.     Hypothyroidism  Continue Synthroid     Anemia of chronic disorders  Monitor hemoglobin  -hemoglobin 8.     Chronic back pain  Continue baclofen     Anxiety  Continue Cymbalta and Buspar

## 2020-10-25 NOTE — PROGRESS NOTES
10/25/20 1438   NIV Type   NIV Started/Stopped On   Equipment Type v60   Mode Bilevel   Mask Type Full face mask   Mask Size Small   Settings/Measurements   IPAP 18 cmH20   CPAP/EPAP 8 cmH2O   Rate Ordered 12   Resp 13   Insp Rise Time (%) 3 %   FiO2  93 %   I Time/ I Time % 1.25 s   Vt Exhaled 321 mL   Minute Volume 7.5 Liters   Mask Leak (lpm) 3 lpm   Comfort Level Good   Using Accessory Muscles No   SpO2 93

## 2020-10-25 NOTE — PROGRESS NOTES
GENERAL SURGERY PROGRESS NOTE    CC/HPI:           Patient feels better from the day before yesterday's procedure, and HR IS MUCH BETTER CONTROLLED; NORMAL now! Vitals:    10/25/20 0402 10/25/20 0502 10/25/20 0602 10/25/20 0632   BP: (!) 116/59 (!) 106/58 (!) 109/54 (!) 117/55   Pulse: 69 68 65 66   Resp: 9 16 13 13   Temp:       TempSrc:       SpO2: 100% 100% 100% 100%   Weight:       Height:         I/O last 3 completed shifts: In: 2847.3 [P.O.:240; I.V.:2407.3; IV Piggyback:200]  Out: 720 [Urine:700; Drains:20]  No intake/output data recorded.     Diet NPO, After Midnight    Recent Results (from the past 48 hour(s))   Comprehensive Metabolic Panel w/ Reflex to MG    Collection Time: 10/22/20 11:23 AM   Result Value Ref Range    Sodium 131 (L) 135 - 145 MMOL/L    Potassium 4.9 3.5 - 5.1 MMOL/L    Chloride 88 (L) 99 - 110 mMol/L    CO2 32 21 - 32 MMOL/L    BUN 13 6 - 23 MG/DL    CREATININE 0.9 0.6 - 1.1 MG/DL    Glucose 101 (H) 70 - 99 MG/DL    Calcium 9.0 8.3 - 10.6 MG/DL    Alb 3.5 3.4 - 5.0 GM/DL    Total Protein 5.8 (L) 6.4 - 8.2 GM/DL    Total Bilirubin 0.3 0.0 - 1.0 MG/DL     (H) 10 - 40 U/L     (H) 15 - 37 IU/L    Alkaline Phosphatase 111 40 - 129 IU/L    GFR Non-African American >60 >60 mL/min/1.73m2    GFR African American >60 >60 mL/min/1.73m2    Anion Gap 11 4 - 16   CBC Auto Differential    Collection Time: 10/22/20 11:23 AM   Result Value Ref Range    WBC 25.5 (H) 4.0 - 10.5 K/CU MM    RBC 3.35 (L) 4.2 - 5.4 M/CU MM    Hemoglobin 9.4 (L) 12.5 - 16.0 GM/DL    Hematocrit 33.0 (L) 37 - 47 %    MCV 98.5 78 - 100 FL    MCH 28.1 27 - 31 PG    MCHC 28.5 (L) 32.0 - 36.0 %    RDW 13.7 11.7 - 14.9 %    Platelets 546 382 - 614 K/CU MM    MPV 10.3 7.5 - 11.1 FL    Differential Type AUTOMATED DIFFERENTIAL     Segs Relative 88.5 (H) 36 - 66 %    Lymphocytes % 3.2 (L) 24 - 44 %    Monocytes % 6.6 (H) 0 - 4 %    Eosinophils % 0.0 0 - 3 %    Basophils % 0.2 0 - 1 %    Segs Absolute 22.6 K/CU MM    Lymphocytes Absolute 0.8 K/CU MM    Monocytes Absolute 1.7 K/CU MM    Eosinophils Absolute 0.0 K/CU MM    Basophils Absolute 0.1 K/CU MM    Nucleated RBC % 0.1 %    Total Nucleated RBC 0.0 K/CU MM    Total Immature Neutrophil 0.38 K/CU MM    Immature Neutrophil % 1.5 (H) 0 - 0.43 %   Comprehensive Metabolic Panel w/ Reflex to MG    Collection Time: 10/23/20 12:16 AM   Result Value Ref Range    Sodium 137 135 - 145 MMOL/L    Potassium 4.3 3.5 - 5.1 MMOL/L    Chloride 94 (L) 99 - 110 mMol/L    CO2 32 21 - 32 MMOL/L    BUN 11 6 - 23 MG/DL    CREATININE 0.7 0.6 - 1.1 MG/DL    Glucose 79 70 - 99 MG/DL    Calcium 8.6 8.3 - 10.6 MG/DL    Alb 2.9 (L) 3.4 - 5.0 GM/DL    Total Protein 5.0 (L) 6.4 - 8.2 GM/DL    Total Bilirubin 0.2 0.0 - 1.0 MG/DL     (H) 10 - 40 U/L     (H) 15 - 37 IU/L    Alkaline Phosphatase 99 40 - 128 IU/L    GFR Non-African American >60 >60 mL/min/1.73m2    GFR African American >60 >60 mL/min/1.73m2    Anion Gap 11 4 - 16   CBC Auto Differential    Collection Time: 10/23/20 12:16 AM   Result Value Ref Range    WBC 17.1 (H) 4.0 - 10.5 K/CU MM    RBC 2.84 (L) 4.2 - 5.4 M/CU MM    Hemoglobin 8.0 (L) 12.5 - 16.0 GM/DL    Hematocrit 27.7 (L) 37 - 47 %    MCV 97.5 78 - 100 FL    MCH 28.2 27 - 31 PG    MCHC 28.9 (L) 32.0 - 36.0 %    RDW 13.7 11.7 - 14.9 %    Platelets 237 399 - 656 K/CU MM    MPV 10.1 7.5 - 11.1 FL    Differential Type AUTOMATED DIFFERENTIAL     Segs Relative 86.8 (H) 36 - 66 %    Lymphocytes % 3.7 (L) 24 - 44 %    Monocytes % 8.0 (H) 0 - 4 %    Eosinophils % 0.1 0 - 3 %    Basophils % 0.1 0 - 1 %    Segs Absolute 14.9 K/CU MM    Lymphocytes Absolute 0.6 K/CU MM    Monocytes Absolute 1.4 K/CU MM    Eosinophils Absolute 0.0 K/CU MM    Basophils Absolute 0.0 K/CU MM    Nucleated RBC % 0.0 %    Total Nucleated RBC 0.0 K/CU MM    Total Immature Neutrophil 0.22 K/CU MM    Immature Neutrophil % 1.3 (H) 0 - 0.43 %   Hepatitis A Antibody, Total    Collection Time: 10/23/20 12:16 AM   Result Value Ref Range    Hep A Total Ab Negative Negative   Hepatitis Panel, Acute    Collection Time: 10/23/20 12:16 AM   Result Value Ref Range    Hepatitis B Surface Ag NON REACTIVE NON REACTIVE    Hep A IgM NON REACTIVE NON REACTIVE    Hep B Core Ab, IgM NON REACTIVE NON REACTIVE    Hepatitis C Ab NON REACTIVE NON REACTIVE   Ferritin    Collection Time: 10/23/20 12:16 AM   Result Value Ref Range    Ferritin 1,617 (H) 15 - 150 NG/ML   Iron and TIBC    Collection Time: 10/23/20 12:16 AM   Result Value Ref Range    Iron 58 37 - 145 ug/dL    UIBC 102 (L) 110 - 370 ug/dL    TIBC 160 (L) 250 - 450 ug/dL    Transferrin % 36 10 - 44 %   Alpha-1-Antitrypsin    Collection Time: 10/23/20 12:16 AM   Result Value Ref Range    A-1 Antitrypsin 275 (H) 90 - 200 mg/dL   IgG, IgA, IgM    Collection Time: 10/23/20 12:16 AM   Result Value Ref Range    IgG, Serum 406 (L) 723 - 1,685 MG/DL    IgA 145 69 - 382 MG/DL    IgM,Serum 83 62 - 277 MG/DL   Protime-INR    Collection Time: 10/23/20 12:16 AM   Result Value Ref Range    Protime 15.7 (H) 11.7 - 14.5 SECONDS    INR 1.29 INDEX   EKG 12 Lead    Collection Time: 10/23/20  8:45 AM   Result Value Ref Range    Ventricular Rate 157 BPM    Atrial Rate 314 BPM    QRS Duration 86 ms    Q-T Interval 254 ms    QTc Calculation (Bazett) 410 ms    P Axis 244 degrees    R Axis 126 degrees    T Axis 230 degrees    Diagnosis       Atrial flutter with variable AV block  Possible Right ventricular hypertrophy  Cannot rule out Inferior infarct , age undetermined  Abnormal ECG  When compared with ECG of 12-JUL-2020 00:32,  Significant changes have occurred  Confirmed by Melida Norman MD, Leslie Quiñones ([id number]) on 10/23/2020 3:14:03 PM     Glucose, Body Fluid    Collection Time: 10/23/20 11:30 AM   Result Value Ref Range    Glucose, Fluid 86 MG/DL   Body Fluid Cell Count with Differential    Collection Time: 10/23/20 11:30 AM   Result Value Ref Range    Fluid Type PERICARDIAL FLUID INDEX    RBC, Fluid 98,000 /CU MM    WBC, Fluid 904 /CU MM    Neutrophil Count, Fluid 39 %    Lymphocytes, Body Fluid 59 %    Monocyte Count, Fluid 2 %   Lactate Dehydrogenase, Body Fluid    Collection Time: 10/23/20 11:30 AM   Result Value Ref Range    LD, Fluid 354 IU/L   Protein, Body Fluid    Collection Time: 10/23/20 11:30 AM   Result Value Ref Range    Protein, Fluid 4.2 GM/DL   Amylase, Body Fluid    Collection Time: 10/23/20 11:30 AM   Result Value Ref Range    Amylase, Fluid 13 U/L   Specific Gravity, Body Fluid    Collection Time: 10/23/20 11:30 AM   Result Value Ref Range    Fl.  Specific Gravity 1.027    EKG 12 Lead    Collection Time: 10/23/20  7:34 PM   Result Value Ref Range    Ventricular Rate 147 BPM    Atrial Rate 150 BPM    QRS Duration 142 ms    Q-T Interval 336 ms    QTc Calculation (Bazett) 525 ms    R Axis 157 degrees    T Axis -73 degrees    Diagnosis       Wide QRS tachycardia  Right bundle branch block  Left posterior fascicular block   Bifascicular block   Abnormal ECG  When compared with ECG of 23-OCT-2020 08:45,  Wide QRS tachycardia has replaced Atrial flutter     Comprehensive Metabolic Panel w/ Reflex to MG    Collection Time: 10/24/20  4:30 AM   Result Value Ref Range    Sodium 140 135 - 145 MMOL/L    Potassium 3.5 3.5 - 5.1 MMOL/L    Chloride 93 (L) 99 - 110 mMol/L    CO2 35 (H) 21 - 32 MMOL/L    BUN 5 (L) 6 - 23 MG/DL    CREATININE 0.6 0.6 - 1.1 MG/DL    Glucose 78 70 - 99 MG/DL    Calcium 8.6 8.3 - 10.6 MG/DL    Alb 3.0 (L) 3.4 - 5.0 GM/DL    Total Protein 5.3 (L) 6.4 - 8.2 GM/DL    Total Bilirubin 0.3 0.0 - 1.0 MG/DL     (H) 10 - 40 U/L    AST 98 (H) 15 - 37 IU/L    Alkaline Phosphatase 123 40 - 129 IU/L    GFR Non-African American >60 >60 mL/min/1.73m2    GFR African American >60 >60 mL/min/1.73m2    Anion Gap 12 4 - 16   CBC Auto Differential    Collection Time: 10/24/20  4:30 AM   Result Value Ref Range    WBC 12.5 (H) 4.0 - 10.5 K/CU MM    RBC 3.23 (L) 4.2 - 5.4 M/CU MM    Hemoglobin 9.3 (L) 12.5 - 16.0 GM/DL    Hematocrit 32.2 (L) 37 - 47 %    MCV 99.7 78 - 100 FL    MCH 28.8 27 - 31 PG    MCHC 28.9 (L) 32.0 - 36.0 %    RDW 13.7 11.7 - 14.9 %    Platelets 584 214 - 461 K/CU MM    MPV 9.9 7.5 - 11.1 FL    Differential Type AUTOMATED DIFFERENTIAL     Segs Relative 81.5 (H) 36 - 66 %    Lymphocytes % 6.6 (L) 24 - 44 %    Monocytes % 10.8 (H) 0 - 4 %    Eosinophils % 0.3 0 - 3 %    Basophils % 0.2 0 - 1 %    Segs Absolute 10.2 K/CU MM    Lymphocytes Absolute 0.8 K/CU MM    Monocytes Absolute 1.4 K/CU MM    Eosinophils Absolute 0.0 K/CU MM    Basophils Absolute 0.0 K/CU MM    Nucleated RBC % 0.0 %    Total Nucleated RBC 0.0 K/CU MM    Total Immature Neutrophil 0.07 K/CU MM    Immature Neutrophil % 0.6 (H) 0 - 0.43 %   Hepatic function panel    Collection Time: 10/24/20  4:30 AM   Result Value Ref Range    Alb 3.0 (L) 3.4 - 5.0 GM/DL    Total Bilirubin 0.3 0.0 - 1.0 MG/DL    Bilirubin, Direct 0.2 0.0 - 0.3 MG/DL    Bilirubin, Indirect 0.1 0 - 0.7 MG/DL    Alkaline Phosphatase 123 40 - 129 IU/L    AST 98 (H) 15 - 37 IU/L     (H) 10 - 40 U/L    Total Protein 5.3 (L) 6.4 - 8.2 GM/DL   Lipase    Collection Time: 10/24/20  4:30 AM   Result Value Ref Range    Lipase 9 (L) 13 - 60 IU/L       Scheduled Meds:   albuterol sulfate HFA  2 puff Inhalation Q4H WA    ipratropium  2 puff Inhalation Q4H WA    lubiprostone  24 mcg Oral BID WC    amiodarone  400 mg Oral BID    amLODIPine  10 mg Oral Daily    cefepime  2 g Intravenous Q12H    pantoprazole  40 mg Intravenous Daily    aspirin  81 mg Oral Daily    baclofen  10 mg Oral TID    budesonide-formoterol  2 puff Inhalation BID    busPIRone  10 mg Oral TID    DULoxetine  60 mg Oral Daily    ferrous gluconate  324 mg Oral BID    fluticasone  2 spray Nasal Daily    folic acid  1 mg Oral Daily    [Held by provider] hydrALAZINE  10 mg Oral TID    levothyroxine  100 mcg Oral Daily    metoprolol succinate  25 mg Oral Daily    montelukast  10 mg Oral Daily    [Held by provider] theophylline  400 mg Oral Daily    traZODone  100 mg Oral Nightly    thiamine  100 mg Oral Daily    sodium chloride flush  10 mL Intravenous 2 times per day    [Held by provider] enoxaparin  40 mg Subcutaneous Daily       Continuous Infusions:   sodium chloride 100 mL/hr at 10/25/20 0315       Physical Exam:  HEENT: Anicteric sclerae, Oropharyngeal mucosae moist, pink and intact. Heart:  Normal S1 and S2, RRR  Lungs: Clear to auscultation bilaterally, No audible Wheezes or Rales. Extremities: No edema. Neuro: Alert and Oriented x 3, Non focal.  Abdomen: Soft, Benign, RUQ tender, and positive Soto sign, Non distended, Positive bowel sounds. Principal Problem:    Sepsis ([address]  Active Problems:    PNA (pneumonia)    Pericardial effusion    Acute acalculous cholecystitis  Resolved Problems:    * No resolved hospital problems. *      Assessment and Plan:  Jena Smith is a 64 y.o. female with Workup revealed acute acalculous cholecystitis, LFTs improving, needed pericardiocentesis, successfully performed yesterday, will follow her clinically, and follow her LFTs, and proceed with Lap cholecystectomy it is needed, when stable and cardiology clears her. Anesthesia d/w cardiology, and wanted to wait 24 hrs to better control her tachycardia before surgery, AND SUCCESSFULLY VERY NICELY CONTROLLED, will proceed TODAY with her lap afshin with Cholangiogram surgery. Increase Ambulation to at least 4x/day walk in the hallways with assistance. Respiratory ingrid-operative care: Incentive Spirometry / deep breathing and coughing 10x/hr while awake. Continue DVT prophylaxis with Teds and SCDs and SC Lovenox. Continue GI prophylaxis with Protonix IV till able to tolerate PO.   Continue ingrid-op Abx for 24 hrs after surgery then dc.    ___________________________________________    Jose Martin Miner MD, FACS, FICS  10/25/2020  7:15 AM

## 2020-10-25 NOTE — ANESTHESIA POSTPROCEDURE EVALUATION
Department of Anesthesiology  Postprocedure Note    Patient: Juan Ramon Escobedo  MRN: [id number]  YOB: 1960  Date of evaluation: 10/25/2020  Time:  2:37 PM     Procedure Summary     Date:  10/25/20 Room / Location:  [address] [place] [address]    Anesthesia Start:  1302 Anesthesia Stop:      Procedure:  CHOLECYSTECTOMY LAPAROSCOPIC WITH IOC (N/A Abdomen) Diagnosis:  (ACUTE CHOLECYSTITIS)    Surgeon:  Kylah Nichols MD Responsible Provider:  BRIAN Kim CRNA    Anesthesia Type:  general ASA Status:  4 - Emergent          Anesthesia Type: general    Karen Phase I:      Karen Phase II:      Last vitals: Reviewed and per EMR flowsheets.        Anesthesia Post Evaluation    Patient location during evaluation: ICU  Patient participation: complete - patient cannot participate  Level of consciousness: sleepy but conscious  Pain score: 0  Airway patency: patent  Nausea & Vomiting: no nausea and no vomiting  Respiratory status: BIPAP  Hydration status: euvolemic

## 2020-10-26 LAB
ALBUMIN SERPL-MCNC: 2.8 GM/DL (ref 3.4–5)
ALP BLD-CCNC: 101 IU/L (ref 40–129)
ALT SERPL-CCNC: 183 U/L (ref 10–40)
ANION GAP SERPL CALCULATED.3IONS-SCNC: 10 MMOL/L (ref 4–16)
AST SERPL-CCNC: 48 IU/L (ref 15–37)
BASE EXCESS MIXED: 16.7 (ref 0–2.3)
BASOPHILS ABSOLUTE: 0 K/CU MM
BASOPHILS RELATIVE PERCENT: 0.1 % (ref 0–1)
BILIRUB SERPL-MCNC: 0.2 MG/DL (ref 0–1)
BUN BLDV-MCNC: 4 MG/DL (ref 6–23)
CALCIUM SERPL-MCNC: 8.3 MG/DL (ref 8.3–10.6)
CARBON MONOXIDE, BLOOD: 2.9 % (ref 0–5)
CHLORIDE BLD-SCNC: 96 MMOL/L (ref 99–110)
CO2 CONTENT: 48.7 MMOL/L (ref 19–24)
CO2: 35 MMOL/L (ref 21–32)
CREAT SERPL-MCNC: 0.6 MG/DL (ref 0.6–1.1)
CULTURE: NORMAL
DIFFERENTIAL TYPE: ABNORMAL
EOSINOPHILS ABSOLUTE: 0 K/CU MM
EOSINOPHILS RELATIVE PERCENT: 0 % (ref 0–3)
GFR AFRICAN AMERICAN: >60 ML/MIN/1.73M2
GFR NON-AFRICAN AMERICAN: >60 ML/MIN/1.73M2
GLUCOSE BLD-MCNC: 141 MG/DL (ref 70–99)
HCO3 ARTERIAL: 46.2 MMOL/L (ref 18–23)
HCT VFR BLD CALC: 30.2 % (ref 37–47)
HEMOGLOBIN: 8.4 GM/DL (ref 12.5–16)
IMMATURE NEUTROPHIL %: 0.7 % (ref 0–0.43)
INR BLD: 1.09 INDEX
LYMPHOCYTES ABSOLUTE: 0.3 K/CU MM
LYMPHOCYTES RELATIVE PERCENT: 2.7 % (ref 24–44)
Lab: NORMAL
MCH RBC QN AUTO: 28.2 PG (ref 27–31)
MCHC RBC AUTO-ENTMCNC: 27.8 % (ref 32–36)
MCV RBC AUTO: 101.3 FL (ref 78–100)
METHEMOGLOBIN ARTERIAL: 1.2 %
MONOCYTES ABSOLUTE: 0.7 K/CU MM
MONOCYTES RELATIVE PERCENT: 5.5 % (ref 0–4)
NUCLEATED RBC %: 0 %
O2 SATURATION: 92.1 % (ref 96–97)
PCO2 ARTERIAL: 80 MMHG (ref 32–45)
PDW BLD-RTO: 13.5 % (ref 11.7–14.9)
PH BLOOD: 7.37 (ref 7.34–7.45)
PLATELET # BLD: 344 K/CU MM (ref 140–440)
PMV BLD AUTO: 9.6 FL (ref 7.5–11.1)
PO2 ARTERIAL: 62 MMHG (ref 75–100)
POTASSIUM SERPL-SCNC: 3.6 MMOL/L (ref 3.5–5.1)
PRO-BNP: 4841 PG/ML
PROTHROMBIN TIME: 13.2 SECONDS (ref 11.7–14.5)
RBC # BLD: 2.98 M/CU MM (ref 4.2–5.4)
SEGMENTED NEUTROPHILS ABSOLUTE COUNT: 11.4 K/CU MM
SEGMENTED NEUTROPHILS RELATIVE PERCENT: 91 % (ref 36–66)
SODIUM BLD-SCNC: 141 MMOL/L (ref 135–145)
SPECIMEN: NORMAL
TOTAL IMMATURE NEUTOROPHIL: 0.09 K/CU MM
TOTAL NUCLEATED RBC: 0 K/CU MM
TOTAL PROTEIN: 4.7 GM/DL (ref 6.4–8.2)
WBC # BLD: 12.5 K/CU MM (ref 4–10.5)

## 2020-10-26 PROCEDURE — C9113 INJ PANTOPRAZOLE SODIUM, VIA: HCPCS | Performed by: SURGERY

## 2020-10-26 PROCEDURE — 6370000000 HC RX 637 (ALT 250 FOR IP): Performed by: SURGERY

## 2020-10-26 PROCEDURE — 99024 POSTOP FOLLOW-UP VISIT: CPT | Performed by: SURGERY

## 2020-10-26 PROCEDURE — 80053 COMPREHEN METABOLIC PANEL: CPT

## 2020-10-26 PROCEDURE — 6360000002 HC RX W HCPCS: Performed by: INTERNAL MEDICINE

## 2020-10-26 PROCEDURE — 85610 PROTHROMBIN TIME: CPT

## 2020-10-26 PROCEDURE — 2580000003 HC RX 258: Performed by: SURGERY

## 2020-10-26 PROCEDURE — 36415 COLL VENOUS BLD VENIPUNCTURE: CPT

## 2020-10-26 PROCEDURE — 6360000002 HC RX W HCPCS: Performed by: SURGERY

## 2020-10-26 PROCEDURE — 36600 WITHDRAWAL OF ARTERIAL BLOOD: CPT

## 2020-10-26 PROCEDURE — 94761 N-INVAS EAR/PLS OXIMETRY MLT: CPT

## 2020-10-26 PROCEDURE — 2700000000 HC OXYGEN THERAPY PER DAY

## 2020-10-26 PROCEDURE — 94150 VITAL CAPACITY TEST: CPT

## 2020-10-26 PROCEDURE — 82803 BLOOD GASES ANY COMBINATION: CPT

## 2020-10-26 PROCEDURE — 85025 COMPLETE CBC W/AUTO DIFF WBC: CPT

## 2020-10-26 PROCEDURE — 82150 ASSAY OF AMYLASE: CPT

## 2020-10-26 PROCEDURE — 94640 AIRWAY INHALATION TREATMENT: CPT

## 2020-10-26 PROCEDURE — 2000000000 HC ICU R&B

## 2020-10-26 PROCEDURE — 83880 ASSAY OF NATRIURETIC PEPTIDE: CPT

## 2020-10-26 PROCEDURE — 99232 SBSQ HOSP IP/OBS MODERATE 35: CPT | Performed by: INTERNAL MEDICINE

## 2020-10-26 PROCEDURE — 6370000000 HC RX 637 (ALT 250 FOR IP): Performed by: INTERNAL MEDICINE

## 2020-10-26 RX ORDER — IPRATROPIUM BROMIDE AND ALBUTEROL SULFATE 2.5; .5 MG/3ML; MG/3ML
1 SOLUTION RESPIRATORY (INHALATION)
Status: DISCONTINUED | OUTPATIENT
Start: 2020-10-26 | End: 2020-10-28

## 2020-10-26 RX ORDER — SODIUM CHLORIDE FOR INHALATION 0.9 %
3 VIAL, NEBULIZER (ML) INHALATION EVERY 6 HOURS PRN
Status: DISCONTINUED | OUTPATIENT
Start: 2020-10-26 | End: 2020-10-30 | Stop reason: HOSPADM

## 2020-10-26 RX ORDER — METHYLPREDNISOLONE SODIUM SUCCINATE 40 MG/ML
40 INJECTION, POWDER, LYOPHILIZED, FOR SOLUTION INTRAMUSCULAR; INTRAVENOUS EVERY 8 HOURS
Status: DISCONTINUED | OUTPATIENT
Start: 2020-10-26 | End: 2020-10-28

## 2020-10-26 RX ADMIN — BUDESONIDE AND FORMOTEROL FUMARATE DIHYDRATE 2 PUFF: 160; 4.5 AEROSOL RESPIRATORY (INHALATION) at 07:37

## 2020-10-26 RX ADMIN — AMLODIPINE BESYLATE 10 MG: 10 TABLET ORAL at 08:39

## 2020-10-26 RX ADMIN — SODIUM CHLORIDE, PRESERVATIVE FREE 10 ML: 5 INJECTION INTRAVENOUS at 08:53

## 2020-10-26 RX ADMIN — METHYLPREDNISOLONE SODIUM SUCCINATE 40 MG: 40 INJECTION, POWDER, FOR SOLUTION INTRAMUSCULAR; INTRAVENOUS at 22:18

## 2020-10-26 RX ADMIN — IPRATROPIUM BROMIDE AND ALBUTEROL SULFATE 1 AMPULE: .5; 3 SOLUTION RESPIRATORY (INHALATION) at 19:25

## 2020-10-26 RX ADMIN — BUSPIRONE HYDROCHLORIDE 10 MG: 10 TABLET ORAL at 08:40

## 2020-10-26 RX ADMIN — TRAZODONE HYDROCHLORIDE 100 MG: 50 TABLET ORAL at 22:18

## 2020-10-26 RX ADMIN — FERROUS GLUCONATE TAB 324 MG (37.5 MG ELEMENTAL IRON) 324 MG: 324 (37.5 FE) TAB at 08:41

## 2020-10-26 RX ADMIN — DULOXETINE HYDROCHLORIDE 60 MG: 30 CAPSULE, DELAYED RELEASE ORAL at 08:40

## 2020-10-26 RX ADMIN — IPRATROPIUM BROMIDE AND ALBUTEROL SULFATE 1 AMPULE: .5; 3 SOLUTION RESPIRATORY (INHALATION) at 15:37

## 2020-10-26 RX ADMIN — Medication 2 PUFF: at 07:37

## 2020-10-26 RX ADMIN — METHYLPREDNISOLONE SODIUM SUCCINATE 40 MG: 40 INJECTION, POWDER, FOR SOLUTION INTRAMUSCULAR; INTRAVENOUS at 14:36

## 2020-10-26 RX ADMIN — BUSPIRONE HYDROCHLORIDE 10 MG: 10 TABLET ORAL at 22:18

## 2020-10-26 RX ADMIN — ENOXAPARIN SODIUM 40 MG: 40 INJECTION SUBCUTANEOUS at 08:38

## 2020-10-26 RX ADMIN — SODIUM CHLORIDE, PRESERVATIVE FREE 10 ML: 5 INJECTION INTRAVENOUS at 08:52

## 2020-10-26 RX ADMIN — BACLOFEN 10 MG: 10 TABLET ORAL at 14:24

## 2020-10-26 RX ADMIN — LUBIPROSTONE 24 MCG: 24 CAPSULE, GELATIN COATED ORAL at 08:39

## 2020-10-26 RX ADMIN — CEFEPIME: 2 INJECTION, POWDER, FOR SOLUTION INTRAVENOUS at 14:23

## 2020-10-26 RX ADMIN — AMIODARONE HYDROCHLORIDE 400 MG: 200 TABLET ORAL at 08:39

## 2020-10-26 RX ADMIN — MONTELUKAST 10 MG: 10 TABLET, FILM COATED ORAL at 08:40

## 2020-10-26 RX ADMIN — ASPIRIN 81 MG CHEWABLE TABLET 81 MG: 81 TABLET CHEWABLE at 08:39

## 2020-10-26 RX ADMIN — CEFEPIME 2 G: 2 INJECTION, POWDER, FOR SOLUTION INTRAVENOUS at 02:16

## 2020-10-26 RX ADMIN — FOLIC ACID 1 MG: 1 TABLET ORAL at 08:39

## 2020-10-26 RX ADMIN — LUBIPROSTONE 24 MCG: 24 CAPSULE, GELATIN COATED ORAL at 17:53

## 2020-10-26 RX ADMIN — AMIODARONE HYDROCHLORIDE 400 MG: 200 TABLET ORAL at 22:18

## 2020-10-26 RX ADMIN — ALBUTEROL SULFATE 2 PUFF: 90 AEROSOL, METERED RESPIRATORY (INHALATION) at 07:36

## 2020-10-26 RX ADMIN — PANTOPRAZOLE SODIUM 40 MG: 40 INJECTION, POWDER, FOR SOLUTION INTRAVENOUS at 08:49

## 2020-10-26 RX ADMIN — FERROUS GLUCONATE TAB 324 MG (37.5 MG ELEMENTAL IRON) 324 MG: 324 (37.5 FE) TAB at 22:18

## 2020-10-26 RX ADMIN — OXYCODONE HYDROCHLORIDE AND ACETAMINOPHEN 1 TABLET: 5; 325 TABLET ORAL at 14:38

## 2020-10-26 RX ADMIN — Medication 100 MG: at 08:39

## 2020-10-26 RX ADMIN — BACLOFEN 10 MG: 10 TABLET ORAL at 22:18

## 2020-10-26 RX ADMIN — OXYCODONE HYDROCHLORIDE AND ACETAMINOPHEN 1 TABLET: 5; 325 TABLET ORAL at 08:36

## 2020-10-26 RX ADMIN — BUSPIRONE HYDROCHLORIDE 10 MG: 10 TABLET ORAL at 14:24

## 2020-10-26 RX ADMIN — Medication 5 ML: at 10:14

## 2020-10-26 RX ADMIN — CLONAZEPAM 1 MG: 1 TABLET ORAL at 08:36

## 2020-10-26 RX ADMIN — BUDESONIDE AND FORMOTEROL FUMARATE DIHYDRATE 2 PUFF: 160; 4.5 AEROSOL RESPIRATORY (INHALATION) at 19:25

## 2020-10-26 RX ADMIN — RACEPINEPHRINE HYDROCHLORIDE 11.25 MG: 11.25 SOLUTION RESPIRATORY (INHALATION) at 10:13

## 2020-10-26 RX ADMIN — FLUTICASONE PROPIONATE 2 SPRAY: 50 SPRAY, METERED NASAL at 14:37

## 2020-10-26 RX ADMIN — LORAZEPAM 0.5 MG: 0.5 TABLET ORAL at 02:17

## 2020-10-26 RX ADMIN — BACLOFEN 10 MG: 10 TABLET ORAL at 08:41

## 2020-10-26 RX ADMIN — SODIUM CHLORIDE, PRESERVATIVE FREE 10 ML: 5 INJECTION INTRAVENOUS at 22:19

## 2020-10-26 RX ADMIN — IPRATROPIUM BROMIDE AND ALBUTEROL SULFATE 1 AMPULE: .5; 3 SOLUTION RESPIRATORY (INHALATION) at 11:42

## 2020-10-26 RX ADMIN — MORPHINE SULFATE 2 MG: 2 INJECTION, SOLUTION INTRAMUSCULAR; INTRAVENOUS at 17:53

## 2020-10-26 RX ADMIN — METOPROLOL SUCCINATE 25 MG: 25 TABLET, EXTENDED RELEASE ORAL at 08:41

## 2020-10-26 RX ADMIN — LEVOTHYROXINE SODIUM 100 MCG: 100 TABLET ORAL at 08:49

## 2020-10-26 RX ADMIN — CLONAZEPAM 1 MG: 1 TABLET ORAL at 17:46

## 2020-10-26 ASSESSMENT — PAIN SCALES - GENERAL
PAINLEVEL_OUTOF10: 6
PAINLEVEL_OUTOF10: 5
PAINLEVEL_OUTOF10: 0
PAINLEVEL_OUTOF10: 6

## 2020-10-26 NOTE — PROGRESS NOTES
Verbal order from Dr. Tianna Denny for Racepinephrine 2.25% nebulizer solution every 6 hours as needed for hemoptysis. Stated pt needs to be in BiPAP as often as possible.

## 2020-10-26 NOTE — PROGRESS NOTES
Hospitalist Progress Note      Name:  Dwana Favre /Age/Sex: 1960  (64 y.o. female)   MRN & CSN:  [id number] & [id number] Admission Date/Time: 10/19/2020 10:07 PM   Location:  -A PCP: Kirkwood Merlin, [address] Day: 8    History of Present Illness:     Chief Complaint: Sepsis ([address]  Dwana Favre is a 64 y.o.  female  who presents with abdominal pain     -patient is on bipap  -had chest drain removed  -she still has soreness in the abdomen  -nurse states she is taking diet by mouth    Ten point ROS reviewed negative, unless as noted above    Objective:        Intake/Output Summary (Last 24 hours) at 10/26/2020 1603  Last data filed at 10/26/2020 4506  Gross per 24 hour   Intake --   Output 1225 ml   Net -1225 ml      Vitals:   Vitals:    10/26/20 1144   BP:    Pulse:    Resp:    Temp:    SpO2: 94%     Physical Exam:   General Appearance: alert and oriented to in no acute distress,   Cardiovascular: RRR  Pulmonary/Chest: on bipap has expiratory wheeze  Abdomen: soft, nontender, non-distended,  no masses, has surgical wounds on abdomen  Extremities: no edema, nontender  Skin: warm and dry, no rash or erythema  Head: normocephalic and atraumatic  Eyes: no discharge  Neck: no thyromegaly   Musculoskeletal: normal range of motion, no joint swelling, deformity or tenderness  Neurological: no gross deficits    Medications:   Medications:    ipratropium-albuterol  1 ampule Inhalation Q4H WA    methylPREDNISolone  40 mg Intravenous Q8H    enoxaparin  40 mg Subcutaneous Daily    lubiprostone  24 mcg Oral BID WC    amiodarone  400 mg Oral BID    amLODIPine  10 mg Oral Daily    cefepime  2 g Intravenous Q12H    pantoprazole  40 mg Intravenous Daily    aspirin  81 mg Oral Daily    baclofen  10 mg Oral TID    budesonide-formoterol  2 puff Inhalation BID    busPIRone  10 mg Oral TID    DULoxetine  60 mg Oral Daily    ferrous gluconate  324 mg Oral BID    fluticasone  2 spray Nasal Daily    folic acid  1 mg Oral Daily    [Held by provider] hydrALAZINE  10 mg Oral TID    levothyroxine  100 mcg Oral Daily    metoprolol succinate  25 mg Oral Daily    montelukast  10 mg Oral Daily    [Held by provider] theophylline  400 mg Oral Daily    traZODone  100 mg Oral Nightly    thiamine  100 mg Oral Daily    sodium chloride flush  10 mL Intravenous 2 times per day      Infusions:     PRN Meds: racepinephrine HCl, 11.25 mg, Q6H PRN  sodium chloride nebulizer, 3 mL, Q6H PRN  morphine, 2 mg, Q3H PRN  LORazepam, 0.5 mg, Q8H PRN  clonazePAM, 1 mg, TID PRN  sodium chloride flush, 10 mL, PRN  acetaminophen, 650 mg, Q6H PRN    Or  acetaminophen, 650 mg, Q6H PRN  polyethylene glycol, 17 g, Daily PRN  promethazine, 12.5 mg, Q6H PRN    Or  ondansetron, 4 mg, Q6H PRN  oxyCODONE-acetaminophen, 1 tablet, Q6H PRN  guaiFENesin-dextromethorphan, 5 mL, Q4H PRN            Pertinent New Labs & Imaging Studies     CBC with Differential:    Lab Results   Component Value Date    WBC 12.5 10/26/2020    RBC 2.98 10/26/2020    HGB 8.4 10/26/2020    HCT 30.2 10/26/2020     10/26/2020    .3 10/26/2020    MCH 28.2 10/26/2020    MCHC 27.8 10/26/2020    RDW 13.5 10/26/2020    SEGSPCT 91.0 10/26/2020    BANDSPCT 4 08/19/2019    LYMPHOPCT 2.7 10/26/2020    MONOPCT 5.5 10/26/2020    MYELOPCT 1 04/20/2019    BASOPCT 0.1 10/26/2020    MONOSABS 0.7 10/26/2020    LYMPHSABS 0.3 10/26/2020    EOSABS 0.0 10/26/2020    BASOSABS 0.0 10/26/2020    DIFFTYPE AUTOMATED DIFFERENTIAL 10/26/2020     CMP:    Lab Results   Component Value Date     10/26/2020    K 3.6 10/26/2020    CL 96 10/26/2020    CO2 35 10/26/2020    BUN 4 10/26/2020    CREATININE 0.6 10/26/2020    GFRAA >60 10/26/2020    LABGLOM >60 10/26/2020    GLUCOSE 141 10/26/2020    PROT 4.7 10/26/2020    PROT 6.9 01/23/2013    LABALBU 2.8 10/26/2020    CALCIUM 8.3 10/26/2020    BILITOT 0.2 10/26/2020    ALKPHOS 101 10/26/2020    AST 48 10/26/2020     sinus rhythm with dig and Cardizem bolus. Anticoagulation as per cardiology. Continue amiodarone. -General surgery states that full dose anticoagulation can start tomorrow 10/27. Transaminitis likely due to acute acalculous cholecystitis's cyst s/p cholecystectomy 10/25  Acute hepatitis panel negative. Abdominal ultrasound suggestive of possible acalculous cholecystitis. -General surgery performed cholecystectomy 10/25. LFTs improving. GI has signed off. Stable from general surgery standpoint. Acute respiratory failure with hypoxia and hypercapnia  Likely multifactorial with sepsis, pericardial effusion, and A. fib with RVR, and COPD/pneumonia  -Postop respiratory acidosis improved with BiPAP. Pulmonology following. On steroids, antibiotics, and bronchodilators. LUZ MARIA-resolved  Likely from prerenal  Currently resolved    Hypertension  Continue metoprolol,   Holding hydralazine due to A. fib with RVR. Hypothyroidism  Continue Synthroid    Anemia of chronic disorders  Monitor hemoglobin  -Hemoglobin down to 8.4. Likely expected postop blood loss. Macrocytic. Check B12 and folate. Monitor for gross bleeding. Chronic back pain  Continue baclofen    Anxiety  Continue Cymbalta and Buspar    Diet DIET LOW FAT;   DVT Prophylaxis [] Lovenox, []  Heparin, [x] SCDs, [] Ambulation   GI Prophylaxis [x] PPI,  [] H2 Blocker,  [] Carafate,  [] Diet/Tube Feeds   Code Status Full Code   Disposition Need breathing to improve.    MDM [] Low, [x] Moderate,[]  High     Electronically signed by Madalyn Lares MD on 10/26/2020 at 4:03 PM

## 2020-10-26 NOTE — CONSULTS
[address] [place] [address], [address] [place] [address]                                  CONSULTATION    PATIENT NAME: Khang Nice                :        1960  MED REC NO:   [id number]                          ROOM:       2117  ACCOUNT NO:   [de-identified]                           ADMIT DATE: 10/19/2020  PROVIDER:     Lia Ragsdale MD    CONSULT DATE:  10/26/2020    HISTORY OF PRESENT ILLNESS:  The patient is a 70-year-old lady with  multiple medical problems including COPD, chronic respiratory failure,  on home oxygen, obstructive sleep apnea, bipolar disorder, who was  admitted through the emergency room with complaints of abdominal pain  with nausea and vomiting. She was also complaining of shortness of  breath on exertion. She was admitted to the hospital.  While in the  hospital, she was found to be in acute-on-chronic respiratory failure. She had a chest x-ray, which showed She denies any hemoptysis. She  denies any fever or chills. She denies any right lower lobe pneumonia. She is complaining of cough productive of whitish to yellow phlegm. She  is also complaining of streaky hemoptysis. PAST MEDICAL HISTORY:  Significant for COPD; chronic respiratory  failure, on home oxygen; bipolar disorder; obstructive sleep apnea. PAST SURGICAL HISTORY:  Remarkable for tubal ligation, colonoscopy,  cholecystectomy, carpal tunnel release surgery, cardiac catheterization,  and back surgery. FAMILY HISTORY:  Noncontributory. SOCIAL HISTORY:  Reveals that she quit smoking in , but used to  smoke one and a half packs per day for 23year-old prior to that. No  history of alcohol or drug abuse. MEDICATIONS:  Reviewed. ALLERGIES:  She is allergic to LISINOPRIL.     REVIEW OF SYSTEMS:  A 10- to 14-point review of systems was reviewed and  is negative except for what is mentioned in the history of present  illness. PHYSICAL EXAMINATION:  GENERAL:  The patient is alert and oriented x3, in no acute respiratory  distress. VITAL SIGNS:  Her blood pressure is 155/67 mmHg, pulse of 85 per minute,  and respiratory rate of 19 per minute. She is afebrile. Her saturation  is 92% on 3 liters nasal cannula. HEENT:  Essentially unremarkable. NECK:  There is mild JVD. No lymphadenopathy. The neck is supple. LUNGS:  Revealed diminished breath sounds, occasional rhonchi, and right  basilar crackles. HEART:  Showed normal S1 and S2. There was no S3 or S4 noted. NEUROLOGIC:  Reveals that she is awake and responsive, answering  questions appropriately, and moving her extremities. LABORATORY DATA:  Her electrolyte showed a sodium of 141, potassium 3.6,  chloride 96, carbon dioxide 35, BUN 4, creatinine 0.6. Her CBC showed a  white count of 12.5, hemoglobin 8.4, hematocrit 30.2. Her ABGs  yesterday showed a pH of 7.30, pCO2 of 76, and pO2 of 85 with 94%  saturation. IMAGING:  Her chest x-ray showed right lower lobe infiltrate. IMPRESSION:  1. Acute-on-chronic respiratory failure secondary to right lower lobe  pneumonia and acute exacerbation of COPD. 2.  Right lower lobe pneumonia. 3.  Acute exacerbation of COPD. 4.  Hemoptysis, most likely secondary to pneumonia. PLAN:  1. Continue present antibiotic therapy. 2.  DuoNeb q.4 h. while awake. 3.  Continue BiPAP. 4.  Racemic epinephrine p.r.n. for hemoptysis. 5.  Sputum for culture and sensitivity. 6.  Check magnesium and phosphorus. 7.  Repeat ABGs. 8.  Solu-Medrol 40 mg q.8 h.  7.  As per orders.         Nathalia Collado MD    D: 10/26/2020 11:05:26       T: 10/26/2020 17:32:21     /[email or web address]  Job#: [id number]     Doc#: [id number]    CC:

## 2020-10-26 NOTE — PROGRESS NOTES
without s/s of infection  Skin pink, warm, dry and CR brisk   No edema bilateral lower extremities       CBC:   Recent Labs     10/24/20  0430 10/25/20  0305   WBC 12.5* 10.5   HGB 9.3* 9.2*    355     BMP:    Recent Labs     10/24/20  0430 10/25/20  0305    139   K 3.5 3.4*   CL 93* 96*   CO2 35* 37*   BUN 5* 5*   CREATININE 0.6 0.7   GLUCOSE 78 117*     Magnesium:   Lab Results   Component Value Date    MG 1.7 10/25/2020     Hepatic:   Recent Labs     10/24/20  0430 10/25/20  0305   AST 98*  98* 71*   *  352* 260*   BILITOT 0.3  0.3 0.3   ALKPHOS 123  123 105     INR: No results for input(s): INR in the last 72 hours.       Intake/Output Summary (Last 24 hours) at 10/26/2020 0555  Last data filed at 10/25/2020 2352  Gross per 24 hour   Intake 1393.33 ml   Output 895 ml   Net 498.33 ml         Medications    Scheduled Medications:    enoxaparin  40 mg Subcutaneous Daily    albuterol sulfate HFA  2 puff Inhalation Q4H WA    ipratropium  2 puff Inhalation Q4H WA    lubiprostone  24 mcg Oral BID WC    amiodarone  400 mg Oral BID    amLODIPine  10 mg Oral Daily    cefepime  2 g Intravenous Q12H    pantoprazole  40 mg Intravenous Daily    aspirin  81 mg Oral Daily    baclofen  10 mg Oral TID    budesonide-formoterol  2 puff Inhalation BID    busPIRone  10 mg Oral TID    DULoxetine  60 mg Oral Daily    ferrous gluconate  324 mg Oral BID    fluticasone  2 spray Nasal Daily    folic acid  1 mg Oral Daily    [Held by provider] hydrALAZINE  10 mg Oral TID    levothyroxine  100 mcg Oral Daily    metoprolol succinate  25 mg Oral Daily    montelukast  10 mg Oral Daily    [Held by provider] theophylline  400 mg Oral Daily    traZODone  100 mg Oral Nightly    thiamine  100 mg Oral Daily    sodium chloride flush  10 mL Intravenous 2 times per day     PRN Medications: morphine, LORazepam, clonazePAM, sodium chloride flush, acetaminophen **OR** acetaminophen, polyethylene glycol, promethazine **OR** ondansetron, oxyCODONE-acetaminophen, guaiFENesin-dextromethorphan  Infusions:    sodium chloride 50 mL/hr at 10/25/20 1928         Patient Active Problem List   Diagnosis Code    Centrilobular emphysema ([facility] J43.2    Hypertension I10    Hyperlipidemia LDL goal <100 E78.5    Abnormal EKG R94.31    Palpitations R00.2    Anxiety F41.9    FH: CAD (coronary artery disease) Z82.49    Fibromyalgia M79.7    Back pain at L4-L5 level M54.5    Sleep apnea G47.30    COPD exacerbation ([facility]) J44.1    Electrolyte imbalance E87.8    Epigastric pain R10.13    COPD (chronic obstructive pulmonary disease) ([facility]) J44.9    Spinal stenosis of lumbar region with radiculopathy M48.061, M54.16    Spinal stenosis, lumbar region, without neurogenic claudication M48.061    COPD with acute exacerbation ([facility]) J44.1    Pneumonia due to infectious organism J18.9    SVT (supraventricular tachycardia) ([facility]) I47.1    Class 1 obesity due to excess calories with body mass index (BMI) of 31.0 to 31.9 in adult E66.09, Z68.31    Pneumonia J18.9    Pleural effusion J90    Atelectasis J98.11    Pulmonary nodules R91.8    Respiratory failure with hypoxia and hypercapnia ([facility]) J96.91, J96.92    Anemia D64.9    COPD with exacerbation ([facility]) J44.1    Acquired hemolytic anemia, unspecified ([facility]) D59.9    Leucocytosis D72.829    Chronic kidney disease, stage I N18.1    Hyponatremia E87.1    PNA (pneumonia) J18.9    Sepsis ([facility]) A41.9    Pericardial effusion I31.3    Acute acalculous cholecystitis K81.0       ASSESSMENT AND RECOMMENDATIONS:    Elevated LFTs, improving:  Multifactorial - secondary to sepsis, acalculous cholecystitis  Chronic liver work up unremarkable    Anemia:  Multifactorial  No gross blood from GI tract, hemoptysis noted (small amount)  Hgb stable @ 9.2  Continue PPI for gastric mucosal protection     Constipation:  Continue Amitiza 24 mcg BID  Recommend patient up to chair today    Nutrition:  Receiving low fat diet     Hypoxemia:   Recommend HFNC or bipap, spoke with RN/RT  Recommend incentive spirometry Q2 hours    Stable from GI standpoint. Will sign off, please call if needed    Discussed plan of care with patient      Patient clinical, biochemical, and radiological information discussed with Dr. Carli Burt. He agrees with the assessment and plan. Kee Ball CNP  10/26/2020  5:55 AM     Acalculous cholecystitis  LFT improving  S/p cholecystectomy    I have seen and examined this patient personally, and independently of the nurse practitioner. The plan was developed mutually at the time of the visit with the patient. Juan Carlos Mcnair and myself have spoken with patient, nursing staff and provided written and verbal instructions .     The above note has been reviewed and I agree with the Assessment,  Diagnosis, and Treatment plan as suggested by Juan Carlos Mcnair CNP      [address] [facility] gastroenterology

## 2020-10-26 NOTE — PROGRESS NOTES
Pt o2 sat 70-86% on 6LPM/NC. Pt taking medication, talking, and moving around during this time. Pt has BiPAP at bedside. HFNC applied at 8LPM. Pt has moderate amount of clear sputum with bright red streaks. Pt is without any respiratory distress at this time. Pt ordered incentive spirometer by Janis Johnson NP with GI group. Pt educated and explained on how to use IS, encouraged cough and deep breathing. At this time, pt has been titrated to 6LPM, o2 sat between 92-94%. Dr. Ryan Quiñones notified. Plan with pt is to get up to chair this morning or for lunch. Pt requested pain medication and would like to be comfortable before ambulation.

## 2020-10-26 NOTE — PLAN OF CARE
Problem: Pain:  Goal: Pain level will decrease  Description: Pain level will decrease  Outcome: Met This Shift  Goal: Control of acute pain  Description: Control of acute pain  Outcome: Met This Shift  Goal: Control of chronic pain  Description: Control of chronic pain  Outcome: Met This Shift     Problem: Falls - Risk of:  Goal: Will remain free from falls  Description: Will remain free from falls  Outcome: Ongoing  Goal: Absence of physical injury  Description: Absence of physical injury  Outcome: Ongoing     Problem: Skin Integrity:  Goal: Will show no infection signs and symptoms  Description: Will show no infection signs and symptoms  Outcome: Ongoing  Goal: Absence of new skin breakdown  Description: Absence of new skin breakdown  Outcome: Ongoing

## 2020-10-26 NOTE — PLAN OF CARE
Problem: Pain:  Goal: Pain level will decrease  Description: Pain level will decrease  10/26/2020 1546 by Africa Canales  Outcome: Met This Shift  10/26/2020 1117 by Africa Canales  Outcome: Met This Shift  Goal: Control of acute pain  Description: Control of acute pain  10/26/2020 1546 by Africa Canales  Outcome: Ongoing  10/26/2020 1117 by Africa Canales  Outcome: Met This Shift  Goal: Control of chronic pain  Description: Control of chronic pain  10/26/2020 1546 by Africa Canales  Outcome: Ongoing  10/26/2020 1117 by Africa Canales  Outcome: Met This Shift     Problem: Falls - Risk of:  Goal: Will remain free from falls  Description: Will remain free from falls  10/26/2020 1546 by Africa Canales  Outcome: Ongoing  10/26/2020 1117 by Africa Canales  Outcome: Ongoing  Goal: Absence of physical injury  Description: Absence of physical injury  10/26/2020 1546 by Africa Canales  Outcome: Met This Shift  10/26/2020 1117 by Africa Canales  Outcome: Ongoing     Problem: Skin Integrity:  Goal: Will show no infection signs and symptoms  Description: Will show no infection signs and symptoms  10/26/2020 1546 by Africa Canales  Outcome: Ongoing  10/26/2020 1117 by Africa Canales  Outcome: Ongoing  Goal: Absence of new skin breakdown  Description: Absence of new skin breakdown  10/26/2020 1546 by Africa Canales  Outcome: Met This Shift  10/26/2020 1117 by Africa Canales  Outcome: Ongoing

## 2020-10-26 NOTE — PROGRESS NOTES
Su sent to hospitalist regarding patient's blood tinged mucous/sputums when she was coughing on a couple of occurrences. Also notified that patient was intubated for a lap afshin yesterday. Waiting for response. Electronically signed by Adriana Daine RN on 10/26/2020 at 6:12 AM    Response at 3490: monitor and notify if there is more.

## 2020-10-26 NOTE — PROGRESS NOTES
Comprehensive Nutrition Assessment    Type and Reason for Visit:  Initial(los)    Nutrition Recommendations/Plan:   Continue current diet as ordered  Encourage po intake as able  ONS available PRN   Will continue to monitor nutrition status, poc    Nutrition Assessment:  Pt admitted for pericardial effusion, PNA, POD #1 lap afshin w/ IOC, PMH of thyroid disease, HTN, HLD, COPD, pt currenly on low fat diet, no meals documented at this time, pt is at moderate nutrition risk at this time    Malnutrition Assessment:  Malnutrition Status:  Insufficient data    Context:  Acute Illness(pt unavailable at time of visit attempt)     Findings of the 6 clinical characteristics of malnutrition:  Energy Intake:  Unable to assess  Weight Loss:  No significant weight loss     Body Fat Loss:  Unable to assess     Muscle Mass Loss:  Unable to assess    Fluid Accumulation:  Unable to assess     Strength:  Not Performed    Estimated Daily Nutrient Needs:  Energy (kcal):  0511-3336; Weight Used for Energy Requirements:  Current     Protein (g):  60-70(1.2-1.4 g/kg); Weight Used for Protein Requirements:  Ideal        Fluid (ml/day):  4705-2878(1 ml/kcal); Weight Used for Fluid Requirements:  Current      Wounds:  Surgical Wound       Current Nutrition Therapies:    DIET LOW FAT;     Anthropometric Measures:  · Height: 5' 2.01\" (157.5 cm)  · Current Body Weight: 204 lb 12.9 oz (92.9 kg)   · Admission Body Weight: 183 lb 6.8 oz (83.2 kg)    · Usual Body Weight: 200 lb 9.9 oz (91 kg)((7/11/20) per chart review)     · Ideal Body Weight: 110 lbs; % Ideal Body Weight 186.2 %   · BMI: 37.5    · BMI Categories: Obese Class 2 (BMI 35.0 -39.9)       Nutrition Diagnosis:   · Overweight/Obese related to excessive energy intake as evidenced by BMI    Nutrition Interventions:   Food and/or Nutrient Delivery:  Continue Current Diet  Nutrition Education/Counseling:  No recommendation at this time   Coordination of Nutrition Care:  Continued Inpatient Monitoring    Goals:  pt will consume greater than 75% of meals       Nutrition Monitoring and Evaluation:   Food/Nutrient Intake Outcomes:  Food and Nutrient Intake  Physical Signs/Symptoms Outcomes:  Biochemical Data, Weight, GI Status     Discharge Planning:     Too soon to determine     Electronically signed by Haley De La O MS, RD, LD on 10/26/20 at 2:45 PM EDT    Contact: [phone]

## 2020-10-26 NOTE — PROGRESS NOTES
CARDIOLOGY  NOTE        Name:  Sudheer Mari /Age/Sex: 1960  (64 y.o. female)   MRN & CSN:  [id number] & [id number] Admission Date/Time: 10/19/2020 10:07 PM   Location:  -A PCP: Claudia Wooten MD       Hospital Day: 8        PLAN FROM CARDIOLOGY FOR TODAY:   start anticoag       - cardiology consult is for:  Per effusion, a fib    -  Interval history:  Drain removed    · ASSESSMENT/ PLAN:  1. A fib HR control and anticoag          Subjective: Todays complain: Patient  No CP    HPI:  Reji Stanford is a 64 y. o.year old who and presents with had concerns including Cough; Headache; and Chills. Chief Complaint   Patient presents with    Cough    Headache    Chills           Objective: Temperature:  Current - Temp: 97.8 °F (36.6 °C);  Max - Temp  Av.8 °F (36.6 °C)  Min: 97.5 °F (36.4 °C)  Max: 98.1 °F (36.7 °C)    Respiratory Rate : Resp  Av.6  Min: 11  Max: 22    Pulse Range: Pulse  Av.7  Min: 71  Max: 96    Blood Presuure Range:  Systolic (62JIH), XCN:157 , Min:101 , BNX:488   ; Diastolic (72HRF), VOY:71, Min:49, Max:79      Pulse ox Range: SpO2  Av.4 %  Min: 85 %  Max: 99 %    24hr I & O:      Intake/Output Summary (Last 24 hours) at 10/26/2020 1545  Last data filed at 10/26/2020 9939  Gross per 24 hour   Intake --   Output 1225 ml   Net -1225 ml         /79   Pulse 96   Temp 97.8 °F (36.6 °C) (Oral)   Resp 17   Ht 5' 2.01\" (1.575 m)   Wt 204 lb 12.9 oz (92.9 kg)   SpO2 94%   BMI 37.45 kg/m²           Review of Systems:  All 14 systems reviewed, all negative       TELEMETRY: Atrial flutter    has a past medical history of Anxiety, Arthritis, Back pain at L4-L5 level, Bipolar 1 disorder (HCC), COPD (chronic obstructive pulmonary disease) ([facility] [address] Emphysema of lung ([facility] [address] FH: CAD (coronary artery disease), Fibromyalgia, H/O Doppler ultrasound, H/O echocardiogram, History of blood transfusion, Hyperlipidemia LDL goal <100, Hypertension, Obstructive sleep apnea, Osteoarthritis, and Thyroid disease. has a past surgical history that includes Carpal tunnel release; Tubal ligation; back surgery; Cardiac catheterization; Colonoscopy (N/A, 12/12/2019); and Cholecystectomy, laparoscopic (N/A, 10/25/2020).   Physical Exam:  General:  Awake, alert, NAD  Head:normal  Eye:normal  Neck:  No JVD   Chest:  Clear to auscultation, respiration easy  Cardiovascular:  RRR S1S2  Abdomen:   nontender  Extremities:  no edema  Pulses; palpable  Neuro: grossly normal    Medications:    ipratropium-albuterol  1 ampule Inhalation Q4H WA    methylPREDNISolone  40 mg Intravenous Q8H    enoxaparin  40 mg Subcutaneous Daily    lubiprostone  24 mcg Oral BID WC    amiodarone  400 mg Oral BID    amLODIPine  10 mg Oral Daily    cefepime  2 g Intravenous Q12H    pantoprazole  40 mg Intravenous Daily    aspirin  81 mg Oral Daily    baclofen  10 mg Oral TID    budesonide-formoterol  2 puff Inhalation BID    busPIRone  10 mg Oral TID    DULoxetine  60 mg Oral Daily    ferrous gluconate  324 mg Oral BID    fluticasone  2 spray Nasal Daily    folic acid  1 mg Oral Daily    [Held by provider] hydrALAZINE  10 mg Oral TID    levothyroxine  100 mcg Oral Daily    metoprolol succinate  25 mg Oral Daily    montelukast  10 mg Oral Daily    [Held by provider] theophylline  400 mg Oral Daily    traZODone  100 mg Oral Nightly    thiamine  100 mg Oral Daily    sodium chloride flush  10 mL Intravenous 2 times per day       racepinephrine HCl, sodium chloride nebulizer, morphine, LORazepam, clonazePAM, sodium chloride flush, acetaminophen **OR** acetaminophen, polyethylene glycol, promethazine **OR** ondansetron, oxyCODONE-acetaminophen, guaiFENesin-dextromethorphan    Lab Data:  CBC:   Recent Labs     10/24/20  0430 10/25/20  0305 10/26/20  0530   WBC 12.5* 10.5 12.5*   HGB 9.3* 9.2* 8.4*   HCT 32.2* 31.8* 30.2*   MCV 99.7 100.3* 101.3*    355 344     BMP:   Recent Labs     10/24/20  0430 10/25/20  0305 10/26/20  0530    139 141   K 3.5 3.4* 3.6   CL 93* 96* 96*   CO2 35* 37* 35*   BUN 5* 5* 4*   CREATININE 0.6 0.7 0.6     LIVER PROFILE:   Recent Labs     10/24/20  0430 10/25/20  0305 10/26/20  0530   AST 98*  98* 71* 48*   *  352* 260* 183*   LIPASE 9*  --   --    BILIDIR 0.2  --   --    BILITOT 0.3  0.3 0.3 0.2   ALKPHOS 123  123 105 101     PT/INR:   Recent Labs     10/26/20  1210   PROTIME 13.2   INR 1.09     APTT: No results for input(s): APTT in the last 72 hours. BNP:  No results for input(s): BNP in the last 72 hours.   TROPONIN: @TROPONINI:3@  Labs, consult, tests reviewed    Assessment/ PLAN:    As above                  Alexandro Hill MD 10/26/2020 3:45 PM

## 2020-10-26 NOTE — PROGRESS NOTES
GENERAL SURGERY PROGRESS NOTE    CC/HPI:           Patient feels better from yesterday's surgery. Vitals:    10/26/20 0800 10/26/20 0830 10/26/20 1144 10/26/20 1438   BP: (!) 155/67 106/79     Pulse: 85 96     Resp: 19 17     Temp:       TempSrc:       SpO2: (!) 89% (!) 85% 94%    Weight:       Height:    5' 2.01\" (1.575 m)     I/O last 3 completed shifts:  In: -   Out: 1225 [Urine:1225]  No intake/output data recorded. DIET LOW FAT;     Recent Results (from the past 48 hour(s))   Comprehensive Metabolic Panel w/ Reflex to MG    Collection Time: 10/25/20  3:05 AM   Result Value Ref Range    Sodium 139 135 - 145 MMOL/L    Potassium 3.4 (L) 3.5 - 5.1 MMOL/L    Chloride 96 (L) 99 - 110 mMol/L    CO2 37 (H) 21 - 32 MMOL/L    BUN 5 (L) 6 - 23 MG/DL    CREATININE 0.7 0.6 - 1.1 MG/DL    Glucose 117 (H) 70 - 99 MG/DL    Calcium 8.6 8.3 - 10.6 MG/DL    Alb 2.8 (L) 3.4 - 5.0 GM/DL    Total Protein 4.9 (L) 6.4 - 8.2 GM/DL    Total Bilirubin 0.3 0.0 - 1.0 MG/DL     (H) 10 - 40 U/L    AST 71 (H) 15 - 37 IU/L    Alkaline Phosphatase 105 40 - 128 IU/L    GFR Non-African American >60 >60 mL/min/1.73m2    GFR African American >60 >60 mL/min/1.73m2    Anion Gap 6 4 - 16   CBC Auto Differential    Collection Time: 10/25/20  3:05 AM   Result Value Ref Range    WBC 10.5 4.0 - 10.5 K/CU MM    RBC 3.17 (L) 4.2 - 5.4 M/CU MM    Hemoglobin 9.2 (L) 12.5 - 16.0 GM/DL    Hematocrit 31.8 (L) 37 - 47 %    .3 (H) 78 - 100 FL    MCH 29.0 27 - 31 PG    MCHC 28.9 (L) 32.0 - 36.0 %    RDW 13.7 11.7 - 14.9 %    Platelets 818 863 - 997 K/CU MM    MPV 9.4 7.5 - 11.1 FL    Differential Type AUTOMATED DIFFERENTIAL     Segs Relative 77.3 (H) 36 - 66 %    Lymphocytes % 8.4 (L) 24 - 44 %    Monocytes % 11.9 (H) 0 - 4 %    Eosinophils % 1.1 0 - 3 %    Basophils % 0.4 0 - 1 %    Segs Absolute 8.1 K/CU MM    Lymphocytes Absolute 0.9 K/CU MM    Monocytes Absolute 1.2 K/CU MM    Eosinophils Absolute 0.1 K/CU MM    Basophils Absolute 0.0 K/CU MM    Nucleated RBC % 0.2 %    Total Nucleated RBC 0.0 K/CU MM    Total Immature Neutrophil 0.09 K/CU MM    Immature Neutrophil % 0.9 (H) 0 - 0.43 %   Magnesium    Collection Time: 10/25/20  3:05 AM   Result Value Ref Range    Magnesium 1.7 (L) 1.8 - 2.4 mg/dl   Blood gas, arterial    Collection Time: 10/25/20  2:45 PM   Result Value Ref Range    pH, Bld 7.24 (L) 7.34 - 7.45    pCO2, Arterial 90.0 (H) 32 - 45 MMHG    pO2, Arterial 60 (L) 75 - 100 MMHG    Base Exc, Mixed 7.7 (H) 0 - 2.3    HCO3, Arterial 38.6 (H) 18 - 23 MMOL/L    CO2 Content 41.4 (H) 19 - 24 MMOL/L    O2 Sat 89.8 (L) 96 - 97 %    Carbon Monoxide, Blood 2.6 0 - 5 %    Methemoglobin, Arterial 1.1 <1.5 %    Comment 18/8 .6    Blood gas, arterial    Collection Time: 10/25/20  3:45 PM   Result Value Ref Range    pH, Bld 7.30 (L) 7.34 - 7.45    pCO2, Arterial 76.0 (H) 32 - 45 MMHG    pO2, Arterial 85 75 - 100 MMHG    Base Exc, Mixed 8 (H) 0 - 2.3    HCO3, Arterial 37.4 (H) 18 - 23 MMOL/L    CO2 Content 39.7 (H) 19 - 24 MMOL/L    O2 Sat 94.3 (L) 96 - 97 %    Carbon Monoxide, Blood 2.0 0 - 5 %    Methemoglobin, Arterial 1.7 (H) <1.5 %    Comment BIAP 20/8  .70    Comprehensive Metabolic Panel w/ Reflex to MG    Collection Time: 10/26/20  5:30 AM   Result Value Ref Range    Sodium 141 135 - 145 MMOL/L    Potassium 3.6 3.5 - 5.1 MMOL/L    Chloride 96 (L) 99 - 110 mMol/L    CO2 35 (H) 21 - 32 MMOL/L    BUN 4 (L) 6 - 23 MG/DL    CREATININE 0.6 0.6 - 1.1 MG/DL    Glucose 141 (H) 70 - 99 MG/DL    Calcium 8.3 8.3 - 10.6 MG/DL    Alb 2.8 (L) 3.4 - 5.0 GM/DL    Total Protein 4.7 (L) 6.4 - 8.2 GM/DL    Total Bilirubin 0.2 0.0 - 1.0 MG/DL     (H) 10 - 40 U/L    AST 48 (H) 15 - 37 IU/L    Alkaline Phosphatase 101 40 - 129 IU/L    GFR Non-African American >60 >60 mL/min/1.73m2    GFR African American >60 >60 mL/min/1.73m2    Anion Gap 10 4 - 16   CBC Auto Differential    Collection Time: 10/26/20  5:30 AM   Result Value Ref Range    WBC 12.5 (H) 4.0 - 10.5 K/CU MM    RBC 2.98 (L) 4.2 - 5.4 M/CU MM    Hemoglobin 8.4 (L) 12.5 - 16.0 GM/DL    Hematocrit 30.2 (L) 37 - 47 %    .3 (H) 78 - 100 FL    MCH 28.2 27 - 31 PG    MCHC 27.8 (L) 32.0 - 36.0 %    RDW 13.5 11.7 - 14.9 %    Platelets 756 606 - 168 K/CU MM    MPV 9.6 7.5 - 11.1 FL    Differential Type AUTOMATED DIFFERENTIAL     Segs Relative 91.0 (H) 36 - 66 %    Lymphocytes % 2.7 (L) 24 - 44 %    Monocytes % 5.5 (H) 0 - 4 %    Eosinophils % 0.0 0 - 3 %    Basophils % 0.1 0 - 1 %    Segs Absolute 11.4 K/CU MM    Lymphocytes Absolute 0.3 K/CU MM    Monocytes Absolute 0.7 K/CU MM    Eosinophils Absolute 0.0 K/CU MM    Basophils Absolute 0.0 K/CU MM    Nucleated RBC % 0.0 %    Total Nucleated RBC 0.0 K/CU MM    Total Immature Neutrophil 0.09 K/CU MM    Immature Neutrophil % 0.7 (H) 0 - 0.43 %   Brain Natriuretic Peptide    Collection Time: 10/26/20  5:30 AM   Result Value Ref Range    Pro-BNP 4,841 (H) <300 PG/ML   Blood Gas, Arterial    Collection Time: 10/26/20 11:00 AM   Result Value Ref Range    pH, Bld 7.37 7.34 - 7.45    pCO2, Arterial 80.0 (H) 32 - 45 MMHG    pO2, Arterial 62 (L) 75 - 100 MMHG    Base Exc, Mixed 16.7 (H) 0 - 2.3    HCO3, Arterial 46.2 (H) 18 - 23 MMOL/L    CO2 Content 48.7 (H) 19 - 24 MMOL/L    O2 Sat 92.1 (L) 96 - 97 %    Carbon Monoxide, Blood 2.9 0 - 5 %    Methemoglobin, Arterial 1.2 <1.5 %   Protime-INR    Collection Time: 10/26/20 12:10 PM   Result Value Ref Range    Protime 13.2 11.7 - 14.5 SECONDS    INR 1.09 INDEX       Scheduled Meds:   ipratropium-albuterol  1 ampule Inhalation Q4H WA    methylPREDNISolone  40 mg Intravenous Q8H    enoxaparin  40 mg Subcutaneous Daily    lubiprostone  24 mcg Oral BID WC    amiodarone  400 mg Oral BID    amLODIPine  10 mg Oral Daily    cefepime  2 g Intravenous Q12H    pantoprazole  40 mg Intravenous Daily    aspirin  81 mg Oral Daily    baclofen  10 mg Oral TID    budesonide-formoterol  2 puff Inhalation BID    busPIRone  10 mg Oral TID    DULoxetine  60 mg Oral Daily    ferrous gluconate  324 mg Oral BID    fluticasone  2 spray Nasal Daily    folic acid  1 mg Oral Daily    [Held by provider] hydrALAZINE  10 mg Oral TID    levothyroxine  100 mcg Oral Daily    metoprolol succinate  25 mg Oral Daily    montelukast  10 mg Oral Daily    [Held by provider] theophylline  400 mg Oral Daily    traZODone  100 mg Oral Nightly    thiamine  100 mg Oral Daily    sodium chloride flush  10 mL Intravenous 2 times per day       Continuous Infusions:    Physical Exam:  HEENT: Anicteric sclerae, Oropharyngeal mucosae moist, pink and intact. Heart:  Normal S1 and S2, RRR  Lungs: Clear to auscultation bilaterally, No audible Wheezes or Rales. Extremities: No edema. Neuro: Alert and Oriented x 3, Non focal.  Abdomen: Soft, Benign, Non tender, Non distended, Positive bowel sounds. Incision: Nicely healing: No erythema, No discharge. Principal Problem:    Sepsis ([address]  Active Problems:    PNA (pneumonia)    Pericardial effusion    Acute acalculous cholecystitis  Resolved Problems:    * No resolved hospital problems. *      Assessment and Plan:  Chaz Moreno is a 64 y.o. female who is POD # 1 status post:  Laparoscopic cholecystectomy, with Intra operative cholangiogram performed under direct fluoroscopy. Doing very well, and tolerating diet, may be discharged from the surgical standpoint and will follow her as OP next week. Increase Ambulation to at least 4x/day walk in the hallways with assistance. Respiratory ingrid-operative care: Incentive Spirometry / deep breathing and coughing 10x/hr while awake. Continue DVT prophylaxis with Teds and SCDs and SC Lovenox. Continue GI prophylaxis with Protonix IV till able to tolerate PO.   Continue ingrid-op Abx for 24 hrs after surgery then dc.    ___________________________________________    Mary Jane Fields MD, FACS, FICS  10/26/2020  3:53 PM

## 2020-10-27 LAB
ALBUMIN SERPL-MCNC: 2.9 GM/DL (ref 3.4–5)
ALP BLD-CCNC: 83 IU/L (ref 40–128)
ALT SERPL-CCNC: 134 U/L (ref 10–40)
ANION GAP SERPL CALCULATED.3IONS-SCNC: 8 MMOL/L (ref 4–16)
AST SERPL-CCNC: 29 IU/L (ref 15–37)
BASOPHILS ABSOLUTE: 0 K/CU MM
BASOPHILS RELATIVE PERCENT: 0 % (ref 0–1)
BILIRUB SERPL-MCNC: 0.2 MG/DL (ref 0–1)
BUN BLDV-MCNC: 8 MG/DL (ref 6–23)
CALCIUM SERPL-MCNC: 8.5 MG/DL (ref 8.3–10.6)
CHLORIDE BLD-SCNC: 93 MMOL/L (ref 99–110)
CO2: 39 MMOL/L (ref 21–32)
CREAT SERPL-MCNC: 0.6 MG/DL (ref 0.6–1.1)
CULTURE: NORMAL
DIFFERENTIAL TYPE: ABNORMAL
EOSINOPHILS ABSOLUTE: 0 K/CU MM
EOSINOPHILS RELATIVE PERCENT: 0 % (ref 0–3)
GFR AFRICAN AMERICAN: >60 ML/MIN/1.73M2
GFR NON-AFRICAN AMERICAN: >60 ML/MIN/1.73M2
GLUCOSE BLD-MCNC: 167 MG/DL (ref 70–99)
GRAM SMEAR: NORMAL
HCT VFR BLD CALC: 28.3 % (ref 37–47)
HEMOGLOBIN: 8.1 GM/DL (ref 12.5–16)
IMMATURE NEUTROPHIL %: 0.7 % (ref 0–0.43)
LYMPHOCYTES ABSOLUTE: 0.3 K/CU MM
LYMPHOCYTES RELATIVE PERCENT: 3.1 % (ref 24–44)
Lab: NORMAL
MAGNESIUM: 1.8 MG/DL (ref 1.8–2.4)
MCH RBC QN AUTO: 28.5 PG (ref 27–31)
MCHC RBC AUTO-ENTMCNC: 28.6 % (ref 32–36)
MCV RBC AUTO: 99.6 FL (ref 78–100)
MONOCYTES ABSOLUTE: 0.4 K/CU MM
MONOCYTES RELATIVE PERCENT: 4 % (ref 0–4)
NUCLEATED RBC %: 0 %
PDW BLD-RTO: 13.5 % (ref 11.7–14.9)
PHOSPHORUS: 1.8 MG/DL (ref 2.5–4.9)
PLATELET # BLD: 326 K/CU MM (ref 140–440)
PMV BLD AUTO: 9.6 FL (ref 7.5–11.1)
POTASSIUM SERPL-SCNC: 3.4 MMOL/L (ref 3.5–5.1)
PRO-BNP: 5073 PG/ML
PROCALCITONIN: 0.19
RBC # BLD: 2.84 M/CU MM (ref 4.2–5.4)
SEGMENTED NEUTROPHILS ABSOLUTE COUNT: 9.1 K/CU MM
SEGMENTED NEUTROPHILS RELATIVE PERCENT: 92.2 % (ref 36–66)
SODIUM BLD-SCNC: 140 MMOL/L (ref 135–145)
SPECIMEN: NORMAL
TOTAL IMMATURE NEUTOROPHIL: 0.07 K/CU MM
TOTAL NUCLEATED RBC: 0 K/CU MM
TOTAL PROTEIN: 4.8 GM/DL (ref 6.4–8.2)
WBC # BLD: 9.9 K/CU MM (ref 4–10.5)

## 2020-10-27 PROCEDURE — 84100 ASSAY OF PHOSPHORUS: CPT

## 2020-10-27 PROCEDURE — 6360000002 HC RX W HCPCS: Performed by: SURGERY

## 2020-10-27 PROCEDURE — 6360000002 HC RX W HCPCS: Performed by: INTERNAL MEDICINE

## 2020-10-27 PROCEDURE — 2000000000 HC ICU R&B

## 2020-10-27 PROCEDURE — 94761 N-INVAS EAR/PLS OXIMETRY MLT: CPT

## 2020-10-27 PROCEDURE — 93226 XTRNL ECG REC<48 HR SCAN A/R: CPT

## 2020-10-27 PROCEDURE — 2580000003 HC RX 258: Performed by: SURGERY

## 2020-10-27 PROCEDURE — 83735 ASSAY OF MAGNESIUM: CPT

## 2020-10-27 PROCEDURE — 84145 PROCALCITONIN (PCT): CPT

## 2020-10-27 PROCEDURE — 94640 AIRWAY INHALATION TREATMENT: CPT

## 2020-10-27 PROCEDURE — 6370000000 HC RX 637 (ALT 250 FOR IP): Performed by: SURGERY

## 2020-10-27 PROCEDURE — C9113 INJ PANTOPRAZOLE SODIUM, VIA: HCPCS | Performed by: SURGERY

## 2020-10-27 PROCEDURE — 94150 VITAL CAPACITY TEST: CPT

## 2020-10-27 PROCEDURE — 83880 ASSAY OF NATRIURETIC PEPTIDE: CPT

## 2020-10-27 PROCEDURE — 2500000003 HC RX 250 WO HCPCS: Performed by: INTERNAL MEDICINE

## 2020-10-27 PROCEDURE — 93225 XTRNL ECG REC<48 HRS REC: CPT

## 2020-10-27 PROCEDURE — 80053 COMPREHEN METABOLIC PANEL: CPT

## 2020-10-27 PROCEDURE — 82150 ASSAY OF AMYLASE: CPT

## 2020-10-27 PROCEDURE — 2580000003 HC RX 258: Performed by: INTERNAL MEDICINE

## 2020-10-27 PROCEDURE — 94660 CPAP INITIATION&MGMT: CPT

## 2020-10-27 PROCEDURE — 99232 SBSQ HOSP IP/OBS MODERATE 35: CPT | Performed by: INTERNAL MEDICINE

## 2020-10-27 PROCEDURE — 6370000000 HC RX 637 (ALT 250 FOR IP): Performed by: INTERNAL MEDICINE

## 2020-10-27 PROCEDURE — 47563 LAPARO CHOLECYSTECTOMY/GRAPH: CPT | Performed by: NURSE PRACTITIONER

## 2020-10-27 PROCEDURE — 36415 COLL VENOUS BLD VENIPUNCTURE: CPT

## 2020-10-27 PROCEDURE — 94664 DEMO&/EVAL PT USE INHALER: CPT

## 2020-10-27 PROCEDURE — 2700000000 HC OXYGEN THERAPY PER DAY

## 2020-10-27 PROCEDURE — 85025 COMPLETE CBC W/AUTO DIFF WBC: CPT

## 2020-10-27 RX ORDER — AMIODARONE HYDROCHLORIDE 200 MG/1
200 TABLET ORAL DAILY
Status: DISCONTINUED | OUTPATIENT
Start: 2020-10-28 | End: 2020-10-30 | Stop reason: HOSPADM

## 2020-10-27 RX ORDER — FUROSEMIDE 10 MG/ML
20 INJECTION INTRAMUSCULAR; INTRAVENOUS ONCE
Status: COMPLETED | OUTPATIENT
Start: 2020-10-27 | End: 2020-10-27

## 2020-10-27 RX ADMIN — BACLOFEN 10 MG: 10 TABLET ORAL at 13:59

## 2020-10-27 RX ADMIN — OXYCODONE HYDROCHLORIDE AND ACETAMINOPHEN 1 TABLET: 5; 325 TABLET ORAL at 10:09

## 2020-10-27 RX ADMIN — BUSPIRONE HYDROCHLORIDE 10 MG: 10 TABLET ORAL at 20:50

## 2020-10-27 RX ADMIN — BUSPIRONE HYDROCHLORIDE 10 MG: 10 TABLET ORAL at 08:18

## 2020-10-27 RX ADMIN — IPRATROPIUM BROMIDE AND ALBUTEROL SULFATE 1 AMPULE: .5; 3 SOLUTION RESPIRATORY (INHALATION) at 15:14

## 2020-10-27 RX ADMIN — IPRATROPIUM BROMIDE AND ALBUTEROL SULFATE 1 AMPULE: .5; 3 SOLUTION RESPIRATORY (INHALATION) at 19:08

## 2020-10-27 RX ADMIN — Medication 100 MG: at 08:17

## 2020-10-27 RX ADMIN — CEFEPIME 2 G: 2 INJECTION, POWDER, FOR SOLUTION INTRAVENOUS at 03:19

## 2020-10-27 RX ADMIN — SODIUM CHLORIDE, PRESERVATIVE FREE 10 ML: 5 INJECTION INTRAVENOUS at 20:50

## 2020-10-27 RX ADMIN — BACLOFEN 10 MG: 10 TABLET ORAL at 20:50

## 2020-10-27 RX ADMIN — POTASSIUM PHOSPHATE, MONOBASIC AND POTASSIUM PHOSPHATE, DIBASIC 10 MMOL: 224; 236 INJECTION, SOLUTION, CONCENTRATE INTRAVENOUS at 11:56

## 2020-10-27 RX ADMIN — LORAZEPAM 0.5 MG: 0.5 TABLET ORAL at 11:56

## 2020-10-27 RX ADMIN — BUDESONIDE AND FORMOTEROL FUMARATE DIHYDRATE 2 PUFF: 160; 4.5 AEROSOL RESPIRATORY (INHALATION) at 07:28

## 2020-10-27 RX ADMIN — LUBIPROSTONE 24 MCG: 24 CAPSULE, GELATIN COATED ORAL at 17:20

## 2020-10-27 RX ADMIN — FOLIC ACID 1 MG: 1 TABLET ORAL at 08:18

## 2020-10-27 RX ADMIN — ASPIRIN 81 MG CHEWABLE TABLET 81 MG: 81 TABLET CHEWABLE at 08:18

## 2020-10-27 RX ADMIN — PANTOPRAZOLE SODIUM 40 MG: 40 INJECTION, POWDER, FOR SOLUTION INTRAVENOUS at 06:27

## 2020-10-27 RX ADMIN — ENOXAPARIN SODIUM 40 MG: 40 INJECTION SUBCUTANEOUS at 08:19

## 2020-10-27 RX ADMIN — SODIUM CHLORIDE, PRESERVATIVE FREE 10 ML: 5 INJECTION INTRAVENOUS at 08:19

## 2020-10-27 RX ADMIN — FUROSEMIDE 20 MG: 10 INJECTION, SOLUTION INTRAVENOUS at 11:56

## 2020-10-27 RX ADMIN — ACETAMINOPHEN 650 MG: 325 TABLET ORAL at 20:49

## 2020-10-27 RX ADMIN — FERROUS GLUCONATE TAB 324 MG (37.5 MG ELEMENTAL IRON) 324 MG: 324 (37.5 FE) TAB at 20:49

## 2020-10-27 RX ADMIN — BUSPIRONE HYDROCHLORIDE 10 MG: 10 TABLET ORAL at 14:14

## 2020-10-27 RX ADMIN — METOPROLOL SUCCINATE 25 MG: 25 TABLET, EXTENDED RELEASE ORAL at 08:19

## 2020-10-27 RX ADMIN — LEVOTHYROXINE SODIUM 100 MCG: 100 TABLET ORAL at 06:27

## 2020-10-27 RX ADMIN — FLUTICASONE PROPIONATE 2 SPRAY: 50 SPRAY, METERED NASAL at 08:20

## 2020-10-27 RX ADMIN — LUBIPROSTONE 24 MCG: 24 CAPSULE, GELATIN COATED ORAL at 08:18

## 2020-10-27 RX ADMIN — FERROUS GLUCONATE TAB 324 MG (37.5 MG ELEMENTAL IRON) 324 MG: 324 (37.5 FE) TAB at 08:18

## 2020-10-27 RX ADMIN — MONTELUKAST 10 MG: 10 TABLET, FILM COATED ORAL at 08:19

## 2020-10-27 RX ADMIN — METHYLPREDNISOLONE SODIUM SUCCINATE 40 MG: 40 INJECTION, POWDER, FOR SOLUTION INTRAMUSCULAR; INTRAVENOUS at 03:19

## 2020-10-27 RX ADMIN — IPRATROPIUM BROMIDE AND ALBUTEROL SULFATE 1 AMPULE: .5; 3 SOLUTION RESPIRATORY (INHALATION) at 11:54

## 2020-10-27 RX ADMIN — TRAZODONE HYDROCHLORIDE 100 MG: 50 TABLET ORAL at 20:49

## 2020-10-27 RX ADMIN — AMIODARONE HYDROCHLORIDE 400 MG: 200 TABLET ORAL at 08:18

## 2020-10-27 RX ADMIN — AMLODIPINE BESYLATE 10 MG: 10 TABLET ORAL at 08:18

## 2020-10-27 RX ADMIN — METHYLPREDNISOLONE SODIUM SUCCINATE 40 MG: 40 INJECTION, POWDER, FOR SOLUTION INTRAMUSCULAR; INTRAVENOUS at 20:55

## 2020-10-27 RX ADMIN — BUDESONIDE AND FORMOTEROL FUMARATE DIHYDRATE 2 PUFF: 160; 4.5 AEROSOL RESPIRATORY (INHALATION) at 19:08

## 2020-10-27 RX ADMIN — DULOXETINE HYDROCHLORIDE 60 MG: 30 CAPSULE, DELAYED RELEASE ORAL at 08:18

## 2020-10-27 RX ADMIN — IPRATROPIUM BROMIDE AND ALBUTEROL SULFATE 1 AMPULE: .5; 3 SOLUTION RESPIRATORY (INHALATION) at 07:21

## 2020-10-27 RX ADMIN — METHYLPREDNISOLONE SODIUM SUCCINATE 40 MG: 40 INJECTION, POWDER, FOR SOLUTION INTRAMUSCULAR; INTRAVENOUS at 11:56

## 2020-10-27 RX ADMIN — BACLOFEN 10 MG: 10 TABLET ORAL at 08:18

## 2020-10-27 ASSESSMENT — PAIN DESCRIPTION - PAIN TYPE: TYPE: SURGICAL PAIN

## 2020-10-27 ASSESSMENT — PAIN SCALES - GENERAL
PAINLEVEL_OUTOF10: 5
PAINLEVEL_OUTOF10: 0
PAINLEVEL_OUTOF10: 0
PAINLEVEL_OUTOF10: 4
PAINLEVEL_OUTOF10: 3
PAINLEVEL_OUTOF10: 0
PAINLEVEL_OUTOF10: 5

## 2020-10-27 ASSESSMENT — PAIN DESCRIPTION - LOCATION: LOCATION: ABDOMEN

## 2020-10-27 NOTE — PROGRESS NOTES
Hospitalist Progress Note      Name:  Kole Jorge /Age/Sex: 1960  (64 y.o. female)   MRN & CSN:  [id number] & [id number] Admission Date/Time: 10/19/2020 10:07 PM   Location:  -A PCP: Mychal Herrera MD         Hospital Day: 9  DISPOSITION: Home    ASSESSMENT/PLAN:  Kole Jorge is a 64 y.o.  female who presented to the hospital with a complaint of chills, fatigue, abdominal pain nausea and vomiting. CT abdomen pelvis showed moderate-sized pericardial effusion. Gallbladder showed no gallbladder wall thickening with evidence of edema in the gallbladder wall suggesting possibility of calculus cholecystitis. LFTs were noted to be elevated with a LT of 776 and AST of 654 at the highest.  WBC on admission 26. Lactic acid 3.6. She underwent lap afshin on 10/25. She underwent successful pericardiocentesis with removal of 500 cc on 10/23. Sepsis----likely secondary to acalculous cholecystitis. WBC on admission 26. WBC normalized. S/p laparoscopic cholecystectomy. N  Remains afebrile. Blood pressure stable. Sepsis resolved. Blood cultures no growth. Respiratory viral panel negative. Cefepime DC'd on 10/27  -Continue monitoring of antibiotics    Pericardial effusion----s/p pericardiocentesis with removal of 500 cc on 10/23. Pericardial drain removed on 10/25. Cytology negative. Acute cholecystitis----s/p lap afshin on 10/25. Peak . Peak . LFTs significantly improved.     Hypertension---BP controlled  -Continue Toprol-XL  -Continue amlodipine    Hypothyroidism---on levothyroxine    Depression/anxiety----continue BuSpar      MEDICAL DECISION MAKING:  -Labs reviewed  -Imaging reviewed  -Level of risk moderate  Diet DIET LOW FAT;   DVT Prophylaxis [x] Lovenox, []  Heparin, [] SCDs, [] Ambulation   GI Prophylaxis [] PPI,  [] H2 Blocker,  [] Carafate,  [] Diet/Tube Feeds   Code Status Full Code   Disposition  Home   MDM [] Low, [x] Moderate,[]  High     Chief sodium chloride flush  10 mL Intravenous 2 times per day      Infusions:   PRN Meds: racepinephrine HCl, 11.25 mg, Q6H PRN  sodium chloride nebulizer, 3 mL, Q6H PRN  morphine, 2 mg, Q3H PRN  LORazepam, 0.5 mg, Q8H PRN  clonazePAM, 1 mg, TID PRN  sodium chloride flush, 10 mL, PRN  acetaminophen, 650 mg, Q6H PRN    Or  acetaminophen, 650 mg, Q6H PRN  polyethylene glycol, 17 g, Daily PRN  promethazine, 12.5 mg, Q6H PRN    Or  ondansetron, 4 mg, Q6H PRN  oxyCODONE-acetaminophen, 1 tablet, Q6H PRN  guaiFENesin-dextromethorphan, 5 mL, Q4H PRN      Electronically signed by Tom Reed MD on 10/27/2020 at 3:02 PM

## 2020-10-27 NOTE — PROGRESS NOTES
Pulmonary and Critical Care  Progress Note    Subjective: The patient has improved  Shortness of breath has improved  Chest pain none  Addressing respiratory complaints Patient is negative for  hemoptysis and cyanosis  CONSTITUTIONAL:  negative for fevers and chills      Past Medical History:     has a past medical history of Anxiety, Arthritis, Back pain at L4-L5 level, Bipolar 1 disorder (HCC), COPD (chronic obstructive pulmonary disease) ([facility] [address] Emphysema of lung ([facility] [address] FH: CAD (coronary artery disease), Fibromyalgia, H/O Doppler ultrasound, H/O echocardiogram, History of blood transfusion, Hyperlipidemia LDL goal <100, Hypertension, Obstructive sleep apnea, Osteoarthritis, and Thyroid disease. has a past surgical history that includes Carpal tunnel release; Tubal ligation; back surgery; Cardiac catheterization; Colonoscopy (N/A, 12/12/2019); and Cholecystectomy, laparoscopic (N/A, 10/25/2020). reports that she quit smoking about 12 years ago. She has a 30.00 pack-year smoking history. She has never used smokeless tobacco. She reports previous alcohol use of about 2.0 standard drinks of alcohol per week. She reports that she does not use drugs. Family history:  family history includes No Known Problems in her father and mother. Allergies   Allergen Reactions    Lisinopril Swelling and Rash     angioedema     Social History:    Reviewed; no changes    Objective:   PHYSICAL EXAM:        VITALS:  BP (!) 139/58   Pulse 89   Temp 98.8 °F (37.1 °C) (Rectal)   Resp 18   Ht 5' 2.01\" (1.575 m)   Wt 204 lb 12.9 oz (92.9 kg)   SpO2 92%   BMI 37.45 kg/m²     24HR INTAKE/OUTPUT:      Intake/Output Summary (Last 24 hours) at 10/27/2020 1019  Last data filed at 10/27/2020 1010  Gross per 24 hour   Intake 320 ml   Output 1600 ml   Net -1280 ml       CONSTITUTIONAL:  awake, alert, cooperative, no apparent distress, and appears stated age  LUNGS:  Moving air better.   CARDIOVASCULAR:  normal S1 and S2 and negative JVD  ABD:Abdomen soft, non-tender. BS normal. No masses,  No organomegaly  NEURO:Alert. DATA:    CBC:  Recent Labs     10/25/20  0305 10/26/20  0530 10/27/20  0541   WBC 10.5 12.5* 9.9   RBC 3.17* 2.98* 2.84*   HGB 9.2* 8.4* 8.1*   HCT 31.8* 30.2* 28.3*    344 326   .3* 101.3* 99.6   MCH 29.0 28.2 28.5   MCHC 28.9* 27.8* 28.6*   RDW 13.7 13.5 13.5   SEGSPCT 77.3* 91.0* 92.2*      BMP:  Recent Labs     10/25/20  0305 10/26/20  0530 10/27/20  0541    141 140   K 3.4* 3.6 3.4*   CL 96* 96* 93*   CO2 37* 35* 39*   BUN 5* 4* 8   CREATININE 0.7 0.6 0.6   CALCIUM 8.6 8.3 8.5   GLUCOSE 117* 141* 167*      ABG:  Recent Labs     10/25/20  1445 10/25/20  1545 10/26/20  1100   PH 7.24* 7.30* 7.37   PO2ART 60* 85 62*   [id number] 90.0* 76.0* 80.0*   O2SAT 89.8* 94.3* 92.1*     Lab Results   Component Value Date    PROBNP 5,073 (H) 10/27/2020    PROBNP 4,841 (H) 10/26/2020    PROBNP 1,333 (H) 07/14/2020    THEOPH 11.6 07/14/2020     No results found for: [facility]    Radiology Review:  Pertinent images / reports were reviewed as a part of this visit.     Assessment:     Patient Active Problem List   Diagnosis    Centrilobular emphysema ([address]    Hypertension    Hyperlipidemia LDL goal <100    Abnormal EKG    Palpitations    Anxiety    FH: CAD (coronary artery disease)    Fibromyalgia    Back pain at L4-L5 level    Sleep apnea    COPD exacerbation (HCC)    Electrolyte imbalance    Epigastric pain    COPD (chronic obstructive pulmonary disease) ([address]    Spinal stenosis of lumbar region with radiculopathy    Spinal stenosis, lumbar region, without neurogenic claudication    COPD with acute exacerbation ([facility] [address]    Pneumonia due to infectious organism    SVT (supraventricular tachycardia) (HCC)    Class 1 obesity due to excess calories with body mass index (BMI) of 31.0 to 31.9 in adult    Pneumonia    Pleural effusion    Atelectasis    Pulmonary nodules    Respiratory failure with hypoxia and hypercapnia (HCC)    Anemia    COPD with exacerbation (HCC)    Acquired hemolytic anemia, unspecified (HCC)    Leucocytosis    Chronic kidney disease, stage I    Hyponatremia    PNA (pneumonia)    Sepsis (HCC)    Pericardial effusion    Acute acalculous cholecystitis       Plan:   1. Overall the patient has improved  2. Gentle diuresis. 3. Supp. kphos.     Alize Arias MD  10/27/2020  10:19 AM

## 2020-10-27 NOTE — PROGRESS NOTES
CARDIOLOGY  NOTE        Name:  Kole Jorge /Age/Sex: 1960  (64 y.o. female)   MRN & CSN:  [id number] & [id number] Admission Date/Time: 10/19/2020 10:07 PM   Location:  -A PCP: Mychal Herrera, [facility] Deangelo Barragan Day: 9        PLAN FROM CARDIOLOGY FOR TODAY:   CPM       - cardiology consult is for:  Per effusion, a fib    -  Interval history:      · ASSESSMENT/ PLAN:  1. A fib HR control and anticoag  2. holter for a fib  3. To amio 200 qd          Subjective: Todays complain: Patient  No CP    HPI:  Eliezer Fish is a 64 y. o.year old who and presents with had concerns including Cough; Headache; and Chills. Chief Complaint   Patient presents with    Cough    Headache    Chills           Objective: Temperature:  Current - Temp: 98.3 °F (36.8 °C);  Max - Temp  Av.3 °F (36.8 °C)  Min: 97.9 °F (36.6 °C)  Max: 98.8 °F (37.1 °C)    Respiratory Rate : Resp  Av.9  Min: 11  Max: 37    Pulse Range: Pulse  Av.6  Min: 69  Max: 99    Blood Presuure Range:  Systolic (53ATF), RJX:863 , Min:106 , IRY:783   ; Diastolic (21HVK), BXQ:57, Min:51, Max:65      Pulse ox Range: SpO2  Av.5 %  Min: 77 %  Max: 100 %    24hr I & O:      Intake/Output Summary (Last 24 hours) at 10/27/2020 1329  Last data filed at 10/27/2020 1045  Gross per 24 hour   Intake 320 ml   Output 3400 ml   Net -3080 ml         BP (!) 120/58   Pulse 81   Temp 98.3 °F (36.8 °C) (Oral)   Resp 20   Ht 5' 2.01\" (1.575 m)   Wt 204 lb 12.9 oz (92.9 kg)   SpO2 96%   BMI 37.45 kg/m²           Review of Systems:  All 14 systems reviewed, all negative       TELEMETRY: Atrial flutter    has a past medical history of Anxiety, Arthritis, Back pain at L4-L5 level, Bipolar 1 disorder (HCC), COPD (chronic obstructive pulmonary disease) ([facility] [address] Emphysema of lung ([address] FH: CAD (coronary artery disease), Fibromyalgia, H/O Doppler ultrasound, H/O echocardiogram, History of blood transfusion, Hyperlipidemia LDL goal <100, Hypertension, Obstructive sleep apnea, Osteoarthritis, and Thyroid disease. has a past surgical history that includes Carpal tunnel release; Tubal ligation; back surgery; Cardiac catheterization; Colonoscopy (N/A, 12/12/2019); and Cholecystectomy, laparoscopic (N/A, 10/25/2020).   Physical Exam:  General:  Awake, alert, NAD  Head:normal  Eye:normal  Neck:  No JVD   Chest:  Clear to auscultation, respiration easy  Cardiovascular:  RRR S1S2  Abdomen:   nontender  Extremities:  no edema  Pulses; palpable  Neuro: grossly normal    Medications:    potassium phosphate IVPB  10 mmol Intravenous Once    ipratropium-albuterol  1 ampule Inhalation Q4H WA    methylPREDNISolone  40 mg Intravenous Q8H    enoxaparin  40 mg Subcutaneous Daily    lubiprostone  24 mcg Oral BID WC    amiodarone  400 mg Oral BID    amLODIPine  10 mg Oral Daily    cefepime  2 g Intravenous Q12H    pantoprazole  40 mg Intravenous Daily    aspirin  81 mg Oral Daily    baclofen  10 mg Oral TID    budesonide-formoterol  2 puff Inhalation BID    busPIRone  10 mg Oral TID    DULoxetine  60 mg Oral Daily    ferrous gluconate  324 mg Oral BID    fluticasone  2 spray Nasal Daily    folic acid  1 mg Oral Daily    [Held by provider] hydrALAZINE  10 mg Oral TID    levothyroxine  100 mcg Oral Daily    metoprolol succinate  25 mg Oral Daily    montelukast  10 mg Oral Daily    [Held by provider] theophylline  400 mg Oral Daily    traZODone  100 mg Oral Nightly    thiamine  100 mg Oral Daily    sodium chloride flush  10 mL Intravenous 2 times per day       racepinephrine HCl, sodium chloride nebulizer, morphine, LORazepam, clonazePAM, sodium chloride flush, acetaminophen **OR** acetaminophen, polyethylene glycol, promethazine **OR** ondansetron, oxyCODONE-acetaminophen, guaiFENesin-dextromethorphan    Lab Data:  CBC:   Recent Labs     10/25/20  0305 10/26/20  0530 10/27/20  0541   WBC 10.5 12.5* 9.9 HGB 9.2* 8.4* 8.1*   HCT 31.8* 30.2* 28.3*   .3* 101.3* 99.6    344 326     BMP:   Recent Labs     10/25/20  0305 10/26/20  0530 10/27/20  0541    141 140   K 3.4* 3.6 3.4*   CL 96* 96* 93*   CO2 37* 35* 39*   PHOS  --   --  1.8*   BUN 5* 4* 8   CREATININE 0.7 0.6 0.6     LIVER PROFILE:   Recent Labs     10/25/20  0305 10/26/20  0530 10/27/20  0541   AST 71* 48* 29   * 183* 134*   BILITOT 0.3 0.2 0.2   ALKPHOS 105 101 83     PT/INR:   Recent Labs     10/26/20  1210   PROTIME 13.2   INR 1.09     APTT: No results for input(s): APTT in the last 72 hours. BNP:  No results for input(s): BNP in the last 72 hours.   TROPONIN: @TROPONINI:3@  Labs, consult, tests reviewed    Assessment/ PLAN:    As above                  Indu Coronado MD 10/27/2020 1:29 PM

## 2020-10-27 NOTE — PLAN OF CARE
Problem: Pain:  Goal: Pain level will decrease  Description: Pain level will decrease  10/26/2020 2317 by Delia Camarillo RN  Outcome: Ongoing  10/26/2020 1546 by Toro Delaney  Outcome: Met This Shift  10/26/2020 1117 by Toro Delaney  Outcome: Met This Shift  Goal: Control of acute pain  Description: Control of acute pain  10/26/2020 2317 by Delia Camarillo RN  Outcome: Ongoing  10/26/2020 1546 by Toro Delaney  Outcome: Ongoing  10/26/2020 1117 by Toro Delaney  Outcome: Met This Shift  Goal: Control of chronic pain  Description: Control of chronic pain  10/26/2020 2317 by Delia Camarillo RN  Outcome: Ongoing  10/26/2020 1546 by Toro Delaney  Outcome: Ongoing  10/26/2020 1117 by Toro Delaney  Outcome: Met This Shift     Problem: Falls - Risk of:  Goal: Will remain free from falls  Description: Will remain free from falls  10/26/2020 2317 by Delia Camarillo RN  Outcome: Ongoing  10/26/2020 1546 by Toro Delaney  Outcome: Ongoing  10/26/2020 1117 by Toro Delaney  Outcome: Ongoing  Goal: Absence of physical injury  Description: Absence of physical injury  10/26/2020 2317 by Delia Camarillo RN  Outcome: Ongoing  10/26/2020 1546 by Toro Delaney  Outcome: Met This Shift  10/26/2020 1117 by Toro Delaney  Outcome: Ongoing     Problem: Skin Integrity:  Goal: Will show no infection signs and symptoms  Description: Will show no infection signs and symptoms  10/26/2020 2317 by Delia Camarillo RN  Outcome: Ongoing  10/26/2020 1546 by Toro Delaney  Outcome: Ongoing  10/26/2020 1117 by Toro Delaney  Outcome: Ongoing  Goal: Absence of new skin breakdown  Description: Absence of new skin breakdown  10/26/2020 2317 by Delia Camarillo RN  Outcome: Ongoing  10/26/2020 1546 by Toro Delaney  Outcome: Met This Shift  10/26/2020 1117 by Toro Delaney  Outcome: Ongoing

## 2020-10-27 NOTE — PROGRESS NOTES
Pt finished meal, on 6LPM/NC. Informed patient she will need to be back on BiPAP as it is ordered continuous by the doctor. She stated, \"I think I'm going to eat this pudding now, I will wait until someone tonight puts it on\". Explained to patient the importance of BiPAP therapy, patient stated, \"I don't care\". Pt remains on 6LPM/NC at this time.

## 2020-10-28 LAB
ALBUMIN SERPL-MCNC: 3.3 GM/DL (ref 3.4–5)
ALP BLD-CCNC: 84 IU/L (ref 40–128)
ALT SERPL-CCNC: 113 U/L (ref 10–40)
ANION GAP SERPL CALCULATED.3IONS-SCNC: 8 MMOL/L (ref 4–16)
AST SERPL-CCNC: 29 IU/L (ref 15–37)
BASOPHILS ABSOLUTE: 0 K/CU MM
BASOPHILS RELATIVE PERCENT: 0.1 % (ref 0–1)
BILIRUB SERPL-MCNC: 0.3 MG/DL (ref 0–1)
BUN BLDV-MCNC: 8 MG/DL (ref 6–23)
CALCIUM SERPL-MCNC: 8.8 MG/DL (ref 8.3–10.6)
CHLORIDE BLD-SCNC: 84 MMOL/L (ref 99–110)
CO2: 49 MMOL/L (ref 21–32)
CREAT SERPL-MCNC: 0.6 MG/DL (ref 0.6–1.1)
DIFFERENTIAL TYPE: ABNORMAL
EOSINOPHILS ABSOLUTE: 0 K/CU MM
EOSINOPHILS RELATIVE PERCENT: 0 % (ref 0–3)
GFR AFRICAN AMERICAN: >60 ML/MIN/1.73M2
GFR NON-AFRICAN AMERICAN: >60 ML/MIN/1.73M2
GLUCOSE BLD-MCNC: 111 MG/DL (ref 70–99)
HCT VFR BLD CALC: 31.3 % (ref 37–47)
HEMOGLOBIN: 8.9 GM/DL (ref 12.5–16)
IMMATURE NEUTROPHIL %: 1.1 % (ref 0–0.43)
LYMPHOCYTES ABSOLUTE: 1.2 K/CU MM
LYMPHOCYTES RELATIVE PERCENT: 9.3 % (ref 24–44)
MAGNESIUM: 1.6 MG/DL (ref 1.8–2.4)
MCH RBC QN AUTO: 27.7 PG (ref 27–31)
MCHC RBC AUTO-ENTMCNC: 28.4 % (ref 32–36)
MCV RBC AUTO: 97.5 FL (ref 78–100)
MONOCYTES ABSOLUTE: 1.2 K/CU MM
MONOCYTES RELATIVE PERCENT: 9.7 % (ref 0–4)
NUCLEATED RBC %: 0 %
PDW BLD-RTO: 13.8 % (ref 11.7–14.9)
PLATELET # BLD: 436 K/CU MM (ref 140–440)
PMV BLD AUTO: 9.3 FL (ref 7.5–11.1)
POTASSIUM SERPL-SCNC: 2.7 MMOL/L (ref 3.5–5.1)
POTASSIUM SERPL-SCNC: 3.1 MMOL/L (ref 3.5–5.1)
RBC # BLD: 3.21 M/CU MM (ref 4.2–5.4)
SEGMENTED NEUTROPHILS ABSOLUTE COUNT: 9.9 K/CU MM
SEGMENTED NEUTROPHILS RELATIVE PERCENT: 79.8 % (ref 36–66)
SODIUM BLD-SCNC: 141 MMOL/L (ref 135–145)
TOTAL IMMATURE NEUTOROPHIL: 0.13 K/CU MM
TOTAL NUCLEATED RBC: 0 K/CU MM
TOTAL PROTEIN: 5.5 GM/DL (ref 6.4–8.2)
TOTAL RETICULOCYTE COUNT: 0.12 K/CU MM
WBC # BLD: 12.4 K/CU MM (ref 4–10.5)

## 2020-10-28 PROCEDURE — 97165 OT EVAL LOW COMPLEX 30 MIN: CPT

## 2020-10-28 PROCEDURE — 6370000000 HC RX 637 (ALT 250 FOR IP): Performed by: SURGERY

## 2020-10-28 PROCEDURE — 6370000000 HC RX 637 (ALT 250 FOR IP): Performed by: INTERNAL MEDICINE

## 2020-10-28 PROCEDURE — 2140000000 HC CCU INTERMEDIATE R&B

## 2020-10-28 PROCEDURE — 80053 COMPREHEN METABOLIC PANEL: CPT

## 2020-10-28 PROCEDURE — 6360000002 HC RX W HCPCS: Performed by: SURGERY

## 2020-10-28 PROCEDURE — 83735 ASSAY OF MAGNESIUM: CPT

## 2020-10-28 PROCEDURE — 2580000003 HC RX 258: Performed by: SURGERY

## 2020-10-28 PROCEDURE — 84132 ASSAY OF SERUM POTASSIUM: CPT

## 2020-10-28 PROCEDURE — 94660 CPAP INITIATION&MGMT: CPT

## 2020-10-28 PROCEDURE — 94640 AIRWAY INHALATION TREATMENT: CPT

## 2020-10-28 PROCEDURE — 6360000002 HC RX W HCPCS: Performed by: INTERNAL MEDICINE

## 2020-10-28 PROCEDURE — 94761 N-INVAS EAR/PLS OXIMETRY MLT: CPT

## 2020-10-28 PROCEDURE — 2700000000 HC OXYGEN THERAPY PER DAY

## 2020-10-28 PROCEDURE — C9113 INJ PANTOPRAZOLE SODIUM, VIA: HCPCS | Performed by: SURGERY

## 2020-10-28 PROCEDURE — 36415 COLL VENOUS BLD VENIPUNCTURE: CPT

## 2020-10-28 PROCEDURE — 85025 COMPLETE CBC W/AUTO DIFF WBC: CPT

## 2020-10-28 PROCEDURE — 99232 SBSQ HOSP IP/OBS MODERATE 35: CPT | Performed by: INTERNAL MEDICINE

## 2020-10-28 PROCEDURE — 97530 THERAPEUTIC ACTIVITIES: CPT

## 2020-10-28 RX ORDER — POTASSIUM CHLORIDE 20 MEQ/1
40 TABLET, EXTENDED RELEASE ORAL PRN
Status: DISCONTINUED | OUTPATIENT
Start: 2020-10-28 | End: 2020-10-30 | Stop reason: HOSPADM

## 2020-10-28 RX ORDER — IPRATROPIUM BROMIDE AND ALBUTEROL SULFATE 2.5; .5 MG/3ML; MG/3ML
1 SOLUTION RESPIRATORY (INHALATION) EVERY 4 HOURS PRN
Status: DISCONTINUED | OUTPATIENT
Start: 2020-10-28 | End: 2020-10-30 | Stop reason: HOSPADM

## 2020-10-28 RX ORDER — POTASSIUM CHLORIDE 20 MEQ/1
40 TABLET, EXTENDED RELEASE ORAL ONCE
Status: COMPLETED | OUTPATIENT
Start: 2020-10-28 | End: 2020-10-28

## 2020-10-28 RX ORDER — ALBUTEROL SULFATE 90 UG/1
2 AEROSOL, METERED RESPIRATORY (INHALATION) 4 TIMES DAILY
Status: DISCONTINUED | OUTPATIENT
Start: 2020-10-28 | End: 2020-10-30 | Stop reason: HOSPADM

## 2020-10-28 RX ORDER — MAGNESIUM SULFATE IN WATER 40 MG/ML
2 INJECTION, SOLUTION INTRAVENOUS ONCE
Status: COMPLETED | OUTPATIENT
Start: 2020-10-28 | End: 2020-10-28

## 2020-10-28 RX ORDER — POTASSIUM CHLORIDE 7.45 MG/ML
10 INJECTION INTRAVENOUS PRN
Status: DISCONTINUED | OUTPATIENT
Start: 2020-10-28 | End: 2020-10-30 | Stop reason: HOSPADM

## 2020-10-28 RX ADMIN — ASPIRIN 81 MG CHEWABLE TABLET 81 MG: 81 TABLET CHEWABLE at 09:00

## 2020-10-28 RX ADMIN — LUBIPROSTONE 24 MCG: 24 CAPSULE, GELATIN COATED ORAL at 09:00

## 2020-10-28 RX ADMIN — Medication: at 09:30

## 2020-10-28 RX ADMIN — POTASSIUM CHLORIDE 40 MEQ: 1500 TABLET, EXTENDED RELEASE ORAL at 11:33

## 2020-10-28 RX ADMIN — ALBUTEROL SULFATE 2 PUFF: 90 AEROSOL, METERED RESPIRATORY (INHALATION) at 20:05

## 2020-10-28 RX ADMIN — ENOXAPARIN SODIUM 40 MG: 40 INJECTION SUBCUTANEOUS at 09:00

## 2020-10-28 RX ADMIN — Medication 100 MG: at 08:59

## 2020-10-28 RX ADMIN — BUDESONIDE AND FORMOTEROL FUMARATE DIHYDRATE 2 PUFF: 160; 4.5 AEROSOL RESPIRATORY (INHALATION) at 07:49

## 2020-10-28 RX ADMIN — CLONAZEPAM 1 MG: 1 TABLET ORAL at 10:25

## 2020-10-28 RX ADMIN — LEVOTHYROXINE SODIUM 100 MCG: 100 TABLET ORAL at 06:07

## 2020-10-28 RX ADMIN — BUSPIRONE HYDROCHLORIDE 10 MG: 10 TABLET ORAL at 15:43

## 2020-10-28 RX ADMIN — BACLOFEN 10 MG: 10 TABLET ORAL at 09:00

## 2020-10-28 RX ADMIN — TRAZODONE HYDROCHLORIDE 100 MG: 50 TABLET ORAL at 20:46

## 2020-10-28 RX ADMIN — ACETAMINOPHEN 650 MG: 325 TABLET ORAL at 10:25

## 2020-10-28 RX ADMIN — AMIODARONE HYDROCHLORIDE 200 MG: 200 TABLET ORAL at 09:00

## 2020-10-28 RX ADMIN — BUSPIRONE HYDROCHLORIDE 10 MG: 10 TABLET ORAL at 09:00

## 2020-10-28 RX ADMIN — SODIUM CHLORIDE, PRESERVATIVE FREE 10 ML: 5 INJECTION INTRAVENOUS at 09:06

## 2020-10-28 RX ADMIN — METHYLPREDNISOLONE SODIUM SUCCINATE 40 MG: 40 INJECTION, POWDER, FOR SOLUTION INTRAMUSCULAR; INTRAVENOUS at 03:37

## 2020-10-28 RX ADMIN — OXYCODONE HYDROCHLORIDE AND ACETAMINOPHEN 1 TABLET: 5; 325 TABLET ORAL at 11:40

## 2020-10-28 RX ADMIN — FERROUS GLUCONATE TAB 324 MG (37.5 MG ELEMENTAL IRON) 324 MG: 324 (37.5 FE) TAB at 09:00

## 2020-10-28 RX ADMIN — LORAZEPAM 0.5 MG: 0.5 TABLET ORAL at 20:48

## 2020-10-28 RX ADMIN — PANTOPRAZOLE SODIUM 40 MG: 40 INJECTION, POWDER, FOR SOLUTION INTRAVENOUS at 06:07

## 2020-10-28 RX ADMIN — BACLOFEN 10 MG: 10 TABLET ORAL at 20:46

## 2020-10-28 RX ADMIN — LUBIPROSTONE 24 MCG: 24 CAPSULE, GELATIN COATED ORAL at 17:22

## 2020-10-28 RX ADMIN — BUSPIRONE HYDROCHLORIDE 10 MG: 10 TABLET ORAL at 20:46

## 2020-10-28 RX ADMIN — IPRATROPIUM BROMIDE AND ALBUTEROL SULFATE 1 AMPULE: .5; 3 SOLUTION RESPIRATORY (INHALATION) at 15:25

## 2020-10-28 RX ADMIN — SODIUM CHLORIDE, PRESERVATIVE FREE 10 ML: 5 INJECTION INTRAVENOUS at 20:46

## 2020-10-28 RX ADMIN — IPRATROPIUM BROMIDE AND ALBUTEROL SULFATE 1 AMPULE: .5; 3 SOLUTION RESPIRATORY (INHALATION) at 12:23

## 2020-10-28 RX ADMIN — RACEPINEPHRINE HYDROCHLORIDE 11.25 MG: 11.25 SOLUTION RESPIRATORY (INHALATION) at 09:28

## 2020-10-28 RX ADMIN — MAGNESIUM SULFATE HEPTAHYDRATE 2 G: 40 INJECTION, SOLUTION INTRAVENOUS at 11:34

## 2020-10-28 RX ADMIN — BACLOFEN 10 MG: 10 TABLET ORAL at 15:43

## 2020-10-28 RX ADMIN — AMLODIPINE BESYLATE 10 MG: 10 TABLET ORAL at 09:00

## 2020-10-28 RX ADMIN — FOLIC ACID 1 MG: 1 TABLET ORAL at 09:00

## 2020-10-28 RX ADMIN — BUDESONIDE AND FORMOTEROL FUMARATE DIHYDRATE 2 PUFF: 160; 4.5 AEROSOL RESPIRATORY (INHALATION) at 20:06

## 2020-10-28 RX ADMIN — DULOXETINE HYDROCHLORIDE 60 MG: 30 CAPSULE, DELAYED RELEASE ORAL at 09:00

## 2020-10-28 RX ADMIN — METOPROLOL SUCCINATE 25 MG: 25 TABLET, EXTENDED RELEASE ORAL at 08:59

## 2020-10-28 RX ADMIN — POTASSIUM CHLORIDE 40 MEQ: 1500 TABLET, EXTENDED RELEASE ORAL at 08:59

## 2020-10-28 RX ADMIN — FLUTICASONE PROPIONATE 2 SPRAY: 50 SPRAY, METERED NASAL at 10:33

## 2020-10-28 RX ADMIN — FERROUS GLUCONATE TAB 324 MG (37.5 MG ELEMENTAL IRON) 324 MG: 324 (37.5 FE) TAB at 20:46

## 2020-10-28 RX ADMIN — IPRATROPIUM BROMIDE AND ALBUTEROL SULFATE 1 AMPULE: .5; 3 SOLUTION RESPIRATORY (INHALATION) at 07:48

## 2020-10-28 RX ADMIN — MONTELUKAST 10 MG: 10 TABLET, FILM COATED ORAL at 08:59

## 2020-10-28 ASSESSMENT — PAIN SCALES - GENERAL
PAINLEVEL_OUTOF10: 0
PAINLEVEL_OUTOF10: 4
PAINLEVEL_OUTOF10: 0
PAINLEVEL_OUTOF10: 3
PAINLEVEL_OUTOF10: 0
PAINLEVEL_OUTOF10: 6
PAINLEVEL_OUTOF10: 0
PAINLEVEL_OUTOF10: 6
PAINLEVEL_OUTOF10: 0

## 2020-10-28 NOTE — PROGRESS NOTES
Pt wears Home O2 @ 3 LPM. Pt states that she is with [place] for oxygen needs.  Please draw ABG in the morning with pt off Bipap all night and on home O2 order of 3 LPM. Overnight oximetry must be completed on 3 LPM. Thank you

## 2020-10-28 NOTE — PROGRESS NOTES
Jessika from Respiratory called. Pt already has Home Bipap per patient Checked Dr. Candy Atkins notes and pt has documented Bipap from him.  Thanks> will discontinue orders at this time for Bipap eval

## 2020-10-28 NOTE — CARE COORDINATION
Physician has order PT/OT 10/27. White board message left for therapies re; need for evals. Will review PT/OT evals and recs once they are completed and in record.

## 2020-10-28 NOTE — PROGRESS NOTES
Hospitalist Progress Note      Name:  Sudheer Mari /Age/Sex: 1960  (64 y.o. female)   MRN & CSN:  [id number] & [id number] Admission Date/Time: 10/19/2020 10:07 PM   Location:   PCP: Claudia Wooten MD         Hospital Day: 10  DISPOSITION: Home  Reason for continued hospitalization: Awaiting PT/OT for proper disposition. Remains on 4 to 6 L of oxygen. Home BiPAP eval.  Hopefully, discharge in the next 1 to 2 days    ASSESSMENT/PLAN:  Sudheer Mari is a 64 y.o.  female who presented to the hospital with a complaint of chills, fatigue, abdominal pain nausea and vomiting. CT abdomen pelvis showed moderate-sized pericardial effusion. Gallbladder showed no gallbladder wall thickening with evidence of edema in the gallbladder wall suggesting possibility of calculus cholecystitis. LFTs were noted to be elevated with a LT of 776 and AST of 654 at the highest.  WBC on admission 26. Lactic acid 3.6. She underwent lap afshin on 10/25. She underwent successful pericardiocentesis with removal of 500 cc on 10/23. Sepsis----likely secondary to acalculous cholecystitis. WBC on admission 26. WBC normalized. S/p laparoscopic cholecystectomy. N  Remains afebrile. Blood pressure stable. Sepsis resolved. Blood cultures no growth. Respiratory viral panel negative. Cefepime DC'd on 10/27  -Continue monitoring of antibiotics    Acute on chronic hypoxic and hypercapnic respiratory failure----requiring 4 to 6 L of nasal cannula. PCO2 remains elevated at 80 on 10/26. Patient is not using the BiPAP continuously. Patient is reportedly on 3 L of oxygen continuously at home.  -Home BiPAP eval  -Wean off oxygen  -Oxygen evaluation at the time of discharge    Hypokalemia---K+ 2.7 on 10/28.  -80 p.o. KCl  -Recheck K levels during the day    Hypomagnesia---magnesium 1.6  -2 g magnesium    Pericardial effusion----s/p pericardiocentesis with removal of 500 cc on 10/23.   Pericardial drain removed on 10/25. Cytology negative. Acute cholecystitis----s/p lap afshin on 10/25. Peak . Peak . LFTs significantly improved. Lactic acidosis---lactic acid 3.6 on admission in the setting of sepsis. Lactic acidosis resolved. Hypertension---BP controlled  -Continue Toprol-XL  -Continue amlodipine    Hypothyroidism---on levothyroxine    Depression/anxiety----continue BuSpar      MEDICAL DECISION MAKING:  -Labs reviewed  -Imaging reviewed  -Level of risk moderate  Diet DIET LOW FAT;   DVT Prophylaxis [x] Lovenox, []  Heparin, [] SCDs, [] Ambulation   GI Prophylaxis [] PPI,  [] H2 Blocker,  [] Carafate,  [] Diet/Tube Feeds   Code Status Full Code   Disposition  Home   MDM [] Low, [x] Moderate,[]  High     Chief complaint/Interval History/ROS     Chief Complaint: Fatigue, abdominal pain, chills, nausea and vomiting      INTERVAL HISTORY: Mcallister catheter removed on 10/27. Patient does not want to go to a nursing home. She has not been using the BiPAP even though daughter says continuous BiPAP. PCO2 elevated at 80 on 10/26. PT/OT ordered. Awaiting PT OT for proper disposition     ROS:  No chest pain. No abdominal pain. No nausea. No vomiting. Objective: Intake/Output Summary (Last 24 hours) at 10/28/2020 1026  Last data filed at 10/27/2020 1721  Gross per 24 hour   Intake 300 ml   Output 1800 ml   Net -1500 ml      Vitals:   Vitals:    10/28/20 0700   BP: 137/69   Pulse: 72   Resp: 14   Temp:    SpO2: 99%     Physical Exam:   GEN: Awake female, nontoxic appearing. Pleasant. EYES: No eye discharge. HENT: Membranes moist.  No nasal discharge. NECK: Supple,   RESP: Symmetric chest expansion. Accessory muscle use. CV: RRR. No pitting oximetry given. GI: Abdomen soft. Nontender. : Mcallister in place. MSK: No bony fractures. No gross deformities. SKIN: warm, dry, no rashes,  NEURO: Cranial nerves appear grossly intact, normal speech, no lateralizing weakness.   PSYCH: Awake, alert, oriented x 4. Affect appropriate.     Medications:   Medications:    potassium chloride  40 mEq Oral Once    magnesium sulfate  2 g Intravenous Once    amiodarone  200 mg Oral Daily    ipratropium-albuterol  1 ampule Inhalation Q4H WA    enoxaparin  40 mg Subcutaneous Daily    lubiprostone  24 mcg Oral BID WC    amLODIPine  10 mg Oral Daily    pantoprazole  40 mg Intravenous Daily    aspirin  81 mg Oral Daily    baclofen  10 mg Oral TID    budesonide-formoterol  2 puff Inhalation BID    busPIRone  10 mg Oral TID    DULoxetine  60 mg Oral Daily    ferrous gluconate  324 mg Oral BID    fluticasone  2 spray Nasal Daily    folic acid  1 mg Oral Daily    [Held by provider] hydrALAZINE  10 mg Oral TID    levothyroxine  100 mcg Oral Daily    metoprolol succinate  25 mg Oral Daily    montelukast  10 mg Oral Daily    [Held by provider] theophylline  400 mg Oral Daily    traZODone  100 mg Oral Nightly    thiamine  100 mg Oral Daily    sodium chloride flush  10 mL Intravenous 2 times per day      Infusions:   PRN Meds: racepinephrine HCl, 11.25 mg, Q6H PRN  sodium chloride nebulizer, 3 mL, Q6H PRN  LORazepam, 0.5 mg, Q8H PRN  clonazePAM, 1 mg, TID PRN  sodium chloride flush, 10 mL, PRN  acetaminophen, 650 mg, Q6H PRN    Or  acetaminophen, 650 mg, Q6H PRN  polyethylene glycol, 17 g, Daily PRN  promethazine, 12.5 mg, Q6H PRN    Or  ondansetron, 4 mg, Q6H PRN  oxyCODONE-acetaminophen, 1 tablet, Q6H PRN  guaiFENesin-dextromethorphan, 5 mL, Q4H PRN      Electronically signed by Melonie Jameson MD on 10/28/2020 at 10:26 AM

## 2020-10-28 NOTE — PROGRESS NOTES
Occupational Therapy    [facility] CARE OCCUPATIONAL THERAPY EVALUATION  Kelly Perez, 1960, 2117/2117-A, 10/28/2020    History  [place]:  The primary encounter diagnosis was Sepsis due to pneumonia ([address] A diagnosis of Pericardial effusion was also pertinent to this visit. Patient  has a past medical history of Anxiety, Arthritis, Back pain at L4-L5 level, Bipolar 1 disorder ([address] COPD (chronic obstructive pulmonary disease) ([address] Emphysema of lung ([address] FH: CAD (coronary artery disease), Fibromyalgia, H/O Doppler ultrasound, H/O echocardiogram, History of blood transfusion, Hyperlipidemia LDL goal <100, Hypertension, Obstructive sleep apnea, Osteoarthritis, and Thyroid disease. Patient  has a past surgical history that includes Carpal tunnel release; Tubal ligation; back surgery; Cardiac catheterization; Colonoscopy (N/A, 12/12/2019); and Cholecystectomy, laparoscopic (N/A, 10/25/2020). Subjective:  Patient states:  \"I can go home just not tonight. I've been through a lot\". Pain:  Denies. Communication with other providers:  Handoff to RN  Restrictions: General Precautions, Fall Risk    Home Setup/Prior level of function       Examination of body systems (includes body structures/functions, activity/participation limitations):  · Observation:  Supine in bed upon arrival, agreeable to therapy, nurse cleared OT for session  · Vision:  [facility]  · Hearing:  [facility]  · Cardiopulmonary:  4L of 02. 02 saturation decreased ~87 percent w/ increased mobility      Body Systems and functions:  · ROM R/L:  WFL.     · Strength R/L:  4+/5,   · Sensation: Denies numbness  · Tone: Normal  · Coordination: WFL  · Perception: WNL    Activities of Daily Living (ADLs):  · Feeding: [place]  · Grooming: Supervision  · UB bathing: Supervision  · LB bathing: Supervision  · UB dressing: Supervision  · LB dressing: Supervision  · Toileting: Supervision    Cognitive and Psychosocial Functioning:  · Overall cognitive status: WNL  · Affect: Normal        Mobility:  · Supine to sit:  Supervisino  · Transfers: Supervision from EOB up to stand  · Sitting balance:  Supervision. · Standing balance:  Supervision ~4 minutes  · Functional Mobility: Supervision ~75 feet w/ decreased 02 saturation see Cardiopulmonary for details  · Toilet/Shower Transfers: DNT    · Sit to supine: Supervision                Treatment:  Therapeutic Activity Training:   Therapeutic activity training was instructed today. Cues were given for safety, sequence, UE/LE placement, awareness, and balance. Activities performed today included bed mobility training, sup-sit, sit-stand, functional mobility, stand to sit    Safety: patient left in bed with bed alarm, call light within reach, RN notified, gait belt used. Assessment:  Pt is a 65 yo female admitted from home for sepsis. Pt at baseline is Independent for ADLs Independent for high level IADLs and Independent for functional transfers/mobility and is an active . Pt currently presents w/ minimal deficits in ADL and high level IADL independence, functional activity tolerance due to decreased 02 saturation. Pt would benefit from continued acute care OT services w/ discharge to home w/ home health OT S1 w/ assist PRN  Complexity: Low  Prognosis: Good, no significant barriers to participation at this time.    Plan  Times per week: 1x+  Times per day: Daily  Current Treatment Recommendations: Strengthening, Patient/Caregiver Education & Training, Equipment Evaluation, Education, & procurement, Balance Training, Functional Mobility Training, Endurance Training, Safety Education & Training, Self-Care / ADL, Pain Management, Positioning, Home Management Training     Equipment: defer    Goals:  Pt goal: go home  Time Frame for STGs: discharge  Goal 1: Pt will perform UE ADLs Independent  Goal 2: Pt will perform LE ADLs Independent  Goal 3: Pt will perform toileting Independent  Goal 4: Pt will perform functional transfer Independent  Goal 5: Pt will perform functional mobility Independent  Goal 6: Pt will perform therex/theract in order to increase functional activity tolerance and dynamic standing balance    Treatment plan:  Pt will perform functional task in stand reaching in all 3 planes in order to increase dynamic standing balance and functional activity tolerance    Recommendations for NURSING activity: Up to chair for all 3 meals and up to standard commode for all toileting needs    Time:   Time in: 1544  Time out: 1559  Timed treatment minutes: 10 minutes  Total time: 15 minutes    Electronically signed by:    Kush AQUINO/L [id number]  4:09 PM,10/28/2020

## 2020-10-28 NOTE — PROGRESS NOTES
Pulmonary and Critical Care  Progress Note    Subjective: The patient is better. Scant hemoptysis. Shortness of breath has improved. Chest pain none  Addressing respiratory complaints Patient is negative for  hemoptysis and cyanosis  CONSTITUTIONAL:  negative for fevers and chills      Past Medical History:     has a past medical history of Anxiety, Arthritis, Back pain at L4-L5 level, Bipolar 1 disorder (HCC), COPD (chronic obstructive pulmonary disease) ([address] Emphysema of lung ([address] FH: CAD (coronary artery disease), Fibromyalgia, H/O Doppler ultrasound, H/O echocardiogram, History of blood transfusion, Hyperlipidemia LDL goal <100, Hypertension, Obstructive sleep apnea, Osteoarthritis, and Thyroid disease. has a past surgical history that includes Carpal tunnel release; Tubal ligation; back surgery; Cardiac catheterization; Colonoscopy (N/A, 12/12/2019); and Cholecystectomy, laparoscopic (N/A, 10/25/2020). reports that she quit smoking about 12 years ago. She has a 30.00 pack-year smoking history. She has never used smokeless tobacco. She reports previous alcohol use of about 2.0 standard drinks of alcohol per week. She reports that she does not use drugs. Family history:  family history includes No Known Problems in her father and mother. Allergies   Allergen Reactions    Lisinopril Swelling and Rash     angioedema     Social History:    Reviewed; no changes    Objective:   PHYSICAL EXAM:        VITALS:  BP (!) 150/65   Pulse 87   Temp 98.5 °F (36.9 °C) (Oral)   Resp 18   Ht 5' 2.01\" (1.575 m)   Wt 204 lb 12.9 oz (92.9 kg)   SpO2 99%   BMI 37.45 kg/m²     24HR INTAKE/OUTPUT:      Intake/Output Summary (Last 24 hours) at 10/28/2020 1135  Last data filed at 10/27/2020 1721  Gross per 24 hour   Intake 300 ml   Output --   Net 300 ml       CONSTITUTIONAL:  awake, alert, cooperative, no apparent distress, and appears stated age  LUNGS:  Moving air better.   CARDIOVASCULAR:  normal S1 and S2 and negative JVD  ABD:Abdomen soft, non-tender. BS normal. No masses,  No organomegaly  NEURO:Alert. DATA:    CBC:  Recent Labs     10/26/20  0530 10/27/20  0541 10/28/20  0819   WBC 12.5* 9.9 12.4*   RBC 2.98* 2.84* 3.21*   HGB 8.4* 8.1* 8.9*   HCT 30.2* 28.3* 31.3*    326 436   .3* 99.6 97.5   MCH 28.2 28.5 27.7   MCHC 27.8* 28.6* 28.4*   RDW 13.5 13.5 13.8   SEGSPCT 91.0* 92.2* 79.8*      BMP:  Recent Labs     10/26/20  0530 10/27/20  0541 10/28/20  0819    140 141   K 3.6 3.4* 2.7*   CL 96* 93* 84*   CO2 35* 39* 49*   BUN 4* 8 8   CREATININE 0.6 0.6 0.6   CALCIUM 8.3 8.5 8.8   GLUCOSE 141* 167* 111*      ABG:  Recent Labs     10/25/20  1445 10/25/20  1545 10/26/20  1100   PH 7.24* 7.30* 7.37   PO2ART 60* 85 62*   WFS0ZLZ 90.0* 76.0* 80.0*   O2SAT 89.8* 94.3* 92.1*     Lab Results   Component Value Date    PROBNP 5,073 (H) 10/27/2020    PROBNP 4,841 (H) 10/26/2020    PROBNP 1,333 (H) 07/14/2020    THEOPH 11.6 07/14/2020     No results found for: [facility]    Radiology Review:  Pertinent images / reports were reviewed as a part of this visit.     Assessment:     Patient Active Problem List   Diagnosis    Centrilobular emphysema ([address]    Hypertension    Hyperlipidemia LDL goal <100    Abnormal EKG    Palpitations    Anxiety    FH: CAD (coronary artery disease)    Fibromyalgia    Back pain at L4-L5 level    Sleep apnea    COPD exacerbation (HCC)    Electrolyte imbalance    Epigastric pain    COPD (chronic obstructive pulmonary disease) ([address]    Spinal stenosis of lumbar region with radiculopathy    Spinal stenosis, lumbar region, without neurogenic claudication    COPD with acute exacerbation ([facility]    Pneumonia due to infectious organism    SVT (supraventricular tachycardia) (HCC)    Class 1 obesity due to excess calories with body mass index (BMI) of 31.0 to 31.9 in adult    Pneumonia    Pleural effusion    Atelectasis    Pulmonary nodules    Respiratory failure with hypoxia and hypercapnia ([facility]    Anemia    COPD with exacerbation (HCC)    Acquired hemolytic anemia, unspecified (HCC)    Leucocytosis    Chronic kidney disease, stage I    Hyponatremia    PNA (pneumonia)    Sepsis (HCC)    Pericardial effusion    Acute acalculous cholecystitis       Plan:   1. Overall the patient has improved  2. Vapo Rx. 3. Supp. k and Mg.     Shavonne Kulkarni MD  10/28/2020  11:35 AM

## 2020-10-28 NOTE — PROGRESS NOTES
Patient Room #: 2117/2117-A  Patient Name: [place] NIV Evaluation / Orders  1. Notify Pulmonary Diagnostics of order to evaluate for home NIV. 2. Perform the following tests at any point before discharge. [x] Perform an Overnight Oximetry while the patient is on at least                  2 lpm of oxygen. The saturation level must be <  88% for > 5                   cumulative minutes to qualify. [x] Arterial puncture (ABG) for blood gas analysis done while awake. ·    Qualifying result is a PaCO2 >   52 mmHg    3. [x]  I have documented in a progress note that DALTON is not the primary cause of hypercapnia in this patient. CPAP will not effectively treat this patient hypercapnia. BIPA Therapy will benefit and more effectively treat the hypercapnia. Results Documentation    (Attach a copy of Reports)  1. ABG Results       Date _________  PaCO2 __________ mmHg on ___________ lpm oxygen    2. Oximetry Level _________% for ___________ minutes on ________ lpm oxygen    3. [] Patient Qualifies      [] Patient Does NOT qualify      Complete order below:  Diagnosis __COPD,  Respiratory Failure with hypoxia and hypercapnea______           Length of Need: [x] Lifetime  Home NIV () at:    Inspiratory Setting __________ cm H2O         Expiratory Setting _________ cm H2O    Therapist Signature: __________________________________Date: _____________    Physician Signature:   Electronically Signed in EMR_______  Date: _____________    Physician Printed Name:  Vita Mota MD  Provider ID: [id number]  NPI:  [id number]

## 2020-10-29 LAB
ANION GAP SERPL CALCULATED.3IONS-SCNC: 6 MMOL/L (ref 4–16)
ANION GAP SERPL CALCULATED.3IONS-SCNC: 6 MMOL/L (ref 4–16)
BASOPHILS ABSOLUTE: 0 K/CU MM
BASOPHILS RELATIVE PERCENT: 0.2 % (ref 0–1)
BUN BLDV-MCNC: 7 MG/DL (ref 6–23)
BUN BLDV-MCNC: 7 MG/DL (ref 6–23)
CALCIUM SERPL-MCNC: 8.7 MG/DL (ref 8.3–10.6)
CALCIUM SERPL-MCNC: 8.7 MG/DL (ref 8.3–10.6)
CHLORIDE BLD-SCNC: 84 MMOL/L (ref 99–110)
CHLORIDE BLD-SCNC: 85 MMOL/L (ref 99–110)
CO2: 47 MMOL/L (ref 21–32)
CO2: 50 MMOL/L (ref 21–32)
CREAT SERPL-MCNC: 0.6 MG/DL (ref 0.6–1.1)
CREAT SERPL-MCNC: 0.6 MG/DL (ref 0.6–1.1)
DIFFERENTIAL TYPE: ABNORMAL
EOSINOPHILS ABSOLUTE: 0.3 K/CU MM
EOSINOPHILS RELATIVE PERCENT: 2.6 % (ref 0–3)
GFR AFRICAN AMERICAN: >60 ML/MIN/1.73M2
GFR AFRICAN AMERICAN: >60 ML/MIN/1.73M2
GFR NON-AFRICAN AMERICAN: >60 ML/MIN/1.73M2
GFR NON-AFRICAN AMERICAN: >60 ML/MIN/1.73M2
GLUCOSE BLD-MCNC: 102 MG/DL (ref 70–99)
GLUCOSE BLD-MCNC: 128 MG/DL (ref 70–99)
HCT VFR BLD CALC: 36 % (ref 37–47)
HEMOGLOBIN: 10.4 GM/DL (ref 12.5–16)
IMMATURE NEUTROPHIL %: 1.1 % (ref 0–0.43)
LYMPHOCYTES ABSOLUTE: 1.8 K/CU MM
LYMPHOCYTES RELATIVE PERCENT: 14.5 % (ref 24–44)
MAGNESIUM: 1.7 MG/DL (ref 1.8–2.4)
MCH RBC QN AUTO: 28.2 PG (ref 27–31)
MCHC RBC AUTO-ENTMCNC: 28.9 % (ref 32–36)
MCV RBC AUTO: 97.6 FL (ref 78–100)
MONOCYTES ABSOLUTE: 1 K/CU MM
MONOCYTES RELATIVE PERCENT: 8 % (ref 0–4)
NUCLEATED RBC %: 0 %
PDW BLD-RTO: 13.8 % (ref 11.7–14.9)
PLATELET # BLD: 399 K/CU MM (ref 140–440)
PMV BLD AUTO: 9.5 FL (ref 7.5–11.1)
POTASSIUM SERPL-SCNC: 3 MMOL/L (ref 3.5–5.1)
POTASSIUM SERPL-SCNC: 3.1 MMOL/L (ref 3.5–5.1)
POTASSIUM SERPL-SCNC: 3.2 MMOL/L (ref 3.5–5.1)
RBC # BLD: 3.69 M/CU MM (ref 4.2–5.4)
SEGMENTED NEUTROPHILS ABSOLUTE COUNT: 9.2 K/CU MM
SEGMENTED NEUTROPHILS RELATIVE PERCENT: 73.6 % (ref 36–66)
SODIUM BLD-SCNC: 137 MMOL/L (ref 135–145)
SODIUM BLD-SCNC: 141 MMOL/L (ref 135–145)
TOTAL IMMATURE NEUTOROPHIL: 0.14 K/CU MM
TOTAL NUCLEATED RBC: 0 K/CU MM
WBC # BLD: 12.5 K/CU MM (ref 4–10.5)

## 2020-10-29 PROCEDURE — 97530 THERAPEUTIC ACTIVITIES: CPT

## 2020-10-29 PROCEDURE — 6370000000 HC RX 637 (ALT 250 FOR IP): Performed by: SURGERY

## 2020-10-29 PROCEDURE — 94640 AIRWAY INHALATION TREATMENT: CPT

## 2020-10-29 PROCEDURE — 6360000002 HC RX W HCPCS: Performed by: SURGERY

## 2020-10-29 PROCEDURE — 85025 COMPLETE CBC W/AUTO DIFF WBC: CPT

## 2020-10-29 PROCEDURE — APPSS45 APP SPLIT SHARED TIME 31-45 MINUTES: Performed by: NURSE PRACTITIONER

## 2020-10-29 PROCEDURE — 6360000002 HC RX W HCPCS: Performed by: INTERNAL MEDICINE

## 2020-10-29 PROCEDURE — 2580000003 HC RX 258: Performed by: SURGERY

## 2020-10-29 PROCEDURE — 6370000000 HC RX 637 (ALT 250 FOR IP): Performed by: INTERNAL MEDICINE

## 2020-10-29 PROCEDURE — 97116 GAIT TRAINING THERAPY: CPT

## 2020-10-29 PROCEDURE — 36415 COLL VENOUS BLD VENIPUNCTURE: CPT

## 2020-10-29 PROCEDURE — 94150 VITAL CAPACITY TEST: CPT

## 2020-10-29 PROCEDURE — 94660 CPAP INITIATION&MGMT: CPT

## 2020-10-29 PROCEDURE — 94761 N-INVAS EAR/PLS OXIMETRY MLT: CPT

## 2020-10-29 PROCEDURE — 80048 BASIC METABOLIC PNL TOTAL CA: CPT

## 2020-10-29 PROCEDURE — 97162 PT EVAL MOD COMPLEX 30 MIN: CPT

## 2020-10-29 PROCEDURE — 83735 ASSAY OF MAGNESIUM: CPT

## 2020-10-29 PROCEDURE — 99232 SBSQ HOSP IP/OBS MODERATE 35: CPT | Performed by: INTERNAL MEDICINE

## 2020-10-29 PROCEDURE — 2700000000 HC OXYGEN THERAPY PER DAY

## 2020-10-29 PROCEDURE — 2140000000 HC CCU INTERMEDIATE R&B

## 2020-10-29 PROCEDURE — 6370000000 HC RX 637 (ALT 250 FOR IP): Performed by: NURSE PRACTITIONER

## 2020-10-29 PROCEDURE — 84132 ASSAY OF SERUM POTASSIUM: CPT

## 2020-10-29 PROCEDURE — C9113 INJ PANTOPRAZOLE SODIUM, VIA: HCPCS | Performed by: SURGERY

## 2020-10-29 RX ORDER — MAGNESIUM OXIDE 400 MG/1
400 TABLET ORAL DAILY
Status: DISCONTINUED | OUTPATIENT
Start: 2020-10-29 | End: 2020-10-30 | Stop reason: HOSPADM

## 2020-10-29 RX ORDER — MAGNESIUM SULFATE IN WATER 40 MG/ML
2 INJECTION, SOLUTION INTRAVENOUS ONCE
Status: COMPLETED | OUTPATIENT
Start: 2020-10-29 | End: 2020-10-29

## 2020-10-29 RX ORDER — POTASSIUM CHLORIDE 20 MEQ/1
40 TABLET, EXTENDED RELEASE ORAL 2 TIMES DAILY WITH MEALS
Status: DISCONTINUED | OUTPATIENT
Start: 2020-10-29 | End: 2020-10-30 | Stop reason: HOSPADM

## 2020-10-29 RX ADMIN — BUDESONIDE AND FORMOTEROL FUMARATE DIHYDRATE 2 PUFF: 160; 4.5 AEROSOL RESPIRATORY (INHALATION) at 08:03

## 2020-10-29 RX ADMIN — POTASSIUM CHLORIDE 10 MEQ: 7.46 INJECTION, SOLUTION INTRAVENOUS at 13:09

## 2020-10-29 RX ADMIN — MONTELUKAST 10 MG: 10 TABLET, FILM COATED ORAL at 08:49

## 2020-10-29 RX ADMIN — ASPIRIN 81 MG CHEWABLE TABLET 81 MG: 81 TABLET CHEWABLE at 08:49

## 2020-10-29 RX ADMIN — PANTOPRAZOLE SODIUM 40 MG: 40 INJECTION, POWDER, FOR SOLUTION INTRAVENOUS at 06:28

## 2020-10-29 RX ADMIN — BUSPIRONE HYDROCHLORIDE 10 MG: 10 TABLET ORAL at 22:12

## 2020-10-29 RX ADMIN — POTASSIUM CHLORIDE 40 MEQ: 1500 TABLET, EXTENDED RELEASE ORAL at 16:08

## 2020-10-29 RX ADMIN — ALBUTEROL SULFATE 2 PUFF: 90 AEROSOL, METERED RESPIRATORY (INHALATION) at 20:03

## 2020-10-29 RX ADMIN — AMIODARONE HYDROCHLORIDE 200 MG: 200 TABLET ORAL at 08:50

## 2020-10-29 RX ADMIN — CLONAZEPAM 1 MG: 1 TABLET ORAL at 14:59

## 2020-10-29 RX ADMIN — MAGNESIUM SULFATE IN WATER 2 G: 40 INJECTION, SOLUTION INTRAVENOUS at 12:20

## 2020-10-29 RX ADMIN — BUDESONIDE AND FORMOTEROL FUMARATE DIHYDRATE 2 PUFF: 160; 4.5 AEROSOL RESPIRATORY (INHALATION) at 20:03

## 2020-10-29 RX ADMIN — LEVOTHYROXINE SODIUM 100 MCG: 100 TABLET ORAL at 06:28

## 2020-10-29 RX ADMIN — DULOXETINE HYDROCHLORIDE 60 MG: 30 CAPSULE, DELAYED RELEASE ORAL at 08:49

## 2020-10-29 RX ADMIN — SODIUM CHLORIDE, PRESERVATIVE FREE 10 ML: 5 INJECTION INTRAVENOUS at 22:13

## 2020-10-29 RX ADMIN — BACLOFEN 10 MG: 10 TABLET ORAL at 13:07

## 2020-10-29 RX ADMIN — POTASSIUM CHLORIDE 10 MEQ: 7.46 INJECTION, SOLUTION INTRAVENOUS at 14:52

## 2020-10-29 RX ADMIN — FERROUS GLUCONATE TAB 324 MG (37.5 MG ELEMENTAL IRON) 324 MG: 324 (37.5 FE) TAB at 08:49

## 2020-10-29 RX ADMIN — TRAZODONE HYDROCHLORIDE 100 MG: 50 TABLET ORAL at 22:12

## 2020-10-29 RX ADMIN — BACLOFEN 10 MG: 10 TABLET ORAL at 08:50

## 2020-10-29 RX ADMIN — Medication 100 MG: at 08:49

## 2020-10-29 RX ADMIN — SODIUM CHLORIDE, PRESERVATIVE FREE 10 ML: 5 INJECTION INTRAVENOUS at 08:50

## 2020-10-29 RX ADMIN — LUBIPROSTONE 24 MCG: 24 CAPSULE, GELATIN COATED ORAL at 08:49

## 2020-10-29 RX ADMIN — ALBUTEROL SULFATE 2 PUFF: 90 AEROSOL, METERED RESPIRATORY (INHALATION) at 15:36

## 2020-10-29 RX ADMIN — AMLODIPINE BESYLATE 10 MG: 10 TABLET ORAL at 08:50

## 2020-10-29 RX ADMIN — POTASSIUM CHLORIDE 10 MEQ: 7.46 INJECTION, SOLUTION INTRAVENOUS at 17:41

## 2020-10-29 RX ADMIN — FERROUS GLUCONATE TAB 324 MG (37.5 MG ELEMENTAL IRON) 324 MG: 324 (37.5 FE) TAB at 22:12

## 2020-10-29 RX ADMIN — OXYCODONE HYDROCHLORIDE AND ACETAMINOPHEN 1 TABLET: 5; 325 TABLET ORAL at 10:23

## 2020-10-29 RX ADMIN — POTASSIUM CHLORIDE 10 MEQ: 7.46 INJECTION, SOLUTION INTRAVENOUS at 11:37

## 2020-10-29 RX ADMIN — ENOXAPARIN SODIUM 40 MG: 40 INJECTION SUBCUTANEOUS at 08:50

## 2020-10-29 RX ADMIN — POTASSIUM CHLORIDE 10 MEQ: 7.46 INJECTION, SOLUTION INTRAVENOUS at 10:13

## 2020-10-29 RX ADMIN — POTASSIUM CHLORIDE 40 MEQ: 1500 TABLET, EXTENDED RELEASE ORAL at 13:09

## 2020-10-29 RX ADMIN — BUSPIRONE HYDROCHLORIDE 10 MG: 10 TABLET ORAL at 13:07

## 2020-10-29 RX ADMIN — FOLIC ACID 1 MG: 1 TABLET ORAL at 08:49

## 2020-10-29 RX ADMIN — MAGNESIUM OXIDE 400 MG (241.3 MG MAGNESIUM) TABLET 400 MG: TABLET at 11:06

## 2020-10-29 RX ADMIN — CLONAZEPAM 1 MG: 1 TABLET ORAL at 11:08

## 2020-10-29 RX ADMIN — ACETAMINOPHEN 650 MG: 325 TABLET ORAL at 13:22

## 2020-10-29 RX ADMIN — METOPROLOL SUCCINATE 25 MG: 25 TABLET, EXTENDED RELEASE ORAL at 08:49

## 2020-10-29 RX ADMIN — ALBUTEROL SULFATE 2 PUFF: 90 AEROSOL, METERED RESPIRATORY (INHALATION) at 11:27

## 2020-10-29 RX ADMIN — LUBIPROSTONE 24 MCG: 24 CAPSULE, GELATIN COATED ORAL at 16:08

## 2020-10-29 RX ADMIN — OXYCODONE HYDROCHLORIDE AND ACETAMINOPHEN 1 TABLET: 5; 325 TABLET ORAL at 17:51

## 2020-10-29 RX ADMIN — ALBUTEROL SULFATE 2 PUFF: 90 AEROSOL, METERED RESPIRATORY (INHALATION) at 08:03

## 2020-10-29 RX ADMIN — POTASSIUM CHLORIDE 10 MEQ: 7.46 INJECTION, SOLUTION INTRAVENOUS at 16:08

## 2020-10-29 RX ADMIN — BACLOFEN 10 MG: 10 TABLET ORAL at 22:12

## 2020-10-29 RX ADMIN — BUSPIRONE HYDROCHLORIDE 10 MG: 10 TABLET ORAL at 08:50

## 2020-10-29 ASSESSMENT — PAIN SCALES - GENERAL
PAINLEVEL_OUTOF10: 6
PAINLEVEL_OUTOF10: 0
PAINLEVEL_OUTOF10: 3
PAINLEVEL_OUTOF10: 3
PAINLEVEL_OUTOF10: 8
PAINLEVEL_OUTOF10: 0
PAINLEVEL_OUTOF10: 1
PAINLEVEL_OUTOF10: 0

## 2020-10-29 NOTE — PLAN OF CARE
Problem: Pain:  Goal: Pain level will decrease  Description: Pain level will decrease  10/29/2020 1334 by Cesar Menendez  Outcome: Ongoing  10/29/2020 1314 by Cesar Menendez  Outcome: Ongoing  Goal: Control of acute pain  Description: Control of acute pain  10/29/2020 1334 by Cesar Menendez  Outcome: Ongoing  10/29/2020 1314 by Cesar Menendez  Outcome: Ongoing  Goal: Control of chronic pain  Description: Control of chronic pain  10/29/2020 1334 by Cesar Menendez  Outcome: Ongoing  10/29/2020 1314 by Cesar Menendez  Outcome: Ongoing     Problem: Falls - Risk of:  Goal: Will remain free from falls  Description: Will remain free from falls  10/29/2020 1334 by Cesar Menendez  Outcome: Ongoing  10/29/2020 1314 by Cesar Menendez  Outcome: Ongoing  Goal: Absence of physical injury  Description: Absence of physical injury  10/29/2020 1334 by Cesar Menendez  Outcome: Ongoing  10/29/2020 1314 by Cesar Menendez  Outcome: Ongoing     Problem: Skin Integrity:  Goal: Will show no infection signs and symptoms  Description: Will show no infection signs and symptoms  10/29/2020 1334 by Cesar Menendez  Outcome: Ongoing  10/29/2020 1314 by Cesar Menendez  Outcome: Ongoing  Goal: Absence of new skin breakdown  Description: Absence of new skin breakdown  10/29/2020 1334 by Cesar Menendez  Outcome: Ongoing  10/29/2020 1314 by Cesar Menendez  Outcome: Ongoing     Problem: Gas Exchange - Impaired:  Goal: Levels of oxygenation will improve  Description: Levels of oxygenation will improve  10/29/2020 1334 by Cesar Menendez  Outcome: Ongoing  10/29/2020 1314 by Cesar Menendez  Outcome: Ongoing

## 2020-10-29 NOTE — PROGRESS NOTES
Cardiology Progress Note     Today's Plan replace potassium and magnesium     Admit Date:  10/19/2020    Consult reason/ Seen today for: pericardial effusion     Subjective and  Overnight Events:  Sitting up on side of bed this am. States she is feeling better. Shortness of breath improved. Denies any chest pain. She does note occasional palpitation - denies any dizziness. Telemetry SR  Oxygen 6 liters NC    Assessment / Plan / Recommendation:     1. Pericardial effusion- moderate pericardial effusion with associated right atrial collapse and pulses paradoxus-  noted on echo 10/20- repeat echo 10/23 unchanged although patient now symptomatic-had urgent pericardiocentesis with more than 500 cc of a blood tinged flidremoved. Clinically improved- cytology of pericardial fluid negative for malignancy   2. Paroxysmal Atrial fibrillation- converted to SR -Will continue with amiodarone and toprol- -  3. Holter completed- predominant rhythm is SR, infrequent APC and PVC  4. Hypokalemia- on  replacement - recommend to keep potassium 4.0-4.5   5. Hypo magnesium- will replace recommend to keep magnesium 2.0-2.2  6. HTN- controlled - continue with norvasc        History of Presenting Illness:    Chief complain on admission : 64 y. o.year old who is admitted for  Chief Complaint   Patient presents with    Cough    Headache    Chills        Past medical history:    has a past medical history of Anxiety, Arthritis, Back pain at L4-L5 level, Bipolar 1 disorder ([address] COPD (chronic obstructive pulmonary disease) ([address] Emphysema of lung ([address] FH: CAD (coronary artery disease), Fibromyalgia, H/O Doppler ultrasound, H/O echocardiogram, History of blood transfusion, Hyperlipidemia LDL goal <100, Hypertension, Obstructive sleep apnea, Osteoarthritis, and Thyroid disease.   Past surgical history:   has a past surgical history that includes Carpal tunnel release; Tubal ligation; back surgery; Cardiac catheterization; Colonoscopy (N/A, 12/12/2019); and Cholecystectomy, laparoscopic (N/A, 10/25/2020). Social History:   reports that she quit smoking about 12 years ago. She has a 30.00 pack-year smoking history. She has never used smokeless tobacco. She reports previous alcohol use of about 2.0 standard drinks of alcohol per week. She reports that she does not use drugs. Family history:  family history includes No Known Problems in her father and mother. Allergies   Allergen Reactions    Lisinopril Swelling and Rash     angioedema       Review of Systems:   All 14 systems were reviewed and are negative  Except for the positive findings which are documented     /74   Pulse 84   Temp 98.2 °F (36.8 °C) (Oral)   Resp 20   Ht 5' 2\" (1.575 m)   Wt 184 lb 4.8 oz (83.6 kg)   SpO2 93%   BMI 33.71 kg/m²       Intake/Output Summary (Last 24 hours) at 10/29/2020 0958  Last data filed at 10/28/2020 1300  Gross per 24 hour   Intake 240 ml   Output --   Net 240 ml       Physical Exam:  Constitutional:  Well developed, Well nourished, No acute distress  HENT:  Normocephalic, Atraumatic, Bilateral external ears normal,  Nose normal.   Neck- trachea is midline  Eyes:  PEERL, Conjunctiva normal  Respiratory:  Decreased breath sounds, No respiratory distress  Cardiovascular:  Normal heart rate, Normal rhythm, no murmurs appreciated, No rubs appreciated, No gallops appreciated, JVP not elevated  Abdomen/GI:  Soft, No tenderness  Musculoskeletal:  Intact distal pulses, no edema to lower legs,  No tenderness, No cyanosis, No clubbing. Integument:  Warm, Dry  Lymphatic:  No lymphadenopathy noted.    Neurologic:  Alert & oriented  Psychiatric:  Affect and Mood :pleasant     Medications:    albuterol sulfate HFA  2 puff Inhalation 4x daily    amiodarone  200 mg Oral Daily    enoxaparin  40 mg Subcutaneous Daily    lubiprostone  24 mcg Oral BID WC    amLODIPine  10 mg Oral Daily    pantoprazole  40 mg Intravenous Daily    aspirin  81 mg Oral Daily    baclofen  10 mg Oral TID    budesonide-formoterol  2 puff Inhalation BID    busPIRone  10 mg Oral TID    DULoxetine  60 mg Oral Daily    ferrous gluconate  324 mg Oral BID    fluticasone  2 spray Nasal Daily    folic acid  1 mg Oral Daily    [Held by provider] hydrALAZINE  10 mg Oral TID    levothyroxine  100 mcg Oral Daily    metoprolol succinate  25 mg Oral Daily    montelukast  10 mg Oral Daily    [Held by provider] theophylline  400 mg Oral Daily    traZODone  100 mg Oral Nightly    thiamine  100 mg Oral Daily    sodium chloride flush  10 mL Intravenous 2 times per day       potassium chloride **OR** potassium alternative oral replacement **OR** potassium chloride, ipratropium-albuterol, racepinephrine HCl, sodium chloride nebulizer, LORazepam, clonazePAM, sodium chloride flush, acetaminophen **OR** acetaminophen, polyethylene glycol, promethazine **OR** ondansetron, oxyCODONE-acetaminophen, guaiFENesin-dextromethorphan    Lab Data:  CBC:   Recent Labs     10/27/20  0541 10/28/20  0819 10/29/20  0820   WBC 9.9 12.4* 12.5*   HGB 8.1* 8.9* 10.4*   HCT 28.3* 31.3* 36.0*   MCV 99.6 97.5 97.6    436 399     BMP:   Recent Labs     10/27/20  0541 10/28/20  0819 10/28/20  1550 10/29/20  0820    141  --  141   K 3.4* 2.7* 3.1* 3.0*   CL 93* 84*  --  85*   CO2 39* 49*  --  50*   PHOS 1.8*  --   --   --    BUN 8 8  --  7   CREATININE 0.6 0.6  --  0.6     PT/INR:   Recent Labs     10/26/20  1210   PROTIME 13.2   INR 1.09     BNP:    Recent Labs     10/27/20  0541   PROBNP 5,073*     TROPONIN: No results for input(s): TROPONINT in the last 72 hours. Impression:  Principal Problem:    Sepsis ([address]  Active Problems:    PNA (pneumonia)    Pericardial effusion    Acute acalculous cholecystitis  Resolved Problems:    * No resolved hospital problems.  *       All labs, medications and tests reviewed by Name band; myself, continue all other medications of all above medical condition listed as is except for changes mentioned above. Thank you   Please call with questions. Electronically signed by BRIAN Barbour CNP on 10/29/2020 at 9:58 AM  I have seen ,spoken to  and examined this patient personally, independently of the nurse practitioner. I have reviewed the hospital care given to date and reviewed all pertinent labs and imaging. The plan was developed mutually at the time of the visit with the patient,  NP  and myself. I have spoken with patient, nursing staff and provided written and verbal instructions . The above note has been reviewed and I agree with the assessment, diagnosis, and treatment plan with changes made by me as follows     CARDIOLOGY ATTENDING ADDENDUM    HPI:  I have reviewed the above HPI  And agree with above   Alberto Noel is a 64 y. o.year old who and presents with had concerns including Cough; Headache; and Chills. Chief Complaint   Patient presents with    Cough    Headache    Chills     Interval history:  In SR    Physical Exam:  General:  Awake, alert, NAD  Head:normal  Eye:normal  Neck:  No JVD   Chest:  Clear to auscultation, respiration easy  Cardiovascular:  RRR S1S2  Abdomen:   nontender  Extremities:  tr edema  Pulses; palpable  Neuro: grossly normal      MEDICAL DECISION MAKING;    I agree with the above plan, which was planned by myself and discussed with NP.   In [address] [place] [address] Drive

## 2020-10-29 NOTE — CARE COORDINATION
Possible discharge SNF vs Home with HC. OT saw pt yesterday and recommended home with HC. Waiting on PT to see pt. WB note placed in computer asking Pt to see pt today.

## 2020-10-29 NOTE — PLAN OF CARE
Problem: Pain:  Goal: Pain level will decrease  Description: Pain level will decrease  Outcome: Ongoing  Goal: Control of acute pain  Description: Control of acute pain  Outcome: Ongoing  Goal: Control of chronic pain  Description: Control of chronic pain  Outcome: Ongoing     Problem: Falls - Risk of:  Goal: Will remain free from falls  Description: Will remain free from falls  Outcome: Ongoing  Goal: Absence of physical injury  Description: Absence of physical injury  Outcome: Ongoing     Problem: Skin Integrity:  Goal: Will show no infection signs and symptoms  Description: Will show no infection signs and symptoms  Outcome: Ongoing  Goal: Absence of new skin breakdown  Description: Absence of new skin breakdown  Outcome: Ongoing     Problem: Gas Exchange - Impaired:  Goal: Levels of oxygenation will improve  Description: Levels of oxygenation will improve  Outcome: Ongoing

## 2020-10-29 NOTE — PROGRESS NOTES
Pulmonary and Critical Care  Progress Note    Subjective: The patient has improved  Shortness of breath has improved  Chest pain none  Addressing respiratory complaints Patient is negative for  hemoptysis and cyanosis  CONSTITUTIONAL:  negative for fevers and chills      Past Medical History:     has a past medical history of Anxiety, Arthritis, Back pain at L4-L5 level, Bipolar 1 disorder (HCC), COPD (chronic obstructive pulmonary disease) ([address]physema of lung [address] FH: CAD (coronary artery disease), Fibromyalgia, H/O Doppler ultrasound, H/O echocardiogram, History of blood transfusion, Hyperlipidemia LDL goal <100, Hypertension, Obstructive sleep apnea, Osteoarthritis, and Thyroid disease. has a past surgical history that includes Carpal tunnel release; Tubal ligation; back surgery; Cardiac catheterization; Colonoscopy (N/A, 12/12/2019); and Cholecystectomy, laparoscopic (N/A, 10/25/2020). reports that she quit smoking about 12 years ago. She has a 30.00 pack-year smoking history. She has never used smokeless tobacco. She reports previous alcohol use of about 2.0 standard drinks of alcohol per week. She reports that she does not use drugs. Family history:  family history includes No Known Problems in her father and mother. Allergies   Allergen Reactions    Lisinopril Swelling and Rash     angioedema     Social History:    Reviewed; no changes    Objective:   PHYSICAL EXAM:        VITALS:  /74   Pulse 84   Temp 98.2 °F (36.8 °C) (Oral)   Resp 20   Ht 5' 2\" (1.575 m)   Wt 184 lb 4.8 oz (83.6 kg)   SpO2 93%   BMI 33.71 kg/m²     24HR INTAKE/OUTPUT:      Intake/Output Summary (Last 24 hours) at 10/29/2020 1142  Last data filed at 10/28/2020 1300  Gross per 24 hour   Intake 240 ml   Output --   Net 240 ml       CONSTITUTIONAL:  awake, alert, cooperative, no apparent distress, and appears stated age  LUNGS:  Moving air better.   CARDIOVASCULAR:  normal S1 and S2 and negative JVD  ABD:Abdomen soft, non-tender. BS normal. No masses,  No organomegaly  NEURO:Alert. DATA:    CBC:  Recent Labs     10/27/20  0541 10/28/20  0819 10/29/20  0820   WBC 9.9 12.4* 12.5*   RBC 2.84* 3.21* 3.69*   HGB 8.1* 8.9* 10.4*   HCT 28.3* 31.3* 36.0*    436 399   MCV 99.6 97.5 97.6   MCH 28.5 27.7 28.2   MCHC 28.6* 28.4* 28.9*   RDW 13.5 13.8 13.8   SEGSPCT 92.2* 79.8* 73.6*      BMP:  Recent Labs     10/27/20  0541 10/28/20  0819 10/28/20  1550 10/29/20  0820    141  --  141   K 3.4* 2.7* 3.1* 3.0*   CL 93* 84*  --  85*   CO2 39* 49*  --  50*   BUN 8 8  --  7   CREATININE 0.6 0.6  --  0.6   CALCIUM 8.5 8.8  --  8.7   GLUCOSE 167* 111*  --  102*      ABG:  No results for input(s): PH, PO2ART, NES3UCC, HCO3, BEART, O2SAT in the last 72 hours. Lab Results   Component Value Date    PROBNP 5,073 (H) 10/27/2020    PROBNP 4,841 (H) 10/26/2020    PROBNP 1,333 (H) 07/14/2020    THEOPH 11.6 07/14/2020     No results found for: [facility]    Radiology Review:  Pertinent images / reports were reviewed as a part of this visit.     Assessment:     Patient Active Problem List   Diagnosis    Centrilobular emphysema ([address]    Hypertension    Hyperlipidemia LDL goal <100    Abnormal EKG    Palpitations    Anxiety    FH: CAD (coronary artery disease)    Fibromyalgia    Back pain at L4-L5 level    Sleep apnea    COPD exacerbation ([facility]    Electrolyte imbalance    Epigastric pain    COPD (chronic obstructive pulmonary disease) ([address]    Spinal stenosis of lumbar region with radiculopathy    Spinal stenosis, lumbar region, without neurogenic claudication    COPD with acute exacerbation ([address]    Pneumonia due to infectious organism    SVT (supraventricular tachycardia) ([facility])    Class 1 obesity due to excess calories with body mass index (BMI) of 31.0 to 31.9 in adult    Pneumonia    Pleural effusion    Atelectasis    Pulmonary nodules    Respiratory failure with hypoxia and hypercapnia (HCC)    Anemia    COPD with exacerbation ([facility]    Acquired hemolytic anemia, unspecified (HCC)    Leucocytosis    Chronic kidney disease, stage I    Hyponatremia    PNA (pneumonia)    Sepsis (HCC)    Pericardial effusion    Acute acalculous cholecystitis       Plan:   1. Overall the patient has improved  2. Supp. k and Mg.     Vivien Husain MD  10/29/2020  11:42 AM

## 2020-10-29 NOTE — PROGRESS NOTES
Physical Therapy    Facility/Department: [facility]N  Initial Assessment    NAME: Daphne Mendoza  : 1960  MRN: [id number]    Date of Service: 10/29/2020    Discharge Recommendations:  Home with Home health PT, S Level 1, 24 hour supervision or assist       Functional Outcome Measure:    Acute Care Index of Function (ACIF):    Score: 0.82 (0.71 or greater = appropriate for home with home PT and 24 hour supervision/assist)      Assessment   Assessment: Pt is a 64 y.o. female with medical history, surgical history, co-morbidities, and personal factors including Anxiety, Arthritis, Back pain at L4-L5 level, Bipolar 1 disorder, COPD (chronic obstructive pulmonary disease), Emphysema of lung, FH: CAD (coronary artery disease), Fibromyalgia, H/O Doppler ultrasound, H/O echocardiogram, History of blood transfusion, Hyperlipidemia LDL goal <100, Hypertension, Obstructive sleep apnea, Osteoarthritis, Thyroid disease, Carpal tunnel release; Tubal ligation; back surgery; Cardiac catheterization; Colonoscopy (N/A, 2019); and Cholecystectomy, laparoscopic (N/A, 10/25/2020) with admission for Sepsis due to pneumonia and Pericardial effusion. Prior to admission, pt was independent with functional mobility and ADLs. Examination of body systems reveals decreased endurance and decreased independence with functional mobility.          Prognosis: Good  Decision Making: Medium Complexity  Clinical Presentation: evolving  PT Education: Goals;PT Role;Plan of Care;Gait Training;Home Exercise Program;Functional Mobility Training;Equipment;Transfer Training;General Safety  REQUIRES PT FOLLOW UP: Yes  Activity Tolerance  Activity Tolerance: Patient Tolerated treatment well         Restrictions  Restrictions/Precautions  Restrictions/Precautions: General Precautions     Vision/Hearing  Vision: Within Functional Limits  Hearing: Within functional limits       Subjective  Pain Screening  Patient Currently in Pain: Denies Orientation  Orientation  Overall Orientation Status: Within Normal Limits  Social/Functional History  Social/Functional History  Lives With: Spouse  Type of Home: House  Home Layout: One level  Home Access: Stairs to enter with rails  Entrance Stairs - Number of Steps: 4  Bathroom Shower/Tub: Tub/Shower unit  Bathroom Toilet: Standard  Bathroom Equipment: Shower chair, Grab bars in shower  Bathroom Accessibility: Accessible  Home Equipment: Oxygen, 4 wheeled walker  Receives Help From: Family  ADL Assistance: Independent  Homemaking Assistance: Needs assistance  Ambulation Assistance: Independent  Transfer Assistance: Independent  Active : Yes  Occupation: Retired  Cognition   Cognition  Overall Cognitive Status: WNL    Objective             Strength RLE  Strength RLE: WFL  Strength LLE  Strength LLE: WFL     Sensation  Overall Sensation Status: WNL  Bed mobility  Rolling to Left: Independent  Rolling to Right: Independent  Supine to Sit: Independent  Sit to Supine: Independent  Transfers  Sit to Stand: Independent  Stand to sit: Independent  Ambulation  Ambulation?: Yes  Ambulation 1  Surface: level tile  Device: Rolling Walker  Assistance: Stand by assistance;Supervision  Quality of Gait: decreased gait speed, decreased step length bilaterally  Distance: 150 feet  Comments: with initial verbal cues for directions in order to successfully navigate hallway and return to correct room; with initial verbal cues to practice pursed lip breathing throughout ambulatoin     Balance  Posture: Fair(forward head; rounded shoulders)  Sitting - Static: Good  Sitting - Dynamic: Good  Standing - Static: Good  Standing - Dynamic: Good(with a front wheeled walker)      Gait Training:  Cues were given for safety, sequence, device management, balance, posture, awareness, path. Therapeutic Activity Training:   Therapeutic activity training was instructed today.   Pt educated on and demonstrated understanding of importance of regular OOB mobility/ambulation with nursing staff and/or therapy staff throughout remainder of hospital stay. Plan   Plan  Times per week: 3+  Current Treatment Recommendations: Strengthening, ROM, Balance Training, Functional Mobility Training, Transfer Training, ADL/Self-care Training, Gait Training, Patient/Caregiver Education & Training, IADL Training, Equipment Evaluation, Education, & procurement, Neuromuscular Re-education, Pain Management, Home Exercise Program, Positioning, Endurance Training, Stair training, Safety Education & Training  Safety Devices  Type of devices: All fall risk precautions in place, Left in bed, Bed alarm in place, Call light within reach, Nurse notified, Gait belt      AM-PAC Score  AM-PAC Inpatient Mobility Raw Score : 22 (10/29/20 1435)  AM-PAC Inpatient T-Scale Score : 53.28 (10/29/20 1435)  Mobility Inpatient CMS 0-100% Score: 20.91 (10/29/20 1435)  Mobility Inpatient CMS G-Code Modifier : CJ (10/29/20 1435)          Goals  Long term goals  Time Frame for Long term goals :  In one week:  Long term goal 1: Pt will ambulate 400 feet with modified independence with LRAD  Long term goal 2: Pt will independently ascend/descend 6 steps with a handrail  Long term goal 3: Pt will independently complete 3 sets of 10 reps of BLE AROM exercises in available and allowed ROM       Time In: 1215  Time Out: 1250  Total Treatment Time: 35  Timed Code Treatment Minutes: [facility] Fanny Florence PT, DPT  License #: [id number]

## 2020-10-29 NOTE — PROCEDURES
[address] [place] [address], [address] [place] [address]                                 HOLTER MONITOR    PATIENT NAME: Kierra Haque                :        1960  MED REC NO:   [id number]                          ROOM:       6711  ACCOUNT NO:   [de-identified]                           ADMIT DATE: 10/19/2020  PROVIDER:     Indu Coronado MD    HOLTER MONITOR 24-HOURS    DATE OF STUDY:  10/27/2020    FINDINGS:  The predominant rhythm is sinus rhythm. The patient's  primary rhythm has wide complexes. The maximum heart rate is 100, the  minimum is 66 with an average of 81. The longest R-R interval is 1.01  seconds. Infrequent APCs and PVCs are seen. No other significant  arrhythmias noted.         Mame Scott MD    D: 10/28/2020 15:44:22       T: 10/28/2020 16:42:51     [id number]  Job#: [id number]     Doc#: [id number]    CC:

## 2020-10-29 NOTE — PROGRESS NOTES
Hospitalist Progress Note      Name:  Jena Smith /Age/Sex: 1960  (64 y.o. female)   MRN & CSN:  [id number] & [id number] Admission Date/Time: 10/19/2020 10:07 PM   Location:  [facility]0/313- PCP: Olivia Castellanos MD         Hospital Day: 11  DISPOSITION: Home  Reason for continued hospitalization:   Acute on chronic hypoxic respiratory failure. Requiring 4 to 6 L at rest at this point. Chronically on 3 L of oxygen continuously at home. Plan is to wean oxygen down for safe discharge. ASSESSMENT/PLAN:  Jena Smith is a 64 y.o.  female who presented to the hospital with a complaint of chills, fatigue, abdominal pain nausea and vomiting. CT abdomen pelvis showed moderate-sized pericardial effusion. Gallbladder showed no gallbladder wall thickening with evidence of edema in the gallbladder wall suggesting possibility of calculus cholecystitis. LFTs were noted to be elevated with a LT of 776 and AST of 654 at the highest.  WBC on admission 26. Lactic acid 3.6. She underwent lap afshin on 10/25. She underwent successful pericardiocentesis with removal of 500 cc on 10/23. Sepsis----likely secondary to acalculous cholecystitis. WBC 26 on admission. WBC normalized. S/p laparoscopic cholecystectomy. Remains afebrile. Blood pressure stable. Sepsis resolved. Blood cultures no growth. Respiratory viral panel negative. Cefepime DC'd on 10/27  -Continue monitoring off antibiotics    Acute on chronic hypoxic and hypercapnic respiratory failure----requiring 4 to 6 L of nasal cannula. PCO2 remains elevated at 80 on 10/26. Patient is not using the BiPAP continuously.   Chronically on 3 L of oxygen continuously at home for the past 1 year.  -Wean off oxygen  -Oxygen evaluation at the time of discharge    Hypokalemia---K+ 3.0 on 10/29  -KCl 40 p.o. twice daily  -80 p.o. KCl  -Recheck K levels during the day    Hypomagnesia---magnesium 1.7 on 10/29    -2 g magnesium    Pericardial

## 2020-10-30 VITALS
OXYGEN SATURATION: 97 % | BODY MASS INDEX: 33.49 KG/M2 | HEIGHT: 62 IN | SYSTOLIC BLOOD PRESSURE: 114 MMHG | WEIGHT: 182 LBS | DIASTOLIC BLOOD PRESSURE: 68 MMHG | HEART RATE: 86 BPM | TEMPERATURE: 98.5 F | RESPIRATION RATE: 23 BRPM

## 2020-10-30 LAB
ANION GAP SERPL CALCULATED.3IONS-SCNC: 10 MMOL/L (ref 4–16)
BUN BLDV-MCNC: 9 MG/DL (ref 6–23)
CALCIUM SERPL-MCNC: 9.3 MG/DL (ref 8.3–10.6)
CHLORIDE BLD-SCNC: 88 MMOL/L (ref 99–110)
CO2: 38 MMOL/L (ref 21–32)
CREAT SERPL-MCNC: 0.6 MG/DL (ref 0.6–1.1)
GFR AFRICAN AMERICAN: >60 ML/MIN/1.73M2
GFR NON-AFRICAN AMERICAN: >60 ML/MIN/1.73M2
GLUCOSE BLD-MCNC: 100 MG/DL (ref 70–99)
MAGNESIUM: 2.2 MG/DL (ref 1.8–2.4)
POTASSIUM SERPL-SCNC: 4.9 MMOL/L (ref 3.5–5.1)
SODIUM BLD-SCNC: 136 MMOL/L (ref 135–145)

## 2020-10-30 PROCEDURE — 97535 SELF CARE MNGMENT TRAINING: CPT

## 2020-10-30 PROCEDURE — 97116 GAIT TRAINING THERAPY: CPT

## 2020-10-30 PROCEDURE — C9113 INJ PANTOPRAZOLE SODIUM, VIA: HCPCS | Performed by: SURGERY

## 2020-10-30 PROCEDURE — 6370000000 HC RX 637 (ALT 250 FOR IP): Performed by: SURGERY

## 2020-10-30 PROCEDURE — 83735 ASSAY OF MAGNESIUM: CPT

## 2020-10-30 PROCEDURE — 99232 SBSQ HOSP IP/OBS MODERATE 35: CPT | Performed by: INTERNAL MEDICINE

## 2020-10-30 PROCEDURE — 80048 BASIC METABOLIC PNL TOTAL CA: CPT

## 2020-10-30 PROCEDURE — 2700000000 HC OXYGEN THERAPY PER DAY

## 2020-10-30 PROCEDURE — 2580000003 HC RX 258: Performed by: SURGERY

## 2020-10-30 PROCEDURE — 6370000000 HC RX 637 (ALT 250 FOR IP): Performed by: INTERNAL MEDICINE

## 2020-10-30 PROCEDURE — APPSS45 APP SPLIT SHARED TIME 31-45 MINUTES: Performed by: NURSE PRACTITIONER

## 2020-10-30 PROCEDURE — 6360000002 HC RX W HCPCS: Performed by: SURGERY

## 2020-10-30 PROCEDURE — 97530 THERAPEUTIC ACTIVITIES: CPT

## 2020-10-30 PROCEDURE — 84132 ASSAY OF SERUM POTASSIUM: CPT

## 2020-10-30 PROCEDURE — 6370000000 HC RX 637 (ALT 250 FOR IP): Performed by: NURSE PRACTITIONER

## 2020-10-30 PROCEDURE — 36415 COLL VENOUS BLD VENIPUNCTURE: CPT

## 2020-10-30 PROCEDURE — 94640 AIRWAY INHALATION TREATMENT: CPT

## 2020-10-30 PROCEDURE — 94761 N-INVAS EAR/PLS OXIMETRY MLT: CPT

## 2020-10-30 RX ORDER — AMIODARONE HYDROCHLORIDE 200 MG/1
200 TABLET ORAL DAILY
Qty: 30 TABLET | Refills: 1 | Status: ON HOLD | OUTPATIENT
Start: 2020-10-31 | End: 2020-11-08 | Stop reason: HOSPADM

## 2020-10-30 RX ORDER — AMLODIPINE BESYLATE 10 MG/1
10 TABLET ORAL DAILY
Qty: 30 TABLET | Refills: 2 | Status: ON HOLD | OUTPATIENT
Start: 2020-10-31 | End: 2020-11-26 | Stop reason: HOSPADM

## 2020-10-30 RX ADMIN — PANTOPRAZOLE SODIUM 40 MG: 40 INJECTION, POWDER, FOR SOLUTION INTRAVENOUS at 07:08

## 2020-10-30 RX ADMIN — BACLOFEN 10 MG: 10 TABLET ORAL at 13:51

## 2020-10-30 RX ADMIN — FERROUS GLUCONATE TAB 324 MG (37.5 MG ELEMENTAL IRON) 324 MG: 324 (37.5 FE) TAB at 08:22

## 2020-10-30 RX ADMIN — MONTELUKAST 10 MG: 10 TABLET, FILM COATED ORAL at 08:23

## 2020-10-30 RX ADMIN — Medication 100 MG: at 08:22

## 2020-10-30 RX ADMIN — CLONAZEPAM 1 MG: 1 TABLET ORAL at 11:24

## 2020-10-30 RX ADMIN — BACLOFEN 10 MG: 10 TABLET ORAL at 08:23

## 2020-10-30 RX ADMIN — METOPROLOL SUCCINATE 25 MG: 25 TABLET, EXTENDED RELEASE ORAL at 08:23

## 2020-10-30 RX ADMIN — FOLIC ACID 1 MG: 1 TABLET ORAL at 08:23

## 2020-10-30 RX ADMIN — MAGNESIUM OXIDE 400 MG (241.3 MG MAGNESIUM) TABLET 400 MG: TABLET at 08:22

## 2020-10-30 RX ADMIN — ASPIRIN 81 MG CHEWABLE TABLET 81 MG: 81 TABLET CHEWABLE at 08:23

## 2020-10-30 RX ADMIN — ALBUTEROL SULFATE 2 PUFF: 90 AEROSOL, METERED RESPIRATORY (INHALATION) at 07:48

## 2020-10-30 RX ADMIN — BUDESONIDE AND FORMOTEROL FUMARATE DIHYDRATE 2 PUFF: 160; 4.5 AEROSOL RESPIRATORY (INHALATION) at 07:49

## 2020-10-30 RX ADMIN — CLONAZEPAM 1 MG: 1 TABLET ORAL at 16:17

## 2020-10-30 RX ADMIN — AMLODIPINE BESYLATE 10 MG: 10 TABLET ORAL at 08:23

## 2020-10-30 RX ADMIN — DULOXETINE HYDROCHLORIDE 60 MG: 30 CAPSULE, DELAYED RELEASE ORAL at 08:23

## 2020-10-30 RX ADMIN — BUSPIRONE HYDROCHLORIDE 10 MG: 10 TABLET ORAL at 08:23

## 2020-10-30 RX ADMIN — LUBIPROSTONE 24 MCG: 24 CAPSULE, GELATIN COATED ORAL at 08:23

## 2020-10-30 RX ADMIN — ENOXAPARIN SODIUM 40 MG: 40 INJECTION SUBCUTANEOUS at 08:22

## 2020-10-30 RX ADMIN — ACETAMINOPHEN 650 MG: 325 TABLET ORAL at 13:53

## 2020-10-30 RX ADMIN — POTASSIUM CHLORIDE 40 MEQ: 1500 TABLET, EXTENDED RELEASE ORAL at 08:23

## 2020-10-30 RX ADMIN — BUSPIRONE HYDROCHLORIDE 10 MG: 10 TABLET ORAL at 13:51

## 2020-10-30 RX ADMIN — LEVOTHYROXINE SODIUM 100 MCG: 100 TABLET ORAL at 07:08

## 2020-10-30 RX ADMIN — SODIUM CHLORIDE, PRESERVATIVE FREE 10 ML: 5 INJECTION INTRAVENOUS at 08:22

## 2020-10-30 RX ADMIN — AMIODARONE HYDROCHLORIDE 200 MG: 200 TABLET ORAL at 08:23

## 2020-10-30 ASSESSMENT — PAIN SCALES - GENERAL
PAINLEVEL_OUTOF10: 3
PAINLEVEL_OUTOF10: 0

## 2020-10-30 NOTE — PROGRESS NOTES
Cardiology Progress Note     Today's Plan will sign off and be available if needed      Admit Date:  10/19/2020    Consult reason/ Seen today for: pericardial effusion     Subjective and  Overnight Events:  States she is feeling better. No palpitations. Shortness of breath has improved. Oxygen demand has decreased     Telemetry SR  Oxygen 4 liters NC    Assessment / Plan / Recommendation:     1. Pericardial effusion- moderate pericardial effusion with associated right atrial collapse and pulses paradoxus-  noted on echo 10/20- repeat echo 10/23 unchanged although patient now symptomatic-had urgent pericardiocentesis with more than 500 cc of a blood tinged flidremoved. Clinically improved- cytology of pericardial fluid negative for malignancy   2. Paroxysmal Atrial fibrillation- converted to SR -Will continue with amiodarone and toprol- add elquis   Georgina vasc 2    3. Holter completed- predominant rhythm is SR, infrequent APC and PVC  4. Hypokalemia- on  replacement - recommend to keep potassium 4.0-4.5   5. Hypo magnesium- will replace recommend to keep magnesium 2.0-2.2  6. HTN- controlled - continue with norvasc    [id number] Score for Atrial Fibrillation Stroke Risk   Risk   Factors  Component Value   C CHF No 0   H HTN Yes 1   A2 Age >= 75 No,  (61 y.o.) 0   D DM No 0   S2 Prior Stroke/TIA No 0   V Vascular Disease No 0   A Age 74-69 No,  (61 y.o.) 0   Sc Sex female 1    [id number]  Score  2   Score last updated 10/30/20 11:36 AM EDT        History of Presenting Illness:    Chief complain on admission : 64 y. o.year old who is admitted for  Chief Complaint   Patient presents with    Cough    Headache    Chills        Past medical history:    has a past medical history of Anxiety, Arthritis, Back pain at L4-L5 level, Bipolar 1 disorder ([address] COPD (chronic obstructive pulmonary disease) ([address] Emphysema of lung ([address] FH: CAD (coronary artery disease), Fibromyalgia, H/O Doppler ultrasound, H/O echocardiogram, History of blood transfusion, Hyperlipidemia LDL goal <100, Hypertension, Obstructive sleep apnea, Osteoarthritis, and Thyroid disease. Past surgical history:   has a past surgical history that includes Carpal tunnel release; Tubal ligation; back surgery; Cardiac catheterization; Colonoscopy (N/A, 12/12/2019); and Cholecystectomy, laparoscopic (N/A, 10/25/2020). Social History:   reports that she quit smoking about 12 years ago. She has a 30.00 pack-year smoking history. She has never used smokeless tobacco. She reports previous alcohol use of about 2.0 standard drinks of alcohol per week. She reports that she does not use drugs. Family history:  family history includes No Known Problems in her father and mother. Allergies   Allergen Reactions    Lisinopril Swelling and Rash     angioedema       Review of Systems:   All 14 systems were reviewed and are negative  Except for the positive findings which are documented     /68   Pulse 78   Temp 98.3 °F (36.8 °C) (Oral)   Resp 18   Ht 5' 2\" (1.575 m)   Wt 182 lb (82.6 kg)   SpO2 97%   BMI 33.29 kg/m²       Intake/Output Summary (Last 24 hours) at 10/30/2020 1130  Last data filed at 10/30/2020 0857  Gross per 24 hour   Intake 663.7 ml   Output --   Net 663.7 ml       Physical Exam:  Constitutional:   No acute distress   HENT:  Normocephalic, Atraumatic, Bilateral external ears normal,  Nose normal.   Neck- trachea is midline  Eyes:  PEERL, Conjunctiva normal  Respiratory:  Decreased breath sounds, No respiratory distress  Cardiovascular:  Normal heart rate, Normal rhythm, no murmurs appreciated, No rubs appreciated, No gallops appreciated, JVP not elevated  Abdomen/GI:  Soft, no tender  Musculoskeletal:  Intact distal pulses, no edema to lower legs,  No tenderness  Integument:  Warm, Dry  Lymphatic:  No lymphadenopathy noted.    Neurologic:  Alert & oriented  Psychiatric:  Affect and Mood :pleasant     Medications:    magnesium oxide  400 mg Oral Daily    potassium chloride  40 mEq Oral BID WC    albuterol sulfate HFA  2 puff Inhalation 4x daily    amiodarone  200 mg Oral Daily    enoxaparin  40 mg Subcutaneous Daily    lubiprostone  24 mcg Oral BID WC    amLODIPine  10 mg Oral Daily    pantoprazole  40 mg Intravenous Daily    aspirin  81 mg Oral Daily    baclofen  10 mg Oral TID    budesonide-formoterol  2 puff Inhalation BID    busPIRone  10 mg Oral TID    DULoxetine  60 mg Oral Daily    ferrous gluconate  324 mg Oral BID    fluticasone  2 spray Nasal Daily    folic acid  1 mg Oral Daily    [Held by provider] hydrALAZINE  10 mg Oral TID    levothyroxine  100 mcg Oral Daily    metoprolol succinate  25 mg Oral Daily    montelukast  10 mg Oral Daily    [Held by provider] theophylline  400 mg Oral Daily    traZODone  100 mg Oral Nightly    thiamine  100 mg Oral Daily    sodium chloride flush  10 mL Intravenous 2 times per day       potassium chloride **OR** potassium alternative oral replacement **OR** potassium chloride, ipratropium-albuterol, racepinephrine HCl, sodium chloride nebulizer, LORazepam, clonazePAM, sodium chloride flush, acetaminophen **OR** acetaminophen, polyethylene glycol, promethazine **OR** ondansetron, oxyCODONE-acetaminophen, guaiFENesin-dextromethorphan    Lab Data:  CBC:   Recent Labs     10/28/20  0819 10/29/20  0820   WBC 12.4* 12.5*   HGB 8.9* 10.4*   HCT 31.3* 36.0*   MCV 97.5 97.6    399     BMP:   Recent Labs     10/28/20  0819 10/28/20  1550 10/29/20  0820 10/29/20  1146     --  141 137   K 2.7* 3.1* 3.0* 3.2*  3.1*   CL 84*  --  85* 84*   CO2 49*  --  50* 47*   BUN 8  --  7 7   CREATININE 0.6  --  0.6 0.6     PT/INR:   No results for input(s): PROTIME, INR in the last 72 hours. BNP:    No results for input(s): PROBNP in the last 72 hours.   TROPONIN: No results for input(s): TROPONINT in the last 72 hours. Impression:  Principal Problem:    Sepsis ([address]  Active Problems:    PNA (pneumonia)    Pericardial effusion    Acute acalculous cholecystitis  Resolved Problems:    * No resolved hospital problems. *       All labs, medications and tests reviewed by myself, continue all other medications of all above medical condition listed as is except for changes mentioned above. Thank you   Please call with questions. Electronically signed by BRIAN Hinton CNP on 10/30/2020 at 11:30 AM  I have seen ,spoken to  and examined this patient personally, independently of the nurse practitioner. I have reviewed the hospital care given to date and reviewed all pertinent labs and imaging. The plan was developed mutually at the time of the visit with the patient,  NP  and myself. I have spoken with patient, nursing staff and provided written and verbal instructions . The above note has been reviewed and I agree with the assessment, diagnosis, and treatment plan with changes made by me as follows     CARDIOLOGY ATTENDING ADDENDUM    HPI:  I have reviewed the above HPI  And agree with above   Isak Butler is a 64 y. o.year old who and presents with had concerns including Cough; Headache; and Chills. Chief Complaint   Patient presents with    Cough    Headache    Chills     Interval history:  In SR    Physical Exam:  General:  Awake, alert, NAD  Head:normal  Eye:normal  Neck:  No JVD   Chest:  Clear to auscultation, respiration easy  Cardiovascular:  RRR S1S2  Abdomen:   nontender  Extremities:  no edema  Pulses; palpable  Neuro: grossly normal      MEDICAL DECISION MAKING;    I agree with the above plan, which was planned by myself and discussed with NP.   Staying in SR  Stable cardiac status  Will assess aflutter as OP, CPM with akila and BB  Hold anticoag  CPM with JOLENE Kirk MD [facility]

## 2020-10-30 NOTE — PROGRESS NOTES
[facility] Liaison spoke w/pt & is aware of discharge & will initiate Donna 78. Spoke with Dr Jerry Rviera and he gave hernan cardenas for [facility]hc for pt. Corinna Kramer at [facility].

## 2020-10-30 NOTE — PROGRESS NOTES
She is continued on Toprol-XL. I discussed with cardiology about long-term anticoagulation. Dr. Popeye Fortune, Holter monitor was sinus rhythm throughout and thus will hold anticoagulation only continue aspirin 81 mg daily. Acute cholecystitis----s/p lap afshin on 10/25. Peak . Peak . LFTs significantly improved. Lactic acidosis---lactic acid 3.6 on admission in the setting of sepsis. Lactic acidosis resolved. Hypertension---BP controlled. Dralzine discontinued discharge. She is continued on Toprol-XL and amlodipine 10 mg was started on discharge. Hypothyroidism---on levothyroxine    Depression/anxiety----continue BuSpar    COPD---chronically on 3 L of oxygen continuously at home. Has been started on theophylline recently. However theophylline discontinued due to drug drug interaction with amiodarone requiring monitoring. MEDICAL DECISION MAKING:  -Labs reviewed  -Imaging reviewed  -Level of risk moderate  Diet DIET LOW FAT;   DVT Prophylaxis [x] Lovenox, []  Heparin, [] SCDs, [] Ambulation   GI Prophylaxis [] PPI,  [] H2 Blocker,  [] Carafate,  [] Diet/Tube Feeds   Code Status Full Code   Disposition  Home   MDM [] Low, [x] Moderate,[]  High     Chief complaint/Interval History/ROS     Chief Complaint: Fatigue, abdominal pain, chills, nausea and vomiting      INTERVAL HISTORY: Acute events overnight. Instructed the nurses to walk the patient the hallways and see oxygen requirement with exertion. ROS:  No chest pain. No abdominal pain. No nausea. No vomiting. Objective: Intake/Output Summary (Last 24 hours) at 10/30/2020 1142  Last data filed at 10/30/2020 0857  Gross per 24 hour   Intake 663.7 ml   Output --   Net 663.7 ml      Vitals:   Vitals:    10/30/20 0841   BP: 114/68   Pulse: 78   Resp: 18   Temp: 98.3 °F (36.8 °C)   SpO2:      Physical Exam:   GEN: Awake female, nontoxic appearing. Pleasant. EYES: No eye discharge.   HENT: Membranes moist.  No nasal discharge. NECK: Supple,   RESP: Clear to auscultation bilateral.    CV: RRR. No pitting oximetry given. GI: Abdomen soft. Nontender. : No Mcallister in place. MSK: No bony fractures. No gross deformities. SKIN: warm, dry, no rashes,  NEURO: Cranial nerves appear grossly intact, normal speech, no lateralizing weakness. PSYCH: Awake, alert, oriented x 4. Affect appropriate.     Medications:   Medications:    apixaban  5 mg Oral BID    magnesium oxide  400 mg Oral Daily    potassium chloride  40 mEq Oral BID WC    albuterol sulfate HFA  2 puff Inhalation 4x daily    amiodarone  200 mg Oral Daily    lubiprostone  24 mcg Oral BID WC    amLODIPine  10 mg Oral Daily    pantoprazole  40 mg Intravenous Daily    aspirin  81 mg Oral Daily    baclofen  10 mg Oral TID    budesonide-formoterol  2 puff Inhalation BID    busPIRone  10 mg Oral TID    DULoxetine  60 mg Oral Daily    ferrous gluconate  324 mg Oral BID    fluticasone  2 spray Nasal Daily    folic acid  1 mg Oral Daily    [Held by provider] hydrALAZINE  10 mg Oral TID    levothyroxine  100 mcg Oral Daily    metoprolol succinate  25 mg Oral Daily    montelukast  10 mg Oral Daily    [Held by provider] theophylline  400 mg Oral Daily    traZODone  100 mg Oral Nightly    thiamine  100 mg Oral Daily    sodium chloride flush  10 mL Intravenous 2 times per day      Infusions:   PRN Meds: potassium chloride, 40 mEq, PRN    Or  potassium alternative oral replacement, 40 mEq, PRN    Or  potassium chloride, 10 mEq, PRN  ipratropium-albuterol, 1 ampule, Q4H PRN  racepinephrine HCl, 11.25 mg, Q6H PRN  sodium chloride nebulizer, 3 mL, Q6H PRN  LORazepam, 0.5 mg, Q8H PRN  clonazePAM, 1 mg, TID PRN  sodium chloride flush, 10 mL, PRN  acetaminophen, 650 mg, Q6H PRN    Or  acetaminophen, 650 mg, Q6H PRN  polyethylene glycol, 17 g, Daily PRN  promethazine, 12.5 mg, Q6H PRN    Or  ondansetron, 4 mg, Q6H PRN  oxyCODONE-acetaminophen, 1 tablet, Q6H PRN  guaiFENesin-dextromethorphan, 5 mL, Q4H PRN      Electronically signed by Db Collazo MD on 10/30/2020 at 11:42 AM

## 2020-10-30 NOTE — DISCHARGE SUMMARY
Discharge Summary    Name:  Kelly Perez /Age/Sex: 1960  (64 y.o. female)   MRN & CSN:  [id number] & [id number] Admission Date/Time: 10/19/2020 10:07 PM   Attending:  Angela Hill MD Discharging Physician: Angela Hill MD     Hospital Course:   ASSESSMENT/PLAN:  Kelly Perez is a 64 y.o.  female who presented to the hospital with a complaint of chills, fatigue, abdominal pain nausea and vomiting. CT abdomen pelvis showed moderate-sized pericardial effusion. Gallbladder showed no gallbladder wall thickening with evidence of edema in the gallbladder wall suggesting possibility of calculus cholecystitis. LFTs were noted to be elevated with a LT of 776 and AST of 654 at the highest.  WBC on admission 26. Lactic acid 3.6. She underwent lap afshin on 10/25. She underwent successful pericardiocentesis with removal of 500 cc on 10/23.     Sepsis----likely secondary to acalculous cholecystitis. WBC 26 on admission. WBC normalized. S/p laparoscopic cholecystectomy. Remains afebrile. Blood pressure stable. Sepsis resolved. Blood cultures no growth. Respiratory viral panel negative. Cefepime DC'd on 10/27. Discharged in stable condition     Acute on chronic hypoxic and hypercapnic respiratory failure----requiring 4 to 6 L of nasal cannula. PCO2 remains elevated at 80 on 10/26. Patient is not using the BiPAP continuously. Chronically on 3 L of oxygen continuously at home for the past 1 year. Patient walked the hallways prior to discharge. According to the nurse, the lowest oxygen saturation with exertion was 92% while on 3 L. At baseline, the patient is on 3 L of oxygen continuously. Per patient account, she did well with ambulation. As a result, patient was discharged on her oxygen at baseline.     Hypomagnesia---resolved.     Pericardial effusion----s/p pericardiocentesis with removal of 500 cc on 10/23. Pericardial drain removed on 10/25.   Cytology negative.     Paroxysmal A. fib/a flutter----in the setting of pericardial effusion. Subsequently converted to sinus rhythm and has been in sinus rhythm prior to discharge. Have been started on amiodarone. She is continued on Toprol-XL. I discussed with cardiology about long-term anticoagulation. Dr. Marin Dickerson, Holter monitor was sinus rhythm throughout and thus will hold anticoagulation and only to continue aspirin 81 mg daily.      Acute cholecystitis----s/p lap afshin on 10/25. Peak . Peak . LFTs significantly improved.     Lactic acidosis---lactic acid 3.6 on admission in the setting of sepsis. Lactic acidosis resolved.     Hypertension---BP controlled. Hydralazine discontinued discharge. She is continued on Toprol-XL and amlodipine 10 mg was started on discharge.     Hypothyroidism---on levothyroxine     Depression/anxiety----continue BuSpar     COPD---chronically on 3 L of oxygen continuously at home. Has been started on theophylline recently. However theophylline discontinued due to drug drug interaction with amiodarone requiring monitoring.       The patient expressed appropriate understanding of and agreement with the discharge recommendations, medications, and plan.      Consults this admission:  IP CONSULT TO HOSPITALIST  IP CONSULT TO CARDIOLOGY  IP CONSULT TO GI  IP CONSULT TO GI  IP CONSULT TO GENERAL SURGERY  IP CONSULT TO PULMONOLOGY  IP CONSULT TO [address] [place] [address] NEEDS    Discharge Instruction:   Follow-up with pulmonology    Diet:  cardiac diet   Activity: activity as tolerated  Disposition: Discharged to:   [x[facility]Home, [x[facility], []SNF, []Acute Rehab, []Hospice  Condition on discharge: Stable    Discharge Medications:      Robinson Soares   Home Medication Instructions CATHY:[id number]    Printed on:10/30/20 0906   Medication Information                      albuterol-ipratropium (COMBIVENT RESPIMAT)  MCG/ACT AERS inhaler  Inhale 1 puff into the lungs every 4 hours as needed for Wheezing             amiodarone (CORDARONE) 200 MG tablet  Take 1 tablet by mouth daily             amLODIPine (NORVASC) 10 MG tablet  Take 1 tablet by mouth daily             aspirin 81 MG chewable tablet  Take 81 mg by mouth daily             baclofen (LIORESAL) 10 MG tablet  Take 1 tablet by mouth 3 times daily             budesonide-formoterol (SYMBICORT) 160-4.5 MCG/ACT AERO  USE 2 INHALATIONS TWICE A DAY             busPIRone (BUSPAR) 10 MG tablet  Take 1 tablet by mouth 3 times daily             clonazePAM (KLONOPIN) 1 MG tablet  Take 1 mg by mouth 3 times daily as needed. DULoxetine (CYMBALTA) 60 MG extended release capsule  Take 60 mg by mouth daily             famotidine (PEPCID) 20 MG tablet  Take 1 tablet by mouth 2 times daily             ferrous gluconate 324 (37.5 Fe) MG TABS  Take 1 tablet by mouth 2 times daily             fluticasone (FLONASE) 50 MCG/ACT nasal spray  2 sprays by Nasal route daily             folic acid (FOLVITE) 1 MG tablet  Take 1 tablet by mouth daily             guaiFENesin (MUCINEX) 600 MG extended release tablet  Take 1 tablet by mouth 2 times daily             ipratropium-albuterol (DUONEB) 0.5-2.5 (3) MG/3ML SOLN nebulizer solution  Inhale 3 mLs into the lungs every 4 hours             levothyroxine (SYNTHROID) 100 MCG tablet  Take 1 tablet by mouth Daily             metoprolol succinate (TOPROL XL) 25 MG extended release tablet  Take 1 tablet by mouth daily             montelukast (SINGULAIR) 10 MG tablet  Take 1 tablet by mouth daily             traZODone (DESYREL) 50 MG tablet  Take 100 mg by mouth nightly              vitamin B-1 100 MG tablet  Take 1 tablet by mouth daily                 Objective Findings at Discharge:   /68   Pulse 86   Temp 98.3 °F (36.8 °C) (Oral)   Resp 23   Ht 5' 2\" (1.575 m)   Wt 182 lb (82.6 kg)   SpO2 97%   BMI 33.29 kg/m²            PHYSICAL EXAM   GEN: Awake female, nontoxic appearing. Pleasant.   EYES: No eye discharge. HENT: Membranes moist.  No nasal discharge. NECK: Supple,   RESP: Clear to auscultation bilateral.    CV: RRR. No pitting oximetry given. GI: Abdomen soft. Nontender. : No Mcallister in place. MSK: No bony fractures. No gross deformities. SKIN: warm, dry, no rashes,  NEURO: Cranial nerves appear grossly intact, normal speech, no lateralizing weakness. PSYCH: Awake, alert, oriented x 4. Affect appropriate.   BMP/CBC  Recent Labs     10/28/20  0819 10/28/20  1550 10/29/20  0820 10/29/20  1146     --  141 137   K 2.7* 3.1* 3.0* 3.2*  3.1*   CL 84*  --  85* 84*   CO2 49*  --  50* 47*   BUN 8  --  7 7   CREATININE 0.6  --  0.6 0.6   WBC 12.4*  --  12.5*  --    HCT 31.3*  --  36.0*  --      --  399  --        IMAGING:  All imaging reviewed before discharge    Discharge Time of 36 minutes    Electronically signed by Julian Hughes MD on 10/30/2020 at 1:05 PM

## 2020-10-30 NOTE — PROGRESS NOTES
Occupational Therapy  . Occupational Therapy Treatment Note  Name: Blanca Bartlett MRN: [id number] :   1960   Date:  10/30/2020   Admission Date: 10/19/2020 Room:  [address] [place] [address]   Restrictions/Precautions:  Restrictions/Precautions  Restrictions/Precautions: General Precautions     Communication with other providers:  Per chart review, patient is appropriate for therapeutic intervention. Subjective:  Patient states:  Pt agreeable to OT Tx session. \"I do plan on going home. \"   Pain:   Location, Type, Intensity (0/10 to 10/10):  0/10, denies pain, reports \"soreness\" in belly  Objective:    Observation:  Pt received seated EOB, having just completed PT Tx session. Objective Measures:  Telemetry, HR 84, O2 sat 97% on 3L at rest, RR 18    Treatment, including education:  Therapeutic Activity Training:   Therapeutic activity training was instructed today. Cues were given for safety, sequence, UE/LE placement, awareness, and balance. Pt educated for self-monitoring for s/s of decreased activity tolerance and perform alternating seated / standing activities and rest breaks PRN. Activities performed today included functional transfers, sit-stand, SPT. Sit<>stands: Mod I c RW  SPT: Sup c RW for cue to keep walker close. Pt reports not using walker at baseline and was educated to use per recommendations and for safe body positioning during functional transfers / mobility. Functional Mobility: Sup c RW inside room to / from bathroom ~30 ft, required one cue for not stepping away from walker to open door to bathroom. Self Care Training:   Cues were given for safety, sequence, UE/LE placement, visual cues, and balance. Activities performed today included dressing, toileting, hand hygiene, and grooming. Toilet Transfer: Sup c RW   Toileting: Mod I + intermittent use of grab bar to perform hygiene s/p BM and clothing mgmt up / down  LB Dressing:  Mod I to doff / don slip on slippers, pull up and pants c intermittent use of grab bar. Pt exhibited self-monitoring for activity tolerance, identifying need to return to bed for seated rest break and to perform grooming tasks seated EOB vs in stance at sink. Grooming: Set up to perform face washing and hand hygiene seated EOB. All therapeutic intervention performed c emphasis on ADL tasks, dynamic balance / sitting and standing tolerance to inc strength, endurance and act tolerance for inc Indep c ADL tasks, func transfers / mobility. Safety  Patient safely seated EOB at end of session, with call light/phone in reach, and nursing aware. Pt declined use of gait belt or to have alarm activated at end of Tx session. Assessment / Impression:        Patient's tolerance of treatment:  Well   Adverse Reaction: None  Significant change in status and impact:  None - pt is doing well c self-pacing and use of rest breaks PRN to manage activity tolerance, actively participates. Barriers to improvement:  Decreased strength / endurance    Plan for Next Session:    Continue per OT POC until discharge.     Time in:  1115  Time out:  1203  Timed treatment minutes:  48  Total treatment time:  48    Electronically signed by:    NOAH Oneal  10/30/2020, 11:00 AM    Previously filed values:    Goals:  Pt goal: go home  Time Frame for STGs: discharge  Goal 1: Pt will perform UE ADLs Independent  Goal 2: Pt will perform LE ADLs Independent  Goal 3: Pt will perform toileting Independent  Goal 4: Pt will perform functional transfer Independent  Goal 5: Pt will perform functional mobility Independent  Goal 6: Pt will perform therex/theract in order to increase functional activity tolerance and dynamic standing balance

## 2020-10-30 NOTE — PROGRESS NOTES
Physical Therapy    Physical Therapy Treatment Note  Name: Nikos Echeverria MRN: [id number] :   1960   Date:  10/30/2020   Admission Date: 10/19/2020 Room:  3130/3130-A   Restrictions/Precautions:  Restrictions/Precautions  Restrictions/Precautions: General Precautions         Subjective:  Patient states:  \"I would like to go for a walk\"  Pain:   Location, Type, Intensity (0/10 to 10/10): Denies    Objective:    Observation:  Pt supine in bed upon entry    Treatment, including education/measures:  Pt agreeable to participating in therapy at this time. Therapeutic Activity Training:   Therapeutic activity training was instructed today. Cues were given for safety, sequence, UE/LE placement, awareness, and balance. Activities performed today included bed mobility training, sup-sit, sit-stand. Pt completed supine to sit with supervision. Pt completed sit to stand from bed/chair/toilet with supervision. Pt completed stand to sit onto bed/chair/toilet with supervision. Pt completed sit to supine with supervision. Increased time required for all task completion. Pt educated on and demonstrated understanding of importance of regular OOB mobility/ambulation with nursing staff and/or therapy staff throughout remainder of hospital stay. Gait Training:  Cues were given for safety, sequence, device management, balance, posture, awareness, path. Pt ambulated 10 feet + 10 feet + 75 feet + 75 feet with assist varying from SBAx1 to supervision with a front wheeled walker with a decreased gait speed and a decreased step length bilaterally. Pt provided with initial verbal cues to practice pursed lip breathing throughout ambulation. Pt provided with initial verbal cues for directions in order to successfully navigate hallway and return to correct room. Safety  Patient left safely in the bed, with call light/phone in reach with alarm applied.  Gait belt was used for transfers and

## 2020-10-30 NOTE — PROGRESS NOTES
JVD  ABD:Abdomen soft, non-tender. BS normal. No masses,  No organomegaly  NEURO:Alert. DATA:    CBC:  Recent Labs     10/28/20  0819 10/29/20  0820   WBC 12.4* 12.5*   RBC 3.21* 3.69*   HGB 8.9* 10.4*   HCT 31.3* 36.0*    399   MCV 97.5 97.6   MCH 27.7 28.2   MCHC 28.4* 28.9*   RDW 13.8 13.8   SEGSPCT 79.8* 73.6*      BMP:  Recent Labs     10/28/20  0819 10/28/20  1550 10/29/20  0820 10/29/20  1146     --  141 137   K 2.7* 3.1* 3.0* 3.2*  3.1*   CL 84*  --  85* 84*   CO2 49*  --  50* 47*   BUN 8  --  7 7   CREATININE 0.6  --  0.6 0.6   CALCIUM 8.8  --  8.7 8.7   GLUCOSE 111*  --  102* 128*      ABG:  No results for input(s): PH, PO2ART, TEN0ZWN, HCO3, BEART, O2SAT in the last 72 hours. Lab Results   Component Value Date    PROBNP 5,073 (H) 10/27/2020    PROBNP 4,841 (H) 10/26/2020    PROBNP 1,333 (H) 07/14/2020    THEOPH 11.6 07/14/2020     No results found for: [facility]    Radiology Review:  Pertinent images / reports were reviewed as a part of this visit.     Assessment:     Patient Active Problem List   Diagnosis    Centrilobular emphysema ([address]    Hypertension    Hyperlipidemia LDL goal <100    Abnormal EKG    Palpitations    Anxiety    FH: CAD (coronary artery disease)    Fibromyalgia    Back pain at L4-L5 level    Sleep apnea    COPD exacerbation ([facility])    Electrolyte imbalance    Epigastric pain    COPD (chronic obstructive pulmonary disease) ([address]    Spinal stenosis of lumbar region with radiculopathy    Spinal stenosis, lumbar region, without neurogenic claudication    COPD with acute exacerbation ([address]    Pneumonia due to infectious organism    SVT (supraventricular tachycardia) ([facility])    Class 1 obesity due to excess calories with body mass index (BMI) of 31.0 to 31.9 in adult    Pneumonia    Pleural effusion    Atelectasis    Pulmonary nodules    Respiratory failure with hypoxia and hypercapnia (HCC)    Anemia    COPD with exacerbation (HCC)    Acquired hemolytic anemia, unspecified (HCC)    Leucocytosis    Chronic kidney disease, stage I    Hyponatremia    PNA (pneumonia)    Sepsis (HCC)    Pericardial effusion    Acute acalculous cholecystitis       Plan:   1. Overall the patient has improved. 2.  [address] Activity.   Earl Ch MD  10/30/2020  9:58 AM

## 2020-10-30 NOTE — CARE COORDINATION
LUCYW received a discharge order for pt to go home with [facility]. LUCYW spoke with pt and she is agreeable to [facility]. is would like [facility]. CARLOS Guidry and gave referral Jad Mittal will look over pt inf and if they can take pt she will initiate [facility] services.

## 2020-10-31 ENCOUNTER — CARE COORDINATION (OUTPATIENT)
Dept: CASE MANAGEMENT | Age: 60
End: 2020-10-31

## 2020-10-31 NOTE — CARE COORDINATION
Patient contacted regarding recent discharge and COVID-19 risk. Discussed COVID-19 related testing which was available at this time. Test results were negative. Patient informed of results, if available? Yes     Care Transition Nurse/ Ambulatory Care Manager contacted the patient by telephone to perform post discharge assessment. Verified name and  with patient as identifiers. Patient has following risk factors of: COPD and pneumonia. CTN/ACM reviewed discharge instructions, medical action plan and red flags related to discharge diagnosis. Reviewed and educated them on any new and changed medications related to discharge diagnosis. Advised obtaining a 90-day supply of all daily and as-needed medications. Education provided regarding infection prevention, and signs and symptoms of COVID-19 and when to seek medical attention with patient who verbalized understanding. Discussed exposure protocols and quarantine from [address] you at higher risk for severe illness  and given an opportunity for questions and concerns. The patient agrees to contact the COVID-19 hotline 180-573-5168 or PCP office for questions related to their healthcare. CTN/ACM provided contact information for future reference. From CDC: Are you at higher risk for severe illness?  Wash your hands often.  Avoid close contact (6 feet, which is about two arm lengths) with people who are sick.  Put distance between yourself and other people if COVID-19 is spreading in your community.  Clean and disinfect frequently touched surfaces.  Avoid all cruise travel and non-essential air travel.  Call your healthcare professional if you have concerns about COVID-19 and your underlying condition or if you are sick. For more information on steps you can take to protect yourself, see CDC's How to [address] [place] [address] for follow-up call in 5-7 days based on severity of symptoms and risk factors.     Spoke with patient who reported she is doing fine just tired. Patient denied any sob, cp, cough, or fevers. Reviewed all new, changed, and stopped medications with patient who reported she is taking all as prescribed. Patient reported she will reach out to her PCP on Monday to setup follow up apt. Denies any acute needs at present time. Agreeable to f/u calls. Educated on the use of urgent care or physicians 24 hr access line if assistance is needed after hours.     Laina BASSETTN, RN, [facility]  Care Transition Nurse   [phone]

## 2020-11-03 ENCOUNTER — APPOINTMENT (OUTPATIENT)
Dept: CT IMAGING | Age: 60
DRG: 871 | End: 2020-11-03
Payer: COMMERCIAL

## 2020-11-03 ENCOUNTER — HOSPITAL ENCOUNTER (INPATIENT)
Age: 60
LOS: 5 days | Discharge: HOME HEALTH | DRG: 871 | End: 2020-11-08
Attending: EMERGENCY MEDICINE | Admitting: INTERNAL MEDICINE
Payer: COMMERCIAL

## 2020-11-03 ENCOUNTER — APPOINTMENT (OUTPATIENT)
Dept: GENERAL RADIOLOGY | Age: 60
DRG: 871 | End: 2020-11-03
Payer: COMMERCIAL

## 2020-11-03 LAB
ALBUMIN SERPL-MCNC: 3 GM/DL (ref 3.4–5)
ALP BLD-CCNC: 141 IU/L (ref 40–129)
ALT SERPL-CCNC: 28 U/L (ref 10–40)
ANION GAP SERPL CALCULATED.3IONS-SCNC: 11 MMOL/L (ref 4–16)
AST SERPL-CCNC: 18 IU/L (ref 15–37)
BACTERIA: NEGATIVE /HPF
BASOPHILS ABSOLUTE: 0.1 K/CU MM
BASOPHILS RELATIVE PERCENT: 0.2 % (ref 0–1)
BILIRUB SERPL-MCNC: 0.7 MG/DL (ref 0–1)
BILIRUBIN URINE: NEGATIVE MG/DL
BLOOD, URINE: NEGATIVE
BUN BLDV-MCNC: 11 MG/DL (ref 6–23)
CALCIUM SERPL-MCNC: 8.8 MG/DL (ref 8.3–10.6)
CHLORIDE BLD-SCNC: 88 MMOL/L (ref 99–110)
CLARITY: CLEAR
CO2: 33 MMOL/L (ref 21–32)
COLOR: YELLOW
CREAT SERPL-MCNC: 0.8 MG/DL (ref 0.6–1.1)
DIFFERENTIAL TYPE: ABNORMAL
EOSINOPHILS ABSOLUTE: 0 K/CU MM
EOSINOPHILS RELATIVE PERCENT: 0.1 % (ref 0–3)
GFR AFRICAN AMERICAN: >60 ML/MIN/1.73M2
GFR NON-AFRICAN AMERICAN: >60 ML/MIN/1.73M2
GLUCOSE BLD-MCNC: 125 MG/DL (ref 70–99)
GLUCOSE, URINE: NEGATIVE MG/DL
HCT VFR BLD CALC: 32.7 % (ref 37–47)
HEMOGLOBIN: 9.8 GM/DL (ref 12.5–16)
IMMATURE NEUTROPHIL %: 1.2 % (ref 0–0.43)
KETONES, URINE: NEGATIVE MG/DL
LACTATE: 1.3 MMOL/L (ref 0.4–2)
LEUKOCYTE ESTERASE, URINE: NEGATIVE
LYMPHOCYTES ABSOLUTE: 0.4 K/CU MM
LYMPHOCYTES RELATIVE PERCENT: 1.6 % (ref 24–44)
MCH RBC QN AUTO: 28.7 PG (ref 27–31)
MCHC RBC AUTO-ENTMCNC: 30 % (ref 32–36)
MCV RBC AUTO: 95.6 FL (ref 78–100)
MONOCYTES ABSOLUTE: 1.9 K/CU MM
MONOCYTES RELATIVE PERCENT: 6.7 % (ref 0–4)
MUCUS: ABNORMAL HPF
NITRITE URINE, QUANTITATIVE: NEGATIVE
PDW BLD-RTO: 14.1 % (ref 11.7–14.9)
PH, URINE: 6 (ref 5–8)
PLATELET # BLD: 273 K/CU MM (ref 140–440)
PMV BLD AUTO: 11.8 FL (ref 7.5–11.1)
POTASSIUM SERPL-SCNC: 3.6 MMOL/L (ref 3.5–5.1)
PRO-BNP: 3274 PG/ML
PROCALCITONIN: 0.58
PROTEIN UA: 30 MG/DL
RBC # BLD: 3.42 M/CU MM (ref 4.2–5.4)
RBC URINE: 3 /HPF (ref 0–6)
SARS-COV-2, NAAT: NOT DETECTED
SEGMENTED NEUTROPHILS ABSOLUTE COUNT: 25.2 K/CU MM
SEGMENTED NEUTROPHILS RELATIVE PERCENT: 90.2 % (ref 36–66)
SODIUM BLD-SCNC: 132 MMOL/L (ref 135–145)
SOURCE: NORMAL
SPECIFIC GRAVITY UA: 1.05 (ref 1–1.03)
SQUAMOUS EPITHELIAL: 1 /HPF
TOTAL IMMATURE NEUTOROPHIL: 0.33 K/CU MM
TOTAL PROTEIN: 6.4 GM/DL (ref 6.4–8.2)
TRICHOMONAS: ABNORMAL /HPF
TROPONIN T: <0.01 NG/ML
TROPONIN T: <0.01 NG/ML
UROBILINOGEN, URINE: 2 MG/DL (ref 0.2–1)
WBC # BLD: 27.9 K/CU MM (ref 4–10.5)
WBC UA: 4 /HPF (ref 0–5)

## 2020-11-03 PROCEDURE — 83880 ASSAY OF NATRIURETIC PEPTIDE: CPT

## 2020-11-03 PROCEDURE — 6370000000 HC RX 637 (ALT 250 FOR IP): Performed by: INTERNAL MEDICINE

## 2020-11-03 PROCEDURE — 83605 ASSAY OF LACTIC ACID: CPT

## 2020-11-03 PROCEDURE — 2580000003 HC RX 258: Performed by: INTERNAL MEDICINE

## 2020-11-03 PROCEDURE — 71275 CT ANGIOGRAPHY CHEST: CPT

## 2020-11-03 PROCEDURE — 99285 EMERGENCY DEPT VISIT HI MDM: CPT

## 2020-11-03 PROCEDURE — 96365 THER/PROPH/DIAG IV INF INIT: CPT

## 2020-11-03 PROCEDURE — 93005 ELECTROCARDIOGRAM TRACING: CPT | Performed by: PHYSICIAN ASSISTANT

## 2020-11-03 PROCEDURE — 71045 X-RAY EXAM CHEST 1 VIEW: CPT

## 2020-11-03 PROCEDURE — 96375 TX/PRO/DX INJ NEW DRUG ADDON: CPT

## 2020-11-03 PROCEDURE — 36415 COLL VENOUS BLD VENIPUNCTURE: CPT

## 2020-11-03 PROCEDURE — 6360000002 HC RX W HCPCS: Performed by: PHYSICIAN ASSISTANT

## 2020-11-03 PROCEDURE — 2700000000 HC OXYGEN THERAPY PER DAY

## 2020-11-03 PROCEDURE — 84145 PROCALCITONIN (PCT): CPT

## 2020-11-03 PROCEDURE — 81001 URINALYSIS AUTO W/SCOPE: CPT

## 2020-11-03 PROCEDURE — U0002 COVID-19 LAB TEST NON-CDC: HCPCS

## 2020-11-03 PROCEDURE — 84484 ASSAY OF TROPONIN QUANT: CPT

## 2020-11-03 PROCEDURE — 6360000002 HC RX W HCPCS: Performed by: INTERNAL MEDICINE

## 2020-11-03 PROCEDURE — 87040 BLOOD CULTURE FOR BACTERIA: CPT

## 2020-11-03 PROCEDURE — 6360000004 HC RX CONTRAST MEDICATION: Performed by: EMERGENCY MEDICINE

## 2020-11-03 PROCEDURE — 80053 COMPREHEN METABOLIC PANEL: CPT

## 2020-11-03 PROCEDURE — 87899 AGENT NOS ASSAY W/OPTIC: CPT

## 2020-11-03 PROCEDURE — 74177 CT ABD & PELVIS W/CONTRAST: CPT

## 2020-11-03 PROCEDURE — 94660 CPAP INITIATION&MGMT: CPT

## 2020-11-03 PROCEDURE — 2140000000 HC CCU INTERMEDIATE R&B

## 2020-11-03 PROCEDURE — 85025 COMPLETE CBC W/AUTO DIFF WBC: CPT

## 2020-11-03 PROCEDURE — 94640 AIRWAY INHALATION TREATMENT: CPT

## 2020-11-03 PROCEDURE — 87449 NOS EACH ORGANISM AG IA: CPT

## 2020-11-03 PROCEDURE — 94761 N-INVAS EAR/PLS OXIMETRY MLT: CPT

## 2020-11-03 PROCEDURE — 2580000003 HC RX 258: Performed by: PHYSICIAN ASSISTANT

## 2020-11-03 RX ORDER — ACETAMINOPHEN 325 MG/1
650 TABLET ORAL EVERY 6 HOURS PRN
Status: DISCONTINUED | OUTPATIENT
Start: 2020-11-03 | End: 2020-11-08 | Stop reason: HOSPADM

## 2020-11-03 RX ORDER — POLYETHYLENE GLYCOL 3350 17 G/17G
17 POWDER, FOR SOLUTION ORAL DAILY PRN
Status: DISCONTINUED | OUTPATIENT
Start: 2020-11-03 | End: 2020-11-08 | Stop reason: HOSPADM

## 2020-11-03 RX ORDER — ALBUTEROL SULFATE 90 UG/1
2 AEROSOL, METERED RESPIRATORY (INHALATION) EVERY 4 HOURS PRN
Status: DISCONTINUED | OUTPATIENT
Start: 2020-11-03 | End: 2020-11-08 | Stop reason: HOSPADM

## 2020-11-03 RX ORDER — SODIUM CHLORIDE 0.9 % (FLUSH) 0.9 %
10 SYRINGE (ML) INJECTION PRN
Status: DISCONTINUED | OUTPATIENT
Start: 2020-11-03 | End: 2020-11-08 | Stop reason: HOSPADM

## 2020-11-03 RX ORDER — LEVOTHYROXINE SODIUM 0.1 MG/1
100 TABLET ORAL DAILY
Status: DISCONTINUED | OUTPATIENT
Start: 2020-11-04 | End: 2020-11-08 | Stop reason: HOSPADM

## 2020-11-03 RX ORDER — ASPIRIN 81 MG/1
81 TABLET, CHEWABLE ORAL DAILY
Status: DISCONTINUED | OUTPATIENT
Start: 2020-11-03 | End: 2020-11-08 | Stop reason: HOSPADM

## 2020-11-03 RX ORDER — TRAZODONE HYDROCHLORIDE 50 MG/1
100 TABLET ORAL NIGHTLY
Status: DISCONTINUED | OUTPATIENT
Start: 2020-11-03 | End: 2020-11-08 | Stop reason: HOSPADM

## 2020-11-03 RX ORDER — AMIODARONE HYDROCHLORIDE 200 MG/1
200 TABLET ORAL DAILY
Status: DISCONTINUED | OUTPATIENT
Start: 2020-11-04 | End: 2020-11-04

## 2020-11-03 RX ORDER — ONDANSETRON 2 MG/ML
4 INJECTION INTRAMUSCULAR; INTRAVENOUS EVERY 6 HOURS PRN
Status: DISCONTINUED | OUTPATIENT
Start: 2020-11-03 | End: 2020-11-08 | Stop reason: HOSPADM

## 2020-11-03 RX ORDER — SODIUM CHLORIDE 9 MG/ML
INJECTION, SOLUTION INTRAVENOUS CONTINUOUS
Status: DISCONTINUED | OUTPATIENT
Start: 2020-11-03 | End: 2020-11-05

## 2020-11-03 RX ORDER — PROMETHAZINE HYDROCHLORIDE 25 MG/1
12.5 TABLET ORAL EVERY 6 HOURS PRN
Status: DISCONTINUED | OUTPATIENT
Start: 2020-11-03 | End: 2020-11-08 | Stop reason: HOSPADM

## 2020-11-03 RX ORDER — BUSPIRONE HYDROCHLORIDE 10 MG/1
10 TABLET ORAL 3 TIMES DAILY
Status: DISCONTINUED | OUTPATIENT
Start: 2020-11-03 | End: 2020-11-08 | Stop reason: HOSPADM

## 2020-11-03 RX ORDER — ONDANSETRON 2 MG/ML
4 INJECTION INTRAMUSCULAR; INTRAVENOUS ONCE
Status: DISCONTINUED | OUTPATIENT
Start: 2020-11-03 | End: 2020-11-08 | Stop reason: HOSPADM

## 2020-11-03 RX ORDER — SODIUM CHLORIDE 0.9 % (FLUSH) 0.9 %
10 SYRINGE (ML) INJECTION EVERY 12 HOURS SCHEDULED
Status: DISCONTINUED | OUTPATIENT
Start: 2020-11-03 | End: 2020-11-08 | Stop reason: HOSPADM

## 2020-11-03 RX ORDER — METOPROLOL SUCCINATE 25 MG/1
25 TABLET, EXTENDED RELEASE ORAL DAILY
Status: DISCONTINUED | OUTPATIENT
Start: 2020-11-04 | End: 2020-11-08 | Stop reason: HOSPADM

## 2020-11-03 RX ORDER — BUDESONIDE AND FORMOTEROL FUMARATE DIHYDRATE 160; 4.5 UG/1; UG/1
2 AEROSOL RESPIRATORY (INHALATION) 2 TIMES DAILY
Status: DISCONTINUED | OUTPATIENT
Start: 2020-11-03 | End: 2020-11-08 | Stop reason: HOSPADM

## 2020-11-03 RX ORDER — FAMOTIDINE 20 MG/1
20 TABLET, FILM COATED ORAL 2 TIMES DAILY
Status: DISCONTINUED | OUTPATIENT
Start: 2020-11-03 | End: 2020-11-08 | Stop reason: HOSPADM

## 2020-11-03 RX ORDER — MONTELUKAST SODIUM 10 MG/1
10 TABLET ORAL DAILY
Status: DISCONTINUED | OUTPATIENT
Start: 2020-11-04 | End: 2020-11-08 | Stop reason: HOSPADM

## 2020-11-03 RX ORDER — BACLOFEN 10 MG/1
10 TABLET ORAL 3 TIMES DAILY
Status: DISCONTINUED | OUTPATIENT
Start: 2020-11-03 | End: 2020-11-08 | Stop reason: HOSPADM

## 2020-11-03 RX ORDER — ACETAMINOPHEN 650 MG/1
650 SUPPOSITORY RECTAL EVERY 6 HOURS PRN
Status: DISCONTINUED | OUTPATIENT
Start: 2020-11-03 | End: 2020-11-08 | Stop reason: HOSPADM

## 2020-11-03 RX ORDER — CLONAZEPAM 1 MG/1
1 TABLET ORAL 3 TIMES DAILY PRN
Status: DISCONTINUED | OUTPATIENT
Start: 2020-11-03 | End: 2020-11-08 | Stop reason: HOSPADM

## 2020-11-03 RX ORDER — THIAMINE MONONITRATE (VIT B1) 100 MG
100 TABLET ORAL DAILY
Status: DISCONTINUED | OUTPATIENT
Start: 2020-11-04 | End: 2020-11-08 | Stop reason: HOSPADM

## 2020-11-03 RX ORDER — IPRATROPIUM BROMIDE AND ALBUTEROL SULFATE 2.5; .5 MG/3ML; MG/3ML
1 SOLUTION RESPIRATORY (INHALATION) EVERY 4 HOURS PRN
Status: DISCONTINUED | OUTPATIENT
Start: 2020-11-03 | End: 2020-11-04 | Stop reason: ALTCHOICE

## 2020-11-03 RX ORDER — DULOXETIN HYDROCHLORIDE 30 MG/1
60 CAPSULE, DELAYED RELEASE ORAL DAILY
Status: DISCONTINUED | OUTPATIENT
Start: 2020-11-04 | End: 2020-11-08 | Stop reason: HOSPADM

## 2020-11-03 RX ORDER — 0.9 % SODIUM CHLORIDE 0.9 %
1000 INTRAVENOUS SOLUTION INTRAVENOUS ONCE
Status: COMPLETED | OUTPATIENT
Start: 2020-11-03 | End: 2020-11-03

## 2020-11-03 RX ORDER — GUAIFENESIN 600 MG/1
600 TABLET, EXTENDED RELEASE ORAL 2 TIMES DAILY
Status: DISCONTINUED | OUTPATIENT
Start: 2020-11-03 | End: 2020-11-08 | Stop reason: HOSPADM

## 2020-11-03 RX ADMIN — BUSPIRONE HYDROCHLORIDE 10 MG: 10 TABLET ORAL at 22:13

## 2020-11-03 RX ADMIN — IOPAMIDOL 80 ML: 755 INJECTION, SOLUTION INTRAVENOUS at 15:50

## 2020-11-03 RX ADMIN — BUDESONIDE AND FORMOTEROL FUMARATE DIHYDRATE 2 PUFF: 160; 4.5 AEROSOL RESPIRATORY (INHALATION) at 20:46

## 2020-11-03 RX ADMIN — SODIUM CHLORIDE: 9 INJECTION, SOLUTION INTRAVENOUS at 22:21

## 2020-11-03 RX ADMIN — ASPIRIN 81 MG CHEWABLE TABLET 81 MG: 81 TABLET CHEWABLE at 22:04

## 2020-11-03 RX ADMIN — VANCOMYCIN HYDROCHLORIDE 1250 MG: 5 INJECTION, POWDER, LYOPHILIZED, FOR SOLUTION INTRAVENOUS at 17:17

## 2020-11-03 RX ADMIN — FAMOTIDINE 20 MG: 20 TABLET ORAL at 22:14

## 2020-11-03 RX ADMIN — GUAIFENESIN 600 MG: 600 TABLET, EXTENDED RELEASE ORAL at 22:14

## 2020-11-03 RX ADMIN — SODIUM CHLORIDE 1000 ML: 9 INJECTION, SOLUTION INTRAVENOUS at 15:11

## 2020-11-03 RX ADMIN — TRAZODONE HYDROCHLORIDE 100 MG: 50 TABLET ORAL at 22:13

## 2020-11-03 RX ADMIN — SODIUM CHLORIDE, PRESERVATIVE FREE 10 ML: 5 INJECTION INTRAVENOUS at 22:20

## 2020-11-03 RX ADMIN — CLONAZEPAM 1 MG: 1 TABLET ORAL at 22:14

## 2020-11-03 RX ADMIN — BACLOFEN 10 MG: 10 TABLET ORAL at 22:14

## 2020-11-03 RX ADMIN — ENOXAPARIN SODIUM 40 MG: 40 INJECTION SUBCUTANEOUS at 22:16

## 2020-11-03 RX ADMIN — CEFEPIME 2 G: 2 INJECTION, POWDER, FOR SOLUTION INTRAVENOUS at 16:00

## 2020-11-03 RX ADMIN — ALBUTEROL SULFATE 2 PUFF: 90 AEROSOL, METERED RESPIRATORY (INHALATION) at 21:11

## 2020-11-03 ASSESSMENT — PAIN DESCRIPTION - PAIN TYPE: TYPE: ACUTE PAIN

## 2020-11-03 ASSESSMENT — PAIN DESCRIPTION - DESCRIPTORS: DESCRIPTORS: DISCOMFORT

## 2020-11-03 ASSESSMENT — PAIN SCALES - GENERAL: PAINLEVEL_OUTOF10: 6

## 2020-11-03 NOTE — ED PROVIDER NOTES
[place]      PCP: Fernando Waters MD    CHIEF COMPLAINT    Chief Complaint   Patient presents with    Shortness of Breath     since Friday       I have seen and evaluated this patient with Dr. Gamez Elias is a 64 y.o. female who presents with shortness of breath and fever. Onset was yesterday. Patient reports fevers up to 100.9, with congestion, sore throat and nausea without vomiting. Patient was recently discharged from the hospital.  She underwent cardiocentesis for pericardial effusion as well as cholecystectomy for cholecystitis. She is on daily aspirin. She was discharged on Friday with a negative Covid test and the next day she began having some sweats while she was napping. She is also complaining of upper abdominal pain. She does not have a chest pain at this time, she denies any pain or swelling in her legs, cough or diarrhea. She does have a history of COPD and chronic kidney disease. REVIEW OF SYSTEMS    Constitutional: + fever, diaphoresis, no chills, weight loss or weakness   HENT:  Denies sore throat or ear pain   Cardiovascular:  No chest pain. No palpitations, No syncope  Respiratory:  See HPI.    GI:  upper abdominal pain, nausea no vomiting, or diarrhea  :  Denies any urinary symptoms, chronic urge incontinence  Musculoskeletal:  Denies back pain,   Skin:  Denies rash  Neurologic:  Denies headache, focal weakness or sensory changes   Endocrine:  Denies polyuria or polydypsia   Lymphatic:  Denies swollen glands     All other review of systems are negative  See HPI and nursing notes for additional information       PAST MEDICAL & SURGICAL HISTORY    Past Medical History:   Diagnosis Date    Anxiety 2/16/2017    Arthritis     Back pain at L4-L5 level 2/16/2017    Dr Jl Wooten Bipolar 1 disorder [facility]     COPD (chronic obstructive pulmonary disease) [facility]     Emphysema of lung [facility]     FH: CAD (coronary artery disease) 2/16/2017 Father had in his forties, sister in her thirties    [facility] Fibromyalgia 2/16/2017    H/O Doppler ultrasound 04/05/2019    venous- no DVT or reflux    H/O echocardiogram 01/14/2015    EF50-55% Normal- see media    History of blood transfusion     Hyperlipidemia LDL goal <100     Hypertension     Obstructive sleep apnea     Osteoarthritis     Thyroid disease      Past Surgical History:   Procedure Laterality Date    BACK SURGERY      CARDIAC CATHETERIZATION      10/2019    CARPAL TUNNEL RELEASE      CHOLECYSTECTOMY, LAPAROSCOPIC N/A 10/25/2020    CHOLECYSTECTOMY LAPAROSCOPIC WITH IOC performed by Janine Duenas MD at [facility] COLONOSCOPY N/A 12/12/2019    COLONOSCOPY DIAGNOSTIC performed by Andria Jo MD at [facility]    Current Outpatient Rx   Medication Sig Dispense Refill    amLODIPine (NORVASC) 10 MG tablet Take 1 tablet by mouth daily 30 tablet 2    amiodarone (CORDARONE) 200 MG tablet Take 1 tablet by mouth daily 30 tablet 1    budesonide-formoterol (SYMBICORT) 160-4.5 MCG/ACT AERO USE 2 INHALATIONS TWICE A DAY 30.6 g 3    ipratropium-albuterol (DUONEB) 0.5-2.5 (3) MG/3ML SOLN nebulizer solution Inhale 3 mLs into the lungs every 4 hours 1080 mL 3    guaiFENesin (MUCINEX) 600 MG extended release tablet Take 1 tablet by mouth 2 times daily 30 tablet 0    montelukast (SINGULAIR) 10 MG tablet Take 1 tablet by mouth daily 30 tablet 3    levothyroxine (SYNTHROID) 100 MCG tablet Take 1 tablet by mouth Daily 30 tablet 2    folic acid (FOLVITE) 1 MG tablet Take 1 tablet by mouth daily 30 tablet 3    vitamin B-1 100 MG tablet Take 1 tablet by mouth daily 30 tablet 3    albuterol-ipratropium (COMBIVENT RESPIMAT)  MCG/ACT AERS inhaler Inhale 1 puff into the lungs every 4 hours as needed for Wheezing 3 Inhaler 0    DULoxetine (CYMBALTA) 60 MG extended release capsule Take 60 mg by mouth daily      busPIRone (BUSPAR) 10 MG tablet Take 1 tablet by mouth 3 times daily 90 tablet 0    ferrous gluconate 324 (37.5 Fe) MG TABS Take 1 tablet by mouth 2 times daily 60 tablet 2    metoprolol succinate (TOPROL XL) 25 MG extended release tablet Take 1 tablet by mouth daily 30 tablet 3    famotidine (PEPCID) 20 MG tablet Take 1 tablet by mouth 2 times daily (Patient taking differently: Take 40 mg by mouth nightly ) 60 tablet 3    traZODone (DESYREL) 50 MG tablet Take 100 mg by mouth nightly       clonazePAM (KLONOPIN) 1 MG tablet Take 1 mg by mouth 3 times daily as needed.       baclofen (LIORESAL) 10 MG tablet Take 1 tablet by mouth 3 times daily 30 tablet 0    aspirin 81 MG chewable tablet Take 81 mg by mouth daily      fluticasone (FLONASE) 50 MCG/ACT nasal spray 2 sprays by Nasal route daily 1 Bottle 0       ALLERGIES    Allergies   Allergen Reactions    Lisinopril Swelling and Rash     angioedema       SOCIAL & FAMILY HISTORY    Social History     Socioeconomic History    Marital status:      Spouse name: None    Number of children: None    Years of education: None    Highest education level: None   Occupational History    None   Social Needs    Financial resource strain: None    Food insecurity     Worry: None     Inability: None    Transportation needs     Medical: None     Non-medical: None   Tobacco Use    Smoking status: Former Smoker     Packs/day: 1.50     Years: 20.00     Pack years: 30.00     Last attempt to quit: 2008     Years since quittin.8    Smokeless tobacco: Never Used   Substance and Sexual Activity    Alcohol use: Not Currently     Alcohol/week: 2.0 standard drinks     Types: 2 Shots of liquor per week    Drug use: No    Sexual activity: Yes     Partners: Male   Lifestyle    Physical activity     Days per week: None     Minutes per session: None    Stress: None   Relationships    Social connections     Talks on phone: None     Gets together: None     Attends Anab[employer]ist service: None     Active 78 - 100 FL    MCH 28.7 27 - 31 PG    MCHC 30.0 (L) 32.0 - 36.0 %    RDW 14.1 11.7 - 14.9 %    Platelets 608 477 - 588 K/CU MM    MPV 11.8 (H) 7.5 - 11.1 FL    Immature Neutrophil % 1.2 (H) 0 - 0.43 %    Segs Relative 90.2 (H) 36 - 66 %    Eosinophils % 0.1 0 - 3 %    Basophils % 0.2 0 - 1 %    Lymphocytes % 1.6 (L) 24 - 44 %    Monocytes % 6.7 (H) 0 - 4 %    Total Immature Neutrophil 0.33 K/CU MM    Segs Absolute 25.2 K/CU MM    Eosinophils Absolute 0.0 K/CU MM    Basophils Absolute 0.1 K/CU MM    Lymphocytes Absolute 0.4 K/CU MM    Monocytes Absolute 1.9 K/CU MM    Differential Type AUTOMATED DIFFERENTIAL    Comprehensive Metabolic Panel   Result Value Ref Range    Sodium 132 (L) 135 - 145 MMOL/L    Potassium 3.6 3.5 - 5.1 MMOL/L    Chloride 88 (L) 99 - 110 mMol/L    CO2 33 (H) 21 - 32 MMOL/L    BUN 11 6 - 23 MG/DL    CREATININE 0.8 0.6 - 1.1 MG/DL    Glucose 125 (H) 70 - 99 MG/DL    Calcium 8.8 8.3 - 10.6 MG/DL    Alb 3.0 (L) 3.4 - 5.0 GM/DL    Total Protein 6.4 6.4 - 8.2 GM/DL    Total Bilirubin 0.7 0.0 - 1.0 MG/DL    ALT 28 10 - 40 U/L    AST 18 15 - 37 IU/L    Alkaline Phosphatase 141 (H) 40 - 129 IU/L    GFR Non-African American >60 >60 mL/min/1.73m2    GFR African American >60 >60 mL/min/1.73m2    Anion Gap 11 4 - 16   Troponin   Result Value Ref Range    Troponin T <0.010 <0.01 NG/ML   Brain Natriuretic Peptide   Result Value Ref Range    Pro-BNP 3,274 (H) <300 PG/ML   Lactic Acid, Plasma   Result Value Ref Range    Lactate 1.3 0.4 - 2.0 mMOL/L   COVID-19    Specimen: Nasopharyngeal Swab   Result Value Ref Range    Source THROAT     SARS-CoV-2, NAAT NOT DETECTED    Urinalysis   Result Value Ref Range    Color, UA YELLOW YELLOW    Clarity, UA CLEAR CLEAR    Glucose, Urine NEGATIVE NEGATIVE MG/DL    Bilirubin Urine NEGATIVE NEGATIVE MG/DL    Ketones, Urine NEGATIVE NEGATIVE MG/DL    Specific Gravity, UA 1.050 (H) 1.001 - 1.035    Blood, Urine NEGATIVE NEGATIVE    pH, Urine 6.0 5.0 - 8.0    Protein, UA 30 (A) NEGATIVE MG/DL    Urobilinogen, Urine 2.0 (H) 0.2 - 1.0 MG/DL    Nitrite Urine, Quantitative NEGATIVE NEGATIVE    Leukocyte Esterase, Urine NEGATIVE NEGATIVE    RBC, UA 3 0 - 6 /HPF    WBC, UA 4 0 - 5 /HPF    Bacteria, UA NEGATIVE NEGATIVE /HPF    Squam Epithel, UA 1 /HPF    Mucus, UA RARE (A) NEGATIVE HPF    Trichomonas, UA NONE SEEN NONE SEEN /HPF   EKG 12 Lead   Result Value Ref Range    Ventricular Rate 103 BPM    Atrial Rate 103 BPM    P-R Interval 138 ms    QRS Duration 134 ms    Q-T Interval 378 ms    QTc Calculation (Bazett) 495 ms    P Axis 59 degrees    R Axis 107 degrees    T Axis -3 degrees    Diagnosis       Sinus tachycardia  Possible Left atrial enlargement  Right bundle branch block  T wave abnormality, consider inferior ischemia  Abnormal ECG  When compared with ECG of 23-OCT-2020 19:34,  Previous ECG has undetermined rhythm, needs review  T wave inversion less evident in Anterior leads       CT ABDOMEN PELVIS W IV CONTRAST Additional Contrast? None   Final Result   1. CTA CHEST: No pulmonary embolism. 2. Small area consolidative change posterior segment right lower lobe is new   compared to prior CT abdomen 3 weeks ago likely related to focal pneumonia or   atelectasis. 3. Nonspecific pericardial effusion; volume appears much smaller than on CT   abdomen 3 weeks ago. 4. Nonspecific bilateral pleural effusion without distinct pulmonary vascular   engorgement or other findings associated with congestive heart failure. 5.  Pulmonary sequela typical of that seen with smoking, including emphysema. 6. CT ABDOMEN/PELVIS: No CT evidence of an acute intra-abdominal or   intrapelvic process. 7. Greatly diminished ascites compared with prior study, with only a small   amount remaining in the pelvis. 8.  No findings to suggest acute appendicitis; no ureter calculus or   hydronephrosis. 9. Mildly renal cortical atrophy right kidney with stable benign cyst   inferior pole right kidney.    10. Diverticulosis coli without CT evidence of acute diverticulitis. 11. Interval cholecystectomy without CT evidence of abscess; minimal residual   postsurgical fluid sequela at the gallbladder fossa within normal limits. CTA PULMONARY W CONTRAST   Final Result   1. CTA CHEST: No pulmonary embolism. 2. Small area consolidative change posterior segment right lower lobe is new   compared to prior CT abdomen 3 weeks ago likely related to focal pneumonia or   atelectasis. 3. Nonspecific pericardial effusion; volume appears much smaller than on CT   abdomen 3 weeks ago. 4. Nonspecific bilateral pleural effusion without distinct pulmonary vascular   engorgement or other findings associated with congestive heart failure. 5.  Pulmonary sequela typical of that seen with smoking, including emphysema. 6. CT ABDOMEN/PELVIS: No CT evidence of an acute intra-abdominal or   intrapelvic process. 7. Greatly diminished ascites compared with prior study, with only a small   amount remaining in the pelvis. 8.  No findings to suggest acute appendicitis; no ureter calculus or   hydronephrosis. 9. Mildly renal cortical atrophy right kidney with stable benign cyst   inferior pole right kidney. 10.  Diverticulosis coli without CT evidence of acute diverticulitis. 11. Interval cholecystectomy without CT evidence of abscess; minimal residual   postsurgical fluid sequela at the gallbladder fossa within normal limits. XR CHEST PORTABLE   Final Result   Persistent airspace disease in the right lower lung. Cardiomegaly with no   evidence of pulmonary edema             ED COURSE & MEDICAL DECISION MAKING        Patient presents as above with shortness of breath and fevers for the past 2 days. Recently discharged from the hospital after pericardiocentesis and cholecystectomy.   She denies any sick contacts since being in the hospital.  Patient presented with low blood pressure but otherwise normal vital signs, O2 sats fine on her base level 3 L of oxygen. On exam she was tender in the epigastric abdomen, with focal bruising from the surgery. Lungs without wheezing or rales. Routine labs reveal elevated white blood cell count of 27.9 with 90% segs. Sodium of 132, chloride of 88 PCO2 and alk phos. Troponin within normal limits lactate within normal limits BNP elevated at 3200. Urine does not show signs of being infected, rapid Covid was negative. The attending noted mild pericardial effusion without evidence of tamponade and mild bilateral pleural effusions on bedside ultrasound exam.  Chest x-ray shows persistent airspace disease in the right lower lung cardiomegaly with no evidence of pulmonary edema. CT of the abdomen pelvis with IV contrast shows no evidence of acute intra-abdominal or intrapelvic process. Diminished ascites compared to prior study. No findings to suggest acute appendicitis calculus or hydronephrosis. Diverticulosis without diverticulitis, mild renal cortical atrophy right kidney interval cholecystectomy. CTA of the chest shows no pulmonary embolism, small area of consolidative change posterior segment right lower lobe is new compared to prior CT 3 weeks ago likely related to focal pneumonia or atelectasis, nonspecific pericardial effusion volume appears much smaller than on CT 3 weeks ago. Spoke with the patient about admission she is amenable at this time. I did talk to cardiology, Dr. López Donovan who asked me to put in an echocardiogram and that he will see her tomorrow. I discussed the patient's case and presentation with the hospitalist and they agreed to admit the patient to see cardiology as well as continue IV antibiotics for hospital-acquired pneumonia and sepsis. Vital signs and nursing notes reviewed during ED course. All pertinent Lab data and radiographic results reviewed with patient at bedside.   The patient and/or the family were informed of the results of any

## 2020-11-03 NOTE — ED NOTES
Bed: ED-31  Expected date:   Expected time:   Means of arrival:   Comments:  Hold EMS     Luis Rizo RN  11/03/20 [id number]

## 2020-11-03 NOTE — ED TRIAGE NOTES
Pt. Presents from home via EMS stating that she has increased shortness of breath that gets worse upon exertion. Pt. States she wears 3L of oxygen at home all the time. Pt. States that she was just here and had fluid taken off from around her heart as well as having her gallbladder taken out. Pt. States some chest discomfort in the center of her chest 6/10. Pt. States she just overall does not feel well.

## 2020-11-04 ENCOUNTER — CARE COORDINATION (OUTPATIENT)
Dept: CASE MANAGEMENT | Age: 60
End: 2020-11-04

## 2020-11-04 LAB
ANION GAP SERPL CALCULATED.3IONS-SCNC: 8 MMOL/L (ref 4–16)
BUN BLDV-MCNC: 8 MG/DL (ref 6–23)
CALCIUM SERPL-MCNC: 8.4 MG/DL (ref 8.3–10.6)
CHLORIDE BLD-SCNC: 95 MMOL/L (ref 99–110)
CO2: 34 MMOL/L (ref 21–32)
CREAT SERPL-MCNC: 0.8 MG/DL (ref 0.6–1.1)
GFR AFRICAN AMERICAN: >60 ML/MIN/1.73M2
GFR NON-AFRICAN AMERICAN: >60 ML/MIN/1.73M2
GLUCOSE BLD-MCNC: 80 MG/DL (ref 70–99)
HCT VFR BLD CALC: 27.2 % (ref 37–47)
HEMOGLOBIN: 8.2 GM/DL (ref 12.5–16)
LEGIONELLA URINARY AG: NEGATIVE
MCH RBC QN AUTO: 29 PG (ref 27–31)
MCHC RBC AUTO-ENTMCNC: 30.1 % (ref 32–36)
MCV RBC AUTO: 96.1 FL (ref 78–100)
PDW BLD-RTO: 14.2 % (ref 11.7–14.9)
PLATELET # BLD: 233 K/CU MM (ref 140–440)
PMV BLD AUTO: 11.6 FL (ref 7.5–11.1)
POTASSIUM SERPL-SCNC: 3.7 MMOL/L (ref 3.5–5.1)
RBC # BLD: 2.83 M/CU MM (ref 4.2–5.4)
SODIUM BLD-SCNC: 137 MMOL/L (ref 135–145)
STREP PNEUMONIAE ANTIGEN: NORMAL
TROPONIN T: <0.01 NG/ML
WBC # BLD: 17.7 K/CU MM (ref 4–10.5)

## 2020-11-04 PROCEDURE — 94664 DEMO&/EVAL PT USE INHALER: CPT

## 2020-11-04 PROCEDURE — 94761 N-INVAS EAR/PLS OXIMETRY MLT: CPT

## 2020-11-04 PROCEDURE — 6360000002 HC RX W HCPCS: Performed by: INTERNAL MEDICINE

## 2020-11-04 PROCEDURE — 80048 BASIC METABOLIC PNL TOTAL CA: CPT

## 2020-11-04 PROCEDURE — 94640 AIRWAY INHALATION TREATMENT: CPT

## 2020-11-04 PROCEDURE — 2140000000 HC CCU INTERMEDIATE R&B

## 2020-11-04 PROCEDURE — 93010 ELECTROCARDIOGRAM REPORT: CPT | Performed by: INTERNAL MEDICINE

## 2020-11-04 PROCEDURE — 6370000000 HC RX 637 (ALT 250 FOR IP): Performed by: INTERNAL MEDICINE

## 2020-11-04 PROCEDURE — 99254 IP/OBS CNSLTJ NEW/EST MOD 60: CPT | Performed by: INTERNAL MEDICINE

## 2020-11-04 PROCEDURE — 87205 SMEAR GRAM STAIN: CPT

## 2020-11-04 PROCEDURE — 2700000000 HC OXYGEN THERAPY PER DAY

## 2020-11-04 PROCEDURE — 94660 CPAP INITIATION&MGMT: CPT

## 2020-11-04 PROCEDURE — 36415 COLL VENOUS BLD VENIPUNCTURE: CPT

## 2020-11-04 PROCEDURE — 93308 TTE F-UP OR LMTD: CPT

## 2020-11-04 PROCEDURE — 85027 COMPLETE CBC AUTOMATED: CPT

## 2020-11-04 PROCEDURE — 87081 CULTURE SCREEN ONLY: CPT

## 2020-11-04 PROCEDURE — 6370000000 HC RX 637 (ALT 250 FOR IP): Performed by: NURSE PRACTITIONER

## 2020-11-04 PROCEDURE — 2580000003 HC RX 258: Performed by: INTERNAL MEDICINE

## 2020-11-04 PROCEDURE — 87070 CULTURE OTHR SPECIMN AEROBIC: CPT

## 2020-11-04 PROCEDURE — 84484 ASSAY OF TROPONIN QUANT: CPT

## 2020-11-04 RX ORDER — ALBUTEROL SULFATE 90 UG/1
2 AEROSOL, METERED RESPIRATORY (INHALATION) 4 TIMES DAILY
Status: DISCONTINUED | OUTPATIENT
Start: 2020-11-04 | End: 2020-11-08 | Stop reason: HOSPADM

## 2020-11-04 RX ORDER — GUAIFENESIN/DEXTROMETHORPHAN 100-10MG/5
5 SYRUP ORAL EVERY 4 HOURS PRN
Status: DISCONTINUED | OUTPATIENT
Start: 2020-11-04 | End: 2020-11-08 | Stop reason: HOSPADM

## 2020-11-04 RX ORDER — OXYCODONE HYDROCHLORIDE AND ACETAMINOPHEN 5; 325 MG/1; MG/1
1 TABLET ORAL EVERY 6 HOURS PRN
Status: DISCONTINUED | OUTPATIENT
Start: 2020-11-04 | End: 2020-11-08 | Stop reason: HOSPADM

## 2020-11-04 RX ORDER — TRAMADOL HYDROCHLORIDE 50 MG/1
50 TABLET ORAL EVERY 6 HOURS PRN
Status: DISCONTINUED | OUTPATIENT
Start: 2020-11-04 | End: 2020-11-08 | Stop reason: HOSPADM

## 2020-11-04 RX ORDER — METHYLPREDNISOLONE SODIUM SUCCINATE 40 MG/ML
40 INJECTION, POWDER, LYOPHILIZED, FOR SOLUTION INTRAMUSCULAR; INTRAVENOUS DAILY
Status: DISCONTINUED | OUTPATIENT
Start: 2020-11-04 | End: 2020-11-08 | Stop reason: HOSPADM

## 2020-11-04 RX ORDER — BENZONATATE 100 MG/1
100 CAPSULE ORAL 3 TIMES DAILY PRN
Status: DISCONTINUED | OUTPATIENT
Start: 2020-11-04 | End: 2020-11-08 | Stop reason: HOSPADM

## 2020-11-04 RX ADMIN — BUSPIRONE HYDROCHLORIDE 10 MG: 10 TABLET ORAL at 20:36

## 2020-11-04 RX ADMIN — CEFEPIME HYDROCHLORIDE 2 G: 2 INJECTION, POWDER, FOR SOLUTION INTRAVENOUS at 04:05

## 2020-11-04 RX ADMIN — ALBUTEROL SULFATE 2 PUFF: 90 AEROSOL, METERED RESPIRATORY (INHALATION) at 11:41

## 2020-11-04 RX ADMIN — METOPROLOL SUCCINATE 25 MG: 25 TABLET, EXTENDED RELEASE ORAL at 09:30

## 2020-11-04 RX ADMIN — DULOXETINE HYDROCHLORIDE 60 MG: 30 CAPSULE, DELAYED RELEASE ORAL at 09:30

## 2020-11-04 RX ADMIN — FAMOTIDINE 20 MG: 20 TABLET ORAL at 09:30

## 2020-11-04 RX ADMIN — TRAMADOL HYDROCHLORIDE 50 MG: 50 TABLET, FILM COATED ORAL at 04:42

## 2020-11-04 RX ADMIN — BUSPIRONE HYDROCHLORIDE 10 MG: 10 TABLET ORAL at 14:28

## 2020-11-04 RX ADMIN — CEFEPIME HYDROCHLORIDE 2 G: 2 INJECTION, POWDER, FOR SOLUTION INTRAVENOUS at 17:07

## 2020-11-04 RX ADMIN — ALBUTEROL SULFATE 2 PUFF: 90 AEROSOL, METERED RESPIRATORY (INHALATION) at 08:18

## 2020-11-04 RX ADMIN — GUAIFENESIN 600 MG: 600 TABLET, EXTENDED RELEASE ORAL at 20:36

## 2020-11-04 RX ADMIN — ASPIRIN 81 MG CHEWABLE TABLET 81 MG: 81 TABLET CHEWABLE at 09:30

## 2020-11-04 RX ADMIN — GUAIFENESIN 600 MG: 600 TABLET, EXTENDED RELEASE ORAL at 09:30

## 2020-11-04 RX ADMIN — ALBUTEROL SULFATE 2 PUFF: 90 AEROSOL, METERED RESPIRATORY (INHALATION) at 19:42

## 2020-11-04 RX ADMIN — SODIUM CHLORIDE, PRESERVATIVE FREE 10 ML: 5 INJECTION INTRAVENOUS at 20:36

## 2020-11-04 RX ADMIN — SODIUM CHLORIDE: 9 INJECTION, SOLUTION INTRAVENOUS at 15:56

## 2020-11-04 RX ADMIN — Medication 2 PUFF: at 19:43

## 2020-11-04 RX ADMIN — BACLOFEN 10 MG: 10 TABLET ORAL at 14:28

## 2020-11-04 RX ADMIN — MONTELUKAST 10 MG: 10 TABLET, FILM COATED ORAL at 09:30

## 2020-11-04 RX ADMIN — Medication 2 PUFF: at 16:31

## 2020-11-04 RX ADMIN — LEVOTHYROXINE SODIUM 100 MCG: 100 TABLET ORAL at 06:41

## 2020-11-04 RX ADMIN — BUDESONIDE AND FORMOTEROL FUMARATE DIHYDRATE 2 PUFF: 160; 4.5 AEROSOL RESPIRATORY (INHALATION) at 08:19

## 2020-11-04 RX ADMIN — BUSPIRONE HYDROCHLORIDE 10 MG: 10 TABLET ORAL at 09:30

## 2020-11-04 RX ADMIN — BACLOFEN 10 MG: 10 TABLET ORAL at 09:30

## 2020-11-04 RX ADMIN — CLONAZEPAM 1 MG: 1 TABLET ORAL at 10:19

## 2020-11-04 RX ADMIN — FAMOTIDINE 20 MG: 20 TABLET ORAL at 20:36

## 2020-11-04 RX ADMIN — VANCOMYCIN HYDROCHLORIDE 1500 MG: 5 INJECTION, POWDER, LYOPHILIZED, FOR SOLUTION INTRAVENOUS at 10:57

## 2020-11-04 RX ADMIN — CLONAZEPAM 1 MG: 1 TABLET ORAL at 14:28

## 2020-11-04 RX ADMIN — OXYCODONE HYDROCHLORIDE AND ACETAMINOPHEN 1 TABLET: 5; 325 TABLET ORAL at 10:19

## 2020-11-04 RX ADMIN — ALBUTEROL SULFATE 2 PUFF: 90 AEROSOL, METERED RESPIRATORY (INHALATION) at 16:30

## 2020-11-04 RX ADMIN — BUDESONIDE AND FORMOTEROL FUMARATE DIHYDRATE 2 PUFF: 160; 4.5 AEROSOL RESPIRATORY (INHALATION) at 19:44

## 2020-11-04 RX ADMIN — ENOXAPARIN SODIUM 40 MG: 40 INJECTION SUBCUTANEOUS at 09:30

## 2020-11-04 RX ADMIN — BACLOFEN 10 MG: 10 TABLET ORAL at 20:36

## 2020-11-04 RX ADMIN — GUAIFENESIN AND DEXTROMETHORPHAN 5 ML: 100; 10 SYRUP ORAL at 04:43

## 2020-11-04 RX ADMIN — TRAZODONE HYDROCHLORIDE 100 MG: 50 TABLET ORAL at 20:36

## 2020-11-04 RX ADMIN — ALBUTEROL SULFATE 2 PUFF: 90 AEROSOL, METERED RESPIRATORY (INHALATION) at 04:02

## 2020-11-04 RX ADMIN — Medication 2 PUFF: at 11:42

## 2020-11-04 RX ADMIN — METHYLPREDNISOLONE SODIUM SUCCINATE 40 MG: 40 INJECTION, POWDER, LYOPHILIZED, FOR SOLUTION INTRAMUSCULAR; INTRAVENOUS at 10:57

## 2020-11-04 RX ADMIN — Medication 100 MG: at 09:30

## 2020-11-04 ASSESSMENT — PAIN DESCRIPTION - LOCATION
LOCATION: CHEST
LOCATION: CHEST

## 2020-11-04 ASSESSMENT — PAIN DESCRIPTION - DESCRIPTORS
DESCRIPTORS: ACHING;DISCOMFORT
DESCRIPTORS: ACHING

## 2020-11-04 ASSESSMENT — PAIN DESCRIPTION - ORIENTATION
ORIENTATION: RIGHT
ORIENTATION: RIGHT

## 2020-11-04 ASSESSMENT — PAIN SCALES - GENERAL
PAINLEVEL_OUTOF10: 10
PAINLEVEL_OUTOF10: 7
PAINLEVEL_OUTOF10: 7

## 2020-11-04 ASSESSMENT — PAIN DESCRIPTION - PAIN TYPE
TYPE: ACUTE PAIN
TYPE: ACUTE PAIN

## 2020-11-04 NOTE — ED NOTES
Report given to [employer]; care transferred at this time.       Kaylynn Sosa, RN  11/03/20 1142

## 2020-11-04 NOTE — PROGRESS NOTES
Peripheral IV inserted into left anterior forearm, blood return noted, flushed with ease, pt tolerated well.

## 2020-11-04 NOTE — CONSULTS
INPATIENT CARDIOLOGY CONSULT NOTE       Reason for consultation: Pericardial effusion    Referring physician:  Pato Scott MD     Primary care physician: Margaretta Gowers, MD      Dear Pato Scott MD Thank you for the consult    Chief Complaint   Patient presents with    Shortness of Breath     since Friday       History of present Kae Caballero is a 64 y. o.year old who  presents with  Chief Complaint   Patient presents with    Shortness of Breath     since Friday       Patient is a 62-year-old female with prior medical history significant for hypertension hyperlipidemia hypothyroidism depression anxiety COPD on 3 L home oxygen, recent pericardial effusion status post pericardiocentesis October 23, 2020, following which there was a paroxysmal atrial fibrillation noted, history of chronic respiratory failure is admitted to the hospital from the ER with a chief complaint of worsening shortness of breath, low-grade fever, sore throat, pleuritic right-sided chest pain. Recent history of cholecystectomy noted    Shortness of breath is at rest as well as with little exertion. Patient can barely walk a couple of steps without becoming short of breath. She also admits to orthopnea but denies paroxysmal nocturnal dyspnea. Patient denies any chest pain      Recent cardiac procedures:  S/p thoracocentesis 10/23/2020, 500 cc of fluid was aspirated  LDL was 354, RBC is 98,000, lymphocyte 59 protein 4.2 white cell count 902  No AFB growth  No fungal growth  Cultures negative      EKG:  Normal sinus rhythm, sinus tachycardia with right bundle branch block      Echocardiogram reviewed, preserved EF, s/p pericardial effusion -pericardiocentesis        CT chest and abdomen  1.  CTA CHEST: No pulmonary embolism. 2. Small area consolidative change posterior segment right lower lobe is new    compared to prior CT abdomen 3 weeks ago likely related to focal pneumonia or    atelectasis.     3. Nonspecific pericardial DM    Allergies   Allergen Reactions    Lisinopril Swelling and Rash     angioedema       vancomycin (VANCOCIN) 1,500 mg in dextrose 5 % 500 mL IVPB, Q18H  traMADol (ULTRAM) tablet 50 mg, Q6H PRN  guaiFENesin-dextromethorphan (ROBITUSSIN DM) 100-10 MG/5ML syrup 5 mL, Q4H PRN  ondansetron (ZOFRAN) injection 4 mg, Once  amiodarone (CORDARONE) tablet 200 mg, Daily  aspirin chewable tablet 81 mg, Daily  baclofen (LIORESAL) tablet 10 mg, TID  budesonide-formoterol (SYMBICORT) 160-4.5 MCG/ACT inhaler 2 puff, BID  busPIRone (BUSPAR) tablet 10 mg, TID  clonazePAM (KLONOPIN) tablet 1 mg, TID PRN  DULoxetine (CYMBALTA) extended release capsule 60 mg, Daily  famotidine (PEPCID) tablet 20 mg, BID  guaiFENesin (MUCINEX) extended release tablet 600 mg, BID  levothyroxine (SYNTHROID) tablet 100 mcg, Daily  metoprolol succinate (TOPROL XL) extended release tablet 25 mg, Daily  montelukast (SINGULAIR) tablet 10 mg, Daily  traZODone (DESYREL) tablet 100 mg, Nightly  vitamin B-1 (THIAMINE) tablet 100 mg, Daily  sodium chloride flush 0.9 % injection 10 mL, 2 times per day  sodium chloride flush 0.9 % injection 10 mL, PRN  acetaminophen (TYLENOL) tablet 650 mg, Q6H PRN    Or  acetaminophen (TYLENOL) suppository 650 mg, Q6H PRN  polyethylene glycol (GLYCOLAX) packet 17 g, Daily PRN  promethazine (PHENERGAN) tablet 12.5 mg, Q6H PRN    Or  ondansetron (ZOFRAN) injection 4 mg, Q6H PRN  enoxaparin (LOVENOX) injection 40 mg, Daily  cefepime (MAXIPIME) 2 g IVPB minibag, Q12H  0.9 % sodium chloride infusion, Continuous  albuterol sulfate  (90 Base) MCG/ACT inhaler 2 puff, Q4H PRN  ipratropium-albuterol (DUONEB) nebulizer solution 1 ampule, Q4H PRN      Current Facility-Administered Medications   Medication Dose Route Frequency Provider Last Rate Last Dose    vancomycin (VANCOCIN) 1,500 mg in dextrose 5 % 500 mL IVPB  1,500 mg Intravenous Q18H Irais Gutiérrez MD        traMADol Letty Horn) tablet 50 mg  50 mg Oral Q6H PRN Shanthi Fishman BRIAN Hay - CNP   50 mg at 11/04/20 0442    guaiFENesin-dextromethorphan (ROBITUSSIN DM) 100-10 MG/5ML syrup 5 mL  5 mL Oral Q4H PRN BRIAN Desir CNP   5 mL at 11/04/20 0443    ondansetron (ZOFRAN) injection 4 mg  4 mg Intravenous Once Reji Paniagua MD        amiodarone (CORDARONE) tablet 200 mg  200 mg Oral Daily Reji Paniagua MD        aspirin chewable tablet 81 mg  81 mg Oral Daily Reji Paniagua MD   81 mg at 11/03/20 2204    baclofen (LIORESAL) tablet 10 mg  10 mg Oral TID Reji Paniagua MD   10 mg at 11/03/20 2214    budesonide-formoterol (SYMBICORT) 160-4.5 MCG/ACT inhaler 2 puff  2 puff Inhalation BID Reji Paniagua MD   2 puff at 11/03/20 2046    busPIRone (BUSPAR) tablet 10 mg  10 mg Oral TID Reji Paniagua MD   10 mg at 11/03/20 2213    clonazePAM (KLONOPIN) tablet 1 mg  1 mg Oral TID PRN Reji Paniagua MD   1 mg at 11/03/20 2214    DULoxetine (CYMBALTA) extended release capsule 60 mg  60 mg Oral Daily Reji Paniagua MD        famotidine (PEPCID) tablet 20 mg  20 mg Oral BID Reji Paniagua MD   20 mg at 11/03/20 2214    guaiFENesin [facility]) extended release tablet 600 mg  600 mg Oral BID Reji Paniagua MD   600 mg at 11/03/20 2214    levothyroxine (SYNTHROID) tablet 100 mcg  100 mcg Oral Daily Reji Paniagua MD   100 mcg at 11/04/20 0641    metoprolol succinate (TOPROL XL) extended release tablet 25 mg  25 mg Oral Daily Reji Paniagua MD        montelukast (SINGULAIR) tablet 10 mg  10 mg Oral Daily Reji Paniagua MD        traZODone (DESYREL) tablet 100 mg  100 mg Oral Nightly Reji Paniagua MD   100 mg at 11/03/20 2213    vitamin B-1 (THIAMINE) tablet 100 mg  100 mg Oral Daily Reji Paniagua MD        sodium chloride flush 0.9 % injection 10 mL  10 mL Intravenous 2 times per day Reji Paniagua MD   10 mL at 11/03/20 2220    sodium chloride flush 0.9 % injection 10 mL  10 mL Intravenous PRN Reji Paniagua MD       [facility] acetaminophen (TYLENOL) tablet 650 mg  650 mg Oral Q6H PRN Brianna Holcomb MD        Or    acetaminophen (TYLENOL) suppository 650 mg  650 mg Rectal Q6H PRN Brianna Holcomb MD        polyethylene glycol [facility]) packet 17 g  17 g Oral Daily PRN Brianna Holcomb MD        promethazine (PHENERGAN) tablet 12.5 mg  12.5 mg Oral Q6H PRN Brianna Holcomb MD        Or    on[facility]ansetron [facility]) injection 4 mg  4 mg Intravenous Q6H PRN Brianna Holcomb MD        enoxaparin (LOVENOX) injection 40 mg  40 mg Subcutaneous Daily Brianna Holcomb MD   40 mg at 11/03/20 2216    cefepime (MAXIPIME) 2 g IVPB minibag  2 g Intravenous Q12H Brianna Holcomb MD   Stopped at 11/04/20 0430    0.9 % sodium chloride infusion   Intravenous Continuous Brianna Holcomb MD 75 mL/hr at 11/03/20 2221      albuterol sulfate  (90 Base) MCG/ACT inhaler 2 puff  2 puff Inhalation Q4H PRN Brianna Holcomb MD   2 puff at 11/04/20 0402    ipratropium-albuterol (DUONEB) nebulizer solution 1 ampule  1 ampule Inhalation Q4H PRN Brianna Holcomb MD             Review of Systems:     · Constitutional: + Fever or no Weight Loss   · Eyes: No Decreased Vision  · ENT: No Headaches, Hearing Loss or Vertigo  · Cardiovascular: + right chest pain, ++ dyspnea on exertion, no palpitations or loss of consciousness  · Respiratory: ++ cough or wheezing    · Gastrointestinal: + abdominal pain, appetite loss, blood in stools, constipation, diarrhea or heartburn  · Genitourinary: No dysuria, trouble voiding, or hematuria  · Musculoskeletal:  No gait disturbance, weakness or joint complaints  · Integumentary: No rash or pruritis  · Neurological: No TIA or stroke symptoms  · Psychiatric: No anxiety or depression  · Endocrine: +++ malaise, fatigue    · Hematologic/Lymphatic: No bleeding problems, blood clots or swollen lymph nodes  · Allergic/Immunologic: No nasal congestion or hives    All other systems were reviewed and were negative otherwise. Physical Examination:      Vitals:    11/04/20 0347   BP:    Pulse:    Resp: 16   Temp:    SpO2:       Wt Readings from Last 3 Encounters:   11/03/20 179 lb 14.4 oz (81.6 kg)   10/30/20 182 lb (82.6 kg)   08/21/20 187 lb 6.4 oz (85 kg)     Body mass index is 33.99 kg/m². General Appearance: Moderate distress, conversant - on supplemental O2   Constitutional:  Poorly developed, Well nourished  HEENT:  Normocephalic, Atraumatic, Oropharynx moist, No oral exudates,   Nose normal. Neck Supple Carotid: no carotid bruit  No JVP noted   Eyes:  Conjunctiva normal, No discharge. Respiratory:    Poor respiratory effort. Bilateral wheezing auscultated. Lung sounds difficult to auscultate given underlying advanced COPD. No crackles appreciated  Cardiovascular: S1-S2 audible although muffled secondary to COPD. No murmurs auscultated. No rubs, thrills or gallops. Normal  rhythm. Pedal pulses are normal. No pedal edema  GI:  Soft Non tender, non distended. :  No CVA tenderness. Musculoskeletal:   No tenderness, No cyanosis, No clubbing. Integument:  Warm, Dry, No erythema, No rash. Lymphatic:  No lymphadenopathy noted. Neurologic:  Alert & oriented x 3  No focal deficits noted. Psychiatric:  Affect normal, Judgment normal, Mood normal.       Lab Review     Recent Labs     11/04/20  0320   WBC 17.7*   HGB 8.2*   HCT 27.2*         Recent Labs     11/04/20  0320      K 3.7   CL 95*   CO2 34*   BUN 8   CREATININE 0.8     Recent Labs     11/03/20  1412   AST 18   ALT 28   BILITOT 0.7   ALKPHOS 141*     No results for input(s): TROPONINI in the last 72 hours. No results found for: BNP  Lab Results   Component Value Date    INR 1.09 10/26/2020    PROTIME 13.2 10/26/2020         All labs, images, EKGs were personally reviewed      Assessment: 64 y. o.year old with PMH of  has a past medical history of Anxiety, Arthritis, Back pain at L4-L5 level, Bipolar 1 disorder ([address] COPD (chronic

## 2020-11-04 NOTE — PROGRESS NOTES
Hospitalist Progress Note      Name:  Nikos Echeverria /Age/Sex: 1960  (64 y.o. female)   MRN & CSN:  [id number] & [id number] Admission Date/Time: 11/3/2020  1:58 PM   Location:  [address] [place] [address] PCP: Mychal Salas MD         Hospital Day: 2    Assessment and Plan:   Nikos Echeverria is a 64 y.o.  female  who presents with Sepsis ([facility] [address]    Sepsis:   · Source: Could be from pneumonia. · Subjective fever. WBC  trending down  · Blood culture. Urine culture. · Lactic acid mild, started on gentle hydration  · Procalcitonin high  · Started on cefepime and vancomycin.     Pneumonia:   · presented with cough, shortness of breath, pleuritic chest pain and nausea  · CT chest with small area of consolidative change in the posterior segment of the right lower lobe  · Started on cefepime and vancomycin, MRSA screen. · Respiratory studies. Pulse oximetry.     Hyponatremia: could be from poor intake. Started gentle hydration.     Pericardial effusion: Has on and off chest pain. CT with nonspecific pericardial effusion volume smaller. Troponin negative. Cardiology consulted at the ED. Echocardiogram ordered.     Bilateral pleural effusion     Chronic respiratory failure 3 L at home. From acute exacerbation of COPD. Started on IV steroids. Diet DIET CARDIAC;   DVT Prophylaxis [x] Lovenox, []  Heparin, [] SCDs, [] Warfarin  [] NOAC     GI Prophylaxis [] PPI,  [] H2 Blocker,  [] Carafate,  [] Diet/Tube Feeds   Code Status Full Code   MDM [] Low, [] Moderate,[]  High     History of Present Illness:     Chief Complaint: Sepsis ([facility] [address]    Seen and examined today. Still complaining of right-sided chest pain. No fever or chills. Has shortness of breath and cough. Denied abdominal pain. No nausea or vomiting. Ten point ROS reviewed negative, unless as noted above    Objective:        Intake/Output Summary (Last 24 hours) at 2020 1420  Last data filed at 2020 1211  Gross per 24 hour   Intake 610 ml Output 150 ml   Net 460 ml      Vitals:   Vitals:    11/04/20 1145   BP:    Pulse:    Resp: 21   Temp:    SpO2: 98%     Physical Exam:   GEN    Awake female, sitting upright in bed in no apparent distress. Appears given age. On oxygen by nasal cannula. EYES   Pupils are equally round. No scleral erythema, discharge, or conjunctivitis. HENT  Mucous membranes are moist. Oral pharynx without exudates, no evidence of thrush. NECK  Supple, no apparent thyromegaly or masses. RESP  Clear to auscultation, no wheezes, rales or rhonchi. Symmetric chest movement while on room air. CARDIO/VASC           S1/S2 auscultated. Regular rate without appreciable murmurs, rubs, or gallops. No JVD or carotid bruits. Peripheral pulses equal bilaterally and palpable. No peripheral edema. GI        Abdomen is soft without significant tenderness, masses, or guarding. Bowel sounds are normoactive. Rectal exam deferred. MSK    No gross joint deformities. SKIN    Normal coloration, warm, dry. NEURO           Cranial nerves appear grossly intact, normal speech, no lateralizing weakness. PSYCH            Awake, alert, oriented x 4. Affect appropriate.     Medications:   Medications:    vancomycin  1,500 mg Intravenous Q18H    methylPREDNISolone  40 mg Intravenous Daily    albuterol sulfate HFA  2 puff Inhalation 4x daily    ipratropium  2 puff Inhalation 4x daily    ondansetron  4 mg Intravenous Once    aspirin  81 mg Oral Daily    baclofen  10 mg Oral TID    budesonide-formoterol  2 puff Inhalation BID    busPIRone  10 mg Oral TID    DULoxetine  60 mg Oral Daily    famotidine  20 mg Oral BID    guaiFENesin  600 mg Oral BID    levothyroxine  100 mcg Oral Daily    metoprolol succinate  25 mg Oral Daily    montelukast  10 mg Oral Daily    traZODone  100 mg Oral Nightly    thiamine  100 mg Oral Daily    sodium chloride flush  10 mL Intravenous 2 times per day    enoxaparin  40 mg Subcutaneous Daily    cefepime  2

## 2020-11-04 NOTE — H&P
History and Physical      Name:  Carter Casarez /Age/Sex: 1960  (64 y.o. female)   MRN & CSN:  [id number] & [id number] Admission Date/Time: 11/3/2020  1:58 PM   Location:  ED31/ED-31 PCP: Christian Sanchez MD       Hospital Day: 1    Assessment and Plan:   Carter Casarez is a 64 y.o.  female  who presents with Sepsis ([facility] [address]    Sepsis:   · Source: Could be from pneumonia. · Subjective fever. WBC high. · Blood culture. Urine culture. · Lactic acid mild, started on gentle hydration  · Check CRP, pro calcitonin. · Started on cefepime and vancomycin. Pneumonia:    presented with cough, shortness of breath, pleuritic chest pain and nausea   CT chest with small area of consolidative change in the posterior segment of the right lower lobe   Started on cefepime and vancomycin, MRSA screen.  Respiratory studies. Pulse oximetry. Hyponatremia: could be from poor intake. Start gentle hydration. Pericardial effusion: Has on and off chest pain. CT with nonspecific pericardial effusion volume smaller. Check troponin. Cardiology consulted at the ED. Echocardiogram ordered. Bilateral pleural effusion    Chronic respiratory failure 3 L at home. From COPD. Diet No diet orders on file   DVT Prophylaxis [x] Lovenox, []  Heparin, [] SCDs, [] Warfarin  [] NOAC     GI Prophylaxis [] PPI,  [x] H2 Blocker,  [] Carafate,  [] Diet/Tube Feeds   Code Status Prior   MDM [] Low, [] Moderate,[x]  High     History of Present Illness:     Chief Complaint: Sepsis ([facility] [address]  Carter Casarez is a 64 y.o.  female, with past medical history significant for hypertension, hypothyroidism, depression, anxiety, COPD, pericardial effusion status post pericardiocentesis 2020, chronic respiratory failure, who presented to the ED from home due shortness of breath.   She was recently admitted to the hospital due to chills, fatigue, abdominal pain, nausea and vomiting found to have a calculus cholecystitis and underwent laparoscopic cholecystectomy. She was also found to have pericardial effusion status post pericardiocentesis and paroxysmal atrial fibrillation. Her symptoms improved on the day of the discharge however had recurrence of chills, shortness of breath, nonproductive cough. She also reported low-grade fever with sore throat and nausea, also with pleuritic chest pain. Ten point ROS reviewed negative, unless as noted above    Objective:   No intake or output data in the 24 hours ending 11/03/20 1903   Vitals:   Vitals:    11/03/20 1831   BP: 110/70   Pulse: 96   Resp:    Temp:    SpO2: 100%     Physical Exam:   GEN Awake female, sitting upright in bed in no apparent distress. Appears given age. On oxygen by nasal cannula. EYES Pupils are equally round. No scleral erythema, discharge, or conjunctivitis. HENT Mucous membranes are moist. Oral pharynx without exudates, no evidence of thrush. NECK Supple, no apparent thyromegaly or masses. RESP Clear to auscultation, no wheezes, rales or rhonchi. Symmetric chest movement while on room air. CARDIO/VASC S1/S2 auscultated. Regular rate without appreciable murmurs, rubs, or gallops. No JVD or carotid bruits. Peripheral pulses equal bilaterally and palpable. No peripheral edema. GI Abdomen is soft without significant tenderness, masses, or guarding. Bowel sounds are normoactive. Rectal exam deferred. MSK No gross joint deformities. SKIN Normal coloration, warm, dry. NEURO Cranial nerves appear grossly intact, normal speech, no lateralizing weakness. PSYCH Awake, alert, oriented x 4. Affect appropriate.     Past Medical History:      Past Medical History:   Diagnosis Date    Anxiety 2/16/2017    Arthritis     Back pain at L4-L5 level 2/16/2017    Dr Yaakov Worrell Ada Bipolar 1 disorder ([address]     COPD (chronic obstructive pulmonary disease) ([address]     Emphysema of lung ([facility] [address]     FH: CAD (coronary artery disease) 2/16/2017    Father had in his current or ex partner: None     Emotionally abused: None     Physically abused: None     Forced sexual activity: None   Other Topics Concern    None   Social History Narrative    None       Medications:     Home Medication   Prior to Admission medications    Medication Sig Start Date End Date Taking?  Authorizing Provider   amLODIPine (NORVASC) 10 MG tablet Take 1 tablet by mouth daily 10/31/20  Yes Db Collazo MD   amiodarone (CORDARONE) 200 MG tablet Take 1 tablet by mouth daily 10/31/20  Yes Db Collazo MD   budesonide-formoterol (SYMBICORT) 160-4.5 MCG/ACT AERO USE 2 INHALATIONS TWICE A DAY 10/2/20  Yes Carolyn Ramirez MD   ipratropium-albuterol (DUONEB) 0.5-2.5 (3) MG/3ML SOLN nebulizer solution Inhale 3 mLs into the lungs every 4 hours 7/21/20  Yes Carolyn Ramirez MD   guaiFENesin (MUCINEX) 600 MG extended release tablet Take 1 tablet by mouth 2 times daily 7/21/20  Yes Carolyn Ramirez MD   montelukast (SINGULAIR) 10 MG tablet Take 1 tablet by mouth daily 7/21/20  Yes Carolyn Ramirez MD   levothyroxine (SYNTHROID) 100 MCG tablet Take 1 tablet by mouth Daily 7/21/20  Yes Carolyn Ramirez MD   folic acid (FOLVITE) 1 MG tablet Take 1 tablet by mouth daily 7/16/20  Yes Marycarmen Alicia MD   vitamin B-1 100 MG tablet Take 1 tablet by mouth daily 7/16/20  Yes Marycarmen Alicia MD   albuterol-ipratropium (COMBIVENT RESPIMAT)  MCG/ACT AERS inhaler Inhale 1 puff into the lungs every 4 hours as needed for Wheezing 7/1/20  Yes Carolyn Ramirez MD   DULoxetine (CYMBALTA) 60 MG extended release capsule Take 60 mg by mouth daily   Yes Historical Provider, MD   busPIRone (BUSPAR) 10 MG tablet Take 1 tablet by mouth 3 times daily 4/23/19  Yes Rahel Kinney MD   ferrous gluconate 324 (37.5 Fe) MG TABS Take 1 tablet by mouth 2 times daily 4/15/19  Yes Rahel Kinney MD   metoprolol succinate (TOPROL XL) 25 MG extended release tablet Take 1 tablet by mouth daily 3/18/19  Yes Abimbola Brown MD   famotidine (PEPCID) 20 MG tablet Take 1 tablet by mouth 2 times daily  Patient taking differently: Take 40 mg by mouth nightly  3/8/19  Yes Rae Eldridge MD   traZODone (DESYREL) 50 MG tablet Take 100 mg by mouth nightly    Yes Historical Provider, MD   clonazePAM (KLONOPIN) 1 MG tablet Take 1 mg by mouth 3 times daily as needed. Yes Historical Provider, MD   baclofen (LIORESAL) 10 MG tablet Take 1 tablet by mouth 3 times daily 12/18/18  Yes Rae Eldridge MD   aspirin 81 MG chewable tablet Take 81 mg by mouth daily   Yes Historical Provider, MD   fluticasone (FLONASE) 50 MCG/ACT nasal spray 2 sprays by Nasal route daily 3/28/18  Yes Rae Eldridge MD     Medications:    ondansetron  4 mg Intravenous Once    vancomycin  1,250 mg Intravenous Once      Infusions:   PRN Meds:      Recent Labs     11/03/20  1412   WBC 27.9*   HGB 9.8*   HCT 32.7*         Recent Labs     11/03/20  1412   *   K 3.6   CL 88*   CO2 33*   BUN 11   CREATININE 0.8     Recent Labs     11/03/20  1412   AST 18   ALT 28   BILITOT 0.7   ALKPHOS 141*     No results for input(s): INR in the last 72 hours.   Recent Labs     11/03/20  1412   TROPONINT <0.010        Imaging reviewed      Electronically signed by Elza Bailey MD on 11/3/2020 at 7:03 PM

## 2020-11-04 NOTE — CARE COORDINATION
.CM met with pt for d/c planning. Introduced self and explained reason for visit. Pt lives with spouse and requires some assistance with ADL's. Her spouse provides her transportation. She has a PCP, has insurance, and is able to afford her medication. Pt has a bi-pap, O2, a walker, shower seat, and a BSC. Pt states that she is active with [facility]. Pt denies any new d/c needs at this time. D/c plan is home with spouse and [facility]. Charles Cisneros Please notify [facility] upon discharge, [phone], they will provide fax number to [place] [phone] order & AVS.  Notify CM if any new d/c needs arise.   TE

## 2020-11-04 NOTE — PROGRESS NOTES
[address] [facility]  consulted by Dr. Kory Jorge for monitoring and adjustment. Indication for treatment: Sepsis, pneumonia  Goal trough: 15 mcg/mL (AUC/RACHEL: 400-600)     Pertinent Laboratory Values:   Temp Readings from Last 3 Encounters:   11/03/20 98.3 °F (36.8 °C) (Oral)   10/30/20 98.5 °F (36.9 °C) (Oral)   10/25/20 98.6 °F (37 °C)     Recent Labs     11/03/20  1412   WBC 27.9*   LACTATE 1.3     Recent Labs     11/03/20  1412   BUN 11   CREATININE 0.8     Estimated Creatinine Clearance: 74 mL/min (based on SCr of 0.8 mg/dL). Intake/Output Summary (Last 24 hours) at 11/4/2020 0229  Last data filed at 11/3/2020 2221  Gross per 24 hour   Intake 250 ml   Output 150 ml   Net 100 ml       Pertinent Cultures:  Date    Source    Results  11/03   Covid-19   Not detected  11/03   Legionella   Pending  11/03   Respiratory   Ordered  11/03   Blood    Ordered  11/04   MRSA nasal screen  Ordered    Vancomycin level:   TROUGH:  No results for input(s): VANCOTROUGH in the last 72 hours. RANDOM:  No results for input(s): VANCORANDOM in the last 72 hours. Assessment:  WBC and temperature: elevated WBC; Afebrile  SCr, BUN, and urine output: WNL  Day(s) of therapy:1  Vancomycin level: To be collected    Plan:  Received Vancomycin 1250 mg x 1 in the ED  Start Vancomycin 1500 mg IVPB q18h  Pharmacy will continue to monitor patient and adjust therapy as indicated     Julian 11/06/20 @6898    Thank you for the consult.   Brigido Miller RPh  11/4/2020 2:29 AM

## 2020-11-04 NOTE — PROGRESS NOTES
11/03/20 2130   NIV Type   $NIV $Daily Charge   NIV Started/Stopped On   Equipment Type v60   Mode Bilevel   Mask Type Full face mask   Mask Size Small   Settings/Measurements   IPAP 12 cmH20   CPAP/EPAP 6 cmH2O   Rate Ordered 12   FiO2  40 %   Vt Exhaled 344 mL   Mask Leak (lpm) 2 lpm   Comfort Level Good   Using Accessory Muscles No   SpO2 97   Alarm Settings   Alarms On Y   Press Low Alarm 5 cmH2O   High Pressure Alarm 25 cmH2O   Delay Alarm 20 sec(s)   Resp Rate Low Alarm 12   High Respiratory Rate 35 br/min   per home regime

## 2020-11-05 LAB
ANION GAP SERPL CALCULATED.3IONS-SCNC: 9 MMOL/L (ref 4–16)
BUN BLDV-MCNC: 11 MG/DL (ref 6–23)
CALCIUM SERPL-MCNC: 8.9 MG/DL (ref 8.3–10.6)
CHLORIDE BLD-SCNC: 96 MMOL/L (ref 99–110)
CO2: 31 MMOL/L (ref 21–32)
CREAT SERPL-MCNC: 0.9 MG/DL (ref 0.6–1.1)
GFR AFRICAN AMERICAN: >60 ML/MIN/1.73M2
GFR NON-AFRICAN AMERICAN: >60 ML/MIN/1.73M2
GLUCOSE BLD-MCNC: 209 MG/DL (ref 70–99)
POTASSIUM SERPL-SCNC: 4.3 MMOL/L (ref 3.5–5.1)
SODIUM BLD-SCNC: 136 MMOL/L (ref 135–145)

## 2020-11-05 PROCEDURE — 94761 N-INVAS EAR/PLS OXIMETRY MLT: CPT

## 2020-11-05 PROCEDURE — 6370000000 HC RX 637 (ALT 250 FOR IP): Performed by: NURSE PRACTITIONER

## 2020-11-05 PROCEDURE — 94660 CPAP INITIATION&MGMT: CPT

## 2020-11-05 PROCEDURE — 2580000003 HC RX 258: Performed by: INTERNAL MEDICINE

## 2020-11-05 PROCEDURE — 94640 AIRWAY INHALATION TREATMENT: CPT

## 2020-11-05 PROCEDURE — 6370000000 HC RX 637 (ALT 250 FOR IP): Performed by: INTERNAL MEDICINE

## 2020-11-05 PROCEDURE — 6360000002 HC RX W HCPCS: Performed by: INTERNAL MEDICINE

## 2020-11-05 PROCEDURE — 99232 SBSQ HOSP IP/OBS MODERATE 35: CPT | Performed by: INTERNAL MEDICINE

## 2020-11-05 PROCEDURE — 80048 BASIC METABOLIC PNL TOTAL CA: CPT

## 2020-11-05 PROCEDURE — 2140000000 HC CCU INTERMEDIATE R&B

## 2020-11-05 PROCEDURE — 36415 COLL VENOUS BLD VENIPUNCTURE: CPT

## 2020-11-05 PROCEDURE — 2700000000 HC OXYGEN THERAPY PER DAY

## 2020-11-05 PROCEDURE — APPSS15 APP SPLIT SHARED TIME 0-15 MINUTES: Performed by: NURSE PRACTITIONER

## 2020-11-05 RX ADMIN — CEFEPIME HYDROCHLORIDE 2 G: 2 INJECTION, POWDER, FOR SOLUTION INTRAVENOUS at 03:59

## 2020-11-05 RX ADMIN — BACLOFEN 10 MG: 10 TABLET ORAL at 14:26

## 2020-11-05 RX ADMIN — Medication 2 PUFF: at 11:42

## 2020-11-05 RX ADMIN — FAMOTIDINE 20 MG: 20 TABLET ORAL at 08:24

## 2020-11-05 RX ADMIN — GUAIFENESIN 600 MG: 600 TABLET, EXTENDED RELEASE ORAL at 08:24

## 2020-11-05 RX ADMIN — CLONAZEPAM 1 MG: 1 TABLET ORAL at 14:26

## 2020-11-05 RX ADMIN — MONTELUKAST 10 MG: 10 TABLET, FILM COATED ORAL at 08:23

## 2020-11-05 RX ADMIN — ALBUTEROL SULFATE 2 PUFF: 90 AEROSOL, METERED RESPIRATORY (INHALATION) at 11:42

## 2020-11-05 RX ADMIN — TRAZODONE HYDROCHLORIDE 100 MG: 50 TABLET ORAL at 20:54

## 2020-11-05 RX ADMIN — Medication 2 PUFF: at 15:35

## 2020-11-05 RX ADMIN — BUSPIRONE HYDROCHLORIDE 10 MG: 10 TABLET ORAL at 20:54

## 2020-11-05 RX ADMIN — ENOXAPARIN SODIUM 40 MG: 40 INJECTION SUBCUTANEOUS at 08:25

## 2020-11-05 RX ADMIN — FAMOTIDINE 20 MG: 20 TABLET ORAL at 20:54

## 2020-11-05 RX ADMIN — BUSPIRONE HYDROCHLORIDE 10 MG: 10 TABLET ORAL at 14:26

## 2020-11-05 RX ADMIN — OXYCODONE HYDROCHLORIDE AND ACETAMINOPHEN 1 TABLET: 5; 325 TABLET ORAL at 20:58

## 2020-11-05 RX ADMIN — METHYLPREDNISOLONE SODIUM SUCCINATE 40 MG: 40 INJECTION, POWDER, LYOPHILIZED, FOR SOLUTION INTRAMUSCULAR; INTRAVENOUS at 08:19

## 2020-11-05 RX ADMIN — ASPIRIN 81 MG CHEWABLE TABLET 81 MG: 81 TABLET CHEWABLE at 08:23

## 2020-11-05 RX ADMIN — DULOXETINE HYDROCHLORIDE 60 MG: 30 CAPSULE, DELAYED RELEASE ORAL at 08:24

## 2020-11-05 RX ADMIN — BACLOFEN 10 MG: 10 TABLET ORAL at 20:54

## 2020-11-05 RX ADMIN — Medication 2 PUFF: at 20:45

## 2020-11-05 RX ADMIN — CEFEPIME HYDROCHLORIDE 2 G: 2 INJECTION, POWDER, FOR SOLUTION INTRAVENOUS at 16:37

## 2020-11-05 RX ADMIN — BUDESONIDE AND FORMOTEROL FUMARATE DIHYDRATE 2 PUFF: 160; 4.5 AEROSOL RESPIRATORY (INHALATION) at 20:47

## 2020-11-05 RX ADMIN — ALBUTEROL SULFATE 2 PUFF: 90 AEROSOL, METERED RESPIRATORY (INHALATION) at 20:43

## 2020-11-05 RX ADMIN — ALBUTEROL SULFATE 2 PUFF: 90 AEROSOL, METERED RESPIRATORY (INHALATION) at 15:35

## 2020-11-05 RX ADMIN — BUDESONIDE AND FORMOTEROL FUMARATE DIHYDRATE 2 PUFF: 160; 4.5 AEROSOL RESPIRATORY (INHALATION) at 08:45

## 2020-11-05 RX ADMIN — BUSPIRONE HYDROCHLORIDE 10 MG: 10 TABLET ORAL at 08:24

## 2020-11-05 RX ADMIN — TRAMADOL HYDROCHLORIDE 50 MG: 50 TABLET, FILM COATED ORAL at 04:01

## 2020-11-05 RX ADMIN — Medication 2 PUFF: at 08:45

## 2020-11-05 RX ADMIN — VANCOMYCIN HYDROCHLORIDE 1500 MG: 5 INJECTION, POWDER, LYOPHILIZED, FOR SOLUTION INTRAVENOUS at 23:35

## 2020-11-05 RX ADMIN — SODIUM CHLORIDE: 9 INJECTION, SOLUTION INTRAVENOUS at 06:36

## 2020-11-05 RX ADMIN — LEVOTHYROXINE SODIUM 100 MCG: 100 TABLET ORAL at 06:36

## 2020-11-05 RX ADMIN — GUAIFENESIN 600 MG: 600 TABLET, EXTENDED RELEASE ORAL at 20:55

## 2020-11-05 RX ADMIN — VANCOMYCIN HYDROCHLORIDE 1500 MG: 5 INJECTION, POWDER, LYOPHILIZED, FOR SOLUTION INTRAVENOUS at 04:47

## 2020-11-05 RX ADMIN — SODIUM CHLORIDE, PRESERVATIVE FREE 10 ML: 5 INJECTION INTRAVENOUS at 08:26

## 2020-11-05 RX ADMIN — BACLOFEN 10 MG: 10 TABLET ORAL at 08:23

## 2020-11-05 RX ADMIN — ALBUTEROL SULFATE 2 PUFF: 90 AEROSOL, METERED RESPIRATORY (INHALATION) at 08:45

## 2020-11-05 RX ADMIN — METOPROLOL SUCCINATE 25 MG: 25 TABLET, EXTENDED RELEASE ORAL at 08:24

## 2020-11-05 RX ADMIN — Medication 100 MG: at 08:23

## 2020-11-05 RX ADMIN — OXYCODONE HYDROCHLORIDE AND ACETAMINOPHEN 1 TABLET: 5; 325 TABLET ORAL at 14:25

## 2020-11-05 RX ADMIN — CLONAZEPAM 1 MG: 1 TABLET ORAL at 04:02

## 2020-11-05 ASSESSMENT — PAIN SCALES - GENERAL
PAINLEVEL_OUTOF10: 0
PAINLEVEL_OUTOF10: 5
PAINLEVEL_OUTOF10: 7
PAINLEVEL_OUTOF10: 2
PAINLEVEL_OUTOF10: 0
PAINLEVEL_OUTOF10: 0
PAINLEVEL_OUTOF10: 3

## 2020-11-05 NOTE — PROGRESS NOTES
Physician Progress Note      PATIENT:               Duarte Hong  CSN #:                  [id number]  :                       1960  ADMIT DATE:       11/3/2020 1:58 PM  [address] [place] DATE:  RESPONDING  PROVIDER #:        Skylar Blackman MD          QUERY TEXT:    Dear cardiology. Patient admitted with sepsis 2/2 possible pneumonia. Noted documentation of   acute on chronic respiratory failure in cardiology consult note on . PT   wears 3L O2 at home and has been on 3L NC with SpO2 % since admission. Please indicate one of the following and document in the medical record: The medical record reflects the following:  Risk Factors: COPD, possible PNA  Clinical Indicators: ED provider-wears 3L home O2, She has not had any   increased oxygen requirements  % on 3L with RR 16-23  H&P-Chronic respiratory failure 3 L at home. Treatment: supplemental O2 at 3L NC, nebs, abx, Mucinex, Solumedrol    Thank you, Katarzyna Garza RN BSN CDS  [phone]    Acute Respiratory Failure Clinical Indicators per  MS-DRG Training Guide and   Quick Reference Guide:  pO2 < 60 mmHg or SpO2 (pulse oximetry) < 91% breathing room air  pCO2 > 50 and pH < 7.35  P/F ratio (pO2 / FIO2) < 300  pO2 decrease or pCO2 increase by 10 mmHg from baseline (if known)  Supplemental oxygen of 40% or more  Presence of respiratory distress, tachypnea, dyspnea, shortness of breath,   wheezing  Unable to speak in complete sentences  Use of accessory muscles to breathe  Extreme anxiety and feeling of impending doom  Tripod position  Confusion/altered mental status/obtunded  Options provided:  -- Acute Respiratory Failure currently as evidenced by, Please document   evidence.   -- Chronic hypoxic respiratory failure only Acute Respiratory Failure ruled   out after study  -- Other - I will add my own diagnosis  -- Disagree - Not applicable / Not valid  -- Disagree - Clinically unable to determine / Unknown  -- Refer to Clinical Documentation Reviewer    PROVIDER RESPONSE TEXT:    Chronic hypoxic respiratory failure only Acute Respiratory Failure has been   ruled out after study.     Query created by: Hussein Delcid on 11/4/2020 12:36 PM      Electronically signed by:  Luke Rasheed MD 11/5/2020 1:05 PM

## 2020-11-05 NOTE — PROGRESS NOTES
[address]  consulted by Dr. Berenice Amezcua for monitoring and adjustment. Indication for treatment: Sepsis, pneumonia  Goal trough: 15 mcg/mL (AUC/RACHEL: 400-600)     Pertinent Laboratory Values:   Temp Readings from Last 3 Encounters:   11/05/20 98 °F (36.7 °C) (Oral)   10/30/20 98.5 °F (36.9 °C) (Oral)   10/25/20 98.6 °F (37 °C)     Recent Labs     11/03/20  1412 11/04/20  0320   WBC 27.9* 17.7*   LACTATE 1.3  --      Recent Labs     11/03/20  1412 11/04/20  0320 11/05/20  0452   BUN 11 8 11   CREATININE 0.8 0.8 0.9     Estimated Creatinine Clearance: 66 mL/min (based on SCr of 0.9 mg/dL). Intake/Output Summary (Last 24 hours) at 11/5/2020 1603  Last data filed at 11/5/2020 4005  Gross per 24 hour   Intake 960 ml   Output 800 ml   Net 160 ml       Pertinent Cultures:  Date    Source    Results  11/03   Covid-19   Not detected  11/03   Legionella   Negative  11/03   Respiratory   Few Gram+  11/03   Blood    No growth  11/04   MRSA nasal screen  Pending    Vancomycin level:   TROUGH:  No results for input(s): VANCOTROUGH in the last 72 hours. RANDOM:  No results for input(s): VANCORANDOM in the last 72 hours. Assessment:  · WBC and temperature: WBC trending down to 17.7; Pt remains afberile  · SCr, BUN, and urine output:  SCR remains normal, output ok  · Day(s) of therapy: 2  · Vancomycin level:  Scheduled for 11/6 @16:30  ·   Plan:  · Renal trends remain stable  · Continue vancomycin 1500 mg IVPB q18h  · Trough scheduled before the 4th dose tomorrow afternoon  · Pharmacy will continue to monitor patient and adjust therapy as indicated     VANCOMYCIN TROUGH SCHEDULED FOR 11/06/20 @9975    Thank you for the consult. Shelbi Bridges  11/5/2020 4:03 PM

## 2020-11-05 NOTE — PROGRESS NOTES
Cardiology Note    Admit Date:  11/3/2020    Admission diagnosis / Complaint:   Shortness of breath  History of Pericardial effusion      Subjective:  Ms. Jody Martel is sitting up in the bed. She reports that she is short of breath. She reports that her breathing is unchanged from yesterday. She is notably short of breath with speech. Assessment and Plan:       2. Pericardial effusion s/p pericardiocentesis -500 cc October 23, 2020. CT scan shows small pericardial effusion. Repeat ECHO 11/04/2020 EF 50% with no evidence of pericardial effusion. 3. Right-sided chest pain: Pleuritic in nature. Suggest anti-inflammatory pain management, trial of steroids. 4. History of recent paroxysmal atrial fibrillation. Patient is on amiodarone. Will stop amiodarone given poor lung status, hypothyroidism. Observe conservatively. Patient is in sinus rhythm  5. Essential hypertension: Continue with Toprol-XL. Blood pressure controlled  6. Sepsis/pneumonia: Likely underlying pneumonia patient is pancultured on IV antibiotics currently  7. Acute on chronic respiratory failure secondary to COPD exacerbation. Suggest steroids/bronchodilators/BiPAP support     Cardiology to sign off, please call if further consultation is needed.      Objective:   /62   Pulse 80   Temp 97.9 °F (36.6 °C) (Oral)   Resp 15   Ht 5' 1\" (1.549 m)   Wt 179 lb 14.4 oz (81.6 kg)   SpO2 97%   BMI 33.99 kg/m²       Intake/Output Summary (Last 24 hours) at 11/5/2020 1045  Last data filed at 11/5/2020 9553  Gross per 24 hour   Intake 1200 ml   Output 800 ml   Net 400 ml       TELEMETRY: Sinus HR 70   has a past medical history of Anxiety, Arthritis, Back pain at L4-L5 level, Bipolar 1 disorder (HCC), COPD (chronic obstructive pulmonary disease) ([address] Emphysema of lung ([address] FH: CAD (coronary artery disease), Fibromyalgia, H/O Doppler ultrasound, H/O echocardiogram, History of blood transfusion, Hyperlipidemia LDL goal <100, Hypertension, Obstructive sleep apnea, Osteoarthritis, and Thyroid disease. has a past surgical history that includes Carpal tunnel release; Tubal ligation; back surgery; Cardiac catheterization; Colonoscopy (N/A, 12/12/2019); and Cholecystectomy, laparoscopic (N/A, 10/25/2020). Physical Exam:  General:  Awake, alert, NAD  Skin:  Warm and dry  Neck:  JVD not appreciated  Chest:  On 02, dyspnea noted. Breath sounds diminished at the bases. Wheezes note to upper lobes both anteriorly and posteriorly.    Cardiovascular:  RRR S1S2  Abdomen:  Soft nontender  Extremities:  trace edema    Medications:    vancomycin  1,500 mg Intravenous Q18H    methylPREDNISolone  40 mg Intravenous Daily    albuterol sulfate HFA  2 puff Inhalation 4x daily    ipratropium  2 puff Inhalation 4x daily    ondansetron  4 mg Intravenous Once    aspirin  81 mg Oral Daily    baclofen  10 mg Oral TID    budesonide-formoterol  2 puff Inhalation BID    busPIRone  10 mg Oral TID    DULoxetine  60 mg Oral Daily    famotidine  20 mg Oral BID    guaiFENesin  600 mg Oral BID    levothyroxine  100 mcg Oral Daily    metoprolol succinate  25 mg Oral Daily    montelukast  10 mg Oral Daily    traZODone  100 mg Oral Nightly    thiamine  100 mg Oral Daily    sodium chloride flush  10 mL Intravenous 2 times per day    enoxaparin  40 mg Subcutaneous Daily    cefepime  2 g Intravenous Q12H       traMADol, guaiFENesin-dextromethorphan, oxyCODONE-acetaminophen, benzonatate, clonazePAM, sodium chloride flush, acetaminophen **OR** acetaminophen, polyethylene glycol, promethazine **OR** ondansetron, albuterol sulfate HFA    Lab Data:  CBC:   Recent Labs     11/03/20  1412 11/04/20  0320   WBC 27.9* 17.7*   HGB 9.8* 8.2*   HCT 32.7* 27.2*   MCV 95.6 96.1    233     BMP:   Recent Labs     11/03/20  1412 11/04/20  0320 11/05/20  0452   * 137 136   K 3.6 3.7 4.3   CL 88* 95* 96*   CO2 33* 34* 31   BUN 11 8 11   CREATININE 0.8 0.8 0.9     LIVER PROFILE:   Recent Labs     11/03/20  1412   AST 18   ALT 28   BILITOT 0.7   ALKPHOS 141*         Jaime Tucker, APRN-CNP 11/5/2020 10:45 AM         CARDIOLOGY ATTENDING ADDENDUM    I have seen, spoken to and examined this patient personally, independently of the nurse practitioner. I have reviewed the hospital care given to date and reviewed all pertinent labs and imaging. The plan was developed mutually at the time of the visit with the patient,  NP   and myself. I have spoken with patient, nursing staff and provided written and verbal instructions . The above note has been reviewed and I agree with the assessment, diagnosis, and treatment plan with changes made by me as follows       HPI:  I have reviewed the above HPI  And agree with above   Please review addendum/changes made to note above     Interval history:            Physical Exam:  General:  Awake, alert, NAD  Head:normal  Eye:normal  Chest:  On bipap  Cardiovascular:  s1s2  Abdomen:   nontender  Extremities:   Trace edema  Pulses; palpable  Neuro: grossly normal      MEDICAL DECISION MAKING;    I agree with the above plan, which was planned by myself and discussed with NP.     No pericardial effusion on echo  Please call us with further questions     Dr. Nellie Main MD

## 2020-11-05 NOTE — PROGRESS NOTES
Progress Note  Date:2020       Room:3120/3120-A  Patient Dragan Barraza     Date of Birth:[birth year]     Age:60 y.o. On bipap and anxious. Subjective    Subjective:  Symptoms:  Stable. Diet:  Poor intake. Pain:  She complains of pain that is mild. Review of Systems  Objective         Vitals Last 24 Hours:  TEMPERATURE:  Temp  Av.6 °F (36.4 °C)  Min: 97.3 °F (36.3 °C)  Max: 98 °F (36.7 °C)  RESPIRATIONS RANGE: Resp  Av.1  Min: 11  Max: 23  PULSE OXIMETRY RANGE: SpO2  Av.8 %  Min: 96 %  Max: 100 %  PULSE RANGE: Pulse  Av.8  Min: 63  Max: 99  BLOOD PRESSURE RANGE: Systolic (28RAY), KLL:088 , Min:100 , BDP:945   ; Diastolic (32MXZ), CAR:08, Min:64, Max:79    I/O (24Hr): Intake/Output Summary (Last 24 hours) at 2020 0755  Last data filed at 2020 0300  Gross per 24 hour   Intake 1320 ml   Output 800 ml   Net 520 ml     Objective:  General Appearance:  Comfortable. Vital signs: (most recent): Blood pressure 122/62, pulse 80, temperature 97.9 °F (36.6 °C), temperature source Oral, resp. rate 20, height 5' 1\" (1.549 m), weight 179 lb 14.4 oz (81.6 kg), SpO2 97 %, not currently breastfeeding. Vital signs are normal.  (Bipap). HEENT: Normal HEENT exam.    Lungs:  Normal effort. There are decreased breath sounds. Heart: Normal rate. Abdomen: Bowel sounds are normal.     Extremities: Decreased range of motion. Neurological: Patient is alert. Pupils:  Pupils are equal, round, and reactive to light. Skin:  Warm.       Labs/Imaging/Diagnostics    Labs:  CBC:  Recent Labs     20  1412 20  0320   WBC 27.9* 17.7*   RBC 3.42* 2.83*   HGB 9.8* 8.2*   HCT 32.7* 27.2*   MCV 95.6 96.1   RDW 14.1 14.2    233     CHEMISTRIES:  Recent Labs     20  1412 20  0320 20  0452   * 137 136   K 3.6 3.7 4.3   CL 88* 95* 96*   CO2 33* 34* 31   BUN 11 8 11   CREATININE 0.8 0.8 0.9   GLUCOSE 125* 80 209*     PT/INR:No results for input(s): PROTIME, INR in the last 72 hours. APTT:No results for input(s): APTT in the last 72 hours. LIVER PROFILE:  Recent Labs     11/03/20  1412   AST 18   ALT 28   BILITOT 0.7   ALKPHOS 141*       Imaging Last 24 Hours:  Ct Abdomen Pelvis W Iv Contrast Additional Contrast? None    Result Date: 11/3/2020  EXAMINATION: CT OF THE ABDOMEN AND PELVIS WITH CONTRAST; CTA OF THE CHEST 11/3/2020 3:47 pm TECHNIQUE: CT of the abdomen and pelvis was performed with the administration of intravenous contrast. Multiplanar reformatted images are provided for review. Dose modulation, iterative reconstruction, and/or weight based adjustment of the mA/kV was utilized to reduce the radiation dose to as low as reasonably achievable.; CTA of the chest was performed after the administration of intravenous contrast.  Multiplanar reformatted images are provided for review. MIP images are provided for review. Dose modulation, iterative reconstruction, and/or weight based adjustment of the mA/kV was utilized to reduce the radiation dose to as low as reasonably achievable. COMPARISON: CT chest 07/12/2020 and CT abdomen and pelvis 10/19/2020 HISTORY: ORDERING SYSTEM PROVIDED HISTORY: Fever, diaphoresis and acute upper abdominal pain TECHNOLOGIST PROVIDED HISTORY: Reason for exam:->abdominal pain Additional Contrast?->None Reason for Exam: abd pain Acuity: Acute Type of Exam: Initial COPD, chronic kidney disease, prior cholecystectomy FINDINGS: Vasculature: Main pulmonary artery is within normal limits for size at 27 mm diameter. The pulmonary arteries are well opacified for the exam with no thromboembolic disease evident. Mild calcific atherosclerosis of the aorta and its branches. The ascending thoracic aorta is 27 mm diameter and descending thoracic aorta 20 mm diameter. No thoracic aorta aneurysm or dissection. Mediastinum: Small to moderate volume pericardial effusion.   The heart size is normal.  Posterior mediastinal structures are unremarkable. Right paratracheal lymph node is 13 mm axial image 45, with another measuring 10 mm on image 35. Upper mediastinal lymph node adjacent to the esophagus and trachea axial image 15 is 14 mm. Borderline enlarged right hilar lymph node is 9 mm short axis. No left hilar adenopathy evident. Lungs/pleura: Sequela of smoking and emphysema predominate in the upper lungs. Bilateral pleural effusion with prominent collection with a minor fissure on the right. New focal consolidative change with soft tissue nodule type appearance posterior basal segment right lower lobe is 16 x 23 mm axial image 94, not present prior study. Minimal subsegmental atelectasis medial left upper lobe. No ground-glass opacity or other consolidation or nodule evident. Organs: Mildly renal cortical atrophy, thinning, right kidney, craniocaudal renal length only 9 cm. 17 mm simple cyst inferior pole right kidney. Left kidney appears unremarkable. No suspicious renal lesion, nephrolithiasis or hydronephrosis. The adrenal glands, spleen and pancreas appear unremarkable. No discrete hepatic lesion evident. Normal appearing homogeneous attenuation throughout the liver. Interval cholecystectomy without CT evidence of abscess; minimal residual postsurgical fluid sequela at the gallbladder fossa within normal limits. GI/Bowel: Multifocal diverticula involve the descending and sigmoid colon without evidence of acute inflammatory change. No diffuse or focal bowel wall thickening evident. No inflammatory changes evident. No obstruction is seen. The appendix is visualized right lower quadrant, unremarkable in appearance. Pelvis: The uterus and adnexal structures appear unremarkable. Urinary bladder is partially filled, unremarkable appearance. No adenopathy. Small volume, simple appearing ascites.  Peritoneum/Retroperitoneum: Trace ascites at the gallbladder fossa following cholecystectomy as expected for normal postsurgical change. No gas formation or loculated collection. No other ascites or pneumoperitoneum. Unremarkable appearance of the aorta and inferior vena cava. No adenopathy. Bones/Soft Tissues: No acute superficial soft tissue or osseous structure abnormality evident. Degenerative changes of mild-to-moderate severity predominate L4-5 and L5-S1 with grade 1 degenerative anterolisthesis L4 on L5 and vacuum disc phenomena L4-5 and L5-S1.     1.  CTA CHEST: No pulmonary embolism. 2. Small area consolidative change posterior segment right lower lobe is new compared to prior CT abdomen 3 weeks ago likely related to focal pneumonia or atelectasis. 3. Nonspecific pericardial effusion; volume appears much smaller than on CT abdomen 3 weeks ago. 4. Nonspecific bilateral pleural effusion without distinct pulmonary vascular engorgement or other findings associated with congestive heart failure. 5.  Pulmonary sequela typical of that seen with smoking, including emphysema. 6. CT ABDOMEN/PELVIS: No CT evidence of an acute intra-abdominal or intrapelvic process. 7. Greatly diminished ascites compared with prior study, with only a small amount remaining in the pelvis. 8.  No findings to suggest acute appendicitis; no ureter calculus or hydronephrosis. 9. Mildly renal cortical atrophy right kidney with stable benign cyst inferior pole right kidney. 10.  Diverticulosis coli without CT evidence of acute diverticulitis. 11. Interval cholecystectomy without CT evidence of abscess; minimal residual postsurgical fluid sequela at the gallbladder fossa within normal limits. Xr Chest Portable    Result Date: 11/3/2020  EXAMINATION: ONE XRAY VIEW OF THE CHEST 11/3/2020 2:34 pm COMPARISON: 10/25/2020 HISTORY: ORDERING SYSTEM PROVIDED HISTORY: SOB TECHNOLOGIST PROVIDED HISTORY: Reason for exam:->SOB Reason for Exam: SOB FINDINGS: Cardiomegaly. Pulmonary vasculature within normal limits.   Persistent airspace disease in the right lower lung. Left lung grossly clear. Multiple old left rib fractures noted     Persistent airspace disease in the right lower lung. Cardiomegaly with no evidence of pulmonary edema     Cta Pulmonary W Contrast    Result Date: 11/3/2020  EXAMINATION: CT OF THE ABDOMEN AND PELVIS WITH CONTRAST; CTA OF THE CHEST 11/3/2020 3:47 pm TECHNIQUE: CT of the abdomen and pelvis was performed with the administration of intravenous contrast. Multiplanar reformatted images are provided for review. Dose modulation, iterative reconstruction, and/or weight based adjustment of the mA/kV was utilized to reduce the radiation dose to as low as reasonably achievable.; CTA of the chest was performed after the administration of intravenous contrast.  Multiplanar reformatted images are provided for review. MIP images are provided for review. Dose modulation, iterative reconstruction, and/or weight based adjustment of the mA/kV was utilized to reduce the radiation dose to as low as reasonably achievable. COMPARISON: CT chest 07/12/2020 and CT abdomen and pelvis 10/19/2020 HISTORY: ORDERING SYSTEM PROVIDED HISTORY: Fever, diaphoresis and acute upper abdominal pain TECHNOLOGIST PROVIDED HISTORY: Reason for exam:->abdominal pain Additional Contrast?->None Reason for Exam: abd pain Acuity: Acute Type of Exam: Initial COPD, chronic kidney disease, prior cholecystectomy FINDINGS: Vasculature: Main pulmonary artery is within normal limits for size at 27 mm diameter. The pulmonary arteries are well opacified for the exam with no thromboembolic disease evident. Mild calcific atherosclerosis of the aorta and its branches. The ascending thoracic aorta is 27 mm diameter and descending thoracic aorta 20 mm diameter. No thoracic aorta aneurysm or dissection. Mediastinum: Small to moderate volume pericardial effusion. The heart size is normal.  Posterior mediastinal structures are unremarkable.   Right paratracheal lymph node is 13 mm axial image 45, with another measuring 10 mm on image 35. Upper mediastinal lymph node adjacent to the esophagus and trachea axial image 15 is 14 mm. Borderline enlarged right hilar lymph node is 9 mm short axis. No left hilar adenopathy evident. Lungs/pleura: Sequela of smoking and emphysema predominate in the upper lungs. Bilateral pleural effusion with prominent collection with a minor fissure on the right. New focal consolidative change with soft tissue nodule type appearance posterior basal segment right lower lobe is 16 x 23 mm axial image 94, not present prior study. Minimal subsegmental atelectasis medial left upper lobe. No ground-glass opacity or other consolidation or nodule evident. Organs: Mildly renal cortical atrophy, thinning, right kidney, craniocaudal renal length only 9 cm. 17 mm simple cyst inferior pole right kidney. Left kidney appears unremarkable. No suspicious renal lesion, nephrolithiasis or hydronephrosis. The adrenal glands, spleen and pancreas appear unremarkable. No discrete hepatic lesion evident. Normal appearing homogeneous attenuation throughout the liver. Interval cholecystectomy without CT evidence of abscess; minimal residual postsurgical fluid sequela at the gallbladder fossa within normal limits. GI/Bowel: Multifocal diverticula involve the descending and sigmoid colon without evidence of acute inflammatory change. No diffuse or focal bowel wall thickening evident. No inflammatory changes evident. No obstruction is seen. The appendix is visualized right lower quadrant, unremarkable in appearance. Pelvis: The uterus and adnexal structures appear unremarkable. Urinary bladder is partially filled, unremarkable appearance. No adenopathy. Small volume, simple appearing ascites. Peritoneum/Retroperitoneum: Trace ascites at the gallbladder fossa following cholecystectomy as expected for normal postsurgical change. No gas formation or loculated collection.   No other ascites or resovle  Pericardial effusion  -echo with no pericardial effusion and EF 50%  Chronic resp failure 3L COPD  Mod PCM  HTN  -BB  Hypothyroid  -synthriod  DVT prophyalxis  -lovenox        Electronically signed by Delfin Solano MD on 11/5/20 at 7:55 AM EST

## 2020-11-05 NOTE — PLAN OF CARE
Problem: Falls - Risk of:  Goal: Will remain free from falls  Description: Will remain free from falls  Outcome: Ongoing  Goal: Absence of physical injury  Description: Absence of physical injury  Outcome: Ongoing     Problem: Discharge Planning:  Goal: Discharged to appropriate level of care  Description: Discharged to appropriate level of care  Outcome: Ongoing     Problem:  Activity Intolerance:  Goal: Ability to tolerate increased activity will improve  Description: Ability to tolerate increased activity will improve  Outcome: Ongoing     Problem: Airway Clearance - Ineffective:  Goal: Ability to maintain a clear airway will improve  Description: Ability to maintain a clear airway will improve  Outcome: Ongoing     Problem: Breathing Pattern - Ineffective:  Goal: Ability to achieve and maintain a regular respiratory rate will improve  Description: Ability to achieve and maintain a regular respiratory rate will improve  Outcome: Ongoing     Problem: Gas Exchange - Impaired:  Goal: Levels of oxygenation will improve  Description: Levels of oxygenation will improve  Outcome: Ongoing     Problem: Pain:  Goal: Pain level will decrease  Description: Pain level will decrease  Outcome: Ongoing  Goal: Control of acute pain  Description: Control of acute pain  Outcome: Ongoing  Goal: Control of chronic pain  Description: Control of chronic pain  Outcome: Ongoing

## 2020-11-05 NOTE — CARE COORDINATION
D/c plan remains the same per pt, home with spouse and [facility]. Coco Galvez Please notify [facility] upon discharge, [phone], they will provide fax number to send [facility] order & AVS.   Notify CM  If any d/c needs arise.     TE

## 2020-11-06 LAB
CREAT SERPL-MCNC: 0.8 MG/DL (ref 0.6–1.1)
CULTURE: NORMAL
CULTURE: NORMAL
DOSE AMOUNT: ABNORMAL
DOSE TIME: ABNORMAL
GFR AFRICAN AMERICAN: >60 ML/MIN/1.73M2
GFR NON-AFRICAN AMERICAN: >60 ML/MIN/1.73M2
GRAM SMEAR: NORMAL
Lab: NORMAL
Lab: NORMAL
SPECIMEN: NORMAL
SPECIMEN: NORMAL
VANCOMYCIN TROUGH: 45.9 UG/ML (ref 10–20)

## 2020-11-06 PROCEDURE — 80202 ASSAY OF VANCOMYCIN: CPT

## 2020-11-06 PROCEDURE — 94640 AIRWAY INHALATION TREATMENT: CPT

## 2020-11-06 PROCEDURE — 6370000000 HC RX 637 (ALT 250 FOR IP): Performed by: INTERNAL MEDICINE

## 2020-11-06 PROCEDURE — 6360000002 HC RX W HCPCS: Performed by: INTERNAL MEDICINE

## 2020-11-06 PROCEDURE — 1200000000 HC SEMI PRIVATE

## 2020-11-06 PROCEDURE — 2580000003 HC RX 258: Performed by: INTERNAL MEDICINE

## 2020-11-06 PROCEDURE — 94761 N-INVAS EAR/PLS OXIMETRY MLT: CPT

## 2020-11-06 PROCEDURE — 2700000000 HC OXYGEN THERAPY PER DAY

## 2020-11-06 PROCEDURE — 82565 ASSAY OF CREATININE: CPT

## 2020-11-06 PROCEDURE — 36415 COLL VENOUS BLD VENIPUNCTURE: CPT

## 2020-11-06 PROCEDURE — 94660 CPAP INITIATION&MGMT: CPT

## 2020-11-06 RX ADMIN — ALBUTEROL SULFATE 2 PUFF: 90 AEROSOL, METERED RESPIRATORY (INHALATION) at 08:31

## 2020-11-06 RX ADMIN — METOPROLOL SUCCINATE 25 MG: 25 TABLET, EXTENDED RELEASE ORAL at 09:58

## 2020-11-06 RX ADMIN — BUSPIRONE HYDROCHLORIDE 10 MG: 10 TABLET ORAL at 20:54

## 2020-11-06 RX ADMIN — BACLOFEN 10 MG: 10 TABLET ORAL at 09:58

## 2020-11-06 RX ADMIN — Medication 100 MG: at 09:58

## 2020-11-06 RX ADMIN — Medication 2 PUFF: at 11:35

## 2020-11-06 RX ADMIN — LEVOTHYROXINE SODIUM 100 MCG: 100 TABLET ORAL at 05:30

## 2020-11-06 RX ADMIN — TRAZODONE HYDROCHLORIDE 100 MG: 50 TABLET ORAL at 20:54

## 2020-11-06 RX ADMIN — OXYCODONE HYDROCHLORIDE AND ACETAMINOPHEN 1 TABLET: 5; 325 TABLET ORAL at 11:15

## 2020-11-06 RX ADMIN — OXYCODONE HYDROCHLORIDE AND ACETAMINOPHEN 1 TABLET: 5; 325 TABLET ORAL at 20:54

## 2020-11-06 RX ADMIN — ALBUTEROL SULFATE 2 PUFF: 90 AEROSOL, METERED RESPIRATORY (INHALATION) at 19:41

## 2020-11-06 RX ADMIN — GUAIFENESIN 600 MG: 600 TABLET, EXTENDED RELEASE ORAL at 09:58

## 2020-11-06 RX ADMIN — SODIUM CHLORIDE, PRESERVATIVE FREE 10 ML: 5 INJECTION INTRAVENOUS at 09:59

## 2020-11-06 RX ADMIN — BUSPIRONE HYDROCHLORIDE 10 MG: 10 TABLET ORAL at 14:00

## 2020-11-06 RX ADMIN — CLONAZEPAM 1 MG: 1 TABLET ORAL at 20:54

## 2020-11-06 RX ADMIN — MONTELUKAST 10 MG: 10 TABLET, FILM COATED ORAL at 09:58

## 2020-11-06 RX ADMIN — BUDESONIDE AND FORMOTEROL FUMARATE DIHYDRATE 2 PUFF: 160; 4.5 AEROSOL RESPIRATORY (INHALATION) at 08:31

## 2020-11-06 RX ADMIN — CEFEPIME HYDROCHLORIDE 2 G: 2 INJECTION, POWDER, FOR SOLUTION INTRAVENOUS at 15:59

## 2020-11-06 RX ADMIN — BUDESONIDE AND FORMOTEROL FUMARATE DIHYDRATE 2 PUFF: 160; 4.5 AEROSOL RESPIRATORY (INHALATION) at 19:41

## 2020-11-06 RX ADMIN — CEFEPIME HYDROCHLORIDE 2 G: 2 INJECTION, POWDER, FOR SOLUTION INTRAVENOUS at 04:50

## 2020-11-06 RX ADMIN — GUAIFENESIN 600 MG: 600 TABLET, EXTENDED RELEASE ORAL at 20:54

## 2020-11-06 RX ADMIN — CLONAZEPAM 1 MG: 1 TABLET ORAL at 05:30

## 2020-11-06 RX ADMIN — BACLOFEN 10 MG: 10 TABLET ORAL at 14:00

## 2020-11-06 RX ADMIN — FAMOTIDINE 20 MG: 20 TABLET ORAL at 20:54

## 2020-11-06 RX ADMIN — BUSPIRONE HYDROCHLORIDE 10 MG: 10 TABLET ORAL at 09:58

## 2020-11-06 RX ADMIN — ALBUTEROL SULFATE 2 PUFF: 90 AEROSOL, METERED RESPIRATORY (INHALATION) at 11:35

## 2020-11-06 RX ADMIN — Medication 2 PUFF: at 19:41

## 2020-11-06 RX ADMIN — ASPIRIN 81 MG CHEWABLE TABLET 81 MG: 81 TABLET CHEWABLE at 09:58

## 2020-11-06 RX ADMIN — BACLOFEN 10 MG: 10 TABLET ORAL at 20:54

## 2020-11-06 RX ADMIN — VANCOMYCIN HYDROCHLORIDE 1500 MG: 5 INJECTION, POWDER, LYOPHILIZED, FOR SOLUTION INTRAVENOUS at 16:35

## 2020-11-06 RX ADMIN — METHYLPREDNISOLONE SODIUM SUCCINATE 40 MG: 40 INJECTION, POWDER, LYOPHILIZED, FOR SOLUTION INTRAMUSCULAR; INTRAVENOUS at 10:51

## 2020-11-06 RX ADMIN — ENOXAPARIN SODIUM 40 MG: 40 INJECTION SUBCUTANEOUS at 09:59

## 2020-11-06 RX ADMIN — Medication 2 PUFF: at 08:31

## 2020-11-06 RX ADMIN — DULOXETINE HYDROCHLORIDE 60 MG: 30 CAPSULE, DELAYED RELEASE ORAL at 09:58

## 2020-11-06 RX ADMIN — FAMOTIDINE 20 MG: 20 TABLET ORAL at 09:59

## 2020-11-06 RX ADMIN — CLONAZEPAM 1 MG: 1 TABLET ORAL at 11:15

## 2020-11-06 ASSESSMENT — PAIN SCALES - GENERAL
PAINLEVEL_OUTOF10: 0
PAINLEVEL_OUTOF10: 7
PAINLEVEL_OUTOF10: 7
PAINLEVEL_OUTOF10: 0

## 2020-11-06 NOTE — PROGRESS NOTES
Progress Note  Date:2020       Room:3120/3120-A  Patient Kwadwo Segovia     Date of Birth:[birth year]     Age:60 y.o. Eating food but still short of breath  Subjective    Subjective:  Symptoms:  Stable. Diet:  Adequate intake. Pain:  She complains of pain that is mild. Review of Systems  Objective         Vitals Last 24 Hours:  TEMPERATURE:  Temp  Av.1 °F (36.7 °C)  Min: 98 °F (36.7 °C)  Max: 98.2 °F (36.8 °C)  RESPIRATIONS RANGE: Resp  Av.2  Min: 7  Max: 20  PULSE OXIMETRY RANGE: SpO2  Av.2 %  Min: 95 %  Max: 100 %  PULSE RANGE: Pulse  Av.3  Min: 80  Max: 81  BLOOD PRESSURE RANGE: Systolic (77GHP), CDC:223 , Min:134 , YO   ; Diastolic (26CJO), XHT:26, Min:68, Max:68    I/O (24Hr): Intake/Output Summary (Last 24 hours) at 2020 0837  Last data filed at 2020 0555  Gross per 24 hour   Intake 1600 ml   Output --   Net 1600 ml     Objective:  General Appearance:  Comfortable. Vital signs: (most recent): Blood pressure 134/68, pulse 80, temperature 98 °F (36.7 °C), temperature source Oral, resp. rate 16, height 5' 1\" (1.549 m), weight 179 lb 14.4 oz (81.6 kg), SpO2 100 %, not currently breastfeeding. Vital signs are normal.  (Bipap). HEENT: Normal HEENT exam.    Lungs:  Normal effort. There are decreased breath sounds. Heart: Normal rate. Abdomen: Bowel sounds are normal.     Extremities: Decreased range of motion. Neurological: Patient is alert. Pupils:  Pupils are equal, round, and reactive to light. Skin:  Warm.       Labs/Imaging/Diagnostics    Labs:  CBC:  Recent Labs     20  1412 20  0320   WBC 27.9* 17.7*   RBC 3.42* 2.83*   HGB 9.8* 8.2*   HCT 32.7* 27.2*   MCV 95.6 96.1   RDW 14.1 14.2    233     CHEMISTRIES:  Recent Labs     20  1412 20  0320 20  0452 20  0419   * 137 136  --    K 3.6 3.7 4.3  --    CL 88* 95* 96*  --    CO2 33* 34* 31  --    BUN 11 8 11  --    CREATININE 0.8 0.8 0.9 0.8   GLUCOSE 125* 80 209*  --      PT/INR:No results for input(s): PROTIME, INR in the last 72 hours. APTT:No results for input(s): APTT in the last 72 hours. LIVER PROFILE:  Recent Labs     11/03/20  1412   AST 18   ALT 28   BILITOT 0.7   ALKPHOS 141*       Imaging Last 24 Hours:  Ct Abdomen Pelvis W Iv Contrast Additional Contrast? None    Result Date: 11/3/2020  EXAMINATION: CT OF THE ABDOMEN AND PELVIS WITH CONTRAST; CTA OF THE CHEST 11/3/2020 3:47 pm TECHNIQUE: CT of the abdomen and pelvis was performed with the administration of intravenous contrast. Multiplanar reformatted images are provided for review. Dose modulation, iterative reconstruction, and/or weight based adjustment of the mA/kV was utilized to reduce the radiation dose to as low as reasonably achievable.; CTA of the chest was performed after the administration of intravenous contrast.  Multiplanar reformatted images are provided for review. MIP images are provided for review. Dose modulation, iterative reconstruction, and/or weight based adjustment of the mA/kV was utilized to reduce the radiation dose to as low as reasonably achievable. COMPARISON: CT chest 07/12/2020 and CT abdomen and pelvis 10/19/2020 HISTORY: ORDERING SYSTEM PROVIDED HISTORY: Fever, diaphoresis and acute upper abdominal pain TECHNOLOGIST PROVIDED HISTORY: Reason for exam:->abdominal pain Additional Contrast?->None Reason for Exam: abd pain Acuity: Acute Type of Exam: Initial COPD, chronic kidney disease, prior cholecystectomy FINDINGS: Vasculature: Main pulmonary artery is within normal limits for size at 27 mm diameter. The pulmonary arteries are well opacified for the exam with no thromboembolic disease evident. Mild calcific atherosclerosis of the aorta and its branches. The ascending thoracic aorta is 27 mm diameter and descending thoracic aorta 20 mm diameter. No thoracic aorta aneurysm or dissection.  Mediastinum: Small to moderate volume pericardial effusion. The heart size is normal.  Posterior mediastinal structures are unremarkable. Right paratracheal lymph node is 13 mm axial image 45, with another measuring 10 mm on image 35. Upper mediastinal lymph node adjacent to the esophagus and trachea axial image 15 is 14 mm. Borderline enlarged right hilar lymph node is 9 mm short axis. No left hilar adenopathy evident. Lungs/pleura: Sequela of smoking and emphysema predominate in the upper lungs. Bilateral pleural effusion with prominent collection with a minor fissure on the right. New focal consolidative change with soft tissue nodule type appearance posterior basal segment right lower lobe is 16 x 23 mm axial image 94, not present prior study. Minimal subsegmental atelectasis medial left upper lobe. No ground-glass opacity or other consolidation or nodule evident. Organs: Mildly renal cortical atrophy, thinning, right kidney, craniocaudal renal length only 9 cm. 17 mm simple cyst inferior pole right kidney. Left kidney appears unremarkable. No suspicious renal lesion, nephrolithiasis or hydronephrosis. The adrenal glands, spleen and pancreas appear unremarkable. No discrete hepatic lesion evident. Normal appearing homogeneous attenuation throughout the liver. Interval cholecystectomy without CT evidence of abscess; minimal residual postsurgical fluid sequela at the gallbladder fossa within normal limits. GI/Bowel: Multifocal diverticula involve the descending and sigmoid colon without evidence of acute inflammatory change. No diffuse or focal bowel wall thickening evident. No inflammatory changes evident. No obstruction is seen. The appendix is visualized right lower quadrant, unremarkable in appearance. Pelvis: The uterus and adnexal structures appear unremarkable. Urinary bladder is partially filled, unremarkable appearance. No adenopathy. Small volume, simple appearing ascites.  Peritoneum/Retroperitoneum: Trace ascites at the gallbladder limits. Persistent airspace disease in the right lower lung. Left lung grossly clear. Multiple old left rib fractures noted     Persistent airspace disease in the right lower lung. Cardiomegaly with no evidence of pulmonary edema     Cta Pulmonary W Contrast    Result Date: 11/3/2020  EXAMINATION: CT OF THE ABDOMEN AND PELVIS WITH CONTRAST; CTA OF THE CHEST 11/3/2020 3:47 pm TECHNIQUE: CT of the abdomen and pelvis was performed with the administration of intravenous contrast. Multiplanar reformatted images are provided for review. Dose modulation, iterative reconstruction, and/or weight based adjustment of the mA/kV was utilized to reduce the radiation dose to as low as reasonably achievable.; CTA of the chest was performed after the administration of intravenous contrast.  Multiplanar reformatted images are provided for review. MIP images are provided for review. Dose modulation, iterative reconstruction, and/or weight based adjustment of the mA/kV was utilized to reduce the radiation dose to as low as reasonably achievable. COMPARISON: CT chest 07/12/2020 and CT abdomen and pelvis 10/19/2020 HISTORY: ORDERING SYSTEM PROVIDED HISTORY: Fever, diaphoresis and acute upper abdominal pain TECHNOLOGIST PROVIDED HISTORY: Reason for exam:->abdominal pain Additional Contrast?->None Reason for Exam: abd pain Acuity: Acute Type of Exam: Initial COPD, chronic kidney disease, prior cholecystectomy FINDINGS: Vasculature: Main pulmonary artery is within normal limits for size at 27 mm diameter. The pulmonary arteries are well opacified for the exam with no thromboembolic disease evident. Mild calcific atherosclerosis of the aorta and its branches. The ascending thoracic aorta is 27 mm diameter and descending thoracic aorta 20 mm diameter. No thoracic aorta aneurysm or dissection. Mediastinum: Small to moderate volume pericardial effusion. The heart size is normal.  Posterior mediastinal structures are unremarkable. Right paratracheal lymph node is 13 mm axial image 45, with another measuring 10 mm on image 35. Upper mediastinal lymph node adjacent to the esophagus and trachea axial image 15 is 14 mm. Borderline enlarged right hilar lymph node is 9 mm short axis. No left hilar adenopathy evident. Lungs/pleura: Sequela of smoking and emphysema predominate in the upper lungs. Bilateral pleural effusion with prominent collection with a minor fissure on the right. New focal consolidative change with soft tissue nodule type appearance posterior basal segment right lower lobe is 16 x 23 mm axial image 94, not present prior study. Minimal subsegmental atelectasis medial left upper lobe. No ground-glass opacity or other consolidation or nodule evident. Organs: Mildly renal cortical atrophy, thinning, right kidney, craniocaudal renal length only 9 cm. 17 mm simple cyst inferior pole right kidney. Left kidney appears unremarkable. No suspicious renal lesion, nephrolithiasis or hydronephrosis. The adrenal glands, spleen and pancreas appear unremarkable. No discrete hepatic lesion evident. Normal appearing homogeneous attenuation throughout the liver. Interval cholecystectomy without CT evidence of abscess; minimal residual postsurgical fluid sequela at the gallbladder fossa within normal limits. GI/Bowel: Multifocal diverticula involve the descending and sigmoid colon without evidence of acute inflammatory change. No diffuse or focal bowel wall thickening evident. No inflammatory changes evident. No obstruction is seen. The appendix is visualized right lower quadrant, unremarkable in appearance. Pelvis: The uterus and adnexal structures appear unremarkable. Urinary bladder is partially filled, unremarkable appearance. No adenopathy. Small volume, simple appearing ascites. Peritoneum/Retroperitoneum: Trace ascites at the gallbladder fossa following cholecystectomy as expected for normal postsurgical change.   No gas formation or loculated collection. No other ascites or pneumoperitoneum. Unremarkable appearance of the aorta and inferior vena cava. No adenopathy. Bones/Soft Tissues: No acute superficial soft tissue or osseous structure abnormality evident. Degenerative changes of mild-to-moderate severity predominate L4-5 and L5-S1 with grade 1 degenerative anterolisthesis L4 on L5 and vacuum disc phenomena L4-5 and L5-S1.     1.  CTA CHEST: No pulmonary embolism. 2. Small area consolidative change posterior segment right lower lobe is new compared to prior CT abdomen 3 weeks ago likely related to focal pneumonia or atelectasis. 3. Nonspecific pericardial effusion; volume appears much smaller than on CT abdomen 3 weeks ago. 4. Nonspecific bilateral pleural effusion without distinct pulmonary vascular engorgement or other findings associated with congestive heart failure. 5.  Pulmonary sequela typical of that seen with smoking, including emphysema. 6. CT ABDOMEN/PELVIS: No CT evidence of an acute intra-abdominal or intrapelvic process. 7. Greatly diminished ascites compared with prior study, with only a small amount remaining in the pelvis. 8.  No findings to suggest acute appendicitis; no ureter calculus or hydronephrosis. 9. Mildly renal cortical atrophy right kidney with stable benign cyst inferior pole right kidney. 10.  Diverticulosis coli without CT evidence of acute diverticulitis. 11. Interval cholecystectomy without CT evidence of abscess; minimal residual postsurgical fluid sequela at the gallbladder fossa within normal limits.      Assessment//Plan           Hospital Problems           Last Modified POA    * (Principal) Sepsis ([facility] [address] 11/3/2020 Yes    SIRS (systemic inflammatory response syndrome) ([facility] [address] 11/4/2020 Yes        Assessment & Plan  Sepsis 2/2 suspected gram neg pna with chronic resp failure 3L and COPD  -CTA chest no PE but RLL pna noted  -Cefepime and vanc  -covid, legionella and strep neg  -solumedrol, inhaler  -bld cx neg 72hrs  Hyponatremia  -IVF stopped as resovle  Pericardial effusion  -echo with no pericardial effusion and EF 50%  Chronic resp failure 3L COPD  Mod PCM  HTN  -BB  Hypothyroid  -synthriod  DVT prophyalxis  -lovenox      Will discuss about disposition today and hopefully dc next 24-48hrs  Electronically signed by Gavin Kerns MD on 11/5/20 at 7:55 AM EST

## 2020-11-06 NOTE — PROGRESS NOTES
[address] [facility]  consulted by Dr. Milagros Mcdowell for monitoring and adjustment. Indication for treatment: Sepsis, pneumonia  Goal trough: 15 mcg/mL (AUC/RACHEL: 400-600)     Pertinent Laboratory Values:   Temp Readings from Last 3 Encounters:   11/06/20 98.3 °F (36.8 °C) (Oral)   10/30/20 98.5 °F (36.9 °C) (Oral)   10/25/20 98.6 °F (37 °C)     Recent Labs     11/04/20  0320   WBC 17.7*     Recent Labs     11/04/20  0320 11/05/20  0452 11/06/20  0419   BUN 8 11  --    CREATININE 0.8 0.9 0.8     Estimated Creatinine Clearance: 74 mL/min (based on SCr of 0.8 mg/dL). Intake/Output Summary (Last 24 hours) at 11/6/2020 1612  Last data filed at 11/6/2020 0555  Gross per 24 hour   Intake 1600 ml   Output --   Net 1600 ml       Pertinent Cultures:  Date    Source    Results  11/03   Covid-19   Not detected  11/03   Legionella   Negative  11/03   Respiratory   Few Gram+  11/03   Blood    No growth  11/04   MRSA nasal screen  Negative    Vancomycin level:   TROUGH:  No results for input(s): VANCOTROUGH in the last 72 hours. RANDOM:  No results for input(s): VANCORANDOM in the last 72 hours. Assessment:  · WBC and temperature: WBC trending down to 17.7 on 11/4; Pt remains afberile  · SCr, BUN, and urine output:  SCR remains normal, no output data  · Day(s) of therapy: 3  · Vancomycin level:  Scheduled for 11/6 @16:30  ·   Plan:  · Renal trends remain stable  · Continue vancomycin 1500 mg IVPB q18h  · Trough scheduled for later today  · MRSA screen negative, de-escalation may be considered  · Pharmacy will continue to monitor patient and adjust therapy as indicated     Julian 11/06/20 @1588    Thank you for the consult. Diogenes Hope  11/6/2020 4:12 PM

## 2020-11-07 ENCOUNTER — APPOINTMENT (OUTPATIENT)
Dept: ULTRASOUND IMAGING | Age: 60
DRG: 871 | End: 2020-11-07
Payer: COMMERCIAL

## 2020-11-07 LAB
ANION GAP SERPL CALCULATED.3IONS-SCNC: 5 MMOL/L (ref 4–16)
BUN BLDV-MCNC: 15 MG/DL (ref 6–23)
CALCIUM SERPL-MCNC: 9.6 MG/DL (ref 8.3–10.6)
CHLORIDE BLD-SCNC: 99 MMOL/L (ref 99–110)
CO2: 35 MMOL/L (ref 21–32)
CREAT SERPL-MCNC: 0.9 MG/DL (ref 0.6–1.1)
GFR AFRICAN AMERICAN: >60 ML/MIN/1.73M2
GFR NON-AFRICAN AMERICAN: >60 ML/MIN/1.73M2
GLUCOSE BLD-MCNC: 149 MG/DL (ref 70–99)
POTASSIUM SERPL-SCNC: 4.6 MMOL/L (ref 3.5–5.1)
POTASSIUM SERPL-SCNC: 5.3 MMOL/L (ref 3.5–5.1)
SODIUM BLD-SCNC: 139 MMOL/L (ref 135–145)

## 2020-11-07 PROCEDURE — 6370000000 HC RX 637 (ALT 250 FOR IP): Performed by: HOSPITALIST

## 2020-11-07 PROCEDURE — 1200000000 HC SEMI PRIVATE

## 2020-11-07 PROCEDURE — 6370000000 HC RX 637 (ALT 250 FOR IP): Performed by: INTERNAL MEDICINE

## 2020-11-07 PROCEDURE — 2700000000 HC OXYGEN THERAPY PER DAY

## 2020-11-07 PROCEDURE — 94640 AIRWAY INHALATION TREATMENT: CPT

## 2020-11-07 PROCEDURE — 80048 BASIC METABOLIC PNL TOTAL CA: CPT

## 2020-11-07 PROCEDURE — 94660 CPAP INITIATION&MGMT: CPT

## 2020-11-07 PROCEDURE — 2580000003 HC RX 258: Performed by: INTERNAL MEDICINE

## 2020-11-07 PROCEDURE — 36415 COLL VENOUS BLD VENIPUNCTURE: CPT

## 2020-11-07 PROCEDURE — 94761 N-INVAS EAR/PLS OXIMETRY MLT: CPT

## 2020-11-07 PROCEDURE — 80202 ASSAY OF VANCOMYCIN: CPT

## 2020-11-07 PROCEDURE — 84132 ASSAY OF SERUM POTASSIUM: CPT

## 2020-11-07 PROCEDURE — 93970 EXTREMITY STUDY: CPT

## 2020-11-07 PROCEDURE — 6360000002 HC RX W HCPCS: Performed by: INTERNAL MEDICINE

## 2020-11-07 RX ORDER — PREDNISOLONE ACETATE 10 MG/ML
1 SUSPENSION/ DROPS OPHTHALMIC 4 TIMES DAILY
Status: CANCELLED | OUTPATIENT
Start: 2020-11-07

## 2020-11-07 RX ADMIN — GUAIFENESIN 600 MG: 600 TABLET, EXTENDED RELEASE ORAL at 22:07

## 2020-11-07 RX ADMIN — ALBUTEROL SULFATE 2 PUFF: 90 AEROSOL, METERED RESPIRATORY (INHALATION) at 08:50

## 2020-11-07 RX ADMIN — BUDESONIDE AND FORMOTEROL FUMARATE DIHYDRATE 2 PUFF: 160; 4.5 AEROSOL RESPIRATORY (INHALATION) at 08:50

## 2020-11-07 RX ADMIN — LEVOTHYROXINE SODIUM 100 MCG: 100 TABLET ORAL at 06:41

## 2020-11-07 RX ADMIN — Medication 2 PUFF: at 21:00

## 2020-11-07 RX ADMIN — BACLOFEN 10 MG: 10 TABLET ORAL at 22:08

## 2020-11-07 RX ADMIN — FAMOTIDINE 20 MG: 20 TABLET ORAL at 08:49

## 2020-11-07 RX ADMIN — DULOXETINE HYDROCHLORIDE 60 MG: 30 CAPSULE, DELAYED RELEASE ORAL at 08:50

## 2020-11-07 RX ADMIN — Medication 2 PUFF: at 16:27

## 2020-11-07 RX ADMIN — ASPIRIN 81 MG CHEWABLE TABLET 81 MG: 81 TABLET CHEWABLE at 08:49

## 2020-11-07 RX ADMIN — OXYCODONE HYDROCHLORIDE AND ACETAMINOPHEN 1 TABLET: 5; 325 TABLET ORAL at 15:52

## 2020-11-07 RX ADMIN — CLONAZEPAM 1 MG: 1 TABLET ORAL at 15:52

## 2020-11-07 RX ADMIN — BACLOFEN 10 MG: 10 TABLET ORAL at 14:09

## 2020-11-07 RX ADMIN — BUSPIRONE HYDROCHLORIDE 10 MG: 10 TABLET ORAL at 11:55

## 2020-11-07 RX ADMIN — GUAIFENESIN 600 MG: 600 TABLET, EXTENDED RELEASE ORAL at 08:49

## 2020-11-07 RX ADMIN — CEFEPIME HYDROCHLORIDE 2 G: 2 INJECTION, POWDER, FOR SOLUTION INTRAVENOUS at 15:52

## 2020-11-07 RX ADMIN — Medication 2 PUFF: at 11:22

## 2020-11-07 RX ADMIN — BACLOFEN 10 MG: 10 TABLET ORAL at 08:49

## 2020-11-07 RX ADMIN — CLONAZEPAM 1 MG: 1 TABLET ORAL at 22:15

## 2020-11-07 RX ADMIN — MONTELUKAST 10 MG: 10 TABLET, FILM COATED ORAL at 08:49

## 2020-11-07 RX ADMIN — ALBUTEROL SULFATE 2 PUFF: 90 AEROSOL, METERED RESPIRATORY (INHALATION) at 20:59

## 2020-11-07 RX ADMIN — NYSTATIN 500000 UNITS: 100000 SUSPENSION ORAL at 22:08

## 2020-11-07 RX ADMIN — FAMOTIDINE 20 MG: 20 TABLET ORAL at 22:08

## 2020-11-07 RX ADMIN — ENOXAPARIN SODIUM 40 MG: 40 INJECTION SUBCUTANEOUS at 08:49

## 2020-11-07 RX ADMIN — BUSPIRONE HYDROCHLORIDE 10 MG: 10 TABLET ORAL at 14:09

## 2020-11-07 RX ADMIN — SODIUM CHLORIDE, PRESERVATIVE FREE 10 ML: 5 INJECTION INTRAVENOUS at 22:27

## 2020-11-07 RX ADMIN — METHYLPREDNISOLONE SODIUM SUCCINATE 40 MG: 40 INJECTION, POWDER, LYOPHILIZED, FOR SOLUTION INTRAMUSCULAR; INTRAVENOUS at 08:50

## 2020-11-07 RX ADMIN — BUDESONIDE AND FORMOTEROL FUMARATE DIHYDRATE 2 PUFF: 160; 4.5 AEROSOL RESPIRATORY (INHALATION) at 20:59

## 2020-11-07 RX ADMIN — TRAZODONE HYDROCHLORIDE 100 MG: 50 TABLET ORAL at 22:08

## 2020-11-07 RX ADMIN — BUSPIRONE HYDROCHLORIDE 10 MG: 10 TABLET ORAL at 22:08

## 2020-11-07 RX ADMIN — Medication 100 MG: at 08:49

## 2020-11-07 RX ADMIN — Medication 2 PUFF: at 08:51

## 2020-11-07 RX ADMIN — ALBUTEROL SULFATE 2 PUFF: 90 AEROSOL, METERED RESPIRATORY (INHALATION) at 16:28

## 2020-11-07 RX ADMIN — ALBUTEROL SULFATE 2 PUFF: 90 AEROSOL, METERED RESPIRATORY (INHALATION) at 11:21

## 2020-11-07 RX ADMIN — METOPROLOL SUCCINATE 25 MG: 25 TABLET, EXTENDED RELEASE ORAL at 08:50

## 2020-11-07 ASSESSMENT — PAIN DESCRIPTION - FREQUENCY
FREQUENCY: CONTINUOUS
FREQUENCY: CONTINUOUS

## 2020-11-07 ASSESSMENT — PAIN SCALES - GENERAL
PAINLEVEL_OUTOF10: 3
PAINLEVEL_OUTOF10: 5
PAINLEVEL_OUTOF10: 3
PAINLEVEL_OUTOF10: 6
PAINLEVEL_OUTOF10: 3
PAINLEVEL_OUTOF10: 0

## 2020-11-07 ASSESSMENT — PAIN DESCRIPTION - PAIN TYPE
TYPE: ACUTE PAIN
TYPE: ACUTE PAIN

## 2020-11-07 ASSESSMENT — PAIN DESCRIPTION - ONSET
ONSET: ON-GOING
ONSET: ON-GOING

## 2020-11-07 ASSESSMENT — PAIN DESCRIPTION - DESCRIPTORS
DESCRIPTORS: ACHING;DISCOMFORT
DESCRIPTORS: ACHING;DISCOMFORT

## 2020-11-07 ASSESSMENT — PAIN DESCRIPTION - PROGRESSION
CLINICAL_PROGRESSION: NOT CHANGED
CLINICAL_PROGRESSION: NOT CHANGED

## 2020-11-07 ASSESSMENT — PAIN - FUNCTIONAL ASSESSMENT: PAIN_FUNCTIONAL_ASSESSMENT: ACTIVITIES ARE NOT PREVENTED

## 2020-11-07 ASSESSMENT — PAIN DESCRIPTION - LOCATION
LOCATION: HEAD;OTHER (COMMENT)
LOCATION: HEAD;ABDOMEN

## 2020-11-07 ASSESSMENT — PAIN DESCRIPTION - ORIENTATION
ORIENTATION: RIGHT
ORIENTATION: RIGHT

## 2020-11-07 NOTE — PROGRESS NOTES
Progress Note  Date:2020       Room:3120/3120-A  Patient Radha Tma     Date of Birth:[birth year]     Age:60 y.o. Discussed about disposition in next 24hrs. She then stated her leg hurt and has a sore tongue. Examined and no thrush  Subjective    Subjective:  Symptoms:  Stable. Diet:  Adequate intake. Pain:  She complains of pain that is mild. Review of Systems  Objective         Vitals Last 24 Hours:  TEMPERATURE:  Temp  Av.3 °F (36.8 °C)  Min: 98.1 °F (36.7 °C)  Max: 98.4 °F (36.9 °C)  RESPIRATIONS RANGE: Resp  Av.4  Min: 9  Max: 22  PULSE OXIMETRY RANGE: SpO2  Av.8 %  Min: 97 %  Max: 100 %  PULSE RANGE: Pulse  Av.7  Min: 62  Max: 82  BLOOD PRESSURE RANGE: Systolic (40ORO), FZN:978 , Min:102 , KGS:548   ; Diastolic (00ZDF), JDW:19, Min:58, Max:65    I/O (24Hr): No intake or output data in the 24 hours ending 20 0839  Objective:  General Appearance:  Comfortable. Vital signs: (most recent): Blood pressure (!) 102/58, pulse 62, temperature 98.4 °F (36.9 °C), temperature source Oral, resp. rate 20, height 5' 1\" (1.549 m), weight 179 lb 14.4 oz (81.6 kg), SpO2 97 %, not currently breastfeeding. Vital signs are normal.  (Bipap). HEENT: Normal HEENT exam.    Lungs:  Normal effort. There are decreased breath sounds. Heart: Normal rate. Abdomen: Bowel sounds are normal.     Extremities: Decreased range of motion. Neurological: Patient is alert. Pupils:  Pupils are equal, round, and reactive to light. Skin:  Warm. Labs/Imaging/Diagnostics    Labs:  CBC:  No results for input(s): WBC, RBC, HGB, HCT, MCV, RDW, PLT in the last 72 hours. CHEMISTRIES:  Recent Labs     20  0452 20  0419 20  0321     --  139   K 4.3  --  5.3*   CL 96*  --  99   CO2 31  --  35*   BUN 11  --  15   CREATININE 0.9 0.8 0.9   GLUCOSE 209*  --  149*     PT/INR:No results for input(s): PROTIME, INR in the last 72 hours.   APTT:No results for or pneumoperitoneum. Unremarkable appearance of the aorta and inferior vena cava. No adenopathy. Bones/Soft Tissues: No acute superficial soft tissue or osseous structure abnormality evident. Degenerative changes of mild-to-moderate severity predominate L4-5 and L5-S1 with grade 1 degenerative anterolisthesis L4 on L5 and vacuum disc phenomena L4-5 and L5-S1.     1.  CTA CHEST: No pulmonary embolism. 2. Small area consolidative change posterior segment right lower lobe is new compared to prior CT abdomen 3 weeks ago likely related to focal pneumonia or atelectasis. 3. Nonspecific pericardial effusion; volume appears much smaller than on CT abdomen 3 weeks ago. 4. Nonspecific bilateral pleural effusion without distinct pulmonary vascular engorgement or other findings associated with congestive heart failure. 5.  Pulmonary sequela typical of that seen with smoking, including emphysema. 6. CT ABDOMEN/PELVIS: No CT evidence of an acute intra-abdominal or intrapelvic process. 7. Greatly diminished ascites compared with prior study, with only a small amount remaining in the pelvis. 8.  No findings to suggest acute appendicitis; no ureter calculus or hydronephrosis. 9. Mildly renal cortical atrophy right kidney with stable benign cyst inferior pole right kidney. 10.  Diverticulosis coli without CT evidence of acute diverticulitis. 11. Interval cholecystectomy without CT evidence of abscess; minimal residual postsurgical fluid sequela at the gallbladder fossa within normal limits. Xr Chest Portable    Result Date: 11/3/2020  EXAMINATION: ONE XRAY VIEW OF THE CHEST 11/3/2020 2:34 pm COMPARISON: 10/25/2020 HISTORY: ORDERING SYSTEM PROVIDED HISTORY: SOB TECHNOLOGIST PROVIDED HISTORY: Reason for exam:->SOB Reason for Exam: SOB FINDINGS: Cardiomegaly. Pulmonary vasculature within normal limits. Persistent airspace disease in the right lower lung. Left lung grossly clear.   Multiple old left rib fractures noted Persistent airspace disease in the right lower lung. Cardiomegaly with no evidence of pulmonary edema     Cta Pulmonary W Contrast    Result Date: 11/3/2020  EXAMINATION: CT OF THE ABDOMEN AND PELVIS WITH CONTRAST; CTA OF THE CHEST 11/3/2020 3:47 pm TECHNIQUE: CT of the abdomen and pelvis was performed with the administration of intravenous contrast. Multiplanar reformatted images are provided for review. Dose modulation, iterative reconstruction, and/or weight based adjustment of the mA/kV was utilized to reduce the radiation dose to as low as reasonably achievable.; CTA of the chest was performed after the administration of intravenous contrast.  Multiplanar reformatted images are provided for review. MIP images are provided for review. Dose modulation, iterative reconstruction, and/or weight based adjustment of the mA/kV was utilized to reduce the radiation dose to as low as reasonably achievable. COMPARISON: CT chest 07/12/2020 and CT abdomen and pelvis 10/19/2020 HISTORY: ORDERING SYSTEM PROVIDED HISTORY: Fever, diaphoresis and acute upper abdominal pain TECHNOLOGIST PROVIDED HISTORY: Reason for exam:->abdominal pain Additional Contrast?->None Reason for Exam: abd pain Acuity: Acute Type of Exam: Initial COPD, chronic kidney disease, prior cholecystectomy FINDINGS: Vasculature: Main pulmonary artery is within normal limits for size at 27 mm diameter. The pulmonary arteries are well opacified for the exam with no thromboembolic disease evident. Mild calcific atherosclerosis of the aorta and its branches. The ascending thoracic aorta is 27 mm diameter and descending thoracic aorta 20 mm diameter. No thoracic aorta aneurysm or dissection. Mediastinum: Small to moderate volume pericardial effusion. The heart size is normal.  Posterior mediastinal structures are unremarkable. Right paratracheal lymph node is 13 mm axial image 45, with another measuring 10 mm on image 35.   Upper mediastinal lymph node adjacent to the esophagus and trachea axial image 15 is 14 mm. Borderline enlarged right hilar lymph node is 9 mm short axis. No left hilar adenopathy evident. Lungs/pleura: Sequela of smoking and emphysema predominate in the upper lungs. Bilateral pleural effusion with prominent collection with a minor fissure on the right. New focal consolidative change with soft tissue nodule type appearance posterior basal segment right lower lobe is 16 x 23 mm axial image 94, not present prior study. Minimal subsegmental atelectasis medial left upper lobe. No ground-glass opacity or other consolidation or nodule evident. Organs: Mildly renal cortical atrophy, thinning, right kidney, craniocaudal renal length only 9 cm. 17 mm simple cyst inferior pole right kidney. Left kidney appears unremarkable. No suspicious renal lesion, nephrolithiasis or hydronephrosis. The adrenal glands, spleen and pancreas appear unremarkable. No discrete hepatic lesion evident. Normal appearing homogeneous attenuation throughout the liver. Interval cholecystectomy without CT evidence of abscess; minimal residual postsurgical fluid sequela at the gallbladder fossa within normal limits. GI/Bowel: Multifocal diverticula involve the descending and sigmoid colon without evidence of acute inflammatory change. No diffuse or focal bowel wall thickening evident. No inflammatory changes evident. No obstruction is seen. The appendix is visualized right lower quadrant, unremarkable in appearance. Pelvis: The uterus and adnexal structures appear unremarkable. Urinary bladder is partially filled, unremarkable appearance. No adenopathy. Small volume, simple appearing ascites. Peritoneum/Retroperitoneum: Trace ascites at the gallbladder fossa following cholecystectomy as expected for normal postsurgical change. No gas formation or loculated collection. No other ascites or pneumoperitoneum.   Unremarkable appearance of the aorta and inferior vena cava.  No adenopathy. Bones/Soft Tissues: No acute superficial soft tissue or osseous structure abnormality evident. Degenerative changes of mild-to-moderate severity predominate L4-5 and L5-S1 with grade 1 degenerative anterolisthesis L4 on L5 and vacuum disc phenomena L4-5 and L5-S1.     1.  CTA CHEST: No pulmonary embolism. 2. Small area consolidative change posterior segment right lower lobe is new compared to prior CT abdomen 3 weeks ago likely related to focal pneumonia or atelectasis. 3. Nonspecific pericardial effusion; volume appears much smaller than on CT abdomen 3 weeks ago. 4. Nonspecific bilateral pleural effusion without distinct pulmonary vascular engorgement or other findings associated with congestive heart failure. 5.  Pulmonary sequela typical of that seen with smoking, including emphysema. 6. CT ABDOMEN/PELVIS: No CT evidence of an acute intra-abdominal or intrapelvic process. 7. Greatly diminished ascites compared with prior study, with only a small amount remaining in the pelvis. 8.  No findings to suggest acute appendicitis; no ureter calculus or hydronephrosis. 9. Mildly renal cortical atrophy right kidney with stable benign cyst inferior pole right kidney. 10.  Diverticulosis coli without CT evidence of acute diverticulitis. 11. Interval cholecystectomy without CT evidence of abscess; minimal residual postsurgical fluid sequela at the gallbladder fossa within normal limits.      Assessment//Plan           Hospital Problems           Last Modified POA    * (Principal) Sepsis ([address] 11/3/2020 Yes    SIRS (systemic inflammatory response syndrome) ([facility] [address] 11/4/2020 Yes        Assessment & Plan  Sepsis 2/2 suspected gram neg pna with chronic resp failure 3L and COPD  -CTA chest no PE but RLL pna noted  -Cefepime and stop vanc  -covid, legionella and strep neg  -solumedrol, inhaler  -bld cx neg so far  Hyponatremia  -IVF stopped as resovled  Pericardial effusion  -echo with no pericardial effusion and EF 50%  Chronic resp failure 3L COPD  Mod PCM  HTN  -BB  Hypothyroid  -synthriod  DVT prophyalxis  -lovenox      Check doppler BLE. Recheck potassium. Discussed with pt that if not discharged today then will be tomorrow based on clinical status.  Stop vanc as anticipate dc 24hrs  Electronically signed by Brie Young MD on 11/5/20 at 7:55 AM EST

## 2020-11-08 VITALS
BODY MASS INDEX: 35.5 KG/M2 | OXYGEN SATURATION: 93 % | DIASTOLIC BLOOD PRESSURE: 67 MMHG | RESPIRATION RATE: 18 BRPM | HEART RATE: 122 BPM | WEIGHT: 188 LBS | SYSTOLIC BLOOD PRESSURE: 155 MMHG | HEIGHT: 61 IN | TEMPERATURE: 98.3 F

## 2020-11-08 PROCEDURE — 6370000000 HC RX 637 (ALT 250 FOR IP): Performed by: HOSPITALIST

## 2020-11-08 PROCEDURE — 6370000000 HC RX 637 (ALT 250 FOR IP): Performed by: INTERNAL MEDICINE

## 2020-11-08 PROCEDURE — 6360000002 HC RX W HCPCS: Performed by: INTERNAL MEDICINE

## 2020-11-08 PROCEDURE — 94640 AIRWAY INHALATION TREATMENT: CPT

## 2020-11-08 PROCEDURE — 2700000000 HC OXYGEN THERAPY PER DAY

## 2020-11-08 PROCEDURE — 2580000003 HC RX 258: Performed by: INTERNAL MEDICINE

## 2020-11-08 PROCEDURE — 94761 N-INVAS EAR/PLS OXIMETRY MLT: CPT

## 2020-11-08 RX ORDER — AMOXICILLIN 250 MG
2 CAPSULE ORAL DAILY PRN
Qty: 20 TABLET | Refills: 0 | Status: SHIPPED | OUTPATIENT
Start: 2020-11-08 | End: 2020-12-10

## 2020-11-08 RX ORDER — PREDNISONE 20 MG/1
TABLET ORAL
Qty: 11 TABLET | Refills: 0 | Status: ON HOLD | OUTPATIENT
Start: 2020-11-08 | End: 2020-11-26 | Stop reason: HOSPADM

## 2020-11-08 RX ORDER — OXYCODONE HYDROCHLORIDE AND ACETAMINOPHEN 5; 325 MG/1; MG/1
1 TABLET ORAL EVERY 6 HOURS PRN
Qty: 12 TABLET | Refills: 0 | Status: SHIPPED | OUTPATIENT
Start: 2020-11-08 | End: 2020-11-11

## 2020-11-08 RX ORDER — AMOXICILLIN AND CLAVULANATE POTASSIUM 875; 125 MG/1; MG/1
1 TABLET, FILM COATED ORAL 2 TIMES DAILY
Qty: 10 TABLET | Refills: 0 | Status: SHIPPED | OUTPATIENT
Start: 2020-11-08 | End: 2020-11-13

## 2020-11-08 RX ADMIN — Medication 100 MG: at 09:40

## 2020-11-08 RX ADMIN — CLONAZEPAM 1 MG: 1 TABLET ORAL at 09:39

## 2020-11-08 RX ADMIN — OXYCODONE HYDROCHLORIDE AND ACETAMINOPHEN 1 TABLET: 5; 325 TABLET ORAL at 09:39

## 2020-11-08 RX ADMIN — BUSPIRONE HYDROCHLORIDE 10 MG: 10 TABLET ORAL at 09:40

## 2020-11-08 RX ADMIN — ALBUTEROL SULFATE 2 PUFF: 90 AEROSOL, METERED RESPIRATORY (INHALATION) at 07:56

## 2020-11-08 RX ADMIN — METOPROLOL SUCCINATE 25 MG: 25 TABLET, EXTENDED RELEASE ORAL at 09:40

## 2020-11-08 RX ADMIN — BACLOFEN 10 MG: 10 TABLET ORAL at 09:40

## 2020-11-08 RX ADMIN — ALBUTEROL SULFATE 2 PUFF: 90 AEROSOL, METERED RESPIRATORY (INHALATION) at 11:42

## 2020-11-08 RX ADMIN — Medication 2 PUFF: at 07:56

## 2020-11-08 RX ADMIN — MONTELUKAST 10 MG: 10 TABLET, FILM COATED ORAL at 09:41

## 2020-11-08 RX ADMIN — METHYLPREDNISOLONE SODIUM SUCCINATE 40 MG: 40 INJECTION, POWDER, LYOPHILIZED, FOR SOLUTION INTRAMUSCULAR; INTRAVENOUS at 09:41

## 2020-11-08 RX ADMIN — BUDESONIDE AND FORMOTEROL FUMARATE DIHYDRATE 2 PUFF: 160; 4.5 AEROSOL RESPIRATORY (INHALATION) at 07:56

## 2020-11-08 RX ADMIN — GUAIFENESIN 600 MG: 600 TABLET, EXTENDED RELEASE ORAL at 09:40

## 2020-11-08 RX ADMIN — DULOXETINE HYDROCHLORIDE 60 MG: 30 CAPSULE, DELAYED RELEASE ORAL at 09:40

## 2020-11-08 RX ADMIN — CEFEPIME HYDROCHLORIDE 2 G: 2 INJECTION, POWDER, FOR SOLUTION INTRAVENOUS at 04:09

## 2020-11-08 RX ADMIN — ASPIRIN 81 MG CHEWABLE TABLET 81 MG: 81 TABLET CHEWABLE at 09:40

## 2020-11-08 RX ADMIN — LEVOTHYROXINE SODIUM 100 MCG: 100 TABLET ORAL at 06:46

## 2020-11-08 RX ADMIN — ENOXAPARIN SODIUM 40 MG: 40 INJECTION SUBCUTANEOUS at 09:41

## 2020-11-08 RX ADMIN — Medication 2 PUFF: at 11:43

## 2020-11-08 RX ADMIN — SODIUM CHLORIDE, PRESERVATIVE FREE 10 ML: 5 INJECTION INTRAVENOUS at 09:41

## 2020-11-08 RX ADMIN — FAMOTIDINE 20 MG: 20 TABLET ORAL at 09:41

## 2020-11-08 RX ADMIN — NYSTATIN 500000 UNITS: 100000 SUSPENSION ORAL at 09:41

## 2020-11-08 ASSESSMENT — PAIN SCALES - GENERAL
PAINLEVEL_OUTOF10: 0
PAINLEVEL_OUTOF10: 7

## 2020-11-08 ASSESSMENT — PAIN DESCRIPTION - PROGRESSION
CLINICAL_PROGRESSION: NOT CHANGED

## 2020-11-08 NOTE — PROGRESS NOTES
Progress Note  Date:2020       [id number]  Patient Kim Vogt     Date of Birth:[birth year]     Age:60 y.o. Has some abd pain which is chronic  Subjective    Subjective:  Symptoms:  Stable. Diet:  Adequate intake. Pain:  She complains of pain that is mild. Review of Systems  Objective         Vitals Last 24 Hours:  TEMPERATURE:  Temp  Av °F (36.7 °C)  Min: 97.6 °F (36.4 °C)  Max: 98.5 °F (36.9 °C)  RESPIRATIONS RANGE: Resp  Av  Min: 16  Max: 21  PULSE OXIMETRY RANGE: SpO2  Av %  Min: 93 %  Max: 100 %  PULSE RANGE: Pulse  Av.4  Min: 64  Max: 122  BLOOD PRESSURE RANGE: Systolic (09PHJ), MARANDA:903 , Min:111 , MJK:243   ; Diastolic (54UZI), WVY:39, Min:56, Max:67    I/O (24Hr): No intake or output data in the 24 hours ending 20 0718  Objective:  General Appearance:  Comfortable. Vital signs: (most recent): Blood pressure (!) 155/67, pulse 122, temperature 98.3 °F (36.8 °C), temperature source Oral, resp. rate 18, height 5' 1\" (1.549 m), weight 188 lb (85.3 kg), SpO2 93 %, not currently breastfeeding. Vital signs are normal.  (Bipap). HEENT: Normal HEENT exam.    Lungs:  Normal effort. There are decreased breath sounds. Heart: Normal rate. Abdomen: Bowel sounds are normal.     Extremities: Decreased range of motion. Neurological: Patient is alert. Pupils:  Pupils are equal, round, and reactive to light. Skin:  Warm. Labs/Imaging/Diagnostics    Labs:  CBC:  No results for input(s): WBC, RBC, HGB, HCT, MCV, RDW, PLT in the last 72 hours. CHEMISTRIES:  Recent Labs     20  0419 20  0321 20  1400   NA  --  139  --    K  --  5.3* 4.6   CL  --  99  --    CO2  --  35*  --    BUN  --  15  --    CREATININE 0.8 0.9  --    GLUCOSE  --  149*  --      PT/INR:No results for input(s): PROTIME, INR in the last 72 hours. APTT:No results for input(s): APTT in the last 72 hours.   LIVER PROFILE:  No results for input(s): AST, ALT, adjacent to the esophagus and trachea axial image 15 is 14 mm. Borderline enlarged right hilar lymph node is 9 mm short axis. No left hilar adenopathy evident. Lungs/pleura: Sequela of smoking and emphysema predominate in the upper lungs. Bilateral pleural effusion with prominent collection with a minor fissure on the right. New focal consolidative change with soft tissue nodule type appearance posterior basal segment right lower lobe is 16 x 23 mm axial image 94, not present prior study. Minimal subsegmental atelectasis medial left upper lobe. No ground-glass opacity or other consolidation or nodule evident. Organs: Mildly renal cortical atrophy, thinning, right kidney, craniocaudal renal length only 9 cm. 17 mm simple cyst inferior pole right kidney. Left kidney appears unremarkable. No suspicious renal lesion, nephrolithiasis or hydronephrosis. The adrenal glands, spleen and pancreas appear unremarkable. No discrete hepatic lesion evident. Normal appearing homogeneous attenuation throughout the liver. Interval cholecystectomy without CT evidence of abscess; minimal residual postsurgical fluid sequela at the gallbladder fossa within normal limits. GI/Bowel: Multifocal diverticula involve the descending and sigmoid colon without evidence of acute inflammatory change. No diffuse or focal bowel wall thickening evident. No inflammatory changes evident. No obstruction is seen. The appendix is visualized right lower quadrant, unremarkable in appearance. Pelvis: The uterus and adnexal structures appear unremarkable. Urinary bladder is partially filled, unremarkable appearance. No adenopathy. Small volume, simple appearing ascites. Peritoneum/Retroperitoneum: Trace ascites at the gallbladder fossa following cholecystectomy as expected for normal postsurgical change. No gas formation or loculated collection. No other ascites or pneumoperitoneum.   Unremarkable appearance of the aorta and inferior vena cava.  No adenopathy. Bones/Soft Tissues: No acute superficial soft tissue or osseous structure abnormality evident. Degenerative changes of mild-to-moderate severity predominate L4-5 and L5-S1 with grade 1 degenerative anterolisthesis L4 on L5 and vacuum disc phenomena L4-5 and L5-S1.     1.  CTA CHEST: No pulmonary embolism. 2. Small area consolidative change posterior segment right lower lobe is new compared to prior CT abdomen 3 weeks ago likely related to focal pneumonia or atelectasis. 3. Nonspecific pericardial effusion; volume appears much smaller than on CT abdomen 3 weeks ago. 4. Nonspecific bilateral pleural effusion without distinct pulmonary vascular engorgement or other findings associated with congestive heart failure. 5.  Pulmonary sequela typical of that seen with smoking, including emphysema. 6. CT ABDOMEN/PELVIS: No CT evidence of an acute intra-abdominal or intrapelvic process. 7. Greatly diminished ascites compared with prior study, with only a small amount remaining in the pelvis. 8.  No findings to suggest acute appendicitis; no ureter calculus or hydronephrosis. 9. Mildly renal cortical atrophy right kidney with stable benign cyst inferior pole right kidney. 10.  Diverticulosis coli without CT evidence of acute diverticulitis. 11. Interval cholecystectomy without CT evidence of abscess; minimal residual postsurgical fluid sequela at the gallbladder fossa within normal limits. Xr Chest Portable    Result Date: 11/3/2020  EXAMINATION: ONE XRAY VIEW OF THE CHEST 11/3/2020 2:34 pm COMPARISON: 10/25/2020 HISTORY: ORDERING SYSTEM PROVIDED HISTORY: SOB TECHNOLOGIST PROVIDED HISTORY: Reason for exam:->SOB Reason for Exam: SOB FINDINGS: Cardiomegaly. Pulmonary vasculature within normal limits. Persistent airspace disease in the right lower lung. Left lung grossly clear. Multiple old left rib fractures noted     Persistent airspace disease in the right lower lung.   Cardiomegaly with no evidence of pulmonary edema     Cta Pulmonary W Contrast    Result Date: 11/3/2020  EXAMINATION: CT OF THE ABDOMEN AND PELVIS WITH CONTRAST; CTA OF THE CHEST 11/3/2020 3:47 pm TECHNIQUE: CT of the abdomen and pelvis was performed with the administration of intravenous contrast. Multiplanar reformatted images are provided for review. Dose modulation, iterative reconstruction, and/or weight based adjustment of the mA/kV was utilized to reduce the radiation dose to as low as reasonably achievable.; CTA of the chest was performed after the administration of intravenous contrast.  Multiplanar reformatted images are provided for review. MIP images are provided for review. Dose modulation, iterative reconstruction, and/or weight based adjustment of the mA/kV was utilized to reduce the radiation dose to as low as reasonably achievable. COMPARISON: CT chest 07/12/2020 and CT abdomen and pelvis 10/19/2020 HISTORY: ORDERING SYSTEM PROVIDED HISTORY: Fever, diaphoresis and acute upper abdominal pain TECHNOLOGIST PROVIDED HISTORY: Reason for exam:->abdominal pain Additional Contrast?->None Reason for Exam: abd pain Acuity: Acute Type of Exam: Initial COPD, chronic kidney disease, prior cholecystectomy FINDINGS: Vasculature: Main pulmonary artery is within normal limits for size at 27 mm diameter. The pulmonary arteries are well opacified for the exam with no thromboembolic disease evident. Mild calcific atherosclerosis of the aorta and its branches. The ascending thoracic aorta is 27 mm diameter and descending thoracic aorta 20 mm diameter. No thoracic aorta aneurysm or dissection. Mediastinum: Small to moderate volume pericardial effusion. The heart size is normal.  Posterior mediastinal structures are unremarkable. Right paratracheal lymph node is 13 mm axial image 45, with another measuring 10 mm on image 35. Upper mediastinal lymph node adjacent to the esophagus and trachea axial image 15 is 14 mm.   Borderline enlarged right hilar lymph node is 9 mm short axis. No left hilar adenopathy evident. Lungs/pleura: Sequela of smoking and emphysema predominate in the upper lungs. Bilateral pleural effusion with prominent collection with a minor fissure on the right. New focal consolidative change with soft tissue nodule type appearance posterior basal segment right lower lobe is 16 x 23 mm axial image 94, not present prior study. Minimal subsegmental atelectasis medial left upper lobe. No ground-glass opacity or other consolidation or nodule evident. Organs: Mildly renal cortical atrophy, thinning, right kidney, craniocaudal renal length only 9 cm. 17 mm simple cyst inferior pole right kidney. Left kidney appears unremarkable. No suspicious renal lesion, nephrolithiasis or hydronephrosis. The adrenal glands, spleen and pancreas appear unremarkable. No discrete hepatic lesion evident. Normal appearing homogeneous attenuation throughout the liver. Interval cholecystectomy without CT evidence of abscess; minimal residual postsurgical fluid sequela at the gallbladder fossa within normal limits. GI/Bowel: Multifocal diverticula involve the descending and sigmoid colon without evidence of acute inflammatory change. No diffuse or focal bowel wall thickening evident. No inflammatory changes evident. No obstruction is seen. The appendix is visualized right lower quadrant, unremarkable in appearance. Pelvis: The uterus and adnexal structures appear unremarkable. Urinary bladder is partially filled, unremarkable appearance. No adenopathy. Small volume, simple appearing ascites. Peritoneum/Retroperitoneum: Trace ascites at the gallbladder fossa following cholecystectomy as expected for normal postsurgical change. No gas formation or loculated collection. No other ascites or pneumoperitoneum. Unremarkable appearance of the aorta and inferior vena cava. No adenopathy.  Bones/Soft Tissues: No acute superficial soft tissue or osseous structure abnormality evident. Degenerative changes of mild-to-moderate severity predominate L4-5 and L5-S1 with grade 1 degenerative anterolisthesis L4 on L5 and vacuum disc phenomena L4-5 and L5-S1.     1.  CTA CHEST: No pulmonary embolism. 2. Small area consolidative change posterior segment right lower lobe is new compared to prior CT abdomen 3 weeks ago likely related to focal pneumonia or atelectasis. 3. Nonspecific pericardial effusion; volume appears much smaller than on CT abdomen 3 weeks ago. 4. Nonspecific bilateral pleural effusion without distinct pulmonary vascular engorgement or other findings associated with congestive heart failure. 5.  Pulmonary sequela typical of that seen with smoking, including emphysema. 6. CT ABDOMEN/PELVIS: No CT evidence of an acute intra-abdominal or intrapelvic process. 7. Greatly diminished ascites compared with prior study, with only a small amount remaining in the pelvis. 8.  No findings to suggest acute appendicitis; no ureter calculus or hydronephrosis. 9. Mildly renal cortical atrophy right kidney with stable benign cyst inferior pole right kidney. 10.  Diverticulosis coli without CT evidence of acute diverticulitis. 11. Interval cholecystectomy without CT evidence of abscess; minimal residual postsurgical fluid sequela at the gallbladder fossa within normal limits.      Assessment//Plan           Hospital Problems           Last Modified POA    * (Principal) Sepsis ([facility] [address] 11/3/2020 Yes    SIRS (systemic inflammatory response syndrome) ([facility] [address] 11/4/2020 Yes        Assessment & Plan  Sepsis 2/2 suspected gram neg pna with chronic resp failure 3L and COPD  -CTA chest no PE but RLL pna noted  -Cefepime and stop vanc  -covid, legionella and strep neg  -solumedrol, inhaler  -bld cx neg so far  Hyponatremia  -IVF stopped as resovled  Pericardial effusion  -echo with no pericardial effusion and EF 50%  Chronic resp failure 3L COPD  Mod PCM  HTN  -BB  Hypothyroid  -synthriod  DVT prophyalxis  -lovenox    Anticipate discharge today  Electronically signed by Shira Meadows MD on 11/5/20 at 7:55 AM EST

## 2020-11-08 NOTE — PLAN OF CARE
Problem: Falls - Risk of:  Goal: Will remain free from falls  Description: Will remain free from falls  11/8/2020 0050 by Hugo Solano RN  Outcome: Ongoing  11/7/2020 1503 by Ky Kathleen RN  Outcome: Ongoing  Goal: Absence of physical injury  Description: Absence of physical injury  11/8/2020 0050 by Hugo Solano RN  Outcome: Ongoing  11/7/2020 1503 by Ky Kathleen RN  Outcome: Ongoing     Problem: Discharge Planning:  Goal: Discharged to appropriate level of care  Description: Discharged to appropriate level of care  11/8/2020 0050 by Hugo Solano RN  Outcome: Ongoing  11/7/2020 1503 by Ky Kathleen RN  Outcome: Ongoing     Problem:  Activity Intolerance:  Goal: Ability to tolerate increased activity will improve  Description: Ability to tolerate increased activity will improve  11/8/2020 0050 by Hugo Solano RN  Outcome: Ongoing  11/7/2020 1503 by Ky Kathleen RN  Outcome: Ongoing     Problem: Airway Clearance - Ineffective:  Goal: Ability to maintain a clear airway will improve  Description: Ability to maintain a clear airway will improve  11/8/2020 0050 by Hugo Solano RN  Outcome: Ongoing  11/7/2020 1503 by Ky Kathleen RN  Outcome: Ongoing     Problem: Breathing Pattern - Ineffective:  Goal: Ability to achieve and maintain a regular respiratory rate will improve  Description: Ability to achieve and maintain a regular respiratory rate will improve  11/8/2020 0050 by Hugo Solano RN  Outcome: Ongoing  11/7/2020 1503 by Ky Kathleen RN  Outcome: Ongoing     Problem: Gas Exchange - Impaired:  Goal: Levels of oxygenation will improve  Description: Levels of oxygenation will improve  11/8/2020 0050 by Hugo Solano RN  Outcome: Ongoing  11/7/2020 1503 by Ky Kathleen RN  Outcome: Ongoing     Problem: Pain:  Goal: Pain level will decrease  Description: Pain level will decrease  11/8/2020 0050 by Hugo Solano RN  Outcome: Ongoing  11/7/2020 1503 by Melida Gaytan RN  Outcome: Ongoing  Goal: Control of acute pain  Description: Control of acute pain  11/8/2020 0050 by Carolee Wise RN  Outcome: Ongoing  11/7/2020 1503 by Melida Gaytan RN  Outcome: Ongoing  Goal: Control of chronic pain  Description: Control of chronic pain  11/8/2020 0050 by Carolee Wise RN  Outcome: Ongoing  11/7/2020 1503 by Melida Gaytan RN  Outcome: Ongoing

## 2020-11-08 NOTE — DISCHARGE SUMMARY
Physician Discharge Summary     Patient ID:  Red De Jesus  [id number]  64 y.o.  1960    Admit date: 11/3/2020    Discharge date and time: No discharge date for patient encounter. Admitting Physician: Jazzmine Tillman MD     Discharge Physician: Violeta Gmaez    Admission Diagnoses: Pneumonia due to organism [J18.9]  Septicemia ([facility] [address] [A41.9]  SIRS (systemic inflammatory response syndrome) ([facility] [address] [R65.10]  Sepsis ([facility] [address] [A41.9]  Pneumonia of right lower lobe due to infectious organism [J18.9]  Dyspnea, unspecified type [R06.00]    Discharge Diagnoses: sepsis 2/2 suspected gram neg pan with chronic resp failure 3L and COPD                                         Hypoantremia                                         Mod PCM                                         Hypothyroid                                         HTN    Admission Condition: fair    Discharged Condition: good    Indication for Admission: Sepsis    Hospital Course:   64 y.o.  female, with past medical history significant for hypertension, hypothyroidism, depression, anxiety, COPD, pericardial effusion status post pericardiocentesis October 23, 2020, chronic respiratory failure, who presented to the ED from home due shortness of breath. She was recently admitted to the hospital due to chills, fatigue, abdominal pain, nausea and vomiting found to have a calculus cholecystitis and underwent laparoscopic cholecystectomy. She was also found to have pericardial effusion status post pericardiocentesis and paroxysmal atrial fibrillation. Her symptoms improved on the day of the discharge however had recurrence of chills, shortness of breath, nonproductive cough. She also reported low-grade fever with sore throat and nausea, also with pleuritic chest pain.       She was admitted fro sepsis and resp failure due to pna. CT PE neg but had RLL pna. Her prelim bld cx with neg. Had neg covid, legionella and srep.  She improved and was stable and discharged home on oral antibiotic. Outstanding Order Results     Date and Time Order Name Status Description    11/7/2020 1227 VL DUP LOWER EXTREMITY VENOUS BILATERAL Preliminary     11/3/2020 1602 Culture, Blood 2 Preliminary     11/3/2020 1412 Culture, Blood 1 Preliminary     10/28/2020 0819 Magnesium Preliminary     10/26/2020 1100 Blood Gas, Arterial Preliminary     10/23/2020 1130 Culture with Smear, Acid Fast Bacillius Preliminary     10/23/2020 1130 Culture, Fungus Preliminary               Discharge Exam:  HEENT: Normal HEENT exam.    Lungs:  Normal effort. There are decreased breath sounds. Heart: Normal rate. Abdomen: Bowel sounds are normal.     Extremities: Decreased range of motion. Neurological: Patient is alert. Pupils:  Pupils are equal, round, and reactive to light. Skin:  Warm. Disposition: home    Patient Instructions:      Medication List      START taking these medications    amoxicillin-clavulanate 875-125 MG per tablet  Commonly known as:  AUGMENTIN  Take 1 tablet by mouth 2 times daily for 5 days     nystatin 522753 UNIT/ML suspension  Commonly known as:  MYCOSTATIN  Take 5 mLs by mouth 4 times daily     oxyCODONE-acetaminophen 5-325 MG per tablet  Commonly known as:  PERCOCET  Take 1 tablet by mouth every 6 hours as needed for Pain for up to 3 days.      predniSONE 20 MG tablet  Commonly known as:  DELTASONE  Take 40mg x 3 days then 20mg x 3days then 10mg x 3 days     senna-docusate 8.6-50 MG per tablet  Commonly known as:  PERICOLACE  Take 2 tablets by mouth daily as needed for Constipation        CHANGE how you take these medications    famotidine 20 MG tablet  Commonly known as:  PEPCID  Take 1 tablet by mouth 2 times daily  What changed:    · how much to take  · when to take this        CONTINUE taking these medications    * albuterol-ipratropium  MCG/ACT Aers inhaler  Commonly known as:  COMBIVENT RESPIMAT  Inhale 1 puff into the lungs every 4 hours as needed for Wheezing     * ipratropium-albuterol 0.5-2.5 (3) MG/3ML Soln nebulizer solution  Commonly known as:  DUONEB  Inhale 3 mLs into the lungs every 4 hours     amLODIPine 10 MG tablet  Commonly known as:  NORVASC  Take 1 tablet by mouth daily     aspirin 81 MG chewable tablet     baclofen 10 MG tablet  Commonly known as:  LIORESAL  Take 1 tablet by mouth 3 times daily     budesonide-formoterol 160-4.5 MCG/ACT Aero  Commonly known as:  SYMBICORT  USE 2 INHALATIONS TWICE A DAY     busPIRone 10 MG tablet  Commonly known as:  BUSPAR  Take 1 tablet by mouth 3 times daily     clonazePAM 1 MG tablet  Commonly known as:  KLONOPIN     DULoxetine 60 MG extended release capsule  Commonly known as:  CYMBALTA     ferrous gluconate 324 (37.5 Fe) MG Tabs  Take 1 tablet by mouth 2 times daily     fluticasone 50 MCG/ACT nasal spray  Commonly known as:  FLONASE  2 sprays by Nasal route daily     folic acid 1 MG tablet  Commonly known as:  FOLVITE  Take 1 tablet by mouth daily     guaiFENesin 600 MG extended release tablet  Commonly known as:  MUCINEX  Take 1 tablet by mouth 2 times daily     levothyroxine 100 MCG tablet  Commonly known as:  SYNTHROID  Take 1 tablet by mouth Daily     metoprolol succinate 25 MG extended release tablet  Commonly known as: Toprol XL  Take 1 tablet by mouth daily     montelukast 10 MG tablet  Commonly known as:  SINGULAIR  Take 1 tablet by mouth daily     thiamine 100 MG tablet  Take 1 tablet by mouth daily     traZODone 50 MG tablet  Commonly known as:  DESYREL         * This list has 2 medication(s) that are the same as other medications prescribed for you. Read the directions carefully, and ask your doctor or other care provider to review them with you.             STOP taking these medications    amiodarone 200 MG tablet  Commonly known as:  CORDARONE           Where to Get Your Medications      You can get these medications from any pharmacy    Bring a paper prescription for each of these medications  · amoxicillin-clavulanate 875-125 MG per tablet  · nystatin 411409 UNIT/ML suspension  · oxyCODONE-acetaminophen 5-325 MG per tablet  · predniSONE 20 MG tablet  · senna-docusate 8.6-50 MG per tablet         Activity: activity as tolerated  Diet: regular diet  Wound Care: none needed    Follow-up with PCP.   Discharge time 30min  Signed:  Lyssa Connor  11/8/2020  11:48 AM

## 2020-11-09 LAB
CULTURE: NORMAL
CULTURE: NORMAL
Lab: NORMAL
Lab: NORMAL
SPECIMEN: NORMAL
SPECIMEN: NORMAL

## 2020-11-10 LAB
EKG ATRIAL RATE: 103 BPM
EKG DIAGNOSIS: NORMAL
EKG P AXIS: 59 DEGREES
EKG P-R INTERVAL: 138 MS
EKG Q-T INTERVAL: 378 MS
EKG QRS DURATION: 134 MS
EKG QTC CALCULATION (BAZETT): 495 MS
EKG R AXIS: 107 DEGREES
EKG T AXIS: -3 DEGREES
EKG VENTRICULAR RATE: 103 BPM

## 2020-11-17 ENCOUNTER — TELEPHONE (OUTPATIENT)
Dept: SURGERY | Age: 60
End: 2020-11-17

## 2020-11-17 NOTE — TELEPHONE ENCOUNTER
Per Dr. Krista Diehl, patient needs seen by Dr. Andrew Alvarez for P/O appt.     Called and left VM to reschedule for 11/25/20 at 0900 with Dr. Andrew Alvarez.

## 2020-11-22 ENCOUNTER — APPOINTMENT (OUTPATIENT)
Dept: CT IMAGING | Age: 60
DRG: 287 | End: 2020-11-22
Payer: COMMERCIAL

## 2020-11-22 ENCOUNTER — HOSPITAL ENCOUNTER (INPATIENT)
Age: 60
LOS: 1 days | Discharge: HOME HEALTH | DRG: 287 | End: 2020-11-26
Attending: EMERGENCY MEDICINE | Admitting: HOSPITALIST
Payer: COMMERCIAL

## 2020-11-22 ENCOUNTER — APPOINTMENT (OUTPATIENT)
Dept: GENERAL RADIOLOGY | Age: 60
DRG: 287 | End: 2020-11-22
Payer: COMMERCIAL

## 2020-11-22 PROBLEM — R07.9 CHEST PAIN: Status: ACTIVE | Noted: 2020-11-22

## 2020-11-22 LAB
ALBUMIN SERPL-MCNC: 3.4 GM/DL (ref 3.4–5)
ALP BLD-CCNC: 134 IU/L (ref 40–128)
ALT SERPL-CCNC: 13 U/L (ref 10–40)
ANION GAP SERPL CALCULATED.3IONS-SCNC: 12 MMOL/L (ref 4–16)
AST SERPL-CCNC: 17 IU/L (ref 15–37)
BACTERIA: NEGATIVE /HPF
BASOPHILS ABSOLUTE: 0.1 K/CU MM
BASOPHILS RELATIVE PERCENT: 0.7 % (ref 0–1)
BILIRUB SERPL-MCNC: 0.3 MG/DL (ref 0–1)
BILIRUBIN URINE: NEGATIVE MG/DL
BLOOD, URINE: NEGATIVE
BUN BLDV-MCNC: 4 MG/DL (ref 6–23)
CALCIUM SERPL-MCNC: 9.3 MG/DL (ref 8.3–10.6)
CHLORIDE BLD-SCNC: 93 MMOL/L (ref 99–110)
CLARITY: CLEAR
CO2: 33 MMOL/L (ref 21–32)
COLOR: COLORLESS
CREAT SERPL-MCNC: 0.9 MG/DL (ref 0.6–1.1)
DIFFERENTIAL TYPE: ABNORMAL
EOSINOPHILS ABSOLUTE: 0.3 K/CU MM
EOSINOPHILS RELATIVE PERCENT: 4.1 % (ref 0–3)
GFR AFRICAN AMERICAN: >60 ML/MIN/1.73M2
GFR NON-AFRICAN AMERICAN: >60 ML/MIN/1.73M2
GLUCOSE BLD-MCNC: 100 MG/DL (ref 70–99)
GLUCOSE, URINE: NEGATIVE MG/DL
HCT VFR BLD CALC: 33.5 % (ref 37–47)
HEMOGLOBIN: 9.7 GM/DL (ref 12.5–16)
IMMATURE NEUTROPHIL %: 0.7 % (ref 0–0.43)
KETONES, URINE: NEGATIVE MG/DL
LACTATE: 1.2 MMOL/L (ref 0.4–2)
LEUKOCYTE ESTERASE, URINE: NEGATIVE
LIPASE: 17 IU/L (ref 13–60)
LYMPHOCYTES ABSOLUTE: 1 K/CU MM
LYMPHOCYTES RELATIVE PERCENT: 14.2 % (ref 24–44)
MAGNESIUM: 1.7 MG/DL (ref 1.8–2.4)
MCH RBC QN AUTO: 26.9 PG (ref 27–31)
MCHC RBC AUTO-ENTMCNC: 29 % (ref 32–36)
MCV RBC AUTO: 93.1 FL (ref 78–100)
MONOCYTES ABSOLUTE: 0.6 K/CU MM
MONOCYTES RELATIVE PERCENT: 8 % (ref 0–4)
NITRITE URINE, QUANTITATIVE: NEGATIVE
NUCLEATED RBC %: 0 %
PDW BLD-RTO: 14.8 % (ref 11.7–14.9)
PH, URINE: 7 (ref 5–8)
PLATELET # BLD: 355 K/CU MM (ref 140–440)
PMV BLD AUTO: 9.6 FL (ref 7.5–11.1)
POTASSIUM SERPL-SCNC: 2.5 MMOL/L (ref 3.5–5.1)
PRO-BNP: 1034 PG/ML
PROTEIN UA: NEGATIVE MG/DL
RBC # BLD: 3.6 M/CU MM (ref 4.2–5.4)
RBC URINE: ABNORMAL /HPF (ref 0–6)
SEGMENTED NEUTROPHILS ABSOLUTE COUNT: 5.1 K/CU MM
SEGMENTED NEUTROPHILS RELATIVE PERCENT: 72.3 % (ref 36–66)
SODIUM BLD-SCNC: 138 MMOL/L (ref 135–145)
SPECIFIC GRAVITY UA: 1 (ref 1–1.03)
TOTAL CK: 33 IU/L (ref 26–140)
TOTAL IMMATURE NEUTOROPHIL: 0.05 K/CU MM
TOTAL NUCLEATED RBC: 0 K/CU MM
TOTAL PROTEIN: 7.3 GM/DL (ref 6.4–8.2)
TRICHOMONAS: ABNORMAL /HPF
TROPONIN T: <0.01 NG/ML
TSH HIGH SENSITIVITY: 11.49 UIU/ML (ref 0.27–4.2)
UROBILINOGEN, URINE: NORMAL MG/DL (ref 0.2–1)
WBC # BLD: 7.1 K/CU MM (ref 4–10.5)
WBC UA: ABNORMAL /HPF (ref 0–5)

## 2020-11-22 PROCEDURE — 93005 ELECTROCARDIOGRAM TRACING: CPT | Performed by: EMERGENCY MEDICINE

## 2020-11-22 PROCEDURE — 2580000003 HC RX 258: Performed by: EMERGENCY MEDICINE

## 2020-11-22 PROCEDURE — 83880 ASSAY OF NATRIURETIC PEPTIDE: CPT

## 2020-11-22 PROCEDURE — 81001 URINALYSIS AUTO W/SCOPE: CPT

## 2020-11-22 PROCEDURE — 96374 THER/PROPH/DIAG INJ IV PUSH: CPT

## 2020-11-22 PROCEDURE — 96372 THER/PROPH/DIAG INJ SC/IM: CPT

## 2020-11-22 PROCEDURE — 83735 ASSAY OF MAGNESIUM: CPT

## 2020-11-22 PROCEDURE — 82550 ASSAY OF CK (CPK): CPT

## 2020-11-22 PROCEDURE — 96365 THER/PROPH/DIAG IV INF INIT: CPT

## 2020-11-22 PROCEDURE — 84484 ASSAY OF TROPONIN QUANT: CPT

## 2020-11-22 PROCEDURE — 87086 URINE CULTURE/COLONY COUNT: CPT

## 2020-11-22 PROCEDURE — 74174 CTA ABD&PLVS W/CONTRAST: CPT

## 2020-11-22 PROCEDURE — 83690 ASSAY OF LIPASE: CPT

## 2020-11-22 PROCEDURE — 80053 COMPREHEN METABOLIC PANEL: CPT

## 2020-11-22 PROCEDURE — 99285 EMERGENCY DEPT VISIT HI MDM: CPT

## 2020-11-22 PROCEDURE — 87040 BLOOD CULTURE FOR BACTERIA: CPT

## 2020-11-22 PROCEDURE — 84443 ASSAY THYROID STIM HORMONE: CPT

## 2020-11-22 PROCEDURE — 96375 TX/PRO/DX INJ NEW DRUG ADDON: CPT

## 2020-11-22 PROCEDURE — 71045 X-RAY EXAM CHEST 1 VIEW: CPT

## 2020-11-22 PROCEDURE — 36415 COLL VENOUS BLD VENIPUNCTURE: CPT

## 2020-11-22 PROCEDURE — 94640 AIRWAY INHALATION TREATMENT: CPT

## 2020-11-22 PROCEDURE — 2500000003 HC RX 250 WO HCPCS: Performed by: EMERGENCY MEDICINE

## 2020-11-22 PROCEDURE — G0378 HOSPITAL OBSERVATION PER HR: HCPCS

## 2020-11-22 PROCEDURE — 83605 ASSAY OF LACTIC ACID: CPT

## 2020-11-22 PROCEDURE — 6360000002 HC RX W HCPCS: Performed by: EMERGENCY MEDICINE

## 2020-11-22 PROCEDURE — 6370000000 HC RX 637 (ALT 250 FOR IP): Performed by: EMERGENCY MEDICINE

## 2020-11-22 PROCEDURE — 6360000004 HC RX CONTRAST MEDICATION: Performed by: EMERGENCY MEDICINE

## 2020-11-22 PROCEDURE — 85025 COMPLETE CBC W/AUTO DIFF WBC: CPT

## 2020-11-22 RX ORDER — AMLODIPINE BESYLATE 10 MG/1
10 TABLET ORAL DAILY
Status: DISCONTINUED | OUTPATIENT
Start: 2020-11-23 | End: 2020-11-25

## 2020-11-22 RX ORDER — MORPHINE SULFATE 2 MG/ML
2 INJECTION, SOLUTION INTRAMUSCULAR; INTRAVENOUS EVERY 4 HOURS PRN
Status: DISCONTINUED | OUTPATIENT
Start: 2020-11-22 | End: 2020-11-23

## 2020-11-22 RX ORDER — ALBUTEROL SULFATE 90 UG/1
2 AEROSOL, METERED RESPIRATORY (INHALATION) ONCE
Status: COMPLETED | OUTPATIENT
Start: 2020-11-22 | End: 2020-11-22

## 2020-11-22 RX ORDER — SODIUM CHLORIDE 0.9 % (FLUSH) 0.9 %
10 SYRINGE (ML) INJECTION PRN
Status: DISCONTINUED | OUTPATIENT
Start: 2020-11-22 | End: 2020-11-24

## 2020-11-22 RX ORDER — GUAIFENESIN 600 MG/1
600 TABLET, EXTENDED RELEASE ORAL 2 TIMES DAILY
Status: DISCONTINUED | OUTPATIENT
Start: 2020-11-23 | End: 2020-11-26 | Stop reason: HOSPADM

## 2020-11-22 RX ORDER — MAGNESIUM SULFATE 1 G/100ML
1 INJECTION INTRAVENOUS ONCE
Status: COMPLETED | OUTPATIENT
Start: 2020-11-22 | End: 2020-11-22

## 2020-11-22 RX ORDER — BACLOFEN 10 MG/1
10 TABLET ORAL 3 TIMES DAILY
Status: DISCONTINUED | OUTPATIENT
Start: 2020-11-23 | End: 2020-11-26 | Stop reason: HOSPADM

## 2020-11-22 RX ORDER — ONDANSETRON 2 MG/ML
4 INJECTION INTRAMUSCULAR; INTRAVENOUS EVERY 6 HOURS PRN
Status: DISCONTINUED | OUTPATIENT
Start: 2020-11-22 | End: 2020-11-26 | Stop reason: HOSPADM

## 2020-11-22 RX ORDER — LEVOTHYROXINE SODIUM 0.12 MG/1
125 TABLET ORAL DAILY
Status: DISCONTINUED | OUTPATIENT
Start: 2020-11-23 | End: 2020-11-26 | Stop reason: HOSPADM

## 2020-11-22 RX ORDER — POTASSIUM CHLORIDE 20 MEQ/1
40 TABLET, EXTENDED RELEASE ORAL PRN
Status: DISCONTINUED | OUTPATIENT
Start: 2020-11-22 | End: 2020-11-26 | Stop reason: HOSPADM

## 2020-11-22 RX ORDER — ONDANSETRON 2 MG/ML
4 INJECTION INTRAMUSCULAR; INTRAVENOUS EVERY 30 MIN PRN
Status: DISCONTINUED | OUTPATIENT
Start: 2020-11-22 | End: 2020-11-23

## 2020-11-22 RX ORDER — 0.9 % SODIUM CHLORIDE 0.9 %
1000 INTRAVENOUS SOLUTION INTRAVENOUS ONCE
Status: COMPLETED | OUTPATIENT
Start: 2020-11-22 | End: 2020-11-22

## 2020-11-22 RX ORDER — MORPHINE SULFATE 4 MG/ML
2 INJECTION, SOLUTION INTRAMUSCULAR; INTRAVENOUS EVERY 30 MIN PRN
Status: DISCONTINUED | OUTPATIENT
Start: 2020-11-22 | End: 2020-11-23

## 2020-11-22 RX ORDER — POTASSIUM CHLORIDE 7.45 MG/ML
10 INJECTION INTRAVENOUS ONCE
Status: COMPLETED | OUTPATIENT
Start: 2020-11-22 | End: 2020-11-22

## 2020-11-22 RX ORDER — FLUTICASONE PROPIONATE 50 MCG
2 SPRAY, SUSPENSION (ML) NASAL DAILY
Status: DISCONTINUED | OUTPATIENT
Start: 2020-11-23 | End: 2020-11-26 | Stop reason: HOSPADM

## 2020-11-22 RX ORDER — SODIUM CHLORIDE 0.9 % (FLUSH) 0.9 %
10 SYRINGE (ML) INJECTION 2 TIMES DAILY
Status: DISCONTINUED | OUTPATIENT
Start: 2020-11-23 | End: 2020-11-24

## 2020-11-22 RX ORDER — FERROUS GLUCONATE 324(37.5)
324 TABLET ORAL 2 TIMES DAILY WITH MEALS
Status: DISCONTINUED | OUTPATIENT
Start: 2020-11-23 | End: 2020-11-26 | Stop reason: HOSPADM

## 2020-11-22 RX ORDER — LORAZEPAM 0.5 MG/1
0.5 TABLET ORAL EVERY 6 HOURS PRN
Status: DISCONTINUED | OUTPATIENT
Start: 2020-11-22 | End: 2020-11-24

## 2020-11-22 RX ORDER — ATORVASTATIN CALCIUM 40 MG/1
40 TABLET, FILM COATED ORAL NIGHTLY
Status: DISCONTINUED | OUTPATIENT
Start: 2020-11-23 | End: 2020-11-26 | Stop reason: HOSPADM

## 2020-11-22 RX ORDER — FAMOTIDINE 20 MG/1
40 TABLET, FILM COATED ORAL NIGHTLY
Status: DISCONTINUED | OUTPATIENT
Start: 2020-11-23 | End: 2020-11-23

## 2020-11-22 RX ORDER — MONTELUKAST SODIUM 10 MG/1
10 TABLET ORAL DAILY
Status: DISCONTINUED | OUTPATIENT
Start: 2020-11-23 | End: 2020-11-26 | Stop reason: HOSPADM

## 2020-11-22 RX ORDER — CLONAZEPAM 0.5 MG/1
1 TABLET ORAL 3 TIMES DAILY PRN
Status: CANCELLED | OUTPATIENT
Start: 2020-11-22

## 2020-11-22 RX ORDER — DULOXETIN HYDROCHLORIDE 30 MG/1
60 CAPSULE, DELAYED RELEASE ORAL DAILY
Status: DISCONTINUED | OUTPATIENT
Start: 2020-11-23 | End: 2020-11-26 | Stop reason: HOSPADM

## 2020-11-22 RX ORDER — ACETAMINOPHEN 650 MG/1
650 SUPPOSITORY RECTAL EVERY 6 HOURS PRN
Status: DISCONTINUED | OUTPATIENT
Start: 2020-11-22 | End: 2020-11-26 | Stop reason: HOSPADM

## 2020-11-22 RX ORDER — FOLIC ACID 1 MG/1
1 TABLET ORAL DAILY
Status: DISCONTINUED | OUTPATIENT
Start: 2020-11-23 | End: 2020-11-26 | Stop reason: HOSPADM

## 2020-11-22 RX ORDER — POTASSIUM CHLORIDE 7.45 MG/ML
10 INJECTION INTRAVENOUS PRN
Status: DISCONTINUED | OUTPATIENT
Start: 2020-11-22 | End: 2020-11-26 | Stop reason: HOSPADM

## 2020-11-22 RX ORDER — METOPROLOL SUCCINATE 25 MG/1
25 TABLET, EXTENDED RELEASE ORAL DAILY
Status: DISCONTINUED | OUTPATIENT
Start: 2020-11-23 | End: 2020-11-26 | Stop reason: HOSPADM

## 2020-11-22 RX ORDER — MAGNESIUM SULFATE IN WATER 40 MG/ML
2 INJECTION, SOLUTION INTRAVENOUS ONCE
Status: CANCELLED | OUTPATIENT
Start: 2020-11-22 | End: 2020-11-22

## 2020-11-22 RX ORDER — ACETAMINOPHEN 325 MG/1
650 TABLET ORAL EVERY 6 HOURS PRN
Status: DISCONTINUED | OUTPATIENT
Start: 2020-11-22 | End: 2020-11-24

## 2020-11-22 RX ORDER — ASPIRIN 81 MG/1
81 TABLET, CHEWABLE ORAL DAILY
Status: CANCELLED | OUTPATIENT
Start: 2020-11-23

## 2020-11-22 RX ORDER — BUDESONIDE AND FORMOTEROL FUMARATE DIHYDRATE 160; 4.5 UG/1; UG/1
1 AEROSOL RESPIRATORY (INHALATION) 2 TIMES DAILY
Status: DISCONTINUED | OUTPATIENT
Start: 2020-11-23 | End: 2020-11-26 | Stop reason: HOSPADM

## 2020-11-22 RX ORDER — ASPIRIN 81 MG/1
81 TABLET, CHEWABLE ORAL DAILY
Status: DISCONTINUED | OUTPATIENT
Start: 2020-11-23 | End: 2020-11-26 | Stop reason: HOSPADM

## 2020-11-22 RX ORDER — MAGNESIUM SULFATE 1 G/100ML
1 INJECTION INTRAVENOUS PRN
Status: DISCONTINUED | OUTPATIENT
Start: 2020-11-22 | End: 2020-11-26 | Stop reason: HOSPADM

## 2020-11-22 RX ORDER — DICYCLOMINE HYDROCHLORIDE 10 MG/ML
20 INJECTION INTRAMUSCULAR ONCE
Status: COMPLETED | OUTPATIENT
Start: 2020-11-22 | End: 2020-11-22

## 2020-11-22 RX ORDER — POLYETHYLENE GLYCOL 3350 17 G/17G
17 POWDER, FOR SOLUTION ORAL DAILY PRN
Status: DISCONTINUED | OUTPATIENT
Start: 2020-11-22 | End: 2020-11-26 | Stop reason: HOSPADM

## 2020-11-22 RX ORDER — PROMETHAZINE HYDROCHLORIDE 25 MG/1
12.5 TABLET ORAL EVERY 6 HOURS PRN
Status: DISCONTINUED | OUTPATIENT
Start: 2020-11-22 | End: 2020-11-26 | Stop reason: HOSPADM

## 2020-11-22 RX ORDER — IPRATROPIUM BROMIDE AND ALBUTEROL SULFATE 2.5; .5 MG/3ML; MG/3ML
1 SOLUTION RESPIRATORY (INHALATION)
Status: DISCONTINUED | OUTPATIENT
Start: 2020-11-23 | End: 2020-11-23

## 2020-11-22 RX ORDER — THIAMINE MONONITRATE (VIT B1) 100 MG
100 TABLET ORAL DAILY
Status: DISCONTINUED | OUTPATIENT
Start: 2020-11-23 | End: 2020-11-26 | Stop reason: HOSPADM

## 2020-11-22 RX ORDER — POTASSIUM CHLORIDE 20 MEQ/1
60 TABLET, EXTENDED RELEASE ORAL ONCE
Status: COMPLETED | OUTPATIENT
Start: 2020-11-22 | End: 2020-11-22

## 2020-11-22 RX ADMIN — POTASSIUM CHLORIDE 60 MEQ: 1500 TABLET, EXTENDED RELEASE ORAL at 21:03

## 2020-11-22 RX ADMIN — FAMOTIDINE 20 MG: 10 INJECTION INTRAVENOUS at 20:14

## 2020-11-22 RX ADMIN — IOPAMIDOL 75 ML: 755 INJECTION, SOLUTION INTRAVENOUS at 21:31

## 2020-11-22 RX ADMIN — ONDANSETRON 4 MG: 2 INJECTION INTRAMUSCULAR; INTRAVENOUS at 20:14

## 2020-11-22 RX ADMIN — MORPHINE SULFATE 2 MG: 4 INJECTION, SOLUTION INTRAMUSCULAR; INTRAVENOUS at 20:15

## 2020-11-22 RX ADMIN — ALBUTEROL SULFATE 2 PUFF: 90 AEROSOL, METERED RESPIRATORY (INHALATION) at 22:50

## 2020-11-22 RX ADMIN — POTASSIUM CHLORIDE 10 MEQ: 7.46 INJECTION, SOLUTION INTRAVENOUS at 23:07

## 2020-11-22 RX ADMIN — SODIUM CHLORIDE 1000 ML: 9 INJECTION, SOLUTION INTRAVENOUS at 20:17

## 2020-11-22 RX ADMIN — DICYCLOMINE HYDROCHLORIDE 20 MG: 20 INJECTION, SOLUTION INTRAMUSCULAR at 20:15

## 2020-11-22 RX ADMIN — MAGNESIUM SULFATE HEPTAHYDRATE 1 G: 1 INJECTION, SOLUTION INTRAVENOUS at 21:55

## 2020-11-22 ASSESSMENT — ENCOUNTER SYMPTOMS
NAUSEA: 1
ABDOMINAL PAIN: 1
ALLERGIC/IMMUNOLOGIC NEGATIVE: 1
COUGH: 1
EYES NEGATIVE: 1

## 2020-11-22 ASSESSMENT — PAIN SCALES - GENERAL
PAINLEVEL_OUTOF10: 7
PAINLEVEL_OUTOF10: 7

## 2020-11-23 LAB
ANION GAP SERPL CALCULATED.3IONS-SCNC: 14 MMOL/L (ref 4–16)
BUN BLDV-MCNC: 3 MG/DL (ref 6–23)
CALCIUM SERPL-MCNC: 8.4 MG/DL (ref 8.3–10.6)
CHLORIDE BLD-SCNC: 95 MMOL/L (ref 99–110)
CHOLESTEROL: 166 MG/DL
CO2: 28 MMOL/L (ref 21–32)
CREAT SERPL-MCNC: 0.7 MG/DL (ref 0.6–1.1)
CULTURE: NORMAL
CULTURE: NORMAL
EKG ATRIAL RATE: 85 BPM
EKG ATRIAL RATE: 86 BPM
EKG DIAGNOSIS: NORMAL
EKG DIAGNOSIS: NORMAL
EKG P AXIS: 50 DEGREES
EKG P AXIS: 62 DEGREES
EKG P-R INTERVAL: 134 MS
EKG P-R INTERVAL: 140 MS
EKG Q-T INTERVAL: 434 MS
EKG Q-T INTERVAL: 472 MS
EKG QRS DURATION: 140 MS
EKG QRS DURATION: 144 MS
EKG QTC CALCULATION (BAZETT): 516 MS
EKG QTC CALCULATION (BAZETT): 564 MS
EKG R AXIS: 111 DEGREES
EKG R AXIS: 112 DEGREES
EKG T AXIS: 17 DEGREES
EKG T AXIS: 51 DEGREES
EKG VENTRICULAR RATE: 85 BPM
EKG VENTRICULAR RATE: 86 BPM
FUNGUS STAIN: NORMAL
GFR AFRICAN AMERICAN: >60 ML/MIN/1.73M2
GFR NON-AFRICAN AMERICAN: >60 ML/MIN/1.73M2
GLUCOSE BLD-MCNC: 103 MG/DL (ref 70–99)
HCT VFR BLD CALC: 32.2 % (ref 37–47)
HDLC SERPL-MCNC: 59 MG/DL
HEMOGLOBIN: 9.1 GM/DL (ref 12.5–16)
LDL CHOLESTEROL DIRECT: 87 MG/DL
LV EF: 55 %
LVEF MODALITY: NORMAL
Lab: NORMAL
Lab: NORMAL
MAGNESIUM: 2.1 MG/DL (ref 1.8–2.4)
MCH RBC QN AUTO: 26.8 PG (ref 27–31)
MCHC RBC AUTO-ENTMCNC: 28.3 % (ref 32–36)
MCV RBC AUTO: 95 FL (ref 78–100)
PDW BLD-RTO: 14.6 % (ref 11.7–14.9)
PLATELET # BLD: 342 K/CU MM (ref 140–440)
PMV BLD AUTO: 9.6 FL (ref 7.5–11.1)
POTASSIUM SERPL-SCNC: 3.3 MMOL/L (ref 3.5–5.1)
POTASSIUM SERPL-SCNC: 3.5 MMOL/L (ref 3.5–5.1)
RBC # BLD: 3.39 M/CU MM (ref 4.2–5.4)
SODIUM BLD-SCNC: 137 MMOL/L (ref 135–145)
SPECIMEN: NORMAL
SPECIMEN: NORMAL
TRIGL SERPL-MCNC: 111 MG/DL
TROPONIN T: <0.01 NG/ML
TROPONIN T: <0.01 NG/ML
WBC # BLD: 6.8 K/CU MM (ref 4–10.5)

## 2020-11-23 PROCEDURE — 6370000000 HC RX 637 (ALT 250 FOR IP): Performed by: NURSE PRACTITIONER

## 2020-11-23 PROCEDURE — 93010 ELECTROCARDIOGRAM REPORT: CPT | Performed by: INTERNAL MEDICINE

## 2020-11-23 PROCEDURE — G0378 HOSPITAL OBSERVATION PER HR: HCPCS

## 2020-11-23 PROCEDURE — 6370000000 HC RX 637 (ALT 250 FOR IP): Performed by: INTERNAL MEDICINE

## 2020-11-23 PROCEDURE — 96376 TX/PRO/DX INJ SAME DRUG ADON: CPT

## 2020-11-23 PROCEDURE — 93005 ELECTROCARDIOGRAM TRACING: CPT | Performed by: HOSPITALIST

## 2020-11-23 PROCEDURE — 82805 BLOOD GASES W/O2 SATURATION: CPT

## 2020-11-23 PROCEDURE — 36415 COLL VENOUS BLD VENIPUNCTURE: CPT

## 2020-11-23 PROCEDURE — 6370000000 HC RX 637 (ALT 250 FOR IP): Performed by: HOSPITALIST

## 2020-11-23 PROCEDURE — 84484 ASSAY OF TROPONIN QUANT: CPT

## 2020-11-23 PROCEDURE — 83735 ASSAY OF MAGNESIUM: CPT

## 2020-11-23 PROCEDURE — 2700000000 HC OXYGEN THERAPY PER DAY

## 2020-11-23 PROCEDURE — 83721 ASSAY OF BLOOD LIPOPROTEIN: CPT

## 2020-11-23 PROCEDURE — 6360000002 HC RX W HCPCS: Performed by: HOSPITALIST

## 2020-11-23 PROCEDURE — 96372 THER/PROPH/DIAG INJ SC/IM: CPT

## 2020-11-23 PROCEDURE — 80048 BASIC METABOLIC PNL TOTAL CA: CPT

## 2020-11-23 PROCEDURE — 84132 ASSAY OF SERUM POTASSIUM: CPT

## 2020-11-23 PROCEDURE — 80061 LIPID PANEL: CPT

## 2020-11-23 PROCEDURE — 93306 TTE W/DOPPLER COMPLETE: CPT

## 2020-11-23 PROCEDURE — 94640 AIRWAY INHALATION TREATMENT: CPT

## 2020-11-23 PROCEDURE — 94761 N-INVAS EAR/PLS OXIMETRY MLT: CPT

## 2020-11-23 PROCEDURE — 6360000002 HC RX W HCPCS: Performed by: NURSE PRACTITIONER

## 2020-11-23 PROCEDURE — 2580000003 HC RX 258: Performed by: HOSPITALIST

## 2020-11-23 PROCEDURE — 99254 IP/OBS CNSLTJ NEW/EST MOD 60: CPT | Performed by: INTERNAL MEDICINE

## 2020-11-23 PROCEDURE — 85027 COMPLETE CBC AUTOMATED: CPT

## 2020-11-23 RX ORDER — BUSPIRONE HYDROCHLORIDE 10 MG/1
10 TABLET ORAL 3 TIMES DAILY
Status: DISCONTINUED | OUTPATIENT
Start: 2020-11-23 | End: 2020-11-26 | Stop reason: HOSPADM

## 2020-11-23 RX ORDER — POTASSIUM CHLORIDE 7.45 MG/ML
40 INJECTION INTRAVENOUS ONCE
Status: DISCONTINUED | OUTPATIENT
Start: 2020-11-23 | End: 2020-11-23 | Stop reason: CLARIF

## 2020-11-23 RX ORDER — POTASSIUM CHLORIDE 7.45 MG/ML
10 INJECTION INTRAVENOUS ONCE
Status: COMPLETED | OUTPATIENT
Start: 2020-11-23 | End: 2020-11-23

## 2020-11-23 RX ORDER — POTASSIUM CHLORIDE 7.45 MG/ML
10 INJECTION INTRAVENOUS ONCE
Status: DISCONTINUED | OUTPATIENT
Start: 2020-11-23 | End: 2020-11-26 | Stop reason: HOSPADM

## 2020-11-23 RX ORDER — PANTOPRAZOLE SODIUM 40 MG/1
40 TABLET, DELAYED RELEASE ORAL
Status: DISCONTINUED | OUTPATIENT
Start: 2020-11-23 | End: 2020-11-26 | Stop reason: HOSPADM

## 2020-11-23 RX ORDER — OXYCODONE HYDROCHLORIDE AND ACETAMINOPHEN 5; 325 MG/1; MG/1
1 TABLET ORAL EVERY 4 HOURS PRN
Status: DISCONTINUED | OUTPATIENT
Start: 2020-11-23 | End: 2020-11-26 | Stop reason: HOSPADM

## 2020-11-23 RX ORDER — LACTOBACILLUS RHAMNOSUS GG 10B CELL
1 CAPSULE ORAL
Status: DISCONTINUED | OUTPATIENT
Start: 2020-11-24 | End: 2020-11-26 | Stop reason: HOSPADM

## 2020-11-23 RX ORDER — LANOLIN ALCOHOL/MO/W.PET/CERES
3 CREAM (GRAM) TOPICAL ONCE
Status: COMPLETED | OUTPATIENT
Start: 2020-11-23 | End: 2020-11-23

## 2020-11-23 RX ORDER — ALBUTEROL SULFATE 90 UG/1
2 AEROSOL, METERED RESPIRATORY (INHALATION) EVERY 4 HOURS PRN
Status: DISCONTINUED | OUTPATIENT
Start: 2020-11-23 | End: 2020-11-26 | Stop reason: HOSPADM

## 2020-11-23 RX ORDER — SUCRALFATE 1 G/1
1 TABLET ORAL EVERY 12 HOURS SCHEDULED
Status: DISCONTINUED | OUTPATIENT
Start: 2020-11-23 | End: 2020-11-26 | Stop reason: HOSPADM

## 2020-11-23 RX ORDER — CLONAZEPAM 1 MG/1
1 TABLET ORAL 3 TIMES DAILY PRN
Status: DISCONTINUED | OUTPATIENT
Start: 2020-11-23 | End: 2020-11-26 | Stop reason: HOSPADM

## 2020-11-23 RX ORDER — ALBUTEROL SULFATE 90 UG/1
1 AEROSOL, METERED RESPIRATORY (INHALATION) EVERY 4 HOURS PRN
Status: DISCONTINUED | OUTPATIENT
Start: 2020-11-23 | End: 2020-11-23

## 2020-11-23 RX ADMIN — LORAZEPAM 0.5 MG: 0.5 TABLET ORAL at 11:50

## 2020-11-23 RX ADMIN — MONTELUKAST 10 MG: 10 TABLET, FILM COATED ORAL at 20:07

## 2020-11-23 RX ADMIN — BACLOFEN 10 MG: 10 TABLET ORAL at 20:07

## 2020-11-23 RX ADMIN — MONTELUKAST 10 MG: 10 TABLET, FILM COATED ORAL at 00:22

## 2020-11-23 RX ADMIN — LORAZEPAM 0.5 MG: 0.5 TABLET ORAL at 20:06

## 2020-11-23 RX ADMIN — OXYCODONE HYDROCHLORIDE AND ACETAMINOPHEN 1 TABLET: 5; 325 TABLET ORAL at 20:06

## 2020-11-23 RX ADMIN — BACLOFEN 10 MG: 10 TABLET ORAL at 15:27

## 2020-11-23 RX ADMIN — ENOXAPARIN SODIUM 40 MG: 100 INJECTION SUBCUTANEOUS at 10:02

## 2020-11-23 RX ADMIN — MORPHINE SULFATE 2 MG: 2 INJECTION, SOLUTION INTRAMUSCULAR; INTRAVENOUS at 04:34

## 2020-11-23 RX ADMIN — POTASSIUM CHLORIDE 10 MEQ: 7.46 INJECTION, SOLUTION INTRAVENOUS at 06:08

## 2020-11-23 RX ADMIN — OXYCODONE HYDROCHLORIDE AND ACETAMINOPHEN 1 TABLET: 5; 325 TABLET ORAL at 15:30

## 2020-11-23 RX ADMIN — FOLIC ACID 1 MG: 1 TABLET ORAL at 10:02

## 2020-11-23 RX ADMIN — FERROUS GLUCONATE TAB 324 MG (37.5 MG ELEMENTAL IRON) 324 MG: 324 (37.5 FE) TAB at 10:02

## 2020-11-23 RX ADMIN — BACLOFEN 10 MG: 10 TABLET ORAL at 00:22

## 2020-11-23 RX ADMIN — ATORVASTATIN CALCIUM 40 MG: 40 TABLET, FILM COATED ORAL at 00:22

## 2020-11-23 RX ADMIN — FAMOTIDINE 40 MG: 20 TABLET, FILM COATED ORAL at 00:22

## 2020-11-23 RX ADMIN — METOPROLOL SUCCINATE 25 MG: 25 TABLET, EXTENDED RELEASE ORAL at 10:02

## 2020-11-23 RX ADMIN — Medication 100 MG: at 10:02

## 2020-11-23 RX ADMIN — SUCRALFATE 1 G: 1 TABLET ORAL at 11:53

## 2020-11-23 RX ADMIN — BUSPIRONE HYDROCHLORIDE 10 MG: 10 TABLET ORAL at 20:06

## 2020-11-23 RX ADMIN — AMLODIPINE BESYLATE 10 MG: 10 TABLET ORAL at 10:01

## 2020-11-23 RX ADMIN — SODIUM CHLORIDE, PRESERVATIVE FREE 10 ML: 5 INJECTION INTRAVENOUS at 20:06

## 2020-11-23 RX ADMIN — Medication 3 MG: at 01:04

## 2020-11-23 RX ADMIN — DULOXETINE HYDROCHLORIDE 60 MG: 30 CAPSULE, DELAYED RELEASE ORAL at 10:01

## 2020-11-23 RX ADMIN — GUAIFENESIN 600 MG: 600 TABLET, EXTENDED RELEASE ORAL at 20:06

## 2020-11-23 RX ADMIN — ASPIRIN 81 MG CHEWABLE TABLET 81 MG: 81 TABLET CHEWABLE at 10:02

## 2020-11-23 RX ADMIN — BUDESONIDE AND FORMOTEROL FUMARATE DIHYDRATE 1 PUFF: 160; 4.5 AEROSOL RESPIRATORY (INHALATION) at 19:53

## 2020-11-23 RX ADMIN — POTASSIUM CHLORIDE 10 MEQ: 7.46 INJECTION, SOLUTION INTRAVENOUS at 03:56

## 2020-11-23 RX ADMIN — SODIUM CHLORIDE, PRESERVATIVE FREE 10 ML: 5 INJECTION INTRAVENOUS at 10:03

## 2020-11-23 RX ADMIN — LEVOTHYROXINE SODIUM 125 MCG: 125 TABLET ORAL at 10:02

## 2020-11-23 RX ADMIN — POTASSIUM CHLORIDE 10 MEQ: 7.46 INJECTION, SOLUTION INTRAVENOUS at 05:05

## 2020-11-23 RX ADMIN — LORAZEPAM 0.5 MG: 0.5 TABLET ORAL at 00:34

## 2020-11-23 RX ADMIN — BUDESONIDE AND FORMOTEROL FUMARATE DIHYDRATE 1 PUFF: 160; 4.5 AEROSOL RESPIRATORY (INHALATION) at 09:35

## 2020-11-23 RX ADMIN — ATORVASTATIN CALCIUM 40 MG: 40 TABLET, FILM COATED ORAL at 20:06

## 2020-11-23 RX ADMIN — POTASSIUM CHLORIDE 10 MEQ: 7.46 INJECTION, SOLUTION INTRAVENOUS at 07:21

## 2020-11-23 RX ADMIN — GUAIFENESIN 600 MG: 600 TABLET, EXTENDED RELEASE ORAL at 00:22

## 2020-11-23 RX ADMIN — MORPHINE SULFATE 2 MG: 2 INJECTION, SOLUTION INTRAMUSCULAR; INTRAVENOUS at 00:22

## 2020-11-23 RX ADMIN — BUSPIRONE HYDROCHLORIDE 10 MG: 10 TABLET ORAL at 10:01

## 2020-11-23 RX ADMIN — BUSPIRONE HYDROCHLORIDE 10 MG: 10 TABLET ORAL at 15:27

## 2020-11-23 RX ADMIN — FERROUS GLUCONATE TAB 324 MG (37.5 MG ELEMENTAL IRON) 324 MG: 324 (37.5 FE) TAB at 18:05

## 2020-11-23 RX ADMIN — SUCRALFATE 1 G: 1 TABLET ORAL at 20:12

## 2020-11-23 RX ADMIN — GUAIFENESIN 600 MG: 600 TABLET, EXTENDED RELEASE ORAL at 10:01

## 2020-11-23 RX ADMIN — MAGNESIUM GLUCONATE 500 MG ORAL TABLET 400 MG: 500 TABLET ORAL at 10:02

## 2020-11-23 RX ADMIN — BACLOFEN 10 MG: 10 TABLET ORAL at 10:02

## 2020-11-23 RX ADMIN — PANTOPRAZOLE SODIUM 40 MG: 40 TABLET, DELAYED RELEASE ORAL at 11:50

## 2020-11-23 RX ADMIN — FLUTICASONE PROPIONATE 2 SPRAY: 50 SPRAY, METERED NASAL at 10:03

## 2020-11-23 RX ADMIN — SODIUM CHLORIDE, PRESERVATIVE FREE 10 ML: 5 INJECTION INTRAVENOUS at 00:25

## 2020-11-23 ASSESSMENT — PAIN DESCRIPTION - FREQUENCY
FREQUENCY: INTERMITTENT
FREQUENCY: CONTINUOUS
FREQUENCY: CONTINUOUS

## 2020-11-23 ASSESSMENT — PAIN DESCRIPTION - LOCATION
LOCATION: ABDOMEN

## 2020-11-23 ASSESSMENT — PAIN DESCRIPTION - PAIN TYPE
TYPE: ACUTE PAIN

## 2020-11-23 ASSESSMENT — PAIN SCALES - GENERAL
PAINLEVEL_OUTOF10: 5
PAINLEVEL_OUTOF10: 6
PAINLEVEL_OUTOF10: 6
PAINLEVEL_OUTOF10: 0
PAINLEVEL_OUTOF10: 0
PAINLEVEL_OUTOF10: 5

## 2020-11-23 ASSESSMENT — PAIN DESCRIPTION - ONSET
ONSET: ON-GOING

## 2020-11-23 ASSESSMENT — PAIN DESCRIPTION - ORIENTATION
ORIENTATION: UPPER
ORIENTATION: UPPER
ORIENTATION: MID;UPPER

## 2020-11-23 ASSESSMENT — PAIN DESCRIPTION - PROGRESSION
CLINICAL_PROGRESSION: NOT CHANGED

## 2020-11-23 ASSESSMENT — PAIN DESCRIPTION - DESCRIPTORS
DESCRIPTORS: DISCOMFORT;ACHING
DESCRIPTORS: TIGHTNESS
DESCRIPTORS: TIGHTNESS

## 2020-11-23 NOTE — PROGRESS NOTES
Hospitalist Progress Note      Name:  Westley Yanez /Age/Sex: 1960  (64 y.o. female)   MRN & CSN:  [id number] & [id number] Admission Date/Time: 2020  7:48 PM   Location:  [address] [place] [address] PCP: Jose M Donnelly MD         Hospital Day: 2    Assessment and Plan:   Westley Yanez is a 64 y.o.  female  who presents with chest pain. --Chest/epigastric pain  Serial troponin negative. No acute ischemic change on ECG. CTA chest negative for PE. She has epigastric tenderness. Plan  Echocardiogram and stress test.  Cardiology to decide further cardiac work-up after echo. Will get GI consultation to explore possible esophageal/gastric cause of discomfort. PPI.    --Hypokalemia and hypomag-replaced    Pericardial effusion s/p pericardicentesis Oct 2020. No effusion seen on ECHO today. Fibromyalgia  COPD-stable  Hypertension-amlodipine, metoprolol  Hyperlipidemia-Lipitor  Chronic respiratory failure on oxygen 3 L/min  Hypothyroidism-Synthroid  Anxiety  Iron deficiency-ferrous gluconate    Diet Diet NPO, After Midnight   DVT Prophylaxis [] Lovenox, []  Heparin, [] SCDs, [] Ambulation   GI Prophylaxis [] PPI,  [] H2 Blocker,  [] Carafate,  [] Diet/Tube Feeds   Code Status Full Code   Disposition  Home   MDM      History of Present Illness:   Patient seen and examined. She still endorses epigastric discomfort, 7 out of 10. She informs me she has a longstanding history of acid reflux for which she sees Dr. Alvin Gerardo. Last visit was over a year ago. Ten point ROS reviewed negative, unless as noted above    Objective: Intake/Output Summary (Last 24 hours) at 2020 0909  Last data filed at 2020 2117  Gross per 24 hour   Intake 990 ml   Output --   Net 990 ml      Vitals:   Vitals:    20 0308   BP: (!) 98/48   Pulse: 69   Resp: 11   Temp: 97.7 °F (36.5 °C)   SpO2: 98%     Physical Exam:   GEN Awake female, sitting upright in bed.   RESP breathing comfortably on supplemental oxygen 3 L/min via nasal cannula. CARDIO/VASC S1/S2 auscultated. Regular rate. No peripheral edema. GI Abdomen is soft with epigastric tenderness, no masses, or guarding. Bowel sounds are normoactive. MSK No gross joint deformities. SKIN Normal coloration, warm, dry. NEURO normal speech, no lateralizing weakness. PSYCH Awake, alert, oriented x 3.      Medications:   Medications:    busPIRone  10 mg Oral TID    potassium chloride  10 mEq Intravenous Once    pantoprazole  40 mg Oral QAM AC    amLODIPine  10 mg Oral Daily    aspirin  81 mg Oral Daily    baclofen  10 mg Oral TID    budesonide-formoterol  1 puff Inhalation BID    DULoxetine  60 mg Oral Daily    ferrous gluconate  324 mg Oral BID WC    fluticasone  2 spray Nasal Daily    folic acid  1 mg Oral Daily    guaiFENesin  600 mg Oral BID    levothyroxine  125 mcg Oral Daily    montelukast  10 mg Oral Daily    metoprolol succinate  25 mg Oral Daily    thiamine  100 mg Oral Daily    sodium chloride flush  10 mL Intravenous BID    atorvastatin  40 mg Oral Nightly    enoxaparin  40 mg Subcutaneous Daily    magnesium oxide  400 mg Oral Daily      Infusions:   PRN Meds: clonazePAM, 1 mg, TID PRN  ipratropium, 2 puff, Q4H PRN    And  albuterol sulfate HFA, 2 puff, Q4H PRN  sodium chloride flush, 10 mL, PRN  acetaminophen, 650 mg, Q6H PRN    Or  acetaminophen, 650 mg, Q6H PRN  polyethylene glycol, 17 g, Daily PRN  promethazine, 12.5 mg, Q6H PRN    Or  ondansetron, 4 mg, Q6H PRN  LORazepam, 0.5 mg, Q6H PRN  morphine, 2 mg, Q4H PRN  potassium chloride, 40 mEq, PRN    Or  potassium alternative oral replacement, 40 mEq, PRN    Or  potassium chloride, 10 mEq, PRN  magnesium sulfate, 1 g, PRN        CBC   Recent Labs     11/22/20 1954 11/23/20  0042   WBC 7.1 6.8   HGB 9.7* 9.1*   HCT 33.5* 32.2*    342      BMP   Recent Labs     11/22/20 1954 11/23/20 0039 11/23/20 0042     --  137   K 2.5* 3.5 3.3*   CL 93*  --  95*   CO2 33*  -- 28   BUN 4*  --  3*   CREATININE 0.9  --  0.7       Radiology report reviewed     Electronically signed by Barby Spicer MD on 11/23/2020 at 9:09 AM

## 2020-11-23 NOTE — ED TRIAGE NOTES
Ems stated that she started having chest pain this morning, felt like she had to burp but cannot \"burp\". 12 lead done by EMS,  given by EMS. PT had gall bladder removal 10 days ago , pt had fluid removed off her heart after her surgery.

## 2020-11-23 NOTE — CARE COORDINATION
Chart reviewed:  Pt lives with family. Pt is independent of all ADLs. Pt has a PCP and has insurance to help with meds. No needs identified at this time. CM available if needed.

## 2020-11-23 NOTE — CONSULTS
Name:  Tiffanie Sparks /Age/Sex: 1960  (64 y.o. female)   MRN & CSN:  [id number] & [id number] Admission Date/Time: 2020  7:48 PM   Location:  TR01/01TR-01 PCP: Fernando Waters, [facility] Day: 1          Referring physician:  Shira Meadows MD         Reason for consultation:  cp        Thanks for referral.    Information source: patient    CC;  cp      HPI:   Thank you for involving me in taking  care of Tiffanie Sparks who  is a 64 y. o.year  Old female  Presents with  H/o recent pericardiocentesis,      Now with recurrent lt sided cp with mild sob had RLL pna as well. CT shows recurrent pericardial effusion                Past medical history:    has a past medical history of Anxiety, Arthritis, Back pain at L4-L5 level, Bipolar 1 disorder (HCC), COPD (chronic obstructive pulmonary disease) ([facility] [address] Emphysema of lung ([facility] [address] FH: CAD (coronary artery disease), Fibromyalgia, H/O Doppler ultrasound, H/O echocardiogram, History of blood transfusion, Hyperlipidemia LDL goal <100, Hypertension, Obstructive sleep apnea, Osteoarthritis, and Thyroid disease. Past surgical history:   has a past surgical history that includes Carpal tunnel release; Tubal ligation; back surgery; Cardiac catheterization; Colonoscopy (N/A, 2019); and Cholecystectomy, laparoscopic (N/A, 10/25/2020). Social History:   reports that she quit smoking about 12 years ago. She has a 30.00 pack-year smoking history. She has never used smokeless tobacco. She reports previous alcohol use of about 2.0 standard drinks of alcohol per week. She reports that she does not use drugs. Family history:  family history includes No Known Problems in her father and mother.     Allergies   Allergen Reactions    Lisinopril Swelling and Rash     angioedema       albuterol sulfate  (90 Base) MCG/ACT inhaler 2 puff, Once  morphine sulfate (PF) injection 2 mg, Q30 Min PRN  ondansetron (ZOFRAN) injection 4 mg, Q30 Min cyanosis    Vascular exam:  Pedal Pulses: palp  bilaterally        Abdomen:  No masses or tenderness. No organomegaly noted. Neurological:  Oriented to time, place, and person   No focal neurological deficit noted. Psychiatric:normal mood, no anxiety    Lab Review   Recent Labs     11/22/20 1954   WBC 7.1   HGB 9.7*   HCT 33.5*         Recent Labs     11/22/20 1954      K 2.5*   CL 93*   CO2 33*   BUN 4*   CREATININE 0.9     Recent Labs     11/22/20 1954   AST 17   ALT 13   BILITOT 0.3   ALKPHOS 134*     No results for input(s): TROPONINI in the last 72 hours.   No results found for: BNP  Lab Results   Component Value Date    INR 1.09 10/26/2020    PROTIME 13.2 10/26/2020           Assessment/Recommendations:     - CP; swerial trops, EKGS, Echo  - cardiolite if echo has no new issue  - hyperlipidim,ea on statin         Lela Koenig MD, 11/22/2020 10:32 PM

## 2020-11-23 NOTE — CONSULTS
[facility] Gastroenterology  Gastroenterology Consultation    2020  8:41 AM    Patient:    Merari Delaney  : 1960   64 y.o. MRN: [id number]  Admitted: 2020  7:48 PM ATT: Ayde Hui MD   [id number]  AdmitDx: Hypokalemia [E87.6]  Hypomagnesemia [E83.42]  Nausea [R11.0]  Pericardial effusion [I31.3]  Chest pain [R07.9]  Abdominal pain, unspecified abdominal location [R10.9]  Chest pain, unspecified type [R07.9]  PCP: Zhou Mcdowell MD    Reason for Consult:  Epigastric pain     Requesting Physician:  Ayde Hui MD      History Obtained From:  Patient and review of all records    HISTORY OF PRESENT ILLNESS:                The patient is a 64 y.o. female with significant past medical history as below who presents with above mentioned causes in reason for consult. Presented to [facility] for epigastric pain. Was recently admitted last month with heart, and pnemonia. Has had epigastric pain for years, but has gotten worse. No melena or brbpr. Takes pepcid 20 mg BID for acid reflux. Since lap afshin has had upset stomach. Nausea most days, no vomiting, Has lost weight unsure of amount. Patient poor historian. Denies, GERD, dyspepsia, dysphagia   Last BM yesterday    History of EGD 2018 Chronic gastritis.  Biopsied, path negative, no h.pylori   History of colonoscopy 2019 Moderate left sided diverticulos, Grade II hemorrhoids     ASA 81 mg    Denies NSAID's  NO Anti-platelet therapy     Past Smoker  Occasional Alcohol intake in the past on the weekends, denies current alcohol use in the past 3 months    Past Medical History:        Diagnosis Date    Anxiety 2017    Arthritis     Back pain at L4-L5 level 2017    Dr [facility] Bipolar 1 disorder ([facility] [address]     COPD (chronic obstructive pulmonary disease) ([facility] [address]     Emphysema of lung ([facility] [address]     FH: CAD (coronary artery disease) 2017    Father had in his forties, sister in her thirties     Fibromyalgia 2/16/2017    H/O Doppler ultrasound 04/05/2019    venous- no DVT or reflux    H/O echocardiogram 01/14/2015    EF50-55% Normal- see media    History of blood transfusion     Hyperlipidemia LDL goal <100     Hypertension     Obstructive sleep apnea     Osteoarthritis     Thyroid disease        Past Surgical History:        Procedure Laterality Date    BACK SURGERY      CARDIAC CATHETERIZATION      10/2019    CARPAL TUNNEL RELEASE      CHOLECYSTECTOMY, LAPAROSCOPIC N/A 10/25/2020    CHOLECYSTECTOMY LAPAROSCOPIC WITH IOC performed by Julio Galloway MD at [place] [address] COLONOSCOPY N/A 12/12/2019    COLONOSCOPY DIAGNOSTIC performed by Anthony Carrera MD at [address] [place] [address]           Current Medications:    Medications    Scheduled Medications:    busPIRone  10 mg Oral TID    potassium chloride  10 mEq Intravenous Once    pantoprazole  40 mg Oral QAM AC    amLODIPine  10 mg Oral Daily    aspirin  81 mg Oral Daily    baclofen  10 mg Oral TID    budesonide-formoterol  1 puff Inhalation BID    DULoxetine  60 mg Oral Daily    ferrous gluconate  324 mg Oral BID WC    fluticasone  2 spray Nasal Daily    folic acid  1 mg Oral Daily    guaiFENesin  600 mg Oral BID    levothyroxine  125 mcg Oral Daily    montelukast  10 mg Oral Daily    metoprolol succinate  25 mg Oral Daily    thiamine  100 mg Oral Daily    sodium chloride flush  10 mL Intravenous BID    atorvastatin  40 mg Oral Nightly    enoxaparin  40 mg Subcutaneous Daily    magnesium oxide  400 mg Oral Daily     PRN Medications: clonazePAM, albuterol sulfate HFA **AND** ipratropium, sodium chloride flush, acetaminophen **OR** acetaminophen, polyethylene glycol, promethazine **OR** ondansetron, LORazepam, morphine, potassium chloride **OR** potassium alternative oral replacement **OR** potassium chloride, magnesium sulfate      Allergies:  Lisinopril    Social History:   TOBACCO:   reports that she quit smoking about 12 years ago. She has a 30.00 pack-year smoking history. She has never used smokeless tobacco.  ETOH:   reports previous alcohol use of about 2.0 standard drinks of alcohol per week. Family History:       Problem Relation Age of Onset    No Known Problems Mother     No Known Problems Father        No family history of colon cancer, Crohn's disease, or ulcerative colitis. REVIEW OF SYSTEMS:    The positive ROS will be identified in bold, otherwise ROS are negative     CONSTITUTIONAL:  Neg   Recent weight changes, fatigue, fever, chills or night sweats  MOUTH/THROAT:  Neg  bleeding gums, hoarseness or sore throat. RESPIRATORY:   Neg SOB, wheeze, cough, sputum, hemoptysis or bronchitis  CARDIOVASCULAR:  Neg chest pain, palpitations, dyspnea on exertion, orthopnea, paroxysmal nocturnal dyspnea or edema  GASTROINTESTINAL:  SEE HPI  HEMATOLOGIC/LYMPHATIC:  Neg  Anemia, bleeding tendency  MUSCULOSKELETAL:    New myalgias, bone pain, joint pain, swelling or stiffness and has had change in gait. NEUROLOGICAL:  Neg  Loss of Consciousness, memory loss, forgetfulness, periods of confusion, difficulty concentrating, seizures, decline in intellect, nervousness, insomina, aphasia or dysarthria. SKIN:  Neg  skin or hair changes, and has no itching, rashes, or sores. PSYCHIATRIC:  Neg depression, personality changes, anxiety. ENDOCRINE:  Neg polydipsia, polyuria, abnormal weight changes, heat /cold intolerance.   ALL/IMM:  Neg reactions to drugs other than listed    PHYSICAL EXAM:      Vitals:    BP (!) 98/48   Pulse 69   Temp 97.7 °F (36.5 °C) (Oral)   Resp 11   Ht 5' 1\" (1.549 m)   Wt 168 lb (76.2 kg)   SpO2 98%   BMI 31.74 kg/m²     General Appearance:    Alert, cooperative, no distress, appears stated age   HEENT:    Normocephalic, atraumatic, Conjunctiva clear   Neck:   Supple, symmetrical, trachea midline   Lungs:     Clear to auscultation bilaterally, respirations unlabored   Chest Wall: No tenderness or deformity    Heart:    Regular rate and rhythm, S1 and S2 normal   Abdomen:     Soft, epigastric tenderness, bowel sounds active all four quadrants,     no masses, no organomegaly, no ascites    Rectal:    Deferred   Extremities:   Extremities normal, atraumatic, no cyanosis or edema   Pulses:   2+ and symmetric all extremities   Skin:   Skin color, texture, turgor normal, no rashes or lesions       Neurologic:   No focal deficits, moving all four extremities            DATA:    ABGs:   Lab Results   Component Value Date    PO2ART 62 10/26/2020    VBE3IWH 80.0 10/26/2020     CBC:   Recent Labs     11/22/20 1954 11/23/20 0042   WBC 7.1 6.8   HGB 9.7* 9.1*    342     BMP:    Recent Labs     11/22/20 1954 11/23/20 0039 11/23/20 0042     --  137   K 2.5* 3.5 3.3*   CL 93*  --  95*   CO2 33*  --  28   BUN 4*  --  3*   CREATININE 0.9  --  0.7   GLUCOSE 100*  --  103*     Magnesium:   Lab Results   Component Value Date    MG 2.1 11/23/2020     Hepatic:   Recent Labs     11/22/20 1954   AST 17   ALT 13   BILITOT 0.3   ALKPHOS 134*     Recent Labs     11/22/20 1954   LIPASE 17     No results for input(s): PROTIME, INR in the last 72 hours. No results for input(s): PTT in the last 72 hours. Lipids:   Recent Labs     11/23/20 0042   CHOL 166   HDL 59     INR: No results for input(s): INR in the last 72 hours.   TSH: No results found for: TSH    Intake/Output Summary (Last 24 hours) at 11/23/2020 0841  Last data filed at 11/22/2020 2117  Gross per 24 hour   Intake 990 ml   Output --   Net 990 ml         Imaging Studies: reviewed      IMPRESSION:      Patient Active Problem List   Diagnosis Code    Centrilobular emphysema ([facility] [address] J43.2    Hypertension I10    Hyperlipidemia LDL goal <100 E78.5    Abnormal EKG R94.31    Palpitations R00.2    Anxiety F41.9    FH: CAD (coronary artery disease) Z82.49    Fibromyalgia M79.7    Back pain at L4-L5 level M54.5    Sleep apnea G47.30    .    The above note has been reviewed and I agree with the Assessment,  Diagnosis, and Treatment plan as suggested by Kellie Diaz CNP      [address] [place] [address] gastroenterology

## 2020-11-23 NOTE — CONSULTS
Department of Cardiovascular & Thoracic Surgery   Consult Note    Reason for Consult:  Recurrent pericardial effusion    Requesting Physician: Dr. Claire Renae    Date of Consult: 11/23/20      History Obtained From:  patient     HISTORY OF PRESENT ILLNESS:    The patient is a 64 y.o. female who presents with epigastric abdominal pain, chest pain, and SOB. She states she has been having these symptoms for several days. She was admitted a couple of weeks ago with similar problems. She had a large pericardial effusion and underwent pericardiocentesis with 500 cc removed. She also had a lap afshin at that time. She was also treated for RLL pneumonia. She states she always has SOB due to her COPD. She wears 3L NC at home. She denies any leg swelling. Denies fever or chills.      Past Medical History:        Diagnosis Date    Anxiety 2/16/2017    Arthritis     Back pain at L4-L5 level 2/16/2017    Dr Diana Waters   [facility] Bipolar 1 disorder ([address]     COPD (chronic obstructive pulmonary disease) ([facility] [address]     Emphysema of lung ([facility] [address]     FH: CAD (coronary artery disease) 2/16/2017    Father had in his forties, sister in her thirties    [facility] Fibromyalgia 2/16/2017    H/O Doppler ultrasound 04/05/2019    venous- no DVT or reflux    H/O echocardiogram 01/14/2015    EF50-55% Normal- see media    History of blood transfusion     Hyperlipidemia LDL goal <100     Hypertension     Obstructive sleep apnea     Osteoarthritis     Thyroid disease      Past Surgical History:        Procedure Laterality Date    BACK SURGERY      CARDIAC CATHETERIZATION      10/2019    CARPAL TUNNEL RELEASE      CHOLECYSTECTOMY, LAPAROSCOPIC N/A 10/25/2020    CHOLECYSTECTOMY LAPAROSCOPIC WITH IOC performed by Mariia Snyder MD at [place] [address] COLONOSCOPY N/A 12/12/2019    COLONOSCOPY DIAGNOSTIC performed by Mitcheal Ormond, MD at [address] [place] [address]       Current Medications:   Current Facility-Administered Medications: busPIRone (BUSPAR) tablet 10 PRN  promethazine (PHENERGAN) tablet 12.5 mg, 12.5 mg, Oral, Q6H PRN **OR** ondansetron (ZOFRAN) injection 4 mg, 4 mg, Intravenous, Q6H PRN  atorvastatin (LIPITOR) tablet 40 mg, 40 mg, Oral, Nightly  enoxaparin (LOVENOX) injection 40 mg, 40 mg, Subcutaneous, Daily  LORazepam (ATIVAN) tablet 0.5 mg, 0.5 mg, Oral, Q6H PRN  morphine (PF) injection 2 mg, 2 mg, Intravenous, Q4H PRN  potassium chloride (KLOR-CON M) extended release tablet 40 mEq, 40 mEq, Oral, PRN **OR** potassium bicarb-citric acid (EFFER-K) effervescent tablet 40 mEq, 40 mEq, Oral, PRN **OR** potassium chloride 10 mEq/100 mL IVPB (Peripheral Line), 10 mEq, Intravenous, PRN  magnesium sulfate 1 g in dextrose 5% 100 mL IVPB, 1 g, Intravenous, PRN  magnesium oxide (MAG-OX) tablet 400 mg, 400 mg, Oral, Daily  Allergies:     Allergies   Allergen Reactions    Lisinopril Swelling and Rash     angioedema       Social History:   Social History     Socioeconomic History    Marital status:      Spouse name: Not on file    Number of children: Not on file    Years of education: Not on file    Highest education level: Not on file   Occupational History    Not on file   Social Needs    Financial resource strain: Not on file    Food insecurity     Worry: Not on file     Inability: Not on file    Transportation needs     Medical: Not on file     Non-medical: Not on file   Tobacco Use    Smoking status: Former Smoker     Packs/day: 1.50     Years: 20.00     Pack years: 30.00     Last attempt to quit: 2008     Years since quittin.9    Smokeless tobacco: Never Used   Substance and Sexual Activity    Alcohol use: Not Currently     Alcohol/week: 2.0 standard drinks     Types: 2 Shots of liquor per week    Drug use: No    Sexual activity: Yes     Partners: Male   Lifestyle    Physical activity     Days per week: Not on file     Minutes per session: Not on file    Stress: Not on file   Relationships    Social connections     Talks on phone: Not on file     Gets together: Not on file     Attends Muslim service: Not on file     Active member of club or organization: Not on file     Attends meetings of clubs or organizations: Not on file     Relationship status: Not on file    Intimate partner violence     Fear of current or ex partner: Not on file     Emotionally abused: Not on file     Physically abused: Not on file     Forced sexual activity: Not on file   Other Topics Concern    Not on file   Social History Narrative    Not on file       Family History:        Problem Relation Age of Onset    No Known Problems Mother     No Known Problems Father        REVIEW OF SYSTEMS:  Constitutional: - fatigue, - fever, - chills, - night sweats  Eyes: - vision loss  Cardiovascular: +  chest pain, - palpitations, - leg swelling, - leg pain   Respiratory: - cough, + shortness of breath, - wheezing   GI: - nausea, - vomiting, - abdominal pain, - constipation, - diarrhea   : - dysuria   MSK: - joint pain, - muscle pain  Integument: - rash, - skin color change   Heme: - easy bruising or bleeding  Neurologic: - headache, - weakness, - dizziness, - paresthesias       EXAM:  Constitutional: Blood pressure (!) 98/48, pulse 69, temperature 97.7 °F (36.5 °C), temperature source Oral, resp. rate 11, height 5' 1\" (1.549 m), weight 168 lb (76.2 kg), SpO2 98 %, not currently breastfeeding. No apparent distress, appears stated age and cooperative. Neurologic: follows commands, no focal weakness noted   Lungs: Good respiratory effort. Clear to auscultation,   CV: Regular rate/ rhythm , no peripheral edema, feet warm and well perfused no JVD  GI: Soft, non-tender in all four quadrants, non-distended, + bowel sounds, liver and spleen no palpable masses  : bladder nondistended   MSK: no obvious deformity   Skin: warm, pink and dry       DATA:  Echo pending.    CT reviewed, stable small pericardial effusion noted     IMPRESSION  Patient Active Problem List   Diagnosis    Centrilobular emphysema (HCC)    Hypertension    Hyperlipidemia LDL goal <100    Abnormal EKG    Palpitations    Anxiety    FH: CAD (coronary artery disease)    Fibromyalgia    Back pain at L4-L5 level    Sleep apnea    COPD exacerbation (HCC)    Electrolyte imbalance    Epigastric pain    COPD (chronic obstructive pulmonary disease) (HCC)    Spinal stenosis of lumbar region with radiculopathy    Spinal stenosis, lumbar region, without neurogenic claudication    COPD with acute exacerbation (HCC)    Pneumonia due to infectious organism    SVT (supraventricular tachycardia) (HCC)    Class 1 obesity due to excess calories with body mass index (BMI) of 31.0 to 31.9 in adult    Pneumonia    Pleural effusion    Atelectasis    Pulmonary nodules    Respiratory failure with hypoxia and hypercapnia (HCC)    Anemia    COPD with exacerbation (HCC)    Acquired hemolytic anemia, unspecified (HCC)    Leucocytosis    Chronic kidney disease, stage I    Hyponatremia    PNA (pneumonia)    Sepsis (HCC)    Pericardial effusion    Acute acalculous cholecystitis    SIRS (systemic inflammatory response syndrome) (HCC)    Chest pain       Pericardial effusion       RECOMMENDATIONS:  Await echo results. Clinically she does not appear to be in tamponade.        Dionicio Meza PA-C

## 2020-11-23 NOTE — ED PROVIDER NOTES
Kristan Tilley [address] [facility]      TRIAGE CHIEF COMPLAINT:   Chest Pain and Abdominal Pain      [place]:  Leona Zuleta is a 64 y.o. female that presents with complaint of chest pain abdominal pain nausea. Patient has a history of COPD she was in the hospital recently for cholecystectomy a few days ago as well as pericardial effusion with pericardiocentesis as well as pneumonia. History of COPD she wears 2 is all time states for the last day or so she is had worsening chest pain fairly constant denies any fevers she has had some shortness of breath and cough which is chronic for COPD has generalized abdominal pain belching bloating nausea denies any diarrhea or constipation no swelling no rash no other questions or concerns. REVIEW OF SYSTEMS:  At least 10 systems reviewed and otherwise acutely negative except as in the [address] Review of Systems   Constitutional: Positive for fatigue. HENT: Negative. Eyes: Negative. Respiratory: Positive for cough. Cardiovascular: Positive for chest pain. Gastrointestinal: Positive for abdominal pain and nausea. Endocrine: Negative. Genitourinary: Negative. Musculoskeletal: Negative. Skin: Negative. Allergic/Immunologic: Negative. Neurological: Negative. Hematological: Negative. Psychiatric/Behavioral: Negative. All other systems reviewed and are negative.       Past Medical History:   Diagnosis Date    Anxiety 2/16/2017    Arthritis     Back pain at L4-L5 level 2/16/2017    Dr Sarah Beth Payan   [facility] Bipolar 1 disorder ([facility] [address]     COPD (chronic obstructive pulmonary disease) ([facility] [address] [facility]     Emphysema of lung ([facility] [address]     FH: CAD (coronary artery disease) 2/16/2017    Father had in his forties, sister in her thirties    [facility] Fibromyalgia 2/16/2017    H/O Doppler ultrasound 04/05/2019    venous- no DVT or reflux    H/O echocardiogram 01/14/2015    EF50-55% Normal- see media    History of blood transfusion     Hyperlipidemia LDL goal <100     Hypertension     Obstructive sleep apnea     Osteoarthritis     Thyroid disease      Past Surgical History:   Procedure Laterality Date    BACK SURGERY      CARDIAC CATHETERIZATION      10/2019    CARPAL TUNNEL RELEASE      CHOLECYSTECTOMY, LAPAROSCOPIC N/A 10/25/2020    CHOLECYSTECTOMY LAPAROSCOPIC WITH IOC performed by Ayla Jiang MD at [place] [address] COLONOSCOPY N/A 2019    COLONOSCOPY DIAGNOSTIC performed by Yael Lawrence MD at [address] [place] [address]       Family History   Problem Relation Age of Onset    No Known Problems Mother     No Known Problems Father      Social History     Socioeconomic History    Marital status:      Spouse name: Not on file    Number of children: Not on file    Years of education: Not on file    Highest education level: Not on file   Occupational History    Not on file   Social Needs    Financial resource strain: Not on file    Food insecurity     Worry: Not on file     Inability: Not on file    Transportation needs     Medical: Not on file     Non-medical: Not on file   Tobacco Use    Smoking status: Former Smoker     Packs/day: 1.50     Years: 20.00     Pack years: 30.00     Last attempt to quit: 2008     Years since quittin.9    Smokeless tobacco: Never Used   Substance and Sexual Activity    Alcohol use: Not Currently     Alcohol/week: 2.0 standard drinks     Types: 2 Shots of liquor per week    Drug use: No    Sexual activity: Yes     Partners: Male   Lifestyle    Physical activity     Days per week: Not on file     Minutes per session: Not on file    Stress: Not on file   Relationships    Social connections     Talks on phone: Not on file     Gets together: Not on file     Attends Christianity service: Not on file     Active member of club or organization: Not on file     Attends meetings of clubs or organizations: Not on file     Relationship status: Not on file    Intimate partner violence     Fear of current or ex partner: Not on file     Emotionally abused: Not on file     Physically abused: Not on file     Forced sexual activity: Not on file   Other Topics Concern    Not on file   Social History Narrative    Not on file     Current Facility-Administered Medications   Medication Dose Route Frequency Provider Last Rate Last Dose    busPIRone (BUSPAR) tablet 10 mg  10 mg Oral TID BRIAN Desir CNP   10 mg at 11/23/20 1001    clonazePAM (KLONOPIN) tablet 1 mg  1 mg Oral TID PRN BRIAN Desir CNP        ipratropium (ATROVENT HFA) 17 MCG/ACT inhaler 2 puff  2 puff Inhalation Q4H PRN Dakota Chilel MD        And    albuterol sulfate  (90 Base) MCG/ACT inhaler 2 puff  2 puff Inhalation Q4H PRN Dakota Chilel MD        potassium chloride 10 mEq/100 mL IVPB (Peripheral Line)  10 mEq Intravenous Once BRIAN Desir CNP        pantoprazole (PROTONIX) tablet 40 mg  40 mg Oral QAM AC Roberto Ash MD   40 mg at 11/23/20 1150    [START ON 11/24/2020] lactobacillus (CULTURELLE) capsule 1 capsule  1 capsule Oral Daily with breakfast BRIAN Oseguera CNP        sucralfate (CARAFATE) tablet 1 g  1 g Oral 2 times per day BRIAN Oseguera CNP   1 g at 11/23/20 1153    oxyCODONE-acetaminophen (PERCOCET) 5-325 MG per tablet 1 tablet  1 tablet Oral Q4H PRN Roberto Ash MD        amLODIPine (NORVASC) tablet 10 mg  10 mg Oral Daily Dakota Chilel MD   10 mg at 11/23/20 1001    aspirin chewable tablet 81 mg  81 mg Oral Daily Jonny Prather MD   81 mg at 11/23/20 1002    baclofen (LIORESAL) tablet 10 mg  10 mg Oral TID Dakota Chilel MD   10 mg at 11/23/20 1002    budesonide-formoterol (SYMBICORT) 160-4.5 MCG/ACT inhaler 1 puff  1 puff Inhalation BID Dakota Chilel MD   1 puff at 11/23/20 0935    DULoxetine (CYMBALTA) extended release capsule 60 mg  60 mg Oral Daily Dakota Chilel MD   60 mg at 11/23/20 1001    ferrous gluconate 324 (37.5 Fe) MG tablet 324 mg  324 mg Oral BID  Jonny Prather, MD   324 mg at 11/23/20 1002    fluticasone (FLONASE) 50 MCG/ACT nasal spray 2 spray  2 spray Nasal Daily Render MD Pipe   2 spray at 12/14/67 7358    folic acid (FOLVITE) tablet 1 mg  1 mg Oral Daily Render MD Pipe   1 mg at 11/23/20 1002    guaiFENesin (MUCINEX) extended release tablet 600 mg  600 mg Oral BID Render MD Pipe   600 mg at 11/23/20 1001    levothyroxine (SYNTHROID) tablet 125 mcg  125 mcg Oral Daily Render MD Pipe   125 mcg at 11/23/20 1002    montelukast (SINGULAIR) tablet 10 mg  10 mg Oral Daily Render MD Pipe   10 mg at 11/23/20 0022    metoprolol succinate (TOPROL XL) extended release tablet 25 mg  25 mg Oral Daily Render MD Pipe   25 mg at 11/23/20 1002    vitamin B-1 (THIAMINE) tablet 100 mg  100 mg Oral Daily Render MD Pipe   100 mg at 11/23/20 1002    sodium chloride flush 0.9 % injection 10 mL  10 mL Intravenous BID Render MD Pipe   10 mL at 11/23/20 1003    sodium chloride flush 0.9 % injection 10 mL  10 mL Intravenous PRN Render MD Pipe        acetaminophen (TYLENOL) tablet 650 mg  650 mg Oral Q6H PRN Render MD Pipe        Or    acetaminophen (TYLENOL) suppository 650 mg  650 mg Rectal Q6H PRN Render MD Pipe        polyethylene glycol (GLYCOLAX) packet 17 g  17 g Oral Daily PRN Render MD Pipe        promethazine (PHENERGAN) tablet 12.5 mg  12.5 mg Oral Q6H PRN Render MD Pipe        Or    ondansetron (ZOFRAN) injection 4 mg  4 mg Intravenous Q6H PRN Render MD Pipe        atorvastatin (LIPITOR) tablet 40 mg  40 mg Oral Nightly Render MD Pipe   40 mg at 11/23/20 0022    enoxaparin (LOVENOX) injection 40 mg  40 mg Subcutaneous Daily Render MD Pipe   40 mg at 11/23/20 1002    LORazepam (ATIVAN) tablet 0.5 mg  0.5 mg Oral Q6H PRN Render MD Pipe   0.5 mg at 11/23/20 1150    potassium chloride (KLOR-CON M) extended release tablet 40 mEq  40 mEq Oral PRN Mark Betancur MD        Or    potassium bicarb-citric acid (EFFER-K) effervescent tablet 40 mEq  40 mEq Oral PRN Elaina Varela MD        Or   Dianna Demian potassium chloride 10 mEq/100 mL IVPB (Peripheral Line)  10 mEq Intravenous PRN Elaina Varela  mL/hr at 11/23/20 0356 10 mEq at 11/23/20 0356    magnesium sulfate 1 g in dextrose 5% 100 mL IVPB  1 g Intravenous PRN Elaina Varela MD        magnesium oxide (MAG-OX) tablet 400 mg  400 mg Oral Daily Elaina Varela MD   400 mg at 11/23/20 1002      Allergies   Allergen Reactions    Lisinopril Swelling and Rash     angioedema     Current Facility-Administered Medications   Medication Dose Route Frequency Provider Last Rate Last Dose    busPIRone (BUSPAR) tablet 10 mg  10 mg Oral TID BRIAN Desir CNP   10 mg at 11/23/20 1001    clonazePAM (KLONOPIN) tablet 1 mg  1 mg Oral TID PRN BRIAN Desir CNP        ipratropium (ATROVENT HFA) 17 MCG/ACT inhaler 2 puff  2 puff Inhalation Q4H PRN Elaina Varela MD        And    albuterol sulfate  (90 Base) MCG/ACT inhaler 2 puff  2 puff Inhalation Q4H PRN Elaina Varela MD        potassium chloride 10 mEq/100 mL IVPB (Peripheral Line)  10 mEq Intravenous Once BRIAN Desir CNP        pantoprazole (PROTONIX) tablet 40 mg  40 mg Oral QAM AC Lazaro Georges MD   40 mg at 11/23/20 1150    [START ON 11/24/2020] lactobacillus (CULTURELLE) capsule 1 capsule  1 capsule Oral Daily with breakfast BRIAN Grant CNP        sucralfate (CARAFATE) tablet 1 g  1 g Oral 2 times per day BRIAN Grant CNP   1 g at 11/23/20 1153    oxyCODONE-acetaminophen (PERCOCET) 5-325 MG per tablet 1 tablet  1 tablet Oral Q4H PRN Lazaro Georges MD        amLODIPine (NORVASC) tablet 10 mg  10 mg Oral Daily Elaina Varela MD   10 mg at 11/23/20 1001    aspirin chewable tablet 81 mg  81 mg Oral Daily Elaina Varela MD   81 mg at 11/23/20 1002    baclofen (LIORESAL) tablet 10 mg  10 mg Oral TID Elaina Varela MD   10 mg at 11/23/20 1002    budesonide-formoterol (SYMBICORT) 160-4.5 MCG/ACT inhaler 1 puff  1 puff Inhalation BID Normal pulses. Heart sounds: Normal heart sounds. No murmur. No friction rub. No gallop. Pulmonary:      Effort: Pulmonary effort is normal. No respiratory distress. Breath sounds: Normal breath sounds. No stridor. No wheezing, rhonchi or rales. Chest:      Chest wall: Tenderness present. Abdominal:      General: Bowel sounds are normal. There is no distension. Palpations: Abdomen is soft. There is no mass or pulsatile mass. Tenderness: There is abdominal tenderness in the epigastric area. There is no guarding or rebound. Negative signs include Soto's sign, Rovsing's sign and McBurney's sign. Hernia: No hernia is present. Musculoskeletal: Normal range of motion. General: Tenderness present. No swelling, deformity or signs of injury. Right lower leg: No edema. Left lower leg: No edema. Skin:     General: Skin is warm. Coloration: Skin is not jaundiced or pale. Findings: No bruising, erythema, lesion or rash. Neurological:      General: No focal deficit present. Mental Status: She is alert and oriented to person, place, and time. GCS: GCS eye subscore is 4. GCS verbal subscore is 5. GCS motor subscore is 6. Cranial Nerves: Cranial nerves are intact. No cranial nerve deficit, dysarthria or facial asymmetry. Sensory: Sensation is intact. No sensory deficit. Motor: Motor function is intact. No weakness, tremor, atrophy, abnormal muscle tone or seizure activity. Coordination: Coordination is intact. Coordination normal.   Psychiatric:         Mood and Affect: Mood normal.         Behavior: Behavior normal.         Thought Content:  Thought content normal.         Judgment: Judgment normal.           I have reviewed andinterpreted all of the currently available lab results from this visit (if applicable):         Radiographs (if obtained):  [] The following radiograph was interpreted by myself in the absence of a radiologist:  [x] Radiologist's Report Reviewed:    CXR, CTA CAP    Ct Abdomen Pelvis W Iv Contrast Additional Contrast? None    Result Date: 11/3/2020  EXAMINATION: CT OF THE ABDOMEN AND PELVIS WITH CONTRAST; CTA OF THE CHEST 11/3/2020 3:47 pm TECHNIQUE: CT of the abdomen and pelvis was performed with the administration of intravenous contrast. Multiplanar reformatted images are provided for review. Dose modulation, iterative reconstruction, and/or weight based adjustment of the mA/kV was utilized to reduce the radiation dose to as low as reasonably achievable.; CTA of the chest was performed after the administration of intravenous contrast.  Multiplanar reformatted images are provided for review. MIP images are provided for review. Dose modulation, iterative reconstruction, and/or weight based adjustment of the mA/kV was utilized to reduce the radiation dose to as low as reasonably achievable. COMPARISON: CT chest 07/12/2020 and CT abdomen and pelvis 10/19/2020 HISTORY: ORDERING SYSTEM PROVIDED HISTORY: Fever, diaphoresis and acute upper abdominal pain TECHNOLOGIST PROVIDED HISTORY: Reason for exam:->abdominal pain Additional Contrast?->None Reason for Exam: abd pain Acuity: Acute Type of Exam: Initial COPD, chronic kidney disease, prior cholecystectomy FINDINGS: Vasculature: Main pulmonary artery is within normal limits for size at 27 mm diameter. The pulmonary arteries are well opacified for the exam with no thromboembolic disease evident. Mild calcific atherosclerosis of the aorta and its branches. The ascending thoracic aorta is 27 mm diameter and descending thoracic aorta 20 mm diameter. No thoracic aorta aneurysm or dissection. Mediastinum: Small to moderate volume pericardial effusion. The heart size is normal.  Posterior mediastinal structures are unremarkable. Right paratracheal lymph node is 13 mm axial image 45, with another measuring 10 mm on image 35.   Upper mediastinal lymph node adjacent to the esophagus and trachea axial image 15 is 14 mm. Borderline enlarged right hilar lymph node is 9 mm short axis. No left hilar adenopathy evident. Lungs/pleura: Sequela of smoking and emphysema predominate in the upper lungs. Bilateral pleural effusion with prominent collection with a minor fissure on the right. New focal consolidative change with soft tissue nodule type appearance posterior basal segment right lower lobe is 16 x 23 mm axial image 94, not present prior study. Minimal subsegmental atelectasis medial left upper lobe. No ground-glass opacity or other consolidation or nodule evident. Organs: Mildly renal cortical atrophy, thinning, right kidney, craniocaudal renal length only 9 cm. 17 mm simple cyst inferior pole right kidney. Left kidney appears unremarkable. No suspicious renal lesion, nephrolithiasis or hydronephrosis. The adrenal glands, spleen and pancreas appear unremarkable. No discrete hepatic lesion evident. Normal appearing homogeneous attenuation throughout the liver. Interval cholecystectomy without CT evidence of abscess; minimal residual postsurgical fluid sequela at the gallbladder fossa within normal limits. GI/Bowel: Multifocal diverticula involve the descending and sigmoid colon without evidence of acute inflammatory change. No diffuse or focal bowel wall thickening evident. No inflammatory changes evident. No obstruction is seen. The appendix is visualized right lower quadrant, unremarkable in appearance. Pelvis: The uterus and adnexal structures appear unremarkable. Urinary bladder is partially filled, unremarkable appearance. No adenopathy. Small volume, simple appearing ascites. Peritoneum/Retroperitoneum: Trace ascites at the gallbladder fossa following cholecystectomy as expected for normal postsurgical change. No gas formation or loculated collection. No other ascites or pneumoperitoneum. Unremarkable appearance of the aorta and inferior vena cava.   No adenopathy. Bones/Soft Tissues: No acute superficial soft tissue or osseous structure abnormality evident. Degenerative changes of mild-to-moderate severity predominate L4-5 and L5-S1 with grade 1 degenerative anterolisthesis L4 on L5 and vacuum disc phenomena L4-5 and L5-S1.     1.  CTA CHEST: No pulmonary embolism. 2. Small area consolidative change posterior segment right lower lobe is new compared to prior CT abdomen 3 weeks ago likely related to focal pneumonia or atelectasis. 3. Nonspecific pericardial effusion; volume appears much smaller than on CT abdomen 3 weeks ago. 4. Nonspecific bilateral pleural effusion without distinct pulmonary vascular engorgement or other findings associated with congestive heart failure. 5.  Pulmonary sequela typical of that seen with smoking, including emphysema. 6. CT ABDOMEN/PELVIS: No CT evidence of an acute intra-abdominal or intrapelvic process. 7. Greatly diminished ascites compared with prior study, with only a small amount remaining in the pelvis. 8.  No findings to suggest acute appendicitis; no ureter calculus or hydronephrosis. 9. Mildly renal cortical atrophy right kidney with stable benign cyst inferior pole right kidney. 10.  Diverticulosis coli without CT evidence of acute diverticulitis. 11. Interval cholecystectomy without CT evidence of abscess; minimal residual postsurgical fluid sequela at the gallbladder fossa within normal limits. Fl Less Than 1 Hour    Result Date: 10/26/2020  EXAMINATION: SPOT FLUOROSCOPIC IMAGES 10/25/2020 2:16 pm COMPARISON: Abdomen and pelvis CT 10/19/2020 HISTORY: ORDERING SYSTEM PROVIDED HISTORY: cholecystitis TECHNOLOGIST PROVIDED HISTORY: Reason for exam:->cholecystitis Reason for Exam: cholangiogram, cholecystitis Acuity: Unknown Type of Exam: Initial Additional signs and symptoms: unknown Relevant Medical/Surgical History: none Intraprocedural imaging.  TECHNIQUE: Fluoroscopy was provided by the radiology department for procedure. Radiologist was not present during examination. FLUOROSCOPY DOSE AND TYPE OR TIME AND EXPOSURES: Fluoroscopy time 0.2 minute, DAP 1.85 Gy*cm2 FINDINGS: 3 spot images of the abdomen were obtained. New surgical clips related to laparoscopic cholecystectomy. Cannulation of the cystic duct remnant and instillation of contrast.  No evident ductal filling defects, strictures, nor dilation. Normal emptying of contrast into the duodenum. No evident spillage of contrast into the peritoneal cavity. Intraprocedural fluoroscopic spot images as above. See separate procedure note for more information. Xr Chest Portable    Result Date: 11/3/2020  EXAMINATION: ONE XRAY VIEW OF THE CHEST 11/3/2020 2:34 pm COMPARISON: 10/25/2020 HISTORY: ORDERING SYSTEM PROVIDED HISTORY: SOB TECHNOLOGIST PROVIDED HISTORY: Reason for exam:->SOB Reason for Exam: SOB FINDINGS: Cardiomegaly. Pulmonary vasculature within normal limits. Persistent airspace disease in the right lower lung. Left lung grossly clear. Multiple old left rib fractures noted     Persistent airspace disease in the right lower lung. Cardiomegaly with no evidence of pulmonary edema     Xr Chest Portable    Result Date: 10/25/2020  EXAMINATION: ONE XRAY VIEW OF THE CHEST 10/25/2020 4:25 am COMPARISON: 10/24/2020 HISTORY: ORDERING SYSTEM PROVIDED HISTORY: pneumonia, scheduled for OR TECHNOLOGIST PROVIDED HISTORY: Reason for exam:->pneumonia, scheduled for OR Reason for Exam: pneumonia, scheduled for OR Acuity: Acute Type of Exam: Ongoing FINDINGS: Cardiomediastinal silhouette is stable. Similar catheter projecting over the mediastinum. Increased density of airspace opacification in the right midlung. No pleural effusion or pneumothorax. No gross bony abnormality. Increased density of airspace opacification in the right midlung. Recommend imaging follow-up to resolution.      Xr Chest Portable    Result Date: 10/24/2020  EXAMINATION: ONE XRAY VIEW OF THE CHEST 10/24/2020 12:10 pm COMPARISON: 10/23/2020 HISTORY: ORDERING SYSTEM PROVIDED HISTORY: pericardiac drainage and pneumonia TECHNOLOGIST PROVIDED HISTORY: Reason for exam:->pericardiac drainage and pneumonia Reason for Exam: pericardiac drainage and pneumonia Acuity: Unknown Type of Exam: Initial Additional signs and symptoms: pain Relevant Medical/Surgical History: COPD FINDINGS: Stable pericardial drain. Stable cardiomegaly. Mild interstitial edema is unchanged. Small bilateral pleural effusions with adjacent atelectasis. No pneumothorax. No new focal consolidation. The osseous structures otherwise stable. Stable chest.  No interval change in interstitial edema and small bilateral pleural effusions. Vl Dup Lower Extremity Venous Bilateral    Result Date: 11/9/2020  EXAMINATION: DUPLEX VENOUS ULTRASOUND OF THE BILATERAL LOWER EXTREMITIES, 11/7/2020 11:47 am TECHNIQUE: Duplex ultrasound and Doppler images were obtained of the bilateral lower extremities COMPARISON: 03/07/2019 HISTORY: ORDERING SYSTEM PROVIDED HISTORY: right leg pain TECHNOLOGIST PROVIDED HISTORY: Reason for exam:->right leg pain Reason for Exam: right leg pain Type of Exam: Initial Additional signs and symptoms: nk Relevant Medical/Surgical History: nk FINDINGS: The visualized veins of the bilateral lower extremities are patent and free of echogenic thrombus. The veins are normally compressible and have normal phasic flow. No evidence of DVT in either lower extremity. Cta Pulmonary W Contrast    Result Date: 11/3/2020  EXAMINATION: CT OF THE ABDOMEN AND PELVIS WITH CONTRAST; CTA OF THE CHEST 11/3/2020 3:47 pm TECHNIQUE: CT of the abdomen and pelvis was performed with the administration of intravenous contrast. Multiplanar reformatted images are provided for review.  Dose modulation, iterative reconstruction, and/or weight based adjustment of the mA/kV was utilized to reduce the radiation dose to as low as reasonably achievable.; CTA of the chest was performed after the administration of intravenous contrast.  Multiplanar reformatted images are provided for review. MIP images are provided for review. Dose modulation, iterative reconstruction, and/or weight based adjustment of the mA/kV was utilized to reduce the radiation dose to as low as reasonably achievable. COMPARISON: CT chest 07/12/2020 and CT abdomen and pelvis 10/19/2020 HISTORY: ORDERING SYSTEM PROVIDED HISTORY: Fever, diaphoresis and acute upper abdominal pain TECHNOLOGIST PROVIDED HISTORY: Reason for exam:->abdominal pain Additional Contrast?->None Reason for Exam: abd pain Acuity: Acute Type of Exam: Initial COPD, chronic kidney disease, prior cholecystectomy FINDINGS: Vasculature: Main pulmonary artery is within normal limits for size at 27 mm diameter. The pulmonary arteries are well opacified for the exam with no thromboembolic disease evident. Mild calcific atherosclerosis of the aorta and its branches. The ascending thoracic aorta is 27 mm diameter and descending thoracic aorta 20 mm diameter. No thoracic aorta aneurysm or dissection. Mediastinum: Small to moderate volume pericardial effusion. The heart size is normal.  Posterior mediastinal structures are unremarkable. Right paratracheal lymph node is 13 mm axial image 45, with another measuring 10 mm on image 35. Upper mediastinal lymph node adjacent to the esophagus and trachea axial image 15 is 14 mm. Borderline enlarged right hilar lymph node is 9 mm short axis. No left hilar adenopathy evident. Lungs/pleura: Sequela of smoking and emphysema predominate in the upper lungs. Bilateral pleural effusion with prominent collection with a minor fissure on the right. New focal consolidative change with soft tissue nodule type appearance posterior basal segment right lower lobe is 16 x 23 mm axial image 94, not present prior study. Minimal subsegmental atelectasis medial left upper lobe.   No than on CT abdomen 3 weeks ago. 4. Nonspecific bilateral pleural effusion without distinct pulmonary vascular engorgement or other findings associated with congestive heart failure. 5.  Pulmonary sequela typical of that seen with smoking, including emphysema. 6. CT ABDOMEN/PELVIS: No CT evidence of an acute intra-abdominal or intrapelvic process. 7. Greatly diminished ascites compared with prior study, with only a small amount remaining in the pelvis. 8.  No findings to suggest acute appendicitis; no ureter calculus or hydronephrosis. 9. Mildly renal cortical atrophy right kidney with stable benign cyst inferior pole right kidney. 10.  Diverticulosis coli without CT evidence of acute diverticulitis. 11. Interval cholecystectomy without CT evidence of abscess; minimal residual postsurgical fluid sequela at the gallbladder fossa within normal limits. EKG (if obtained): (All EKG's are interpreted by myself in the absence of a cardiologist)    12 lead EKG per my interpretation:  Normal Sinus Rhythm 86, bifasicular block  Axis is   Normal  QTc is  564  There is no specific T wave changes appreciated. There is no specific ST wave changes appreciated. Prior EKG to compare with was not available     Total critical care time today provided was 22 minutes. This excludes seperately billable procedure. Critical care time provided for reviewing labs, images, giving fluids, oxygen, potassium, consulting medicine, consulting cardiology that required close evaluation and/or intervention with concern for patient decompensation. MDM:    Patient here with continued chest pain nausea abdominal pain. Again she was here recently for a cholecystectomy, pneumonia, pericardial effusion with pericardiocentesis. She has COPD she wears 2 L all the time she denies any coronavirus. Came in by EMS today with worsening chest pain fairly constant all day long.   She is in no distress vital signs are stable physical exam is otherwise normal she does have chest wall pain and upper abdominal pain. Will check labs imaging including CT of the chest abdomen pelvis due to recent surgery and chest pain and abdominal pain and nausea EKG has bifascicular block will give her pain nausea medicine oxygen IV fluids. I did wear appropriate PPE including 95 mask gown gloves face shield eye protection. Patient rechecked doing well. So far work-up does show hypokalemia which I did replace also has persistent moderate pericardial effusion no signs of tamponade at this time work-up otherwise negative will admit to hospital medicine consult as well as consulting cardiology given recent pericardiocentesis. Otherwise patient stable admitted. CLINICAL IMPRESSION:  Final diagnoses:   Chest pain, unspecified type   Abdominal pain, unspecified abdominal location   Nausea   Hypokalemia   Hypomagnesemia   Pericardial effusion       (Please note that portions of this note may have been completed with a voice recognition program. Efforts were made to edit the dictations but occasionally words aremis-transcribed.)    DISPOSITION REFERRAL (if applicable):  No follow-up provider specified.     DISPOSITION MEDICATIONS (if applicable):  Current Discharge Medication List             DO Elier Brantley DO  11/23/20 5990

## 2020-11-23 NOTE — CARE COORDINATION
Pt identified as potential readmission. Last admission 11/3 - 11/8 for Pneumonia due to organism. Pt here today because she started having chest pain this morning, felt like she had to burp but cannot \"burp\". 12 lead done by EMS,  given by EMS. PT had gall bladder removal 10 days ago , pt had fluid removed off her heart after her surgery. Patients tests resulted abnormal.    Pt is requiring readmission and there were no alternatives to admission to explore at this time. [facility]

## 2020-11-23 NOTE — H&P
then 20mg x 3days then 10mg x 3 days 11 tablet 0    nystatin (MYCOSTATIN) 711857 UNIT/ML suspension Take 5 mLs by mouth 4 times daily 1 Bottle 0    senna-docusate (PERICOLACE) 8.6-50 MG per tablet Take 2 tablets by mouth daily as needed for Constipation 20 tablet 0    amLODIPine (NORVASC) 10 MG tablet Take 1 tablet by mouth daily 30 tablet 2    budesonide-formoterol (SYMBICORT) 160-4.5 MCG/ACT AERO USE 2 INHALATIONS TWICE A DAY 30.6 g 3    ipratropium-albuterol (DUONEB) 0.5-2.5 (3) MG/3ML SOLN nebulizer solution Inhale 3 mLs into the lungs every 4 hours 1080 mL 3    guaiFENesin (MUCINEX) 600 MG extended release tablet Take 1 tablet by mouth 2 times daily 30 tablet 0    montelukast (SINGULAIR) 10 MG tablet Take 1 tablet by mouth daily 30 tablet 3    levothyroxine (SYNTHROID) 100 MCG tablet Take 1 tablet by mouth Daily 30 tablet 2    folic acid (FOLVITE) 1 MG tablet Take 1 tablet by mouth daily 30 tablet 3    vitamin B-1 100 MG tablet Take 1 tablet by mouth daily 30 tablet 3    albuterol-ipratropium (COMBIVENT RESPIMAT)  MCG/ACT AERS inhaler Inhale 1 puff into the lungs every 4 hours as needed for Wheezing 3 Inhaler 0    DULoxetine (CYMBALTA) 60 MG extended release capsule Take 60 mg by mouth daily      busPIRone (BUSPAR) 10 MG tablet Take 1 tablet by mouth 3 times daily 90 tablet 0    ferrous gluconate 324 (37.5 Fe) MG TABS Take 1 tablet by mouth 2 times daily 60 tablet 2    metoprolol succinate (TOPROL XL) 25 MG extended release tablet Take 1 tablet by mouth daily 30 tablet 3    famotidine (PEPCID) 20 MG tablet Take 1 tablet by mouth 2 times daily (Patient taking differently: Take 40 mg by mouth nightly ) 60 tablet 3    traZODone (DESYREL) 50 MG tablet Take 100 mg by mouth nightly       clonazePAM (KLONOPIN) 1 MG tablet Take 1 mg by mouth 3 times daily as needed.       baclofen (LIORESAL) 10 MG tablet Take 1 tablet by mouth 3 times daily 30 tablet 0    aspirin 81 MG chewable tablet Take 81 mg by mouth daily      fluticasone (FLONASE) 50 MCG/ACT nasal spray 2 sprays by Nasal route daily 1 Bottle 0         Allergies:  Lisinopril    Social History:   Social History     Socioeconomic History    Marital status:      Spouse name: Not on file    Number of children: Not on file    Years of education: Not on file    Highest education level: Not on file   Occupational History    Not on file   Social Needs    Financial resource strain: Not on file    Food insecurity     Worry: Not on file     Inability: Not on file    Transportation needs     Medical: Not on file     Non-medical: Not on file   Tobacco Use    Smoking status: Former Smoker     Packs/day: 1.50     Years: 20.00     Pack years: 30.00     Last attempt to quit: 2008     Years since quittin.9    Smokeless tobacco: Never Used   Substance and Sexual Activity    Alcohol use: Not Currently     Alcohol/week: 2.0 standard drinks     Types: 2 Shots of liquor per week    Drug use: No    Sexual activity: Yes     Partners: Male   Lifestyle    Physical activity     Days per week: Not on file     Minutes per session: Not on file    Stress: Not on file   Relationships    Social connections     Talks on phone: Not on file     Gets together: Not on file     Attends Rastafari service: Not on file     Active member of club or organization: Not on file     Attends meetings of clubs or organizations: Not on file     Relationship status: Not on file    Intimate partner violence     Fear of current or ex partner: Not on file     Emotionally abused: Not on file     Physically abused: Not on file     Forced sexual activity: Not on file   Other Topics Concern    Not on file   Social History Narrative    Not on file         Family History:   Family History   Problem Relation Age of Onset    No Known Problems Mother     No Known Problems Father        REVIEW OF SYSTEMS:  CONSTITUTIONAL:  negative  EYES:  negative  HEENT: negative  RESPIRATORY:  negative  CARDIOVASCULAR:  positive for  chest pain  GASTROINTESTINAL:  negative  ALLERGIC/IMMUNOLOGIC:  negative  ENDOCRINE:  negative  MUSCULOSKELETAL:  negative  NEUROLOGICAL:  negative  BEHAVIOR/PSYCH:  positive for anxiety  PHYSICAL EXAM:    Vitals:  BP (!) 148/74   Pulse 87   Temp 98.1 °F (36.7 °C) (Oral)   Resp 20   Ht 5' 1\" (1.549 m)   Wt 168 lb (76.2 kg)   SpO2 100%   BMI 31.74 kg/m²     CONSTITUTIONAL:  awake  EYES:  lids and lashes normal  ENT:  normocepalic, without obvious abnormality  LUNGS:  Clear and no wheezing or crackles  CARDIOVASCULAR:  Heart sound audible but distant. ABDOMEN:  No scars, normal bowel sounds, soft, non-distended, non-tender, no masses palpated, no hepatosplenomegally  MUSCULOSKELETAL:  full range of motion noted  NEUROLOGIC:  Awake, alert, oriented to name, place and time. Cranial nerves II-XII are grossly intact. Motor is 5 out of 5 bilaterally. SKIN:  no bruising or bleeding    DATA:  CTA chest  . No aneurysm or dissection within the chest, abdomen, or pelvis. 2. Decrease in size of the right pleural effusion.  Nodular opacity in the    right lung base is unchanged, measuring up to 2.3 cm.    3. Persistent moderate pericardial effusion. 4. Dilation of the main pulmonary artery, suggestive of pulmonary artery    hypertension. 5. No acute visceral abnormality within the abdomen or pelvis.     6. Colonic diverticulosis without CT evidence of diverticulitis.         EKG 86 RBB, SR    ASSESSMENT AND PLAN:    Chest pain  -ASa, statin with lipid panel and serial CE  -echo and consult cardiology  chornic resp failure 3L with COPD  -continue inhalers  HTN  -CCB  Hypothyroid  -increase synthroid as TSH elevated  Hypokalemia and hypomag  -replacement protocol  Anxiety  -BZD  DVT prophylaxis  -lovenox

## 2020-11-24 ENCOUNTER — APPOINTMENT (OUTPATIENT)
Dept: NUCLEAR MEDICINE | Age: 60
DRG: 287 | End: 2020-11-24
Payer: COMMERCIAL

## 2020-11-24 PROBLEM — R94.39 ABNORMAL NUCLEAR STRESS TEST: Status: ACTIVE | Noted: 2020-11-24

## 2020-11-24 LAB
ANION GAP SERPL CALCULATED.3IONS-SCNC: 7 MMOL/L (ref 4–16)
BUN BLDV-MCNC: 3 MG/DL (ref 6–23)
CALCIUM SERPL-MCNC: 8.8 MG/DL (ref 8.3–10.6)
CHLORIDE BLD-SCNC: 99 MMOL/L (ref 99–110)
CO2: 32 MMOL/L (ref 21–32)
CREAT SERPL-MCNC: 0.9 MG/DL (ref 0.6–1.1)
GFR AFRICAN AMERICAN: >60 ML/MIN/1.73M2
GFR NON-AFRICAN AMERICAN: >60 ML/MIN/1.73M2
GLUCOSE BLD-MCNC: 72 MG/DL (ref 70–99)
LV EF: 55 %
LV EF: 63 %
LVEF MODALITY: NORMAL
LVEF MODALITY: NORMAL
MAGNESIUM: 1.9 MG/DL (ref 1.8–2.4)
POTASSIUM SERPL-SCNC: 3.7 MMOL/L (ref 3.5–5.1)
SARS-COV-2, NAAT: NOT DETECTED
SODIUM BLD-SCNC: 138 MMOL/L (ref 135–145)
SOURCE: NORMAL

## 2020-11-24 PROCEDURE — 93458 L HRT ARTERY/VENTRICLE ANGIO: CPT | Performed by: INTERNAL MEDICINE

## 2020-11-24 PROCEDURE — 36415 COLL VENOUS BLD VENIPUNCTURE: CPT

## 2020-11-24 PROCEDURE — 2580000003 HC RX 258: Performed by: INTERNAL MEDICINE

## 2020-11-24 PROCEDURE — APPNB60 APP NON BILLABLE TIME 46-60 MINS: Performed by: NURSE PRACTITIONER

## 2020-11-24 PROCEDURE — 2700000000 HC OXYGEN THERAPY PER DAY

## 2020-11-24 PROCEDURE — 6370000000 HC RX 637 (ALT 250 FOR IP): Performed by: INTERNAL MEDICINE

## 2020-11-24 PROCEDURE — 2709999900 HC NON-CHARGEABLE SUPPLY

## 2020-11-24 PROCEDURE — G0378 HOSPITAL OBSERVATION PER HR: HCPCS

## 2020-11-24 PROCEDURE — 80048 BASIC METABOLIC PNL TOTAL CA: CPT

## 2020-11-24 PROCEDURE — 2580000003 HC RX 258: Performed by: HOSPITALIST

## 2020-11-24 PROCEDURE — 93017 CV STRESS TEST TRACING ONLY: CPT

## 2020-11-24 PROCEDURE — 4A023N7 MEASUREMENT OF CARDIAC SAMPLING AND PRESSURE, LEFT HEART, PERCUTANEOUS APPROACH: ICD-10-PCS | Performed by: INTERNAL MEDICINE

## 2020-11-24 PROCEDURE — 6370000000 HC RX 637 (ALT 250 FOR IP): Performed by: NURSE PRACTITIONER

## 2020-11-24 PROCEDURE — B2151ZZ FLUOROSCOPY OF LEFT HEART USING LOW OSMOLAR CONTRAST: ICD-10-PCS | Performed by: INTERNAL MEDICINE

## 2020-11-24 PROCEDURE — 99233 SBSQ HOSP IP/OBS HIGH 50: CPT | Performed by: INTERNAL MEDICINE

## 2020-11-24 PROCEDURE — 6360000002 HC RX W HCPCS

## 2020-11-24 PROCEDURE — 93458 L HRT ARTERY/VENTRICLE ANGIO: CPT

## 2020-11-24 PROCEDURE — 83735 ASSAY OF MAGNESIUM: CPT

## 2020-11-24 PROCEDURE — C1894 INTRO/SHEATH, NON-LASER: HCPCS

## 2020-11-24 PROCEDURE — B2111ZZ FLUOROSCOPY OF MULTIPLE CORONARY ARTERIES USING LOW OSMOLAR CONTRAST: ICD-10-PCS | Performed by: INTERNAL MEDICINE

## 2020-11-24 PROCEDURE — 94640 AIRWAY INHALATION TREATMENT: CPT

## 2020-11-24 PROCEDURE — 78452 HT MUSCLE IMAGE SPECT MULT: CPT

## 2020-11-24 PROCEDURE — U0002 COVID-19 LAB TEST NON-CDC: HCPCS

## 2020-11-24 PROCEDURE — 6370000000 HC RX 637 (ALT 250 FOR IP): Performed by: HOSPITALIST

## 2020-11-24 PROCEDURE — 2500000003 HC RX 250 WO HCPCS

## 2020-11-24 PROCEDURE — 6360000002 HC RX W HCPCS: Performed by: INTERNAL MEDICINE

## 2020-11-24 PROCEDURE — 94761 N-INVAS EAR/PLS OXIMETRY MLT: CPT

## 2020-11-24 PROCEDURE — 3430000000 HC RX DIAGNOSTIC RADIOPHARMACEUTICAL: Performed by: INTERNAL MEDICINE

## 2020-11-24 PROCEDURE — 6360000004 HC RX CONTRAST MEDICATION

## 2020-11-24 PROCEDURE — A9500 TC99M SESTAMIBI: HCPCS | Performed by: INTERNAL MEDICINE

## 2020-11-24 RX ORDER — SODIUM CHLORIDE 0.9 % (FLUSH) 0.9 %
10 SYRINGE (ML) INJECTION EVERY 12 HOURS SCHEDULED
Status: DISCONTINUED | OUTPATIENT
Start: 2020-11-24 | End: 2020-11-26 | Stop reason: HOSPADM

## 2020-11-24 RX ORDER — SODIUM CHLORIDE 9 MG/ML
INJECTION, SOLUTION INTRAVENOUS CONTINUOUS
Status: ACTIVE | OUTPATIENT
Start: 2020-11-24 | End: 2020-11-25

## 2020-11-24 RX ORDER — SENNA PLUS 8.6 MG/1
1 TABLET ORAL 2 TIMES DAILY
Status: DISCONTINUED | OUTPATIENT
Start: 2020-11-24 | End: 2020-11-26 | Stop reason: HOSPADM

## 2020-11-24 RX ORDER — TRAZODONE HYDROCHLORIDE 50 MG/1
100 TABLET ORAL NIGHTLY
Status: DISCONTINUED | OUTPATIENT
Start: 2020-11-24 | End: 2020-11-26 | Stop reason: HOSPADM

## 2020-11-24 RX ORDER — ACETAMINOPHEN 325 MG/1
650 TABLET ORAL EVERY 4 HOURS PRN
Status: DISCONTINUED | OUTPATIENT
Start: 2020-11-24 | End: 2020-11-26 | Stop reason: HOSPADM

## 2020-11-24 RX ORDER — SODIUM CHLORIDE 0.9 % (FLUSH) 0.9 %
10 SYRINGE (ML) INJECTION PRN
Status: DISCONTINUED | OUTPATIENT
Start: 2020-11-24 | End: 2020-11-26 | Stop reason: HOSPADM

## 2020-11-24 RX ADMIN — FERROUS GLUCONATE TAB 324 MG (37.5 MG ELEMENTAL IRON) 324 MG: 324 (37.5 FE) TAB at 15:47

## 2020-11-24 RX ADMIN — GUAIFENESIN 600 MG: 600 TABLET, EXTENDED RELEASE ORAL at 21:41

## 2020-11-24 RX ADMIN — GUAIFENESIN 600 MG: 600 TABLET, EXTENDED RELEASE ORAL at 15:47

## 2020-11-24 RX ADMIN — CLONAZEPAM 1 MG: 1 TABLET ORAL at 21:42

## 2020-11-24 RX ADMIN — SUCRALFATE 1 G: 1 TABLET ORAL at 15:44

## 2020-11-24 RX ADMIN — Medication 30 MILLICURIE: at 09:40

## 2020-11-24 RX ADMIN — FOLIC ACID 1 MG: 1 TABLET ORAL at 15:47

## 2020-11-24 RX ADMIN — MONTELUKAST 10 MG: 10 TABLET, FILM COATED ORAL at 21:42

## 2020-11-24 RX ADMIN — SODIUM CHLORIDE, PRESERVATIVE FREE 10 ML: 5 INJECTION INTRAVENOUS at 09:07

## 2020-11-24 RX ADMIN — BACLOFEN 10 MG: 10 TABLET ORAL at 15:47

## 2020-11-24 RX ADMIN — REGADENOSON 0.4 MG: 0.08 INJECTION, SOLUTION INTRAVENOUS at 09:41

## 2020-11-24 RX ADMIN — THEOPHYLLINE ANHYDROUS 400 MG: 200 CAPSULE, EXTENDED RELEASE ORAL at 15:47

## 2020-11-24 RX ADMIN — Medication 1 CAPSULE: at 15:47

## 2020-11-24 RX ADMIN — SENNOSIDES 8.6 MG: 8.6 TABLET, FILM COATED ORAL at 15:44

## 2020-11-24 RX ADMIN — PROMETHAZINE HYDROCHLORIDE 12.5 MG: 25 TABLET ORAL at 09:07

## 2020-11-24 RX ADMIN — Medication 10 MILLICURIE: at 08:00

## 2020-11-24 RX ADMIN — OXYCODONE HYDROCHLORIDE AND ACETAMINOPHEN 1 TABLET: 5; 325 TABLET ORAL at 21:42

## 2020-11-24 RX ADMIN — BUDESONIDE AND FORMOTEROL FUMARATE DIHYDRATE 1 PUFF: 160; 4.5 AEROSOL RESPIRATORY (INHALATION) at 20:45

## 2020-11-24 RX ADMIN — BUDESONIDE AND FORMOTEROL FUMARATE DIHYDRATE 1 PUFF: 160; 4.5 AEROSOL RESPIRATORY (INHALATION) at 08:52

## 2020-11-24 RX ADMIN — METOPROLOL SUCCINATE 25 MG: 25 TABLET, EXTENDED RELEASE ORAL at 15:47

## 2020-11-24 RX ADMIN — PROMETHAZINE HYDROCHLORIDE 12.5 MG: 25 TABLET ORAL at 17:49

## 2020-11-24 RX ADMIN — OXYCODONE HYDROCHLORIDE AND ACETAMINOPHEN 1 TABLET: 5; 325 TABLET ORAL at 17:49

## 2020-11-24 RX ADMIN — LEVOTHYROXINE SODIUM 125 MCG: 125 TABLET ORAL at 15:46

## 2020-11-24 RX ADMIN — MAGNESIUM GLUCONATE 500 MG ORAL TABLET 400 MG: 500 TABLET ORAL at 15:45

## 2020-11-24 RX ADMIN — CLONAZEPAM 1 MG: 1 TABLET ORAL at 08:46

## 2020-11-24 RX ADMIN — BUSPIRONE HYDROCHLORIDE 10 MG: 10 TABLET ORAL at 15:48

## 2020-11-24 RX ADMIN — SUCRALFATE 1 G: 1 TABLET ORAL at 21:42

## 2020-11-24 RX ADMIN — DULOXETINE HYDROCHLORIDE 60 MG: 30 CAPSULE, DELAYED RELEASE ORAL at 15:45

## 2020-11-24 RX ADMIN — ATORVASTATIN CALCIUM 40 MG: 40 TABLET, FILM COATED ORAL at 21:41

## 2020-11-24 RX ADMIN — SODIUM CHLORIDE: 9 INJECTION, SOLUTION INTRAVENOUS at 15:19

## 2020-11-24 RX ADMIN — FLUTICASONE PROPIONATE 2 SPRAY: 50 SPRAY, METERED NASAL at 15:48

## 2020-11-24 RX ADMIN — BACLOFEN 10 MG: 10 TABLET ORAL at 21:42

## 2020-11-24 RX ADMIN — Medication 100 MG: at 15:46

## 2020-11-24 RX ADMIN — AMLODIPINE BESYLATE 10 MG: 10 TABLET ORAL at 15:46

## 2020-11-24 RX ADMIN — TRAZODONE HYDROCHLORIDE 100 MG: 50 TABLET ORAL at 21:42

## 2020-11-24 RX ADMIN — BUSPIRONE HYDROCHLORIDE 10 MG: 10 TABLET ORAL at 21:42

## 2020-11-24 ASSESSMENT — PAIN DESCRIPTION - LOCATION
LOCATION: CHEST
LOCATION: CHEST

## 2020-11-24 ASSESSMENT — PAIN DESCRIPTION - PROGRESSION
CLINICAL_PROGRESSION: NOT CHANGED

## 2020-11-24 ASSESSMENT — PAIN SCALES - GENERAL
PAINLEVEL_OUTOF10: 0
PAINLEVEL_OUTOF10: 7
PAINLEVEL_OUTOF10: 0
PAINLEVEL_OUTOF10: 4
PAINLEVEL_OUTOF10: 0
PAINLEVEL_OUTOF10: 6

## 2020-11-24 ASSESSMENT — PAIN DESCRIPTION - PAIN TYPE
TYPE: ACUTE PAIN
TYPE: ACUTE PAIN

## 2020-11-24 ASSESSMENT — PAIN DESCRIPTION - ORIENTATION: ORIENTATION: MID;LOWER

## 2020-11-24 ASSESSMENT — PAIN DESCRIPTION - DESCRIPTORS: DESCRIPTORS: THROBBING;ACHING;DISCOMFORT

## 2020-11-24 NOTE — PROGRESS NOTES
1455- Patient returned to 3027 from Cath Lab with tegaderm and gauze to right groin s/p removal of 4 fr arterial sheath. No bleeding nor hematoma noted. Vitals stable. Call light within reach. Bedside report and site check with Jesenia Keller RN.

## 2020-11-24 NOTE — PROGRESS NOTES
[facility], operated by [facility]        Progress Note       2020  8:20 AM    Patient:    Westley Yanez  : 1960   64 y.o. MRN: [id number]  Admitted: 2020  7:48 PM ATT: Lj Davis MD   [id number]  AdmitDx: Hypokalemia [E87.6]  Hypomagnesemia [E83.42]  Nausea [R11.0]  Pericardial effusion [I31.3]  Chest pain [R07.9]  Abdominal pain, unspecified abdominal location [R10.9]  Chest pain, unspecified type [R07.9]  PCP: Jose M Donnelly MD    SUBJECTIVE:  Chart reviewed, events noted  Patient feeling well. Has chest pain, rubs chest. No SOB at this time. Eating hamburger last night. No N/V. No BM. Has stress test today.      ROS:  The positive ROS will be identified in bold     CONSTITUTIONAL:  Neg  Weight loss, fatigue, fever  MOUTH/THROAT:  Neg  Bleeding gums, hoarseness or sore throat  RESPIRATORY:   Neg SOB, wheeze, cough, hemoptysis or bronchitis  CARDIOVASCULAR:  Neg Chest pain, palpitations, dyspnea on exertion, edema  GASTROINTESTINAL:  SEE HPI  HEMATOLOGIC/LYMPHATIC:  Neg  Anemia, bleeding tendency  MUSCULOSKELETAL: Neg  New myalgias, joint pain, swelling or stiffness  NEUROLOGICAL:  Neg  Loss of Consciousness, memory loss, forgetfulness, periods of confusion, difficulty concentrating, seizures, insomnia, aphasia    SKIN:  Neg No itching, rashes, or sores  PSYCHIATRIC:  Neg Depression, personality changes, anxiety    OBJECTIVE:      /72   Pulse 71   Temp 98.2 °F (36.8 °C) (Oral)   Resp 12   Ht 5' 1\" (1.549 m)   Wt 168 lb (76.2 kg)   SpO2 96%   BMI 31.74 kg/m²     NAD, appears comfortable  Lips and mucous membranes pink and moist  RRR, Nl s1s2, no murmurs, no JVD  Lungs CTA bilaterally, respirations even and unlabored   Abdomen soft, ND, epigastric Tenderness, bowel sounds normal  Skin pink, warm and dry  No edema bilateral lower extremities   AAOx3    CBC:   Recent Labs     20  0042   WBC 7.1 6. 8   HGB 9.7* 9.1*    342     BMP:    Recent Labs     11/22/20 1954 11/23/20  0039 11/23/20  0042 11/24/20  0210     --  137 138   K 2.5* 3.5 3.3* 3.7   CL 93*  --  95* 99   CO2 33*  --  28 32   BUN 4*  --  3* 3*   CREATININE 0.9  --  0.7 0.9   GLUCOSE 100*  --  103* 72     Magnesium:   Lab Results   Component Value Date    MG 1.9 11/24/2020     Hepatic:   Recent Labs     11/22/20 1954   AST 17   ALT 13   BILITOT 0.3   ALKPHOS 134*     INR: No results for input(s): INR in the last 72 hours.       Intake/Output Summary (Last 24 hours) at 11/24/2020 0820  Last data filed at 11/23/2020 1531  Gross per 24 hour   Intake 720 ml   Output --   Net 720 ml         Medications    Scheduled Medications:    busPIRone  10 mg Oral TID    potassium chloride  10 mEq Intravenous Once    pantoprazole  40 mg Oral QAM AC    lactobacillus  1 capsule Oral Daily with breakfast    sucralfate  1 g Oral 2 times per day    amLODIPine  10 mg Oral Daily    aspirin  81 mg Oral Daily    baclofen  10 mg Oral TID    budesonide-formoterol  1 puff Inhalation BID    DULoxetine  60 mg Oral Daily    ferrous gluconate  324 mg Oral BID WC    fluticasone  2 spray Nasal Daily    folic acid  1 mg Oral Daily    guaiFENesin  600 mg Oral BID    levothyroxine  125 mcg Oral Daily    montelukast  10 mg Oral Daily    metoprolol succinate  25 mg Oral Daily    thiamine  100 mg Oral Daily    sodium chloride flush  10 mL Intravenous BID    atorvastatin  40 mg Oral Nightly    enoxaparin  40 mg Subcutaneous Daily    magnesium oxide  400 mg Oral Daily     PRN Medications: clonazePAM, albuterol sulfate HFA **AND** ipratropium, oxyCODONE-acetaminophen, sodium chloride flush, acetaminophen **OR** acetaminophen, polyethylene glycol, promethazine **OR** ondansetron, potassium chloride **OR** potassium alternative oral replacement **OR** potassium chloride, magnesium sulfate  Infusions:         Patient Active Problem List   Diagnosis Code    CNP  11/24/2020  8:20 AM     Atypical chest pain  Some abd bloating  Treat symptomatically    I have seen and examined this patient personally, and independently of the nurse practitioner. The plan was developed mutually at the time of the visit with the patient. Donnie Sheehan and myself have spoken with patient, nursing staff and provided written and verbal instructions .     The above note has been reviewed and I agree with the Assessment,  Diagnosis, and Treatment plan as suggested by Donnie Sheehan [address] gastroenterology

## 2020-11-24 NOTE — PROGRESS NOTES
Patient back from stress test.  As soon as pt returns to room, she asks for ice chips and states \"where are my meds. \"  This nurse explained to her that she is NPO until stress test results are read. PS Sent to Dr Sera Davison. Patient then states \"you threw my dentures away. \"  When this nurse asked pt where she had her dentures last, she states they were in a napkin on her bedside table. Room trash was checked and patient's dentures were not thrown away. This nurse let patient check her trash cans as well, pt did not find dentures. This nurse does not remember seeing patient's dentures on table and was not in patient's room while she was gone for stress test. Patient states her dentures are not at home, that she had them in.

## 2020-11-24 NOTE — PROGRESS NOTES
Hospitalist Progress Note      Name:  Gary Sacks /Age/Sex: 1960  (64 y.o. female)   MRN & CSN:  [id number] & [id number] Admission Date/Time: 2020  7:48 PM   Location:  [address] [place] [address] PCP: Epifanio Craig MD         Hospital Day: 3    Assessment and Plan:   Gary Sacks is a 64 y.o.  female  who presents with chest pain. --Chest/epigastric pain  Serial troponin negative. No acute ischemic change on ECG. CTA chest negative for PE. No GI findings. ECHO benign. Stress test is abnormal.  Plan  Cardiology to do [facility]. --Hypokalemia and hypomag-replaced    Pericardial effusion s/p pericardicentesis Oct 2020. No effusion seen on ECHO today. Fibromyalgia  COPD-stable  Hypertension-amlodipine, metoprolol  Hyperlipidemia-Lipitor  Chronic respiratory failure on oxygen 3 L/min  Hypothyroidism-Synthroid  Anxiety  Iron deficiency-ferrous gluconate  Gastritis - PPI    Diet Diet NPO, After Midnight   DVT Prophylaxis [] Lovenox, []  Heparin, [] SCDs, [] Ambulation   GI Prophylaxis [] PPI,  [] H2 Blocker,  [] Carafate,  [] Diet/Tube Feeds   Code Status Full Code   Disposition  Home after cardiac work-up   MDM      History of Present Illness:   Patient seen and examined. Going for stress test this morning. Ten point ROS reviewed negative, unless as noted above    Objective: Intake/Output Summary (Last 24 hours) at 2020 0953  Last data filed at 2020 0907  Gross per 24 hour   Intake 730 ml   Output --   Net 730 ml      Vitals:   Vitals:    20 0854   BP:    Pulse:    Resp: 15   Temp:    SpO2: 100%     Physical Exam:   GEN Awake female, sitting upright in bed. RESP breathing comfortably on supplemental oxygen 3 L/min via nasal cannula. CARDIO/VASC S1/S2 auscultated. Regular rate. No peripheral edema. GI Abdomen is soft with epigastric tenderness, no masses, or guarding. Bowel sounds are normoactive. MSK No gross joint deformities.   SKIN Normal coloration, warm, dry.  NEURO normal speech, no lateralizing weakness. PSYCH Awake, alert, oriented x 3.      Medications:   Medications:    senna  1 tablet Oral BID    busPIRone  10 mg Oral TID    potassium chloride  10 mEq Intravenous Once    pantoprazole  40 mg Oral QAM AC    lactobacillus  1 capsule Oral Daily with breakfast    sucralfate  1 g Oral 2 times per day    amLODIPine  10 mg Oral Daily    aspirin  81 mg Oral Daily    baclofen  10 mg Oral TID    budesonide-formoterol  1 puff Inhalation BID    DULoxetine  60 mg Oral Daily    ferrous gluconate  324 mg Oral BID WC    fluticasone  2 spray Nasal Daily    folic acid  1 mg Oral Daily    guaiFENesin  600 mg Oral BID    levothyroxine  125 mcg Oral Daily    montelukast  10 mg Oral Daily    metoprolol succinate  25 mg Oral Daily    thiamine  100 mg Oral Daily    sodium chloride flush  10 mL Intravenous BID    atorvastatin  40 mg Oral Nightly    enoxaparin  40 mg Subcutaneous Daily    magnesium oxide  400 mg Oral Daily      Infusions:   PRN Meds: technetium sestamibi, 10 millicurie, ONCE PRN  technetium sestamibi, 30 millicurie, ONCE PRN  clonazePAM, 1 mg, TID PRN  ipratropium, 2 puff, Q4H PRN    And  albuterol sulfate HFA, 2 puff, Q4H PRN  oxyCODONE-acetaminophen, 1 tablet, Q4H PRN  sodium chloride flush, 10 mL, PRN  acetaminophen, 650 mg, Q6H PRN    Or  acetaminophen, 650 mg, Q6H PRN  polyethylene glycol, 17 g, Daily PRN  promethazine, 12.5 mg, Q6H PRN    Or  ondansetron, 4 mg, Q6H PRN  potassium chloride, 40 mEq, PRN    Or  potassium alternative oral replacement, 40 mEq, PRN    Or  potassium chloride, 10 mEq, PRN  magnesium sulfate, 1 g, PRN        CBC   Recent Labs     11/22/20 1954 11/23/20 0042   WBC 7.1 6.8   HGB 9.7* 9.1*   HCT 33.5* 32.2*    342      BMP   Recent Labs     11/22/20 1954 11/23/20  0039 11/23/20  0042 11/24/20  0210     --  137 138   K 2.5* 3.5 3.3* 3.7   CL 93*  --  95* 99   CO2 33*  --  28 32   BUN 4*  --  3* 3* CREATININE 0.9  --  0.7 0.9       Radiology report reviewed     Electronically signed by Maite Hoang MD on 11/24/2020 at 9:53 AM

## 2020-11-24 NOTE — PROGRESS NOTES
[address], [address] [facility],Third [address]  Phone: [phone]    Fax [phone]                  Indu Coronado MD, Mike Alarcon MD, Ines Faye MD, MD Fior Amezcua MD Julious Congress, MD Mango De Leon, BRIAN Donnelly, BRIAN Hughes, BRIAN Tony, APRN    Cardiology Progress Note     Today's Plan: LHC today dt abnormal stress    Admit Date:  11/22/2020    Consult reason/ Seen today for: chest pain    Subjective and  Overnight Events:  Patient has had many mood swings today and is anxious to get out of the hospital. She discussed C with Dr. Sera Davison and is agreeable to [facility] today. Assessment / Plan / Recommendation:     1. Chest pain without history of CAD: serial troponin are negative. EKG is without acute abnormalities. Stress test is abnormal. Will check [facility] today. Consent obtained  2. Hyperlipidemia: 11/23/2020 LDL 87, HDL 59, triglycerides 111. Continue statin therapy. 3. Pericardial effusion with pericardiocentesis on 10/24/2020 : echo does not show pleural effusion 11/24/2020.   4. HTN: controlled. 5. DALTON: encouraged patient to wear CPAP  6. Hypothyroid: per primary  7. DVT prophylaxis if not contraindicated while in the hospital.       History of Presenting Illness:    Chief complain on admission : 64 y. o.year old who is admitted for  Chief Complaint   Patient presents with    Chest Pain    Abdominal Pain        Past medical history:    has a past medical history of Anxiety, Arthritis, Back pain at L4-L5 level, Bipolar 1 disorder ([address] COPD (chronic obstructive pulmonary disease) ([address] Emphysema of lung ([address] FH: CAD (coronary artery disease), Fibromyalgia, H/O Doppler ultrasound, H/O echocardiogram, History of blood transfusion, Hyperlipidemia LDL goal <100, Hypertension, Obstructive sleep apnea, Osteoarthritis, and Thyroid disease.   Past surgical history:   has a past surgical history that includes Carpal tunnel release; Tubal ligation; back surgery; Cardiac catheterization; Colonoscopy (N/A, 12/12/2019); and Cholecystectomy, laparoscopic (N/A, 10/25/2020). Social History:   reports that she quit smoking about 12 years ago. She has a 30.00 pack-year smoking history. She has never used smokeless tobacco. She reports previous alcohol use of about 2.0 standard drinks of alcohol per week. She reports that she does not use drugs. Family history:  family history includes No Known Problems in her father and mother. Allergies   Allergen Reactions    Lisinopril Swelling and Rash     angioedema         Review of Systems:    All 14 systems were reviewed and are negative  Except for the positive findings  which as documented     /74   Pulse 85   Temp 98.8 °F (37.1 °C) (Oral)   Resp 15   Ht 5' 1\" (1.549 m)   Wt 168 lb (76.2 kg)   SpO2 100%   BMI 31.74 kg/m²       Intake/Output Summary (Last 24 hours) at 11/24/2020 1137  Last data filed at 11/24/2020 0907  Gross per 24 hour   Intake 730 ml   Output --   Net 730 ml       Physical Exam  Vitals signs reviewed. Constitutional:       General: She is not in acute distress. Appearance: Normal appearance. She is not ill-appearing. HENT:      Head: Normocephalic and atraumatic. Eyes:      Conjunctiva/sclera: Conjunctivae normal.      Pupils: Pupils are equal, round, and reactive to light. Neck:      Musculoskeletal: Neck supple. No muscular tenderness. Vascular: No carotid bruit. Cardiovascular:      Rate and Rhythm: Normal rate and regular rhythm. Pulses: Normal pulses. Heart sounds: Normal heart sounds. No murmur. Pulmonary:      Effort: Pulmonary effort is normal. No respiratory distress. Breath sounds: Normal breath sounds. Musculoskeletal:         General: No swelling or deformity. Skin:     General: Skin is warm and dry.       Capillary Refill: Capillary refill takes less than 2 seconds. Neurological:      Mental Status: She is alert and oriented to person, place, and time. Psychiatric:         Mood and Affect: Mood normal.         Behavior: Behavior normal.         Thought Content:  Thought content normal.         Judgment: Judgment normal.         Telemetry Reviewed:   Sinus rhythm rate 94    Medications:    senna  1 tablet Oral BID    busPIRone  10 mg Oral TID    potassium chloride  10 mEq Intravenous Once    pantoprazole  40 mg Oral QAM AC    lactobacillus  1 capsule Oral Daily with breakfast    sucralfate  1 g Oral 2 times per day    amLODIPine  10 mg Oral Daily    aspirin  81 mg Oral Daily    baclofen  10 mg Oral TID    budesonide-formoterol  1 puff Inhalation BID    DULoxetine  60 mg Oral Daily    ferrous gluconate  324 mg Oral BID WC    fluticasone  2 spray Nasal Daily    folic acid  1 mg Oral Daily    guaiFENesin  600 mg Oral BID    levothyroxine  125 mcg Oral Daily    montelukast  10 mg Oral Daily    metoprolol succinate  25 mg Oral Daily    thiamine  100 mg Oral Daily    sodium chloride flush  10 mL Intravenous BID    atorvastatin  40 mg Oral Nightly    enoxaparin  40 mg Subcutaneous Daily    magnesium oxide  400 mg Oral Daily       technetium sestamibi, technetium sestamibi, clonazePAM, albuterol sulfate HFA **AND** ipratropium, oxyCODONE-acetaminophen, sodium chloride flush, acetaminophen **OR** acetaminophen, polyethylene glycol, promethazine **OR** ondansetron, potassium chloride **OR** potassium alternative oral replacement **OR** potassium chloride, magnesium sulfate    Lab Data:  CBC:   Recent Labs     11/22/20 1954 11/23/20  0042   WBC 7.1 6.8   HGB 9.7* 9.1*   HCT 33.5* 32.2*   MCV 93.1 95.0    342     BMP:   Recent Labs     11/22/20 1954 11/23/20  0039 11/23/20  0042 11/24/20  0210     --  137 138   K 2.5* 3.5 3.3* 3.7   CL 93*  --  95* 99   CO2 33*  --  28 32   BUN 4*  --  3* 3*   CREATININE 0.9  --  0.7 0.9     PT/INR: No results for input(s): PROTIME, INR in the last 72 hours. BNP:    Recent Labs     11/22/20 1954   PROBNP 1,034*     TROPONIN:   Recent Labs     11/22/20 1954 11/23/20  0042 11/23/20  0450   TROPONINT <0.010 <0.010 <0.010        ECHO :   Echocardiogram 11/23/2020 Summary   Left ventricular systolic function is normal.   Ejection fraction is visually estimated at 55%. No significant valvular disease noted. Septal bounce noted. Pericardial fat pad present. No evidence of any pericardial effusion. All labs, medications and tests reviewed by myself , continue all other medications of all above medical condition listed as is except for changes mentioned above. Thank you very much for consult , please call with questions. Electronically signed by BRIAN Hollingsworth CNP on 11/24/2020 at 11:37 AM      I have seen ,spoken to  and examined this patient personally, independently of the nurse practitioner. I have reviewed the hospital care given to date and reviewed all pertinent labs and imaging. The plan was developed mutually at the time of the visit with the patient,  NP  and myself. I have spoken with patient, nursing staff and provided written and verbal instructions . The above note has been reviewed and I agree with the assessment, diagnosis, and treatment plan with changes made by me as follows     CARDIOLOGY ATTENDING ADDENDUM    HPI:  I have reviewed the above HPI  And agree with above   Alberto Noel is a 64 y. o.year old who and presents with had concerns including Chest Pain and Abdominal Pain.   Chief Complaint   Patient presents with    Chest Pain    Abdominal Pain     Interval history:  Had stress    Physical Exam:  General:  Awake, alert, NAD  Head:normal  Eye:normal  Neck:  No JVD   Chest:  Clear to auscultation, respiration easy  Cardiovascular:  RRR S1S2  Abdomen:   nontender  Extremities:  no edema  Pulses; palpable  Neuro: grossly normal      MEDICAL DECISION MAKING;    I agree with the above plan, which was planned by myself and discussed with NP.   Abn stress lhc today        Sergo Smith MD [facility]

## 2020-11-25 PROBLEM — R94.39 ABNORMAL STRESS TEST: Status: ACTIVE | Noted: 2020-11-25

## 2020-11-25 LAB
ANION GAP SERPL CALCULATED.3IONS-SCNC: 11 MMOL/L (ref 4–16)
BUN BLDV-MCNC: 3 MG/DL (ref 6–23)
CALCIUM SERPL-MCNC: 8.4 MG/DL (ref 8.3–10.6)
CHLORIDE BLD-SCNC: 96 MMOL/L (ref 99–110)
CO2: 32 MMOL/L (ref 21–32)
CREAT SERPL-MCNC: 0.9 MG/DL (ref 0.6–1.1)
GFR AFRICAN AMERICAN: >60 ML/MIN/1.73M2
GFR NON-AFRICAN AMERICAN: >60 ML/MIN/1.73M2
GLUCOSE BLD-MCNC: 133 MG/DL (ref 70–99)
HCT VFR BLD CALC: 28.9 % (ref 37–47)
HEMOGLOBIN: 7.8 GM/DL (ref 12.5–16)
MCH RBC QN AUTO: 26.4 PG (ref 27–31)
MCHC RBC AUTO-ENTMCNC: 27 % (ref 32–36)
MCV RBC AUTO: 97.6 FL (ref 78–100)
PDW BLD-RTO: 14.8 % (ref 11.7–14.9)
PLATELET # BLD: 262 K/CU MM (ref 140–440)
PMV BLD AUTO: 9.2 FL (ref 7.5–11.1)
POTASSIUM SERPL-SCNC: 3.9 MMOL/L (ref 3.5–5.1)
RBC # BLD: 2.96 M/CU MM (ref 4.2–5.4)
SODIUM BLD-SCNC: 139 MMOL/L (ref 135–145)
WBC # BLD: 6.2 K/CU MM (ref 4–10.5)

## 2020-11-25 PROCEDURE — 6370000000 HC RX 637 (ALT 250 FOR IP): Performed by: INTERNAL MEDICINE

## 2020-11-25 PROCEDURE — 80048 BASIC METABOLIC PNL TOTAL CA: CPT

## 2020-11-25 PROCEDURE — APPSS45 APP SPLIT SHARED TIME 31-45 MINUTES: Performed by: NURSE PRACTITIONER

## 2020-11-25 PROCEDURE — 2700000000 HC OXYGEN THERAPY PER DAY

## 2020-11-25 PROCEDURE — 36415 COLL VENOUS BLD VENIPUNCTURE: CPT

## 2020-11-25 PROCEDURE — G0378 HOSPITAL OBSERVATION PER HR: HCPCS

## 2020-11-25 PROCEDURE — 1200000000 HC SEMI PRIVATE

## 2020-11-25 PROCEDURE — 94761 N-INVAS EAR/PLS OXIMETRY MLT: CPT

## 2020-11-25 PROCEDURE — 6360000002 HC RX W HCPCS: Performed by: INTERNAL MEDICINE

## 2020-11-25 PROCEDURE — 2580000003 HC RX 258: Performed by: INTERNAL MEDICINE

## 2020-11-25 PROCEDURE — 94640 AIRWAY INHALATION TREATMENT: CPT

## 2020-11-25 PROCEDURE — 99231 SBSQ HOSP IP/OBS SF/LOW 25: CPT | Performed by: INTERNAL MEDICINE

## 2020-11-25 PROCEDURE — 85027 COMPLETE CBC AUTOMATED: CPT

## 2020-11-25 PROCEDURE — 96372 THER/PROPH/DIAG INJ SC/IM: CPT

## 2020-11-25 RX ADMIN — GUAIFENESIN 600 MG: 600 TABLET, EXTENDED RELEASE ORAL at 08:04

## 2020-11-25 RX ADMIN — BUDESONIDE AND FORMOTEROL FUMARATE DIHYDRATE 1 PUFF: 160; 4.5 AEROSOL RESPIRATORY (INHALATION) at 08:16

## 2020-11-25 RX ADMIN — OXYCODONE HYDROCHLORIDE AND ACETAMINOPHEN 1 TABLET: 5; 325 TABLET ORAL at 08:03

## 2020-11-25 RX ADMIN — THEOPHYLLINE ANHYDROUS 400 MG: 200 CAPSULE, EXTENDED RELEASE ORAL at 08:03

## 2020-11-25 RX ADMIN — MONTELUKAST 10 MG: 10 TABLET, FILM COATED ORAL at 20:08

## 2020-11-25 RX ADMIN — Medication 100 MG: at 08:04

## 2020-11-25 RX ADMIN — BACLOFEN 10 MG: 10 TABLET ORAL at 20:08

## 2020-11-25 RX ADMIN — AMLODIPINE BESYLATE 10 MG: 10 TABLET ORAL at 08:04

## 2020-11-25 RX ADMIN — BACLOFEN 10 MG: 10 TABLET ORAL at 15:05

## 2020-11-25 RX ADMIN — OXYCODONE HYDROCHLORIDE AND ACETAMINOPHEN 1 TABLET: 5; 325 TABLET ORAL at 17:13

## 2020-11-25 RX ADMIN — BACLOFEN 10 MG: 10 TABLET ORAL at 08:03

## 2020-11-25 RX ADMIN — FERROUS GLUCONATE TAB 324 MG (37.5 MG ELEMENTAL IRON) 324 MG: 324 (37.5 FE) TAB at 08:04

## 2020-11-25 RX ADMIN — SENNOSIDES 8.6 MG: 8.6 TABLET, FILM COATED ORAL at 20:07

## 2020-11-25 RX ADMIN — OXYCODONE HYDROCHLORIDE AND ACETAMINOPHEN 1 TABLET: 5; 325 TABLET ORAL at 12:10

## 2020-11-25 RX ADMIN — FLUTICASONE PROPIONATE 2 SPRAY: 50 SPRAY, METERED NASAL at 08:05

## 2020-11-25 RX ADMIN — SUCRALFATE 1 G: 1 TABLET ORAL at 08:04

## 2020-11-25 RX ADMIN — BUSPIRONE HYDROCHLORIDE 10 MG: 10 TABLET ORAL at 15:06

## 2020-11-25 RX ADMIN — CLONAZEPAM 1 MG: 1 TABLET ORAL at 08:04

## 2020-11-25 RX ADMIN — CLONAZEPAM 1 MG: 1 TABLET ORAL at 12:10

## 2020-11-25 RX ADMIN — DULOXETINE HYDROCHLORIDE 60 MG: 30 CAPSULE, DELAYED RELEASE ORAL at 08:03

## 2020-11-25 RX ADMIN — GUAIFENESIN 600 MG: 600 TABLET, EXTENDED RELEASE ORAL at 20:08

## 2020-11-25 RX ADMIN — ATORVASTATIN CALCIUM 40 MG: 40 TABLET, FILM COATED ORAL at 20:08

## 2020-11-25 RX ADMIN — FOLIC ACID 1 MG: 1 TABLET ORAL at 08:03

## 2020-11-25 RX ADMIN — BUSPIRONE HYDROCHLORIDE 10 MG: 10 TABLET ORAL at 08:04

## 2020-11-25 RX ADMIN — TRAZODONE HYDROCHLORIDE 100 MG: 50 TABLET ORAL at 20:08

## 2020-11-25 RX ADMIN — Medication 10 ML: at 08:05

## 2020-11-25 RX ADMIN — SUCRALFATE 1 G: 1 TABLET ORAL at 20:08

## 2020-11-25 RX ADMIN — BUSPIRONE HYDROCHLORIDE 10 MG: 10 TABLET ORAL at 20:08

## 2020-11-25 RX ADMIN — ENOXAPARIN SODIUM 40 MG: 100 INJECTION SUBCUTANEOUS at 08:04

## 2020-11-25 RX ADMIN — PANTOPRAZOLE SODIUM 40 MG: 40 TABLET, DELAYED RELEASE ORAL at 06:32

## 2020-11-25 RX ADMIN — Medication 1 CAPSULE: at 08:04

## 2020-11-25 RX ADMIN — CLONAZEPAM 1 MG: 1 TABLET ORAL at 20:08

## 2020-11-25 RX ADMIN — SENNOSIDES 8.6 MG: 8.6 TABLET, FILM COATED ORAL at 08:04

## 2020-11-25 RX ADMIN — ONDANSETRON 4 MG: 2 INJECTION INTRAMUSCULAR; INTRAVENOUS at 17:18

## 2020-11-25 RX ADMIN — Medication 10 ML: at 20:08

## 2020-11-25 RX ADMIN — LEVOTHYROXINE SODIUM 125 MCG: 125 TABLET ORAL at 06:31

## 2020-11-25 RX ADMIN — ASPIRIN 81 MG CHEWABLE TABLET 81 MG: 81 TABLET CHEWABLE at 08:04

## 2020-11-25 RX ADMIN — FERROUS GLUCONATE TAB 324 MG (37.5 MG ELEMENTAL IRON) 324 MG: 324 (37.5 FE) TAB at 17:13

## 2020-11-25 RX ADMIN — METOPROLOL SUCCINATE 25 MG: 25 TABLET, EXTENDED RELEASE ORAL at 08:04

## 2020-11-25 RX ADMIN — MAGNESIUM GLUCONATE 500 MG ORAL TABLET 400 MG: 500 TABLET ORAL at 08:03

## 2020-11-25 ASSESSMENT — PAIN SCALES - GENERAL
PAINLEVEL_OUTOF10: 3
PAINLEVEL_OUTOF10: 5
PAINLEVEL_OUTOF10: 5
PAINLEVEL_OUTOF10: 3
PAINLEVEL_OUTOF10: 5

## 2020-11-25 NOTE — PROGRESS NOTES
thyromegaly or masses. RESP Clear to auscultation, no wheezes, rales or rhonchi. Symmetric chest movement while on 3L NC  CARDIO/VASC S1/S2 auscultated. Regular rate without appreciable murmurs, rubs, or gallops. Fabiana Lala GI Abdomen is soft without significant tenderness, masses, or guarding.  No costovertebral angle tenderness. HEME/LYMPH  No petechiae or ecchymoses. MSK No gross joint deformities. SKIN Normal coloration, warm, dry. NEURO Cranial nerves appear grossly intact, normal speech  PSYCH Awake, alert, oriented x 4. Affect appropriate.     Medications:   Medications:    senna  1 tablet Oral BID    traZODone  100 mg Oral Nightly    theophylline  400 mg Oral Daily    sodium chloride flush  10 mL Intravenous 2 times per day    busPIRone  10 mg Oral TID    potassium chloride  10 mEq Intravenous Once    pantoprazole  40 mg Oral QAM AC    lactobacillus  1 capsule Oral Daily with breakfast    sucralfate  1 g Oral 2 times per day    amLODIPine  10 mg Oral Daily    aspirin  81 mg Oral Daily    baclofen  10 mg Oral TID    budesonide-formoterol  1 puff Inhalation BID    DULoxetine  60 mg Oral Daily    ferrous gluconate  324 mg Oral BID WC    fluticasone  2 spray Nasal Daily    folic acid  1 mg Oral Daily    guaiFENesin  600 mg Oral BID    levothyroxine  125 mcg Oral Daily    montelukast  10 mg Oral Daily    metoprolol succinate  25 mg Oral Daily    thiamine  100 mg Oral Daily    atorvastatin  40 mg Oral Nightly    enoxaparin  40 mg Subcutaneous Daily    magnesium oxide  400 mg Oral Daily      Infusions:   PRN Meds: sodium chloride flush, 10 mL, PRN  acetaminophen, 650 mg, Q4H PRN  clonazePAM, 1 mg, TID PRN  ipratropium, 2 puff, Q4H PRN    And  albuterol sulfate HFA, 2 puff, Q4H PRN  oxyCODONE-acetaminophen, 1 tablet, Q4H PRN  acetaminophen, 650 mg, Q6H PRN  polyethylene glycol, 17 g, Daily PRN  promethazine, 12.5 mg, Q6H PRN    Or  ondansetron, 4 mg, Q6H PRN  potassium chloride, 40 mEq, PRN    Or  potassium alternative oral replacement, 40 mEq, PRN    Or  potassium chloride, 10 mEq, PRN  magnesium sulfate, 1 g, PRN          Electronically signed by Yoel Kurtz MD on 11/25/2020 at 11:50 AM

## 2020-11-25 NOTE — PROGRESS NOTES
[facility]        Progress Note       2020  8:44 AM    Patient:    Dwana Favre  : 1960   64 y.o. MRN: [id number]  Admitted: 2020  7:48 PM ATT: Aravind Hdez MD   [id number]  AdmitDx: Hypokalemia [E87.6]  Hypomagnesemia [E83.42]  Nausea [R11.0]  Pericardial effusion [I31.3]  Chest pain [R07.9]  Abdominal pain, unspecified abdominal location [R10.9]  Chest pain, unspecified type [R07.9]  PCP: Kirkwood Merlin, MD    SUBJECTIVE:  Chart reviewed, events noted  Patient feeling well. Has epigastric pain. Eating well. No N/V. Stress test and heart cath yesterday.      ROS:  The positive ROS will be identified in bold     CONSTITUTIONAL:  Neg  Weight loss, fatigue, fever  MOUTH/THROAT:  Neg  Bleeding gums, hoarseness or sore throat  RESPIRATORY:   Neg SOB, wheeze, cough, hemoptysis or bronchitis  CARDIOVASCULAR:  Neg Chest pain, palpitations, dyspnea on exertion, edema  GASTROINTESTINAL:  SEE HPI  HEMATOLOGIC/LYMPHATIC:  Neg  Anemia, bleeding tendency  MUSCULOSKELETAL: Neg  New myalgias, joint pain, swelling or stiffness  NEUROLOGICAL:  Neg  Loss of Consciousness, memory loss, forgetfulness, periods of confusion, difficulty concentrating, seizures, insomnia, aphasia    SKIN:  Neg No itching, rashes, or sores  PSYCHIATRIC:  Neg Depression, personality changes, anxiety    OBJECTIVE:      /70   Pulse 81   Temp 98.9 °F (37.2 °C) (Oral)   Resp 17   Ht 5' 1\" (1.549 m)   Wt 189 lb 4.8 oz (85.9 kg)   SpO2 95%   BMI 35.77 kg/m²     NAD, appears comfortable  Lips and mucous membranes pink and moist  RRR, Nl s1s2  Lungs decreased bilaterally, respirations even and unlabored on O2 per NC   Abdomen soft, ND, NT, no HSM, bowel sounds normal  Skin pink, warm and dry  No edema bilateral lower extremities   AAOx3    CBC:   Recent Labs     20  0042 20  0319   WBC 7.1 6.8 6.2   HGB 9.7* 9.1* 7.8*  342 262     BMP:    Recent Labs     11/23/20  0042 11/24/20  0210 11/25/20  0319    138 139   K 3.3* 3.7 3.9   CL 95* 99 96*   CO2 28 32 32   BUN 3* 3* 3*   CREATININE 0.7 0.9 0.9   GLUCOSE 103* 72 133*     Magnesium:   Lab Results   Component Value Date    MG 1.9 11/24/2020     Hepatic:   Recent Labs     11/22/20  1954   AST 17   ALT 13   BILITOT 0.3   ALKPHOS 134*     INR: No results for input(s): INR in the last 72 hours.       Intake/Output Summary (Last 24 hours) at 11/25/2020 0844  Last data filed at 11/24/2020 2734  Gross per 24 hour   Intake 10 ml   Output --   Net 10 ml         Medications    Scheduled Medications:    senna  1 tablet Oral BID    traZODone  100 mg Oral Nightly    theophylline  400 mg Oral Daily    sodium chloride flush  10 mL Intravenous 2 times per day    busPIRone  10 mg Oral TID    potassium chloride  10 mEq Intravenous Once    pantoprazole  40 mg Oral QAM AC    lactobacillus  1 capsule Oral Daily with breakfast    sucralfate  1 g Oral 2 times per day    amLODIPine  10 mg Oral Daily    aspirin  81 mg Oral Daily    baclofen  10 mg Oral TID    budesonide-formoterol  1 puff Inhalation BID    DULoxetine  60 mg Oral Daily    ferrous gluconate  324 mg Oral BID WC    fluticasone  2 spray Nasal Daily    folic acid  1 mg Oral Daily    guaiFENesin  600 mg Oral BID    levothyroxine  125 mcg Oral Daily    montelukast  10 mg Oral Daily    metoprolol succinate  25 mg Oral Daily    thiamine  100 mg Oral Daily    atorvastatin  40 mg Oral Nightly    enoxaparin  40 mg Subcutaneous Daily    magnesium oxide  400 mg Oral Daily     PRN Medications: sodium chloride flush, acetaminophen, clonazePAM, albuterol sulfate HFA **AND** ipratropium, oxyCODONE-acetaminophen, [DISCONTINUED] acetaminophen **OR** acetaminophen, polyethylene glycol, promethazine **OR** ondansetron, potassium chloride **OR** potassium alternative oral replacement **OR** potassium chloride, magnesium sulfate  Infusions:         Patient Active Problem List   Diagnosis Code    Centrilobular emphysema (HCC) J43.2    Hypertension I10    Hyperlipidemia LDL goal <100 E78.5    Abnormal EKG R94.31    Palpitations R00.2    Anxiety F41.9    FH: CAD (coronary artery disease) Z82.49    Fibromyalgia M79.7    Back pain at L4-L5 level M54.5    Sleep apnea G47.30    COPD exacerbation ([facility]) J44.1    Electrolyte imbalance E87.8    Epigastric pain R10.13    COPD (chronic obstructive pulmonary disease) ([facility] J44.9    Spinal stenosis of lumbar region with radiculopathy M48.061, M54.16    Spinal stenosis, lumbar region, without neurogenic claudication M48.061    COPD with acute exacerbation ([facility] J44.1    Pneumonia due to infectious organism J18.9    SVT (supraventricular tachycardia) ([facility]) I47.1    Class 1 obesity due to excess calories with body mass index (BMI) of 31.0 to 31.9 in adult E66.09, Z68.31    Pneumonia J18.9    Pleural effusion J90    Atelectasis J98.11    Pulmonary nodules R91.8    Respiratory failure with hypoxia and hypercapnia ([facility]) J96.91, J96.92    Anemia D64.9    COPD with exacerbation ([facility]) J44.1    Acquired hemolytic anemia, unspecified ([facility]) D59.9    Leucocytosis D72.829    Chronic kidney disease, stage I N18.1    Hyponatremia E87.1    PNA (pneumonia) J18.9    Sepsis ([facility]) A41.9    Pericardial effusion I31.3    Acute acalculous cholecystitis K81.0    SIRS (systemic inflammatory response syndrome) ([facility]) R65.10    Chest pain R07.9    Abnormal nuclear stress test R94.39        ASSESSMENT:  Epigastric pain   CTA noted no GI findings  Has had GI upset since lap afshin 10/25/2020  May be gastritis  Cardiac workup completed no sig CAD, FU OP     RECOMMENDATIONS:  Conservative management  PPI daily  Continue carafate BID  Antiemetics as needed  Continue probiotic   Continue Senna for constipation      EGD OP at this time. Stable from from GI standpoint.       Discussed plan of

## 2020-11-25 NOTE — PROGRESS NOTES
[facility] [address], [address] [facility],Third Floor  Phone: [phone]    Fax [phone]                  Lela Koenig MD, Una Moseley MD, Shirin Bazan MD, MD Taryn Julian MD Nelva Bramble, MD Micheal Lunger, MD Peggy Stabs, APRN      Loc Kendrick, APRN  Susannah Starr, [facility] Arelizabeth Hartmann, BRIAN    Cardiology Progress Note     Today's Plan: sign off    Admit Date:  11/22/2020    Consult reason/ Seen today for: chest pain    Subjective and  Overnight Events:  Patient states that she is ready to go home. She denies chest pain. Assessment / Plan / Recommendation:     1. Chest pain without history of CAD: serial troponin are negative. EKG is without acute abnormalities. [facility] 11/25/2020 was without significant CAD. 2. Hyperlipidemia: 11/23/2020 LDL 87, HDL 59, triglycerides 111. Continue statin therapy. 3. Pericardial effusion with pericardiocentesis on 10/24/2020 : echo does not show pleural effusion 11/24/2020.   4. HTN: controlled. 5. DALTON: encouraged patient to wear CPAP  6. Hypothyroid: per primary  7. DVT prophylaxis if not contraindicated while in the hospital.   8. Patient is okay for discharge from cardiology perspective. Please re consult if additional cardiology recommendations are needed. 9. Pt to follow up in the office 2 week after discharge        History of Presenting Illness:    Chief complain on admission : 64 y. o.year old who is admitted for  Chief Complaint   Patient presents with    Chest Pain    Abdominal Pain        Past medical history:    has a past medical history of Anxiety, Arthritis, Back pain at L4-L5 level, Bipolar 1 disorder ([address] COPD (chronic obstructive pulmonary disease) ([address] Emphysema of lung ([address] FH: CAD (coronary artery disease), Fibromyalgia, H/O Doppler ultrasound, H/O echocardiogram, History of blood transfusion, Hyperlipidemia LDL goal <100, Hypertension, Obstructive sleep apnea, Osteoarthritis, and Thyroid disease. Past surgical history:   has a past surgical history that includes Carpal tunnel release; Tubal ligation; back surgery; Cardiac catheterization; Colonoscopy (N/A, 12/12/2019); and Cholecystectomy, laparoscopic (N/A, 10/25/2020). Social History:   reports that she quit smoking about 12 years ago. She has a 30.00 pack-year smoking history. She has never used smokeless tobacco. She reports previous alcohol use of about 2.0 standard drinks of alcohol per week. She reports that she does not use drugs. Family history:  family history includes No Known Problems in her father and mother. Allergies   Allergen Reactions    Lisinopril Swelling and Rash     angioedema         Review of Systems:    All 14 systems were reviewed and are negative  Except for the positive findings  which as documented     /70   Pulse 81   Temp 98.9 °F (37.2 °C) (Oral)   Resp 17   Ht 5' 1\" (1.549 m)   Wt 189 lb 4.8 oz (85.9 kg)   SpO2 95%   BMI 35.77 kg/m²     No intake or output data in the 24 hours ending 11/25/20 1125    Physical Exam  Vitals signs reviewed. Constitutional:       General: She is not in acute distress. Appearance: Normal appearance. She is not ill-appearing. HENT:      Head: Normocephalic and atraumatic. Eyes:      Conjunctiva/sclera: Conjunctivae normal.      Pupils: Pupils are equal, round, and reactive to light. Neck:      Musculoskeletal: Neck supple. No muscular tenderness. Vascular: No carotid bruit. Cardiovascular:      Rate and Rhythm: Normal rate and regular rhythm. Pulses: Normal pulses. Heart sounds: Normal heart sounds. No murmur. Pulmonary:      Effort: Pulmonary effort is normal. No respiratory distress. Breath sounds: Normal breath sounds. Musculoskeletal:         General: No swelling or deformity. Skin:     General: Skin is warm and dry. Capillary Refill: Capillary refill takes less than 2 seconds. Comments: right groin site dressing is clean dry and intact, site is soft without hematoma, bruising or bleeding noted. Neurological:      Mental Status: She is alert and oriented to person, place, and time. Psychiatric:         Mood and Affect: Mood normal.         Behavior: Behavior normal.         Thought Content:  Thought content normal.         Judgment: Judgment normal.         Telemetry Reviewed:   Sinus rhythm rate 94    Medications:    senna  1 tablet Oral BID    traZODone  100 mg Oral Nightly    theophylline  400 mg Oral Daily    sodium chloride flush  10 mL Intravenous 2 times per day    busPIRone  10 mg Oral TID    potassium chloride  10 mEq Intravenous Once    pantoprazole  40 mg Oral QAM AC    lactobacillus  1 capsule Oral Daily with breakfast    sucralfate  1 g Oral 2 times per day    amLODIPine  10 mg Oral Daily    aspirin  81 mg Oral Daily    baclofen  10 mg Oral TID    budesonide-formoterol  1 puff Inhalation BID    DULoxetine  60 mg Oral Daily    ferrous gluconate  324 mg Oral BID WC    fluticasone  2 spray Nasal Daily    folic acid  1 mg Oral Daily    guaiFENesin  600 mg Oral BID    levothyroxine  125 mcg Oral Daily    montelukast  10 mg Oral Daily    metoprolol succinate  25 mg Oral Daily    thiamine  100 mg Oral Daily    atorvastatin  40 mg Oral Nightly    enoxaparin  40 mg Subcutaneous Daily    magnesium oxide  400 mg Oral Daily       sodium chloride flush, acetaminophen, clonazePAM, albuterol sulfate HFA **AND** ipratropium, oxyCODONE-acetaminophen, [DISCONTINUED] acetaminophen **OR** acetaminophen, polyethylene glycol, promethazine **OR** ondansetron, potassium chloride **OR** potassium alternative oral replacement **OR** potassium chloride, magnesium sulfate    Lab Data:  CBC:   Recent Labs     11/22/20 1954 11/23/20  0042 11/25/20  0319   WBC 7.1 6.8 6.2   HGB 9.7* 9.1* 7.8*   HCT 33.5* 32.2* 28.9*   MCV 93.1 95.0 97.6    342 262     BMP:   Recent Labs easy  Cardiovascular:  RRR S1S2  Abdomen:   nontender  Extremities:  no edema  Pulses; palpable  Neuro: grossly normal      MEDICAL DECISION MAKING;    I agree with the above plan, which was planned by myself and discussed with NP.   No sig cad on [facility]  Echo with no sig per effusion  Will fu as needsudhir Garcia MD [address] [facility]

## 2020-11-26 VITALS
WEIGHT: 189.3 LBS | SYSTOLIC BLOOD PRESSURE: 117 MMHG | HEART RATE: 83 BPM | DIASTOLIC BLOOD PRESSURE: 74 MMHG | TEMPERATURE: 98.7 F | HEIGHT: 61 IN | RESPIRATION RATE: 14 BRPM | BODY MASS INDEX: 35.74 KG/M2 | OXYGEN SATURATION: 99 %

## 2020-11-26 PROCEDURE — 6370000000 HC RX 637 (ALT 250 FOR IP): Performed by: INTERNAL MEDICINE

## 2020-11-26 PROCEDURE — 6360000002 HC RX W HCPCS: Performed by: INTERNAL MEDICINE

## 2020-11-26 PROCEDURE — 2580000003 HC RX 258: Performed by: INTERNAL MEDICINE

## 2020-11-26 PROCEDURE — 94761 N-INVAS EAR/PLS OXIMETRY MLT: CPT

## 2020-11-26 PROCEDURE — 2700000000 HC OXYGEN THERAPY PER DAY

## 2020-11-26 RX ORDER — LACTOBACILLUS RHAMNOSUS GG 10B CELL
1 CAPSULE ORAL
Qty: 30 CAPSULE | Refills: 0 | Status: SHIPPED | OUTPATIENT
Start: 2020-11-27

## 2020-11-26 RX ORDER — SUCRALFATE 1 G/1
1 TABLET ORAL EVERY 12 HOURS SCHEDULED
Qty: 120 TABLET | Refills: 3 | Status: SHIPPED | OUTPATIENT
Start: 2020-11-26 | End: 2021-11-17 | Stop reason: CLARIF

## 2020-11-26 RX ORDER — DICYCLOMINE HYDROCHLORIDE 10 MG/1
10 CAPSULE ORAL 4 TIMES DAILY
Qty: 360 CAPSULE | Refills: 1 | Status: ON HOLD | OUTPATIENT
Start: 2020-11-26 | End: 2021-05-04 | Stop reason: SDUPTHER

## 2020-11-26 RX ORDER — PANTOPRAZOLE SODIUM 40 MG/1
40 TABLET, DELAYED RELEASE ORAL 2 TIMES DAILY
Qty: 60 TABLET | Refills: 3 | Status: SHIPPED | OUTPATIENT
Start: 2020-11-26 | End: 2021-09-08 | Stop reason: CLARIF

## 2020-11-26 RX ORDER — SENNA PLUS 8.6 MG/1
1 TABLET ORAL 2 TIMES DAILY
Qty: 60 TABLET | Refills: 0 | Status: SHIPPED | OUTPATIENT
Start: 2020-11-26 | End: 2020-12-26

## 2020-11-26 RX ORDER — OXYCODONE HYDROCHLORIDE AND ACETAMINOPHEN 5; 325 MG/1; MG/1
1 TABLET ORAL EVERY 8 HOURS PRN
Qty: 9 TABLET | Refills: 0 | Status: SHIPPED | OUTPATIENT
Start: 2020-11-26 | End: 2020-11-29

## 2020-11-26 RX ADMIN — SUCRALFATE 1 G: 1 TABLET ORAL at 08:53

## 2020-11-26 RX ADMIN — MAGNESIUM GLUCONATE 500 MG ORAL TABLET 400 MG: 500 TABLET ORAL at 08:53

## 2020-11-26 RX ADMIN — GUAIFENESIN 600 MG: 600 TABLET, EXTENDED RELEASE ORAL at 08:53

## 2020-11-26 RX ADMIN — BUSPIRONE HYDROCHLORIDE 10 MG: 10 TABLET ORAL at 08:53

## 2020-11-26 RX ADMIN — FOLIC ACID 1 MG: 1 TABLET ORAL at 08:53

## 2020-11-26 RX ADMIN — THEOPHYLLINE ANHYDROUS 400 MG: 200 CAPSULE, EXTENDED RELEASE ORAL at 08:53

## 2020-11-26 RX ADMIN — METOPROLOL SUCCINATE 25 MG: 25 TABLET, EXTENDED RELEASE ORAL at 08:53

## 2020-11-26 RX ADMIN — ENOXAPARIN SODIUM 40 MG: 100 INJECTION SUBCUTANEOUS at 08:53

## 2020-11-26 RX ADMIN — PANTOPRAZOLE SODIUM 40 MG: 40 TABLET, DELAYED RELEASE ORAL at 06:31

## 2020-11-26 RX ADMIN — Medication 100 MG: at 08:53

## 2020-11-26 RX ADMIN — BACLOFEN 10 MG: 10 TABLET ORAL at 08:53

## 2020-11-26 RX ADMIN — SENNOSIDES 8.6 MG: 8.6 TABLET, FILM COATED ORAL at 08:53

## 2020-11-26 RX ADMIN — Medication 10 ML: at 08:53

## 2020-11-26 RX ADMIN — FERROUS GLUCONATE TAB 324 MG (37.5 MG ELEMENTAL IRON) 324 MG: 324 (37.5 FE) TAB at 08:53

## 2020-11-26 RX ADMIN — LEVOTHYROXINE SODIUM 125 MCG: 125 TABLET ORAL at 06:31

## 2020-11-26 RX ADMIN — Medication 1 CAPSULE: at 08:53

## 2020-11-26 RX ADMIN — DULOXETINE HYDROCHLORIDE 60 MG: 30 CAPSULE, DELAYED RELEASE ORAL at 08:53

## 2020-11-26 RX ADMIN — ASPIRIN 81 MG CHEWABLE TABLET 81 MG: 81 TABLET CHEWABLE at 08:53

## 2020-11-26 ASSESSMENT — PAIN SCALES - GENERAL
PAINLEVEL_OUTOF10: 0
PAINLEVEL_OUTOF10: 0

## 2020-11-26 NOTE — DISCHARGE SUMMARY
inhaler  Inhale 1 puff into the lungs every 4 hours as needed for Wheezing             aspirin 81 MG chewable tablet  Take 81 mg by mouth daily             baclofen (LIORESAL) 10 MG tablet  Take 1 tablet by mouth 3 times daily             budesonide-formoterol (SYMBICORT) 160-4.5 MCG/ACT AERO  USE 2 INHALATIONS TWICE A DAY             busPIRone (BUSPAR) 10 MG tablet  Take 1 tablet by mouth 3 times daily             clonazePAM (KLONOPIN) 1 MG tablet  Take 1 mg by mouth 3 times daily as needed. dicyclomine (BENTYL) 10 MG capsule  Take 1 capsule by mouth 4 times daily             DULoxetine (CYMBALTA) 60 MG extended release capsule  Take 60 mg by mouth daily             ferrous gluconate 324 (37.5 Fe) MG TABS  Take 1 tablet by mouth 2 times daily             fluticasone (FLONASE) 50 MCG/ACT nasal spray  2 sprays by Nasal route daily             folic acid (FOLVITE) 1 MG tablet  Take 1 tablet by mouth daily             guaiFENesin (MUCINEX) 600 MG extended release tablet  Take 1 tablet by mouth 2 times daily             ipratropium-albuterol (DUONEB) 0.5-2.5 (3) MG/3ML SOLN nebulizer solution  Inhale 3 mLs into the lungs every 4 hours             lactobacillus (CULTURELLE) capsule  Take 1 capsule by mouth daily (with breakfast)             levothyroxine (SYNTHROID) 100 MCG tablet  Take 1 tablet by mouth Daily             metoprolol succinate (TOPROL XL) 25 MG extended release tablet  Take 1 tablet by mouth daily             montelukast (SINGULAIR) 10 MG tablet  Take 1 tablet by mouth daily             nystatin (MYCOSTATIN) 057796 UNIT/ML suspension  Take 5 mLs by mouth 4 times daily             oxyCODONE-acetaminophen (PERCOCET) 5-325 MG per tablet  Take 1 tablet by mouth every 8 hours as needed for Pain for up to 3 days.              pantoprazole (PROTONIX) 40 MG tablet  Take 1 tablet by mouth 2 times daily             senna (SENOKOT) 8.6 MG tablet  Take 1 tablet by mouth 2 times daily senna-docusate (PERICOLACE) 8.6-50 MG per tablet  Take 2 tablets by mouth daily as needed for Constipation             sucralfate (CARAFATE) 1 GM tablet  Take 1 tablet by mouth every 12 hours             theophylline (MIAH-24) 200 MG extended release capsule  Take 400 mg by mouth daily             traZODone (DESYREL) 50 MG tablet  Take 100 mg by mouth nightly              vitamin B-1 100 MG tablet  Take 1 tablet by mouth daily                 Objective Findings at Discharge:   /74   Pulse 83   Temp 98.7 °F (37.1 °C) (Oral)   Resp 14   Ht 5' 1\" (1.549 m)   Wt 189 lb 4.8 oz (85.9 kg)   SpO2 99%   BMI 35.77 kg/m²            PHYSICAL EXAM   GEN Awake female, sitting upright in bed in no apparent distress. Appears given age. EYES Pupils are equally round. No scleral erythema, discharge, or conjunctivitis. HENT Mucous membranes are moist. Oral pharynx without exudates, no evidence of thrush. NECK Supple, no apparent thyromegaly or masses. RESP Clear to auscultation, no wheezes, rales or rhonchi. Symmetric chest movement while on 3 L of O2  CARDIO/VASC S1/S2 auscultated. Regular rate without appreciable murmurs, rubs, or gallops. No JVD or carotid bruits. Peripheral pulses equal bilaterally and palpable. No peripheral edema. GI Abdomen is soft without significant tenderness, masses, or guarding. Bowel sounds are normoactive. Rectal exam deferred.  No costovertebral angle tenderness. Normal appearing external genitalia. Mcallister catheter is not present. HEME/LYMPH No palpable cervical lymphadenopathy and no hepatosplenomegaly. No petechiae or ecchymoses. MSK No gross joint deformities. SKIN Normal coloration, warm, dry. NEURO Cranial nerves appear grossly intact, normal speech, no lateralizing weakness. PSYCH Awake, alert, oriented x 4. Affect appropriate.     BMP/CBC  Recent Labs     11/24/20  0210 11/25/20  0319    139   K 3.7 3.9   CL 99 96*   CO2 32 32   BUN 3* 3*   CREATININE 0.9 0.9 WBC  --  6.2   HCT  --  28.9*   PLT  --  262       IMAGING:  As above    Discharge Time of 35 minutes    Electronically signed by Daniel Marino MD on 11/26/2020 at 10:58 AM

## 2020-11-27 LAB
CULTURE: NORMAL
CULTURE: NORMAL
Lab: NORMAL
Lab: NORMAL
SPECIMEN: NORMAL
SPECIMEN: NORMAL

## 2020-12-02 ENCOUNTER — OFFICE VISIT (OUTPATIENT)
Dept: BARIATRICS/WEIGHT MGMT | Age: 60
End: 2020-12-02

## 2020-12-02 VITALS
DIASTOLIC BLOOD PRESSURE: 80 MMHG | HEIGHT: 62 IN | OXYGEN SATURATION: 85 % | HEART RATE: 102 BPM | SYSTOLIC BLOOD PRESSURE: 110 MMHG | BODY MASS INDEX: 31.62 KG/M2 | WEIGHT: 171.8 LBS | TEMPERATURE: 96.9 F

## 2020-12-02 PROBLEM — Z90.49 STATUS POST LAPAROSCOPIC CHOLECYSTECTOMY: Status: ACTIVE | Noted: 2020-12-02

## 2020-12-02 PROCEDURE — 99024 POSTOP FOLLOW-UP VISIT: CPT | Performed by: SURGERY

## 2020-12-02 RX ORDER — ONDANSETRON 4 MG/1
4 TABLET, ORALLY DISINTEGRATING ORAL EVERY 8 HOURS PRN
Qty: 30 TABLET | Refills: 3 | Status: SHIPPED | OUTPATIENT
Start: 2020-12-02 | End: 2021-12-29

## 2020-12-02 NOTE — PROGRESS NOTES
Spouse name: Not on file    Number of children: Not on file    Years of education: Not on file    Highest education level: Not on file   Occupational History    Not on file   Social Needs    Financial resource strain: Not on file    Food insecurity     Worry: Not on file     Inability: Not on file    Transportation needs     Medical: Not on file     Non-medical: Not on file   Tobacco Use    Smoking status: Former Smoker     Packs/day: 1.50     Years: 20.00     Pack years: 30.00     Last attempt to quit: 2008     Years since quittin.9    Smokeless tobacco: Never Used   Substance and Sexual Activity    Alcohol use: Not Currently     Alcohol/week: 2.0 standard drinks     Types: 2 Shots of liquor per week    Drug use: No    Sexual activity: Yes     Partners: Male   Lifestyle    Physical activity     Days per week: Not on file     Minutes per session: Not on file    Stress: Not on file   Relationships    Social connections     Talks on phone: Not on file     Gets together: Not on file     Attends Confucianism service: Not on file     Active member of club or organization: Not on file     Attends meetings of clubs or organizations: Not on file     Relationship status: Not on file    Intimate partner violence     Fear of current or ex partner: Not on file     Emotionally abused: Not on file     Physically abused: Not on file     Forced sexual activity: Not on file   Other Topics Concern    Not on file   Social History Narrative    Not on file        Allergies   Allergen Reactions    Lisinopril Swelling and Rash     angioedema        Family History   Problem Relation Age of Onset    No Known Problems Mother     No Known Problems Father        Current Outpatient Medications   Medication Sig Dispense Refill    ondansetron (ZOFRAN ODT) 4 MG disintegrating tablet Take 1 tablet by mouth every 8 hours as needed for Nausea or Vomiting Place under the tongue and let it melt and absorb from under your tablet 3    traZODone (DESYREL) 50 MG tablet Take 100 mg by mouth nightly       clonazePAM (KLONOPIN) 1 MG tablet Take 1 mg by mouth 3 times daily as needed.  baclofen (LIORESAL) 10 MG tablet Take 1 tablet by mouth 3 times daily 30 tablet 0    aspirin 81 MG chewable tablet Take 81 mg by mouth daily      fluticasone (FLONASE) 50 MCG/ACT nasal spray 2 sprays by Nasal route daily 1 Bottle 0     No current facility-administered medications for this visit. Review of Systems      OBJECTIVE:    /80   Pulse 102   Temp 96.9 °F (36.1 °C)   Ht 5' 1.5\" (1.562 m)   Wt 171 lb 12.8 oz (77.9 kg)   SpO2 (!) 85%   BMI 31.94 kg/m²    Body mass index is 31.94 kg/m². Physical Exam      Orders Placed This Encounter   Medications    ondansetron (ZOFRAN ODT) 4 MG disintegrating tablet     Sig: Take 1 tablet by mouth every 8 hours as needed for Nausea or Vomiting Place under the tongue and let it melt and absorb from under your tongue. Dispense:  30 tablet     Refill:  3       ASSESSMENT & PLAN:    1. Status post laparoscopic cholecystectomy         Feels sick, and vomiting. . will Rx Zofran, and follow prn. Patient counseled on the risks, benefits, and alternatives of treatment plan at length while in the office today. Patient states an understanding and willingness to proceed with the plan. Follow Up:    Return for PRN - As needed for any new symptom. Galindo Mccollum MD, FACS, FICS. 12/2/20             .

## 2020-12-08 LAB
AFB SMEAR: NORMAL
CULTURE: NORMAL
Lab: NORMAL
SPECIMEN: NORMAL

## 2020-12-10 ENCOUNTER — OFFICE VISIT (OUTPATIENT)
Dept: CARDIOLOGY CLINIC | Age: 60
End: 2020-12-10
Payer: COMMERCIAL

## 2020-12-10 VITALS
SYSTOLIC BLOOD PRESSURE: 96 MMHG | HEIGHT: 61 IN | BODY MASS INDEX: 32.1 KG/M2 | DIASTOLIC BLOOD PRESSURE: 58 MMHG | WEIGHT: 170 LBS | HEART RATE: 84 BPM

## 2020-12-10 PROCEDURE — G8427 DOCREV CUR MEDS BY ELIG CLIN: HCPCS | Performed by: INTERNAL MEDICINE

## 2020-12-10 PROCEDURE — 99214 OFFICE O/P EST MOD 30 MIN: CPT | Performed by: INTERNAL MEDICINE

## 2020-12-10 PROCEDURE — G8417 CALC BMI ABV UP PARAM F/U: HCPCS | Performed by: INTERNAL MEDICINE

## 2020-12-10 PROCEDURE — 1111F DSCHRG MED/CURRENT MED MERGE: CPT | Performed by: INTERNAL MEDICINE

## 2020-12-10 PROCEDURE — 93000 ELECTROCARDIOGRAM COMPLETE: CPT | Performed by: INTERNAL MEDICINE

## 2020-12-10 PROCEDURE — 3017F COLORECTAL CA SCREEN DOC REV: CPT | Performed by: INTERNAL MEDICINE

## 2020-12-10 PROCEDURE — G8484 FLU IMMUNIZE NO ADMIN: HCPCS | Performed by: INTERNAL MEDICINE

## 2020-12-10 PROCEDURE — 1036F TOBACCO NON-USER: CPT | Performed by: INTERNAL MEDICINE

## 2020-12-10 RX ORDER — ATORVASTATIN CALCIUM 40 MG/1
40 TABLET, FILM COATED ORAL DAILY
COMMUNITY
End: 2021-05-20

## 2020-12-10 RX ORDER — AMIODARONE HYDROCHLORIDE 200 MG/1
200 TABLET ORAL DAILY
COMMUNITY
End: 2020-12-10

## 2020-12-10 RX ORDER — FERROUS SULFATE 325(65) MG
325 TABLET ORAL 2 TIMES DAILY
Status: ON HOLD | COMMUNITY
End: 2021-05-04 | Stop reason: SDUPTHER

## 2020-12-10 NOTE — PROGRESS NOTES
depression  · Endocrine: No malaise, fatigue or temperature intolerance  · Hematologic/Lymphatic: No bleeding problems, blood clots or swollen lymph nodes  · Allergic/Immunologic: No nasal congestion or hives  All systems negative except as marked. Objective:  BP (!) 96/58   Pulse 84   Ht 5' 1\" (1.549 m)   Wt 170 lb (77.1 kg)   BMI 32.12 kg/m²   Wt Readings from Last 3 Encounters:   12/10/20 170 lb (77.1 kg)   12/02/20 171 lb 12.8 oz (77.9 kg)   11/25/20 189 lb 4.8 oz (85.9 kg)     Body mass index is 32.12 kg/m². GENERAL - Alert, oriented, pleasant, in no apparent distress,normal grooming  HEENT - pupils are reactive to light and accomodation, cornea intact, conjunctive normal color, ears are normal in exam,throat exam in normal, teeth, gum and palate are normal, oral mucosa is normal without any notation of pallor or cyanosis  Neck - Supple. No jugular venous distention noted. No carotid bruits, no apical -carotid delay  Respiratory:  Normal breath sounds, No respiratory distress, No wheezing, No chest tenderness. ,no use of accessory muscles, diaphragm movement is normal  Cardiovascular: (PMI) apex non displaced,no lifts no thrills, no s3,no s4, Normal heart rate, Normal rhythm, No murmurs, No rubs, No gallops. Carotid arteries pulse and amplitude are normal no bruit, no abdominal bruit noted ( normal abdominal aorta ausculation), femoral arteries pulse and amplitude are normal no bruit, pedal pulses are normal  Femoral pulses have normal amplitude, no bruits   Extremities - No cyanosis, clubbing, or significant edema, no varicose veins    Abdomen - No masses, tenderness, or organomegaly, no hepato-splenomegally, no bruits  Musculoskeletal - No significant edema, no kyphosis or scoliosis, no deformity in any extremity noted, muscle strength and tone are normal  Skin: no ulcer,no scar,no stasis dermatitis   Neurologic - alert oriented times 3,Cranial nerves II through XII are grossly intact.   There were no gross focal neurologic abnormalities. All sensory and motor nerves examined and were normal  Psychiatric: normal mood and affect    Lab Results   Component Value Date    CKTOTAL 33 11/22/2020    TROPONINI 0.007 03/25/2014     BNP:  No results found for: BNP  PT/INR:  No results found for: [email or web address]  Lab Results   Component Value Date    LABA1C 5.1 06/21/2019     Lab Results   Component Value Date    CHOL 166 11/23/2020    TRIG 111 11/23/2020    HDL 59 11/23/2020    LDLDIRECT 87 11/23/2020     Lab Results   Component Value Date    ALT 13 11/22/2020    AST 17 11/22/2020     TSH:  No results found for: TSH    Impression:  Kamari Oquendo is a 64 y. o.year old who  has a past medical history of Anxiety, Arthritis, Back pain at L4-L5 level, Bipolar 1 disorder ([address] COPD (chronic obstructive pulmonary disease) ([facility] [address] Emphysema of lung ([facility] [address] FH: CAD (coronary artery disease), Fibromyalgia, H/O Doppler ultrasound, H/O echocardiogram, History of blood transfusion, Hyperlipidemia LDL goal <100, Hypertension, Obstructive sleep apnea, Osteoarthritis, and Thyroid disease. and presents with     Plan:  1. HYPOTENSION: hold lopressor for now, if blood pressure goes, restart it  2. ? PAF, on aspirin, Stop admiodarone, will start lopressor in 1 month if blood pressure is normal  3. Normal LHC  4. S/p pericardiocentesis  5. endstage  Lung disease: on home oxygen  6. Sinus tachycardia: seen by EP  In hospital, had SVT and sinus tachycardia and was started on cardizem, will continue it  7. She is not in CHF, LAST YR RHC at [facility] showed fluid overload with PCWP of 35, but there is no need for lasix at this time  8. End stage COPD: stable, continue inhalers, and steroids  9. Anemia: agree with follow up with hematology  10. Bipolar: stable  Fibromyalgia: stableHealth maintenance: exerise and dietAll labs, medications and tests reviewed, continue all other medications of all above medical condition listed as is.     [unfilled]

## 2020-12-10 NOTE — PATIENT INSTRUCTIONS
Please be informed that if you contact our office outside of normal business hours the physician on call cannot help with any scheduling or rescheduling issues, procedure instruction questions or any type of medication issue. We advise you for any urgent/emergency that you go to the nearest emergency room!     PLEASE CALL OUR OFFICE DURING NORMAL BUSINESS HOURS    Monday - Friday   8 am to 5 pm    [place]: [phone]    [place]:  [phone]

## 2020-12-14 ENCOUNTER — TELEPHONE (OUTPATIENT)
Dept: CARDIOLOGY CLINIC | Age: 60
End: 2020-12-14

## 2020-12-16 ENCOUNTER — NURSE ONLY (OUTPATIENT)
Dept: CARDIOLOGY CLINIC | Age: 60
End: 2020-12-16
Payer: COMMERCIAL

## 2020-12-16 PROCEDURE — 93000 ELECTROCARDIOGRAM COMPLETE: CPT | Performed by: INTERNAL MEDICINE

## 2020-12-16 NOTE — PATIENT INSTRUCTIONS
Start Cardizem 30mg 4 times a day. Follow up in 2 weeks with Dr. Lima Prater.  If Blood Pressure goes below 100/70 Call the office [phone]

## 2020-12-16 NOTE — PROGRESS NOTES
Patient EKG shows Sinus tachycardia rate 123. She was on cardizem in hospital for SVT. Pete Saez note the patient is to continue Cardizem but patient did not receive Cardizem at discharge from hospital.  Will start Cardizem 30 mg 4 times a day. Patient to notify office if blood pressure is less than 100/70. Plan for patient to follow-up in 2 weeks with Dr. Pete Cardenas for further management.     Electronically signed by BRIAN Fisher CNP on 12/16/2020 at 3:19 PM

## 2020-12-27 ENCOUNTER — HOSPITAL ENCOUNTER (OUTPATIENT)
Age: 60
Setting detail: OBSERVATION
Discharge: HOME | End: 2020-12-31
Attending: EMERGENCY MEDICINE | Admitting: INTERNAL MEDICINE
Payer: COMMERCIAL

## 2020-12-27 ENCOUNTER — APPOINTMENT (OUTPATIENT)
Dept: GENERAL RADIOLOGY | Age: 60
End: 2020-12-27
Payer: COMMERCIAL

## 2020-12-27 DIAGNOSIS — R07.9 CHEST PAIN, UNSPECIFIED TYPE: ICD-10-CM

## 2020-12-27 DIAGNOSIS — R79.89 ELEVATED SERUM FREE T4 LEVEL: Primary | ICD-10-CM

## 2020-12-27 DIAGNOSIS — R77.8 ELEVATED TROPONIN: ICD-10-CM

## 2020-12-27 LAB
ALBUMIN SERPL-MCNC: 2.9 GM/DL (ref 3.4–5)
ALP BLD-CCNC: 116 IU/L (ref 40–129)
ALT SERPL-CCNC: 11 U/L (ref 10–40)
AMPHETAMINES: NEGATIVE
ANION GAP SERPL CALCULATED.3IONS-SCNC: 10 MMOL/L (ref 4–16)
AST SERPL-CCNC: 20 IU/L (ref 15–37)
BARBITURATE SCREEN URINE: NEGATIVE
BASE EXCESS MIXED: 12.9 (ref 0–2.3)
BASOPHILS ABSOLUTE: 0.1 K/CU MM
BASOPHILS RELATIVE PERCENT: 1.3 % (ref 0–1)
BENZODIAZEPINE SCREEN, URINE: NEGATIVE
BILIRUB SERPL-MCNC: 0.2 MG/DL (ref 0–1)
BUN BLDV-MCNC: 6 MG/DL (ref 6–23)
CALCIUM SERPL-MCNC: 8.9 MG/DL (ref 8.3–10.6)
CANNABINOID SCREEN URINE: NEGATIVE
CARBON MONOXIDE, BLOOD: 2.2 % (ref 0–5)
CHLORIDE BLD-SCNC: 93 MMOL/L (ref 99–110)
CO2 CONTENT: 42.1 MMOL/L (ref 19–24)
CO2: 33 MMOL/L (ref 21–32)
COCAINE METABOLITE: NEGATIVE
COMMENT: 15
CREAT SERPL-MCNC: 0.9 MG/DL (ref 0.6–1.1)
DIFFERENTIAL TYPE: ABNORMAL
EKG ATRIAL RATE: 108 BPM
EKG DIAGNOSIS: NORMAL
EKG P AXIS: 72 DEGREES
EKG P-R INTERVAL: 134 MS
EKG Q-T INTERVAL: 394 MS
EKG QRS DURATION: 136 MS
EKG QTC CALCULATION (BAZETT): 527 MS
EKG R AXIS: 101 DEGREES
EKG T AXIS: 27 DEGREES
EKG VENTRICULAR RATE: 108 BPM
EOSINOPHILS ABSOLUTE: 1 K/CU MM
EOSINOPHILS RELATIVE PERCENT: 10.3 % (ref 0–3)
GFR AFRICAN AMERICAN: >60 ML/MIN/1.73M2
GFR NON-AFRICAN AMERICAN: >60 ML/MIN/1.73M2
GLUCOSE BLD-MCNC: 108 MG/DL (ref 70–99)
HCO3 ARTERIAL: 40.2 MMOL/L (ref 18–23)
HCT VFR BLD CALC: 33 % (ref 37–47)
HEMOGLOBIN: 9.4 GM/DL (ref 12.5–16)
HIGH SENSITIVE C-REACTIVE PROTEIN: 29 MG/L
IMMATURE NEUTROPHIL %: 0.4 % (ref 0–0.43)
LYMPHOCYTES ABSOLUTE: 1.4 K/CU MM
LYMPHOCYTES RELATIVE PERCENT: 13.8 % (ref 24–44)
MAGNESIUM: 1.6 MG/DL (ref 1.8–2.4)
MCH RBC QN AUTO: 25.9 PG (ref 27–31)
MCHC RBC AUTO-ENTMCNC: 28.5 % (ref 32–36)
MCV RBC AUTO: 90.9 FL (ref 78–100)
METHEMOGLOBIN ARTERIAL: 0.9 %
MONOCYTES ABSOLUTE: 1.1 K/CU MM
MONOCYTES RELATIVE PERCENT: 11 % (ref 0–4)
NUCLEATED RBC %: 0 %
O2 SATURATION: 96.1 % (ref 96–97)
OPIATES, URINE: NEGATIVE
OXYCODONE: NEGATIVE
PCO2 ARTERIAL: 62 MMHG (ref 32–45)
PDW BLD-RTO: 16.4 % (ref 11.7–14.9)
PH BLOOD: 7.42 (ref 7.34–7.45)
PHENCYCLIDINE, URINE: NEGATIVE
PLATELET # BLD: 294 K/CU MM (ref 140–440)
PMV BLD AUTO: 9.9 FL (ref 7.5–11.1)
PO2 ARTERIAL: 99 MMHG (ref 75–100)
POTASSIUM SERPL-SCNC: 2.9 MMOL/L (ref 3.5–5.1)
POTASSIUM SERPL-SCNC: 3.1 MMOL/L (ref 3.5–5.1)
PRO-BNP: 729.1 PG/ML
RBC # BLD: 3.63 M/CU MM (ref 4.2–5.4)
SARS-COV-2, NAAT: NOT DETECTED
SEGMENTED NEUTROPHILS ABSOLUTE COUNT: 6.3 K/CU MM
SEGMENTED NEUTROPHILS RELATIVE PERCENT: 63.2 % (ref 36–66)
SODIUM BLD-SCNC: 136 MMOL/L (ref 135–145)
SOURCE: NORMAL
T4 FREE: 2.54 NG/DL (ref 0.9–1.8)
TOTAL IMMATURE NEUTOROPHIL: 0.04 K/CU MM
TOTAL NUCLEATED RBC: 0 K/CU MM
TOTAL PROTEIN: 6 GM/DL (ref 6.4–8.2)
TROPONIN T: 0.01 NG/ML
TROPONIN T: 0.01 NG/ML
TROPONIN T: <0.01 NG/ML
TROPONIN T: <0.01 NG/ML
TSH HIGH SENSITIVITY: 0.02 UIU/ML (ref 0.27–4.2)
WBC # BLD: 9.9 K/CU MM (ref 4–10.5)

## 2020-12-27 PROCEDURE — 6360000002 HC RX W HCPCS: Performed by: INTERNAL MEDICINE

## 2020-12-27 PROCEDURE — 84439 ASSAY OF FREE THYROXINE: CPT

## 2020-12-27 PROCEDURE — 86141 C-REACTIVE PROTEIN HS: CPT

## 2020-12-27 PROCEDURE — 2580000003 HC RX 258: Performed by: INTERNAL MEDICINE

## 2020-12-27 PROCEDURE — 85025 COMPLETE CBC W/AUTO DIFF WBC: CPT

## 2020-12-27 PROCEDURE — 94660 CPAP INITIATION&MGMT: CPT

## 2020-12-27 PROCEDURE — 83880 ASSAY OF NATRIURETIC PEPTIDE: CPT

## 2020-12-27 PROCEDURE — 36415 COLL VENOUS BLD VENIPUNCTURE: CPT

## 2020-12-27 PROCEDURE — 6370000000 HC RX 637 (ALT 250 FOR IP): Performed by: INTERNAL MEDICINE

## 2020-12-27 PROCEDURE — 84443 ASSAY THYROID STIM HORMONE: CPT

## 2020-12-27 PROCEDURE — 96365 THER/PROPH/DIAG IV INF INIT: CPT

## 2020-12-27 PROCEDURE — 99285 EMERGENCY DEPT VISIT HI MDM: CPT

## 2020-12-27 PROCEDURE — U0002 COVID-19 LAB TEST NON-CDC: HCPCS

## 2020-12-27 PROCEDURE — 94640 AIRWAY INHALATION TREATMENT: CPT

## 2020-12-27 PROCEDURE — 80307 DRUG TEST PRSMV CHEM ANLYZR: CPT

## 2020-12-27 PROCEDURE — 93010 ELECTROCARDIOGRAM REPORT: CPT | Performed by: INTERNAL MEDICINE

## 2020-12-27 PROCEDURE — 94761 N-INVAS EAR/PLS OXIMETRY MLT: CPT

## 2020-12-27 PROCEDURE — 6370000000 HC RX 637 (ALT 250 FOR IP): Performed by: EMERGENCY MEDICINE

## 2020-12-27 PROCEDURE — 96367 TX/PROPH/DG ADDL SEQ IV INF: CPT

## 2020-12-27 PROCEDURE — G0378 HOSPITAL OBSERVATION PER HR: HCPCS

## 2020-12-27 PROCEDURE — 80053 COMPREHEN METABOLIC PANEL: CPT

## 2020-12-27 PROCEDURE — 2700000000 HC OXYGEN THERAPY PER DAY

## 2020-12-27 PROCEDURE — 93005 ELECTROCARDIOGRAM TRACING: CPT | Performed by: EMERGENCY MEDICINE

## 2020-12-27 PROCEDURE — 36600 WITHDRAWAL OF ARTERIAL BLOOD: CPT

## 2020-12-27 PROCEDURE — 96372 THER/PROPH/DIAG INJ SC/IM: CPT

## 2020-12-27 PROCEDURE — 82803 BLOOD GASES ANY COMBINATION: CPT

## 2020-12-27 PROCEDURE — 96368 THER/DIAG CONCURRENT INF: CPT

## 2020-12-27 PROCEDURE — 84132 ASSAY OF SERUM POTASSIUM: CPT

## 2020-12-27 PROCEDURE — 84484 ASSAY OF TROPONIN QUANT: CPT

## 2020-12-27 PROCEDURE — 96376 TX/PRO/DX INJ SAME DRUG ADON: CPT

## 2020-12-27 PROCEDURE — 71045 X-RAY EXAM CHEST 1 VIEW: CPT

## 2020-12-27 PROCEDURE — 83735 ASSAY OF MAGNESIUM: CPT

## 2020-12-27 PROCEDURE — 96375 TX/PRO/DX INJ NEW DRUG ADDON: CPT

## 2020-12-27 RX ORDER — FOLIC ACID 1 MG/1
1 TABLET ORAL DAILY
Status: DISCONTINUED | OUTPATIENT
Start: 2020-12-27 | End: 2020-12-31 | Stop reason: HOSPADM

## 2020-12-27 RX ORDER — DULOXETIN HYDROCHLORIDE 30 MG/1
60 CAPSULE, DELAYED RELEASE ORAL DAILY
Status: DISCONTINUED | OUTPATIENT
Start: 2020-12-27 | End: 2020-12-31 | Stop reason: HOSPADM

## 2020-12-27 RX ORDER — PROMETHAZINE HYDROCHLORIDE 25 MG/1
12.5 TABLET ORAL EVERY 6 HOURS PRN
Status: DISCONTINUED | OUTPATIENT
Start: 2020-12-27 | End: 2020-12-27

## 2020-12-27 RX ORDER — LORAZEPAM 2 MG/ML
0.5 INJECTION INTRAMUSCULAR ONCE
Status: COMPLETED | OUTPATIENT
Start: 2020-12-27 | End: 2020-12-27

## 2020-12-27 RX ORDER — ACETAMINOPHEN 500 MG
1000 TABLET ORAL ONCE
Status: COMPLETED | OUTPATIENT
Start: 2020-12-27 | End: 2020-12-27

## 2020-12-27 RX ORDER — SUCRALFATE 1 G/1
1 TABLET ORAL EVERY 12 HOURS SCHEDULED
Status: DISCONTINUED | OUTPATIENT
Start: 2020-12-27 | End: 2020-12-31 | Stop reason: HOSPADM

## 2020-12-27 RX ORDER — CLONAZEPAM 1 MG/1
0.5 TABLET, ORALLY DISINTEGRATING ORAL 2 TIMES DAILY PRN
COMMUNITY

## 2020-12-27 RX ORDER — MONTELUKAST SODIUM 10 MG/1
10 TABLET ORAL DAILY
Status: DISCONTINUED | OUTPATIENT
Start: 2020-12-27 | End: 2020-12-31 | Stop reason: HOSPADM

## 2020-12-27 RX ORDER — IPRATROPIUM BROMIDE AND ALBUTEROL SULFATE 2.5; .5 MG/3ML; MG/3ML
3 SOLUTION RESPIRATORY (INHALATION) EVERY 4 HOURS PRN
Status: DISCONTINUED | OUTPATIENT
Start: 2020-12-27 | End: 2020-12-31 | Stop reason: HOSPADM

## 2020-12-27 RX ORDER — POLYETHYLENE GLYCOL 3350 17 G/17G
17 POWDER, FOR SOLUTION ORAL DAILY PRN
Status: DISCONTINUED | OUTPATIENT
Start: 2020-12-27 | End: 2020-12-31 | Stop reason: HOSPADM

## 2020-12-27 RX ORDER — OXYCODONE HYDROCHLORIDE AND ACETAMINOPHEN 5; 325 MG/1; MG/1
1 TABLET ORAL EVERY 6 HOURS PRN
Status: DISCONTINUED | OUTPATIENT
Start: 2020-12-27 | End: 2020-12-31 | Stop reason: HOSPADM

## 2020-12-27 RX ORDER — ACETAMINOPHEN 650 MG/1
650 SUPPOSITORY RECTAL EVERY 6 HOURS PRN
Status: DISCONTINUED | OUTPATIENT
Start: 2020-12-27 | End: 2020-12-31 | Stop reason: HOSPADM

## 2020-12-27 RX ORDER — LANOLIN ALCOHOL/MO/W.PET/CERES
100 CREAM (GRAM) TOPICAL DAILY
Status: DISCONTINUED | OUTPATIENT
Start: 2020-12-27 | End: 2020-12-31 | Stop reason: HOSPADM

## 2020-12-27 RX ORDER — BUDESONIDE AND FORMOTEROL FUMARATE DIHYDRATE 160; 4.5 UG/1; UG/1
2 AEROSOL RESPIRATORY (INHALATION) DAILY
Status: DISCONTINUED | OUTPATIENT
Start: 2020-12-27 | End: 2020-12-27

## 2020-12-27 RX ORDER — CLONAZEPAM 1 MG/1
1 TABLET ORAL 3 TIMES DAILY PRN
Status: DISCONTINUED | OUTPATIENT
Start: 2020-12-27 | End: 2020-12-31 | Stop reason: HOSPADM

## 2020-12-27 RX ORDER — PROMETHAZINE HYDROCHLORIDE 25 MG/ML
12.5 INJECTION, SOLUTION INTRAMUSCULAR; INTRAVENOUS EVERY 6 HOURS PRN
Status: DISCONTINUED | OUTPATIENT
Start: 2020-12-27 | End: 2020-12-31 | Stop reason: HOSPADM

## 2020-12-27 RX ORDER — GUAIFENESIN 600 MG/1
600 TABLET, EXTENDED RELEASE ORAL 2 TIMES DAILY
Status: DISCONTINUED | OUTPATIENT
Start: 2020-12-27 | End: 2020-12-31 | Stop reason: HOSPADM

## 2020-12-27 RX ORDER — SODIUM CHLORIDE 0.9 % (FLUSH) 0.9 %
10 SYRINGE (ML) INJECTION EVERY 12 HOURS SCHEDULED
Status: DISCONTINUED | OUTPATIENT
Start: 2020-12-27 | End: 2020-12-31 | Stop reason: HOSPADM

## 2020-12-27 RX ORDER — BUSPIRONE HYDROCHLORIDE 10 MG/1
10 TABLET ORAL 2 TIMES DAILY
Status: CANCELLED | OUTPATIENT
Start: 2020-12-27

## 2020-12-27 RX ORDER — LEVOTHYROXINE SODIUM 0.07 MG/1
75 TABLET ORAL DAILY
Status: DISCONTINUED | OUTPATIENT
Start: 2020-12-27 | End: 2020-12-31 | Stop reason: HOSPADM

## 2020-12-27 RX ORDER — METHYLPREDNISOLONE SODIUM SUCCINATE 40 MG/ML
40 INJECTION, POWDER, LYOPHILIZED, FOR SOLUTION INTRAMUSCULAR; INTRAVENOUS EVERY 6 HOURS
Status: DISCONTINUED | OUTPATIENT
Start: 2020-12-27 | End: 2020-12-28

## 2020-12-27 RX ORDER — ATORVASTATIN CALCIUM 40 MG/1
40 TABLET, FILM COATED ORAL NIGHTLY
Status: DISCONTINUED | OUTPATIENT
Start: 2020-12-27 | End: 2020-12-31 | Stop reason: HOSPADM

## 2020-12-27 RX ORDER — PANTOPRAZOLE SODIUM 40 MG/1
40 TABLET, DELAYED RELEASE ORAL 2 TIMES DAILY
Status: DISCONTINUED | OUTPATIENT
Start: 2020-12-27 | End: 2020-12-31 | Stop reason: HOSPADM

## 2020-12-27 RX ORDER — PROMETHAZINE HYDROCHLORIDE 12.5 MG/1
12.5 TABLET ORAL EVERY 6 HOURS PRN
Status: DISCONTINUED | OUTPATIENT
Start: 2020-12-27 | End: 2020-12-31 | Stop reason: HOSPADM

## 2020-12-27 RX ORDER — METHYLPREDNISOLONE SODIUM SUCCINATE 40 MG/ML
40 INJECTION, POWDER, LYOPHILIZED, FOR SOLUTION INTRAMUSCULAR; INTRAVENOUS DAILY
Status: DISCONTINUED | OUTPATIENT
Start: 2020-12-27 | End: 2020-12-27

## 2020-12-27 RX ORDER — ASPIRIN 81 MG/1
81 TABLET, CHEWABLE ORAL DAILY
Status: CANCELLED | OUTPATIENT
Start: 2020-12-28

## 2020-12-27 RX ORDER — FLUTICASONE PROPIONATE 50 MCG
2 SPRAY, SUSPENSION (ML) NASAL DAILY
Status: DISCONTINUED | OUTPATIENT
Start: 2020-12-27 | End: 2020-12-31 | Stop reason: HOSPADM

## 2020-12-27 RX ORDER — BUSPIRONE HYDROCHLORIDE 10 MG/1
10 TABLET ORAL 3 TIMES DAILY
Status: DISCONTINUED | OUTPATIENT
Start: 2020-12-27 | End: 2020-12-31 | Stop reason: HOSPADM

## 2020-12-27 RX ORDER — BACLOFEN 10 MG/1
10 TABLET ORAL 3 TIMES DAILY
Status: DISCONTINUED | OUTPATIENT
Start: 2020-12-27 | End: 2020-12-31 | Stop reason: HOSPADM

## 2020-12-27 RX ORDER — FERROUS SULFATE 325(65) MG
325 TABLET ORAL 2 TIMES DAILY
Status: DISCONTINUED | OUTPATIENT
Start: 2020-12-27 | End: 2020-12-31 | Stop reason: HOSPADM

## 2020-12-27 RX ORDER — NITROGLYCERIN 0.4 MG/1
0.4 TABLET SUBLINGUAL EVERY 5 MIN PRN
Status: DISCONTINUED | OUTPATIENT
Start: 2020-12-27 | End: 2020-12-31 | Stop reason: HOSPADM

## 2020-12-27 RX ORDER — POTASSIUM CHLORIDE 20 MEQ/1
40 TABLET, EXTENDED RELEASE ORAL PRN
Status: DISCONTINUED | OUTPATIENT
Start: 2020-12-27 | End: 2020-12-31 | Stop reason: HOSPADM

## 2020-12-27 RX ORDER — POTASSIUM CHLORIDE 7.45 MG/ML
10 INJECTION INTRAVENOUS PRN
Status: DISCONTINUED | OUTPATIENT
Start: 2020-12-27 | End: 2020-12-31 | Stop reason: HOSPADM

## 2020-12-27 RX ORDER — TRAZODONE HYDROCHLORIDE 50 MG/1
100 TABLET ORAL NIGHTLY
Status: DISCONTINUED | OUTPATIENT
Start: 2020-12-27 | End: 2020-12-31 | Stop reason: HOSPADM

## 2020-12-27 RX ORDER — MAGNESIUM SULFATE IN WATER 40 MG/ML
2 INJECTION, SOLUTION INTRAVENOUS PRN
Status: DISCONTINUED | OUTPATIENT
Start: 2020-12-27 | End: 2020-12-31 | Stop reason: HOSPADM

## 2020-12-27 RX ORDER — ONDANSETRON 2 MG/ML
4 INJECTION INTRAMUSCULAR; INTRAVENOUS EVERY 6 HOURS PRN
Status: DISCONTINUED | OUTPATIENT
Start: 2020-12-27 | End: 2020-12-27

## 2020-12-27 RX ORDER — SODIUM CHLORIDE 0.9 % (FLUSH) 0.9 %
10 SYRINGE (ML) INJECTION PRN
Status: DISCONTINUED | OUTPATIENT
Start: 2020-12-27 | End: 2020-12-31 | Stop reason: HOSPADM

## 2020-12-27 RX ORDER — BUDESONIDE AND FORMOTEROL FUMARATE DIHYDRATE 160; 4.5 UG/1; UG/1
2 AEROSOL RESPIRATORY (INHALATION) 2 TIMES DAILY
Status: DISCONTINUED | OUTPATIENT
Start: 2020-12-27 | End: 2020-12-31 | Stop reason: HOSPADM

## 2020-12-27 RX ORDER — ACETAMINOPHEN 325 MG/1
650 TABLET ORAL EVERY 6 HOURS PRN
Status: DISCONTINUED | OUTPATIENT
Start: 2020-12-27 | End: 2020-12-31 | Stop reason: HOSPADM

## 2020-12-27 RX ORDER — ASPIRIN 81 MG/1
81 TABLET, CHEWABLE ORAL DAILY
Status: DISCONTINUED | OUTPATIENT
Start: 2020-12-27 | End: 2020-12-31 | Stop reason: HOSPADM

## 2020-12-27 RX ORDER — DICYCLOMINE HYDROCHLORIDE 10 MG/1
10 CAPSULE ORAL 4 TIMES DAILY
Status: DISCONTINUED | OUTPATIENT
Start: 2020-12-27 | End: 2020-12-31 | Stop reason: HOSPADM

## 2020-12-27 RX ORDER — MAGNESIUM SULFATE IN WATER 40 MG/ML
2 INJECTION, SOLUTION INTRAVENOUS ONCE
Status: COMPLETED | OUTPATIENT
Start: 2020-12-27 | End: 2020-12-27

## 2020-12-27 RX ADMIN — SODIUM CHLORIDE, PRESERVATIVE FREE 10 ML: 5 INJECTION INTRAVENOUS at 20:17

## 2020-12-27 RX ADMIN — PANTOPRAZOLE SODIUM 40 MG: 40 TABLET, DELAYED RELEASE ORAL at 20:14

## 2020-12-27 RX ADMIN — MONTELUKAST 10 MG: 10 TABLET, FILM COATED ORAL at 12:56

## 2020-12-27 RX ADMIN — DICYCLOMINE HYDROCHLORIDE 10 MG: 10 CAPSULE ORAL at 12:57

## 2020-12-27 RX ADMIN — ASPIRIN 81 MG CHEWABLE TABLET 81 MG: 81 TABLET CHEWABLE at 13:05

## 2020-12-27 RX ADMIN — THEOPHYLLINE ANHYDROUS 400 MG: 200 CAPSULE, EXTENDED RELEASE ORAL at 12:56

## 2020-12-27 RX ADMIN — METHYLPREDNISOLONE SODIUM SUCCINATE 40 MG: 40 INJECTION, POWDER, FOR SOLUTION INTRAMUSCULAR; INTRAVENOUS at 12:56

## 2020-12-27 RX ADMIN — DICYCLOMINE HYDROCHLORIDE 10 MG: 10 CAPSULE ORAL at 20:13

## 2020-12-27 RX ADMIN — GUAIFENESIN 600 MG: 600 TABLET, EXTENDED RELEASE ORAL at 20:13

## 2020-12-27 RX ADMIN — POTASSIUM CHLORIDE 40 MEQ: 1500 TABLET, EXTENDED RELEASE ORAL at 12:57

## 2020-12-27 RX ADMIN — AZITHROMYCIN DIHYDRATE 500 MG: 500 INJECTION, POWDER, LYOPHILIZED, FOR SOLUTION INTRAVENOUS at 14:31

## 2020-12-27 RX ADMIN — TRAZODONE HYDROCHLORIDE 100 MG: 50 TABLET ORAL at 21:35

## 2020-12-27 RX ADMIN — DULOXETINE HYDROCHLORIDE 60 MG: 30 CAPSULE, DELAYED RELEASE ORAL at 13:05

## 2020-12-27 RX ADMIN — CLONAZEPAM 1 MG: 1 TABLET ORAL at 18:20

## 2020-12-27 RX ADMIN — SUCRALFATE 1 G: 1 TABLET ORAL at 20:13

## 2020-12-27 RX ADMIN — PANTOPRAZOLE SODIUM 40 MG: 40 TABLET, DELAYED RELEASE ORAL at 13:04

## 2020-12-27 RX ADMIN — LORAZEPAM 0.5 MG: 2 INJECTION INTRAMUSCULAR; INTRAVENOUS at 19:09

## 2020-12-27 RX ADMIN — ACETAMINOPHEN 1000 MG: 500 TABLET ORAL at 04:15

## 2020-12-27 RX ADMIN — DILTIAZEM HYDROCHLORIDE 30 MG: 30 TABLET, FILM COATED ORAL at 15:46

## 2020-12-27 RX ADMIN — IPRATROPIUM BROMIDE AND ALBUTEROL SULFATE 3 ML: .5; 3 SOLUTION RESPIRATORY (INHALATION) at 10:37

## 2020-12-27 RX ADMIN — ATORVASTATIN CALCIUM 40 MG: 40 TABLET, FILM COATED ORAL at 20:13

## 2020-12-27 RX ADMIN — OXYCODONE HYDROCHLORIDE AND ACETAMINOPHEN 1 TABLET: 5; 325 TABLET ORAL at 20:14

## 2020-12-27 RX ADMIN — FERROUS SULFATE TAB 325 MG (65 MG ELEMENTAL FE) 325 MG: 325 (65 FE) TAB at 20:13

## 2020-12-27 RX ADMIN — METHYLPREDNISOLONE SODIUM SUCCINATE 40 MG: 40 INJECTION, POWDER, LYOPHILIZED, FOR SOLUTION INTRAMUSCULAR; INTRAVENOUS at 20:17

## 2020-12-27 RX ADMIN — DILTIAZEM HYDROCHLORIDE 30 MG: 30 TABLET, FILM COATED ORAL at 20:13

## 2020-12-27 RX ADMIN — Medication 100 MG: at 14:31

## 2020-12-27 RX ADMIN — TIOTROPIUM BROMIDE INHALATION SPRAY 2 PUFF: 3.12 SPRAY, METERED RESPIRATORY (INHALATION) at 16:05

## 2020-12-27 RX ADMIN — BUDESONIDE AND FORMOTEROL FUMARATE DIHYDRATE 2 PUFF: 160; 4.5 AEROSOL RESPIRATORY (INHALATION) at 21:41

## 2020-12-27 RX ADMIN — BACLOFEN 10 MG: 10 TABLET ORAL at 20:13

## 2020-12-27 RX ADMIN — FLUTICASONE PROPIONATE 2 SPRAY: 50 SPRAY, METERED NASAL at 14:31

## 2020-12-27 RX ADMIN — DILTIAZEM HYDROCHLORIDE 30 MG: 30 TABLET, FILM COATED ORAL at 12:57

## 2020-12-27 RX ADMIN — LEVOTHYROXINE SODIUM 75 MCG: 75 TABLET ORAL at 12:56

## 2020-12-27 RX ADMIN — CEFTRIAXONE 1 G: 1 INJECTION, POWDER, FOR SOLUTION INTRAMUSCULAR; INTRAVENOUS at 15:46

## 2020-12-27 RX ADMIN — BUSPIRONE HYDROCHLORIDE 10 MG: 10 TABLET ORAL at 20:37

## 2020-12-27 RX ADMIN — MAGNESIUM SULFATE HEPTAHYDRATE 2 G: 40 INJECTION, SOLUTION INTRAVENOUS at 15:52

## 2020-12-27 RX ADMIN — ENOXAPARIN SODIUM 40 MG: 100 INJECTION SUBCUTANEOUS at 13:05

## 2020-12-27 RX ADMIN — CLONAZEPAM 1 MG: 1 TABLET ORAL at 12:56

## 2020-12-27 RX ADMIN — GUAIFENESIN 600 MG: 600 TABLET, EXTENDED RELEASE ORAL at 12:57

## 2020-12-27 RX ADMIN — FOLIC ACID 1 MG: 1 TABLET ORAL at 12:57

## 2020-12-27 RX ADMIN — FERROUS SULFATE TAB 325 MG (65 MG ELEMENTAL FE) 325 MG: 325 (65 FE) TAB at 13:04

## 2020-12-27 RX ADMIN — BACLOFEN 10 MG: 10 TABLET ORAL at 12:57

## 2020-12-27 RX ADMIN — DICYCLOMINE HYDROCHLORIDE 10 MG: 10 CAPSULE ORAL at 15:46

## 2020-12-27 RX ADMIN — BUSPIRONE HYDROCHLORIDE 10 MG: 10 TABLET ORAL at 12:56

## 2020-12-27 ASSESSMENT — ENCOUNTER SYMPTOMS
SORE THROAT: 0
DIARRHEA: 0
VOMITING: 0
NAUSEA: 0
BACK PAIN: 0
EYE REDNESS: 0
COUGH: 0
RHINORRHEA: 0
SHORTNESS OF BREATH: 1
CONSTIPATION: 0
ABDOMINAL PAIN: 0

## 2020-12-27 ASSESSMENT — PAIN SCALES - GENERAL
PAINLEVEL_OUTOF10: 9
PAINLEVEL_OUTOF10: 6

## 2020-12-27 ASSESSMENT — PAIN DESCRIPTION - ORIENTATION: ORIENTATION: RIGHT

## 2020-12-27 NOTE — ED TRIAGE NOTES
Patient presents to ED with complaints of problems with blood pressure. States \"It was high and then low and I could never get it to be okay. It was 106/90's and then it jumped up to 180/90's. \"

## 2020-12-27 NOTE — ED PROVIDER NOTES
Triage Chief Complaint:   Rash and Hypertension    [place]:  Zoey Jackson is a 64 y.o. female that presents with complaint of a rash to her medial legs that she noticed about 4 days ago. The rash is not itchy or painful and she does not know where she got it from. She also complains of her blood pressure going up and down. She states she checked her blood pressure earlier and it was a high number on the top and the bottom number was in the 90s and then before she came to the emergency department she rechecked her blood pressure and it was 103/96. She states she has had chest pain since she had a cardiocentesis before Thanksgiving and is chronically short of breath. She is on 3 to 4 L of oxygen at home. She denies any new cough. No fevers. She feels like her legs have been swollen for the last 2 to 3 days and states that they look like jelly. She states her heart rate increases whenever she walks but this has been going on for about a year. She follows with Dr. Rivka Zuniga and states they took her off her blood pressure medications has not been on any blood pressure medication since she saw him earlier in December but that his nurse practitioner tried to put her back on some blood pressure medication. After reviewing their notes, they stopped her amiodarone and Lopressor but it appears that the nurse practitioner was trying to restart her on Cardizem. ROS:   Review of Systems   Constitutional: Negative for chills and fever. HENT: Negative for congestion, rhinorrhea and sore throat. Eyes: Negative for redness and visual disturbance. Respiratory: Positive for shortness of breath (chronic, unchanged). Negative for cough. Cardiovascular: Positive for chest pain (constant for over a month) and leg swelling (mild over the last few days). Gastrointestinal: Negative for abdominal pain, constipation, diarrhea, nausea and vomiting. Genitourinary: Negative for dysuria and frequency. Musculoskeletal: Negative for arthralgias and back pain. Skin: Positive for rash (as in HPI). Negative for wound. Neurological: Negative for syncope and headaches. Psychiatric/Behavioral: Negative. Negative for hallucinations and suicidal ideas.        Past Medical History:   Diagnosis Date    Anemia     Anxiety 02/16/2017    follows with Dr Charline Mccray Back pain at L4-L5 level 2/16/2017    Dr Maikol Eduardo 1 disorder ([facility] [address]     per pt on 2/5/2021\"never told I have bipolar\"    COPD (chronic obstructive pulmonary disease) ([facility] [address]     follows with Dr Gabriel Hansen Emphysema of lung ([facility] [address]     FH: CAD (coronary artery disease) 2/16/2017    Father had in his forties, sister in her thirties    Carol Officer Fibromyalgia 02/16/2017    Full dentures     full upper plate and partial on the bottom    Gastric ulcer     \"had stomach ulder back fall 2019\"    H/O Doppler ultrasound 04/05/2019    venous- no DVT or reflux    H/O echocardiogram 01/14/2015    EF50-55% Normal- see media    History of blood transfusion     Hyperlipidemia LDL goal <100     Hypertension     Dr Dori Dailey Irregular heartbeat     \"they said I have irreg heart beat before- back when they drained fluid around my heart \"    Obstructive sleep apnea     \"have bipap I use at home\"    On home oxygen therapy     \"on oxygen all the time at home and keep it on 2.5 liters\"    Osteoarthritis     Pericardial effusion     per old chart had pericardial effusion 10/2020    Spinal stenosis     hx per old chart    Thyroid disease     Wears glasses     \"suppose to wear glasses\"     Past Surgical History:   Procedure Laterality Date    BACK SURGERY  03/2017    \"surgery on low back L5-6- not sure if they put any metal in \"   [address]      10/2019    CARDIAC CATHETERIZATION      per old chart had cath done 11/2020    CARPAL TUNNEL RELEASE Right 1985    CHOLECYSTECTOMY, LAPAROSCOPIC N/A 10/25/2020    CHOLECYSTECTOMY LAPAROSCOPIC WITH IOC performed by Guanaco Fragoso MD at [place] [address] COLONOSCOPY N/A 2019    COLONOSCOPY DIAGNOSTIC performed by Meli Randhawa MD at [address] [place] [address], DIAGNOSTIC      per old chart had egd done 2018    TUBAL LIGATION  1987    UPPER GASTROINTESTINAL ENDOSCOPY N/A 2021    EGD BIOPSY performed by Meli Randhawa MD at [facility] ENDOSCOPY     Family History   Problem Relation Age of Onset    Asthma Mother         COPD    Heart Disease Father      Social History     Socioeconomic History    Marital status:      Spouse name: Not on file    Number of children: Not on file    Years of education: Not on file    Highest education level: Not on file   Occupational History    Not on file   Social Needs    Financial resource strain: Not on file    Food insecurity     Worry: Not on file     Inability: Not on file    Transportation needs     Medical: Not on file     Non-medical: Not on file   Tobacco Use    Smoking status: Former Smoker     Packs/day: 1.50     Years: 20.00     Pack years: 30.00     Types: Cigarettes     Quit date: 2008     Years since quittin.0    Smokeless tobacco: Never Used   Substance and Sexual Activity    Alcohol use: Not Currently     Alcohol/week: 2.0 standard drinks     Types: 2 Shots of liquor per week     Comment: per pt on 2021\"quit drinking back Oct 2020\"use to drink wine coolers - 3 times per week \"    Drug use: No    Sexual activity: Yes     Partners: Male   Lifestyle    Physical activity     Days per week: Not on file     Minutes per session: Not on file    Stress: Not on file   Relationships    Social connections     Talks on phone: Not on file     Gets together: Not on file     Attends Rastafari service: Not on file     Active member of club or organization: Not on file     Attends meetings of clubs or organizations: Not on file     Relationship status: Not on file    Intimate partner violence     Fear of current or albuterol-ipratropium (COMBIVENT RESPIMAT)  MCG/ACT AERS inhaler Inhale 1 puff into the lungs every 4 hours as needed for Wheezing 3 Inhaler 0    DULoxetine (CYMBALTA) 60 MG extended release capsule Take 60 mg by mouth daily      busPIRone (BUSPAR) 10 MG tablet Take 1 tablet by mouth 3 times daily (Patient taking differently: Take 10 mg by mouth 2 times daily ) 90 tablet 0    traZODone (DESYREL) 50 MG tablet Take 100 mg by mouth nightly       baclofen (LIORESAL) 10 MG tablet Take 1 tablet by mouth 3 times daily (Patient taking differently: Take 10 mg by mouth 2 times daily ) 30 tablet 0    aspirin 81 MG chewable tablet Take 81 mg by mouth daily      fluticasone (FLONASE) 50 MCG/ACT nasal spray 2 sprays by Nasal route daily 1 Bottle 0    ferrous sulfate (IRON 325) 325 (65 Fe) MG tablet Take 325 mg by mouth 2 times daily Monday, wed, fri        Allergies   Allergen Reactions    Lisinopril Swelling and Rash     angioedema       Nursing Notes Reviewed     Physical Exam:   ED Triage Vitals [12/27/20 0110]   Enc Vitals Group      BP (!) 157/87      Pulse 112      Resp 22      Temp 98.3 °F (36.8 °C)      Temp Source Oral      SpO2 100 %      Weight 175 lb (79.4 kg)      Height 5' 1\" (1.549 m)      Head Circumference       Peak Flow       Pain Score       Pain Loc       Pain Edu? Excl. in [address]? BP (!) 143/71   Pulse 97   Temp 98.2 °F (36.8 °C) (Oral)   Resp 14   Ht 5' 1\" (1.549 m)   Wt 166 lb 11.2 oz (75.6 kg)   LMP 01/05/2010   SpO2 99%   BMI 31.50 kg/m²   My pulse ox interpretation is - normal  Physical Exam  Vitals signs and nursing note reviewed. Constitutional:       General: She is not in acute distress. Appearance: Normal appearance. She is not toxic-appearing or diaphoretic. HENT:      Head: Normocephalic and atraumatic. Eyes:      General:         Right eye: No discharge. Left eye: No discharge.       Conjunctiva/sclera: Conjunctivae normal.   Cardiovascular: Rate and Rhythm: Normal rate and regular rhythm. Pulses: Normal pulses. Radial pulses are 2+ on the right side and 2+ on the left side. Pulmonary:      Effort: Pulmonary effort is normal. No respiratory distress. Breath sounds: No wheezing or rales. Abdominal:      General: There is no distension. Tenderness: There is no abdominal tenderness. Musculoskeletal: Normal range of motion. General: No swelling or tenderness. Skin:     General: Skin is warm and dry. Neurological:      General: No focal deficit present. Mental Status: She is alert. Cranial Nerves: No cranial nerve deficit.    Psychiatric:         Mood and Affect: Mood normal.         Behavior: Behavior normal.         I have reviewed and interpreted all of the currently available lab results from this visit (if applicable):  Results for orders placed or performed during the hospital encounter of 12/27/20   CBC Auto Differential   Result Value Ref Range    WBC 9.9 4.0 - 10.5 K/CU MM    RBC 3.63 (L) 4.2 - 5.4 M/CU MM    Hemoglobin 9.4 (L) 12.5 - 16.0 GM/DL    Hematocrit 33.0 (L) 37 - 47 %    MCV 90.9 78 - 100 FL    MCH 25.9 (L) 27 - 31 PG    MCHC 28.5 (L) 32.0 - 36.0 %    RDW 16.4 (H) 11.7 - 14.9 %    Platelets 336 287 - 051 K/CU MM    MPV 9.9 7.5 - 11.1 FL    Differential Type AUTOMATED DIFFERENTIAL     Segs Relative 63.2 36 - 66 %    Lymphocytes % 13.8 (L) 24 - 44 %    Monocytes % 11.0 (H) 0 - 4 %    Eosinophils % 10.3 (H) 0 - 3 %    Basophils % 1.3 (H) 0 - 1 %    Segs Absolute 6.3 K/CU MM    Lymphocytes Absolute 1.4 K/CU MM    Monocytes Absolute 1.1 K/CU MM    Eosinophils Absolute 1.0 K/CU MM    Basophils Absolute 0.1 K/CU MM    Nucleated RBC % 0.0 %    Total Nucleated RBC 0.0 K/CU MM    Total Immature Neutrophil 0.04 K/CU MM    Immature Neutrophil % 0.4 0 - 0.43 %   Comprehensive Metabolic Panel w/ Reflex to MG   Result Value Ref Range    Sodium 136 135 - 145 MMOL/L    Potassium 3.1 (L) 3.5 - 5.1 Potassium   Result Value Ref Range    Potassium 2.9 (LL) 3.5 - 5.1 MMOL/L   CBC   Result Value Ref Range    WBC 3.3 (L) 4.0 - 10.5 K/CU MM    RBC 3.66 (L) 4.2 - 5.4 M/CU MM    Hemoglobin 9.5 (L) 12.5 - 16.0 GM/DL    Hematocrit 33.0 (L) 37 - 47 %    MCV 90.2 78 - 100 FL    MCH 26.0 (L) 27 - 31 PG    MCHC 28.8 (L) 32.0 - 36.0 %    RDW 16.1 (H) 11.7 - 14.9 %    Platelets 537 344 - 936 K/CU MM    MPV 9.9 7.5 - 11.1 FL   Phosphorus   Result Value Ref Range    Phosphorus 2.8 2.5 - 4.9 MG/DL   Magnesium   Result Value Ref Range    Magnesium 2.2 1.8 - 2.4 mg/dl   Comprehensive Metabolic Panel   Result Value Ref Range    Sodium 137 135 - 145 MMOL/L    Potassium 3.3 (L) 3.5 - 5.1 MMOL/L    Chloride 95 (L) 99 - 110 mMol/L    CO2 34 (H) 21 - 32 MMOL/L    BUN 6 6 - 23 MG/DL    CREATININE 0.7 0.6 - 1.1 MG/DL    Glucose 183 (H) 70 - 99 MG/DL    Calcium 8.9 8.3 - 10.6 MG/DL    Alb 2.9 (L) 3.4 - 5.0 GM/DL    Total Protein 5.9 (L) 6.4 - 8.2 GM/DL    Total Bilirubin 0.2 0.0 - 1.0 MG/DL    ALT 13 10 - 40 U/L    AST 21 15 - 37 IU/L    Alkaline Phosphatase 116 40 - 128 IU/L    GFR Non-African American >60 >60 mL/min/1.73m2    GFR African American >60 >60 mL/min/1.73m2    Anion Gap 8 4 - 16   Brain Natriuretic Peptide   Result Value Ref Range    Pro-BNP 1,413 (H) <300 PG/ML   Procalcitonin   Result Value Ref Range    Procalcitonin 0.142    Theophylline   Result Value Ref Range    Theophylline Lvl 12.2 10 - 20 ug/mL    DOSE AMOUNT DOSE AMT.  GIVEN - 400 mg     DOSE TIME DOSE TIME GIVEN - 900am    COVID-19    Specimen: Nasopharyngeal Swab   Result Value Ref Range    Source THROAT     SARS-CoV-2, NAAT NOT DETECTED    Sedimentation Rate   Result Value Ref Range    Sed Rate 68 (H) 0 - 30 MM/HR   Urinalysis   Result Value Ref Range    Color, UA COLORLESS (A) YELLOW    Clarity, UA CLEAR CLEAR    Glucose, Urine NEGATIVE NEGATIVE MG/DL    Bilirubin Urine NEGATIVE NEGATIVE MG/DL    Ketones, Urine NEGATIVE NEGATIVE MG/DL    Specific [place], UA 1.003 1.001 - 1.035    Blood, Urine NEGATIVE NEGATIVE    pH, Urine 6.0 5.0 - 8.0    Protein, UA NEGATIVE NEGATIVE MG/DL    Urobilinogen, Urine NORMAL 0.2 - 1.0 MG/DL    Nitrite Urine, Quantitative NEGATIVE NEGATIVE    Leukocyte Esterase, Urine NEGATIVE NEGATIVE    RBC, UA NONE SEEN 0 - 6 /HPF    WBC, UA NONE SEEN 0 - 5 /HPF    Bacteria, UA NEGATIVE NEGATIVE /HPF    Squam Epithel, UA <1 /HPF    Trichomonas, UA NONE SEEN NONE SEEN /HPF    Hyaline Casts, UA 0 /LPF   Basic Metabolic Panel   Result Value Ref Range    Sodium 134 (L) 135 - 145 MMOL/L    Potassium 3.7 3.5 - 5.1 MMOL/L    Chloride 92 (L) 99 - 110 mMol/L    CO2 35 (H) 21 - 32 MMOL/L    Anion Gap 7 4 - 16    BUN 14 6 - 23 MG/DL    CREATININE 0.9 0.6 - 1.1 MG/DL    Glucose 201 (H) 70 - 99 MG/DL    Calcium 9.1 8.3 - 10.6 MG/DL    GFR Non-African American >60 >60 mL/min/1.73m2    GFR African American >60 >60 mL/min/1.73m2   EKG 12 Lead   Result Value Ref Range    Ventricular Rate 108 BPM    Atrial Rate 108 BPM    P-R Interval 134 ms    QRS Duration 136 ms    Q-T Interval 394 ms    QTc Calculation (Bazett) 527 ms    P Axis 72 degrees    R Axis 101 degrees    T Axis 27 degrees    Diagnosis       Sinus tachycardia  Right bundle branch block  T wave abnormality, consider inferior ischemia  Abnormal ECG  When compared with ECG of 23-NOV-2020 06:13,  T wave inversion less evident in Inferior leads  Nonspecific T wave abnormality no longer evident in Lateral leads  Confirmed by Samir Bates ([id number]) on 12/27/2020 4:10:46 PM     EKG 12 lead   Result Value Ref Range    Ventricular Rate 79 BPM    Atrial Rate 79 BPM    P-R Interval 138 ms    QRS Duration 144 ms    Q-T Interval 502 ms    QTc Calculation (Bazett) 575 ms    P Axis 72 degrees    R Axis 101 degrees    T Axis 68 degrees    Diagnosis       Normal sinus rhythm  Right bundle branch block  Abnormal ECG  When compared with ECG of 27-DEC-2020 02:04,  No significant change was found  Confirmed by Samir Bates ([id number]) on 12/28/2020 4:48:53 PM        Radiographs (if obtained):  [] The following radiograph was interpreted by myself in the absence of a radiologist:  [x]Radiologist's Report Reviewed:  XR CHEST PORTABLE   Final Result   No acute process. EKG (if obtained): (All EKG's are interpreted by myself in the absence of a cardiologist)  Sinus tachycardia with a rate of 108. NJ interval 134, , QTc 527. Nonspecific ST segments and T waves. Bundle-branch-block. Impression: Abnormal EKG. When compared to previous EKG from 11/23/2020, the tachycardia and the morphology of the bundle branch block is different. MDM:  Differential diagnoses considered include but are not limited to electrolyte abnormality, reaction to blood pressure medication changes, hyperthyroidism, thrombocytopenia, acute coronary syndrome, low suspicion for reaccumulation of pericardial fluid. EKG was obtained shows tachycardia but is not concerning for acute ischemia. Basic labs are obtained and shows slightly decreased potassium level but are otherwise unremarkable. Normal platelet level. Troponin was obtained and is 0.011. Minimally detectable. Patient's TSH level is decreased and her free T4 level is slightly elevated. This may be contributing to the elevated heart rate. Chest x-ray shows no acute cardiopulmonary abnormalities. Plan for delta troponin. If it is improving or negative plan to discharge patient home with additional beta-blocker medication and follow-up closely with her primary care physician and endocrinology for hyperthyroidism. Delta troponin was obtained and is actually increasing. Due to this plan admit patient to the hospital for further evaluation and treatment. I spoke with Dr. Danetta Nageotte, who graciously agreed to admit the patient. Plan of care explained to patient. All questions and concerns were addressed to the patient's satisfaction. Patient understood and agreed with plan.     I did don appropriate PPE (including N95 face mask, protective eye ware/safety glasses, gloves, hair covering, and no isolation gown), as recommended by the health facility/national standard best practice, during my bedside interactions with the patient. Clinical Impression:  1. Elevated serum free T4 level    2. Chest pain, unspecified type    3. Elevated troponin          Ramiro Rincon MD       Please note that portions of this note may have been complete with a voice recognition program.  Effortswere made to edit the dictations, but occasional words are mis-transcribed.          Ramiro Rincon MD  01/11/21 [id number]

## 2020-12-27 NOTE — PROGRESS NOTES
Pt requests lasix for fluid retention in legs, requests covid test, states clonazepam is not working for anxiety, also requests pain meds for bilat groin pain, refuses tylenol, md notified

## 2020-12-27 NOTE — ED NOTES
Report called to [facility] on 3E. Pt will be transported to room 3030 via cart with transport tech shortly.      Lucy Borges RN  12/27/20 [id number]

## 2020-12-27 NOTE — H&P
History and Physical      Name:  Trell Ge /Age/Sex: 1960  (64 y.o. female)   MRN & CSN:  [id number] & [id number] Admission Date/Time: 2020  1:03 AM   Location:  [place] [address] PCP: Orvel Severs, MD       Hospital Day: 1    Assessment and Plan:   Trell Ge is a 64 y.o.  female  who presents with Chest pain    Chest pain: Could be from COPD, anxiety. · Chronic, on and off, worse with exertion. · Recent left heart catheterization with normal coronaries  · EKG with sinus tachycardia  · Detectable troponin  · Serial troponin  · Continue Cardizem for now. · Scheduled to have EGD on 2020. · Cardiology on consult. Lower extremity swelling  · Has trace pitting edema. · No congestive heart failure per cardiology. Acute COPD exacerbation:  · Has chronic respiratory failure  · Has Tight air entry, cough  · Chest x-ray with no acute process. · Start antibiotic, steroid. · Pulmonology on consult. Hypokalemia/hypomagnesemia: Replace accordingly. Chronic respiratory failure on oxygen via nasal cannula 2 L. Due to COPD. Continue BiPAP nightly. Generalized anxiety disorder: On BuSpar, clonazepam, Cymbalta. Increase BuSpar. Hypertension: Has fluctuating blood pressure. .  Currently on Cardizem. Hyperlipidemia: continue statin  Hypothyroidism: TSH low. Decrease Synthroid to 75. Obesity    Diet No diet orders on file   DVT Prophylaxis [x] Lovenox, []  Heparin, [] SCDs, [] Warfarin  [] NOAC     GI Prophylaxis [x] PPI,  [] H2 Blocker,  [] Carafate,  [] Diet/Tube Feeds   Code Status Prior   MDM [] Low, [] Moderate,[x]  High     History of Present Illness:     Chief Complaint: Chest pain  Trell Ge is a 64 y.o.  female, with past medical history significant for hypertension, hyperlipidemia, hypothyroidism, pericardial effusion, who presented to the ED from home due chest pain. The present condition started 2 months prior to admission after pericardiocentesis.  FH: CAD (coronary artery disease) 2017    Father had in his forties, sister in her thirties    [place] Fibromyalgia 2017    H/O Doppler ultrasound 2019    venous- no DVT or reflux    H/O echocardiogram 2015    EF50-55% Normal- see media    History of blood transfusion     Hyperlipidemia LDL goal <100     Hypertension     Obstructive sleep apnea     Osteoarthritis     Thyroid disease      PSHX:  has a past surgical history that includes Carpal tunnel release; Tubal ligation; back surgery; Cardiac catheterization; Colonoscopy (N/A, 2019); and Cholecystectomy, laparoscopic (N/A, 10/25/2020). Allergies: Allergies   Allergen Reactions    Lisinopril Swelling and Rash     angioedema       FAM HX: family history includes No Known Problems in her father and mother.   Soc HX:   Social History     Socioeconomic History    Marital status:      Spouse name: None    Number of children: None    Years of education: None    Highest education level: None   Occupational History    None   Social Needs    Financial resource strain: None    Food insecurity     Worry: None     Inability: None    Transportation needs     Medical: None     Non-medical: None   Tobacco Use    Smoking status: Former Smoker     Packs/day: 1.50     Years: 20.00     Pack years: 30.00     Quit date: 2008     Years since quittin.9    Smokeless tobacco: Never Used   Substance and Sexual Activity    Alcohol use: Not Currently     Alcohol/week: 2.0 standard drinks     Types: 2 Shots of liquor per week    Drug use: No    Sexual activity: Yes     Partners: Male   Lifestyle    Physical activity     Days per week: None     Minutes per session: None    Stress: None   Relationships    Social connections     Talks on phone: None     Gets together: None     Attends [employer] service: None     Active member of club or organization: None     Attends meetings of clubs or organizations: None     Relationship status: None    Intimate partner violence     Fear of current or ex partner: None     Emotionally abused: None     Physically abused: None     Forced sexual activity: None   Other Topics Concern    None   Social History Narrative    None       Medications:     Home Medication   Prior to Admission medications    Medication Sig Start Date End Date Taking? Authorizing Provider   clonazePAM (KLONOPIN) 1 MG disintegrating tablet Take 1 mg by mouth 3 times daily as needed. Yes Historical Provider, MD   dilTIAZem (CARDIZEM) 30 MG tablet Take 1 tablet by mouth 4 times daily 12/16/20  Yes BRIAN Barnhart - CNP   atorvastatin (LIPITOR) 40 MG tablet Take 40 mg by mouth daily   Yes Historical Provider, MD   Ferrous Fumarate (FERROCITE PO) Take by mouth   Yes Historical Provider, MD   ondansetron (ZOFRAN ODT) 4 MG disintegrating tablet Take 1 tablet by mouth every 8 hours as needed for Nausea or Vomiting Place under the tongue and let it melt and absorb from under your tongue.  12/2/20  Yes Jessie Randle MD   lactobacillus (CULTURELLE) capsule Take 1 capsule by mouth daily (with breakfast) 11/27/20  Yes Richard Cordova MD   pantoprazole (PROTONIX) 40 MG tablet Take 1 tablet by mouth 2 times daily 11/26/20  Yes Richard Cordova MD   sucralfate (CARAFATE) 1 GM tablet Take 1 tablet by mouth every 12 hours 11/26/20  Yes Richard Cordova MD   dicyclomine (BENTYL) 10 MG capsule Take 1 capsule by mouth 4 times daily 11/26/20  Yes Richard Cordova MD   theophylline (MIAH-24) 200 MG extended release capsule Take 400 mg by mouth daily   Yes Historical Provider, MD   budesonide-formoterol (SYMBICORT) 160-4.5 MCG/ACT AERO USE 2 INHALATIONS TWICE A DAY 10/2/20  Yes Latoya Wiley MD   ipratropium-albuterol (DUONEB) 0.5-2.5 (3) MG/3ML SOLN nebulizer solution Inhale 3 mLs into the lungs every 4 hours 7/21/20  Yes Latoya Wiley MD   guaiFENesin (MUCINEX) 600 MG extended release tablet Take 1 tablet by mouth 2 times daily 7/21/20  Yes Sumeet Rojas MD   montelukast (SINGULAIR) 10 MG tablet Take 1 tablet by mouth daily 7/21/20  Yes Sumeet Rojas MD   levothyroxine (SYNTHROID) 100 MCG tablet Take 1 tablet by mouth Daily 7/21/20  Yes Sumeet Rojas MD   folic acid (FOLVITE) 1 MG tablet Take 1 tablet by mouth daily 7/16/20  Yes Monty Hope MD   vitamin B-1 100 MG tablet Take 1 tablet by mouth daily 7/16/20  Yes Monty Hope MD   albuterol-ipratropium (COMBIVENT RESPIMAT)  MCG/ACT AERS inhaler Inhale 1 puff into the lungs every 4 hours as needed for Wheezing 7/1/20  Yes Sumeet Rojas MD   DULoxetine (CYMBALTA) 60 MG extended release capsule Take 60 mg by mouth daily   Yes Historical Provider, MD   busPIRone (BUSPAR) 10 MG tablet Take 1 tablet by mouth 3 times daily  Patient taking differently: Take 10 mg by mouth 2 times daily  4/23/19  Yes Devang Wallis MD   traZODone (DESYREL) 50 MG tablet Take 100 mg by mouth nightly    Yes Historical Provider, MD   baclofen (LIORESAL) 10 MG tablet Take 1 tablet by mouth 3 times daily 12/18/18  Yes Devang Wallis MD   aspirin 81 MG chewable tablet Take 81 mg by mouth daily   Yes Historical Provider, MD   fluticasone (FLONASE) 50 MCG/ACT nasal spray 2 sprays by Nasal route daily 3/28/18  Yes Devang Wallis MD   ferrous sulfate (IRON 325) 325 (65 Fe) MG tablet Take 325 mg by mouth 2 times daily Monday, wed, fri     Historical Provider, MD     Medications:    Infusions:   PRN Meds:     ipratropium-albuterol, 3 mL, Q4H PRN        Recent Labs     12/27/20  0154   WBC 9.9   HGB 9.4*   HCT 33.0*         Recent Labs     12/27/20  0154      K 3.1*   CL 93*   CO2 33*   BUN 6   CREATININE 0.9     Recent Labs     12/27/20  0154   AST 20   ALT 11   BILITOT 0.2   ALKPHOS 116     No results for input(s): INR in the last 72 hours.   Recent Labs     12/27/20  0154 12/27/20  0542   TROPONINT 0.011* 0.015*        Imaging reviewed      Electronically signed by Nickie Gallagher MD on 12/27/2020 at 10:28 AM

## 2020-12-27 NOTE — ED NOTES
Call light answered. Warm blankets given. Pt requesting med neb at this time.  This RN will notify respiratory therapy      Marilia Meza RN  12/27/20 2386

## 2020-12-28 PROBLEM — E44.0 MODERATE MALNUTRITION (HCC): Chronic | Status: ACTIVE | Noted: 2020-12-28

## 2020-12-28 LAB
ALBUMIN SERPL-MCNC: 2.9 GM/DL (ref 3.4–5)
ALP BLD-CCNC: 116 IU/L (ref 40–128)
ALT SERPL-CCNC: 13 U/L (ref 10–40)
ANION GAP SERPL CALCULATED.3IONS-SCNC: 8 MMOL/L (ref 4–16)
AST SERPL-CCNC: 21 IU/L (ref 15–37)
BACTERIA: NEGATIVE /HPF
BILIRUB SERPL-MCNC: 0.2 MG/DL (ref 0–1)
BILIRUBIN URINE: NEGATIVE MG/DL
BLOOD, URINE: NEGATIVE
BUN BLDV-MCNC: 6 MG/DL (ref 6–23)
CALCIUM SERPL-MCNC: 8.9 MG/DL (ref 8.3–10.6)
CHLORIDE BLD-SCNC: 95 MMOL/L (ref 99–110)
CLARITY: CLEAR
CO2: 34 MMOL/L (ref 21–32)
COLOR: COLORLESS
CREAT SERPL-MCNC: 0.7 MG/DL (ref 0.6–1.1)
DOSE AMOUNT: NORMAL
DOSE TIME: NORMAL
EKG ATRIAL RATE: 79 BPM
EKG DIAGNOSIS: NORMAL
EKG P AXIS: 72 DEGREES
EKG P-R INTERVAL: 138 MS
EKG Q-T INTERVAL: 502 MS
EKG QRS DURATION: 144 MS
EKG QTC CALCULATION (BAZETT): 575 MS
EKG R AXIS: 101 DEGREES
EKG T AXIS: 68 DEGREES
EKG VENTRICULAR RATE: 79 BPM
ERYTHROCYTE SEDIMENTATION RATE: 68 MM/HR (ref 0–30)
GFR AFRICAN AMERICAN: >60 ML/MIN/1.73M2
GFR NON-AFRICAN AMERICAN: >60 ML/MIN/1.73M2
GLUCOSE BLD-MCNC: 183 MG/DL (ref 70–99)
GLUCOSE, URINE: NEGATIVE MG/DL
HCT VFR BLD CALC: 33 % (ref 37–47)
HEMOGLOBIN: 9.5 GM/DL (ref 12.5–16)
HYALINE CASTS: 0 /LPF
KETONES, URINE: NEGATIVE MG/DL
LEUKOCYTE ESTERASE, URINE: NEGATIVE
MAGNESIUM: 2.2 MG/DL (ref 1.8–2.4)
MCH RBC QN AUTO: 26 PG (ref 27–31)
MCHC RBC AUTO-ENTMCNC: 28.8 % (ref 32–36)
MCV RBC AUTO: 90.2 FL (ref 78–100)
NITRITE URINE, QUANTITATIVE: NEGATIVE
PDW BLD-RTO: 16.1 % (ref 11.7–14.9)
PH, URINE: 6 (ref 5–8)
PHOSPHORUS: 2.8 MG/DL (ref 2.5–4.9)
PLATELET # BLD: 296 K/CU MM (ref 140–440)
PMV BLD AUTO: 9.9 FL (ref 7.5–11.1)
POTASSIUM SERPL-SCNC: 3.3 MMOL/L (ref 3.5–5.1)
PRO-BNP: 1413 PG/ML
PROCALCITONIN: 0.14
PROTEIN UA: NEGATIVE MG/DL
RBC # BLD: 3.66 M/CU MM (ref 4.2–5.4)
RBC URINE: ABNORMAL /HPF (ref 0–6)
SODIUM BLD-SCNC: 137 MMOL/L (ref 135–145)
SPECIFIC GRAVITY UA: 1 (ref 1–1.03)
SQUAMOUS EPITHELIAL: <1 /HPF
THEOPHYLLINE LEVEL: 12.2 UG/ML (ref 10–20)
TOTAL PROTEIN: 5.9 GM/DL (ref 6.4–8.2)
TRICHOMONAS: ABNORMAL /HPF
UROBILINOGEN, URINE: NORMAL MG/DL (ref 0.2–1)
WBC # BLD: 3.3 K/CU MM (ref 4–10.5)
WBC UA: ABNORMAL /HPF (ref 0–5)

## 2020-12-28 PROCEDURE — 2580000003 HC RX 258: Performed by: INTERNAL MEDICINE

## 2020-12-28 PROCEDURE — 94664 DEMO&/EVAL PT USE INHALER: CPT

## 2020-12-28 PROCEDURE — 80053 COMPREHEN METABOLIC PANEL: CPT

## 2020-12-28 PROCEDURE — 96372 THER/PROPH/DIAG INJ SC/IM: CPT

## 2020-12-28 PROCEDURE — G0378 HOSPITAL OBSERVATION PER HR: HCPCS

## 2020-12-28 PROCEDURE — 6360000002 HC RX W HCPCS: Performed by: INTERNAL MEDICINE

## 2020-12-28 PROCEDURE — 85027 COMPLETE CBC AUTOMATED: CPT

## 2020-12-28 PROCEDURE — 6370000000 HC RX 637 (ALT 250 FOR IP): Performed by: INTERNAL MEDICINE

## 2020-12-28 PROCEDURE — 84145 PROCALCITONIN (PCT): CPT

## 2020-12-28 PROCEDURE — 80198 ASSAY OF THEOPHYLLINE: CPT

## 2020-12-28 PROCEDURE — 94640 AIRWAY INHALATION TREATMENT: CPT

## 2020-12-28 PROCEDURE — 36415 COLL VENOUS BLD VENIPUNCTURE: CPT

## 2020-12-28 PROCEDURE — 94761 N-INVAS EAR/PLS OXIMETRY MLT: CPT

## 2020-12-28 PROCEDURE — 96375 TX/PRO/DX INJ NEW DRUG ADDON: CPT

## 2020-12-28 PROCEDURE — 93010 ELECTROCARDIOGRAM REPORT: CPT | Performed by: INTERNAL MEDICINE

## 2020-12-28 PROCEDURE — 85652 RBC SED RATE AUTOMATED: CPT

## 2020-12-28 PROCEDURE — 93005 ELECTROCARDIOGRAM TRACING: CPT | Performed by: INTERNAL MEDICINE

## 2020-12-28 PROCEDURE — 84100 ASSAY OF PHOSPHORUS: CPT

## 2020-12-28 PROCEDURE — 83880 ASSAY OF NATRIURETIC PEPTIDE: CPT

## 2020-12-28 PROCEDURE — 99244 OFF/OP CNSLTJ NEW/EST MOD 40: CPT | Performed by: INTERNAL MEDICINE

## 2020-12-28 PROCEDURE — 81001 URINALYSIS AUTO W/SCOPE: CPT

## 2020-12-28 PROCEDURE — 96366 THER/PROPH/DIAG IV INF ADDON: CPT

## 2020-12-28 PROCEDURE — 83735 ASSAY OF MAGNESIUM: CPT

## 2020-12-28 PROCEDURE — 93308 TTE F-UP OR LMTD: CPT

## 2020-12-28 PROCEDURE — 96376 TX/PRO/DX INJ SAME DRUG ADON: CPT

## 2020-12-28 PROCEDURE — 2700000000 HC OXYGEN THERAPY PER DAY

## 2020-12-28 RX ORDER — FUROSEMIDE 10 MG/ML
20 INJECTION INTRAMUSCULAR; INTRAVENOUS ONCE
Status: COMPLETED | OUTPATIENT
Start: 2020-12-28 | End: 2020-12-28

## 2020-12-28 RX ORDER — METHYLPREDNISOLONE SODIUM SUCCINATE 40 MG/ML
40 INJECTION, POWDER, LYOPHILIZED, FOR SOLUTION INTRAMUSCULAR; INTRAVENOUS EVERY 8 HOURS
Status: DISCONTINUED | OUTPATIENT
Start: 2020-12-28 | End: 2020-12-31

## 2020-12-28 RX ADMIN — BUDESONIDE AND FORMOTEROL FUMARATE DIHYDRATE 2 PUFF: 160; 4.5 AEROSOL RESPIRATORY (INHALATION) at 08:46

## 2020-12-28 RX ADMIN — SUCRALFATE 1 G: 1 TABLET ORAL at 10:52

## 2020-12-28 RX ADMIN — DICYCLOMINE HYDROCHLORIDE 10 MG: 10 CAPSULE ORAL at 10:52

## 2020-12-28 RX ADMIN — GUAIFENESIN 600 MG: 600 TABLET, EXTENDED RELEASE ORAL at 10:53

## 2020-12-28 RX ADMIN — BUSPIRONE HYDROCHLORIDE 10 MG: 10 TABLET ORAL at 14:28

## 2020-12-28 RX ADMIN — MONTELUKAST 10 MG: 10 TABLET, FILM COATED ORAL at 10:53

## 2020-12-28 RX ADMIN — BACLOFEN 10 MG: 10 TABLET ORAL at 10:53

## 2020-12-28 RX ADMIN — DILTIAZEM HYDROCHLORIDE 30 MG: 30 TABLET, FILM COATED ORAL at 12:43

## 2020-12-28 RX ADMIN — FUROSEMIDE 20 MG: 10 INJECTION, SOLUTION INTRAMUSCULAR; INTRAVENOUS at 14:28

## 2020-12-28 RX ADMIN — FLUTICASONE PROPIONATE 2 SPRAY: 50 SPRAY, METERED NASAL at 10:58

## 2020-12-28 RX ADMIN — DICYCLOMINE HYDROCHLORIDE 10 MG: 10 CAPSULE ORAL at 12:43

## 2020-12-28 RX ADMIN — METHYLPREDNISOLONE SODIUM SUCCINATE 40 MG: 40 INJECTION, POWDER, LYOPHILIZED, FOR SOLUTION INTRAMUSCULAR; INTRAVENOUS at 10:54

## 2020-12-28 RX ADMIN — METHYLPREDNISOLONE SODIUM SUCCINATE 40 MG: 40 INJECTION, POWDER, LYOPHILIZED, FOR SOLUTION INTRAMUSCULAR; INTRAVENOUS at 14:28

## 2020-12-28 RX ADMIN — DICYCLOMINE HYDROCHLORIDE 10 MG: 10 CAPSULE ORAL at 20:59

## 2020-12-28 RX ADMIN — BUSPIRONE HYDROCHLORIDE 10 MG: 10 TABLET ORAL at 20:59

## 2020-12-28 RX ADMIN — LEVOTHYROXINE SODIUM 75 MCG: 75 TABLET ORAL at 05:40

## 2020-12-28 RX ADMIN — CEFTRIAXONE 1 G: 1 INJECTION, POWDER, FOR SOLUTION INTRAMUSCULAR; INTRAVENOUS at 18:24

## 2020-12-28 RX ADMIN — CLONAZEPAM 1 MG: 1 TABLET ORAL at 21:16

## 2020-12-28 RX ADMIN — TRAZODONE HYDROCHLORIDE 100 MG: 50 TABLET ORAL at 21:08

## 2020-12-28 RX ADMIN — BACLOFEN 10 MG: 10 TABLET ORAL at 20:59

## 2020-12-28 RX ADMIN — DILTIAZEM HYDROCHLORIDE 30 MG: 30 TABLET, FILM COATED ORAL at 10:52

## 2020-12-28 RX ADMIN — OXYCODONE HYDROCHLORIDE AND ACETAMINOPHEN 1 TABLET: 5; 325 TABLET ORAL at 12:43

## 2020-12-28 RX ADMIN — FOLIC ACID 1 MG: 1 TABLET ORAL at 10:53

## 2020-12-28 RX ADMIN — DICYCLOMINE HYDROCHLORIDE 10 MG: 10 CAPSULE ORAL at 18:25

## 2020-12-28 RX ADMIN — DILTIAZEM HYDROCHLORIDE 30 MG: 30 TABLET, FILM COATED ORAL at 18:24

## 2020-12-28 RX ADMIN — AZITHROMYCIN DIHYDRATE 500 MG: 500 INJECTION, POWDER, LYOPHILIZED, FOR SOLUTION INTRAVENOUS at 13:05

## 2020-12-28 RX ADMIN — PANTOPRAZOLE SODIUM 40 MG: 40 TABLET, DELAYED RELEASE ORAL at 10:54

## 2020-12-28 RX ADMIN — OXYCODONE HYDROCHLORIDE AND ACETAMINOPHEN 1 TABLET: 5; 325 TABLET ORAL at 22:35

## 2020-12-28 RX ADMIN — FERROUS SULFATE TAB 325 MG (65 MG ELEMENTAL FE) 325 MG: 325 (65 FE) TAB at 10:54

## 2020-12-28 RX ADMIN — Medication 100 MG: at 10:52

## 2020-12-28 RX ADMIN — ASPIRIN 81 MG CHEWABLE TABLET 81 MG: 81 TABLET CHEWABLE at 10:52

## 2020-12-28 RX ADMIN — BACLOFEN 10 MG: 10 TABLET ORAL at 14:28

## 2020-12-28 RX ADMIN — DULOXETINE HYDROCHLORIDE 60 MG: 30 CAPSULE, DELAYED RELEASE ORAL at 10:53

## 2020-12-28 RX ADMIN — SODIUM CHLORIDE, PRESERVATIVE FREE 10 ML: 5 INJECTION INTRAVENOUS at 11:00

## 2020-12-28 RX ADMIN — ENOXAPARIN SODIUM 40 MG: 100 INJECTION SUBCUTANEOUS at 10:54

## 2020-12-28 RX ADMIN — METHYLPREDNISOLONE SODIUM SUCCINATE 40 MG: 40 INJECTION, POWDER, LYOPHILIZED, FOR SOLUTION INTRAMUSCULAR; INTRAVENOUS at 02:02

## 2020-12-28 RX ADMIN — PANTOPRAZOLE SODIUM 40 MG: 40 TABLET, DELAYED RELEASE ORAL at 20:59

## 2020-12-28 RX ADMIN — ATORVASTATIN CALCIUM 40 MG: 40 TABLET, FILM COATED ORAL at 20:59

## 2020-12-28 RX ADMIN — METHYLPREDNISOLONE SODIUM SUCCINATE 40 MG: 40 INJECTION, POWDER, LYOPHILIZED, FOR SOLUTION INTRAMUSCULAR; INTRAVENOUS at 21:00

## 2020-12-28 RX ADMIN — DILTIAZEM HYDROCHLORIDE 30 MG: 30 TABLET, FILM COATED ORAL at 20:59

## 2020-12-28 RX ADMIN — GUAIFENESIN 600 MG: 600 TABLET, EXTENDED RELEASE ORAL at 20:59

## 2020-12-28 RX ADMIN — THEOPHYLLINE ANHYDROUS 400 MG: 200 CAPSULE, EXTENDED RELEASE ORAL at 10:52

## 2020-12-28 RX ADMIN — CLONAZEPAM 1 MG: 1 TABLET ORAL at 12:43

## 2020-12-28 RX ADMIN — TIOTROPIUM BROMIDE INHALATION SPRAY 2 PUFF: 3.12 SPRAY, METERED RESPIRATORY (INHALATION) at 08:45

## 2020-12-28 RX ADMIN — BUDESONIDE AND FORMOTEROL FUMARATE DIHYDRATE 2 PUFF: 160; 4.5 AEROSOL RESPIRATORY (INHALATION) at 21:16

## 2020-12-28 RX ADMIN — SODIUM CHLORIDE, PRESERVATIVE FREE 10 ML: 5 INJECTION INTRAVENOUS at 20:59

## 2020-12-28 RX ADMIN — BUSPIRONE HYDROCHLORIDE 10 MG: 10 TABLET ORAL at 10:52

## 2020-12-28 RX ADMIN — FERROUS SULFATE TAB 325 MG (65 MG ELEMENTAL FE) 325 MG: 325 (65 FE) TAB at 20:59

## 2020-12-28 RX ADMIN — SUCRALFATE 1 G: 1 TABLET ORAL at 20:59

## 2020-12-28 ASSESSMENT — PAIN DESCRIPTION - ORIENTATION: ORIENTATION: RIGHT

## 2020-12-28 ASSESSMENT — PAIN SCALES - GENERAL
PAINLEVEL_OUTOF10: 4
PAINLEVEL_OUTOF10: 6

## 2020-12-28 NOTE — PROGRESS NOTES
Hospitalist Progress Note      Name:  Bryce Whitten /Age/Sex: 1960  (64 y.o. female)   MRN & CSN:  [id number] & [id number] Admission Date/Time: 2020  1:03 AM   Location:  [facility] PCP: Cassandra Scales MD         Hospital Day: 2    Assessment and Plan:   Bryce Whitten is a 64 y.o.  female  who presents with Chest pain    Chest pain: Could be from COPD, anxiety. · Chronic, on and off, worse with exertion. · Recent left heart catheterization with normal coronaries  · EKG with sinus tachycardia  · Detectable troponin -now normal  · Serial troponin  · Continue Cardizem for now. · Scheduled to have EGD on 2020. · Cardiology on consult.     Lower extremity swelling  · Has trace pitting edema. · No congestive heart failure per cardiology.     Acute COPD exacerbation: On BiPAP overnight  · Has chronic respiratory failure  · Has Tight air entry, cough  · Chest x-ray with no acute process. · COVID-19 negative. · Start antibiotic, steroid. · Pulmonology on consult. Lower extremity swelling: No evidence of heart failure. Could be from chronic venous insufficiency. We will try 1 dose of Lasix today. Urinary incontinence/dysuria: Check urinalysis. Hypokalemia/hypomagnesemia: Replace accordingly.     Chronic respiratory failure on oxygen via nasal cannula 2 L. Due to COPD. Continue BiPAP nightly. Generalized anxiety disorder: On BuSpar, clonazepam, Cymbalta. Increase BuSpar. Hypertension: Has fluctuating blood pressure. .  Currently on Cardizem. Hyperlipidemia: continue statin  Hypothyroidism: TSH low. Decrease Synthroid to 75.   Obesity    Diet DIET CARDIAC; Low Sodium (2 GM)   DVT Prophylaxis [] Lovenox, []  Heparin, [] SCDs, [] Warfarin  [] NOAC     GI Prophylaxis [] PPI,  [] H2 Blocker,  [] Carafate,  [] Diet/Tube Feeds   Code Status Full Code   MDM [] Low, [] Moderate,[]  High     History of Present Illness:     Chief Complaint: Chest pain    Seen and examined today. Has multiple complaints. Still with shortness of breath, chest pain, lower abdominal pain, incontinence, dysuria, lower extremity swelling. Ten point ROS reviewed negative, unless as noted above    Objective: Intake/Output Summary (Last 24 hours) at 12/28/2020 1351  Last data filed at 12/28/2020 0545  Gross per 24 hour   Intake 860 ml   Output --   Net 860 ml      Vitals:   Vitals:    12/28/20 1245   BP:    Pulse:    Resp: 19   Temp:    SpO2: 99%     Physical Exam:   GEN Awake female, sitting upright in bed in respiratory distress. On noninvasive ventilation. EYES Pupils are equally round. No scleral erythema, discharge, or conjunctivitis. HENT Mucous membranes are moist. Oral pharynx without exudates, no evidence of thrush. NECK Supple, no apparent thyromegaly or masses. RESP tight air entry. Lysbeth Carrmaida CARDIO/VASC S1/S2 auscultated. Regular rate without appreciable murmurs, rubs, or gallops. No JVD or carotid bruits. Peripheral pulses equal bilaterally and palpable. No peripheral edema. GI Abdomen is soft without significant tenderness, masses, or guarding. Bowel sounds are normoactive. Rectal exam deferred. MSK No gross joint deformities. SKIN Normal coloration, warm, dry. NEURO Cranial nerves appear grossly intact, normal speech, no lateralizing weakness. PSYCH Awake, alert, oriented x 4. Affect appropriate.     Medications:   Medications:    sodium chloride flush  10 mL Intravenous 2 times per day    atorvastatin  40 mg Oral Nightly    enoxaparin  40 mg Subcutaneous Daily    aspirin  81 mg Oral Daily    baclofen  10 mg Oral TID    dicyclomine  10 mg Oral 4x Daily    dilTIAZem  30 mg Oral 4x Daily    DULoxetine  60 mg Oral Daily    ferrous sulfate  325 mg Oral BID    fluticasone  2 spray Nasal Daily    folic acid  1 mg Oral Daily    guaiFENesin  600 mg Oral BID    levothyroxine  75 mcg Oral Daily    montelukast  10 mg Oral Daily    pantoprazole  40 mg Oral BID    sucralfate 1 g Oral 2 times per day    theophylline  400 mg Oral Daily    traZODone  100 mg Oral Nightly    thiamine  100 mg Oral Daily    azithromycin  500 mg Intravenous Q24H    busPIRone  10 mg Oral TID    budesonide-formoterol  2 puff Inhalation BID    methylPREDNISolone  40 mg Intravenous Q6H    cefTRIAXone (ROCEPHIN) IV  1 g Intravenous Q24H    tiotropium  2 puff Inhalation Daily      Infusions:   PRN Meds:     sodium chloride flush, 10 mL, PRN      acetaminophen, 650 mg, Q6H PRN    Or      acetaminophen, 650 mg, Q6H PRN      polyethylene glycol, 17 g, Daily PRN      potassium chloride, 40 mEq, PRN    Or      potassium alternative oral replacement, 40 mEq, PRN    Or      potassium chloride, 10 mEq, PRN      magnesium sulfate, 2 g, PRN      nitroGLYCERIN, 0.4 mg, Q5 Min PRN      ipratropium-albuterol, 3 mL, Q4H PRN      clonazePAM, 1 mg, TID PRN      promethazine, 12.5 mg, Q6H PRN    Or      promethazine, 12.5 mg, Q6H PRN      oxyCODONE-acetaminophen, 1 tablet, Q6H PRN        Recent Labs     12/27/20  0154 12/28/20  0247   WBC 9.9 3.3*   HGB 9.4* 9.5*   HCT 33.0* 33.0*    296      Recent Labs     12/27/20  0154 12/27/20  1335 12/28/20  0247     --  137   K 3.1* 2.9* 3.3*   CL 93*  --  95*   CO2 33*  --  34*   PHOS  --   --  2.8   BUN 6  --  6   CREATININE 0.9  --  0.7     Recent Labs     12/27/20  0154 12/28/20  0247   AST 20 21   ALT 11 13   BILITOT 0.2 0.2   ALKPHOS 116 116     No results for input(s): INR in the last 72 hours.   Recent Labs     12/27/20  0542 12/27/20  1335 12/27/20 2022   TROPONINT 0.015* <0.010 <0.010      Imaging reviewed    Electronically signed by Pipe Sullivan MD on 12/28/2020 at 1:51 PM

## 2020-12-28 NOTE — CARE COORDINATION
Chart reviewed:  Pt lives with family. Pt is independent of all her ADLs. Pt has a PCP and has insurance to help with meds. Pt has a C-PAP at home. No needs identified at this time. CM is available if needed.

## 2020-12-28 NOTE — PROGRESS NOTES
Comprehensive Nutrition Assessment    Type and Reason for Visit:  Initial, Positive Nutrition Screen(wt loss, poor intake)    Nutrition Recommendations/Plan:   · Continue current diet as ordered  · Encourage po intake as able  · Will order low vinh high protein oral nutrition supplement bid    Nutrition Assessment:  Pt admitted with CP. Reports poor intake and wt loss following recent lap afshin. Cardiac, Low 2 gm Sodium Diet ordered. Some recent wt loss noted, 13% loss over past 5 months. Pt meets criteria for moderate malnutrition. Will order oral supplement and continue to follow as moderate nutrition risk. Malnutrition Assessment:  Malnutrition Status: Moderate malnutrition    Context:  Acute Illness     Findings of the 6 clinical characteristics of malnutrition:  Energy Intake:  75% or less of estimated energy requirements for 7 or more days  Weight Loss:   Greater than 7.5% over 3 months     Body Fat Loss:  Unable to assess     Muscle Mass Loss:  Unable to assess    Fluid Accumulation:  No significant fluid accumulation Extremities   Strength:  Not Performed    Estimated Daily Nutrient Needs:  Energy (kcal):  5496-2826 (Griffith-St Jeor); Weight Used for Energy Requirements:  Current     Protein (g):  48-57 (1-1.2 g/kg IBW); Weight Used for Protein Requirements:  Ideal        Fluid (ml/day):  5407-5912 (1 ml/kcal); Method Used for Fluid Requirements:  1 ml/kcal      Wounds:  None       Current Nutrition Therapies:    DIET CARDIAC; Low Sodium (2 GM)    Anthropometric Measures:  · Height: 5' 1\" (154.9 cm)  · Current Body Weight: 163 lb 11.2 oz (74.3 kg)   · Admission Body Weight: 163 lb 4.8 oz (74.1 kg)    · Usual Body Weight: 187 lb 6.4 oz (85 kg)(7/30/20)     · Ideal Body Weight: 105 lbs; % Ideal Body Weight 155.9 %   · BMI: 30.9  · BMI Categories: Obese Class 1 (BMI 30.0-34. 9)       Nutrition Diagnosis:   · Predicted inadequate energy intake related to pain as evidenced by poor intake prior to admission, weight loss    Nutrition Interventions:   Food and/or Nutrient Delivery:  Continue Current Diet, Start Oral Nutrition Supplement  Nutrition Education/Counseling:  No recommendation at this time   Coordination of Nutrition Care:  Continue to monitor while inpatient    Goals:  pt will consume greater than 50% of meals       Nutrition Monitoring and Evaluation:   Behavioral-Environmental Outcomes:  None Identified   Food/Nutrient Intake Outcomes:  Food and Nutrient Intake, Supplement Intake  Physical Signs/Symptoms Outcomes:  Biochemical Data, GI Status, Nausea or Vomiting, Nutrition Focused Physical Findings, Weight     Discharge Planning:     Too soon to determine     Electronically signed by Mahsa Reeder RD, LD on 12/28/20 at 2:10 PM EST    Contact: [phone]

## 2020-12-28 NOTE — CONSULTS
[address] [place] [address], [address] [facility]                                  CONSULTATION    PATIENT NAME: Daryl Bryant                :        1960  MED REC NO:   [id number]                          ROOM:       3030  ACCOUNT NO:   [de-identified]                           ADMIT DATE: 2020  PROVIDER:     Rashi Elias MD    CONSULT DATE:  2020    HISTORY OF PRESENT ILLNESS:  The patient is a 42-year-old lady with  multiple medical problems including COPD, chronic respiratory failure,  on home oxygen, fibromyalgia, bipolar disorder, who was admitted through  the emergency room with complaints of increasing shortness of breath and  cough productive of whitish to yellow phlegm and wheezing. She denies  any hemoptysis. She denies any fever or chills. She denies any nausea  or vomiting. She denies any chest pain. She was also complaining of  swelling of her lower extremities. In the emergency room, she had a  chest x-ray, which showed no acute disease. She was given  bronchodilator therapy and was subsequently admitted to the hospital.    PAST MEDICAL HISTORY:  Significant for COPD, chronic respiratory  failure, on home oxygen, hypertension, osteoarthritis, and  hyperlipidemia. PAST SURGICAL HISTORY:  Remarkable for carpal tunnel release surgery,  tubal ligation, back surgery, cardiac catheterization, colonoscopy and  laparoscopic cholecystectomy. FAMILY HISTORY:  Noncontributory. SOCIAL HISTORY:  Reveals that she quit smoking in , but has  30-pack-year smoking history. She has a history of alcohol abuse in the  past.  No history of drug abuse. MEDICATIONS:  Reviewed. ALLERGIES:  She is allergic to LISINOPRIL. REVIEW OF SYSTEMS:  A 10- to 14-point review of systems was reviewed and  is negative except for what is mentioned in the history of present  illness.     PHYSICAL EXAMINATION:

## 2020-12-28 NOTE — CONSULTS
INPATIENT CARDIOLOGY CONSULT NOTE       Reason for consultation: Chest pain    Referring physician:  Etta Nguyen MD     Primary care physician: Leyda Fonseca MD      Dear Etta Nguyen MD Thank you for the consult    Chief Complaint   Patient presents with    Rash    Hypertension       History of present illness:Nelly is a 64 y. o.year old who  presents with  Chief Complaint   Patient presents with    Rash    Hypertension     Patient is 64 alert female with prior medical history significant hypertension, nonobstructive CAD presents to the hospital with chief complaint of nausea anxiety chest pain lower extremities edema. Patient was evaluated by our service within the past 2 to 3 months. She underwent left heart cath catheterization which showed normal coronary angiogram.  Patient also had pericardial effusion which was tapped in October 2020. Since then patient has had multiple echocardiograms which did not reveal any recurrent effusions. Pericardial fat was noted. EKG shows normal sinus rhythm without any ST-T changes. Left heart cath 11/24/2020. Normal coronary angiogram.    Echocardiogram today shows the following. Summary   Left ventricular systolic function is normal.   Ejection fraction is visually estimated at 55-60%. Mild left ventricular hypertrophy. Trace aortic regurgitation. Trace mitral regurgitation. No evidence of any pericardial effusion.       Signature      ------------------------------------------------------------------   Electronically signed by Isaac Kemp MD (Interpreting    Past medical history:    has a past medical history of Anxiety, Arthritis, Back pain at L4-L5 level, Bipolar 1 disorder ([facility] [address] COPD (chronic obstructive pulmonary disease) ([facility] [address] Emphysema of lung ([facility] [address] FH: CAD (coronary artery disease), Fibromyalgia, H/O Doppler ultrasound, H/O echocardiogram, History of blood transfusion, Hyperlipidemia LDL goal <100, Hypertension, Obstructive sleep apnea, Osteoarthritis, and Thyroid disease. Past surgical history:   has a past surgical history that includes Carpal tunnel release; Tubal ligation; back surgery; Cardiac catheterization; Colonoscopy (N/A, 12/12/2019); and Cholecystectomy, laparoscopic (N/A, 10/25/2020). Social History:   reports that she quit smoking about 13 years ago. She has a 30.00 pack-year smoking history. She has never used smokeless tobacco. She reports previous alcohol use of about 2.0 standard drinks of alcohol per week. She reports that she does not use drugs.   Family history:   no family history of CAD, STROKE of DM    Allergies   Allergen Reactions    Lisinopril Swelling and Rash     angioedema           sodium chloride flush 0.9 % injection 10 mL, 2 times per day      sodium chloride flush 0.9 % injection 10 mL, PRN      acetaminophen (TYLENOL) tablet 650 mg, Q6H PRN    Or      acetaminophen (TYLENOL) suppository 650 mg, Q6H PRN      polyethylene glycol (GLYCOLAX) packet 17 g, Daily PRN      atorvastatin (LIPITOR) tablet 40 mg, Nightly      potassium chloride (KLOR-CON M) extended release tablet 40 mEq, PRN    Or      potassium bicarb-citric acid (EFFER-K) effervescent tablet 40 mEq, PRN    Or      potassium chloride 10 mEq/100 mL IVPB (Peripheral Line), PRN      magnesium sulfate 2 g in 50 mL IVPB premix, PRN      enoxaparin (LOVENOX) injection 40 mg, Daily      nitroGLYCERIN (NITROSTAT) SL tablet 0.4 mg, Q5 Min PRN      ipratropium-albuterol (DUONEB) nebulizer solution 3 mL, Q4H PRN      aspirin chewable tablet 81 mg, Daily      baclofen (LIORESAL) tablet 10 mg, TID      clonazePAM (KLONOPIN) tablet 1 mg, TID PRN      dicyclomine (BENTYL) capsule 10 mg, 4x Daily      dilTIAZem (CARDIZEM) tablet 30 mg, 4x Daily      DULoxetine (CYMBALTA) extended release capsule 60 mg, Daily      ferrous sulfate (IRON 325) tablet 325 mg, BID      fluticasone (FLONASE) 50 MCG/ACT nasal spray 2 spray, Daily      folic acid (FOLVITE) tablet 1 mg, Daily      guaiFENesin (MUCINEX) extended release tablet 600 mg, BID      levothyroxine (SYNTHROID) tablet 75 mcg, Daily      montelukast (SINGULAIR) tablet 10 mg, Daily      pantoprazole (PROTONIX) tablet 40 mg, BID      sucralfate (CARAFATE) tablet 1 g, 2 times per day      theophylline (MIAH-24) extended release capsule 400 mg, Daily      traZODone (DESYREL) tablet 100 mg, Nightly      thiamine tablet 100 mg, Daily      azithromycin (ZITHROMAX) 500 mg in dextrose 5 % 250 mL IVPB, Q24H      busPIRone (BUSPAR) tablet 10 mg, TID      promethazine (PHENERGAN) tablet 12.5 mg, Q6H PRN    Or      promethazine (PHENERGAN) injection 12.5 mg, Q6H PRN      budesonide-formoterol (SYMBICORT) 160-4.5 MCG/ACT inhaler 2 puff, BID      methylPREDNISolone sodium (SOLU-MEDROL) injection 40 mg, Q6H      cefTRIAXone (ROCEPHIN) 1 g IVPB in 50 mL D5W minibag, Q24H      tiotropium (SPIRIVA RESPIMAT) 2.5 MCG/ACT inhaler 2 puff, Daily      oxyCODONE-acetaminophen (PERCOCET) 5-325 MG per tablet 1 tablet, Q6H PRN      Current Facility-Administered Medications   Medication Dose Route Frequency Provider Last Rate Last Admin    sodium chloride flush 0.9 % injection 10 mL  10 mL Intravenous 2 times per day Monty Hope MD   10 mL at 12/28/20 1100    sodium chloride flush 0.9 % injection 10 mL  10 mL Intravenous PRN Monty Hope MD        acetaminophen (TYLENOL) tablet 650 mg  650 mg Oral Q6H PRN Monty Hope MD        Or    acetaminophen (TYLENOL) suppository 650 mg  650 mg Rectal Q6H PRN Monty Hope MD        polyethylene glycol [facility]) packet 17 g  17 g Oral Daily PRN Monty Hope MD        atorvastatin (LIPITOR) tablet 40 mg  40 mg Oral Nightly Monty Hope MD   40 mg at 12/27/20 2013    potassium chloride (KLOR-CON M) extended release tablet 40 mEq  40 mEq Oral PRN Monty Hope MD   40 mEq at 12/27/20 1257    Or    potassium bicarb-citric acid (EFFER-K) effervescent tablet 40 mEq  40 mEq Oral PRN Etta Nguyen MD        Or    potassium chloride 10 mEq/100 mL IVPB (Peripheral Line)  10 mEq Intravenous PRN Etta Nguyen MD        magnesium sulfate 2 g in 50 mL IVPB premix  2 g Intravenous PRN Etta Nguyen MD        enoxaparin (LOVENOX) injection 40 mg  40 mg Subcutaneous Daily Etta Nguyen MD   40 mg at 12/28/20 1054    nitroGLYCERIN (NITROSTAT) SL tablet 0.4 mg  0.4 mg Sublingual Q5 Min PRN Etta Nguyen MD        ipratropium-albuterol (DUONEB) nebulizer solution 3 mL  3 mL Nebulization Q4H PRN Etta Nguyen MD   3 mL at 12/27/20 1037    aspirin chewable tablet 81 mg  81 mg Oral Daily Etta Nguyen MD   81 mg at 12/28/20 1052    baclofen (LIORESAL) tablet 10 mg  10 mg Oral TID Etta Nguyen MD   10 mg at 12/28/20 1428    clonazePAM (KLONOPIN) tablet 1 mg  1 mg Oral TID PRN Etta Nguyen MD   1 mg at 12/28/20 1243    dicyclomine (BENTYL) capsule 10 mg  10 mg Oral 4x Daily Etta Nguyen MD   10 mg at 12/28/20 1243    dilTIAZem (CARDIZEM) tablet 30 mg  30 mg Oral 4x Daily Etta Nguyen MD   30 mg at 12/28/20 1243    DULoxetine (CYMBALTA) extended release capsule 60 mg  60 mg Oral Daily Etta Nguyen MD   60 mg at 12/28/20 1053    ferrous sulfate (IRON 325) tablet 325 mg  325 mg Oral BID Etta Nguyen MD   325 mg at 12/28/20 1054    fluticasone (FLONASE) 50 MCG/ACT nasal spray 2 spray  2 spray Nasal Daily Etta Nguyen MD   2 spray at 71/84/69 7295    folic acid (FOLVITE) tablet 1 mg  1 mg Oral Daily Etta Nguyen MD   1 mg at 12/28/20 1053    guaiFENesin [facility]) extended release tablet 600 mg  600 mg Oral BID Etta Nguyen MD   600 mg at 12/28/20 1053    levothyroxine (SYNTHROID) tablet 75 mcg  75 mcg Oral Daily Etta Nguyen MD   75 mcg at 12/28/20 0545    montelukast (SINGULAIR) tablet 10 mg  10 mg Oral Daily Etta Nguyen MD   10 mg at 12/28/20 cough or wheezing    · Gastrointestinal: No abdominal pain, appetite loss, blood in stools, constipation, diarrhea or heartburn  · Genitourinary: No dysuria, trouble voiding, or hematuria  · Musculoskeletal:  No gait disturbance, weakness or joint complaints  · Integumentary: No rash or pruritis  · Neurological: No TIA or stroke symptoms  · Psychiatric: No anxiety or depression  · Endocrine: No malaise, fatigue or temperature intolerance  · Hematologic/Lymphatic: No bleeding problems, blood clots or swollen lymph nodes  · Allergic/Immunologic: No nasal congestion or hives    All other systems were reviewed and were negative otherwise. Physical Examination:      Vitals:    12/28/20 1245   BP:    Pulse:    Resp: 19   Temp:    SpO2: 99%      Wt Readings from Last 3 Encounters:   12/28/20 163 lb 11.2 oz (74.3 kg)   12/10/20 170 lb (77.1 kg)   12/02/20 171 lb 12.8 oz (77.9 kg)     Body mass index is 30.93 kg/m². General Appearance:  No distress, conversant  Constitutional:  Well developed, Well nourished  HEENT:  Normocephalic, Atraumatic, Oropharynx moist, No oral exudates,   Nose normal. Neck Supple Carotid: no carotid bruit  Eyes:  Conjunctiva normal, No discharge. Respiratory:    Normal breath sounds, No respiratory distress, No wheezing, no use of accessory muscles, diaphragm movement is normal  No chest Tenderness  Cardiovascular: S1-S2 No murmurs auscultated. No rubs, thrills or gallops. Normal  rhythm. Pedal pulses are normal. No pedal edema  GI:  Soft Non tender, non distended. :  No CVA tenderness. Musculoskeletal:   No tenderness, No cyanosis, No clubbing. Integument:  Warm, Dry, No erythema, No rash. Lymphatic:  No lymphadenopathy noted. Neurologic:  Alert & oriented x 3  No focal deficits noted.    Psychiatric:  Affect normal, Judgment normal, Mood normal.       Lab Review     Recent Labs     12/28/20  0247   WBC 3.3*   HGB 9.5*   HCT 33.0*         Recent Labs 12/28/20  0247      K 3.3*   CL 95*   CO2 34*   PHOS 2.8   BUN 6   CREATININE 0.7     Recent Labs     12/28/20  0247   AST 21   ALT 13   BILITOT 0.2   ALKPHOS 116     No results for input(s): TROPONINI in the last 72 hours. No results found for: BNP  Lab Results   Component Value Date    INR 1.09 10/26/2020    PROTIME 13.2 10/26/2020         All labs, images, EKGs were personally reviewed      Assessment: 64 y. o.year old with PMH of  has a past medical history of Anxiety, Arthritis, Back pain at L4-L5 level, Bipolar 1 disorder ([facility] [address] COPD (chronic obstructive pulmonary disease) ([facility] [address] Emphysema of lung ([facility] [address] FH: CAD (coronary artery disease), Fibromyalgia, H/O Doppler ultrasound, H/O echocardiogram, History of blood transfusion, Hyperlipidemia LDL goal <100, Hypertension, Obstructive sleep apnea, Osteoarthritis, and Thyroid disease. Recommendations:      1. Atypical chest pain: Recent left heart cardiac catheterization normal angiogram.  Evaluate for noncardiac causes of chest pain. 2. History of COPD/anxiety: Likely triggering above chest pains. Patient also has history of gastritis and is anticipating EGD next month. Could likely explain atypical chest pains. 3. Trace lower extremity edema: An echocardiogram was performed today which revealed preserved LV ejection fraction without any pericardial effusions. 4. History of pericardial fat and pericardial effusion s/p thoracocentesis in October 2020. No recurrence since then. 5. Essential hypertension: Patient on short acting calcium channel blocker. Can be switched over to long-acting diltiazem 180 mg daily. 6. Hyperlipidemia: Continue with statin therapy. Please call us with any further questions.   Thank you for the consult    Dr. Parveen Durand  12/28/2020 3:11 PM

## 2020-12-28 NOTE — PROGRESS NOTES
Pulmonary and Critical Care  Progress Note    Subjective: The patient has improved  Shortness of breath has improved  Chest pain none  Addressing respiratory complaints Patient is negative for  hemoptysis and cyanosis  CONSTITUTIONAL:  negative for fevers and chills      Past Medical History:     has a past medical history of Anxiety, Arthritis, Back pain at L4-L5 level, Bipolar 1 disorder (HCC), COPD (chronic obstructive pulmonary disease) ([facility] [address] Emphysema of lung ([facility] [address] FH: CAD (coronary artery disease), Fibromyalgia, H/O Doppler ultrasound, H/O echocardiogram, History of blood transfusion, Hyperlipidemia LDL goal <100, Hypertension, Obstructive sleep apnea, Osteoarthritis, and Thyroid disease. has a past surgical history that includes Carpal tunnel release; Tubal ligation; back surgery; Cardiac catheterization; Colonoscopy (N/A, 12/12/2019); and Cholecystectomy, laparoscopic (N/A, 10/25/2020). reports that she quit smoking about 13 years ago. She has a 30.00 pack-year smoking history. She has never used smokeless tobacco. She reports previous alcohol use of about 2.0 standard drinks of alcohol per week. She reports that she does not use drugs. Family history:  family history includes No Known Problems in her father and mother. Allergies   Allergen Reactions    Lisinopril Swelling and Rash     angioedema     Social History:    Reviewed; no changes    Objective:   PHYSICAL EXAM:        VITALS:  /70   Pulse 93   Temp 98.4 °F (36.9 °C) (Oral)   Resp 27   Ht 5' 1\" (1.549 m)   Wt 163 lb 11.2 oz (74.3 kg)   SpO2 98%   BMI 30.93 kg/m²     24HR INTAKE/OUTPUT:      Intake/Output Summary (Last 24 hours) at 12/28/2020 6388  Last data filed at 12/28/2020 0545  Gross per 24 hour   Intake 510 ml   Output --   Net 510 ml       CONSTITUTIONAL:  awake, alert, cooperative, no apparent distress, and appears stated age  LUNGS:  Moving air better.   CARDIOVASCULAR:  normal S1 and S2 and negative JVD  ABD:Abdomen soft, non-tender. BS normal. No masses,  No organomegaly  NEURO:Alert and oriented x3. Gait normal. Reflexes and motor strength normal and symmetric. Cranial nerves 2-12 and sensation grossly intact. DATA:    CBC:  Recent Labs     12/27/20  0154 12/28/20  0247   WBC 9.9 3.3*   RBC 3.63* 3.66*   HGB 9.4* 9.5*   HCT 33.0* 33.0*    296   MCV 90.9 90.2   MCH 25.9* 26.0*   MCHC 28.5* 28.8*   RDW 16.4* 16.1*   SEGSPCT 63.2  --       BMP:  Recent Labs     12/27/20  0154 12/27/20  1335 12/28/20  0247     --  137   K 3.1* 2.9* 3.3*   CL 93*  --  95*   CO2 33*  --  34*   BUN 6  --  6   CREATININE 0.9  --  0.7   CALCIUM 8.9  --  8.9   GLUCOSE 108*  --  183*      ABG:  Recent Labs     12/27/20  1500   PH 7.42   PO2ART 99   BXO9OFW 62.0*   O2SAT 96.1     Lab Results   Component Value Date    PROBNP 1,413 (H) 12/28/2020    PROBNP 729.1 (H) 12/27/2020    PROBNP 1,034 (H) 11/22/2020    THEOPH 12.2 12/28/2020     No results found for: [facility]    Radiology Review:  Pertinent images / reports were reviewed as a part of this visit.     Assessment:     Patient Active Problem List   Diagnosis    Centrilobular emphysema ([address]    Hypertension    Hyperlipidemia LDL goal <100    Abnormal EKG    Palpitations    Anxiety    FH: CAD (coronary artery disease)    Fibromyalgia    Back pain at L4-L5 level    Sleep apnea    COPD exacerbation (HCC)    Electrolyte imbalance    Epigastric pain    COPD (chronic obstructive pulmonary disease) ([address]    Spinal stenosis of lumbar region with radiculopathy    Spinal stenosis, lumbar region, without neurogenic claudication    COPD with acute exacerbation ([address]    Pneumonia due to infectious organism    SVT (supraventricular tachycardia) (HCC)    Class 1 obesity due to excess calories with body mass index (BMI) of 31.0 to 31.9 in adult    Pneumonia    Pleural effusion    Atelectasis    Pulmonary nodules    Respiratory failure with hypoxia and hypercapnia (HCC)    Anemia

## 2020-12-28 NOTE — PLAN OF CARE
Problem: Falls - Risk of:  Goal: Will remain free from falls  Description: Will remain free from falls  12/28/2020 0149 by Kathryn Brody RN  Outcome: Ongoing  12/27/2020 1627 by Gustavo Tony RN  Outcome: Ongoing  Goal: Absence of physical injury  Description: Absence of physical injury  12/28/2020 0149 by Kathryn Brody RN  Outcome: Ongoing  12/27/2020 1627 by Gustavo Tony RN  Outcome: Ongoing

## 2020-12-29 LAB
ANION GAP SERPL CALCULATED.3IONS-SCNC: 7 MMOL/L (ref 4–16)
BUN BLDV-MCNC: 14 MG/DL (ref 6–23)
CALCIUM SERPL-MCNC: 9.1 MG/DL (ref 8.3–10.6)
CHLORIDE BLD-SCNC: 92 MMOL/L (ref 99–110)
CO2: 35 MMOL/L (ref 21–32)
CREAT SERPL-MCNC: 0.9 MG/DL (ref 0.6–1.1)
GFR AFRICAN AMERICAN: >60 ML/MIN/1.73M2
GFR NON-AFRICAN AMERICAN: >60 ML/MIN/1.73M2
GLUCOSE BLD-MCNC: 201 MG/DL (ref 70–99)
POTASSIUM SERPL-SCNC: 3.7 MMOL/L (ref 3.5–5.1)
SODIUM BLD-SCNC: 134 MMOL/L (ref 135–145)

## 2020-12-29 PROCEDURE — 96366 THER/PROPH/DIAG IV INF ADDON: CPT

## 2020-12-29 PROCEDURE — 94761 N-INVAS EAR/PLS OXIMETRY MLT: CPT

## 2020-12-29 PROCEDURE — 6370000000 HC RX 637 (ALT 250 FOR IP): Performed by: INTERNAL MEDICINE

## 2020-12-29 PROCEDURE — 96376 TX/PRO/DX INJ SAME DRUG ADON: CPT

## 2020-12-29 PROCEDURE — 2580000003 HC RX 258: Performed by: INTERNAL MEDICINE

## 2020-12-29 PROCEDURE — G0378 HOSPITAL OBSERVATION PER HR: HCPCS

## 2020-12-29 PROCEDURE — 36415 COLL VENOUS BLD VENIPUNCTURE: CPT

## 2020-12-29 PROCEDURE — 94640 AIRWAY INHALATION TREATMENT: CPT

## 2020-12-29 PROCEDURE — 80048 BASIC METABOLIC PNL TOTAL CA: CPT

## 2020-12-29 PROCEDURE — 6360000002 HC RX W HCPCS: Performed by: INTERNAL MEDICINE

## 2020-12-29 PROCEDURE — 2700000000 HC OXYGEN THERAPY PER DAY

## 2020-12-29 PROCEDURE — 96372 THER/PROPH/DIAG INJ SC/IM: CPT

## 2020-12-29 RX ORDER — GUAIFENESIN/DEXTROMETHORPHAN 100-10MG/5
5 SYRUP ORAL EVERY 4 HOURS PRN
Status: DISCONTINUED | OUTPATIENT
Start: 2020-12-29 | End: 2020-12-31 | Stop reason: HOSPADM

## 2020-12-29 RX ORDER — ALPRAZOLAM 0.5 MG/1
0.5 TABLET ORAL 2 TIMES DAILY PRN
Status: DISCONTINUED | OUTPATIENT
Start: 2020-12-29 | End: 2020-12-29

## 2020-12-29 RX ADMIN — METHYLPREDNISOLONE SODIUM SUCCINATE 40 MG: 40 INJECTION, POWDER, LYOPHILIZED, FOR SOLUTION INTRAMUSCULAR; INTRAVENOUS at 05:47

## 2020-12-29 RX ADMIN — BACLOFEN 10 MG: 10 TABLET ORAL at 09:33

## 2020-12-29 RX ADMIN — FOLIC ACID 1 MG: 1 TABLET ORAL at 09:33

## 2020-12-29 RX ADMIN — GUAIFENESIN 600 MG: 600 TABLET, EXTENDED RELEASE ORAL at 21:56

## 2020-12-29 RX ADMIN — TIOTROPIUM BROMIDE INHALATION SPRAY 2 PUFF: 3.12 SPRAY, METERED RESPIRATORY (INHALATION) at 07:25

## 2020-12-29 RX ADMIN — CLONAZEPAM 1 MG: 1 TABLET ORAL at 22:07

## 2020-12-29 RX ADMIN — FERROUS SULFATE TAB 325 MG (65 MG ELEMENTAL FE) 325 MG: 325 (65 FE) TAB at 21:57

## 2020-12-29 RX ADMIN — GUAIFENESIN 600 MG: 600 TABLET, EXTENDED RELEASE ORAL at 09:33

## 2020-12-29 RX ADMIN — METHYLPREDNISOLONE SODIUM SUCCINATE 40 MG: 40 INJECTION, POWDER, LYOPHILIZED, FOR SOLUTION INTRAMUSCULAR; INTRAVENOUS at 21:56

## 2020-12-29 RX ADMIN — LEVOTHYROXINE SODIUM 75 MCG: 75 TABLET ORAL at 05:47

## 2020-12-29 RX ADMIN — Medication 100 MG: at 09:33

## 2020-12-29 RX ADMIN — CEFTRIAXONE 1 G: 1 INJECTION, POWDER, FOR SOLUTION INTRAMUSCULAR; INTRAVENOUS at 17:35

## 2020-12-29 RX ADMIN — BUSPIRONE HYDROCHLORIDE 10 MG: 10 TABLET ORAL at 09:33

## 2020-12-29 RX ADMIN — SODIUM CHLORIDE, PRESERVATIVE FREE 10 ML: 5 INJECTION INTRAVENOUS at 09:36

## 2020-12-29 RX ADMIN — SUCRALFATE 1 G: 1 TABLET ORAL at 09:33

## 2020-12-29 RX ADMIN — BACLOFEN 10 MG: 10 TABLET ORAL at 14:39

## 2020-12-29 RX ADMIN — ATORVASTATIN CALCIUM 40 MG: 40 TABLET, FILM COATED ORAL at 21:56

## 2020-12-29 RX ADMIN — ASPIRIN 81 MG CHEWABLE TABLET 81 MG: 81 TABLET CHEWABLE at 09:33

## 2020-12-29 RX ADMIN — DICYCLOMINE HYDROCHLORIDE 10 MG: 10 CAPSULE ORAL at 17:35

## 2020-12-29 RX ADMIN — ENOXAPARIN SODIUM 40 MG: 100 INJECTION SUBCUTANEOUS at 09:34

## 2020-12-29 RX ADMIN — FLUTICASONE PROPIONATE 2 SPRAY: 50 SPRAY, METERED NASAL at 09:34

## 2020-12-29 RX ADMIN — AZITHROMYCIN DIHYDRATE 500 MG: 500 INJECTION, POWDER, LYOPHILIZED, FOR SOLUTION INTRAVENOUS at 13:01

## 2020-12-29 RX ADMIN — PANTOPRAZOLE SODIUM 40 MG: 40 TABLET, DELAYED RELEASE ORAL at 09:34

## 2020-12-29 RX ADMIN — OXYCODONE HYDROCHLORIDE AND ACETAMINOPHEN 1 TABLET: 5; 325 TABLET ORAL at 12:07

## 2020-12-29 RX ADMIN — THEOPHYLLINE ANHYDROUS 400 MG: 200 CAPSULE, EXTENDED RELEASE ORAL at 09:33

## 2020-12-29 RX ADMIN — DILTIAZEM HYDROCHLORIDE 30 MG: 30 TABLET, FILM COATED ORAL at 21:57

## 2020-12-29 RX ADMIN — DICYCLOMINE HYDROCHLORIDE 10 MG: 10 CAPSULE ORAL at 21:57

## 2020-12-29 RX ADMIN — GUAIFENESIN AND DEXTROMETHORPHAN 5 ML: 100; 10 SYRUP ORAL at 12:07

## 2020-12-29 RX ADMIN — OXYCODONE HYDROCHLORIDE AND ACETAMINOPHEN 1 TABLET: 5; 325 TABLET ORAL at 22:07

## 2020-12-29 RX ADMIN — FERROUS SULFATE TAB 325 MG (65 MG ELEMENTAL FE) 325 MG: 325 (65 FE) TAB at 09:34

## 2020-12-29 RX ADMIN — SODIUM CHLORIDE, PRESERVATIVE FREE 10 ML: 5 INJECTION INTRAVENOUS at 21:57

## 2020-12-29 RX ADMIN — DILTIAZEM HYDROCHLORIDE 30 MG: 30 TABLET, FILM COATED ORAL at 14:40

## 2020-12-29 RX ADMIN — METHYLPREDNISOLONE SODIUM SUCCINATE 40 MG: 40 INJECTION, POWDER, LYOPHILIZED, FOR SOLUTION INTRAMUSCULAR; INTRAVENOUS at 14:40

## 2020-12-29 RX ADMIN — CLONAZEPAM 1 MG: 1 TABLET ORAL at 12:07

## 2020-12-29 RX ADMIN — BUSPIRONE HYDROCHLORIDE 10 MG: 10 TABLET ORAL at 14:40

## 2020-12-29 RX ADMIN — BUSPIRONE HYDROCHLORIDE 10 MG: 10 TABLET ORAL at 21:56

## 2020-12-29 RX ADMIN — DULOXETINE HYDROCHLORIDE 60 MG: 30 CAPSULE, DELAYED RELEASE ORAL at 09:34

## 2020-12-29 RX ADMIN — BACLOFEN 10 MG: 10 TABLET ORAL at 21:57

## 2020-12-29 RX ADMIN — SUCRALFATE 1 G: 1 TABLET ORAL at 22:07

## 2020-12-29 RX ADMIN — TRAZODONE HYDROCHLORIDE 100 MG: 50 TABLET ORAL at 21:57

## 2020-12-29 RX ADMIN — DILTIAZEM HYDROCHLORIDE 30 MG: 30 TABLET, FILM COATED ORAL at 09:33

## 2020-12-29 RX ADMIN — BUDESONIDE AND FORMOTEROL FUMARATE DIHYDRATE 2 PUFF: 160; 4.5 AEROSOL RESPIRATORY (INHALATION) at 07:25

## 2020-12-29 RX ADMIN — DILTIAZEM HYDROCHLORIDE 30 MG: 30 TABLET, FILM COATED ORAL at 17:35

## 2020-12-29 RX ADMIN — PANTOPRAZOLE SODIUM 40 MG: 40 TABLET, DELAYED RELEASE ORAL at 21:57

## 2020-12-29 RX ADMIN — BUDESONIDE AND FORMOTEROL FUMARATE DIHYDRATE 2 PUFF: 160; 4.5 AEROSOL RESPIRATORY (INHALATION) at 20:48

## 2020-12-29 RX ADMIN — GUAIFENESIN AND DEXTROMETHORPHAN 5 ML: 100; 10 SYRUP ORAL at 22:09

## 2020-12-29 RX ADMIN — DICYCLOMINE HYDROCHLORIDE 10 MG: 10 CAPSULE ORAL at 14:40

## 2020-12-29 RX ADMIN — MONTELUKAST 10 MG: 10 TABLET, FILM COATED ORAL at 09:33

## 2020-12-29 ASSESSMENT — PAIN SCALES - GENERAL
PAINLEVEL_OUTOF10: 6
PAINLEVEL_OUTOF10: 7

## 2020-12-29 ASSESSMENT — PAIN DESCRIPTION - ORIENTATION: ORIENTATION: RIGHT

## 2020-12-29 NOTE — PROGRESS NOTES
Hospitalist Progress Note      Name:  Sherrill Pisano /Age/Sex: 1960  (64 y.o. female)   MRN & CSN:  [id number] & [id number] Admission Date/Time: 2020  1:03 AM   Location:  [address] [place] [address]- PCP: Ally Marvin MD         Hospital Day: 3    Assessment and Plan:   Sherrill Pisano is a 64 y.o.  female  who presents with Chest pain, for evaluations for chest pain, normal cath recently    · Chest pain, likely noncardiac  -Serial troponin trending down  -Left heart cath 2020, normal  -Echo EF 55 to 60%, mild LVH, no pericardial effusion  -Cardio on board, likely noncardiac  -EGD as outpatient on 2020     · Acute on chronic respiratory failure due to COPD exacerbation  -Now at baseline O2  -Chest x-ray with no acute process  -COVID-19 negative  -On steroid, breathing treatment, antibiotics  -Pulmonology on board    · Urinary incontinence, dysuria, UTI ruled out  -UA negative    · Generalized anxiety disorder, home meds resumed  -Super anxious, Psychiatry to see    Chronic medical condition  -Chronic respiratory failure on oxygen via nasal cannula 2 L. -DALTON, BiPAP at night   -Generalized anxiety disorder, home meds resumed  -Hypertension, continue home management  -Hyperlipidemia: continue statin  -Hypothyroidism: TSH low.  Decrease Synthroid to 75.  -Morbid obesity, encourage weight loss     Disposition, anticipate discharge in a day or 2  Diet DIET CARDIAC; Low Sodium (2 GM)  Dietary Nutrition Supplements: Low Calorie High Protein Supplement   DVT Prophylaxis [] Lovenox, []  Heparin, [] SCDs, []No VTE prophylaxis, patient ambulating   GI Prophylaxis [] PPI, [] H2 Blocker, [] No GI prophylaxis, patient is receiving diet/Tube Feeds   Code Status Full Code   Disposition Patient requires continued admission due to    MDM [] Low, [] Moderate,[]  High  Patient's risk as above due to      History of Present Illness:     Pt S&E.   Shortness of breath is improved, patient is very anxious, asking about some anxiety meds, notes pain in legs    10-14 point ROS reviewed negative, unless as noted above    Objective:   No intake or output data in the 24 hours ending 12/29/20 1101   Vitals:   Vitals:    12/29/20 0900   BP: (!) 148/64   Pulse: 102   Resp: 25   Temp: 98.1 °F (36.7 °C)   SpO2: 99%     Physical Exam:    GEN Awake female, sitting upright in bed in no apparent distress. Appears given age. EYES Pupils are equally round. No scleral erythema, discharge, or conjunctivitis. HENT Mucous membranes are moist.   NECK No apparent thyromegaly or masses. RESP Clear to auscultation, no wheezes, rales or rhonchi. Symmetric chest movement while on room air. CARDIO/VASC S1/S2 auscultated. Regular rate without appreciable murmurs, rubs, or gallops. Peripheral pulses equal bilaterally and palpable. No peripheral edema. GI Abdomen is soft without significant tenderness, masses, or guarding. Bowel sounds are normoactive. Rectal exam deferred.  Mcallister catheter is not present. HEME/LYMPH No petechiae or ecchymoses. MSK No gross joint deformities. Spontaneous movement of all extremities  SKIN Normal coloration, warm, dry. NEURO Cranial nerves appear grossly intact, normal speech, no lateralizing weakness. PSYCH Awake, alert, oriented x 4. Affect appropriate.     Medications:   Medications:    methylPREDNISolone  40 mg Intravenous Q8H    sodium chloride flush  10 mL Intravenous 2 times per day    atorvastatin  40 mg Oral Nightly    enoxaparin  40 mg Subcutaneous Daily    aspirin  81 mg Oral Daily    baclofen  10 mg Oral TID    dicyclomine  10 mg Oral 4x Daily    dilTIAZem  30 mg Oral 4x Daily    DULoxetine  60 mg Oral Daily    ferrous sulfate  325 mg Oral BID    fluticasone  2 spray Nasal Daily    folic acid  1 mg Oral Daily    guaiFENesin  600 mg Oral BID    levothyroxine  75 mcg Oral Daily    montelukast  10 mg Oral Daily    pantoprazole  40 mg Oral BID    sucralfate  1 g Oral 2 times per day    theophylline  400 mg Oral Daily    traZODone  100 mg Oral Nightly    thiamine  100 mg Oral Daily    azithromycin  500 mg Intravenous Q24H    busPIRone  10 mg Oral TID    budesonide-formoterol  2 puff Inhalation BID    cefTRIAXone (ROCEPHIN) IV  1 g Intravenous Q24H    tiotropium  2 puff Inhalation Daily      Infusions:   PRN Meds:     ALPRAZolam, 0.5 mg, BID PRN      sodium chloride flush, 10 mL, PRN      acetaminophen, 650 mg, Q6H PRN    Or      acetaminophen, 650 mg, Q6H PRN      polyethylene glycol, 17 g, Daily PRN      potassium chloride, 40 mEq, PRN    Or      potassium alternative oral replacement, 40 mEq, PRN    Or      potassium chloride, 10 mEq, PRN      magnesium sulfate, 2 g, PRN      nitroGLYCERIN, 0.4 mg, Q5 Min PRN      ipratropium-albuterol, 3 mL, Q4H PRN      clonazePAM, 1 mg, TID PRN      promethazine, 12.5 mg, Q6H PRN    Or      promethazine, 12.5 mg, Q6H PRN      oxyCODONE-acetaminophen, 1 tablet, Q6H PRN        Data    Recent Labs     12/27/20  0154 12/28/20  0247   WBC 9.9 3.3*   HGB 9.4* 9.5*   HCT 33.0* 33.0*    296      Recent Labs     12/27/20  0154 12/27/20  1335 12/28/20  0247 12/29/20  0219     --  137 134*   K 3.1* 2.9* 3.3* 3.7   CL 93*  --  95* 92*   CO2 33*  --  34* 35*   PHOS  --   --  2.8  --    BUN 6  --  6 14   CREATININE 0.9  --  0.7 0.9     Recent Labs     12/27/20  0154 12/28/20  0247   AST 20 21   ALT 11 13   BILITOT 0.2 0.2   ALKPHOS 116 116     No results for input(s): INR in the last 72 hours. No results for input(s): CKTOTAL, CKMB, CKMBINDEX, TROPONINI in the last 72 hours.         Electronically signed by Leeanne Nieto MD on 12/29/2020 at 11:01 AM

## 2020-12-29 NOTE — PROGRESS NOTES
12/29/20 0130   NIV Type   NIV Started/Stopped On   Equipment Type V60   Mode Bilevel   Mask Type Full face mask   Mask Size Small   Bonnet size Small   Settings/Measurements   IPAP 15 cmH20   CPAP/EPAP 5 cmH2O   Rate Ordered 12   Resp 27   FiO2  30 %   I Time/ I Time % 1.2 s   Vt Exhaled 460 mL   Minute Volume 13.9 Liters   Mask Leak (lpm) 0 lpm   Comfort Level Good   Using Accessory Muscles No   SpO2 98   Patient Observation   Observations Pt tolerating well   Alarm Settings   Alarms On Y   Press Low Alarm 5 cmH2O   High Pressure Alarm 30 cmH2O   Delay Alarm 20 sec(s)   Apnea (secs) 20 secs   Resp Rate Low Alarm 12   High Respiratory Rate 50 br/min

## 2020-12-29 NOTE — PROGRESS NOTES
Pulmonary and Critical Care  Progress Note    Subjective: The patient is better. Shortness of breath has improved  Chest pain none  Addressing respiratory complaints Patient is negative for  hemoptysis and cyanosis  CONSTITUTIONAL:  negative for fevers and chills      Past Medical History:     has a past medical history of Anxiety, Arthritis, Back pain at L4-L5 level, Bipolar 1 disorder (HCC), COPD (chronic obstructive pulmonary disease) ([address] Emphysema of lung ([address] FH: CAD (coronary artery disease), Fibromyalgia, H/O Doppler ultrasound, H/O echocardiogram, History of blood transfusion, Hyperlipidemia LDL goal <100, Hypertension, Obstructive sleep apnea, Osteoarthritis, and Thyroid disease. has a past surgical history that includes Carpal tunnel release; Tubal ligation; back surgery; Cardiac catheterization; Colonoscopy (N/A, 12/12/2019); and Cholecystectomy, laparoscopic (N/A, 10/25/2020). reports that she quit smoking about 13 years ago. She has a 30.00 pack-year smoking history. She has never used smokeless tobacco. She reports previous alcohol use of about 2.0 standard drinks of alcohol per week. She reports that she does not use drugs. Family history:  family history includes No Known Problems in her father and mother. Allergies   Allergen Reactions    Lisinopril Swelling and Rash     angioedema     Social History:    Reviewed; no changes    Objective:   PHYSICAL EXAM:        VITALS:  BP (!) 148/64   Pulse 102   Temp 98.1 °F (36.7 °C) (Oral)   Resp 25   Ht 5' 1\" (1.549 m)   Wt 163 lb 11.2 oz (74.3 kg)   SpO2 99%   BMI 30.93 kg/m²     24HR INTAKE/OUTPUT:    No intake or output data in the 24 hours ending 12/29/20 1113    CONSTITUTIONAL:  awake, alert, cooperative, no apparent distress, and appears stated age  LUNGS:  Moving air better. CARDIOVASCULAR:  normal S1 and S2 and negative JVD  ABD:Abdomen soft, non-tender. BS normal. No masses,  No organomegaly  NEURO:Alert and oriented x3.  Gait normal. Reflexes and motor strength normal and symmetric. Cranial nerves 2-12 and sensation grossly intact. DATA:    CBC:  Recent Labs     12/27/20  0154 12/28/20  0247   WBC 9.9 3.3*   RBC 3.63* 3.66*   HGB 9.4* 9.5*   HCT 33.0* 33.0*    296   MCV 90.9 90.2   MCH 25.9* 26.0*   MCHC 28.5* 28.8*   RDW 16.4* 16.1*   SEGSPCT 63.2  --       BMP:  Recent Labs     12/27/20  0154 12/27/20  1335 12/28/20  0247 12/29/20  0219     --  137 134*   K 3.1* 2.9* 3.3* 3.7   CL 93*  --  95* 92*   CO2 33*  --  34* 35*   BUN 6  --  6 14   CREATININE 0.9  --  0.7 0.9   CALCIUM 8.9  --  8.9 9.1   GLUCOSE 108*  --  183* 201*      ABG:  Recent Labs     12/27/20  1500   PH 7.42   PO2ART 99   LNG7MPI 62.0*   O2SAT 96.1     Lab Results   Component Value Date    PROBNP 1,413 (H) 12/28/2020    PROBNP 729.1 (H) 12/27/2020    PROBNP 1,034 (H) 11/22/2020    THEOPH 12.2 12/28/2020     No results found for: [facility]    Radiology Review:  Pertinent images / reports were reviewed as a part of this visit.     Assessment:     Patient Active Problem List   Diagnosis    Centrilobular emphysema ([address]    Hypertension    Hyperlipidemia LDL goal <100    Abnormal EKG    Palpitations    Anxiety    FH: CAD (coronary artery disease)    Fibromyalgia    Back pain at L4-L5 level    Sleep apnea    [facility] exacerbation ([facility])    Electrolyte imbalance    Epigastric pain    COPD (chronic obstructive pulmonary disease) ([address]    Spinal stenosis of lumbar region with radiculopathy    Spinal stenosis, lumbar region, without neurogenic claudication    COPD with acute exacerbation ([address]    Pneumonia due to infectious organism    SVT (supraventricular tachycardia) ([facility])    Class 1 obesity due to excess calories with body mass index (BMI) of 31.0 to 31.9 in adult    Pneumonia    Pleural effusion    Atelectasis    Pulmonary nodules    Respiratory failure with hypoxia and hypercapnia ([facility])    Anemia    [facility]    Acquired hemolytic anemia, unspecified (HCC)    Leucocytosis    Chronic kidney disease, stage I    Hyponatremia    PNA (pneumonia)    Sepsis (HCC)    Pericardial effusion    Acute acalculous cholecystitis    SIRS (systemic inflammatory response syndrome) (HCC)    Chest pain    Abnormal nuclear stress test    Abnormal stress test    Status post laparoscopic cholecystectomy    Moderate malnutrition ([address]       Plan:   1. Overall the patient has improved. 2. Inc. activity.       Katherin Hong MD  12/29/2020  11:13 AM

## 2020-12-30 PROCEDURE — 6370000000 HC RX 637 (ALT 250 FOR IP): Performed by: INTERNAL MEDICINE

## 2020-12-30 PROCEDURE — 2580000003 HC RX 258: Performed by: INTERNAL MEDICINE

## 2020-12-30 PROCEDURE — G0378 HOSPITAL OBSERVATION PER HR: HCPCS

## 2020-12-30 PROCEDURE — 96376 TX/PRO/DX INJ SAME DRUG ADON: CPT

## 2020-12-30 PROCEDURE — 2700000000 HC OXYGEN THERAPY PER DAY

## 2020-12-30 PROCEDURE — 6360000002 HC RX W HCPCS: Performed by: INTERNAL MEDICINE

## 2020-12-30 PROCEDURE — 96366 THER/PROPH/DIAG IV INF ADDON: CPT

## 2020-12-30 PROCEDURE — 94640 AIRWAY INHALATION TREATMENT: CPT

## 2020-12-30 PROCEDURE — 96372 THER/PROPH/DIAG INJ SC/IM: CPT

## 2020-12-30 PROCEDURE — 94761 N-INVAS EAR/PLS OXIMETRY MLT: CPT

## 2020-12-30 RX ORDER — FUROSEMIDE 20 MG/1
20 TABLET ORAL DAILY
Status: DISCONTINUED | OUTPATIENT
Start: 2020-12-30 | End: 2020-12-31 | Stop reason: HOSPADM

## 2020-12-30 RX ADMIN — FERROUS SULFATE TAB 325 MG (65 MG ELEMENTAL FE) 325 MG: 325 (65 FE) TAB at 10:35

## 2020-12-30 RX ADMIN — DICYCLOMINE HYDROCHLORIDE 10 MG: 10 CAPSULE ORAL at 13:52

## 2020-12-30 RX ADMIN — ASPIRIN 81 MG CHEWABLE TABLET 81 MG: 81 TABLET CHEWABLE at 10:33

## 2020-12-30 RX ADMIN — CLONAZEPAM 1 MG: 1 TABLET ORAL at 16:50

## 2020-12-30 RX ADMIN — OXYCODONE HYDROCHLORIDE AND ACETAMINOPHEN 1 TABLET: 5; 325 TABLET ORAL at 16:50

## 2020-12-30 RX ADMIN — PANTOPRAZOLE SODIUM 40 MG: 40 TABLET, DELAYED RELEASE ORAL at 20:12

## 2020-12-30 RX ADMIN — BUDESONIDE AND FORMOTEROL FUMARATE DIHYDRATE 2 PUFF: 160; 4.5 AEROSOL RESPIRATORY (INHALATION) at 20:16

## 2020-12-30 RX ADMIN — Medication 100 MG: at 10:44

## 2020-12-30 RX ADMIN — DILTIAZEM HYDROCHLORIDE 30 MG: 30 TABLET, FILM COATED ORAL at 10:34

## 2020-12-30 RX ADMIN — DILTIAZEM HYDROCHLORIDE 30 MG: 30 TABLET, FILM COATED ORAL at 13:52

## 2020-12-30 RX ADMIN — SODIUM CHLORIDE, PRESERVATIVE FREE 10 ML: 5 INJECTION INTRAVENOUS at 10:37

## 2020-12-30 RX ADMIN — DICYCLOMINE HYDROCHLORIDE 10 MG: 10 CAPSULE ORAL at 20:12

## 2020-12-30 RX ADMIN — BACLOFEN 10 MG: 10 TABLET ORAL at 20:12

## 2020-12-30 RX ADMIN — TRAZODONE HYDROCHLORIDE 100 MG: 50 TABLET ORAL at 20:12

## 2020-12-30 RX ADMIN — FOLIC ACID 1 MG: 1 TABLET ORAL at 10:36

## 2020-12-30 RX ADMIN — OXYCODONE HYDROCHLORIDE AND ACETAMINOPHEN 1 TABLET: 5; 325 TABLET ORAL at 10:39

## 2020-12-30 RX ADMIN — SUCRALFATE 1 G: 1 TABLET ORAL at 20:12

## 2020-12-30 RX ADMIN — GUAIFENESIN AND DEXTROMETHORPHAN 5 ML: 100; 10 SYRUP ORAL at 20:12

## 2020-12-30 RX ADMIN — BUDESONIDE AND FORMOTEROL FUMARATE DIHYDRATE 2 PUFF: 160; 4.5 AEROSOL RESPIRATORY (INHALATION) at 11:59

## 2020-12-30 RX ADMIN — METHYLPREDNISOLONE SODIUM SUCCINATE 40 MG: 40 INJECTION, POWDER, LYOPHILIZED, FOR SOLUTION INTRAMUSCULAR; INTRAVENOUS at 13:53

## 2020-12-30 RX ADMIN — DILTIAZEM HYDROCHLORIDE 30 MG: 30 TABLET, FILM COATED ORAL at 20:12

## 2020-12-30 RX ADMIN — BUSPIRONE HYDROCHLORIDE 10 MG: 10 TABLET ORAL at 20:12

## 2020-12-30 RX ADMIN — DICYCLOMINE HYDROCHLORIDE 10 MG: 10 CAPSULE ORAL at 10:36

## 2020-12-30 RX ADMIN — LEVOTHYROXINE SODIUM 75 MCG: 75 TABLET ORAL at 06:37

## 2020-12-30 RX ADMIN — ENOXAPARIN SODIUM 40 MG: 100 INJECTION SUBCUTANEOUS at 10:34

## 2020-12-30 RX ADMIN — FLUTICASONE PROPIONATE 2 SPRAY: 50 SPRAY, METERED NASAL at 10:44

## 2020-12-30 RX ADMIN — GUAIFENESIN 600 MG: 600 TABLET, EXTENDED RELEASE ORAL at 10:35

## 2020-12-30 RX ADMIN — METHYLPREDNISOLONE SODIUM SUCCINATE 40 MG: 40 INJECTION, POWDER, LYOPHILIZED, FOR SOLUTION INTRAMUSCULAR; INTRAVENOUS at 06:37

## 2020-12-30 RX ADMIN — DICYCLOMINE HYDROCHLORIDE 10 MG: 10 CAPSULE ORAL at 16:50

## 2020-12-30 RX ADMIN — FERROUS SULFATE TAB 325 MG (65 MG ELEMENTAL FE) 325 MG: 325 (65 FE) TAB at 20:12

## 2020-12-30 RX ADMIN — PANTOPRAZOLE SODIUM 40 MG: 40 TABLET, DELAYED RELEASE ORAL at 10:35

## 2020-12-30 RX ADMIN — BACLOFEN 10 MG: 10 TABLET ORAL at 13:52

## 2020-12-30 RX ADMIN — BACLOFEN 10 MG: 10 TABLET ORAL at 10:36

## 2020-12-30 RX ADMIN — METHYLPREDNISOLONE SODIUM SUCCINATE 40 MG: 40 INJECTION, POWDER, LYOPHILIZED, FOR SOLUTION INTRAMUSCULAR; INTRAVENOUS at 20:14

## 2020-12-30 RX ADMIN — GUAIFENESIN 600 MG: 600 TABLET, EXTENDED RELEASE ORAL at 20:12

## 2020-12-30 RX ADMIN — BUSPIRONE HYDROCHLORIDE 10 MG: 10 TABLET ORAL at 13:52

## 2020-12-30 RX ADMIN — SUCRALFATE 1 G: 1 TABLET ORAL at 10:33

## 2020-12-30 RX ADMIN — CEFTRIAXONE 1 G: 1 INJECTION, POWDER, FOR SOLUTION INTRAMUSCULAR; INTRAVENOUS at 16:51

## 2020-12-30 RX ADMIN — BUSPIRONE HYDROCHLORIDE 10 MG: 10 TABLET ORAL at 10:34

## 2020-12-30 RX ADMIN — THEOPHYLLINE ANHYDROUS 400 MG: 200 CAPSULE, EXTENDED RELEASE ORAL at 10:34

## 2020-12-30 RX ADMIN — ATORVASTATIN CALCIUM 40 MG: 40 TABLET, FILM COATED ORAL at 20:12

## 2020-12-30 RX ADMIN — CLONAZEPAM 1 MG: 1 TABLET ORAL at 10:34

## 2020-12-30 RX ADMIN — SODIUM CHLORIDE, PRESERVATIVE FREE 10 ML: 5 INJECTION INTRAVENOUS at 20:13

## 2020-12-30 RX ADMIN — DILTIAZEM HYDROCHLORIDE 30 MG: 30 TABLET, FILM COATED ORAL at 16:50

## 2020-12-30 RX ADMIN — FUROSEMIDE 20 MG: 20 TABLET ORAL at 10:35

## 2020-12-30 RX ADMIN — MONTELUKAST 10 MG: 10 TABLET, FILM COATED ORAL at 10:34

## 2020-12-30 RX ADMIN — TIOTROPIUM BROMIDE INHALATION SPRAY 2 PUFF: 3.12 SPRAY, METERED RESPIRATORY (INHALATION) at 12:00

## 2020-12-30 RX ADMIN — DULOXETINE HYDROCHLORIDE 60 MG: 30 CAPSULE, DELAYED RELEASE ORAL at 10:35

## 2020-12-30 ASSESSMENT — PAIN SCALES - GENERAL: PAINLEVEL_OUTOF10: 5

## 2020-12-30 NOTE — PROGRESS NOTES
Hospitalist Progress Note      Name:  Trell Ge /Age/Sex: 1960  (64 y.o. female)   MRN & CSN:  [id number] & [id number] Admission Date/Time: 2020  1:03 AM   Location:  [facility]-A PCP: Orvel Severs, MD         Hospital Day: 4    Assessment and Plan:   Trell Ge is a 64 y.o.  female  who presents with Chest pain, for evaluations for chest pain, normal cath recently     · Chest pain, likely noncardiac  -Serial troponin trending down  -Left heart cath 2020, normal  -Echo EF 55 to 60%, mild LVH, no pericardial effusion  -Cardio on board, likely noncardiac  -EGD as outpatient on 2020     · Acute on chronic respiratory failure due to COPD exacerbation  -Now at baseline O2  -Chest x-ray with no acute process  -COVID-19 negative  -On steroid, breathing treatment, antibiotics  -Pulmonology on board     · Urinary incontinence, dysuria, UTI ruled out  -UA negative     · Generalized anxiety disorder, home meds resumed  -Super anxious, Psychiatry to see     Chronic medical condition  -Chronic respiratory failure on oxygen via nasal cannula 2 L. -DALTON, BiPAP at night   -Generalized anxiety disorder, home meds resumed  -Hypertension, continue home management  -Hyperlipidemia: continue statin  -Hypothyroidism: TSH low.  Decrease Synthroid to 75.  -Morbid obesity, encourage weight loss     Disposition, anticipate discharge in a day or 2, [facility] order placed    Diet DIET CARDIAC; Low Sodium (2 GM)  Dietary Nutrition Supplements: Low Calorie High Protein Supplement   DVT Prophylaxis [] Lovenox, []  Heparin, [] SCDs, []No VTE prophylaxis, patient ambulating   GI Prophylaxis [] PPI, [] H2 Blocker, [] No GI prophylaxis, patient is receiving diet/Tube Feeds   Code Status Full Code   Disposition Patient requires continued admission due to    MDM [] Low, [] Moderate,[]  High  Patient's risk as above due to      History of Present Illness:     Pt S&E.    Patient notes still gets short of breath on moving around, worried about her legs, no chest pain  10-14 point ROS reviewed negative, unless as noted above    Objective: Intake/Output Summary (Last 24 hours) at 12/30/2020 1243  Last data filed at 12/30/2020 0512  Gross per 24 hour   Intake --   Output 1800 ml   Net -1800 ml      Vitals:   Vitals:    12/30/20 0427   BP: (!) 145/76   Pulse: 73   Resp: 15   Temp: 97.9 °F (36.6 °C)   SpO2: 96%     Physical Exam:    GEN Awake female, sitting upright in bed in no apparent distress. Appears given age. EYES Pupils are equally round. No scleral erythema, discharge, or conjunctivitis. HENT Mucous membranes are moist.   NECK No apparent thyromegaly or masses. RESP Clear to auscultation, no wheezes, rales or rhonchi. Symmetric chest movement while on room air. CARDIO/VASC S1/S2 auscultated. Regular rate without appreciable murmurs, rubs, or gallops. Peripheral pulses equal bilaterally and palpable. No peripheral edema. GI Abdomen is soft without significant tenderness, masses, or guarding. Bowel sounds are normoactive. Rectal exam deferred.  Mcallister catheter is not present. HEME/LYMPH No petechiae or ecchymoses. MSK No gross joint deformities. Spontaneous movement of all extremities no leg swelling noted  SKIN Normal coloration, warm, dry. NEURO Cranial nerves appear grossly intact, normal speech, no lateralizing weakness. PSYCH Awake, alert, oriented x 4. Affect appropriate.     Medications:   Medications:    furosemide  20 mg Oral Daily    methylPREDNISolone  40 mg Intravenous Q8H    sodium chloride flush  10 mL Intravenous 2 times per day    atorvastatin  40 mg Oral Nightly    enoxaparin  40 mg Subcutaneous Daily    aspirin  81 mg Oral Daily    baclofen  10 mg Oral TID    dicyclomine  10 mg Oral 4x Daily    dilTIAZem  30 mg Oral 4x Daily    DULoxetine  60 mg Oral Daily    ferrous sulfate  325 mg Oral BID    fluticasone  2 spray Nasal Daily    folic acid  1 mg Oral Daily    guaiFENesin  600 mg Oral BID    levothyroxine  75 mcg Oral Daily    montelukast  10 mg Oral Daily    pantoprazole  40 mg Oral BID    sucralfate  1 g Oral 2 times per day    theophylline  400 mg Oral Daily    traZODone  100 mg Oral Nightly    thiamine  100 mg Oral Daily    busPIRone  10 mg Oral TID    budesonide-formoterol  2 puff Inhalation BID    cefTRIAXone (ROCEPHIN) IV  1 g Intravenous Q24H    tiotropium  2 puff Inhalation Daily      Infusions:   PRN Meds:     guaiFENesin-dextromethorphan, 5 mL, Q4H PRN      sodium chloride flush, 10 mL, PRN      acetaminophen, 650 mg, Q6H PRN    Or      acetaminophen, 650 mg, Q6H PRN      polyethylene glycol, 17 g, Daily PRN      potassium chloride, 40 mEq, PRN    Or      potassium alternative oral replacement, 40 mEq, PRN    Or      potassium chloride, 10 mEq, PRN      magnesium sulfate, 2 g, PRN      nitroGLYCERIN, 0.4 mg, Q5 Min PRN      ipratropium-albuterol, 3 mL, Q4H PRN      clonazePAM, 1 mg, TID PRN      promethazine, 12.5 mg, Q6H PRN    Or      promethazine, 12.5 mg, Q6H PRN      oxyCODONE-acetaminophen, 1 tablet, Q6H PRN        Data    Recent Labs     12/28/20  0247   WBC 3.3*   HGB 9.5*   HCT 33.0*         Recent Labs     12/27/20  1335 12/28/20  0247 12/29/20  0219   NA  --  137 134*   K 2.9* 3.3* 3.7   CL  --  95* 92*   CO2  --  34* 35*   PHOS  --  2.8  --    BUN  --  6 14   CREATININE  --  0.7 0.9     Recent Labs     12/28/20  0247   AST 21   ALT 13   BILITOT 0.2   ALKPHOS 116     No results for input(s): INR in the last 72 hours. No results for input(s): CKTOTAL, CKMB, CKMBINDEX, TROPONINI in the last 72 hours.         Electronically signed by Kitty Francis MD on 12/30/2020 at 12:43 PM

## 2020-12-30 NOTE — BH NOTE
Patient states that she sees Dr. Tahira Fry as an outpatient. She is happy with her current medication regimen and declines interview with Psychiatry.

## 2020-12-30 NOTE — CARE COORDINATION
Pt lives with family. Pt has a PCP and can afford meds. Pt is independent of her ADLs. Pt is active with [facility]. Pt plans home with family and to continue to [facility]. [facility] will need a inpt HC order at discharge. If pt is discharged after hours please complete the following. Call [phone] and inform them of pt discharge. [phone] and fax [facility]. order, face sheet, H&P, discharge summary and AVS to [phone].

## 2020-12-30 NOTE — PROGRESS NOTES
Pulmonary and Critical Care  Progress Note    Subjective: The patient has improved. Shortness of breath has improved. Chest pain none. Addressing respiratory complaints Patient is negative for  hemoptysis and cyanosis. CONSTITUTIONAL:  negative for fevers and chills. Past Medical History:     has a past medical history of Anxiety, Arthritis, Back pain at L4-L5 level, Bipolar 1 disorder ([address] COPD (chronic obstructive pulmonary disease) ([facility] Emphysema of lung ([facility] FH: CAD (coronary artery disease), Fibromyalgia, H/O Doppler ultrasound, H/O echocardiogram, History of blood transfusion, Hyperlipidemia LDL goal <100, Hypertension, Obstructive sleep apnea, Osteoarthritis, and Thyroid disease. has a past surgical history that includes Carpal tunnel release; Tubal ligation; back surgery; Cardiac catheterization; Colonoscopy (N/A, 12/12/2019); and Cholecystectomy, laparoscopic (N/A, 10/25/2020). reports that she quit smoking about 13 years ago. She has a 30.00 pack-year smoking history. She has never used smokeless tobacco. She reports previous alcohol use of about 2.0 standard drinks of alcohol per week. She reports that she does not use drugs. Family history:  family history includes No Known Problems in her father and mother. Allergies   Allergen Reactions    Lisinopril Swelling and Rash     angioedema     Social History:    Reviewed; no changes    Objective:   PHYSICAL EXAM:        VITALS:  /60   Pulse 77   Temp 97.7 °F (36.5 °C) (Oral)   Resp 19   Ht 5' 1\" (1.549 m)   Wt 163 lb 11.2 oz (74.3 kg)   SpO2 98%   BMI 30.93 kg/m²     24HR INTAKE/OUTPUT:      Intake/Output Summary (Last 24 hours) at 12/30/2020 1721  Last data filed at 12/30/2020 1305  Gross per 24 hour   Intake --   Output 2600 ml   Net -2600 ml       CONSTITUTIONAL:  awake, alert, cooperative, no apparent distress, and appears stated age  LUNGS: Decreased BS. Occ rhonchii.   CARDIOVASCULAR:  normal S1 and S2 and negative JVD  ABD:Abdomen soft, non-tender. BS normal. No masses,  No organomegaly  NEURO:Alert and oriented x3. Gait normal. Reflexes and motor strength normal and symmetric. Cranial nerves 2-12 and sensation grossly intact. DATA:    CBC:  Recent Labs     12/28/20  0247   WBC 3.3*   RBC 3.66*   HGB 9.5*   HCT 33.0*      MCV 90.2   MCH 26.0*   MCHC 28.8*   RDW 16.1*      BMP:  Recent Labs     12/28/20  0247 12/29/20  0219    134*   K 3.3* 3.7   CL 95* 92*   CO2 34* 35*   BUN 6 14   CREATININE 0.7 0.9   CALCIUM 8.9 9.1   GLUCOSE 183* 201*      ABG:  No results for input(s): PH, PO2ART, VSI8DDB, HCO3, BEART, O2SAT in the last 72 hours. Lab Results   Component Value Date    PROBNP 1,413 (H) 12/28/2020    PROBNP 729.1 (H) 12/27/2020    PROBNP 1,034 (H) 11/22/2020    THEOPH 12.2 12/28/2020     No results found for: [facility]    Radiology Review:  Pertinent images / reports were reviewed as a part of this visit.     Assessment:     Patient Active Problem List   Diagnosis    Centrilobular emphysema ([address]    Hypertension    Hyperlipidemia LDL goal <100    Abnormal EKG    Palpitations    Anxiety    FH: CAD (coronary artery disease)    Fibromyalgia    Back pain at L4-L5 level    Sleep apnea    COPD exacerbation (HCC)    Electrolyte imbalance    Epigastric pain    COPD (chronic obstructive pulmonary disease) ([address]    Spinal stenosis of lumbar region with radiculopathy    Spinal stenosis, lumbar region, without neurogenic claudication    COPD with acute exacerbation ([address]    Pneumonia due to infectious organism    SVT (supraventricular tachycardia) (HCC)    Class 1 obesity due to excess calories with body mass index (BMI) of 31.0 to 31.9 in adult    Pneumonia    Pleural effusion    Atelectasis    Pulmonary nodules    Respiratory failure with hypoxia and hypercapnia (HCC)    Anemia    COPD with exacerbation (HCC)    Acquired hemolytic anemia, unspecified (HCC)    Leucocytosis    Chronic kidney

## 2020-12-31 VITALS
BODY MASS INDEX: 31.47 KG/M2 | DIASTOLIC BLOOD PRESSURE: 71 MMHG | TEMPERATURE: 98.2 F | SYSTOLIC BLOOD PRESSURE: 143 MMHG | HEART RATE: 97 BPM | HEIGHT: 61 IN | OXYGEN SATURATION: 99 % | WEIGHT: 166.7 LBS | RESPIRATION RATE: 14 BRPM

## 2020-12-31 PROCEDURE — 96372 THER/PROPH/DIAG INJ SC/IM: CPT

## 2020-12-31 PROCEDURE — G0378 HOSPITAL OBSERVATION PER HR: HCPCS

## 2020-12-31 PROCEDURE — 94761 N-INVAS EAR/PLS OXIMETRY MLT: CPT

## 2020-12-31 PROCEDURE — 6360000002 HC RX W HCPCS: Performed by: INTERNAL MEDICINE

## 2020-12-31 PROCEDURE — 2700000000 HC OXYGEN THERAPY PER DAY

## 2020-12-31 PROCEDURE — 6370000000 HC RX 637 (ALT 250 FOR IP): Performed by: INTERNAL MEDICINE

## 2020-12-31 PROCEDURE — 94640 AIRWAY INHALATION TREATMENT: CPT

## 2020-12-31 PROCEDURE — 96376 TX/PRO/DX INJ SAME DRUG ADON: CPT

## 2020-12-31 PROCEDURE — 2580000003 HC RX 258: Performed by: INTERNAL MEDICINE

## 2020-12-31 RX ORDER — METHYLPREDNISOLONE SODIUM SUCCINATE 40 MG/ML
40 INJECTION, POWDER, LYOPHILIZED, FOR SOLUTION INTRAMUSCULAR; INTRAVENOUS EVERY 12 HOURS
Status: DISCONTINUED | OUTPATIENT
Start: 2020-12-31 | End: 2020-12-31 | Stop reason: HOSPADM

## 2020-12-31 RX ORDER — PREDNISONE 20 MG/1
40 TABLET ORAL DAILY
Qty: 10 TABLET | Refills: 0 | Status: SHIPPED | OUTPATIENT
Start: 2020-12-31 | End: 2021-01-05

## 2020-12-31 RX ADMIN — CLONAZEPAM 1 MG: 1 TABLET ORAL at 00:54

## 2020-12-31 RX ADMIN — GUAIFENESIN 600 MG: 600 TABLET, EXTENDED RELEASE ORAL at 09:04

## 2020-12-31 RX ADMIN — SODIUM CHLORIDE, PRESERVATIVE FREE 10 ML: 5 INJECTION INTRAVENOUS at 09:08

## 2020-12-31 RX ADMIN — FERROUS SULFATE TAB 325 MG (65 MG ELEMENTAL FE) 325 MG: 325 (65 FE) TAB at 09:04

## 2020-12-31 RX ADMIN — ENOXAPARIN SODIUM 40 MG: 100 INJECTION SUBCUTANEOUS at 09:06

## 2020-12-31 RX ADMIN — FLUTICASONE PROPIONATE 2 SPRAY: 50 SPRAY, METERED NASAL at 09:07

## 2020-12-31 RX ADMIN — SUCRALFATE 1 G: 1 TABLET ORAL at 09:05

## 2020-12-31 RX ADMIN — DICYCLOMINE HYDROCHLORIDE 10 MG: 10 CAPSULE ORAL at 09:04

## 2020-12-31 RX ADMIN — OXYCODONE HYDROCHLORIDE AND ACETAMINOPHEN 1 TABLET: 5; 325 TABLET ORAL at 00:54

## 2020-12-31 RX ADMIN — BUSPIRONE HYDROCHLORIDE 10 MG: 10 TABLET ORAL at 09:05

## 2020-12-31 RX ADMIN — OXYCODONE HYDROCHLORIDE AND ACETAMINOPHEN 1 TABLET: 5; 325 TABLET ORAL at 09:04

## 2020-12-31 RX ADMIN — DICYCLOMINE HYDROCHLORIDE 10 MG: 10 CAPSULE ORAL at 13:22

## 2020-12-31 RX ADMIN — DILTIAZEM HYDROCHLORIDE 30 MG: 30 TABLET, FILM COATED ORAL at 13:22

## 2020-12-31 RX ADMIN — LEVOTHYROXINE SODIUM 75 MCG: 75 TABLET ORAL at 05:58

## 2020-12-31 RX ADMIN — FUROSEMIDE 20 MG: 20 TABLET ORAL at 09:05

## 2020-12-31 RX ADMIN — BACLOFEN 10 MG: 10 TABLET ORAL at 13:22

## 2020-12-31 RX ADMIN — DULOXETINE HYDROCHLORIDE 60 MG: 30 CAPSULE, DELAYED RELEASE ORAL at 09:04

## 2020-12-31 RX ADMIN — BUDESONIDE AND FORMOTEROL FUMARATE DIHYDRATE 2 PUFF: 160; 4.5 AEROSOL RESPIRATORY (INHALATION) at 08:15

## 2020-12-31 RX ADMIN — PANTOPRAZOLE SODIUM 40 MG: 40 TABLET, DELAYED RELEASE ORAL at 09:04

## 2020-12-31 RX ADMIN — BUSPIRONE HYDROCHLORIDE 10 MG: 10 TABLET ORAL at 13:22

## 2020-12-31 RX ADMIN — MONTELUKAST 10 MG: 10 TABLET, FILM COATED ORAL at 09:06

## 2020-12-31 RX ADMIN — METHYLPREDNISOLONE SODIUM SUCCINATE 40 MG: 40 INJECTION, POWDER, LYOPHILIZED, FOR SOLUTION INTRAMUSCULAR; INTRAVENOUS at 05:58

## 2020-12-31 RX ADMIN — CLONAZEPAM 1 MG: 1 TABLET ORAL at 09:16

## 2020-12-31 RX ADMIN — FOLIC ACID 1 MG: 1 TABLET ORAL at 09:04

## 2020-12-31 RX ADMIN — THEOPHYLLINE ANHYDROUS 400 MG: 200 CAPSULE, EXTENDED RELEASE ORAL at 09:06

## 2020-12-31 RX ADMIN — BACLOFEN 10 MG: 10 TABLET ORAL at 09:05

## 2020-12-31 RX ADMIN — ASPIRIN 81 MG CHEWABLE TABLET 81 MG: 81 TABLET CHEWABLE at 09:05

## 2020-12-31 RX ADMIN — TIOTROPIUM BROMIDE INHALATION SPRAY 2 PUFF: 3.12 SPRAY, METERED RESPIRATORY (INHALATION) at 08:17

## 2020-12-31 RX ADMIN — Medication 100 MG: at 09:07

## 2020-12-31 RX ADMIN — DILTIAZEM HYDROCHLORIDE 30 MG: 30 TABLET, FILM COATED ORAL at 09:05

## 2020-12-31 ASSESSMENT — PAIN SCALES - GENERAL
PAINLEVEL_OUTOF10: 6
PAINLEVEL_OUTOF10: 1
PAINLEVEL_OUTOF10: 0
PAINLEVEL_OUTOF10: 4

## 2020-12-31 ASSESSMENT — PAIN DESCRIPTION - PAIN TYPE: TYPE: CHRONIC PAIN

## 2020-12-31 NOTE — PROGRESS NOTES
Pt is being discharged home with all of her belongings, and discharge instructions were thoroughly explained, including when and how to get a COVID test for her upcoming procedure.

## 2020-12-31 NOTE — DISCHARGE SUMMARY
Discharge Summary    Name:  Leigh Hussein /Age/Sex: 1960  (64 y.o. female)   MRN & CSN:  [id number] & [id number] Admission Date/Time: 2020  1:03 AM   Attending:  Kathie Fierro MD Discharging Physician: Kathie Fierro MD     Hospital Course:   Leigh Hussein is a 64 y.o.  female  who presents with Chest pain    · Chest pain, likely noncardiac  -Serial troponin trending down  -Left heart cath 2020, normal  -Echo EF 55 to 60%, mild LVH, no pericardial effusion  -Cardio on board, likely noncardiac  -EGD as outpatient on 2020     · Acute on chronic respiratory failure due to COPD exacerbation  -Now at baseline O2 2-3 L O2   -Chest x-ray with no acute process  -COVID-19 negative  -On steroid, breathing treatment, antibiotics ( finished 5 days course of AB Rx)  -Pulmonology on board  Switch to oral steroid      · Urinary incontinence, dysuria, UTI ruled out  -UA negative     · Generalized anxiety disorder, home meds resumed  -Super anxious, Psychiatry to see, patient happy with current mediation and refuse to see psychiatrist      Chronic medical condition  -Chronic respiratory failure on oxygen via nasal cannula 2 L. -DALTON, BiPAP at night   -Generalized anxiety disorder, home meds resumed  -Hypertension, continue home management  -Hyperlipidemia: continue statin  -Hypothyroidism: TSH low.  Decrease Synthroid to 75.  -Morbid obesity, encourage weight loss     Disposition, anticipate discharge in a day or 2, [facility] order placed      The patient expressed appropriate understanding of and agreement with the discharge recommendations, medications, and plan.      Consults this admission:  IP CONSULT TO HOSPITALIST  IP CONSULT TO CARDIOLOGY  IP CONSULT TO PULMONOLOGY  IP CONSULT TO PSYCHIATRY  IP CONSULT TO HOME CARE NEEDS    Discharge Instruction:   Follow up appointments:   Primary care physician:  within 1 weeks    Diet:  Regular   Activity: As tolerated  Disposition: Discharged to:   []Home, [x]C, []SNF, []Acute Rehab, []Hospice   Condition on discharge: Stable    Discharge Medications:      [facility]   Home Medication Instructions GRIFFIN:[id number]    Printed on:12/31/20 0509   Medication Information                      albuterol-ipratropium (COMBIVENT RESPIMAT)  MCG/ACT AERS inhaler  Inhale 1 puff into the lungs every 4 hours as needed for Wheezing             aspirin 81 MG chewable tablet  Take 81 mg by mouth daily             atorvastatin (LIPITOR) 40 MG tablet  Take 40 mg by mouth daily             baclofen (LIORESAL) 10 MG tablet  Take 1 tablet by mouth 3 times daily             budesonide-formoterol (SYMBICORT) 160-4.5 MCG/ACT AERO  USE 2 INHALATIONS TWICE A DAY             busPIRone (BUSPAR) 10 MG tablet  Take 1 tablet by mouth 3 times daily             clonazePAM (KLONOPIN) 1 MG disintegrating tablet  Take 1 mg by mouth 3 times daily as needed.               dicyclomine (BENTYL) 10 MG capsule  Take 1 capsule by mouth 4 times daily             dilTIAZem (CARDIZEM) 30 MG tablet  Take 1 tablet by mouth 4 times daily             DULoxetine (CYMBALTA) 60 MG extended release capsule  Take 60 mg by mouth daily             ferrous sulfate (IRON 325) 325 (65 Fe) MG tablet  Take 325 mg by mouth 2 times daily Monday, wed, fri              fluticasone (FLONASE) 50 MCG/ACT nasal spray  2 sprays by Nasal route daily             folic acid (FOLVITE) 1 MG tablet  Take 1 tablet by mouth daily             guaiFENesin (MUCINEX) 600 MG extended release tablet  Take 1 tablet by mouth 2 times daily             ipratropium-albuterol (DUONEB) 0.5-2.5 (3) MG/3ML SOLN nebulizer solution  Inhale 3 mLs into the lungs every 4 hours             lactobacillus (CULTURELLE) capsule  Take 1 capsule by mouth daily (with breakfast)             levothyroxine (SYNTHROID) 100 MCG tablet  Take 1 tablet by mouth Daily             montelukast (SINGULAIR) 10 MG tablet  Take 1 tablet by mouth daily             ondansetron (ZOFRAN ODT) 4 MG disintegrating tablet  Take 1 tablet by mouth every 8 hours as needed for Nausea or Vomiting Place under the tongue and let it melt and absorb from under your tongue. pantoprazole (PROTONIX) 40 MG tablet  Take 1 tablet by mouth 2 times daily             predniSONE (DELTASONE) 20 MG tablet  Take 2 tablets by mouth daily for 5 days             sucralfate (CARAFATE) 1 GM tablet  Take 1 tablet by mouth every 12 hours             theophylline (MIAH-24) 200 MG extended release capsule  Take 400 mg by mouth daily             traZODone (DESYREL) 50 MG tablet  Take 100 mg by mouth nightly              vitamin B-1 100 MG tablet  Take 1 tablet by mouth daily                 Objective Findings at Discharge:   BP (!) 143/67   Pulse 64   Temp 98.3 °F (36.8 °C) (Oral)   Resp 14   Ht 5' 1\" (1.549 m)   Wt 166 lb 11.2 oz (75.6 kg)   SpO2 98%   BMI 31.50 kg/m²            PHYSICAL EXAM   GEN Awake female, sitting upright in bed in no apparent distress. Appears given age. EYES Pupils are equally round. No scleral erythema, discharge, or conjunctivitis. HENT Mucous membranes are moist. Oral pharynx without exudates, no evidence of thrush. NECK Supple, no apparent thyromegaly or masses. RESP Clear to auscultation, no wheezes, rales or rhonchi. Symmetric chest movement while on room air. CARDIO/VASC S1/S2 auscultated. Regular rate without appreciable murmurs, rubs, or gallops. No JVD or carotid bruits. Peripheral pulses equal bilaterally and palpable. No peripheral edema. GI Abdomen is soft without significant tenderness, masses, or guarding. Bowel sounds are normoactive. Rectal exam deferred. SKIN Normal coloration, warm, dry. NEURO Cranial nerves appear grossly intact, normal speech, no lateralizing weakness. PSYCH Awake, alert, oriented x 4. Affect appropriate.     BMP/CBC  Recent Labs     12/29/20  0219   *   K 3.7   CL 92*   CO2 35*   BUN 14 CREATININE 0.9       IMAGING:      Discharge Time of 35 minutes    Electronically signed by Verner Conner, MD on 12/31/2020 at 9:50 AM

## 2020-12-31 NOTE — CARE COORDINATION
LSW received discharge order for home and pt to continue with her [facility] with [facility]. LUCYW PS Brea with [facility] and informed her of the discharge. Jennifer Tirado will initiate HC services.

## 2020-12-31 NOTE — PLAN OF CARE
Problem: Falls - Risk of:  Goal: Will remain free from falls  Description: Will remain free from falls  12/31/2020 1304 by Kellie Alves RN  Outcome: Completed  12/31/2020 1118 by Kellie Alves RN  Outcome: Ongoing  Goal: Absence of physical injury  Description: Absence of physical injury  12/31/2020 1304 by Kellie Alves RN  Outcome: Completed  12/31/2020 1118 by Kellie Alves RN  Outcome: Ongoing     Problem: Pain:  Goal: Pain level will decrease  Description: Pain level will decrease  12/31/2020 1304 by Kellie Alves RN  Outcome: Completed  12/31/2020 1118 by Kellie Alves RN  Outcome: Ongoing  Goal: Control of acute pain  Description: Control of acute pain  12/31/2020 1304 by Kellie Alves RN  Outcome: Completed  12/31/2020 1118 by Kellie Alves RN  Outcome: Ongoing  Goal: Control of chronic pain  Description: Control of chronic pain  12/31/2020 1304 by Kellie Alves RN  Outcome: Completed  12/31/2020 1118 by Kellie Alves RN  Outcome: Ongoing

## 2020-12-31 NOTE — PROGRESS NOTES
JVD  ABD:Abdomen soft, non-tender. BS normal. No masses,  No organomegaly  NEURO:Alert and oriented x3. Gait normal. Reflexes and motor strength normal and symmetric. Cranial nerves 2-12 and sensation grossly intact. DATA:    CBC:  No results for input(s): WBC, RBC, HGB, HCT, PLT, MCV, MCH, MCHC, RDW, NRBC, SEGSPCT, BANDSPCT in the last 72 hours. BMP:  Recent Labs     12/29/20  0219   *   K 3.7   CL 92*   CO2 35*   BUN 14   CREATININE 0.9   CALCIUM 9.1   GLUCOSE 201*      ABG:  No results for input(s): PH, PO2ART, HSU5FQP, HCO3, BEART, O2SAT in the last 72 hours. Lab Results   Component Value Date    PROBNP 1,413 (H) 12/28/2020    PROBNP 729.1 (H) 12/27/2020    PROBNP 1,034 (H) 11/22/2020    THEOPH 12.2 12/28/2020     No results found for: [facility]    Radiology Review:  Pertinent images / reports were reviewed as a part of this visit.     Assessment:     Patient Active Problem List   Diagnosis    Centrilobular emphysema ([address]    Hypertension    Hyperlipidemia LDL goal <100    Abnormal EKG    Palpitations    Anxiety    FH: CAD (coronary artery disease)    Fibromyalgia    Back pain at L4-L5 level    Sleep apnea    COPD exacerbation (HCC)    Electrolyte imbalance    Epigastric pain    COPD (chronic obstructive pulmonary disease) ([address]    Spinal stenosis of lumbar region with radiculopathy    Spinal stenosis, lumbar region, without neurogenic claudication    COPD with acute exacerbation ([address]    Pneumonia due to infectious organism    SV[facility]cardia) [facility])    Class 1 obesity due to excess calories with body mass index (BMI) of 31.0 to 31.9 in adult    Pneumonia    Pleural effusion    Atelectasis    Pulmonary nodules    Respiratory failure with hypoxia and hypercapnia (HCC)    Anemia    COPD with exacerbation (HCC)    Acquired hemolytic anemia, unspecified (HCC)    Leucocytosis    Chronic kidney disease, stage I    Hyponatremia    PNA (pneumonia)    Sepsis [facility]    Pericardial effusion    Acute acalculous cholecystitis    SIRS (systemic inflammatory response syndrome) (HCC)    Chest pain    Abnormal nuclear stress test    Abnormal stress test    Status post laparoscopic cholecystectomy    Moderate malnutrition ([address]       Plan:   1. Overall the patient has improved. 2. Inc. activity. 3. D/C today.     Yasir Denny MD  12/31/2020  10:45 AM

## 2020-12-31 NOTE — PROGRESS NOTES
[facility] Liaison spoke w/pt and is aware of discharge & will initiate [facility].

## 2021-01-04 ENCOUNTER — HOSPITAL ENCOUNTER (OUTPATIENT)
Dept: LAB | Age: 61
Discharge: HOME | End: 2021-01-04
Payer: COMMERCIAL

## 2021-01-04 PROCEDURE — U0002 COVID-19 LAB TEST NON-CDC: HCPCS

## 2021-01-04 PROCEDURE — C9803 HOPD COVID-19 SPEC COLLECT: HCPCS

## 2021-01-05 LAB
SARS-COV-2: NOT DETECTED
SOURCE: NORMAL

## 2021-01-06 ENCOUNTER — ANESTHESIA EVENT (OUTPATIENT)
Dept: ENDOSCOPY | Age: 61
End: 2021-01-06
Payer: COMMERCIAL

## 2021-01-06 NOTE — ANESTHESIA PRE PROCEDURE
Department of Anesthesiology  Preprocedure Note       Name:  James Benavides   Age:  64 y.o.  :  1960                                          MRN:  [id number]         Date:  2021      Surgeon: Florencia Mcleod):  Laura Guerra MD    Procedure: Procedure(s):  EGD ESOPHAGOGASTRODUODENOSCOPY    Medications prior to admission:   Prior to Admission medications    Medication Sig Start Date End Date Taking? Authorizing Provider   clonazePAM (KLONOPIN) 1 MG disintegrating tablet Take 1 mg by mouth 3 times daily as needed. Historical Provider, MD   dilTIAZem (CARDIZEM) 30 MG tablet Take 1 tablet by mouth 4 times daily 20   [facility], APRN - CNP   ferrous sulfate (IRON 325) 325 (65 Fe) MG tablet Take 325 mg by mouth 2 times daily Monday, wed, fri     Historical Provider, MD   atorvastatin (LIPITOR) 40 MG tablet Take 40 mg by mouth daily    Historical Provider, MD   ondansetron (ZOFRAN ODT) 4 MG disintegrating tablet Take 1 tablet by mouth every 8 hours as needed for Nausea or Vomiting Place under the tongue and let it melt and absorb from under your tongue.  20   Jessie Randle MD   lactobacillus (CULTURELLE) capsule Take 1 capsule by mouth daily (with breakfast) 20   Richard Cordova MD   pantoprazole (PROTONIX) 40 MG tablet Take 1 tablet by mouth 2 times daily 20   Richard Cordova MD   sucralfate (CARAFATE) 1 GM tablet Take 1 tablet by mouth every 12 hours 20   Richard Cordova MD   dicyclomine (BENTYL) 10 MG capsule Take 1 capsule by mouth 4 times daily 20   Richard Cordova MD   theophylline (MIAH-24) 200 MG extended release capsule Take 400 mg by mouth daily    Historical Provider, MD   budesonide-formoterol (SYMBICORT) 160-4.5 MCG/ACT AERO USE 2 INHALATIONS TWICE A DAY 10/2/20   Latoya Wiley MD ipratropium-albuterol (DUONEB) 0.5-2.5 (3) MG/3ML SOLN nebulizer solution Inhale 3 mLs into the lungs every 4 hours 7/21/20   Jahaira Iglesias MD   guaiFENesin (MUCINEX) 600 MG extended release tablet Take 1 tablet by mouth 2 times daily 7/21/20   Jahaira Iglesias MD   montelukast (SINGULAIR) 10 MG tablet Take 1 tablet by mouth daily 7/21/20   Jahaira Iglesias MD   levothyroxine (SYNTHROID) 100 MCG tablet Take 1 tablet by mouth Daily 7/21/20   Jahaira Iglesias MD   folic acid (FOLVITE) 1 MG tablet Take 1 tablet by mouth daily 7/16/20   Zahida Galloway MD   vitamin B-1 100 MG tablet Take 1 tablet by mouth daily 7/16/20   Zahida Galloway MD   albuterol-ipratropium (COMBIVENT RESPIMAT)  MCG/ACT AERS inhaler Inhale 1 puff into the lungs every 4 hours as needed for Wheezing 7/1/20   Jahaira Iglesias MD   DULoxetine (CYMBALTA) 60 MG extended release capsule Take 60 mg by mouth daily    Historical Provider, MD   busPIRone (BUSPAR) 10 MG tablet Take 1 tablet by mouth 3 times daily  Patient taking differently: Take 10 mg by mouth 2 times daily  4/23/19   Antionette Correa MD   traZODone (DESYREL) 50 MG tablet Take 100 mg by mouth nightly     Historical Provider, MD   baclofen (LIORESAL) 10 MG tablet Take 1 tablet by mouth 3 times daily  Patient taking differently: Take 10 mg by mouth 2 times daily  12/18/18   Antionette Correa MD   aspirin 81 MG chewable tablet Take 81 mg by mouth daily    Historical Provider, MD   fluticasone (FLONASE) 50 MCG/ACT nasal spray 2 sprays by Nasal route daily 3/28/18   Antionette Correa MD       Current medications:    No current facility-administered medications for this encounter. Current Outpatient Medications   Medication Sig Dispense Refill    clonazePAM (KLONOPIN) 1 MG disintegrating tablet Take 1 mg by mouth 3 times daily as needed.        dilTIAZem (CARDIZEM) 30 MG tablet Take 1 tablet by mouth 4 times daily 120 tablet 0  ferrous sulfate (IRON 325) 325 (65 Fe) MG tablet Take 325 mg by mouth 2 times daily Monday, wed, fri       atorvastatin (LIPITOR) 40 MG tablet Take 40 mg by mouth daily      ondansetron (ZOFRAN ODT) 4 MG disintegrating tablet Take 1 tablet by mouth every 8 hours as needed for Nausea or Vomiting Place under the tongue and let it melt and absorb from under your tongue.  30 tablet 3    lactobacillus (CULTURELLE) capsule Take 1 capsule by mouth daily (with breakfast) 30 capsule 0    pantoprazole (PROTONIX) 40 MG tablet Take 1 tablet by mouth 2 times daily 60 tablet 3    sucralfate (CARAFATE) 1 GM tablet Take 1 tablet by mouth every 12 hours 120 tablet 3    dicyclomine (BENTYL) 10 MG capsule Take 1 capsule by mouth 4 times daily 360 capsule 1    theophylline (MIAH-24) 200 MG extended release capsule Take 400 mg by mouth daily      budesonide-formoterol (SYMBICORT) 160-4.5 MCG/ACT AERO USE 2 INHALATIONS TWICE A DAY 30.6 g 3    ipratropium-albuterol (DUONEB) 0.5-2.5 (3) MG/3ML SOLN nebulizer solution Inhale 3 mLs into the lungs every 4 hours 1080 mL 3    guaiFENesin (MUCINEX) 600 MG extended release tablet Take 1 tablet by mouth 2 times daily 30 tablet 0    montelukast (SINGULAIR) 10 MG tablet Take 1 tablet by mouth daily 30 tablet 3    levothyroxine (SYNTHROID) 100 MCG tablet Take 1 tablet by mouth Daily 30 tablet 2    folic acid (FOLVITE) 1 MG tablet Take 1 tablet by mouth daily 30 tablet 3    vitamin B-1 100 MG tablet Take 1 tablet by mouth daily 30 tablet 3    albuterol-ipratropium (COMBIVENT RESPIMAT)  MCG/ACT AERS inhaler Inhale 1 puff into the lungs every 4 hours as needed for Wheezing 3 Inhaler 0    DULoxetine (CYMBALTA) 60 MG extended release capsule Take 60 mg by mouth daily      busPIRone (BUSPAR) 10 MG tablet Take 1 tablet by mouth 3 times daily (Patient taking differently: Take 10 mg by mouth 2 times daily ) 90 tablet 0  traZODone (DESYREL) 50 MG tablet Take 100 mg by mouth nightly       baclofen (LIORESAL) 10 MG tablet Take 1 tablet by mouth 3 times daily (Patient taking differently: Take 10 mg by mouth 2 times daily ) 30 tablet 0    aspirin 81 MG chewable tablet Take 81 mg by mouth daily      fluticasone (FLONASE) 50 MCG/ACT nasal spray 2 sprays by Nasal route daily 1 Bottle 0       Allergies:     Allergies   Allergen Reactions    Lisinopril Swelling and Rash     angioedema       Problem List:    Patient Active Problem List   Diagnosis Code    Centrilobular emphysema ([facility]) J43.2    Hypertension I10    Hyperlipidemia LDL goal <100 E78.5    Abnormal EKG R94.31    Palpitations R00.2    Anxiety F41.9    FH: CAD (coronary artery disease) Z82.49    Fibromyalgia M79.7    Back pain at L4-L5 level M54.5    Sleep apnea G47.30    COPD exacerbation ([facility]) J44.1    Electrolyte imbalance E87.8    Epigastric pain R10.13    COPD (chronic obstructive pulmonary disease) ([facility] J44.9    Spinal stenosis of lumbar region with radiculopathy M48.061, M54.16    Spinal stenosis, lumbar region, without neurogenic claudication M48.061    COPD with acute exacerbation ([facility] J44.1    Pneumonia due to infectious organism J18.9    SVT (supraventricular tachycardia) ([facility]) I47.1    Class 1 obesity due to excess calories with body mass index (BMI) of 31.0 to 31.9 in adult E66.09, Z68.31    Pneumonia J18.9    Pleural effusion J90    Atelectasis J98.11    Pulmonary nodules R91.8    Respiratory failure with hypoxia and hypercapnia ([facility]) J96.91, J96.92    Anemia D64.9    COPD with exacerbation ([facility]) J44.1    Acquired hemolytic anemia, unspecified ([facility]) D59.9    Leucocytosis D72.829    Chronic kidney disease, stage I N18.1    Hyponatremia E87.1    PNA (pneumonia) J18.9    Sepsis ([facility]) A41.9    Pericardial effusion I31.3    Acute acalculous cholecystitis K81.0    SIRS (systemic inflammatory response syndrome) ([facility]) R65.10  Chest pain R07.9    Abnormal nuclear stress test R94.39    Abnormal stress test R94.39    Status post laparoscopic cholecystectomy Z90.49    Moderate malnutrition (HCC) E44.0       Past Medical History:        Diagnosis Date    Anemia     Anxiety 02/16/2017    follows with Dr Jewel Christina Back pain at L4-L5 level 2/16/2017    Dr Austin Hernandez Bipolar 1 disorder ([facility]     per pt on 2/5/2021\"never told I have bipolar\"    COPD (chronic obstructive pulmonary disease) ([facility]     follows with Dr Valerio Melendrez Emphysema of lung ([facility]     FH: CAD (coronary artery disease) 2/16/2017    Father had in his forties, sister in her thirties    Jelani Hernandez Fibromyalgia 02/16/2017    Full dentures     full upper plate and partial on the bottom    Gastric ulcer     \"had stomach ulder back fall 2019\"    H/O Doppler ultrasound 04/05/2019    venous- no DVT or reflux    H/O echocardiogram 01/14/2015    EF50-55% Normal- see media    History of blood transfusion     Hyperlipidemia LDL goal <100     Hypertension     Dr Hay December Irregular heartbeat     \"they said I have irreg heart beat before- back when they drained fluid around my heart \"    Obstructive sleep apnea     \"have bipap I use at home\"    On home oxygen therapy     \"on oxygen all the time at home and keep it on 2.5 liters\"    Osteoarthritis     Pericardial effusion     per old chart had pericardial effusion 10/2020    Spinal stenosis     hx per old chart    Thyroid disease     Wears glasses     \"suppose to wear glasses\"       Past Surgical History:        Procedure Laterality Date    BACK SURGERY  03/2017    \"surgery on low back L5-6- not sure if they put any metal in \"   [address]      10/2019    CARDIAC CATHETERIZATION      per old chart had cath done 11/2020    CARPAL TUNNEL RELEASE Right 1985    CHOLECYSTECTOMY, LAPAROSCOPIC N/A 10/25/2020    CHOLECYSTECTOMY LAPAROSCOPIC WITH IOC performed by Robby Reddy MD at [place] [address]  COLONOSCOPY N/A 2019    COLONOSCOPY DIAGNOSTIC performed by Eugene Bach MD at [address] [place] [address], COLON, DIAGNOSTIC      per old chart had egd done 2018    TUBAL LIGATION         Social History:    Social History     Tobacco Use    Smoking status: Former Smoker     Packs/day: 1.50     Years: 20.00     Pack years: 30.00     Types: Cigarettes     Quit date: 2008     Years since quittin.0    Smokeless tobacco: Never Used   Substance Use Topics    Alcohol use: Not Currently     Alcohol/week: 2.0 standard drinks     Types: 2 Shots of liquor per week     Comment: per pt on 2021\"quit drinking back Oct 2020\"use to drink wine coolers - 3 times per week \"                                Counseling given: Not Answered      Vital Signs (Current):   Vitals:    21 1012   Weight: 166 lb (75.3 kg)   Height: 5' 1\" (1.549 m)                                              BP Readings from Last 3 Encounters:   20 (!) 143/71   12/10/20 (!) 96/58   20 110/80       NPO Status:                                                                                 BMI:   Wt Readings from Last 3 Encounters:   20 166 lb 11.2 oz (75.6 kg)   12/10/20 170 lb (77.1 kg)   20 171 lb 12.8 oz (77.9 kg)     Body mass index is 31.37 kg/m².     CBC:   Lab Results   Component Value Date    WBC 3.3 2020    RBC 3.66 2020    HGB 9.5 2020    HCT 33.0 2020    MCV 90.2 2020    RDW 16.1 2020     2020       CMP:   Lab Results   Component Value Date     2020    K 3.7 2020    CL 92 2020    CO2 35 2020    BUN 14 2020    CREATININE 0.9 2020    GFRAA >60 2020    LABGLOM >60 2020    GLUCOSE 201 2020    PROT 5.9 2020    PROT 6.9 2013    CALCIUM 9.1 2020    BILITOT 0.2 2020    ALKPHOS 116 2020    AST 21 2020    ALT 13 2020 POC Tests: No results for input(s): POCGLU, POCNA, POCK, POCCL, POCBUN, POCHEMO, POCHCT in the last 72 hours. Coags:   Lab Results   Component Value Date    PROTIME 13.2 10/26/2020    INR 1.09 10/26/2020    APTT 30.7 2019       HCG (If Applicable):   Lab Results   Component Value Date    PREGTESTUR NEGATIVE 2014        ABGs:   Lab Results   Component Value Date    PO2ART 99 2020    HDW5HDN 62.0 2020    ZJU2JNB 40.2 2020        Type & Screen (If Applicable):  No results found for: LABABO, LABRH    Drug/Infectious Status (If Applicable):  Lab Results   Component Value Date    HEPCAB NON REACTIVE 10/23/2020       COVID-19 Screening (If Applicable):   Lab Results   Component Value Date    COVID19 NOT DETECTED 2021         Anesthesia Evaluation  Patient summary reviewed  Airway: Mallampati: II        Dental:    (+) upper dentures      Pulmonary:normal exam    (+) pneumonia:  COPD:  sleep apnea:                            ROS comment: Home O2 - 2.5 L NC  Former Smoker - 30 pack years  Quit Smokin08    CXR 2020: Impression  No acute process. Cardiovascular:  Exercise tolerance: good (>4 METS),   (+) hypertension:, dysrhythmias: SVT, hyperlipidemia         Beta Blocker:  Not on Beta Blocker      ROS comment: Echo 2020:  Summary   Left ventricular systolic function is normal.   Ejection fraction is visually estimated at 55-60%. Mild left ventricular hypertrophy. Trace aortic regurgitation. Trace mitral regurgitation. No evidence of any pericardial effusion. [facility] 2020:   Procedure Summary   1. Angiographically almost normal coronary arteries. 2. Normal LV systolic function. LVEF is 60 to 65 %. Recommendations   Medical therapy as needed.      Neuro/Psych:   (+) neuromuscular disease:, psychiatric history:depression/anxiety             GI/Hepatic/Renal:   (+) PUD, renal disease: CRI, morbid obesity          Endo/Other: (+) hypothyroidism, blood dyscrasia: anemia:., .                 Abdominal:           Vascular:                                    Anesthesia Plan      MAC     ASA 4       Induction: intravenous. Anesthetic plan and risks discussed with patient. Pre Anesthesia Evaluation complete. Anesthesia plan, risks, benefits, alternatives, and personnel discussed with patient and/or legal guardian. Patient and/or legal guardian verbalized an understanding and agreed to proceed. Anesthesia plan discussed with care team members and agreed upon.   BRIAN Hewitt - CRNA  1/7/2021

## 2021-01-07 ENCOUNTER — HOSPITAL ENCOUNTER (OUTPATIENT)
Age: 61
Setting detail: OUTPATIENT SURGERY
Discharge: HOME | End: 2021-01-07
Attending: INTERNAL MEDICINE | Admitting: INTERNAL MEDICINE
Payer: COMMERCIAL

## 2021-01-07 ENCOUNTER — ANESTHESIA (OUTPATIENT)
Dept: ENDOSCOPY | Age: 61
End: 2021-01-07
Payer: COMMERCIAL

## 2021-01-07 VITALS
OXYGEN SATURATION: 100 % | TEMPERATURE: 97.6 F | WEIGHT: 158 LBS | HEART RATE: 88 BPM | DIASTOLIC BLOOD PRESSURE: 88 MMHG | SYSTOLIC BLOOD PRESSURE: 158 MMHG | BODY MASS INDEX: 29.83 KG/M2 | RESPIRATION RATE: 16 BRPM | HEIGHT: 61 IN

## 2021-01-07 VITALS
OXYGEN SATURATION: 100 % | TEMPERATURE: 98.6 F | RESPIRATION RATE: 13 BRPM | SYSTOLIC BLOOD PRESSURE: 152 MMHG | DIASTOLIC BLOOD PRESSURE: 79 MMHG

## 2021-01-07 PROCEDURE — 2709999900 HC NON-CHARGEABLE SUPPLY: Performed by: INTERNAL MEDICINE

## 2021-01-07 PROCEDURE — 2580000003 HC RX 258: Performed by: ANESTHESIOLOGY

## 2021-01-07 PROCEDURE — 6360000002 HC RX W HCPCS: Performed by: NURSE ANESTHETIST, CERTIFIED REGISTERED

## 2021-01-07 PROCEDURE — 3609012400 HC EGD TRANSORAL BIOPSY SINGLE/MULTIPLE: Performed by: INTERNAL MEDICINE

## 2021-01-07 PROCEDURE — 7100000011 HC PHASE II RECOVERY - ADDTL 15 MIN: Performed by: INTERNAL MEDICINE

## 2021-01-07 PROCEDURE — 88342 IMHCHEM/IMCYTCHM 1ST ANTB: CPT | Performed by: PATHOLOGY

## 2021-01-07 PROCEDURE — 2500000003 HC RX 250 WO HCPCS: Performed by: NURSE ANESTHETIST, CERTIFIED REGISTERED

## 2021-01-07 PROCEDURE — 7100000010 HC PHASE II RECOVERY - FIRST 15 MIN: Performed by: INTERNAL MEDICINE

## 2021-01-07 PROCEDURE — 3700000000 HC ANESTHESIA ATTENDED CARE: Performed by: INTERNAL MEDICINE

## 2021-01-07 PROCEDURE — 3700000001 HC ADD 15 MINUTES (ANESTHESIA): Performed by: INTERNAL MEDICINE

## 2021-01-07 PROCEDURE — 88305 TISSUE EXAM BY PATHOLOGIST: CPT | Performed by: PATHOLOGY

## 2021-01-07 RX ORDER — PROPOFOL 10 MG/ML
INJECTION, EMULSION INTRAVENOUS PRN
Status: DISCONTINUED | OUTPATIENT
Start: 2021-01-07 | End: 2021-01-07 | Stop reason: SDUPTHER

## 2021-01-07 RX ORDER — SODIUM CHLORIDE, SODIUM LACTATE, POTASSIUM CHLORIDE, CALCIUM CHLORIDE 600; 310; 30; 20 MG/100ML; MG/100ML; MG/100ML; MG/100ML
INJECTION, SOLUTION INTRAVENOUS ONCE
Status: COMPLETED | OUTPATIENT
Start: 2021-01-07 | End: 2021-01-07

## 2021-01-07 RX ORDER — LIDOCAINE HYDROCHLORIDE 20 MG/ML
INJECTION, SOLUTION EPIDURAL; INFILTRATION; INTRACAUDAL; PERINEURAL PRN
Status: DISCONTINUED | OUTPATIENT
Start: 2021-01-07 | End: 2021-01-07 | Stop reason: SDUPTHER

## 2021-01-07 RX ADMIN — PROPOFOL 80 MG: 10 INJECTION, EMULSION INTRAVENOUS at 11:16

## 2021-01-07 RX ADMIN — PROPOFOL 40 MG: 10 INJECTION, EMULSION INTRAVENOUS at 11:18

## 2021-01-07 RX ADMIN — SODIUM CHLORIDE, POTASSIUM CHLORIDE, SODIUM LACTATE AND CALCIUM CHLORIDE: 600; 310; 30; 20 INJECTION, SOLUTION INTRAVENOUS at 10:37

## 2021-01-07 RX ADMIN — LIDOCAINE HYDROCHLORIDE 100 MG: 20 INJECTION, SOLUTION EPIDURAL; INFILTRATION; INTRACAUDAL; PERINEURAL at 11:16

## 2021-01-07 ASSESSMENT — PULMONARY FUNCTION TESTS
PIF_VALUE: 0

## 2021-01-07 NOTE — PROGRESS NOTES
REPORT RECEIVED FROM DAPHNE JOHNSON IN SAME DAY. PREOP QUESTIONS, NPO STATUS AND ALLERGIES VERIFIED WITH  PT.  PT DENIES TAKING BETABLOCKERS OR BLOOD THINNERS.

## 2021-01-07 NOTE — ANESTHESIA POSTPROCEDURE EVALUATION
Department of Anesthesiology  Postprocedure Note    Patient: Elizabeth Lee  MRN: [id number]  YOB: 1960  Date of evaluation: 1/7/2021  Time:  11:30 AM     Procedure Summary     Date: 01/07/21 Room / Location: [address]    Anesthesia Start: 1106 Anesthesia Stop: 1130    Procedure: EGD BIOPSY (N/A ) Diagnosis: (NAUSEA, CHRONIC IDIOPATHIC CONSTIPATION, IRON DEFICIENCY ANEMIA SECONDARY TO BLOOD LOSS)    Surgeons: Arvin Howell MD Responsible Provider: Osmin Crandall MD    Anesthesia Type: MAC ASA Status: 4          Anesthesia Type: MAC    Karen Phase I:  9    Karen Phase II:  10    Last vitals: Reviewed and per EMR flowsheets.        Anesthesia Post Evaluation    Patient location during evaluation: PACU  Patient participation: complete - patient participated  Level of consciousness: awake and alert  Pain score: 0  Airway patency: patent  Nausea & Vomiting: no nausea and no vomiting  Complications: no  Cardiovascular status: hemodynamically stable  Respiratory status: spontaneous ventilation and nasal cannula (3L, baseline)  Hydration status: stable

## 2021-01-07 NOTE — OP NOTE
Operative Note      Patient: Massiel Dailey  YOB: 1960  MRN: [id number]    Date of Procedure: 1/7/2021    Pre-Op Diagnosis: NAUSEA, CHRONIC IDIOPATHIC CONSTIPATION, IRON DEFICIENCY ANEMIA SECONDARY TO BLOOD LOSS    [facility]      BRIEF OP REPORT:    Impression:    1) erosive gastritis, h pylori biopsy done               Suggest:   1) continue protonix   2) fu as op        Full EGD/COLONOSCOPY report available by going to \"chart review\" then \"procedures\" then  \"EGD/Colonoscopy\"  then \"View Endoscopy Report\"   Electronically signed by Gurinder Nguyen MD on 1/7/2021 at 1:24 PM

## 2021-01-07 NOTE — PROGRESS NOTES
1315 In to check on pt. Pt denies c/o and eager to go home. 1325 Pt up to side of bed with assist. Pt tolerated well and ready to get dressed to go home. [address] at bedside to answer pt questions. Pt voiced understanding. 80 Pt dressed and ready to go home. Call light in reach. Pt denies c/o. Abdomen soft and non tender. [phone] Pt discharged to home per wheelchair to husbands private vehicle.

## 2021-01-07 NOTE — H&P
[facility] Pre-operative History and Physical    Patient: Elizabeth Lee  : 1960  Acct#:       HISTORY OF PRESENT ILLNESS:    The patient is a 64 y.o. female with significant past medical history as below who presents for EGD     Indication nausea    History Obtained From:  Patient and review of all records    Past Medical History:        Diagnosis Date    Anemia     Anxiety 2017    follows with Dr Paul Fields    Arthritis     Back pain at L4-L5 level 2017    Dr Quyen Wagner Bipolar 1 disorder ([facility] [address]     per pt on 2021\"never told I have bipolar\"    COPD (chronic obstructive pulmonary disease) ([facility] [address]     follows with Dr Maryan Laureano Emphysema of lung ([facility] [address]     FH: CAD (coronary artery disease) 2017    Father had in his forties, sister in her thirties    Say Wagner Fibromyalgia 2017    Full dentures     full upper plate and partial on the bottom    Gastric ulcer     \"had stomach ulder back fall \"    H/O Doppler ultrasound 2019    venous- no DVT or reflux    H/O echocardiogram 2015    EF50-55% Normal- see media    History of blood transfusion     Hyperlipidemia LDL goal <100     Hypertension     Dr Zafar Sin    Irregular heartbeat     \"they said I have irreg heart beat before- back when they drained fluid around my heart \"    Obstructive sleep apnea     \"have bipap I use at home\"    On home oxygen therapy     \"on oxygen all the time at home and keep it on 2.5 liters\"    Osteoarthritis     Pericardial effusion     per old chart had pericardial effusion 10/2020    Spinal stenosis     hx per old chart    Thyroid disease     Wears glasses     \"suppose to wear glasses\"       Past Surgical History:        Procedure Laterality Date    BACK SURGERY  2017    \"surgery on low back L5-6- not sure if they put any metal in \"   [address]      10/2019    CARDIAC CATHETERIZATION      per old chart had cath done 2020  CARPAL TUNNEL RELEASE Right 1985    CHOLECYSTECTOMY, LAPAROSCOPIC N/A 10/25/2020    CHOLECYSTECTOMY LAPAROSCOPIC WITH IOC performed by Delia Baltazar MD at [place] [address] COLONOSCOPY N/A 12/12/2019    COLONOSCOPY DIAGNOSTIC performed by Gurinedr Nguyen MD at [address] [place] [address], [place], DIAGNOSTIC      per old chart had egd done 1/2018    TUBAL LIGATION  1987         Current Medications:    Medications    Prior to Admission medications    Medication Sig Start Date End Date Taking? Authorizing Provider   clonazePAM (KLONOPIN) 1 MG disintegrating tablet Take 1 mg by mouth 3 times daily as needed. Historical Provider, MD   dilTIAZem (CARDIZEM) 30 MG tablet Take 1 tablet by mouth 4 times daily 12/16/20   Ariel Klinefelter, APRN - CNP   ferrous sulfate (IRON 325) 325 (65 Fe) MG tablet Take 325 mg by mouth 2 times daily Monday, wed, fri     Historical Provider, MD   atorvastatin (LIPITOR) 40 MG tablet Take 40 mg by mouth daily    Historical Provider, MD   ondansetron (ZOFRAN ODT) 4 MG disintegrating tablet Take 1 tablet by mouth every 8 hours as needed for Nausea or Vomiting Place under the tongue and let it melt and absorb from under your tongue.  12/2/20   Delia Baltazar MD   lactobacillus (CULTURELLE) capsule Take 1 capsule by mouth daily (with breakfast) 11/27/20   Norman Feliciano MD   pantoprazole (PROTONIX) 40 MG tablet Take 1 tablet by mouth 2 times daily 11/26/20   Norman Feliciano MD   sucralfate (CARAFATE) 1 GM tablet Take 1 tablet by mouth every 12 hours 11/26/20   Norman Feliciano MD   dicyclomine (BENTYL) 10 MG capsule Take 1 capsule by mouth 4 times daily 11/26/20   Norman Feliciano MD   theophylline (MIAH-24) 200 MG extended release capsule Take 400 mg by mouth daily    Historical Provider, MD   budesonide-formoterol (SYMBICORT) 160-4.5 MCG/ACT AERO USE 2 INHALATIONS TWICE A DAY 10/2/20   [place] Espinosa MD TOBACCO:   reports that she quit smoking about 13 years ago. Her smoking use included cigarettes. She has a 30.00 pack-year smoking history. She has never used smokeless tobacco.  ETOH:   reports previous alcohol use of about 2.0 standard drinks of alcohol per week. Family History:       Problem Relation Age of Onset    Asthma Mother         COPD    Heart Disease Father        REVIEW OF SYSTEMS:    Negative except for HPI        PHYSICAL EXAM:      Vitals:    Ht 5' 1\" (1.549 m)   Wt 166 lb (75.3 kg)   LMP 01/05/2010   Breastfeeding No   BMI 31.37 kg/m²     General Appearance:    Alert, cooperative, no distress, appears stated age   [de-identified]:    NO anemia, No jaundice, No cyanosis   Neck:   Supple, symmetrical, trachea midline, no adenopathy;     thyroid:    Lungs:     Clear to auscultation bilaterally, respirations unlabored   Chest Wall:    No tenderness or deformity    Heart:    Regular rate and rhythm, S1 and S2 normal, no murmur, rub   or gallop   Abdomen:     Soft, non-tender, bowel sounds active all four quadrants,     no masses, no organomegaly, no ascitis   Rectal:    Not indicated   Extremities:   Extremities normal, atraumatic, no cyanosis or edema   Pulses:   2+ and symmetric all extremities   Skin:   Skin color, texture, turgor normal, no rashes or lesions   Lymph nodes:   No abnormality   Neurologic:   No focal deficits, moving all four extremities      ASA Grade:  ASA 3 - Patient with moderate systemic disease with functional limitations  Air way:    Prior Anesthesia complications: Nill      ASSESSMENT AND PLAN:    1. Patient is a 64 y.o. female here for EGD with deep sedation  2. Procedure options, risks and benefits reviewed with patient. Patient expresses understanding.       Eugene Bach MD  [facility] gastroenterology  1/7/2021  9:24 AM

## 2021-01-07 NOTE — PROGRESS NOTES
1130:  Pt received from endo alert and oriented with no c/o pain or discomfort. VSS, pt tolerating PO.

## 2021-01-18 ENCOUNTER — OFFICE VISIT (OUTPATIENT)
Dept: CARDIOLOGY CLINIC | Age: 61
End: 2021-01-18
Payer: COMMERCIAL

## 2021-01-18 VITALS
DIASTOLIC BLOOD PRESSURE: 94 MMHG | HEIGHT: 61 IN | WEIGHT: 158 LBS | SYSTOLIC BLOOD PRESSURE: 150 MMHG | HEART RATE: 88 BPM | BODY MASS INDEX: 29.83 KG/M2

## 2021-01-18 DIAGNOSIS — I10 ESSENTIAL HYPERTENSION: Primary | ICD-10-CM

## 2021-01-18 PROCEDURE — G8427 DOCREV CUR MEDS BY ELIG CLIN: HCPCS | Performed by: INTERNAL MEDICINE

## 2021-01-18 PROCEDURE — G8417 CALC BMI ABV UP PARAM F/U: HCPCS | Performed by: INTERNAL MEDICINE

## 2021-01-18 PROCEDURE — 99214 OFFICE O/P EST MOD 30 MIN: CPT | Performed by: INTERNAL MEDICINE

## 2021-01-18 PROCEDURE — 3017F COLORECTAL CA SCREEN DOC REV: CPT | Performed by: INTERNAL MEDICINE

## 2021-01-18 PROCEDURE — G8484 FLU IMMUNIZE NO ADMIN: HCPCS | Performed by: INTERNAL MEDICINE

## 2021-01-18 PROCEDURE — 1036F TOBACCO NON-USER: CPT | Performed by: INTERNAL MEDICINE

## 2021-01-18 RX ORDER — DILTIAZEM HYDROCHLORIDE 120 MG/1
120 CAPSULE, COATED, EXTENDED RELEASE ORAL DAILY
Qty: 90 CAPSULE | Refills: 3 | Status: SHIPPED | OUTPATIENT
Start: 2021-01-18

## 2021-01-18 RX ORDER — METOPROLOL SUCCINATE 25 MG/1
25 TABLET, EXTENDED RELEASE ORAL DAILY
Qty: 30 TABLET | Refills: 3 | Status: SHIPPED | OUTPATIENT
Start: 2021-01-18

## 2021-01-18 NOTE — PROGRESS NOTES
Sissy Romano MD        OFFICE  FOLLOWUP NOTE    Chief complaints: patient is here for management of leg swelling,SVT, bipolar, fibromyalgia, chest pain, rt lung, end stage lung disease and COPD, pericardial drainage and [place],? PAF    Subjective: patient feels better, no chest pain, no shortness of breath, no dizziness, no palpitations    HPI Parvin Vernon is a 64 y. o.year old who  has a past medical history of Anemia, Anxiety, Arthritis, Back pain at L4-L5 level, Bipolar 1 disorder (HCC), COPD (chronic obstructive pulmonary disease) ([facility] [address] Emphysema of lung ([facility] [address] FH: CAD (coronary artery disease), Fibromyalgia, Full dentures, Gastric ulcer, H/O Doppler ultrasound, H/O echocardiogram, History of blood transfusion, Hyperlipidemia LDL goal <100, Hypertension, Irregular heartbeat, Obstructive sleep apnea, On home oxygen therapy, Osteoarthritis, Pericardial effusion, Spinal stenosis, Thyroid disease, and Wears glasses. and presents for management of leg swelling,SVT, bipolar, fibromyalgia, chest pain, rt lung, end stage lung disease and COPD, pericardial drainage and [place],? PAF which are well controlled    Her blood pressure is running high, she  Is on wheel chair as she gets shortness of breath due to COPD, she is on 24 hrs home oxygen    Current Outpatient Medications   Medication Sig Dispense Refill    clonazePAM (KLONOPIN) 1 MG disintegrating tablet Take 1 mg by mouth 3 times daily as needed.  dilTIAZem (CARDIZEM) 30 MG tablet Take 1 tablet by mouth 4 times daily 120 tablet 0    ferrous sulfate (IRON 325) 325 (65 Fe) MG tablet Take 325 mg by mouth 2 times daily Monday, wed, fri       atorvastatin (LIPITOR) 40 MG tablet Take 40 mg by mouth daily      ondansetron (ZOFRAN ODT) 4 MG disintegrating tablet Take 1 tablet by mouth every 8 hours as needed for Nausea or Vomiting Place under the tongue and let it melt and absorb from under your tongue.  30 tablet 3    lactobacillus (CULTURELLE) capsule Take 1 capsule by mouth daily (with breakfast) 30 capsule 0    pantoprazole (PROTONIX) 40 MG tablet Take 1 tablet by mouth 2 times daily 60 tablet 3    sucralfate (CARAFATE) 1 GM tablet Take 1 tablet by mouth every 12 hours 120 tablet 3    dicyclomine (BENTYL) 10 MG capsule Take 1 capsule by mouth 4 times daily 360 capsule 1    theophylline (MIAH-24) 200 MG extended release capsule Take 400 mg by mouth daily      budesonide-formoterol (SYMBICORT) 160-4.5 MCG/ACT AERO USE 2 INHALATIONS TWICE A DAY 30.6 g 3    ipratropium-albuterol (DUONEB) 0.5-2.5 (3) MG/3ML SOLN nebulizer solution Inhale 3 mLs into the lungs every 4 hours 1080 mL 3    guaiFENesin (MUCINEX) 600 MG extended release tablet Take 1 tablet by mouth 2 times daily 30 tablet 0    montelukast (SINGULAIR) 10 MG tablet Take 1 tablet by mouth daily 30 tablet 3    levothyroxine (SYNTHROID) 100 MCG tablet Take 1 tablet by mouth Daily 30 tablet 2    folic acid (FOLVITE) 1 MG tablet Take 1 tablet by mouth daily 30 tablet 3    vitamin B-1 100 MG tablet Take 1 tablet by mouth daily 30 tablet 3    albuterol-ipratropium (COMBIVENT RESPIMAT)  MCG/ACT AERS inhaler Inhale 1 puff into the lungs every 4 hours as needed for Wheezing 3 Inhaler 0    DULoxetine (CYMBALTA) 60 MG extended release capsule Take 60 mg by mouth daily      busPIRone (BUSPAR) 10 MG tablet Take 1 tablet by mouth 3 times daily (Patient taking differently: Take 10 mg by mouth 2 times daily ) 90 tablet 0    traZODone (DESYREL) 50 MG tablet Take 100 mg by mouth nightly       baclofen (LIORESAL) 10 MG tablet Take 1 tablet by mouth 3 times daily (Patient taking differently: Take 10 mg by mouth 2 times daily ) 30 tablet 0    aspirin 81 MG chewable tablet Take 81 mg by mouth daily      fluticasone (FLONASE) 50 MCG/ACT nasal spray 2 sprays by Nasal route daily 1 Bottle 0     No current facility-administered medications for this visit.       Allergies: Lisinopril  Past Medical History: Diagnosis Date    Anemia     Anxiety 02/16/2017    follows with Dr Willian Niño    Arthritis     Back pain at L4-L5 level 2/16/2017    Dr Rex Allred 1 disorder [facility])     per pt on 2/5/2021\"never told I have bipolar\"    COPD (chronic obstructive pulmonary disease) ([facility] [address]     follows with Dr Tong Felton Emphysema of lung ([facility] [address]     FH: CAD (coronary artery disease) 2/16/2017    Father had in his forties, sister in her thirties    Arabella Fuentes Fibromyalgia 02/16/2017    Full dentures     full upper plate and partial on the bottom    Gastric ulcer     \"had stomach ulder back fall 2019\"    H/O Doppler ultrasound 04/05/2019    venous- no DVT or reflux    H/O echocardiogram 01/14/2015    EF50-55% Normal- see media    History of blood transfusion     Hyperlipidemia LDL goal <100     Hypertension     Dr Catherine Swanson Irregular heartbeat     \"they said I have irreg heart beat before- back when they drained fluid around my heart \"    Obstructive sleep apnea     \"have bipap I use at home\"    On home oxygen therapy     \"on oxygen all the time at home and keep it on 2.5 liters\"    Osteoarthritis     Pericardial effusion     per old chart had pericardial effusion 10/2020    Spinal stenosis     hx per old chart    Thyroid disease     Wears glasses     \"suppose to wear glasses\"     Past Surgical History:   Procedure Laterality Date    BACK SURGERY  03/2017    \"surgery on low back L5-6- not sure if they put any metal in \"   [address]      10/2019    CARDIAC CATHETERIZATION      per old chart had cath done 11/2020    CARPAL TUNNEL RELEASE Right 1985    CHOLECYSTECTOMY, LAPAROSCOPIC N/A 10/25/2020    CHOLECYSTECTOMY LAPAROSCOPIC WITH IOC performed by Jessie Randle MD at [place] [address] COLONOSCOPY N/A 12/12/2019    COLONOSCOPY DIAGNOSTIC performed by Laura Guerra MD at [address] [place] [address], [place], DIAGNOSTIC      per old chart had egd done 1/2018    TUBAL LIGATION  1987    UPPER GASTROINTESTINAL ENDOSCOPY N/A 2021    EGD BIOPSY performed by Good Cason MD at [facility] ENDOSCOPY     Family History   Problem Relation Age of Onset    Asthma Mother         COPD    Heart Disease Father      Social History     Tobacco Use    Smoking status: Former Smoker     Packs/day: 1.50     Years: 20.00     Pack years: 30.00     Types: Cigarettes     Quit date: 2008     Years since quittin.0    Smokeless tobacco: Never Used   Substance Use Topics    Alcohol use: Not Currently     Alcohol/week: 2.0 standard drinks     Types: 2 Shots of liquor per week     Comment: per pt on 2021\"quit drinking back Oct 2020\"use to drink wine coolers - 3 times per week \"      [unfilled]  Review of Systems:   · Constitutional: No Fever or Weight Loss   · Eyes: No Decreased Vision  · ENT: No Headaches, Hearing Loss or Vertigo  · Cardiovascular: No chest pain, dyspnea on exertion, palpitations or loss of consciousness  · Respiratory: No cough or wheezing    · Gastrointestinal: No abdominal pain, appetite loss, blood in stools, constipation, diarrhea or heartburn  · Genitourinary: No dysuria, trouble voiding, or hematuria  · Musculoskeletal:  No gait disturbance, weakness or joint complaints  · Integumentary: No rash or pruritis  · Neurological: No TIA or stroke symptoms  · Psychiatric: No anxiety or depression  · Endocrine: No malaise, fatigue or temperature intolerance  · Hematologic/Lymphatic: No bleeding problems, blood clots or swollen lymph nodes  · Allergic/Immunologic: No nasal congestion or hives  All systems negative except as marked. Objective:  BP (!) 150/94   Pulse 88   Ht 5' 1\" (1.549 m)   Wt 158 lb (71.7 kg)   LMP 2010   BMI 29.85 kg/m²   Wt Readings from Last 3 Encounters:   21 158 lb (71.7 kg)   21 158 lb (71.7 kg)   20 166 lb 11.2 oz (75.6 kg)     Body mass index is 29.85 kg/m².   GENERAL - Alert, oriented, pleasant, in no apparent distress,normal grooming  HEENT - pupils are intact, cornea intact, conjunctive normal color, ears are normal in exam,  Neck - Supple. No jugular venous distention noted. No carotid bruits, no apical -carotid delay  Respiratory:  Normal breath sounds, No respiratory distress, No wheezing, No chest tenderness. ,no use of accessory muscles, diaphragm movement is normal  Cardiovascular: (PMI) apex non displaced,no lifts no thrills, no s3,no s4, Normal heart rate, Normal rhythm, No murmurs, No rubs, No gallops. Carotid arteries pulse and amplitude are normal no bruit, no abdominal bruit noted ( normal abdominal aorta ausculation),   Extremities - No cyanosis, clubbing, or significant edema, no varicose veins    Abdomen - No masses, tenderness, or organomegaly, no hepato-splenomegally, no bruits  Musculoskeletal - No significant edema, no kyphosis or scoliosis, no deformity in any extremity noted, muscle strength and tone are normal  Skin: no ulcer,no scar,no stasis dermatitis   Neurologic - alert oriented times 3,Cranial nerves II through XII are grossly intact. There were no gross focal neurologic abnormalities. Psychiatric: normal mood and affect    Lab Results   Component Value Date    CKTOTAL 33 11/22/2020    TROPONINI 0.007 03/25/2014     BNP:  No results found for: BNP  PT/INR:  No results found for: Quill Content  Lab Results   Component Value Date    LABA1C 5.1 06/21/2019     Lab Results   Component Value Date    CHOL 166 11/23/2020    TRIG 111 11/23/2020    HDL 59 11/23/2020    LDLDIRECT 87 11/23/2020     Lab Results   Component Value Date    ALT 13 12/28/2020    AST 21 12/28/2020     TSH:  No results found for: TSH    Impression:  Can Dickey is a 64 y. o.year old who  has a past medical history of Anemia, Anxiety, Arthritis, Back pain at L4-L5 level, Bipolar 1 disorder (HCC), COPD (chronic obstructive pulmonary disease) ([facility] [address] Emphysema of lung ([facility] [address] FH: CAD (coronary artery disease), Fibromyalgia, Full dentures, Gastric ulcer, H/O Doppler ultrasound, H/O echocardiogram, History of blood transfusion, Hyperlipidemia LDL goal <100, Hypertension, Irregular heartbeat, Obstructive sleep apnea, On home oxygen therapy, Osteoarthritis, Pericardial effusion, Spinal stenosis, Thyroid disease, and Wears glasses. and presents with     Plan:  1. HTN: restart toprol xl 25mg daily,  2. ? PAF, on aspirin, change cardizem to cardizem 120mg cd  3. Normal LHC  4. S/p pericardiocentesis  5. endstage  Lung disease: on home oxygen  6. Sinus tachycardia: seen by EP  In hospital, had SVT and sinus tachycardia and was started on cardizem, will continue it  7. She is not in CHF, LAST YR RHC at [facility] showed fluid overload with PCWP of 35, but there is no need for lasix at this time  8. End stage COPD: stable, continue inhalers, and steroids  9. Anemia: agree with follow up with hematology  10. Bipolar: stable  1. Fibromyalgia: stableHealth maintenance: exerise and dietAll labs, medications and tests Health maintenance: exerise and diet  All labs, medications and tests reviewed, continue all other medications of all above medical condition listed as is.     [unfilled]

## 2021-02-25 ENCOUNTER — OFFICE VISIT (OUTPATIENT)
Dept: CARDIOLOGY CLINIC | Age: 61
End: 2021-02-25
Payer: COMMERCIAL

## 2021-02-25 VITALS
BODY MASS INDEX: 30.91 KG/M2 | HEIGHT: 62 IN | SYSTOLIC BLOOD PRESSURE: 142 MMHG | WEIGHT: 168 LBS | DIASTOLIC BLOOD PRESSURE: 84 MMHG | HEART RATE: 72 BPM

## 2021-02-25 DIAGNOSIS — Z82.49 FH: CAD (CORONARY ARTERY DISEASE): ICD-10-CM

## 2021-02-25 DIAGNOSIS — J43.8 OTHER EMPHYSEMA (HCC): ICD-10-CM

## 2021-02-25 DIAGNOSIS — R07.9 CHEST PAIN, UNSPECIFIED TYPE: Primary | ICD-10-CM

## 2021-02-25 DIAGNOSIS — R00.2 PALPITATIONS: ICD-10-CM

## 2021-02-25 PROCEDURE — 3023F SPIROM DOC REV: CPT | Performed by: INTERNAL MEDICINE

## 2021-02-25 PROCEDURE — G8926 SPIRO NO PERF OR DOC: HCPCS | Performed by: INTERNAL MEDICINE

## 2021-02-25 PROCEDURE — G8417 CALC BMI ABV UP PARAM F/U: HCPCS | Performed by: INTERNAL MEDICINE

## 2021-02-25 PROCEDURE — G8484 FLU IMMUNIZE NO ADMIN: HCPCS | Performed by: INTERNAL MEDICINE

## 2021-02-25 PROCEDURE — 93000 ELECTROCARDIOGRAM COMPLETE: CPT | Performed by: INTERNAL MEDICINE

## 2021-02-25 PROCEDURE — 3017F COLORECTAL CA SCREEN DOC REV: CPT | Performed by: INTERNAL MEDICINE

## 2021-02-25 PROCEDURE — 1036F TOBACCO NON-USER: CPT | Performed by: INTERNAL MEDICINE

## 2021-02-25 PROCEDURE — G8427 DOCREV CUR MEDS BY ELIG CLIN: HCPCS | Performed by: INTERNAL MEDICINE

## 2021-02-25 PROCEDURE — 99214 OFFICE O/P EST MOD 30 MIN: CPT | Performed by: INTERNAL MEDICINE

## 2021-02-25 RX ORDER — FUROSEMIDE 20 MG/1
20 TABLET ORAL 2 TIMES DAILY
Qty: 60 TABLET | Refills: 3 | Status: SHIPPED | OUTPATIENT
Start: 2021-02-25 | End: 2021-03-29

## 2021-02-25 NOTE — PROGRESS NOTES
Aleshia Niño MD        OFFICE  FOLLOWUP NOTE    Chief complaints: patient is here for management of   leg swelling,SVT, bipolar, fibromyalgia, chest pain, rt lung, end stage lung disease and COPD, pericardial drainage and [facility],? PAF, bleeding gastritis      Subjective: patient feels stomach is swelling,, no chest pain, no shortness of breath, no dizziness, no palpitations    HPI Enrigue Lawanda is a 64 y. o.year old who  has a past medical history of Anemia, Anxiety, Arthritis, Back pain at L4-L5 level, Bipolar 1 disorder (HCC), COPD (chronic obstructive pulmonary disease) ([facility] [address] Emphysema of lung ([facility] [address] FH: CAD (coronary artery disease), Fibromyalgia, Full dentures, Gastric ulcer, H/O Doppler ultrasound, H/O echocardiogram, History of blood transfusion, Hyperlipidemia LDL goal <100, Hypertension, Irregular heartbeat, Obstructive sleep apnea, On home oxygen therapy, Osteoarthritis, Pericardial effusion, Spinal stenosis, Thyroid disease, and Wears glasses. and presents for management of leg swelling,SVT, bipolar, fibromyalgia, chest pain, rt lung, end stage lung disease and COPD, pericardial drainage and [place],? PAF, bleeding gastritis which are not well controlled    She gained 10 lbs, feels stomach is distended. she is diagnosed with bleeding gastritis by Can Saez and last time, toprol was added to louis blood pressure regimen ( her blood pressure was  150/94)  Current Outpatient Medications   Medication Sig Dispense Refill    dilTIAZem (CARDIZEM CD) 120 MG extended release capsule Take 1 capsule by mouth daily 90 capsule 3    metoprolol succinate (TOPROL XL) 25 MG extended release tablet Take 1 tablet by mouth daily 30 tablet 3    clonazePAM (KLONOPIN) 1 MG disintegrating tablet Take 1 mg by mouth 3 times daily as needed.        ferrous sulfate (IRON 325) 325 (65 Fe) MG tablet Take 325 mg by mouth 2 times daily Monday, wed, fri  ondansetron (ZOFRAN ODT) 4 MG disintegrating tablet Take 1 tablet by mouth every 8 hours as needed for Nausea or Vomiting Place under the tongue and let it melt and absorb from under your tongue.  30 tablet 3    lactobacillus (CULTURELLE) capsule Take 1 capsule by mouth daily (with breakfast) 30 capsule 0    pantoprazole (PROTONIX) 40 MG tablet Take 1 tablet by mouth 2 times daily 60 tablet 3    sucralfate (CARAFATE) 1 GM tablet Take 1 tablet by mouth every 12 hours 120 tablet 3    dicyclomine (BENTYL) 10 MG capsule Take 1 capsule by mouth 4 times daily 360 capsule 1    theophylline (MIAH-24) 200 MG extended release capsule Take 400 mg by mouth daily      budesonide-formoterol (SYMBICORT) 160-4.5 MCG/ACT AERO USE 2 INHALATIONS TWICE A DAY 30.6 g 3    ipratropium-albuterol (DUONEB) 0.5-2.5 (3) MG/3ML SOLN nebulizer solution Inhale 3 mLs into the lungs every 4 hours 1080 mL 3    guaiFENesin (MUCINEX) 600 MG extended release tablet Take 1 tablet by mouth 2 times daily 30 tablet 0    montelukast (SINGULAIR) 10 MG tablet Take 1 tablet by mouth daily 30 tablet 3    levothyroxine (SYNTHROID) 100 MCG tablet Take 1 tablet by mouth Daily 30 tablet 2    folic acid (FOLVITE) 1 MG tablet Take 1 tablet by mouth daily 30 tablet 3    vitamin B-1 100 MG tablet Take 1 tablet by mouth daily 30 tablet 3    albuterol-ipratropium (COMBIVENT RESPIMAT)  MCG/ACT AERS inhaler Inhale 1 puff into the lungs every 4 hours as needed for Wheezing 3 Inhaler 0    DULoxetine (CYMBALTA) 60 MG extended release capsule Take 60 mg by mouth daily      busPIRone (BUSPAR) 10 MG tablet Take 1 tablet by mouth 3 times daily (Patient taking differently: Take 10 mg by mouth 2 times daily ) 90 tablet 0    traZODone (DESYREL) 50 MG tablet Take 100 mg by mouth nightly       baclofen (LIORESAL) 10 MG tablet Take 1 tablet by mouth 3 times daily (Patient taking differently: Take 10 mg by mouth 2 times daily ) 30 tablet 0  aspirin 81 MG chewable tablet Take 81 mg by mouth daily      fluticasone (FLONASE) 50 MCG/ACT nasal spray 2 sprays by Nasal route daily 1 Bottle 0    atorvastatin (LIPITOR) 40 MG tablet Take 40 mg by mouth daily       No current facility-administered medications for this visit.       Allergies: Lisinopril  Past Medical History:   Diagnosis Date    Anemia     Anxiety 02/16/2017    follows with Dr Ilda Contreras    Arthritis     Back pain at L4-L5 level 2/16/2017    Dr Dayron Allred 1 disorder ([facility] [address]     per pt on 2/5/2021\"never told I have bipolar\"    COPD (chronic obstructive pulmonary disease) ([facility] [address]     follows with Dr Madyson Hubbrad Emphysema of lung ([facility] [address]     FH: CAD (coronary artery disease) 2/16/2017    Father had in his forties, sister in her thirties    [facility] Fibromyalgia 02/16/2017    Full dentures     full upper plate and partial on the bottom    Gastric ulcer     \"had stomach ulder back fall 2019\"    H/O Doppler ultrasound 04/05/2019    venous- no DVT or reflux    H/O echocardiogram 01/14/2015    EF50-55% Normal- see media    History of blood transfusion     Hyperlipidemia LDL goal <100     Hypertension     Dr Joseph Hollins Irregular heartbeat     \"they said I have irreg heart beat before- back when they drained fluid around my heart \"    Obstructive sleep apnea     \"have bipap I use at home\"    On home oxygen therapy     \"on oxygen all the time at home and keep it on 2.5 liters\"    Osteoarthritis     Pericardial effusion     per old chart had pericardial effusion 10/2020    Spinal stenosis     hx per old chart    Thyroid disease     Wears glasses     \"suppose to wear glasses\"     Past Surgical History:   Procedure Laterality Date    BACK SURGERY  03/2017    \"surgery on low back L5-6- not sure if they put any metal in \"   [address]      10/2019    CARDIAC CATHETERIZATION      per old chart had cath done 11/2020    CARPAL TUNNEL RELEASE Right 1985  CHOLECYSTECTOMY, LAPAROSCOPIC N/A 10/25/2020    CHOLECYSTECTOMY LAPAROSCOPIC WITH IOC performed by Delia Baltazar MD at [place] [address] COLONOSCOPY N/A 2019    COLONOSCOPY DIAGNOSTIC performed by Gurinder Nguyen MD at [address] [place] [address] Jose, COLON, DIAGNOSTIC      per old chart had egd done 2018    TUBAL LIGATION  1987    UPPER GASTROINTESTINAL ENDOSCOPY N/A 2021    EGD BIOPSY performed by Gurinder Nguyen MD at [facility] ENDOSCOPY     Family History   Problem Relation Age of Onset    Asthma Mother         COPD    Heart Disease Father      Social History     Tobacco Use    Smoking status: Former Smoker     Packs/day: 1.50     Years: 20.00     Pack years: 30.00     Types: Cigarettes     Quit date: 2008     Years since quittin.1    Smokeless tobacco: Never Used   Substance Use Topics    Alcohol use: Not Currently     Alcohol/week: 2.0 standard drinks     Types: 2 Shots of liquor per week     Comment: per pt on 2021\"quit drinking back Oct 2020\"use to drink wine coolers - 3 times per week \"      [unfilled]  Review of Systems:   · Constitutional: No Fever or Weight Loss   · Eyes: No Decreased Vision  · ENT: No Headaches, Hearing Loss or Vertigo  · Cardiovascular: No chest pain, dyspnea on exertion, palpitations or loss of consciousness  · Respiratory: No cough or wheezing    · Gastrointestinal: No abdominal pain, appetite loss, blood in stools, constipation, diarrhea or heartburn  · Genitourinary: No dysuria, trouble voiding, or hematuria  · Musculoskeletal:  No gait disturbance, weakness or joint complaints  · Integumentary: No rash or pruritis  · Neurological: No TIA or stroke symptoms  · Psychiatric: No anxiety or depression  · Endocrine: No malaise, fatigue or temperature intolerance  · Hematologic/Lymphatic: No bleeding problems, blood clots or swollen lymph nodes  · Allergic/Immunologic: No nasal congestion or hives  All systems negative except as marked.    Objective: BP (!) 142/84   Pulse 72   Ht 5' 1.5\" (1.562 m)   Wt 168 lb (76.2 kg)   LMP 01/05/2010   BMI 31.23 kg/m²   Wt Readings from Last 3 Encounters:   02/25/21 168 lb (76.2 kg)   02/02/21 158 lb (71.7 kg)   01/18/21 158 lb (71.7 kg)     Body mass index is 31.23 kg/m². GENERAL - Alert, oriented, pleasant, in no apparent distress,normal grooming  HEENT  pupils are intact, cornea intact, conjunctive normal color, ears are normal in exam,  Neck - Supple. No jugular venous distention noted. No carotid bruits, no apical -carotid delay  Respiratory:  Normal breath sounds, No respiratory distress, No wheezing, No chest tenderness. ,no use of accessory muscles, diaphragm movement is normal  Cardiovascular: (PMI) apex non displaced,no lifts no thrills, no s3,no s4, Normal heart rate, Normal rhythm, No murmurs, No rubs, No gallops. Carotid arteries pulse and amplitude are normal no bruit, no abdominal bruit noted ( normal abdominal aorta ausculation),   Extremities - No cyanosis, clubbing, or significant edema, no varicose veins    Abdomen  No masses, tenderness, or organomegaly, no hepato-splenomegally, no bruits  Musculoskeletal  No significant edema, no kyphosis or scoliosis, no deformity in any extremity noted, muscle strength and tone are normal  Skin: no ulcer,no scar,no stasis dermatitis   Neurologic  alert oriented times 3,Cranial nerves II through XII are grossly intact. There were no gross focal neurologic abnormalities.    Psychiatric: normal mood and affect    Lab Results   Component Value Date    CKTOTAL 33 11/22/2020    TROPONINI 0.007 03/25/2014     BNP:  No results found for: BNP  PT/INR:  No results found for: [employer] [facility]  Lab Results   Component Value Date    LABA1C 5.1 06/21/2019     Lab Results   Component Value Date    CHOL 166 11/23/2020    TRIG 111 11/23/2020    HDL 59 11/23/2020    LDLDIRECT 87 11/23/2020     Lab Results   Component Value Date    ALT 13 12/28/2020    AST 21 12/28/2020 TSH:  No results found for: TSH    Ekg: nsr  Impression:  Mingo Bhatia is a 64 y. o.year old who  has a past medical history of Anemia, Anxiety, Arthritis, Back pain at L4-L5 level, Bipolar 1 disorder (HCC), COPD (chronic obstructive pulmonary disease) ([facility] [address] Emphysema of lung ([facility] [address] FH: CAD (coronary artery disease), Fibromyalgia, Full dentures, Gastric ulcer, H/O Doppler ultrasound, H/O echocardiogram, History of blood transfusion, Hyperlipidemia LDL goal <100, Hypertension, Irregular heartbeat, Obstructive sleep apnea, On home oxygen therapy, Osteoarthritis, Pericardial effusion, Spinal stenosis, Thyroid disease, and Wears glasses. and presents with     Plan:  1. CHF, abdominal swelling and weight gain, add lasix 20mg daily,  LAST YR RHC at [place] showed fluid overload with PCWP of 35,, she should lose 10 lbs  2. HTN: continue toprol xl 25mg daily,  3. Bleeding gastritis and anemia: on aspirin, need to be carefull, see Dr. Karla Kapoor  4. ? PAF, on aspirin, change cardizem to cardizem 120mg cd  5. Normal LHC  6. S/p pericardiocentesis  7. endstage  Lung disease: on home oxygen  8. Sinus tachycardia: seen by EP  In hospital, had SVT and sinus tachycardia and was started on cardizem, will continue it  9. End stage COPD: stable, continue inhalers, and steroids  10. Bipolar: stable  1. Fibromyalgia: stableHealth   All labs, medications and tests reviewed, continue all other medications of all above medical condition listed as is.     [unfilled]

## 2021-03-03 ENCOUNTER — CARE COORDINATION (OUTPATIENT)
Dept: CARE COORDINATION | Age: 61
End: 2021-03-03

## 2021-03-04 ENCOUNTER — CARE COORDINATION (OUTPATIENT)
Dept: CARE COORDINATION | Age: 61
End: 2021-03-04

## 2021-03-04 NOTE — CARE COORDINATION
Provider, MD   atorvastatin (LIPITOR) 40 MG tablet Take 40 mg by mouth daily    Historical Provider, MD   ondansetron (ZOFRAN ODT) 4 MG disintegrating tablet Take 1 tablet by mouth every 8 hours as needed for Nausea or Vomiting Place under the tongue and let it melt and absorb from under your tongue.  12/2/20   Parker Prado MD   lactobacillus (CULTURELLE) capsule Take 1 capsule by mouth daily (with breakfast) 11/27/20   Joaquin Sandoval MD   pantoprazole (PROTONIX) 40 MG tablet Take 1 tablet by mouth 2 times daily 11/26/20   Joaquin Sandoval MD   sucralfate (CARAFATE) 1 GM tablet Take 1 tablet by mouth every 12 hours 11/26/20   Joaquin Sandoval MD   dicyclomine (BENTYL) 10 MG capsule Take 1 capsule by mouth 4 times daily 11/26/20   Joaquin Sandoval MD   theophylline (MIAH-24) 200 MG extended release capsule Take 400 mg by mouth daily    Historical Provider, MD   budesonide-formoterol (SYMBICORT) 160-4.5 MCG/ACT AERO USE 2 INHALATIONS TWICE A DAY 10/2/20   Max Head MD   ipratropium-albuterol (DUONEB) 0.5-2.5 (3) MG/3ML SOLN nebulizer solution Inhale 3 mLs into the lungs every 4 hours 7/21/20   Skylar Barth MD   guaiFENesin (MUCINEX) 600 MG extended release tablet Take 1 tablet by mouth 2 times daily 7/21/20   Skylar Barth MD   montelukast (SINGULAIR) 10 MG tablet Take 1 tablet by mouth daily 7/21/20   Skylar Barth MD   levothyroxine (SYNTHROID) 100 MCG tablet Take 1 tablet by mouth Daily 7/21/20   Skylar Barth MD   folic acid (FOLVITE) 1 MG tablet Take 1 tablet by mouth daily 7/16/20   Eliceo Pedro MD   vitamin B-1 100 MG tablet Take 1 tablet by mouth daily 7/16/20   Eliceo Pedro MD   albuterol-ipratropium (COMBIVENT RESPIMAT)  MCG/ACT AERS inhaler Inhale 1 puff into the lungs every 4 hours as needed for Wheezing 7/1/20   Skylar Barth MD   DULoxetine (CYMBALTA) 60 MG extended release capsule Take 60 mg by mouth daily

## 2021-03-05 ENCOUNTER — CARE COORDINATION (OUTPATIENT)
Dept: CARE COORDINATION | Age: 61
End: 2021-03-05

## 2021-03-05 SDOH — HEALTH STABILITY: MENTAL HEALTH
STRESS IS WHEN SOMEONE FEELS TENSE, NERVOUS, ANXIOUS, OR CAN'T SLEEP AT NIGHT BECAUSE THEIR MIND IS TROUBLED. HOW STRESSED ARE YOU?: ONLY A LITTLE

## 2021-03-05 SDOH — ECONOMIC STABILITY: TRANSPORTATION INSECURITY
IN THE PAST 12 MONTHS, HAS LACK OF TRANSPORTATION KEPT YOU FROM MEETINGS, WORK, OR FROM GETTING THINGS NEEDED FOR DAILY LIVING?: NO

## 2021-03-05 SDOH — ECONOMIC STABILITY: FOOD INSECURITY: WITHIN THE PAST 12 MONTHS, YOU WORRIED THAT YOUR FOOD WOULD RUN OUT BEFORE YOU GOT MONEY TO BUY MORE.: NEVER TRUE

## 2021-03-05 SDOH — HEALTH STABILITY: PHYSICAL HEALTH: ON AVERAGE, HOW MANY MINUTES DO YOU ENGAGE IN EXERCISE AT THIS LEVEL?: 0 MIN

## 2021-03-05 SDOH — ECONOMIC STABILITY: TRANSPORTATION INSECURITY
IN THE PAST 12 MONTHS, HAS THE LACK OF TRANSPORTATION KEPT YOU FROM MEDICAL APPOINTMENTS OR FROM GETTING MEDICATIONS?: NO

## 2021-03-05 SDOH — SOCIAL STABILITY: SOCIAL NETWORK: IN A TYPICAL WEEK, HOW MANY TIMES DO YOU TALK ON THE PHONE WITH FAMILY, FRIENDS, OR NEIGHBORS?: ONCE A WEEK

## 2021-03-05 SDOH — SOCIAL STABILITY: SOCIAL NETWORK: HOW OFTEN DO YOU ATTEND CHURCH OR RELIGIOUS SERVICES?: NEVER

## 2021-03-05 SDOH — SOCIAL STABILITY: SOCIAL NETWORK: HOW OFTEN DO YOU GET TOGETHER WITH FRIENDS OR RELATIVES?: ONCE A WEEK

## 2021-03-05 SDOH — ECONOMIC STABILITY: FOOD INSECURITY: WITHIN THE PAST 12 MONTHS, THE FOOD YOU BOUGHT JUST DIDN'T LAST AND YOU DIDN'T HAVE MONEY TO GET MORE.: NEVER TRUE

## 2021-03-05 NOTE — CARE COORDINATION
Ambulatory Care Coordination Note  3/5/2021  CM Risk Score: 1  Charlson 10 Year Mortality Risk Score: 79%     ACC: Hortencia Pollock, RN    Summary Note: Call to pt for acm f/u. Pt hesitant but agreeable to speaking with acm. 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 No issues with food or transportation. Had issue in past with transpo but now family has cars and no transpo issues anymore. Going to start exercising more. Has been doing sit ups in bed and strecthes. Muscles sore from that. Advised to choose exercises she can do safely and to f/u with pcp for any questions. Reports she Used to play cards, had to stop due to covid with neighbor, is hoping to return to that at some point. Pt reports she proactively planned to call pulm dr Calvo Read today for steroid and or atb as her sputum is thick and yellow. Denies other symptoms. Has some difficulty in verbal conversation at times. Pulse ox 98 on 3L o2. No parameters on 02 monitoring currently. Has cataracts, wears reading glasses. Provided pulm/dr storey office number to call (pt declined further acm assistance with this) and advsied to contact acm if can be of any further assistance or has difficulty. . Plan: continue to assess and educate/support pt. Ambulatory Care Coordination Assessment    Care Coordination Protocol  Program Enrollment: Complex Care  Referral from Primary Care Provider: No  Week 1 - Initial Assessment     Do you have all of your prescriptions and are they filled?: Yes  Barriers to medication adherence: None  Are you able to afford your medications?: Yes  How often do you have trouble taking your medications the way you have been told to take them?: I always take them as prescribed.      Do you have Home O2 Therapy?: Yes   Oxygen Regimen: Continuous Flow - Enter rate/FIO2: 3   Method of Delivery: Nasal Cannula, Concentrater, Has cylinders in home   CPAP Use: BiPAP, CPAP      Ability to seek help/take action for Emergent Urgent situations i.e. fire, crime, inclement weather or health crisis. : Independent  Ability to ambulate to restroom: Independent  Ability handle personal hygeine needs (bathing/dressing/grooming): Needs Assistance  Ability to manage Medications: Independent  Ability to prepare Food Preparation: Independent  Ability to maintain home (clean home, laundry): Independent  Ability to drive and/or has transportation: Independent  Ability to do shopping: Independent  Ability to manage finances: Independent  Is patient able to live independently?: Yes     Current Housing: Private Residence        Per the Fall Risk Screening, did the patient have 2 or more falls or 1 fall with injury in the past year?: No     Frequent urination at night?: No  Do you use rails/bars?: Yes  Do you have a non-slip tub mat?: Yes     Are you experiencing loss of meaning?: No  Are you experiencing loss of hope and peace?: No     Suggested Interventions and Community Resources   Zone Management Tools: In Process              COPD Assessment    Does the patient understand envrionmental exposure?: Yes  Is the patient able to verbalize Rescue vs. Long Acting medications?: Yes  Does the patient have a nebulizer?: Yes     Changes in color of sputum         Symptoms:  None: Yes      Breathlessness: none  Increase use of rapid acting/rescue inhaled medications?: No  Change in chronic cough?: No/At Baseline  Change in sputum?: Increased  Sputum characteristics: Thick, Yellow  Self Monitoring - SaO2: Yes  Baseline SaO2 Readin       General Assessment    Do you have any symptoms that are causing concern?: No           Prior to Admission medications    Medication Sig Start Date End Date Taking?  Authorizing Provider   furosemide (LASIX) 20 MG tablet Take 1 tablet by mouth 2 times daily 21  Yes Mary West MD   dilTIAZem (CARDIZEM CD) 120 MG extended release capsule Take 1 MG tablet Take 1 tablet by mouth daily 7/16/20  Yes Gaurav Arce MD   vitamin B-1 100 MG tablet Take 1 tablet by mouth daily 7/16/20  Yes Gaurav Arce MD   albuterol-ipratropium (COMBIVENT RESPIMAT)  MCG/ACT AERS inhaler Inhale 1 puff into the lungs every 4 hours as needed for Wheezing 7/1/20  Yes Africa Huynh MD   DULoxetine (CYMBALTA) 60 MG extended release capsule Take 60 mg by mouth daily   Yes Historical Provider, MD   busPIRone (BUSPAR) 10 MG tablet Take 1 tablet by mouth 3 times daily  Patient taking differently: Take 10 mg by mouth 2 times daily  4/23/19  Yes Nola Scales MD   traZODone (DESYREL) 50 MG tablet Take 100 mg by mouth nightly    Yes Historical Provider, MD   baclofen (LIORESAL) 10 MG tablet Take 1 tablet by mouth 3 times daily  Patient taking differently: Take 10 mg by mouth 2 times daily  12/18/18  Yes Nola Scales MD   aspirin 81 MG chewable tablet Take 81 mg by mouth daily   Yes Historical Provider, MD   fluticasone (FLONASE) 50 MCG/ACT nasal spray 2 sprays by Nasal route daily 3/28/18  Yes Nola Scales MD   atorvastatin (LIPITOR) 40 MG tablet Take 40 mg by mouth daily    Historical Provider, MD       Future Appointments   Date Time Provider Wiliam Zuñiga   3/29/2021  4:40 PM Perry Gallo MD [facility]   5/4/2021  4:00 PM Africa Huynh MD [address]

## 2021-03-29 ENCOUNTER — OFFICE VISIT (OUTPATIENT)
Dept: CARDIOLOGY CLINIC | Age: 61
End: 2021-03-29
Payer: COMMERCIAL

## 2021-03-29 VITALS
DIASTOLIC BLOOD PRESSURE: 70 MMHG | SYSTOLIC BLOOD PRESSURE: 114 MMHG | BODY MASS INDEX: 32.81 KG/M2 | WEIGHT: 173.8 LBS | HEIGHT: 61 IN | HEART RATE: 71 BPM

## 2021-03-29 DIAGNOSIS — I50.30 DIASTOLIC CONGESTIVE HEART FAILURE, UNSPECIFIED HF CHRONICITY (HCC): ICD-10-CM

## 2021-03-29 DIAGNOSIS — J44.1 COPD EXACERBATION (HCC): Primary | ICD-10-CM

## 2021-03-29 DIAGNOSIS — I50.22 CHRONIC SYSTOLIC CONGESTIVE HEART FAILURE (HCC): ICD-10-CM

## 2021-03-29 PROCEDURE — 3017F COLORECTAL CA SCREEN DOC REV: CPT | Performed by: INTERNAL MEDICINE

## 2021-03-29 PROCEDURE — G8926 SPIRO NO PERF OR DOC: HCPCS | Performed by: INTERNAL MEDICINE

## 2021-03-29 PROCEDURE — 93000 ELECTROCARDIOGRAM COMPLETE: CPT | Performed by: INTERNAL MEDICINE

## 2021-03-29 PROCEDURE — G8428 CUR MEDS NOT DOCUMENT: HCPCS | Performed by: INTERNAL MEDICINE

## 2021-03-29 PROCEDURE — G8417 CALC BMI ABV UP PARAM F/U: HCPCS | Performed by: INTERNAL MEDICINE

## 2021-03-29 PROCEDURE — 3023F SPIROM DOC REV: CPT | Performed by: INTERNAL MEDICINE

## 2021-03-29 PROCEDURE — 1036F TOBACCO NON-USER: CPT | Performed by: INTERNAL MEDICINE

## 2021-03-29 PROCEDURE — G8484 FLU IMMUNIZE NO ADMIN: HCPCS | Performed by: INTERNAL MEDICINE

## 2021-03-29 PROCEDURE — 99214 OFFICE O/P EST MOD 30 MIN: CPT | Performed by: INTERNAL MEDICINE

## 2021-03-29 RX ORDER — POTASSIUM CHLORIDE 20 MEQ/1
40 TABLET, EXTENDED RELEASE ORAL DAILY
Qty: 90 TABLET | Refills: 1 | Status: ON HOLD | OUTPATIENT
Start: 2021-03-29 | End: 2021-05-04 | Stop reason: HOSPADM

## 2021-03-29 RX ORDER — FUROSEMIDE 20 MG/1
40 TABLET ORAL 2 TIMES DAILY
Qty: 90 TABLET | Refills: 3 | Status: ON HOLD | OUTPATIENT
Start: 2021-03-29 | End: 2021-05-01 | Stop reason: SDUPTHER

## 2021-03-29 NOTE — PROGRESS NOTES
Gustavo Anderson MD        OFFICE  FOLLOWUP NOTE    Chief complaints: patient is here for management of  leg swelling,SVT, bipolar, fibromyalgia, chest pain, rt lung, end stage lung disease and COPD, pericardial drainage and [facility],? PAF, bleeding gastritis    F/u on CHF Weight    Subjective: + shortness of breath, no dizziness, no palpitations    MACARENA Johansen is a 64 y. o.year old who  has a past medical history of Anemia, Anxiety, Arthritis, Back pain at L4-L5 level, Bipolar 1 disorder (HCC), COPD (chronic obstructive pulmonary disease) ([facility] [address] Emphysema of lung ([facility] [address] FH: CAD (coronary artery disease), Fibromyalgia, Full dentures, Gastric ulcer, H/O Doppler ultrasound, H/O echocardiogram, History of blood transfusion, Hyperlipidemia LDL goal <100, Hypertension, Irregular heartbeat, Obstructive sleep apnea, On home oxygen therapy, Osteoarthritis, Pericardial effusion, Spinal stenosis, Thyroid disease, and Wears glasses. and presents for management of  leg swelling,SVT, bipolar, fibromyalgia, chest pain, rt lung, end stage lung disease and COPD, pericardial drainage and [facility],? PAF, bleeding gastritis which are well controlled    Patient was given 20mg bid last months, she is not making much urine, and has gained 5 lbs this time and had gained 10 lbs last month  Current Outpatient Medications   Medication Sig Dispense Refill    furosemide (LASIX) 20 MG tablet Take 1 tablet by mouth 2 times daily 60 tablet 3    dilTIAZem (CARDIZEM CD) 120 MG extended release capsule Take 1 capsule by mouth daily 90 capsule 3    metoprolol succinate (TOPROL XL) 25 MG extended release tablet Take 1 tablet by mouth daily 30 tablet 3    clonazePAM (KLONOPIN) 1 MG disintegrating tablet Take 1 mg by mouth 3 times daily as needed.        ferrous sulfate (IRON 325) 325 (65 Fe) MG tablet Take 325 mg by mouth 2 times daily Indications: pt taking every day bid Monday, wed, fri       atorvastatin (LIPITOR) 40 MG tablet Take 40 mg by mouth daily      ondansetron (ZOFRAN ODT) 4 MG disintegrating tablet Take 1 tablet by mouth every 8 hours as needed for Nausea or Vomiting Place under the tongue and let it melt and absorb from under your tongue.  30 tablet 3    lactobacillus (CULTURELLE) capsule Take 1 capsule by mouth daily (with breakfast) 30 capsule 0    pantoprazole (PROTONIX) 40 MG tablet Take 1 tablet by mouth 2 times daily 60 tablet 3    sucralfate (CARAFATE) 1 GM tablet Take 1 tablet by mouth every 12 hours 120 tablet 3    dicyclomine (BENTYL) 10 MG capsule Take 1 capsule by mouth 4 times daily 360 capsule 1    theophylline (MIAH-24) 200 MG extended release capsule Take 400 mg by mouth daily      budesonide-formoterol (SYMBICORT) 160-4.5 MCG/ACT AERO USE 2 INHALATIONS TWICE A DAY 30.6 g 3    ipratropium-albuterol (DUONEB) 0.5-2.5 (3) MG/3ML SOLN nebulizer solution Inhale 3 mLs into the lungs every 4 hours 1080 mL 3    guaiFENesin (MUCINEX) 600 MG extended release tablet Take 1 tablet by mouth 2 times daily 30 tablet 0    montelukast (SINGULAIR) 10 MG tablet Take 1 tablet by mouth daily 30 tablet 3    levothyroxine (SYNTHROID) 100 MCG tablet Take 1 tablet by mouth Daily 30 tablet 2    folic acid (FOLVITE) 1 MG tablet Take 1 tablet by mouth daily 30 tablet 3    vitamin B-1 100 MG tablet Take 1 tablet by mouth daily 30 tablet 3    albuterol-ipratropium (COMBIVENT RESPIMAT)  MCG/ACT AERS inhaler Inhale 1 puff into the lungs every 4 hours as needed for Wheezing 3 Inhaler 0    DULoxetine (CYMBALTA) 60 MG extended release capsule Take 60 mg by mouth daily      busPIRone (BUSPAR) 10 MG tablet Take 1 tablet by mouth 3 times daily (Patient taking differently: Take 10 mg by mouth 2 times daily ) 90 tablet 0    traZODone (DESYREL) 50 MG tablet Take 100 mg by mouth nightly       baclofen (LIORESAL) 10 MG tablet Take 1 tablet by mouth 3 times daily (Patient taking differently: Take 10 mg by mouth 2 times daily ) 30 tablet 0    aspirin 81 MG chewable tablet Take 81 mg by mouth daily      fluticasone (FLONASE) 50 MCG/ACT nasal spray 2 sprays by Nasal route daily 1 Bottle 0     No current facility-administered medications for this visit.       Allergies: Lisinopril  Past Medical History:   Diagnosis Date    Anemia     Anxiety 02/16/2017    follows with Dr Paul Fields    Arthritis     Back pain at L4-L5 level 2/16/2017    Dr Hedy Arambula 1 disorder ([facility] [address]     per pt on 2/5/2021\"never told I have bipolar\"    COPD (chronic obstructive pulmonary disease) ([facility] [address]     follows with Dr Maryan Laureano Emphysema of lung ([facility] [address]     FH: CAD (coronary artery disease) 2/16/2017    Father had in his forties, sister in her thirties    [facility] Fibromyalgia 02/16/2017    Full dentures     full upper plate and partial on the bottom    Gastric ulcer     \"had stomach ulder back fall 2019\"    H/O Doppler ultrasound 04/05/2019    venous- no DVT or reflux    H/O echocardiogram 01/14/2015    EF50-55% Normal- see media    History of blood transfusion     Hyperlipidemia LDL goal <100     Hypertension     Dr Jacquie Paz Irregular heartbeat     \"they said I have irreg heart beat before- back when they drained fluid around my heart \"    Obstructive sleep apnea     \"have bipap I use at home\"    On home oxygen therapy     \"on oxygen all the time at home and keep it on 2.5 liters\"    Osteoarthritis     Pericardial effusion     per old chart had pericardial effusion 10/2020    Spinal stenosis     hx per old chart    Thyroid disease     Wears glasses     \"suppose to wear glasses\"     Past Surgical History:   Procedure Laterality Date    BACK SURGERY  03/2017    \"surgery on low back L5-6- not sure if they put any metal in \"   [address]      10/2019    CARDIAC CATHETERIZATION      per old chart had cath done 11/2020    CARPAL TUNNEL RELEASE Right 1985    CHOLECYSTECTOMY, LAPAROSCOPIC N/A 10/25/2020    CHOLECYSTECTOMY LAPAROSCOPIC WITH [facility] performed by Sara Chino MD at [facility] COLONOSCOPY N/A 2019    COLONOSCOPY DIAGNOSTIC performed by Juliette Velasquez MD at [facility] [place], DIAGNOSTIC      per old chart had egd done 2018    TUBAL LIGATION  1987    UPPER GASTROINTESTINAL ENDOSCOPY N/A 2021    EGD BIOPSY performed by Juliette Velasquez MD at [facility] ENDOSCOPY     Family History   Problem Relation Age of Onset    Asthma Mother         COPD    Heart Disease Father      Social History     Tobacco Use    Smoking status: Former Smoker     Packs/day: 1.50     Years: 20.00     Pack years: 30.00     Types: Cigarettes     Quit date: 2008     Years since quittin.2    Smokeless tobacco: Never Used   Substance Use Topics    Alcohol use: Not Currently     Alcohol/week: 2.0 standard drinks     Types: 2 Shots of liquor per week     Comment: per pt on 2021\"quit drinking back Oct 2020\"use to drink wine coolers - 3 times per week \"/caffeine 2-3 coffees aday 1 pop       [unfilled]  Review of Systems:   · Constitutional: No Fever or Weight Loss   · Eyes: No Decreased Vision  · ENT: No Headaches, Hearing Loss or Vertigo  · Cardiovascular: No chest pain, dyspnea on exertion, palpitations or loss of consciousness  · Respiratory: No cough or wheezing    · Gastrointestinal: No abdominal pain, appetite loss, blood in stools, constipation, diarrhea or heartburn  · Genitourinary: No dysuria, trouble voiding, or hematuria  · Musculoskeletal:  No gait disturbance, weakness or joint complaints  · Integumentary: No rash or pruritis  · Neurological: No TIA or stroke symptoms  · Psychiatric: No anxiety or depression  · Endocrine: No malaise, fatigue or temperature intolerance  · Hematologic/Lymphatic: No bleeding problems, blood clots or swollen lymph nodes  · Allergic/Immunologic: No nasal congestion or hives  All systems negative except as marked.    Objective:  /70   Pulse 71   Ht 5' 1\" (1.549 m)   Wt 173 lb 12.8 oz (78.8 kg) LMP 01/05/2010   BMI 32.84 kg/m²   Wt Readings from Last 3 Encounters:   03/29/21 173 lb 12.8 oz (78.8 kg)   02/25/21 168 lb (76.2 kg)   02/02/21 158 lb (71.7 kg)     Body mass index is 32.84 kg/m². GENERAL - Alert, oriented, pleasant, in no apparent distress,normal grooming  HEENT  pupils are intact, cornea intact, conjunctive normal color, ears are normal in exam,  Neck - Supple. No jugular venous distention noted. No carotid bruits, no apical -carotid delay  Respiratory:  Normal breath sounds, No respiratory distress, No wheezing, No chest tenderness. ,no use of accessory muscles, diaphragm movement is normal  Cardiovascular: (PMI) apex non displaced,no lifts no thrills, no s3,no s4, Normal heart rate, Normal rhythm, No murmurs, No rubs, No gallops. Carotid arteries pulse and amplitude are normal no bruit, no abdominal bruit noted ( normal abdominal aorta ausculation),   Extremities - No cyanosis, clubbing, or significant edema, no varicose veins    Abdomen  No masses, tenderness, or organomegaly, no hepato-splenomegally, no bruits  Musculoskeletal  No significant edema, no kyphosis or scoliosis, no deformity in any extremity noted, muscle strength and tone are normal  Skin: no ulcer,no scar,no stasis dermatitis   Neurologic  alert oriented times 3,Cranial nerves II through XII are grossly intact. There were no gross focal neurologic abnormalities. Psychiatric: normal mood and affect    Lab Results   Component Value Date    CKTOTAL 33 11/22/2020    TROPONINI 0.007 03/25/2014     BNP:  No results found for: BNP  PT/INR:  No results found for: [employer] [facility]  Lab Results   Component Value Date    LABA1C 5.1 06/21/2019     Lab Results   Component Value Date    CHOL 166 11/23/2020    TRIG 111 11/23/2020    HDL 59 11/23/2020    LDLDIRECT 87 11/23/2020     Lab Results   Component Value Date    ALT 13 12/28/2020    AST 21 12/28/2020     TSH:  No results found for: TSH    Impression:  Kory Vega is a 64 y. o.year old who has a past medical history of Anemia, Anxiety, Arthritis, Back pain at L4-L5 level, Bipolar 1 disorder (HCC), COPD (chronic obstructive pulmonary disease) ([facility] [address] Emphysema of lung ([facility] [address] FH: CAD (coronary artery disease), Fibromyalgia, Full dentures, Gastric ulcer, H/O Doppler ultrasound, H/O echocardiogram, History of blood transfusion, Hyperlipidemia LDL goal <100, Hypertension, Irregular heartbeat, Obstructive sleep apnea, On home oxygen therapy, Osteoarthritis, Pericardial effusion, Spinal stenosis, Thyroid disease, and Wears glasses. and presents with     Plan:  1. CHF, abdominal swelling and weight gain, increase lasix 40mg bid,  LAST YR RHC at [address] [place] [address] showed fluid overload with PCWP of 35,, she should lose 10 lbs, add kdur 40meq and check chem 7  In one week, if she fails to lose weight despite increasing lasix, will need to add zaroxolyn and refer to nephrology  2. HTN: continue toprol xl 25mg daily,  3. Bleeding gastritis and anemia: on aspirin, need to be carefull, see Dr. Samantha Guerin  4. ? PAF, on aspirin, change cardizem to cardizem 120mg cd  5. Normal LHC  6. S/p pericardiocentesis  7. endstage  Lung disease: on home oxygen  8. Sinus tachycardia: seen by EP  In hospital, had SVT and sinus tachycardia and was started on cardizem, will continue it  9. End stage COPD: stable, continue inhalers, and steroids  10. Bipolar: stable  1. Fibromyalgia: stableHealth   All labs, medications and tests reviewed, continue all other medications of all above medical condition listed as is.     @[facility]@

## 2021-03-29 NOTE — PATIENT INSTRUCTIONS
Please be informed that if you contact our office outside of normal business hours the physician on call cannot help with any scheduling or rescheduling issues, procedure instruction questions or any type of medication issue. We advise you for any urgent/emergency that you go to the nearest emergency room!     PLEASE CALL OUR OFFICE DURING NORMAL BUSINESS HOURS    Monday - Friday   8 am to 5 pm    [place]: [phone]    [place]:  [phone]

## 2021-03-30 ENCOUNTER — APPOINTMENT (OUTPATIENT)
Dept: GENERAL RADIOLOGY | Age: 61
End: 2021-03-30
Payer: COMMERCIAL

## 2021-03-30 ENCOUNTER — HOSPITAL ENCOUNTER (EMERGENCY)
Age: 61
Discharge: HOME | End: 2021-03-31
Attending: EMERGENCY MEDICINE
Payer: COMMERCIAL

## 2021-03-30 ENCOUNTER — APPOINTMENT (OUTPATIENT)
Dept: CT IMAGING | Age: 61
End: 2021-03-30
Payer: COMMERCIAL

## 2021-03-30 ENCOUNTER — HOSPITAL ENCOUNTER (OUTPATIENT)
Age: 61
Discharge: HOME | End: 2021-03-30
Payer: COMMERCIAL

## 2021-03-30 DIAGNOSIS — R42 LIGHTHEADEDNESS: Primary | ICD-10-CM

## 2021-03-30 LAB
ALBUMIN SERPL-MCNC: 4.2 GM/DL (ref 3.4–5)
ALP BLD-CCNC: 82 IU/L (ref 40–128)
ALT SERPL-CCNC: 13 U/L (ref 10–40)
ANION GAP SERPL CALCULATED.3IONS-SCNC: 5 MMOL/L (ref 4–16)
ANION GAP SERPL CALCULATED.3IONS-SCNC: 6 MMOL/L (ref 4–16)
AST SERPL-CCNC: 15 IU/L (ref 15–37)
BACTERIA: NEGATIVE /HPF
BASOPHILS ABSOLUTE: 0.1 K/CU MM
BASOPHILS RELATIVE PERCENT: 1.1 % (ref 0–1)
BILIRUB SERPL-MCNC: 0.2 MG/DL (ref 0–1)
BILIRUBIN URINE: NEGATIVE MG/DL
BLOOD, URINE: NEGATIVE
BUN BLDV-MCNC: 15 MG/DL (ref 6–23)
BUN BLDV-MCNC: 16 MG/DL (ref 6–23)
CALCIUM SERPL-MCNC: 9.6 MG/DL (ref 8.3–10.6)
CALCIUM SERPL-MCNC: 9.7 MG/DL (ref 8.3–10.6)
CHLORIDE BLD-SCNC: 90 MMOL/L (ref 99–110)
CHLORIDE BLD-SCNC: 90 MMOL/L (ref 99–110)
CLARITY: CLEAR
CO2: 39 MMOL/L (ref 21–32)
CO2: 40 MMOL/L (ref 21–32)
COLOR: YELLOW
CREAT SERPL-MCNC: 0.9 MG/DL (ref 0.6–1.1)
CREAT SERPL-MCNC: 1 MG/DL (ref 0.6–1.1)
DIFFERENTIAL TYPE: ABNORMAL
EOSINOPHILS ABSOLUTE: 0.5 K/CU MM
EOSINOPHILS RELATIVE PERCENT: 6 % (ref 0–3)
GFR AFRICAN AMERICAN: >60 ML/MIN/1.73M2
GFR AFRICAN AMERICAN: >60 ML/MIN/1.73M2
GFR NON-AFRICAN AMERICAN: 57 ML/MIN/1.73M2
GFR NON-AFRICAN AMERICAN: >60 ML/MIN/1.73M2
GLUCOSE BLD-MCNC: 109 MG/DL (ref 70–99)
GLUCOSE BLD-MCNC: 97 MG/DL (ref 70–99)
GLUCOSE, URINE: NEGATIVE MG/DL
HCT VFR BLD CALC: 37.3 % (ref 37–47)
HEMOGLOBIN: 11.2 GM/DL (ref 12.5–16)
IMMATURE NEUTROPHIL %: 0.3 % (ref 0–0.43)
KETONES, URINE: NEGATIVE MG/DL
LEUKOCYTE ESTERASE, URINE: ABNORMAL
LYMPHOCYTES ABSOLUTE: 1.5 K/CU MM
LYMPHOCYTES RELATIVE PERCENT: 18.5 % (ref 24–44)
MCH RBC QN AUTO: 28.1 PG (ref 27–31)
MCHC RBC AUTO-ENTMCNC: 30 % (ref 32–36)
MCV RBC AUTO: 93.7 FL (ref 78–100)
MONOCYTES ABSOLUTE: 0.5 K/CU MM
MONOCYTES RELATIVE PERCENT: 6.1 % (ref 0–4)
NITRITE URINE, QUANTITATIVE: NEGATIVE
NUCLEATED RBC %: 0 %
PDW BLD-RTO: 14 % (ref 11.7–14.9)
PH, URINE: 7 (ref 5–8)
PLATELET # BLD: 232 K/CU MM (ref 140–440)
PMV BLD AUTO: 9.6 FL (ref 7.5–11.1)
POTASSIUM SERPL-SCNC: 4.4 MMOL/L (ref 3.5–5.1)
POTASSIUM SERPL-SCNC: 4.7 MMOL/L (ref 3.5–5.1)
PRO-BNP: 140.4 PG/ML
PROTEIN UA: NEGATIVE MG/DL
RBC # BLD: 3.98 M/CU MM (ref 4.2–5.4)
RBC URINE: ABNORMAL /HPF (ref 0–6)
SEGMENTED NEUTROPHILS ABSOLUTE COUNT: 5.3 K/CU MM
SEGMENTED NEUTROPHILS RELATIVE PERCENT: 68 % (ref 36–66)
SODIUM BLD-SCNC: 134 MMOL/L (ref 135–145)
SODIUM BLD-SCNC: 136 MMOL/L (ref 135–145)
SPECIFIC GRAVITY UA: 1.01 (ref 1–1.03)
SQUAMOUS EPITHELIAL: 1 /HPF
TOTAL IMMATURE NEUTOROPHIL: 0.02 K/CU MM
TOTAL NUCLEATED RBC: 0 K/CU MM
TOTAL PROTEIN: 6.8 GM/DL (ref 6.4–8.2)
TRICHOMONAS: ABNORMAL /HPF
TROPONIN T: 0.01 NG/ML
UROBILINOGEN, URINE: NEGATIVE MG/DL (ref 0.2–1)
WBC # BLD: 7.9 K/CU MM (ref 4–10.5)
WBC UA: 2 /HPF (ref 0–5)

## 2021-03-30 PROCEDURE — 80053 COMPREHEN METABOLIC PANEL: CPT

## 2021-03-30 PROCEDURE — 99285 EMERGENCY DEPT VISIT HI MDM: CPT

## 2021-03-30 PROCEDURE — 93005 ELECTROCARDIOGRAM TRACING: CPT | Performed by: EMERGENCY MEDICINE

## 2021-03-30 PROCEDURE — 36415 COLL VENOUS BLD VENIPUNCTURE: CPT

## 2021-03-30 PROCEDURE — 83880 ASSAY OF NATRIURETIC PEPTIDE: CPT

## 2021-03-30 PROCEDURE — 85025 COMPLETE CBC W/AUTO DIFF WBC: CPT

## 2021-03-30 PROCEDURE — 84484 ASSAY OF TROPONIN QUANT: CPT

## 2021-03-30 PROCEDURE — 70450 CT HEAD/BRAIN W/O DYE: CPT

## 2021-03-30 PROCEDURE — 81001 URINALYSIS AUTO W/SCOPE: CPT

## 2021-03-30 PROCEDURE — 80048 BASIC METABOLIC PNL TOTAL CA: CPT

## 2021-03-30 PROCEDURE — 71046 X-RAY EXAM CHEST 2 VIEWS: CPT

## 2021-03-30 NOTE — ED PROVIDER NOTES
As physician-in-triage, I performed a medical screening history and physical exam on this patient. I performed a medical screening examination and evaluated this patient briefly m[employer] tele-health platform with the purpose of initiating their ED workup in an expeditious manner. Please see notes from other ED providers regarding comprehensive evaluation including full history, physical exam, interpretation of results, and medical decision making/disposition. CHIEF COMPLAINT  Chief Complaint   Patient presents with    Shortness of Breath    Dizziness       HISTORY OF PRESENT ILLNESS  Traci Walker is a 64 y.o. female that presents to the emergency department for evaluation multiple symptoms. Patient notes over the past 2 days, she has felt dizzy. She describes dizziness as a lightheaded sensation. Last known well was yesterday morning. Dizziness has been persistent. Denies falls, blunt head trauma, neck trauma. Denies double vision, blurry vision, change in vision. No flashes or floaters. No tinnitus, buzzing, ringing in ears. No facial droop, slurred speech, aphasia, dysphagia. In addition, patient has shortness of breath. Shortness of breath is worse with exertion. Denies orthopnea or paroxysmal nocturnal dyspnea. Patient does have a history of CHF and COPD. Does admit to a dry, nonproductive cough. No hemoptysis. No fevers, chills, diaphoresis. No syncope, dizziness, lightheadedness. No additional precipitating, modifying, alleviating features. Benjamín Selby PHYSICAL EXAM  BP (!) 120/59   Pulse 80   Temp 98.3 °F (36.8 °C) (Oral)   Resp 15   Ht 5' 1\" (1.549 m)   Wt 171 lb (77.6 kg)   LMP 01/05/2010   SpO2 97%   BMI 32.31 kg/m²     On exam, the patient appears in no acute distress. Speech is clear. Breathing is unlabored.   Moves all extremities    Comment: Please note this report has been produced using speech recognition software and may contain errors related to that system including

## 2021-03-31 VITALS
WEIGHT: 171 LBS | RESPIRATION RATE: 17 BRPM | OXYGEN SATURATION: 100 % | HEIGHT: 61 IN | DIASTOLIC BLOOD PRESSURE: 72 MMHG | SYSTOLIC BLOOD PRESSURE: 154 MMHG | BODY MASS INDEX: 32.28 KG/M2 | TEMPERATURE: 97.6 F | HEART RATE: 63 BPM

## 2021-03-31 PROCEDURE — 93010 ELECTROCARDIOGRAM REPORT: CPT | Performed by: INTERNAL MEDICINE

## 2021-03-31 NOTE — ED NOTES
Discharge instructions understood as stated by patient. All questions answered.      Rohit Caballero RN  03/31/21 [id number]

## 2021-03-31 NOTE — ED NOTES
12 lead EKG as interpreted by me reveals sinus rhythm. Axis is normal. There are no ischemic ST elevations or other suspicious ST changes;  QRS interval consistent with a RBBB pattern, QT interval is not prolonged. Final Interpretation: Sinus Rhythm with RBBB.   No change from EKG dated 3/29/2021         Carolyn Gonzales MD  03/31/21 [id number]

## 2021-03-31 NOTE — ED NOTES
Yobany barrett (extra green) from patient's right median cephalic vein.       Manjula Torres  03/30/21 2054

## 2021-03-31 NOTE — ED PROVIDER NOTES
nausea; Denies abdominal pain. No vomiting, or diarrhea. :  Denies Dysuria or Hematuria. Adamantly denies pregnancy  Musculoskeletal:  Denies back pain.      Skin:  Denies rash   Endocrine:  Denies polyuria or polydypsia   Lymphatic:  Denies swollen glands   Psychiatric:  Denies depression, suicidal ideation or homicidal ideation     All other review of systems negative at this time  See HPI and nursing notes for additional information      PAST MEDICAL & SURGICAL HISTORY    Past Medical History:   Diagnosis Date    Anemia     Anxiety 02/16/2017    follows with Dr Lorraine Arcos    Arthritis     Back pain at L4-L5 level 2/16/2017    Dr Danuta Salmeron 1 disorder ([facility]     per pt on 2/5/2021\"never told I have bipolar\"    COPD (chronic obstructive pulmonary disease) ([facility]     follows with Dr Mini Piper Emphysema of lung ([facility]     FH: CAD (coronary artery disease) 2/16/2017    Father had in his forties, sister in her thirties    Swati Braxton Fibromyalgia 02/16/2017    Full dentures     full upper plate and partial on the bottom    Gastric ulcer     \"had stomach ulder back fall 2019\"    H/O Doppler ultrasound 04/05/2019    venous- no DVT or reflux    H/O echocardiogram 01/14/2015    EF50-55% Normal- see media    History of blood transfusion     Hyperlipidemia LDL goal <100     Hypertension     Dr Daphne Garcia Irregular heartbeat     \"they said I have irreg heart beat before- back when they drained fluid around my heart \"    Obstructive sleep apnea     \"have bipap I use at home\"    On home oxygen therapy     \"on oxygen all the time at home and keep it on 2.5 liters\"    Osteoarthritis     Pericardial effusion     per old chart had pericardial effusion 10/2020    Spinal stenosis     hx per old chart    Thyroid disease     Wears glasses     \"suppose to wear glasses\"     Past Surgical History:   Procedure Laterality Date    BACK SURGERY  03/2017    \"surgery on low back L5-6- not sure if they put any metal in \"   [address]      10/2019    CARDIAC CATHETERIZATION      per old chart had cath done 11/2020    CARPAL TUNNEL RELEASE Right 1985    CHOLECYSTECTOMY, LAPAROSCOPIC N/A 10/25/2020    CHOLECYSTECTOMY LAPAROSCOPIC WITH IOC performed by Ashleigh Noel MD at [facility] COLONOSCOPY N/A 12/12/2019    COLONOSCOPY DIAGNOSTIC performed by Shraddha Kay MD at [facility], COLON, DIAGNOSTIC      per old chart had egd done 1/2018    TUBAL LIGATION  1987    UPPER GASTROINTESTINAL ENDOSCOPY N/A 1/7/2021    EGD BIOPSY performed by Shraddha Kay MD at [address] [place] [address] Outpatient Rx   Medication Sig Dispense Refill    furosemide (LASIX) 20 MG tablet Take 2 tablets by mouth 2 times daily 90 tablet 3    potassium chloride (KLOR-CON M) 20 MEQ extended release tablet Take 2 tablets by mouth daily 90 tablet 1    dilTIAZem (CARDIZEM CD) 120 MG extended release capsule Take 1 capsule by mouth daily 90 capsule 3    metoprolol succinate (TOPROL XL) 25 MG extended release tablet Take 1 tablet by mouth daily 30 tablet 3    clonazePAM (KLONOPIN) 1 MG disintegrating tablet Take 1 mg by mouth 3 times daily as needed.  ferrous sulfate (IRON 325) 325 (65 Fe) MG tablet Take 325 mg by mouth 2 times daily Indications: pt taking every day bid Monday, wed, fri       atorvastatin (LIPITOR) 40 MG tablet Take 40 mg by mouth daily      ondansetron (ZOFRAN ODT) 4 MG disintegrating tablet Take 1 tablet by mouth every 8 hours as needed for Nausea or Vomiting Place under the tongue and let it melt and absorb from under your tongue.  30 tablet 3    lactobacillus (CULTURELLE) capsule Take 1 capsule by mouth daily (with breakfast) 30 capsule 0    pantoprazole (PROTONIX) 40 MG tablet Take 1 tablet by mouth 2 times daily 60 tablet 3    sucralfate (CARAFATE) 1 GM tablet Take 1 tablet by mouth every 12 hours 120 tablet 3    dicyclomine (BENTYL) 10 MG capsule Take 1 capsule by insecurity     Worry: Never true     Inability: Never true    Transportation needs     Medical: No     Non-medical: No   Tobacco Use    Smoking status: Former Smoker     Packs/day: 1.50     Years: 20.00     Pack years: 30.00     Types: Cigarettes     Quit date: 2008     Years since quittin.2    Smokeless tobacco: Never Used   Substance and Sexual Activity    Alcohol use: Not Currently     Alcohol/week: 2.0 standard drinks     Types: 2 Shots of liquor per week     Comment: per pt on 2021\"quit drinking back Oct 2020\"use to drink wine coolers - 3 times per week \"/caffeine 2-3 coffees aday 1 pop     Drug use: No    Sexual activity: Yes     Partners: Male   Lifestyle    Physical activity     Days per week: 0 days     Minutes per session: 0 min    Stress: Only a little   Relationships    Social connections     Talks on phone: Once a week     Gets together: Once a week     Attends Yarsanism service: Never     Active member of club or organization: No     Attends meetings of clubs or organizations: Never     Relationship status:     Intimate partner violence     Fear of current or ex partner: None     Emotionally abused: None     Physically abused: None     Forced sexual activity: None   Other Topics Concern    None   Social History Narrative    None     Family History   Problem Relation Age of Onset    Asthma Mother         COPD    Heart Disease Father        PHYSICAL EXAM    VITAL SIGNS: BP (!) 154/72   Pulse 63   Temp 97.6 °F (36.4 °C) (Oral)   Resp 17   Ht 5' 1\" (1.549 m)   Wt 171 lb (77.6 kg)   LMP 2010   SpO2 100%   BMI 32.31 kg/m²    Constitutional:  Well developed, well nourished, no acute distress     HENT:  Atraumatic. Eyes: Conjunctiva clear. No tearing . Pupils equally round and react to light, extraocular movement are intact. Neck: supple, no JVD. Cardiovascular:  Regular rate & rhythm, no murmurs/rubs/gallops.   Respiratory:  Lungs with mild diffuse wheezing in all lung fields. No retractions. No rhonchi or rales. No accessory muscle usage. Patient speaks in full sentences. Patient is on 3 L of oxygen via nasal cannula. There is good air movement throughout all lung fields. No stridor. GI:  Soft, nontender, normal bowel sounds  Musculoskeletal:  No edema, no deformities  Integument:  Well hydrated, no petechiae     Neurologic:    - Alert & oriented person, place, time, and situation, no speech difficulties or slurring.  - No obvious gross motor deficits  - Cranial nerves 2-12 grossly intact  - Sensation intact to light touch  - Strength 5/5 in upper and lower extremities bilaterally  - Normal finger to nose test bilaterally  - Rapid alternating movements intact  - Normal heel-shin bilaterally  - No pronator drift. - Light touch sensation intact throughout. - Upper and lower extremity DTRs 2+ bilaterally.   - No truncal ataxia  - NIHSS of 0    Psych: Pleasant affect, no hallucinations      LABS:   Results for orders placed or performed during the hospital encounter of 03/30/21   CBC Auto Differential   Result Value Ref Range    WBC 7.9 4.0 - 10.5 K/CU MM    RBC 3.98 (L) 4.2 - 5.4 M/CU MM    Hemoglobin 11.2 (L) 12.5 - 16.0 GM/DL    Hematocrit 37.3 37 - 47 %    MCV 93.7 78 - 100 FL    MCH 28.1 27 - 31 PG    MCHC 30.0 (L) 32.0 - 36.0 %    RDW 14.0 11.7 - 14.9 %    Platelets 249 988 - 233 K/CU MM    MPV 9.6 7.5 - 11.1 FL    Differential Type AUTOMATED DIFFERENTIAL     Segs Relative 68.0 (H) 36 - 66 %    Lymphocytes % 18.5 (L) 24 - 44 %    Monocytes % 6.1 (H) 0 - 4 %    Eosinophils % 6.0 (H) 0 - 3 %    Basophils % 1.1 (H) 0 - 1 %    Segs Absolute 5.3 K/CU MM    Lymphocytes Absolute 1.5 K/CU MM    Monocytes Absolute 0.5 K/CU MM    Eosinophils Absolute 0.5 K/CU MM    Basophils Absolute 0.1 K/CU MM    Nucleated RBC % 0.0 %    Total Nucleated RBC 0.0 K/CU MM    Total Immature Neutrophil 0.02 K/CU MM    Immature Neutrophil % 0.3 0 - 0.43 %   Comprehensive Department, available for consultation, throughout entirety of patient care. Patient presents as above which prompted work-up today. Labs, imaging, and EKG were obtained. For EKG interpretation see ED physician's note. Labs reveal chronically elevated bicarbonate at 39 and detectable troponin at 0.011. Troponin has been similar in the past and patient is without change in her chronic shortness of breath from her COPD. I do have low clinical suspicion for ACS. Rest of labs without emergent findings. NIHSS of 0. Patient is neurologically intact on exam.  Patient is only having lightheadedness with positional changes and I do have concern for orthostatic hypotension. Patient and I discussed this today. She declines orthostatic vital signs being obtained. Chest xray obtained today and reveals no acute cardiopulmonary process. No pneumothorax, no consolidation concerning for pneumonia, no pleural effusion. CT head obtained today and reveals no acute intracranial abnormality, no acute intracranial hemorrhage, mass-effect, or midline shift. Patient and I discussed the importance of still eating and drinking as she has not been doing this much since her cardiologist told her that her weight had increased. Suspect patient's positional lightheadedness is related to her recent increase in her Lasix and poor oral intake. Patient I discussed importance of changing positions slowly and waiting until lightheadedness passes prior to standing or walking. She voices understanding. Patient I discussed that should symptoms not improved with eating and drinking normally tomorrow that she should follow-up with her cardiologist for recheck in 2 to 3 days. I have low clinical suspicion for CVA/TIA. Patient is nontoxic appearing. Vital signs stable. Patient is stable for outpatient management. Patient is requesting discharge at this time.   All pertinent Lab data and radiographic results reviewed with patient at bedside. The patient and/or the family were informed of the results of any tests/labs/imaging, the treatment plan, and time was allotted to answer questions. Diagnosis, disposition, and treatment plan reviewed in detail with patient. Patient understands and agrees to follow-up with cardiologist for recheck in 2-3 days. Patient understands and agrees to return to emergency department for any new or worsening symptoms - strict return precautions given. Clinical  IMPRESSION    1. Lightheadedness          Disposition referral (if applicable): Chase Aceves MD  [address]  [phone]    Call today  Recheck in 2-3 days    [facility] Emergency Department  [address] [phone]  [phone]  Go to   Return to ED if symptoms worsen or new symptoms      Disposition medications (if applicable):  Discharge Medication List as of 3/31/2021 12:21 AM          Comment: Please note this report has been produced using speech recognition software and may contain errors related to that system including errors in grammar, punctuation, and spelling, as well as words and phrases that may be inappropriate. If there are any questions or concerns please feel free to contact the dictating provider for clarification.              Isidro Hills PA-C  03/31/21 0726

## 2021-04-01 ENCOUNTER — CARE COORDINATION (OUTPATIENT)
Dept: CARE COORDINATION | Age: 61
End: 2021-04-01

## 2021-04-01 NOTE — CARE COORDINATION
decompensation with patient who verbalized understanding. Discussed exposure protocols and quarantine with CDC Guidelines What to do if you are sick with coronavirus disease 2019.  Patient was given an opportunity for questions and concerns. The patient agrees to contact the [facility] exposure line [phone], local health department [place] Department of Health: ([phone]) and PCP office for questions related to their healthcare. LPN CC provided contact information for future needs. Reviewed and educated patient on any new and changed medications related to discharge diagnosis     Was patient discharged with a pulse oximeter? No      Patient/family/caregiver given information for [employer] and agrees to enroll no, pt refused    Spoke with pt, reports she was dizzy due to her ear, as it is now itching, reports she had tube removed awhile back and thinks she has vertigo, reports shortness of breath on exertion due to her COPD, denies any COVID 19 symptoms, and discussed what symptoms she should watch out for and prevention precautions for COVID. Offered to make an earlier OV with cardiologist, but pt declined stating she wants to wait until 4/16 as scheduled. Encouraged pt to f/u with her PCP Dr. Isabel Patterson. Provided Walk-In Clinic information/role, and ACM information given if needs arise. ACM will plan to call pt in 2 weeks. Kathryn YUEN [address] [place] [address],[facility] Coordinator  [phone]  [email or web address]

## 2021-04-06 LAB
EKG ATRIAL RATE: 78 BPM
EKG DIAGNOSIS: NORMAL
EKG P AXIS: 74 DEGREES
EKG P-R INTERVAL: 140 MS
EKG Q-T INTERVAL: 412 MS
EKG QRS DURATION: 146 MS
EKG QTC CALCULATION (BAZETT): 469 MS
EKG R AXIS: 101 DEGREES
EKG T AXIS: 35 DEGREES
EKG VENTRICULAR RATE: 78 BPM

## 2021-04-15 ENCOUNTER — CARE COORDINATION (OUTPATIENT)
Dept: CARE COORDINATION | Age: 61
End: 2021-04-15

## 2021-04-15 ASSESSMENT — ENCOUNTER SYMPTOMS: DYSPNEA ASSOCIATED WITH: EXERTION

## 2021-04-15 NOTE — CARE COORDINATION
Patient contacted regarding COVID-19 risk and screening. Discussed COVID-19 related testing which was not done at this time. Test results were not done. Patient informed of results, if available? No.    Ambulatory Care Manager contacted the patient by telephone to perform follow-up assessment. Verified name and  with patient as identifiers. Patient has following risk factors of: heart failure and COPD. Symptoms reviewed with patient who verbalized the following symptoms: no new symptoms and no worsening symptoms. Was patient discharged with a pulse oximeter? No Discussed and confirmed pulse oximeter discharge instructions and when to notify provider or seek emergency care. 3lL- has pulse ox   Due to no new or worsening symptoms encounter was not routed to provider for escalation. Education provided regarding infection prevention, and signs and symptoms of COVID-19 and when to seek medical attention with patient who verbalized understanding. Discussed exposure protocols and quarantine from [address] you at higher risk for severe illness  and given an opportunity for questions and concerns. The patient agrees to contact the COVID-19 hotline [phone] or PCP office for questions related to their healthcare. ACM provided contact information for future reference. From CDC: Are you at higher risk for severe illness?  Wash your hands often.  Avoid close contact (6 feet, which is about two arm lengths) with people who are sick.  Put distance between yourself and other people if COVID-19 is spreading in your community.  Clean and disinfect frequently touched surfaces.  Avoid all cruise travel and non-essential air travel.  Call your healthcare professional if you have concerns about COVID-19 and your underlying condition or if you are sick.     For more information on steps you can take to protect yourself, see CDC's How to Lynnette for follow-up call in 7-14 days based on severity of symptoms and risk factors. Ambulatory Care Coordination Note  4/16/2021  CM Risk Score: 1  Charlson 10 Year Mortality Risk Score: 79%     ACC: Shanon Styles, RN    Summary Note: Call to pt for acm f/u. Reports cardiology aware of Lightheadedness/vertigo and it happens just when she gets up. Discussed zm and reviewed ed. Has pulse ox. Reviewed pulse ox instructions. Got covid vaccine. Has f/u with pcp in May. Noted she called pulm when she had symptoms 3/21 and pulm called in meds.   drives. Has ov with cardiology tmw and sees pcp next month. Pt denies needs/symptoms at this time. Plan: continue to follow to support and educate and assess for any barriers to care. Care Coordination Interventions    Program Enrollment: Complex Care  Referral from Primary Care Provider: No  Suggested Interventions and Community Resources  Zone Management Tools: Completed         Goals Addressed                 This Visit's Progress     Conditions and Symptoms   On track     I will notify my provider of any barriers to my plan of care. I will notify my provider of any symptoms that indicate a worsening of my condition. Barriers: none  Plan for overcoming my barriers: support an education from acm  Confidence: 10/10  Anticipated Goal Completion Date: 5/15/21              Prior to Admission medications    Medication Sig Start Date End Date Taking?  Authorizing Provider   furosemide (LASIX) 20 MG tablet Take 2 tablets by mouth 2 times daily 3/29/21   Tiara Villagomez MD   potassium chloride (KLOR-CON M) 20 MEQ extended release tablet Take 2 tablets by mouth daily 3/29/21   Tiara Villagomez MD   dilTIAZem (CARDIZEM CD) 120 MG extended release capsule Take 1 capsule by mouth daily 1/18/21   Tiara Villagomez MD   metoprolol succinate (TOPROL XL) 25 MG extended release tablet Take 1 tablet by mouth daily 1/18/21   Tiara Villagomez MD   clonazePAM (KLONOPIN) 1 MG albuterol-ipratropium (COMBIVENT RESPIMAT)  MCG/ACT AERS inhaler Inhale 1 puff into the lungs every 4 hours as needed for Wheezing 20   Renea Darnell MD   DULoxetine (CYMBALTA) 60 MG extended release capsule Take 60 mg by mouth daily    Historical Provider, MD   busPIRone (BUSPAR) 10 MG tablet Take 1 tablet by mouth 3 times daily  Patient taking differently: Take 10 mg by mouth 2 times daily  19   James Franz MD   traZODone (DESYREL) 50 MG tablet Take 100 mg by mouth nightly     Historical Provider, MD   baclofen (LIORESAL) 10 MG tablet Take 1 tablet by mouth 3 times daily  Patient taking differently: Take 10 mg by mouth 2 times daily  18   James Franz MD   aspirin 81 MG chewable tablet Take 81 mg by mouth daily    Historical Provider, MD   fluticasone [facility]) 50 MCG/ACT nasal spray 2 sprays by Nasal route daily 3/28/18   James Franz MD       Future Appointments   Date Time Provider Wiliam Zuñiga   2021  4:50 PM Tai Coleman MD [facility]   2021  4:00 PM Renea Darnell MD [facility]     ,   COPD Assessment    Does the patient understand envrionmental exposure?: Yes  Is the patient able to verbalize Rescue vs. Long Acting medications?: Yes  Does the patient have a nebulizer?: Yes  Does the patient use a space with inhaled medications?: No     No patient-reported symptoms         Symptoms:  None: Yes      Symptom course: stable  Breathlessness: exertion  Increase use of rapid acting/rescue inhaled medications?: No  Change in chronic cough?: No/At Baseline  Change in sputum?: No/At Baseline  Self Monitoring - SaO2: Yes  Baseline SaO2 Readin      and   General Assessment    Do you have any symptoms that are causing concern?: No

## 2021-04-16 ENCOUNTER — OFFICE VISIT (OUTPATIENT)
Dept: CARDIOLOGY CLINIC | Age: 61
End: 2021-04-16
Payer: COMMERCIAL

## 2021-04-16 VITALS
HEART RATE: 72 BPM | BODY MASS INDEX: 33.04 KG/M2 | SYSTOLIC BLOOD PRESSURE: 116 MMHG | DIASTOLIC BLOOD PRESSURE: 64 MMHG | HEIGHT: 61 IN | WEIGHT: 175 LBS

## 2021-04-16 DIAGNOSIS — I50.30 DIASTOLIC CONGESTIVE HEART FAILURE, UNSPECIFIED HF CHRONICITY (HCC): Primary | ICD-10-CM

## 2021-04-16 PROCEDURE — 99214 OFFICE O/P EST MOD 30 MIN: CPT | Performed by: INTERNAL MEDICINE

## 2021-04-16 PROCEDURE — 1036F TOBACCO NON-USER: CPT | Performed by: INTERNAL MEDICINE

## 2021-04-16 PROCEDURE — G8417 CALC BMI ABV UP PARAM F/U: HCPCS | Performed by: INTERNAL MEDICINE

## 2021-04-16 PROCEDURE — 3017F COLORECTAL CA SCREEN DOC REV: CPT | Performed by: INTERNAL MEDICINE

## 2021-04-16 PROCEDURE — G8427 DOCREV CUR MEDS BY ELIG CLIN: HCPCS | Performed by: INTERNAL MEDICINE

## 2021-04-16 RX ORDER — METOLAZONE 2.5 MG/1
2.5 TABLET ORAL DAILY
Qty: 30 TABLET | Refills: 3 | Status: ON HOLD | OUTPATIENT
Start: 2021-04-16 | End: 2021-05-01 | Stop reason: HOSPADM

## 2021-04-28 ENCOUNTER — APPOINTMENT (OUTPATIENT)
Dept: GENERAL RADIOLOGY | Age: 61
DRG: 640 | End: 2021-04-28
Payer: COMMERCIAL

## 2021-04-28 ENCOUNTER — HOSPITAL ENCOUNTER (OUTPATIENT)
Age: 61
Discharge: HOME | DRG: 640 | End: 2021-04-28
Payer: COMMERCIAL

## 2021-04-28 ENCOUNTER — HOSPITAL ENCOUNTER (INPATIENT)
Age: 61
LOS: 3 days | Discharge: HOME | DRG: 640 | End: 2021-05-01
Attending: EMERGENCY MEDICINE | Admitting: INTERNAL MEDICINE
Payer: COMMERCIAL

## 2021-04-28 ENCOUNTER — TELEPHONE (OUTPATIENT)
Dept: CARDIOLOGY CLINIC | Age: 61
End: 2021-04-28

## 2021-04-28 DIAGNOSIS — E87.6 HYPOKALEMIA: ICD-10-CM

## 2021-04-28 DIAGNOSIS — R06.89 HYPERCAPNIA: ICD-10-CM

## 2021-04-28 DIAGNOSIS — N17.9 AKI (ACUTE KIDNEY INJURY) (HCC): ICD-10-CM

## 2021-04-28 DIAGNOSIS — E87.1 HYPONATREMIA: Primary | ICD-10-CM

## 2021-04-28 LAB
ALBUMIN SERPL-MCNC: 4.3 GM/DL (ref 3.4–5)
ALP BLD-CCNC: 90 IU/L (ref 40–129)
ALT SERPL-CCNC: 15 U/L (ref 10–40)
ANION GAP SERPL CALCULATED.3IONS-SCNC: 9 MMOL/L (ref 4–16)
ANION GAP SERPL CALCULATED.3IONS-SCNC: ABNORMAL MMOL/L (ref 4–16)
AST SERPL-CCNC: 22 IU/L (ref 15–37)
BASE EXCESS MIXED: 29.6 (ref 0–2.3)
BASOPHILS ABSOLUTE: 0.1 K/CU MM
BASOPHILS RELATIVE PERCENT: 0.5 % (ref 0–1)
BILIRUB SERPL-MCNC: 0.3 MG/DL (ref 0–1)
BUN BLDV-MCNC: 19 MG/DL (ref 6–23)
BUN BLDV-MCNC: 23 MG/DL (ref 6–23)
CALCIUM SERPL-MCNC: 10.1 MG/DL (ref 8.3–10.6)
CALCIUM SERPL-MCNC: 10.4 MG/DL (ref 8.3–10.6)
CHLORIDE BLD-SCNC: 67 MMOL/L (ref 99–110)
CHLORIDE BLD-SCNC: 68 MMOL/L (ref 99–110)
CO2: >50 MMOL/L (ref 21–32)
CO2: >50 MMOL/L (ref 21–32)
COMMENT: ABNORMAL
CREAT SERPL-MCNC: 1.2 MG/DL (ref 0.6–1.1)
CREAT SERPL-MCNC: 1.4 MG/DL (ref 0.6–1.1)
DIFFERENTIAL TYPE: ABNORMAL
EOSINOPHILS ABSOLUTE: 0.4 K/CU MM
EOSINOPHILS RELATIVE PERCENT: 3.2 % (ref 0–3)
GFR AFRICAN AMERICAN: 47 ML/MIN/1.73M2
GFR AFRICAN AMERICAN: 56 ML/MIN/1.73M2
GFR NON-AFRICAN AMERICAN: 39 ML/MIN/1.73M2
GFR NON-AFRICAN AMERICAN: 46 ML/MIN/1.73M2
GLUCOSE BLD-MCNC: 110 MG/DL (ref 70–99)
GLUCOSE BLD-MCNC: 137 MG/DL (ref 70–99)
HCO3 VENOUS: 59.7 MMOL/L (ref 19–25)
HCT VFR BLD CALC: 34.8 % (ref 37–47)
HEMOGLOBIN: 10.9 GM/DL (ref 12.5–16)
IMMATURE NEUTROPHIL %: 0.3 % (ref 0–0.43)
LYMPHOCYTES ABSOLUTE: 1.3 K/CU MM
LYMPHOCYTES RELATIVE PERCENT: 11.7 % (ref 24–44)
MAGNESIUM: 1.9 MG/DL (ref 1.8–2.4)
MCH RBC QN AUTO: 28.1 PG (ref 27–31)
MCHC RBC AUTO-ENTMCNC: 31.3 % (ref 32–36)
MCV RBC AUTO: 89.7 FL (ref 78–100)
MONOCYTES ABSOLUTE: 1 K/CU MM
MONOCYTES RELATIVE PERCENT: 8.4 % (ref 0–4)
NUCLEATED RBC %: 0 %
O2 SAT, VEN: 74.5 % (ref 50–70)
PCO2, VEN: 84 MMHG (ref 38–52)
PDW BLD-RTO: 12.6 % (ref 11.7–14.9)
PH VENOUS: 7.46 (ref 7.32–7.42)
PLATELET # BLD: 233 K/CU MM (ref 140–440)
PMV BLD AUTO: 10 FL (ref 7.5–11.1)
PO2, VEN: 38 MMHG (ref 28–48)
POTASSIUM SERPL-SCNC: 2.6 MMOL/L (ref 3.5–5.1)
POTASSIUM SERPL-SCNC: 2.7 MMOL/L (ref 3.5–5.1)
PRO-BNP: 196.9 PG/ML
RBC # BLD: 3.88 M/CU MM (ref 4.2–5.4)
SEGMENTED NEUTROPHILS ABSOLUTE COUNT: 8.5 K/CU MM
SEGMENTED NEUTROPHILS RELATIVE PERCENT: 75.9 % (ref 36–66)
SODIUM BLD-SCNC: 125 MMOL/L (ref 135–145)
SODIUM BLD-SCNC: 126 MMOL/L (ref 135–145)
TOTAL IMMATURE NEUTOROPHIL: 0.03 K/CU MM
TOTAL NUCLEATED RBC: 0 K/CU MM
TOTAL PROTEIN: 6.8 GM/DL (ref 6.4–8.2)
WBC # BLD: 11.3 K/CU MM (ref 4–10.5)

## 2021-04-28 PROCEDURE — 83735 ASSAY OF MAGNESIUM: CPT

## 2021-04-28 PROCEDURE — 85025 COMPLETE CBC W/AUTO DIFF WBC: CPT

## 2021-04-28 PROCEDURE — 82805 BLOOD GASES W/O2 SATURATION: CPT

## 2021-04-28 PROCEDURE — 6360000002 HC RX W HCPCS: Performed by: PHYSICIAN ASSISTANT

## 2021-04-28 PROCEDURE — 6370000000 HC RX 637 (ALT 250 FOR IP): Performed by: NURSE PRACTITIONER

## 2021-04-28 PROCEDURE — 93005 ELECTROCARDIOGRAM TRACING: CPT | Performed by: EMERGENCY MEDICINE

## 2021-04-28 PROCEDURE — 36415 COLL VENOUS BLD VENIPUNCTURE: CPT

## 2021-04-28 PROCEDURE — 71046 X-RAY EXAM CHEST 2 VIEWS: CPT

## 2021-04-28 PROCEDURE — 80048 BASIC METABOLIC PNL TOTAL CA: CPT

## 2021-04-28 PROCEDURE — 99285 EMERGENCY DEPT VISIT HI MDM: CPT

## 2021-04-28 PROCEDURE — 80053 COMPREHEN METABOLIC PANEL: CPT

## 2021-04-28 PROCEDURE — 2580000003 HC RX 258: Performed by: PHYSICIAN ASSISTANT

## 2021-04-28 PROCEDURE — 83880 ASSAY OF NATRIURETIC PEPTIDE: CPT

## 2021-04-28 PROCEDURE — 1200000000 HC SEMI PRIVATE

## 2021-04-28 RX ORDER — PROMETHAZINE HYDROCHLORIDE 12.5 MG/1
12.5 TABLET ORAL EVERY 6 HOURS PRN
Status: DISCONTINUED | OUTPATIENT
Start: 2021-04-28 | End: 2021-05-01 | Stop reason: HOSPADM

## 2021-04-28 RX ORDER — FERROUS SULFATE 325(65) MG
325 TABLET ORAL 2 TIMES DAILY WITH MEALS
Status: DISCONTINUED | OUTPATIENT
Start: 2021-04-29 | End: 2021-05-01 | Stop reason: HOSPADM

## 2021-04-28 RX ORDER — BUDESONIDE AND FORMOTEROL FUMARATE DIHYDRATE 160; 4.5 UG/1; UG/1
2 AEROSOL RESPIRATORY (INHALATION) 2 TIMES DAILY
Status: DISCONTINUED | OUTPATIENT
Start: 2021-04-29 | End: 2021-05-01 | Stop reason: HOSPADM

## 2021-04-28 RX ORDER — DILTIAZEM HYDROCHLORIDE 120 MG/1
120 CAPSULE, COATED, EXTENDED RELEASE ORAL DAILY
Status: DISCONTINUED | OUTPATIENT
Start: 2021-04-29 | End: 2021-05-01 | Stop reason: HOSPADM

## 2021-04-28 RX ORDER — FOLIC ACID 1 MG/1
1 TABLET ORAL DAILY
Status: DISCONTINUED | OUTPATIENT
Start: 2021-04-29 | End: 2021-05-01 | Stop reason: HOSPADM

## 2021-04-28 RX ORDER — ACETAMINOPHEN 325 MG/1
650 TABLET ORAL EVERY 6 HOURS PRN
Status: DISCONTINUED | OUTPATIENT
Start: 2021-04-28 | End: 2021-05-01 | Stop reason: HOSPADM

## 2021-04-28 RX ORDER — THEOPHYLLINE 400 MG/1
400 TABLET, EXTENDED RELEASE ORAL DAILY
Status: DISCONTINUED | OUTPATIENT
Start: 2021-04-29 | End: 2021-05-01 | Stop reason: HOSPADM

## 2021-04-28 RX ORDER — MONTELUKAST SODIUM 10 MG/1
10 TABLET ORAL DAILY
Status: DISCONTINUED | OUTPATIENT
Start: 2021-04-29 | End: 2021-05-01 | Stop reason: HOSPADM

## 2021-04-28 RX ORDER — POTASSIUM CHLORIDE 20 MEQ/1
40 TABLET, EXTENDED RELEASE ORAL PRN
Status: DISCONTINUED | OUTPATIENT
Start: 2021-04-28 | End: 2021-05-01 | Stop reason: HOSPADM

## 2021-04-28 RX ORDER — ASPIRIN 81 MG/1
81 TABLET, CHEWABLE ORAL DAILY
Status: DISCONTINUED | OUTPATIENT
Start: 2021-04-29 | End: 2021-05-01 | Stop reason: HOSPADM

## 2021-04-28 RX ORDER — ACETAMINOPHEN 650 MG/1
650 SUPPOSITORY RECTAL EVERY 6 HOURS PRN
Status: DISCONTINUED | OUTPATIENT
Start: 2021-04-28 | End: 2021-05-01 | Stop reason: HOSPADM

## 2021-04-28 RX ORDER — ATORVASTATIN CALCIUM 40 MG/1
40 TABLET, FILM COATED ORAL DAILY
Status: DISCONTINUED | OUTPATIENT
Start: 2021-04-29 | End: 2021-05-01 | Stop reason: HOSPADM

## 2021-04-28 RX ORDER — PANTOPRAZOLE SODIUM 40 MG/10ML
40 INJECTION, POWDER, LYOPHILIZED, FOR SOLUTION INTRAVENOUS 2 TIMES DAILY
Status: DISCONTINUED | OUTPATIENT
Start: 2021-04-29 | End: 2021-05-01 | Stop reason: HOSPADM

## 2021-04-28 RX ORDER — LEVOTHYROXINE SODIUM 0.1 MG/1
100 TABLET ORAL DAILY
Status: DISCONTINUED | OUTPATIENT
Start: 2021-04-29 | End: 2021-05-01 | Stop reason: HOSPADM

## 2021-04-28 RX ORDER — TRAZODONE HYDROCHLORIDE 50 MG/1
100 TABLET ORAL NIGHTLY
Status: DISCONTINUED | OUTPATIENT
Start: 2021-04-29 | End: 2021-05-01 | Stop reason: HOSPADM

## 2021-04-28 RX ORDER — LACTOBACILLUS RHAMNOSUS GG 10B CELL
1 CAPSULE ORAL
Status: DISCONTINUED | OUTPATIENT
Start: 2021-04-29 | End: 2021-05-01 | Stop reason: HOSPADM

## 2021-04-28 RX ORDER — SODIUM CHLORIDE 0.9 % (FLUSH) 0.9 %
5-40 SYRINGE (ML) INJECTION PRN
Status: DISCONTINUED | OUTPATIENT
Start: 2021-04-28 | End: 2021-05-01 | Stop reason: HOSPADM

## 2021-04-28 RX ORDER — ALBUTEROL SULFATE 90 UG/1
1 AEROSOL, METERED RESPIRATORY (INHALATION) EVERY 4 HOURS PRN
Status: DISCONTINUED | OUTPATIENT
Start: 2021-04-28 | End: 2021-05-01

## 2021-04-28 RX ORDER — SODIUM CHLORIDE 0.9 % (FLUSH) 0.9 %
5-40 SYRINGE (ML) INJECTION 2 TIMES DAILY
Status: DISCONTINUED | OUTPATIENT
Start: 2021-04-29 | End: 2021-05-01 | Stop reason: HOSPADM

## 2021-04-28 RX ORDER — POTASSIUM CHLORIDE 20 MEQ/1
40 TABLET, EXTENDED RELEASE ORAL DAILY
Status: DISCONTINUED | OUTPATIENT
Start: 2021-04-29 | End: 2021-04-29

## 2021-04-28 RX ORDER — ONDANSETRON 2 MG/ML
4 INJECTION INTRAMUSCULAR; INTRAVENOUS EVERY 30 MIN PRN
Status: DISCONTINUED | OUTPATIENT
Start: 2021-04-28 | End: 2021-04-28

## 2021-04-28 RX ORDER — METOPROLOL SUCCINATE 25 MG/1
25 TABLET, EXTENDED RELEASE ORAL DAILY
Status: DISCONTINUED | OUTPATIENT
Start: 2021-04-29 | End: 2021-05-01 | Stop reason: HOSPADM

## 2021-04-28 RX ORDER — POTASSIUM CHLORIDE 7.45 MG/ML
10 INJECTION INTRAVENOUS PRN
Status: DISCONTINUED | OUTPATIENT
Start: 2021-04-28 | End: 2021-05-01 | Stop reason: HOSPADM

## 2021-04-28 RX ORDER — IPRATROPIUM BROMIDE AND ALBUTEROL SULFATE 2.5; .5 MG/3ML; MG/3ML
1 SOLUTION RESPIRATORY (INHALATION)
Status: DISCONTINUED | OUTPATIENT
Start: 2021-04-29 | End: 2021-05-01

## 2021-04-28 RX ORDER — SODIUM CHLORIDE 9 MG/ML
INJECTION, SOLUTION INTRAVENOUS CONTINUOUS
Status: DISCONTINUED | OUTPATIENT
Start: 2021-04-28 | End: 2021-04-28

## 2021-04-28 RX ORDER — SODIUM CHLORIDE 9 MG/ML
25 INJECTION, SOLUTION INTRAVENOUS PRN
Status: DISCONTINUED | OUTPATIENT
Start: 2021-04-28 | End: 2021-05-01 | Stop reason: HOSPADM

## 2021-04-28 RX ORDER — CLONAZEPAM 0.5 MG/1
1 TABLET ORAL 3 TIMES DAILY PRN
Status: DISCONTINUED | OUTPATIENT
Start: 2021-04-28 | End: 2021-05-01 | Stop reason: HOSPADM

## 2021-04-28 RX ORDER — FLUTICASONE PROPIONATE 50 MCG
2 SPRAY, SUSPENSION (ML) NASAL DAILY
Status: DISCONTINUED | OUTPATIENT
Start: 2021-04-29 | End: 2021-05-01 | Stop reason: HOSPADM

## 2021-04-28 RX ORDER — DULOXETIN HYDROCHLORIDE 30 MG/1
60 CAPSULE, DELAYED RELEASE ORAL DAILY
Status: DISCONTINUED | OUTPATIENT
Start: 2021-04-29 | End: 2021-05-01 | Stop reason: HOSPADM

## 2021-04-28 RX ORDER — ONDANSETRON 2 MG/ML
4 INJECTION INTRAMUSCULAR; INTRAVENOUS EVERY 6 HOURS PRN
Status: DISCONTINUED | OUTPATIENT
Start: 2021-04-28 | End: 2021-05-01 | Stop reason: HOSPADM

## 2021-04-28 RX ORDER — BACLOFEN 10 MG/1
10 TABLET ORAL 2 TIMES DAILY
Status: DISCONTINUED | OUTPATIENT
Start: 2021-04-29 | End: 2021-05-01 | Stop reason: HOSPADM

## 2021-04-28 RX ORDER — SUCRALFATE 1 G/1
1 TABLET ORAL
Status: DISCONTINUED | OUTPATIENT
Start: 2021-04-29 | End: 2021-05-01 | Stop reason: HOSPADM

## 2021-04-28 RX ADMIN — SODIUM CHLORIDE: 9 INJECTION, SOLUTION INTRAVENOUS at 20:41

## 2021-04-28 RX ADMIN — POTASSIUM CHLORIDE 10 MEQ: 7.46 INJECTION, SOLUTION INTRAVENOUS at 23:50

## 2021-04-28 RX ADMIN — ACETAMINOPHEN 650 MG: 325 TABLET ORAL at 22:42

## 2021-04-28 RX ADMIN — POTASSIUM CHLORIDE 10 MEQ: 7.46 INJECTION, SOLUTION INTRAVENOUS at 21:56

## 2021-04-28 ASSESSMENT — PAIN SCALES - GENERAL
PAINLEVEL_OUTOF10: 10
PAINLEVEL_OUTOF10: 9

## 2021-04-28 ASSESSMENT — PAIN DESCRIPTION - PAIN TYPE: TYPE: ACUTE PAIN

## 2021-04-28 NOTE — ED PROVIDER NOTES
EKG is interpreted me. EKG shows sinus rhythm at 72 bpm, she has wide QRS complexes, she does have a V prime wave in V1 consistent with right bundle branch block, axis appears to be nondeviated, she does have inverted T waves in inferior and lateral leads, IL interval 156, QRS duration of 162, QTC of 523. Final impression, nonspecific ED. From review of records, she has had a right bundle branch block previously.     Valentin Lopez  4/28/2021  6:57 PM       Valentin Lopez MD  04/28/21 8933

## 2021-04-28 NOTE — ED PROVIDER NOTES
Triage Chief Complaint:   Abnormal Lab (low potassium sent by PCP)    [place]:  Today in the ED I had the pleasure of caring for Socorro Mcwilliams who is a 64 y.o. female that presents today to the emergency department for evaluation for hypokalemia. Patient had routine visit with her cardiologist.  She has a history of hypokalemia. She takes 20 mg of potassium twice daily. She states she has been doing that x1 month. And her blood levels did reveal that her potassium was low so she came here for evaluation. She denies sensation of palpitations or chest pain no lightheadedness dizziness. Does endorse generalized fatigue has been chronic over the last 3+ months. Her appetite and elimination have been baseline otherwise. She endorses being compliant with her medications. She states she does have a history of CHF.   But denies any worsening dyspnea on exertion    ROS:  REVIEW OF SYSTEMS    At least 10 systems reviewed      All other review of systems are negative  See HPI and nursing notes for additional information       Past Medical History:   Diagnosis Date    Anemia     Anxiety 02/16/2017    follows with Dr Timothy Mtz    Arthritis     Back pain at L4-L5 level 2/16/2017    Dr Sterling Mueller   [facility] Bipolar 1 disorder ([facility] [address]     per pt on 2/5/2021\"never told I have bipolar\"    COPD (chronic obstructive pulmonary disease) ([facility]     follows with Dr Sue Singh Emphysema of lung ([facility] [address] [facility]     FH: CAD (coronary artery disease) 2/16/2017    Father had in his forties, sister in her thirties    [facility] Fibromyalgia 02/16/2017    Full dentures     full upper plate and partial on the bottom    Gastric ulcer     \"had stomach ulder back fall 2019\"    H/O Doppler ultrasound 04/05/2019    venous- no DVT or reflux    H/O echocardiogram 01/14/2015    EF50-55% Normal- see media    History of blood transfusion     Hyperlipidemia LDL goal <100     Hypertension     Dr Heather Remy Irregular heartbeat     \"they said I have irreg heart beat before- back when they drained fluid around my heart \"    Obstructive sleep apnea     \"have bipap I use at home\"    On home oxygen therapy     \"on oxygen all the time at home and keep it on 2.5 liters\"    Osteoarthritis     Pericardial effusion     per old chart had pericardial effusion 10/2020    Spinal stenosis     hx per old chart    Thyroid disease     Wears glasses     \"suppose to wear glasses\"     Past Surgical History:   Procedure Laterality Date    BACK SURGERY  2017    \"surgery on low back L5-6- not sure if they put any metal in \"   [address]      10/2019    CARDIAC CATHETERIZATION      per old chart had cath done 2020    CARPAL TUNNEL RELEASE Right 1985    CHOLECYSTECTOMY, LAPAROSCOPIC N/A 10/25/2020    CHOLECYSTECTOMY LAPAROSCOPIC WITH IOC performed by Yaneth Shane MD at [place] [address] COLONOSCOPY N/A 2019    COLONOSCOPY DIAGNOSTIC performed by Alyssa Chaparro MD at [address] [place] [address], DIAGNOSTIC      per old chart had egd done 2018    TUBAL LIGATION  1987    UPPER GASTROINTESTINAL ENDOSCOPY N/A 2021    EGD BIOPSY performed by Alyssa Chaparro MD at [facility] ENDOSCOPY     Family History   Problem Relation Age of Onset    Asthma Mother         COPD    Heart Disease Father      Social History     Socioeconomic History    Marital status:      Spouse name: Not on file    Number of children: Not on file    Years of education: Not on file    Highest education level: Not on file   Occupational History    Not on file   Social Needs    Financial resource strain: Not hard at all   [employer] insecurity     Worry: Never true     Inability: Never true   [employer] needs     Medical: No     Non-medical: No   Tobacco Use    Smoking status: Former Smoker     Packs/day: 1.50     Years: 20.00     Pack years: 30.00     Types: Cigarettes     Quit date: 2008     Years since quittin.3    Smokeless tobacco: Never Used   Substance and Sexual Activity    Alcohol use: Not Currently     Alcohol/week: 2.0 standard drinks     Types: 2 Shots of liquor per week     Comment: per pt on 1/5/2021\"quit drinking back Oct 2020\"use to drink wine coolers - 3 times per week \"/caffeine 2-3 coffees aday 1 pop     Drug use: No    Sexual activity: Yes     Partners: Male   Lifestyle    Physical activity     Days per week: 0 days     Minutes per session: 0 min    Stress:  Only a little   Relationships    Social connections     Talks on phone: Once a week     Gets together: Once a week     Attends Caoda[employer] service: Never     Active member of club or organization: No     Attends meetings of clubs or organizations: Never     Relationship status:     Intimate partner violence     Fear of current or ex partner: Not on file     Emotionally abused: Not on file     Physically abused: Not on file     Forced sexual activity: Not on file   Other Topics Concern    Not on file   Social History Narrative    Not on file     Current Facility-Administered Medications   Medication Dose Route Frequency Provider Last Rate Last Admin    potassium chloride (KLOR-CON M) extended release tablet 40 mEq  40 mEq Oral PRN Jazmin Finn, PA-C        Or    potassium bicarb-citric acid (EFFER-K) effervescent tablet 40 mEq  40 mEq Oral PRN Jazmin Finn, PA-C        Or    potassium chloride 10 mEq/100 mL IVPB (Peripheral Line)  10 mEq Intravenous PRN Jazmin Finn, PA-C 100 mL/hr at 04/28/21 2041 10 mEq at 04/28/21 2041    ondansetron (ZOFRAN) injection 4 mg  4 mg Intravenous Q30 Min PRN Jazmin Finn, PA-C        0.9 % sodium chloride infusion   Intravenous Continuous Jazmin Finn, PA-C 100 mL/hr at 04/28/21 2041 New Bag at 04/28/21 2041     Current Outpatient Medications   Medication Sig Dispense Refill    metOLazone (ZAROXOLYN) 2.5 MG tablet Take 1 tablet by mouth daily 30 tablet 3    furosemide (LASIX) 20 MG tablet Take 2 tablets by mouth 2 times daily 90 tablet 3    potassium chloride (KLOR-CON M) 20 MEQ extended release tablet Take 2 tablets by mouth daily 90 tablet 1    dilTIAZem (CARDIZEM CD) 120 MG extended release capsule Take 1 capsule by mouth daily 90 capsule 3    metoprolol succinate (TOPROL XL) 25 MG extended release tablet Take 1 tablet by mouth daily 30 tablet 3    clonazePAM (KLONOPIN) 1 MG disintegrating tablet Take 1 mg by mouth 3 times daily as needed.  ferrous sulfate (IRON 325) 325 (65 Fe) MG tablet Take 325 mg by mouth 2 times daily Indications: pt taking every day bid Monday, wed, fri       atorvastatin (LIPITOR) 40 MG tablet Take 40 mg by mouth daily      ondansetron (ZOFRAN ODT) 4 MG disintegrating tablet Take 1 tablet by mouth every 8 hours as needed for Nausea or Vomiting Place under the tongue and let it melt and absorb from under your tongue.  30 tablet 3    lactobacillus (CULTURELLE) capsule Take 1 capsule by mouth daily (with breakfast) 30 capsule 0    pantoprazole (PROTONIX) 40 MG tablet Take 1 tablet by mouth 2 times daily 60 tablet 3    sucralfate (CARAFATE) 1 GM tablet Take 1 tablet by mouth every 12 hours 120 tablet 3    dicyclomine (BENTYL) 10 MG capsule Take 1 capsule by mouth 4 times daily 360 capsule 1    theophylline (MIAH-24) 200 MG extended release capsule Take 400 mg by mouth daily      budesonide-formoterol (SYMBICORT) 160-4.5 MCG/ACT AERO USE 2 INHALATIONS TWICE A DAY 30.6 g 3    ipratropium-albuterol (DUONEB) 0.5-2.5 (3) MG/3ML SOLN nebulizer solution Inhale 3 mLs into the lungs every 4 hours 1080 mL 3    guaiFENesin (MUCINEX) 600 MG extended release tablet Take 1 tablet by mouth 2 times daily 30 tablet 0    montelukast (SINGULAIR) 10 MG tablet Take 1 tablet by mouth daily 30 tablet 3    levothyroxine (SYNTHROID) 100 MCG tablet Take 1 tablet by mouth Daily 30 tablet 2    folic acid (FOLVITE) 1 MG tablet Take 1 tablet by mouth daily 30 tablet 3    vitamin B-1 100 MG tablet Take 1 tablet by mouth daily 30 tablet 3    albuterol-ipratropium (COMBIVENT RESPIMAT)  MCG/ACT AERS inhaler Inhale 1 puff into the lungs every 4 hours as needed for Wheezing 3 Inhaler 0    DULoxetine (CYMBALTA) 60 MG extended release capsule Take 60 mg by mouth daily      busPIRone (BUSPAR) 10 MG tablet Take 1 tablet by mouth 3 times daily (Patient taking differently: Take 10 mg by mouth 2 times daily ) 90 tablet 0    traZODone (DESYREL) 50 MG tablet Take 100 mg by mouth nightly       baclofen (LIORESAL) 10 MG tablet Take 1 tablet by mouth 3 times daily (Patient taking differently: Take 10 mg by mouth 2 times daily ) 30 tablet 0    aspirin 81 MG chewable tablet Take 81 mg by mouth daily      fluticasone (FLONASE) 50 MCG/ACT nasal spray 2 sprays by Nasal route daily 1 Bottle 0     Allergies   Allergen Reactions    Lisinopril Swelling and Rash     angioedema       Nursing Notes Reviewed    Physical Exam:  ED Triage Vitals [04/28/21 1739]   Enc Vitals Group      /71      Pulse 74      Resp 16      Temp 97.7 °F (36.5 °C)      Temp Source Oral      SpO2 93 %      Weight 175 lb (79.4 kg)      Height 5' 1\" (1.549 m)      Head Circumference       Peak Flow       Pain Score       Pain Loc       Pain Edu? Excl. in [address]? General :Patient is awake alert oriented person place and time no acute distress nontoxic appearing  HEENT: Pupils are equally round and reactive to light extraocular motors are intact conjunctivae clear sclerae white there is no injection no icterus. Nose without any rhinorrhea or epistaxis. Oral mucosa is moist no exudate buccal mucosa shows no ulcerations. Uvula is midline    Neck: Neck is supple full range of motion trachea midline thyroid nonpalpable  Cardiac: Heart regular rate rhythm no murmurs rubs clicks or gallops  Lungs: Lungs are clear to auscultation there is no wheezing rhonchi or rales. There is no use of accessory muscles no nasal flaring identified. Chest wall:  There is no MM    Basophils Absolute 0.1 K/CU MM    Nucleated RBC % 0.0 %    Total Nucleated RBC 0.0 K/CU MM    Total Immature Neutrophil 0.03 K/CU MM    Immature Neutrophil % 0.3 0 - 0.43 %   CMP   Result Value Ref Range    Sodium 126 (L) 135 - 145 MMOL/L    Potassium 2.7 (LL) 3.5 - 5.1 MMOL/L    Chloride 67 (L) 99 - 110 mMol/L    CO2 >50 (H) 21 - 32 MMOL/L    BUN 23 6 - 23 MG/DL    CREATININE 1.4 (H) 0.6 - 1.1 MG/DL    Glucose 137 (H) 70 - 99 MG/DL    Calcium 10.4 8.3 - 10.6 MG/DL    Albumin 4.3 3.4 - 5.0 GM/DL    Total Protein 6.8 6.4 - 8.2 GM/DL    Total Bilirubin 0.3 0.0 - 1.0 MG/DL    ALT 15 10 - 40 U/L    AST 22 15 - 37 IU/L    Alkaline Phosphatase 90 40 - 129 IU/L    GFR Non- 39 (L) >60 mL/min/1.73m2    GFR  47 (L) >60 mL/min/1.73m2    Anion Gap 9 4 - 16   Magnesium   Result Value Ref Range    Magnesium 1.9 1.8 - 2.4 mg/dl   Brain Natriuretic Peptide   Result Value Ref Range    Pro-.9 <300 PG/ML   Blood Gas, Venous   Result Value Ref Range    pH, [place]her 7.46 (H) 7.32 - 7.42    pCO2, Topher 84 (H) 38 - 52 mmHG    pO2, Topher 38 28 - 48 mmHG    Base Exc, Mixed 29.6 (H) 0 - 2.3    HCO3, Venous 59.7 (H) 19 - 25 MMOL/L    O2 Sat, Topher 74.5 (H) 50 - 70 %    Comment VBG    EKG 12 Lead   Result Value Ref Range    Ventricular Rate 72 BPM    Atrial Rate 72 BPM    P-R Interval 156 ms    QRS Duration 162 ms    Q-T Interval 478 ms    QTc Calculation (Bazett) 523 ms    P Axis 80 degrees    R Axis 108 degrees    T Axis -8 degrees    Diagnosis       Normal sinus rhythm  Possible Left atrial enlargement  Right bundle branch block  T wave abnormality, consider inferior ischemia  Abnormal ECG  When compared with ECG of 30-MAR-2021 16:44,  T wave inversion now evident in Inferior leads  Nonspecific T wave abnormality now evident in Lateral leads  QT has lengthened        Radiographs (if obtained):  [] The following radiograph was interpreted by myself in the absence of a radiologist:   [] Radiologist's Report cells demonstrate no acute abnormality. SOFT TISSUES/SKULL:  No acute abnormality of the visualized skull or soft tissues. No acute intracranial abnormality. MDM:     Interventions given this visit:   Orders Placed This Encounter   Medications    OR Linked Order Group     potassium chloride (KLOR-CON M) extended release tablet 40 mEq     potassium bicarb-citric acid (EFFER-K) effervescent tablet 40 mEq     potassium chloride 10 mEq/100 mL IVPB (Peripheral Line)    ondansetron (ZOFRAN) injection 4 mg    0.9 % sodium chloride infusion   She presents today after routine labs did reveal abnormalities in her metabolic panel. Physical exam is unremarkable. Patient is not having any pain. Vital signs are all within normal limits here and remained that way throughout her stay here in the ED. Patient CBC reveals no acute abnormalities. In particular no profound anemia. Or signs of sepsis/acute infectious process. Metabolic panel did reveal a hyponatremia of 126. A creatinine of 1.2 up from 0.9 just several months ago. As well as a hypokalemia of 2.7. Patient is initiated on p.o. and will as well as IV hypokalemia potassium supplement orders. As well as a saline infusion for her hyponatremia. Patient is not short of breath. Unknown etiology right now with her hypercapnia of 51. However that will certainly require to be investigated on an inpatient basis    On-call physician  Was consulted regarding patient. After thorough discussion regarding patient's history, physical exam.  laboratory values, radiographic evidence (if applicable  theymyself as well as my attending physician agreed Given the patient's presenting concerns, medical history and clinical findings, the patient will be admitted at this time to undergo further evaluation and disposition. . During patient's entire stay in the ED patient remained stable and comfortable. Analgesia is well-controlled.   Patient will be admitted for all information regarding ongoing management and care of patient please see note of Admitting physician. All EKG interpretations are performed by Attending physician      /74   Pulse 70   Temp 97.7 °F (36.5 °C) (Oral)   Resp 17   Ht 5' 1\" (1.549 m)   Wt 175 lb (79.4 kg)   LMP 01/05/2010   SpO2 100%   BMI 33.07 kg/m²       Clinical Impression:  1. Hyponatremia    2. Hypokalemia    3. Hypercapnia    4. LUZ MARIA (acute kidney injury) ([facility] [address]        Disposition referral (if applicable):  No follow-up provider specified. Disposition medications (if applicable):  New Prescriptions    No medications on file         Comment: Please note this report has been produced using speech recognition software and may contain errors related to that system including errors in grammar, punctuation, and spelling, as well as words and phrases that may be inappropriate. If there are any questions or concerns please feel free to contact the dictating provider for clarification.       CARLOS Martins, [place]  04/28/21 7165

## 2021-04-29 ENCOUNTER — APPOINTMENT (OUTPATIENT)
Dept: GENERAL RADIOLOGY | Age: 61
DRG: 640 | End: 2021-04-29
Payer: COMMERCIAL

## 2021-04-29 LAB
ALBUMIN SERPL-MCNC: 4.1 GM/DL (ref 3.4–5)
AMYLASE: 66 U/L (ref 25–115)
ANION GAP SERPL CALCULATED.3IONS-SCNC: 5 MMOL/L (ref 4–16)
ANION GAP SERPL CALCULATED.3IONS-SCNC: 6 MMOL/L (ref 4–16)
ANION GAP SERPL CALCULATED.3IONS-SCNC: 6 MMOL/L (ref 4–16)
BACTERIA: NEGATIVE /HPF
BASOPHILS ABSOLUTE: 0.1 K/CU MM
BASOPHILS RELATIVE PERCENT: 1 % (ref 0–1)
BILIRUBIN URINE: NEGATIVE MG/DL
BLOOD, URINE: NEGATIVE
BUN BLDV-MCNC: 17 MG/DL (ref 6–23)
BUN BLDV-MCNC: 18 MG/DL (ref 6–23)
BUN BLDV-MCNC: 21 MG/DL (ref 6–23)
CALCIUM SERPL-MCNC: 8.8 MG/DL (ref 8.3–10.6)
CALCIUM SERPL-MCNC: 9.5 MG/DL (ref 8.3–10.6)
CALCIUM SERPL-MCNC: 9.9 MG/DL (ref 8.3–10.6)
CHLORIDE BLD-SCNC: 68 MMOL/L (ref 99–110)
CHLORIDE BLD-SCNC: 72 MMOL/L (ref 99–110)
CHLORIDE BLD-SCNC: 79 MMOL/L (ref 99–110)
CHLORIDE URINE RANDOM: 55 MMOL/L (ref 43–210)
CLARITY: CLEAR
CO2: 40 MMOL/L (ref 21–32)
CO2: 46 MMOL/L (ref 21–32)
CO2: 48 MMOL/L (ref 21–32)
COLOR: ABNORMAL
CREAT SERPL-MCNC: 1 MG/DL (ref 0.6–1.1)
CREAT SERPL-MCNC: 1.1 MG/DL (ref 0.6–1.1)
CREAT SERPL-MCNC: 1.2 MG/DL (ref 0.6–1.1)
CREATININE URINE: 23.8 MG/DL
CREATININE URINE: 24.2 MG/DL (ref 28–217)
DIFFERENTIAL TYPE: ABNORMAL
EKG ATRIAL RATE: 72 BPM
EKG DIAGNOSIS: NORMAL
EKG P AXIS: 80 DEGREES
EKG P-R INTERVAL: 156 MS
EKG Q-T INTERVAL: 478 MS
EKG QRS DURATION: 162 MS
EKG QTC CALCULATION (BAZETT): 523 MS
EKG R AXIS: 108 DEGREES
EKG T AXIS: -8 DEGREES
EKG VENTRICULAR RATE: 72 BPM
EOSINOPHILS ABSOLUTE: 0.5 K/CU MM
EOSINOPHILS RELATIVE PERCENT: 6.9 % (ref 0–3)
GFR AFRICAN AMERICAN: 56 ML/MIN/1.73M2
GFR AFRICAN AMERICAN: >60 ML/MIN/1.73M2
GFR AFRICAN AMERICAN: >60 ML/MIN/1.73M2
GFR NON-AFRICAN AMERICAN: 46 ML/MIN/1.73M2
GFR NON-AFRICAN AMERICAN: 51 ML/MIN/1.73M2
GFR NON-AFRICAN AMERICAN: 57 ML/MIN/1.73M2
GLUCOSE BLD-MCNC: 110 MG/DL (ref 70–99)
GLUCOSE BLD-MCNC: 117 MG/DL (ref 70–99)
GLUCOSE BLD-MCNC: 139 MG/DL (ref 70–99)
GLUCOSE, URINE: NEGATIVE MG/DL
HCT VFR BLD CALC: 34.3 % (ref 37–47)
HEMOGLOBIN: 10.9 GM/DL (ref 12.5–16)
IMMATURE NEUTROPHIL %: 0.1 % (ref 0–0.43)
KETONES, URINE: NEGATIVE MG/DL
LEUKOCYTE ESTERASE, URINE: NEGATIVE
LIPASE: 26 IU/L (ref 13–60)
LYMPHOCYTES ABSOLUTE: 1.4 K/CU MM
LYMPHOCYTES RELATIVE PERCENT: 20.6 % (ref 24–44)
MAGNESIUM: 1.7 MG/DL (ref 1.8–2.4)
MCH RBC QN AUTO: 28.2 PG (ref 27–31)
MCHC RBC AUTO-ENTMCNC: 31.8 % (ref 32–36)
MCV RBC AUTO: 88.9 FL (ref 78–100)
MONOCYTES ABSOLUTE: 0.6 K/CU MM
MONOCYTES RELATIVE PERCENT: 9.1 % (ref 0–4)
NITRITE URINE, QUANTITATIVE: NEGATIVE
NUCLEATED RBC %: 0 %
PDW BLD-RTO: 12.6 % (ref 11.7–14.9)
PH, URINE: 8 (ref 5–8)
PHOSPHORUS: 2.6 MG/DL (ref 2.5–4.9)
PLATELET # BLD: 192 K/CU MM (ref 140–440)
PMV BLD AUTO: 9.6 FL (ref 7.5–11.1)
POTASSIUM SERPL-SCNC: 2.6 MMOL/L (ref 3.5–5.1)
POTASSIUM SERPL-SCNC: 2.7 MMOL/L (ref 3.5–5.1)
POTASSIUM SERPL-SCNC: 3.4 MMOL/L (ref 3.5–5.1)
POTASSIUM, UR: 41.4 MMOL/L (ref 22–119)
PROT/CREAT RATIO, UR: 0.3
PROTEIN UA: NEGATIVE MG/DL
RBC # BLD: 3.86 M/CU MM (ref 4.2–5.4)
RBC URINE: ABNORMAL /HPF (ref 0–6)
SEGMENTED NEUTROPHILS ABSOLUTE COUNT: 4.3 K/CU MM
SEGMENTED NEUTROPHILS RELATIVE PERCENT: 62.3 % (ref 36–66)
SODIUM BLD-SCNC: 122 MMOL/L (ref 135–145)
SODIUM BLD-SCNC: 124 MMOL/L (ref 135–145)
SODIUM BLD-SCNC: 124 MMOL/L (ref 135–145)
SODIUM URINE: 46 MMOL/L (ref 35–167)
SPECIFIC GRAVITY UA: 1.01 (ref 1–1.03)
SQUAMOUS EPITHELIAL: <1 /HPF
TOTAL IMMATURE NEUTOROPHIL: 0.01 K/CU MM
TOTAL NUCLEATED RBC: 0 K/CU MM
TRICHOMONAS: ABNORMAL /HPF
URINE TOTAL PROTEIN: 6.2 MG/DL
UROBILINOGEN, URINE: NEGATIVE MG/DL (ref 0.2–1)
WBC # BLD: 6.9 K/CU MM (ref 4–10.5)
WBC UA: <1 /HPF (ref 0–5)

## 2021-04-29 PROCEDURE — 6360000002 HC RX W HCPCS: Performed by: NURSE PRACTITIONER

## 2021-04-29 PROCEDURE — C9113 INJ PANTOPRAZOLE SODIUM, VIA: HCPCS | Performed by: NURSE PRACTITIONER

## 2021-04-29 PROCEDURE — 83735 ASSAY OF MAGNESIUM: CPT

## 2021-04-29 PROCEDURE — 2580000003 HC RX 258: Performed by: NURSE PRACTITIONER

## 2021-04-29 PROCEDURE — 81001 URINALYSIS AUTO W/SCOPE: CPT

## 2021-04-29 PROCEDURE — 6360000002 HC RX W HCPCS: Performed by: INTERNAL MEDICINE

## 2021-04-29 PROCEDURE — 93010 ELECTROCARDIOGRAM REPORT: CPT | Performed by: INTERNAL MEDICINE

## 2021-04-29 PROCEDURE — 6370000000 HC RX 637 (ALT 250 FOR IP): Performed by: NURSE PRACTITIONER

## 2021-04-29 PROCEDURE — 6370000000 HC RX 637 (ALT 250 FOR IP): Performed by: PHYSICIAN ASSISTANT

## 2021-04-29 PROCEDURE — 94761 N-INVAS EAR/PLS OXIMETRY MLT: CPT

## 2021-04-29 PROCEDURE — 94660 CPAP INITIATION&MGMT: CPT

## 2021-04-29 PROCEDURE — 87086 URINE CULTURE/COLONY COUNT: CPT

## 2021-04-29 PROCEDURE — 85025 COMPLETE CBC W/AUTO DIFF WBC: CPT

## 2021-04-29 PROCEDURE — 6360000002 HC RX W HCPCS: Performed by: PHYSICIAN ASSISTANT

## 2021-04-29 PROCEDURE — 82570 ASSAY OF URINE CREATININE: CPT

## 2021-04-29 PROCEDURE — 84300 ASSAY OF URINE SODIUM: CPT

## 2021-04-29 PROCEDURE — 6370000000 HC RX 637 (ALT 250 FOR IP): Performed by: INTERNAL MEDICINE

## 2021-04-29 PROCEDURE — 80069 RENAL FUNCTION PANEL: CPT

## 2021-04-29 PROCEDURE — 82436 ASSAY OF URINE CHLORIDE: CPT

## 2021-04-29 PROCEDURE — 2700000000 HC OXYGEN THERAPY PER DAY

## 2021-04-29 PROCEDURE — 82150 ASSAY OF AMYLASE: CPT

## 2021-04-29 PROCEDURE — 84133 ASSAY OF URINE POTASSIUM: CPT

## 2021-04-29 PROCEDURE — 80048 BASIC METABOLIC PNL TOTAL CA: CPT

## 2021-04-29 PROCEDURE — 84156 ASSAY OF PROTEIN URINE: CPT

## 2021-04-29 PROCEDURE — 1200000000 HC SEMI PRIVATE

## 2021-04-29 PROCEDURE — 2580000003 HC RX 258: Performed by: INTERNAL MEDICINE

## 2021-04-29 PROCEDURE — 36415 COLL VENOUS BLD VENIPUNCTURE: CPT

## 2021-04-29 PROCEDURE — 83935 ASSAY OF URINE OSMOLALITY: CPT

## 2021-04-29 PROCEDURE — 83690 ASSAY OF LIPASE: CPT

## 2021-04-29 PROCEDURE — 94640 AIRWAY INHALATION TREATMENT: CPT

## 2021-04-29 PROCEDURE — 71045 X-RAY EXAM CHEST 1 VIEW: CPT

## 2021-04-29 RX ORDER — LIDOCAINE HYDROCHLORIDE 20 MG/ML
15 SOLUTION OROPHARYNGEAL
Status: DISCONTINUED | OUTPATIENT
Start: 2021-04-29 | End: 2021-05-01 | Stop reason: HOSPADM

## 2021-04-29 RX ORDER — POTASSIUM CHLORIDE 7.45 MG/ML
10 INJECTION INTRAVENOUS
Status: DISPENSED | OUTPATIENT
Start: 2021-04-29 | End: 2021-04-29

## 2021-04-29 RX ORDER — POLYETHYLENE GLYCOL 3350 17 G/17G
17 POWDER, FOR SOLUTION ORAL 2 TIMES DAILY
Status: DISCONTINUED | OUTPATIENT
Start: 2021-04-29 | End: 2021-05-01 | Stop reason: HOSPADM

## 2021-04-29 RX ORDER — MAGNESIUM HYDROXIDE/ALUMINUM HYDROXICE/SIMETHICONE 120; 1200; 1200 MG/30ML; MG/30ML; MG/30ML
30 SUSPENSION ORAL ONCE
Status: COMPLETED | OUTPATIENT
Start: 2021-04-29 | End: 2021-04-29

## 2021-04-29 RX ORDER — POTASSIUM CHLORIDE 20 MEQ/1
40 TABLET, EXTENDED RELEASE ORAL
Status: DISCONTINUED | OUTPATIENT
Start: 2021-04-29 | End: 2021-04-30

## 2021-04-29 RX ADMIN — ALUMINUM HYDROXIDE, MAGNESIUM HYDROXIDE, AND SIMETHICONE 30 ML: 200; 200; 20 SUSPENSION ORAL at 01:36

## 2021-04-29 RX ADMIN — POLYETHYLENE GLYCOL (3350) 17 G: 17 POWDER, FOR SOLUTION ORAL at 20:52

## 2021-04-29 RX ADMIN — POTASSIUM CHLORIDE 40 MEQ: 1500 TABLET, EXTENDED RELEASE ORAL at 15:32

## 2021-04-29 RX ADMIN — LEVOTHYROXINE SODIUM 100 MCG: 0.1 TABLET ORAL at 06:14

## 2021-04-29 RX ADMIN — ACETAMINOPHEN 650 MG: 325 TABLET ORAL at 21:05

## 2021-04-29 RX ADMIN — POTASSIUM CHLORIDE 10 MEQ: 7.46 INJECTION, SOLUTION INTRAVENOUS at 18:47

## 2021-04-29 RX ADMIN — POTASSIUM CHLORIDE: 149 INJECTION, SOLUTION, CONCENTRATE INTRAVENOUS at 00:44

## 2021-04-29 RX ADMIN — BUDESONIDE AND FORMOTEROL FUMARATE DIHYDRATE 2 PUFF: 160; 4.5 AEROSOL RESPIRATORY (INHALATION) at 20:07

## 2021-04-29 RX ADMIN — IPRATROPIUM BROMIDE AND ALBUTEROL SULFATE 3 ML: .5; 3 SOLUTION RESPIRATORY (INHALATION) at 15:20

## 2021-04-29 RX ADMIN — CLONAZEPAM 1 MG: 0.5 TABLET ORAL at 21:05

## 2021-04-29 RX ADMIN — POLYETHYLENE GLYCOL (3350) 17 G: 17 POWDER, FOR SOLUTION ORAL at 09:48

## 2021-04-29 RX ADMIN — PANTOPRAZOLE SODIUM 40 MG: 40 INJECTION, POWDER, LYOPHILIZED, FOR SOLUTION INTRAVENOUS at 20:52

## 2021-04-29 RX ADMIN — SUCRALFATE 1 G: 1 TABLET ORAL at 00:44

## 2021-04-29 RX ADMIN — SUCRALFATE 1 G: 1 TABLET ORAL at 15:32

## 2021-04-29 RX ADMIN — SODIUM CHLORIDE, PRESERVATIVE FREE 10 ML: 5 INJECTION INTRAVENOUS at 20:53

## 2021-04-29 RX ADMIN — SODIUM CHLORIDE, PRESERVATIVE FREE 10 ML: 5 INJECTION INTRAVENOUS at 09:37

## 2021-04-29 RX ADMIN — ACETAMINOPHEN 650 MG: 325 TABLET ORAL at 12:00

## 2021-04-29 RX ADMIN — POTASSIUM CHLORIDE 10 MEQ: 7.46 INJECTION, SOLUTION INTRAVENOUS at 05:03

## 2021-04-29 RX ADMIN — LIDOCAINE HYDROCHLORIDE 15 ML: 20 SOLUTION ORAL; TOPICAL at 01:36

## 2021-04-29 RX ADMIN — ASPIRIN 81 MG CHEWABLE TABLET 81 MG: 81 TABLET CHEWABLE at 09:35

## 2021-04-29 RX ADMIN — BUDESONIDE AND FORMOTEROL FUMARATE DIHYDRATE 2 PUFF: 160; 4.5 AEROSOL RESPIRATORY (INHALATION) at 07:40

## 2021-04-29 RX ADMIN — FERROUS SULFATE TAB 325 MG (65 MG ELEMENTAL FE) 325 MG: 325 (65 FE) TAB at 09:48

## 2021-04-29 RX ADMIN — TRAZODONE HYDROCHLORIDE 100 MG: 50 TABLET ORAL at 00:44

## 2021-04-29 RX ADMIN — CLONAZEPAM 1 MG: 0.5 TABLET ORAL at 15:32

## 2021-04-29 RX ADMIN — POTASSIUM CHLORIDE 10 MEQ: 7.46 INJECTION, SOLUTION INTRAVENOUS at 11:55

## 2021-04-29 RX ADMIN — DULOXETINE HYDROCHLORIDE 60 MG: 30 CAPSULE, DELAYED RELEASE ORAL at 09:36

## 2021-04-29 RX ADMIN — SUCRALFATE 1 G: 1 TABLET ORAL at 06:14

## 2021-04-29 RX ADMIN — ENOXAPARIN SODIUM 40 MG: 40 INJECTION SUBCUTANEOUS at 09:35

## 2021-04-29 RX ADMIN — POTASSIUM CHLORIDE 10 MEQ: 7.46 INJECTION, SOLUTION INTRAVENOUS at 15:28

## 2021-04-29 RX ADMIN — CLONAZEPAM 1 MG: 0.5 TABLET ORAL at 09:48

## 2021-04-29 RX ADMIN — Medication 1 CAPSULE: at 09:48

## 2021-04-29 RX ADMIN — POTASSIUM CHLORIDE 10 MEQ: 7.46 INJECTION, SOLUTION INTRAVENOUS at 21:17

## 2021-04-29 RX ADMIN — FERROUS SULFATE TAB 325 MG (65 MG ELEMENTAL FE) 325 MG: 325 (65 FE) TAB at 15:32

## 2021-04-29 RX ADMIN — FOLIC ACID 1 MG: 1 TABLET ORAL at 09:36

## 2021-04-29 RX ADMIN — PANTOPRAZOLE SODIUM 40 MG: 40 INJECTION, POWDER, LYOPHILIZED, FOR SOLUTION INTRAVENOUS at 09:35

## 2021-04-29 RX ADMIN — PANTOPRAZOLE SODIUM 40 MG: 40 INJECTION, POWDER, LYOPHILIZED, FOR SOLUTION INTRAVENOUS at 00:44

## 2021-04-29 RX ADMIN — POTASSIUM CHLORIDE 10 MEQ: 7.46 INJECTION, SOLUTION INTRAVENOUS at 18:42

## 2021-04-29 RX ADMIN — MONTELUKAST 10 MG: 10 TABLET, FILM COATED ORAL at 09:36

## 2021-04-29 RX ADMIN — IPRATROPIUM BROMIDE AND ALBUTEROL SULFATE 3 ML: .5; 3 SOLUTION RESPIRATORY (INHALATION) at 20:07

## 2021-04-29 RX ADMIN — POTASSIUM CHLORIDE 40 MEQ: 1500 TABLET, EXTENDED RELEASE ORAL at 09:51

## 2021-04-29 RX ADMIN — SODIUM CHLORIDE, PRESERVATIVE FREE 10 ML: 5 INJECTION INTRAVENOUS at 00:45

## 2021-04-29 RX ADMIN — IPRATROPIUM BROMIDE AND ALBUTEROL SULFATE 3 ML: .5; 3 SOLUTION RESPIRATORY (INHALATION) at 07:39

## 2021-04-29 RX ADMIN — BACLOFEN 10 MG: 10 TABLET ORAL at 09:36

## 2021-04-29 RX ADMIN — TRAZODONE HYDROCHLORIDE 100 MG: 50 TABLET ORAL at 20:52

## 2021-04-29 RX ADMIN — THEOPHYLLINE 400 MG: 400 TABLET, EXTENDED RELEASE ORAL at 09:36

## 2021-04-29 ASSESSMENT — PAIN SCALES - GENERAL
PAINLEVEL_OUTOF10: 3
PAINLEVEL_OUTOF10: 6

## 2021-04-29 ASSESSMENT — PAIN DESCRIPTION - PAIN TYPE: TYPE: ACUTE PAIN

## 2021-04-29 NOTE — CONSULTS
thirties     Fibromyalgia 02/16/2017    Full dentures     full upper plate and partial on the bottom    Gastric ulcer     \"had stomach ulder back fall 2019\"    H/O Doppler ultrasound 04/05/2019    venous- no DVT or reflux    H/O echocardiogram 01/14/2015    EF50-55% Normal- see media    History of blood transfusion     Hyperlipidemia LDL goal <100     Hypertension     Dr Michael Cordova Irregular heartbeat     \"they said I have irreg heart beat before- back when they drained fluid around my heart \"    Obstructive sleep apnea     \"have bipap I use at home\"    On home oxygen therapy     \"on oxygen all the time at home and keep it on 2.5 liters\"    Osteoarthritis     Pericardial effusion     per old chart had pericardial effusion 10/2020    Spinal stenosis     hx per old chart    Thyroid disease     Wears glasses     \"suppose to wear glasses\"       Past Surgical History:        Procedure Laterality Date    BACK SURGERY  03/2017    \"surgery on low back L5-6- not sure if they put any metal in \"   [address]      10/2019    CARDIAC CATHETERIZATION      per old chart had cath done 11/2020    CARPAL TUNNEL RELEASE Right 1985    CHOLECYSTECTOMY, LAPAROSCOPIC N/A 10/25/2020    CHOLECYSTECTOMY LAPAROSCOPIC WITH IOC performed by Jan Rasmussen MD at [place] [address] COLONOSCOPY N/A 12/12/2019    COLONOSCOPY DIAGNOSTIC performed by Obed Pederson MD at [address] [facility] [place], DIAGNOSTIC      per old chart had egd done 1/2018    TUBAL LIGATION  1987    UPPER GASTROINTESTINAL ENDOSCOPY N/A 1/7/2021    EGD BIOPSY performed by Obed Pederson MD at [facility] ENDOSCOPY         Current Medications:    Medications    Scheduled Medications:    potassium chloride  40 mEq Oral TID     potassium chloride  10 mEq Intravenous Q1H    aspirin  81 mg Oral Daily    atorvastatin  40 mg Oral Daily    baclofen  10 mg Oral BID    budesonide-formoterol  2 puff Inhalation BID    dilTIAZem  120 mg Oral Daily    DULoxetine  60 mg Oral Daily    ferrous sulfate  325 mg Oral BID WC    traZODone  100 mg Oral Nightly    theophylline  400 mg Oral Daily    sucralfate  1 g Oral BID AC    montelukast  10 mg Oral Daily    metoprolol succinate  25 mg Oral Daily    levothyroxine  100 mcg Oral Daily    lactobacillus  1 capsule Oral Daily with breakfast    ipratropium-albuterol  1 vial Inhalation [id number] WA    folic acid  1 mg Oral Daily    fluticasone  2 spray Nasal Daily    sodium chloride flush  5-40 mL Intravenous BID    enoxaparin  40 mg Subcutaneous Daily    pantoprazole  40 mg Intravenous BID     PRN Medications: lidocaine viscous hcl, potassium chloride **OR** potassium alternative oral replacement **OR** potassium chloride, clonazePAM, sodium chloride flush, sodium chloride, promethazine **OR** ondansetron, acetaminophen **OR** acetaminophen, albuterol sulfate HFA **AND** ipratropium      Allergies:  Lisinopril    Social History:   TOBACCO:   reports that she quit smoking about 13 years ago. Her smoking use included cigarettes. She has a 30.00 pack-year smoking history. She has never used smokeless tobacco.  ETOH:   reports previous alcohol use of about 2.0 standard drinks of alcohol per week. Family History:       Problem Relation Age of Onset    Asthma Mother         COPD    Heart Disease Father        No family history of colon cancer, Crohn's disease, or ulcerative colitis. REVIEW OF SYSTEMS:    The positive ROS will be identified in bold, otherwise ROS are negative     CONSTITUTIONAL:  Neg   Recent weight changes, fatigue, fever, chills or night sweats  NOSE:  Neg  Rhinorrhea, sneezing, itching, allergy or epistaxis  MOUTH/THROAT:  Neg  bleeding gums, hoarseness or sore throat.   RESPIRATORY:   Neg SOB, wheeze, cough, sputum, hemoptysis or bronchitis  CARDIOVASCULAR:  Neg chest pain, palpitations, dyspnea on exertion, orthopnea, paroxysmal nocturnal dyspnea or edema  GASTROINTESTINAL:  SEE HPI  HEMATOLOGIC/LYMPHATIC:  Neg  Anemia, bleeding tendency  MUSCULOSKELETAL:    New myalgias, bone pain, joint pain, swelling or stiffness and has had change in gait. NEUROLOGICAL:  Neg  Loss of Consciousness, memory loss, forgetfulness, periods of confusion, difficulty concentrating, seizures, decline in intellect, nervousness, insomina, aphasia or dysarthria. SKIN:  Neg  skin or hair changes, and has no itching, rashes, or sores. PSYCHIATRIC:  Neg depression, personality changes, anxiety. ENDOCRINE:  Neg polydipsia, polyuria, abnormal weight changes, heat /cold intolerance.   ALL/IMM:  Neg reactions to drugs other than listed    PHYSICAL EXAM:      Vitals:    BP (!) 101/50   Pulse 69   Temp 98.3 °F (36.8 °C) (Oral)   Resp 16   Ht 5' 1\" (1.549 m)   Wt 171 lb 14.4 oz (78 kg)   LMP 01/05/2010   SpO2 99%   BMI 32.48 kg/m²     General Appearance:    Alert, cooperative, no distress, laying in bed   HEENT:    Normocephalic, atraumatic, Conjunctiva clear,   Neck:   Supple, symmetrical, trachea midline   Lungs:     Clear to auscultation bilaterally, respirations unlabored   Chest Wall:    No tenderness or deformity    Heart:    Regular rate and rhythm, S1 and S2 normal, no murmur, rub   or gallop   Abdomen:     Soft, tenderness with palpation LUQ,  bowel sounds active all four quadrants,     no masses, no organomegaly, no ascites    Rectal:    Deferred   Extremities:   Extremities normal, atraumatic, no cyanosis or edema   Pulses:   2+ and symmetric all extremities   Skin:   Skin color, texture, turgor normal, no rashes or lesions       Neurologic:   No focal deficits, moving all four extremities            DATA:    ABGs:   Lab Results   Component Value Date    PO2ART 99 12/27/2020    YGF3LYZ 62.0 12/27/2020     CBC:   Recent Labs     04/28/21  1827   WBC 11.3*   HGB 10.9*        BMP:    Recent Labs     04/28/21  1323 04/28/21  1827 04/29/21  0134   * 126* 124*   K 2.6* 2.7* 2.6*   CL 68* 67* 72* CO2 >50* >50* 46*   BUN 19 23 21   CREATININE 1.2* 1.4* 1.2*   GLUCOSE 110* 137* 110*     Magnesium:   Lab Results   Component Value Date    MG 1.9 04/28/2021     Hepatic:   Recent Labs     04/28/21  1827   AST 22   ALT 15   BILITOT 0.3   ALKPHOS 90     No results for input(s): LIPASE, AMYLASE in the last 72 hours. No results for input(s): PROTIME, INR in the last 72 hours. No results for input(s): PTT in the last 72 hours. Lipids: No results for input(s): CHOL, HDL in the last 72 hours. Invalid input(s): LDLCALCU  INR: No results for input(s): INR in the last 72 hours.   TSH: No results found for: TSH    Intake/Output Summary (Last 24 hours) at 4/29/2021 0803  Last data filed at 4/29/2021 0045  Gross per 24 hour   Intake 10 ml   Output --   Net 10 ml      sodium chloride      IV infusion builder 125 mL/hr at 04/29/21 0044         IMPRESSION:      Patient Active Problem List   Diagnosis Code    Centrilobular emphysema ([facility] [address] J43.2    Hypertension I10    Hyperlipidemia LDL goal <100 E78.5    Abnormal EKG R94.31    Palpitations R00.2    Anxiety F41.9    FH: CAD (coronary artery disease) Z82.49    Fibromyalgia M79.7    Back pain at L4-L5 level M54.5    Sleep apnea G47.30    COPD exacerbation ([facility]) J44.1    Electrolyte imbalance E87.8    Epigastric pain R10.13    COPD (chronic obstructive pulmonary disease) ([facility]) J44.9    Spinal stenosis of lumbar region with radiculopathy M48.061, M54.16    Spinal stenosis, lumbar region, without neurogenic claudication M48.061    COPD with acute exacerbation ([facility]) J44.1    Pneumonia due to infectious organism J18.9    SVT (supraventricular tachycardia) ([facility]) I47.1    Class 1 obesity due to excess calories with body mass index (BMI) of 31.0 to 31.9 in adult E66.09, Z68.31    Pneumonia J18.9    Pleural effusion J90    Atelectasis J98.11    Pulmonary nodules R91.8    Respiratory failure with hypoxia and hypercapnia ([facility]) J96.91, J96.92    Anemia D64.9

## 2021-04-29 NOTE — PLAN OF CARE
Problem: Breathing Pattern - Ineffective:  Goal: Ability to achieve and maintain a regular respiratory rate will improve  Description: Ability to achieve and maintain a regular respiratory rate will improve  Outcome: Ongoing     Problem: Pain:  Goal: Pain level will decrease  Description: Pain level will decrease  Outcome: Ongoing  Goal: Control of acute pain  Description: Control of acute pain  Outcome: Ongoing  Goal: Control of chronic pain  Description: Control of chronic pain  Outcome: Ongoing     Problem: Fluid Volume:  Goal: Ability to achieve a balanced intake and output will improve  Description: Ability to achieve a balanced intake and output will improve  Outcome: Ongoing     Problem: Physical Regulation:  Goal: Ability to maintain clinical measurements within normal limits will improve  Description: Ability to maintain clinical measurements within normal limits will improve  Outcome: Ongoing  Goal: Will show no signs and symptoms of electrolyte imbalance  Description: Will show no signs and symptoms of electrolyte imbalance  Outcome: Ongoing

## 2021-04-29 NOTE — H&P
History and Physical      Name:  Gilson Palencia /Age/Sex: 1960  (64 y.o. female)   MRN & CSN:  [id number] & [id number] Admission Date/Time: 2021  7:25 PM   Location:  [facility] PCP: Adonis Moore MD       Admitting Physicians : Dr. Sylwia Zhu is a 64 y.o.  female  who presents with Abnormal Lab (low potassium sent by PCP)    Assessment and Plan: LUZ MARIA  # Hyponatremia  # Acute on chronic Hypokalemia-2.7  # Chronic metabolic acidosis non AG likely 2/2 COPD  -IVF  -Received 40 mEq of potassium in ED  -Urine indices  -Consult Nephrology    Melena  Hx of gastric ulcer.  Hgb 10.9, dropped from 11.2 on 3/30/2021  -Protonix 40 mg IV BID  -Monitor H&H  -Consult GI    COPD, not in exacerbation  -Continue Singulair, Albuterol and Symbicort      Chronic HFpEF with EF 55-60%  -Hold Metolazone and Lasix  -Continue Metoprolol    Hypothyroidism  -Continue Levothyroxine     Obesity class 1 with BMI 33.0  -Educated about lifestyle modificiations    DVT-PPX:Lovenox  Diet: General    Medications:   Medications:    Infusions:    sodium chloride 100 mL/hr at 21     PRN Meds: potassium chloride, 40 mEq, PRN    Or  potassium alternative oral replacement, 40 mEq, PRN    Or  potassium chloride, 10 mEq, PRN  ondansetron, 4 mg, Q30 Min PRN        Current Facility-Administered Medications:     potassium chloride (KLOR-CON M) extended release tablet 40 mEq, 40 mEq, Oral, PRN **OR** potassium bicarb-citric acid (EFFER-K) effervescent tablet 40 mEq, 40 mEq, Oral, PRN **OR** potassium chloride 10 mEq/100 mL IVPB (Peripheral Line), 10 mEq, Intravenous, PRN, Tran Broom PA-C, Last Rate: 100 mL/hr at 21, 10 mEq at 21    ondansetron (ZOFRAN) injection 4 mg, 4 mg, Intravenous, Q30 Min PRN, Tran Broom, PA-C    0.9 % sodium chloride infusion, , Intravenous, Continuous, Tran Broom, PA-C, Last Rate: 100 mL/hr at 21, New Bag at 21 2041    Current Outpatient Medications:     metOLazone (ZAROXOLYN) 2.5 MG tablet, Take 1 tablet by mouth daily, Disp: 30 tablet, Rfl: 3    furosemide (LASIX) 20 MG tablet, Take 2 tablets by mouth 2 times daily, Disp: 90 tablet, Rfl: 3    potassium chloride (KLOR-CON M) 20 MEQ extended release tablet, Take 2 tablets by mouth daily, Disp: 90 tablet, Rfl: 1    dilTIAZem (CARDIZEM CD) 120 MG extended release capsule, Take 1 capsule by mouth daily, Disp: 90 capsule, Rfl: 3    metoprolol succinate (TOPROL XL) 25 MG extended release tablet, Take 1 tablet by mouth daily, Disp: 30 tablet, Rfl: 3    clonazePAM (KLONOPIN) 1 MG disintegrating tablet, Take 1 mg by mouth 3 times daily as needed.  , Disp: , Rfl:     ferrous sulfate (IRON 325) 325 (65 Fe) MG tablet, Take 325 mg by mouth 2 times daily Indications: pt taking every day bid Monday, wed, fri , Disp: , Rfl:     atorvastatin (LIPITOR) 40 MG tablet, Take 40 mg by mouth daily, Disp: , Rfl:     ondansetron (ZOFRAN ODT) 4 MG disintegrating tablet, Take 1 tablet by mouth every 8 hours as needed for Nausea or Vomiting Place under the tongue and let it melt and absorb from under your tongue., Disp: 30 tablet, Rfl: 3    lactobacillus (CULTURELLE) capsule, Take 1 capsule by mouth daily (with breakfast), Disp: 30 capsule, Rfl: 0    pantoprazole (PROTONIX) 40 MG tablet, Take 1 tablet by mouth 2 times daily, Disp: 60 tablet, Rfl: 3    sucralfate (CARAFATE) 1 GM tablet, Take 1 tablet by mouth every 12 hours, Disp: 120 tablet, Rfl: 3    dicyclomine (BENTYL) 10 MG capsule, Take 1 capsule by mouth 4 times daily, Disp: 360 capsule, Rfl: 1    theophylline (MIAH-24) 200 MG extended release capsule, Take 400 mg by mouth daily, Disp: , Rfl:     budesonide-formoterol (SYMBICORT) 160-4.5 MCG/ACT AERO, USE 2 INHALATIONS TWICE A DAY, Disp: 30.6 g, Rfl: 3    ipratropium-albuterol (DUONEB) 0.5-2.5 (3) MG/3ML SOLN nebulizer solution, Inhale 3 mLs into the lungs every 4 hours, Disp: 1080 mL, Rfl: 3    guaiFENesin (MUCINEX) 600 MG extended release tablet, Take 1 tablet by mouth 2 times daily, Disp: 30 tablet, Rfl: 0    montelukast (SINGULAIR) 10 MG tablet, Take 1 tablet by mouth daily, Disp: 30 tablet, Rfl: 3    levothyroxine (SYNTHROID) 100 MCG tablet, Take 1 tablet by mouth Daily, Disp: 30 tablet, Rfl: 2    folic acid (FOLVITE) 1 MG tablet, Take 1 tablet by mouth daily, Disp: 30 tablet, Rfl: 3    vitamin B-1 100 MG tablet, Take 1 tablet by mouth daily, Disp: 30 tablet, Rfl: 3    albuterol-ipratropium (COMBIVENT RESPIMAT)  MCG/ACT AERS inhaler, Inhale 1 puff into the lungs every 4 hours as needed for Wheezing, Disp: 3 Inhaler, Rfl: 0    DULoxetine (CYMBALTA) 60 MG extended release capsule, Take 60 mg by mouth daily, Disp: , Rfl:     busPIRone (BUSPAR) 10 MG tablet, Take 1 tablet by mouth 3 times daily (Patient taking differently: Take 10 mg by mouth 2 times daily ), Disp: 90 tablet, Rfl: 0    traZODone (DESYREL) 50 MG tablet, Take 100 mg by mouth nightly , Disp: , Rfl:     baclofen (LIORESAL) 10 MG tablet, Take 1 tablet by mouth 3 times daily (Patient taking differently: Take 10 mg by mouth 2 times daily ), Disp: 30 tablet, Rfl: 0    aspirin 81 MG chewable tablet, Take 81 mg by mouth daily, Disp: , Rfl:     fluticasone (FLONASE) 50 MCG/ACT nasal spray, 2 sprays by Nasal route daily, Disp: 1 Bottle, Rfl: 0    History of present illness     Chief Complaint: Abnormal Lab (low potassium sent by PCP)      Janelle Orellana is a 64 y.o.  female  who presents with abnormal labs from cardiologist office. Patient is on potassium supplement for over a month. Patient states she had lab work done and was fund to have low potassium. She was asked to come to ED for evaluation. Patient reports dark stool lately. She follows up with Dr. Christian Cagle and was told she has gastric ulcer. Denies nausea, vomiting, or diarrhea.  Hx of CHF,      Review of Systems   Ten point ROS reviewed and negative, unless as noted below. GENERAL:  Denies fever, chills, night sweats, or changes in weight. + Fatigue  EYES:  Denies recent visual changes. ENT:  Denies ear pain, hearing loss or tinnitus  RESP:  Denies any cough, dyspnea, or wheezing. CV:  Denies any chest pain with exertion or at rest, palpitations, syncope, or edema. GI:  Denies any dysphagia, nausea, vomiting, abdominal pain, heartburn, changes in bowel habit, + melena or rectal bleeding  MUSCULOSKELETAL:  Denies any joint swelling, joint pain, or loss of range of motion. NEURO:  Denies any headaches, tremors, dizziness, vertigo, memory loss, confusion, weakness, numbness or tingling. PSYCH:  Denies any sleeping problems, history of abuse, marital discord. HEME/LYMPHATIC/IMMUNO:  Denies , bruising, bleeding abnormalities   ENDO:  Denies any heat or cold intolerance, panemiaolyuria or polydipsia. Objective:   No intake or output data in the 24 hours ending 04/28/21 2049   Vitals:   Vitals:    04/28/21 1934   BP: 123/74   Pulse: 70   Resp: 17   Temp:    SpO2: 100%     Physical Exam:   Gen:  awake, alert, cooperative, no apparent distress  EYES:Lids and lashes normal, pupils equal, round ,extra ocular muscles intact, sclera clear, conjunctiva normal  ENT:  Normocephalic, oral pharynx with dry mucus membranes  NECK:  Supple, symmetrical, trachea midline, no adenopathy,  LUNGS:  Clear to auscultate bilaterally, no rales ronchi or wheezing noted. CARDIOVASCULAR:  regular rate and rhythm, normal S1 and S2,no murmur noted, peripheral pulses 2+, no pitting edema  ABDOMEN: Normal BS, mil tenderness in epigastric area, non distended, no HSM noted. MUSCULOSKELETAL:  ROM of all extremities grossly wnl  NEUROLOGIC: AOx 3,  Cranial nerves II-XII are grossly intact. Motor is 5 out of 5 bilaterally. Sensory is intact, no lateralizing findings.    SKIN:  no bruising or bleeding, pale, turgor, no redness, warmth, or swelling      Past Medical History:      Past Medical History:   Diagnosis Date    Anemia     Anxiety 02/16/2017    follows with Dr Jeancarlos Rios    Arthritis     Back pain at L4-L5 level 2/16/2017    Dr Candace Wallace 1 disorder ([facility] [address]     per pt on 2/5/2021\"never told I have bipolar\"    COPD (chronic obstructive pulmonary disease) ([facility] [address]     follows with Dr Clarice Vidales Emphysema of lung ([facility] [address]     FH: CAD (coronary artery disease) 2/16/2017    Father had in his forties, sister in her thirties    [facility] Fibromyalgia 02/16/2017    Full dentures     full upper plate and partial on the bottom    Gastric ulcer     \"had stomach ulder back fall 2019\"    H/O Doppler ultrasound 04/05/2019    venous- no DVT or reflux    H/O echocardiogram 01/14/2015    EF50-55% Normal- see media    History of blood transfusion     Hyperlipidemia LDL goal <100     Hypertension     Dr Michael Cordova Irregular heartbeat     \"they said I have irreg heart beat before- back when they drained fluid around my heart \"    Obstructive sleep apnea     \"have bipap I use at home\"    On home oxygen therapy     \"on oxygen all the time at home and keep it on 2.5 liters\"    Osteoarthritis     Pericardial effusion     per old chart had pericardial effusion 10/2020    Spinal stenosis     hx per old chart    Thyroid disease     Wears glasses     \"suppose to wear glasses\"     PSHX:  has a past surgical history that includes Carpal tunnel release (Right, 1985); Tubal ligation (1987); back surgery (03/2017); Cholecystectomy, laparoscopic (N/A, 10/25/2020); Colonoscopy (N/A, 12/12/2019); Endoscopy, colon, diagnostic; Cardiac catheterization; Cardiac catheterization; and Upper gastrointestinal endoscopy (N/A, 1/7/2021). Allergies: Allergies   Allergen Reactions    Lisinopril Swelling and Rash     angioedema       FAM HX: family history includes Asthma in her mother; Heart Disease in her father. Family history reviewed and is essentially negative unless as stated above.      Soc HX:   Social History     Socioeconomic History    Marital status:      Spouse name: None    Number of children: None    Years of education: None    Highest education level: None   Occupational History    None   Social Needs    Financial resource strain: Not hard at all   William-Leslie insecurity     Worry: Never true     Inability: Never true   Victiv needs     Medical: No     Non-medical: No   Tobacco Use    Smoking status: Former Smoker     Packs/day: 1.50     Years: 20.00     Pack years: 30.00     Types: Cigarettes     Quit date: 2008     Years since quittin.3    Smokeless tobacco: Never Used   Substance and Sexual Activity    Alcohol use: Not Currently     Alcohol/week: 2.0 standard drinks     Types: 2 Shots of liquor per week     Comment: per pt on 2021\"quit drinking back Oct 2020\"use to drink wine coolers - 3 times per week \"/caffeine 2-3 coffees aday 1 pop     Drug use: No    Sexual activity: Yes     Partners: Male   Lifestyle    Physical activity     Days per week: 0 days     Minutes per session: 0 min    Stress:  Only a little   Relationships    Social connections     Talks on phone: Once a week     Gets together: Once a week     Attends Worship service: Never     Active member of club or organization: No     Attends meetings of clubs or organizations: Never     Relationship status:     Intimate partner violence     Fear of current or ex partner: None     Emotionally abused: None     Physically abused: None     Forced sexual activity: None   Other Topics Concern    None   Social History Narrative    None       Electronically signed by BRIAN Porras CNP on 2021 at 8:49 PM

## 2021-04-29 NOTE — PROGRESS NOTES
Reviewed chart and adjusted ivf, fu labs midnight and adjust with meds.  Monitor and check culutre  For now aggreisve hydration

## 2021-04-29 NOTE — ED NOTES
Called and gave report to nurse on [address] [place] [address] Rd; pt will be going to room [address]  Antonietta Dear, [facility]  04/28/21 0908

## 2021-04-29 NOTE — ED NOTES
Pt placed call light on and mentioned that the potassium was starting to hurt; pt has IV fluids infusing with the potassium; potassium rate changed from 100ml/hr to 75ml/hr       Cheryl Villarreal [facility]  04/28/21 2121

## 2021-04-29 NOTE — PROGRESS NOTES
Hospitalist Progress Note      Name:  Alexandrea Cross /Age/Sex: 1960  (64 y.o. female)   MRN & CSN:  [id number] & [id number] Admission Date/Time: 2021  7:25 PM   Location:  [address] [place] [address] PCP: Celia Rachel MD         Hospital Day: 2    Assessment and Plan:   Alexandrae Cross is a 64 y.o.  female  who presents with abnormal labs, admits to poor appetite prior    · LUZ MARIA  · Metabolic alkalosis likely diuretic related  Metolazone and furosemide on hold  Serial BMP and potassium check  IV fluids, potassium replacement  Nephrology on board    · Melena, concern for possible GI bleed  Hb stable, monitor  Serial H&H  Continue Protonix  GI on board    Chronic medical conditions, medications resumed unless contraindicated  COPD, not in exacerbation, Continue Singulair, Albuterol and Symbicort  Chronic HFpEF with EF 55-60%, metolazone and Lasix on hold, continue metoprolol  Hypothyroidism, levothyroxine  Obesity class 1 with BMI 33.0, encourage lifestyle changes, weight loss    Diet DIET GENERAL;   DVT Prophylaxis [] Lovenox, []  Heparin, [] SCDs, []No VTE prophylaxis, patient ambulating   GI Prophylaxis [] PPI, [] H2 Blocker, [] No GI prophylaxis, patient is receiving diet/Tube Feeds   Code Status Full Code   Disposition Patient requires continued admission due to    MDM [] Low, [] Moderate,[]  High  Patient's risk as above due to      History of Present Illness:     Pt S&E    Complaining about a sore throat, no fever or chills, no viral symptoms. 10-14 point ROS reviewed negative, unless as noted above    Objective: Intake/Output Summary (Last 24 hours) at 2021 1513  Last data filed at 2021 0045  Gross per 24 hour   Intake 10 ml   Output --   Net 10 ml      Vitals:   Vitals:    21 0945   BP: (!) 102/49   Pulse: 67   Resp: 16   Temp: 98.3 °F (36.8 °C)   SpO2: 100%     Physical Exam:    GEN Awake female, sitting upright in bed in no apparent distress.  Appears given Or  potassium alternative oral replacement, 40 mEq, PRN    Or  potassium chloride, 10 mEq, PRN  clonazePAM, 1 mg, TID PRN  sodium chloride flush, 5-40 mL, PRN  sodium chloride, 25 mL, PRN  promethazine, 12.5 mg, Q6H PRN    Or  ondansetron, 4 mg, Q6H PRN  acetaminophen, 650 mg, Q6H PRN    Or  acetaminophen, 650 mg, Q6H PRN  albuterol sulfate HFA, 1 puff, Q4H PRN    And  ipratropium, 1 puff, Q4H PRN        Data    Recent Labs     04/28/21 1827 04/29/21  0915   WBC 11.3* 6.9   HGB 10.9* 10.9*   HCT 34.8* 34.3*    192      Recent Labs     04/28/21 1827 04/29/21  0134 04/29/21  0941   * 124* 122*   K 2.7* 2.6* 2.7*   CL 67* 72* 68*   CO2 >50* 46* 48*   PHOS  --   --  2.6   BUN 23 21 18   CREATININE 1.4* 1.2* 1.1     Recent Labs     04/28/21 1827   AST 22   ALT 15   BILITOT 0.3   ALKPHOS 90     No results for input(s): INR in the last 72 hours. No results for input(s): CKTOTAL, CKMB, CKMBINDEX, TROPONINI in the last 72 hours.         Electronically signed by Nawaf Rahman MD on 4/29/2021 at 3:13 PM

## 2021-04-29 NOTE — PROGRESS NOTES
Pt seen and examined and diarrhea with poor intake and was on diuretic and combo lead to met alkalosis with low K. repelte and fu labs and monitor close and slowly recover as work on etiology with gi.  dw pt and full consult to follow,

## 2021-04-29 NOTE — CONSULTS
Nephrology Service Consultation    Patient:  Haily Forman  MRN: [id number]  Consulting physician:  Marisela Claude, MD  Reason for Consult: electrolyte change    History Obtained From:  patient, electronic medical record  PCP: Fanny Brownlee MD    HISTORY OF PRESENT ILLNESS:   The patient is a 64 y.o. female who presents with weakness and hx chf on diuretic therapy with bipolar and present with n/v diarrhea and low K with severe met alkalosis and renal asked evalutae. Seen in past and not fu and hx copd and chronic pain.  Pt denies etoh or drug use and renal asked to see    Past Medical History:        Diagnosis Date    Anemia     Anxiety 02/16/2017    follows with Dr Iram Becerril    Arthritis     Back pain at L4-L5 level 2/16/2017    Dr Elgin Walters Bipolar 1 disorder ([facility] [address]     per pt on 2/5/2021\"never told I have bipolar\"    COPD (chronic obstructive pulmonary disease) ([facility] [address]     follows with Dr Milind Garrett Emphysema of lung ([facility] [address]     FH: CAD (coronary artery disease) 2/16/2017    Father had in his forties, sister in her thirties    Dez Carleys Fibromyalgia 02/16/2017    Full dentures     full upper plate and partial on the bottom    Gastric ulcer     \"had stomach ulder back fall 2019\"    H/O Doppler ultrasound 04/05/2019    venous- no DVT or reflux    H/O echocardiogram 01/14/2015    EF50-55% Normal- see media    History of blood transfusion     Hyperlipidemia LDL goal <100     Hypertension     Dr Suellen Velazquez Irregular heartbeat     \"they said I have irreg heart beat before- back when they drained fluid around my heart \"    Obstructive sleep apnea     \"have bipap I use at home\"    On home oxygen therapy     \"on oxygen all the time at home and keep it on 2.5 liters\"    Osteoarthritis     Pericardial effusion     per old chart had pericardial effusion 10/2020    Spinal stenosis     hx per old chart    Thyroid disease     Wears glasses     \"suppose to wear glasses\"       Past Surgical History: Procedure Laterality Date    BACK SURGERY  03/2017    \"surgery on low back L5-6- not sure if they put any metal in \"    CARDIAC CATHETERIZATION      10/2019    CARDIAC CATHETERIZATION      per old chart had cath done 11/2020    CARPAL TUNNEL RELEASE Right 1985    CHOLECYSTECTOMY, LAPAROSCOPIC N/A 10/25/2020    CHOLECYSTECTOMY LAPAROSCOPIC WITH IOC performed by Destin Schuster MD at [place] [address] COLONOSCOPY N/A 12/12/2019    COLONOSCOPY DIAGNOSTIC performed by Jacqui Archer MD at [facility] ENDOSCOPY    ENDOSCOPY, COLON, DIAGNOSTIC      per old chart had egd done 1/2018    TUBAL LIGATION  1987    UPPER GASTROINTESTINAL ENDOSCOPY N/A 1/7/2021    EGD BIOPSY performed by Jacqui Archer MD at [facility] ENDOSCOPY       Medications:   Scheduled Meds:   potassium chloride  40 mEq Oral TID WC    polyethylene glycol  17 g Oral BID    aspirin  81 mg Oral Daily    atorvastatin  40 mg Oral Daily    baclofen  10 mg Oral BID    budesonide-formoterol  2 puff Inhalation BID    dilTIAZem  120 mg Oral Daily    DULoxetine  60 mg Oral Daily    ferrous sulfate  325 mg Oral BID WC    traZODone  100 mg Oral Nightly    theophylline  400 mg Oral Daily    sucralfate  1 g Oral BID AC    montelukast  10 mg Oral Daily    metoprolol succinate  25 mg Oral Daily    levothyroxine  100 mcg Oral Daily    lactobacillus  1 capsule Oral Daily with breakfast    ipratropium-albuterol  1 vial Inhalation F2N WA    folic acid  1 mg Oral Daily    fluticasone  2 spray Nasal Daily    sodium chloride flush  5-40 mL Intravenous BID    enoxaparin  40 mg Subcutaneous Daily    pantoprazole  40 mg Intravenous BID     Continuous Infusions:   sodium chloride      IV infusion builder 125 mL/hr at 04/29/21 0044     PRN Meds:.lidocaine viscous hcl, potassium chloride **OR** potassium alternative oral replacement **OR** potassium chloride, clonazePAM, sodium chloride flush, sodium chloride, promethazine **OR** ondansetron, acetaminophen **OR** acetaminophen, albuterol sulfate HFA **AND** ipratropium    Allergies:  Lisinopril    Social History:   TOBACCO:   reports that she quit smoking about 13 years ago. Her smoking use included cigarettes. She has a 30.00 pack-year smoking history. She has never used smokeless tobacco.  ETOH:   reports previous alcohol use of about 2.0 standard drinks of alcohol per week. OCCUPATION:      Family History:       Problem Relation Age of Onset    Asthma Mother         COPD    Heart Disease Father        REVIEW OF SYSTEMS:  Negative except for weak anxious no fever. Physical Exam:    I/O: 04/28 0701 - 04/29 0700  In: 10 [I.V.:10]  Out: -     Vitals: BP (!) 102/49   Pulse 67   Temp 98.3 °F (36.8 °C) (Oral)   Resp 16   Ht 5' 1\" (1.549 m)   Wt 171 lb 14.4 oz (78 kg)   LMP 01/05/2010   SpO2 100%   BMI 32.48 kg/m²   General appearance: awake weak  HEENT: Head: Normal, normocephalic, atraumatic. Neck: supple, symmetrical, trachea midline  Lungs: diminished breath sounds bilaterally  Heart: S1, S2 normal  Abdomen: abnormal findings:  soft NT  Extremities: edema trace  Neurologic: Mental status: alertness: alert anxious      CBC:   Recent Labs     04/28/21 1827 04/29/21  0915   WBC 11.3* 6.9   HGB 10.9* 10.9*    192     BMP:    Recent Labs     04/28/21  1827 04/29/21  0134 04/29/21  0941   * 124* 122*   K 2.7* 2.6* 2.7*   CL 67* 72* 68*   CO2 >50* 46* 48*   BUN 23 21 18   CREATININE 1.4* 1.2* 1.1   GLUCOSE 137* 110* 117*     Hepatic:   Recent Labs     04/28/21 1827   AST 22   ALT 15   BILITOT 0.3   ALKPHOS 90     Troponin: No results for input(s): TROPONINI in the last 72 hours. BNP: No results for input(s): BNP in the last 72 hours. Lipids: No results for input(s): CHOL, HDL in the last 72 hours. Invalid input(s): LDLCALCU  ABGs:   Lab Results   Component Value Date    PO2ART 99 12/27/2020    MYN8LFM 62.0 12/27/2020     INR: No results for input(s): INR in the last 72 hours.   Renal Labs  Albumin: Lab Results   Component Value Date    LABALBU 4.1 04/29/2021     Calcium:    Lab Results   Component Value Date    CALCIUM 9.9 04/29/2021     Phosphorus:    Lab Results   Component Value Date    PHOS 2.6 04/29/2021     U/A:    Lab Results   Component Value Date    NITRU NEGATIVE 04/29/2021    COLORU STRAW 04/29/2021    WBCUA <1 04/29/2021    RBCUA NONE SEEN 04/29/2021    MUCUS RARE 11/03/2020    TRICHOMONAS NONE SEEN 04/29/2021    BACTERIA NEGATIVE 04/29/2021    CLARITYU CLEAR 04/29/2021    SPECGRAV 1.008 04/29/2021    UROBILINOGEN NEGATIVE 04/29/2021    BILIRUBINUR NEGATIVE 04/29/2021    BLOODU NEGATIVE 04/29/2021    KETUA NEGATIVE 04/29/2021     ABG:    Lab Results   Component Value Date    TAT5YAE 62.0 12/27/2020    PO2ART 99 12/27/2020    QXX9EZA 40.2 12/27/2020     HgBA1c:    Lab Results   Component Value Date    LABA1C 5.1 06/21/2019     Microalbumen/Creatinine ratio:  No components found for: RUCREAT  TSH:  No results found for: TSH  IRON:    Lab Results   Component Value Date    IRON 58 10/23/2020     Iron Saturation:  No components found for: PERCENTFE  TIBC:    Lab Results   Component Value Date    TIBC 160 10/23/2020     FERRITIN:    Lab Results   Component Value Date    FERRITIN 1,617 10/23/2020     RPR:  No results found for: RPR  VITALY:    Lab Results   Component Value Date    VITALY None Detected 10/23/2020     24 Hour Urine for Creatinine Clearance:  No components found for: CREAT4, UHRS10, UTV10  -----------------------------------------------------------------      Assessment and Recommendations     Patient Active Problem List   Diagnosis Code    Centrilobular emphysema ([facility] [address] J43.2    Hypertension I10    Hyperlipidemia LDL goal <100 E78.5    Abnormal EKG R94.31    Palpitations R00.2    Anxiety F41.9    FH: CAD (coronary artery disease) Z82.49    Fibromyalgia M79.7    Back pain at L4-L5 level M54.5    Sleep apnea G47.30    COPD exacerbation ([facility] J44.1    Electrolyte imbalance E87.8    Epigastric pain R10.13    COPD [facility]chronic obstructive pulmonary disease) [facility] J44.9    Spinal stenosis of lumbar region with radiculopathy M48.061, M54.16    Spinal stenosis, lumbar region, without neurogenic claudication M48.061    COPD with acute exacerbation ([facility]) J44.1    Pneumonia due to infectious organism J18.9    SVT (supraventricular tachycardia) ([facility]) I47.1    Class 1 obesity due to excess calories with body mass index (BMI) of 31.0 to 31.9 in adult E66.09, Z68.31    Pneumonia J18.9    Pleural effusion J90    Atelectasis J98.11    Pulmonary nodules R91.8    Respiratory failure with hypoxia and hypercapnia ([facility]) J96.91, J96.92    Anemia D64.9    COPD with exacerbation ([facility]) J44.1    Acquired hemolytic anemia, unspecified ([facility]) D59.9    Leucocytosis D72.829    Chronic kidney disease, stage I N18.1    Hyponatremia E87.1    PNA (pneumonia) J18.9    Sepsis ([facility]) A41.9    Pericardial effusion I31.3    Acute acalculous cholecystitis K81.0    SIRS (systemic inflammatory response syndrome) ([facility]) R65.10    Chest pain R07.9    Abnormal nuclear stress test R94.39    Abnormal stress test R94.39    Status post laparoscopic cholecystectomy Z90.49    Moderate malnutrition ([facility]) E44.0    LUZ MARIA (acute kidney injury) ([facility]) N17.9     Imp/plan  1 hyponatremia  2 hypokalemia  3 met alkalosis  4 arf from atn  5 bipolar  6 diarrhea with weakness  7 hx chf  8 low bp    Plan  1 na low monitor as hydrate  2 replete K  3 acidosis improve with hydration  4 renal monitor  5 fu affect  6 fu gi workup  7 o2 monitor  8 bp low monitor  Will follow    Electronically signed by Russ Gannon MD on 4/29/2021 at 1:46 PM

## 2021-04-30 LAB
ALBUMIN SERPL-MCNC: 3.6 GM/DL (ref 3.4–5)
ANION GAP SERPL CALCULATED.3IONS-SCNC: 3 MMOL/L (ref 4–16)
ANION GAP SERPL CALCULATED.3IONS-SCNC: 5 MMOL/L (ref 4–16)
BUN BLDV-MCNC: 11 MG/DL (ref 6–23)
BUN BLDV-MCNC: 11 MG/DL (ref 6–23)
CALCIUM SERPL-MCNC: 8.6 MG/DL (ref 8.3–10.6)
CALCIUM SERPL-MCNC: 8.7 MG/DL (ref 8.3–10.6)
CHLORIDE BLD-SCNC: 85 MMOL/L (ref 99–110)
CHLORIDE BLD-SCNC: 88 MMOL/L (ref 99–110)
CO2: 37 MMOL/L (ref 21–32)
CO2: 39 MMOL/L (ref 21–32)
CREAT SERPL-MCNC: 0.7 MG/DL (ref 0.6–1.1)
CREAT SERPL-MCNC: 0.8 MG/DL (ref 0.6–1.1)
CULTURE: NORMAL
GFR AFRICAN AMERICAN: >60 ML/MIN/1.73M2
GFR AFRICAN AMERICAN: >60 ML/MIN/1.73M2
GFR NON-AFRICAN AMERICAN: >60 ML/MIN/1.73M2
GFR NON-AFRICAN AMERICAN: >60 ML/MIN/1.73M2
GLUCOSE BLD-MCNC: 115 MG/DL (ref 70–99)
GLUCOSE BLD-MCNC: 152 MG/DL (ref 70–99)
HCT VFR BLD CALC: 30.1 % (ref 37–47)
HEMOGLOBIN: 9.5 GM/DL (ref 12.5–16)
Lab: NORMAL
OSMOLALITY URINE: 238 MOSM/KG (ref 50–800)
PHOSPHORUS: 2.3 MG/DL (ref 2.5–4.9)
POTASSIUM SERPL-SCNC: 3.5 MMOL/L (ref 3.5–5.1)
POTASSIUM SERPL-SCNC: 3.9 MMOL/L (ref 3.5–5.1)
SODIUM BLD-SCNC: 127 MMOL/L (ref 135–145)
SODIUM BLD-SCNC: 130 MMOL/L (ref 135–145)
SPECIMEN: NORMAL

## 2021-04-30 PROCEDURE — 6370000000 HC RX 637 (ALT 250 FOR IP): Performed by: NURSE PRACTITIONER

## 2021-04-30 PROCEDURE — 1200000000 HC SEMI PRIVATE

## 2021-04-30 PROCEDURE — 80069 RENAL FUNCTION PANEL: CPT

## 2021-04-30 PROCEDURE — 2580000003 HC RX 258: Performed by: NURSE PRACTITIONER

## 2021-04-30 PROCEDURE — 6370000000 HC RX 637 (ALT 250 FOR IP): Performed by: INTERNAL MEDICINE

## 2021-04-30 PROCEDURE — 36415 COLL VENOUS BLD VENIPUNCTURE: CPT

## 2021-04-30 PROCEDURE — 6360000002 HC RX W HCPCS: Performed by: NURSE PRACTITIONER

## 2021-04-30 PROCEDURE — 94761 N-INVAS EAR/PLS OXIMETRY MLT: CPT

## 2021-04-30 PROCEDURE — 85018 HEMOGLOBIN: CPT

## 2021-04-30 PROCEDURE — 2500000003 HC RX 250 WO HCPCS: Performed by: INTERNAL MEDICINE

## 2021-04-30 PROCEDURE — 2580000003 HC RX 258: Performed by: INTERNAL MEDICINE

## 2021-04-30 PROCEDURE — C9113 INJ PANTOPRAZOLE SODIUM, VIA: HCPCS | Performed by: NURSE PRACTITIONER

## 2021-04-30 PROCEDURE — 6360000002 HC RX W HCPCS: Performed by: INTERNAL MEDICINE

## 2021-04-30 PROCEDURE — 85014 HEMATOCRIT: CPT

## 2021-04-30 PROCEDURE — 80048 BASIC METABOLIC PNL TOTAL CA: CPT

## 2021-04-30 PROCEDURE — 2700000000 HC OXYGEN THERAPY PER DAY

## 2021-04-30 PROCEDURE — 94640 AIRWAY INHALATION TREATMENT: CPT

## 2021-04-30 RX ORDER — DICYCLOMINE HYDROCHLORIDE 10 MG/1
10 CAPSULE ORAL 3 TIMES DAILY PRN
Status: DISCONTINUED | OUTPATIENT
Start: 2021-04-30 | End: 2021-05-01 | Stop reason: HOSPADM

## 2021-04-30 RX ORDER — MULTIVIT-MIN/FERROUS GLUCONATE 9 MG/15 ML
15 LIQUID (ML) ORAL DAILY
Status: DISCONTINUED | OUTPATIENT
Start: 2021-04-30 | End: 2021-05-01 | Stop reason: HOSPADM

## 2021-04-30 RX ORDER — LANOLIN ALCOHOL/MO/W.PET/CERES
100 CREAM (GRAM) TOPICAL DAILY
Status: DISCONTINUED | OUTPATIENT
Start: 2021-04-30 | End: 2021-05-01 | Stop reason: HOSPADM

## 2021-04-30 RX ORDER — GUAIFENESIN 600 MG/1
600 TABLET, EXTENDED RELEASE ORAL 2 TIMES DAILY
Status: DISCONTINUED | OUTPATIENT
Start: 2021-04-30 | End: 2021-05-01 | Stop reason: HOSPADM

## 2021-04-30 RX ORDER — MAGNESIUM SULFATE IN WATER 40 MG/ML
2000 INJECTION, SOLUTION INTRAVENOUS
Status: COMPLETED | OUTPATIENT
Start: 2021-04-30 | End: 2021-04-30

## 2021-04-30 RX ORDER — BUSPIRONE HYDROCHLORIDE 10 MG/1
10 TABLET ORAL 2 TIMES DAILY
Status: DISCONTINUED | OUTPATIENT
Start: 2021-04-30 | End: 2021-05-01 | Stop reason: HOSPADM

## 2021-04-30 RX ORDER — SODIUM CHLORIDE 1000 MG
1 TABLET, SOLUBLE MISCELLANEOUS 2 TIMES DAILY WITH MEALS
Status: COMPLETED | OUTPATIENT
Start: 2021-04-30 | End: 2021-05-01

## 2021-04-30 RX ADMIN — SODIUM CHLORIDE, PRESERVATIVE FREE 10 ML: 5 INJECTION INTRAVENOUS at 09:51

## 2021-04-30 RX ADMIN — SODIUM PHOSPHATE, MONOBASIC, MONOHYDRATE AND SODIUM PHOSPHATE, DIBASIC, ANHYDROUS 20 MMOL: 276; 142 INJECTION, SOLUTION INTRAVENOUS at 12:52

## 2021-04-30 RX ADMIN — MONTELUKAST 10 MG: 10 TABLET, FILM COATED ORAL at 09:45

## 2021-04-30 RX ADMIN — BUSPIRONE HYDROCHLORIDE 10 MG: 10 TABLET ORAL at 21:25

## 2021-04-30 RX ADMIN — DICYCLOMINE HYDROCHLORIDE 10 MG: 10 CAPSULE ORAL at 21:25

## 2021-04-30 RX ADMIN — POLYETHYLENE GLYCOL (3350) 17 G: 17 POWDER, FOR SOLUTION ORAL at 09:45

## 2021-04-30 RX ADMIN — IPRATROPIUM BROMIDE AND ALBUTEROL SULFATE 3 ML: .5; 3 SOLUTION RESPIRATORY (INHALATION) at 12:29

## 2021-04-30 RX ADMIN — CLONAZEPAM 1 MG: 0.5 TABLET ORAL at 10:18

## 2021-04-30 RX ADMIN — SUCRALFATE 1 G: 1 TABLET ORAL at 06:14

## 2021-04-30 RX ADMIN — MAGNESIUM SULFATE HEPTAHYDRATE 2000 MG: 2 INJECTION, SOLUTION INTRAVENOUS at 19:04

## 2021-04-30 RX ADMIN — FERROUS SULFATE TAB 325 MG (65 MG ELEMENTAL FE) 325 MG: 325 (65 FE) TAB at 16:43

## 2021-04-30 RX ADMIN — FERROUS SULFATE TAB 325 MG (65 MG ELEMENTAL FE) 325 MG: 325 (65 FE) TAB at 09:45

## 2021-04-30 RX ADMIN — IPRATROPIUM BROMIDE AND ALBUTEROL SULFATE 3 ML: .5; 3 SOLUTION RESPIRATORY (INHALATION) at 08:30

## 2021-04-30 RX ADMIN — FOLIC ACID 1 MG: 1 TABLET ORAL at 09:45

## 2021-04-30 RX ADMIN — MULTIVITAMIN 15 ML: LIQUID ORAL at 10:10

## 2021-04-30 RX ADMIN — POTASSIUM CHLORIDE: 149 INJECTION, SOLUTION, CONCENTRATE INTRAVENOUS at 05:00

## 2021-04-30 RX ADMIN — SUCRALFATE 1 G: 1 TABLET ORAL at 16:43

## 2021-04-30 RX ADMIN — Medication 100 MG: at 17:35

## 2021-04-30 RX ADMIN — BUDESONIDE AND FORMOTEROL FUMARATE DIHYDRATE 2 PUFF: 160; 4.5 AEROSOL RESPIRATORY (INHALATION) at 08:30

## 2021-04-30 RX ADMIN — Medication 1 G: at 16:44

## 2021-04-30 RX ADMIN — IPRATROPIUM BROMIDE AND ALBUTEROL SULFATE 3 ML: .5; 3 SOLUTION RESPIRATORY (INHALATION) at 19:50

## 2021-04-30 RX ADMIN — BUDESONIDE AND FORMOTEROL FUMARATE DIHYDRATE 2 PUFF: 160; 4.5 AEROSOL RESPIRATORY (INHALATION) at 19:57

## 2021-04-30 RX ADMIN — THEOPHYLLINE 400 MG: 400 TABLET, EXTENDED RELEASE ORAL at 09:45

## 2021-04-30 RX ADMIN — DICYCLOMINE HYDROCHLORIDE 10 MG: 10 CAPSULE ORAL at 17:38

## 2021-04-30 RX ADMIN — DULOXETINE HYDROCHLORIDE 60 MG: 30 CAPSULE, DELAYED RELEASE ORAL at 09:45

## 2021-04-30 RX ADMIN — PROMETHAZINE HYDROCHLORIDE 12.5 MG: 12.5 TABLET ORAL at 17:38

## 2021-04-30 RX ADMIN — IPRATROPIUM BROMIDE AND ALBUTEROL SULFATE 3 ML: .5; 3 SOLUTION RESPIRATORY (INHALATION) at 16:26

## 2021-04-30 RX ADMIN — ASPIRIN 81 MG CHEWABLE TABLET 81 MG: 81 TABLET CHEWABLE at 09:45

## 2021-04-30 RX ADMIN — LEVOTHYROXINE SODIUM 100 MCG: 0.1 TABLET ORAL at 06:14

## 2021-04-30 RX ADMIN — TRAZODONE HYDROCHLORIDE 100 MG: 50 TABLET ORAL at 21:25

## 2021-04-30 RX ADMIN — PANTOPRAZOLE SODIUM 40 MG: 40 INJECTION, POWDER, LYOPHILIZED, FOR SOLUTION INTRAVENOUS at 21:25

## 2021-04-30 RX ADMIN — ATORVASTATIN CALCIUM 40 MG: 40 TABLET, FILM COATED ORAL at 09:45

## 2021-04-30 RX ADMIN — Medication 1 G: at 10:06

## 2021-04-30 RX ADMIN — CLONAZEPAM 1 MG: 0.5 TABLET ORAL at 17:39

## 2021-04-30 RX ADMIN — PANTOPRAZOLE SODIUM 40 MG: 40 INJECTION, POWDER, LYOPHILIZED, FOR SOLUTION INTRAVENOUS at 09:46

## 2021-04-30 RX ADMIN — MAGNESIUM SULFATE HEPTAHYDRATE 2000 MG: 2 INJECTION, SOLUTION INTRAVENOUS at 16:43

## 2021-04-30 RX ADMIN — DICYCLOMINE HYDROCHLORIDE 10 MG: 10 CAPSULE ORAL at 10:06

## 2021-04-30 RX ADMIN — SODIUM CHLORIDE, PRESERVATIVE FREE 10 ML: 5 INJECTION INTRAVENOUS at 21:26

## 2021-04-30 RX ADMIN — GUAIFENESIN 600 MG: 600 TABLET, EXTENDED RELEASE ORAL at 21:25

## 2021-04-30 RX ADMIN — ENOXAPARIN SODIUM 40 MG: 40 INJECTION SUBCUTANEOUS at 09:50

## 2021-04-30 RX ADMIN — Medication 1 CAPSULE: at 09:45

## 2021-04-30 RX ADMIN — POLYETHYLENE GLYCOL (3350) 17 G: 17 POWDER, FOR SOLUTION ORAL at 21:24

## 2021-04-30 ASSESSMENT — PAIN SCALES - GENERAL
PAINLEVEL_OUTOF10: 0

## 2021-04-30 NOTE — PROGRESS NOTES
Nephrology Progress Note  4/30/2021 7:56 AM  Subjective: Interval History: Jose Montiel is a 64 y.o. female with weakness and not very informative state doing ok and no diarrhea        Data:   Scheduled Meds:   potassium chloride  40 mEq Oral TID WC    polyethylene glycol  17 g Oral BID    aspirin  81 mg Oral Daily    atorvastatin  40 mg Oral Daily    baclofen  10 mg Oral BID    budesonide-formoterol  2 puff Inhalation BID    dilTIAZem  120 mg Oral Daily    DULoxetine  60 mg Oral Daily    ferrous sulfate  325 mg Oral BID WC    traZODone  100 mg Oral Nightly    theophylline  400 mg Oral Daily    sucralfate  1 g Oral BID AC    montelukast  10 mg Oral Daily    metoprolol succinate  25 mg Oral Daily    levothyroxine  100 mcg Oral Daily    lactobacillus  1 capsule Oral Daily with breakfast    ipratropium-albuterol  1 vial Inhalation B5M WA    folic acid  1 mg Oral Daily    fluticasone  2 spray Nasal Daily    sodium chloride flush  5-40 mL Intravenous BID    enoxaparin  40 mg Subcutaneous Daily    pantoprazole  40 mg Intravenous BID     Continuous Infusions:   sodium chloride      IV infusion builder 125 mL/hr at 04/30/21 0500         CBC   Recent Labs     04/28/21  1827 04/29/21  0915 04/30/21  0717   WBC 11.3* 6.9  --    HGB 10.9* 10.9* 9.5*   HCT 34.8* 34.3* 30.1*    192  --       BMP   Recent Labs     04/29/21  0134 04/29/21  0941 04/29/21  1635   * 122* 124*   K 2.6* 2.7* 3.4*   CL 72* 68* 79*   CO2 46* 48* 40*   PHOS  --  2.6  --    BUN 21 18 17   CREATININE 1.2* 1.1 1.0     Hepatic:   Recent Labs     04/28/21  1827   AST 22   ALT 15   BILITOT 0.3   ALKPHOS 90     Troponin: No results for input(s): TROPONINI in the last 72 hours. BNP: No results for input(s): BNP in the last 72 hours. Lipids: No results for input(s): CHOL, HDL in the last 72 hours.     Invalid input(s): LDLCALCU  ABGs:   Lab Results   Component Value Date    PO2ART 99 12/27/2020    LKT5XUC 62.0 12/27/2020     INR: No results for input(s): INR in the last 72 hours. Renal Labs  Albumin:    Lab Results   Component Value Date    LABALBU 4.1 04/29/2021     Calcium:    Lab Results   Component Value Date    CALCIUM 8.8 04/29/2021     Phosphorus:    Lab Results   Component Value Date    PHOS 2.6 04/29/2021     U/A:    Lab Results   Component Value Date    NITRU NEGATIVE 04/29/2021    COLORU STRAW 04/29/2021    WBCUA <1 04/29/2021    RBCUA NONE SEEN 04/29/2021    MUCUS RARE 11/03/2020    TRICHOMONAS NONE SEEN 04/29/2021    BACTERIA NEGATIVE 04/29/2021    CLARITYU CLEAR 04/29/2021    SPECGRAV 1.008 04/29/2021    UROBILINOGEN NEGATIVE 04/29/2021    BILIRUBINUR NEGATIVE 04/29/2021    BLOODU NEGATIVE 04/29/2021    KETUA NEGATIVE 04/29/2021     ABG:    Lab Results   Component Value Date    LIB8IED 62.0 12/27/2020    PO2ART 99 12/27/2020    PHG0KIU 40.2 12/27/2020     HgBA1c:    Lab Results   Component Value Date    LABA1C 5.1 06/21/2019     Microalbumen/Creatinine ratio:  No components found for: RUCREAT  TSH:  No results found for: TSH  IRON:    Lab Results   Component Value Date    IRON 58 10/23/2020     Iron Saturation:  No components found for: PERCENTFE  TIBC:    Lab Results   Component Value Date    TIBC 160 10/23/2020     FERRITIN:    Lab Results   Component Value Date    FERRITIN 1,617 10/23/2020     RPR:  No results found for: RPR  VITALY:    Lab Results   Component Value Date    VITALY None Detected 10/23/2020     24 Hour Urine for Creatinine Clearance:  No components found for: CREAT4, UHRS10, UTV10      Objective:   I/O: No intake/output data recorded. No intake/output data recorded. No intake/output data recorded. Vitals: BP (!) 118/57   Pulse 66   Temp 97.4 °F (36.3 °C) (Oral)   Resp 16   Ht 5' 1\" (1.549 m)   Wt 171 lb 14.4 oz (78 kg)   LMP 01/05/2010   SpO2 99%   BMI 32.48 kg/m²  {  General appearance: awake weak  HEENT: Head: Normal, normocephalic, atraumatic.   Neck: supple, symmetrical, trachea midline  Lungs: diminished breath sounds bilaterally  Heart: S1, S2 normal  Abdomen: abnormal findings:  soft nt  Extremities: edema trace  Neurologic: Mental status: alertness: alert flat affect        Assessment and Plan:      IMP:  1 hyponatremia  2 hypokalemia  3 met alkalosis  4 arf from atn  5 bipolar  6 diarrhea with weakness  7 hx chf  8 low bp    Plan    1 na was improving and await today labs  2 K improved and fu today  3 resolving alkalosis and keep off diuretic and maitnain hydration, can stop ivf and maintain oral intake  4 resolving arf and state urination stable  5 affect is a factor and will need fu psych on dc  6 diarrhea better and gi note hb now 9.5 and conservative therapy with PPI  7 o2 stable  8 bp stable now  Fu urine culture and today labs, of na and K stable and taking po no diarrhea can plan for dc, fu labs             Patrick Agudelo MD

## 2021-04-30 NOTE — PROGRESS NOTES
Recent Labs     04/29/21  0941 04/29/21  1635 04/30/21  0717   * 124* 127*   K 2.7* 3.4* 3.9   CL 68* 79* 85*   CO2 48* 40* 39*   BUN 18 17 11   CREATININE 1.1 1.0 0.8   GLUCOSE 117* 139* 115*     Magnesium:   Lab Results   Component Value Date    MG 1.7 04/29/2021     Hepatic:   Recent Labs     04/28/21  1827   AST 22   ALT 15   BILITOT 0.3   ALKPHOS 90     INR: No results for input(s): INR in the last 72 hours.     No intake or output data in the 24 hours ending 04/30/21 0825      Medications    Scheduled Medications:    sodium phosphate IVPB  20 mmol Intravenous Once    sodium chloride  1 g Oral BID WC    CENTRUM/CERTA-JAMAL with minerals oral  15 mL Oral Daily    polyethylene glycol  17 g Oral BID    aspirin  81 mg Oral Daily    atorvastatin  40 mg Oral Daily    baclofen  10 mg Oral BID    budesonide-formoterol  2 puff Inhalation BID    dilTIAZem  120 mg Oral Daily    DULoxetine  60 mg Oral Daily    ferrous sulfate  325 mg Oral BID WC    traZODone  100 mg Oral Nightly    theophylline  400 mg Oral Daily    sucralfate  1 g Oral BID AC    montelukast  10 mg Oral Daily    metoprolol succinate  25 mg Oral Daily    levothyroxine  100 mcg Oral Daily    lactobacillus  1 capsule Oral Daily with breakfast    ipratropium-albuterol  1 vial Inhalation W0Y WA    folic acid  1 mg Oral Daily    fluticasone  2 spray Nasal Daily    sodium chloride flush  5-40 mL Intravenous BID    enoxaparin  40 mg Subcutaneous Daily    pantoprazole  40 mg Intravenous BID     PRN Medications: lidocaine viscous hcl, potassium chloride **OR** potassium alternative oral replacement **OR** potassium chloride, clonazePAM, sodium chloride flush, sodium chloride, promethazine **OR** ondansetron, acetaminophen **OR** acetaminophen, albuterol sulfate HFA **AND** ipratropium  Infusions:    sodium chloride           Patient Active Problem List   Diagnosis Code    Centrilobular emphysema ([facility] [address] J43.2    Hypertension I10    normal      RECOMMENDATIONS:  Treat conservatively  Protonix 40 mg BID  Carafate 1g BID  Call if gross bleeding  H&H daily   Transfuse if hgb < 7.0  Check Amylase and Lipase  Constipation resolved   Recommend dicyclomine as needed for abdominal pain    Stable from GI standpoint, Will sign off, call if gross bleeding, follow up outpatient     Discussed plan of care with patient and RN    Patient clinical, biochemical, and radiological information discussed with Dr. Terra Myers. He agrees with the assessment and plan. Drooteo Brewer CNP  4/30/2021  8:25 AM     Hb stable  abd soft  FU as op    I have seen and examined this patient personally, and independently of the nurse practitioner. The plan was developed mutually at the time of the visit with the patient. Gonzalez Hernandez and myself have spoken with patient, nursing staff and provided written and verbal instructions .     The above note has been reviewed and I agree with the Assessment,  Diagnosis, and Treatment plan as suggested by [facility] gastro[facility]ology

## 2021-05-01 ENCOUNTER — HOSPITAL ENCOUNTER (OUTPATIENT)
Age: 61
Setting detail: OBSERVATION
Discharge: HOME HEALTH | End: 2021-05-04
Attending: EMERGENCY MEDICINE | Admitting: INTERNAL MEDICINE
Payer: COMMERCIAL

## 2021-05-01 VITALS
HEART RATE: 84 BPM | HEIGHT: 61 IN | DIASTOLIC BLOOD PRESSURE: 54 MMHG | RESPIRATION RATE: 17 BRPM | BODY MASS INDEX: 32.45 KG/M2 | OXYGEN SATURATION: 100 % | SYSTOLIC BLOOD PRESSURE: 123 MMHG | WEIGHT: 171.9 LBS | TEMPERATURE: 98 F

## 2021-05-01 DIAGNOSIS — E87.1 HYPONATREMIA: Primary | ICD-10-CM

## 2021-05-01 LAB
ALBUMIN SERPL-MCNC: 3.4 GM/DL (ref 3.4–5)
ALBUMIN SERPL-MCNC: 3.9 GM/DL (ref 3.4–5)
ALP BLD-CCNC: 85 IU/L (ref 40–129)
ALT SERPL-CCNC: 13 U/L (ref 10–40)
ANION GAP SERPL CALCULATED.3IONS-SCNC: 3 MMOL/L (ref 4–16)
ANION GAP SERPL CALCULATED.3IONS-SCNC: 4 MMOL/L (ref 4–16)
AST SERPL-CCNC: 22 IU/L (ref 15–37)
BASOPHILS ABSOLUTE: 0.1 K/CU MM
BASOPHILS RELATIVE PERCENT: 1 % (ref 0–1)
BILIRUB SERPL-MCNC: 0.1 MG/DL (ref 0–1)
BUN BLDV-MCNC: 12 MG/DL (ref 6–23)
BUN BLDV-MCNC: 13 MG/DL (ref 6–23)
CALCIUM SERPL-MCNC: 9.4 MG/DL (ref 8.3–10.6)
CALCIUM SERPL-MCNC: 9.5 MG/DL (ref 8.3–10.6)
CHLORIDE BLD-SCNC: 81 MMOL/L (ref 99–110)
CHLORIDE BLD-SCNC: 86 MMOL/L (ref 99–110)
CO2: 42 MMOL/L (ref 21–32)
CO2: 43 MMOL/L (ref 21–32)
CREAT SERPL-MCNC: 0.8 MG/DL (ref 0.6–1.1)
CREAT SERPL-MCNC: 0.9 MG/DL (ref 0.6–1.1)
DIFFERENTIAL TYPE: ABNORMAL
EOSINOPHILS ABSOLUTE: 0.6 K/CU MM
EOSINOPHILS RELATIVE PERCENT: 7.5 % (ref 0–3)
GFR AFRICAN AMERICAN: >60 ML/MIN/1.73M2
GFR AFRICAN AMERICAN: >60 ML/MIN/1.73M2
GFR NON-AFRICAN AMERICAN: >60 ML/MIN/1.73M2
GFR NON-AFRICAN AMERICAN: >60 ML/MIN/1.73M2
GLUCOSE BLD-MCNC: 104 MG/DL (ref 70–99)
GLUCOSE BLD-MCNC: 92 MG/DL (ref 70–99)
HCT VFR BLD CALC: 28.1 % (ref 37–47)
HCT VFR BLD CALC: 31.3 % (ref 37–47)
HEMOGLOBIN: 8.9 GM/DL (ref 12.5–16)
HEMOGLOBIN: 9.8 GM/DL (ref 12.5–16)
IMMATURE NEUTROPHIL %: 0.4 % (ref 0–0.43)
LYMPHOCYTES ABSOLUTE: 1.6 K/CU MM
LYMPHOCYTES RELATIVE PERCENT: 18.7 % (ref 24–44)
MCH RBC QN AUTO: 28.2 PG (ref 27–31)
MCHC RBC AUTO-ENTMCNC: 31.3 % (ref 32–36)
MCV RBC AUTO: 90.2 FL (ref 78–100)
MONOCYTES ABSOLUTE: 0.7 K/CU MM
MONOCYTES RELATIVE PERCENT: 8.4 % (ref 0–4)
NUCLEATED RBC %: 0 %
PDW BLD-RTO: 13 % (ref 11.7–14.9)
PHOSPHORUS: 3.3 MG/DL (ref 2.5–4.9)
PLATELET # BLD: 191 K/CU MM (ref 140–440)
PMV BLD AUTO: 9.7 FL (ref 7.5–11.1)
POTASSIUM SERPL-SCNC: 3.6 MMOL/L (ref 3.5–5.1)
POTASSIUM SERPL-SCNC: 3.6 MMOL/L (ref 3.5–5.1)
RBC # BLD: 3.47 M/CU MM (ref 4.2–5.4)
SEGMENTED NEUTROPHILS ABSOLUTE COUNT: 5.3 K/CU MM
SEGMENTED NEUTROPHILS RELATIVE PERCENT: 64 % (ref 36–66)
SODIUM BLD-SCNC: 127 MMOL/L (ref 135–145)
SODIUM BLD-SCNC: 132 MMOL/L (ref 135–145)
TOTAL IMMATURE NEUTOROPHIL: 0.03 K/CU MM
TOTAL NUCLEATED RBC: 0 K/CU MM
TOTAL PROTEIN: 5.7 GM/DL (ref 6.4–8.2)
WBC # BLD: 8.3 K/CU MM (ref 4–10.5)

## 2021-05-01 PROCEDURE — 6360000002 HC RX W HCPCS: Performed by: NURSE PRACTITIONER

## 2021-05-01 PROCEDURE — 94664 DEMO&/EVAL PT USE INHALER: CPT

## 2021-05-01 PROCEDURE — 94761 N-INVAS EAR/PLS OXIMETRY MLT: CPT

## 2021-05-01 PROCEDURE — 94640 AIRWAY INHALATION TREATMENT: CPT

## 2021-05-01 PROCEDURE — 6370000000 HC RX 637 (ALT 250 FOR IP): Performed by: INTERNAL MEDICINE

## 2021-05-01 PROCEDURE — 6370000000 HC RX 637 (ALT 250 FOR IP): Performed by: NURSE PRACTITIONER

## 2021-05-01 PROCEDURE — 80069 RENAL FUNCTION PANEL: CPT

## 2021-05-01 PROCEDURE — 94150 VITAL CAPACITY TEST: CPT

## 2021-05-01 PROCEDURE — 85025 COMPLETE CBC W/AUTO DIFF WBC: CPT

## 2021-05-01 PROCEDURE — 1200000000 HC SEMI PRIVATE

## 2021-05-01 PROCEDURE — 85018 HEMOGLOBIN: CPT

## 2021-05-01 PROCEDURE — 99285 EMERGENCY DEPT VISIT HI MDM: CPT

## 2021-05-01 PROCEDURE — 2580000003 HC RX 258: Performed by: NURSE PRACTITIONER

## 2021-05-01 PROCEDURE — 36415 COLL VENOUS BLD VENIPUNCTURE: CPT

## 2021-05-01 PROCEDURE — C9113 INJ PANTOPRAZOLE SODIUM, VIA: HCPCS | Performed by: NURSE PRACTITIONER

## 2021-05-01 PROCEDURE — 85014 HEMATOCRIT: CPT

## 2021-05-01 PROCEDURE — 80053 COMPREHEN METABOLIC PANEL: CPT

## 2021-05-01 PROCEDURE — 93005 ELECTROCARDIOGRAM TRACING: CPT | Performed by: EMERGENCY MEDICINE

## 2021-05-01 PROCEDURE — 2700000000 HC OXYGEN THERAPY PER DAY

## 2021-05-01 RX ORDER — ALBUTEROL SULFATE 90 UG/1
2 AEROSOL, METERED RESPIRATORY (INHALATION) 4 TIMES DAILY
Status: DISCONTINUED | OUTPATIENT
Start: 2021-05-01 | End: 2021-05-01 | Stop reason: HOSPADM

## 2021-05-01 RX ORDER — FUROSEMIDE 20 MG/1
20 TABLET ORAL DAILY PRN
Qty: 30 TABLET | Refills: 0 | Status: ON HOLD | OUTPATIENT
Start: 2021-05-01 | End: 2021-05-04 | Stop reason: SDUPTHER

## 2021-05-01 RX ADMIN — MONTELUKAST 10 MG: 10 TABLET, FILM COATED ORAL at 08:19

## 2021-05-01 RX ADMIN — MULTIVITAMIN 15 ML: LIQUID ORAL at 08:19

## 2021-05-01 RX ADMIN — FLUTICASONE PROPIONATE 2 SPRAY: 50 SPRAY, METERED NASAL at 08:23

## 2021-05-01 RX ADMIN — BUSPIRONE HYDROCHLORIDE 10 MG: 10 TABLET ORAL at 08:19

## 2021-05-01 RX ADMIN — ASPIRIN 81 MG CHEWABLE TABLET 81 MG: 81 TABLET CHEWABLE at 08:19

## 2021-05-01 RX ADMIN — Medication 100 MG: at 08:19

## 2021-05-01 RX ADMIN — SODIUM CHLORIDE, PRESERVATIVE FREE 10 ML: 5 INJECTION INTRAVENOUS at 08:18

## 2021-05-01 RX ADMIN — ENOXAPARIN SODIUM 40 MG: 40 INJECTION SUBCUTANEOUS at 08:19

## 2021-05-01 RX ADMIN — DILTIAZEM HYDROCHLORIDE 120 MG: 120 CAPSULE, COATED, EXTENDED RELEASE ORAL at 08:19

## 2021-05-01 RX ADMIN — ALBUTEROL SULFATE 2 PUFF: 90 AEROSOL, METERED RESPIRATORY (INHALATION) at 08:19

## 2021-05-01 RX ADMIN — Medication 1 CAPSULE: at 08:19

## 2021-05-01 RX ADMIN — DULOXETINE HYDROCHLORIDE 60 MG: 30 CAPSULE, DELAYED RELEASE ORAL at 08:18

## 2021-05-01 RX ADMIN — GUAIFENESIN 600 MG: 600 TABLET, EXTENDED RELEASE ORAL at 08:19

## 2021-05-01 RX ADMIN — LEVOTHYROXINE SODIUM 100 MCG: 0.1 TABLET ORAL at 04:48

## 2021-05-01 RX ADMIN — SUCRALFATE 1 G: 1 TABLET ORAL at 04:46

## 2021-05-01 RX ADMIN — Medication 2 PUFF: at 08:19

## 2021-05-01 RX ADMIN — PROMETHAZINE HYDROCHLORIDE 12.5 MG: 12.5 TABLET ORAL at 10:46

## 2021-05-01 RX ADMIN — Medication 1 G: at 08:19

## 2021-05-01 RX ADMIN — PANTOPRAZOLE SODIUM 40 MG: 40 INJECTION, POWDER, LYOPHILIZED, FOR SOLUTION INTRAVENOUS at 08:18

## 2021-05-01 RX ADMIN — FOLIC ACID 1 MG: 1 TABLET ORAL at 08:19

## 2021-05-01 RX ADMIN — FERROUS SULFATE TAB 325 MG (65 MG ELEMENTAL FE) 325 MG: 325 (65 FE) TAB at 08:19

## 2021-05-01 RX ADMIN — THEOPHYLLINE 400 MG: 400 TABLET, EXTENDED RELEASE ORAL at 08:19

## 2021-05-01 RX ADMIN — CLONAZEPAM 1 MG: 0.5 TABLET ORAL at 10:46

## 2021-05-01 RX ADMIN — POLYETHYLENE GLYCOL (3350) 17 G: 17 POWDER, FOR SOLUTION ORAL at 08:18

## 2021-05-01 ASSESSMENT — PAIN SCALES - GENERAL: PAINLEVEL_OUTOF10: 0

## 2021-05-01 NOTE — DISCHARGE SUMMARY
Discharge Summary    Name:  Wilbur Quezada /Age/Sex: 1960  (64 y.o. female)   MRN & CSN:  [id number] & [id number] Admission Date/Time: 2021  7:25 PM   Attending:  No att. providers found Discharging Physician: Brock Lilly MD     Hospital Course:   Wilbur Quezada is a 64 y.o.  female  who presents with abnormal labs, admits to poor appetite prior     · LUZ MARIA  · Metabolic alkalosis likely diuretic related, improving  Metolazone and furosemide on hold  Serial BMP and potassium check  IV fluids, potassium replacement  Nephrology on board, hold off diuretics  Discussed with nephrology, patient is to weigh self daily, if gains up to about 4 pounds thereabouts, take water pill as needed as prescribed, follow-up with nephrology in about a week     · Melena, concern for possible GI bleed  Hb stable, monitor  Serial H&H  Continue Protonix  GI on board, No intervention at this time, follow-up as outpatient     Chronic medical conditions, medications resumed unless contraindicated  COPD, not in exacerbation, Continue Singulair, Albuterol and Symbicort  Chronic HFpEF with EF 55-60%, metolazone and Lasix on hold, continue metoprolol  Hypothyroidism, levothyroxine  Obesity class 1 with BMI 33.0, encourage lifestyle changes, weight loss    The patient expressed appropriate understanding of and agreement with the discharge recommendations, medications, and plan.      Consults this admission:  IP CONSULT TO HOSPITALIST  IP CONSULT TO NEPHROLOGY  IP CONSULT TO GI    Discharge Instruction:   Follow up appointments: Nephrology, GI  Primary care physician:  within 2 weeks    Diet:  cardiac diet   Activity: activity as tolerated  Disposition: Discharged to:   [x]Home, []C, []SNF, []Acute Rehab, []Hospice   Condition on discharge: Stable    Discharge Medications:      Michelle Arguelles Medication Instructions GRIFFIN:[id number]    Printed on:21 1225   Medication Information albuterol-ipratropium (COMBIVENT RESPIMAT)  MCG/ACT AERS inhaler  Inhale 1 puff into the lungs every 4 hours as needed for Wheezing             aspirin 81 MG chewable tablet  Take 81 mg by mouth daily             atorvastatin (LIPITOR) 40 MG tablet  Take 40 mg by mouth daily             baclofen (LIORESAL) 10 MG tablet  Take 1 tablet by mouth 3 times daily             budesonide-formoterol (SYMBICORT) 160-4.5 MCG/ACT AERO  USE 2 INHALATIONS TWICE A DAY             busPIRone (BUSPAR) 10 MG tablet  Take 1 tablet by mouth 3 times daily             clonazePAM (KLONOPIN) 1 MG disintegrating tablet  Take 1 mg by mouth 3 times daily as needed.               dicyclomine (BENTYL) 10 MG capsule  Take 1 capsule by mouth 4 times daily             dilTIAZem (CARDIZEM CD) 120 MG extended release capsule  Take 1 capsule by mouth daily             DULoxetine (CYMBALTA) 60 MG extended release capsule  Take 60 mg by mouth daily             ferrous sulfate (IRON 325) 325 (65 Fe) MG tablet  Take 325 mg by mouth 2 times daily Indications: pt taking every day bid Monday, wed, fri              fluticasone (FLONASE) 50 MCG/ACT nasal spray  2 sprays by Nasal route daily             folic acid (FOLVITE) 1 MG tablet  Take 1 tablet by mouth daily             furosemide (LASIX) 20 MG tablet  Take 1 tablet by mouth daily as needed (sob/leg swelling) Weigh self daily Current weight 171lbs Take pill when greater than 175lbs  As needed             guaiFENesin (MUCINEX) 600 MG extended release tablet  Take 1 tablet by mouth 2 times daily             ipratropium-albuterol (DUONEB) 0.5-2.5 (3) MG/3ML SOLN nebulizer solution  Inhale 3 mLs into the lungs every 4 hours             lactobacillus (CULTURELLE) capsule  Take 1 capsule by mouth daily (with breakfast)             levothyroxine (SYNTHROID) 100 MCG tablet  Take 1 tablet by mouth Daily             metoprolol succinate (TOPROL XL) 25 MG extended release tablet  Take 1 tablet by mouth daily             montelukast (SINGULAIR) 10 MG tablet  Take 1 tablet by mouth daily             ondansetron (ZOFRAN ODT) 4 MG disintegrating tablet  Take 1 tablet by mouth every 8 hours as needed for Nausea or Vomiting Place under the tongue and let it melt and absorb from under your tongue. pantoprazole (PROTONIX) 40 MG tablet  Take 1 tablet by mouth 2 times daily             potassium chloride (KLOR-CON M) 20 MEQ extended release tablet  Take 2 tablets by mouth daily             sucralfate (CARAFATE) 1 GM tablet  Take 1 tablet by mouth every 12 hours             theophylline (MIAH-24) 200 MG extended release capsule  Take 400 mg by mouth daily             traZODone (DESYREL) 50 MG tablet  Take 100 mg by mouth nightly              vitamin B-1 100 MG tablet  Take 1 tablet by mouth daily                 Objective Findings at Discharge:   BP (!) 123/54   Pulse 84   Temp 98 °F (36.7 °C) (Oral)   Resp 17 Comment: Simultaneous filing. User may not have seen previous data. Ht 5' 1\" (1.549 m)   Wt 171 lb 14.4 oz (78 kg)   LMP 01/05/2010   SpO2 100%   BMI 32.48 kg/m²            PHYSICAL EXAM   GEN Awake female, sitting upright in bed in no apparent distress. Appears given age. EYES Pupils are equally round. No scleral erythema, discharge, or conjunctivitis. HENT Mucous membranes are moist.   NECK No apparent thyromegaly or masses. RESP Clear to auscultation, no wheezes, rales or rhonchi. Symmetric chest movement while on room air. CARDIO/VASC S1/S2 auscultated. Regular rate without appreciable murmurs, rubs, or gallops. Peripheral pulses equal bilaterally and palpable. No peripheral edema. GI Abdomen is soft without significant tenderness, masses, or guarding. Bowel sounds are normoactive. Rectal exam deferred.  Mcallister catheter is not present. HEME/LYMPH No petechiae or ecchymoses. MSK No gross joint deformities.  Spontaneous movement of all extremities  SKIN Normal coloration, warm, dry.  NEURO Cranial nerves appear grossly intact, normal speech, no lateralizing weakness. PSYCH Awake, alert, oriented x 4. Affect appropriate. BMP/CBC  Recent Labs     04/28/21  1827 04/28/21  1827 04/29/21  0915 04/29/21  0915 04/30/21  0717 04/30/21  1457 05/01/21  0423   *   < >  --    < > 127* 130* 132*   K 2.7*   < >  --    < > 3.9 3.5 3.6   CL 67*   < >  --    < > 85* 88* 86*   CO2 >50*   < >  --    < > 39* 37* 43*   BUN 23   < >  --    < > 11 11 13   CREATININE 1.4*   < >  --    < > 0.8 0.7 0.9   WBC 11.3*  --  6.9  --   --   --   --    HCT 34.8*  --  34.3*  --  30.1*  --  28.1*     --  192  --   --   --   --     < > = values in this interval not displayed. IMAGING:  Xr Chest (2 Vw)    Result Date: 4/28/2021  EXAMINATION: TWO XRAY VIEWS OF THE CHEST 4/28/2021 7:54 pm COMPARISON: None. HISTORY: ORDERING SYSTEM PROVIDED HISTORY: emergency TECHNOLOGIST PROVIDED HISTORY: Reason for exam:->emergency Reason for Exam: abdnormal lab Acuity: Acute Type of Exam: Initial Mechanism of Injury: abdnormal lab Relevant Medical/Surgical History: abdnormal lab FINDINGS: No infiltrate or consolidation or effusion is identified. The heart size is normal.  Chronic left-sided rib fractures are noted. There may be a loose body within the right shoulder measuring 1.4 cm in diameter. No acute abnormality visualized. Xr Chest Portable    Result Date: 4/29/2021  EXAMINATION: ONE XRAY VIEW OF THE CHEST 4/29/2021 6:02 am COMPARISON: Chest radiograph dated April 28, 2021 HISTORY: ORDERING SYSTEM PROVIDED HISTORY: SOB TECHNOLOGIST PROVIDED HISTORY: Reason for exam:->SOB Reason for Exam: SOB Acuity: Acute Type of Exam: Initial Mechanism of Injury: SOB Relevant Medical/Surgical History: SOB FINDINGS: The lungs are without acute focal process. There is no effusion or pneumothorax. The cardiomediastinal silhouette is without acute process. The osseous structures are without acute process.      No acute process.     Discharge Time of 31 minutes    Electronically signed by Connor Ascencio MD on 5/1/2021 at 12:25 PM

## 2021-05-01 NOTE — PROGRESS NOTES
Nephrology Progress Note  5/1/2021 11:07 AM        Subjective:   Admit Date: 4/28/2021  PCP: Corbin Paz MD    Interval History: doing well , going home     Diet: reasonable     ROS:  No overt sob     Data:     Current meds:    albuterol sulfate HFA  2 puff Inhalation 4x daily    ipratropium  2 puff Inhalation 4x daily    CENTRUM/CERTA-JAMAL with minerals oral  15 mL Oral Daily    busPIRone  10 mg Oral BID    guaiFENesin  600 mg Oral BID    thiamine  100 mg Oral Daily    polyethylene glycol  17 g Oral BID    aspirin  81 mg Oral Daily    atorvastatin  40 mg Oral Daily    baclofen  10 mg Oral BID    budesonide-formoterol  2 puff Inhalation BID    dilTIAZem  120 mg Oral Daily    DULoxetine  60 mg Oral Daily    ferrous sulfate  325 mg Oral BID WC    traZODone  100 mg Oral Nightly    theophylline  400 mg Oral Daily    sucralfate  1 g Oral BID AC    montelukast  10 mg Oral Daily    metoprolol succinate  25 mg Oral Daily    levothyroxine  100 mcg Oral Daily    lactobacillus  1 capsule Oral Daily with breakfast    folic acid  1 mg Oral Daily    fluticasone  2 spray Nasal Daily    sodium chloride flush  5-40 mL Intravenous BID    enoxaparin  40 mg Subcutaneous Daily    pantoprazole  40 mg Intravenous BID      sodium chloride           I/O last 3 completed shifts: In: 56 [P.O.:480; I.V.:10]  Out: -     CBC:   Recent Labs     04/28/21  1827 04/29/21  0915 04/30/21  0717 05/01/21  0423   WBC 11.3* 6.9  --   --    HGB 10.9* 10.9* 9.5* 8.9*    192  --   --           Recent Labs     04/30/21  0717 04/30/21  1457 05/01/21  0423   * 130* 132*   K 3.9 3.5 3.6   CL 85* 88* 86*   CO2 39* 37* 43*   BUN 11 11 13   CREATININE 0.8 0.7 0.9   GLUCOSE 115* 152* 104*       Lab Results   Component Value Date    CALCIUM 9.5 05/01/2021    PHOS 3.3 05/01/2021       Objective:     Vitals: BP (!) 123/54   Pulse 84   Temp 98 °F (36.7 °C) (Oral)   Resp 17 Comment: Simultaneous filing.  User may not have seen previous data. Ht 5' 1\" (1.549 m)   Wt 171 lb 14.4 oz (78 kg)   LMP 01/05/2010   SpO2 100%   BMI 32.48 kg/m²     General appearance:  No ac distress   HEENT:  + conj pallor  Neck:  supple  Lungs:  + coarse crackles   Heart:  irregular  Abdomen: soft  Extremities:  No overt edema     Problem List :         Impression :     1. Low na - now could be from access TBW   2. LUZ MARIA sec to CRS type 1 - peak cr was 1.4 - urine bland - minimal proteinuria   3. ch  hypercapnia with compensatory high serum bicarb     Recommendation/Plan  :     1. Ok to d/c off course   2. Low  salt  3. DASH diet   4. Good diet and lifestyle  5.  F/u with Dr Citlaly Fountain in 2-3 wks       Anabela Cabral MD

## 2021-05-01 NOTE — PROGRESS NOTES
05/01/21 0813   Oxygen Therapy/Pulse Ox   O2 Therapy Oxygen   O2 Device Nasal cannula   O2 Flow Rate (L/min) 3 L/min   Resp 21   SpO2 97 %     Patient noted home use of 3L NC.  - inhalers given, tolerate well. - no respiratory distress noted. - Spo2 97%. Breath sounds clear/diminished. Bipap, V60 bedside in standby. Will continue to monitor.

## 2021-05-02 PROBLEM — R42 DIZZINESS: Status: ACTIVE | Noted: 2021-05-02

## 2021-05-02 LAB
ANION GAP SERPL CALCULATED.3IONS-SCNC: 5 MMOL/L (ref 4–16)
BUN BLDV-MCNC: 11 MG/DL (ref 6–23)
CALCIUM SERPL-MCNC: 9.5 MG/DL (ref 8.3–10.6)
CHLORIDE BLD-SCNC: 86 MMOL/L (ref 99–110)
CO2: 42 MMOL/L (ref 21–32)
CREAT SERPL-MCNC: 0.8 MG/DL (ref 0.6–1.1)
EKG ATRIAL RATE: 78 BPM
EKG DIAGNOSIS: NORMAL
EKG P AXIS: 70 DEGREES
EKG P-R INTERVAL: 148 MS
EKG Q-T INTERVAL: 468 MS
EKG QRS DURATION: 156 MS
EKG QTC CALCULATION (BAZETT): 533 MS
EKG R AXIS: 94 DEGREES
EKG T AXIS: 24 DEGREES
EKG VENTRICULAR RATE: 78 BPM
GFR AFRICAN AMERICAN: >60 ML/MIN/1.73M2
GFR NON-AFRICAN AMERICAN: >60 ML/MIN/1.73M2
GLUCOSE BLD-MCNC: 124 MG/DL (ref 70–99)
MAGNESIUM: 1.8 MG/DL (ref 1.8–2.4)
POTASSIUM SERPL-SCNC: 3.2 MMOL/L (ref 3.5–5.1)
SODIUM BLD-SCNC: 133 MMOL/L (ref 135–145)

## 2021-05-02 PROCEDURE — 6370000000 HC RX 637 (ALT 250 FOR IP): Performed by: INTERNAL MEDICINE

## 2021-05-02 PROCEDURE — 2580000003 HC RX 258: Performed by: INTERNAL MEDICINE

## 2021-05-02 PROCEDURE — G0378 HOSPITAL OBSERVATION PER HR: HCPCS

## 2021-05-02 PROCEDURE — 83735 ASSAY OF MAGNESIUM: CPT

## 2021-05-02 PROCEDURE — 96372 THER/PROPH/DIAG INJ SC/IM: CPT

## 2021-05-02 PROCEDURE — 94640 AIRWAY INHALATION TREATMENT: CPT

## 2021-05-02 PROCEDURE — 93010 ELECTROCARDIOGRAM REPORT: CPT | Performed by: INTERNAL MEDICINE

## 2021-05-02 PROCEDURE — 2700000000 HC OXYGEN THERAPY PER DAY

## 2021-05-02 PROCEDURE — 80048 BASIC METABOLIC PNL TOTAL CA: CPT

## 2021-05-02 PROCEDURE — 94664 DEMO&/EVAL PT USE INHALER: CPT

## 2021-05-02 PROCEDURE — 36415 COLL VENOUS BLD VENIPUNCTURE: CPT

## 2021-05-02 PROCEDURE — 94761 N-INVAS EAR/PLS OXIMETRY MLT: CPT

## 2021-05-02 PROCEDURE — 6360000002 HC RX W HCPCS: Performed by: INTERNAL MEDICINE

## 2021-05-02 RX ORDER — METOPROLOL SUCCINATE 25 MG/1
25 TABLET, EXTENDED RELEASE ORAL DAILY
Status: DISCONTINUED | OUTPATIENT
Start: 2021-05-02 | End: 2021-05-04 | Stop reason: HOSPADM

## 2021-05-02 RX ORDER — TRAZODONE HYDROCHLORIDE 50 MG/1
100 TABLET ORAL NIGHTLY PRN
Status: DISCONTINUED | OUTPATIENT
Start: 2021-05-02 | End: 2021-05-04 | Stop reason: HOSPADM

## 2021-05-02 RX ORDER — BUDESONIDE AND FORMOTEROL FUMARATE DIHYDRATE 160; 4.5 UG/1; UG/1
2 AEROSOL RESPIRATORY (INHALATION) 2 TIMES DAILY
Status: DISCONTINUED | OUTPATIENT
Start: 2021-05-02 | End: 2021-05-04 | Stop reason: HOSPADM

## 2021-05-02 RX ORDER — SODIUM CHLORIDE 9 MG/ML
25 INJECTION, SOLUTION INTRAVENOUS PRN
Status: DISCONTINUED | OUTPATIENT
Start: 2021-05-02 | End: 2021-05-04 | Stop reason: HOSPADM

## 2021-05-02 RX ORDER — SODIUM CHLORIDE 0.9 % (FLUSH) 0.9 %
5-40 SYRINGE (ML) INJECTION PRN
Status: DISCONTINUED | OUTPATIENT
Start: 2021-05-02 | End: 2021-05-04 | Stop reason: HOSPADM

## 2021-05-02 RX ORDER — DICYCLOMINE HYDROCHLORIDE 10 MG/1
10 CAPSULE ORAL 3 TIMES DAILY PRN
Status: DISCONTINUED | OUTPATIENT
Start: 2021-05-02 | End: 2021-05-04 | Stop reason: HOSPADM

## 2021-05-02 RX ORDER — FLUTICASONE PROPIONATE 50 MCG
2 SPRAY, SUSPENSION (ML) NASAL DAILY
Status: DISCONTINUED | OUTPATIENT
Start: 2021-05-02 | End: 2021-05-04 | Stop reason: HOSPADM

## 2021-05-02 RX ORDER — SODIUM CHLORIDE 9 MG/ML
INJECTION, SOLUTION INTRAVENOUS CONTINUOUS
Status: DISCONTINUED | OUTPATIENT
Start: 2021-05-02 | End: 2021-05-02

## 2021-05-02 RX ORDER — ONDANSETRON 2 MG/ML
4 INJECTION INTRAMUSCULAR; INTRAVENOUS EVERY 6 HOURS PRN
Status: DISCONTINUED | OUTPATIENT
Start: 2021-05-02 | End: 2021-05-04 | Stop reason: HOSPADM

## 2021-05-02 RX ORDER — IPRATROPIUM BROMIDE AND ALBUTEROL SULFATE 2.5; .5 MG/3ML; MG/3ML
1 SOLUTION RESPIRATORY (INHALATION) EVERY 4 HOURS PRN
Status: DISCONTINUED | OUTPATIENT
Start: 2021-05-02 | End: 2021-05-04 | Stop reason: HOSPADM

## 2021-05-02 RX ORDER — BUSPIRONE HYDROCHLORIDE 10 MG/1
10 TABLET ORAL 2 TIMES DAILY
Status: DISCONTINUED | OUTPATIENT
Start: 2021-05-02 | End: 2021-05-04 | Stop reason: HOSPADM

## 2021-05-02 RX ORDER — GUAIFENESIN 600 MG/1
600 TABLET, EXTENDED RELEASE ORAL 2 TIMES DAILY
Status: DISCONTINUED | OUTPATIENT
Start: 2021-05-02 | End: 2021-05-04 | Stop reason: HOSPADM

## 2021-05-02 RX ORDER — CLONAZEPAM 0.5 MG/1
1 TABLET ORAL 3 TIMES DAILY PRN
Status: DISCONTINUED | OUTPATIENT
Start: 2021-05-02 | End: 2021-05-04 | Stop reason: HOSPADM

## 2021-05-02 RX ORDER — ASPIRIN 81 MG/1
81 TABLET, CHEWABLE ORAL DAILY
Status: DISCONTINUED | OUTPATIENT
Start: 2021-05-02 | End: 2021-05-04 | Stop reason: HOSPADM

## 2021-05-02 RX ORDER — POTASSIUM CHLORIDE 20 MEQ/1
40 TABLET, EXTENDED RELEASE ORAL DAILY
Status: DISCONTINUED | OUTPATIENT
Start: 2021-05-02 | End: 2021-05-02

## 2021-05-02 RX ORDER — POTASSIUM CHLORIDE 20 MEQ/1
40 TABLET, EXTENDED RELEASE ORAL
Status: DISCONTINUED | OUTPATIENT
Start: 2021-05-02 | End: 2021-05-04

## 2021-05-02 RX ORDER — FOLIC ACID 1 MG/1
1 TABLET ORAL DAILY
Status: DISCONTINUED | OUTPATIENT
Start: 2021-05-02 | End: 2021-05-04 | Stop reason: HOSPADM

## 2021-05-02 RX ORDER — PANTOPRAZOLE SODIUM 40 MG/1
40 TABLET, DELAYED RELEASE ORAL
Status: DISCONTINUED | OUTPATIENT
Start: 2021-05-02 | End: 2021-05-04 | Stop reason: HOSPADM

## 2021-05-02 RX ORDER — POLYETHYLENE GLYCOL 3350 17 G/17G
17 POWDER, FOR SOLUTION ORAL DAILY PRN
Status: DISCONTINUED | OUTPATIENT
Start: 2021-05-02 | End: 2021-05-04 | Stop reason: HOSPADM

## 2021-05-02 RX ORDER — IPRATROPIUM BROMIDE AND ALBUTEROL SULFATE 2.5; .5 MG/3ML; MG/3ML
1 SOLUTION RESPIRATORY (INHALATION) EVERY 4 HOURS
Status: DISCONTINUED | OUTPATIENT
Start: 2021-05-02 | End: 2021-05-02

## 2021-05-02 RX ORDER — SODIUM CHLORIDE 0.9 % (FLUSH) 0.9 %
5-40 SYRINGE (ML) INJECTION EVERY 12 HOURS SCHEDULED
Status: DISCONTINUED | OUTPATIENT
Start: 2021-05-02 | End: 2021-05-04 | Stop reason: HOSPADM

## 2021-05-02 RX ORDER — MECLIZINE HCL 12.5 MG/1
12.5 TABLET ORAL 3 TIMES DAILY PRN
Status: DISCONTINUED | OUTPATIENT
Start: 2021-05-02 | End: 2021-05-04 | Stop reason: HOSPADM

## 2021-05-02 RX ORDER — DILTIAZEM HYDROCHLORIDE 120 MG/1
120 CAPSULE, COATED, EXTENDED RELEASE ORAL DAILY
Status: DISCONTINUED | OUTPATIENT
Start: 2021-05-02 | End: 2021-05-04 | Stop reason: HOSPADM

## 2021-05-02 RX ORDER — ACETAMINOPHEN 325 MG/1
650 TABLET ORAL EVERY 6 HOURS PRN
Status: DISCONTINUED | OUTPATIENT
Start: 2021-05-02 | End: 2021-05-04 | Stop reason: HOSPADM

## 2021-05-02 RX ORDER — MONTELUKAST SODIUM 10 MG/1
10 TABLET ORAL DAILY
Status: DISCONTINUED | OUTPATIENT
Start: 2021-05-02 | End: 2021-05-04 | Stop reason: HOSPADM

## 2021-05-02 RX ORDER — BACLOFEN 10 MG/1
10 TABLET ORAL 3 TIMES DAILY
Status: DISCONTINUED | OUTPATIENT
Start: 2021-05-02 | End: 2021-05-02

## 2021-05-02 RX ORDER — HYDROCODONE BITARTRATE AND ACETAMINOPHEN 5; 325 MG/1; MG/1
1 TABLET ORAL EVERY 6 HOURS PRN
Status: DISCONTINUED | OUTPATIENT
Start: 2021-05-02 | End: 2021-05-04 | Stop reason: HOSPADM

## 2021-05-02 RX ORDER — FERROUS SULFATE 325(65) MG
325 TABLET ORAL 2 TIMES DAILY
Status: DISCONTINUED | OUTPATIENT
Start: 2021-05-02 | End: 2021-05-04 | Stop reason: HOSPADM

## 2021-05-02 RX ORDER — ALBUTEROL SULFATE 90 UG/1
2 AEROSOL, METERED RESPIRATORY (INHALATION) EVERY 4 HOURS
Status: DISCONTINUED | OUTPATIENT
Start: 2021-05-02 | End: 2021-05-04 | Stop reason: HOSPADM

## 2021-05-02 RX ORDER — LACTOBACILLUS RHAMNOSUS GG 10B CELL
1 CAPSULE ORAL
Status: DISCONTINUED | OUTPATIENT
Start: 2021-05-02 | End: 2021-05-04 | Stop reason: HOSPADM

## 2021-05-02 RX ORDER — LANOLIN ALCOHOL/MO/W.PET/CERES
100 CREAM (GRAM) TOPICAL DAILY
Status: DISCONTINUED | OUTPATIENT
Start: 2021-05-02 | End: 2021-05-04 | Stop reason: HOSPADM

## 2021-05-02 RX ORDER — ATORVASTATIN CALCIUM 40 MG/1
40 TABLET, FILM COATED ORAL DAILY
Status: DISCONTINUED | OUTPATIENT
Start: 2021-05-02 | End: 2021-05-04 | Stop reason: HOSPADM

## 2021-05-02 RX ORDER — PROMETHAZINE HYDROCHLORIDE 12.5 MG/1
12.5 TABLET ORAL EVERY 6 HOURS PRN
Status: DISCONTINUED | OUTPATIENT
Start: 2021-05-02 | End: 2021-05-04 | Stop reason: HOSPADM

## 2021-05-02 RX ORDER — SUCRALFATE 1 G/1
1 TABLET ORAL EVERY 12 HOURS SCHEDULED
Status: DISCONTINUED | OUTPATIENT
Start: 2021-05-02 | End: 2021-05-04 | Stop reason: HOSPADM

## 2021-05-02 RX ORDER — LEVOTHYROXINE SODIUM 0.1 MG/1
100 TABLET ORAL DAILY
Status: DISCONTINUED | OUTPATIENT
Start: 2021-05-02 | End: 2021-05-04 | Stop reason: HOSPADM

## 2021-05-02 RX ORDER — DULOXETIN HYDROCHLORIDE 30 MG/1
60 CAPSULE, DELAYED RELEASE ORAL DAILY
Status: DISCONTINUED | OUTPATIENT
Start: 2021-05-02 | End: 2021-05-04 | Stop reason: HOSPADM

## 2021-05-02 RX ADMIN — GUAIFENESIN 600 MG: 600 TABLET, EXTENDED RELEASE ORAL at 20:18

## 2021-05-02 RX ADMIN — ENOXAPARIN SODIUM 40 MG: 40 INJECTION SUBCUTANEOUS at 08:22

## 2021-05-02 RX ADMIN — Medication 100 MG: at 08:21

## 2021-05-02 RX ADMIN — BUSPIRONE HYDROCHLORIDE 10 MG: 10 TABLET ORAL at 08:21

## 2021-05-02 RX ADMIN — POTASSIUM CHLORIDE 40 MEQ: 1500 TABLET, EXTENDED RELEASE ORAL at 08:21

## 2021-05-02 RX ADMIN — POTASSIUM CHLORIDE 40 MEQ: 1500 TABLET, EXTENDED RELEASE ORAL at 12:29

## 2021-05-02 RX ADMIN — Medication 15 G: at 12:30

## 2021-05-02 RX ADMIN — Medication 1 CAPSULE: at 08:21

## 2021-05-02 RX ADMIN — SODIUM CHLORIDE: 9 INJECTION, SOLUTION INTRAVENOUS at 05:04

## 2021-05-02 RX ADMIN — FERROUS SULFATE TAB 325 MG (65 MG ELEMENTAL FE) 325 MG: 325 (65 FE) TAB at 08:21

## 2021-05-02 RX ADMIN — HYDROCODONE BITARTRATE AND ACETAMINOPHEN 1 TABLET: 5; 325 TABLET ORAL at 19:22

## 2021-05-02 RX ADMIN — ASPIRIN 81 MG CHEWABLE TABLET 81 MG: 81 TABLET CHEWABLE at 08:21

## 2021-05-02 RX ADMIN — DICYCLOMINE HYDROCHLORIDE 10 MG: 10 CAPSULE ORAL at 16:57

## 2021-05-02 RX ADMIN — POTASSIUM CHLORIDE 40 MEQ: 1500 TABLET, EXTENDED RELEASE ORAL at 16:23

## 2021-05-02 RX ADMIN — SODIUM CHLORIDE, PRESERVATIVE FREE 10 ML: 5 INJECTION INTRAVENOUS at 20:18

## 2021-05-02 RX ADMIN — ALBUTEROL SULFATE 2 PUFF: 90 AEROSOL, METERED RESPIRATORY (INHALATION) at 19:54

## 2021-05-02 RX ADMIN — ALBUTEROL SULFATE 2 PUFF: 90 AEROSOL, METERED RESPIRATORY (INHALATION) at 07:56

## 2021-05-02 RX ADMIN — TRAZODONE HYDROCHLORIDE 100 MG: 50 TABLET ORAL at 02:39

## 2021-05-02 RX ADMIN — ALBUTEROL SULFATE 2 PUFF: 90 AEROSOL, METERED RESPIRATORY (INHALATION) at 15:54

## 2021-05-02 RX ADMIN — GUAIFENESIN 600 MG: 600 TABLET, EXTENDED RELEASE ORAL at 02:39

## 2021-05-02 RX ADMIN — SUCRALFATE 1 G: 1 TABLET ORAL at 08:22

## 2021-05-02 RX ADMIN — ATORVASTATIN CALCIUM 40 MG: 40 TABLET, FILM COATED ORAL at 08:21

## 2021-05-02 RX ADMIN — DILTIAZEM HYDROCHLORIDE 120 MG: 120 CAPSULE, COATED, EXTENDED RELEASE ORAL at 08:22

## 2021-05-02 RX ADMIN — LEVOTHYROXINE SODIUM 100 MCG: 0.1 TABLET ORAL at 05:13

## 2021-05-02 RX ADMIN — CLONAZEPAM 1 MG: 0.5 TABLET ORAL at 08:21

## 2021-05-02 RX ADMIN — BUSPIRONE HYDROCHLORIDE 10 MG: 10 TABLET ORAL at 20:18

## 2021-05-02 RX ADMIN — Medication 2 PUFF: at 15:54

## 2021-05-02 RX ADMIN — CLONAZEPAM 1 MG: 0.5 TABLET ORAL at 16:23

## 2021-05-02 RX ADMIN — MONTELUKAST 10 MG: 10 TABLET, FILM COATED ORAL at 08:21

## 2021-05-02 RX ADMIN — SODIUM CHLORIDE, PRESERVATIVE FREE 10 ML: 5 INJECTION INTRAVENOUS at 08:22

## 2021-05-02 RX ADMIN — BUDESONIDE AND FORMOTEROL FUMARATE DIHYDRATE 2 PUFF: 160; 4.5 AEROSOL RESPIRATORY (INHALATION) at 07:56

## 2021-05-02 RX ADMIN — Medication 2 PUFF: at 07:56

## 2021-05-02 RX ADMIN — CLONAZEPAM 1 MG: 0.5 TABLET ORAL at 23:26

## 2021-05-02 RX ADMIN — PANTOPRAZOLE SODIUM 40 MG: 40 TABLET, DELAYED RELEASE ORAL at 16:23

## 2021-05-02 RX ADMIN — Medication 2 PUFF: at 19:54

## 2021-05-02 RX ADMIN — SUCRALFATE 1 G: 1 TABLET ORAL at 20:18

## 2021-05-02 RX ADMIN — FOLIC ACID 1 MG: 1 TABLET ORAL at 08:22

## 2021-05-02 RX ADMIN — ACETAMINOPHEN 650 MG: 325 TABLET ORAL at 08:21

## 2021-05-02 RX ADMIN — FERROUS SULFATE TAB 325 MG (65 MG ELEMENTAL FE) 325 MG: 325 (65 FE) TAB at 20:19

## 2021-05-02 RX ADMIN — GUAIFENESIN 600 MG: 600 TABLET, EXTENDED RELEASE ORAL at 08:21

## 2021-05-02 RX ADMIN — Medication 15 G: at 20:18

## 2021-05-02 RX ADMIN — THEOPHYLLINE ANHYDROUS 400 MG: 200 CAPSULE, EXTENDED RELEASE ORAL at 08:21

## 2021-05-02 RX ADMIN — ACETAMINOPHEN 650 MG: 325 TABLET ORAL at 23:25

## 2021-05-02 RX ADMIN — PANTOPRAZOLE SODIUM 40 MG: 40 TABLET, DELAYED RELEASE ORAL at 05:13

## 2021-05-02 RX ADMIN — BUDESONIDE AND FORMOTEROL FUMARATE DIHYDRATE 2 PUFF: 160; 4.5 AEROSOL RESPIRATORY (INHALATION) at 19:54

## 2021-05-02 RX ADMIN — DULOXETINE HYDROCHLORIDE 60 MG: 30 CAPSULE, DELAYED RELEASE ORAL at 08:21

## 2021-05-02 RX ADMIN — TRAZODONE HYDROCHLORIDE 100 MG: 50 TABLET ORAL at 23:26

## 2021-05-02 ASSESSMENT — PAIN SCALES - GENERAL
PAINLEVEL_OUTOF10: 0
PAINLEVEL_OUTOF10: 7
PAINLEVEL_OUTOF10: 4

## 2021-05-02 NOTE — PROGRESS NOTES
Hospitalist Progress Note      Name:  Viviana Pat /Age/Sex: 1960  (64 y.o. female)   MRN & CSN:  [id number] & [id number] Admission Date/Time: 2021  9:16 PM   Location:  [address] [place] [address] PCP: Gloria Kramer MD         Hospital Day: 2    Assessment and Plan:     Viviana Pat is a 64 y.o.  female  who presents with dizziness, she was discharged few hours prior to presentation, after being managed for LUZ MARIA with metabolic alkalosis, possible GI bleed.     · Dizziness  Denies any fall, no neurological no focal symptoms noted  Will give meclizine as needed  PT OT to evaluate  Consider neuro evaluation if persistent    · Hyponatremia  · Metabolic alkalosis likely diuretic related, improving  Metolazone and furosemide on hold  Serial BMP and potassium check  IV fluids, discontinued by nephro  Nephrology on board, hold off diuretics, started on urea       Chronic medical conditions, medications resumed unless contraindicated  Essential hypertension  COPD, not in exacerbation, Continue Singulair, Albuterol and Symbicort  Chronic hypoxic respiratory failure, on 4 L at baseline  Chronic HFpEF with EF 55-60%, metolazone and Lasix on hold, continue metoprolol  Hypothyroidism, levothyroxine  Chronic blood loss anemia  DALTON on CPAP  Bipolar disorder, depression and anxiety  Obesity class 1 with BMI 33.0, encourage lifestyle changes, weight loss    Diet DIET GENERAL;   DVT Prophylaxis [] Lovenox, []  Heparin, [] SCDs, []No VTE prophylaxis, patient ambulating   GI Prophylaxis [] PPI, [] H2 Blocker, [] No GI prophylaxis, patient is receiving diet/Tube Feeds   Code Status Full Code   Disposition Patient requires continued admission due to    MDM [] Low, [] Moderate,[]  High  Patient's risk as above due to      History of Present Illness:     Pt S&E.      Patient notes dizziness, unable to describe, denies any viral symptoms, or ear pain, discharge, no falls, no loss of consciousness  10-14 point ROS reviewed negative, unless as noted above    Objective: Intake/Output Summary (Last 24 hours) at 5/2/2021 1240  Last data filed at 5/2/2021 1024  Gross per 24 hour   Intake 250 ml   Output --   Net 250 ml      Vitals:   Vitals:    05/02/21 0816   BP: (!) 114/56   Pulse: 83   Resp: 20   Temp: 98.4 °F (36.9 °C)   SpO2: 98%     Physical Exam:   GEN Awake female, sitting upright in bed in no apparent distress. Appears given age. EYES Pupils are equally round. No scleral erythema, discharge, or conjunctivitis. HENT Mucous membranes are moist.   NECK No apparent thyromegaly or masses. RESP Clear to auscultation, no wheezes, rales or rhonchi. Symmetric chest movement while on room air. CARDIO/VASC S1/S2 auscultated. Regular rate without appreciable murmurs, rubs, or gallops. Peripheral pulses equal bilaterally and palpable. No peripheral edema. GI Abdomen is soft without significant tenderness, masses, or guarding. Bowel sounds are normoactive. Rectal exam deferred.  Mcallister catheter is not present. HEME/LYMPH No petechiae or ecchymoses. MSK No gross joint deformities. Spontaneous movement of all extremities  SKIN Normal coloration, warm, dry. NEURO Cranial nerves appear grossly intact, normal speech, no lateralizing weakness. PSYCH Awake, alert, oriented x 4. Affect appropriate.     Medications:   Medications:    sodium chloride flush  5-40 mL Intravenous 2 times per day    enoxaparin  40 mg Subcutaneous Daily    aspirin  81 mg Oral Daily    atorvastatin  40 mg Oral Daily    budesonide-formoterol  2 puff Inhalation BID    busPIRone  10 mg Oral BID    dilTIAZem  120 mg Oral Daily    DULoxetine  60 mg Oral Daily    ferrous sulfate  325 mg Oral BID    fluticasone  2 spray Nasal Daily    folic acid  1 mg Oral Daily    guaiFENesin  600 mg Oral BID    lactobacillus  1 capsule Oral Daily with breakfast    levothyroxine  100 mcg Oral Daily    metoprolol succinate  25 mg Oral Daily    montelukast  10 mg

## 2021-05-02 NOTE — CONSULTS
Pt seen ,examined,interviewed and chart reviewed. Please see the dictated consult for details     Imp :   1. Ac on ch hyponatremia - based on available  Data-- >  potential etiology- low k- low protein diet ? ectopic ADH etc - unlikely hypovolemia   2. ? LH- ? Etiology - check orthostatics   3. Low K likely from [employer]  4. ch met alkalosis- thought to be from ch high PCo2 and diuretics -  but add ACTh and cortisol  in am   5. COPD- HTN    Plan:  1. Stop IVF  2. Replete K   3. Po urea [owder  4. arginine / ACTH/ cortisol,CK  in am   5. manual orthostatics   6. Daily wt  7. Low salt diet  8. She has risk for  Fluid re tension/ overload    9.  Follow clinically       Thanks for the consult    #[id number]

## 2021-05-02 NOTE — ED TRIAGE NOTES
Pt. Present to the ER via EMS from home. EMS states that they were called out for general illness. PT. States that she has been feeling dizzy. When first asked, pt. States that she has been feeling dizzy this morning. When asked again she states it started a minute ago. Pt. Then states that she called the squad because her legs were swollen. Pt. Is alert and in no visible distress.

## 2021-05-02 NOTE — H&P
ALLERGIES    As per discharge summary-aspirin 81 mg daily, atorvastatin 40 mg daily, baclofen 10 mg 3 times daily, Symbicort twice daily, BuSpar 10 mg 3 times daily, clonazepam 1 mg 3 times daily as needed, Cardizem 120 mg daily, duloxetine 60 mg daily, ferrous sulfate 325 mg twice daily, Flonase daily, folic acid daily, Lasix as needed, levothyroxine 100 MCG daily, metoprolol succinate 25 mg daily, Singulair 10 mg daily, Protonix 40 mg twice daily, sucralfate 1 g every 12 hours, theophylline 400 mg daily, trazodone 100 mg daily, thiamine.   Lisinopril-angioedema       PAST MEDICAL, SURGICAL, FAMILY, and SOCIAL HISTORY         Past Medical History:   Diagnosis Date    Anemia     Anxiety 02/16/2017    follows with Dr Quintin Guaman    Arthritis     Back pain at L4-L5 level 2/16/2017    Dr Nini Field Bipolar 1 disorder ([facility]     per pt on 2/5/2021\"never told I have bipolar\"    COPD (chronic obstructive pulmonary disease) ([facility]     follows with Dr Carolin Atkinson Emphysema of lung ([facility]     FH: CAD (coronary artery disease) 2/16/2017    Father had in his forties, sister in her thirties    Hunter Field Fibromyalgia 02/16/2017    Full dentures     full upper plate and partial on the bottom    Gastric ulcer     \"had stomach ulder back fall 2019\"    H/O Doppler ultrasound 04/05/2019    venous- no DVT or reflux    H/O echocardiogram 01/14/2015    EF50-55% Normal- see media    History of blood transfusion     Hyperlipidemia LDL goal <100     Hypertension     Dr Marlon Coley Irregular heartbeat     \"they said I have irreg heart beat before- back when they drained fluid around my heart \"    Obstructive sleep apnea     \"have bipap I use at home\"    On home oxygen therapy     \"on oxygen all the time at home and keep it on 2.5 liters\"    Osteoarthritis     Pericardial effusion     per old chart had pericardial effusion 10/2020    Spinal stenosis     hx per old chart    Thyroid disease     Wears glasses     \"suppose to wear glasses\"     Past Surgical History:   Procedure Laterality Date    BACK SURGERY  2017    \"surgery on low back L5-6- not sure if they put any metal in \"   [address]      10/2019    CARDIAC CATHETERIZATION      per old chart had cath done 2020    CARPAL TUNNEL RELEASE Right 1985    CHOLECYSTECTOMY, LAPAROSCOPIC N/A 10/25/2020    CHOLECYSTECTOMY LAPAROSCOPIC WITH IOC performed by Priscilla Olvera MD at [place] [address] COLONOSCOPY N/A 2019    COLONOSCOPY DIAGNOSTIC performed by Meng Hagan MD at [address] [place] [address], DIAGNOSTIC      per old chart had egd done 2018    TUBAL LIGATION  1987    UPPER GASTROINTESTINAL ENDOSCOPY N/A 2021    EGD BIOPSY performed by [facility]eng [facility]agan MD at [facility] ENDOSCOPY     Family History   Problem Relation Age of Onset    Asthma Mother         COPD    Heart Disease Father      Family Hx of HTN  Family Hx as reviewed above, otherwise non-contributory  Social History     Socioeconomic History    Marital status:      Spouse name: None    Number of children: None    Years of education: None    Highest education level: None   Occupational History    None   Social Needs    Financial resource strain: Not hard at all   Beyond [place]-Leslie insecurity     Worry: Never true     Inability: Never true    Transportation needs     Medical: No     Non-medical: No   Tobacco Use    Smoking status: Former Smoker     Packs/day: 1.50     Years: 20.00     Pack years: 30.00     Types: Cigarettes     Quit date: 2008     Years since quittin.3    Smokeless tobacco: Never Used   Substance and Sexual Activity    Alcohol use: Not Currently     Alcohol/week: 2.0 standard drinks     Types: 2 Shots of liquor per week     Comment: per pt on 2021\"quit drinking back Oct 2020\"use to drink wine coolers - 3 times per week \"/caffeine 2-3 coffees aday 1 pop     Drug use: No    Sexual activity: Yes     Partners: Male   Lifestyle    Physical activity     Days per week: 0 days     Minutes per session: 0 min    Stress: Only a little   Relationships    Social connections     Talks on phone: Once a week     Gets together: Once a week     Attends Catholic service: Never     Active member of club or organization: No     Attends meetings of clubs or organizations: Never     Relationship status:     Intimate partner violence     Fear of current or ex partner: None     Emotionally abused: None     Physically abused: None     Forced sexual activity: None   Other Topics Concern    None   Social History Narrative    None       MEDICATIONS   Medications Prior to Admission  Not in a hospital admission. Current Medications  No current facility-administered medications for this encounter. Current Outpatient Medications   Medication Sig Dispense Refill    furosemide (LASIX) 20 MG tablet Take 1 tablet by mouth daily as needed (sob/leg swelling) Weigh self daily Current weight 171lbs Take pill when greater than 175lbs  As needed 30 tablet 0    potassium chloride (KLOR-CON M) 20 MEQ extended release tablet Take 2 tablets by mouth daily 90 tablet 1    dilTIAZem (CARDIZEM CD) 120 MG extended release capsule Take 1 capsule by mouth daily 90 capsule 3    metoprolol succinate (TOPROL XL) 25 MG extended release tablet Take 1 tablet by mouth daily 30 tablet 3    clonazePAM (KLONOPIN) 1 MG disintegrating tablet Take 1 mg by mouth 3 times daily as needed.  ferrous sulfate (IRON 325) 325 (65 Fe) MG tablet Take 325 mg by mouth 2 times daily Indications: pt taking every day bid Monday, wed, fri       atorvastatin (LIPITOR) 40 MG tablet Take 40 mg by mouth daily      ondansetron (ZOFRAN ODT) 4 MG disintegrating tablet Take 1 tablet by mouth every 8 hours as needed for Nausea or Vomiting Place under the tongue and let it melt and absorb from under your tongue.  30 tablet 3    lactobacillus (CULTURELLE) capsule Take 1 capsule by mouth daily (with breakfast) 30 capsule 0    pantoprazole (PROTONIX) 40 MG tablet Take 1 tablet by mouth 2 times daily 60 tablet 3    sucralfate (CARAFATE) 1 GM tablet Take 1 tablet by mouth every 12 hours 120 tablet 3    dicyclomine (BENTYL) 10 MG capsule Take 1 capsule by mouth 4 times daily 360 capsule 1    theophylline (MIAH-24) 200 MG extended release capsule Take 400 mg by mouth daily      budesonide-formoterol (SYMBICORT) 160-4.5 MCG/ACT AERO USE 2 INHALATIONS TWICE A DAY 30.6 g 3    ipratropium-albuterol (DUONEB) 0.5-2.5 (3) MG/3ML SOLN nebulizer solution Inhale 3 mLs into the lungs every 4 hours 1080 mL 3    guaiFENesin (MUCINEX) 600 MG extended release tablet Take 1 tablet by mouth 2 times daily 30 tablet 0    montelukast (SINGULAIR) 10 MG tablet Take 1 tablet by mouth daily 30 tablet 3    levothyroxine (SYNTHROID) 100 MCG tablet Take 1 tablet by mouth Daily 30 tablet 2    folic acid (FOLVITE) 1 MG tablet Take 1 tablet by mouth daily 30 tablet 3    vitamin B-1 100 MG tablet Take 1 tablet by mouth daily 30 tablet 3    albuterol-ipratropium (COMBIVENT RESPIMAT)  MCG/ACT AERS inhaler Inhale 1 puff into the lungs every 4 hours as needed for Wheezing 3 Inhaler 0    DULoxetine (CYMBALTA) 60 MG extended release capsule Take 60 mg by mouth daily      busPIRone (BUSPAR) 10 MG tablet Take 1 tablet by mouth 3 times daily (Patient taking differently: Take 10 mg by mouth 2 times daily ) 90 tablet 0    traZODone (DESYREL) 50 MG tablet Take 100 mg by mouth nightly       baclofen (LIORESAL) 10 MG tablet Take 1 tablet by mouth 3 times daily (Patient not taking: Reported on 4/29/2021) 30 tablet 0    aspirin 81 MG chewable tablet Take 81 mg by mouth daily      fluticasone (FLONASE) 50 MCG/ACT nasal spray 2 sprays by Nasal route daily 1 Bottle 0         Allergies  Allergies   Allergen Reactions    Lisinopril Swelling and Rash     angioedema       REVIEW OF SYSTEMS   Within above limitations. 14 point review of systems reviewed.  Pertinent positive or negative as per HPI or otherwise negative per 14 point systems review. PHYSICAL EXAM     Wt Readings from Last 3 Encounters:   04/28/21 171 lb 14.4 oz (78 kg)   04/16/21 175 lb (79.4 kg)   03/30/21 171 lb (77.6 kg)       Blood pressure 120/77, pulse 76, temperature 98.3 °F (36.8 °C), temperature source Oral, resp. rate 16, last menstrual period 01/05/2010, SpO2 100 %, not currently breastfeeding. GEN  -Awake, alert, NAD.   EYES   -PERRL. HENT  -MM are moist.   RESP  -LS CTA equal bilat, no wheezes, rales or rhonchi. Symmetric chest movement. No respiratory distress noted. C/V  -S1/S2 auscultated. RRR without appreciable M/R/G. No JVD or carotid bruits. Peripheral pulses equal bilaterally and palpable. No peripheral edema. No reproducible chest wall tenderness. GI  -Abdomen is soft, non-distended, no significant tenderness. No masses or guarding. + BS in all quadrants. Rectal exam deferred.   -No CVA tenderness. Mcallister catheter is not present. MS  -B/L extremities strong muscles strength. Full movements. No gross joint deformities. No swelling, intact sensation symmetrical.   SKIN  -Normal coloration, warm, dry. NEURO  -alert, oriented, follows commands appropriately. LABS AND IMAGING     Results for Abner Fox (MRN [id number]) as of 5/2/2021 03:55   Ref.  Range 5/1/2021 21:40   Sodium Latest Ref Range: 135 - 145 MMOL/L 127 (L)   Potassium Latest Ref Range: 3.5 - 5.1 MMOL/L 3.6   Chloride Latest Ref Range: 99 - 110 mMol/L 81 (L)   CO2 Latest Ref Range: 21 - 32 MMOL/L 42 (H)   BUN Latest Ref Range: 6 - 23 MG/DL 12   Creatinine Latest Ref Range: 0.6 - 1.1 MG/DL 0.8   Anion Gap Latest Ref Range: 4 - 16  4   GFR Non- Latest Ref Range: >60 mL/min/1.73m2 >60   GFR African American Latest Ref Range: >60 mL/min/1.73m2 >60   Glucose Latest Ref Range: 70 - 99 MG/DL 92   Calcium Latest Ref Range: 8.3 - 10.6 MG/DL 9.4   Total Protein Latest Ref Range: 6.4 - 8.2 GM/DL 5.7 (L) Albumin Latest Ref Range: 3.4 - 5.0 GM/DL 3.9   Alk Phos Latest Ref Range: 40 - 129 IU/L 85   ALT Latest Ref Range: 10 - 40 U/L 13   AST Latest Ref Range: 15 - 37 IU/L 22   Bilirubin Latest Ref Range: 0.0 - 1.0 MG/DL 0.1   WBC Latest Ref Range: 4.0 - 10.5 K/CU MM 8.3   RBC Latest Ref Range: 4.2 - 5.4 M/CU MM 3.47 (L)   Hemoglobin Quant Latest Ref Range: 12.5 - 16.0 GM/DL 9.8 (L)   Hematocrit Latest Ref Range: 37 - 47 % 31.3 (L)   MCV Latest Ref Range: 78 - 100 FL 90.2   MCH Latest Ref Range: 27 - 31 PG 28.2   MCHC Latest Ref Range: 32.0 - 36.0 % 31.3 (L)   MPV Latest Ref Range: 7.5 - 11.1 FL 9.7   RDW Latest Ref Range: 11.7 - 14.9 % 13.0   Platelet Count Latest Ref Range: 140 - 440 K/CU    Lymphocyte % Latest Ref Range: 24 - 44 % 18.7 (L)   Monocytes % Latest Ref Range: 0 - 4 % 8.4 (H)   Eosinophils % Latest Ref Range: 0 - 3 % 7.5 (H)   Basophils % Latest Ref Range: 0 - 1 % 1.0   Lymphocytes Absolute Latest Units: K/CU MM 1.6   Monocytes Absolute Latest Units: K/CU MM 0.7   Eosinophils Absolute Latest Units: K/CU MM 0.6   Basophils Absolute Latest Units: K/CU MM 0.1   Differential Type Unknown AUTOMATED DIFFERENTIAL   Segs Relative Latest Ref Range: 36 - 66 % 64.0   Segs Absolute Latest Units: K/CU MM 5.3   Nucleated RBC % Latest Units: % 0.0   Immature Neutrophil % Latest Ref Range: 0 - 0.43 % 0.4   Total Immature Neutrophil Latest Units: K/CU MM 0.03   Total Nucleated RBC Latest Units: K/CU MM 0.0     Recent Imaging    none    Relevant labs and imaging reviewed    ASSESSMENT AND PLAN     #. Hyponatremia:  -sodium was 132 earlier today, was down to 127 .  -Patient reported she took a dose of furosemide prior to coming to the hospital   -Started IV fluids   -Nephrology consult   -Repeat BMP in a.m. #. Dizziness :  -Patient could not elaborate on her symptoms.  -No focal deficits noted  -Could be secondary to hyponatremia. -Monitor closely and if symptoms did not resolve, consider CT head.     -PT/OT evaluation. #. COPD, on 4 L of oxygen at home  -does not appear to be in exacerbation. -on symbicort, theophylline, duoneb, singulair, flonase. #. obstructive sleep apnea on CPAP    #. Hypertension  -Metoprolol succinate    #. Hypothyroidism-levothyroxine    #. bipolar disorder, depression/anxiety  -Clonazepam, Duloxetine    #. history of SVT-Cardizem    #. history of pericardial effusion-10/2020    #. Erosive gastritis:  -Continue Protonix, sucralfate. #.  Insomnia: trazodone    #. Chronic normocytic anemia:  -continue ferrous sulfate. #. recent admission 4/28-5/1/2021 for hyponatremia, melena. DVT Prophylaxis:lovenox  GI Prophylaxis: protonix  Code Status: FULL.       Case d/w ED physician    Jacquelin Walker MD  Hospitalist, Internal Medicine  5/1/2021 at 11:55 PM

## 2021-05-02 NOTE — ED PROVIDER NOTES
irreg heart beat before- back when they drained fluid around my heart \"    Obstructive sleep apnea     \"have bipap I use at home\"    On home oxygen therapy     \"on oxygen all the time at home and keep it on 2.5 liters\"    Osteoarthritis     Pericardial effusion     per old chart had pericardial effusion 10/2020    Spinal stenosis     hx per old chart    Thyroid disease     Wears glasses     \"suppose to wear glasses\"     Past Surgical History:   Procedure Laterality Date    BACK SURGERY  2017    \"surgery on low back L5-6- not sure if they put any metal in \"   [address]      10/2019    CARDIAC CATHETERIZATION      per old chart had cath done 2020    CARPAL TUNNEL RELEASE Right 1985    CHOLECYSTECTOMY, LAPAROSCOPIC N/A 10/25/2020    CHOLECYSTECTOMY LAPAROSCOPIC WITH IOC performed by Wilmer Minor MD at [place] [address] COLONOSCOPY N/A 2019    COLONOSCOPY DIAGNOSTIC performed by Nicholas Adams MD at [address] [place] [address], DIAGNOSTIC      per old chart had egd done 2018    TUBAL LIGATION  1987    UPPER GASTROINTESTINAL ENDOSCOPY N/A 2021    [facility]GD BIOPSY [facility] by Nicholas Adams MD at [facility] ENDOSCOPY     Family History   Problem Relation Age of Onset    Asthma Mother         COPD    Heart Disease Father      Social History     Socioeconomic History    Marital status:      Spouse name: Not on file    Number of children: Not on file    Years of education: Not on file    Highest education level: Not on file   Occupational History    Not on file   Social Needs    Financial resource strain: Not hard at all   William-Leslie insecurity     Worry: Never true     Inability: Never true    Transportation needs     Medical: No     Non-medical: No   Tobacco Use    Smoking status: Former Smoker     Packs/day: 1.50     Years: 20.00     Pack years: 30.00     Types: Cigarettes     Quit date: 2008     Years since quittin.3    Smokeless tobacco: Never Used Substance and Sexual Activity    Alcohol use: Not Currently     Alcohol/week: 2.0 standard drinks     Types: 2 Shots of liquor per week     Comment: per pt on 1/5/2021\"quit drinking back Oct 2020\"use to drink wine coolers - 3 times per week \"/caffeine 2-3 coffees aday 1 pop     Drug use: No    Sexual activity: Yes     Partners: Male   Lifestyle    Physical activity     Days per week: 0 days     Minutes per session: 0 min    Stress: Only a little   Relationships    Social connections     Talks on phone: Once a week     Gets together: Once a week     Attends Sikh service: Never     Active member of club or organization: No     Attends meetings of clubs or organizations: Never     Relationship status:     Intimate partner violence     Fear of current or ex partner: Not on file     Emotionally abused: Not on file     Physically abused: Not on file     Forced sexual activity: Not on file   Other Topics Concern    Not on file   Social History Narrative    Not on file     No current facility-administered medications for this encounter. Current Outpatient Medications   Medication Sig Dispense Refill    furosemide (LASIX) 20 MG tablet Take 1 tablet by mouth daily as needed (sob/leg swelling) Weigh self daily Current weight 171lbs Take pill when greater than 175lbs  As needed 30 tablet 0    potassium chloride (KLOR-CON M) 20 MEQ extended release tablet Take 2 tablets by mouth daily 90 tablet 1    dilTIAZem (CARDIZEM CD) 120 MG extended release capsule Take 1 capsule by mouth daily 90 capsule 3    metoprolol succinate (TOPROL XL) 25 MG extended release tablet Take 1 tablet by mouth daily 30 tablet 3    clonazePAM (KLONOPIN) 1 MG disintegrating tablet Take 1 mg by mouth 3 times daily as needed.        ferrous sulfate (IRON 325) 325 (65 Fe) MG tablet Take 325 mg by mouth 2 times daily Indications: pt taking every day bid Monday, wed, fri       atorvastatin (LIPITOR) 40 MG tablet Take 40 mg by tablet Take 81 mg by mouth daily      fluticasone (FLONASE) 50 MCG/ACT nasal spray 2 sprays by Nasal route daily 1 Bottle 0     Allergies   Allergen Reactions    Lisinopril Swelling and Rash     angioedema       Nursing Notes Reviewed    Physical Exam:  ED Triage Vitals   Enc Vitals Group      BP       Pulse       Resp       Temp       Temp src       SpO2       Weight       Height       Head Circumference       Peak Flow       Pain Score       Pain Loc       Pain Edu? Excl. in [address]? GENERAL APPEARANCE: Awake and alert. Cooperative. No acute distress. HEAD: Normocephalic. Atraumatic. EYES: EOM's grossly intact. Sclera anicteric. ENT: Mucous membranes are moist. Tolerates saliva. No trismus. NECK: Supple. Trachea midline. HEART: RRR. Radial pulses 2+. LUNGS: Respirations unlabored. CTAB  ABDOMEN: Soft. Non-tender. No guarding or rebound. EXTREMITIES: No acute deformities. No edema  SKIN: Warm and dry. NEUROLOGICAL: No gross facial drooping. Moves all 4 extremities spontaneously. PSYCHIATRIC: Normal mood.     I have reviewed and interpreted all of the currently available lab results from this visit (if applicable):  Results for orders placed or performed during the hospital encounter of 05/01/21   CBC Auto Differential   Result Value Ref Range    WBC 8.3 4.0 - 10.5 K/CU MM    RBC 3.47 (L) 4.2 - 5.4 M/CU MM    Hemoglobin 9.8 (L) 12.5 - 16.0 GM/DL    Hematocrit 31.3 (L) 37 - 47 %    MCV 90.2 78 - 100 FL    MCH 28.2 27 - 31 PG    MCHC 31.3 (L) 32.0 - 36.0 %    RDW 13.0 11.7 - 14.9 %    Platelets 572 316 - 705 K/CU MM    MPV 9.7 7.5 - 11.1 FL    Differential Type AUTOMATED DIFFERENTIAL     Segs Relative 64.0 36 - 66 %    Lymphocytes % 18.7 (L) 24 - 44 %    Monocytes % 8.4 (H) 0 - 4 %    Eosinophils % 7.5 (H) 0 - 3 %    Basophils % 1.0 0 - 1 %    Segs Absolute 5.3 K/CU MM    Lymphocytes Absolute 1.6 K/CU MM    Monocytes Absolute 0.7 K/CU MM    Eosinophils Absolute 0.6 K/CU MM    Basophils Absolute 0.1 K/CU MM    Nucleated RBC % 0.0 %    Total Nucleated RBC 0.0 K/CU MM    Total Immature Neutrophil 0.03 K/CU MM    Immature Neutrophil % 0.4 0 - 0.43 %   Comprehensive Metabolic Panel w/ Reflex to MG   Result Value Ref Range    Sodium 127 (L) 135 - 145 MMOL/L    Potassium 3.6 3.5 - 5.1 MMOL/L    Chloride 81 (L) 99 - 110 mMol/L    CO2 42 (H) 21 - 32 MMOL/L    BUN 12 6 - 23 MG/DL    CREATININE 0.8 0.6 - 1.1 MG/DL    Glucose 92 70 - 99 MG/DL    Calcium 9.4 8.3 - 10.6 MG/DL    Albumin 3.9 3.4 - 5.0 GM/DL    Total Protein 5.7 (L) 6.4 - 8.2 GM/DL    Total Bilirubin 0.1 0.0 - 1.0 MG/DL    ALT 13 10 - 40 U/L    AST 22 15 - 37 IU/L    Alkaline Phosphatase 85 40 - 129 IU/L    GFR Non-African American >60 >60 mL/min/1.73m2    GFR African American >60 >60 mL/min/1.73m2    Anion Gap 4 4 - 16      Radiographs (if obtained):  [] The following radiograph was interpreted by myself in the absence of a radiologist:  [] Radiologist's Report Reviewed:    EKG (if obtained): (All EKG's are interpreted by myself in the absence of a cardiologist)  12 lead EKG as interpreted by me reveals sinus rhythm. Axis is normal. There are no ischemic ST elevations or other suspicious ST changes;  QRS interval consistent with a RBBB pattern, QT interval is not prolonged. Final Interpretation: Sinus Rhythm with RBBB. No significant change when compared with EKG dated 4/28/2020        MDM:  Plan of care is discussed thoroughly with the patient and family if present. If performed, all imaging and lab work also discussed with patient. All relevant prior results and chart reviewed if available. Patient presents as above. She is in no acute distress and has normal vital signs. She does not appear hypervolemic at this time based on exam.  Presents with chronic lightheadedness. Denies any chest pain or shortness of breath at this time. EKG does not show any new ischemic changes. Overall presentation not consistent with CVA.     Patient's metabolic work-up is significant for hyponatremia of 127 which is down from 132 just earlier in the day. Patient states that she has not been eating much today. There may be a component of very slight volume overload. Given significant drop just over the last 12 hours, she would be admitted for further observation and trending of sodium levels. Clinical Impression:  1.  Hyponatremia      (Please note that portions of this note may have been completed with a voice recognition program. Efforts were made to edit the dictations but occasionally words are mis-transcribed.)    MD Joey Silva MD  05/01/21 [id number]

## 2021-05-02 NOTE — CONSULTS
[address], [address] [facility]                                  CONSULTATION    PATIENT NAME: Quiana Fry                :        1960  MED REC NO:   [id number]                          ROOM:       [id number]  ACCOUNT NO:   [de-identified]                           ADMIT DATE: 2021  PROVIDER:     Irving Becerril MD    CONSULT DATE:  2021    CONSULT REQUESTED BY:  [place]on MD    REASON FOR CONSULTATION:  Hyponatremia, hypokalemia, and metabolic  alkalosis. HISTORY OF PRESENT ILLNESS:  The patient is a 26-year-old female  presented to the emergency room same day that she was discharge with  lightheadedness. The patient was discharged home. Apparently, her   took her home. According to her, she continued to have  lightheadedness which prompted her to come to the emergency room. In  the emergency room, she was actually little bit hypotensive, although I  do not see any orthostatic and underwent several diagnostic tests,  mainly biochemical since she just left the hospital.  It found sodium of  127, potassium of 3.6, CO2 of 42, BUN and creatinine 12 and 0.6 and  slight anemia. She was given normal saline. Potassium was repleted and  subsequently admitted for further evaluation. The patient was here not too long ago with hyperkalemia, hyponatremia,  thought to be from diuretics and also had a high serum bicarbonate level  which also thought to be from chronic pCO2 retention and diuretic  therapy. She was sent with diuretics. I am not sure whether she was  able to take anything at home or not. This morning when I saw, she is rather restless and very anxious and  frequently she lives her trend of thoughts, but I was able to sit down  with her and had a chat and get as much as I wanted to from her history. PAST MEDICAL HISTORY:  1. COPD. She is on home oxygen.   2.  Longstanding hypertension. 3.  Chronic atrial fibrillation. 4.  Apparently had an episode of CHF, although she had relatively  preserved left ventricular ejection fraction. Apparently _____ before. 5.  Osteoarthritis. 6.  Depression. 7.  Hypothyroidism. 8.  Gastroesophageal reflux (GERD). 9.  Insomnia. 10.  Anxiety disorder. OB/GYN HISTORY:   3, para 3. No history of eclampsia,  preeclampsia, gestational diabetes. PAST SURGICAL HISTORY:  Apparently, the back surgery and tubal ligation  as well as carpal tunnel disease surgery. She also had heart cath and  gallbladder surgery. SOCIAL HISTORY:  The patient is  for 18 years, but this is her  third marriage though. She has three children. She used to work  various job including [employer], other odd job, but last time she  worked in . FAMILY MEDICAL HISTORY:  Mom  at 58 from complication of COPD. Dad  also had a COPD and  at 79. One of the sisters  from cancer. HABITS:  She quit smoking 10 years ago, but had been smoking since age  27 until she quit 10 years ago. She denied any history of alcohol or  illicit drug abuse. PAST PSYCHIATRIC HISTORY:  Significant anxiety and depression, but  denied any suicidal or homicidal ideation. HOME MEDICATION:  She was discharged with Combivent, aspirin,  atorvastatin, baclofen, Symbicort, BuSpar, Klonopin, dicyclomine,  diltiazem, ferrous sulfate, Synthroid, metoprolol, and sucralfate as  well as theophylline and trazodone, but unsure whether she had a chance  to take any one of them. CURRENT MEDICATIONS:  In the hospital, she is on aspirin, atorvastatin,  baclofen which I will discontinue, BuSpar, diltiazem, duloxetine, folic  acid, sucralfate, theophylline, etc.    REVIEW OF SYSTEMS:  Lightheadedness basically. No fever, chills or  rigor. Anxiety, also depression. No suicidal ideation. The rest of  the review of systems is negative other than previous paragraph. PHYSICAL EXAMINATION:  VITAL SIGNS:  At the time of examination, temperature 98.5; blood  pressure 120/60, but I needed manual blood pressure with orthostatic. She is satting 99% on 3 liters of oxygen, pulse 79, respiratory rate 16. GENERAL:  The patient is anxious, restless. HEAD AND NECK:  Normocephalic and traumatic. EYES:  I do not see any conjunctival pallor. She does have some tremor,  but no cogwheel rigidity. RESPIRATORY:  She has expiratory wheeze and rhonchi. CARDIOVASCULAR:  Regular rate and rhythm. ABDOMEN:  Soft. EXTREMITIES:  No edema. LABORATORY VALUES AND ANCILLARY SERVICES:  Her urine sodium actually was  high. Serum sodium came back to 133, potassium is still 3.3. IMPRESSION:  A 37-year-old female with acute-on-chronic hyponatremia. 1.  Acute-on-chronic hyponatremia. Her sodium has been running low for  a while. Based on the available data, potential etiology, low  potassium, low protein diet or activity ADH, unlikely hypovolemia. 2.  Questionable lightheadedness, question etiology. We will check an  orthostatics and look for any other etiology. 3. Low K likely from total body store. 4.  Chronic metabolic alkalosis, although thought to be from high pCO2  and diuretics, but we will check an ACTH, cortisol, etc.  5.  Underlying COPD, hypertension and anxiety, to name a few. PLAN:  Stop IV fluid.  _____ powder. Arginine, ACTH, cortisol in the  morning. Manual orthostatics. Daily weight, low salt diet. She is  high risk for fluid retention, so watch carefully.         De Brewer MD    D: 05/02/2021 16:09:22       T: 05/02/2021 16:13:18     [email or web address]  Job#: [id number]     Doc#: [id number]    CC:

## 2021-05-03 LAB
ALBUMIN SERPL-MCNC: 3.8 GM/DL (ref 3.4–5)
ALP BLD-CCNC: 91 IU/L (ref 40–128)
ALT SERPL-CCNC: 17 U/L (ref 10–40)
ANION GAP SERPL CALCULATED.3IONS-SCNC: 3 MMOL/L (ref 4–16)
AST SERPL-CCNC: 20 IU/L (ref 15–37)
BILIRUB SERPL-MCNC: 0.2 MG/DL (ref 0–1)
BUN BLDV-MCNC: 32 MG/DL (ref 6–23)
CALCIUM SERPL-MCNC: 9.6 MG/DL (ref 8.3–10.6)
CHLORIDE BLD-SCNC: 92 MMOL/L (ref 99–110)
CO2: 39 MMOL/L (ref 21–32)
CORTISOL - AM: 1.6 UG/DL (ref 6–18.4)
CREAT SERPL-MCNC: 0.9 MG/DL (ref 0.6–1.1)
GFR AFRICAN AMERICAN: >60 ML/MIN/1.73M2
GFR NON-AFRICAN AMERICAN: >60 ML/MIN/1.73M2
GLUCOSE BLD-MCNC: 115 MG/DL (ref 70–99)
GLUCOSE BLD-MCNC: 135 MG/DL (ref 70–99)
MAGNESIUM: 1.9 MG/DL (ref 1.8–2.4)
PHOSPHORUS: 3.5 MG/DL (ref 2.5–4.9)
POTASSIUM SERPL-SCNC: 4.3 MMOL/L (ref 3.5–5.1)
PRO-BNP: 1132 PG/ML
SODIUM BLD-SCNC: 134 MMOL/L (ref 135–145)
TOTAL CK: 126 IU/L (ref 26–140)
TOTAL PROTEIN: 5.7 GM/DL (ref 6.4–8.2)

## 2021-05-03 PROCEDURE — 6370000000 HC RX 637 (ALT 250 FOR IP): Performed by: INTERNAL MEDICINE

## 2021-05-03 PROCEDURE — 82024 ASSAY OF ACTH: CPT

## 2021-05-03 PROCEDURE — G0378 HOSPITAL OBSERVATION PER HR: HCPCS

## 2021-05-03 PROCEDURE — 2580000003 HC RX 258: Performed by: INTERNAL MEDICINE

## 2021-05-03 PROCEDURE — 94640 AIRWAY INHALATION TREATMENT: CPT

## 2021-05-03 PROCEDURE — 6360000002 HC RX W HCPCS: Performed by: INTERNAL MEDICINE

## 2021-05-03 PROCEDURE — 36415 COLL VENOUS BLD VENIPUNCTURE: CPT

## 2021-05-03 PROCEDURE — 82533 TOTAL CORTISOL: CPT

## 2021-05-03 PROCEDURE — 82550 ASSAY OF CK (CPK): CPT

## 2021-05-03 PROCEDURE — 83880 ASSAY OF NATRIURETIC PEPTIDE: CPT

## 2021-05-03 PROCEDURE — 80053 COMPREHEN METABOLIC PANEL: CPT

## 2021-05-03 PROCEDURE — 97161 PT EVAL LOW COMPLEX 20 MIN: CPT

## 2021-05-03 PROCEDURE — 96372 THER/PROPH/DIAG INJ SC/IM: CPT

## 2021-05-03 PROCEDURE — 97166 OT EVAL MOD COMPLEX 45 MIN: CPT

## 2021-05-03 PROCEDURE — 97116 GAIT TRAINING THERAPY: CPT

## 2021-05-03 PROCEDURE — 2700000000 HC OXYGEN THERAPY PER DAY

## 2021-05-03 PROCEDURE — 94761 N-INVAS EAR/PLS OXIMETRY MLT: CPT

## 2021-05-03 PROCEDURE — 83735 ASSAY OF MAGNESIUM: CPT

## 2021-05-03 PROCEDURE — 84588 ASSAY OF VASOPRESSIN: CPT

## 2021-05-03 PROCEDURE — 84100 ASSAY OF PHOSPHORUS: CPT

## 2021-05-03 PROCEDURE — 97530 THERAPEUTIC ACTIVITIES: CPT

## 2021-05-03 PROCEDURE — 82962 GLUCOSE BLOOD TEST: CPT

## 2021-05-03 RX ADMIN — SODIUM CHLORIDE, PRESERVATIVE FREE 10 ML: 5 INJECTION INTRAVENOUS at 09:33

## 2021-05-03 RX ADMIN — PANTOPRAZOLE SODIUM 40 MG: 40 TABLET, DELAYED RELEASE ORAL at 06:29

## 2021-05-03 RX ADMIN — THEOPHYLLINE ANHYDROUS 400 MG: 200 CAPSULE, EXTENDED RELEASE ORAL at 09:31

## 2021-05-03 RX ADMIN — HYDROCODONE BITARTRATE AND ACETAMINOPHEN 1 TABLET: 5; 325 TABLET ORAL at 21:10

## 2021-05-03 RX ADMIN — ALBUTEROL SULFATE 2 PUFF: 90 AEROSOL, METERED RESPIRATORY (INHALATION) at 16:18

## 2021-05-03 RX ADMIN — POTASSIUM CHLORIDE 40 MEQ: 1500 TABLET, EXTENDED RELEASE ORAL at 17:36

## 2021-05-03 RX ADMIN — LEVOTHYROXINE SODIUM 100 MCG: 0.1 TABLET ORAL at 06:29

## 2021-05-03 RX ADMIN — HYDROCODONE BITARTRATE AND ACETAMINOPHEN 1 TABLET: 5; 325 TABLET ORAL at 13:21

## 2021-05-03 RX ADMIN — DICYCLOMINE HYDROCHLORIDE 10 MG: 10 CAPSULE ORAL at 21:25

## 2021-05-03 RX ADMIN — Medication 2 PUFF: at 12:22

## 2021-05-03 RX ADMIN — SUCRALFATE 1 G: 1 TABLET ORAL at 21:11

## 2021-05-03 RX ADMIN — DICYCLOMINE HYDROCHLORIDE 10 MG: 10 CAPSULE ORAL at 09:42

## 2021-05-03 RX ADMIN — MONTELUKAST 10 MG: 10 TABLET, FILM COATED ORAL at 09:32

## 2021-05-03 RX ADMIN — TRAZODONE HYDROCHLORIDE 100 MG: 50 TABLET ORAL at 21:11

## 2021-05-03 RX ADMIN — CLONAZEPAM 1 MG: 0.5 TABLET ORAL at 12:21

## 2021-05-03 RX ADMIN — Medication 15 G: at 09:42

## 2021-05-03 RX ADMIN — ENOXAPARIN SODIUM 40 MG: 40 INJECTION SUBCUTANEOUS at 09:33

## 2021-05-03 RX ADMIN — SODIUM CHLORIDE, PRESERVATIVE FREE 10 ML: 5 INJECTION INTRAVENOUS at 21:21

## 2021-05-03 RX ADMIN — BUSPIRONE HYDROCHLORIDE 10 MG: 10 TABLET ORAL at 21:11

## 2021-05-03 RX ADMIN — SUCRALFATE 1 G: 1 TABLET ORAL at 09:32

## 2021-05-03 RX ADMIN — ALBUTEROL SULFATE 2 PUFF: 90 AEROSOL, METERED RESPIRATORY (INHALATION) at 12:22

## 2021-05-03 RX ADMIN — ASPIRIN 81 MG CHEWABLE TABLET 81 MG: 81 TABLET CHEWABLE at 09:32

## 2021-05-03 RX ADMIN — GUAIFENESIN 600 MG: 600 TABLET, EXTENDED RELEASE ORAL at 09:31

## 2021-05-03 RX ADMIN — POTASSIUM CHLORIDE 40 MEQ: 1500 TABLET, EXTENDED RELEASE ORAL at 09:42

## 2021-05-03 RX ADMIN — METOPROLOL SUCCINATE 25 MG: 25 TABLET, EXTENDED RELEASE ORAL at 09:31

## 2021-05-03 RX ADMIN — Medication 2 PUFF: at 08:32

## 2021-05-03 RX ADMIN — POTASSIUM CHLORIDE 40 MEQ: 1500 TABLET, EXTENDED RELEASE ORAL at 12:21

## 2021-05-03 RX ADMIN — Medication 1 CAPSULE: at 09:41

## 2021-05-03 RX ADMIN — Medication 100 MG: at 09:31

## 2021-05-03 RX ADMIN — DILTIAZEM HYDROCHLORIDE 120 MG: 120 CAPSULE, COATED, EXTENDED RELEASE ORAL at 09:31

## 2021-05-03 RX ADMIN — BUDESONIDE AND FORMOTEROL FUMARATE DIHYDRATE 2 PUFF: 160; 4.5 AEROSOL RESPIRATORY (INHALATION) at 19:34

## 2021-05-03 RX ADMIN — BUDESONIDE AND FORMOTEROL FUMARATE DIHYDRATE 2 PUFF: 160; 4.5 AEROSOL RESPIRATORY (INHALATION) at 08:34

## 2021-05-03 RX ADMIN — BUSPIRONE HYDROCHLORIDE 10 MG: 10 TABLET ORAL at 09:32

## 2021-05-03 RX ADMIN — PANTOPRAZOLE SODIUM 40 MG: 40 TABLET, DELAYED RELEASE ORAL at 17:48

## 2021-05-03 RX ADMIN — GUAIFENESIN 600 MG: 600 TABLET, EXTENDED RELEASE ORAL at 21:11

## 2021-05-03 RX ADMIN — DICYCLOMINE HYDROCHLORIDE 10 MG: 10 CAPSULE ORAL at 15:18

## 2021-05-03 RX ADMIN — ALBUTEROL SULFATE 2 PUFF: 90 AEROSOL, METERED RESPIRATORY (INHALATION) at 08:32

## 2021-05-03 RX ADMIN — ALBUTEROL SULFATE 2 PUFF: 90 AEROSOL, METERED RESPIRATORY (INHALATION) at 19:34

## 2021-05-03 RX ADMIN — CLONAZEPAM 1 MG: 0.5 TABLET ORAL at 17:37

## 2021-05-03 RX ADMIN — Medication 2 PUFF: at 19:34

## 2021-05-03 RX ADMIN — Medication 2 PUFF: at 16:17

## 2021-05-03 RX ADMIN — DULOXETINE HYDROCHLORIDE 60 MG: 30 CAPSULE, DELAYED RELEASE ORAL at 09:31

## 2021-05-03 RX ADMIN — FERROUS SULFATE TAB 325 MG (65 MG ELEMENTAL FE) 325 MG: 325 (65 FE) TAB at 09:32

## 2021-05-03 RX ADMIN — FERROUS SULFATE TAB 325 MG (65 MG ELEMENTAL FE) 325 MG: 325 (65 FE) TAB at 21:10

## 2021-05-03 RX ADMIN — FOLIC ACID 1 MG: 1 TABLET ORAL at 09:42

## 2021-05-03 ASSESSMENT — PAIN DESCRIPTION - PROGRESSION
CLINICAL_PROGRESSION: GRADUALLY WORSENING

## 2021-05-03 ASSESSMENT — PAIN DESCRIPTION - ONSET
ONSET: GRADUAL
ONSET: ON-GOING

## 2021-05-03 ASSESSMENT — PAIN DESCRIPTION - PAIN TYPE: TYPE: CHRONIC PAIN

## 2021-05-03 ASSESSMENT — PAIN DESCRIPTION - DESCRIPTORS: DESCRIPTORS: ACHING;DISCOMFORT

## 2021-05-03 ASSESSMENT — PAIN DESCRIPTION - LOCATION: LOCATION: HIP

## 2021-05-03 ASSESSMENT — PAIN SCALES - GENERAL: PAINLEVEL_OUTOF10: 4

## 2021-05-03 NOTE — PROGRESS NOTES
Nephrology Progress Note  5/3/2021 9:50 AM  Subjective: Interval History: Kendall Laboy is a 64 y.o. female with weakness and readmitted, now taking po and feel better,         Data:   Scheduled Meds:   sodium chloride flush  5-40 mL Intravenous 2 times per day    enoxaparin  40 mg Subcutaneous Daily    aspirin  81 mg Oral Daily    atorvastatin  40 mg Oral Daily    budesonide-formoterol  2 puff Inhalation BID    busPIRone  10 mg Oral BID    dilTIAZem  120 mg Oral Daily    DULoxetine  60 mg Oral Daily    ferrous sulfate  325 mg Oral BID    fluticasone  2 spray Nasal Daily    folic acid  1 mg Oral Daily    guaiFENesin  600 mg Oral BID    lactobacillus  1 capsule Oral Daily with breakfast    levothyroxine  100 mcg Oral Daily    metoprolol succinate  25 mg Oral Daily    montelukast  10 mg Oral Daily    pantoprazole  40 mg Oral BID AC    sucralfate  1 g Oral 2 times per day    theophylline  400 mg Oral Daily    thiamine  100 mg Oral Daily    albuterol sulfate HFA  2 puff Inhalation Q4H    ipratropium  2 puff Inhalation Q4H    potassium chloride  40 mEq Oral TID WC    urea  15 g Oral BID     Continuous Infusions:   sodium chloride           CBC   Recent Labs     05/01/21 0423 05/01/21 2140   WBC  --  8.3   HGB 8.9* 9.8*   HCT 28.1* 31.3*   PLT  --  191      BMP   Recent Labs     05/01/21 0423 05/01/21 2140 05/02/21  0411 05/03/21  0249   * 127* 133* 134*   K 3.6 3.6 3.2* 4.3   CL 86* 81* 86* 92*   CO2 43* 42* 42* 39*   PHOS 3.3  --   --  3.5   BUN 13 12 11 32*   CREATININE 0.9 0.8 0.8 0.9     Hepatic:   Recent Labs     05/01/21 2140 05/03/21  0249   AST 22 20   ALT 13 17   BILITOT 0.1 0.2   ALKPHOS 85 91     Troponin: No results for input(s): TROPONINI in the last 72 hours. BNP: No results for input(s): BNP in the last 72 hours. Lipids: No results for input(s): CHOL, HDL in the last 72 hours.     Invalid input(s): LDLCALCU  ABGs:   Lab Results   Component Value Date PO2ART 99 12/27/2020    KCH3RGD 62.0 12/27/2020     INR: No results for input(s): INR in the last 72 hours. Renal Labs  Albumin:    Lab Results   Component Value Date    LABALBU 3.8 05/03/2021     Calcium:    Lab Results   Component Value Date    CALCIUM 9.6 05/03/2021     Phosphorus:    Lab Results   Component Value Date    PHOS 3.5 05/03/2021     U/A:    Lab Results   Component Value Date    NITRU NEGATIVE 04/29/2021    COLORU STRAW 04/29/2021    WBCUA <1 04/29/2021    RBCUA NONE SEEN 04/29/2021    MUCUS RARE 11/03/2020    TRICHOMONAS NONE SEEN 04/29/2021    BACTERIA NEGATIVE 04/29/2021    CLARITYU CLEAR 04/29/2021    SPECGRAV 1.008 04/29/2021    UROBILINOGEN NEGATIVE 04/29/2021    BILIRUBINUR NEGATIVE 04/29/2021    BLOODU NEGATIVE 04/29/2021    KETUA NEGATIVE 04/29/2021     ABG:    Lab Results   Component Value Date    RLK8NED 62.0 12/27/2020    PO2ART 99 12/27/2020    EHD2GJL 40.2 12/27/2020     HgBA1c:    Lab Results   Component Value Date    LABA1C 5.1 06/21/2019     Microalbumen/Creatinine ratio:  No components found for: RUCREAT  TSH:  No results found for: TSH  IRON:    Lab Results   Component Value Date    IRON 58 10/23/2020     Iron Saturation:  No components found for: PERCENTFE  TIBC:    Lab Results   Component Value Date    TIBC 160 10/23/2020     FERRITIN:    Lab Results   Component Value Date    FERRITIN 1,617 10/23/2020     RPR:  No results found for: RPR  VITALY:    Lab Results   Component Value Date    VITALY None Detected 10/23/2020     24 Hour Urine for Creatinine Clearance:  No components found for: CREAT4, UHRS10, UTV10      Objective:   I/O: 05/02 0701 - 05/03 0700  In: 680 [P.O.:680]  Out: -   I/O last 3 completed shifts: In: 680 [P.O.:680]  Out: -   No intake/output data recorded.   Vitals: /63   Pulse 84   Temp 97.8 °F (36.6 °C) (Oral)   Resp 20   Ht 5' 1\" (1.549 m)   Wt 174 lb 7 oz (79.1 kg)   LMP 01/05/2010   SpO2 99%   BMI 32.96 kg/m²  {  General appearance: awake weak  HEENT: Head: Normal, normocephalic, atraumatic. Neck: supple, symmetrical, trachea midline  Lungs: diminished breath sounds bilaterally  Heart: S1, S2 normal  Abdomen: abnormal findings:  soft nt  Extremities: edema trace  Neurologic: Mental status: alertness: alert        Assessment and Plan:      IMP:  1 acute hyponatremia  2 weakness with lightheaded  3 low K with low intake  4 met alkalosis  5 htn  6 hx chf    Plan     1 na improved monitor  2 affect stable today and bp improved, oral hydration and monitor  3 K stable now as replete  4 alkalosis better as hydrate  5 bp stable  6 o2 monitor as o2 chronic she state with copd and not overload  Will follow and ?  Rehab jodi Wilcox MD

## 2021-05-03 NOTE — PROGRESS NOTES
Independent  Active : No  Patient's  Info:     Examination of body systems (includes body structures/functions, activity/participation limitations):  · Observation:  Supine in bed upon arrival. Cooperative with therapy. Hesitant initially to work with therapy and questioning why she needed it. Receptive with education. · Vision:  [facility] [place]  · Hearing:  [facility] [place]  · Cardiopulmonary:  Stable vitals on 3L     Musculoskeletal  · ROM R/L:  WFL BLEs    · Strength R/L:  BLEs grossly WFL in function and endurance. Mobility/treatment:   · Rolling L/R:  NT   · Supine to sit:  Leland with HOB slightly elevated  · Sit to supine: Leland with HOB slightly elevated  · Transfers:   · Sit to stand: Leland from EOB and toilet to RW  · Stand to sit: Leland to EOB and toilet   · Step pivot: Leland with RW (2x)   · Sitting balance:  Leland at EOB static and light dynamic managing slippers   · Standing balance:  SBA to Leland, at RW and without UE support static and light dynamic while managing toileting and hand hygiene tasks  · Gait: ~100ft x 2 with RW SBA for safety, slower pace with consistent step length and reciprocal gait pattern. Felt \"wobbly\" but had no major LOB noted. Encouraged use of AD at home. Rest break taken between ambulation trials. · Educated pt on POC, role of PT, DME use, discharge recommendations. [facility] 6 Clicks Inpatient Mobility:  AM-PAC Inpatient Mobility Raw Score : 22    Safety: patient left in bed,  call light within reach, gait belt used. Assessment:  Pt is a 64year old female admitted with hyponatremia and hypokalemia. Recommend home with assist PRN from family for higher level IALDs and HH PT once medically stable. At baseline she is indep with gross mobility and ADLs. She performed well this date with no further acute PT needs. Recommend frequent mobility with nursing while admitted to prevent deconditioning.    Complexity: Low   Prognosis: Good, no significant barriers to participation at this time.   Plan Times per week: n/a  Discharge Recommendations: Home with assist PRN, Home with Home health PT for balance strategies if pt chooses to mobilize without AD  Equipment: no needs     Treatment plan:  Recommendations for NURSING mobility: ambulate with RW   Time:   Time in: 1145  Time out: 1210  Timed treatment minutes: 10  Total time: 25    Electronically signed by:    Celine Aden [id number]  5/3/2021, 12:46 PM

## 2021-05-03 NOTE — PROGRESS NOTES
Hospitalist Progress Note      Name:  Joann Harvey /Age/Sex: 1960  (64 y.o. female)   MRN & CSN:  [id number] & [id number] Admission Date/Time: 2021  9:16 PM   Location:  [address] [place] [address] PCP: Laurita Blanchard MD         Hospital Day: 3    Assessment and Plan:     Joann Harvey is a 64 y.o.  female  who presents with dizziness, she was discharged few hours prior to presentation, after being managed for LUZ MARIA with metabolic alkalosis, possible GI bleed.     · Dizziness  Denies any fall, no neurological no focal symptoms noted  Will give meclizine as needed  PT OT to evaluate  Consider neuro evaluation if persistent    · Hyponatremia  · Metabolic alkalosis likely diuretic related, improving  Metolazone and furosemide on hold  Serial BMP and potassium check  IV fluids, discontinued by nephro  Nephrology on board, hold off diuretics, started on urea       Chronic medical conditions, medications resumed unless contraindicated  Essential hypertension  COPD, not in exacerbation, Continue Singulair, Albuterol and Symbicort  Chronic hypoxic respiratory failure, on 4 L at baseline  Chronic HFpEF with EF 55-60%, metolazone and Lasix on hold, continue metoprolol  Hypothyroidism, levothyroxine  Chronic blood loss anemia  DALTON on CPAP  Bipolar disorder, depression and anxiety  Obesity class 1 with BMI 33.0, encourage lifestyle changes, weight loss    Anticipate discharge in a day or 2  Diet DIET GENERAL;   DVT Prophylaxis [] Lovenox, []  Heparin, [] SCDs, []No VTE prophylaxis, patient ambulating   GI Prophylaxis [] PPI, [] H2 Blocker, [] No GI prophylaxis, patient is receiving diet/Tube Feeds   Code Status Full Code   Disposition Patient requires continued admission due to    MDM [] Low, [] Moderate,[]  High  Patient's risk as above due to      History of Present Illness:     Pt S&E.    Patient notes history of ENT problems, has had vertigo before, feels better with meclizine, encouraged to ambulate  10-14 point ROS reviewed negative, unless as noted above    Objective: Intake/Output Summary (Last 24 hours) at 5/3/2021 1427  Last data filed at 5/3/2021 0630  Gross per 24 hour   Intake 200 ml   Output --   Net 200 ml      Vitals:   Vitals:    05/03/21 1222   BP:    Pulse:    Resp: 17   Temp:    SpO2: 99%     Physical Exam:   GEN Awake female, sitting upright in bed in no apparent distress. Appears given age. EYES Pupils are equally round. No scleral erythema, discharge, or conjunctivitis. HENT Mucous membranes are moist.   NECK No apparent thyromegaly or masses. RESP Clear to auscultation, no wheezes, rales or rhonchi. Symmetric chest movement while on room air. CARDIO/VASC S1/S2 auscultated. Regular rate without appreciable murmurs, rubs, or gallops. Peripheral pulses equal bilaterally and palpable. No peripheral edema. GI Abdomen is soft without significant tenderness, masses, or guarding. Bowel sounds are normoactive. Rectal exam deferred.  Mcallister catheter is not present. HEME/LYMPH No petechiae or ecchymoses. MSK No gross joint deformities. Spontaneous movement of all extremities  SKIN Normal coloration, warm, dry. NEURO Cranial nerves appear grossly intact, normal speech, no lateralizing weakness. PSYCH Awake, alert, oriented x 4. Affect appropriate.     Medications:   Medications:    [START ON 5/4/2021] urea  15 g Oral Daily    sodium chloride flush  5-40 mL Intravenous 2 times per day    enoxaparin  40 mg Subcutaneous Daily    aspirin  81 mg Oral Daily    atorvastatin  40 mg Oral Daily    budesonide-formoterol  2 puff Inhalation BID    busPIRone  10 mg Oral BID    dilTIAZem  120 mg Oral Daily    DULoxetine  60 mg Oral Daily    ferrous sulfate  325 mg Oral BID    fluticasone  2 spray Nasal Daily    folic acid  1 mg Oral Daily    guaiFENesin  600 mg Oral BID    lactobacillus  1 capsule Oral Daily with breakfast    levothyroxine  100 mcg Oral Daily    metoprolol succinate  25 mg Oral Daily    montelukast  10 mg Oral Daily    pantoprazole  40 mg Oral BID AC    sucralfate  1 g Oral 2 times per day    theophylline  400 mg Oral Daily    thiamine  100 mg Oral Daily    albuterol sulfate HFA  2 puff Inhalation Q4H    ipratropium  2 puff Inhalation Q4H    potassium chloride  40 mEq Oral TID WC      Infusions:    sodium chloride       PRN Meds: sodium chloride flush, 5-40 mL, PRN  sodium chloride, 25 mL, PRN  promethazine, 12.5 mg, Q6H PRN    Or  ondansetron, 4 mg, Q6H PRN  polyethylene glycol, 17 g, Daily PRN  acetaminophen, 650 mg, Q6H PRN    Or  acetaminophen, 650 mg, Q6H PRN  clonazePAM, 1 mg, TID PRN  traZODone, 100 mg, Nightly PRN  ipratropium-albuterol, 1 vial, Q4H PRN  meclizine, 12.5 mg, TID PRN  dicyclomine, 10 mg, TID PRN  HYDROcodone 5 mg - acetaminophen, 1 tablet, Q6H PRN        Data    Recent Labs     05/01/21 0423 05/01/21 2140   WBC  --  8.3   HGB 8.9* 9.8*   HCT 28.1* 31.3*   PLT  --  191      Recent Labs     05/01/21 0423 05/01/21 2140 05/02/21  0411 05/03/21  0249   * 127* 133* 134*   K 3.6 3.6 3.2* 4.3   CL 86* 81* 86* 92*   CO2 43* 42* 42* 39*   PHOS 3.3  --   --  3.5   BUN 13 12 11 32*   CREATININE 0.9 0.8 0.8 0.9     Recent Labs     05/01/21 2140 05/03/21  0249   AST 22 20   ALT 13 17   BILITOT 0.1 0.2   ALKPHOS 85 91     No results for input(s): INR in the last 72 hours.   Recent Labs     05/03/21  8901  [facility]           Electronically signed by Silvia Mtz MD on 5/3/2021 at 2:27 PM

## 2021-05-03 NOTE — CARE COORDINATION
Cm placed a white board for pt/ot to see [facility]  CM into see pt to initiate a safe discharge plan. Cm  introduced self and explained role of CM. Pt is kind, alert and oriented. Pt lives at home with her dtr and . Pt lives in a one floor home with a basement. Pt shared that she does not have to go to basement. Pt is very supported by family. Pt has a home O2 supplied through [employer] and a bipap supplied through [employer]. Pt DME includes a walker and rollator. Pt has a PCP. Pt has insurance and is able to obtain her medications. Pt  provides transportation.  is able to obtain groceries. CM discussed home care and pt is agreeable to home care. She has had [address] in the past and would like them again. CM placed a PS to [facility] with referral.   Discharge plan at this time is home with  and dtr. [address] [place] [address] Rd to follow. Has PCP/insurance. Has home O2 and bipap. CM provided card and encouraged to call for any needs or concern. CM is available if any needs arise.

## 2021-05-03 NOTE — CONSULTS
· Toileting: SBA pt was able to perform toilet transfers, manipulate underwear in standing, and perform ingrid care with SBA for safety. Cognitive and Psychosocial Functioning:  · Overall cognitive status: Oriented to self, time, and location. · Affect: Intermittent episodes of agitation. Balance:   · Sitting: Independent (pt sat EOB demo good dynamic sitting balance). · Standing: SBA (pt stood without use of AD. Demo fair+ dynamic standing balance). Functional Mobility:   · Bed Mobility: SUP (pt performed supine to seated bed mobility with HOB slightly elevated, increased time, and VC). · Transfers: SBA  (pt performed STS from EOB and commode with SBA and no AD, VC throughout for sequencing and increased time). · Ambulation: SBA (pt ambulated approx 100ft x 100ft with RW and seated rest break in between each bout. Demo good pace throughout and 0 LOB). AM-PAC 6 click short form for inpatient daily activity:   How much help from another person does the patient currently need. .. Unable  Dep A Lot  Max A A Lot   Mod A A Little  Min A A Little   CGA  SBA None   Mod I  Indep  Sup   1. Putting on and taking off regular lower body clothing? [] 1    [] 2   [] 2   [] 3   [] 3   [x] 4      2. Bathing (including washing, rinsing, drying)? [] 1   [] 2   [] 2 [] 3 [] 3 [x] 4   3. Toileting, which includes using toilet, bedpan, or urinal? [] 1    [] 2   [] 2   [] 3   [x] 3   [] 4     4. Putting on and taking off regular upper body clothing? [] 1   [] 2   [] 2   [] 3   [] 3    [x] 4      5. Taking care of personal grooming such as brushing teeth? [] 1   [] 2    [] 2 [] 3    [] 3   [x] 4      6. Eating meals? [] 1   [] 2   [] 2   [] 3   [] 3   [x] 4      Raw Score:  23     [24=0% impaired(CH), 23=1-19%(CI), 20-22=20-39%(CJ), 15-19=40-59%(CK), 10-14=60-79%(CL), 7-9=80-99%(CM), 6=100%(CN)]    Treatment:  Therapeutic Activity Training:   Therapeutic activity training was instructed today.   Cues were given for safety, sequence, UE/LE placement, awareness, and balance. Activities performed today included bed mobility training, sup-sit, sit-stand, ambulation. Safety Measures: Gait belt used, Left in bed, Alarm in place    Assessment:  Assessment  Treatment Diagnosis: hyponatremia  Prognosis: Good  Decision Making: Medium Complexity  REQUIRES OT FOLLOW UP: No  Discharge Recommendations: Home with assist PRN    Pt is a 64year old F admitted for hyponatremia. Pt reports that prior to admission she was IND in ADLs, IND in IADLs, and intermittently Jeimy for functional mobility. This date, pt demo good functional mobility for ADL status and pt appears to be functioning at or near baseline. No OT needs at this time. Re-order if symptoms progress. Recommend use of shower chair for bathing and AD for ambulation to increase safety. Time:   Time in: 1145  Time out: 1210  Timed treatment minutes: 12  Total time: 25      Electronically signed by:       KENIA Dickerson/L, [address] [place] [address], .[id number]

## 2021-05-04 ENCOUNTER — APPOINTMENT (OUTPATIENT)
Dept: CT IMAGING | Age: 61
End: 2021-05-04
Payer: COMMERCIAL

## 2021-05-04 VITALS
DIASTOLIC BLOOD PRESSURE: 94 MMHG | HEIGHT: 61 IN | HEART RATE: 74 BPM | TEMPERATURE: 98.1 F | WEIGHT: 174.44 LBS | SYSTOLIC BLOOD PRESSURE: 153 MMHG | RESPIRATION RATE: 17 BRPM | BODY MASS INDEX: 32.93 KG/M2 | OXYGEN SATURATION: 100 %

## 2021-05-04 LAB
ALBUMIN SERPL-MCNC: 3.3 GM/DL (ref 3.4–5)
ANION GAP SERPL CALCULATED.3IONS-SCNC: 1 MMOL/L (ref 4–16)
BUN BLDV-MCNC: 21 MG/DL (ref 6–23)
CALCIUM SERPL-MCNC: 9.1 MG/DL (ref 8.3–10.6)
CHLORIDE BLD-SCNC: 98 MMOL/L (ref 99–110)
CHLORIDE URINE RANDOM: 24 MMOL/L (ref 43–210)
CO2: 36 MMOL/L (ref 21–32)
CREAT SERPL-MCNC: 0.8 MG/DL (ref 0.6–1.1)
GFR AFRICAN AMERICAN: >60 ML/MIN/1.73M2
GFR NON-AFRICAN AMERICAN: >60 ML/MIN/1.73M2
GLUCOSE BLD-MCNC: 100 MG/DL (ref 70–99)
PHOSPHORUS: 3.1 MG/DL (ref 2.5–4.9)
POTASSIUM SERPL-SCNC: 5.5 MMOL/L (ref 3.5–5.1)
POTASSIUM, UR: 21.3 MMOL/L (ref 22–119)
SODIUM BLD-SCNC: 135 MMOL/L (ref 135–145)
SODIUM URINE: 22 MMOL/L (ref 35–167)

## 2021-05-04 PROCEDURE — 6360000002 HC RX W HCPCS: Performed by: INTERNAL MEDICINE

## 2021-05-04 PROCEDURE — 80069 RENAL FUNCTION PANEL: CPT

## 2021-05-04 PROCEDURE — 94664 DEMO&/EVAL PT USE INHALER: CPT

## 2021-05-04 PROCEDURE — 84133 ASSAY OF URINE POTASSIUM: CPT

## 2021-05-04 PROCEDURE — 6370000000 HC RX 637 (ALT 250 FOR IP): Performed by: INTERNAL MEDICINE

## 2021-05-04 PROCEDURE — G0378 HOSPITAL OBSERVATION PER HR: HCPCS

## 2021-05-04 PROCEDURE — 36415 COLL VENOUS BLD VENIPUNCTURE: CPT

## 2021-05-04 PROCEDURE — 2580000003 HC RX 258: Performed by: INTERNAL MEDICINE

## 2021-05-04 PROCEDURE — 94761 N-INVAS EAR/PLS OXIMETRY MLT: CPT

## 2021-05-04 PROCEDURE — 94640 AIRWAY INHALATION TREATMENT: CPT

## 2021-05-04 PROCEDURE — 73201 CT UPPER EXTREMITY W/DYE: CPT

## 2021-05-04 PROCEDURE — 6360000004 HC RX CONTRAST MEDICATION: Performed by: INTERNAL MEDICINE

## 2021-05-04 PROCEDURE — 84300 ASSAY OF URINE SODIUM: CPT

## 2021-05-04 PROCEDURE — 82436 ASSAY OF URINE CHLORIDE: CPT

## 2021-05-04 PROCEDURE — 2700000000 HC OXYGEN THERAPY PER DAY

## 2021-05-04 PROCEDURE — 96372 THER/PROPH/DIAG INJ SC/IM: CPT

## 2021-05-04 RX ORDER — THEOPHYLLINE ANHYDROUS 400 MG/1
CAPSULE, EXTENDED RELEASE ORAL
Status: ON HOLD | COMMUNITY
Start: 2021-04-27 | End: 2021-05-04 | Stop reason: HOSPADM

## 2021-05-04 RX ORDER — DICYCLOMINE HYDROCHLORIDE 10 MG/1
10 CAPSULE ORAL 4 TIMES DAILY PRN
Qty: 360 CAPSULE | Refills: 1
Start: 2021-05-04 | End: 2021-11-17 | Stop reason: CLARIF

## 2021-05-04 RX ORDER — CLONAZEPAM 1 MG/1
TABLET ORAL
Status: ON HOLD | COMMUNITY
Start: 2021-04-16 | End: 2021-05-04 | Stop reason: HOSPADM

## 2021-05-04 RX ORDER — FUROSEMIDE 20 MG/1
20 TABLET ORAL DAILY PRN
Qty: 30 TABLET | Refills: 0
Start: 2021-05-04 | End: 2021-11-17 | Stop reason: CLARIF

## 2021-05-04 RX ORDER — THIAMINE MONONITRATE (VIT B1) 100 MG
TABLET ORAL
Status: ON HOLD | COMMUNITY
Start: 2021-04-16 | End: 2021-05-04 | Stop reason: HOSPADM

## 2021-05-04 RX ORDER — 0.9 % SODIUM CHLORIDE 0.9 %
10 VIAL (ML) INJECTION
Status: COMPLETED | OUTPATIENT
Start: 2021-05-04 | End: 2021-05-04

## 2021-05-04 RX ORDER — FERROUS FUMARATE 324(106)MG
TABLET ORAL
Status: ON HOLD | COMMUNITY
Start: 2021-04-16 | End: 2021-05-04 | Stop reason: HOSPADM

## 2021-05-04 RX ORDER — BUSPIRONE HYDROCHLORIDE 10 MG/1
10 TABLET ORAL 2 TIMES DAILY
Qty: 1 TABLET | Refills: 0
Start: 2021-05-04 | End: 2022-09-22

## 2021-05-04 RX ORDER — PREDNISONE 10 MG/1
10 TABLET ORAL DAILY
Qty: 14 TABLET | Refills: 0 | Status: SHIPPED | OUTPATIENT
Start: 2021-05-04 | End: 2021-05-18

## 2021-05-04 RX ORDER — FERROUS SULFATE 325(65) MG
325 TABLET ORAL EVERY OTHER DAY
Qty: 30 TABLET | Refills: 3
Start: 2021-05-04

## 2021-05-04 RX ADMIN — FLUTICASONE PROPIONATE 2 SPRAY: 50 SPRAY, METERED NASAL at 08:10

## 2021-05-04 RX ADMIN — ENOXAPARIN SODIUM 40 MG: 40 INJECTION SUBCUTANEOUS at 08:04

## 2021-05-04 RX ADMIN — SODIUM CHLORIDE, PRESERVATIVE FREE 10 ML: 5 INJECTION INTRAVENOUS at 08:10

## 2021-05-04 RX ADMIN — DULOXETINE HYDROCHLORIDE 60 MG: 30 CAPSULE, DELAYED RELEASE ORAL at 08:04

## 2021-05-04 RX ADMIN — GUAIFENESIN 600 MG: 600 TABLET, EXTENDED RELEASE ORAL at 08:03

## 2021-05-04 RX ADMIN — DILTIAZEM HYDROCHLORIDE 120 MG: 120 CAPSULE, COATED, EXTENDED RELEASE ORAL at 08:04

## 2021-05-04 RX ADMIN — METOPROLOL SUCCINATE 25 MG: 25 TABLET, EXTENDED RELEASE ORAL at 08:03

## 2021-05-04 RX ADMIN — Medication 10 ML: at 08:57

## 2021-05-04 RX ADMIN — ASPIRIN 81 MG CHEWABLE TABLET 81 MG: 81 TABLET CHEWABLE at 08:04

## 2021-05-04 RX ADMIN — LEVOTHYROXINE SODIUM 100 MCG: 0.1 TABLET ORAL at 06:54

## 2021-05-04 RX ADMIN — DICYCLOMINE HYDROCHLORIDE 10 MG: 10 CAPSULE ORAL at 10:12

## 2021-05-04 RX ADMIN — CLONAZEPAM 1 MG: 0.5 TABLET ORAL at 09:16

## 2021-05-04 RX ADMIN — FERROUS SULFATE TAB 325 MG (65 MG ELEMENTAL FE) 325 MG: 325 (65 FE) TAB at 08:04

## 2021-05-04 RX ADMIN — POTASSIUM CHLORIDE 40 MEQ: 1500 TABLET, EXTENDED RELEASE ORAL at 08:04

## 2021-05-04 RX ADMIN — MONTELUKAST 10 MG: 10 TABLET, FILM COATED ORAL at 08:04

## 2021-05-04 RX ADMIN — PANTOPRAZOLE SODIUM 40 MG: 40 TABLET, DELAYED RELEASE ORAL at 06:54

## 2021-05-04 RX ADMIN — Medication 1 CAPSULE: at 08:03

## 2021-05-04 RX ADMIN — THEOPHYLLINE ANHYDROUS 400 MG: 200 CAPSULE, EXTENDED RELEASE ORAL at 08:04

## 2021-05-04 RX ADMIN — ALBUTEROL SULFATE 2 PUFF: 90 AEROSOL, METERED RESPIRATORY (INHALATION) at 04:07

## 2021-05-04 RX ADMIN — ALBUTEROL SULFATE 2 PUFF: 90 AEROSOL, METERED RESPIRATORY (INHALATION) at 11:54

## 2021-05-04 RX ADMIN — Medication 100 MG: at 08:03

## 2021-05-04 RX ADMIN — Medication 2 PUFF: at 11:56

## 2021-05-04 RX ADMIN — FOLIC ACID 1 MG: 1 TABLET ORAL at 08:04

## 2021-05-04 RX ADMIN — Medication 15 G: at 08:04

## 2021-05-04 RX ADMIN — SUCRALFATE 1 G: 1 TABLET ORAL at 08:04

## 2021-05-04 RX ADMIN — IOPAMIDOL 75 ML: 755 INJECTION, SOLUTION INTRAVENOUS at 08:56

## 2021-05-04 RX ADMIN — BUSPIRONE HYDROCHLORIDE 10 MG: 10 TABLET ORAL at 08:04

## 2021-05-04 RX ADMIN — HYDROCODONE BITARTRATE AND ACETAMINOPHEN 1 TABLET: 5; 325 TABLET ORAL at 08:03

## 2021-05-04 RX ADMIN — Medication 2 PUFF: at 04:07

## 2021-05-04 ASSESSMENT — PAIN DESCRIPTION - FREQUENCY: FREQUENCY: CONTINUOUS

## 2021-05-04 ASSESSMENT — PAIN DESCRIPTION - PAIN TYPE: TYPE: ACUTE PAIN

## 2021-05-04 ASSESSMENT — PAIN SCALES - GENERAL: PAINLEVEL_OUTOF10: 6

## 2021-05-04 ASSESSMENT — PAIN DESCRIPTION - LOCATION: LOCATION: HEAD;NECK

## 2021-05-04 NOTE — PROGRESS NOTES
Nephrology Progress Note  5/4/2021 10:57 AM  Subjective: Interval History: Gilson Palencia is a 64 y.o. female. Doing okay and hoping to go home soon. Concern on CT scan for possible joint effusion. Today also potassium elevated      Data:   Scheduled Meds:   urea  15 g Oral Daily    sodium chloride flush  5-40 mL Intravenous 2 times per day    enoxaparin  40 mg Subcutaneous Daily    aspirin  81 mg Oral Daily    atorvastatin  40 mg Oral Daily    budesonide-formoterol  2 puff Inhalation BID    busPIRone  10 mg Oral BID    dilTIAZem  120 mg Oral Daily    DULoxetine  60 mg Oral Daily    ferrous sulfate  325 mg Oral BID    fluticasone  2 spray Nasal Daily    folic acid  1 mg Oral Daily    guaiFENesin  600 mg Oral BID    lactobacillus  1 capsule Oral Daily with breakfast    levothyroxine  100 mcg Oral Daily    metoprolol succinate  25 mg Oral Daily    montelukast  10 mg Oral Daily    pantoprazole  40 mg Oral BID AC    sucralfate  1 g Oral 2 times per day    theophylline  400 mg Oral Daily    thiamine  100 mg Oral Daily    albuterol sulfate HFA  2 puff Inhalation Q4H    ipratropium  2 puff Inhalation Q4H     Continuous Infusions:   sodium chloride           CBC   Recent Labs     05/01/21 2140   WBC 8.3   HGB 9.8*   HCT 31.3*         BMP   Recent Labs     05/02/21  0411 05/03/21  0249 05/04/21  0318   * 134* 135   K 3.2* 4.3 5.5*   CL 86* 92* 98*   CO2 42* 39* 36*   PHOS  --  3.5 3.1   BUN 11 32* 21   CREATININE 0.8 0.9 0.8     Hepatic:   Recent Labs     05/01/21  2140 05/03/21  0249   AST 22 20   ALT 13 17   BILITOT 0.1 0.2   ALKPHOS 85 91     Troponin: No results for input(s): TROPONINI in the last 72 hours. BNP: No results for input(s): BNP in the last 72 hours. Lipids: No results for input(s): CHOL, HDL in the last 72 hours.     Invalid input(s): LDLCALCU  ABGs:   Lab Results   Component Value Date    PO2ART 99 12/27/2020    MBU1VKH 62.0 12/27/2020     INR: No results for input(s): INR in the last 72 hours. Renal Labs  Albumin:    Lab Results   Component Value Date    LABALBU 3.3 05/04/2021     Calcium:    Lab Results   Component Value Date    CALCIUM 9.1 05/04/2021     Phosphorus:    Lab Results   Component Value Date    PHOS 3.1 05/04/2021     U/A:    Lab Results   Component Value Date    NITRU NEGATIVE 04/29/2021    COLORU STRAW 04/29/2021    WBCUA <1 04/29/2021    RBCUA NONE SEEN 04/29/2021    MUCUS RARE 11/03/2020    TRICHOMONAS NONE SEEN 04/29/2021    BACTERIA NEGATIVE 04/29/2021    CLARITYU CLEAR 04/29/2021    SPECGRAV 1.008 04/29/2021    UROBILINOGEN NEGATIVE 04/29/2021    BILIRUBINUR NEGATIVE 04/29/2021    BLOODU NEGATIVE 04/29/2021    KETUA NEGATIVE 04/29/2021     ABG:    Lab Results   Component Value Date    RLN4LTY 62.0 12/27/2020    PO2ART 99 12/27/2020    NOZ7FJF 40.2 12/27/2020     HgBA1c:    Lab Results   Component Value Date    LABA1C 5.1 06/21/2019     Microalbumen/Creatinine ratio:  No components found for: RUCREAT  TSH:  No results found for: TSH  IRON:    Lab Results   Component Value Date    IRON 58 10/23/2020     Iron Saturation:  No components found for: PERCENTFE  TIBC:    Lab Results   Component Value Date    TIBC 160 10/23/2020     FERRITIN:    Lab Results   Component Value Date    FERRITIN 1,617 10/23/2020     RPR:  No results found for: RPR  VITALY:    Lab Results   Component Value Date    VITALY None Detected 10/23/2020     24 Hour Urine for Creatinine Clearance:  No components found for: CREAT4, UHRS10, UTV10      Objective:   I/O: 05/03 0701 - 05/04 0700  In: -   Out: 500 [Urine:500]  I/O last 3 completed shifts:  In: -   Out: 500 [Urine:500]  I/O this shift: In: 5 [I.V.:5]  Out: -   Vitals: BP (!) 153/94   Pulse 74   Temp 98.1 °F (36.7 °C) (Oral)   Resp 17   Ht 5' 1\" (1.549 m)   Wt 174 lb 7 oz (79.1 kg)   LMP 01/05/2010   SpO2 95%   BMI 32.96 kg/m²  {  General appearance: awake weak  HEENT: Head: Normal, normocephalic, atraumatic.   Neck: supple, symmetrical, trachea midline  Lungs: diminished breath sounds bilaterally  Heart: S1, S2 normal  Abdomen: abnormal findings:  soft nt  Extremities: edema trace  Neurologic: Mental status: alertness: alert        Assessment and Plan:      IMP:  1 acute hyponatremia  2 weakness with lightheaded  3 low K with low intake  4 met alkalosis  5 htn  6 hx chf    Plan     #1 sodium stable we will stop the urea tablets. 2.  Appears slightly better with weakness CT scan of the left shoulder concern for need for Ortho evaluation  3. Potassium now elevated not low will maintain on lower potassium diet  4. Alkalosis appears resolving  5.  BP elevated but also anxious and some pain will try to adjust meds slowly  6. Oxygenation stable will hold on diuretics. 7.  We will monitor  Noted low am cortisol and will dc on po prednisone 10mg a day until fu 2 week.  Likely from poor nutrtion and intake, will monitor         Millicent Wilcox MD

## 2021-05-04 NOTE — DISCHARGE SUMMARY
Discharge Summary    Name:  Alesia Wetzel /Age/Sex: 1960  (64 y.o. female)   MRN & CSN:  [id number] & [id number] Admission Date/Time: 2021  9:16 PM   Attending:  Gabrielle Florentino MD Discharging Physician: Marj Beyer MD     HPI:     Per H&P:  Alesia Wetzel is a 64 y.o. female with COPD, on 4 L of oxygen at home, obstructive sleep apnea on CPAP, hypertension, hypothyroidism, bipolar disorder, depression/anxiety, history of SVT, sinus tachycardia, history of pericardial effusion-10/2020, recent admission -2021 for hyponatremia, melena. Patient was just discharged today morning. Patient returned to ED complaining of dizziness. Patient could not elaborate on her symptoms of dizziness. Denied any weakness in her hands or legs, denied any speech abnormality. Patient was treated for hyponatremia, hypokalemia, was discharged home and was advised to hold metolazone, furosemide. Patient reported that she felt her legs were swollen and took a dose of Lasix. Patient denied any fever, chills, cough, chest pain, shortness of breath, abdominal pain, denied any urinary complaints, denied any constipation or diarrhea. At presentation patient was noted to have /58, HR 81, RR 17, temperature 98.2, saturating 100% on 3 L of oxygen. Lab work significant for sodium 127, potassium 3.6, chloride 81, CO2 42, hemoglobin 9.8. Patient sodium at the time of discharge-132, 127 on repeat lab work. Due to drop in sodium ER physician advised admission. Hospital Course:     Ryder Jones is a pleasant 68-year-old female who was just discharged from the hospital from a 3-day stay for an acute kidney injury. She presented to ED few hours after discharge complaining of dizziness. Sodium was found to be 127, and she was admitted. Review of her records show that at the beginning of last admission the sodium was 122. Dizziness could have possibly been due to hyponatremia.   Also she reports that she has had a tube in her ear placed in the past and that she has seen ENT physician Dr. Joesph Lovelace. She is concerned about her left ear as she stated she had similar symptoms in the past when she had the ear problem. She was advised to follow-up with ENT. She is stable today and wants to go home, so she will be discharged. Problem list    Dizziness  -Denies any fall, no neurological no focal symptoms noted  -therapy evaluated patient  -recommend outpatient ENT follow up for history of left ear problems, she has seen Dr. Joesph Lovelace in the past  -Sodium has been corrected     Hyponatremia - improved  -improved after 5 doses of urea per nephrology  -monitor sodium as outpatient     Metabolic alkalosis likely diuretic related, improved  -per nephrology     Severe left glenohumeral joint osteoarthrosis with a joint effusion seen on CT scan today. Curvilinear sclerosis at the superior aspect of the humeral head could represent avascular necrosis. -CT scan was ordered because she complained of swelling in her left armpit. -With her history of COPD she probably has used steroids several times in the past.  She could very well have avascular necrosis. Recommend outpatient orthopedic referral.  This was discussed with her    Low cortisol level  -Cortisol level drawn at 2:50 AM this morning was 1.6, level was ordered by nephrology  -I spoke with Dr. Sri Carrera about this today. He recommended prednisone 10 mg a day upon discharge, and he will follow her up in 2 weeks.  -Consider outpatient endocrinology consult. Hyperkalemia-potassium level today was 5.5 -this compares to 2.6 upon arrival to the ER last admission on April 28  -She was on potassium 40 M EQ daily at home. This has been discontinued for now, and potassium level is to be rechecked in 2 days, in 1 week, and in 2 weeks. Orders for that to be done were written on the home health care order.     Patient is concerned about cat scratch disease as she stated that her cat bites her son plans. She has a concern about swelling in her left armpit. CT scan today did not show adenopathy. Consider MRI. Also, I advised her to follow-up with her PCP for this. Other problems    COPD-she is on oxygen 3 L at rest and 4 L with exertion  -Per prior notes she has end-stage COPD. She was referred to the transplant program at [facility] in the past.  Per last entry from the transplant program dated November 14, 2019, she had been deferred from the lung transplant program.  Continue outpatient follow-up. Sleep apnea-she is on BiPAP at home-she sees Dr. Stephen Cooney    History of congestive heart failure  -She had a right heart cath last year at [facility] which showed fluid overload with a pulmonary capillary wedge pressure of 35 per Dr. Nikki Song office note  -It appears that her volume status is precarious, so are her electrolytes. She should be watched carefully as an outpatient to strike an appropriate balance between Lasix therapy, hyponatremia, hypokalemia, and hyperkalemia. History of SVT with questionable history of paroxysmal atrial fibrillation - as per Dr. Nikki Song note 4/14/2021 diagnosis of AF is questioned. Continue aspirin and Cardizem 120 mg daily and follow-up with cardiology. Left heart cath in late 2020 showed normal coronary arteries.  -She had been seen by electrophysiology in the hospital in July 2020 and it was noted that her underlying lung disease is the reason for her arrhythmias. History of pericardial effusion status post pericardiocentesis in 2020    Hypertension  -Continue metoprolol succinate    History of tobacco use-quit over 10 years ago    Body mass index is 32.96 kg/m².      Anxiety-Per chart she has bipolar disorder  -He is on clonazepam and other medications, and she sees Dr. Homer Soni    History of alcohol use-she said she quit last year    History of erosive gastritis  -Continue sucralfate and Protonix per GI note last admission-that date on the note was April 29      The patient expressed appropriate understanding of and agreement with the discharge recommendations, medications, and plan. Consults this admission:  IP CONSULT TO HOSPITALIST  IP CONSULT TO NEPHROLOGY  IP CONSULT TO [address] [place] [address] NEEDS    Discharge Instruction:   Follow up appointments: Nephrology, consider endocrinology referral for low sodium level,  Primary care physician:  within 2 weeks    Diet:  cardiac diet   Activity: activity as tolerated  Disposition: Discharged to:   []Home, [x]HHC, []SNF, []Acute Rehab, []Hospice   Condition on discharge: Stable    Discharge Medications:      Karol Benjamin   [place] Medication Instructions [place]:[id number]    Printed on:05/04/21 4602   Medication Information                      albuterol-ipratropium (COMBIVENT RESPIMAT)  MCG/ACT AERS inhaler  Inhale 1 puff into the lungs every 4 hours as needed for Wheezing             aspirin 81 MG chewable tablet  Take 81 mg by mouth daily             atorvastatin (LIPITOR) 40 MG tablet  Take 40 mg by mouth daily             budesonide-formoterol (SYMBICORT) 160-4.5 MCG/ACT AERO  USE 2 INHALATIONS TWICE A DAY             busPIRone (BUSPAR) 10 MG tablet  Take 1 tablet by mouth 2 times daily             clonazePAM (KLONOPIN) 1 MG disintegrating tablet  Take 1 mg by mouth 3 times daily as needed.               dicyclomine (BENTYL) 10 MG capsule  Take 1 capsule by mouth 4 times daily as needed (adominal cramps)             dilTIAZem (CARDIZEM CD) 120 MG extended release capsule  Take 1 capsule by mouth daily             DULoxetine (CYMBALTA) 60 MG extended release capsule  Take 60 mg by mouth daily             ferrous sulfate (IRON 325) 325 (65 Fe) MG tablet  Take 1 tablet by mouth every other day Indications: pt taking every day bid Monday, wed, fri             fluticasone (FLONASE) 50 MCG/ACT nasal spray  2 sprays by Nasal route daily             folic acid (FOLVITE) 1 MG tablet  Take 1 tablet by mouth daily             furosemide (LASIX) 20 MG tablet  Take 1 tablet by mouth daily as needed (sob/leg swelling) Weigh self daily Current weight 174lbs Take pill when greater than 175lbs  As needed             guaiFENesin (MUCINEX) 600 MG extended release tablet  Take 1 tablet by mouth 2 times daily             ipratropium-albuterol (DUONEB) 0.5-2.5 (3) MG/3ML SOLN nebulizer solution  Inhale 3 mLs into the lungs every 4 hours             lactobacillus (CULTURELLE) capsule  Take 1 capsule by mouth daily (with breakfast)             levothyroxine (SYNTHROID) 100 MCG tablet  Take 1 tablet by mouth Daily             metoprolol succinate (TOPROL XL) 25 MG extended release tablet  Take 1 tablet by mouth daily             montelukast (SINGULAIR) 10 MG tablet  Take 1 tablet by mouth daily             ondansetron (ZOFRAN ODT) 4 MG disintegrating tablet  Take 1 tablet by mouth every 8 hours as needed for Nausea or Vomiting Place under the tongue and let it melt and absorb from under your tongue. pantoprazole (PROTONIX) 40 MG tablet  Take 1 tablet by mouth 2 times daily             predniSONE (DELTASONE) 10 MG tablet  Take 1 tablet by mouth daily for 14 days             sucralfate (CARAFATE) 1 GM tablet  Take 1 tablet by mouth every 12 hours             theophylline (MIAH-24) 200 MG extended release capsule  Take 400 mg by mouth daily             traZODone (DESYREL) 50 MG tablet  Take 100 mg by mouth nightly              vitamin B-1 100 MG tablet  Take 1 tablet by mouth daily                 Objective Findings at Discharge:   BP (!) 153/94   Pulse 74   Temp 98.1 °F (36.7 °C) (Oral)   Resp 17   Ht 5' 1\" (1.549 m)   Wt 174 lb 7 oz (79.1 kg)   LMP 01/05/2010   SpO2 100%   BMI 32.96 kg/m²            PHYSICAL EXAM   GEN Awake female, sitting upright in bed in no apparent distress. Appears given age.     LABS:    CBC:   Lab Results   Component Value Date    WBC 8.3 05/01/2021    HGB 9.8 05/01/2021 HCT 31.3 05/01/2021    MCV 90.2 05/01/2021     05/01/2021     BMP:   Lab Results   Component Value Date     05/04/2021    K 5.5 05/04/2021    CL 98 05/04/2021    CO2 36 05/04/2021    PHOS 3.1 05/04/2021    BUN 21 05/04/2021    CREATININE 0.8 05/04/2021    CALCIUM 9.1 05/04/2021     Hepatic:   Recent Labs     05/01/21  2140 05/03/21  0249   AST 22 20   ALT 13 17   BILITOT 0.1 0.2   ALKPHOS 85 91        IMAGING:    CT SHOULDER LEFT W CONTRAST [[id number]] Collected: 05/04/21 0956      Order Status: Completed Updated: 05/04/21 1008     Narrative:       EXAMINATION:   CT OF THE LEFT SHOULDER WITH CONTRAST 5/4/2021 8:51 am     TECHNIQUE:   CT of the left shoulder was performed with the administration of intravenous   contrast.  Multiplanar reformatted images are provided for review. Dose   modulation, iterative reconstruction, and/or weight based adjustment of the   mA/kV was utilized to reduce the radiation dose to as low as reasonably   achievable. COMPARISON:   CT chest 11/20/2020     HISTORY   ORDERING SYSTEM PROVIDED HISTORY: Check for left axillary lymphadenopathy -   complaints of swelling under left arm   TECHNOLOGIST PROVIDED HISTORY:   Reason for exam:->Check for left axillary lymphadenopathy - complaints of   swelling under left arm   Reason for Exam: Check for left axillary lymphadenopathy - complaints of   swelling under left arm   Acuity: Acute   Type of Exam: Initial   Additional signs and symptoms: none   Relevant Medical/Surgical History: 75ml isovue 370/ gfr>60     FINDINGS:   No evidence of left axillary lymphadenopathy. Similar streaky atelectasis and/or scarring in the left lingula. There are multiple old left posterolateral rib fractures. No acute fracture   is seen. No evidence of dislocation. Severe glenohumeral joint   osteoarthrosis. Moderate AC joint osteoarthrosis. There is a glenohumeral   joint effusion. Fluid is seen in the biceps tendon sheath. Calcifications   are seen near the greater tuberosity rotator cuff insertion. Curvilinear sclerosis seen at the superior aspect of the humeral head. Impression:       No evidence of left axillary lymphadenopathy. Severe left glenohumeral joint osteoarthrosis with a joint effusion. Curvilinear sclerosis at the superior aspect of the humeral head could   represent avascular necrosis.       CT SHOULDER LEFT W WO CONTRAST [[id number]]      Order Status: Canceled          Discharge Time of 45 minutes    Electronically signed by Landon Bedolla MD on 5/4/2021 at 12:25 PM

## 2021-05-04 NOTE — PLAN OF CARE
Problem: Falls - Risk of:  Goal: Will remain free from falls  Description: Will remain free from falls  5/4/2021 0238 by Porsha Go RN  Outcome: Ongoing  5/3/2021 1345 by Arabella Quintanilla RN  Outcome: Ongoing  Goal: Absence of physical injury  Description: Absence of physical injury  5/4/2021 0238 by Porsha Go RN  Outcome: Ongoing  5/3/2021 1345 by Arabella Quintanilla RN  Outcome: Ongoing

## 2021-05-04 NOTE — PROGRESS NOTES
Hospitalist Progress Note      Name:  Viviana Pat /Age/Sex: 1960  (64 y.o. female)   MRN & CSN:  [id number] & [id number] Admission Date/Time: 2021  9:16 PM   Location:  [address] [place] [address] PCP: Gloria Kramer MD         Hospital Day: 4    Assessment and Plan:     Viviana Pat is a 64 y.o.  female  who presents with dizziness, she was discharged few hours prior to presentation, after being managed for LUZ MARIA with metabolic alkalosis, possible GI bleed.     Dizziness  -Denies any fall, no neurological no focal symptoms noted  -meclizine prn - not using  -therapy evaluated patient  -consider outpatient neuro eval    Hyponatremia - improved    Metabolic alkalosis likely diuretic related, improving  -Metolazone and furosemide on hold  -Urea per nephrology  -IV fluids per nephrology (discontinued)       Chronic Problems    Essential hypertension  COPD, not in exacerbation, Continue Singulair, Albuterol and Symbicort  Chronic hypoxic respiratory failure, on 4 L at baseline  Chronic HFpEF with EF 55-60%, metolazone and Lasix on hold, continue metoprolol  Hypothyroidism, levothyroxine  Chronic blood loss anemia  DALTON on CPAP  Bipolar disorder, depression and anxiety  Obesity class 1 with BMI 33.0, encourage lifestyle changes, weight loss        History of Present Illness:      - states she has a tube in her left ear      Prior note:    Patient notes history of ENT problems, has had vertigo before  Objective: Intake/Output Summary (Last 24 hours) at 2021 0741  Last data filed at 2021 0700  Gross per 24 hour   Intake --   Output 500 ml   Net -500 ml      Vitals:   Vitals:    21 0200   BP: 122/79   Pulse:    Resp:    Temp: 96.2 °F (35.7 °C)   SpO2: 95%     Physical Exam:       Physical Exam  Pulmonary:      Effort: Pulmonary effort is normal.   Skin:     Coloration: Skin is not jaundiced. Neurological:      Mental Status: She is alert.              Medications:   Medications:    urea 15 g Oral Daily    sodium chloride flush  5-40 mL Intravenous 2 times per day    enoxaparin  40 mg Subcutaneous Daily    aspirin  81 mg Oral Daily    atorvastatin  40 mg Oral Daily    budesonide-formoterol  2 puff Inhalation BID    busPIRone  10 mg Oral BID    dilTIAZem  120 mg Oral Daily    DULoxetine  60 mg Oral Daily    ferrous sulfate  325 mg Oral BID    fluticasone  2 spray Nasal Daily    folic acid  1 mg Oral Daily    guaiFENesin  600 mg Oral BID    lactobacillus  1 capsule Oral Daily with breakfast    levothyroxine  100 mcg Oral Daily    metoprolol succinate  25 mg Oral Daily    montelukast  10 mg Oral Daily    pantoprazole  40 mg Oral BID AC    sucralfate  1 g Oral 2 times per day    theophylline  400 mg Oral Daily    thiamine  100 mg Oral Daily    albuterol sulfate HFA  2 puff Inhalation Q4H    ipratropium  2 puff Inhalation Q4H    potassium chloride  40 mEq Oral TID WC      Infusions:    sodium chloride       PRN Meds: sodium chloride flush, 5-40 mL, PRN  sodium chloride, 25 mL, PRN  promethazine, 12.5 mg, Q6H PRN    Or  ondansetron, 4 mg, Q6H PRN  polyethylene glycol, 17 g, Daily PRN  acetaminophen, 650 mg, Q6H PRN    Or  acetaminophen, 650 mg, Q6H PRN  clonazePAM, 1 mg, TID PRN  traZODone, 100 mg, Nightly PRN  ipratropium-albuterol, 1 vial, Q4H PRN  meclizine, 12.5 mg, TID PRN  dicyclomine, 10 mg, TID PRN  HYDROcodone 5 mg - acetaminophen, 1 tablet, Q6H PRN        Data    Recent Labs     05/01/21 2140   WBC 8.3   HGB 9.8*   HCT 31.3*         Recent Labs     05/01/21 2140 05/02/21  0411 05/03/21  0249   * 133* 134*   K 3.6 3.2* 4.3   CL 81* 86* 92*   CO2 42* 42* 39*   PHOS  --   --  3.5   BUN 12 11 32*   CREATININE 0.8 0.8 0.9     Recent Labs     05/01/21 2140 05/03/21  0249   AST 22 20   ALT 13 17   BILITOT 0.1 0.2   ALKPHOS 85 91     No results for input(s): INR in the last 72 hours.   Recent Labs     05/03/21  0249   CKTOTAL 126           Electronically signed by Meryle Pries, MD on 5/4/2021 at 7:41 AM

## 2021-05-05 ENCOUNTER — CARE COORDINATION (OUTPATIENT)
Dept: CARE COORDINATION | Age: 61
End: 2021-05-05

## 2021-05-05 NOTE — CARE COORDINATION
Ambulatory Care Coordination Note  CM Risk Score: 2  Charlson 10 Year Mortality Risk Score: 79%     ACC: Clifford Rowell RN    Summary Note: Call to pt for acm f/u. Denies symptoms/concerns at this time. Discussed home care referral and is declining as reports she \"Has daughter to help. I dont really want them here\", explained benefits of home care. Confirmed she Has stopped potassium and is  taking prednisone. Has f/u with pcp Monday, and to schedule f/u with nephrology  In 2 wks. Denies any issues managing meds. Pt Agreeable to home care once acm explained they are to be rechecking her k labs per hosp notes. Advised to weigh daily. Has dr Maria Luisa Dhaliwal and boni phone numbers. Call made to [facility] to check on soc. Reports they Will be calling pt tomorrow or Friday. Call back to pt to inform. Voices understanding, advised to call with any needs. Will continue to follow. Care Coordination Interventions    Program Enrollment: Complex Care  Referral from Primary Care Provider: No  Suggested Interventions and Community Resources  Zone Management Tools: Completed         Goals Addressed                 This Visit's Progress     Conditions and Symptoms   On track     I will notify my provider of any barriers to my plan of care. I will notify my provider of any symptoms that indicate a worsening of my condition. Barriers: none  Plan for overcoming my barriers: support an education from acm  Confidence: 10/10  Anticipated Goal Completion Date: 5/15/21              Prior to Admission medications    Medication Sig Start Date End Date Taking?  Authorizing Provider   busPIRone (BUSPAR) 10 MG tablet Take 1 tablet by mouth 2 times daily 5/4/21   Cristin Mauricio MD   dicyclomine (BENTYL) 10 MG capsule Take 1 capsule by mouth 4 times daily as needed (adominal cramps) 5/4/21   Cristin Mauricio MD   ferrous sulfate (IRON 325) 325 (65 Fe) MG tablet Take 1 tablet by mouth every other day Indications: pt taking every day bid Monday, wed, fri 5/4/21   Arlette Camp MD   predniSONE (DELTASONE) 10 MG tablet Take 1 tablet by mouth daily for 14 days 5/4/21 5/18/21  Arlette Camp MD   furosemide (LASIX) 20 MG tablet Take 1 tablet by mouth daily as needed (sob/leg swelling) Weigh self daily Current weight 174lbs Take pill when greater than 175lbs  As needed 5/4/21   Arlette Camp MD   dilTIAZem (CARDIZEM CD) 120 MG extended release capsule Take 1 capsule by mouth daily 1/18/21   Yanique Reyes MD   metoprolol succinate (TOPROL XL) 25 MG extended release tablet Take 1 tablet by mouth daily 1/18/21   Yanique Reyes MD   clonazePAM (KLONOPIN) 1 MG disintegrating tablet Take 1 mg by mouth 3 times daily as needed. Historical Provider, MD   atorvastatin (LIPITOR) 40 MG tablet Take 40 mg by mouth daily    Historical Provider, MD   ondansetron (ZOFRAN ODT) 4 MG disintegrating tablet Take 1 tablet by mouth every 8 hours as needed for Nausea or Vomiting Place under the tongue and let it melt and absorb from under your tongue.  12/2/20   Trinity Danielle MD   lactobacillus (CULTURELLE) capsule Take 1 capsule by mouth daily (with breakfast) 11/27/20   Damari Alvarez MD   pantoprazole (PROTONIX) 40 MG tablet Take 1 tablet by mouth 2 times daily 11/26/20   Damari Alvarez MD   sucralfate (CARAFATE) 1 GM tablet Take 1 tablet by mouth every 12 hours 11/26/20   Damari Alvarez MD   theophylline (MIAH-24) 200 MG extended release capsule Take 400 mg by mouth daily    Historical Provider, MD   budesonide-formoterol (SYMBICORT) 160-4.5 MCG/ACT AERO USE 2 INHALATIONS TWICE A DAY 10/2/20   Max Castañeda MD   ipratropium-albuterol (DUONEB) 0.5-2.5 (3) MG/3ML SOLN nebulizer solution Inhale 3 mLs into the lungs every 4 hours 7/21/20   Travis Olmedo MD   guaiFENesin (MUCINEX) 600 MG extended release tablet Take 1 tablet by mouth 2 times daily 7/21/20   Rusty Singleton

## 2021-05-07 ENCOUNTER — HOSPITAL ENCOUNTER (OUTPATIENT)
Age: 61
Setting detail: SPECIMEN
Discharge: HOME | End: 2021-05-07
Payer: COMMERCIAL

## 2021-05-07 LAB
ANION GAP SERPL CALCULATED.3IONS-SCNC: 4 MMOL/L (ref 4–16)
BUN BLDV-MCNC: 12 MG/DL (ref 6–23)
CALCIUM SERPL-MCNC: 10.1 MG/DL (ref 8.3–10.6)
CHLORIDE BLD-SCNC: 94 MMOL/L (ref 99–110)
CO2: 39 MMOL/L (ref 21–32)
CREAT SERPL-MCNC: 1 MG/DL (ref 0.6–1.1)
GFR AFRICAN AMERICAN: >60 ML/MIN/1.73M2
GFR NON-AFRICAN AMERICAN: 57 ML/MIN/1.73M2
GLUCOSE BLD-MCNC: 90 MG/DL (ref 70–99)
POTASSIUM SERPL-SCNC: 3.8 MMOL/L (ref 3.5–5.1)
SODIUM BLD-SCNC: 137 MMOL/L (ref 135–145)

## 2021-05-07 PROCEDURE — 80048 BASIC METABOLIC PNL TOTAL CA: CPT

## 2021-05-13 ENCOUNTER — HOSPITAL ENCOUNTER (OUTPATIENT)
Age: 61
Setting detail: SPECIMEN
Discharge: HOME | End: 2021-05-13
Payer: COMMERCIAL

## 2021-05-13 LAB
ANION GAP SERPL CALCULATED.3IONS-SCNC: 6 MMOL/L (ref 4–16)
BUN BLDV-MCNC: 15 MG/DL (ref 6–23)
CALCIUM SERPL-MCNC: 9.7 MG/DL (ref 8.3–10.6)
CHLORIDE BLD-SCNC: 97 MMOL/L (ref 99–110)
CO2: 35 MMOL/L (ref 21–32)
CREAT SERPL-MCNC: 0.9 MG/DL (ref 0.6–1.1)
GFR AFRICAN AMERICAN: >60 ML/MIN/1.73M2
GFR NON-AFRICAN AMERICAN: >60 ML/MIN/1.73M2
GLUCOSE BLD-MCNC: 108 MG/DL (ref 70–99)
POTASSIUM SERPL-SCNC: 4.1 MMOL/L (ref 3.5–5.1)
SODIUM BLD-SCNC: 138 MMOL/L (ref 135–145)

## 2021-05-13 PROCEDURE — 80048 BASIC METABOLIC PNL TOTAL CA: CPT

## 2021-05-19 ENCOUNTER — HOSPITAL ENCOUNTER (OUTPATIENT)
Age: 61
Setting detail: SPECIMEN
Discharge: HOME | End: 2021-05-19
Payer: COMMERCIAL

## 2021-05-19 LAB
ANION GAP SERPL CALCULATED.3IONS-SCNC: 5 MMOL/L (ref 4–16)
BUN BLDV-MCNC: 19 MG/DL (ref 6–23)
CALCIUM SERPL-MCNC: 9.7 MG/DL (ref 8.3–10.6)
CHLORIDE BLD-SCNC: 97 MMOL/L (ref 99–110)
CO2: 35 MMOL/L (ref 21–32)
CREAT SERPL-MCNC: 1 MG/DL (ref 0.6–1.1)
GFR AFRICAN AMERICAN: >60 ML/MIN/1.73M2
GFR NON-AFRICAN AMERICAN: 57 ML/MIN/1.73M2
GLUCOSE BLD-MCNC: 129 MG/DL (ref 70–99)
POTASSIUM SERPL-SCNC: 4.2 MMOL/L (ref 3.5–5.1)
SODIUM BLD-SCNC: 137 MMOL/L (ref 135–145)

## 2021-05-19 PROCEDURE — 80048 BASIC METABOLIC PNL TOTAL CA: CPT

## 2021-05-20 PROBLEM — N18.31 STAGE 3A CHRONIC KIDNEY DISEASE (HCC): Status: ACTIVE | Noted: 2021-05-20

## 2021-05-20 PROBLEM — E27.40 ADRENAL INSUFFICIENCY (HCC): Status: ACTIVE | Noted: 2021-05-20

## 2021-06-02 ENCOUNTER — OFFICE VISIT (OUTPATIENT)
Dept: ORTHOPEDIC SURGERY | Age: 61
End: 2021-06-02
Payer: COMMERCIAL

## 2021-06-02 VITALS
HEIGHT: 61 IN | WEIGHT: 181 LBS | OXYGEN SATURATION: 97 % | RESPIRATION RATE: 16 BRPM | BODY MASS INDEX: 34.17 KG/M2 | HEART RATE: 94 BPM

## 2021-06-02 DIAGNOSIS — M19.012 PRIMARY OSTEOARTHRITIS OF LEFT SHOULDER: ICD-10-CM

## 2021-06-02 DIAGNOSIS — M89.9 RADIODENSE BONE LESION PRESENT ON X-RAY: Primary | ICD-10-CM

## 2021-06-02 PROCEDURE — G8427 DOCREV CUR MEDS BY ELIG CLIN: HCPCS | Performed by: PHYSICIAN ASSISTANT

## 2021-06-02 PROCEDURE — 99242 OFF/OP CONSLTJ NEW/EST SF 20: CPT | Performed by: PHYSICIAN ASSISTANT

## 2021-06-02 PROCEDURE — 20610 DRAIN/INJ JOINT/BURSA W/O US: CPT | Performed by: PHYSICIAN ASSISTANT

## 2021-06-02 PROCEDURE — G8417 CALC BMI ABV UP PARAM F/U: HCPCS | Performed by: PHYSICIAN ASSISTANT

## 2021-06-02 RX ORDER — HYDROCODONE BITARTRATE AND ACETAMINOPHEN 7.5; 325 MG/1; MG/1
1 TABLET ORAL 2 TIMES DAILY PRN
Qty: 10 TABLET | Refills: 0 | Status: SHIPPED | OUTPATIENT
Start: 2021-06-02 | End: 2021-06-07

## 2021-06-02 NOTE — PROGRESS NOTES
Review of Systems   Constitutional: Negative. HENT: Negative. Eyes: Negative. Respiratory: Negative. Cardiovascular: Negative. Gastrointestinal: Negative. Genitourinary: Negative. Musculoskeletal: Positive for arthralgias and myalgias. Skin: Negative. Neurological: Negative. Psychiatric/Behavioral: Negative. Melinda Soriano is a 64 y.o. female that presents to the office today to have her left knee and left shoulder evaluated. She has no known injuries. She states that the knee hurts all over and it is difficult to bear weight. States that her shoulder is stiff and hurts when she tries to move it.     Patient was referred by  Steven Waterman MD  For left shoulder pain and left knee pain    Past Medical History:   Diagnosis Date    Anemia     Anxiety 02/16/2017    follows with Dr Javier Russ    Arthritis     Back pain at L4-L5 level 2/16/2017    Dr Baljinder Wooten Bipolar 1 disorder ([facility] [address]     per pt on 2/5/2021\"never told I have bipolar\"    COPD (chronic obstructive pulmonary disease) ([facility] [address]     follows with Dr Gerber Reed Emphysema of lung ([facility] [address]     FH: CAD (coronary artery disease) 2/16/2017    Father had in his forties, sister in her thirties    Wooten Fibromyalgia 02/16/2017    Full dentures     full upper plate and partial on the bottom    Gastric ulcer     \"had stomach ulder back fall 2019\"    H/O Doppler ultrasound 04/05/2019    venous- no DVT or reflux    H/O echocardiogram 01/14/2015    EF50-55% Normal- see media    History of blood transfusion     Hyperlipidemia LDL goal <100     Hypertension     Dr Rubi Flor Irregular heartbeat     \"they said I have irreg heart beat before- back when they drained fluid around my heart \"    Obstructive sleep apnea     \"have bipap I use at home\"    On home oxygen therapy     \"on oxygen all the time at home and keep it on 2.5 liters\"    Osteoarthritis     Pericardial effusion     per old chart had pericardial effusion 10/2020    Spinal stenosis     hx per old chart    Thyroid disease     Wears glasses     \"suppose to wear glasses\"       Past Surgical History:   Procedure Laterality Date    BACK SURGERY  2017    \"surgery on low back L5-6- not sure if they put any metal in \"   Joaquín Armas S      10/2019    CARDIAC CATHETERIZATION      per old chart had cath done 2020    CARPAL TUNNEL RELEASE Right 1985    CHOLECYSTECTOMY, LAPAROSCOPIC N/A 10/25/2020    CHOLECYSTECTOMY LAPAROSCOPIC WITH IOC performed by Romario Krishna MD at [place] [address] COLONOSCOPY N/A 2019    COLONOSCOPY DIAGNOSTIC performed by Ben Santiago MD at [address] [place] [address], DIAGNOSTIC      per old chart had egd done 2018    TUBAL LIGATION  1987    UPPER GASTROINTESTINAL ENDOSCOPY N/A 2021    EGD BIOPSY [facility] MD at [facility] ENDOSCOPY       Family History   Problem Relation Age of Onset    Asthma Mother         COPD    Heart Disease Father        Social History     Socioeconomic History    Marital status:      Spouse name: None    Number of children: None    Years of education: None    Highest education level: None   Occupational History    None   Tobacco Use    Smoking status: Former Smoker     Packs/day: 1.50     Years: 20.00     Pack years: 30.00     Types: Cigarettes     Quit date: 2008     Years since quittin.4    Smokeless tobacco: Never Used   Vaping Use    Vaping Use: Never used   Substance and Sexual Activity    Alcohol use: Not Currently     Alcohol/week: 2.0 standard drinks     Types: 2 Shots of liquor per week     Comment: per pt on 2021\"quit drinking back Oct 2020\"use to drink wine coolers - 3 times per week \"/caffeine 2-3 coffees aday 1 pop     Drug use: No    Sexual activity: Yes     Partners: Male   Other Topics Concern    None   Social History Narrative    None     Social Determinants of Health     Financial Resource Strain: Low Risk     Difficulty mg by mouth 2 times daily Indications: pt taking every day bid ) 30 tablet 3    furosemide (LASIX) 20 MG tablet Take 1 tablet by mouth daily as needed (sob/leg swelling) Weigh self daily Current weight 174lbs Take pill when greater than 175lbs  As needed 30 tablet 0    dilTIAZem (CARDIZEM CD) 120 MG extended release capsule Take 1 capsule by mouth daily 90 capsule 3    metoprolol succinate (TOPROL XL) 25 MG extended release tablet Take 1 tablet by mouth daily 30 tablet 3    clonazePAM (KLONOPIN) 1 MG disintegrating tablet Take 1 mg by mouth 3 times daily as needed.  ondansetron (ZOFRAN ODT) 4 MG disintegrating tablet Take 1 tablet by mouth every 8 hours as needed for Nausea or Vomiting Place under the tongue and let it melt and absorb from under your tongue.  30 tablet 3    lactobacillus (CULTURELLE) capsule Take 1 capsule by mouth daily (with breakfast) 30 capsule 0    pantoprazole (PROTONIX) 40 MG tablet Take 1 tablet by mouth 2 times daily (Patient taking differently: Take 40 mg by mouth daily ) 60 tablet 3    sucralfate (CARAFATE) 1 GM tablet Take 1 tablet by mouth every 12 hours 120 tablet 3    theophylline (MIAH-24) 200 MG extended release capsule Take 400 mg by mouth daily      budesonide-formoterol (SYMBICORT) 160-4.5 MCG/ACT AERO USE 2 INHALATIONS TWICE A DAY 30.6 g 3    ipratropium-albuterol (DUONEB) 0.5-2.5 (3) MG/3ML SOLN nebulizer solution Inhale 3 mLs into the lungs every 4 hours 1080 mL 3    guaiFENesin (MUCINEX) 600 MG extended release tablet Take 1 tablet by mouth 2 times daily 30 tablet 0    montelukast (SINGULAIR) 10 MG tablet Take 1 tablet by mouth daily 30 tablet 3    levothyroxine (SYNTHROID) 100 MCG tablet Take 1 tablet by mouth Daily 30 tablet 2    folic acid (FOLVITE) 1 MG tablet Take 1 tablet by mouth daily 30 tablet 3    vitamin B-1 100 MG tablet Take 1 tablet by mouth daily 30 tablet 3    albuterol-ipratropium (COMBIVENT RESPIMAT)  MCG/ACT AERS inhaler Inhale 1 puff into the lungs every 4 hours as needed for Wheezing 3 Inhaler 0    DULoxetine (CYMBALTA) 60 MG extended release capsule Take 60 mg by mouth daily      traZODone (DESYREL) 50 MG tablet Take 100 mg by mouth nightly       aspirin 81 MG chewable tablet Take 81 mg by mouth daily      fluticasone (FLONASE) 50 MCG/ACT nasal spray 2 sprays by Nasal route daily 1 Bottle 0     No current facility-administered medications for this visit. Allergies   Allergen Reactions    Lisinopril Swelling and Rash     angioedema       Review of Systems:  See above      Physical Exam:   Pulse 94   Resp 16   Ht 5' 1\" (1.549 m)   Wt 181 lb (82.1 kg)   LMP 01/05/2010   SpO2 97%   BMI 34.20 kg/m²        Gait is Antalgic. Gen/Psych:Examination reveals a pleasant individual in no acute distress. The patient is oriented to time, place and person. The patient's mood and affect are appropriate.     Lymph: The lymphatic examination bilaterally reveals all areas to be without enlargement or induration.      Skin intact without lymphadenopathy, discoloration, or abnormal temperature.      Vascular: There is intact, symmetric circulation in both lower extremities. left leg/knee exam:  Leg alignment:     neutral  Quadriceps/hamstring atrophy:   no  Knee effusion:    mild   Knee erythema:   no  ROM:     0-120 degrees  Lachman:    no  Posterior Drawer:   no  Varus laxity at 0 and 30 deg's: no  Valguslaxity at 0 and 30 deg's: no  Tenderness at:   Medial joint line, Lateral joint line and mild global pain.     leftKnee strength is 5/5 flexion and extension  There is + L2-S1 motor and sensory function bilateral lower extremities    Left shoulder exam:  Skin:  Clear with no erythema, there is no significant joint effusion  Deformity:  none  Atrophy:  none  Tenderness:  globally, mild globally  Active ROM:   FE:160    IR side: L4           ER side: 30    Passive ROM is the same as active  Strength: FE:5/5     ER:5/5 IR:5/5      Elbow flexion: 5/5  Neer:  mildly positive       Outsiderecord review: CT scan of shoulder    Imaging studies:  4 views the left knee taken and reviewed in the office today show  2. Irregular sclerosis in the lateral femoral condyle with subchondral cysts   and depression of the lateral trochlea.  This may represent a chronic   osteochondral lesion.  Alternatively, a vascular necrosis is a possibly. Consider further evaluation with MRI. 3. Mild medial and lateral compartment degenerative changes.                   Impression:     Diagnosis Orders   1. Radiodense bone lesion present on x-ray  MRI KNEE LEFT WO CONTRAST    HYDROcodone-acetaminophen (NORCO) 7.5-325 MG per tablet   2. Primary osteoarthritis of left shoulder  SD ARTHROCENTESIS ASPIR&/INJ MAJOR JT/BURSA W/O US           Plan:    Patient Instructions   Continue to weight bear as tolerated  Continue range of motion  Ice and elevate as needed  Tylenol or Motrin for pain  Injection given today in the office   Rest the right shoulder for 24-48 hours  MRI Ordered for the left knee  Follow up in 2-3 weeks    Central Scheduling # [phone]      Left [facility]) Joint Aspiration / Injection Procedure:  Multiple treatment options were discussed. This injection was recommended as a part of the overall treatment plan. Details of the procedure, potential risks, and potential benefits were discussed. Patient's questions were answered. Patient elected to proceed with procedure. Medication: 3 mL's of 1% plain lidocaine, 2 mL (40 mg/mL) Kenalog,  Procedure:  Sterile technique was used as the skin over the injection site was prepped with alcohol. The left glenohumeral joint was then injected with the above listed medication. A sterile bandage was placed over the injection site. The patient tolerated the procedure well without complication.

## 2021-06-02 NOTE — PATIENT INSTRUCTIONS
Continue to weight bear as tolerated  Continue range of motion  Ice and elevate as needed  Tylenol or Motrin for pain  Injection given today in the office   Rest the right shoulder for 24-48 hours  MRI Ordered for the left knee  Follow up in 2-3 weeks    Central Scheduling # [phone]

## 2021-06-08 ENCOUNTER — CARE COORDINATION (OUTPATIENT)
Dept: CARE COORDINATION | Age: 61
End: 2021-06-08

## 2021-06-08 ASSESSMENT — ENCOUNTER SYMPTOMS: DYSPNEA ASSOCIATED WITH: EXERTION

## 2021-06-08 NOTE — CARE COORDINATION
Ambulatory Care Coordination Note  CM Risk Score: 3  Charlson 10 Year Mortality Risk Score: 79%     ACC: Jarad Valente, ELIZABETH    Summary Note: Call to pt for acm f/u. Pt reports she Declined home care nurse visit yesterday. They are coming back out Thursday. Pt reports she is not having any problems, and is using home o2. Checking weight twice a week. Weight is 182 pounds and staying stable. Reviewed info from ortho and nephrology. Reports she has had Headache started Sunday. Feels like Stress in her neck and headache, taking medicine for HA and goes to see pcp on the 15th. Reports HA starting to improve. Agreeable to call pcp if persists/worsens. Denies further needs/barriers at this time. Plan: continue to assess/educate and will plan to graduate pt over next 2 encounters. Care Coordination Interventions    Program Enrollment: Complex Care  Referral from Primary Care Provider: No  Suggested Interventions and [address]: Completed  Zone Management Tools: Completed         Goals Addressed                 This Visit's Progress     Conditions and Symptoms   On track     I will notify my provider of any barriers to my plan of care. I will notify my provider of any symptoms that indicate a worsening of my condition. Barriers: none  Plan for overcoming my barriers: support an education from acm  Confidence: 10/10  Anticipated Goal Completion Date: 7/15/21              Prior to Admission medications    Medication Sig Start Date End Date Taking?  Authorizing Provider   acetaminophen (TYLENOL 8 HOUR ARTHRITIS PAIN) 650 MG extended release tablet Take 650 mg by mouth as needed for Pain    Historical Provider, MD   vitamin D (CHOLECALCIFEROL) 25 MCG (1000 UT) TABS tablet Take 1,000 Units by mouth daily    Historical Provider, MD   busPIRone (BUSPAR) 10 MG tablet Take 1 tablet by mouth 2 times daily 5/4/21   Deleta MD Abe   dicyclomine (BENTYL) 10 MG capsule Take 1 capsule by mouth 4 times daily as needed (adominal cramps)  Patient taking differently: Take 10 mg by mouth 4 times daily  5/4/21   Berkley Peacock MD   ferrous sulfate (IRON 325) 325 (65 Fe) MG tablet Take 1 tablet by mouth every other day Indications: pt taking every day bid Monday, wed, fri  Patient taking differently: Take 325 mg by mouth 2 times daily Indications: pt taking every day bid  5/4/21   Berkley Peacock MD   furosemide (LASIX) 20 MG tablet Take 1 tablet by mouth daily as needed (sob/leg swelling) Weigh self daily Current weight 174lbs Take pill when greater than 175lbs  As needed 5/4/21   Berkley Peacock MD   dilTIAZem (CARDIZEM CD) 120 MG extended release capsule Take 1 capsule by mouth daily 1/18/21   Deangelo Fitch MD   metoprolol succinate (TOPROL XL) 25 MG extended release tablet Take 1 tablet by mouth daily 1/18/21   Deangelo Fitch MD   clonazePAM (KLONOPIN) 1 MG disintegrating tablet Take 1 mg by mouth 3 times daily as needed. Historical Provider, MD   ondansetron (ZOFRAN ODT) 4 MG disintegrating tablet Take 1 tablet by mouth every 8 hours as needed for Nausea or Vomiting Place under the tongue and let it melt and absorb from under your tongue.  12/2/20   Geronimo Stevenson MD   lactobacillus (CULTURELLE) capsule Take 1 capsule by mouth daily (with breakfast) 11/27/20   Denis Luna MD   pantoprazole (PROTONIX) 40 MG tablet Take 1 tablet by mouth 2 times daily  Patient taking differently: Take 40 mg by mouth daily  11/26/20   Denis Luna MD   sucralfate (CARAFATE) 1 GM tablet Take 1 tablet by mouth every 12 hours 11/26/20   Denis Luna MD   theophylline (MIAH-24) 200 MG extended release capsule Take 400 mg by mouth daily    Historical Provider, MD   budesonide-formoterol (SYMBICORT) 160-4.5 MCG/ACT AERO USE 2 INHALATIONS TWICE A DAY 10/2/20   Max Trammell MD   ipratropium-albuterol (DUONEB) 0.5-2.5 (3) MG/3ML SOLN nebulizer solution Inhale 3 mLs into the lungs every 4 hours 7/21/20   Alessio Pelaez MD   guaiFENesin (MUCINEX) 600 MG extended release tablet Take 1 tablet by mouth 2 times daily 7/21/20   Alessio Pelaez MD   montelukast (SINGULAIR) 10 MG tablet Take 1 tablet by mouth daily 7/21/20   Alessio Pelaez MD   levothyroxine (SYNTHROID) 100 MCG tablet Take 1 tablet by mouth Daily 7/21/20   Alessio Pelaez MD   folic acid (FOLVITE) 1 MG tablet Take 1 tablet by mouth daily 7/16/20   Viola Diaz MD   vitamin B-1 100 MG tablet Take 1 tablet by mouth daily 7/16/20   Viola Diaz MD   albuterol-ipratropium (COMBIVENT RESPIMAT)  MCG/ACT AERS inhaler Inhale 1 puff into the lungs every 4 hours as needed for Wheezing 7/1/20   Alessio Pelaez MD   DULoxetine (CYMBALTA) 60 MG extended release capsule Take 60 mg by mouth daily    Historical Provider, MD   traZODone (DESYREL) 50 MG tablet Take 100 mg by mouth nightly     Historical Provider, MD   aspirin 81 MG chewable tablet Take 81 mg by mouth daily    Historical Provider, MD   fluticasone [facility]) 50 MCG/ACT nasal spray 2 sprays by Nasal route daily 3/28/18   Jose Kunz MD       Future Appointments   Date Time Provider Wiliam Zuñiga   6/15/2021  7:00 AM [facility] MRI ROOM 1 [facility]   6/16/2021  2:30 PM Tish Wallis PA-C [facility]   6/25/2021  4:00 PM Jatin Howell, APRN - CNP [facility]   7/14/2021  2:45 PM [facility]   8/12/2021  4:00 PM MD LESLIE [facility]ksdalen [facility]   8/30/2021  3:45 PM Valdez Perdomo MD [id number]     ,   Congestive Heart Failure Assessment    Are you currently restricting fluids?: No Restriction  Do you understand a low sodium diet?: Yes  Do you salt your food before tasting it?: No     No patient-reported symptoms      Symptoms:  None: Yes      Symptom course: stable  Patient-reported weight (lb): 182  Weight trend: stable  Salt intake watch compared to last visit: stable      and   COPD Assessment    Does the patient understand envrionmental exposure?: Yes  Is the patient able to verbalize Rescue vs. Long Acting medications?: Yes  Does the patient have a nebulizer?: Yes  Does the patient use a space with inhaled medications?: No     No patient-reported symptoms         Symptoms:  None: Yes      Symptom course: stable  Breathlessness: exertion  Increase use of rapid acting/rescue inhaled medications?: No  Change in chronic cough?: No/At Baseline  Change in sputum?: No/At Baseline  Self Monitoring - SaO2: No  Have you had a recent diagnosis of pneumonia either by PCP or at a hospital?: No

## 2021-06-12 ENCOUNTER — APPOINTMENT (OUTPATIENT)
Dept: CT IMAGING | Age: 61
End: 2021-06-12
Payer: COMMERCIAL

## 2021-06-12 ENCOUNTER — HOSPITAL ENCOUNTER (EMERGENCY)
Age: 61
Discharge: ANOTHER HOSPITAL | End: 2021-06-12
Attending: EMERGENCY MEDICINE
Payer: COMMERCIAL

## 2021-06-12 VITALS
SYSTOLIC BLOOD PRESSURE: 152 MMHG | OXYGEN SATURATION: 100 % | WEIGHT: 177 LBS | HEIGHT: 61 IN | RESPIRATION RATE: 16 BRPM | TEMPERATURE: 98.3 F | DIASTOLIC BLOOD PRESSURE: 64 MMHG | HEART RATE: 80 BPM | BODY MASS INDEX: 33.42 KG/M2

## 2021-06-12 DIAGNOSIS — R51.9 NONINTRACTABLE HEADACHE, UNSPECIFIED CHRONICITY PATTERN, UNSPECIFIED HEADACHE TYPE: ICD-10-CM

## 2021-06-12 DIAGNOSIS — I61.0 BASAL GANGLIA HEMORRHAGE (HCC): Primary | ICD-10-CM

## 2021-06-12 LAB
ALBUMIN SERPL-MCNC: 4.3 GM/DL (ref 3.4–5)
ALP BLD-CCNC: 74 IU/L (ref 40–129)
ALT SERPL-CCNC: 15 U/L (ref 10–40)
ANION GAP SERPL CALCULATED.3IONS-SCNC: 9 MMOL/L (ref 4–16)
AST SERPL-CCNC: 13 IU/L (ref 15–37)
BASOPHILS ABSOLUTE: 0.1 K/CU MM
BASOPHILS RELATIVE PERCENT: 0.5 % (ref 0–1)
BILIRUB SERPL-MCNC: 0.2 MG/DL (ref 0–1)
BUN BLDV-MCNC: 13 MG/DL (ref 6–23)
CALCIUM SERPL-MCNC: 10.2 MG/DL (ref 8.3–10.6)
CHLORIDE BLD-SCNC: 93 MMOL/L (ref 99–110)
CO2: 32 MMOL/L (ref 21–32)
CREAT SERPL-MCNC: 0.9 MG/DL (ref 0.6–1.1)
DIFFERENTIAL TYPE: ABNORMAL
EOSINOPHILS ABSOLUTE: 0.1 K/CU MM
EOSINOPHILS RELATIVE PERCENT: 1.1 % (ref 0–3)
GFR AFRICAN AMERICAN: >60 ML/MIN/1.73M2
GFR NON-AFRICAN AMERICAN: >60 ML/MIN/1.73M2
GLUCOSE BLD-MCNC: 107 MG/DL (ref 70–99)
HCT VFR BLD CALC: 38.1 % (ref 37–47)
HEMOGLOBIN: 12.2 GM/DL (ref 12.5–16)
IMMATURE NEUTROPHIL %: 0.3 % (ref 0–0.43)
INR BLD: 0.93 INDEX
LYMPHOCYTES ABSOLUTE: 1.4 K/CU MM
LYMPHOCYTES RELATIVE PERCENT: 11.1 % (ref 24–44)
MCH RBC QN AUTO: 29.9 PG (ref 27–31)
MCHC RBC AUTO-ENTMCNC: 32 % (ref 32–36)
MCV RBC AUTO: 93.4 FL (ref 78–100)
MONOCYTES ABSOLUTE: 1.1 K/CU MM
MONOCYTES RELATIVE PERCENT: 8.4 % (ref 0–4)
NUCLEATED RBC %: 0 %
PDW BLD-RTO: 13.1 % (ref 11.7–14.9)
PLATELET # BLD: 252 K/CU MM (ref 140–440)
PMV BLD AUTO: 9.6 FL (ref 7.5–11.1)
POTASSIUM SERPL-SCNC: 3.2 MMOL/L (ref 3.5–5.1)
PROTHROMBIN TIME: 11.3 SECONDS (ref 11.7–14.5)
RBC # BLD: 4.08 M/CU MM (ref 4.2–5.4)
SEGMENTED NEUTROPHILS ABSOLUTE COUNT: 9.9 K/CU MM
SEGMENTED NEUTROPHILS RELATIVE PERCENT: 78.6 % (ref 36–66)
SODIUM BLD-SCNC: 134 MMOL/L (ref 135–145)
TOTAL IMMATURE NEUTOROPHIL: 0.04 K/CU MM
TOTAL NUCLEATED RBC: 0 K/CU MM
TOTAL PROTEIN: 6.4 GM/DL (ref 6.4–8.2)
WBC # BLD: 12.5 K/CU MM (ref 4–10.5)

## 2021-06-12 PROCEDURE — 96374 THER/PROPH/DIAG INJ IV PUSH: CPT

## 2021-06-12 PROCEDURE — 70450 CT HEAD/BRAIN W/O DYE: CPT

## 2021-06-12 PROCEDURE — 99285 EMERGENCY DEPT VISIT HI MDM: CPT

## 2021-06-12 PROCEDURE — 85025 COMPLETE CBC W/AUTO DIFF WBC: CPT

## 2021-06-12 PROCEDURE — 6360000002 HC RX W HCPCS: Performed by: EMERGENCY MEDICINE

## 2021-06-12 PROCEDURE — 93005 ELECTROCARDIOGRAM TRACING: CPT | Performed by: EMERGENCY MEDICINE

## 2021-06-12 PROCEDURE — 85610 PROTHROMBIN TIME: CPT

## 2021-06-12 PROCEDURE — 36415 COLL VENOUS BLD VENIPUNCTURE: CPT

## 2021-06-12 PROCEDURE — 96375 TX/PRO/DX INJ NEW DRUG ADDON: CPT

## 2021-06-12 PROCEDURE — 80053 COMPREHEN METABOLIC PANEL: CPT

## 2021-06-12 RX ORDER — KETOROLAC TROMETHAMINE 30 MG/ML
30 INJECTION, SOLUTION INTRAMUSCULAR; INTRAVENOUS ONCE
Status: DISCONTINUED | OUTPATIENT
Start: 2021-06-12 | End: 2021-06-12

## 2021-06-12 RX ORDER — PROCHLORPERAZINE EDISYLATE 5 MG/ML
5 INJECTION INTRAMUSCULAR; INTRAVENOUS ONCE
Status: COMPLETED | OUTPATIENT
Start: 2021-06-12 | End: 2021-06-12

## 2021-06-12 RX ORDER — DIPHENHYDRAMINE HYDROCHLORIDE 50 MG/ML
12.5 INJECTION INTRAMUSCULAR; INTRAVENOUS ONCE
Status: COMPLETED | OUTPATIENT
Start: 2021-06-12 | End: 2021-06-12

## 2021-06-12 RX ADMIN — DIPHENHYDRAMINE HYDROCHLORIDE 12.5 MG: 50 INJECTION INTRAMUSCULAR; INTRAVENOUS at 21:12

## 2021-06-12 RX ADMIN — PROCHLORPERAZINE EDISYLATE 5 MG: 5 INJECTION INTRAMUSCULAR; INTRAVENOUS at 21:10

## 2021-06-12 NOTE — ED PROVIDER NOTES
CHIEF COMPLAINT  Chief Complaint   Patient presents with    Headache    Hypertension       HPI  Raymond Pedro is a 64 y.o. female with history of anemia, anxiety, COPD on 3 L baseline oxygen, spinal stenosis who presents with headache and eye pressure that is been intermittent, waxing and waning over the past week. She denies any falls or trauma to the head. She denies any fevers, chills, neck stiffness. Her pain is aching, throbbing and pressure-like. She denies any history of similar headaches to this in the past.  She takes a baby aspirin, denies any other blood thinners.       REVIEW OF SYSTEMS  Review of Systems   History obtained from chart review and the patient  General ROS: negative for - chills or fever  Ophthalmic ROS: negative for - decreased vision or double vision  ENT ROS: positive for - headaches  Hematological and Lymphatic ROS: negative for - bleeding problems or bruising  Endocrine ROS: negative for - unexpected weight changes  Respiratory ROS: no cough, shortness of breath, or wheezing  Cardiovascular ROS: no chest pain or dyspnea on exertion  Gastrointestinal ROS: no abdominal pain, change in bowel habits, or black or bloody stools  Genito-Urinary ROS: no dysuria, trouble voiding, or hematuria  Musculoskeletal ROS: negative for - joint pain or joint stiffness  Neurological ROS: positive for - headaches  negative for - behavioral changes, confusion, dizziness or weakness      PAST MEDICAL HISTORY  Past Medical History:   Diagnosis Date    Anemia     Anxiety 02/16/2017    follows with Dr Richard Lynch    Arthritis     Back pain at L4-L5 level 2/16/2017    Dr Lauryn Pinedo   [facility] Bipolar 1 disorder ([facility] [address]     per pt on 2/5/2021\"never told I have bipolar\"    COPD (chronic obstructive pulmonary disease) ([facility] [address]     follows with Dr Kelly Hartley Emphysema of lung ([facility] [address]     FH: CAD (coronary artery disease) 2/16/2017    Father had in his forties, sister in her thirties     Fibromyalgia 02/16/2017    Full dentures     full upper plate and partial on the bottom    Gastric ulcer     \"had stomach ulder back 2019\"    H/O Doppler ultrasound 2019    venous- no DVT or reflux    H/O echocardiogram 2015    EF50-55% Normal- see media    History of blood transfusion     Hyperlipidemia LDL goal <100     Hypertension     Dr Marlon Coley Irregular heartbeat     \"they said I have irreg heart beat before- back when they drained fluid around my heart \"    Obstructive sleep apnea     \"have bipap I use at home\"    On home oxygen therapy     \"on oxygen all the time at home and keep it on 2.5 liters\"    Osteoarthritis     Pericardial effusion     per old chart had pericardial effusion 10/2020    Spinal stenosis     hx per old chart    Thyroid disease     Wears glasses     \"suppose to wear glasses\"       FAMILY HISTORY  Family History   Problem Relation Age of Onset    Asthma Mother         COPD    Heart Disease Father        SOCIAL HISTORY  Social History     Socioeconomic History    Marital status:      Spouse name: None    Number of children: None    Years of education: None    Highest education level: None   Occupational History    None   Tobacco Use    Smoking status: Former Smoker     Packs/day: 1.50     Years: 20.00     Pack years: 30.00     Types: Cigarettes     Quit date: 2008     Years since quittin.4    Smokeless tobacco: Never Used   Vaping Use    Vaping Use: Never used   Substance and Sexual Activity    Alcohol use: Not Currently     Alcohol/week: 2.0 standard drinks     Types: 2 Shots of liquor per week     Comment: per pt on 2021\"quit drinking back Oct 2020\"use to drink wine coolers - 3 times per week \"/caffeine 2-3 coffees aday 1 pop     Drug use: No    Sexual activity: Yes     Partners: Male   Other Topics Concern    None   Social History Narrative    None     Social Determinants of Health     Financial Resource Strain: Low Risk     Difficulty of Paying Living Expenses: Not hard at all   Food Insecurity: No Food Insecurity    Worried About [facility] in the Last Year: Never true    Ran Out of Food in the Last Year: Never true   Transportation Needs: No Transportation Needs    Lack of Transportation (Medical): No    Lack of Transportation (Non-Medical): No   Physical Activity: Inactive    Days of Exercise per Week: 0 days    Minutes of Exercise per Session: 0 min   Stress: No Stress Concern Present    Feeling of Stress : Only a little   Social Connections: Socially Isolated    Frequency of Communication with Friends and Family: Once a week    Frequency of Social Gatherings with Friends and Family: Once a week    Attends [employer] Services: Never    Active Member of Clubs or Organizations: No    Attends Club or Organization Meetings: Never    Marital Status:    Intimate Partner Violence:     Fear of Current or Ex-Partner:     Emotionally Abused:     Physically Abused:     Sexually Abused:        SURGICAL HISTORY  Past Surgical History:   Procedure Laterality Date    BACK SURGERY  03/2017    \"surgery on low back L5-6- not sure if they put any metal in \"   [address]      10/2019    CARDIAC CATHETERIZATION      per old chart had cath done 11/2020    CARPAL TUNNEL RELEASE Right 1985    CHOLECYSTECTOMY, LAPAROSCOPIC N/A 10/25/2020    CHOLECYSTECTOMY LAPAROSCOPIC WITH IOC performed by Thong Warner MD at [facility] COLONOSCOPY N/A 12/12/2019    COLONOSCOPY DIAGNOSTIC performed by Vincent Price MD at [address] [place] [address], COLON, DIAGNOSTIC      per old chart had egd done 1/2018    TUBAL LIGATION  1987    UPPER GASTROINTESTINAL ENDOSCOPY N/A 1/7/2021    EGD BIOPSY performed by Vincent Price MD at [address] [place] [address]  No current facility-administered medications on file prior to encounter.      Current Outpatient Medications on File Prior to Encounter   Medication Sig Dispense Refill    acetaminophen (TYLENOL 8 HOUR ARTHRITIS PAIN) 650 MG extended release tablet Take 650 mg by mouth as needed for Pain      vitamin D (CHOLECALCIFEROL) 25 MCG (1000 UT) TABS tablet Take 1,000 Units by mouth daily      busPIRone (BUSPAR) 10 MG tablet Take 1 tablet by mouth 2 times daily 1 tablet 0    dicyclomine (BENTYL) 10 MG capsule Take 1 capsule by mouth 4 times daily as needed (adominal cramps) (Patient taking differently: Take 10 mg by mouth 4 times daily ) 360 capsule 1    ferrous sulfate (IRON 325) 325 (65 Fe) MG tablet Take 1 tablet by mouth every other day Indications: pt taking every day bid Monday, wed, fri (Patient taking differently: Take 325 mg by mouth 2 times daily Indications: pt taking every day bid ) 30 tablet 3    furosemide (LASIX) 20 MG tablet Take 1 tablet by mouth daily as needed (sob/leg swelling) Weigh self daily Current weight 174lbs Take pill when greater than 175lbs  As needed 30 tablet 0    dilTIAZem (CARDIZEM CD) 120 MG extended release capsule Take 1 capsule by mouth daily 90 capsule 3    metoprolol succinate (TOPROL XL) 25 MG extended release tablet Take 1 tablet by mouth daily 30 tablet 3    clonazePAM (KLONOPIN) 1 MG disintegrating tablet Take 1 mg by mouth 3 times daily as needed.  ondansetron (ZOFRAN ODT) 4 MG disintegrating tablet Take 1 tablet by mouth every 8 hours as needed for Nausea or Vomiting Place under the tongue and let it melt and absorb from under your tongue.  30 tablet 3    lactobacillus (CULTURELLE) capsule Take 1 capsule by mouth daily (with breakfast) 30 capsule 0    pantoprazole (PROTONIX) 40 MG tablet Take 1 tablet by mouth 2 times daily (Patient taking differently: Take 40 mg by mouth daily ) 60 tablet 3    sucralfate (CARAFATE) 1 GM tablet Take 1 tablet by mouth every 12 hours 120 tablet 3    theophylline (MIAH-24) 200 MG extended release capsule Take 400 mg by mouth daily      budesonide-formoterol (SYMBICORT) 160-4.5 MCG/ACT AERO USE 2 INHALATIONS TWICE A DAY 30.6 g 3    ipratropium-albuterol (DUONEB) 0.5-2.5 (3) MG/3ML SOLN nebulizer solution Inhale 3 mLs into the lungs every 4 hours 1080 mL 3    guaiFENesin (MUCINEX) 600 MG extended release tablet Take 1 tablet by mouth 2 times daily 30 tablet 0    montelukast (SINGULAIR) 10 MG tablet Take 1 tablet by mouth daily 30 tablet 3    levothyroxine (SYNTHROID) 100 MCG tablet Take 1 tablet by mouth Daily 30 tablet 2    folic acid (FOLVITE) 1 MG tablet Take 1 tablet by mouth daily 30 tablet 3    vitamin B-1 100 MG tablet Take 1 tablet by mouth daily 30 tablet 3    albuterol-ipratropium (COMBIVENT RESPIMAT)  MCG/ACT AERS inhaler Inhale 1 puff into the lungs every 4 hours as needed for Wheezing 3 Inhaler 0    DULoxetine (CYMBALTA) 60 MG extended release capsule Take 60 mg by mouth daily      traZODone (DESYREL) 50 MG tablet Take 100 mg by mouth nightly       aspirin 81 MG chewable tablet Take 81 mg by mouth daily      fluticasone (FLONASE) 50 MCG/ACT nasal spray 2 sprays by Nasal route daily 1 Bottle 0         ALLERGIES  Allergies   Allergen Reactions    Lisinopril Swelling and Rash     angioedema       PHYSICAL EXAM  VITAL SIGNS: /75   Pulse 104   Temp 98.3 °F (36.8 °C) (Oral)   Resp 15   Ht 5' 1\" (1.549 m)   Wt 177 lb (80.3 kg)   LMP 01/05/2010   SpO2 97%   BMI 33.44 kg/m²   Constitutional: Well developed, Well nourished, resting in bed, pleasant  HENT: Normocephalic, Atraumatic, Bilateral external ears normal, Oropharynx moist, No oral exudates, Nose normal.  Nasal cannula in place  Eyes: PERRL, EOMI, Conjunctiva normal, No discharge. Neck: Normal range of motion, Supple, No stridor. Cardiovascular: Mildly tachycardic heart rate, Normal rhythm, No murmurs, No rubs, No gallops. Thorax & Lungs: Normal breath sounds, No respiratory distress, No wheezing, No chest tenderness. Abdomen:  Bowel sounds normal, Soft, No tenderness, no guarding, no rebound, No masses, No pulsatile masses. Skin: Warm, Dry, No erythema, No rash. Extremities: Intact distal pulses, No edema, No tenderness, No cyanosis, No clubbing. Musculoskeletal: Good gross range of motion in all major joints. No major deformities noted. Neurologic: Alert & oriented x 3, Normal gross motor function, Normal gross sensory function, No focal deficits noted. Cranial nerves II through XII intact, no ataxia  Psychiatric: Affect normal  EKG  EKG Interpretation    Interpreted by emergency department physician from June 12 at 1725    Rhythm: normal sinus   Rate: tachycardia  Axis: right  Ectopy: none  Conduction: right bundle branch block (complete), left bundle branch block (incomplete) and bifasicular  ST Segments: no acute change  T Waves: no acute change  Q Waves: none    Clinical Impression: no acute changes    Thony Guidry MD      RADIOLOGY/PROCEDURES/LABS  Last Imaging results   CT HEAD WO CONTRAST   Final Result   Acute intraparenchymal hemorrhage within the left basal ganglia measuring 2.0   x 1.5 cm demonstrating intraventricular extension into the lateral   ventricles, likely a hypertensive hemorrhage. The above findings were discussed with Dr. Sera Polk at 8:50 p.m. on 06/12/2021.              Imaging reviewed by myself, discussed with radiologist    Labs Reviewed   CBC WITH AUTO DIFFERENTIAL - Abnormal; Notable for the following components:       Result Value    WBC 12.5 (*)     RBC 4.08 (*)     Hemoglobin 12.2 (*)     Segs Relative 78.6 (*)     Lymphocytes % 11.1 (*)     Monocytes % 8.4 (*)     All other components within normal limits   COMPREHENSIVE METABOLIC PANEL - Abnormal; Notable for the following components:    Sodium 134 (*)     Potassium 3.2 (*)     Chloride 93 (*)     Glucose 107 (*)     AST 13 (*)     All other components within normal limits   PROTIME-INR         Medications   prochlorperazine (COMPAZINE) injection 5 mg (has no administration in time range)   diphenhydrAMINE (BENADRYL) injection 12.5 mg (has no administration in time range)       COURSE & MEDICAL DECISION MAKING  Pertinent Labs & Imaging studies reviewed. (See chart for details)      77-year-old female presents with headache. As she does not have a chronic history of headaches, I did obtain a CT scan which did show intraparenchymal hemorrhage in the left basal ganglia, concerning for possible hypertensive hemorrhage. Here her blood pressure is well controlled, she has not required any push dose blood pressure control at this time. We will monitor her blood pressure with as needed BP meds as needed. Patient will be transferred to [address] [place] [address], Dr. Atif Davis accepting for the brain bleed line. She is currently neurologically nonfocal, her headache was treated with Compazine and Benadryl. Patient is agreeable this plan of care. CRITICAL CARE NOTE:  There was a high probability of clinically significant life-threatening deterioration of the patient's condition requiring my urgent intervention due to intraparenchymal hemorrhage. Imaging, consultation with radiology and neurosurgery was performed to address this. Total critical care time is 32 minutes. This includes vital sign monitoring, pulse oximetry monitoring, telemetry monitoring, clinical response to the IV medications, reviewing the nursing notes, consultation time, dictation/documentation time, and interpretation of the lab work. This time excludes time spent performing procedures and separately billable procedures and family discussion time. FINAL IMPRESSION  Problem List Items Addressed This Visit     None      Visit Diagnoses     Basal ganglia hemorrhage (HCC)    -  Primary    Nonintractable headache, unspecified chronicity pattern, unspecified headache type          1.    2.   3.    Patient gave me permission to discuss medical history, care, and plan with those present in the room.   Electronically signed by: Inga Vick MD, 6/12/2021  Inga Vick MD Thony Guidry MD  06/12/21 0835

## 2021-06-12 NOTE — ED PROVIDER NOTES
As physician-in-triage, I performed a medical screening history and physical exam on this patient. HISTORY OF PRESENT ILLNESS  Art Edouard is a 64 y.o. female presents to the emergency department stating she had a headache for a week. Denies any trauma. States it is across her forehead. States she checked her blood pressure today and it was high around 155/110. States she takes medication for this that she did not miss her run out of. Denies that there is been any recent changes to her meds. Denied any chest pain or pressure or shortness of breath. No vision changes. No tingling or numbness to her face arms or legs. PHYSICAL EXAM  LMP 01/05/2010     On exam, the patient appears in no acute distress. Speech is clear. Breathing is unlabored. Moves all extremities    Comment: Please note this report has been produced using speech recognition software and may contain errors related to that system including errors in grammar, punctuation, and spelling, as well as words and phrases that may be inappropriate. If there are any questions or concerns please feel free to contact the dictating provider for clarification.        Philly Atkins MD  06/12/21 [id number]

## 2021-06-13 LAB
EKG ATRIAL RATE: 101 BPM
EKG DIAGNOSIS: NORMAL
EKG P AXIS: 75 DEGREES
EKG P-R INTERVAL: 130 MS
EKG Q-T INTERVAL: 400 MS
EKG QRS DURATION: 146 MS
EKG QTC CALCULATION (BAZETT): 518 MS
EKG R AXIS: 110 DEGREES
EKG T AXIS: 36 DEGREES
EKG VENTRICULAR RATE: 101 BPM

## 2021-06-13 PROCEDURE — 93010 ELECTROCARDIOGRAM REPORT: CPT | Performed by: INTERNAL MEDICINE

## 2021-06-13 NOTE — ED NOTES
Spoke with Ronel Renae from [facility] transfer center and pt is accepted by [facility] and will be transferred to [facility] by helicopter.       Eze Cedeno RN  06/12/21 [id number]

## 2021-06-14 ENCOUNTER — CARE COORDINATION (OUTPATIENT)
Dept: CARE COORDINATION | Age: 61
End: 2021-06-14

## 2021-06-14 NOTE — CARE COORDINATION
ACM follow up attempt. Patient was transferred to [facility] w/ stroke symptoms. ACM to follow up deferred until after discharge.

## 2021-06-18 ENCOUNTER — TELEPHONE (OUTPATIENT)
Dept: CARDIOLOGY CLINIC | Age: 61
End: 2021-06-18

## 2021-06-18 NOTE — TELEPHONE ENCOUNTER
Spoke with [address] [place] [address], patient discharged from [facility] 6/18. Attempting to reconcile meds, patient had Lasix bottles for 40mg but discharge states 20mg. Advised that per Care Everywhere only documentation I could fine is for 20mg.  Patient has OV 6/25

## 2021-06-19 ENCOUNTER — HOSPITAL ENCOUNTER (OUTPATIENT)
Dept: MRI IMAGING | Age: 61
Discharge: HOME | End: 2021-06-19
Payer: COMMERCIAL

## 2021-06-19 DIAGNOSIS — M89.9 RADIODENSE BONE LESION PRESENT ON X-RAY: ICD-10-CM

## 2021-06-19 PROCEDURE — 73721 MRI JNT OF LWR EXTRE W/O DYE: CPT

## 2021-07-14 ENCOUNTER — CARE COORDINATION (OUTPATIENT)
Dept: CARE COORDINATION | Age: 61
End: 2021-07-14

## 2021-07-14 ENCOUNTER — OFFICE VISIT (OUTPATIENT)
Dept: ORTHOPEDIC SURGERY | Age: 61
End: 2021-07-14
Payer: COMMERCIAL

## 2021-07-14 VITALS — HEART RATE: 84 BPM | WEIGHT: 181 LBS | HEIGHT: 61 IN | BODY MASS INDEX: 34.17 KG/M2 | OXYGEN SATURATION: 98 %

## 2021-07-14 DIAGNOSIS — M17.12 PRIMARY OSTEOARTHRITIS OF LEFT KNEE: ICD-10-CM

## 2021-07-14 PROCEDURE — G8417 CALC BMI ABV UP PARAM F/U: HCPCS | Performed by: PHYSICIAN ASSISTANT

## 2021-07-14 PROCEDURE — 1036F TOBACCO NON-USER: CPT | Performed by: PHYSICIAN ASSISTANT

## 2021-07-14 PROCEDURE — 20610 DRAIN/INJ JOINT/BURSA W/O US: CPT | Performed by: PHYSICIAN ASSISTANT

## 2021-07-14 PROCEDURE — 99212 OFFICE O/P EST SF 10 MIN: CPT | Performed by: PHYSICIAN ASSISTANT

## 2021-07-14 PROCEDURE — 3017F COLORECTAL CA SCREEN DOC REV: CPT | Performed by: PHYSICIAN ASSISTANT

## 2021-07-14 PROCEDURE — G8428 CUR MEDS NOT DOCUMENT: HCPCS | Performed by: PHYSICIAN ASSISTANT

## 2021-07-14 NOTE — PROGRESS NOTES
Review of Lashay Paulson is a 63 y.o. female that returns to the office today to go over the MRI results of her left knee. She continues to have left knee pain but she is able to bear weight on it.       Past Medical History:   Diagnosis Date    Anemia     Anxiety 02/16/2017    follows with Dr Dominguez Lee Back pain at L4-L5 level 2/16/2017    Dr Yobany Hernandez 1 disorder ([facility]     per pt on 2/5/2021\"never told I have bipolar\"    COPD (chronic obstructive pulmonary disease) ([facility]     follows with Dr Mike Busch Emphysema of lung ([facility] [address] [facility]     FH: CAD (coronary artery disease) 2/16/2017    Father had in his forties, sister in her thirties    Natasha Hollingsworth Fibromyalgia 02/16/2017    Full dentures     full upper plate and partial on the bottom    Gastric ulcer     \"had stomach ulder back fall 2019\"    H/O Doppler ultrasound 04/05/2019    venous- no DVT or reflux    H/O echocardiogram 01/14/2015    EF50-55% Normal- see media    History of blood transfusion     Hyperlipidemia LDL goal <100     Hypertension     Dr Arelis Chapa Irregular heartbeat     \"they said I have irreg heart beat before- back when they drained fluid around my heart \"    Obstructive sleep apnea     \"have bipap I use at home\"    On home oxygen therapy     \"on oxygen all the time at home and keep it on 2.5 liters\"    Osteoarthritis     Pericardial effusion     per old chart had pericardial effusion 10/2020    Spinal stenosis     hx per old chart    Thyroid disease     Wears glasses     \"suppose to wear glasses\"       Past Surgical History:   Procedure Laterality Date    BACK SURGERY  03/2017    \"surgery on low back L5-6- not sure if they put any metal in \"   Salvatore Foss      10/2019    CARDIAC CATHETERIZATION      per old chart had cath done 11/2020    CARPAL TUNNEL RELEASE Right 1985    CHOLECYSTECTOMY, LAPAROSCOPIC N/A 10/25/2020    CHOLECYSTECTOMY LAPAROSCOPIC WITH IOC performed by [facility] Connections: Socially Isolated    Frequency of Communication with Friends and Family: Once a week    Frequency of Social Gatherings with Friends and Family: Once a week    Attends Sikhism Services: Never    Active Member of Clubs or Organizations: No    Attends Club or Organization Meetings: Never    Marital Status:    Intimate Partner Violence:     Fear of Current or Ex-Partner:     Emotionally Abused:     Physically Abused:     Sexually Abused:        Current Outpatient Medications   Medication Sig Dispense Refill    acetaminophen (TYLENOL 8 HOUR ARTHRITIS PAIN) 650 MG extended release tablet Take 650 mg by mouth as needed for Pain      vitamin D (CHOLECALCIFEROL) 25 MCG (1000 UT) TABS tablet Take 1,000 Units by mouth daily      busPIRone (BUSPAR) 10 MG tablet Take 1 tablet by mouth 2 times daily 1 tablet 0    dicyclomine (BENTYL) 10 MG capsule Take 1 capsule by mouth 4 times daily as needed (adominal cramps) (Patient taking differently: Take 10 mg by mouth 4 times daily ) 360 capsule 1    ferrous sulfate (IRON 325) 325 (65 Fe) MG tablet Take 1 tablet by mouth every other day Indications: pt taking every day bid Monday, wed, fri (Patient taking differently: Take 325 mg by mouth 2 times daily Indications: pt taking every day bid ) 30 tablet 3    furosemide (LASIX) 20 MG tablet Take 1 tablet by mouth daily as needed (sob/leg swelling) Weigh self daily Current weight 174lbs Take pill when greater than 175lbs  As needed 30 tablet 0    dilTIAZem (CARDIZEM CD) 120 MG extended release capsule Take 1 capsule by mouth daily 90 capsule 3    metoprolol succinate (TOPROL XL) 25 MG extended release tablet Take 1 tablet by mouth daily 30 tablet 3    clonazePAM (KLONOPIN) 1 MG disintegrating tablet Take 1 mg by mouth 3 times daily as needed.        ondansetron (ZOFRAN ODT) 4 MG disintegrating tablet Take 1 tablet by mouth every 8 hours as needed for Nausea or Vomiting Place under the tongue and let it melt and absorb from under your tongue. 30 tablet 3    lactobacillus (CULTURELLE) capsule Take 1 capsule by mouth daily (with breakfast) 30 capsule 0    pantoprazole (PROTONIX) 40 MG tablet Take 1 tablet by mouth 2 times daily (Patient taking differently: Take 40 mg by mouth daily ) 60 tablet 3    sucralfate (CARAFATE) 1 GM tablet Take 1 tablet by mouth every 12 hours 120 tablet 3    theophylline (MIAH-24) 200 MG extended release capsule Take 400 mg by mouth daily      budesonide-formoterol (SYMBICORT) 160-4.5 MCG/ACT AERO USE 2 INHALATIONS TWICE A DAY 30.6 g 3    ipratropium-albuterol (DUONEB) 0.5-2.5 (3) MG/3ML SOLN nebulizer solution Inhale 3 mLs into the lungs every 4 hours 1080 mL 3    guaiFENesin (MUCINEX) 600 MG extended release tablet Take 1 tablet by mouth 2 times daily 30 tablet 0    montelukast (SINGULAIR) 10 MG tablet Take 1 tablet by mouth daily 30 tablet 3    levothyroxine (SYNTHROID) 100 MCG tablet Take 1 tablet by mouth Daily 30 tablet 2    folic acid (FOLVITE) 1 MG tablet Take 1 tablet by mouth daily 30 tablet 3    vitamin B-1 100 MG tablet Take 1 tablet by mouth daily 30 tablet 3    albuterol-ipratropium (COMBIVENT RESPIMAT)  MCG/ACT AERS inhaler Inhale 1 puff into the lungs every 4 hours as needed for Wheezing 3 Inhaler 0    DULoxetine (CYMBALTA) 60 MG extended release capsule Take 60 mg by mouth daily      traZODone (DESYREL) 50 MG tablet Take 100 mg by mouth nightly       aspirin 81 MG chewable tablet Take 81 mg by mouth daily      fluticasone (FLONASE) 50 MCG/ACT nasal spray 2 sprays by Nasal route daily 1 Bottle 0     No current facility-administered medications for this visit.        Allergies   Allergen Reactions    Lisinopril Swelling and Rash     angioedema       Review of Systems:  See above      Physical Exam:   Pulse 84   Ht 5' 1\" (1.549 m)   Wt 181 lb (82.1 kg)   LMP 01/05/2010   SpO2 98%   BMI 34.20 kg/m²        Gait is Normal.     Gen/Psych:Examination reveals a pleasant individual in no acute distress. The patient is oriented to time, place and person. The patient's mood and affect are appropriate. Patient appears well nourished. Body habitus is overweight     Lymph:   lymphedema in bilateral lower extremities     Skin intact in bilateral lower extremities with no ulcerations, lesions, rash, erythema. Vascular: There are no varicosities in bilateral lower extremities, sensation intact to light touch over bilateral lower extremities. Outsiderecord review: none    Imaging studies:  MRI reviewed which show large areas of bone infarct in both the distal femur and proximal tibia. FINDINGS:   MENISCI: Lateral meniscus is intact demonstrating normal signal, morphology   and position.  There is intrasubstance meniscal degeneration of the medial   meniscus.  There is free margin fraying of the posterior horn of the medial   meniscus.  No discrete displaced meniscal tear or parameniscal cyst.       CRUCIATE LIGAMENTS: The anterior and posterior cruciate ligaments are normal   in signal, morphology and course.       EXTENSOR MECHANISM: The quadriceps and patellar tendons are intact.  The   medial and lateral patellar retinacula are intact.       LATERAL COLLATERAL LIGAMENT COMPLEX: The popliteus tendon, biceps femoris   tendon, fibular collateral ligament and iliotibial band are intact.       MEDIAL COLLATERAL LIGAMENT COMPLEX: The superficial and deep components of   the medial collateral ligament are intact.       KNEE JOINT: Amedeo Nails is a small suprapatellar joint effusion.  Small Baker's   cyst is noted.       Tricompartmental degenerative changes are noted of the knee.  Mild articular   cartilage edema and grade 2 chondromalacic changes of the patellofemoral   compartment and small subchondral cysts.       In the medial compartment there is articular cartilage thinning and mild   joint space narrowing with small marginal osteophytes.  Subchondral cystic changes and subchondral marrow edema is noted.  There is focal severe   thinning of the articular cartilage along the weight-bearing surface of the   medial femoral condyle with irregularity of the articular surface that may   represent sequela of prior subchondral fracture or subchondral collapse. Lateral compartmental articular cartilage is intact without evidence of a   focal defect.       BONE MARROW: There are large bone infarcts noted in the medial and lateral   femoral condyles, distal femur and proximal tibia.  No evidence of acute   marrow edema.  No evidence of acute fracture.           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       Articular surface irregularity and subchondral abnormalities of the medial   femoral condyle may represent sequela of prior subchondral insufficiency   fracture or prior trauma.       Tricompartmental degenerative changes of the knee most prominent in the   medial compartment with severe focal cartilage thinning, subchondral cystic   changes.  Associated free margin fraying of the medial meniscus with   intrasubstance meniscal degeneration.       Small joint effusion and small Baker's cyst.               Impression:     Diagnosis Orders   1. Primary osteoarthritis of left knee  CO ARTHROCENTESIS ASPIR&/INJ MAJOR JT/BURSA W/O US           Plan:    Patient Instructions   Continue to weight bear as tolerated  Continue range of motion  Ice and elevate as needed  Tylenol or Motrin for pain  Injection given today in the office   Rest for 24-48 hours  Follow up as needed    Left knee injection procedure note    Pre-procedure diagnosis:  Left knee DJD    Post-procedure diagnosis:  same    Procedure: The planned procedure/risks/benefits/alternatives were discussed with the patient. Risks include, but are not limited to, increased pain, drug reaction, infection, bleeding, lack of improvement, neurovascular injury, and increased blood sugar levels. The patient understood and all of their questions were answered. The Left knee was prepped with alcohol then a 22 gauge needle was advanced into the inferior-lateral joint without difficulty. The joint was then injected with 1 ml 1% lidocaine, 1ml of Kenalog (40 mg/ml), 1ml 0.5% bupivacaine the injection site was cleansed with isopropyl alcohol and a band-aid was placed. Complications:  None, the patient tolerated the procedure well. Instructions: The patient was advised to rest the knee and decrease activity for the next 24 to 48 hours. May use prescription or OTC pain relievers as needed for any post-injection pain as well as ice.

## 2021-07-14 NOTE — PROGRESS NOTES
Patient comes in today for follow up right shoulder s/p injection on 06/02/21. Relief of under shoulder pain for approximately 4 wks. Review Left Knee MRI Results. examination the patient's left knee notes a varus disposition. Her knee motion is 5-105 degrees. She has significant crepitus and grinding medially. Collaterals and cruciates seem to be good significant patellofemoral pain with any kind of motion. No hip rotational pain no IT band findings. Neurological: Patient is alert and oriented to person, place, and time. Normal strenght. No sensory deficit. Skin: Skin is warm and dry  Psychiatric: Behavior is normal. Thought content normal.  Nursing note and vitals reviewed. Labs and Imaging:     XR taken today:  Xr Knee Left (1-2 Views)    Result Date: 9/23/2020  X-rays taken today reviewed by me show standing AP of both knees and lateral left knee. Patient has severe end-stage DJD of the left knee with bone-on-bone apposition of the medial compartment of the left knee. She does have some lateral tibial subluxation as a result of this as well as significant patellofemoral disease on her left knee. Patient's right knee has fairly significant narrowing of the medial compartment of her right knee although certainly not as bad        No orders of the defined types were placed in this encounter. Assessment and Plan:  1. Chronic pain of left knee            This is a 60 y.o. female who presents to the clinic today for evaluation / follow up of severe end-stage DJD left knee.      Past History:    Current Outpatient Medications:     losartan (COZAAR) 50 MG tablet, Take 50 mg by mouth daily, Disp: , Rfl:     hydrochlorothiazide (MICROZIDE) 12.5 MG capsule, Take 12.5 mg by mouth daily, Disp: , Rfl:     meloxicam (MOBIC) 15 MG tablet, Take 15 mg by mouth daily, Disp: , Rfl:     atenolol (TENORMIN) 50 MG tablet, Take 50 mg by mouth daily, Disp: , Rfl:   Allergies   Allergen Reactions    Lisinopril Other (See Comments)     Cough      Social History     Socioeconomic History    Marital status:      Spouse name: Not on file    Number of children: Not on file    Years of education: Not on file    Highest education level: Not on file   Occupational History    Not on file   Social Needs    Financial resource strain: Not on file    Food insecurity     Worry: Not on file     Inability: Not on file    Transportation needs     Medical: Not on file     Non-medical: Not on file   Tobacco Use    Smoking status: Never Smoker    Smokeless tobacco: Never Used   Substance and Sexual Activity    Alcohol use: Not on file    Drug use: Not on file    Sexual activity: Not on file   Lifestyle    Physical activity     Days per week: Not on file     Minutes per session: Not on file    Stress: Not on file   Relationships    Social connections     Talks on phone: Not on file     Gets together: Not on file     Attends Scientology service: Not on file     Active member of club or organization: Not on file     Attends meetings of clubs or organizations: Not on file     Relationship status: Not on file    Intimate partner violence     Fear of current or ex partner: Not on file     Emotionally abused: Not on file     Physically abused: Not on file     Forced sexual activity: Not on file   Other Topics Concern    Not on file   Social History Narrative    Not on file     Past Medical History:   Diagnosis Date    HTN (hypertension)     Sleep apnea      Past Surgical History:   Procedure Laterality Date    ECTOPIC PREGNANCY SURGERY       No family history on file. Plan  Patient is here wishing to pursue total knee arthroplasty. She is failed conservative treatment including anti-inflammatories exercise program and injections. She states that she really would like to pursue total knee arthroplasty at this time. She is made aware of all risk and benefits as well as the typical preoperative postoperative course. Patient does not have any significant medical comorbidities other than some mild hypertension. She is not diabetic and she has been not on any anticoagulants.   Her primary care physician Dr. Tran Mullins and will need preoperative clearance per him. We will see her back here once this is available and after postoperative status    Provider Attestation:  Lesley Pozo, personally performed the services described in this documentation. All medical record entries made by the scribe were at my direction and in my presence. I have reviewed the chart and discharge instructions and agree that the records reflect my personal performance and is accurate and complete. Josiane Robles MD. 09/23/20      Please note that this chart was generated using voice recognition Dragon dictation software. Although every effort was made to ensure the accuracy of this automated transcription, some errors in transcription may have occurred.

## 2021-07-14 NOTE — CARE COORDINATION
capsule by mouth 4 times daily as needed (adominal cramps)  Patient taking differently: Take 10 mg by mouth 4 times daily  5/4/21   Anjum Benites MD   ferrous sulfate (IRON 325) 325 (65 Fe) MG tablet Take 1 tablet by mouth every other day Indications: pt taking every day bid Monday, wed, fri  Patient taking differently: Take 325 mg by mouth 2 times daily Indications: pt taking every day bid  5/4/21   Anjum Benites MD   furosemide (LASIX) 20 MG tablet Take 1 tablet by mouth daily as needed (sob/leg swelling) Weigh self daily Current weight 174lbs Take pill when greater than 175lbs  As needed 5/4/21   Anjum Benites MD   dilTIAZem (CARDIZEM CD) 120 MG extended release capsule Take 1 capsule by mouth daily 1/18/21   Miguelangel Shoemaker MD   metoprolol succinate (TOPROL XL) 25 MG extended release tablet Take 1 tablet by mouth daily 1/18/21   Miguelangel Shoemaker MD   clonazePAM (KLONOPIN) 1 MG disintegrating tablet Take 1 mg by mouth 3 times daily as needed. Historical Provider, MD   ondansetron (ZOFRAN ODT) 4 MG disintegrating tablet Take 1 tablet by mouth every 8 hours as needed for Nausea or Vomiting Place under the tongue and let it melt and absorb from under your tongue.  12/2/20   Danny Rodriguez MD   lactobacillus (CULTURELLE) capsule Take 1 capsule by mouth daily (with breakfast) 11/27/20   Jesica Jung MD   pantoprazole (PROTONIX) 40 MG tablet Take 1 tablet by mouth 2 times daily  Patient taking differently: Take 40 mg by mouth daily  11/26/20   Jesica Jung MD   sucralfate (CARAFATE) 1 GM tablet Take 1 tablet by mouth every 12 hours 11/26/20   Jesica Jung MD   theophylline (MIAH-24) 200 MG extended release capsule Take 400 mg by mouth daily    Historical Provider, MD   budesonide-formoterol (SYMBICORT) 160-4.5 MCG/ACT AERO USE 2 INHALATIONS TWICE A DAY 10/2/20   Max Serrano MD   ipratropium-albuterol (DUONEB) 0.5-2.5 (3) MG/3ML SOLN nebulizer solution the patient have a nebulizer?: Yes  Does the patient use a space with inhaled medications?: No     No patient-reported symptoms         Symptoms:

## 2021-07-16 ENCOUNTER — CARE COORDINATION (OUTPATIENT)
Dept: CARE COORDINATION | Age: 61
End: 2021-07-16

## 2021-07-16 ENCOUNTER — OFFICE VISIT (OUTPATIENT)
Dept: CARDIOLOGY CLINIC | Age: 61
End: 2021-07-16
Payer: COMMERCIAL

## 2021-07-16 VITALS
SYSTOLIC BLOOD PRESSURE: 110 MMHG | BODY MASS INDEX: 34.34 KG/M2 | OXYGEN SATURATION: 96 % | HEIGHT: 61 IN | DIASTOLIC BLOOD PRESSURE: 68 MMHG | WEIGHT: 181.9 LBS | HEART RATE: 78 BPM

## 2021-07-16 DIAGNOSIS — I48.0 PAROXYSMAL ATRIAL FIBRILLATION (HCC): Primary | ICD-10-CM

## 2021-07-16 PROCEDURE — 3017F COLORECTAL CA SCREEN DOC REV: CPT | Performed by: INTERNAL MEDICINE

## 2021-07-16 PROCEDURE — G8428 CUR MEDS NOT DOCUMENT: HCPCS | Performed by: INTERNAL MEDICINE

## 2021-07-16 PROCEDURE — 99214 OFFICE O/P EST MOD 30 MIN: CPT | Performed by: INTERNAL MEDICINE

## 2021-07-16 PROCEDURE — 1036F TOBACCO NON-USER: CPT | Performed by: INTERNAL MEDICINE

## 2021-07-16 PROCEDURE — G8417 CALC BMI ABV UP PARAM F/U: HCPCS | Performed by: INTERNAL MEDICINE

## 2021-07-16 NOTE — PATIENT INSTRUCTIONS
**It is YOUR responsibilty to bring medication bottles and/or updated medication list to [address] [place] [address]. This will allow us to better serve you and all your healthcare needs**      Please be informed that if you contact our office outside of normal business hours the physician on call cannot help with any scheduling or rescheduling issues, procedure instruction questions or any type of medication issue. We advise you for any urgent/emergency that you go to the nearest emergency room! PLEASE CALL OUR OFFICE DURING NORMAL BUSINESS HOURS    Monday - Friday   8 am to 5 pm    [place] [address]: [phone]    [place]:  [phone]    ,Please hold on to these instructions the  will call you within 1-9 business days when we receive authorization from your insurance. Echocardiogram    WHAT TO EXPECT:   ? This test will take approximately 45 minutes. ? It is an ultrasound of the heart. ? It can look at the valves and chambers inside the heart   ? There is no special instructions for this test.     If you are unable to keep this appointment, please notify us 24 hours prior to test at [phone].

## 2021-07-16 NOTE — LETTER
Read Bhavin Pham  1960  [id number]    Have you had any Chest Pain that is not new? - No  If Yes DO EKG - How does it feel -    How long does the pain last -      How long have you been having the pain -    Did you take a    And did it relieve the pain -      DO EKG IF: Patient has a Heart Rate above 100 or below 40     CAD (Coronary Artery Disease) patient should have one on file every 6 months        Have you had any Shortness of Breath - Yes  If Yes - When on exertion    Have you had any dizziness - No  If Yes DO ORTHOSTATIC BP - when do you feel dizzy    How long does it last .        Sitting wait 5 minutes do supine (laying down) wait 5 minutes then do standing - log each in \"vitals\" area in Epic   Be sure to ask what symptoms they are having if they get dizzy while completing ortho stats such as: room spinning, nausea, etc.    Have you had any palpitations that are not new? - No  If Yes DO EKG - Do you feel your heart   How long does it last - . Is the patient on any of the following medications -   If Yes DO EKG - Needs done every 6 months    Do you have any edema - swelling in No    If Yes - CHECK TO SEE IF THE EDEMA IS PITTING  How long have they been having edema -   If Yes - Have they worn compression stockings   If they have worn compression stockings      Vein \"LEG PROBLEM Questionnaire\"  1. Do you have prominent leg veins? No   2. Do you have any skin discoloration? No  3. Do you have any healed or active sores? No  4. Do you have swelling of the legs? No  5. Do you have a family history of varicose veins? No  6. Does your profession involve pro-longed        standing or heavy lifting? No  7. Have you been fighting overweight problems? No  8. Do you have restless legs? No  9. Do you have any night time cramps? Yes  10.  Do you have any of the following in your legs:        Aching and tiredness      When did you have

## 2021-07-16 NOTE — CARE COORDINATION
Call to pt to provide info on [facility]. Contact info provided and advised pt acm available if has difficulty/questions. Voices understanding and denies further needs at this time.

## 2021-07-16 NOTE — PROGRESS NOTES
Marina Salas MD        OFFICE  FOLLOWUP NOTE    Chief complaints: patient is here for management of  leg swelling,SVT, bipolar, fibromyalgia, chest pain, rt lung, end stage lung disease and COPD, pericardial drainage and [facility],? PAF, bleeding gastritis. Intracranial hemorrhage    F/u after hospital discharge: had intracranial hemorrhage, she only had headache no weakness, air lifted to Elisabeth Carrera June 2021  HAD HYPOAKELMIA 2.7 in April on zaroxolyn    Subjective: patient feels better, no chest pain, no shortness of breath, no dizziness, no palpitations    HPI Renetta Pinto is a 63 y. o.year old who  has a past medical history of Anemia, Anxiety, Arthritis, Back pain at L4-L5 level, Bipolar 1 disorder (HCC), COPD (chronic obstructive pulmonary disease) ([facility] [address] Emphysema of lung ([facility] [address] FH: CAD (coronary artery disease), Fibromyalgia, Full dentures, Gastric ulcer, H/O Doppler ultrasound, H/O echocardiogram, History of blood transfusion, Hyperlipidemia LDL goal <100, Hypertension, Irregular heartbeat, Obstructive sleep apnea, On home oxygen therapy, Osteoarthritis, Pericardial effusion, Spinal stenosis, Thyroid disease, and Wears glasses.  and presents for management of  leg swelling,SVT, bipolar, fibromyalgia, chest pain, rt lung, end stage lung disease and COPD, pericardial drainage and [place],? PAF, bleeding gastritis which are well controlled      Current Outpatient Medications   Medication Sig Dispense Refill    acetaminophen (TYLENOL 8 HOUR ARTHRITIS PAIN) 650 MG extended release tablet Take 650 mg by mouth as needed for Pain      vitamin D (CHOLECALCIFEROL) 25 MCG (1000 UT) TABS tablet Take 1,000 Units by mouth daily      busPIRone (BUSPAR) 10 MG tablet Take 1 tablet by mouth 2 times daily 1 tablet 0    dicyclomine (BENTYL) 10 MG capsule Take 1 capsule by mouth 4 times daily as needed (adominal cramps) (Patient taking differently: Take 10 mg by mouth 4 times daily ) 360 capsule 1    ferrous sulfate (IRON 325) 325 (65 Fe) MG tablet Take 1 tablet by mouth every other day Indications: pt taking every day bid Monday, wed, fri (Patient taking differently: Take 325 mg by mouth 2 times daily Indications: pt taking every day bid ) 30 tablet 3    furosemide (LASIX) 20 MG tablet Take 1 tablet by mouth daily as needed (sob/leg swelling) Weigh self daily Current weight 174lbs Take pill when greater than 175lbs  As needed 30 tablet 0    dilTIAZem (CARDIZEM CD) 120 MG extended release capsule Take 1 capsule by mouth daily 90 capsule 3    metoprolol succinate (TOPROL XL) 25 MG extended release tablet Take 1 tablet by mouth daily 30 tablet 3    clonazePAM (KLONOPIN) 1 MG disintegrating tablet Take 1 mg by mouth 3 times daily as needed.  ondansetron (ZOFRAN ODT) 4 MG disintegrating tablet Take 1 tablet by mouth every 8 hours as needed for Nausea or Vomiting Place under the tongue and let it melt and absorb from under your tongue.  30 tablet 3    lactobacillus (CULTURELLE) capsule Take 1 capsule by mouth daily (with breakfast) 30 capsule 0    pantoprazole (PROTONIX) 40 MG tablet Take 1 tablet by mouth 2 times daily (Patient taking differently: Take 40 mg by mouth daily ) 60 tablet 3    sucralfate (CARAFATE) 1 GM tablet Take 1 tablet by mouth every 12 hours 120 tablet 3    theophylline (MIAH-24) 200 MG extended release capsule Take 400 mg by mouth daily      budesonide-formoterol (SYMBICORT) 160-4.5 MCG/ACT AERO USE 2 INHALATIONS TWICE A DAY 30.6 g 3    ipratropium-albuterol (DUONEB) 0.5-2.5 (3) MG/3ML SOLN nebulizer solution Inhale 3 mLs into the lungs every 4 hours 1080 mL 3    guaiFENesin (MUCINEX) 600 MG extended release tablet Take 1 tablet by mouth 2 times daily 30 tablet 0    montelukast (SINGULAIR) 10 MG tablet Take 1 tablet by mouth daily 30 tablet 3    levothyroxine (SYNTHROID) 100 MCG tablet Take 1 tablet by mouth Daily 30 tablet 2    folic acid (FOLVITE) 1 MG tablet Take 1 tablet by mouth daily 30 tablet Past Surgical History:   Procedure Laterality Date    BACK SURGERY  2017    \"surgery on low back L5-6- not sure if they put any metal in \"   [address]      10/2019    CARDIAC CATHETERIZATION      per old chart had cath done 2020    CARPAL TUNNEL RELEASE Right 1985    CHOLECYSTECTOMY, LAPAROSCOPIC N/A 10/25/2020    CHOLECYSTECTOMY LAPAROSCOPIC WITH IOC performed by Tracy Marks MD at [facility] COLONOSCOPY N/A 2019    COLONOSCOPY DIAGNOSTIC performed by Negrita Toribio MD at [facility] [place], DIAGNOSTIC      per old chart had egd done 2018    TUBAL LIGATION  1987    UPPER GASTROINTESTINAL ENDOSCOPY N/A 2021    EGD BIOPSY performed by Negrita Toribio MD at [facility] ENDOSCOPY     Family History   Problem Relation Age of Onset    Asthma Mother         COPD    Heart Disease Father      Social History     Tobacco Use    Smoking status: Former Smoker     Packs/day: 1.50     Years: 20.00     Pack years: 30.00     Types: Cigarettes     Quit date: 2008     Years since quittin.5    Smokeless tobacco: Never Used   Substance Use Topics    Alcohol use: Not Currently     Alcohol/week: 2.0 standard drinks     Types: 2 Shots of liquor per week     Comment: per pt on 2021\"quit drinking back Oct 2020\"use to drink wine coolers - 3 times per week \"/caffeine 2-3 coffees aday 1 pop       [unfilled]  Review of Systems:   · Constitutional: No Fever or Weight Loss   · Eyes: No Decreased Vision  · ENT: No Headaches, Hearing Loss or Vertigo  · Cardiovascular: No chest pain, dyspnea on exertion, palpitations or loss of consciousness  · Respiratory: No cough or wheezing    · Gastrointestinal: No abdominal pain, appetite loss, blood in stools, constipation, diarrhea or heartburn  · Genitourinary: No dysuria, trouble voiding, or hematuria  · Musculoskeletal:  No gait disturbance, weakness or joint complaints  · Integumentary: No rash or pruritis  · Neurological: No TIA or stroke symptoms  · Psychiatric: No anxiety or depression  · Endocrine: No malaise, fatigue or temperature intolerance  · Hematologic/Lymphatic: No bleeding problems, blood clots or swollen lymph nodes  · Allergic/Immunologic: No nasal congestion or hives  All systems negative except as marked. Objective:  /68   Pulse 78   Ht 5' 1\" (1.549 m)   Wt 181 lb 14.4 oz (82.5 kg)   LMP 01/05/2010   SpO2 96%   BMI 34.37 kg/m²   Wt Readings from Last 3 Encounters:   07/16/21 181 lb 14.4 oz (82.5 kg)   07/14/21 181 lb (82.1 kg)   06/12/21 177 lb (80.3 kg)     Body mass index is 34.37 kg/m². GENERAL - Alert, oriented, pleasant, in no apparent distress,normal grooming  HEENT  pupils are intact, cornea intact, conjunctive normal color, ears are normal in exam,  Neck - Supple. No jugular venous distention noted. No carotid bruits, no apical -carotid delay  Respiratory:  Normal breath sounds, No respiratory distress, No wheezing, No chest tenderness. ,no use of accessory muscles, diaphragm movement is normal  Cardiovascular: (PMI) apex non displaced,no lifts no thrills, no s3,no s4, Normal heart rate, Normal rhythm, No murmurs, No rubs, No gallops. Carotid arteries pulse and amplitude are normal no bruit, no abdominal bruit noted ( normal abdominal aorta ausculation),   Extremities - No cyanosis, clubbing, or significant edema, no varicose veins    Abdomen  No masses, tenderness, or organomegaly, no hepato-splenomegally, no bruits  Musculoskeletal  No significant edema, no kyphosis or scoliosis, no deformity in any extremity noted, muscle strength and tone are normal  Skin: no ulcer,no scar,no stasis dermatitis   Neurologic  alert oriented times 3,Cranial nerves II through XII are grossly intact. There were no gross focal neurologic abnormalities.    Psychiatric: normal mood and affect    Lab Results   Component Value Date    CKTOTAL 126 05/03/2021    TROPONINI 0.007 03/25/2014     BNP:  No results found for: BNP  PT/INR:  No results found for: [employer] [facility]  Lab Results   Component Value Date    LABA1C 5.1 06/21/2019     Lab Results   Component Value Date    CHOL 166 11/23/2020    TRIG 111 11/23/2020    HDL 59 11/23/2020    LDLDIRECT 87 11/23/2020     Lab Results   Component Value Date    ALT 15 06/12/2021    AST 13 (L) 06/12/2021     TSH:  No results found for: TSH    Impression:  Marva Sportsparish is a 63 y. o.year old who  has a past medical history of Anemia, Anxiety, Arthritis, Back pain at L4-L5 level, Bipolar 1 disorder (HCC), COPD (chronic obstructive pulmonary disease) ([facility] [address] Emphysema of lung ([facility] [address] FH: CAD (coronary artery disease), Fibromyalgia, Full dentures, Gastric ulcer, H/O Doppler ultrasound, H/O echocardiogram, History of blood transfusion, Hyperlipidemia LDL goal <100, Hypertension, Irregular heartbeat, Obstructive sleep apnea, On home oxygen therapy, Osteoarthritis, Pericardial effusion, Spinal stenosis, Thyroid disease, and Wears glasses. and presents with     Plan:  1. Intracranial hemorrhage: stable, OFF aspirin  2. preo for knee: not stable for sx at this time, just had stroke and has severe COPD  3. preop for cataract: ok for sx  4. CHF, use lasix PRN  LAST YR RHC at [facility] showed fluid overload with PCWP of 35,, she should lose 10 lbs, continue kdur 40meq and checked chem 7   5. HTN: continue toprol xl 25mg daily,  6. Bleeding gastritis and anemia: on aspirin, need to be carefull, see Dr. Fang Hernandez  7. ? PAF, OFF aspirin, change cardizem to cardizem 120mg cd, RECHECK ECHO  8. Normal LHC  9. S/p pericardiocentesis  10. endstage  Lung disease: on home oxygen  11. Sinus tachycardia: seen by EP  In hospital, had SVT and sinus tachycardia and was started on cardizem, will continue it  12. End stage COPD:continue oxygen stable, continue inhalers, and steroids  13. Bipolar: stable  1.  Fibromyalgia: stableHealth   All labs, medications and tests reviewed, continue all other medications of all above medical condition listed as is.     [unfilled]

## 2021-07-30 ENCOUNTER — TELEPHONE (OUTPATIENT)
Dept: ORTHOPEDIC SURGERY | Age: 61
End: 2021-07-30

## 2021-07-30 NOTE — TELEPHONE ENCOUNTER
Patient calling about her referral to Pain management. She states we were to send her to pain management and send records. Please call patient and speak with her about this. [phone].  Samuel Bonilla

## 2021-08-06 ENCOUNTER — PROCEDURE VISIT (OUTPATIENT)
Dept: CARDIOLOGY CLINIC | Age: 61
End: 2021-08-06
Payer: COMMERCIAL

## 2021-08-06 DIAGNOSIS — R07.9 CHEST PAIN, UNSPECIFIED TYPE: ICD-10-CM

## 2021-08-06 DIAGNOSIS — I48.0 PAROXYSMAL ATRIAL FIBRILLATION (HCC): ICD-10-CM

## 2021-08-06 LAB
LV EF: 58 %
LVEF MODALITY: NORMAL

## 2021-08-06 PROCEDURE — 93306 TTE W/DOPPLER COMPLETE: CPT | Performed by: INTERNAL MEDICINE

## 2021-08-19 ENCOUNTER — CARE COORDINATION (OUTPATIENT)
Dept: CARE COORDINATION | Age: 61
End: 2021-08-19

## 2021-08-19 SDOH — ECONOMIC STABILITY: HOUSING INSECURITY
IN THE LAST 12 MONTHS, WAS THERE A TIME WHEN YOU DID NOT HAVE A STEADY PLACE TO SLEEP OR SLEPT IN A SHELTER (INCLUDING NOW)?: NO

## 2021-08-19 SDOH — ECONOMIC STABILITY: INCOME INSECURITY: IN THE LAST 12 MONTHS, WAS THERE A TIME WHEN YOU WERE NOT ABLE TO PAY THE MORTGAGE OR RENT ON TIME?: NO

## 2021-08-19 SDOH — ECONOMIC STABILITY: HOUSING INSECURITY: IN THE LAST 12 MONTHS, HOW MANY PLACES HAVE YOU LIVED?: 1

## 2021-08-19 ASSESSMENT — ENCOUNTER SYMPTOMS: DYSPNEA ASSOCIATED WITH: EXERTION

## 2021-08-19 ASSESSMENT — LIFESTYLE VARIABLES
HOW MANY STANDARD DRINKS CONTAINING ALCOHOL DO YOU HAVE ON A TYPICAL DAY: 1 OR 2
HOW OFTEN DO YOU HAVE A DRINK CONTAINING ALCOHOL: NEVER

## 2021-08-19 NOTE — CARE COORDINATION
Ambulatory Care Coordination Note  CM Risk Score: 3  Charlson 10 Year Mortality Risk Score: 79%     ACC: Homar Murry, RN    Summary Note: Call to pt for acm f/u. Noted pt was prescribed zpak after calling pulm about resp. Symptoms. Reports she is all better and Took atb was prescribed . No difficulties with obtaining meds,Reports she owes 1700 and thought insurance paid for it. Reports bipap machine came from [employer]. Reports it needs switched out and the person from [employer] hasnt been out. [place] now called art, supposed to bring her new water reservoir. Has not been out to do it. Every time comes out does not bring new piece. Needs new facemask, and hoses. Need new filter. ACM will reach out to [place]/art in regard to this. Reports she has cataract surgery coming up for L eye Monday. Repots she had R one done and seeing a lot better. Reviewed med assist as resource denies need for assistance at this time but aware if would be needed once  retires. Praised pt for reaching out to pulm and reports \"I always do\" in regard to contacting pulmonoogist for symtoms. Plan: ACM will reach out to [place] in r/t bipap supplies. Plan to graduate at next encounter as pt aware of symptoms to report and is proactive in calling md to reach out for tx. Call to art/[employer], per [facility] pt is on trilogy  machine. Najma Dubois Will have supplies sent out MOnday. Care Coordination Interventions    Program Enrollment: Complex Care  Referral from Primary Care Provider: No  Suggested Interventions and [address]: Completed  Medication Assistance Program: In Process  Zone Management Tools: Completed         Goals Addressed                 This Visit's Progress     Conditions and Symptoms   On track     I will notify my provider of any barriers to my plan of care. I will notify my provider of any symptoms that indicate a worsening of my condition.     Barriers: none  Plan for overcoming my barriers: support an education from [facility]  Confidence: 10/10  Anticipated Goal Completion Date: 7/15/21              Prior to Admission medications    Medication Sig Start Date End Date Taking? Authorizing Provider   acetaminophen (TYLENOL 8 HOUR ARTHRITIS PAIN) 650 MG extended release tablet Take 650 mg by mouth as needed for Pain    Historical Provider, MD   vitamin D (CHOLECALCIFEROL) 25 MCG (1000 UT) TABS tablet Take 1,000 Units by mouth daily    Historical Provider, MD   busPIRone (BUSPAR) 10 MG tablet Take 1 tablet by mouth 2 times daily 5/4/21   Marylen Mince, MD   dicyclomine (BENTYL) 10 MG capsule Take 1 capsule by mouth 4 times daily as needed (adominal cramps)  Patient taking differently: Take 10 mg by mouth 4 times daily  5/4/21   Marylen Mince, MD   ferrous sulfate (IRON 325) 325 (65 Fe) MG tablet Take 1 tablet by mouth every other day Indications: pt taking every day bid Monday, wed, fri  Patient taking differently: Take 325 mg by mouth 2 times daily Indications: pt taking every day bid  5/4/21   Marylen Mince, MD   furosemide (LASIX) 20 MG tablet Take 1 tablet by mouth daily as needed (sob/leg swelling) Weigh self daily Current weight 174lbs Take pill when greater than 175lbs  As needed 5/4/21   Marylen Mince, MD   dilTIAZem (CARDIZEM CD) 120 MG extended release capsule Take 1 capsule by mouth daily 1/18/21   Georgie Young MD   metoprolol succinate (TOPROL XL) 25 MG extended release tablet Take 1 tablet by mouth daily 1/18/21   Georgie Young MD   clonazePAM (KLONOPIN) 1 MG disintegrating tablet Take 1 mg by mouth 3 times daily as needed. Historical Provider, MD   ondansetron (ZOFRAN ODT) 4 MG disintegrating tablet Take 1 tablet by mouth every 8 hours as needed for Nausea or Vomiting Place under the tongue and let it melt and absorb from under your tongue.  12/2/20   Ana Mcgill MD   lactobacillus (CULTURELLE) capsule Take 1 capsule by mouth daily (with breakfast) 11/27/20   Chandan Devine MD   pantoprazole (PROTONIX) 40 MG tablet Take 1 tablet by mouth 2 times daily  Patient taking differently: Take 40 mg by mouth daily  11/26/20   Chandan Devine MD   sucralfate (CARAFATE) 1 GM tablet Take 1 tablet by mouth every 12 hours 11/26/20   Chandan Devine MD   theophylline (MIAH-24) 200 MG extended release capsule Take 400 mg by mouth daily    Historical Provider, MD   budesonide-formoterol (SYMBICORT) 160-4.5 MCG/ACT AERO USE 2 INHALATIONS TWICE A DAY 10/2/20   Max Rendon MD   ipratropium-albuterol (DUONEB) 0.5-2.5 (3) MG/3ML SOLN nebulizer solution Inhale 3 mLs into the lungs every 4 hours 7/21/20   Jazmin Ramírez MD   guaiFENesin (MUCINEX) 600 MG extended release tablet Take 1 tablet by mouth 2 times daily 7/21/20   Jazmin Ramírez MD   montelukast (SINGULAIR) 10 MG tablet Take 1 tablet by mouth daily 7/21/20   Jazmin Ramírez MD   levothyroxine (SYNTHROID) 100 MCG tablet Take 1 tablet by mouth Daily 7/21/20   Jazmin Ramírez MD   folic acid (FOLVITE) 1 MG tablet Take 1 tablet by mouth daily 7/16/20   Rosalind Miner MD   vitamin B-1 100 MG tablet Take 1 tablet by mouth daily 7/16/20   Rosalind Miner MD   albuterol-ipratropium (COMBIVENT RESPIMAT)  MCG/ACT AERS inhaler Inhale 1 puff into the lungs every 4 hours as needed for Wheezing 7/1/20   Jazmin Ramírez MD   DULoxetine (CYMBALTA) 60 MG extended release capsule Take 60 mg by mouth daily    Historical Provider, MD   traZODone (DESYREL) 50 MG tablet Take 100 mg by mouth nightly     Historical Provider, MD   fluticasone [facility]) 50 MCG/ACT nasal spray 2 sprays by Nasal route daily 3/28/18   Enzo Sanford MD       Future Appointments   Date Time Provider Wiliam Zuñiga   8/30/2021 11:45 AM Tera Embs, MD [facility]   9/8/2021  4:00 PM Max Rendon MD AFL [address] [place] [address] LESLIE Santana Ran   10/11/2021  4:40 PM Karla CASTILLO Ronald Acosta MD [address] [place] [address] Heart [facility]     ,   Congestive Heart Failure Assessment    Are you currently restricting fluids?: No Restriction  Do you understand a low sodium diet?: Yes  How many restaurant meals do you eat per week?: 1-2  Do you salt your food before tasting it?: No     No patient-reported symptoms      Symptoms:  None: Yes      Symptom course: stable  Weight trend: stable  Salt intake watch compared to last visit: stable      and   COPD Assessment    Does the patient understand envrionmental exposure?: Yes  Is the patient able to verbalize Rescue vs. Long Acting medications?: Yes  Does the patient have a nebulizer?: Yes  Does the patient use a space with inhaled medications?: No     No patient-reported symptoms         Symptoms:  None: Yes      Symptom course: improving  Breathlessness: exertion  Increase use of rapid acting/rescue inhaled medications?: No  Change in chronic cough?: No/At Baseline  Change in sputum?: No/At Baseline  Self Monitoring - SaO2: No

## 2021-08-23 ENCOUNTER — HOSPITAL ENCOUNTER (OUTPATIENT)
Age: 61
Discharge: HOME | End: 2021-08-23
Payer: COMMERCIAL

## 2021-08-23 LAB
ALBUMIN SERPL-MCNC: 4.5 GM/DL (ref 3.4–5)
ANION GAP SERPL CALCULATED.3IONS-SCNC: 7 MMOL/L (ref 4–16)
BACTERIA: NEGATIVE /HPF
BILIRUBIN URINE: NEGATIVE MG/DL
BLOOD, URINE: NEGATIVE
BUN BLDV-MCNC: 12 MG/DL (ref 6–23)
CALCIUM SERPL-MCNC: 10.3 MG/DL (ref 8.3–10.6)
CHLORIDE BLD-SCNC: 94 MMOL/L (ref 99–110)
CLARITY: CLEAR
CO2: 37 MMOL/L (ref 21–32)
COLOR: YELLOW
CREAT SERPL-MCNC: 0.9 MG/DL (ref 0.6–1.1)
CREATININE URINE: 50.5 MG/DL (ref 28–217)
GFR AFRICAN AMERICAN: >60 ML/MIN/1.73M2
GFR NON-AFRICAN AMERICAN: >60 ML/MIN/1.73M2
GLUCOSE BLD-MCNC: 104 MG/DL (ref 70–99)
GLUCOSE, URINE: NEGATIVE MG/DL
KETONES, URINE: NEGATIVE MG/DL
LEUKOCYTE ESTERASE, URINE: NEGATIVE
MAGNESIUM: 1.9 MG/DL (ref 1.8–2.4)
NITRITE URINE, QUANTITATIVE: NEGATIVE
PH, URINE: 7 (ref 5–8)
PHOSPHORUS: 3.5 MG/DL (ref 2.5–4.9)
POTASSIUM SERPL-SCNC: 4 MMOL/L (ref 3.5–5.1)
PROT/CREAT RATIO, UR: 0.2
PROTEIN UA: NEGATIVE MG/DL
RBC URINE: <1 /HPF (ref 0–6)
SODIUM BLD-SCNC: 138 MMOL/L (ref 135–145)
SPECIFIC GRAVITY UA: 1.01 (ref 1–1.03)
TRICHOMONAS: NORMAL /HPF
URINE TOTAL PROTEIN: 8.4 MG/DL
UROBILINOGEN, URINE: NEGATIVE MG/DL (ref 0.2–1)
WBC UA: 3 /HPF (ref 0–5)

## 2021-08-23 PROCEDURE — 36415 COLL VENOUS BLD VENIPUNCTURE: CPT

## 2021-08-23 PROCEDURE — 82040 ASSAY OF SERUM ALBUMIN: CPT

## 2021-08-23 PROCEDURE — 81001 URINALYSIS AUTO W/SCOPE: CPT

## 2021-08-23 PROCEDURE — 84100 ASSAY OF PHOSPHORUS: CPT

## 2021-08-23 PROCEDURE — 83735 ASSAY OF MAGNESIUM: CPT

## 2021-08-23 PROCEDURE — 82570 ASSAY OF URINE CREATININE: CPT

## 2021-08-23 PROCEDURE — 84156 ASSAY OF PROTEIN URINE: CPT

## 2021-08-23 PROCEDURE — 80048 BASIC METABOLIC PNL TOTAL CA: CPT

## 2021-08-23 PROCEDURE — 84105 ASSAY OF URINE PHOSPHORUS: CPT

## 2021-09-20 ENCOUNTER — HOSPITAL ENCOUNTER (OUTPATIENT)
Dept: CT IMAGING | Age: 61
Discharge: HOME | End: 2021-09-20
Payer: COMMERCIAL

## 2021-09-20 DIAGNOSIS — R91.1 LUNG NODULE: ICD-10-CM

## 2021-09-20 PROCEDURE — 71250 CT THORAX DX C-: CPT

## 2021-10-05 RX ORDER — HYDRALAZINE HYDROCHLORIDE 50 MG/1
75 TABLET, FILM COATED ORAL 3 TIMES DAILY
Qty: 135 TABLET | Refills: 5 | Status: SHIPPED | OUTPATIENT
Start: 2021-10-05 | End: 2022-10-25 | Stop reason: SDUPTHER

## 2021-10-05 NOTE — TELEPHONE ENCOUNTER
Patient called asking for a refill Hydralazine 50 mg three times she stated she was restarted   Back in June  Jud Chisholm

## 2021-10-05 NOTE — TELEPHONE ENCOUNTER
Ordered in June at [facility], verified in care everywhere.  Send to [employer]

## 2021-10-22 ENCOUNTER — CARE COORDINATION (OUTPATIENT)
Dept: CARE COORDINATION | Age: 61
End: 2021-10-22

## 2021-11-03 ENCOUNTER — CARE COORDINATION (OUTPATIENT)
Dept: CARE COORDINATION | Age: 61
End: 2021-11-03

## 2021-11-03 NOTE — CARE COORDINATION
Ambulatory Care Coordination Note  11/3/2021  CM Risk Score: 3  Charlson 10 Year Mortality Risk Score: 79%     ACC: Lore Doe, RN    Summary Note: Call to pt for acm f/u. Has had covid shot plus booster and had flu shot. Discussed copd/chf zm and daily weights and pt reports she has picked up 20 lbs in 6 months. Has f/u with pcp Dr. Jose Merrill scheduled. Would like info on heart healthy diet/food list Plan: will refer to [facility] for heart healthy diet info and plan to graduate over next 1-2 encounters. RD referral sent to [facility]. Care Coordination Interventions    Program Enrollment: Complex Care  Referral from Primary Care Provider: No  Suggested Interventions and [address]: Completed  Medication Assistance Program: In Process  Zone Management Tools: Completed         Goals Addressed                 This Visit's Progress     Conditions and Symptoms   On track     I will notify my provider of any barriers to my plan of care. I will notify my provider of any symptoms that indicate a worsening of my condition. Barriers: none  Plan for overcoming my barriers: support an education from [facility]  Confidence: 10/10  Anticipated Goal Completion Date: 12/15/21              Prior to Admission medications    Medication Sig Start Date End Date Taking? Authorizing Provider   hydrALAZINE (APRESOLINE) 50 MG tablet Take 1.5 tablets by mouth 3 times daily 10/5/21   Jatin Velazquez, BRIAN - CNP   ipratropium-albuterol (DUONEB) 0.5-2.5 (3) MG/3ML SOLN nebulizer solution Take 3 mLs by nebulization 4 times daily 9/8/21   Mariam Trent MD   acetaminophen (TYLENOL 8 HOUR ARTHRITIS PAIN) 650 MG extended release tablet Take 650 mg by mouth as needed for Pain    Historical Provider, MD   vitamin D (CHOLECALCIFEROL) 25 MCG (1000 UT) TABS tablet Take 1,000 Units by mouth daily    Historical Provider, MD   busPIRone (BUSPAR) 10 MG tablet Take 1 tablet by mouth 2 times daily 5/4/21 Ousmane Herron MD   dicyclomine (BENTYL) 10 MG capsule Take 1 capsule by mouth 4 times daily as needed (adominal cramps)  Patient taking differently: Take 10 mg by mouth 4 times daily  5/4/21   Ousmane Herron MD   ferrous sulfate (IRON 325) 325 (65 Fe) MG tablet Take 1 tablet by mouth every other day Indications: pt taking every day bid Monday, wed, fri  Patient taking differently: Take 325 mg by mouth 2 times daily Indications: pt taking every day bid  5/4/21   Ousmane Herron MD   furosemide (LASIX) 20 MG tablet Take 1 tablet by mouth daily as needed (sob/leg swelling) Weigh self daily Current weight 174lbs Take pill when greater than 175lbs  As needed 5/4/21   Ousmane Herron MD   dilTIAZem (CARDIZEM CD) 120 MG extended release capsule Take 1 capsule by mouth daily 1/18/21   Ifeoma Clements MD   metoprolol succinate (TOPROL XL) 25 MG extended release tablet Take 1 tablet by mouth daily 1/18/21   Ifeoma Clements MD   clonazePAM (KLONOPIN) 1 MG disintegrating tablet Take 1 mg by mouth 3 times daily as needed. Historical Provider, MD   ondansetron (ZOFRAN ODT) 4 MG disintegrating tablet Take 1 tablet by mouth every 8 hours as needed for Nausea or Vomiting Place under the tongue and let it melt and absorb from under your tongue.  12/2/20   Tanner Villatoro MD   lactobacillus (CULTURELLE) capsule Take 1 capsule by mouth daily (with breakfast) 11/27/20   Santa Felix MD   sucralfate (CARAFATE) 1 GM tablet Take 1 tablet by mouth every 12 hours 11/26/20   Santa Felix MD   theophylline (MIAH-24) 200 MG extended release capsule Take 400 mg by mouth daily    Historical Provider, MD   budesonide-formoterol (SYMBICORT) 160-4.5 MCG/ACT AERO USE 2 INHALATIONS TWICE A DAY 10/2/20   Max Acharya MD   guaiFENesin (MUCINEX) 600 MG extended release tablet Take 1 tablet by mouth 2 times daily 7/21/20   Honor Ahumada, MD   montelukast (SINGULAIR) 10 MG tablet Take 1 tablet by mouth daily 7/21/20   Fior Tiwari MD   levothyroxine (SYNTHROID) 100 MCG tablet Take 1 tablet by mouth Daily 7/21/20   Fior Tiwari MD   folic acid (FOLVITE) 1 MG tablet Take 1 tablet by mouth daily 7/16/20   Yuki Segovia MD   vitamin B-1 100 MG tablet Take 1 tablet by mouth daily 7/16/20   Yuki Segovia MD   albuterol-ipratropium (COMBIVENT RESPIMAT)  MCG/ACT AERS inhaler Inhale 1 puff into the lungs every 4 hours as needed for Wheezing 7/1/20   Fior Tiwari MD   DULoxetine (CYMBALTA) 60 MG extended release capsule Take 60 mg by mouth daily    Historical Provider, MD   traZODone (DESYREL) 50 MG tablet Take 100 mg by mouth nightly     Historical Provider, MD   fluticasone [facility]) 50 MCG/ACT nasal spray 2 sprays by Nasal route daily 3/28/18   Sole Courtney MD       Future Appointments   Date Time Provider Wiliam Zuñiga   11/5/2021  4:40 PM Maxine Knowles  [address] [facility]   11/17/2021  4:00 PM Fior Tiwari MD AFL [facility] AFL Max Ran   3/1/2022  3:45 PM Valdez Chacon MD AFLADVNPH[facility] AFL ADV NEPH     ,   Congestive Heart Failure Assessment    Are you currently restricting fluids?: No Restriction  Do you understand a low sodium diet?: Yes  How many restaurant meals do you eat per week?: 1-2  Do you salt your food before tasting it?: No     No patient-reported symptoms      Symptoms:  None: Yes      Symptom course: stable  Weight trend: stable  Salt intake watch compared to last visit: stable     ,   COPD Assessment    Does the patient understand envrionmental exposure?: Yes  Is the patient able to verbalize Rescue vs. Long Acting medications?: Yes  Does the patient have a nebulizer?: Yes  Does the patient use a space with inhaled medications?: No     No patient-reported symptoms         Symptoms:     Have you had a recent diagnosis of pneumonia either by PCP or at a hospital?: No      and   General Assessment    Do you have any symptoms that are causing concern?: No

## 2021-11-04 ENCOUNTER — CARE COORDINATION (OUTPATIENT)
Dept: CARE COORDINATION | Age: 61
End: 2021-11-04

## 2021-11-04 ASSESSMENT — ENCOUNTER SYMPTOMS
GASTROINTESTINAL NEGATIVE: 1
RESPIRATORY NEGATIVE: 1
EYES NEGATIVE: 1

## 2021-11-04 NOTE — CARE COORDINATION
Candi Fishman  November 4, 2021    Initial Referral Reason: CHF    Patient Care Team:  Quintin David MD as PCP - General (Internal Medicine)  Lakeshia Keller MD as Consulting Physician (Cardiology)  Nicole Peguero RN as [address] [place] [address]  Oneil Payan RD, LD as Dietitian (Dietitian)    Past Medical History:    Current Outpatient Medications   Medication Sig Dispense Refill    hydrALAZINE (APRESOLINE) 50 MG tablet Take 1.5 tablets by mouth 3 times daily 135 tablet 5    ipratropium-albuterol (DUONEB) 0.5-2.5 (3) MG/3ML SOLN nebulizer solution Take 3 mLs by nebulization 4 times daily 360 mL 3    acetaminophen (TYLENOL 8 HOUR ARTHRITIS PAIN) 650 MG extended release tablet Take 650 mg by mouth as needed for Pain      vitamin D (CHOLECALCIFEROL) 25 MCG (1000 UT) TABS tablet Take 1,000 Units by mouth daily      busPIRone (BUSPAR) 10 MG tablet Take 1 tablet by mouth 2 times daily 1 tablet 0    dicyclomine (BENTYL) 10 MG capsule Take 1 capsule by mouth 4 times daily as needed (adominal cramps) (Patient taking differently: Take 10 mg by mouth 4 times daily ) 360 capsule 1    ferrous sulfate (IRON 325) 325 (65 Fe) MG tablet Take 1 tablet by mouth every other day Indications: pt taking every day bid Monday, wed, fri (Patient taking differently: Take 325 mg by mouth 2 times daily Indications: pt taking every day bid ) 30 tablet 3    furosemide (LASIX) 20 MG tablet Take 1 tablet by mouth daily as needed (sob/leg swelling) Weigh self daily Current weight 174lbs Take pill when greater than 175lbs  As needed 30 tablet 0    dilTIAZem (CARDIZEM CD) 120 MG extended release capsule Take 1 capsule by mouth daily 90 capsule 3    metoprolol succinate (TOPROL XL) 25 MG extended release tablet Take 1 tablet by mouth daily 30 tablet 3    clonazePAM (KLONOPIN) 1 MG disintegrating tablet Take 1 mg by mouth 3 times daily as needed.        ondansetron (ZOFRAN ODT) 4 MG disintegrating tablet Take 1 tablet by mouth for: LABVLDL  No results found for: [facility]    Lab Results   Component Value Date    WBC 12.5 (H) 06/12/2021    HGB 12.2 (L) 06/12/2021    HCT 38.1 06/12/2021    MCV 93.4 06/12/2021     06/12/2021       Lab Results   Component Value Date    CREATININE 0.9 08/23/2021    BUN 12 08/23/2021     08/23/2021    K 4.0 08/23/2021    CL 94 (L) 08/23/2021    CO2 37 (H) 08/23/2021         Anthropometric Measurements:    Height: 61 inches (154.9 cm)  Weight: 188 lb (85.3 kg)  BMI: 35.52 (obesity class II)  IBW: 105 lb (47.7 kg) +-10%  %IBW: 179.04%   AdBW: 138 lb (62.7 kg)    Physical Exam Findings:  Deferred    Nutrition Interview: RD called pt, explained reason for call and role in care. Pt states she typically eats 1-2 meals/day. See food recall below. RD explained the importance of watching sodium to prevent body from holding extra fluid. Explained the American Heart Association recommends no more than 2300 mg of sodium per day, which is equivalent to 1 teaspoon of salt to put it into perspective. RD explained the nutrition plan for heart failure usually limits the sodium from food and beverages to no more than 2000 mg per day. Discussed avoiding the salt shaker when eating/cooking- RD encouraged pt to put it in a place that is not accessible and to use alternatives such as Mrs. Dash, black pepper, rosemary, etc. Explained how sodium is hidden in a lot of foods and the importance of reading food labels-choosing foods with 140 mg of sodium or less per serving. Discussed draining/rinsing canned goods to decrease sodium content. Reviewed foods high in sodium to avoid/limit eating. Pt states last night her son made tacos- RD explained packages of taco seasoning tend to be very high in sodium and discussed alternatives. RD discussed watching portion sizes and reiterated the importance of reading food labels. RD reviewed the importance of weighing self daily.  Pt states she recently started monitoring daily weights. Reviewed to call PCP if pt has a 3 lb weight gain in one day or a 5 lb weight gain in 2 days. Pt verbalizes understanding. RD explained the importance of eating balanced meals consistently throughout the day and making these meals balanced using the MyPlate [id number]-1/5 plate fruits and/or vegetables, 1/4 plate protein and 1/4 plate starchy carbohydrates with 8 oz glass of low fat milk if desired. Provided an example of a balanced meal: baked chicken with brown rice and broccoli. Discussed making small changes and the big impact they will have with time. Pt has no nutrition related questions at this time. RD offered to mail educational handouts to pt to reinforce concepts discussed during phone conversation, pt accepted and very appreciative. RD verified address. 24-Hour Diet Recall  Pt states she eats 1-2 meals/day and examples provided include spaghetti, chicken, tacos    Nutrition Diagnosis:  #1 Problem Food and nutrition-related knowledge deficit       Etiology related to lack of prior nutrition related education regarding sodium       Signs/Symptoms as evidenced by conversation with patient and referral for nutrition education     Nutrition Intervention:     Estimated Needs  cardiac diet providing 1400 kcals to promote wt loss ([address] based on AdBW for obesity class II). Estimated daily CHO Needs: 193 g (based on 45-65% total calorie intake)  Estimated daily Protein Needs: 50-63 g (based on 0.8-1.0 g/kg based on AdBW for obesity class II)  Estimated daily Fluid Needs: 64 oz. #1 Nutrition Information: Provided patient with Heart Healthy Nutrition Therapy handouts. For reinforcement of concepts discussed during nutrition interview. #2 Nutrition Counseling: Used open-ended questions to assess patients perceived susceptibility and severity of disease state. Discussed potential impact of health threat on patient's lifestyle.  Used open-ended questions to assess patient's perception regarding benefits of and barriers to implementation of nutrition therapy. #3 Nutrition Education: Clearly defined the benefits of nutrition therapy. Summarized and affirmed positive aspects of current nutrition patterns. Provided education regarding value of adherence to cardiac diet. Discussed ways to establish applying concepts of alternatives and choices regarding implementation of diet. Explored ideas for small, incremental goals to initiate behavior change. Nutrition Monitoring and Evaluation:     Indicator/Goal Criteria   #1 Eat balanced meals consistently throughout the day. #1 Focus on eating 3 meals/day and make these meals balanced using the MyPlate GZYTNLXLU-5/1 plate fruits and/or vegetables, 1/4 plate protein and 1/4 plate starchy carbohydrates with 8 oz glass of low fat milk if desired. #2 Monitor daily sodium intake. Keep sodium from food and beverages to no more than 2000 mg/day. #2 Avoid the salt shaker. Read food labels to help choose lower sodium options (<140 mg of sodium per serving). #3 Monitor daily weights. #3 Weigh self daily and keep a log. Follow Up: RD will call pt in 2 weeks to follow up and make sure pt received handouts in mail. RD will answer any nutrition related questions at this time.      [address] [facility], [address] [place] [address]

## 2021-11-05 ENCOUNTER — OFFICE VISIT (OUTPATIENT)
Dept: CARDIOLOGY CLINIC | Age: 61
End: 2021-11-05
Payer: COMMERCIAL

## 2021-11-05 VITALS — DIASTOLIC BLOOD PRESSURE: 72 MMHG | HEART RATE: 68 BPM | SYSTOLIC BLOOD PRESSURE: 122 MMHG

## 2021-11-05 DIAGNOSIS — I48.0 PAROXYSMAL ATRIAL FIBRILLATION (HCC): Primary | ICD-10-CM

## 2021-11-05 PROCEDURE — G8428 CUR MEDS NOT DOCUMENT: HCPCS | Performed by: INTERNAL MEDICINE

## 2021-11-05 PROCEDURE — 3017F COLORECTAL CA SCREEN DOC REV: CPT | Performed by: INTERNAL MEDICINE

## 2021-11-05 PROCEDURE — G8417 CALC BMI ABV UP PARAM F/U: HCPCS | Performed by: INTERNAL MEDICINE

## 2021-11-05 PROCEDURE — 1036F TOBACCO NON-USER: CPT | Performed by: INTERNAL MEDICINE

## 2021-11-05 PROCEDURE — G8484 FLU IMMUNIZE NO ADMIN: HCPCS | Performed by: INTERNAL MEDICINE

## 2021-11-05 PROCEDURE — 99214 OFFICE O/P EST MOD 30 MIN: CPT | Performed by: INTERNAL MEDICINE

## 2021-11-05 NOTE — PROGRESS NOTES
Jer Norman MD        OFFICE  FOLLOWUP NOTE    Chief complaints: patient is here for management of  leg swelling,SVT, bipolar, fibromyalgia, chest pain, rt lung, end stage lung disease and COPD, pericardial drainage and [facility],? PAF, bleeding gastritis. Intracranial hemorrhage    Subjective: +shortness of breath, no dizziness, no palpitations    HPI Bry Ellie is a 63 y. o.year old who  has a past medical history of Anemia, Anxiety, Arthritis, Back pain at L4-L5 level, Bipolar 1 disorder (HCC), COPD (chronic obstructive pulmonary disease) ([facility] [address] Emphysema of lung ([facility] [address] FH: CAD (coronary artery disease), Fibromyalgia, Full dentures, Gastric ulcer, H/O Doppler ultrasound, H/O echocardiogram, History of blood transfusion, Hyperlipidemia LDL goal <100, Hypertension, Irregular heartbeat, Obstructive sleep apnea, On home oxygen therapy, Osteoarthritis, Pericardial effusion, Spinal stenosis, Thyroid disease, and Wears glasses. and presents for management of  leg swelling,SVT, bipolar, fibromyalgia, chest pain, rt lung, end stage lung disease and COPD, pericardial drainage and [facility],? PAF, bleeding gastritis.  Intracranial hemorrhage which are well controlled      Current Outpatient Medications   Medication Sig Dispense Refill    hydrALAZINE (APRESOLINE) 50 MG tablet Take 1.5 tablets by mouth 3 times daily 135 tablet 5    ipratropium-albuterol (DUONEB) 0.5-2.5 (3) MG/3ML SOLN nebulizer solution Take 3 mLs by nebulization 4 times daily 360 mL 3    acetaminophen (TYLENOL 8 HOUR ARTHRITIS PAIN) 650 MG extended release tablet Take 650 mg by mouth as needed for Pain      vitamin D (CHOLECALCIFEROL) 25 MCG (1000 UT) TABS tablet Take 1,000 Units by mouth daily      busPIRone (BUSPAR) 10 MG tablet Take 1 tablet by mouth 2 times daily 1 tablet 0    dicyclomine (BENTYL) 10 MG capsule Take 1 capsule by mouth 4 times daily as needed (adominal cramps) (Patient not taking: Reported on 11/5/2021) 360 capsule 1    ferrous sulfate (IRON 325) 325 (65 Fe) MG tablet Take 1 tablet by mouth every other day Indications: pt taking every day bid Monday, wed, fri (Patient taking differently: Take 325 mg by mouth 2 times daily Indications: pt taking every day bid ) 30 tablet 3    furosemide (LASIX) 20 MG tablet Take 1 tablet by mouth daily as needed (sob/leg swelling) Weigh self daily Current weight 174lbs Take pill when greater than 175lbs  As needed (Patient not taking: Reported on 11/5/2021) 30 tablet 0    dilTIAZem (CARDIZEM CD) 120 MG extended release capsule Take 1 capsule by mouth daily 90 capsule 3    metoprolol succinate (TOPROL XL) 25 MG extended release tablet Take 1 tablet by mouth daily 30 tablet 3    clonazePAM (KLONOPIN) 1 MG disintegrating tablet Take 1 mg by mouth 3 times daily as needed.  ondansetron (ZOFRAN ODT) 4 MG disintegrating tablet Take 1 tablet by mouth every 8 hours as needed for Nausea or Vomiting Place under the tongue and let it melt and absorb from under your tongue.  30 tablet 3    lactobacillus (CULTURELLE) capsule Take 1 capsule by mouth daily (with breakfast) 30 capsule 0    sucralfate (CARAFATE) 1 GM tablet Take 1 tablet by mouth every 12 hours (Patient not taking: Reported on 11/5/2021) 120 tablet 3    theophylline (MIAH-24) 200 MG extended release capsule Take 400 mg by mouth daily      budesonide-formoterol (SYMBICORT) 160-4.5 MCG/ACT AERO USE 2 INHALATIONS TWICE A DAY 30.6 g 3    guaiFENesin (MUCINEX) 600 MG extended release tablet Take 1 tablet by mouth 2 times daily 30 tablet 0    montelukast (SINGULAIR) 10 MG tablet Take 1 tablet by mouth daily 30 tablet 3    levothyroxine (SYNTHROID) 100 MCG tablet Take 1 tablet by mouth Daily 30 tablet 2    folic acid (FOLVITE) 1 MG tablet Take 1 tablet by mouth daily 30 tablet 3    vitamin B-1 100 MG tablet Take 1 tablet by mouth daily 30 tablet 3    albuterol-ipratropium (COMBIVENT RESPIMAT)  MCG/ACT AERS inhaler Inhale 1 puff into the lungs every 4 hours as needed for Wheezing 3 Inhaler 0    DULoxetine (CYMBALTA) 60 MG extended release capsule Take 60 mg by mouth daily      traZODone (DESYREL) 50 MG tablet Take 100 mg by mouth nightly       fluticasone (FLONASE) 50 MCG/ACT nasal spray 2 sprays by Nasal route daily 1 Bottle 0     No current facility-administered medications for this visit.      Allergies: Lisinopril  Past Medical History:   Diagnosis Date    Anemia     Anxiety 02/16/2017    follows with Dr Ash Choudhary    Arthritis     Back pain at L4-L5 level 2/16/2017    Dr Espinoza Rios 1 disorder ([facility] [address]     per pt on 2/5/2021\"never told I have bipolar\"    COPD (chronic obstructive pulmonary disease) ([facility] [address]     follows with Dr Fifi Rosales Emphysema of lung ([facility]     FH: CAD (coronary artery disease) 2/16/2017    Father had in his forties, sister in her thirties    [employer] Fibromyalgia 02/16/2017    Full dentures     full upper plate and partial on the bottom    Gastric ulcer     \"had stomach ulder back fall 2019\"    H/O Doppler ultrasound 04/05/2019    venous- no DVT or reflux    H/O echocardiogram 01/14/2015    EF50-55% Normal- see media    History of blood transfusion     Hyperlipidemia LDL goal <100     Hypertension     Dr Jody Hanson Irregular heartbeat     \"they said I have irreg heart beat before- back when they drained fluid around my heart \"    Obstructive sleep apnea     \"have bipap I use at home\"    On home oxygen therapy     \"on oxygen all the time at home and keep it on 2.5 liters\"    Osteoarthritis     Pericardial effusion     per old chart had pericardial effusion 10/2020    Spinal stenosis     hx per old chart    Thyroid disease     Wears glasses     \"suppose to wear glasses\"     Past Surgical History:   Procedure Laterality Date    BACK SURGERY  03/2017    \"surgery on low back L5-6- not sure if they put any metal in \"   Miri Pulse      10/2019    CARDIAC CATHETERIZATION      per old chart had cath done 2020    CARPAL TUNNEL RELEASE Right 1985    CHOLECYSTECTOMY, LAPAROSCOPIC N/A 10/25/2020    CHOLECYSTECTOMY LAPAROSCOPIC WITH IOC performed by Ayesha Escudero MD at [place] [address] COLONOSCOPY N/A 2019    COLONOSCOPY DIAGNOSTIC performed by Jyoti Nguyen MD at [address] [place] [address], DIAGNOSTIC      per old chart had egd done 2018    TUBAL LIGATION  1987    UPPER GASTROINTESTINAL ENDOSCOPY N/A 2021    EGD BIOPSY performed by Jyoti Nguyen MD at [facility] ENDOSCOPY     Family History   Problem Relation Age of Onset    Asthma Mother         COPD    Heart Disease Father      Social History     Tobacco Use    Smoking status: Former Smoker     Packs/day: 1.50     Years: 20.00     Pack years: 30.00     Types: Cigarettes     Quit date: 2008     Years since quittin.8    Smokeless tobacco: Never Used   Substance Use Topics    Alcohol use: Not Currently     Alcohol/week: 2.0 standard drinks     Types: 2 Shots of liquor per week     Comment: per pt on 2021\"quit drinking back Oct 2020\"use to drink wine coolers - 3 times per week \"/caffeine 2-3 coffees aday 1 pop b      @[facility]@  Review of Systems:   · Constitutional: No Fever or Weight Loss   · Eyes: No Decreased Vision  · ENT: No Headaches, Hearing Loss or Vertigo  · Cardiovascular: No chest pain,+ dyspnea on exertion, palpitations or loss of consciousness  · Respiratory: No cough or wheezing    · Gastrointestinal: No abdominal pain, appetite loss, blood in stools, constipation, diarrhea or heartburn  · Genitourinary: No dysuria, trouble voiding, or hematuria  · Musculoskeletal:  No gait disturbance, weakness or joint complaints  · Integumentary: No rash or pruritis  · Neurological: No TIA or stroke symptoms  · Psychiatric: No anxiety or depression  · Endocrine: No malaise, fatigue or temperature intolerance  · Hematologic/Lymphatic: No bleeding problems, blood clots or swollen lymph nodes  · Allergic/Immunologic: No nasal congestion or hives  All systems negative except as marked. Objective:  /72   Pulse 68   LMP 01/05/2010   Wt Readings from Last 3 Encounters:   09/08/21 188 lb (85.3 kg)   08/30/21 188 lb 12.8 oz (85.6 kg)   07/16/21 181 lb 14.4 oz (82.5 kg)     There is no height or weight on file to calculate BMI. GENERAL - Alert, oriented, pleasant, in no apparent distress,normal grooming  HEENT  pupils are intact, cornea intact, conjunctive normal color, ears are normal in exam,  Neck - Supple. No jugular venous distention noted. No carotid bruits, no apical -carotid delay  Respiratory:  Normal breath sounds, No respiratory distress, No wheezing, No chest tenderness. ,no use of accessory muscles, diaphragm movement is normal  Cardiovascular: (PMI) apex non displaced,no lifts no thrills, no s3,no s4, Normal heart rate, Normal rhythm, No murmurs, No rubs, No gallops. Carotid arteries pulse and amplitude are normal no bruit, no abdominal bruit noted ( normal abdominal aorta ausculation),   Extremities - No cyanosis, clubbing, or significant edema, no varicose veins    Abdomen  No masses, tenderness, or organomegaly, no hepato-splenomegally, no bruits  Musculoskeletal  No significant edema, no kyphosis or scoliosis, no deformity in any extremity noted, muscle strength and tone are normal  Skin: no ulcer,no scar,no stasis dermatitis   Neurologic  alert oriented times 3,Cranial nerves II through XII are grossly intact. There were no gross focal neurologic abnormalities.    Psychiatric: normal mood and affect    Lab Results   Component Value Date    CKTOTAL 126 05/03/2021    TROPONINI 0.007 03/25/2014     BNP:  No results found for: BNP  PT/INR:  No results found for: [employer]  Lab Results   Component Value Date    LABA1C 5.1 06/21/2019     Lab Results   Component Value Date    CHOL 166 11/23/2020    TRIG 111 11/23/2020    HDL 59 11/23/2020    LDLDIRECT 87 11/23/2020     Lab Results   Component Value Date    ALT 15 06/12/2021    AST 13 (L) 06/12/2021     TSH:  No results found for: TSH    Impression:  Waylon Fry is a 63 y. o.year old who  has a past medical history of Anemia, Anxiety, Arthritis, Back pain at L4-L5 level, Bipolar 1 disorder (HCC), COPD (chronic obstructive pulmonary disease) ([facility] [address] Emphysema of lung ([facility] [address] FH: CAD (coronary artery disease), Fibromyalgia, Full dentures, Gastric ulcer, H/O Doppler ultrasound, H/O echocardiogram, History of blood transfusion, Hyperlipidemia LDL goal <100, Hypertension, Irregular heartbeat, Obstructive sleep apnea, On home oxygen therapy, Osteoarthritis, Pericardial effusion, Spinal stenosis, Thyroid disease, and Wears glasses. and presents with     Plan:  1. Intracranial hemorrhage: stable, OFF aspirin  2. preo for knee: stable for sx at this time, she had stroke and has severe COPD, recommend to get pulmonary clearance  3. preop for cataract: ok for sx  4. CHF, use lasix PRN  LAST YR RHC at [facility] showed fluid overload with PCWP of 35,, she should lose 10 lbs, continue kdur 40meq and checked chem 7   5. HTN: continue toprol xl 25mg daily,  6. Bleeding gastritis and anemia: on aspirin, need to be carefull, see Dr. Carmen Padgett  7. ? PAF, OFF aspirin, change cardizem to cardizem 120mg cd, RECHECKed ECHO is normal  8. Normal LHC  9. S/p pericardiocentesis  10. endstage  Lung disease: on home oxygen  11. Sinus tachycardia: seen by EP  In hospital, had SVT and sinus tachycardia and was started on cardizem, will continue it  12. End stage COPD:continue oxygen stable, continue inhalers, and steroids  13. Bipolar: stable  Fibromyalgia: stableHealth All labs, medications and tests reviewed, continue all other medications of all above medical condition listed as is.     [unfilled]

## 2021-11-07 ENCOUNTER — HOSPITAL ENCOUNTER (EMERGENCY)
Age: 61
Discharge: HOME | End: 2021-11-07
Attending: EMERGENCY MEDICINE
Payer: COMMERCIAL

## 2021-11-07 VITALS
SYSTOLIC BLOOD PRESSURE: 140 MMHG | RESPIRATION RATE: 17 BRPM | WEIGHT: 187 LBS | TEMPERATURE: 98.1 F | HEART RATE: 75 BPM | OXYGEN SATURATION: 98 % | BODY MASS INDEX: 34.41 KG/M2 | HEIGHT: 62 IN | DIASTOLIC BLOOD PRESSURE: 80 MMHG

## 2021-11-07 DIAGNOSIS — M54.31 SCIATICA OF RIGHT SIDE: Primary | ICD-10-CM

## 2021-11-07 PROCEDURE — 96372 THER/PROPH/DIAG INJ SC/IM: CPT

## 2021-11-07 PROCEDURE — 6370000000 HC RX 637 (ALT 250 FOR IP): Performed by: EMERGENCY MEDICINE

## 2021-11-07 PROCEDURE — 6360000002 HC RX W HCPCS: Performed by: EMERGENCY MEDICINE

## 2021-11-07 PROCEDURE — 99284 EMERGENCY DEPT VISIT MOD MDM: CPT

## 2021-11-07 RX ORDER — METHYLPREDNISOLONE 4 MG/1
TABLET ORAL
Qty: 1 KIT | Refills: 0 | Status: SHIPPED | OUTPATIENT
Start: 2021-11-07 | End: 2021-11-13

## 2021-11-07 RX ORDER — HYDROCODONE BITARTRATE AND ACETAMINOPHEN 5; 325 MG/1; MG/1
1 TABLET ORAL EVERY 6 HOURS PRN
Qty: 12 TABLET | Refills: 0 | Status: SHIPPED | OUTPATIENT
Start: 2021-11-07 | End: 2021-11-10

## 2021-11-07 RX ORDER — ORPHENADRINE CITRATE 30 MG/ML
60 INJECTION INTRAMUSCULAR; INTRAVENOUS ONCE
Status: COMPLETED | OUTPATIENT
Start: 2021-11-07 | End: 2021-11-07

## 2021-11-07 RX ORDER — KETOROLAC TROMETHAMINE 30 MG/ML
30 INJECTION, SOLUTION INTRAMUSCULAR; INTRAVENOUS ONCE
Status: DISCONTINUED | OUTPATIENT
Start: 2021-11-07 | End: 2021-11-07

## 2021-11-07 RX ORDER — HYDROCODONE BITARTRATE AND ACETAMINOPHEN 5; 325 MG/1; MG/1
1 TABLET ORAL ONCE
Status: COMPLETED | OUTPATIENT
Start: 2021-11-07 | End: 2021-11-07

## 2021-11-07 RX ADMIN — ORPHENADRINE CITRATE 60 MG: 30 INJECTION INTRAMUSCULAR; INTRAVENOUS at 17:58

## 2021-11-07 RX ADMIN — HYDROCODONE BITARTRATE AND ACETAMINOPHEN 1 TABLET: 5; 325 TABLET ORAL at 17:30

## 2021-11-07 ASSESSMENT — PAIN DESCRIPTION - LOCATION: LOCATION: BUTTOCKS;BACK;HIP;LEG

## 2021-11-07 ASSESSMENT — PAIN SCALES - GENERAL
PAINLEVEL_OUTOF10: 9
PAINLEVEL_OUTOF10: 9

## 2021-11-07 ASSESSMENT — PAIN DESCRIPTION - FREQUENCY: FREQUENCY: CONTINUOUS

## 2021-11-07 ASSESSMENT — PAIN DESCRIPTION - ORIENTATION: ORIENTATION: RIGHT

## 2021-11-07 NOTE — ED PROVIDER NOTES
EMERGENCY DEPARTMENT ENCOUNTER      CHIEF COMPLAINT:   Back Pain    HPI: Ponce Bean is a 63 y.o. female who presents to the emergency department, complaining of right lower back and buttock pain that radiates into the right hip and leg. The patient states the pain started yesterday. It has been persistent. She describes it as achy and throbbing. It radiates from the right lower back down the right buttock and into the right hip and leg. She rates it as 9 out of 10 pain. It is constant. It is worse with movement and better with rest.  She denies any loss of bowel or bladder function with this episode of pain. She denies any associated numbness, tingling or weakness in the leg. She denies saddle anesthesia. She denies any recent trauma. She did have back surgery in 2017. She denies any fevers. She denies chest pain, shortness of breath, nausea, vomiting or any other complaints. REVIEW OF SYSTEMS:   Constitutional:  Denies fever or chills  Eyes:  Denies change in visual acuity  HENT:  Denies nasal congestion or sore throat  Respiratory:  Denies cough or shortness of breath  Cardiovascular:  Denies chest pain or edema  GI:  Denies abdominal pain, nausea, vomiting, bloody stools or diarrhea  :  Denies dysuria  Musculoskeletal:  Complains of back pain  Integument:  Denies rash  Neurologic:  Denies headache, focal weakness or sensory changes  \"Remaining review of systems reviewed and negative. I have reviewed the nursing triage documentation and agree unless otherwise noted below. \"      PAST MEDICAL HISTORY:   Past Medical History:   Diagnosis Date    Anemia     Anxiety 02/16/2017    follows with Dr Gloria Ag    Arthritis     Back pain at L4-L5 level 2/16/2017    Dr Nelly Srinivasan Se Bipolar 1 disorder ([facility] [address]     per pt on 2/5/2021\"never told I have bipolar\"    COPD (chronic obstructive pulmonary disease) ([facility] [address]     follows with Dr Tu Mack Emphysema of lung ([facility] [address]     FH: CAD (coronary artery disease) 2/16/2017    Father had in his forties, sister in her thirties    [place] Fibromyalgia 02/16/2017    Full dentures     full upper plate and partial on the bottom    Gastric ulcer     \"had stomach ulder back fall 2019\"    H/O Doppler ultrasound 04/05/2019    venous- no DVT or reflux    H/O echocardiogram 01/14/2015    EF50-55% Normal- see media    History of blood transfusion     Hyperlipidemia LDL goal <100     Hypertension     Dr Jody Hanson Irregular heartbeat     \"they said I have irreg heart beat before- back when they drained fluid around my heart \"    Obstructive sleep apnea     \"have bipap I use at home\"    On home oxygen therapy     \"on oxygen all the time at home and keep it on 2.5 liters\"    Osteoarthritis     Pericardial effusion     per old chart had pericardial effusion 10/2020    Spinal stenosis     hx per old chart    Thyroid disease     Wears glasses     \"suppose to wear glasses\"       CURRENT MEDICATIONS:   Home medications reviewed.     SURGICAL HISTORY:   Past Surgical History:   Procedure Laterality Date    BACK SURGERY  03/2017    \"surgery on low back L5-6- not sure if they put any metal in \"   Miri Pulse      10/2019    CARDIAC CATHETERIZATION      per old chart had cath done 11/2020    CARPAL TUNNEL RELEASE Right 1985    CHOLECYSTECTOMY, LAPAROSCOPIC N/A 10/25/2020    CHOLECYSTECTOMY LAPAROSCOPIC WITH IOC performed by Sagar Diane MD at [place] [address] COLONOSCOPY N/A 12/12/2019    COLONOSCOPY DIAGNOSTIC performed by Sima Cordero MD at [facility] [place], DIAGNOSTIC      per old chart had egd done 1/2018    TUBAL LIGATION  1987    UPPER GASTROINTESTINAL ENDOSCOPY N/A 1/7/2021    EGD BIOPSY performed by Sima Cordero MD at [facility] ENDOSCOPY       FAMILY HISTORY:   Family History   Problem Relation Age of Onset    Asthma Mother         COPD    Heart Disease Father        SOCIAL HISTORY:   Social History     Socioeconomic History    Marital status:      Spouse name: Not on file    Number of children: Not on file    Years of education: Not on file    Highest education level: Not on file   Occupational History    Not on file   Tobacco Use    Smoking status: Former Smoker     Packs/day: 1.50     Years: 20.00     Pack years: 30.00     Types: Cigarettes     Quit date: 2008     Years since quittin.8    Smokeless tobacco: Never Used   Vaping Use    Vaping Use: Never used   Substance and Sexual Activity    Alcohol use: Not Currently     Alcohol/week: 2.0 standard drinks     Types: 2 Shots of liquor per week     Comment: per pt on 2021\"quit drinking back Oct 2020\"use to drink wine coolers - 3 times per week \"/caffeine 2-3 coffees aday 1 pop b    Drug use: No    Sexual activity: Yes     Partners: Male   Other Topics Concern    Not on file   Social History Narrative    Not on file     Social Determinants of Health     Financial Resource Strain: Low Risk     Difficulty of Paying Living Expenses: Not hard at all   Food Insecurity: No Food Insecurity    Worried About Running Out of Food in the Last Year: Never true    Donny of Food in the Last Year: Never true   Transportation Needs: No Transportation Needs    Lack of Transportation (Medical): No    Lack of Transportation (Non-Medical): No   Physical Activity: Inactive    Days of Exercise per Week: 0 days    Minutes of Exercise per Session: 0 min   Stress: No Stress Concern Present    Feeling of Stress :  Only a little   Social Connections: Socially Isolated    Frequency of Communication with Friends and Family: Once a week    Frequency of Social Gatherings with Friends and Family: Once a week    Attends Nondenominational Services: Never    Active Member of Clubs or Organizations: No    Attends Club or Organization Meetings: Never    Marital Status:    Intimate Partner Violence:     Fear of Current or Ex-Partner: Not on file    Emotionally Abused: Not on file   Jose Greer Physically Abused: Not on file    Sexually Abused: Not on file   Housing Stability: Low Risk     Unable to Pay for Housing in the Last Year: No    Number of Places Lived in the Last Year: 1    Unstable Housing in the Last Year: No       ALLERGIES: Lisinopril    PHYSICAL EXAM:  VITAL SIGNS:   ED Triage Vitals [11/07/21 1658]   Enc Vitals Group      BP (!) 140/80      Pulse 75      Resp 17      Temp 98.1 °F (36.7 °C)      Temp Source Oral      SpO2 98 %      Weight 187 lb (84.8 kg)      Height 5' 1.5\" (1.562 m)      Head Circumference       Peak Flow       Pain Score       Pain Loc       Pain Edu? Excl. in [address]? Constitutional:  Non-toxic appearance  HENT: Normocephalic, Atraumatic  Eyes:  PERRL, Conjunctiva normal, No discharge. Neck: Normal range of motion, No tenderness, Supple, No stridor  Cardiovascular:  Normal heart rate, Normal rhythm  Pulmonary/Chest:  Normal breath sounds, No respiratory distress, No wheezing  Abdomen: Bowel sounds normal, Soft, No tenderness, No masses, No pulsatile masses  Back:  No CVA tenderness to palpation, no midline spinal tenderness to palpation or step-offs, mild right lumbar and buttock paraspinal muscle tenderness to palpation   Extremities:  Normal range of motion, Intact distal pulses, No edema, No tenderness  Neurologic: Alert & oriented x 3, Speech clear, Normal motor function, Sensation intact to light touch throughout, No focal deficits  Skin:  Warm, Dry, No erythema, No rash      EKG Interpretation  None    Radiology / Procedures:  None    ED COURSE & MEDICAL DECISION MAKING:  Pertinent Labs & Imaging studies reviewed. (See chart for details)  On exam, the patient is afebrile and nontoxic appearing. She is hemodynamically stable and neurologically intact. She has reproducible right lumbar paraspinal tenderness to palpation as well as tenderness to palpation over the sciatic notch. She was treated with Norco and Norflex with some improvement.     I suspect that the patient has sciatica. I have a low suspicion for acute fracture, dislocation, epidural abscess, epidural hematoma, cauda equina syndrome or neurological deficit/injury. I feel that the patient is stable for outpatient management with follow up in 2-3 days. She is given return precautions. The patient verbalized understanding, was agreeable with plan, and was discharged home in stable condition. Clinical Impression:  1. Sciatica of right side        Disposition referral (if applicable): Elfego Harrell MD  [address]  [phone]    Schedule an appointment as soon as possible for a visit in 3 days      [facility] Emergency Department  [facility] [id number] [phone]  Go to   If symptoms worsen      Disposition medications (if applicable):  New Prescriptions    HYDROCODONE-ACETAMINOPHEN (NORCO) 5-325 MG PER TABLET    Take 1 tablet by mouth every 6 hours as needed for Pain for up to 3 days. Intended supply: 3 days. Take lowest dose possible to manage pain    METHYLPREDNISOLONE (MEDROL DOSEPACK) 4 MG TABLET    Take by mouth. Comment: Please note this report has been produced using speech recognition software and may contain errors related to that system including errors in grammar, punctuation, and spelling, as well as words and phrases that may be inappropriate. If there are any questions or concerns please feel free to contact the dictating provider for clarification.         Kati Roth MD  11/07/21 2503

## 2021-11-16 ENCOUNTER — APPOINTMENT (OUTPATIENT)
Dept: GENERAL RADIOLOGY | Age: 61
End: 2021-11-16
Payer: COMMERCIAL

## 2021-11-16 PROCEDURE — 99285 EMERGENCY DEPT VISIT HI MDM: CPT

## 2021-11-16 PROCEDURE — 72100 X-RAY EXAM L-S SPINE 2/3 VWS: CPT

## 2021-11-16 PROCEDURE — 6370000000 HC RX 637 (ALT 250 FOR IP): Performed by: PHYSICIAN ASSISTANT

## 2021-11-16 PROCEDURE — 73502 X-RAY EXAM HIP UNI 2-3 VIEWS: CPT

## 2021-11-16 PROCEDURE — 96372 THER/PROPH/DIAG INJ SC/IM: CPT

## 2021-11-16 RX ORDER — ACETAMINOPHEN 325 MG/1
650 TABLET ORAL ONCE
Status: COMPLETED | OUTPATIENT
Start: 2021-11-16 | End: 2021-11-16

## 2021-11-16 RX ADMIN — ACETAMINOPHEN 650 MG: 325 TABLET ORAL at 20:36

## 2021-11-16 ASSESSMENT — PAIN DESCRIPTION - PAIN TYPE: TYPE: CHRONIC PAIN

## 2021-11-16 ASSESSMENT — PAIN SCALES - GENERAL
PAINLEVEL_OUTOF10: 7
PAINLEVEL_OUTOF10: 7

## 2021-11-16 ASSESSMENT — PAIN DESCRIPTION - LOCATION: LOCATION: HIP

## 2021-11-16 ASSESSMENT — PAIN DESCRIPTION - ORIENTATION: ORIENTATION: RIGHT

## 2021-11-17 ENCOUNTER — HOSPITAL ENCOUNTER (EMERGENCY)
Age: 61
Discharge: HOME | End: 2021-11-17
Payer: COMMERCIAL

## 2021-11-17 VITALS
HEART RATE: 67 BPM | HEIGHT: 62 IN | BODY MASS INDEX: 34.41 KG/M2 | RESPIRATION RATE: 18 BRPM | OXYGEN SATURATION: 100 % | WEIGHT: 187 LBS | DIASTOLIC BLOOD PRESSURE: 83 MMHG | TEMPERATURE: 98.2 F | SYSTOLIC BLOOD PRESSURE: 163 MMHG

## 2021-11-17 DIAGNOSIS — M25.551 RIGHT HIP PAIN: Primary | ICD-10-CM

## 2021-11-17 PROCEDURE — 6360000002 HC RX W HCPCS: Performed by: PHYSICIAN ASSISTANT

## 2021-11-17 PROCEDURE — 6370000000 HC RX 637 (ALT 250 FOR IP): Performed by: PHYSICIAN ASSISTANT

## 2021-11-17 RX ORDER — KETOROLAC TROMETHAMINE 30 MG/ML
30 INJECTION, SOLUTION INTRAMUSCULAR; INTRAVENOUS ONCE
Status: COMPLETED | OUTPATIENT
Start: 2021-11-17 | End: 2021-11-17

## 2021-11-17 RX ORDER — HYDROCODONE BITARTRATE AND ACETAMINOPHEN 5; 325 MG/1; MG/1
1 TABLET ORAL ONCE
Status: COMPLETED | OUTPATIENT
Start: 2021-11-17 | End: 2021-11-17

## 2021-11-17 RX ORDER — MELOXICAM 15 MG/1
15 TABLET ORAL DAILY
Qty: 30 TABLET | Refills: 1 | Status: ON HOLD | OUTPATIENT
Start: 2021-11-17 | End: 2022-03-16 | Stop reason: HOSPADM

## 2021-11-17 RX ADMIN — HYDROCODONE BITARTRATE AND ACETAMINOPHEN 1 TABLET: 5; 325 TABLET ORAL at 02:23

## 2021-11-17 RX ADMIN — KETOROLAC TROMETHAMINE 30 MG: 30 INJECTION, SOLUTION INTRAMUSCULAR; INTRAVENOUS at 02:11

## 2021-11-17 ASSESSMENT — PAIN SCALES - GENERAL
PAINLEVEL_OUTOF10: 8
PAINLEVEL_OUTOF10: 8

## 2021-11-17 NOTE — ED PROVIDER NOTES
Triage Chief Complaint:   Hip Pain (right, no new injury )    [place]:  Sterling Mendoza is a 63 y.o. female that presents today complaining of  R  Sided hip  pain. Context is, pt requiest percocet for her pain stating that has worked before. Sites she has a lot of walking to do tomorrow. Pt does have hx of back pain and arhtritis of the hip. No addle anesthesia, no bowel or bladder disfunction. No paresthesias. Pain is ranked * 08/10. Patient admits  To no radiation. Pain is made worse with movement and palpation. Pain relieved some with rest.     ROS:  At least 06 systems reviewed and otherwise negative except as in the [address]     Past Medical History:   Diagnosis Date    Anemia     Anxiety 02/16/2017    follows with Dr Oconnor Minor Back pain at L4-L5 level 2/16/2017    Dr Thomas Cornea 1 disorder ([facility] [address]     per pt on 2/5/2021\"never told I have bipolar\"    COPD (chronic obstructive pulmonary disease) ([facility] [address]     follows with Dr Jose Camejo Emphysema of lung ([facility] [address]     FH: CAD (coronary artery disease) 2/16/2017    Father had in his forties, sister in her thirties    [employer] Fibromyalgia 02/16/2017    Full dentures     full upper plate and partial on the bottom    Gastric ulcer     \"had stomach ulder back fall 2019\"    H/O Doppler ultrasound 04/05/2019    venous- no DVT or reflux    H/O echocardiogram 01/14/2015    EF50-55% Normal- see media    History of blood transfusion     Hyperlipidemia LDL goal <100     Hypertension     Dr Gabriel Riserogelio Irregular heartbeat     \"they said I have irreg heart beat before- back when they drained fluid around my heart \"    Obstructive sleep apnea     \"have bipap I use at home\"    On home oxygen therapy     \"on oxygen all the time at home and keep it on 2.5 liters\"    Osteoarthritis     Pericardial effusion     per old chart had pericardial effusion 10/2020    Spinal stenosis     hx per old chart    Thyroid disease     Wears glasses     \"suppose to wear glasses\"     Past Surgical History:   Procedure Laterality Date    BACK SURGERY  2017    \"surgery on low back L5-6- not sure if they put any metal in \"   [address]      10/2019    CARDIAC CATHETERIZATION      per old chart had cath done 2020    CARPAL TUNNEL RELEASE Right 1985    CHOLECYSTECTOMY, LAPAROSCOPIC N/A 10/25/2020    CHOLECYSTECTOMY LAPAROSCOPIC WITH IOC performed by Joan Richey MD at [place] [address] COLONOSCOPY N/A 2019    COLONOSCOPY DIAGNOSTIC performed by Maci Echeverria MD at [address] [place] [address], DIAGNOSTIC      per old chart had egd done 2018    TUBAL LIGATION  1987    UPPER GASTROINTESTINAL ENDOSCOPY N/A 2021    EGD BIOPSY performed by Maci Echeverria MD at [facility] ENDOSCOPY     Family History   Problem Relation Age of Onset    Asthma Mother         COPD    Heart Disease Father      Social History     Socioeconomic History    Marital status:      Spouse name: Not on file    Number of children: Not on file    Years of education: Not on file    Highest education level: Not on file   Occupational History    Not on file   Tobacco Use    Smoking status: Former Smoker     Packs/day: 1.50     Years: 20.00     Pack years: 30.00     Types: Cigarettes     Quit date: 2008     Years since quittin.8    Smokeless tobacco: Never Used   Vaping Use    Vaping Use: Never used   Substance and Sexual Activity    Alcohol use: Not Currently     Alcohol/week: 2.0 standard drinks     Types: 2 Shots of liquor per week     Comment: per pt on 2021\"quit drinking back Oct 2020\"use to drink wine coolers - 3 times per week \"/caffeine 2-3 coffees aday 1 pop b    Drug use: No    Sexual activity: Yes     Partners: Male   Other Topics Concern    Not on file   Social History Narrative    Not on file     Social Determinants of Health     Financial Resource Strain: Low Risk     Difficulty of Paying Living Expenses: Not hard at all   Food Insecurity: No Food Insecurity    Worried About Running Out of Food in the Last Year: Never true    Ran Out of Food in the Last Year: Never true   Transportation Needs: No Transportation Needs    Lack of Transportation (Medical): No    Lack of Transportation (Non-Medical): No   Physical Activity: Inactive    Days of Exercise per Week: 0 days    Minutes of Exercise per Session: 0 min   Stress: No Stress Concern Present    Feeling of Stress :  Only a little   Social Connections: Socially Isolated    Frequency of Communication with Friends and Family: Once a week    Frequency of Social Gatherings with Friends and Family: Once a week    Attends Roman Catholic Services: Never    Active Member of Clubs or Organizations: No    Attends Club or Organization Meetings: Never    Marital Status:    Intimate Partner Violence:     Fear of Current or Ex-Partner: Not on file    Emotionally Abused: Not on file    Physically Abused: Not on file    Sexually Abused: Not on file   Housing Stability: [facility] [address] Unable to Pay for Housing in the Last Year: No    Number of [place] in the Last Year: 1    Unstable Housing in the Last Year: No     Current Facility-Administered Medications   Medication Dose Route Frequency Provider Last Rate Last Admin    ketorolac (TORADOL) injection 30 mg  30 mg IntraMUSCular Once [place] Alber, PA-C         Current Outpatient Medications   Medication Sig Dispense Refill    meloxicam (MOBIC) 15 MG tablet Take 1 tablet by mouth daily 30 tablet 1    hydrALAZINE (APRESOLINE) 50 MG tablet Take 1.5 tablets by mouth 3 times daily 135 tablet 5    ipratropium-albuterol (DUONEB) 0.5-2.5 (3) MG/3ML SOLN nebulizer solution Take 3 mLs by nebulization 4 times daily 360 mL 3    acetaminophen (TYLENOL 8 HOUR ARTHRITIS PAIN) 650 MG extended release tablet Take 650 mg by mouth as needed for Pain      vitamin D (CHOLECALCIFEROL) 25 MCG (1000 UT) TABS tablet Take 1,000 Units by mouth daily      busPIRone (BUSPAR) 10 MG tablet Take 1 tablet by mouth 2 times daily 1 tablet 0    dicyclomine (BENTYL) 10 MG capsule Take 1 capsule by mouth 4 times daily as needed (adominal cramps) (Patient not taking: Reported on 11/5/2021) 360 capsule 1    ferrous sulfate (IRON 325) 325 (65 Fe) MG tablet Take 1 tablet by mouth every other day Indications: pt taking every day bid Monday, wed, fri (Patient taking differently: Take 325 mg by mouth 2 times daily Indications: pt taking every day bid ) 30 tablet 3    furosemide (LASIX) 20 MG tablet Take 1 tablet by mouth daily as needed (sob/leg swelling) Weigh self daily Current weight 174lbs Take pill when greater than 175lbs  As needed (Patient not taking: Reported on 11/5/2021) 30 tablet 0    dilTIAZem (CARDIZEM CD) 120 MG extended release capsule Take 1 capsule by mouth daily 90 capsule 3    metoprolol succinate (TOPROL XL) 25 MG extended release tablet Take 1 tablet by mouth daily 30 tablet 3    clonazePAM (KLONOPIN) 1 MG disintegrating tablet Take 1 mg by mouth 3 times daily as needed.  ondansetron (ZOFRAN ODT) 4 MG disintegrating tablet Take 1 tablet by mouth every 8 hours as needed for Nausea or Vomiting Place under the tongue and let it melt and absorb from under your tongue.  30 tablet 3    lactobacillus (CULTURELLE) capsule Take 1 capsule by mouth daily (with breakfast) 30 capsule 0    sucralfate (CARAFATE) 1 GM tablet Take 1 tablet by mouth every 12 hours (Patient not taking: Reported on 11/5/2021) 120 tablet 3    theophylline (MIAH-24) 200 MG extended release capsule Take 400 mg by mouth daily      budesonide-formoterol (SYMBICORT) 160-4.5 MCG/ACT AERO USE 2 INHALATIONS TWICE A DAY 30.6 g 3    guaiFENesin (MUCINEX) 600 MG extended release tablet Take 1 tablet by mouth 2 times daily 30 tablet 0    montelukast (SINGULAIR) 10 MG tablet Take 1 tablet by mouth daily 30 tablet 3    levothyroxine (SYNTHROID) 100 MCG tablet Take 1 tablet by mouth Daily 30 tablet 2    folic acid (FOLVITE) 1 MG tablet Take 1 tablet by mouth daily 30 tablet 3    vitamin B-1 100 MG tablet Take 1 tablet by mouth daily 30 tablet 3    albuterol-ipratropium (COMBIVENT RESPIMAT)  MCG/ACT AERS inhaler Inhale 1 puff into the lungs every 4 hours as needed for Wheezing 3 Inhaler 0    DULoxetine (CYMBALTA) 60 MG extended release capsule Take 60 mg by mouth daily      traZODone (DESYREL) 50 MG tablet Take 100 mg by mouth nightly       fluticasone (FLONASE) 50 MCG/ACT nasal spray 2 sprays by Nasal route daily 1 Bottle 0     Allergies   Allergen Reactions    Lisinopril Swelling and Rash     angioedema       Nursing Notes Reviewed    Physical Exam:  ED Triage Vitals [11/16/21 1858]   Enc Vitals Group      BP (!) 135/104      Pulse 77      Resp 18      Temp 98.4 °F (36.9 °C)      Temp Source Oral      SpO2 100 %      Weight 187 lb (84.8 kg)      Height 5' 1.5\" (1.562 m)      Head Circumference       Peak Flow       Pain Score       Pain Loc       Pain Edu? Excl. in [address]? GENERAL APPEARANCE: Awake and alert. Cooperative. No acute distress. HEAD: Normocephalic. Atraumatic. NECK: Full ROM  LUNGS: Respirations unlabored. ABDOMEN: Soft. Non-tender. No guarding or rebound. No organomegaly. No palpable masses  MUSCULOSKELETAL: No acute deformities. BACK: There is not thoracic or lumbar midline tenderness to palpation or step-offs. Paraspinal tenerness to palpation is  present in the lumbar region. No overlying rashes. LE strength is 5/5. LE light touch is intact. LE DTR's are 2+ in the patellas and achilles. Straight leg test is not present on the bilateral.  Legs are equal in length without rotation. Patent stands without depression of hips. There is unilateral tenderness to palpation to the iliac crest. Full ROM of his bilateral with internal rotation, external rotation, Abduction, Adduction, flexion and extension. No obvious deformities. SKIN: Warm and dry.  No rash, No erythema, No edema. No ecchymoses. NEUROLOGICAL: No gross facial drooping. Moves all 4 extremities spontaneously. PSYCHIATRIC: Normal mood. I have reviewed and interpreted all of the currently available lab results from this visit (if applicable):  No results found for this visit on 11/17/21. Radiographs (if obtained):  [] The following radiograph was interpreted by myself in the absence of a radiologist:   [] Radiologist's Report Reviewed:  XR LUMBAR SPINE (2-3 VIEWS)   Preliminary Result   Rotatory levoscoliosis of the lower lumbar spine. Grade 1 spondylolisthesis   of L4 on L5 which is new since the prior study of 2015. Facet arthropathy   throughout the lumbar region. Disc space narrowing at L4-L5, and L5-S1. No   obvious acute finding of the lumbar spine by plain film imaging. XR HIP 2-3 VW W PELVIS RIGHT   Final Result   1. No acute osseous abnormality. 2. Mild pubic symphysis degenerative changes. EKG (if obtained):   Please See Note of attending physician for EKG interpretation. Chart review shows recent radiograph(s):  XR LUMBAR SPINE (2-3 VIEWS)    Result Date: 11/16/2021  EXAMINATION: THREE XRAY VIEWS OF THE LUMBAR SPINE 11/16/2021 7:03 pm COMPARISON: 07/02/2015 HISTORY: ORDERING SYSTEM PROVIDED HISTORY: pain TECHNOLOGIST PROVIDED HISTORY: Reason for exam:->pain Reason for Exam: pain Acuity: Chronic Type of Exam: Initial FINDINGS: There is rotatory levoscoliosis of the lower lumbar spine. There is minimal grade 1 spondylolisthesis of L4 on L5, which is new since the prior study of 2015. There is disc space narrowing and vacuum disc phenomena at L4-L5. There is disc space narrowing at L5-S1. There is facet arthropathy throughout the lumbar region. No obvious acute finding of the lumbar spine is identified by plain film imaging. If there is focal pain, CT should be considered as fractures can be missed on plain film imaging.  An MRI could better evaluate the spinal canal contents if indicated. There is atherosclerotic calcification of the abdominal aorta. Rotatory levoscoliosis of the lower lumbar spine. Grade 1 spondylolisthesis of L4 on L5 which is new since the prior study of 2015. Facet arthropathy throughout the lumbar region. Disc space narrowing at L4-L5, and L5-S1. No obvious acute finding of the lumbar spine by plain film imaging. XR HIP 2-3 VW W PELVIS RIGHT    Result Date: 11/16/2021  EXAMINATION: ONE XRAY VIEW OF THE PELVIS AND TWO XRAY VIEWS RIGHT HIP 11/16/2021 7:03 pm COMPARISON: None. HISTORY: ORDERING SYSTEM PROVIDED HISTORY: pain TECHNOLOGIST PROVIDED HISTORY: Reason for exam:->pain Reason for Exam: pain Acuity: Chronic Type of Exam: Initial FINDINGS: Degenerative changes of the lower lumbar spine. The bilateral sacroiliac joints are intact. The bilateral hips are unremarkable. Mild pubic symphysis degenerative changes. No fracture or dislocation. No suspicious lytic or blastic osseous lesion. The soft tissues are unremarkable. 1. No acute osseous abnormality. 2. Mild pubic symphysis degenerative changes. MDM:   Neurovascularly intact. Patient presents today with signs and symptoms that are congruent with musculoskeletal arthralgia of the hip  Xray negative for any acute osseous abnormality     I have very low clinical suspicion of any fracture. However patient is educated that should symptoms persist and did not improve over the next 4-5 days patient should have orthopedic follow up as referred for x-rays to rule out fracture. I estimate there is LOW risk for Fracture, COMPARTMENT SYNDROME, DEEP VENOUS THROMBOSIS, COMPLETE TENDON RUPTURE, OR NEUROVASCULAR INJURY, thus I consider the discharge disposition reasonable. Pt is to be discharged home. Pt is  to return immediately to the emergency department if he has any new, worrisome or worsening symptoms. Pt is to follow up with PCP within 2 days.   Patient/Surrogate vocalizes agreement and understanding with this plan and he has no questions upon disposition. Pt is comfortable upon disposition home. Patient is stable, Patients vital signs are stable. Vital signs and nursing notes reviewed during ED course. I independently managed patient today in the ED.   =     All pertinent Lab data and radiographic results reviewed with patient at bedside. The patient and/or the family were informed of the results of any tests/labs/imaging, the treatment plan, and time was allotted to answer questions. See chart for details of medications given during the ED stay. BP (!) 163/83   Pulse 67   Temp 98.2 °F (36.8 °C) (Oral)   Resp 18   Ht 5' 1.5\" (1.562 m)   Wt 187 lb (84.8 kg)   LMP 01/05/2010   SpO2 100%   BMI 34.76 kg/m²       Clinical Impression:  1. Right hip pain        Disposition referral (if applicable):  Brigitte Richardson DO  [address]  [phone]    In 2 days      Disposition medications (if applicable):  New Prescriptions    MELOXICAM (MOBIC) 15 MG TABLET    Take 1 tablet by mouth daily       Comment: Please note this report has been produced using speech recognition software and may contain errors related to that system including errors in grammar, punctuation, and spelling, as well as words and phrases that may be inappropriate. If there are any questions or concerns please feel free to contact the dictating provider for clarification.     [facility], [address] [place] [address]  11/17/21 1817

## 2021-11-17 NOTE — ED NOTES
Discharge paperwork reviewed with Pt, all questions answered.  Pt ambulated to estefanía Hernandez RN  11/17/21 [id number]

## 2021-11-17 NOTE — ED PROVIDER NOTES
As provider-in-triage, I performed a medical screening history and physical exam on this patient. HISTORY OF PRESENT ILLNESS  Melissa Elizabeth is a 63 y.o. female who presents for exacerbation of chronic pain in low back and right hip radiating down to right knee. Was seen here a week ago for same. No new injury. PHYSICAL EXAM  BP (!) 135/104   Pulse 77   Temp 98.4 °F (36.9 °C) (Oral)   Resp 18   Ht 5' 1.5\" (1.562 m)   Wt 187 lb (84.8 kg)   LMP 01/05/2010   SpO2 100%   BMI 34.76 kg/m²     On exam, the patient appears well-hydrated, well-nourished, and in no acute distress. Mucous membranes are moist. Speech is clear. Breathing is unlabored. Skin is dry. Mental status is normal. Moves all extremities, and is without facial droop. Comment: Please note this report has been produced using speech recognition software and may contain errors related to that system including errors in grammar, punctuation, and spelling, as well as words and phrases that may be inappropriate. If there are any questions or concerns please feel free to contact the dictating provider for clarification.        Shyla Wheat PA-C  11/16/21 9638

## 2021-11-18 ENCOUNTER — CARE COORDINATION (OUTPATIENT)
Dept: CARE COORDINATION | Age: 61
End: 2021-11-18

## 2021-11-18 NOTE — CARE COORDINATION
Call to pt for ed f/u and reports she cant talk right now as uncle passed away. Condolences offered and encouraged pt to call acm back at her convenience. Denies further needs at this time.

## 2021-11-19 ENCOUNTER — CARE COORDINATION (OUTPATIENT)
Dept: CARE COORDINATION | Age: 61
End: 2021-11-19

## 2021-11-19 NOTE — CARE COORDINATION
Anette Mclaughlin  11/19/2021    Registered Dietitian Progress Note for Care Coordination    Assessment: Aristides Lynn is a 63 y.o. female. RD referred for CHF. RD spoke with patient for initial nutrition assessment on 11/4. RD called to follow up with pt today 11/19. Pt states she has not yet received the educational handouts in the mail, RD will resend. RD discussed previous goals with pt. Pt is eating balanced meals consistently. Pt states she is not using the salt shaker anymore- RD acknowledged this. Pt is not reading food labels- RD explained the importance of reading labels and choosing foods with <140 mg of Na per serving. Pt verbalizes understanding. Pt is not consistently monitoring daily weights- RD discussed making a reminder note to weight self daily in the AM and keep a log. Reviewed when to notify PCP of sudden weight gain. Pt has no nutrition related questions at this time. Barriers to meeting goals: overwhelmed by complexity of regimen and stress    Action:  Reiterated the importance of eating balanced meals and following a low sodium diet. Reiterated the importance of reading food labels and paying close attention to sodium content per serving. Reiterated the importance of monitoring daily weights. See assessment note above. Nutrition Monitoring and Evaluation  Indicator/Goal Criteria Progress   #1 Eat balanced meals consistently throughout the day.  #1 Focus on eating 3 meals/day and make these meals balanced using the MyPlate EVTHYWGLK-0/1 plate fruits and/or vegetables, 1/4 plate protein and 1/4 plate starchy carbohydrates with 8 oz glass of low fat milk if desired. #1 Pt is eating balanced meals consistently. #2 Monitor daily sodium intake. Keep sodium from food and beverages to no more than 2000 mg/day. #2 Avoid the salt shaker. Read food labels to help choose lower sodium options (<140 mg of sodium per serving). #2 Pt is not using the salt shaker anymore.  Pt will start reading food labels and pay close attention to the Na content per serving. #3  Monitor daily weights. #3 Weigh self daily and keep a log. #3 Pt is not monitoring daily weights. Pt does have a scale and will focus on weighing self first thing in the AM.       Plan of Care:  RD encouraged pt to keep working toward goals set. RD mailed educational handouts, pt has not yet received them. RD will follow up with pt to ensure handouts were received, discuss any questions pt has and check the progress toward goals. Follow Up:    RD will call pt in 3 weeks to follow up and answer any nutrition related questions at that time.      [facility] [facility], [address] [place] [address]

## 2021-12-08 ENCOUNTER — OFFICE VISIT (OUTPATIENT)
Dept: ORTHOPEDIC SURGERY | Age: 61
End: 2021-12-08
Payer: COMMERCIAL

## 2021-12-08 VITALS
HEART RATE: 75 BPM | WEIGHT: 195 LBS | HEIGHT: 62 IN | BODY MASS INDEX: 35.88 KG/M2 | OXYGEN SATURATION: 95 % | RESPIRATION RATE: 18 BRPM

## 2021-12-08 DIAGNOSIS — G89.29 CHRONIC BACK PAIN GREATER THAN 3 MONTHS DURATION: Primary | ICD-10-CM

## 2021-12-08 DIAGNOSIS — M70.61 TROCHANTERIC BURSITIS OF RIGHT HIP: ICD-10-CM

## 2021-12-08 DIAGNOSIS — M54.9 CHRONIC BACK PAIN GREATER THAN 3 MONTHS DURATION: Primary | ICD-10-CM

## 2021-12-08 PROCEDURE — G8427 DOCREV CUR MEDS BY ELIG CLIN: HCPCS | Performed by: STUDENT IN AN ORGANIZED HEALTH CARE EDUCATION/TRAINING PROGRAM

## 2021-12-08 PROCEDURE — 3017F COLORECTAL CA SCREEN DOC REV: CPT | Performed by: STUDENT IN AN ORGANIZED HEALTH CARE EDUCATION/TRAINING PROGRAM

## 2021-12-08 PROCEDURE — G8484 FLU IMMUNIZE NO ADMIN: HCPCS | Performed by: STUDENT IN AN ORGANIZED HEALTH CARE EDUCATION/TRAINING PROGRAM

## 2021-12-08 PROCEDURE — 20610 DRAIN/INJ JOINT/BURSA W/O US: CPT | Performed by: STUDENT IN AN ORGANIZED HEALTH CARE EDUCATION/TRAINING PROGRAM

## 2021-12-08 PROCEDURE — G8417 CALC BMI ABV UP PARAM F/U: HCPCS | Performed by: STUDENT IN AN ORGANIZED HEALTH CARE EDUCATION/TRAINING PROGRAM

## 2021-12-08 PROCEDURE — 99213 OFFICE O/P EST LOW 20 MIN: CPT | Performed by: STUDENT IN AN ORGANIZED HEALTH CARE EDUCATION/TRAINING PROGRAM

## 2021-12-08 PROCEDURE — 1036F TOBACCO NON-USER: CPT | Performed by: STUDENT IN AN ORGANIZED HEALTH CARE EDUCATION/TRAINING PROGRAM

## 2021-12-08 ASSESSMENT — ENCOUNTER SYMPTOMS
NAUSEA: 0
BACK PAIN: 0
COLOR CHANGE: 0
WHEEZING: 0
SHORTNESS OF BREATH: 0
VOMITING: 0
COUGH: 0
VOICE CHANGE: 0
SORE THROAT: 0

## 2021-12-08 NOTE — PATIENT INSTRUCTIONS
Continue to weight bear as tolerated  Continue range of motion  Ice and elevate as needed  Tylenol or Motrin for pain  Injection given today in the office   Rest for 24-48 hours  Follow up as needed  Referral to pain management    Patient Education        Hip Bursitis: Exercises  Introduction  Here are some examples of exercises for you to try. The exercises may be suggested for a condition or for rehabilitation. Start each exercise slowly. Ease off the exercises if you start to have pain. You will be told when to start these exercises and which ones will work best for you. How to do the exercises  Hip rotator stretch    1. Lie on your back with both knees bent and your feet flat on the floor. 2. Put the ankle of your affected leg on your opposite thigh near your knee. 3. Use your hand to gently push your knee away from your body until you feel a gentle stretch around your hip. 4. Hold the stretch for 15 to 30 seconds. 5. Repeat 2 to 4 times. 6. Repeat steps 1 through 5, but this time use your hand to gently pull your knee toward your opposite shoulder. Iliotibial band stretch    1. Lean sideways against a wall. If you are not steady on your feet, hold on to a chair or counter. 2. Stand on the leg with the affected hip, with that leg close to the wall. Then cross your other leg in front of it. 3. Let your affected hip drop out to the side of your body and against wall. Then lean away from your affected hip until you feel a stretch. 4. Hold the stretch for 15 to 30 seconds. 5. Repeat 2 to 4 times. Straight-leg raises to the outside    1. Lie on your side, with your affected hip on top. 2. Tighten the front thigh muscles of your top leg to keep your knee straight. 3. Keep your hip and your leg straight in line with the rest of your body, and keep your knee pointing forward. Do not drop your hip back. 4. Lift your top leg straight up toward the ceiling, about 12 inches off the floor.  Hold for about 6 seconds, then slowly lower your leg. 5. Repeat 8 to 12 times. Clamshell    1. Lie on your side, with your affected hip on top and your head propped on a pillow. Keep your feet and knees together and your knees bent. 2. Raise your top knee, but keep your feet together. Do not let your hips roll back. Your legs should open up like a clamshell. 3. Hold for 6 seconds. 4. Slowly lower your knee back down. Rest for 10 seconds. 5. Repeat 8 to 12 times. Follow-up care is a key part of your treatment and safety. Be sure to make and go to all appointments, and call your doctor if you are having problems. It's also a good idea to know your test results and keep a list of the medicines you take. Where can you learn more? Go to [email or web address] [email or web address].[email or web address]. org and sign in to your [email or web address] account. Enter D129 in the Stad[employer] box to learn more about \"Hip Bursitis: Exercises. \"     If you do not have an account, please click on the \"Sign Up Now\" link. Current as of: July 1, 2021               Content Version: 13.0  © 8389-4786 [employer]wise, [employer]. Care instructions adapted under license by [facility] ([facility]). If you have questions about a medical condition or this instruction, always ask your healthcare professional. [address]  any warranty or liability for your use of this information.

## 2021-12-08 NOTE — PROGRESS NOTES
located on the lateral side of her hip that goes into her knee. Patient states that her right hip has been hurting for several months. She states that her right leg has been giving out on her. She denies any falls or injuries, surgeries or injections on her right hip. She is ambulating with a rolling walker and an antalgic gait. She has a history of bilateral knee pain and back surgery in March 2017. She was seen in the ED on 11/7/21 and 11/17/21 for her hip pain. She was given Mobic in the ED. Medical History  Patient's medications, allergies, past medical, surgical, social and family histories were reviewed and updated as appropriate.     Past Medical History:   Diagnosis Date    Anemia     Anxiety 02/16/2017    follows with Dr Hector Epstein Back pain at L4-L5 level 2/16/2017    Dr Clarissa Lindo 1 disorder ([facility]     per pt on 2/5/2021\"never told I have bipolar\"    COPD (chronic obstructive pulmonary disease) ([facility] [address]     follows with Dr St Estimlavinia Emphysema of lung ([facility] [address]     FH: CAD (coronary artery disease) 2/16/2017    Father had in his forties, sister in her thirties    [facility] Fibromyalgia 02/16/2017    Full dentures     full upper plate and partial on the bottom    Gastric ulcer     \"had stomach ulder back fall 2019\"    H/O Doppler ultrasound 04/05/2019    venous- no DVT or reflux    H/O echocardiogram 01/14/2015    EF50-55% Normal- see media    History of blood transfusion     Hyperlipidemia LDL goal <100     Hypertension     Dr Juanjo Caballero Irregular heartbeat     \"they said I have irreg heart beat before- back when they drained fluid around my heart \"    Obstructive sleep apnea     \"have bipap I use at home\"    On home oxygen therapy     \"on oxygen all the time at home and keep it on 2.5 liters\"    Osteoarthritis     Pericardial effusion     per old chart had pericardial effusion 10/2020    Spinal stenosis     hx per old chart    Thyroid disease     Wears glasses \"suppose to wear glasses\"     Past Surgical History:   Procedure Laterality Date    BACK SURGERY  2017    \"surgery on low back L5-6- not sure if they put any metal in \"   [address]      10/2019    CARDIAC CATHETERIZATION      per old chart had cath done 2020    CARPAL TUNNEL RELEASE Right 1985    CHOLECYSTECTOMY, LAPAROSCOPIC N/A 10/25/2020    CHOLECYSTECTOMY LAPAROSCOPIC WITH IOC performed by Joan Richey MD at [place] [address] COLONOSCOPY N/A 2019    COLONOSCOPY DIAGNOSTIC performed by Maci Echeverrai MD at [address] [place] [address], DIAGNOSTIC      per old chart had egd done 2018    TUBAL LIGATION  1987    UPPER GASTROINTESTINAL ENDOSCOPY N/A 2021    EGD BIOPSY [facility]ria [facility] at [facility] END[facility]COPY     Family History   Problem Relation Age of Onset    Asthma Mother         COPD    Heart Disease Father      Social History     Socioeconomic History    Marital status:      Spouse name: Not on file    Number of children: Not on file    Years of education: Not on file    Highest education level: Not on file   Occupational History    Not on file   Tobacco Use    Smoking status: Former Smoker     Packs/day: 1.50     Years: 20.00     Pack years: 30.00     Types: Cigarettes     Quit date: 2008     Years since quittin.9    Smokeless tobacco: Never Used   Vaping Use    Vaping Use: Never used   Substance and Sexual Activity    Alcohol use: Not Currently     Alcohol/week: 2.0 standard drinks     Types: 2 Shots of liquor per week     Comment: per pt on 2021\"quit drinking back Oct 2020\"use to drink wine coolers - 3 times per week \"/caffeine 2-3 coffees aday 1 pop b    Drug use: No    Sexual activity: Yes     Partners: Male   Other Topics Concern    Not on file   Social History Narrative    Not on file     Social Determinants of Health     Financial Resource Strain: Low Risk     Difficulty of Paying Living Expenses: Not hard at all   Food Insecurity: No Food Insecurity    Worried About Running Out of Food in the Last Year: Never true    Ran Out of Food in the Last Year: Never true   Transportation Needs: No Transportation Needs    Lack of Transportation (Medical): No    Lack of Transportation (Non-Medical): No   Physical Activity: Inactive    Days of Exercise per Week: 0 days    Minutes of Exercise per Session: 0 min   Stress: No Stress Concern Present    Feeling of Stress :  Only a little   Social Connections: Socially Isolated    Frequency of Communication with Friends and Family: Once a week    Frequency of Social Gatherings with Friends and Family: Once a week    Attends Scientology Services: Never    Active Member of Clubs or Organizations: No    Attends Club or Organization Meetings: Never    Marital Status:    Intimate Partner Violence:     Fear of Current or Ex-Partner: Not on file    Emotionally Abused: Not on file    Physically Abused: Not on file    Sexually Abused: Not on file   Housing Stability: [address] [place] [address] Unable to Pay for Housing in the Last Year: No    Number of [place] in the Last Year: 1    Unstable Housing in the Last Year: No     Current Outpatient Medications   Medication Sig Dispense Refill    meloxicam (MOBIC) 15 MG tablet Take 1 tablet by mouth daily 30 tablet 1    hydrALAZINE (APRESOLINE) 50 MG tablet Take 1.5 tablets by mouth 3 times daily 135 tablet 5    ipratropium-albuterol (DUONEB) 0.5-2.5 (3) MG/3ML SOLN nebulizer solution Take 3 mLs by nebulization 4 times daily 360 mL 3    acetaminophen (TYLENOL 8 HOUR ARTHRITIS PAIN) 650 MG extended release tablet Take 650 mg by mouth as needed for Pain      vitamin D (CHOLECALCIFEROL) 25 MCG (1000 UT) TABS tablet Take 1,000 Units by mouth daily      busPIRone (BUSPAR) 10 MG tablet Take 1 tablet by mouth 2 times daily 1 tablet 0    ferrous sulfate (IRON 325) 325 (65 Fe) MG tablet Take 1 tablet by mouth every other day Indications: pt taking every day bid Monday, wed, fri (Patient taking differently: Take 325 mg by mouth 2 times daily Indications: pt taking every day bid ) 30 tablet 3    dilTIAZem (CARDIZEM CD) 120 MG extended release capsule Take 1 capsule by mouth daily 90 capsule 3    metoprolol succinate (TOPROL XL) 25 MG extended release tablet Take 1 tablet by mouth daily 30 tablet 3    clonazePAM (KLONOPIN) 1 MG disintegrating tablet Take 1 mg by mouth 3 times daily as needed.  ondansetron (ZOFRAN ODT) 4 MG disintegrating tablet Take 1 tablet by mouth every 8 hours as needed for Nausea or Vomiting Place under the tongue and let it melt and absorb from under your tongue. 30 tablet 3    lactobacillus (CULTURELLE) capsule Take 1 capsule by mouth daily (with breakfast) 30 capsule 0    theophylline (MIAH-24) 200 MG extended release capsule Take 400 mg by mouth daily      budesonide-formoterol (SYMBICORT) 160-4.5 MCG/ACT AERO USE 2 INHALATIONS TWICE A DAY 30.6 g 3    guaiFENesin (MUCINEX) 600 MG extended release tablet Take 1 tablet by mouth 2 times daily 30 tablet 0    montelukast (SINGULAIR) 10 MG tablet Take 1 tablet by mouth daily 30 tablet 3    levothyroxine (SYNTHROID) 100 MCG tablet Take 1 tablet by mouth Daily 30 tablet 2    folic acid (FOLVITE) 1 MG tablet Take 1 tablet by mouth daily 30 tablet 3    vitamin B-1 100 MG tablet Take 1 tablet by mouth daily 30 tablet 3    albuterol-ipratropium (COMBIVENT RESPIMAT)  MCG/ACT AERS inhaler Inhale 1 puff into the lungs every 4 hours as needed for Wheezing 3 Inhaler 0    DULoxetine (CYMBALTA) 60 MG extended release capsule Take 60 mg by mouth daily       traZODone (DESYREL) 50 MG tablet Take 100 mg by mouth nightly       fluticasone (FLONASE) 50 MCG/ACT nasal spray 2 sprays by Nasal route daily 1 Bottle 0     No current facility-administered medications for this visit.      Allergies   Allergen Reactions    Lisinopril Swelling and Rash     angioedema         Review of Systems Constitutional: Positive for activity change. Negative for fatigue and fever. HENT: Negative for sneezing, sore throat and voice change. Respiratory: Negative for cough, shortness of breath and wheezing. Cardiovascular: Negative for leg swelling. Gastrointestinal: Negative for nausea and vomiting. Musculoskeletal: Positive for myalgias. Negative for arthralgias, back pain, gait problem, joint swelling, neck pain and neck stiffness. Skin: Negative for color change, rash and wound. Neurological: Negative for weakness and numbness. Psychiatric/Behavioral: Negative for behavioral problems, confusion and self-injury. Examination:  General Exam:  Vitals: Pulse 75   Resp 18   Ht 5' 1.5\" (1.562 m)   Wt 195 lb (88.5 kg)   LMP 01/05/2010   SpO2 95%   BMI 36.25 kg/m²    Physical Exam  Constitutional:       Appearance: Normal appearance. She is obese. HENT:      Head: Normocephalic and atraumatic. Nose: Nose normal.      Mouth/Throat:      Mouth: Mucous membranes are dry. Eyes:      Extraocular Movements: Extraocular movements intact. Cardiovascular:      Rate and Rhythm: Normal rate. Pulses: Normal pulses. Pulmonary:      Effort: Pulmonary effort is normal.      Breath sounds: Normal breath sounds. Musculoskeletal:         General: Tenderness present. No swelling, deformity or signs of injury. Cervical back: Normal range of motion. Lumbar back: Normal.      Right hip: Tenderness, bony tenderness and crepitus present. No deformity or lacerations. Normal range of motion. Normal strength. Left hip: Normal.      Right upper leg: Normal.      Left upper leg: Normal.      Right knee: Normal.      Left knee: Normal.   Skin:     General: Skin is warm and dry. Capillary Refill: Capillary refill takes less than 2 seconds. Neurological:      General: No focal deficit present.       Mental Status: She is alert and oriented to person, place, and time. Mental status is at baseline. Psychiatric:         Mood and Affect: Mood normal.         Behavior: Behavior normal.      RIGHT HIP EXAMINATION       OBSERVATION / INSPECTION    Gait: Mildly antalgic    Alignment: Neutral     Scars: None     Muscle atrophy: None     Effusion: None     Warmth: None     Discoloration: None     Leg lengths: Equal     Pelvis: Level       TENDERNESS / CREPITUS (T/C):    T / C    Trochanteric bursa + and significant/ -    Piriformis + / -    SI joint - / -    Psoas tendon - / -    Rectus insertion - / -    Adductor insertion - / -    Pubic symphysis - / -    Pain with palpation throughout the gluteal region      ROM: (* = pain)      Flexion:  120 degrees    External rotation: 40 degrees    Internal rotation with axial load: 30 degrees    Internal rotation without axial load: 40 degrees    Abduction: 45 degrees    Adduction: 20 degrees      SPECIAL TESTS:    Pain w/ forced internal rotation (FADIR): +     Pain w/ forced external rotation (AMBROSIO): Absent     AMBROSIO asymmetry: +    Circumduction test: +    Stinchfield test: Negative     Log roll: Negative     Snapping hip (internal): Negative     Sit-up pain: Negative     Resisted sit-up pain: Negative     Resisted sit-up w/ adductor contraction pain:Negative     Step-down test: +    Trendelenburg test: Negative     Bridge test +    Straight leg raise: Negative      EXTREMITY NEURO-VASCULAR EXAMINATION:     Sensation:  Grossly intact to light touch all dermatomal regions. Motor Function:  Fully intact motor function at hip, knee, foot and ankle      DTRs;  quadriceps and  achilles 2+. No clonus and downgoing Babinski. Vascular status:  DP and PT pulses 2+, brisk capillary refill, symmetric. Skin: intact, compartments soft.     Diagnostic testing:  X-ray images were reviewed by myself and discussed with the patient:  Outside imaging, 3 view of right hip demonstrates minimal osteoarthritic changes at the hip joint. Mild pubic symphysis degenerative changes as well as lumbar degenerative changes. No acute osseous abnormality including a type of fracture or soft tissue avulsion type injury. Minimal cam and pincer deformity    Office Procedures:  No orders of the defined types were placed in this encounter. Right greater Trochanteric Bursa Injection Procedure Note   Pre-operative Diagnosis: Right hip trochanteric bursitis  Post-operative Diagnosis: same   Indications: Symptomatic relief of bursitis   Anesthesia: Lidocaine 1% and marcaine 0.5% without epinephrine without added sodium bicarbonate   Procedure Details   Verbal consent was obtained for the procedure. The lateral hip was palpated and the greater trochanter was identified and skin above was prepped with alcohol. A 22 gauge needle was advanced into the lateral hip to the bone in the area of patient's pain. The patient verified that this was the location of their pain. The needle was then slightly retracted approximately 1 to 2 mm to reach the bursal space without difficulty The space was then injected with 1 ml 1% lidocaine and 1 ml 0.5% marcaine and 1 ml of triamcinolone (KENALOG) 40mg/ml. The injection site was cleansed with isopropyl alcohol and a dressing was applied. Complications: None; patient tolerated the procedure well. Symptoms were improved immediately after the injection. Assessment and Plan    A: Right hip greater trochanteric bursitis    P:     I had a thorough discussion with the patient regarding her right hip symptoms and treatment course. I explained that the hip joint itself is not the issue but it is rather her greater trochanteric bursitis. I explained she is very tender over this area and I can perform a cortisone injection which should provide some relief. Patient agreed although she still states that she would like Vicodin for the pain.  I proceeded with a cortisone injection which was done without complication and provided minimal relief this time. Patient again was adamant that she wanted Vicodin I explained that she would have to be seen by pain management and she would like this. Therefore, patient was given a pain management referral. She was also given a prescription for Voltaren gel. Patient was encouraged to use ice and she was given a home exercise program to work on gluteal and hip strengthening. She will return as needed and all questions were answered and patient voiced understanding.     Electronically signed by Charlotte Freeman DO on 12/8/2021 at 4:56 PM

## 2021-12-08 NOTE — PROGRESS NOTES
Patient is a 63year old female. Patient is in the office today with right hip pain. Pain scale  7/10. Her pain is located on the lateral side of her hip that goes into her knee. Patient states that her right hip has been hurting for several months. She states that her right leg has been giving out on her. She denies any falls or injuries, surgeries or injections on her right hip. She is ambulating with a rolling walker and an antalgic gait. She has a history of bilateral knee pain and back surgery in March 2017. She was seen in the ED on 11/7/21 and 11/17/21 for her hip pain. She was given Mobic in the ED.

## 2021-12-09 ENCOUNTER — CARE COORDINATION (OUTPATIENT)
Dept: CARE COORDINATION | Age: 61
End: 2021-12-09

## 2021-12-09 NOTE — CARE COORDINATION
Contacted Rahel Kwan and left voicemail regarding Dietitian follow up. Left call back number and will follow up as appropriate.          [address] [facility], [address] [place] [address]

## 2021-12-16 ENCOUNTER — CARE COORDINATION (OUTPATIENT)
Dept: CARE COORDINATION | Age: 61
End: 2021-12-16

## 2021-12-16 NOTE — CARE COORDINATION
Contacted Suha Becerra and left voicemail regarding Dietitian follow up. Left call back number. RD spoke with patient for initial nutrition assessment on 11/4 and first follow up on 11/9. RD outreached 12/9 and today 12/16- left VM both outreaches. RD will continue to follow/assist with patient return call.        [facility] [facility], [address] [place] [address]

## 2021-12-21 ENCOUNTER — CARE COORDINATION (OUTPATIENT)
Dept: CARE COORDINATION | Age: 61
End: 2021-12-21

## 2021-12-21 NOTE — CARE COORDINATION
Ambulatory Care Coordination Note  CM Risk Score: 3  Charlson 10 Year Mortality Risk Score: 79%     ACC: Loni Mendoza, RN    Summary Note: Call to pt for acm f/u-chf outreach. Reviewed copd/chf info and zm. Reports ortho made Pain management referral  and she has not heard back, doesn't want [facility] pain managemt. Had f/u with neuro at [address] [place] [address]. Was supposed to go nov 16, but tranportation was issue/barrier. She is interested in neuro in [place] possibly, wont be able to get to [place] unless evening hours? . Plan: acm will f/u with ortho re: PM referral status/location of referral and contact neuro for possible transition to provider closer to pt home d/t transpo barrier. Ortho [phone] , spoke with [facility], waiting on ins auth for pt to be scheduled. Will send phone encounter to them in regard to this. Pt should receive letter on status she reports. Rcvd vm back from [facility] and reports referral was sent to [facility], they have to review pt info and once accepted will get call from them. Their phone number is [phone]. Care Coordination Interventions    Program Enrollment: Complex Care  Referral from Primary Care Provider: No  Suggested Interventions and [address]: Completed  Registered Dietician: Completed  Zone Management Tools: Completed         Goals Addressed                 This Visit's Progress     Conditions and Symptoms   On track     I will notify my provider of any barriers to my plan of care. I will notify my provider of any symptoms that indicate a worsening of my condition. Barriers: none  Plan for overcoming my barriers: support an education from acm  Confidence: 10/10  Anticipated Goal Completion Date: 12/15/21              Prior to Admission medications    Medication Sig Start Date End Date Taking?  Authorizing Provider   dicyclomine (BENTYL) 10 MG capsule Take 2 capsules by mouth 4 times daily (before meals and nightly) 12/29/21   Adelina Bills PA-C   ondansetron (ZOFRAN ODT) 4 MG disintegrating tablet Take 1 tablet by mouth every 6 hours 12/29/21   Tasneem Loya PA-C   carBAMazepine (TEGRETOL) 200 MG tablet Take 1 tablet by mouth daily for 14 days 12/25/21 1/8/22  Rhea Daley PA-C   diclofenac sodium (VOLTAREN) 1 % GEL Apply 4 g topically 4 times daily 12/8/21   Faye Bonilla DO   meloxicam [facility] 15 MG tablet Take 1 tablet by mouth daily 11/17/21   Talisha Stinson PA-C   hydrALAZINE (APRESOLINE) 50 MG tablet Take 1.5 tablets by mouth 3 times daily 10/5/21   BRIAN Sneed - CNP   ipratropium-albuterol (DUONEB) 0.5-2.5 (3) MG/3ML SOLN nebulizer solution Take 3 mLs by nebulization 4 times daily 9/8/21   Dat Beltrán MD   acetaminophen (TYLENOL 8 HOUR ARTHRITIS PAIN) 650 MG extended release tablet Take 650 mg by mouth as needed for Pain    Historical Provider, MD   vitamin D (CHOLECALCIFEROL) 25 MCG (1000 UT) TABS tablet Take 1,000 Units by mouth daily    Historical Provider, MD   busPIRone (BUSPAR) 10 MG tablet Take 1 tablet by mouth 2 times daily 5/4/21   Linda Olmstead MD   ferrous sulfate (IRON 325) 325 (65 Fe) MG tablet Take 1 tablet by mouth every other day Indications: pt taking every day bid Monday, wed, fri  Patient taking differently: Take 325 mg by mouth 2 times daily Indications: pt taking every day bid  5/4/21   Linda Olmstead MD   dilTIAZem (CARDIZEM CD) 120 MG extended release capsule Take 1 capsule by mouth daily 1/18/21   Vincenzo Seaman MD   metoprolol succinate (TOPROL XL) 25 MG extended release tablet Take 1 tablet by mouth daily 1/18/21   Vincenzo Seaman MD   clonazePAM (KLONOPIN) 1 MG disintegrating tablet Take 1 mg by mouth 3 times daily as needed.      Historical Provider, MD   lactobacillus (CULTURELLE) capsule Take 1 capsule by mouth daily (with breakfast) 11/27/20   Indu Montaño MD   theophylline (MIAH-24) 200 MG extended release capsule Take 400 mg by mouth daily    Historical Provider, MD   budesonide-formoterol (SYMBICORT) 160-4.5 MCG/ACT AERO USE 2 INHALATIONS TWICE A DAY 10/2/20   Max Stoddard MD   guaiFENesin (MUCINEX) 600 MG extended release tablet Take 1 tablet by mouth 2 times daily 7/21/20   Marija Rod MD   montelukast (SINGULAIR) 10 MG tablet Take 1 tablet by mouth daily 7/21/20   Marija Rod MD   levothyroxine (SYNTHROID) 100 MCG tablet Take 1 tablet by mouth Daily 7/21/20   Marija Rod MD   folic acid (FOLVITE) 1 MG tablet Take 1 tablet by mouth daily 7/16/20   Jorge Moeller MD   vitamin B-1 100 MG tablet Take 1 tablet by mouth daily 7/16/20   Jorge Moeller MD   albuterol-ipratropium (COMBIVENT RESPIMAT)  MCG/ACT AERS inhaler Inhale 1 puff into the lungs every 4 hours as needed for Wheezing 7/1/20   Marija Rod MD   DULoxetine (CYMBALTA) 60 MG extended release capsule Take 60 mg by mouth daily     Historical Provider, MD   traZODone (DESYREL) 50 MG tablet Take 100 mg by mouth nightly     Historical Provider, MD   fluticasone [facility]) 50 MCG/ACT nasal spray 2 sprays by Nasal route daily 3/28/18   Bakari Acosta MD       Future Appointments   Date Time Provider Wiliam Zuñiga   2/17/2022  3:45 Reggie Cordero MD [facility]   3/1/2022  3:45 PM [facility]   5/13/2022  4:30 PM Ralf Roman MD [facility]     ,   Congestive Heart Failure Assessment    Are you currently restricting fluids?: No Restriction  Do you understand a low sodium diet?: Yes  How many restaurant meals do you eat per week?: 1-2  Do you salt your food before tasting it?: No     No patient-reported symptoms      Symptoms:         and   COPD Assessment    Does the patient understand envrionmental exposure?: Yes  Is the patient able to verbalize Rescue vs. Long Acting medications?: Yes  Does the patient have a nebulizer?: Yes  Does the patient use a space with inhaled medications?: No     No patient-reported symptoms         Symptoms:

## 2021-12-25 ENCOUNTER — APPOINTMENT (OUTPATIENT)
Dept: CT IMAGING | Age: 61
End: 2021-12-25
Payer: COMMERCIAL

## 2021-12-25 ENCOUNTER — HOSPITAL ENCOUNTER (EMERGENCY)
Age: 61
Discharge: HOME | End: 2021-12-25
Attending: EMERGENCY MEDICINE
Payer: COMMERCIAL

## 2021-12-25 VITALS
TEMPERATURE: 97.9 F | OXYGEN SATURATION: 100 % | RESPIRATION RATE: 16 BRPM | HEART RATE: 71 BPM | SYSTOLIC BLOOD PRESSURE: 146 MMHG | DIASTOLIC BLOOD PRESSURE: 79 MMHG

## 2021-12-25 DIAGNOSIS — R51.9 ACUTE NONINTRACTABLE HEADACHE, UNSPECIFIED HEADACHE TYPE: Primary | ICD-10-CM

## 2021-12-25 LAB
ALBUMIN SERPL-MCNC: 3.8 GM/DL (ref 3.4–5)
ALP BLD-CCNC: 79 IU/L (ref 40–129)
ALT SERPL-CCNC: 15 U/L (ref 10–40)
ANION GAP SERPL CALCULATED.3IONS-SCNC: 7 MMOL/L (ref 4–16)
AST SERPL-CCNC: 14 IU/L (ref 15–37)
BASOPHILS ABSOLUTE: 0.1 K/CU MM
BASOPHILS RELATIVE PERCENT: 0.6 % (ref 0–1)
BILIRUB SERPL-MCNC: 0.2 MG/DL (ref 0–1)
BUN BLDV-MCNC: 15 MG/DL (ref 6–23)
CALCIUM SERPL-MCNC: 9.2 MG/DL (ref 8.3–10.6)
CHLORIDE BLD-SCNC: 94 MMOL/L (ref 99–110)
CO2: 34 MMOL/L (ref 21–32)
CREAT SERPL-MCNC: 0.8 MG/DL (ref 0.6–1.1)
DIFFERENTIAL TYPE: ABNORMAL
EOSINOPHILS ABSOLUTE: 0.4 K/CU MM
EOSINOPHILS RELATIVE PERCENT: 2.8 % (ref 0–3)
ERYTHROCYTE SEDIMENTATION RATE: 33 MM/HR (ref 0–30)
GFR AFRICAN AMERICAN: >60 ML/MIN/1.73M2
GFR NON-AFRICAN AMERICAN: >60 ML/MIN/1.73M2
GLUCOSE BLD-MCNC: 116 MG/DL (ref 70–99)
HCT VFR BLD CALC: 37.1 % (ref 37–47)
HEMOGLOBIN: 11.1 GM/DL (ref 12.5–16)
IMMATURE NEUTROPHIL %: 0.5 % (ref 0–0.43)
LYMPHOCYTES ABSOLUTE: 1.5 K/CU MM
LYMPHOCYTES RELATIVE PERCENT: 10.5 % (ref 24–44)
MCH RBC QN AUTO: 29.4 PG (ref 27–31)
MCHC RBC AUTO-ENTMCNC: 29.9 % (ref 32–36)
MCV RBC AUTO: 98.4 FL (ref 78–100)
MONOCYTES ABSOLUTE: 1 K/CU MM
MONOCYTES RELATIVE PERCENT: 7.4 % (ref 0–4)
NUCLEATED RBC %: 0 %
PDW BLD-RTO: 12.8 % (ref 11.7–14.9)
PLATELET # BLD: 252 K/CU MM (ref 140–440)
PMV BLD AUTO: 9.3 FL (ref 7.5–11.1)
POTASSIUM SERPL-SCNC: 4 MMOL/L (ref 3.5–5.1)
RBC # BLD: 3.77 M/CU MM (ref 4.2–5.4)
SEGMENTED NEUTROPHILS ABSOLUTE COUNT: 10.8 K/CU MM
SEGMENTED NEUTROPHILS RELATIVE PERCENT: 78.2 % (ref 36–66)
SODIUM BLD-SCNC: 135 MMOL/L (ref 135–145)
TOTAL IMMATURE NEUTOROPHIL: 0.07 K/CU MM
TOTAL NUCLEATED RBC: 0 K/CU MM
TOTAL PROTEIN: 6.4 GM/DL (ref 6.4–8.2)
WBC # BLD: 13.9 K/CU MM (ref 4–10.5)

## 2021-12-25 PROCEDURE — 99284 EMERGENCY DEPT VISIT MOD MDM: CPT

## 2021-12-25 PROCEDURE — 6370000000 HC RX 637 (ALT 250 FOR IP): Performed by: PHYSICIAN ASSISTANT

## 2021-12-25 PROCEDURE — 80053 COMPREHEN METABOLIC PANEL: CPT

## 2021-12-25 PROCEDURE — 85652 RBC SED RATE AUTOMATED: CPT

## 2021-12-25 PROCEDURE — 85025 COMPLETE CBC W/AUTO DIFF WBC: CPT

## 2021-12-25 PROCEDURE — 70450 CT HEAD/BRAIN W/O DYE: CPT

## 2021-12-25 RX ORDER — HYDROCODONE BITARTRATE AND ACETAMINOPHEN 5; 325 MG/1; MG/1
1 TABLET ORAL ONCE
Status: COMPLETED | OUTPATIENT
Start: 2021-12-25 | End: 2021-12-25

## 2021-12-25 RX ORDER — CARBAMAZEPINE 200 MG/1
200 TABLET ORAL DAILY
Qty: 14 TABLET | Refills: 0 | Status: ON HOLD | OUTPATIENT
Start: 2021-12-25 | End: 2022-03-16 | Stop reason: HOSPADM

## 2021-12-25 RX ADMIN — HYDROCODONE BITARTRATE AND ACETAMINOPHEN 1 TABLET: 5; 325 TABLET ORAL at 15:19

## 2021-12-25 ASSESSMENT — PAIN SCALES - GENERAL
PAINLEVEL_OUTOF10: 5
PAINLEVEL_OUTOF10: 7

## 2021-12-25 ASSESSMENT — PAIN DESCRIPTION - FREQUENCY: FREQUENCY: CONTINUOUS

## 2021-12-25 ASSESSMENT — PAIN DESCRIPTION - PAIN TYPE: TYPE: ACUTE PAIN

## 2021-12-25 ASSESSMENT — PAIN DESCRIPTION - ONSET: ONSET: ON-GOING

## 2021-12-25 ASSESSMENT — PAIN DESCRIPTION - LOCATION: LOCATION: HEAD

## 2021-12-25 ASSESSMENT — PAIN DESCRIPTION - PROGRESSION: CLINICAL_PROGRESSION: NOT CHANGED

## 2021-12-25 ASSESSMENT — PAIN DESCRIPTION - DESCRIPTORS: DESCRIPTORS: HEADACHE

## 2021-12-25 NOTE — ED PROVIDER NOTES
[place]      PCP: Asiya Welsh MD    CHIEF COMPLAINT    Chief Complaint   Patient presents with    Headache     to left side of face x20 mins, no weakness      Patient staffed with supervising physician Dr. Fausto White    HPI    Miguel Barillas is a 63 y.o. female who presents with left temporal headache. Onset approximately 20 minutes prior to arrival.  Patient initially describes it as \"numbness\" and then describes it as achy. No known aggravating or alleviating factors. Patient denies any vision changes. Patient denies chest pain, shortness breath, abdominal pain, vomiting or diarrhea. Patient denies extremity weakness or numbness. REVIEW OF SYSTEMS   Constitutional:  Denies fever  Neurologic:  See HPI. Denies confusion or memory loss. Denies light-headedness, dizziness, or LOC. Denies stiff neck. Denies weakness or sensory changes   Eyes:   Denies discharge, dipplopia, blurred vision, or loss visual field  HENT:  Denies sore throat or ear pain   Cardiovascular:  Denies chest pain  Respiratory:  Denies cough, shortness of breath  GI  Denies abdominal pain. Denies vomiting, or diarrhea. :  Denies Dysuria or Hematuria. Musculoskeletal:  Denies back pain.      Skin:  Denies rash   Lymphatic:  Denies swollen glands     All other review of systems negative at this time  See HPI and nursing notes for additional information      PAST MEDICAL & SURGICAL HISTORY    Past Medical History:   Diagnosis Date    Anemia     Anxiety 02/16/2017    follows with Dr Pérez Rivera    Arthritis     Back pain at L4-L5 level 2/16/2017    Dr Brady Harrell Bipolar 1 disorder ([facility] [address]     per pt on 2/5/2021\"never told I have bipolar\"    COPD (chronic obstructive pulmonary disease) ([facility] [address]     follows with Dr Annmarie Mansfield Emphysema of lung ([facility] [address]     FH: CAD (coronary artery disease) 2/16/2017    Father had in his forties, sister in her thirties    Awilda Harrell Fibromyalgia 02/16/2017    Full dentures     full upper plate and partial on the bottom    Gastric ulcer     \"had stomach ulder back fall 2019\"    H/O Doppler ultrasound 04/05/2019    venous- no DVT or reflux    H/O echocardiogram 01/14/2015    EF50-55% Normal- see media    History of blood transfusion     Hyperlipidemia LDL goal <100     Hypertension     Dr Earlene Shirley Irregular heartbeat     \"they said I have irreg heart beat before- back when they drained fluid around my heart \"    Obstructive sleep apnea     \"have bipap I use at home\"    On home oxygen therapy     \"on oxygen all the time at home and keep it on 2.5 liters\"    Osteoarthritis     Pericardial effusion     per old chart had pericardial effusion 10/2020    Spinal stenosis     hx per old chart    Thyroid disease     Wears glasses     \"suppose to wear glasses\"     Past Surgical History:   Procedure Laterality Date    BACK SURGERY  03/2017    \"surgery on low back L5-6- not sure if they put any metal in \"   [address]      10/2019    CARDIAC CATHETERIZATION      per old chart had cath done 11/2020    CARPAL TUNNEL RELEASE Right 1985    CHOLECYSTECTOMY, LAPAROSCOPIC N/A 10/25/2020    CHOLECYSTECTOMY LAPAROSCOPIC WITH IOC performed by Summer Napier MD at [facility] COLONOSCOPY N/A 12/12/2019    COLONOSCOPY DIAGNOSTIC performed by Bailee Smith MD at [address] [place] [address] COLON, DIAGNOSTIC      per old chart had egd done 1/2018   [address]    UPPER GASTROINTESTINAL ENDOSCOPY N/A 1/7/2021    EGD BIOPSY performed by Bailee Smith MD at [address] [place] [address]    Current Outpatient Rx   Medication Sig Dispense Refill    carBAMazepine (TEGRETOL) 200 MG tablet Take 1 tablet by mouth daily for 14 days 14 tablet 0    diclofenac sodium (VOLTAREN) 1 % GEL Apply 4 g topically 4 times daily 350 g 2    meloxicam (MOBIC) 15 MG tablet Take 1 tablet by mouth daily 30 tablet 1    hydrALAZINE (APRESOLINE) 50 MG tablet Take 1.5 tablets by mouth 3 times daily 135 tablet 5    ipratropium-albuterol (DUONEB) 0.5-2.5 (3) MG/3ML SOLN nebulizer solution Take 3 mLs by nebulization 4 times daily 360 mL 3    acetaminophen (TYLENOL 8 HOUR ARTHRITIS PAIN) 650 MG extended release tablet Take 650 mg by mouth as needed for Pain      vitamin D (CHOLECALCIFEROL) 25 MCG (1000 UT) TABS tablet Take 1,000 Units by mouth daily      busPIRone (BUSPAR) 10 MG tablet Take 1 tablet by mouth 2 times daily 1 tablet 0    ferrous sulfate (IRON 325) 325 (65 Fe) MG tablet Take 1 tablet by mouth every other day Indications: pt taking every day bid Monday, wed, fri (Patient taking differently: Take 325 mg by mouth 2 times daily Indications: pt taking every day bid ) 30 tablet 3    dilTIAZem (CARDIZEM CD) 120 MG extended release capsule Take 1 capsule by mouth daily 90 capsule 3    metoprolol succinate (TOPROL XL) 25 MG extended release tablet Take 1 tablet by mouth daily 30 tablet 3    clonazePAM (KLONOPIN) 1 MG disintegrating tablet Take 1 mg by mouth 3 times daily as needed.  ondansetron (ZOFRAN ODT) 4 MG disintegrating tablet Take 1 tablet by mouth every 8 hours as needed for Nausea or Vomiting Place under the tongue and let it melt and absorb from under your tongue.  30 tablet 3    lactobacillus (CULTURELLE) capsule Take 1 capsule by mouth daily (with breakfast) 30 capsule 0    theophylline (MIAH-24) 200 MG extended release capsule Take 400 mg by mouth daily      budesonide-formoterol (SYMBICORT) 160-4.5 MCG/ACT AERO USE 2 INHALATIONS TWICE A DAY 30.6 g 3    guaiFENesin (MUCINEX) 600 MG extended release tablet Take 1 tablet by mouth 2 times daily 30 tablet 0    montelukast (SINGULAIR) 10 MG tablet Take 1 tablet by mouth daily 30 tablet 3    levothyroxine (SYNTHROID) 100 MCG tablet Take 1 tablet by mouth Daily 30 tablet 2    folic acid (FOLVITE) 1 MG tablet Take 1 tablet by mouth daily 30 tablet 3    vitamin B-1 100 MG tablet Take 1 tablet by mouth daily 30 tablet 3    albuterol-ipratropium (COMBIVENT RESPIMAT)  MCG/ACT AERS inhaler Inhale 1 puff into the lungs every 4 hours as needed for Wheezing 3 Inhaler 0    DULoxetine (CYMBALTA) 60 MG extended release capsule Take 60 mg by mouth daily       traZODone (DESYREL) 50 MG tablet Take 100 mg by mouth nightly       fluticasone (FLONASE) 50 MCG/ACT nasal spray 2 sprays by Nasal route daily 1 Bottle 0       ALLERGIES    Allergies   Allergen Reactions    Lisinopril Swelling and Rash     angioedema       SOCIAL & FAMILY HISTORY    Social History     Socioeconomic History    Marital status:      Spouse name: None    Number of children: None    Years of education: None    Highest education level: None   Occupational History    None   Tobacco Use    Smoking status: Former Smoker     Packs/day: 1.50     Years: 20.00     Pack years: 30.00     Types: Cigarettes     Quit date: 2008     Years since quittin.9    Smokeless tobacco: Never Used   Vaping Use    Vaping Use: Never used   Substance and Sexual Activity    Alcohol use: Not Currently     Alcohol/week: 2.0 standard drinks     Types: 2 Shots of liquor per week     Comment: per pt on 2021\"quit drinking back Oct 2020\"use to drink wine coolers - 3 times per week \"/caffeine 2-3 coffees aday 1 pop b    Drug use: No    Sexual activity: Yes     Partners: Male   Other Topics Concern    None   Social History Narrative    None     Social Determinants of Health     Financial Resource Strain: Low Risk     Difficulty of Paying Living Expenses: Not hard at all   Food Insecurity: No Food Insecurity    Worried About Running Out of Food in the Last Year: Never true    Donny of Food in the Last Year: Never true   Transportation Needs: No Transportation Needs    Lack of Transportation (Medical): No    Lack of Transportation (Non-Medical):  No   Physical Activity: Inactive    Days of Exercise per Week: 0 days    Minutes of Exercise per Session: 0 min   Stress: No Stress Concern Present    Feeling of Stress : Only a little   Social Connections: Socially Isolated    Frequency of Communication with Friends and Family: Once a week    Frequency of Social Gatherings with Friends and Family: Once a week    Attends [employer] Services: Never    Active Member of Clubs or Organizations: No    Attends Club or Organization Meetings: Never    Marital Status:    Intimate Partner Violence:     Fear of Current or Ex-Partner: Not on file    Emotionally Abused: Not on file    Physically Abused: Not on file    Sexually Abused: Not on file   Housing Stability: [address] Unable to Pay for Housing in the Last Year: No    Number of [place] in the Last Year: 1    Unstable Housing in the Last Year: No     Family History   Problem Relation Age of Onset    Asthma Mother         COPD    Heart Disease Father        PHYSICAL EXAM    VITAL SIGNS: BP (!) 146/79   Pulse 71   Temp 97.9 °F (36.6 °C) (Oral)   Resp 16   LMP 01/05/2010   SpO2 100%    Constitutional: Elderly female, no acute distress   HENT:  Atraumatic. Mild left temporal tenderness. No overlying skin changes. Sensation intact. Eyes: Conjunctiva clear. No tearing. Pupils equally round and react to light, extraocular movement are intact. Neck: supple, no JVD.     Cardiovascular: Normal rate and rhythm   Respiratory: No increased work of breathing, decreased lung sounds bilaterally, no obvious wheezes  GI:  Soft, nontender, normal bowel sounds  Musculoskeletal:  No edema, no deformities  Integument:  Well hydrated, no petechiae   Neurologic:    - Alert & oriented person, place, time, and situation, no speech difficulties or slurring.  - No obvious gross motor deficits  - Cranial nerves 2-12 grossly intact  - Sensation intact to light touch  - Strength 5/5 in upper and lower extremities bilaterally  - No meningeal signs  - Normal finger to nose test bilaterally  - Rapid alternating movements intact  - No with no associated hydrocephalus. ED COURSE & MEDICAL DECISION MAKING      Patient presents as above. Patient provided [address] for pain. Patient is neurologically intact on exam.  Patient does have some left temporal tenderness and temporal arteritis is considered. There is no numbness on exam and no stroke alert is called. Patient is neurologically intact. CT head shows no acute intracranial abnormality, unchanged colloid cyst.  CBC shows white blood cell count of 13.9, hemoglobin 11.1. CMP shows CO2 of 34 which is similar to previous. Sed rate is less than 50 and is 33 today, previously elevated with patient as well. On reevaluation patient states headache is resolved. I currently have low suspicion of temporal arteritis, possible trigeminal neuralgia. Patient evaluated by supervising physician who recommends short course of Tegretol. I discussed labs and imaging with patient today. Recommend close follow-up with primary care provider in 2 days for recheck and return immediately if new or worsening symptoms develop. Clinical  IMPRESSION    1. Acute nonintractable headache, unspecified headache type          Diagnosis and plan discussed in detail with patient who understands and agrees. Return to emergency Department precautions, which included any change in nature or severity of headaches or any new symptoms, were discussed in detail with patient who understands and agrees. Comment: Please note this report has been produced using speech recognition software and may contain errors related to that system including errors in grammar, punctuation, and spelling, as well as words and phrases that may be inappropriate. If there are any questions or concerns please feel free to contact the dictating provider for clarification.             Osei Calles PA-C  12/25/21 6948

## 2021-12-25 NOTE — ED PROVIDER NOTES
Drift for 10 Seconds 0  Drift, but doesn't hit bed +1  Drift, hits bed +2  Some Effort Against Gravity +2  No Effort Against Gravity +3  No Movement +4     5B:  Test Right Arm Motor Drift  Amputation/Joint Fusion 0  No Drift for 10 Seconds 0  Drift, but doesn't hit bed +1  Drift, hits bed +2  Some Effort Against Gravity +2  No Effort Against Gravity +3  No Movement +4     6A: Test Left Leg Motor Drift  Amputation/Joint Fusion 0  No Drift for 5 Seconds 0  Drift, but doesn't hit bed +1  Drift, hits bed +2  Some Effort Against Gravity +2  No Effort Against Gravity +3  No Movement +4     6B: Test Right Leg Motor Drift  Amputation/Joint Fusion 0  No Drift for 5 Seconds 0  Drift, but doesn't hit bed +1  Drift, hits bed +2  Some Effort Against Gravity +2  No Effort Against Gravity +3  No Movement +4     7: Test Limb Ataxia(FTN/Heel-Shin)  Amputation/Joint Fusion 0  Does Not Understand 0  Paralyzed 0  No Ataxia 0  Ataxia in 1 Limb +1  Ataxia in 2 Limbs +2     8: Test Sensation  Normal; No sensory loss 0  Mild-Moderate Loss: Less Sharp/More Dull +1  Mild-Moderate Loss: Can Sense Being Touched +1  Complete Loss: Cannot Sense Being Touched At All +2  No Response and Quadriplegic +2  Coma/Unresponsive +2     9: Test Language/Aphasia (Describe the scene; name the words; read the sentences)  Normal; No aphasia 0  Mild-Moderate Aphasia: Some Obvious Changes, Without Significant Limitation +1  Severe Aphasia: Fragmentary Expression, Inference Needed, Cannot Identify  Materials +2  Mute/Global Aphasia: No Usable Speech/Auditory Comprehension +3  Coma/Unresponsive +3     10: Test Dysarthria (Read the words)  Intubated/Unable to Test 0  Normal 0  Mild-Moderate Dysarthria: Slurring but can be understood +1  Severe Dysarthria: Unintelligble Slurring or Out of Proportion to Dysphasia +2  Mute/Anarthric +2     11: Test Extinction/Inattention  No abnormality 0  Visual/tactile/auditory/spatial/personal inattention +1  Extinction to bilateral (H) 0 - 30 MM/HR          ABNORMAL LABS:  Labs Reviewed   CBC WITH AUTO DIFFERENTIAL - Abnormal; Notable for the following components:       Result Value    WBC 13.9 (*)     RBC 3.77 (*)     Hemoglobin 11.1 (*)     MCHC 29.9 (*)     Segs Relative 78.2 (*)     Lymphocytes % 10.5 (*)     Monocytes % 7.4 (*)     Immature Neutrophil % 0.5 (*)     All other components within normal limits   COMPREHENSIVE METABOLIC PANEL - Abnormal; Notable for the following components:    Chloride 94 (*)     CO2 34 (*)     Glucose 116 (*)     AST 14 (*)     All other components within normal limits   SEDIMENTATION RATE - Abnormal; Notable for the following components:    Sed Rate 33 (*)     All other components within normal limits         IMAGING STUDIES ORDERED:  CT HEAD WO CONTRAST    I have personally viewed the imaging studies. The radiologist interpretation is:   CT HEAD WO CONTRAST   Final Result   1. No acute intracranial abnormality. 2. Unchanged 0.5 cm colloid cyst in the 3rd ventricle near the foramina of   [place] with no associated hydrocephalus. MEDICATIONS ADMINISTERED:  Medications   HYDROcodone-acetaminophen (NORCO) 5-325 MG per tablet 1 tablet (1 tablet Oral Given 12/25/21 6229)         PLAN/ED COURSE:  Last Vitals: BP (!) 146/79   Pulse 71   Temp 97.9 °F (36.6 °C) (Oral)   Resp 16   LMP 01/05/2010   SpO2 8%       61-year-old female with left-sided headache. Suspect likely trigeminal neuralgia. Would also consider temporal arteritis and acute SAH. There are additionally no clinical or historical red flags for a malignant secondary cause of headache, including, but not limited to: cerebral aneurysm, subarachnoid hemorrhage, subdural and epidural hematoma, cervical arterial dissection/pathology, space-occupying lesions, glaucoma, temporal arteritis, venous/cavernous thrombosis, meningitis/encephalitis, carbon monoxide toxicity and idiopathic intracranial hypertension (pseudotumor cerebri). Neurological examination is intact and non-focal.    The risk of further workup or hospitalization is likely higher than the risk of the patient having a malignant or serious secondary cause of headache. I, therefore, believe it is in the patients best interest not to do additional emergent testing, including but not limited to cranial imaging and or lumbar puncture here in the emergency department, based on the patient's presentation, history, physical exam and emergency department course. This was discussed with the patient and they understand and agree. Additional workup and treatment in the ED as documented above and in the physician assistant's note. Patient reassured and will be discharged home. I have explained to the patient in appropriate terminology our work-up in the ED and their diagnosis. I have also given anticipatory guidance and expectant management of their condition as an outpatient as per my custom. The patient was given clear discharge and follow-up instructions including return to the ER immediately for worsening concerns. The patient has been advised to follow-up with their primary care physician and/or referred physician in the next two to three days or sooner if worsening and to return to the ER immediately as above with any concerns. I provided the patient counseling with regard to my customary list of strict return precautions as well as return precautions specific to the cause for today's emergency department visit. The patient will return under these provided conditions, but should also return for new concerns or further worsening. Pt and/or family understand and agree with plan. Clinical Impression:  1. Acute nonintractable headache, unspecified headache type        Disposition referral (if applicable):   MD Марина Blankenshiplapati [facility]  [phone]    Schedule an appointment as soon as possible for a visit in 2 days  For Recheck, Return to ED if symptoms change or worsen      Disposition medications (if applicable):  New Prescriptions    CARBAMAZEPINE (TEGRETOL) 200 MG TABLET    Take 1 tablet by mouth daily for 14 days       ED Provider Disposition Time  DISPOSITION Decision To Discharge 12/25/2021 04:39:43 PM            Electronically signed by Kimberly Elliott MD on 12/25/2021 at 4:41 PM      Kimberly Elliott MD  12/25/21 [id number]

## 2021-12-25 NOTE — ED NOTES
Verbal and written discharge instructions given, scripts provided with education. Patient verbalized understanding and denies further questions or concerns.      Lisbeth Kaplan RN  12/25/21 7610

## 2021-12-27 ENCOUNTER — CARE COORDINATION (OUTPATIENT)
Dept: CARE COORDINATION | Age: 61
End: 2021-12-27

## 2021-12-28 PROCEDURE — 99285 EMERGENCY DEPT VISIT HI MDM: CPT

## 2021-12-29 ENCOUNTER — HOSPITAL ENCOUNTER (EMERGENCY)
Age: 61
Discharge: HOME | End: 2021-12-29
Payer: COMMERCIAL

## 2021-12-29 ENCOUNTER — APPOINTMENT (OUTPATIENT)
Dept: CT IMAGING | Age: 61
End: 2021-12-29
Payer: COMMERCIAL

## 2021-12-29 VITALS
HEART RATE: 89 BPM | OXYGEN SATURATION: 99 % | SYSTOLIC BLOOD PRESSURE: 134 MMHG | TEMPERATURE: 99 F | RESPIRATION RATE: 18 BRPM | DIASTOLIC BLOOD PRESSURE: 69 MMHG

## 2021-12-29 DIAGNOSIS — K52.9 ENTERITIS: Primary | ICD-10-CM

## 2021-12-29 LAB
ALBUMIN SERPL-MCNC: 3.7 GM/DL (ref 3.4–5)
ALP BLD-CCNC: 68 IU/L (ref 40–128)
ALT SERPL-CCNC: 18 U/L (ref 10–40)
ANION GAP SERPL CALCULATED.3IONS-SCNC: 7 MMOL/L (ref 4–16)
AST SERPL-CCNC: 19 IU/L (ref 15–37)
BACTERIA: NEGATIVE /HPF
BASOPHILS ABSOLUTE: 0.1 K/CU MM
BASOPHILS RELATIVE PERCENT: 1 % (ref 0–1)
BILIRUB SERPL-MCNC: 0.2 MG/DL (ref 0–1)
BILIRUBIN URINE: NEGATIVE MG/DL
BLOOD, URINE: NEGATIVE
BUN BLDV-MCNC: 7 MG/DL (ref 6–23)
CALCIUM SERPL-MCNC: 8.8 MG/DL (ref 8.3–10.6)
CHLORIDE BLD-SCNC: 91 MMOL/L (ref 99–110)
CLARITY: CLEAR
CO2: 34 MMOL/L (ref 21–32)
COLOR: YELLOW
CREAT SERPL-MCNC: 0.7 MG/DL (ref 0.6–1.1)
DIFFERENTIAL TYPE: ABNORMAL
EKG ATRIAL RATE: 83 BPM
EKG DIAGNOSIS: NORMAL
EKG P AXIS: 67 DEGREES
EKG P-R INTERVAL: 142 MS
EKG Q-T INTERVAL: 422 MS
EKG QRS DURATION: 140 MS
EKG QTC CALCULATION (BAZETT): 495 MS
EKG R AXIS: 106 DEGREES
EKG T AXIS: 34 DEGREES
EKG VENTRICULAR RATE: 83 BPM
GFR AFRICAN AMERICAN: >60 ML/MIN/1.73M2
GFR NON-AFRICAN AMERICAN: >60 ML/MIN/1.73M2
GLUCOSE BLD-MCNC: 115 MG/DL (ref 70–99)
GLUCOSE, URINE: NEGATIVE MG/DL
HCT VFR BLD CALC: 35.2 % (ref 37–47)
HEMOGLOBIN: 10.7 GM/DL (ref 12.5–16)
KETONES, URINE: NEGATIVE MG/DL
LEUKOCYTE ESTERASE, URINE: NEGATIVE
LIPASE: 17 IU/L (ref 13–60)
LYMPHOCYTES ABSOLUTE: 0.2 K/CU MM
LYMPHOCYTES RELATIVE PERCENT: 4 % (ref 24–44)
MAGNESIUM: 1.6 MG/DL (ref 1.8–2.4)
MCH RBC QN AUTO: 29.5 PG (ref 27–31)
MCHC RBC AUTO-ENTMCNC: 30.4 % (ref 32–36)
MCV RBC AUTO: 97 FL (ref 78–100)
MONOCYTES ABSOLUTE: 0.2 K/CU MM
MONOCYTES RELATIVE PERCENT: 4 % (ref 0–4)
MYELOCYTE PERCENT: 2 %
MYELOCYTES ABSOLUTE COUNT: 0.1 K/CU MM
NITRITE URINE, QUANTITATIVE: NEGATIVE
PDW BLD-RTO: 12.4 % (ref 11.7–14.9)
PH, URINE: 7 (ref 5–8)
PLATELET # BLD: 175 K/CU MM (ref 140–440)
PMV BLD AUTO: 9.3 FL (ref 7.5–11.1)
POTASSIUM SERPL-SCNC: 3.5 MMOL/L (ref 3.5–5.1)
PROTEIN UA: NEGATIVE MG/DL
RBC # BLD: 3.63 M/CU MM (ref 4.2–5.4)
RBC URINE: NORMAL /HPF (ref 0–6)
SEGMENTED NEUTROPHILS ABSOLUTE COUNT: 4.6 K/CU MM
SEGMENTED NEUTROPHILS RELATIVE PERCENT: 89 % (ref 36–66)
SODIUM BLD-SCNC: 132 MMOL/L (ref 135–145)
SPECIFIC GRAVITY UA: 1.01 (ref 1–1.03)
TOTAL PROTEIN: 6 GM/DL (ref 6.4–8.2)
TRICHOMONAS: NORMAL /HPF
UROBILINOGEN, URINE: NEGATIVE MG/DL (ref 0.2–1)
WBC # BLD: 5.2 K/CU MM (ref 4–10.5)
WBC UA: <1 /HPF (ref 0–5)

## 2021-12-29 PROCEDURE — 36415 COLL VENOUS BLD VENIPUNCTURE: CPT

## 2021-12-29 PROCEDURE — 96375 TX/PRO/DX INJ NEW DRUG ADDON: CPT

## 2021-12-29 PROCEDURE — 96374 THER/PROPH/DIAG INJ IV PUSH: CPT

## 2021-12-29 PROCEDURE — 93010 ELECTROCARDIOGRAM REPORT: CPT | Performed by: INTERNAL MEDICINE

## 2021-12-29 PROCEDURE — C9113 INJ PANTOPRAZOLE SODIUM, VIA: HCPCS | Performed by: PHYSICIAN ASSISTANT

## 2021-12-29 PROCEDURE — 96372 THER/PROPH/DIAG INJ SC/IM: CPT

## 2021-12-29 PROCEDURE — 80053 COMPREHEN METABOLIC PANEL: CPT

## 2021-12-29 PROCEDURE — 83690 ASSAY OF LIPASE: CPT

## 2021-12-29 PROCEDURE — 85007 BL SMEAR W/DIFF WBC COUNT: CPT

## 2021-12-29 PROCEDURE — 74177 CT ABD & PELVIS W/CONTRAST: CPT

## 2021-12-29 PROCEDURE — 93005 ELECTROCARDIOGRAM TRACING: CPT | Performed by: PHYSICIAN ASSISTANT

## 2021-12-29 PROCEDURE — 6370000000 HC RX 637 (ALT 250 FOR IP): Performed by: PHYSICIAN ASSISTANT

## 2021-12-29 PROCEDURE — 85027 COMPLETE CBC AUTOMATED: CPT

## 2021-12-29 PROCEDURE — 83735 ASSAY OF MAGNESIUM: CPT

## 2021-12-29 PROCEDURE — 2580000003 HC RX 258: Performed by: PHYSICIAN ASSISTANT

## 2021-12-29 PROCEDURE — 6360000004 HC RX CONTRAST MEDICATION: Performed by: PHYSICIAN ASSISTANT

## 2021-12-29 PROCEDURE — 81001 URINALYSIS AUTO W/SCOPE: CPT

## 2021-12-29 PROCEDURE — 6360000002 HC RX W HCPCS: Performed by: PHYSICIAN ASSISTANT

## 2021-12-29 RX ORDER — PANTOPRAZOLE SODIUM 40 MG/10ML
40 INJECTION, POWDER, LYOPHILIZED, FOR SOLUTION INTRAVENOUS ONCE
Status: COMPLETED | OUTPATIENT
Start: 2021-12-29 | End: 2021-12-29

## 2021-12-29 RX ORDER — ACETAMINOPHEN 500 MG
1000 TABLET ORAL ONCE
Status: COMPLETED | OUTPATIENT
Start: 2021-12-29 | End: 2021-12-29

## 2021-12-29 RX ORDER — 0.9 % SODIUM CHLORIDE 0.9 %
1000 INTRAVENOUS SOLUTION INTRAVENOUS ONCE
Status: COMPLETED | OUTPATIENT
Start: 2021-12-29 | End: 2021-12-29

## 2021-12-29 RX ORDER — DICYCLOMINE HYDROCHLORIDE 10 MG/ML
20 INJECTION INTRAMUSCULAR ONCE
Status: COMPLETED | OUTPATIENT
Start: 2021-12-29 | End: 2021-12-29

## 2021-12-29 RX ORDER — DICYCLOMINE HYDROCHLORIDE 10 MG/1
20 CAPSULE ORAL
Qty: 40 CAPSULE | Refills: 0 | Status: SHIPPED | OUTPATIENT
Start: 2021-12-29 | End: 2022-09-22

## 2021-12-29 RX ORDER — ONDANSETRON 2 MG/ML
4 INJECTION INTRAMUSCULAR; INTRAVENOUS ONCE
Status: COMPLETED | OUTPATIENT
Start: 2021-12-29 | End: 2021-12-29

## 2021-12-29 RX ORDER — ONDANSETRON 4 MG/1
4 TABLET, ORALLY DISINTEGRATING ORAL EVERY 6 HOURS
Qty: 20 TABLET | Refills: 0 | Status: SHIPPED | OUTPATIENT
Start: 2021-12-29

## 2021-12-29 RX ADMIN — PANTOPRAZOLE SODIUM 40 MG: 40 INJECTION, POWDER, FOR SOLUTION INTRAVENOUS at 02:14

## 2021-12-29 RX ADMIN — ONDANSETRON 4 MG: 2 INJECTION INTRAMUSCULAR; INTRAVENOUS at 02:14

## 2021-12-29 RX ADMIN — IOPAMIDOL 75 ML: 755 INJECTION, SOLUTION INTRAVENOUS at 02:42

## 2021-12-29 RX ADMIN — SODIUM CHLORIDE 1000 ML: 9 INJECTION, SOLUTION INTRAVENOUS at 02:13

## 2021-12-29 RX ADMIN — DICYCLOMINE HYDROCHLORIDE 20 MG: 20 INJECTION, SOLUTION INTRAMUSCULAR at 02:14

## 2021-12-29 RX ADMIN — ACETAMINOPHEN 1000 MG: 500 TABLET ORAL at 04:21

## 2021-12-29 ASSESSMENT — PAIN SCALES - GENERAL
PAINLEVEL_OUTOF10: 8
PAINLEVEL_OUTOF10: 8

## 2021-12-29 NOTE — ED TRIAGE NOTES
Patient states upper abdominal pain 8/10 since Monday. Patient states she vomiting but denies diarrhea. Patient is alert & oriented x 4. Patient is on 2L O2 at bedside.

## 2021-12-29 NOTE — ED PROVIDER NOTES
Emergency [address] EMERGENCY DEPARTMENT    Patient: Joey Traylor  MRN: [id number]  : 1960  Date of Evaluation: 2021  ED Provider: Alexa Chaudhari PA-C    Chief Complaint       Chief Complaint   Patient presents with    Fever    Abdominal Pain       OLIVER Traylor is a 63 y.o. female who presents to the emergency department for abdominal pain. Onset was yesterday. Constant. Epigastric. Described as a pressure. Worse with PO intake. No alleviating factors. + n/v, denies diarrhea. She states she's had a low grade temperature. Denies cough, congestion, cp, sob. She's had a cholecystectomy. ROS     CONSTITUTIONAL:  Denies fever. EYES:  Denies visual changes. HEAD:  Denies headache. ENT:  Denies earache, nasal congestion, sore throat. NECK:  Denies neck pain. RESPIRATORY:  Denies any shortness of breath. CARDIOVASCULAR:  Denies chest pain. GI:  + abd pain, n/v.  :  Denies urinary symptoms. MUSCULOSKELETAL:  Denies extremity pain or swelling. BACK:  Denies back pain. INTEGUMENT:  Denies skin changes. LYMPHATIC:  Denies lymphadenopathy. NEUROLOGIC:  Denies any numbness/tingling. PSYCHIATRIC:  Denies SI/HI.     Past History     Past Medical History:   Diagnosis Date    Anemia     Anxiety 2017    follows with Dr Goldman Hammed Back pain at L4-L5 level 2017    Dr Yessy Devine [facility])     per pt on 2021\"never told I have bipolar\"    COPD (chronic obstructive pulmonary disease) [facility]     follows with Dr Carol Warren Emphysema of lung ([facility]     FH: CAD (coronary artery disease) 2017    Father had in his forties, sister in her thirties    [facility] Fibromyalgia 2017    Full dentures     full upper plate and partial on the bottom    Gastric ulcer     \"had stomach ulder back fall \"    H/O Doppler ultrasound 2019    venous- no DVT or reflux  H/O echocardiogram 2015    EF50-55% Normal- see media    History of blood transfusion     Hyperlipidemia LDL goal <100     Hypertension     Dr Mark Saleem Irregular heartbeat     \"they said I have irreg heart beat before- back when they drained fluid around my heart \"    Obstructive sleep apnea     \"have bipap I use at home\"    On home oxygen therapy     \"on oxygen all the time at home and keep it on 2.5 liters\"    Osteoarthritis     Pericardial effusion     per old chart had pericardial effusion 10/2020    Spinal stenosis     hx per old chart    Thyroid disease     Wears glasses     \"suppose to wear glasses\"     Past Surgical History:   Procedure Laterality Date    BACK SURGERY  2017    \"surgery on low back L5-6- not sure if they put any metal in \"   [address]      10/2019    CARDIAC CATHETERIZATION      per old chart had cath done 2020    CARPAL TUNNEL RELEASE Right 1985    CHOLECYSTECTOMY, LAPAROSCOPIC N/A 10/25/2020    CHOLECYSTECTOMY LAPAROSCOPIC WITH IOC performed by Kory Mckeon MD at [place] [address] COLONOSCOPY N/A 2019    COLONOSCOPY DIAGNOSTIC performed by Claudean Radish, MD at [address] [place] [address], DIAGNOSTIC      per old chart had egd done 2018    TUBAL LIGATION  1987    UPPER GASTROINTESTINAL ENDOSCOPY N/A 2021    EGD BIOPSY performed by Claudean Radish, MD at [facility] ENDOSCOPY     Social History     Socioeconomic History    Marital status:      Spouse name: Not on file    Number of children: Not on file    Years of education: Not on file    Highest education level: Not on file   Occupational History    Not on file   Tobacco Use    Smoking status: Former Smoker     Packs/day: 1.50     Years: 20.00     Pack years: 30.00     Types: Cigarettes     Quit date: 2008     Years since quittin.0    Smokeless tobacco: Never Used   Vaping Use    Vaping Use: Never used   Substance and Sexual Activity    Alcohol use: Not Currently     Alcohol/week: 2.0 standard drinks     Types: 2 Shots of liquor per week     Comment: per pt on 1/5/2021\"quit drinking back Oct 2020\"use to drink wine coolers - 3 times per week \"/caffeine 2-3 coffees aday 1 pop b    Drug use: No    Sexual activity: Yes     Partners: Male   Other Topics Concern    Not on file   Social History Narrative    Not on file     Social Determinants of Health     Financial Resource Strain: Low Risk     Difficulty of Paying Living Expenses: Not hard at all   Food Insecurity: No Food Insecurity    Worried About Running Out of Food in the Last Year: Never true    Donny of Food in the Last Year: Never true   Transportation Needs: No Transportation Needs    Lack of Transportation (Medical): No    Lack of Transportation (Non-Medical): No   Physical Activity: Inactive    Days of Exercise per Week: 0 days    Minutes of Exercise per Session: 0 min   Stress: No Stress Concern Present    Feeling of Stress :  Only a little   Social Connections: Socially Isolated    Frequency of Communication with Friends and Family: Once a week    Frequency of Social Gatherings with Friends and Family: Once a week    Attends Gnosticist Services: Never    Active Member of Clubs or Organizations: No    Attends Club or Organization Meetings: Never    Marital Status:    Intimate Partner Violence:     Fear of Current or Ex-Partner: Not on file    Emotionally Abused: Not on file    Physically Abused: Not on file    Sexually Abused: Not on file   Housing Stability: [address] Unable to Pay for Housing in the Last Year: No    Number of [place] in the Last Year: 1    Unstable Housing in the Last Year: No       Medications/Allergies     Previous Medications    ACETAMINOPHEN (TYLENOL 8 HOUR ARTHRITIS PAIN) 650 MG EXTENDED RELEASE TABLET    Take 650 mg by mouth as needed for Pain    ALBUTEROL-IPRATROPIUM (COMBIVENT RESPIMAT)  MCG/ACT AERS INHALER    Inhale 1 puff into the lungs every 4 hours as needed for Wheezing    BUDESONIDE-FORMOTEROL (SYMBICORT) 160-4.5 MCG/ACT AERO    USE 2 INHALATIONS TWICE A DAY    BUSPIRONE (BUSPAR) 10 MG TABLET    Take 1 tablet by mouth 2 times daily    CARBAMAZEPINE (TEGRETOL) 200 MG TABLET    Take 1 tablet by mouth daily for 14 days    CLONAZEPAM (KLONOPIN) 1 MG DISINTEGRATING TABLET    Take 1 mg by mouth 3 times daily as needed. DICLOFENAC SODIUM (VOLTAREN) 1 % GEL    Apply 4 g topically 4 times daily    DILTIAZEM (CARDIZEM CD) 120 MG EXTENDED RELEASE CAPSULE    Take 1 capsule by mouth daily    DULOXETINE (CYMBALTA) 60 MG EXTENDED RELEASE CAPSULE    Take 60 mg by mouth daily     FERROUS SULFATE (IRON 325) 325 (65 FE) MG TABLET    Take 1 tablet by mouth every other day Indications: pt taking every day bid Monday, wed, fri    FLUTICASONE (FLONASE) 50 MCG/ACT NASAL SPRAY    2 sprays by Nasal route daily    FOLIC ACID (FOLVITE) 1 MG TABLET    Take 1 tablet by mouth daily    GUAIFENESIN (MUCINEX) 600 MG EXTENDED RELEASE TABLET    Take 1 tablet by mouth 2 times daily    HYDRALAZINE (APRESOLINE) 50 MG TABLET    Take 1.5 tablets by mouth 3 times daily    IPRATROPIUM-ALBUTEROL (DUONEB) 0.5-2.5 (3) MG/3ML SOLN NEBULIZER SOLUTION    Take 3 mLs by nebulization 4 times daily    LACTOBACILLUS (CULTURELLE) CAPSULE    Take 1 capsule by mouth daily (with breakfast)    LEVOTHYROXINE (SYNTHROID) 100 MCG TABLET    Take 1 tablet by mouth Daily    MELOXICAM (MOBIC) 15 MG TABLET    Take 1 tablet by mouth daily    METOPROLOL SUCCINATE (TOPROL XL) 25 MG EXTENDED RELEASE TABLET    Take 1 tablet by mouth daily    MONTELUKAST (SINGULAIR) 10 MG TABLET    Take 1 tablet by mouth daily    ONDANSETRON (ZOFRAN ODT) 4 MG DISINTEGRATING TABLET    Take 1 tablet by mouth every 8 hours as needed for Nausea or Vomiting Place under the tongue and let it melt and absorb from under your tongue.     THEOPHYLLINE (MIAH-24) 200 MG EXTENDED RELEASE CAPSULE    Take 400 mg by mouth daily TRAZODONE (DESYREL) 50 MG TABLET    Take 100 mg by mouth nightly     VITAMIN B-1 100 MG TABLET    Take 1 tablet by mouth daily    VITAMIN D (CHOLECALCIFEROL) 25 MCG (1000 UT) TABS TABLET    Take 1,000 Units by mouth daily     Allergies   Allergen Reactions    Lisinopril Swelling and Rash     angioedema        Physical Exam       ED Triage Vitals [12/28/21 2259]   BP Temp Temp Source Pulse Resp SpO2 Height Weight   (!) 142/67 99.3 °F (37.4 °C) Oral 85 16 99 % -- --     GENERAL APPEARANCE:  Well-developed, well-nourished, no acute distress. HEAD:  NC/AT. EYES:  Sclera anicteric. ENT:  Ears, nose, mouth normal.     NECK:  Supple. CARDIO:  RRR. LUNGS:   CTAB. Respirations unlabored. ABDOMEN:  Soft, non-distended, non-tender. BS active. EXTREMITIES:  No acute deformities. SKIN:  Warm and dry. NEUROLOGICAL:  Alert and oriented. PSYCHIATRIC:  Normal mood.      Diagnostics     Labs:  Results for orders placed or performed during the hospital encounter of 12/29/21   Comprehensive Metabolic Panel w/ Reflex to MG   Result Value Ref Range    Sodium 132 (L) 135 - 145 MMOL/L    Potassium 3.5 3.5 - 5.1 MMOL/L    Chloride 91 (L) 99 - 110 mMol/L    CO2 34 (H) 21 - 32 MMOL/L    BUN 7 6 - 23 MG/DL    CREATININE 0.7 0.6 - 1.1 MG/DL    Glucose 115 (H) 70 - 99 MG/DL    Calcium 8.8 8.3 - 10.6 MG/DL    Albumin 3.7 3.4 - 5.0 GM/DL    Total Protein 6.0 (L) 6.4 - 8.2 GM/DL    Total Bilirubin 0.2 0.0 - 1.0 MG/DL    ALT 18 10 - 40 U/L    AST 19 15 - 37 IU/L    Alkaline Phosphatase 68 40 - 128 IU/L    GFR Non-African American >60 >60 mL/min/1.73m2    GFR African American >60 >60 mL/min/1.73m2    Anion Gap 7 4 - 16   CBC Auto Differential   Result Value Ref Range    WBC 5.2 4.0 - 10.5 K/CU MM    RBC 3.63 (L) 4.2 - 5.4 M/CU MM    Hemoglobin 10.7 (L) 12.5 - 16.0 GM/DL    Hematocrit 35.2 (L) 37 - 47 %    MCV 97.0 78 - 100 FL    MCH 29.5 27 - 31 PG    MCHC 30.4 (L) 32.0 - 36.0 %    RDW 12.4 11.7 - 14.9 %    Platelets 127 546 - 215 K/CU MM    MPV 9.3 7.5 - 11.1 FL   Lipase   Result Value Ref Range    Lipase 17 13 - 60 IU/L       Radiographs:  CT HEAD WO CONTRAST    Result Date: 12/25/2021  EXAMINATION: CT OF THE HEAD WITHOUT CONTRAST 12/25/2021 3:39 pm TECHNIQUE: CT of the head was performed without the administration of intravenous contrast. Dose modulation, iterative reconstruction, and/or weight based adjustment of the mA/kV was utilized to reduce the radiation dose to as low as reasonably achievable. COMPARISON: Head CT 06/12/2021 and 03/30/2021 HISTORY: ORDERING SYSTEM PROVIDED HISTORY: left temporal headache TECHNOLOGIST PROVIDED HISTORY: Has a \"code stroke\" or \"stroke alert\" been called? ->No Reason for exam:->left temporal headache Decision Support Exception - unselect if not a suspected or confirmed emergency medical condition->Emergency Medical Condition (MA) Reason for Exam: left temporal headache Additional signs and symptoms: no Relevant Medical/Surgical History: none FINDINGS: BRAIN/VENTRICLES:  No acute intracranial hemorrhage. New chronic appearing lacunar infarct in the head of the left caudate nucleus related to the previously seen hemorrhage. Otherwise intact gray/white matter differentiation without findings of acute ischemia. No mass effect nor midline shift. Patent basilar cisterns and foramen magnum. No hydrocephalus. 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 Mild diffuse atrophy. Mild deep and periventricular white matter hypodensities likely due to chronic small vessel ischemia. ORBITS:  New bilateral lens implants. Otherwise normal without acute abnormality. SINUSES:  Partial opacification of the left mastoid air cells likely due to effusion. Visualized portions otherwise appear normally pneumatized and aerated. SOFT TISSUES/SKULL:  No acute soft tissue abnormality. Mild atherosclerotic calcifications. No acute fracture.      1. No acute intracranial

## 2021-12-29 NOTE — ED NOTES
12 lead EKG as interpreted by me reveals sinus rhythm. Axis is normal. There are no ischemic ST elevations or other suspicious ST changes;  QRS interval consistent with a RBBB pattern, QT interval is not prolonged. Final Interpretation: Sinus Rhythm with RBBB.          Carlos Camacho MD  12/29/21 8060

## 2021-12-29 NOTE — ED NOTES
Pt. reviewed discharge instructions and follow up instructions. . Pt. verbalizes understanding with no further questions. Pt.wheeled out to lobby and not showing any signs of distress.        Rogelio Mayorga RN  12/29/21 2110

## 2021-12-30 ENCOUNTER — CARE COORDINATION (OUTPATIENT)
Dept: CARE COORDINATION | Age: 61
End: 2021-12-30

## 2022-01-01 ENCOUNTER — APPOINTMENT (OUTPATIENT)
Dept: CT IMAGING | Age: 62
DRG: 207 | End: 2022-01-01
Payer: COMMERCIAL

## 2022-01-01 ENCOUNTER — APPOINTMENT (OUTPATIENT)
Dept: GENERAL RADIOLOGY | Age: 62
DRG: 207 | End: 2022-01-01
Payer: COMMERCIAL

## 2022-01-01 ENCOUNTER — HOSPITAL ENCOUNTER (INPATIENT)
Age: 62
LOS: 4 days | DRG: 207 | End: 2022-12-04
Attending: EMERGENCY MEDICINE | Admitting: SPECIALIST
Payer: COMMERCIAL

## 2022-01-01 ENCOUNTER — TELEPHONE (OUTPATIENT)
Dept: CARDIOLOGY CLINIC | Age: 62
End: 2022-01-01

## 2022-01-01 VITALS
BODY MASS INDEX: 39.67 KG/M2 | OXYGEN SATURATION: 98 % | SYSTOLIC BLOOD PRESSURE: 165 MMHG | DIASTOLIC BLOOD PRESSURE: 83 MMHG | HEIGHT: 61 IN | HEART RATE: 97 BPM | WEIGHT: 210.1 LBS | RESPIRATION RATE: 26 BRPM | TEMPERATURE: 99.5 F

## 2022-01-01 DIAGNOSIS — I46.9 CARDIAC ARREST (HCC): Primary | ICD-10-CM

## 2022-01-01 LAB
ABO/RH: NORMAL
ADENOVIRUS DETECTION BY PCR: NOT DETECTED
ALBUMIN SERPL-MCNC: 2.9 GM/DL (ref 3.4–5)
ALBUMIN SERPL-MCNC: 3 GM/DL (ref 3.4–5)
ALBUMIN SERPL-MCNC: 3 GM/DL (ref 3.4–5)
ALBUMIN SERPL-MCNC: 3.1 GM/DL (ref 3.4–5)
ALBUMIN SERPL-MCNC: 3.2 GM/DL (ref 3.4–5)
ALBUMIN SERPL-MCNC: 3.3 GM/DL (ref 3.4–5)
ALBUMIN SERPL-MCNC: 3.3 GM/DL (ref 3.4–5)
ALP BLD-CCNC: 124 IU/L (ref 40–129)
ALP BLD-CCNC: 127 IU/L (ref 40–129)
ALP BLD-CCNC: 81 IU/L (ref 40–128)
ALP BLD-CCNC: 81 IU/L (ref 40–129)
ALP BLD-CCNC: 82 IU/L (ref 40–129)
ALP BLD-CCNC: 83 IU/L (ref 40–128)
ALP BLD-CCNC: 85 IU/L (ref 40–128)
ALP BLD-CCNC: 87 IU/L (ref 40–129)
ALP BLD-CCNC: 89 IU/L (ref 40–128)
ALT SERPL-CCNC: 31 U/L (ref 10–40)
ALT SERPL-CCNC: 35 U/L (ref 10–40)
ALT SERPL-CCNC: 44 U/L (ref 10–40)
ALT SERPL-CCNC: 45 U/L (ref 10–40)
ALT SERPL-CCNC: 46 U/L (ref 10–40)
ALT SERPL-CCNC: 47 U/L (ref 10–40)
ALT SERPL-CCNC: 49 U/L (ref 10–40)
ALT SERPL-CCNC: 51 U/L (ref 10–40)
ALT SERPL-CCNC: 53 U/L (ref 10–40)
AMYLASE: 115 U/L (ref 25–115)
ANION GAP SERPL CALCULATED.3IONS-SCNC: 10 MMOL/L (ref 4–16)
ANION GAP SERPL CALCULATED.3IONS-SCNC: 11 MMOL/L (ref 4–16)
ANION GAP SERPL CALCULATED.3IONS-SCNC: 12 MMOL/L (ref 4–16)
ANION GAP SERPL CALCULATED.3IONS-SCNC: 13 MMOL/L (ref 4–16)
ANION GAP SERPL CALCULATED.3IONS-SCNC: 14 MMOL/L (ref 4–16)
ANTIBODY SCREEN: NEGATIVE
APTT: 28.3 SECONDS (ref 25.1–37.1)
APTT: 29 SECONDS (ref 25.1–37.1)
APTT: 29 SECONDS (ref 25.1–37.1)
APTT: 29.2 SECONDS (ref 25.1–37.1)
APTT: 29.4 SECONDS (ref 25.1–37.1)
APTT: 29.8 SECONDS (ref 25.1–37.1)
APTT: 32.3 SECONDS (ref 25.1–37.1)
APTT: 41.8 SECONDS (ref 25.1–37.1)
AST SERPL-CCNC: 103 IU/L (ref 15–37)
AST SERPL-CCNC: 113 IU/L (ref 15–37)
AST SERPL-CCNC: 113 IU/L (ref 15–37)
AST SERPL-CCNC: 120 IU/L (ref 15–37)
AST SERPL-CCNC: 132 IU/L (ref 15–37)
AST SERPL-CCNC: 134 IU/L (ref 15–37)
AST SERPL-CCNC: 39 IU/L (ref 15–37)
AST SERPL-CCNC: 51 IU/L (ref 15–37)
AST SERPL-CCNC: 98 IU/L (ref 15–37)
ATYPICAL LYMPHOCYTE ABSOLUTE COUNT: ABNORMAL
ATYPICAL LYMPHOCYTE ABSOLUTE COUNT: ABNORMAL
BACTERIA: ABNORMAL /HPF
BACTERIA: ABNORMAL /HPF
BACTERIA: NEGATIVE /HPF
BACTERIA: NEGATIVE /HPF
BANDED NEUTROPHILS ABSOLUTE COUNT: 1.16 K/CU MM
BANDED NEUTROPHILS ABSOLUTE COUNT: 1.99 K/CU MM
BANDED NEUTROPHILS RELATIVE PERCENT: 11 % (ref 5–11)
BANDED NEUTROPHILS RELATIVE PERCENT: 12 % (ref 5–11)
BASE EXCESS MIXED: 10.5 (ref 0–2.3)
BASE EXCESS MIXED: 13.7 (ref 0–2.3)
BASE EXCESS MIXED: 14.2 (ref 0–2.3)
BASE EXCESS MIXED: 14.2 (ref 0–2.3)
BASE EXCESS MIXED: 14.6 (ref 0–2.3)
BASE EXCESS MIXED: 7 (ref 0–2.3)
BASE EXCESS MIXED: 7.4 (ref 0–2.3)
BASE EXCESS MIXED: 7.6 (ref 0–2.3)
BASE EXCESS MIXED: 7.9 (ref 0–2.3)
BASE EXCESS: ABNORMAL (ref 0–2.4)
BASE EXCESS: ABNORMAL (ref 0–2.4)
BASOPHILS ABSOLUTE: 0 K/CU MM
BASOPHILS RELATIVE PERCENT: 0 % (ref 0–1)
BASOPHILS RELATIVE PERCENT: 0.2 % (ref 0–1)
BASOPHILS RELATIVE PERCENT: 0.2 % (ref 0–1)
BILIRUB SERPL-MCNC: 0.2 MG/DL (ref 0–1)
BILIRUB SERPL-MCNC: 0.3 MG/DL (ref 0–1)
BILIRUB SERPL-MCNC: 0.3 MG/DL (ref 0–1)
BILIRUBIN DIRECT: 0.2 MG/DL (ref 0–0.3)
BILIRUBIN URINE: NEGATIVE MG/DL
BILIRUBIN, INDIRECT: 0 MG/DL (ref 0–0.7)
BILIRUBIN, INDIRECT: 0 MG/DL (ref 0–0.7)
BLOOD, URINE: ABNORMAL
BORDETELLA PARAPERTUSSIS BY PCR: NOT DETECTED
BORDETELLA PERTUSSIS PCR: NOT DETECTED
BUN BLDV-MCNC: 10 MG/DL (ref 6–23)
BUN BLDV-MCNC: 19 MG/DL (ref 6–23)
BUN BLDV-MCNC: 25 MG/DL (ref 6–23)
BUN BLDV-MCNC: 40 MG/DL (ref 6–23)
BUN BLDV-MCNC: 46 MG/DL (ref 6–23)
BUN BLDV-MCNC: 47 MG/DL (ref 6–23)
BUN BLDV-MCNC: 48 MG/DL (ref 6–23)
BUN BLDV-MCNC: 49 MG/DL (ref 6–23)
BUN BLDV-MCNC: 49 MG/DL (ref 6–23)
BUN BLDV-MCNC: 5 MG/DL (ref 6–23)
BUN BLDV-MCNC: 50 MG/DL (ref 6–23)
BUN BLDV-MCNC: 50 MG/DL (ref 6–23)
CALCIUM OXALATE CRYSTALS: ABNORMAL /HPF
CALCIUM SERPL-MCNC: 8.4 MG/DL (ref 8.3–10.6)
CALCIUM SERPL-MCNC: 8.4 MG/DL (ref 8.3–10.6)
CALCIUM SERPL-MCNC: 8.5 MG/DL (ref 8.3–10.6)
CALCIUM SERPL-MCNC: 8.6 MG/DL (ref 8.3–10.6)
CALCIUM SERPL-MCNC: 8.8 MG/DL (ref 8.3–10.6)
CALCIUM SERPL-MCNC: 8.9 MG/DL (ref 8.3–10.6)
CALCIUM SERPL-MCNC: 9 MG/DL (ref 8.3–10.6)
CALCIUM SERPL-MCNC: 9 MG/DL (ref 8.3–10.6)
CALCIUM SERPL-MCNC: 9.4 MG/DL (ref 8.3–10.6)
CARBON MONOXIDE, BLOOD: 0.6 % (ref 0–5)
CARBON MONOXIDE, BLOOD: 0.8 % (ref 0–5)
CARBON MONOXIDE, BLOOD: 0.9 % (ref 0–5)
CARBON MONOXIDE, BLOOD: 1 % (ref 0–5)
CARBON MONOXIDE, BLOOD: 1.2 % (ref 0–5)
CARBON MONOXIDE, BLOOD: 1.2 % (ref 0–5)
CARBON MONOXIDE, BLOOD: 2.7 % (ref 0–5)
CHLAMYDOPHILA PNEUMONIA PCR: NOT DETECTED
CHLORIDE BLD-SCNC: 100 MMOL/L (ref 99–110)
CHLORIDE BLD-SCNC: 90 MMOL/L (ref 99–110)
CHLORIDE BLD-SCNC: 91 MMOL/L (ref 99–110)
CHLORIDE BLD-SCNC: 92 MMOL/L (ref 99–110)
CHLORIDE BLD-SCNC: 93 MMOL/L (ref 99–110)
CHLORIDE BLD-SCNC: 94 MMOL/L (ref 99–110)
CHLORIDE BLD-SCNC: 94 MMOL/L (ref 99–110)
CHLORIDE BLD-SCNC: 96 MMOL/L (ref 99–110)
CHLORIDE BLD-SCNC: 98 MMOL/L (ref 99–110)
CHOLESTEROL: 143 MG/DL
CLARITY: ABNORMAL
CLARITY: ABNORMAL
CLARITY: CLEAR
CLARITY: CLEAR
CO2 CONTENT: 35.6 MMOL/L (ref 19–24)
CO2 CONTENT: 37.6 MMOL/L (ref 19–24)
CO2 CONTENT: 38.5 MMOL/L (ref 19–24)
CO2 CONTENT: 38.7 MMOL/L (ref 19–24)
CO2 CONTENT: 38.9 MMOL/L (ref 19–24)
CO2 CONTENT: 39.8 MMOL/L (ref 19–24)
CO2 CONTENT: 39.8 MMOL/L (ref 19–24)
CO2 CONTENT: 39.9 MMOL/L (ref 19–24)
CO2 CONTENT: 40.7 MMOL/L (ref 19–24)
CO2: 31 MMOL/L (ref 21–32)
CO2: 33 MMOL/L (ref 21–32)
CO2: 34 MMOL/L (ref 21–32)
CO2: 35 MMOL/L (ref 21–32)
CO2: 36 MMOL/L (ref 21–32)
CO2: 40 MMOL/L (ref 21–32)
CO2: 40 MMOL/L (ref 21–32)
COLOR: YELLOW
COMMENT: ABNORMAL
CORONAVIRUS 229E PCR: NOT DETECTED
CORONAVIRUS HKU1 PCR: NOT DETECTED
CORONAVIRUS NL63 PCR: NOT DETECTED
CORONAVIRUS OC43 PCR: NOT DETECTED
CREAT SERPL-MCNC: 0.9 MG/DL (ref 0.6–1.1)
CREAT SERPL-MCNC: 1 MG/DL (ref 0.6–1.1)
CREAT SERPL-MCNC: 1.4 MG/DL (ref 0.6–1.1)
CREAT SERPL-MCNC: 1.6 MG/DL (ref 0.6–1.1)
CREAT SERPL-MCNC: 2.1 MG/DL (ref 0.6–1.1)
CREAT SERPL-MCNC: 2.3 MG/DL (ref 0.6–1.1)
CREAT SERPL-MCNC: 2.3 MG/DL (ref 0.6–1.1)
CREAT SERPL-MCNC: 2.4 MG/DL (ref 0.6–1.1)
CREAT SERPL-MCNC: 2.5 MG/DL (ref 0.6–1.1)
CREAT SERPL-MCNC: 2.6 MG/DL (ref 0.6–1.1)
CREAT SERPL-MCNC: 2.6 MG/DL (ref 0.6–1.1)
CREAT SERPL-MCNC: 2.7 MG/DL (ref 0.6–1.1)
CULTURE: ABNORMAL
CULTURE: NORMAL
DIFFERENTIAL TYPE: ABNORMAL
DOHLE BODIES: PRESENT
DOSE AMOUNT: NORMAL
DOSE TIME: NORMAL
EGFR, POC: 52 ML/MIN/1.73M2
EGFR, POC: >60 ML/MIN/1.73M2
EKG ATRIAL RATE: 262 BPM
EKG DIAGNOSIS: NORMAL
EKG P AXIS: 108 DEGREES
EKG Q-T INTERVAL: 336 MS
EKG QRS DURATION: 146 MS
EKG QTC CALCULATION (BAZETT): 496 MS
EKG R AXIS: 106 DEGREES
EKG T AXIS: 21 DEGREES
EKG VENTRICULAR RATE: 131 BPM
EOSINOPHILS ABSOLUTE: 0 K/CU MM
EOSINOPHILS RELATIVE PERCENT: 0 % (ref 0–3)
EOSINOPHILS RELATIVE PERCENT: 0 % (ref 0–3)
EOSINOPHILS RELATIVE PERCENT: 0.2 % (ref 0–3)
ESTIMATED AVERAGE GLUCOSE: 114 MG/DL
GAMMA GLUTAMYL TRANSFERASE: 35 IU/L (ref 5–36)
GFR SERPL CREATININE-BSD FRML MDRD: 20 ML/MIN/1.73M2
GFR SERPL CREATININE-BSD FRML MDRD: 21 ML/MIN/1.73M2
GFR SERPL CREATININE-BSD FRML MDRD: 23 ML/MIN/1.73M2
GFR SERPL CREATININE-BSD FRML MDRD: 24 ML/MIN/1.73M2
GFR SERPL CREATININE-BSD FRML MDRD: 24 ML/MIN/1.73M2
GFR SERPL CREATININE-BSD FRML MDRD: 26 ML/MIN/1.73M2
GFR SERPL CREATININE-BSD FRML MDRD: 37 ML/MIN/1.73M2
GFR SERPL CREATININE-BSD FRML MDRD: 43 ML/MIN/1.73M2
GFR SERPL CREATININE-BSD FRML MDRD: >60 ML/MIN/1.73M2
GFR SERPL CREATININE-BSD FRML MDRD: >60 ML/MIN/1.73M2
GLUCOSE BLD-MCNC: 138 MG/DL (ref 70–99)
GLUCOSE BLD-MCNC: 141 MG/DL (ref 70–99)
GLUCOSE BLD-MCNC: 144 MG/DL (ref 70–99)
GLUCOSE BLD-MCNC: 152 MG/DL (ref 70–99)
GLUCOSE BLD-MCNC: 155 MG/DL (ref 70–99)
GLUCOSE BLD-MCNC: 161 MG/DL (ref 70–99)
GLUCOSE BLD-MCNC: 161 MG/DL (ref 70–99)
GLUCOSE BLD-MCNC: 162 MG/DL (ref 70–99)
GLUCOSE BLD-MCNC: 168 MG/DL (ref 70–99)
GLUCOSE BLD-MCNC: 168 MG/DL (ref 70–99)
GLUCOSE BLD-MCNC: 176 MG/DL (ref 70–99)
GLUCOSE BLD-MCNC: 178 MG/DL (ref 70–99)
GLUCOSE BLD-MCNC: 191 MG/DL (ref 70–99)
GLUCOSE BLD-MCNC: 234 MG/DL (ref 70–99)
GLUCOSE, URINE: NEGATIVE MG/DL
HBA1C MFR BLD: 5.6 % (ref 4.2–6.3)
HCO3 ARTERIAL: 34.3 MMOL/L (ref 18–23)
HCO3 ARTERIAL: 35.6 MMOL/L (ref 18–23)
HCO3 ARTERIAL: 36.4 MMOL/L (ref 18–23)
HCO3 ARTERIAL: 37.2 MMOL/L (ref 18–23)
HCO3 ARTERIAL: 37.5 MMOL/L (ref 18–23)
HCO3 ARTERIAL: 37.5 MMOL/L (ref 18–23)
HCO3 ARTERIAL: 37.6 MMOL/L (ref 18–23)
HCO3 ARTERIAL: 38.5 MMOL/L (ref 18–23)
HCO3 ARTERIAL: 39.3 MMOL/L (ref 18–23)
HCT VFR BLD CALC: 28.8 % (ref 37–47)
HCT VFR BLD CALC: 29.1 % (ref 37–47)
HCT VFR BLD CALC: 29.3 % (ref 37–47)
HCT VFR BLD CALC: 29.7 % (ref 37–47)
HCT VFR BLD CALC: 30 % (ref 37–47)
HCT VFR BLD CALC: 34.6 % (ref 37–47)
HCT VFR BLD CALC: 38 % (ref 37–47)
HCT VFR BLD CALC: 38 % (ref 37–47)
HCT VFR BLD CALC: 40 % (ref 37–47)
HCT VFR BLD CALC: 42 % (ref 37–47)
HDLC SERPL-MCNC: 88 MG/DL
HEMOGLOBIN: 10 GM/DL (ref 12.5–16)
HEMOGLOBIN: 10.5 GM/DL (ref 12.5–16)
HEMOGLOBIN: 13.5 GM/DL (ref 12.5–16)
HEMOGLOBIN: 14.3 GM/DL (ref 12.5–16)
HEMOGLOBIN: 8.8 GM/DL (ref 12.5–16)
HEMOGLOBIN: 9.3 GM/DL (ref 12.5–16)
HEMOGLOBIN: 9.8 GM/DL (ref 12.5–16)
HUMAN METAPNEUMOVIRUS PCR: NOT DETECTED
HYPHENATED YEAST: ABNORMAL /HPF
IMMATURE NEUTROPHIL %: 0.4 % (ref 0–0.43)
IMMATURE NEUTROPHIL %: 1.2 % (ref 0–0.43)
IMMATURE NEUTROPHIL %: 2.3 % (ref 0–0.43)
INFLUENZA A BY PCR: NOT DETECTED
INFLUENZA A H1 (2009) PCR: NOT DETECTED
INFLUENZA A H1 PANDEMIC PCR: NOT DETECTED
INFLUENZA A H3 PCR: NOT DETECTED
INFLUENZA B BY PCR: NOT DETECTED
INR BLD: 0.79 INDEX
INR BLD: 0.82 INDEX
INR BLD: 0.94 INDEX
INR BLD: 0.95 INDEX
INR BLD: 0.98 INDEX
INR BLD: 0.99 INDEX
INR BLD: 1 INDEX
INR BLD: 1.02 INDEX
KETONES, URINE: NEGATIVE MG/DL
LACTATE DEHYDROGENASE: 583 IU/L (ref 120–246)
LACTATE: 1.2 MMOL/L (ref 0.4–2)
LACTATE: 1.5 MMOL/L (ref 0.4–2)
LACTATE: 8.5 MMOL/L (ref 0.4–2)
LACTIC ACID, SEPSIS: 3.6 MMOL/L (ref 0.5–1.9)
LDL CHOLESTEROL CALCULATED: 38 MG/DL
LEUKOCYTE ESTERASE, URINE: ABNORMAL
LEUKOCYTE ESTERASE, URINE: ABNORMAL
LEUKOCYTE ESTERASE, URINE: NEGATIVE
LEUKOCYTE ESTERASE, URINE: NEGATIVE
LIPASE: 22 IU/L (ref 13–60)
LIPASE: 44 IU/L (ref 13–60)
LYMPHOCYTES ABSOLUTE: 0.5 K/CU MM
LYMPHOCYTES ABSOLUTE: 0.5 K/CU MM
LYMPHOCYTES ABSOLUTE: 0.9 K/CU MM
LYMPHOCYTES ABSOLUTE: 1.7 K/CU MM
LYMPHOCYTES ABSOLUTE: 2.7 K/CU MM
LYMPHOCYTES RELATIVE PERCENT: 16 % (ref 24–44)
LYMPHOCYTES RELATIVE PERCENT: 16 % (ref 24–44)
LYMPHOCYTES RELATIVE PERCENT: 4.8 % (ref 24–44)
LYMPHOCYTES RELATIVE PERCENT: 5.6 % (ref 24–44)
LYMPHOCYTES RELATIVE PERCENT: 9.3 % (ref 24–44)
Lab: ABNORMAL
Lab: ABNORMAL
Lab: NORMAL
MAGNESIUM: 1.6 MG/DL (ref 1.8–2.4)
MAGNESIUM: 1.7 MG/DL (ref 1.8–2.4)
MAGNESIUM: 2.5 MG/DL (ref 1.8–2.4)
MAGNESIUM: 2.5 MG/DL (ref 1.8–2.4)
MAGNESIUM: 2.6 MG/DL (ref 1.8–2.4)
MAGNESIUM: 2.7 MG/DL (ref 1.8–2.4)
MAGNESIUM: 2.8 MG/DL (ref 1.8–2.4)
MAGNESIUM: 2.8 MG/DL (ref 1.8–2.4)
MCH RBC QN AUTO: 26.5 PG (ref 27–31)
MCH RBC QN AUTO: 26.7 PG (ref 27–31)
MCH RBC QN AUTO: 26.8 PG (ref 27–31)
MCH RBC QN AUTO: 26.9 PG (ref 27–31)
MCH RBC QN AUTO: 27 PG (ref 27–31)
MCH RBC QN AUTO: 27.1 PG (ref 27–31)
MCH RBC QN AUTO: 27.4 PG (ref 27–31)
MCH RBC QN AUTO: 27.7 PG (ref 27–31)
MCHC RBC AUTO-ENTMCNC: 25.8 % (ref 32–36)
MCHC RBC AUTO-ENTMCNC: 27.6 % (ref 32–36)
MCHC RBC AUTO-ENTMCNC: 28.9 % (ref 32–36)
MCHC RBC AUTO-ENTMCNC: 29.6 % (ref 32–36)
MCHC RBC AUTO-ENTMCNC: 30 % (ref 32–36)
MCHC RBC AUTO-ENTMCNC: 30.2 % (ref 32–36)
MCHC RBC AUTO-ENTMCNC: 30.6 % (ref 32–36)
MCHC RBC AUTO-ENTMCNC: 31 % (ref 32–36)
MCV RBC AUTO: 105 FL (ref 78–100)
MCV RBC AUTO: 88.3 FL (ref 78–100)
MCV RBC AUTO: 88.7 FL (ref 78–100)
MCV RBC AUTO: 89.3 FL (ref 78–100)
MCV RBC AUTO: 89.5 FL (ref 78–100)
MCV RBC AUTO: 89.6 FL (ref 78–100)
MCV RBC AUTO: 92.5 FL (ref 78–100)
MCV RBC AUTO: 99.2 FL (ref 78–100)
METAMYELOCYTES ABSOLUTE COUNT: 0.11 K/CU MM
METAMYELOCYTES ABSOLUTE COUNT: 0.33 K/CU MM
METAMYELOCYTES PERCENT: 1 %
METAMYELOCYTES PERCENT: 2 %
METHEMOGLOBIN ARTERIAL: 0.7 %
METHEMOGLOBIN ARTERIAL: 0.8 %
METHEMOGLOBIN ARTERIAL: 0.8 %
METHEMOGLOBIN ARTERIAL: 1.1 %
METHEMOGLOBIN ARTERIAL: 1.1 %
METHEMOGLOBIN ARTERIAL: 1.2 %
METHEMOGLOBIN ARTERIAL: 1.2 %
METHEMOGLOBIN ARTERIAL: 1.3 %
METHEMOGLOBIN ARTERIAL: 1.4 %
MONOCYTES ABSOLUTE: 0.3 K/CU MM
MONOCYTES ABSOLUTE: 0.7 K/CU MM
MONOCYTES ABSOLUTE: 0.7 K/CU MM
MONOCYTES ABSOLUTE: 0.8 K/CU MM
MONOCYTES ABSOLUTE: 0.8 K/CU MM
MONOCYTES RELATIVE PERCENT: 3.7 % (ref 0–4)
MONOCYTES RELATIVE PERCENT: 4 % (ref 0–4)
MONOCYTES RELATIVE PERCENT: 6.8 % (ref 0–4)
MONOCYTES RELATIVE PERCENT: 8 % (ref 0–4)
MONOCYTES RELATIVE PERCENT: 8.7 % (ref 0–4)
MUCUS: ABNORMAL HPF
MUCUS: ABNORMAL HPF
MYCOPLASMA PNEUMONIAE PCR: NOT DETECTED
NITRITE URINE, QUANTITATIVE: NEGATIVE
NUCLEATED RBC %: 0 %
O2 SATURATION: 100 % (ref 96–97)
O2 SATURATION: 81.4 % (ref 96–97)
O2 SATURATION: 95.7 % (ref 96–97)
O2 SATURATION: 95.9 % (ref 96–97)
O2 SATURATION: 95.9 % (ref 96–97)
O2 SATURATION: 96.1 % (ref 96–97)
O2 SATURATION: 96.4 % (ref 96–97)
O2 SATURATION: 96.5 % (ref 96–97)
O2 SATURATION: 96.9 % (ref 96–97)
O2 SATURATION: 97 % (ref 96–97)
O2 SATURATION: 97 % (ref 96–97)
PARAINFLUENZA 1 PCR: NOT DETECTED
PARAINFLUENZA 2 PCR: NOT DETECTED
PARAINFLUENZA 3 PCR: NOT DETECTED
PARAINFLUENZA 4 PCR: NOT DETECTED
PCO2 ARTERIAL: 40 MMHG (ref 32–45)
PCO2 ARTERIAL: 41 MMHG (ref 32–45)
PCO2 ARTERIAL: 43 MMHG (ref 32–45)
PCO2 ARTERIAL: 45 MMHG (ref 32–45)
PCO2 ARTERIAL: 47 MMHG (ref 32–45)
PCO2 ARTERIAL: 66 MMHG (ref 32–45)
PCO2 ARTERIAL: 69 MMHG (ref 32–45)
PCO2 ARTERIAL: 80.3 MMHG (ref 32–45)
PCO2 ARTERIAL: 81 MMHG (ref 32–45)
PDW BLD-RTO: 14.7 % (ref 11.7–14.9)
PDW BLD-RTO: 15 % (ref 11.7–14.9)
PDW BLD-RTO: 15.5 % (ref 11.7–14.9)
PDW BLD-RTO: 15.9 % (ref 11.7–14.9)
PDW BLD-RTO: 15.9 % (ref 11.7–14.9)
PDW BLD-RTO: 16 % (ref 11.7–14.9)
PH BLOOD: 7.27 (ref 7.34–7.45)
PH BLOOD: 7.28 (ref 7.34–7.45)
PH BLOOD: 7.33 (ref 7.34–7.45)
PH BLOOD: 7.34 (ref 7.34–7.45)
PH BLOOD: 7.53 (ref 7.34–7.45)
PH BLOOD: 7.53 (ref 7.34–7.45)
PH BLOOD: 7.54 (ref 7.34–7.45)
PH BLOOD: 7.55 (ref 7.34–7.45)
PH BLOOD: 7.56 (ref 7.34–7.45)
PH BLOOD: 7.56 (ref 7.34–7.45)
PH BLOOD: 7.58 (ref 7.34–7.45)
PH, URINE: 5.5 (ref 5–8)
PH, URINE: 5.5 (ref 5–8)
PH, URINE: 6 (ref 5–8)
PH, URINE: 7 (ref 5–8)
PHOSPHORUS: 2.4 MG/DL (ref 2.5–4.9)
PHOSPHORUS: 3.7 MG/DL (ref 2.5–4.9)
PHOSPHORUS: 4.1 MG/DL (ref 2.5–4.9)
PHOSPHORUS: 4.4 MG/DL (ref 2.5–4.9)
PHOSPHORUS: 4.6 MG/DL (ref 2.5–4.9)
PHOSPHORUS: 5.1 MG/DL (ref 2.5–4.9)
PHOSPHORUS: 5.1 MG/DL (ref 2.5–4.9)
PHOSPHORUS: 5.9 MG/DL (ref 2.5–4.9)
PLATELET # BLD: 167 K/CU MM (ref 140–440)
PLATELET # BLD: 176 K/CU MM (ref 140–440)
PLATELET # BLD: 186 K/CU MM (ref 140–440)
PLATELET # BLD: 187 K/CU MM (ref 140–440)
PLATELET # BLD: 191 K/CU MM (ref 140–440)
PLATELET # BLD: 192 K/CU MM (ref 140–440)
PLATELET # BLD: 193 K/CU MM (ref 140–440)
PLATELET # BLD: 194 K/CU MM (ref 140–440)
PMV BLD AUTO: 10.3 FL (ref 7.5–11.1)
PMV BLD AUTO: 10.3 FL (ref 7.5–11.1)
PMV BLD AUTO: 10.5 FL (ref 7.5–11.1)
PMV BLD AUTO: 10.7 FL (ref 7.5–11.1)
PMV BLD AUTO: 10.8 FL (ref 7.5–11.1)
PMV BLD AUTO: 10.9 FL (ref 7.5–11.1)
PMV BLD AUTO: 10.9 FL (ref 7.5–11.1)
PMV BLD AUTO: 11.1 FL (ref 7.5–11.1)
PO2 ARTERIAL: 114 MMHG (ref 75–100)
PO2 ARTERIAL: 115 MMHG (ref 75–100)
PO2 ARTERIAL: 119 MMHG (ref 75–100)
PO2 ARTERIAL: 156 MMHG (ref 75–100)
PO2 ARTERIAL: 183 MMHG (ref 75–100)
PO2 ARTERIAL: 197 MMHG (ref 75–100)
PO2 ARTERIAL: 372 MMHG (ref 75–100)
PO2 ARTERIAL: 54.8 MMHG (ref 75–100)
PO2 ARTERIAL: 552.2 MMHG (ref 75–100)
PO2 ARTERIAL: 94 MMHG (ref 75–100)
PO2 ARTERIAL: 99 MMHG (ref 75–100)
POC CALCIUM: 1.14 MMOL/L (ref 1.12–1.32)
POC CALCIUM: 1.19 MMOL/L (ref 1.12–1.32)
POC CHLORIDE: 94 MMOL/L (ref 98–109)
POC CHLORIDE: 94 MMOL/L (ref 98–109)
POC CREATININE: 1 MG/DL (ref 0.6–1.1)
POC CREATININE: 1.2 MG/DL (ref 0.6–1.1)
POTASSIUM SERPL-SCNC: 3.2 MMOL/L (ref 3.5–5.1)
POTASSIUM SERPL-SCNC: 3.6 MMOL/L (ref 3.5–5.1)
POTASSIUM SERPL-SCNC: 3.7 MMOL/L (ref 3.5–5.1)
POTASSIUM SERPL-SCNC: 3.8 MMOL/L (ref 3.5–5.1)
POTASSIUM SERPL-SCNC: 4 MMOL/L (ref 3.5–5.1)
POTASSIUM SERPL-SCNC: 4.2 MMOL/L (ref 3.5–5.1)
POTASSIUM SERPL-SCNC: 4.4 MMOL/L (ref 3.5–5.1)
POTASSIUM SERPL-SCNC: 4.7 MMOL/L (ref 3.5–5.1)
POTASSIUM SERPL-SCNC: 5.1 MMOL/L (ref 3.5–4.5)
POTASSIUM SERPL-SCNC: 5.2 MMOL/L (ref 3.5–5.1)
POTASSIUM SERPL-SCNC: 5.5 MMOL/L (ref 3.5–4.5)
POTASSIUM SERPL-SCNC: 5.5 MMOL/L (ref 3.5–5.1)
POTASSIUM SERPL-SCNC: 5.7 MMOL/L (ref 3.5–5.1)
POTASSIUM SERPL-SCNC: 6.1 MMOL/L (ref 3.5–5.1)
PRO-BNP: 3978 PG/ML
PRO-BNP: 4074 PG/ML
PROCALCITONIN: 0.26
PROTEIN UA: 100 MG/DL
PROTHROMBIN TIME: 10.2 SECONDS (ref 11.7–14.5)
PROTHROMBIN TIME: 10.5 SECONDS (ref 11.7–14.5)
PROTHROMBIN TIME: 12.1 SECONDS (ref 11.7–14.5)
PROTHROMBIN TIME: 12.3 SECONDS (ref 11.7–14.5)
PROTHROMBIN TIME: 12.7 SECONDS (ref 11.7–14.5)
PROTHROMBIN TIME: 12.8 SECONDS (ref 11.7–14.5)
PROTHROMBIN TIME: 12.9 SECONDS (ref 11.7–14.5)
PROTHROMBIN TIME: 13.2 SECONDS (ref 11.7–14.5)
RBC # BLD: 3.26 M/CU MM (ref 4.2–5.4)
RBC # BLD: 3.27 M/CU MM (ref 4.2–5.4)
RBC # BLD: 3.28 M/CU MM (ref 4.2–5.4)
RBC # BLD: 3.32 M/CU MM (ref 4.2–5.4)
RBC # BLD: 3.36 M/CU MM (ref 4.2–5.4)
RBC # BLD: 3.62 M/CU MM (ref 4.2–5.4)
RBC # BLD: 3.74 M/CU MM (ref 4.2–5.4)
RBC # BLD: 3.83 M/CU MM (ref 4.2–5.4)
RBC URINE: 21 /HPF (ref 0–6)
RBC URINE: 33 /HPF (ref 0–6)
RBC URINE: 39 /HPF (ref 0–6)
RBC URINE: 44 /HPF (ref 0–6)
RHINOVIRUS ENTEROVIRUS PCR: NOT DETECTED
RSV PCR: ABNORMAL
SARS-COV-2, NAAT: NOT DETECTED
SARS-COV-2: NOT DETECTED
SEGMENTED NEUTROPHILS ABSOLUTE COUNT: 10.9 K/CU MM
SEGMENTED NEUTROPHILS ABSOLUTE COUNT: 6.7 K/CU MM
SEGMENTED NEUTROPHILS ABSOLUTE COUNT: 7.3 K/CU MM
SEGMENTED NEUTROPHILS ABSOLUTE COUNT: 7.5 K/CU MM
SEGMENTED NEUTROPHILS ABSOLUTE COUNT: 9.4 K/CU MM
SEGMENTED NEUTROPHILS RELATIVE PERCENT: 64 % (ref 36–66)
SEGMENTED NEUTROPHILS RELATIVE PERCENT: 66 % (ref 36–66)
SEGMENTED NEUTROPHILS RELATIVE PERCENT: 79.3 % (ref 36–66)
SEGMENTED NEUTROPHILS RELATIVE PERCENT: 88 % (ref 36–66)
SEGMENTED NEUTROPHILS RELATIVE PERCENT: 89.3 % (ref 36–66)
SODIUM BLD-SCNC: 135 MMOL/L (ref 135–145)
SODIUM BLD-SCNC: 136 MMOL/L (ref 135–145)
SODIUM BLD-SCNC: 137 MMOL/L (ref 135–145)
SODIUM BLD-SCNC: 138 MMOL/L (ref 135–145)
SODIUM BLD-SCNC: 138 MMOL/L (ref 138–146)
SODIUM BLD-SCNC: 139 MMOL/L (ref 135–145)
SODIUM BLD-SCNC: 139 MMOL/L (ref 135–145)
SODIUM BLD-SCNC: 139 MMOL/L (ref 138–146)
SODIUM BLD-SCNC: 140 MMOL/L (ref 135–145)
SODIUM BLD-SCNC: 140 MMOL/L (ref 135–145)
SODIUM BLD-SCNC: 141 MMOL/L (ref 135–145)
SODIUM BLD-SCNC: 142 MMOL/L (ref 135–145)
SODIUM BLD-SCNC: 144 MMOL/L (ref 135–145)
SODIUM BLD-SCNC: 148 MMOL/L (ref 135–145)
SOURCE, BLOOD GAS: ABNORMAL
SOURCE, BLOOD GAS: ABNORMAL
SOURCE: NORMAL
SPECIFIC GRAVITY UA: 1.01 (ref 1–1.03)
SPECIFIC GRAVITY UA: 1.02 (ref 1–1.03)
SPECIFIC GRAVITY UA: 1.02 (ref 1–1.03)
SPECIFIC GRAVITY UA: >1.03 (ref 1–1.03)
SPECIMEN: ABNORMAL
SPECIMEN: ABNORMAL
SPECIMEN: NORMAL
SQUAMOUS EPITHELIAL: 2 /HPF
SQUAMOUS EPITHELIAL: 9 /HPF
TOTAL CK: 32 IU/L (ref 26–140)
TOTAL CK: 92 IU/L (ref 26–140)
TOTAL IMMATURE NEUTOROPHIL: 0.04 K/CU MM
TOTAL IMMATURE NEUTOROPHIL: 0.1 K/CU MM
TOTAL IMMATURE NEUTOROPHIL: 0.21 K/CU MM
TOTAL NUCLEATED RBC: 0 K/CU MM
TOTAL PROTEIN: 5.4 GM/DL (ref 6.4–8.2)
TOTAL PROTEIN: 5.5 GM/DL (ref 6.4–8.2)
TOTAL PROTEIN: 5.5 GM/DL (ref 6.4–8.2)
TOTAL PROTEIN: 5.6 GM/DL (ref 6.4–8.2)
TOTAL PROTEIN: 5.6 GM/DL (ref 6.4–8.2)
TOTAL PROTEIN: 5.7 GM/DL (ref 6.4–8.2)
TOTAL PROTEIN: 5.7 GM/DL (ref 6.4–8.2)
TOTAL PROTEIN: 5.8 GM/DL (ref 6.4–8.2)
TOTAL PROTEIN: 5.8 GM/DL (ref 6.4–8.2)
TRICHOMONAS: ABNORMAL /HPF
TRIGL SERPL-MCNC: 87 MG/DL
TROPONIN T: 0.02 NG/ML
TROPONIN T: 0.06 NG/ML
TSH HIGH SENSITIVITY: 15.66 UIU/ML (ref 0.27–4.2)
UROBILINOGEN, URINE: 0.2 MG/DL (ref 0.2–1)
VANCOMYCIN RANDOM: 9 UG/ML
WBC # BLD: 10.1 K/CU MM (ref 4–10.5)
WBC # BLD: 10.5 K/CU MM (ref 4–10.5)
WBC # BLD: 10.7 K/CU MM (ref 4–10.5)
WBC # BLD: 10.7 K/CU MM (ref 4–10.5)
WBC # BLD: 16.6 K/CU MM (ref 4–10.5)
WBC # BLD: 7.9 K/CU MM (ref 4–10.5)
WBC # BLD: 8.4 K/CU MM (ref 4–10.5)
WBC # BLD: 9.2 K/CU MM (ref 4–10.5)
WBC CLUMP: ABNORMAL /HPF
WBC CLUMP: ABNORMAL /HPF
WBC UA: 21 /HPF (ref 0–5)
WBC UA: 22 /HPF (ref 0–5)
WBC UA: 22 /HPF (ref 0–5)
WBC UA: 31 /HPF (ref 0–5)
YEAST: ABNORMAL /HPF

## 2022-01-01 PROCEDURE — C9113 INJ PANTOPRAZOLE SODIUM, VIA: HCPCS | Performed by: STUDENT IN AN ORGANIZED HEALTH CARE EDUCATION/TRAINING PROGRAM

## 2022-01-01 PROCEDURE — 2500000003 HC RX 250 WO HCPCS: Performed by: STUDENT IN AN ORGANIZED HEALTH CARE EDUCATION/TRAINING PROGRAM

## 2022-01-01 PROCEDURE — 85027 COMPLETE CBC AUTOMATED: CPT

## 2022-01-01 PROCEDURE — 85610 PROTHROMBIN TIME: CPT

## 2022-01-01 PROCEDURE — 36620 INSERTION CATHETER ARTERY: CPT

## 2022-01-01 PROCEDURE — 83605 ASSAY OF LACTIC ACID: CPT

## 2022-01-01 PROCEDURE — 3600000012 HC SURGERY LEVEL 2 ADDTL 15MIN

## 2022-01-01 PROCEDURE — 82803 BLOOD GASES ANY COMBINATION: CPT

## 2022-01-01 PROCEDURE — 37799 UNLISTED PX VASCULAR SURGERY: CPT

## 2022-01-01 PROCEDURE — 2580000003 HC RX 258: Performed by: NURSE PRACTITIONER

## 2022-01-01 PROCEDURE — 87635 SARS-COV-2 COVID-19 AMP PRB: CPT

## 2022-01-01 PROCEDURE — 80048 BASIC METABOLIC PNL TOTAL CA: CPT

## 2022-01-01 PROCEDURE — 83880 ASSAY OF NATRIURETIC PEPTIDE: CPT

## 2022-01-01 PROCEDURE — 82248 BILIRUBIN DIRECT: CPT

## 2022-01-01 PROCEDURE — 36415 COLL VENOUS BLD VENIPUNCTURE: CPT

## 2022-01-01 PROCEDURE — 87106 FUNGI IDENTIFICATION YEAST: CPT

## 2022-01-01 PROCEDURE — 89220 SPUTUM SPECIMEN COLLECTION: CPT

## 2022-01-01 PROCEDURE — 2000000000 HC ICU R&B

## 2022-01-01 PROCEDURE — 85007 BL SMEAR W/DIFF WBC COUNT: CPT

## 2022-01-01 PROCEDURE — 86900 BLOOD TYPING SEROLOGIC ABO: CPT

## 2022-01-01 PROCEDURE — 82805 BLOOD GASES W/O2 SATURATION: CPT

## 2022-01-01 PROCEDURE — 94003 VENT MGMT INPAT SUBQ DAY: CPT

## 2022-01-01 PROCEDURE — 87040 BLOOD CULTURE FOR BACTERIA: CPT

## 2022-01-01 PROCEDURE — 85025 COMPLETE CBC W/AUTO DIFF WBC: CPT

## 2022-01-01 PROCEDURE — 85730 THROMBOPLASTIN TIME PARTIAL: CPT

## 2022-01-01 PROCEDURE — 2700000000 HC OXYGEN THERAPY PER DAY

## 2022-01-01 PROCEDURE — 82565 ASSAY OF CREATININE: CPT

## 2022-01-01 PROCEDURE — 95813 EEG EXTND MNTR 61-119 MIN: CPT

## 2022-01-01 PROCEDURE — 87186 SC STD MICRODIL/AGAR DIL: CPT

## 2022-01-01 PROCEDURE — 87081 CULTURE SCREEN ONLY: CPT

## 2022-01-01 PROCEDURE — 2580000003 HC RX 258: Performed by: STUDENT IN AN ORGANIZED HEALTH CARE EDUCATION/TRAINING PROGRAM

## 2022-01-01 PROCEDURE — 85018 HEMOGLOBIN: CPT

## 2022-01-01 PROCEDURE — 84145 PROCALCITONIN (PCT): CPT

## 2022-01-01 PROCEDURE — 6360000002 HC RX W HCPCS: Performed by: SPECIALIST

## 2022-01-01 PROCEDURE — 85014 HEMATOCRIT: CPT

## 2022-01-01 PROCEDURE — 2500000003 HC RX 250 WO HCPCS: Performed by: EMERGENCY MEDICINE

## 2022-01-01 PROCEDURE — 80053 COMPREHEN METABOLIC PANEL: CPT

## 2022-01-01 PROCEDURE — 6370000000 HC RX 637 (ALT 250 FOR IP): Performed by: STUDENT IN AN ORGANIZED HEALTH CARE EDUCATION/TRAINING PROGRAM

## 2022-01-01 PROCEDURE — 71275 CT ANGIOGRAPHY CHEST: CPT

## 2022-01-01 PROCEDURE — 6360000002 HC RX W HCPCS: Performed by: STUDENT IN AN ORGANIZED HEALTH CARE EDUCATION/TRAINING PROGRAM

## 2022-01-01 PROCEDURE — 80202 ASSAY OF VANCOMYCIN: CPT

## 2022-01-01 PROCEDURE — 71045 X-RAY EXAM CHEST 1 VIEW: CPT

## 2022-01-01 PROCEDURE — 0202U NFCT DS 22 TRGT SARS-COV-2: CPT

## 2022-01-01 PROCEDURE — 94761 N-INVAS EAR/PLS OXIMETRY MLT: CPT

## 2022-01-01 PROCEDURE — 0BH17EZ INSERTION OF ENDOTRACHEAL AIRWAY INTO TRACHEA, VIA NATURAL OR ARTIFICIAL OPENING: ICD-10-PCS | Performed by: STUDENT IN AN ORGANIZED HEALTH CARE EDUCATION/TRAINING PROGRAM

## 2022-01-01 PROCEDURE — 84100 ASSAY OF PHOSPHORUS: CPT

## 2022-01-01 PROCEDURE — 70450 CT HEAD/BRAIN W/O DYE: CPT

## 2022-01-01 PROCEDURE — 81001 URINALYSIS AUTO W/SCOPE: CPT

## 2022-01-01 PROCEDURE — 3600000002 HC SURGERY LEVEL 2 BASE

## 2022-01-01 PROCEDURE — 93005 ELECTROCARDIOGRAM TRACING: CPT | Performed by: EMERGENCY MEDICINE

## 2022-01-01 PROCEDURE — 6360000002 HC RX W HCPCS: Performed by: EMERGENCY MEDICINE

## 2022-01-01 PROCEDURE — 99223 1ST HOSP IP/OBS HIGH 75: CPT | Performed by: STUDENT IN AN ORGANIZED HEALTH CARE EDUCATION/TRAINING PROGRAM

## 2022-01-01 PROCEDURE — 6360000004 HC RX CONTRAST MEDICATION: Performed by: STUDENT IN AN ORGANIZED HEALTH CARE EDUCATION/TRAINING PROGRAM

## 2022-01-01 PROCEDURE — 87077 CULTURE AEROBIC IDENTIFY: CPT

## 2022-01-01 PROCEDURE — 83735 ASSAY OF MAGNESIUM: CPT

## 2022-01-01 PROCEDURE — 93308 TTE F-UP OR LMTD: CPT

## 2022-01-01 PROCEDURE — 82977 ASSAY OF GGT: CPT

## 2022-01-01 PROCEDURE — 94002 VENT MGMT INPAT INIT DAY: CPT

## 2022-01-01 PROCEDURE — 02HV33Z INSERTION OF INFUSION DEVICE INTO SUPERIOR VENA CAVA, PERCUTANEOUS APPROACH: ICD-10-PCS | Performed by: STUDENT IN AN ORGANIZED HEALTH CARE EDUCATION/TRAINING PROGRAM

## 2022-01-01 PROCEDURE — 87150 DNA/RNA AMPLIFIED PROBE: CPT

## 2022-01-01 PROCEDURE — 81003 URINALYSIS AUTO W/O SCOPE: CPT

## 2022-01-01 PROCEDURE — 86850 RBC ANTIBODY SCREEN: CPT

## 2022-01-01 PROCEDURE — 82550 ASSAY OF CK (CPK): CPT

## 2022-01-01 PROCEDURE — 83615 LACTATE (LD) (LDH) ENZYME: CPT

## 2022-01-01 PROCEDURE — 6370000000 HC RX 637 (ALT 250 FOR IP): Performed by: SPECIALIST

## 2022-01-01 PROCEDURE — 31500 INSERT EMERGENCY AIRWAY: CPT

## 2022-01-01 PROCEDURE — 83690 ASSAY OF LIPASE: CPT

## 2022-01-01 PROCEDURE — 95819 EEG AWAKE AND ASLEEP: CPT

## 2022-01-01 PROCEDURE — 5A1955Z RESPIRATORY VENTILATION, GREATER THAN 96 CONSECUTIVE HOURS: ICD-10-PCS | Performed by: STUDENT IN AN ORGANIZED HEALTH CARE EDUCATION/TRAINING PROGRAM

## 2022-01-01 PROCEDURE — 36556 INSERT NON-TUNNEL CV CATH: CPT

## 2022-01-01 PROCEDURE — 6360000002 HC RX W HCPCS: Performed by: NURSE PRACTITIONER

## 2022-01-01 PROCEDURE — 80051 ELECTROLYTE PANEL: CPT

## 2022-01-01 PROCEDURE — 88307 TISSUE EXAM BY PATHOLOGIST: CPT

## 2022-01-01 PROCEDURE — 2500000003 HC RX 250 WO HCPCS

## 2022-01-01 PROCEDURE — 86901 BLOOD TYPING SEROLOGIC RH(D): CPT

## 2022-01-01 PROCEDURE — 87086 URINE CULTURE/COLONY COUNT: CPT

## 2022-01-01 PROCEDURE — 82962 GLUCOSE BLOOD TEST: CPT

## 2022-01-01 PROCEDURE — 95717 EEG PHYS/QHP 2-12 HR W/O VID: CPT | Performed by: STUDENT IN AN ORGANIZED HEALTH CARE EDUCATION/TRAINING PROGRAM

## 2022-01-01 PROCEDURE — 99233 SBSQ HOSP IP/OBS HIGH 50: CPT | Performed by: NURSE PRACTITIONER

## 2022-01-01 PROCEDURE — 84484 ASSAY OF TROPONIN QUANT: CPT

## 2022-01-01 PROCEDURE — 96374 THER/PROPH/DIAG INJ IV PUSH: CPT

## 2022-01-01 PROCEDURE — 80076 HEPATIC FUNCTION PANEL: CPT

## 2022-01-01 PROCEDURE — 82150 ASSAY OF AMYLASE: CPT

## 2022-01-01 PROCEDURE — 93010 ELECTROCARDIOGRAM REPORT: CPT | Performed by: INTERNAL MEDICINE

## 2022-01-01 PROCEDURE — 83036 HEMOGLOBIN GLYCOSYLATED A1C: CPT

## 2022-01-01 PROCEDURE — 2580000003 HC RX 258: Performed by: EMERGENCY MEDICINE

## 2022-01-01 PROCEDURE — 80061 LIPID PANEL: CPT

## 2022-01-01 PROCEDURE — 99285 EMERGENCY DEPT VISIT HI MDM: CPT

## 2022-01-01 PROCEDURE — 2709999900 HC NON-CHARGEABLE SUPPLY

## 2022-01-01 PROCEDURE — 84443 ASSAY THYROID STIM HORMONE: CPT

## 2022-01-01 RX ORDER — SODIUM CHLORIDE 0.9 % (FLUSH) 0.9 %
5-40 SYRINGE (ML) INJECTION EVERY 12 HOURS SCHEDULED
Status: DISCONTINUED | OUTPATIENT
Start: 2022-01-01 | End: 2022-01-01 | Stop reason: HOSPADM

## 2022-01-01 RX ORDER — MIDAZOLAM HYDROCHLORIDE 1 MG/ML
0.5 INJECTION INTRAMUSCULAR; INTRAVENOUS
Status: DISCONTINUED | OUTPATIENT
Start: 2022-01-01 | End: 2022-01-01 | Stop reason: HOSPADM

## 2022-01-01 RX ORDER — MAGNESIUM SULFATE IN WATER 40 MG/ML
2000 INJECTION, SOLUTION INTRAVENOUS ONCE
Status: COMPLETED | OUTPATIENT
Start: 2022-01-01 | End: 2022-01-01

## 2022-01-01 RX ORDER — POLYETHYLENE GLYCOL 3350 17 G/17G
17 POWDER, FOR SOLUTION ORAL DAILY PRN
Status: DISCONTINUED | OUTPATIENT
Start: 2022-01-01 | End: 2022-01-01 | Stop reason: HOSPADM

## 2022-01-01 RX ORDER — ONDANSETRON 2 MG/ML
4 INJECTION INTRAMUSCULAR; INTRAVENOUS EVERY 6 HOURS PRN
Status: DISCONTINUED | OUTPATIENT
Start: 2022-01-01 | End: 2022-01-01 | Stop reason: HOSPADM

## 2022-01-01 RX ORDER — PANTOPRAZOLE SODIUM 40 MG/10ML
40 INJECTION, POWDER, LYOPHILIZED, FOR SOLUTION INTRAVENOUS 2 TIMES DAILY
Status: DISCONTINUED | OUTPATIENT
Start: 2022-01-01 | End: 2022-01-01 | Stop reason: HOSPADM

## 2022-01-01 RX ORDER — FENTANYL CITRATE 50 UG/ML
25 INJECTION, SOLUTION INTRAMUSCULAR; INTRAVENOUS EVERY 10 MIN PRN
Status: DISCONTINUED | OUTPATIENT
Start: 2022-01-01 | End: 2022-01-01 | Stop reason: HOSPADM

## 2022-01-01 RX ORDER — ONDANSETRON 4 MG/1
4 TABLET, ORALLY DISINTEGRATING ORAL EVERY 8 HOURS PRN
Status: DISCONTINUED | OUTPATIENT
Start: 2022-01-01 | End: 2022-01-01 | Stop reason: HOSPADM

## 2022-01-01 RX ORDER — DEXTROSE MONOHYDRATE 100 MG/ML
INJECTION, SOLUTION INTRAVENOUS CONTINUOUS PRN
Status: DISCONTINUED | OUTPATIENT
Start: 2022-01-01 | End: 2022-01-01 | Stop reason: HOSPADM

## 2022-01-01 RX ORDER — ATORVASTATIN CALCIUM 40 MG/1
80 TABLET, FILM COATED ORAL NIGHTLY
Status: DISCONTINUED | OUTPATIENT
Start: 2022-01-01 | End: 2022-01-01 | Stop reason: HOSPADM

## 2022-01-01 RX ORDER — HEPARIN SODIUM 5000 [USP'U]/ML
5000 INJECTION, SOLUTION INTRAVENOUS; SUBCUTANEOUS EVERY 8 HOURS SCHEDULED
Status: DISCONTINUED | OUTPATIENT
Start: 2022-01-01 | End: 2022-01-01 | Stop reason: HOSPADM

## 2022-01-01 RX ORDER — THIAMINE HYDROCHLORIDE 100 MG/ML
250 INJECTION, SOLUTION INTRAMUSCULAR; INTRAVENOUS EVERY 8 HOURS SCHEDULED
Status: DISCONTINUED | OUTPATIENT
Start: 2022-01-01 | End: 2022-01-01

## 2022-01-01 RX ORDER — FOLIC ACID 1 MG/1
1 TABLET ORAL DAILY
Status: DISCONTINUED | OUTPATIENT
Start: 2022-01-01 | End: 2022-01-01 | Stop reason: HOSPADM

## 2022-01-01 RX ORDER — SODIUM CHLORIDE 0.9 % (FLUSH) 0.9 %
5-40 SYRINGE (ML) INJECTION PRN
Status: DISCONTINUED | OUTPATIENT
Start: 2022-01-01 | End: 2022-01-01 | Stop reason: HOSPADM

## 2022-01-01 RX ORDER — MINERAL OIL AND WHITE PETROLATUM 150; 830 MG/G; MG/G
OINTMENT OPHTHALMIC EVERY 4 HOURS
Status: DISCONTINUED | OUTPATIENT
Start: 2022-01-01 | End: 2022-01-01 | Stop reason: HOSPADM

## 2022-01-01 RX ORDER — HEPARIN SODIUM 5000 [USP'U]/ML
30000 INJECTION, SOLUTION INTRAVENOUS; SUBCUTANEOUS ONCE
Status: COMPLETED | OUTPATIENT
Start: 2022-01-01 | End: 2022-01-01

## 2022-01-01 RX ORDER — NOREPINEPHRINE BIT/0.9 % NACL 16MG/250ML
1-100 INFUSION BOTTLE (ML) INTRAVENOUS CONTINUOUS
Status: DISCONTINUED | OUTPATIENT
Start: 2022-01-01 | End: 2022-01-01

## 2022-01-01 RX ORDER — EPINEPHRINE 0.1 MG/ML
SYRINGE (ML) INJECTION DAILY PRN
Status: COMPLETED | OUTPATIENT
Start: 2022-01-01 | End: 2022-01-01

## 2022-01-01 RX ORDER — POLYVINYL ALCOHOL 14 MG/ML
1 SOLUTION/ DROPS OPHTHALMIC EVERY 4 HOURS
Status: DISCONTINUED | OUTPATIENT
Start: 2022-01-01 | End: 2022-01-01 | Stop reason: HOSPADM

## 2022-01-01 RX ORDER — LEVOTHYROXINE SODIUM 0.1 MG/1
100 TABLET ORAL DAILY
Status: DISCONTINUED | OUTPATIENT
Start: 2022-01-01 | End: 2022-01-01

## 2022-01-01 RX ORDER — LANSOPRAZOLE
30 KIT DAILY
Status: DISCONTINUED | OUTPATIENT
Start: 2022-01-01 | End: 2022-01-01

## 2022-01-01 RX ORDER — ACETAMINOPHEN 325 MG/1
650 TABLET ORAL EVERY 6 HOURS PRN
Status: DISCONTINUED | OUTPATIENT
Start: 2022-01-01 | End: 2022-01-01 | Stop reason: HOSPADM

## 2022-01-01 RX ORDER — ENOXAPARIN SODIUM 100 MG/ML
40 INJECTION SUBCUTANEOUS DAILY
Status: DISCONTINUED | OUTPATIENT
Start: 2022-01-01 | End: 2022-01-01

## 2022-01-01 RX ORDER — ALBUTEROL SULFATE 2.5 MG/3ML
2.5 SOLUTION RESPIRATORY (INHALATION) EVERY 4 HOURS PRN
Status: DISCONTINUED | OUTPATIENT
Start: 2022-01-01 | End: 2022-01-01 | Stop reason: HOSPADM

## 2022-01-01 RX ORDER — HEPARIN SODIUM 5000 [USP'U]/.5ML
5000 INJECTION, SOLUTION INTRAVENOUS; SUBCUTANEOUS EVERY 8 HOURS
Status: DISCONTINUED | OUTPATIENT
Start: 2022-01-01 | End: 2022-01-01

## 2022-01-01 RX ORDER — GLYCOPYRROLATE 0.2 MG/ML
0.2 INJECTION INTRAMUSCULAR; INTRAVENOUS EVERY 4 HOURS PRN
Status: DISCONTINUED | OUTPATIENT
Start: 2022-01-01 | End: 2022-01-01 | Stop reason: HOSPADM

## 2022-01-01 RX ORDER — PROPOFOL 10 MG/ML
10 INJECTION, EMULSION INTRAVENOUS
Status: DISCONTINUED | OUTPATIENT
Start: 2022-01-01 | End: 2022-01-01

## 2022-01-01 RX ORDER — 0.9 % SODIUM CHLORIDE 0.9 %
500 INTRAVENOUS SOLUTION INTRAVENOUS ONCE
Status: COMPLETED | OUTPATIENT
Start: 2022-01-01 | End: 2022-01-01

## 2022-01-01 RX ORDER — 0.9 % SODIUM CHLORIDE 0.9 %
1000 INTRAVENOUS SOLUTION INTRAVENOUS ONCE
Status: COMPLETED | OUTPATIENT
Start: 2022-01-01 | End: 2022-01-01

## 2022-01-01 RX ORDER — ACETAMINOPHEN 650 MG/1
650 SUPPOSITORY RECTAL EVERY 6 HOURS PRN
Status: DISCONTINUED | OUTPATIENT
Start: 2022-01-01 | End: 2022-01-01 | Stop reason: HOSPADM

## 2022-01-01 RX ORDER — M-VIT,TX,IRON,MINS/CALC/FOLIC 27MG-0.4MG
1 TABLET ORAL DAILY
Status: DISCONTINUED | OUTPATIENT
Start: 2022-01-01 | End: 2022-01-01 | Stop reason: HOSPADM

## 2022-01-01 RX ORDER — HYDRALAZINE HYDROCHLORIDE 20 MG/ML
10 INJECTION INTRAMUSCULAR; INTRAVENOUS EVERY 6 HOURS PRN
Status: DISCONTINUED | OUTPATIENT
Start: 2022-01-01 | End: 2022-01-01 | Stop reason: HOSPADM

## 2022-01-01 RX ORDER — SODIUM CHLORIDE 9 MG/ML
INJECTION, SOLUTION INTRAVENOUS PRN
Status: DISCONTINUED | OUTPATIENT
Start: 2022-01-01 | End: 2022-01-01 | Stop reason: HOSPADM

## 2022-01-01 RX ORDER — MIDAZOLAM HYDROCHLORIDE 1 MG/ML
2 INJECTION INTRAMUSCULAR; INTRAVENOUS ONCE
Status: COMPLETED | OUTPATIENT
Start: 2022-01-01 | End: 2022-01-01

## 2022-01-01 RX ORDER — CHLORHEXIDINE GLUCONATE 0.12 MG/ML
15 RINSE ORAL 2 TIMES DAILY
Status: DISCONTINUED | OUTPATIENT
Start: 2022-01-01 | End: 2022-01-01 | Stop reason: HOSPADM

## 2022-01-01 RX ADMIN — SODIUM CHLORIDE, PRESERVATIVE FREE 10 ML: 5 INJECTION INTRAVENOUS at 20:54

## 2022-01-01 RX ADMIN — CHLORHEXIDINE GLUCONATE 0.12% ORAL RINSE 15 ML: 1.2 LIQUID ORAL at 08:24

## 2022-01-01 RX ADMIN — POLYVINYL ALCOHOL 1 DROP: 14 SOLUTION/ DROPS OPHTHALMIC at 19:47

## 2022-01-01 RX ADMIN — PIPERACILLIN AND TAZOBACTAM 3375 MG: 3; .375 INJECTION, POWDER, LYOPHILIZED, FOR SOLUTION INTRAVENOUS at 04:23

## 2022-01-01 RX ADMIN — MINERAL OIL AND WHITE PETROLATUM: 150; 830 OINTMENT OPHTHALMIC at 14:25

## 2022-01-01 RX ADMIN — SODIUM CHLORIDE, PRESERVATIVE FREE 10 ML: 5 INJECTION INTRAVENOUS at 08:25

## 2022-01-01 RX ADMIN — MINERAL OIL AND WHITE PETROLATUM: 150; 830 OINTMENT OPHTHALMIC at 12:29

## 2022-01-01 RX ADMIN — MINERAL OIL AND WHITE PETROLATUM: 150; 830 OINTMENT OPHTHALMIC at 12:56

## 2022-01-01 RX ADMIN — VASOPRESSIN 0.03 UNITS/MIN: 0.2 INJECTION INTRAVENOUS at 05:23

## 2022-01-01 RX ADMIN — CHLORHEXIDINE GLUCONATE 0.12% ORAL RINSE 15 ML: 1.2 LIQUID ORAL at 10:12

## 2022-01-01 RX ADMIN — MINERAL OIL AND WHITE PETROLATUM: 150; 830 OINTMENT OPHTHALMIC at 08:24

## 2022-01-01 RX ADMIN — CHLORHEXIDINE GLUCONATE 0.12% ORAL RINSE 15 ML: 1.2 LIQUID ORAL at 20:51

## 2022-01-01 RX ADMIN — SODIUM CHLORIDE 25 ML: 9 INJECTION, SOLUTION INTRAVENOUS at 20:38

## 2022-01-01 RX ADMIN — ATORVASTATIN CALCIUM 80 MG: 40 TABLET, FILM COATED ORAL at 21:03

## 2022-01-01 RX ADMIN — PANTOPRAZOLE SODIUM 40 MG: 40 INJECTION, POWDER, FOR SOLUTION INTRAVENOUS at 08:27

## 2022-01-01 RX ADMIN — POLYVINYL ALCOHOL 1 DROP: 14 SOLUTION/ DROPS OPHTHALMIC at 00:10

## 2022-01-01 RX ADMIN — MINERAL OIL AND WHITE PETROLATUM: 150; 830 OINTMENT OPHTHALMIC at 04:53

## 2022-01-01 RX ADMIN — PIPERACILLIN AND TAZOBACTAM 3375 MG: 3; .375 INJECTION, POWDER, LYOPHILIZED, FOR SOLUTION INTRAVENOUS at 20:50

## 2022-01-01 RX ADMIN — MINERAL OIL AND WHITE PETROLATUM: 150; 830 OINTMENT OPHTHALMIC at 02:33

## 2022-01-01 RX ADMIN — PIPERACILLIN AND TAZOBACTAM 3375 MG: 3; .375 INJECTION, POWDER, LYOPHILIZED, FOR SOLUTION INTRAVENOUS at 13:06

## 2022-01-01 RX ADMIN — SODIUM CHLORIDE, PRESERVATIVE FREE 10 ML: 5 INJECTION INTRAVENOUS at 08:26

## 2022-01-01 RX ADMIN — POLYVINYL ALCOHOL 1 DROP: 14 SOLUTION/ DROPS OPHTHALMIC at 03:47

## 2022-01-01 RX ADMIN — MINERAL OIL AND WHITE PETROLATUM: 150; 830 OINTMENT OPHTHALMIC at 13:30

## 2022-01-01 RX ADMIN — POLYVINYL ALCOHOL 1 DROP: 14 SOLUTION/ DROPS OPHTHALMIC at 15:01

## 2022-01-01 RX ADMIN — MULTIPLE VITAMINS W/ MINERALS TAB 1 TABLET: TAB at 12:48

## 2022-01-01 RX ADMIN — PIPERACILLIN AND TAZOBACTAM 3375 MG: 3; .375 INJECTION, POWDER, LYOPHILIZED, FOR SOLUTION INTRAVENOUS at 05:29

## 2022-01-01 RX ADMIN — Medication 10 MCG: at 03:08

## 2022-01-01 RX ADMIN — SODIUM CHLORIDE, PRESERVATIVE FREE 10 ML: 5 INJECTION INTRAVENOUS at 10:12

## 2022-01-01 RX ADMIN — SODIUM CHLORIDE 1000 ML: 9 INJECTION, SOLUTION INTRAVENOUS at 05:36

## 2022-01-01 RX ADMIN — MINERAL OIL AND WHITE PETROLATUM: 150; 830 OINTMENT OPHTHALMIC at 10:11

## 2022-01-01 RX ADMIN — MULTIPLE VITAMINS W/ MINERALS TAB 1 TABLET: TAB at 12:28

## 2022-01-01 RX ADMIN — CHLORHEXIDINE GLUCONATE 0.12% ORAL RINSE 15 ML: 1.2 LIQUID ORAL at 08:25

## 2022-01-01 RX ADMIN — POLYVINYL ALCOHOL 1 DROP: 14 SOLUTION/ DROPS OPHTHALMIC at 00:22

## 2022-01-01 RX ADMIN — POLYVINYL ALCOHOL 1 DROP: 14 SOLUTION/ DROPS OPHTHALMIC at 19:52

## 2022-01-01 RX ADMIN — PIPERACILLIN AND TAZOBACTAM 3375 MG: 3; .375 INJECTION, POWDER, LYOPHILIZED, FOR SOLUTION INTRAVENOUS at 13:41

## 2022-01-01 RX ADMIN — HEPARIN SODIUM 5000 UNITS: 5000 INJECTION INTRAVENOUS; SUBCUTANEOUS at 20:39

## 2022-01-01 RX ADMIN — Medication 1 MG: at 02:38

## 2022-01-01 RX ADMIN — SODIUM CHLORIDE, PRESERVATIVE FREE 10 ML: 5 INJECTION INTRAVENOUS at 08:44

## 2022-01-01 RX ADMIN — MINERAL OIL AND WHITE PETROLATUM: 150; 830 OINTMENT OPHTHALMIC at 21:04

## 2022-01-01 RX ADMIN — FOLIC ACID 1 MG: 1 TABLET ORAL at 08:28

## 2022-01-01 RX ADMIN — MINERAL OIL AND WHITE PETROLATUM: 150; 830 OINTMENT OPHTHALMIC at 17:47

## 2022-01-01 RX ADMIN — Medication 20 MCG/MIN: at 03:05

## 2022-01-01 RX ADMIN — SODIUM CHLORIDE, PRESERVATIVE FREE 10 ML: 5 INJECTION INTRAVENOUS at 20:37

## 2022-01-01 RX ADMIN — MINERAL OIL AND WHITE PETROLATUM: 150; 830 OINTMENT OPHTHALMIC at 21:10

## 2022-01-01 RX ADMIN — FOLIC ACID 1 MG: 1 TABLET ORAL at 08:43

## 2022-01-01 RX ADMIN — PIPERACILLIN AND TAZOBACTAM 3375 MG: 3; .375 INJECTION, POWDER, LYOPHILIZED, FOR SOLUTION INTRAVENOUS at 12:41

## 2022-01-01 RX ADMIN — MINERAL OIL AND WHITE PETROLATUM: 150; 830 OINTMENT OPHTHALMIC at 08:18

## 2022-01-01 RX ADMIN — ENOXAPARIN SODIUM 40 MG: 100 INJECTION SUBCUTANEOUS at 08:25

## 2022-01-01 RX ADMIN — SODIUM ZIRCONIUM CYCLOSILICATE 5 G: 5 POWDER, FOR SUSPENSION ORAL at 20:54

## 2022-01-01 RX ADMIN — PIPERACILLIN AND TAZOBACTAM 3375 MG: 3; .375 INJECTION, POWDER, LYOPHILIZED, FOR SOLUTION INTRAVENOUS at 12:27

## 2022-01-01 RX ADMIN — POLYVINYL ALCOHOL 1 DROP: 14 SOLUTION/ DROPS OPHTHALMIC at 14:48

## 2022-01-01 RX ADMIN — POLYVINYL ALCOHOL 1 DROP: 14 SOLUTION/ DROPS OPHTHALMIC at 04:17

## 2022-01-01 RX ADMIN — SODIUM CHLORIDE, PRESERVATIVE FREE 10 ML: 5 INJECTION INTRAVENOUS at 20:51

## 2022-01-01 RX ADMIN — PANTOPRAZOLE SODIUM 40 MG: 40 INJECTION, POWDER, FOR SOLUTION INTRAVENOUS at 20:51

## 2022-01-01 RX ADMIN — MINERAL OIL AND WHITE PETROLATUM: 150; 830 OINTMENT OPHTHALMIC at 17:00

## 2022-01-01 RX ADMIN — VASOPRESSIN 0.03 UNITS/MIN: 20 INJECTION INTRAVENOUS at 05:53

## 2022-01-01 RX ADMIN — MULTIPLE VITAMINS W/ MINERALS TAB 1 TABLET: TAB at 13:06

## 2022-01-01 RX ADMIN — MINERAL OIL AND WHITE PETROLATUM: 150; 830 OINTMENT OPHTHALMIC at 08:53

## 2022-01-01 RX ADMIN — LEVOTHYROXINE SODIUM ANHYDROUS 10 MCG/HR: 100 INJECTION, POWDER, LYOPHILIZED, FOR SOLUTION INTRAVENOUS at 05:57

## 2022-01-01 RX ADMIN — HYDRALAZINE HYDROCHLORIDE 10 MG: 20 INJECTION INTRAMUSCULAR; INTRAVENOUS at 13:03

## 2022-01-01 RX ADMIN — PANTOPRAZOLE SODIUM 40 MG: 40 INJECTION, POWDER, FOR SOLUTION INTRAVENOUS at 21:03

## 2022-01-01 RX ADMIN — CHLORHEXIDINE GLUCONATE 0.12% ORAL RINSE 15 ML: 1.2 LIQUID ORAL at 20:39

## 2022-01-01 RX ADMIN — SODIUM ZIRCONIUM CYCLOSILICATE 5 G: 5 POWDER, FOR SUSPENSION ORAL at 13:29

## 2022-01-01 RX ADMIN — CHLORHEXIDINE GLUCONATE 0.12% ORAL RINSE 15 ML: 1.2 LIQUID ORAL at 21:02

## 2022-01-01 RX ADMIN — VASOPRESSIN 0.03 UNITS/MIN: 20 INJECTION INTRAVENOUS at 14:47

## 2022-01-01 RX ADMIN — MINERAL OIL AND WHITE PETROLATUM: 150; 830 OINTMENT OPHTHALMIC at 06:55

## 2022-01-01 RX ADMIN — POLYVINYL ALCOHOL 1 DROP: 14 SOLUTION/ DROPS OPHTHALMIC at 00:00

## 2022-01-01 RX ADMIN — POLYVINYL ALCOHOL 1 DROP: 14 SOLUTION/ DROPS OPHTHALMIC at 08:18

## 2022-01-01 RX ADMIN — POLYVINYL ALCOHOL 1 DROP: 14 SOLUTION/ DROPS OPHTHALMIC at 23:09

## 2022-01-01 RX ADMIN — SODIUM CHLORIDE 500 ML: 9 INJECTION, SOLUTION INTRAVENOUS at 18:39

## 2022-01-01 RX ADMIN — SODIUM CHLORIDE 1400 MG: 9 INJECTION, SOLUTION INTRAVENOUS at 05:50

## 2022-01-01 RX ADMIN — ATORVASTATIN CALCIUM 80 MG: 40 TABLET, FILM COATED ORAL at 20:53

## 2022-01-01 RX ADMIN — SODIUM CHLORIDE, PRESERVATIVE FREE 10 ML: 5 INJECTION INTRAVENOUS at 21:03

## 2022-01-01 RX ADMIN — Medication 10 MCG: at 03:13

## 2022-01-01 RX ADMIN — PIPERACILLIN AND TAZOBACTAM 3375 MG: 3; .375 INJECTION, POWDER, LYOPHILIZED, FOR SOLUTION INTRAVENOUS at 05:46

## 2022-01-01 RX ADMIN — MINERAL OIL AND WHITE PETROLATUM: 150; 830 OINTMENT OPHTHALMIC at 04:35

## 2022-01-01 RX ADMIN — PANTOPRAZOLE SODIUM 40 MG: 40 INJECTION, POWDER, FOR SOLUTION INTRAVENOUS at 10:25

## 2022-01-01 RX ADMIN — FOLIC ACID 1 MG: 1 TABLET ORAL at 08:24

## 2022-01-01 RX ADMIN — MINERAL OIL AND WHITE PETROLATUM: 150; 830 OINTMENT OPHTHALMIC at 20:54

## 2022-01-01 RX ADMIN — POLYVINYL ALCOHOL 1 DROP: 14 SOLUTION/ DROPS OPHTHALMIC at 15:34

## 2022-01-01 RX ADMIN — MULTIPLE VITAMINS W/ MINERALS TAB 1 TABLET: TAB at 13:29

## 2022-01-01 RX ADMIN — ATORVASTATIN CALCIUM 80 MG: 40 TABLET, FILM COATED ORAL at 20:51

## 2022-01-01 RX ADMIN — Medication 24 MCG/MIN: at 12:35

## 2022-01-01 RX ADMIN — POLYVINYL ALCOHOL 1 DROP: 14 SOLUTION/ DROPS OPHTHALMIC at 18:12

## 2022-01-01 RX ADMIN — PIPERACILLIN AND TAZOBACTAM 3375 MG: 3; .375 INJECTION, POWDER, LYOPHILIZED, FOR SOLUTION INTRAVENOUS at 12:48

## 2022-01-01 RX ADMIN — CHLORHEXIDINE GLUCONATE 0.12% ORAL RINSE 15 ML: 1.2 LIQUID ORAL at 08:37

## 2022-01-01 RX ADMIN — PIPERACILLIN AND TAZOBACTAM 3375 MG: 3; .375 INJECTION, POWDER, LYOPHILIZED, FOR SOLUTION INTRAVENOUS at 04:50

## 2022-01-01 RX ADMIN — PANTOPRAZOLE SODIUM 40 MG: 40 INJECTION, POWDER, FOR SOLUTION INTRAVENOUS at 08:24

## 2022-01-01 RX ADMIN — IOPAMIDOL 75 ML: 755 INJECTION, SOLUTION INTRAVENOUS at 03:36

## 2022-01-01 RX ADMIN — MINERAL OIL AND WHITE PETROLATUM: 150; 830 OINTMENT OPHTHALMIC at 08:47

## 2022-01-01 RX ADMIN — ENOXAPARIN SODIUM 40 MG: 100 INJECTION SUBCUTANEOUS at 08:37

## 2022-01-01 RX ADMIN — MINERAL OIL AND WHITE PETROLATUM: 150; 830 OINTMENT OPHTHALMIC at 01:16

## 2022-01-01 RX ADMIN — POLYVINYL ALCOHOL 1 DROP: 14 SOLUTION/ DROPS OPHTHALMIC at 08:46

## 2022-01-01 RX ADMIN — MINERAL OIL AND WHITE PETROLATUM: 150; 830 OINTMENT OPHTHALMIC at 13:08

## 2022-01-01 RX ADMIN — HEPARIN SODIUM 30000 UNITS: 5000 INJECTION INTRAVENOUS; SUBCUTANEOUS at 14:46

## 2022-01-01 RX ADMIN — PIPERACILLIN AND TAZOBACTAM 3375 MG: 3; .375 INJECTION, POWDER, LYOPHILIZED, FOR SOLUTION INTRAVENOUS at 20:53

## 2022-01-01 RX ADMIN — POLYVINYL ALCOHOL 1 DROP: 14 SOLUTION/ DROPS OPHTHALMIC at 12:29

## 2022-01-01 RX ADMIN — MINERAL OIL AND WHITE PETROLATUM: 150; 830 OINTMENT OPHTHALMIC at 02:32

## 2022-01-01 RX ADMIN — SODIUM ZIRCONIUM CYCLOSILICATE 15 G: 10 POWDER, FOR SUSPENSION ORAL at 01:16

## 2022-01-01 RX ADMIN — HEPARIN SODIUM 5000 UNITS: 5000 INJECTION INTRAVENOUS; SUBCUTANEOUS at 04:54

## 2022-01-01 RX ADMIN — POLYVINYL ALCOHOL 1 DROP: 14 SOLUTION/ DROPS OPHTHALMIC at 04:53

## 2022-01-01 RX ADMIN — PIPERACILLIN AND TAZOBACTAM 3375 MG: 3; .375 INJECTION, POWDER, LYOPHILIZED, FOR SOLUTION INTRAVENOUS at 20:38

## 2022-01-01 RX ADMIN — MINERAL OIL AND WHITE PETROLATUM: 150; 830 OINTMENT OPHTHALMIC at 05:14

## 2022-01-01 RX ADMIN — POLYVINYL ALCOHOL 1 DROP: 14 SOLUTION/ DROPS OPHTHALMIC at 17:59

## 2022-01-01 RX ADMIN — POLYVINYL ALCOHOL 1 DROP: 14 SOLUTION/ DROPS OPHTHALMIC at 07:00

## 2022-01-01 RX ADMIN — HEPARIN SODIUM 5000 UNITS: 5000 INJECTION INTRAVENOUS; SUBCUTANEOUS at 13:06

## 2022-01-01 RX ADMIN — POLYVINYL ALCOHOL 1 DROP: 14 SOLUTION/ DROPS OPHTHALMIC at 20:55

## 2022-01-01 RX ADMIN — THIAMINE HYDROCHLORIDE 250 MG: 100 INJECTION, SOLUTION INTRAMUSCULAR; INTRAVENOUS at 06:05

## 2022-01-01 RX ADMIN — FOLIC ACID 1 MG: 1 TABLET ORAL at 08:37

## 2022-01-01 RX ADMIN — POLYVINYL ALCOHOL 1 DROP: 14 SOLUTION/ DROPS OPHTHALMIC at 10:11

## 2022-01-01 RX ADMIN — PANTOPRAZOLE SODIUM 40 MG: 40 INJECTION, POWDER, FOR SOLUTION INTRAVENOUS at 08:37

## 2022-01-01 RX ADMIN — ATORVASTATIN CALCIUM 80 MG: 40 TABLET, FILM COATED ORAL at 20:39

## 2022-01-01 RX ADMIN — SODIUM CHLORIDE, PRESERVATIVE FREE 10 ML: 5 INJECTION INTRAVENOUS at 08:53

## 2022-01-01 RX ADMIN — PIPERACILLIN AND TAZOBACTAM 3375 MG: 3; .375 INJECTION, POWDER, LYOPHILIZED, FOR SOLUTION INTRAVENOUS at 21:27

## 2022-01-01 RX ADMIN — HEPARIN SODIUM 5000 UNITS: 5000 INJECTION INTRAVENOUS; SUBCUTANEOUS at 05:35

## 2022-01-01 RX ADMIN — MINERAL OIL AND WHITE PETROLATUM: 150; 830 OINTMENT OPHTHALMIC at 20:51

## 2022-01-01 RX ADMIN — MINERAL OIL AND WHITE PETROLATUM: 150; 830 OINTMENT OPHTHALMIC at 17:59

## 2022-01-01 RX ADMIN — SODIUM ZIRCONIUM CYCLOSILICATE 5 G: 5 POWDER, FOR SUSPENSION ORAL at 08:37

## 2022-01-01 RX ADMIN — POLYVINYL ALCOHOL 1 DROP: 14 SOLUTION/ DROPS OPHTHALMIC at 08:52

## 2022-01-01 RX ADMIN — PANTOPRAZOLE SODIUM 40 MG: 40 INJECTION, POWDER, FOR SOLUTION INTRAVENOUS at 21:09

## 2022-01-01 RX ADMIN — VANCOMYCIN HYDROCHLORIDE 1000 MG: 1 INJECTION, POWDER, LYOPHILIZED, FOR SOLUTION INTRAVENOUS at 05:51

## 2022-01-01 RX ADMIN — POLYVINYL ALCOHOL 1 DROP: 14 SOLUTION/ DROPS OPHTHALMIC at 12:56

## 2022-01-01 RX ADMIN — SODIUM PHOSPHATE, MONOBASIC, MONOHYDRATE 30 MMOL: 276; 142 INJECTION, SOLUTION INTRAVENOUS at 23:04

## 2022-01-01 RX ADMIN — PANTOPRAZOLE SODIUM 40 MG: 40 INJECTION, POWDER, FOR SOLUTION INTRAVENOUS at 20:53

## 2022-01-01 RX ADMIN — MINERAL OIL AND WHITE PETROLATUM: 150; 830 OINTMENT OPHTHALMIC at 01:30

## 2022-01-01 RX ADMIN — PANTOPRAZOLE SODIUM 40 MG: 40 INJECTION, POWDER, FOR SOLUTION INTRAVENOUS at 08:43

## 2022-01-01 RX ADMIN — MIDAZOLAM 2 MG: 1 INJECTION INTRAMUSCULAR; INTRAVENOUS at 13:03

## 2022-01-01 RX ADMIN — POLYVINYL ALCOHOL 1 DROP: 14 SOLUTION/ DROPS OPHTHALMIC at 13:09

## 2022-01-01 RX ADMIN — CHLORHEXIDINE GLUCONATE 0.12% ORAL RINSE 15 ML: 1.2 LIQUID ORAL at 20:53

## 2022-01-01 RX ADMIN — CHLORHEXIDINE GLUCONATE 0.12% ORAL RINSE 15 ML: 1.2 LIQUID ORAL at 08:43

## 2022-01-01 RX ADMIN — MAGNESIUM SULFATE HEPTAHYDRATE 2000 MG: 2 INJECTION, SOLUTION INTRAVENOUS at 22:12

## 2022-01-01 RX ADMIN — PIPERACILLIN AND TAZOBACTAM 3375 MG: 3; .375 INJECTION, POWDER, LYOPHILIZED, FOR SOLUTION INTRAVENOUS at 04:54

## 2022-01-01 RX ADMIN — PROPOFOL 20 MCG/KG/MIN: 10 INJECTION, EMULSION INTRAVENOUS at 05:26

## 2022-01-01 RX ADMIN — Medication 1 MG: at 02:35

## 2022-01-01 RX ADMIN — POLYVINYL ALCOHOL 1 DROP: 14 SOLUTION/ DROPS OPHTHALMIC at 04:23

## 2022-01-01 ASSESSMENT — PULMONARY FUNCTION TESTS
PIF_VALUE: 41
PIF_VALUE: 39
PIF_VALUE: 39
PIF_VALUE: 40
PIF_VALUE: 35
PIF_VALUE: 37
PIF_VALUE: 40
PIF_VALUE: 40
PIF_VALUE: 33
PIF_VALUE: 34
PIF_VALUE: 43
PIF_VALUE: 39
PIF_VALUE: 41
PIF_VALUE: 37
PIF_VALUE: 40
PIF_VALUE: 39
PIF_VALUE: 40
PIF_VALUE: 38
PIF_VALUE: 40
PIF_VALUE: 41
PIF_VALUE: 39
PIF_VALUE: 36
PIF_VALUE: 38
PIF_VALUE: 34
PIF_VALUE: 40
PIF_VALUE: 37
PIF_VALUE: 41
PIF_VALUE: 39
PIF_VALUE: 40
PIF_VALUE: 39
PIF_VALUE: 40
PIF_VALUE: 39
PIF_VALUE: 39
PIF_VALUE: 40
PIF_VALUE: 33
PIF_VALUE: 34
PIF_VALUE: 34
PIF_VALUE: 35
PIF_VALUE: 40
PIF_VALUE: 38
PIF_VALUE: 42
PIF_VALUE: 38
PIF_VALUE: 37
PIF_VALUE: 39
PIF_VALUE: 40
PIF_VALUE: 33
PIF_VALUE: 41
PIF_VALUE: 40
PIF_VALUE: 39
PIF_VALUE: 40
PIF_VALUE: 38
PIF_VALUE: 40
PIF_VALUE: 40
PIF_VALUE: 33
PIF_VALUE: 41
PIF_VALUE: 34
PIF_VALUE: 41
PIF_VALUE: 40
PIF_VALUE: 35
PIF_VALUE: 41
PIF_VALUE: 39
PIF_VALUE: 41
PIF_VALUE: 34
PIF_VALUE: 37
PIF_VALUE: 41
PIF_VALUE: 41
PIF_VALUE: 40
PIF_VALUE: 39
PIF_VALUE: 37
PIF_VALUE: 40
PIF_VALUE: 37
PIF_VALUE: 33
PIF_VALUE: 40
PIF_VALUE: 50
PIF_VALUE: 35
PIF_VALUE: 40
PIF_VALUE: 41
PIF_VALUE: 39
PIF_VALUE: 34
PIF_VALUE: 41
PIF_VALUE: 35
PIF_VALUE: 40
PIF_VALUE: 38
PIF_VALUE: 39
PIF_VALUE: 35
PIF_VALUE: 37
PIF_VALUE: 39
PIF_VALUE: 39
PIF_VALUE: 40
PIF_VALUE: 40
PIF_VALUE: 39
PIF_VALUE: 35
PIF_VALUE: 39
PIF_VALUE: 34
PIF_VALUE: 40
PIF_VALUE: 35
PIF_VALUE: 39
PIF_VALUE: 40
PIF_VALUE: 39
PIF_VALUE: 40
PIF_VALUE: 33
PIF_VALUE: 37
PIF_VALUE: 40
PIF_VALUE: 39
PIF_VALUE: 38
PIF_VALUE: 40
PIF_VALUE: 39
PIF_VALUE: 34
PIF_VALUE: 40
PIF_VALUE: 35
PIF_VALUE: 39
PIF_VALUE: 40
PIF_VALUE: 40
PIF_VALUE: 35
PIF_VALUE: 40
PIF_VALUE: 33
PIF_VALUE: 38
PIF_VALUE: 37
PIF_VALUE: 40
PIF_VALUE: 41
PIF_VALUE: 40
PIF_VALUE: 35
PIF_VALUE: 41
PIF_VALUE: 35
PIF_VALUE: 37
PIF_VALUE: 39
PIF_VALUE: 45
PIF_VALUE: 38
PIF_VALUE: 39
PIF_VALUE: 39
PIF_VALUE: 36
PIF_VALUE: 34
PIF_VALUE: 39
PIF_VALUE: 40
PIF_VALUE: 39

## 2022-01-01 ASSESSMENT — PAIN SCALES - GENERAL
PAINLEVEL_OUTOF10: 0

## 2022-01-14 ENCOUNTER — CARE COORDINATION (OUTPATIENT)
Dept: CARE COORDINATION | Age: 62
End: 2022-01-14

## 2022-01-14 NOTE — CARE COORDINATION
Call to [facility] neuro per pt request re: difficulty for pt to get to apptmt due to distance/transpo. They report that [facility] Doesn't partner with anyone outside of clinic, staff reports that apptmt was to get her established with them, and that pt didnt necessarily have to come. Staff advsies that pt should start with insurance if interested in different neurology provider to see who in network provider would be. Will have pt check with insurance as to who she should follow with and have pt f/u with pcp if referral needed.    Call to pt to inform, requests acm call back at another time, agreeable to call back next week

## 2022-01-18 ENCOUNTER — CARE COORDINATION (OUTPATIENT)
Dept: CARE COORDINATION | Age: 62
End: 2022-01-18

## 2022-01-18 NOTE — CARE COORDINATION
Ambulatory Care Coordination Note  1/18/2022  CM Risk Score: 3  Charlson 10 Year Mortality Risk Score: 79%     ACC: Judah Mane, RN    Summary Note: Call to pt for acm f/u. Pt advsied of info from [employer]. Reports had pcp apptmt today but due to no transportation canceled and rescheduled. Offered toassist pt with transpo and pt declines and reprots she will call to reschedule pcp ov this week when family can take her. Reviewed info from [facility] neuro in regard to pt wanting neuro closer to home. PLans to speak with pcp about this at ov. Advised to call acm if can be of any assistance, voices understanding and dneies needs/symptoms at this time. Will graduate at net encounter if no new needs and if no further assistance needed w referrals. Care Coordination Interventions    Program Enrollment: Complex Care  Referral from Primary Care Provider: No  Suggested Interventions and [address]: Completed  Registered Dietician: Completed  Zone Management Tools: Completed         Goals Addressed                 This Visit's Progress     Conditions and Symptoms   On track     I will notify my provider of any barriers to my plan of care. I will notify my provider of any symptoms that indicate a worsening of my condition. Barriers: none  Plan for overcoming my barriers: support an education from acm  Confidence: 10/10  Anticipated Goal Completion Date: 12/15/21              Prior to Admission medications    Medication Sig Start Date End Date Taking?  Authorizing Provider   dicyclomine (BENTYL) 10 MG capsule Take 2 capsules by mouth 4 times daily (before meals and nightly) 12/29/21   Adelina Cason PA-C   ondansetron (ZOFRAN ODT) 4 MG disintegrating tablet Take 1 tablet by mouth every 6 hours 12/29/21   Lana Soares PA-C   carBAMazepine (TEGRETOL) 200 MG tablet Take 1 tablet by mouth daily for 14 days 12/25/21 1/8/22  Hodan Daley PA-C   diclofenac sodium (VOLTAREN) 1 % GEL Apply 4 g topically 4 times daily 12/8/21   Marii Jean DO   meloxicam [facility]) 15 MG tablet Take 1 tablet by mouth daily 11/17/21   Jacquelyn Mcneill PA-C   hydrALAZINE (APRESOLINE) 50 MG tablet Take 1.5 tablets by mouth 3 times daily 10/5/21   BRIAN Sneed - CNP   ipratropium-albuterol (DUONEB) 0.5-2.5 (3) MG/3ML SOLN nebulizer solution Take 3 mLs by nebulization 4 times daily 9/8/21   Huyen Smyth MD   acetaminophen (TYLENOL 8 HOUR ARTHRITIS PAIN) 650 MG extended release tablet Take 650 mg by mouth as needed for Pain    Historical Provider, MD   vitamin D (CHOLECALCIFEROL) 25 MCG (1000 UT) TABS tablet Take 1,000 Units by mouth daily    Historical Provider, MD   busPIRone (BUSPAR) 10 MG tablet Take 1 tablet by mouth 2 times daily 5/4/21   Dagoberto Tao MD   ferrous sulfate (IRON 325) 325 (65 Fe) MG tablet Take 1 tablet by mouth every other day Indications: pt taking every day bid Monday, wed, fri  Patient taking differently: Take 325 mg by mouth 2 times daily Indications: pt taking every day bid  5/4/21   Dagoberto Tao MD   dilTIAZem (CARDIZEM CD) 120 MG extended release capsule Take 1 capsule by mouth daily 1/18/21   Cholo Espinoza MD   metoprolol succinate (TOPROL XL) 25 MG extended release tablet Take 1 tablet by mouth daily 1/18/21   Cholo Espinoza MD   clonazePAM (KLONOPIN) 1 MG disintegrating tablet Take 1 mg by mouth 3 times daily as needed.      Historical Provider, MD   lactobacillus (CULTURELLE) capsule Take 1 capsule by mouth daily (with breakfast) 11/27/20   Nba May MD   theophylline (MIAH-24) 200 MG extended release capsule Take 400 mg by mouth daily    Historical Provider, MD   budesonide-formoterol (SYMBICORT) 160-4.5 MCG/ACT AERO USE 2 INHALATIONS TWICE A DAY 10/2/20   Max Loza MD   guaiFENesin (MUCINEX) 600 MG extended release tablet Take 1 tablet by mouth 2 times daily 7/21/20   Huyen Smyth MD montelukast (SINGULAIR) 10 MG tablet Take 1 tablet by mouth daily 7/21/20   Adeola Arriaga MD   levothyroxine (SYNTHROID) 100 MCG tablet Take 1 tablet by mouth Daily 7/21/20   Adeola Arriaga MD   folic acid (FOLVITE) 1 MG tablet Take 1 tablet by mouth daily 7/16/20   Cali Kate MD   vitamin B-1 100 MG tablet Take 1 tablet by mouth daily 7/16/20   Cali Kate MD   albuterol-ipratropium (COMBIVENT RESPIMAT)  MCG/ACT AERS inhaler Inhale 1 puff into the lungs every 4 hours as needed for Wheezing 7/1/20   Adeola Arriaga MD   DULoxetine (CYMBALTA) 60 MG extended release capsule Take 60 mg by mouth daily     Historical Provider, MD   traZODone (DESYREL) 50 MG tablet Take 100 mg by mouth nightly     Historical Provider, MD   fluticasone [place] Fina) 50 MCG/ACT nasal spray 2 sprays by Nasal route daily 3/28/18   Evin Duggan MD       Future Appointments   Date Time Provider Wiliam Zuñiga   2/17/2022  3:45 PM Adeola Arriaga MD [facility]   3/1/2022  3:45 PM Pilar De La O MD [facility]   5/13/2022  4:30 PM Yusra Lam MD [facility]     ,   Congestive Heart Failure Assessment    Are you currently restricting fluids?: No Restriction  Do you understand a low sodium diet?: Yes  How many restaurant meals do you eat per week?: 1-2  Do you salt your food before tasting it?: No     No patient-reported symptoms      Symptoms:  None: Yes      Symptom course: stable  Weight trend: stable     ,   COPD Assessment    Does the patient understand envrionmental exposure?: Yes  Is the patient able to verbalize Rescue vs. Long Acting medications?: Yes  Does the patient have a nebulizer?: Yes  Does the patient use a space with inhaled medications?: No     No patient-reported symptoms         Symptoms:         and   General Assessment    Do you have any symptoms that are causing concern?: No

## 2022-01-25 ENCOUNTER — APPOINTMENT (OUTPATIENT)
Dept: GENERAL RADIOLOGY | Age: 62
End: 2022-01-25
Payer: COMMERCIAL

## 2022-01-25 ENCOUNTER — HOSPITAL ENCOUNTER (EMERGENCY)
Age: 62
Discharge: HOME | End: 2022-01-26
Attending: EMERGENCY MEDICINE
Payer: COMMERCIAL

## 2022-01-25 DIAGNOSIS — R05.9 COUGH: Primary | ICD-10-CM

## 2022-01-25 LAB
ALBUMIN SERPL-MCNC: 3.9 GM/DL (ref 3.4–5)
ALP BLD-CCNC: 74 IU/L (ref 40–129)
ALT SERPL-CCNC: 23 U/L (ref 10–40)
ANION GAP SERPL CALCULATED.3IONS-SCNC: 11 MMOL/L (ref 4–16)
AST SERPL-CCNC: 19 IU/L (ref 15–37)
BASE EXCESS MIXED: 13 (ref 0–2.3)
BASOPHILS ABSOLUTE: 0.1 K/CU MM
BASOPHILS RELATIVE PERCENT: 0.5 % (ref 0–1)
BILIRUB SERPL-MCNC: 0.1 MG/DL (ref 0–1)
BUN BLDV-MCNC: 7 MG/DL (ref 6–23)
CALCIUM SERPL-MCNC: 9.3 MG/DL (ref 8.3–10.6)
CHLORIDE BLD-SCNC: 91 MMOL/L (ref 99–110)
CO2: 33 MMOL/L (ref 21–32)
COMMENT: ABNORMAL
CREAT SERPL-MCNC: 0.7 MG/DL (ref 0.6–1.1)
DIFFERENTIAL TYPE: ABNORMAL
EOSINOPHILS ABSOLUTE: 0 K/CU MM
EOSINOPHILS RELATIVE PERCENT: 0.2 % (ref 0–3)
GFR AFRICAN AMERICAN: >60 ML/MIN/1.73M2
GFR NON-AFRICAN AMERICAN: >60 ML/MIN/1.73M2
GLUCOSE BLD-MCNC: 157 MG/DL (ref 70–99)
HCO3 VENOUS: 41.4 MMOL/L (ref 19–25)
HCT VFR BLD CALC: 38.6 % (ref 37–47)
HEMOGLOBIN: 11.7 GM/DL (ref 12.5–16)
IMMATURE NEUTROPHIL %: 0.9 % (ref 0–0.43)
LYMPHOCYTES ABSOLUTE: 1.7 K/CU MM
LYMPHOCYTES RELATIVE PERCENT: 14.1 % (ref 24–44)
MAGNESIUM: 1.8 MG/DL (ref 1.8–2.4)
MCH RBC QN AUTO: 29 PG (ref 27–31)
MCHC RBC AUTO-ENTMCNC: 30.3 % (ref 32–36)
MCV RBC AUTO: 95.8 FL (ref 78–100)
MONOCYTES ABSOLUTE: 0.7 K/CU MM
MONOCYTES RELATIVE PERCENT: 6.2 % (ref 0–4)
NUCLEATED RBC %: 0 %
O2 SAT, VEN: 95.5 % (ref 50–70)
PCO2, VEN: 70 MMHG (ref 38–52)
PDW BLD-RTO: 12.4 % (ref 11.7–14.9)
PH VENOUS: 7.38 (ref 7.32–7.42)
PLATELET # BLD: 262 K/CU MM (ref 140–440)
PMV BLD AUTO: 9.6 FL (ref 7.5–11.1)
PO2, VEN: 105 MMHG (ref 28–48)
POTASSIUM SERPL-SCNC: 3.4 MMOL/L (ref 3.5–5.1)
PRO-BNP: 943.5 PG/ML
RBC # BLD: 4.03 M/CU MM (ref 4.2–5.4)
SARS-COV-2, NAAT: NOT DETECTED
SEGMENTED NEUTROPHILS ABSOLUTE COUNT: 9.2 K/CU MM
SEGMENTED NEUTROPHILS RELATIVE PERCENT: 78.1 % (ref 36–66)
SODIUM BLD-SCNC: 135 MMOL/L (ref 135–145)
SOURCE: NORMAL
TOTAL IMMATURE NEUTOROPHIL: 0.1 K/CU MM
TOTAL NUCLEATED RBC: 0 K/CU MM
TOTAL PROTEIN: 6.5 GM/DL (ref 6.4–8.2)
TROPONIN T: <0.01 NG/ML
WBC # BLD: 11.7 K/CU MM (ref 4–10.5)

## 2022-01-25 PROCEDURE — 85025 COMPLETE CBC W/AUTO DIFF WBC: CPT

## 2022-01-25 PROCEDURE — 83735 ASSAY OF MAGNESIUM: CPT

## 2022-01-25 PROCEDURE — 83880 ASSAY OF NATRIURETIC PEPTIDE: CPT

## 2022-01-25 PROCEDURE — 87635 SARS-COV-2 COVID-19 AMP PRB: CPT

## 2022-01-25 PROCEDURE — 99285 EMERGENCY DEPT VISIT HI MDM: CPT

## 2022-01-25 PROCEDURE — 71045 X-RAY EXAM CHEST 1 VIEW: CPT

## 2022-01-25 PROCEDURE — 93005 ELECTROCARDIOGRAM TRACING: CPT | Performed by: PHYSICIAN ASSISTANT

## 2022-01-25 PROCEDURE — 6370000000 HC RX 637 (ALT 250 FOR IP): Performed by: EMERGENCY MEDICINE

## 2022-01-25 PROCEDURE — 80053 COMPREHEN METABOLIC PANEL: CPT

## 2022-01-25 PROCEDURE — 84484 ASSAY OF TROPONIN QUANT: CPT

## 2022-01-25 PROCEDURE — 82805 BLOOD GASES W/O2 SATURATION: CPT

## 2022-01-25 RX ORDER — ACETAMINOPHEN 500 MG
1000 TABLET ORAL ONCE
Status: COMPLETED | OUTPATIENT
Start: 2022-01-25 | End: 2022-01-25

## 2022-01-25 RX ADMIN — ACETAMINOPHEN 1000 MG: 500 TABLET ORAL at 22:39

## 2022-01-25 ASSESSMENT — PAIN SCALES - GENERAL: PAINLEVEL_OUTOF10: 7

## 2022-01-25 NOTE — ED PROVIDER NOTES
As provider-in-triage, I performed a medical screening history and physical exam on this patient. HISTORY OF PRESENT ILLNESS  Elizabeth Rosales is a 63 y.o. female who presents for SOB, cough, chest tightness for 5 days. Known exposure to covid. Not improving with z-pack and steroids. On home O2, 3L. Did receive covid vaccine. PHYSICAL EXAM  Ht 5' 3\" (1.6 m)   Wt 198 lb (89.8 kg)   LMP 01/05/2010   BMI 35.07 kg/m²     On exam, the patient appears well-hydrated, well-nourished, and in no acute distress. Mucous membranes are moist. Speech is clear. Breathing is unlabored. Skin is dry. Mental status is normal. Moves all extremities, and is without facial droop. Comment: Please note this report has been produced using speech recognition software and may contain errors related to that system including errors in grammar, punctuation, and spelling, as well as words and phrases that may be inappropriate. If there are any questions or concerns please feel free to contact the dictating provider for clarification.        Leydi Crawford PA-C  01/25/22 0183

## 2022-01-26 ENCOUNTER — CARE COORDINATION (OUTPATIENT)
Dept: CARE COORDINATION | Age: 62
End: 2022-01-26

## 2022-01-26 VITALS
WEIGHT: 198 LBS | TEMPERATURE: 98.2 F | DIASTOLIC BLOOD PRESSURE: 83 MMHG | RESPIRATION RATE: 16 BRPM | HEIGHT: 63 IN | OXYGEN SATURATION: 100 % | SYSTOLIC BLOOD PRESSURE: 142 MMHG | BODY MASS INDEX: 35.08 KG/M2 | HEART RATE: 83 BPM

## 2022-01-26 LAB
EKG ATRIAL RATE: 87 BPM
EKG DIAGNOSIS: NORMAL
EKG P AXIS: 67 DEGREES
EKG P-R INTERVAL: 134 MS
EKG Q-T INTERVAL: 430 MS
EKG QRS DURATION: 152 MS
EKG QTC CALCULATION (BAZETT): 517 MS
EKG R AXIS: 115 DEGREES
EKG T AXIS: 41 DEGREES
EKG VENTRICULAR RATE: 87 BPM

## 2022-01-26 PROCEDURE — 6370000000 HC RX 637 (ALT 250 FOR IP): Performed by: EMERGENCY MEDICINE

## 2022-01-26 PROCEDURE — 93010 ELECTROCARDIOGRAM REPORT: CPT | Performed by: INTERNAL MEDICINE

## 2022-01-26 RX ORDER — DOXYCYCLINE HYCLATE 100 MG
100 TABLET ORAL ONCE
Status: COMPLETED | OUTPATIENT
Start: 2022-01-26 | End: 2022-01-26

## 2022-01-26 RX ORDER — CODEINE PHOSPHATE AND GUAIFENESIN 10; 100 MG/5ML; MG/5ML
5 SOLUTION ORAL ONCE
Status: COMPLETED | OUTPATIENT
Start: 2022-01-26 | End: 2022-01-26

## 2022-01-26 RX ORDER — PREDNISONE 50 MG/1
50 TABLET ORAL DAILY
Qty: 5 TABLET | Refills: 0 | Status: SHIPPED | OUTPATIENT
Start: 2022-01-26 | End: 2022-01-31

## 2022-01-26 RX ORDER — DOXYCYCLINE HYCLATE 100 MG
100 TABLET ORAL 2 TIMES DAILY
Qty: 20 TABLET | Refills: 0 | Status: SHIPPED | OUTPATIENT
Start: 2022-01-26 | End: 2022-02-05

## 2022-01-26 RX ORDER — PREDNISONE 20 MG/1
40 TABLET ORAL ONCE
Status: COMPLETED | OUTPATIENT
Start: 2022-01-26 | End: 2022-01-26

## 2022-01-26 RX ORDER — GUAIFENESIN AND CODEINE PHOSPHATE 100; 10 MG/5ML; MG/5ML
5 SOLUTION ORAL 3 TIMES DAILY PRN
Qty: 75 ML | Refills: 0 | Status: SHIPPED | OUTPATIENT
Start: 2022-01-26 | End: 2022-01-31

## 2022-01-26 RX ADMIN — GUAIFENESIN AND CODEINE PHOSPHATE 5 ML: 100; 10 SOLUTION ORAL at 01:11

## 2022-01-26 RX ADMIN — PREDNISONE 40 MG: 20 TABLET ORAL at 01:11

## 2022-01-26 RX ADMIN — DOXYCYCLINE HYCLATE 100 MG: 100 TABLET, COATED ORAL at 01:11

## 2022-01-26 ASSESSMENT — ENCOUNTER SYMPTOMS
COUGH: 1
EYE DISCHARGE: 0
SORE THROAT: 0
DYSPNEA ASSOCIATED WITH: EXERTION
SHORTNESS OF BREATH: 1
RHINORRHEA: 0
CHEST TIGHTNESS: 1
ABDOMINAL PAIN: 0
EYE PAIN: 0
BACK PAIN: 0
NAUSEA: 0
VOMITING: 0

## 2022-01-26 NOTE — ED NOTES
Pt requesting tylenol for back pain. Dr. Marielena Mcconnell aware.      Zoe Muhammad, RN  01/25/22 4706

## 2022-01-26 NOTE — ED PROVIDER NOTES
[address] ENCOUNTER      Pt Name: Lizabeth Mcclain  MRN: [id number]  [place] 1960  Date of evaluation: 1/25/2022  Provider: Aren Aranda MD    [address] [place] [address]       Chief Complaint   Patient presents with    Cough     Cough / SOB / Concern for Covid         HISTORY OF PRESENT ILLNESS      Lizabeth Mcclain is a 63 y.o. female who presents to the emergency department  for   Chief Complaint   Patient presents with    Cough     Cough / SOB / Concern for Covid       27-year-old female with history of COPD, chronic respiratory failure on 3 L of supplemental oxygen presents with cough. She has been seen outpatient by her pulmonologist and she has been on a course of azithromycin as well as steroids. She reports that she is finished those medications but is continued to have cough. She reports she has been vaccinated for Trenerys Walker 232. Denies any sick contacts. She does endorse continued sputum production. She also endorses some chest tightness. Denies any abdominal pain. No nausea or vomiting. No fevers or chills. GCS of 15 in the emergency department. She is on 3 L of supplemental oxygen with it is her home amount and has active saturations of 100%. No significant signs of respiratory distress in the emergency department. She has no stridor or significant wheezing. Nursing Notes, Triage Notes & Vital Signs were reviewed. REVIEW OF SYSTEMS    (2-9 systems for level 4, 10 or more for level 5)     Review of Systems   Constitutional: Negative for chills and fever. HENT: Negative for congestion, rhinorrhea and sore throat. Eyes: Negative for pain and discharge. Respiratory: Positive for cough, chest tightness and shortness of breath. Cardiovascular: Negative for chest pain and palpitations. Gastrointestinal: Negative for abdominal pain, nausea and vomiting.    Endocrine: Negative for polydipsia and polyuria. Genitourinary: Negative for dysuria and flank pain. Musculoskeletal: Negative for back pain and neck pain. Skin: Negative for pallor and wound. Neurological: Negative for dizziness, seizures, facial asymmetry, light-headedness, numbness and headaches. Psychiatric/Behavioral: Negative for confusion. Except as noted above the remainder of the review of systems was reviewed and negative. PAST MEDICAL HISTORY     Past Medical History:   Diagnosis Date    Anemia     Anxiety 02/16/2017    follows with Dr Ary Weber    Arthritis     Back pain at L4-L5 level 2/16/2017    Dr Tammy Chacko Bipolar 1 disorder ([facility]     per pt on 2/5/2021\"never told I have bipolar\"    COPD (chronic obstructive pulmonary disease) ([facility]     follows with Dr Raisa Rebolledo [facility]     FH: CAD (coronary artery disease) 2/16/2017    Father had in his forties, sister in her thirties    Adalberto Chacko Fibromyalgia 02/16/2017    Full dentures     full upper plate and partial on the bottom    Gastric ulcer     \"had stomach ulder back fall 2019\"    H/O Doppler ultrasound 04/05/2019    venous- no DVT or reflux    H/O echocardiogram 01/14/2015    EF50-55% Normal- see media    History of blood transfusion     Hyperlipidemia LDL goal <100     Hypertension     Dr Santoro Prime Irregular heartbeat     \"they said I have irreg heart beat before- back when they drained fluid around my heart \"    Obstructive sleep apnea     \"have bipap I use at home\"    On home oxygen therapy     \"on oxygen all the time at home and keep it on 2.5 liters\"    Osteoarthritis     Pericardial effusion     per old chart had pericardial effusion 10/2020    Spinal stenosis     hx per old chart    Thyroid disease     Wears glasses     \"suppose to wear glasses\"       Prior to Admission medications    Medication Sig Start Date End Date Taking?  Authorizing Provider   doxycycline hyclate (VIBRA-TABS) 100 MG tablet Take 1 tablet by mouth 2 times daily for 10 days 1/26/22 2/5/22 Yes Regine Gonzalez MD   predniSONE (DELTASONE) 50 MG tablet Take 1 tablet by mouth daily for 5 days 1/26/22 1/31/22 Yes Regine Gonzalez MD   guaiFENesin-codeine (TUSSI-ORGANIDIN NR) 100-10 MG/5ML syrup Take 5 mLs by mouth 3 times daily as needed for Cough for up to 5 days. 1/26/22 1/31/22 Yes Regine Gonzalez MD   methylPREDNISolone (MEDROL DOSEPACK) 4 MG tablet Take by mouth. 1/21/22 1/27/22  Paul Finn MD   azithromycin (ZITHROMAX) 250 MG tablet Take 1 tablet by mouth See Admin Instructions for 5 days 500mg on day 1 followed by 250mg on days 2 - 5 1/21/22 1/26/22  Paul Finn MD   dicyclomine (BENTYL) 10 MG capsule Take 2 capsules by mouth 4 times daily (before meals and nightly) 12/29/21   Anabel Hook PA-C   ondansetron (ZOFRAN ODT) 4 MG disintegrating tablet Take 1 tablet by mouth every 6 hours 12/29/21   Anabel Hook PA-C   carBAMazepine (TEGRETOL) 200 MG tablet Take 1 tablet by mouth daily for 14 days 12/25/21 1/8/22  Cami Daley PA-C   diclofenac sodium (VOLTAREN) 1 % GEL Apply 4 g topically 4 times daily 12/8/21   Heike George DO   meloxicam [facility]) 15 MG tablet Take 1 tablet by mouth daily 11/17/21   [address], PATOR   hydrALAZINE (APRESOLINE) 50 MG tablet Take 1.5 tablets by mouth 3 times daily 10/5/21   BRIAN Sneed - CNP   ipratropium-albuterol (DUONEB) 0.5-2.5 (3) MG/3ML SOLN nebulizer solution Take 3 mLs by nebulization 4 times daily 9/8/21   Paul Finn MD   acetaminophen (TYLENOL 8 HOUR ARTHRITIS PAIN) 650 MG extended release tablet Take 650 mg by mouth as needed for Pain    Historical Provider, MD   vitamin D (CHOLECALCIFEROL) 25 MCG (1000 UT) TABS tablet Take 1,000 Units by mouth daily    Historical Provider, MD   busPIRone (BUSPAR) 10 MG tablet Take 1 tablet by mouth 2 times daily 5/4/21   Rolene MD Colette   ferrous sulfate (IRON 325) 325 (65 Fe) MG tablet Take 1 tablet by mouth every other day Indications: pt taking every day bid Monday, wed, fri  Patient taking differently: Take 325 mg by mouth 2 times daily Indications: pt taking every day bid  5/4/21   Marie Freitas MD   dilTIAZem (CARDIZEM CD) 120 MG extended release capsule Take 1 capsule by mouth daily 1/18/21   Sherrie Haynes MD   metoprolol succinate (TOPROL XL) 25 MG extended release tablet Take 1 tablet by mouth daily 1/18/21   Sherrie Haynes MD   clonazePAM (KLONOPIN) 1 MG disintegrating tablet Take 1 mg by mouth 3 times daily as needed.      Historical Provider, MD   lactobacillus (CULTURELLE) capsule Take 1 capsule by mouth daily (with breakfast) 11/27/20   Kira Lowry MD   theophylline (MIAH-24) 200 MG extended release capsule Take 400 mg by mouth daily    Historical Provider, MD   budesonide-formoterol (SYMBICORT) 160-4.5 MCG/ACT AERO USE 2 INHALATIONS TWICE A DAY 10/2/20   Max Britton MD   guaiFENesin (MUCINEX) 600 MG extended release tablet Take 1 tablet by mouth 2 times daily 7/21/20   Mejia Arce MD   montelukast (SINGULAIR) 10 MG tablet Take 1 tablet by mouth daily 7/21/20   Mejia Arce MD   levothyroxine (SYNTHROID) 100 MCG tablet Take 1 tablet by mouth Daily 7/21/20   Mejia Arce MD   folic acid (FOLVITE) 1 MG tablet Take 1 tablet by mouth daily 7/16/20   Jeremy Jackson MD   vitamin B-1 100 MG tablet Take 1 tablet by mouth daily 7/16/20   Jeremy Jackson MD   albuterol-ipratropium (COMBIVENT RESPIMAT)  MCG/ACT AERS inhaler Inhale 1 puff into the lungs every 4 hours as needed for Wheezing 7/1/20   Mejia Arce MD   DULoxetine (CYMBALTA) 60 MG extended release capsule Take 60 mg by mouth daily     Historical Provider, MD   traZODone (DESYREL) 50 MG tablet Take 100 mg by mouth nightly     Historical Provider, MD   fluticasone (FLONASE) 50 MCG/ACT nasal spray 2 sprays by Nasal route daily 3/28/18 Calvin Krishnamurthy MD        Patient Active Problem List   Diagnosis    Centrilobular emphysema ([facility]    Hypertension    Hyperlipidemia LDL goal <100    Abnormal EKG    Palpitations    Anxiety    FH: CAD (coronary artery disease)    Fibromyalgia    Back pain at L4-L5 level    Sleep apnea    COPD exacerbation (HCC)    Electrolyte imbalance    Epigastric pain    COPD (chronic obstructive pulmonary disease) ([address]    Spinal stenosis of lumbar region with radiculopathy    Spinal stenosis, lumbar region, without neurogenic claudication    COPD with acute exacerbation ([address]    Pneumonia due to infectious organism    SVT (supraventricular tachycardia) (HCC)    Class 1 obesity due to excess calories with body mass index (BMI) of 31.0 to 31.9 in adult    Pneumonia    Pleural effusion    Atelectasis    Pulmonary nodules    Respiratory failure with hypoxia and hypercapnia (HCC)    Anemia    COPD with exacerbation (HCC)    Acquired hemolytic anemia, unspecified (HCC)    Leucocytosis    Chronic kidney disease, stage I    Hyponatremia    PNA (pneumonia)    Sepsis (HCC)    Pericardial effusion    Acute acalculous cholecystitis    SIRS (systemic inflammatory response syndrome) (HCC)    Chest pain    Abnormal nuclear stress test    Abnormal stress test    Status post laparoscopic cholecystectomy    Moderate malnutrition (HCC)    LUZ MARIA (acute kidney injury) (HCC)    Dizziness    Stage 3a chronic kidney disease (HCC)    Adrenal insufficiency (HCC)    Trochanteric bursitis of right hip         SURGICAL HISTORY       Past Surgical History:   Procedure Laterality Date    BACK SURGERY  03/2017    \"surgery on low back L5-6- not sure if they put any metal in \"    CARDIAC CATHETERIZATION      10/2019    CARDIAC CATHETERIZATION      per old chart had cath done 11/2020    CARPAL TUNNEL RELEASE Right 1985    CHOLECYSTECTOMY, LAPAROSCOPIC N/A 10/25/2020    CHOLECYSTECTOMY LAPAROSCOPIC WITH IOC performed by Elly Kuo MD at [facility] COLONOSCOPY N/A 12/12/2019    COLONOSCOPY DIAGNOSTIC performed by Radha David MD at [facility], COLON, DIAGNOSTIC      per old chart had egd done 1/2018    TUBAL LIGATION  1987    UPPER GASTROINTESTINAL ENDOSCOPY N/A 1/7/2021    EGD BIOPSY performed by Radha David MD at [facility]       Discharge Medication List as of 1/26/2022  1:04 AM      CONTINUE these medications which have NOT CHANGED    Details   methylPREDNISolone (MEDROL DOSEPACK) 4 MG tablet Take by mouth., Disp-21 tablet, R-0Normal      azithromycin (ZITHROMAX) 250 MG tablet Take 1 tablet by mouth See Admin Instructions for 5 days 500mg on day 1 followed by 250mg on days 2 - 5, Disp-6 tablet, R-0Normal      dicyclomine (BENTYL) 10 MG capsule Take 2 capsules by mouth 4 times daily (before meals and nightly), Disp-40 capsule, R-0Normal      ondansetron (ZOFRAN ODT) 4 MG disintegrating tablet Take 1 tablet by mouth every 6 hours, Disp-20 tablet, R-0Normal      carBAMazepine (TEGRETOL) 200 MG tablet Take 1 tablet by mouth daily for 14 days, Disp-14 tablet, R-0Print      diclofenac sodium (VOLTAREN) 1 % GEL Apply 4 g topically 4 times daily, Topical, 4 TIMES DAILY Starting Wed 12/8/2021, Disp-350 g, R-2, Normal      meloxicam (MOBIC) 15 MG tablet Take 1 tablet by mouth daily, Disp-30 tablet, R-1Normal      hydrALAZINE (APRESOLINE) 50 MG tablet Take 1.5 tablets by mouth 3 times daily, Disp-135 tablet, R-5Normal      ipratropium-albuterol (DUONEB) 0.5-2.5 (3) MG/3ML SOLN nebulizer solution Take 3 mLs by nebulization 4 times daily, Disp-360 mL, R-3Normal      acetaminophen (TYLENOL 8 HOUR ARTHRITIS PAIN) 650 MG extended release tablet Take 650 mg by mouth as needed for PainHistorical Med      vitamin D (CHOLECALCIFEROL) 25 MCG (1000 UT) TABS tablet Take 1,000 Units by mouth dailyHistorical Med      busPIRone (BUSPAR) 10 MG tablet Take 1 tablet by mouth 2 times daily, Disp-1 tablet, R-0NO PRINT      ferrous sulfate (IRON 325) 325 (65 Fe) MG tablet Take 1 tablet by mouth every other day Indications: pt taking every day bid Monday, wed, fri, Disp-30 tablet, R-3NO PRINT      dilTIAZem (CARDIZEM CD) 120 MG extended release capsule Take 1 capsule by mouth daily, Disp-90 capsule, R-3Normal      metoprolol succinate (TOPROL XL) 25 MG extended release tablet Take 1 tablet by mouth daily, Disp-30 tablet, R-3Normal      clonazePAM (KLONOPIN) 1 MG disintegrating tablet Take 1 mg by mouth 3 times daily as needed.  Historical Med      lactobacillus (CULTURELLE) capsule Take 1 capsule by mouth daily (with breakfast), Disp-30 capsule,R-0Normal      theophylline (MIAH-24) 200 MG extended release capsule Take 400 mg by mouth dailyHistorical Med      budesonide-formoterol (SYMBICORT) 160-4.5 MCG/ACT AERO USE 2 INHALATIONS TWICE A DAY, Disp-30.6 g,R-3Normal      guaiFENesin (MUCINEX) 600 MG extended release tablet Take 1 tablet by mouth 2 times daily, Disp-30 tablet,R-0Normal      montelukast (SINGULAIR) 10 MG tablet Take 1 tablet by mouth daily, Disp-30 tablet,R-3Normal      levothyroxine (SYNTHROID) 100 MCG tablet Take 1 tablet by mouth Daily, Disp-30 OOCBOF,[id number]      folic acid (FOLVITE) 1 MG tablet Take 1 tablet by mouth daily, Disp-30 tablet,R-3Normal      vitamin B-1 100 MG tablet Take 1 tablet by mouth daily, Disp-30 tablet,R-3Normal      albuterol-ipratropium (COMBIVENT RESPIMAT)  MCG/ACT AERS inhaler Inhale 1 puff into the lungs every 4 hours as needed for Wheezing, Disp-3 Inhaler, R-0Normal      DULoxetine (CYMBALTA) 60 MG extended release capsule Take 60 mg by mouth daily Historical Med      traZODone (DESYREL) 50 MG tablet Take 100 mg by mouth nightly Historical Med      fluticasone (FLONASE) 50 MCG/ACT nasal spray 2 sprays by Nasal route daily, Disp-1 Bottle, R-0Print             ALLERGIES     Lisinopril    FAMILY HISTORY       Family History   Problem Relation Age of Onset  Asthma Mother         COPD    Heart Disease Father           SOCIAL HISTORY       Social History     Socioeconomic History    Marital status:      Spouse name: Not on file    Number of children: Not on file    Years of education: Not on file    Highest education level: Not on file   Occupational History    Not on file   Tobacco Use    Smoking status: Former Smoker     Packs/day: 1.50     Years: 20.00     Pack years: 30.00     Types: Cigarettes     Quit date: 2008     Years since quittin.0    Smokeless tobacco: Never Used   Vaping Use    Vaping Use: Never used   Substance and Sexual Activity    Alcohol use: Not Currently     Alcohol/week: 2.0 standard drinks     Types: 2 Shots of liquor per week     Comment: per pt on 2021\"quit drinking back Oct 2020\"use to drink wine coolers - 3 times per week \"/caffeine 2-3 coffees aday 1 pop b    Drug use: No    Sexual activity: Yes     Partners: Male   Other Topics Concern    Not on file   Social History Narrative    Not on file     Social Determinants of Health     Financial Resource Strain: Low Risk     Difficulty of Paying Living Expenses: Not hard at all   Food Insecurity: No Food Insecurity    Worried About Running Out of Food in the Last Year: Never true    Donny of Food in the Last Year: Never true   Transportation Needs: No Transportation Needs    Lack of Transportation (Medical): No    Lack of Transportation (Non-Medical): No   Physical Activity: Inactive    Days of Exercise per Week: 0 days    Minutes of Exercise per Session: 0 min   Stress: No Stress Concern Present    Feeling of Stress :  Only a little   Social Connections: Socially Isolated    Frequency of Communication with Friends and Family: Once a week    Frequency of Social Gatherings with Friends and Family: Once a week    Attends Roman [employer] Services: Never    Active Member of Clubs or Organizations: No    Attends Club or Organization Meetings: Never   [place] Marital Status:    Intimate Partner Violence:     Fear of Current or Ex-Partner: Not on file    Emotionally Abused: Not on file    Physically Abused: Not on file    Sexually Abused: Not on file   Housing Stability: [address] Unable to Pay for Housing in the Last Year: No    Number of [place] in the Last Year: 1    Unstable Housing in the Last Year: No       SCREENINGS    [place] Coma Scale  Eye Opening: Spontaneous  Best Verbal Response: Oriented  Best Motor Response: Obeys commands  [place] Coma Scale Score: 15          PHYSICAL EXAM    (up to 7 for level 4, 8 or more for level 5)     ED Triage Vitals   BP Temp Temp Source Pulse Resp SpO2 Height Weight   01/25/22 1726 01/25/22 1726 01/25/22 1726 01/25/22 1726 01/25/22 1726 01/25/22 1726 01/25/22 1711 01/25/22 1711   (!) 171/79 98.9 °F (37.2 °C) Oral 87 18 99 % 5' 3\" (1.6 m) 198 lb (89.8 kg)       Physical Exam  Vitals reviewed. Constitutional:       Appearance: She is not toxic-appearing or diaphoretic. HENT:      Head: Normocephalic and atraumatic. Nose: No congestion or rhinorrhea. Mouth/Throat:      Mouth: Mucous membranes are moist.   Eyes:      General:         Right eye: No discharge. Left eye: No discharge. Extraocular Movements: Extraocular movements intact. Pupils: Pupils are equal, round, and reactive to light. Cardiovascular:      Rate and Rhythm: Normal rate. Pulmonary:      Effort: Pulmonary effort is normal. No respiratory distress. Comments: B/l breath sounds  No retractions, no increased work of breathing  Mildly diminished breath sounds bilaterally  Chest:      Chest wall: No tenderness. Abdominal:      Palpations: Abdomen is soft. Tenderness: There is no abdominal tenderness. There is no guarding. Musculoskeletal:         General: No swelling, deformity or signs of injury. Normal range of motion. Cervical back: Normal range of motion and neck supple. No tenderness. Skin:     General: Skin is warm. Capillary Refill: Capillary refill takes less than 2 seconds. Neurological:      General: No focal deficit present. Mental Status: She is alert and oriented to person, place, and time. DIAGNOSTIC RESULTS     Labs Reviewed   CBC WITH AUTO DIFFERENTIAL - Abnormal; Notable for the following components:       Result Value    WBC 11.7 (*)     RBC 4.03 (*)     Hemoglobin 11.7 (*)     MCHC 30.3 (*)     Segs Relative 78.1 (*)     Lymphocytes % 14.1 (*)     Monocytes % 6.2 (*)     Immature Neutrophil % 0.9 (*)     All other components within normal limits   COMPREHENSIVE METABOLIC PANEL W/ REFLEX TO MG FOR LOW K - Abnormal; Notable for the following components:    Potassium 3.4 (*)     Chloride 91 (*)     CO2 33 (*)     Glucose 157 (*)     All other components within normal limits   BRAIN NATRIURETIC PEPTIDE - Abnormal; Notable for the following components:    Pro-.5 (*)     All other components within normal limits   BLOOD GAS, VENOUS - Abnormal; Notable for the following components:    pCO2, Topher 70 (*)     pO2, [place] 105 (*)     Base Exc, Mixed 13 (*)     HCO3, Venous 41.4 (*)     O2 Sat, [place]her 95.5 (*)     All other components within normal limits   COVID-19, RAPID   TROPONIN   MAGNESIUM          RADIOLOGY:     Non-plain film images such as CT, Ultrasound and MRI are read by the radiologist. Plain radiographic images are visualized and preliminarily interpreted by the emergency physician. Interpretation per the Radiologist below, if available at the time of this note:    XR CHEST 1 VIEW   Final Result   Findings consistent with COPD. No acute disease. XR CHEST PORTABLE   Final Result   COPD. No cardiomegaly, pneumonia or interstitial edema. No significant change from 04/29/2021.                ED BEDSIDE ULTRASOUND:   Performed by ED Physician Regine Gonzalez MD       LABS:  Labs Reviewed   CBC WITH AUTO DIFFERENTIAL - Abnormal; Notable for the following components:       Result Value    WBC 11.7 (*)     RBC 4.03 (*)     Hemoglobin 11.7 (*)     MCHC 30.3 (*)     Segs Relative 78.1 (*)     Lymphocytes % 14.1 (*)     Monocytes % 6.2 (*)     Immature Neutrophil % 0.9 (*)     All other components within normal limits   COMPREHENSIVE METABOLIC PANEL W/ REFLEX TO MG FOR LOW K - Abnormal; Notable for the following components:    Potassium 3.4 (*)     Chloride 91 (*)     CO2 33 (*)     Glucose 157 (*)     All other components within normal limits   BRAIN NATRIURETIC PEPTIDE - Abnormal; Notable for the following components:    Pro-.5 (*)     All other components within normal limits   BLOOD GAS, VENOUS - Abnormal; Notable for the following components:    pCO2, Topher 70 (*)     pO2, Topher 105 (*)     Base Exc, Mixed 13 (*)     HCO3, Venous 41.4 (*)     O2 Sat, Topher 95.5 (*)     All other components within normal limits   COVID-19, RAPID   TROPONIN   MAGNESIUM       All other labs were within normal range or not returned as of this dictation. EMERGENCY DEPARTMENT COURSE and DIFFERENTIAL DIAGNOSIS/MDM:   Vitals:    Vitals:    01/25/22 1711 01/25/22 1726 01/25/22 2130 01/26/22 0122   BP:  (!) 171/79 (!) 161/84 (!) 142/83   Pulse:  87 83    Resp:  18 16    Temp:  98.9 °F (37.2 °C) 98.2 °F (36.8 °C)    TempSrc:  Oral     SpO2:  99% 100%    Weight: 198 lb (89.8 kg)      Height: 5' 3\" (1.6 m)              MDM  Number of Diagnoses or Management Options  Cough  Diagnosis management comments: 79-year-old female with history of COPD, chronic respiratory failure on 3 L of supplemental oxygen presents with cough. She has been battling cough and similar symptoms for a few weeks. She has been seen by her pulmonologist and prescribed a Z-Eber as well as steroids. She reports finishing those medications without improvement in her cough. She does endorse some sputum production.   She presents to the emergency department on 3 L which is her home amount has saturations of 100%. No signs of respiratory distress. Her vitals show mild elevated blood pressure. Vitals otherwise unremarkable. Chest x-ray is nonacute. Did obtain labs which are overall nonacute. Her Covid19 test is negative. She will be prescribed symptomatic treatment for home. We did discuss her symptoms might be due to a different viral respiratory infection. At this time her respiratory status is stable. She is on her home oxygen amount with sats of 100%. She is amatory without difficulty. She is discharged in stable condition with return precautions. She will follow-up outpatient. -  Patient seen and evaluated in the emergency department. -  Triage and nursing notes reviewed and incorporated. -  Old chart records reviewed and incorporated. -  Work-up included:  See above  -  Results discussed with patient. CONSULTS:  None    PROCEDURES:  None performed unless otherwise noted below     Procedures        FINAL IMPRESSION      1. Cough          DISPOSITION/PLAN   DISPOSITION Decision To Discharge 01/26/2022 12:56:46 AM      PATIENT REFERRED TO:  Laurita Blanchard MD  [place] [address]  [phone]    Schedule an appointment as soon as possible for a visit in 3 days      [address] [place] [address]  [phone]    Schedule an appointment as soon as possible for a visit in 3 days        DISCHARGE MEDICATIONS:  Discharge Medication List as of 1/26/2022  1:04 AM      START taking these medications    Details   doxycycline hyclate (VIBRA-TABS) 100 MG tablet Take 1 tablet by mouth 2 times daily for 10 days, Disp-20 tablet, R-0Normal      predniSONE (DELTASONE) 50 MG tablet Take 1 tablet by mouth daily for 5 days, Disp-5 tablet, R-0Normal      guaiFENesin-codeine (TUSSI-ORGANIDIN NR) 100-10 MG/5ML syrup Take 5 mLs by mouth 3 times daily as needed for Cough for up to 5 days. , Disp-75 mL, R-0Normal             ED Provider Disposition Time  DISPOSITION Decision To Discharge 01/26/2022 12:56:46 AM      Appropriate personal protective equipment was worn during the patient's evaluation. These included surgical, eye protection, surgical mask or in 95 respirator and gloves. The patient was also placed in a surgical mask for the prevention of possible spread of respiratory viral illnesses. The Patient was instructed to read the package inserts with any medication that was prescribed. Major potential reactions and medication interactions were discussed. The Patient understands that there are numerous possible adverse reactions not covered. The patient was also instructed to arrange follow-up with his or her primary care provider for review of any pending labwork or incidental findings on any radiology results that were obtained. All efforts were made to discuss any incidental findings that require further monitoring. Controlled Substances Monitoring:     No flowsheet data found.     (Please note that portions of this note were completed with a voice recognition program.  Efforts were made to edit the dictations but occasionally words are mis-transcribed.)    Antonia Greene MD (electronically signed)  Attending Emergency Physician           Antonia Greene MD  01/26/22 9551

## 2022-01-26 NOTE — CARE COORDINATION
Ambulatory Care Coordination Note  CM Risk Score: 3  Charlson 10 Year Mortality Risk Score: 79%     ACC: Shanon Styles RN    Summary Note:Call to pt for acm ed f/u. Pt in ER yesterday for cough, covid negative. Pt had called pcp prior to er visit and Reports zpac and steroid was prescribed with no improvement. Reviewed new meds prescribed by ER with pt and voices understanding. Denies new/worsening symptoms/reviewed zm/when to call pcp or pulm/911. Sees pulm on 2/12. Pulse ox on 3L o2 97% resting, Reviewed pulse o instructions. Pt denies further needs/concerns at this time. , plans to follow up with providers when feeling better and declines acm calling to schedule f/u visits for her at this time. Encouraged rest and symptom monitoring. PLan: call pt next week to check on status/resolution of symptoms, with plan to graduate over next  Encounters if no needs. Care Coordination Interventions    Program Enrollment: Complex Care  Referral from Primary Care Provider: No  Suggested Interventions and [address]: Completed  Registered Dietician: Completed  Zone Management Tools: Completed         Goals Addressed                 This Visit's Progress     Conditions and Symptoms   On track     I will notify my provider of any barriers to my plan of care. I will notify my provider of any symptoms that indicate a worsening of my condition. Barriers: none  Plan for overcoming my barriers: support an education from acm  Confidence: 10/10  Anticipated Goal Completion Date: 12/15/21              Prior to Admission medications    Medication Sig Start Date End Date Taking?  Authorizing Provider   doxycycline hyclate (VIBRA-TABS) 100 MG tablet Take 1 tablet by mouth 2 times daily for 10 days 1/26/22 2/5/22  Michaelle Rashid MD   predniSONE (DELTASONE) 50 MG tablet Take 1 tablet by mouth daily for 5 days 1/26/22 1/31/22  Michaelle Rashid MD   guaiFENesin-codeine (TUSSI-ORGANIDIN NR) 100-10 MG/5ML syrup Take 5 mLs by mouth 3 times daily as needed for Cough for up to 5 days. 1/26/22 1/31/22  Danny Russ MD   methylPREDNISolone (MEDROL DOSEPACK) 4 MG tablet Take by mouth. 1/21/22 1/27/22  Sridevi Mcclendon MD   dicyclomine (BENTYL) 10 MG capsule Take 2 capsules by mouth 4 times daily (before meals and nightly) 12/29/21   Adelina Baugh PA-C   ondansetron (ZOFRAN ODT) 4 MG disintegrating tablet Take 1 tablet by mouth every 6 hours 12/29/21   Jesus Nuñez PA-C   carBAMazepine (TEGRETOL) 200 MG tablet Take 1 tablet by mouth daily for 14 days 12/25/21 1/8/22  Luis Alberto Daley PA-C   diclofenac sodium (VOLTAREN) 1 % GEL Apply 4 g topically 4 times daily 12/8/21   Michelle Jackson DO   meloxicam [facility]) 15 MG tablet Take 1 tablet by mouth daily 11/17/21   Ray Mai PA-C   hydrALAZINE (APRESOLINE) 50 MG tablet Take 1.5 tablets by mouth 3 times daily 10/5/21   BRIAN Sneed - CNP   ipratropium-albuterol (DUONEB) 0.5-2.5 (3) MG/3ML SOLN nebulizer solution Take 3 mLs by nebulization 4 times daily 9/8/21   Sridevi Mcclendon MD   acetaminophen (TYLENOL 8 HOUR ARTHRITIS PAIN) 650 MG extended release tablet Take 650 mg by mouth as needed for Pain    Historical Provider, MD   vitamin D (CHOLECALCIFEROL) 25 MCG (1000 UT) TABS tablet Take 1,000 Units by mouth daily    Historical Provider, MD   busPIRone (BUSPAR) 10 MG tablet Take 1 tablet by mouth 2 times daily 5/4/21   Shandra Hall MD   ferrous sulfate (IRON 325) 325 (65 Fe) MG tablet Take 1 tablet by mouth every other day Indications: pt taking every day bid Monday, wed, fri  Patient taking differently: Take 325 mg by mouth 2 times daily Indications: pt taking every day bid  5/4/21   Shandra Hall MD   dilTIAZem (CARDIZEM CD) 120 MG extended release capsule Take 1 capsule by mouth daily 1/18/21   Debi Jesus MD   metoprolol succinate (TOPROL XL) 25 MG extended release tablet Take 1 tablet by mouth daily 1/18/21   Vincenzo Seaman MD   clonazePAM (KLONOPIN) 1 MG disintegrating tablet Take 1 mg by mouth 3 times daily as needed.      Historical Provider, MD   lactobacillus (CULTURELLE) capsule Take 1 capsule by mouth daily (with breakfast) 11/27/20   Indu Montaño MD   theophylline (MAIH-24) 200 MG extended release capsule Take 400 mg by mouth daily    Historical Provider, MD   budesonide-formoterol (SYMBICORT) 160-4.5 MCG/ACT AERO USE 2 INHALATIONS TWICE A DAY 10/2/20   Max Gonzalez MD   guaiFENesin (MUCINEX) 600 MG extended release tablet Take 1 tablet by mouth 2 times daily 7/21/20   Dat Beltrán MD   montelukast (SINGULAIR) 10 MG tablet Take 1 tablet by mouth daily 7/21/20   Dat Beltrán MD   levothyroxine (SYNTHROID) 100 MCG tablet Take 1 tablet by mouth Daily 7/21/20   Dat Beltrán MD   folic acid (FOLVITE) 1 MG tablet Take 1 tablet by mouth daily 7/16/20   Loc Ratliff MD   vitamin B-1 100 MG tablet Take 1 tablet by mouth daily 7/16/20   Loc Ratliff MD   albuterol-ipratropium (COMBIVENT RESPIMAT)  MCG/ACT AERS inhaler Inhale 1 puff into the lungs every 4 hours as needed for Wheezing 7/1/20   Dat Beltrán MD   DULoxetine (CYMBALTA) 60 MG extended release capsule Take 60 mg by mouth daily     Historical Provider, MD   traZODone (DESYREL) 50 MG tablet Take 100 mg by mouth nightly     Historical Provider, MD   fluticasone Mittie Running) 50 MCG/ACT nasal spray 2 sprays by Nasal route daily 3/28/18   Susan Reno MD       Future Appointments   Date Time Provider Wiliam Zuñiga   2/17/2022  3:45 PM Dat Beltrán MD [facility] Max Albright   3/1/2022  3:45 PM [facility]   5/13/2022  4:30 PM Vincenzo Seaman MD [facility]     ,   Congestive Heart Failure Assessment    Are you currently restricting fluids?: No Restriction  Do you understand a low sodium diet?: Yes  How many restaurant meals do you eat per week?: 1-2  Do you salt your food before tasting it?: No     No patient-reported symptoms      Symptoms:  None: Yes      Symptom course: stable  Weight trend: stable  Salt intake watch compared to last visit: stable     ,   COPD Assessment    Does the patient understand envrionmental exposure?: Yes  Is the patient able to verbalize Rescue vs. Long Acting medications?: Yes  Does the patient have a nebulizer?: Yes  Does the patient use a space with inhaled medications?: No     No patient-reported symptoms         Symptoms:  None: Yes      Symptom course: improving  Breathlessness: exertion  Increase use of rapid acting/rescue inhaled medications?: No  Change in chronic cough?: Increased  Change in sputum?: No/At Baseline  Self Monitoring - SaO2: Yes  Baseline SaO2 Readin  Have you had a recent diagnosis of pneumonia either by PCP or at a hospital?: No      and   General Assessment    Do you have any symptoms that are causing concern?: No

## 2022-02-07 ENCOUNTER — CARE COORDINATION (OUTPATIENT)
Dept: CARE COORDINATION | Age: 62
End: 2022-02-07

## 2022-02-18 ENCOUNTER — CARE COORDINATION (OUTPATIENT)
Dept: CARE COORDINATION | Age: 62
End: 2022-02-18

## 2022-02-18 NOTE — CARE COORDINATION
Ambulatory Care Coordination Note  CM Risk Score: 3  Charlson 10 Year Mortality Risk Score: 79%     ACC: Misti Anglin, RN    Summary Note: Call to pt for acm f/u. Reports she Lost lab order from Dr Mary Aaron, advised labs in system. Sees dr. Flo Pisano Monday, sees pm 2/25. Pt unsure if she will need assistance with transpo, advised acm available to help if needs rise. Plan: f/u with pt after pcp visit and plan to graduate if no new needs. .        Care Coordination Interventions    Program Enrollment: Complex Care  Referral from Primary Care Provider: No  Suggested Interventions and [address]: Completed  Registered Dietician: Completed  Zone Management Tools: Completed         Goals Addressed                 This Visit's Progress     Conditions and Symptoms   On track     I will notify my provider of any barriers to my plan of care. I will notify my provider of any symptoms that indicate a worsening of my condition. Barriers: none  Plan for overcoming my barriers: support an education from acm  Confidence: 10/10  Anticipated Goal Completion Date: 12/15/21              Prior to Admission medications    Medication Sig Start Date End Date Taking?  Authorizing Provider   atorvastatin (LIPITOR) 80 MG tablet Take 80 mg by mouth daily    Historical Provider, MD   cefUROXime (CEFTIN) 250 MG tablet Take 1 tablet by mouth 2 times daily for 10 days 2/17/22 2/27/22  Sridevi Mcclendon MD   methylPREDNISolone (MEDROL, AKIL,) 4 MG tablet Use as directed 2/17/22   Sridevi Mcclendon MD   dicyclomine (BENTYL) 10 MG capsule Take 2 capsules by mouth 4 times daily (before meals and nightly) 12/29/21   Adelina Baugh PA-C   ondansetron (ZOFRAN ODT) 4 MG disintegrating tablet Take 1 tablet by mouth every 6 hours 12/29/21   Jesus Nuñez PA-C   carBAMazepine (TEGRETOL) 200 MG tablet Take 1 tablet by mouth daily for 14 days 12/25/21 1/8/22  Luis Alberto Daley PA-C   diclofenac sodium (VOLTAREN) 1 % GEL Apply 4 g topically 4 times daily 12/8/21   Kortney Horne DO   meloxicam [facility] [facility]) 15 MG tablet Take 1 tablet by mouth daily 11/17/21   Mariposa Blackman PA-C   hydrALAZINE (APRESOLINE) 50 MG tablet Take 1.5 tablets by mouth 3 times daily 10/5/21   BRIAN Sneed - CNP   ipratropium-albuterol (DUONEB) 0.5-2.5 (3) MG/3ML SOLN nebulizer solution Take 3 mLs by nebulization 4 times daily 9/8/21   Marcel Pollack MD   acetaminophen (TYLENOL 8 HOUR ARTHRITIS PAIN) 650 MG extended release tablet Take 650 mg by mouth as needed for Pain    Historical Provider, MD   vitamin D (CHOLECALCIFEROL) 25 MCG (1000 UT) TABS tablet Take 1,000 Units by mouth daily    Historical Provider, MD   busPIRone (BUSPAR) 10 MG tablet Take 1 tablet by mouth 2 times daily 5/4/21   Berkley Peacock MD   ferrous sulfate (IRON 325) 325 (65 Fe) MG tablet Take 1 tablet by mouth every other day Indications: pt taking every day bid Monday, wed, fri  Patient taking differently: Take 325 mg by mouth 2 times daily Indications: pt taking every day bid  5/4/21   Berkley Peacock MD   dilTIAZem (CARDIZEM CD) 120 MG extended release capsule Take 1 capsule by mouth daily 1/18/21   Shannan Montesinos MD   metoprolol succinate (TOPROL XL) 25 MG extended release tablet Take 1 tablet by mouth daily 1/18/21   Shannan Montesinos MD   clonazePAM (KLONOPIN) 1 MG disintegrating tablet Take 1 mg by mouth 3 times daily as needed.      Historical Provider, MD   lactobacillus (CULTURELLE) capsule Take 1 capsule by mouth daily (with breakfast) 11/27/20   Denis Luna MD   theophylline (MIAH-24) 200 MG extended release capsule Take 400 mg by mouth daily    Historical Provider, MD   budesonide-formoterol (SYMBICORT) 160-4.5 MCG/ACT AERO USE 2 INHALATIONS TWICE A DAY 10/2/20   Max Trammell MD   guaiFENesin (MUCINEX) 600 MG extended release tablet Take 1 tablet by mouth 2 times daily 7/21/20   Darryl Carroll MD Deo   montelukast (SINGULAIR) 10 MG tablet Take 1 tablet by mouth daily 7/21/20   Yadira Verma MD   levothyroxine (SYNTHROID) 100 MCG tablet Take 1 tablet by mouth Daily 7/21/20   Yadira Verma MD   folic acid (FOLVITE) 1 MG tablet Take 1 tablet by mouth daily 7/16/20   Ethel Langley MD   vitamin B-1 100 MG tablet Take 1 tablet by mouth daily 7/16/20   Ethel Langley MD   albuterol-ipratropium (COMBIVENT RESPIMAT)  MCG/ACT AERS inhaler Inhale 1 puff into the lungs every 4 hours as needed for Wheezing 7/1/20   Yadira Verma MD   DULoxetine (CYMBALTA) 60 MG extended release capsule Take 60 mg by mouth daily     Historical Provider, MD   traZODone (DESYREL) 50 MG tablet Take 100 mg by mouth nightly     Historical Provider, MD   fluticasone [facility]) 50 MCG/ACT nasal spray 2 sprays by Nasal route daily 3/28/18   Brigid Nath MD       Future Appointments   Date Time Provider Wiliam Zuñiga   3/1/2022  3:45 PM Dominique Ingram MD [facility]   3/24/2022  3:30 PM Yadira Verma MD [facility] [address] [place] [address]   5/13/2022  4:30 PM Linda Osborne MD [facility]     ,   Congestive Heart Failure Assessment    Are you currently restricting fluids?: No Restriction  Do you understand a low sodium diet?: Yes  How many restaurant meals do you eat per week?: 1-2  Do you salt your food before tasting it?: No     No patient-reported symptoms      Symptoms:  None: Yes      Symptom course: improving  Weight trend: stable  Salt intake watch compared to last visit: stable     ,   COPD Assessment    Does the patient understand envrionmental exposure?: Yes  Is the patient able to verbalize Rescue vs. Long Acting medications?: Yes  Does the patient have a nebulizer?: Yes  Does the patient use a space with inhaled medications?: No     No patient-reported symptoms         Symptoms:  None: Yes      Symptom course: improving  Breathlessness: exertion  Increase use of rapid acting/rescue inhaled medications?: No  Change in chronic cough?: No/At Baseline  Change in sputum?: No/At Baseline  Have you had a recent diagnosis of pneumonia either by PCP or at a hospital?: No      and   General Assessment    Do you have any symptoms that are causing concern?: No     '

## 2022-02-21 ASSESSMENT — ENCOUNTER SYMPTOMS: DYSPNEA ASSOCIATED WITH: EXERTION

## 2022-02-23 ENCOUNTER — HOSPITAL ENCOUNTER (OUTPATIENT)
Age: 62
Discharge: HOME | End: 2022-02-23
Payer: COMMERCIAL

## 2022-02-23 LAB
ALBUMIN SERPL-MCNC: 4 GM/DL (ref 3.4–5)
ANION GAP SERPL CALCULATED.3IONS-SCNC: 7 MMOL/L (ref 4–16)
BACTERIA: NEGATIVE /HPF
BILIRUBIN URINE: NEGATIVE MG/DL
BLOOD, URINE: NEGATIVE
BUN BLDV-MCNC: 15 MG/DL (ref 6–23)
CALCIUM SERPL-MCNC: 9.5 MG/DL (ref 8.3–10.6)
CHLORIDE BLD-SCNC: 90 MMOL/L (ref 99–110)
CLARITY: CLEAR
CO2: 38 MMOL/L (ref 21–32)
COLOR: YELLOW
CREAT SERPL-MCNC: 0.8 MG/DL (ref 0.6–1.1)
GFR AFRICAN AMERICAN: >60 ML/MIN/1.73M2
GFR NON-AFRICAN AMERICAN: >60 ML/MIN/1.73M2
GLUCOSE BLD-MCNC: 100 MG/DL (ref 70–99)
GLUCOSE, URINE: NEGATIVE MG/DL
KETONES, URINE: NEGATIVE MG/DL
LEUKOCYTE ESTERASE, URINE: NEGATIVE
MAGNESIUM: 1.6 MG/DL (ref 1.8–2.4)
NITRITE URINE, QUANTITATIVE: NEGATIVE
PH, URINE: 6.5 (ref 5–8)
POTASSIUM SERPL-SCNC: 3.3 MMOL/L (ref 3.5–5.1)
PROTEIN UA: NEGATIVE MG/DL
RBC URINE: <1 /HPF (ref 0–6)
SODIUM BLD-SCNC: 135 MMOL/L (ref 135–145)
SPECIFIC GRAVITY UA: 1.01 (ref 1–1.03)
SQUAMOUS EPITHELIAL: <1 /HPF
TRICHOMONAS: NORMAL /HPF
UROBILINOGEN, URINE: 0.2 MG/DL (ref 0.2–1)
WBC UA: <1 /HPF (ref 0–5)

## 2022-02-23 PROCEDURE — 83735 ASSAY OF MAGNESIUM: CPT

## 2022-02-23 PROCEDURE — 81001 URINALYSIS AUTO W/SCOPE: CPT

## 2022-02-23 PROCEDURE — 80048 BASIC METABOLIC PNL TOTAL CA: CPT

## 2022-02-23 PROCEDURE — 82040 ASSAY OF SERUM ALBUMIN: CPT

## 2022-03-01 PROBLEM — E87.6 HYPOKALEMIA: Status: ACTIVE | Noted: 2022-01-01

## 2022-03-04 ENCOUNTER — HOSPITAL ENCOUNTER (OUTPATIENT)
Dept: GENERAL RADIOLOGY | Age: 62
Discharge: HOME | End: 2022-03-04
Payer: COMMERCIAL

## 2022-03-04 ENCOUNTER — CARE COORDINATION (OUTPATIENT)
Dept: CARE COORDINATION | Age: 62
End: 2022-03-04

## 2022-03-04 ENCOUNTER — HOSPITAL ENCOUNTER (OUTPATIENT)
Age: 62
Discharge: HOME | End: 2022-03-04
Payer: COMMERCIAL

## 2022-03-04 DIAGNOSIS — M96.1 POSTLAMINECTOMY SYNDROME, LUMBAR REGION: ICD-10-CM

## 2022-03-04 DIAGNOSIS — M17.0 PRIMARY OSTEOARTHRITIS OF BOTH KNEES: ICD-10-CM

## 2022-03-04 PROCEDURE — 73560 X-RAY EXAM OF KNEE 1 OR 2: CPT

## 2022-03-04 PROCEDURE — 72100 X-RAY EXAM L-S SPINE 2/3 VWS: CPT

## 2022-03-11 ENCOUNTER — CARE COORDINATION (OUTPATIENT)
Dept: CARE COORDINATION | Age: 62
End: 2022-03-11

## 2022-03-11 NOTE — CARE COORDINATION
Ambulatory Care Coordination Note  3/11/2022  CM Risk Score: 3  Charlson 10 Year Mortality Risk Score: 79%     ACC: Norbert Zhang, RN    Summary Note: Call to pt for acm f/u. Pt denies symptoms/concerns. Will plan to graduate at this time. Advised to contact acm if can be of assistance in future, voices understanding. Has f/u with pcp and specialists scheduled. Care Coordination Interventions    Program Enrollment: Complex Care  Referral from Primary Care Provider: No  Suggested Interventions and [address]: Completed  Registered Dietician: Completed  Zone Management Tools: Completed         Goals Addressed                 This Visit's Progress     Conditions and Symptoms   On track     I will notify my provider of any barriers to my plan of care. I will notify my provider of any symptoms that indicate a worsening of my condition. Barriers: none  Plan for overcoming my barriers: support an education from ac  Confidence: 10/10  Anticipated Goal Completion Date: 12/15/21              Prior to Admission medications    Medication Sig Start Date End Date Taking?  Authorizing Provider   magnesium oxide (MAG-OX) 400 MG tablet Take 1 tablet by mouth daily 3/1/22   Laurel Smoker, MD   potassium chloride (KLOR-CON M) 20 MEQ extended release tablet Take 1 tablet by mouth daily 3/1/22   Laurel Smoker, MD   atorvastatin (LIPITOR) 80 MG tablet Take 80 mg by mouth daily    Historical Provider, MD   dicyclomine (BENTYL) 10 MG capsule Take 2 capsules by mouth 4 times daily (before meals and nightly) 12/29/21   Avis Carty PA-C   ondansetron (ZOFRAN ODT) 4 MG disintegrating tablet Take 1 tablet by mouth every 6 hours 12/29/21   Avis Carty PA-C   carBAMazepine (TEGRETOL) 200 MG tablet Take 1 tablet by mouth daily for 14 days 12/25/21 3/1/22  Isabell Daley PA-C   diclofenac sodium (VOLTAREN) 1 % GEL Apply 4 g topically 4 times daily 12/8/21   Mari Zavala DO meloxicam (MOBIC) 15 MG tablet Take 1 tablet by mouth daily 11/17/21   Nenita Alonso PA-C   hydrALAZINE (APRESOLINE) 50 MG tablet Take 1.5 tablets by mouth 3 times daily 10/5/21   BRIAN Sneed - CNP   ipratropium-albuterol (DUONEB) 0.5-2.5 (3) MG/3ML SOLN nebulizer solution Take 3 mLs by nebulization 4 times daily 9/8/21   Irma Corona MD   acetaminophen (TYLENOL 8 HOUR ARTHRITIS PAIN) 650 MG extended release tablet Take 650 mg by mouth as needed for Pain    Historical Provider, MD   vitamin D (CHOLECALCIFEROL) 25 MCG (1000 UT) TABS tablet Take 1,000 Units by mouth daily    Historical Provider, MD   busPIRone (BUSPAR) 10 MG tablet Take 1 tablet by mouth 2 times daily 5/4/21   Vishal Chi MD   ferrous sulfate (IRON 325) 325 (65 Fe) MG tablet Take 1 tablet by mouth every other day Indications: pt taking every day bid Monday, wed, fri  Patient taking differently: Take 325 mg by mouth 2 times daily Indications: pt taking every day bid  5/4/21   Vishal Chi MD   dilTIAZem (CARDIZEM CD) 120 MG extended release capsule Take 1 capsule by mouth daily 1/18/21   Yung Calvillo MD   metoprolol succinate (TOPROL XL) 25 MG extended release tablet Take 1 tablet by mouth daily 1/18/21   Yung Calvillo MD   clonazePAM (KLONOPIN) 1 MG disintegrating tablet Take 1 mg by mouth 3 times daily as needed.      Historical Provider, MD   lactobacillus (CULTURELLE) capsule Take 1 capsule by mouth daily (with breakfast) 11/27/20   Krystal Fulton MD   theophylline (MIAH-24) 200 MG extended release capsule Take 400 mg by mouth daily    Historical Provider, MD   budesonide-formoterol (SYMBICORT) 160-4.5 MCG/ACT AERO USE 2 INHALATIONS TWICE A DAY 10/2/20   Max Singer MD   guaiFENesin (MUCINEX) 600 MG extended release tablet Take 1 tablet by mouth 2 times daily 7/21/20   Irma Corona MD   montelukast (SINGULAIR) 10 MG tablet Take 1 tablet by mouth daily 7/21/20 Angelia Quiñonez MD   levothyroxine (SYNTHROID) 100 MCG tablet Take 1 tablet by mouth Daily 7/21/20   Angelia Quiñonez MD   folic acid (FOLVITE) 1 MG tablet Take 1 tablet by mouth daily 7/16/20   Nupur Gross MD   vitamin B-1 100 MG tablet Take 1 tablet by mouth daily 7/16/20   Nupur Gross MD   albuterol-ipratropium (COMBIVENT RESPIMAT)  MCG/ACT AERS inhaler Inhale 1 puff into the lungs every 4 hours as needed for Wheezing 7/1/20   Angelia Quiñonez MD   DULoxetine (CYMBALTA) 60 MG extended release capsule Take 60 mg by mouth daily     Historical Provider, MD   traZODone (DESYREL) 50 MG tablet Take 100 mg by mouth nightly     Historical Provider, MD   fluticasone [facility]) 50 MCG/ACT nasal spray 2 sprays by Nasal route daily 3/28/18   Jonny Ervin MD       Future Appointments   Date Time Provider Wiliam Zuñiga   3/14/2022  1:50 PM [facility] D[facility]   3/14/2022  2:30 PM [facility] MAMMO ROOM 1 [facility]   3/24/2022  3:30 PM MD L[facility]LIE Hoang Ran   5/13/2022  4:30 PM Bailey Carlin MD [address] [place] [address] [facility]   9/6/2022  3:45 PM Valdez Roque MD [facility]     ,   Congestive Heart Failure Assessment    Are you currently restricting fluids?: No Restriction  Do you understand a low sodium diet?: Yes  How many restaurant meals do you eat per week?: 1-2  Do you salt your food before tasting it?: No     No patient-reported symptoms      Symptoms:  None: Yes      Symptom course: stable  Patient-reported weight (lb): 200  Weight trend: stable  Salt intake watch compared to last visit: stable     ,   COPD Assessment    Does the patient understand envrionmental exposure?: Yes  Is the patient able to verbalize Rescue vs. Long Acting medications?: Yes  Does the patient have a nebulizer?: Yes  Does the patient use a space with inhaled medications?: No     No patient-reported symptoms         Symptoms:     Have you had a recent diagnosis of pneumonia either by PCP or at a hospital?: No      and   General Assessment    Do you have any symptoms that are causing concern?: No       Heart Failure Education outreach Date/Time: 3/11/2022 2:01 PM    Ambulatory Care Manager ([email or web address]) contacted the patient by telephone to perform Ambulatory Care Coordination. Verified name and  with patient as identifiers. Provided introduction to self, and explanation of the Ambulatory Care Manager's role. ACM reviewed that a Health Healthy tips for the Spring packet has been mailed to the them. ACM reviewed CHF zones, daily weights and the importance of low sodium diet with the patient. Instructed patient to call their PCP if they have a weight gain of 3 lbs in 2 days or 5 lbs in a week. Patient reminded that there is a physician on call 24 hours a day / 7 days a week should the patient have questions or concerns. The patient verbalized understanding.

## 2022-03-14 ENCOUNTER — APPOINTMENT (OUTPATIENT)
Dept: GENERAL RADIOLOGY | Age: 62
DRG: 191 | End: 2022-03-14
Payer: COMMERCIAL

## 2022-03-14 ENCOUNTER — HOSPITAL ENCOUNTER (INPATIENT)
Age: 62
LOS: 1 days | Discharge: HOME | DRG: 191 | End: 2022-03-16
Attending: EMERGENCY MEDICINE | Admitting: INTERNAL MEDICINE
Payer: COMMERCIAL

## 2022-03-14 DIAGNOSIS — R07.9 CHEST PAIN, UNSPECIFIED TYPE: Primary | ICD-10-CM

## 2022-03-14 DIAGNOSIS — J18.9 PNEUMONIA DUE TO INFECTIOUS ORGANISM, UNSPECIFIED LATERALITY, UNSPECIFIED PART OF LUNG: ICD-10-CM

## 2022-03-14 LAB
ALBUMIN SERPL-MCNC: 3.7 GM/DL (ref 3.4–5)
ALP BLD-CCNC: 87 IU/L (ref 40–129)
ALT SERPL-CCNC: 12 U/L (ref 10–40)
ANION GAP SERPL CALCULATED.3IONS-SCNC: 9 MMOL/L (ref 4–16)
AST SERPL-CCNC: 17 IU/L (ref 15–37)
BASOPHILS ABSOLUTE: 0.1 K/CU MM
BASOPHILS RELATIVE PERCENT: 0.8 % (ref 0–1)
BILIRUB SERPL-MCNC: 0.1 MG/DL (ref 0–1)
BUN BLDV-MCNC: 8 MG/DL (ref 6–23)
CALCIUM SERPL-MCNC: 9.1 MG/DL (ref 8.3–10.6)
CHLORIDE BLD-SCNC: 90 MMOL/L (ref 99–110)
CO2: 32 MMOL/L (ref 21–32)
CREAT SERPL-MCNC: 0.8 MG/DL (ref 0.6–1.1)
DIFFERENTIAL TYPE: ABNORMAL
EOSINOPHILS ABSOLUTE: 0.4 K/CU MM
EOSINOPHILS RELATIVE PERCENT: 3.2 % (ref 0–3)
GFR AFRICAN AMERICAN: >60 ML/MIN/1.73M2
GFR NON-AFRICAN AMERICAN: >60 ML/MIN/1.73M2
GLUCOSE BLD-MCNC: 131 MG/DL (ref 70–99)
HCT VFR BLD CALC: 34.9 % (ref 37–47)
HEMOGLOBIN: 10.2 GM/DL (ref 12.5–16)
IMMATURE NEUTROPHIL %: 0.5 % (ref 0–0.43)
LYMPHOCYTES ABSOLUTE: 1.5 K/CU MM
LYMPHOCYTES RELATIVE PERCENT: 13.4 % (ref 24–44)
MCH RBC QN AUTO: 29.1 PG (ref 27–31)
MCHC RBC AUTO-ENTMCNC: 29.2 % (ref 32–36)
MCV RBC AUTO: 99.7 FL (ref 78–100)
MONOCYTES ABSOLUTE: 1 K/CU MM
MONOCYTES RELATIVE PERCENT: 9.3 % (ref 0–4)
NUCLEATED RBC %: 0 %
PDW BLD-RTO: 12.7 % (ref 11.7–14.9)
PLATELET # BLD: 245 K/CU MM (ref 140–440)
PMV BLD AUTO: 9.7 FL (ref 7.5–11.1)
POTASSIUM SERPL-SCNC: 3.5 MMOL/L (ref 3.5–5.1)
PRO-BNP: 304.5 PG/ML
RBC # BLD: 3.5 M/CU MM (ref 4.2–5.4)
SEGMENTED NEUTROPHILS ABSOLUTE COUNT: 7.9 K/CU MM
SEGMENTED NEUTROPHILS RELATIVE PERCENT: 72.8 % (ref 36–66)
SODIUM BLD-SCNC: 131 MMOL/L (ref 135–145)
TOTAL IMMATURE NEUTOROPHIL: 0.05 K/CU MM
TOTAL NUCLEATED RBC: 0 K/CU MM
TOTAL PROTEIN: 6.4 GM/DL (ref 6.4–8.2)
TOTAL RETICULOCYTE COUNT: 0.09 K/CU MM
TROPONIN T: <0.01 NG/ML
WBC # BLD: 10.8 K/CU MM (ref 4–10.5)

## 2022-03-14 PROCEDURE — 99283 EMERGENCY DEPT VISIT LOW MDM: CPT

## 2022-03-14 PROCEDURE — 80053 COMPREHEN METABOLIC PANEL: CPT

## 2022-03-14 PROCEDURE — 84484 ASSAY OF TROPONIN QUANT: CPT

## 2022-03-14 PROCEDURE — 85025 COMPLETE CBC W/AUTO DIFF WBC: CPT

## 2022-03-14 PROCEDURE — 93005 ELECTROCARDIOGRAM TRACING: CPT | Performed by: PHYSICIAN ASSISTANT

## 2022-03-14 PROCEDURE — 71045 X-RAY EXAM CHEST 1 VIEW: CPT

## 2022-03-14 PROCEDURE — 83880 ASSAY OF NATRIURETIC PEPTIDE: CPT

## 2022-03-15 PROBLEM — R07.89 CHEST PRESSURE: Status: ACTIVE | Noted: 2022-03-15

## 2022-03-15 LAB
EKG ATRIAL RATE: 85 BPM
EKG DIAGNOSIS: NORMAL
EKG P AXIS: 74 DEGREES
EKG P-R INTERVAL: 146 MS
EKG Q-T INTERVAL: 422 MS
EKG QRS DURATION: 148 MS
EKG QTC CALCULATION (BAZETT): 502 MS
EKG R AXIS: 107 DEGREES
EKG T AXIS: 28 DEGREES
EKG VENTRICULAR RATE: 85 BPM
PROCALCITONIN: 0.04
TROPONIN T: 0.01 NG/ML
TROPONIN T: <0.01 NG/ML
TROPONIN T: <0.01 NG/ML

## 2022-03-15 PROCEDURE — 93010 ELECTROCARDIOGRAM REPORT: CPT | Performed by: INTERNAL MEDICINE

## 2022-03-15 PROCEDURE — 6360000002 HC RX W HCPCS: Performed by: NURSE PRACTITIONER

## 2022-03-15 PROCEDURE — 6360000002 HC RX W HCPCS: Performed by: INTERNAL MEDICINE

## 2022-03-15 PROCEDURE — 93005 ELECTROCARDIOGRAM TRACING: CPT | Performed by: INTERNAL MEDICINE

## 2022-03-15 PROCEDURE — 84145 PROCALCITONIN (PCT): CPT

## 2022-03-15 PROCEDURE — 6370000000 HC RX 637 (ALT 250 FOR IP): Performed by: INTERNAL MEDICINE

## 2022-03-15 PROCEDURE — 94660 CPAP INITIATION&MGMT: CPT

## 2022-03-15 PROCEDURE — 94640 AIRWAY INHALATION TREATMENT: CPT

## 2022-03-15 PROCEDURE — 94761 N-INVAS EAR/PLS OXIMETRY MLT: CPT

## 2022-03-15 PROCEDURE — 2700000000 HC OXYGEN THERAPY PER DAY

## 2022-03-15 PROCEDURE — 36415 COLL VENOUS BLD VENIPUNCTURE: CPT

## 2022-03-15 PROCEDURE — 84484 ASSAY OF TROPONIN QUANT: CPT

## 2022-03-15 PROCEDURE — 2140000000 HC CCU INTERMEDIATE R&B

## 2022-03-15 PROCEDURE — 2580000003 HC RX 258: Performed by: INTERNAL MEDICINE

## 2022-03-15 PROCEDURE — 2580000003 HC RX 258: Performed by: EMERGENCY MEDICINE

## 2022-03-15 PROCEDURE — 94664 DEMO&/EVAL PT USE INHALER: CPT

## 2022-03-15 PROCEDURE — 6360000002 HC RX W HCPCS: Performed by: EMERGENCY MEDICINE

## 2022-03-15 RX ORDER — MELOXICAM 7.5 MG/1
15 TABLET ORAL DAILY
Status: DISCONTINUED | OUTPATIENT
Start: 2022-03-15 | End: 2022-03-15

## 2022-03-15 RX ORDER — DULOXETIN HYDROCHLORIDE 30 MG/1
60 CAPSULE, DELAYED RELEASE ORAL DAILY
Status: DISCONTINUED | OUTPATIENT
Start: 2022-03-15 | End: 2022-03-16 | Stop reason: HOSPADM

## 2022-03-15 RX ORDER — ONDANSETRON 4 MG/1
4 TABLET, ORALLY DISINTEGRATING ORAL EVERY 8 HOURS PRN
Status: DISCONTINUED | OUTPATIENT
Start: 2022-03-15 | End: 2022-03-16 | Stop reason: HOSPADM

## 2022-03-15 RX ORDER — ACETAMINOPHEN 650 MG/1
650 SUPPOSITORY RECTAL EVERY 6 HOURS PRN
Status: DISCONTINUED | OUTPATIENT
Start: 2022-03-15 | End: 2022-03-16 | Stop reason: HOSPADM

## 2022-03-15 RX ORDER — LACTOBACILLUS RHAMNOSUS GG 10B CELL
1 CAPSULE ORAL
Status: DISCONTINUED | OUTPATIENT
Start: 2022-03-15 | End: 2022-03-16 | Stop reason: HOSPADM

## 2022-03-15 RX ORDER — KETOROLAC TROMETHAMINE 30 MG/ML
15 INJECTION, SOLUTION INTRAMUSCULAR; INTRAVENOUS ONCE
Status: COMPLETED | OUTPATIENT
Start: 2022-03-15 | End: 2022-03-15

## 2022-03-15 RX ORDER — TRAZODONE HYDROCHLORIDE 50 MG/1
100 TABLET ORAL NIGHTLY
Status: DISCONTINUED | OUTPATIENT
Start: 2022-03-15 | End: 2022-03-16 | Stop reason: HOSPADM

## 2022-03-15 RX ORDER — CLONAZEPAM 0.5 MG/1
1 TABLET ORAL 2 TIMES DAILY PRN
Status: DISCONTINUED | OUTPATIENT
Start: 2022-03-15 | End: 2022-03-16 | Stop reason: HOSPADM

## 2022-03-15 RX ORDER — FOLIC ACID 1 MG/1
1 TABLET ORAL DAILY
Status: DISCONTINUED | OUTPATIENT
Start: 2022-03-15 | End: 2022-03-16 | Stop reason: HOSPADM

## 2022-03-15 RX ORDER — FLUTICASONE PROPIONATE 50 MCG
2 SPRAY, SUSPENSION (ML) NASAL DAILY
Status: DISCONTINUED | OUTPATIENT
Start: 2022-03-15 | End: 2022-03-16 | Stop reason: HOSPADM

## 2022-03-15 RX ORDER — BUDESONIDE AND FORMOTEROL FUMARATE DIHYDRATE 160; 4.5 UG/1; UG/1
2 AEROSOL RESPIRATORY (INHALATION) 2 TIMES DAILY
Status: DISCONTINUED | OUTPATIENT
Start: 2022-03-15 | End: 2022-03-16 | Stop reason: HOSPADM

## 2022-03-15 RX ORDER — ATORVASTATIN CALCIUM 80 MG/1
80 TABLET, FILM COATED ORAL DAILY
Status: DISCONTINUED | OUTPATIENT
Start: 2022-03-15 | End: 2022-03-16 | Stop reason: HOSPADM

## 2022-03-15 RX ORDER — MAGNESIUM OXIDE 400 MG/1
400 TABLET ORAL DAILY
Status: DISCONTINUED | OUTPATIENT
Start: 2022-03-15 | End: 2022-03-16 | Stop reason: HOSPADM

## 2022-03-15 RX ORDER — LANOLIN ALCOHOL/MO/W.PET/CERES
100 CREAM (GRAM) TOPICAL DAILY
Status: DISCONTINUED | OUTPATIENT
Start: 2022-03-15 | End: 2022-03-16 | Stop reason: HOSPADM

## 2022-03-15 RX ORDER — SODIUM CHLORIDE 9 MG/ML
25 INJECTION, SOLUTION INTRAVENOUS PRN
Status: DISCONTINUED | OUTPATIENT
Start: 2022-03-15 | End: 2022-03-16 | Stop reason: HOSPADM

## 2022-03-15 RX ORDER — MONTELUKAST SODIUM 10 MG/1
10 TABLET ORAL DAILY
Status: DISCONTINUED | OUTPATIENT
Start: 2022-03-15 | End: 2022-03-16 | Stop reason: HOSPADM

## 2022-03-15 RX ORDER — PREDNISONE 20 MG/1
40 TABLET ORAL DAILY
Status: DISCONTINUED | OUTPATIENT
Start: 2022-03-16 | End: 2022-03-16 | Stop reason: HOSPADM

## 2022-03-15 RX ORDER — VITAMIN B COMPLEX
1000 TABLET ORAL DAILY
Status: DISCONTINUED | OUTPATIENT
Start: 2022-03-15 | End: 2022-03-16 | Stop reason: HOSPADM

## 2022-03-15 RX ORDER — METHYLPREDNISOLONE SODIUM SUCCINATE 40 MG/ML
40 INJECTION, POWDER, LYOPHILIZED, FOR SOLUTION INTRAMUSCULAR; INTRAVENOUS EVERY 8 HOURS
Status: COMPLETED | OUTPATIENT
Start: 2022-03-15 | End: 2022-03-15

## 2022-03-15 RX ORDER — IPRATROPIUM BROMIDE AND ALBUTEROL SULFATE 2.5; .5 MG/3ML; MG/3ML
1 SOLUTION RESPIRATORY (INHALATION) 4 TIMES DAILY
Status: DISCONTINUED | OUTPATIENT
Start: 2022-03-15 | End: 2022-03-15

## 2022-03-15 RX ORDER — DOXYCYCLINE HYCLATE 100 MG
100 TABLET ORAL EVERY 12 HOURS SCHEDULED
Status: DISCONTINUED | OUTPATIENT
Start: 2022-03-15 | End: 2022-03-16 | Stop reason: HOSPADM

## 2022-03-15 RX ORDER — LEVOTHYROXINE SODIUM 0.1 MG/1
100 TABLET ORAL DAILY
Status: DISCONTINUED | OUTPATIENT
Start: 2022-03-15 | End: 2022-03-16 | Stop reason: HOSPADM

## 2022-03-15 RX ORDER — ACETAMINOPHEN 325 MG/1
650 TABLET ORAL EVERY 6 HOURS PRN
Status: DISCONTINUED | OUTPATIENT
Start: 2022-03-15 | End: 2022-03-16 | Stop reason: HOSPADM

## 2022-03-15 RX ORDER — POTASSIUM CHLORIDE 20 MEQ/1
20 TABLET, EXTENDED RELEASE ORAL DAILY
Status: DISCONTINUED | OUTPATIENT
Start: 2022-03-15 | End: 2022-03-16 | Stop reason: HOSPADM

## 2022-03-15 RX ORDER — ALBUTEROL SULFATE 90 UG/1
2 AEROSOL, METERED RESPIRATORY (INHALATION) 4 TIMES DAILY
Status: DISCONTINUED | OUTPATIENT
Start: 2022-03-15 | End: 2022-03-16 | Stop reason: HOSPADM

## 2022-03-15 RX ORDER — ACETAMINOPHEN 325 MG/1
650 TABLET ORAL EVERY 6 HOURS PRN
Status: DISCONTINUED | OUTPATIENT
Start: 2022-03-15 | End: 2022-03-15

## 2022-03-15 RX ORDER — ONDANSETRON 2 MG/ML
4 INJECTION INTRAMUSCULAR; INTRAVENOUS EVERY 6 HOURS PRN
Status: DISCONTINUED | OUTPATIENT
Start: 2022-03-15 | End: 2022-03-16 | Stop reason: HOSPADM

## 2022-03-15 RX ORDER — METOPROLOL SUCCINATE 25 MG/1
25 TABLET, EXTENDED RELEASE ORAL DAILY
Status: DISCONTINUED | OUTPATIENT
Start: 2022-03-15 | End: 2022-03-16 | Stop reason: HOSPADM

## 2022-03-15 RX ORDER — BUSPIRONE HYDROCHLORIDE 10 MG/1
10 TABLET ORAL 2 TIMES DAILY
Status: DISCONTINUED | OUTPATIENT
Start: 2022-03-15 | End: 2022-03-16 | Stop reason: HOSPADM

## 2022-03-15 RX ORDER — FERROUS SULFATE 325(65) MG
325 TABLET ORAL 2 TIMES DAILY WITH MEALS
Status: DISCONTINUED | OUTPATIENT
Start: 2022-03-15 | End: 2022-03-16 | Stop reason: HOSPADM

## 2022-03-15 RX ORDER — TIZANIDINE 2 MG/1
2 TABLET ORAL 3 TIMES DAILY PRN
Status: ON HOLD | COMMUNITY
End: 2022-06-30 | Stop reason: HOSPADM

## 2022-03-15 RX ORDER — ONDANSETRON 4 MG/1
4 TABLET, ORALLY DISINTEGRATING ORAL EVERY 6 HOURS
Status: DISCONTINUED | OUTPATIENT
Start: 2022-03-15 | End: 2022-03-15

## 2022-03-15 RX ORDER — POLYETHYLENE GLYCOL 3350 17 G/17G
17 POWDER, FOR SOLUTION ORAL DAILY PRN
Status: DISCONTINUED | OUTPATIENT
Start: 2022-03-15 | End: 2022-03-16 | Stop reason: HOSPADM

## 2022-03-15 RX ORDER — SODIUM CHLORIDE 0.9 % (FLUSH) 0.9 %
5-40 SYRINGE (ML) INJECTION PRN
Status: DISCONTINUED | OUTPATIENT
Start: 2022-03-15 | End: 2022-03-16 | Stop reason: HOSPADM

## 2022-03-15 RX ORDER — DILTIAZEM HYDROCHLORIDE 120 MG/1
120 CAPSULE, COATED, EXTENDED RELEASE ORAL DAILY
Status: DISCONTINUED | OUTPATIENT
Start: 2022-03-15 | End: 2022-03-16 | Stop reason: HOSPADM

## 2022-03-15 RX ORDER — GUAIFENESIN 600 MG/1
600 TABLET, EXTENDED RELEASE ORAL 2 TIMES DAILY
Status: DISCONTINUED | OUTPATIENT
Start: 2022-03-15 | End: 2022-03-16 | Stop reason: HOSPADM

## 2022-03-15 RX ORDER — TIZANIDINE 2 MG/1
2 TABLET ORAL EVERY 8 HOURS PRN
Status: DISCONTINUED | OUTPATIENT
Start: 2022-03-15 | End: 2022-03-16 | Stop reason: HOSPADM

## 2022-03-15 RX ORDER — SODIUM CHLORIDE 0.9 % (FLUSH) 0.9 %
5-40 SYRINGE (ML) INJECTION EVERY 12 HOURS SCHEDULED
Status: DISCONTINUED | OUTPATIENT
Start: 2022-03-15 | End: 2022-03-16 | Stop reason: HOSPADM

## 2022-03-15 RX ADMIN — FERROUS SULFATE TAB 325 MG (65 MG ELEMENTAL FE) 325 MG: 325 (65 FE) TAB at 16:41

## 2022-03-15 RX ADMIN — AZITHROMYCIN DIHYDRATE 500 MG: 500 INJECTION, POWDER, LYOPHILIZED, FOR SOLUTION INTRAVENOUS at 04:10

## 2022-03-15 RX ADMIN — ALBUTEROL SULFATE 2 PUFF: 90 AEROSOL, METERED RESPIRATORY (INHALATION) at 15:48

## 2022-03-15 RX ADMIN — DILTIAZEM HYDROCHLORIDE 120 MG: 120 CAPSULE, COATED, EXTENDED RELEASE ORAL at 09:48

## 2022-03-15 RX ADMIN — HYDRALAZINE HYDROCHLORIDE 75 MG: 50 TABLET, FILM COATED ORAL at 13:50

## 2022-03-15 RX ADMIN — Medication 2 PUFF: at 21:37

## 2022-03-15 RX ADMIN — METHYLPREDNISOLONE SODIUM SUCCINATE 40 MG: 40 INJECTION, POWDER, FOR SOLUTION INTRAMUSCULAR; INTRAVENOUS at 13:48

## 2022-03-15 RX ADMIN — KETOROLAC TROMETHAMINE 15 MG: 30 INJECTION, SOLUTION INTRAMUSCULAR; INTRAVENOUS at 22:28

## 2022-03-15 RX ADMIN — FOLIC ACID 1 MG: 1 TABLET ORAL at 09:48

## 2022-03-15 RX ADMIN — MONTELUKAST 10 MG: 10 TABLET, FILM COATED ORAL at 09:48

## 2022-03-15 RX ADMIN — Medication 100 MG: at 09:48

## 2022-03-15 RX ADMIN — SODIUM CHLORIDE, PRESERVATIVE FREE 10 ML: 5 INJECTION INTRAVENOUS at 09:57

## 2022-03-15 RX ADMIN — Medication 1000 UNITS: at 09:48

## 2022-03-15 RX ADMIN — ACETAMINOPHEN 650 MG: 325 TABLET ORAL at 10:53

## 2022-03-15 RX ADMIN — CLONAZEPAM 1 MG: 0.5 TABLET ORAL at 22:29

## 2022-03-15 RX ADMIN — Medication 2 PUFF: at 06:49

## 2022-03-15 RX ADMIN — BUDESONIDE AND FORMOTEROL FUMARATE DIHYDRATE 2 PUFF: 160; 4.5 AEROSOL RESPIRATORY (INHALATION) at 21:37

## 2022-03-15 RX ADMIN — ALBUTEROL SULFATE 2 PUFF: 90 AEROSOL, METERED RESPIRATORY (INHALATION) at 21:37

## 2022-03-15 RX ADMIN — MELOXICAM 15 MG: 7.5 TABLET ORAL at 09:49

## 2022-03-15 RX ADMIN — ALBUTEROL SULFATE 2 PUFF: 90 AEROSOL, METERED RESPIRATORY (INHALATION) at 06:49

## 2022-03-15 RX ADMIN — BUSPIRONE HYDROCHLORIDE 10 MG: 10 TABLET ORAL at 22:26

## 2022-03-15 RX ADMIN — Medication 2 PUFF: at 15:48

## 2022-03-15 RX ADMIN — BUDESONIDE AND FORMOTEROL FUMARATE DIHYDRATE 2 PUFF: 160; 4.5 AEROSOL RESPIRATORY (INHALATION) at 06:49

## 2022-03-15 RX ADMIN — HYDRALAZINE HYDROCHLORIDE 75 MG: 50 TABLET, FILM COATED ORAL at 22:27

## 2022-03-15 RX ADMIN — LEVOTHYROXINE SODIUM 100 MCG: 100 TABLET ORAL at 05:22

## 2022-03-15 RX ADMIN — DULOXETINE HYDROCHLORIDE 60 MG: 30 CAPSULE, DELAYED RELEASE ORAL at 09:56

## 2022-03-15 RX ADMIN — TRAZODONE HYDROCHLORIDE 100 MG: 50 TABLET ORAL at 22:27

## 2022-03-15 RX ADMIN — ATORVASTATIN CALCIUM 80 MG: 80 TABLET, FILM COATED ORAL at 09:50

## 2022-03-15 RX ADMIN — MAGNESIUM OXIDE 400 MG (241.3 MG MAGNESIUM) TABLET 400 MG: TABLET at 09:50

## 2022-03-15 RX ADMIN — CEFTRIAXONE 1000 MG: 1 INJECTION, POWDER, FOR SOLUTION INTRAMUSCULAR; INTRAVENOUS at 02:45

## 2022-03-15 RX ADMIN — POTASSIUM CHLORIDE 20 MEQ: 20 TABLET, EXTENDED RELEASE ORAL at 09:48

## 2022-03-15 RX ADMIN — HYDRALAZINE HYDROCHLORIDE 75 MG: 50 TABLET, FILM COATED ORAL at 05:22

## 2022-03-15 RX ADMIN — ACETAMINOPHEN 650 MG: 325 TABLET ORAL at 04:10

## 2022-03-15 RX ADMIN — Medication 1 CAPSULE: at 09:59

## 2022-03-15 RX ADMIN — THEOPHYLLINE ANHYDROUS 400 MG: 200 CAPSULE, EXTENDED RELEASE ORAL at 09:48

## 2022-03-15 RX ADMIN — METOPROLOL SUCCINATE 25 MG: 25 TABLET, EXTENDED RELEASE ORAL at 09:48

## 2022-03-15 RX ADMIN — TIZANIDINE 2 MG: 2 TABLET ORAL at 14:03

## 2022-03-15 RX ADMIN — METHYLPREDNISOLONE SODIUM SUCCINATE 40 MG: 40 INJECTION, POWDER, FOR SOLUTION INTRAMUSCULAR; INTRAVENOUS at 22:28

## 2022-03-15 RX ADMIN — SODIUM CHLORIDE, PRESERVATIVE FREE 10 ML: 5 INJECTION INTRAVENOUS at 22:28

## 2022-03-15 RX ADMIN — GUAIFENESIN 600 MG: 600 TABLET, EXTENDED RELEASE ORAL at 09:48

## 2022-03-15 RX ADMIN — BUSPIRONE HYDROCHLORIDE 10 MG: 10 TABLET ORAL at 09:48

## 2022-03-15 RX ADMIN — GUAIFENESIN 600 MG: 600 TABLET, EXTENDED RELEASE ORAL at 22:27

## 2022-03-15 RX ADMIN — FERROUS SULFATE TAB 325 MG (65 MG ELEMENTAL FE) 325 MG: 325 (65 FE) TAB at 09:56

## 2022-03-15 RX ADMIN — CLONAZEPAM 1 MG: 0.5 TABLET ORAL at 14:00

## 2022-03-15 RX ADMIN — TIZANIDINE 2 MG: 2 TABLET ORAL at 22:27

## 2022-03-15 RX ADMIN — ENOXAPARIN SODIUM 40 MG: 100 INJECTION SUBCUTANEOUS at 09:27

## 2022-03-15 RX ADMIN — DOXYCYCLINE HYCLATE 100 MG: 100 TABLET, COATED ORAL at 22:27

## 2022-03-15 ASSESSMENT — PAIN DESCRIPTION - FREQUENCY
FREQUENCY: CONTINUOUS

## 2022-03-15 ASSESSMENT — PAIN DESCRIPTION - ONSET
ONSET: ON-GOING

## 2022-03-15 ASSESSMENT — PAIN DESCRIPTION - LOCATION
LOCATION: CHEST
LOCATION: CHEST
LOCATION: GENERALIZED
LOCATION: GENERALIZED;BACK

## 2022-03-15 ASSESSMENT — PAIN SCALES - GENERAL
PAINLEVEL_OUTOF10: 6
PAINLEVEL_OUTOF10: 5
PAINLEVEL_OUTOF10: 7
PAINLEVEL_OUTOF10: 7
PAINLEVEL_OUTOF10: 0
PAINLEVEL_OUTOF10: 4
PAINLEVEL_OUTOF10: 7
PAINLEVEL_OUTOF10: 6

## 2022-03-15 ASSESSMENT — ENCOUNTER SYMPTOMS
EYE DISCHARGE: 0
VOMITING: 0
BACK PAIN: 0
SORE THROAT: 0
SHORTNESS OF BREATH: 1
RHINORRHEA: 0
EYE PAIN: 0
NAUSEA: 0

## 2022-03-15 ASSESSMENT — PAIN - FUNCTIONAL ASSESSMENT
PAIN_FUNCTIONAL_ASSESSMENT: PREVENTS OR INTERFERES SOME ACTIVE ACTIVITIES AND ADLS

## 2022-03-15 ASSESSMENT — PAIN DESCRIPTION - ORIENTATION
ORIENTATION: MID
ORIENTATION: RIGHT;LEFT

## 2022-03-15 ASSESSMENT — PAIN DESCRIPTION - DESCRIPTORS
DESCRIPTORS: ACHING
DESCRIPTORS: ACHING
DESCRIPTORS: DISCOMFORT
DESCRIPTORS: CRAMPING

## 2022-03-15 ASSESSMENT — PAIN DESCRIPTION - PAIN TYPE
TYPE: CHRONIC PAIN
TYPE: CHRONIC PAIN;ACUTE PAIN
TYPE: ACUTE PAIN
TYPE: CHRONIC PAIN;ACUTE PAIN

## 2022-03-15 ASSESSMENT — PAIN DESCRIPTION - PROGRESSION
CLINICAL_PROGRESSION: NOT CHANGED
CLINICAL_PROGRESSION: GRADUALLY IMPROVING
CLINICAL_PROGRESSION: GRADUALLY WORSENING

## 2022-03-15 NOTE — ED TRIAGE NOTES
Pt arrives from home via EMS complaining of central chest pressure since around 2100 tonight. Pt sts it began when she laid down to go to sleep. Pt also complains of leg swelling and believes she is retaining fluids. Pt with hx of CHF, sts she is not on a diuretic. SpO2 100% on home O2 3L. Pt given 324 mg ASA and 1 Nitro from EMS.

## 2022-03-15 NOTE — ED NOTES
Purewick placed. Pt stated she was unable to get out of the bed because it was so high.       Aydin Pisano RN  03/14/22 [id number]

## 2022-03-15 NOTE — CARE COORDINATION
.CM has reviewed pt's chart for needs. CM screening shows that pt has PCP, insurance and is independent PTA. Pt has O2 and a bi-pap at home. If any d/c needs arise please contact DERIK.   TE

## 2022-03-15 NOTE — H&P
History and Physical-[facility]       Name:  Jhony Rapp /Age/Sex: 1960  (63 y.o. female)   MRN & CSN:  [id number] & [id number] Admission Date/Time: 3/14/2022 10:41 PM   Location:  ED25/ED-25 PCP: Karina Cash MD       Hospital Day: 2    Assessment and Plan:   Jhony Rapp is a 63 y.o.  female  who presents with Chest pressure    1. Probable community-acquired pneumonia:  · Chest x-ray shows right lower lobe infiltrates. She has a mild leukocytosis, however does not have any significant cough, fever, or unusual shortness of breath. · Will check procalcitonin, Legionella antigen and strep antigen and urine  · Will continue empiric antibiotic, IV ceftriaxone and azithromycin started in the ED. 2.  Chest pain, rule out ACS  · Patient describes as a pressure in the middle of her chest.  · Initial troponin was negative. · Will obtain EKG, placed on telemetry, get serial troponins. · Her last stress test was 2020. She had a small size defect of mild severity which was reversible involving the anterior wall of the myocardium, estimated EF of 55%. Stress test was abnormal.  · She had a left heart cath which showed almost normal coronaries. · Defer further testing to day team/cardiology    3. Chronic respiratory failure  Continue home dose of oxygen which is 3 L at baseline    4. Obstructive sleep apnea  Patient is on BiPAP at night, will continue    5. Essential hypertension  Continue home dose of antihypertensives, including hydralazine 75 mg 3 times daily, Toprol-XL 25 mg daily. 6.  COPD without exacerbation  Continue home inhalers, Symbicort, Singulair, DuoNebs. 7.  History of hypothyroidism  On Synthroid 100 mcg daily. 8.  History of depression and anxiety  On Cymbalta 60 mg daily, Klonopin 1 mg twice daily as needed. 9.  Paroxysmal atrial fibrillation  Obtain EKG  Continue diltiazem 120 mg daily.   On aspirin      DVT Prophylaxis: Lovenox  CODE STATUS: Full code    History of Present Illness:     Chief Complaint: Chest pressure  Velma Salas is a 63 y.o.  female  who presents with past medical history of COPD, hypertension, atrial fibrillation, obstructive sleep apnea on BiPAP at home who presents to the hospital today with complaints of chest pain. Patient describes it as a chest pressure in the middle of her chest which is worse when she lays down. She reports her symptoms started around 9:30 PM and was still there at the time of my evaluation over 4 hours later. Patient rates the pain at about 2-3 out of 10. The chest pressure is nonradiating, no known relieving or aggravating factors. She also reports some shortness of breath with exertion, she denies any fever. She states that she has occasional cough but nothing out of the ordinary. In the ED her blood pressure was elevated, however the rest of her vitals been stable. She wears 3 L of oxygen at baseline. Her labs showed mild leukocytosis of 10.8, her hemoglobin is 10.2 which is around her baseline. Initial troponin was negative, CMP showed mild hyponatremia 131, chloride of 90, glucose of 131, proBNP essentially unremarkable. She had a chest x-ray which showed probable infiltrate within the right lower lobe. Review of Systems:     Ten point ROS reviewed negative, unless as noted above    Objective:     No intake or output data in the 24 hours ending 03/15/22 0256     Vitals:   Vitals:    03/15/22 0230   BP: (!) 152/75   Pulse: 86   Resp: 14   Temp:    SpO2: 100%       Physical Exam:     GEN: Awake female, sitting upright in bed in no apparent distress. Appears given age. EYES: Pupils are equally round. No scleral erythema, discharge, or conjunctivitis. HEENT:Mucous membranes are moist. Oral pharynx without exudates, no evidence of thrush. NECK: Supple, no apparent thyromegaly or masses. RESPIRATORY: Clear to auscultation, no wheezes, rales or rhonchi.   Symmetric chest movement while on room air. CARDIOVASCULAR: S1/S2 auscultated. Regular. No peripheral edema. GASTROINTESTINAL:Abdomen is soft without significant tenderness, masses, or guarding. Bowel sounds are normoactive. Rectal exam deferred. GENITOURINARY:No costovertebral angle tenderness. Normal appearing external genitalia. Mcallister catheter is not present. MUSCULOSKELETAL:No gross joint deformities. SKIN:Normal coloration, warm, dry. NEURO:Cranial nerves appear grossly intact, normal speech, no lateralizing weakness. PSYCH:Awake, alert, oriented x 4. Affect appropriate.     Past Medical History:      Past Medical History:   Diagnosis Date    Anemia     Anxiety 02/16/2017    follows with Dr Federico River Back pain at L4-L5 level 2/16/2017    Dr Citlaly Baptiste 1 disorder ([facility] [address]     per pt on 2/5/2021\"never told I have bipolar\"    COPD (chronic obstructive pulmonary disease) ([facility] [address]     follows with Dr Benitez Pap Emphysema of lung ([facility] [address]     FH: CAD (coronary artery disease) 2/16/2017    Father had in his forties, sister in her thirties    Jagdish Castañeda Fibromyalgia 02/16/2017    Full dentures     full upper plate and partial on the bottom    Gastric ulcer     \"had stomach ulder back fall 2019\"    H/O Doppler ultrasound 04/05/2019    venous- no DVT or reflux    H/O echocardiogram 01/14/2015    EF50-55% Normal- see media    History of blood transfusion     Hyperlipidemia LDL goal <100     Hypertension     Dr Goodman Ambrocio Irregular heartbeat     \"they said I have irreg heart beat before- back when they drained fluid around my heart \"    Obstructive sleep apnea     \"have bipap I use at home\"    On home oxygen therapy     \"on oxygen all the time at home and keep it on 2.5 liters\"    Osteoarthritis     Pericardial effusion     per old chart had pericardial effusion 10/2020    Spinal stenosis     hx per old chart    Thyroid disease     Wears glasses     \"suppose to wear glasses\"     PSHX: has a past surgical history that includes Carpal tunnel release (Right, ); Tubal ligation (); back surgery (2017); Cholecystectomy, laparoscopic (N/A, 10/25/2020); Colonoscopy (N/A, 2019); Endoscopy, colon, diagnostic; Cardiac catheterization; Cardiac catheterization; and Upper gastrointestinal endoscopy (N/A, 2021). Allergies: Allergies   Allergen Reactions    Lisinopril Swelling and Rash     angioedema       FAM HX: family history includes Asthma in her mother; Heart Disease in her father. Soc HX:   Social History     Socioeconomic History    Marital status:      Spouse name: None    Number of children: None    Years of education: None    Highest education level: None   Occupational History    None   Tobacco Use    Smoking status: Former Smoker     Packs/day: 1.50     Years: 20.00     Pack years: 30.00     Types: Cigarettes     Quit date: 2008     Years since quittin.2    Smokeless tobacco: Never Used   Vaping Use    Vaping Use: Never used   Substance and Sexual Activity    Alcohol use: Not Currently     Alcohol/week: 2.0 standard drinks     Types: 2 Shots of liquor per week     Comment: per pt on 2021\"quit drinking back Oct 2020\"use to drink wine coolers - 3 times per week \"/caffeine 2-3 coffees aday 1 pop b    Drug use: No    Sexual activity: Yes     Partners: Male   Other Topics Concern    None   Social History Narrative    None     Social Determinants of Health     Financial Resource Strain:     Difficulty of Paying Living Expenses: Not on file   Food Insecurity:     Worried About Running Out of Food in the Last Year: Not on file    Donny of Food in the Last Year: Not on file   Transportation Needs:     Lack of Transportation (Medical): Not on file    Lack of Transportation (Non-Medical):  Not on file   Physical Activity:     Days of Exercise per Week: Not on file    Minutes of Exercise per Session: Not on file   Stress:     Feeling of Stress : Not on file   Social Connections:     Frequency of Communication with Friends and Family: Not on file    Frequency of Social Gatherings with Friends and Family: Not on file    Attends Shinto Services: Not on file    Active Member of Clubs or Organizations: Not on file    Attends Club or Organization Meetings: Not on file    Marital Status: Not on file   Intimate Partner Violence:     Fear of Current or Ex-Partner: Not on file    Emotionally Abused: Not on file    Physically Abused: Not on file    Sexually Abused: Not on file   Housing Stability: [address] Unable to Pay for Housing in the Last Year: No    Number of [place] in the Last Year: 1    Unstable Housing in the Last Year: No       Medications:   Medications:    cefTRIAXone (ROCEPHIN) IV  1,000 mg IntraVENous Once    And    azithromycin  500 mg IntraVENous Once      Infusions:   PRN Meds:        Electronically signed by Kit Pickering MD on 3/15/2022 at 2:56 AM

## 2022-03-15 NOTE — PROGRESS NOTES
Hospitalist Progress Note      Name:  Jeffery Pichardo /Age/Sex: 1960  (63 y.o. female)   MRN & CSN:  [id number] & [id number] Admission Date/Time: 3/14/2022 10:41 PM   Location:  [address] [place] [address] PCP: Mayo Magana MD         Hospital Day: 2  Discharge barrier/Reason for continued hospitalization: COPD flare. Uncontrolled pain. Assessment and Plan:   Jeffery Pichardo is a 63 y.o.  female with a past medical history of chronic pain, fibromyalgia, IBS, COPD, who presented to the ED on 3/14/2022 with Chest pressure    1. Chest pressure: Likely noncardiac  Reports upper chest wall tenderness. Normal LHC within the last 2 years  Normal cardiac enzymes  Resume home dose of Zanaflex    2. Fibromyalgia: With generalized pain  Resume Zanaflex. Continue Cymbalta. IV dose of Solu-Medrol x2 then p.o. prednisone in a.m. for 5 days. 3.  Right lower lobe infiltrate: Normal procalcitonin. Community-acquired pneumonia ruled out. Stop antibiotics    4. COPD: Possibly exacerbated at this time  Scattered wheeze bilaterally  IV steroids and p.o. prednisone as above. 5.  Class II obesity: Lifestyle modification counseling. 6.  Hypothyroidism: Continue Synthroid  7. Hypertension: Continue Toprol-XL, diltiazem. 8.  Depression: Continue Zoloft currently stable. Diet ADULT DIET; Regular   DVT Prophylaxis [x] Lovenox, []  Heparin, [] SCDs, [] Ambulation   GI Prophylaxis [] PPI,  [] H2 Blocker,  [] Carafate,  [x] Diet/Tube Feeds   Code Status Full Code   MDM [] Low, [] Moderate,[x]  High       History of Present Illness:     Chief Complaint: Chest pressure  Jeffery Pichardo is a 63 y.o.  female  who presents with chest pressure    3/15/2022: Patient is seen and examined, continues to complain of generalized pain especially in her chest, upper abdomen and back. Ten point ROS reviewed, negative, unless as noted above    Objective:        Intake/Output Summary (Last 24 hours) at 3/15/2022 [facility] filed at 3/15/2022 1229  Gross per 24 hour   Intake 200 ml   Output 800 ml   Net -600 ml        Vitals:   Vitals:    03/15/22 0826 03/15/22 0923 03/15/22 1523 03/15/22 1552   BP:  119/69 133/75    Pulse:  83 106    Resp: 16 18 24 23   Temp:  97.8 °F (36.6 °C) 98.7 °F (37.1 °C)    TempSrc:  Oral Oral    SpO2:       Weight:       Height:            Physical Exam:   GEN: Awake female, alert and oriented x3 in no apparent distress. Appears given age. Diffuse generalized tenderness to palpation. HEENT: Normal  RESP: Scattered wheezes bilaterally. Symmetric chest movement while on RA  CVS: RRR, S1, S2  GI/: Abdomen is soft, obese, nontender, no organomegaly. . Bowel sounds normal, rectal exam deferred. No CVA tenderness. MSK: No gross joint deformities. Diffuse generalized tenderness. SKIN: Normal coloration, warm, dry. NEURO: Cranial nerves appear grossly intact, normal speech, no lateralizing weakness.   PSYCH:  Affect appropriate    Medications:   Medications:    Vitamin D  1,000 Units Oral Daily    thiamine  100 mg Oral Daily    traZODone  100 mg Oral Nightly    theophylline  400 mg Oral Daily    potassium chloride  20 mEq Oral Daily    montelukast  10 mg Oral Daily    metoprolol succinate  25 mg Oral Daily    magnesium oxide  400 mg Oral Daily    levothyroxine  100 mcg Oral Daily    guaiFENesin  600 mg Oral BID    folic acid  1 mg Oral Daily    fluticasone  2 spray Nasal Daily    hydrALAZINE  75 mg Oral TID    ferrous sulfate  325 mg Oral BID WC    DULoxetine  60 mg Oral Daily    dilTIAZem  120 mg Oral Daily    busPIRone  10 mg Oral BID    budesonide-formoterol  2 puff Inhalation BID    atorvastatin  80 mg Oral Daily    lactobacillus  1 capsule Oral Daily with breakfast    sodium chloride flush  5-40 mL IntraVENous 2 times per day    enoxaparin  40 mg SubCUTAneous Daily    albuterol sulfate HFA  2 puff Inhalation 4x daily    ipratropium  2 puff Inhalation 4x daily    methylPREDNISolone  40 mg IntraVENous Q8H    [START ON 3/16/2022] predniSONE  40 mg Oral Daily    doxycycline hyclate  100 mg Oral 2 times per day      Infusions:    sodium chloride       PRN Meds: clonazePAM, 1 mg, BID PRN  sodium chloride flush, 5-40 mL, PRN  sodium chloride, 25 mL, PRN  ondansetron, 4 mg, Q8H PRN   Or  ondansetron, 4 mg, Q6H PRN  polyethylene glycol, 17 g, Daily PRN  acetaminophen, 650 mg, Q6H PRN   Or  acetaminophen, 650 mg, Q6H PRN  tiZANidine, 2 mg, Q8H PRN          Electronically signed by Desi Hathaway MD on 3/15/2022 at 5:43 PM

## 2022-03-16 VITALS
OXYGEN SATURATION: 99 % | WEIGHT: 213.7 LBS | TEMPERATURE: 97.5 F | RESPIRATION RATE: 20 BRPM | SYSTOLIC BLOOD PRESSURE: 156 MMHG | HEIGHT: 63 IN | DIASTOLIC BLOOD PRESSURE: 79 MMHG | HEART RATE: 87 BPM | BODY MASS INDEX: 37.86 KG/M2

## 2022-03-16 LAB
ANION GAP SERPL CALCULATED.3IONS-SCNC: 11 MMOL/L (ref 4–16)
BASOPHILS ABSOLUTE: 0 K/CU MM
BASOPHILS RELATIVE PERCENT: 0.2 % (ref 0–1)
BUN BLDV-MCNC: 10 MG/DL (ref 6–23)
CALCIUM SERPL-MCNC: 9.5 MG/DL (ref 8.3–10.6)
CHLORIDE BLD-SCNC: 93 MMOL/L (ref 99–110)
CO2: 29 MMOL/L (ref 21–32)
CREAT SERPL-MCNC: 0.8 MG/DL (ref 0.6–1.1)
DIFFERENTIAL TYPE: ABNORMAL
EKG ATRIAL RATE: 101 BPM
EKG DIAGNOSIS: NORMAL
EKG P AXIS: 78 DEGREES
EKG P-R INTERVAL: 152 MS
EKG Q-T INTERVAL: 402 MS
EKG QRS DURATION: 138 MS
EKG QTC CALCULATION (BAZETT): 521 MS
EKG R AXIS: 108 DEGREES
EKG T AXIS: 34 DEGREES
EKG VENTRICULAR RATE: 101 BPM
EOSINOPHILS ABSOLUTE: 0 K/CU MM
EOSINOPHILS RELATIVE PERCENT: 0 % (ref 0–3)
GFR AFRICAN AMERICAN: >60 ML/MIN/1.73M2
GFR NON-AFRICAN AMERICAN: >60 ML/MIN/1.73M2
GLUCOSE BLD-MCNC: 164 MG/DL (ref 70–99)
HCT VFR BLD CALC: 34 % (ref 37–47)
HEMOGLOBIN: 10 GM/DL (ref 12.5–16)
IMMATURE NEUTROPHIL %: 0.3 % (ref 0–0.43)
LYMPHOCYTES ABSOLUTE: 0.4 K/CU MM
LYMPHOCYTES RELATIVE PERCENT: 6.4 % (ref 24–44)
MCH RBC QN AUTO: 29.1 PG (ref 27–31)
MCHC RBC AUTO-ENTMCNC: 29.4 % (ref 32–36)
MCV RBC AUTO: 98.8 FL (ref 78–100)
MONOCYTES ABSOLUTE: 0.1 K/CU MM
MONOCYTES RELATIVE PERCENT: 1 % (ref 0–4)
NUCLEATED RBC %: 0 %
PDW BLD-RTO: 12.5 % (ref 11.7–14.9)
PLATELET # BLD: 244 K/CU MM (ref 140–440)
PMV BLD AUTO: 10.1 FL (ref 7.5–11.1)
POTASSIUM SERPL-SCNC: 4.6 MMOL/L (ref 3.5–5.1)
RBC # BLD: 3.44 M/CU MM (ref 4.2–5.4)
SEGMENTED NEUTROPHILS ABSOLUTE COUNT: 5.3 K/CU MM
SEGMENTED NEUTROPHILS RELATIVE PERCENT: 92.1 % (ref 36–66)
SODIUM BLD-SCNC: 133 MMOL/L (ref 135–145)
TOTAL IMMATURE NEUTOROPHIL: 0.02 K/CU MM
TOTAL NUCLEATED RBC: 0 K/CU MM
TROPONIN T: <0.01 NG/ML
WBC # BLD: 5.8 K/CU MM (ref 4–10.5)

## 2022-03-16 PROCEDURE — 85025 COMPLETE CBC W/AUTO DIFF WBC: CPT

## 2022-03-16 PROCEDURE — 80048 BASIC METABOLIC PNL TOTAL CA: CPT

## 2022-03-16 PROCEDURE — 36415 COLL VENOUS BLD VENIPUNCTURE: CPT

## 2022-03-16 PROCEDURE — 93010 ELECTROCARDIOGRAM REPORT: CPT | Performed by: INTERNAL MEDICINE

## 2022-03-16 PROCEDURE — 84484 ASSAY OF TROPONIN QUANT: CPT

## 2022-03-16 PROCEDURE — 6370000000 HC RX 637 (ALT 250 FOR IP): Performed by: INTERNAL MEDICINE

## 2022-03-16 PROCEDURE — 2700000000 HC OXYGEN THERAPY PER DAY

## 2022-03-16 PROCEDURE — 94640 AIRWAY INHALATION TREATMENT: CPT

## 2022-03-16 PROCEDURE — 94761 N-INVAS EAR/PLS OXIMETRY MLT: CPT

## 2022-03-16 RX ORDER — SUCRALFATE ORAL 1 G/10ML
1 SUSPENSION ORAL 4 TIMES DAILY
Qty: 420 ML | Refills: 0 | Status: SHIPPED | OUTPATIENT
Start: 2022-03-16 | End: 2022-09-22

## 2022-03-16 RX ORDER — PREDNISONE 20 MG/1
40 TABLET ORAL DAILY
Qty: 10 TABLET | Refills: 0 | Status: SHIPPED | OUTPATIENT
Start: 2022-03-16 | End: 2022-03-21

## 2022-03-16 RX ORDER — PANTOPRAZOLE SODIUM 40 MG/1
40 TABLET, DELAYED RELEASE ORAL
Qty: 90 TABLET | Refills: 0 | Status: SHIPPED | OUTPATIENT
Start: 2022-03-16

## 2022-03-16 RX ORDER — DOXYCYCLINE HYCLATE 100 MG
100 TABLET ORAL EVERY 12 HOURS SCHEDULED
Qty: 9 TABLET | Refills: 0 | Status: SHIPPED | OUTPATIENT
Start: 2022-03-16 | End: 2022-03-21

## 2022-03-16 RX ADMIN — Medication 1000 UNITS: at 10:36

## 2022-03-16 RX ADMIN — BUDESONIDE AND FORMOTEROL FUMARATE DIHYDRATE 2 PUFF: 160; 4.5 AEROSOL RESPIRATORY (INHALATION) at 07:24

## 2022-03-16 RX ADMIN — FLUTICASONE PROPIONATE 2 SPRAY: 50 SPRAY, METERED NASAL at 10:38

## 2022-03-16 RX ADMIN — HYDRALAZINE HYDROCHLORIDE 75 MG: 50 TABLET, FILM COATED ORAL at 06:14

## 2022-03-16 RX ADMIN — MAGNESIUM OXIDE 400 MG (241.3 MG MAGNESIUM) TABLET 400 MG: TABLET at 10:37

## 2022-03-16 RX ADMIN — THEOPHYLLINE ANHYDROUS 400 MG: 200 CAPSULE, EXTENDED RELEASE ORAL at 10:35

## 2022-03-16 RX ADMIN — POTASSIUM CHLORIDE 20 MEQ: 20 TABLET, EXTENDED RELEASE ORAL at 10:37

## 2022-03-16 RX ADMIN — ATORVASTATIN CALCIUM 80 MG: 80 TABLET, FILM COATED ORAL at 10:36

## 2022-03-16 RX ADMIN — ACETAMINOPHEN 650 MG: 325 TABLET ORAL at 10:36

## 2022-03-16 RX ADMIN — BUSPIRONE HYDROCHLORIDE 10 MG: 10 TABLET ORAL at 10:46

## 2022-03-16 RX ADMIN — Medication 1 CAPSULE: at 10:37

## 2022-03-16 RX ADMIN — METOPROLOL SUCCINATE 25 MG: 25 TABLET, EXTENDED RELEASE ORAL at 10:38

## 2022-03-16 RX ADMIN — LEVOTHYROXINE SODIUM 100 MCG: 100 TABLET ORAL at 06:14

## 2022-03-16 RX ADMIN — ALBUTEROL SULFATE 2 PUFF: 90 AEROSOL, METERED RESPIRATORY (INHALATION) at 07:23

## 2022-03-16 RX ADMIN — FERROUS SULFATE TAB 325 MG (65 MG ELEMENTAL FE) 325 MG: 325 (65 FE) TAB at 10:37

## 2022-03-16 RX ADMIN — GUAIFENESIN 600 MG: 600 TABLET, EXTENDED RELEASE ORAL at 10:36

## 2022-03-16 RX ADMIN — DULOXETINE HYDROCHLORIDE 60 MG: 30 CAPSULE, DELAYED RELEASE ORAL at 10:35

## 2022-03-16 RX ADMIN — DOXYCYCLINE HYCLATE 100 MG: 100 TABLET, COATED ORAL at 10:36

## 2022-03-16 RX ADMIN — MONTELUKAST 10 MG: 10 TABLET, FILM COATED ORAL at 10:38

## 2022-03-16 RX ADMIN — PREDNISONE 40 MG: 20 TABLET ORAL at 10:36

## 2022-03-16 RX ADMIN — DILTIAZEM HYDROCHLORIDE 120 MG: 120 CAPSULE, COATED, EXTENDED RELEASE ORAL at 10:36

## 2022-03-16 RX ADMIN — CLONAZEPAM 1 MG: 0.5 TABLET ORAL at 10:36

## 2022-03-16 RX ADMIN — Medication 2 PUFF: at 07:23

## 2022-03-16 RX ADMIN — FOLIC ACID 1 MG: 1 TABLET ORAL at 10:36

## 2022-03-16 RX ADMIN — Medication 100 MG: at 10:38

## 2022-03-16 ASSESSMENT — PAIN DESCRIPTION - FREQUENCY: FREQUENCY: CONTINUOUS

## 2022-03-16 ASSESSMENT — PAIN SCALES - GENERAL
PAINLEVEL_OUTOF10: 5

## 2022-03-16 ASSESSMENT — PAIN DESCRIPTION - ONSET: ONSET: ON-GOING

## 2022-03-16 ASSESSMENT — PAIN DESCRIPTION - PROGRESSION: CLINICAL_PROGRESSION: NOT CHANGED

## 2022-03-16 ASSESSMENT — PAIN DESCRIPTION - DESCRIPTORS: DESCRIPTORS: ACHING

## 2022-03-16 ASSESSMENT — PAIN DESCRIPTION - LOCATION: LOCATION: GENERALIZED

## 2022-03-16 ASSESSMENT — PAIN DESCRIPTION - PAIN TYPE: TYPE: CHRONIC PAIN

## 2022-03-16 ASSESSMENT — PAIN - FUNCTIONAL ASSESSMENT: PAIN_FUNCTIONAL_ASSESSMENT: ACTIVITIES ARE NOT PREVENTED

## 2022-03-16 NOTE — DISCHARGE SUMMARY
Discharge Summary    Name:  Kristin Leach /Age/Sex: 1960  (63 y.o. female)   MRN & CSN:  [id number] & [id number] Admission Date/Time: 3/14/2022 10:41 PM   Attending:  Natasha Gamboa MD Discharging Physician: Natasha Gamboa MD     Hospital Course:   Kristin Leach is a 63 y.o.  female with a past medical history of chronic pain, fibromyalgia, IBS, COPD who presents to the ED 2 days ago with Chest pressure  Acute coronary syndrome was ruled out due to nonischemic EKG, normal cardiac enzymes. In addition, patient was noted to have normal LHC within the last 2 years. Chest pain was reproducible on chest wall indicating musculoskeletal chest pain. Patient also her had epigastric tenderness on exam.  Patient has been on meloxicam for chronic pain and she was counseled to stop this. She has been started on Protonix daily and she will have 10 days worth of sucralfate to assist with NSAID induced gastritis healing. She did have a right lower lobe infiltrate but pneumonia was ruled out. She did however required antibiotics due to COPD exacerbation. She also required a short course of steroids. Rest of her medications remain unchanged. The patient/family expressed appropriate understanding of and agreement with the discharge recommendations, medications, and plan.      Consults this admission:  IP CONSULT TO HOSPITALIST    Discharge Instruction:   Follow up appointments: Pulmonologist  Primary care physician:  within 2 weeks    Diet:  regular diet   Activity: activity as tolerated  Disposition: Discharged to:  [x]Home, [][facility], []SNF, []Acute Rehab, []Hospice   Condition on discharge: Stable    Discharge Medications:        Medication List      START taking these medications    doxycycline hyclate 100 MG tablet  Commonly known as: VIBRA-TABS  Take 1 tablet by mouth every 12 hours for 9 doses     pantoprazole 40 MG tablet  Commonly known as: PROTONIX  Take 1 tablet by mouth every morning (before breakfast)     sucralfate 1 GM/10ML suspension  Commonly known as: Carafate  Take 10 mLs by mouth 4 times daily        CHANGE how you take these medications    ferrous sulfate 325 (65 Fe) MG tablet  Commonly known as: IRON 325  Take 1 tablet by mouth every other day Indications: pt taking every day bid Monday, wed, fri  What changed:   · when to take this  · additional instructions        CONTINUE taking these medications    * albuterol-ipratropium  MCG/ACT Aers inhaler  Commonly known as: COMBIVENT RESPIMAT  Inhale 1 puff into the lungs every 4 hours as needed for Wheezing     * ipratropium-albuterol 0.5-2.5 (3) MG/3ML Soln nebulizer solution  Commonly known as: DUONEB  Take 3 mLs by nebulization 4 times daily     atorvastatin 80 MG tablet  Commonly known as: LIPITOR     budesonide-formoterol 160-4.5 MCG/ACT Aero  Commonly known as: SYMBICORT  USE 2 INHALATIONS TWICE A DAY     busPIRone 10 MG tablet  Commonly known as: BUSPAR  Take 1 tablet by mouth 2 times daily     clonazePAM 1 MG disintegrating tablet  Commonly known as: KLONOPIN     diclofenac sodium 1 % Gel  Commonly known as: VOLTAREN  Apply 4 g topically 4 times daily     dicyclomine 10 MG capsule  Commonly known as: Bentyl  Take 2 capsules by mouth 4 times daily (before meals and nightly)     dilTIAZem 120 MG extended release capsule  Commonly known as: CARDIZEM CD  Take 1 capsule by mouth daily     DULoxetine 60 MG extended release capsule  Commonly known as: CYMBALTA     fluticasone 50 MCG/ACT nasal spray  Commonly known as: FLONASE  2 sprays by Nasal route daily     folic acid 1 MG tablet  Commonly known as: FOLVITE  Take 1 tablet by mouth daily     guaiFENesin 600 MG extended release tablet  Commonly known as: MUCINEX  Take 1 tablet by mouth 2 times daily     hydrALAZINE 50 MG tablet  Commonly known as: APRESOLINE  Take 1.5 tablets by mouth 3 times daily     lactobacillus capsule  Take 1 capsule by mouth daily (with breakfast) levothyroxine 100 MCG tablet  Commonly known as: SYNTHROID  Take 1 tablet by mouth Daily     magnesium oxide 400 MG tablet  Commonly known as: MAG-OX  Take 1 tablet by mouth daily     metoprolol succinate 25 MG extended release tablet  Commonly known as: Toprol XL  Take 1 tablet by mouth daily     montelukast 10 MG tablet  Commonly known as: SINGULAIR  Take 1 tablet by mouth daily     ondansetron 4 MG disintegrating tablet  Commonly known as: Zofran ODT  Take 1 tablet by mouth every 6 hours     potassium chloride 20 MEQ extended release tablet  Commonly known as: KLOR-CON M  Take 1 tablet by mouth daily     theophylline 200 MG extended release capsule  Commonly known as: MIAH-24     thiamine 100 MG tablet  Take 1 tablet by mouth daily     tiZANidine 2 MG tablet  Commonly known as: ZANAFLEX     traZODone 50 MG tablet  Commonly known as: DESYREL     Tylenol 8 Hour Arthritis Pain 650 MG extended release tablet  Generic drug: acetaminophen     vitamin D 25 MCG (1000 UT) Tabs tablet  Commonly known as: CHOLECALCIFEROL         * This list has 2 medication(s) that are the same as other medications prescribed for you. Read the directions carefully, and ask your doctor or other care provider to review them with you.             STOP taking these medications    carBAMazepine 200 MG tablet  Commonly known as: TEGretol     meloxicam 15 MG tablet  Commonly known as: MOBIC           Where to Get Your Medications      Information about where to get these medications is not yet available    Ask your nurse or doctor about these medications  · doxycycline hyclate 100 MG tablet  · pantoprazole 40 MG tablet  · sucralfate 1 GM/10ML suspension          Objective Findings at Discharge:   BP (!) 162/90   Pulse 91   Temp 98 °F (36.7 °C) (Axillary)   Resp 18   Ht 5' 3\" (1.6 m)   Wt 213 lb 11.2 oz (96.9 kg)   LMP 01/05/2010   SpO2 97%   BMI 37.86 kg/m²            PHYSICAL EXAM  GEN: Awake female, alert and oriented x 3 in no apparent distress. Appears given age. HEENT: Normal   NECK: Supple, no apparent thyromegaly or masses. No AROLDO  RESP: Diminished lung fields bilaterally. CVS: RRR, S1, S2  GI/: Abdomen is soft, epigastric tenderness, no organomegaly. . Bowel sounds normal, rectal exam deferred. No CVA tenderness. MSK: No gross joint deformities. No tenderness  SKIN: Normal coloration, warm, dry. NEURO: Cranial nerves appear grossly intact, normal speech, no lateralizing weakness. PSYCH:Affect appropriate.     BMP/CBC  Recent Labs     03/14/22  2250 03/16/22  0235   * 133*   K 3.5 4.6   CL 90* 93*   CO2 32 29   BUN 8 10   CREATININE 0.8 0.8   WBC 10.8* 5.8   HCT 34.9* 34.0*    244       Discharge Time of 22 minutes    Electronically signed by Jeffery Bailey MD on 3/16/2022 at 8:56 AM

## 2022-03-16 NOTE — PROGRESS NOTES
Discharge note: Discharge instructions reviewed with patient and  (included with patient's permission). After teaching patient and  provided proper teachback about taking medications as prescribed (has meds-to-bed delivery), making/keeping follow-up appointments, using home oxygen safely, and returning for new or worsening problems. IV access and telemetry have been discontinued. Patient dressed in own clothing and states has all personal belongings. Patient taken by wheelchair escort (with portable O2) to Main Entrance, accompanied by .

## 2022-03-16 NOTE — PROGRESS NOTES
3/16/22 1443 Patient had called in and stated that she didn't have her  cord or her copy of her instructions. Checking with housekeeping and found the  cord. Patient had signed discharge instructions at time of discharge, not found in room when left. Called patient and let her know  cord was found. Arranged with patient that we will have  cord and copy of discharge instructions at the desk for her  to .

## 2022-03-16 NOTE — PROGRESS NOTES
Outpatient Pharmacy Progress Note for Meds-to-Beds    Total number of Prescriptions Filled: 3  The following medications were dispensed to the patient during the discharge process:  Doxycycline 100mg  Prednisone 20mg  Sucralfate 1gm    Additional Documentation:  Patient's family member picked-up the medication(s) in the OP Pharmacy   The following prescription(s) were refilled to soon per insurance (patient has some at home): Pantoprazole (can be filled on 4/11/22)      Thank you for letting us serve your patients.   [facility]    [address], [address] [facility]    Phone: [phone]    Fax: [phone]

## 2022-03-16 NOTE — PROGRESS NOTES
03/16/22 0419   NIV Type   Skin Assessment Clean, dry, & intact   Skin Protection for O2 Device No   Equipment ID [id number]   NIV Started/Stopped On   Equipment Type [id number]   Mode Bilevel   Mask Type Full face mask   Mask Size Medium   Settings/Measurements   IPAP 10 cmH20   CPAP/EPAP 5 cmH2O   Resp 18   Insp Rise Time (%) 2 %   FiO2  28 %   I Time/ I Time % 1.1 s   Vt Exhaled 371 mL   Minute Volume 7.4 Liters   Mask Leak (lpm) 0 lpm   Comfort Level Good   Using Accessory Muscles No   SpO2 94   Alarm Settings   Alarms On Y   Press Low Alarm 3 cmH2O   High Pressure Alarm 20 cmH2O   Delay Alarm 20 sec(s)   Apnea (secs) 20 secs   Resp Rate Low Alarm 13   High Respiratory Rate 40 br/min

## 2022-04-21 ENCOUNTER — HOSPITAL ENCOUNTER (OUTPATIENT)
Dept: MRI IMAGING | Age: 62
Discharge: HOME | End: 2022-04-21
Payer: COMMERCIAL

## 2022-04-21 DIAGNOSIS — M17.0 PRIMARY OSTEOARTHRITIS OF BOTH KNEES: ICD-10-CM

## 2022-04-21 PROCEDURE — 73721 MRI JNT OF LWR EXTRE W/O DYE: CPT

## 2022-05-04 ENCOUNTER — TELEPHONE (OUTPATIENT)
Dept: CARDIOLOGY CLINIC | Age: 62
End: 2022-05-04

## 2022-05-04 DIAGNOSIS — R60.0 LOCALIZED EDEMA: Primary | ICD-10-CM

## 2022-05-04 RX ORDER — FUROSEMIDE 20 MG/1
20 TABLET ORAL DAILY
Qty: 7 TABLET | Refills: 0 | Status: SHIPPED | OUTPATIENT
Start: 2022-05-04 | End: 2022-06-03

## 2022-05-04 NOTE — TELEPHONE ENCOUNTER
Will send in PRN dose of lasix 20 mg daily for the next week she will also need to take 20 extra MEQ of KCL. Then F/u labs in 2 weeks with F/U appt.

## 2022-05-04 NOTE — TELEPHONE ENCOUNTER
Patient states swelling ankles to thighs and belly. Had for few weeks but now worsening. Patient has COPD but breathing not worse than usual. Patient has compression stockings but unable to get on.  Asking for diuretic

## 2022-05-20 NOTE — ED PROVIDER NOTES
[address]      Pt Name: Jeffery Pichardo  MRN: [id number]  [place] 1960  Date of evaluation: 3/14/2022  Provider: Aleshia Sharif MD    CHIEF COMPLAINT       Chief Complaint   Patient presents with    Chest Pain     chest pressure, began around 2100 when pt began to lie flat to sleep    Leg Swelling     hx of CHF         HISTORY OF PRESENT ILLNESS      Jeffery Pichardo is a 63 y.o. female who presents to the emergency department  for   Chief Complaint   Patient presents with    Chest Pain     chest pressure, began around 2100 when pt began to lie flat to sleep    Leg Swelling     hx of CHF       53-year-old female with history of COPD, chronic respiratory failure on several liters of supplemental oxygen at baseline presents reporting of some lower chest wall pain. She denies a history of CAD or MI. She is been having symptoms for couple days. Denies any remarkable sputum production. Denies any fever or chills. Denies any sick contacts. No injury to her chest wall. She presents the emergency department with some pursed lip breathing. She is on her home amount of supplemental oxygen. She states she believes that she might be fluid overloaded. She reports that she is having swelling in her abdomen and legs. GCS of 15. She is moving her extremity spontaneously. Nursing Notes, Triage Notes & Vital Signs were reviewed. REVIEW OF SYSTEMS    (2-9 systems for level 4, 10 or more for level 5)     Review of Systems   Constitutional: Negative for chills and fever. HENT: Negative for congestion, rhinorrhea and sore throat. Eyes: Negative for pain and discharge. Respiratory: Positive for shortness of breath. Cardiovascular: Positive for leg swelling. Gastrointestinal: Negative for nausea and vomiting. Endocrine: Negative for polydipsia and polyuria. Genitourinary: Negative for dysuria and flank pain.    Musculoskeletal: Negative for [FreeTextEntry1] : CRS \par \par S/P Watchman Procedure, MARLO ( Device Check )\par \par +I LR,\par \par rec : Anemia & CKD Management .\par \par W.U & Meds reviewed, \par \par Decrease Diuresis, \par \par Begin  Renal  vitamin  & Oral Fe, \par \par EPO Administered, \par \par Intolerant of Oral Fe, \par \par To arrange for IV Fe, \par  3/1/22  Yes Valdez Reyes MD   atorvastatin (LIPITOR) 80 MG tablet Take 80 mg by mouth daily   Yes Historical Provider, MD   dicyclomine (BENTYL) 10 MG capsule Take 2 capsules by mouth 4 times daily (before meals and nightly) 12/29/21  Yes Monica Atkins PA-C   ondansetron (ZOFRAN ODT) 4 MG disintegrating tablet Take 1 tablet by mouth every 6 hours 12/29/21  Yes Monica Atkins PA-C   diclofenac sodium (VOLTAREN) 1 % GEL Apply 4 g topically 4 times daily 12/8/21  Yes Caio Jurado DO   meloxicam [facility] ARA[facility]) 15 MG tablet Take 1 tablet by mouth daily 11/17/21  Yes Bernard Francis PA-C   hydrALAZINE (APRESOLINE) 50 MG tablet Take 1.5 tablets by mouth 3 times daily 10/5/21  Yes BRIAN Sneed - CNP   ipratropium-albuterol (DUONEB) 0.5-2.5 (3) MG/3ML SOLN nebulizer solution Take 3 mLs by nebulization 4 times daily 9/8/21  Yes Jose Mcgill MD   acetaminophen (TYLENOL 8 HOUR ARTHRITIS PAIN) 650 MG extended release tablet Take 650 mg by mouth as needed for Pain   Yes Historical Provider, MD   vitamin D (CHOLECALCIFEROL) 25 MCG (1000 UT) TABS tablet Take 1,000 Units by mouth daily   Yes Historical Provider, MD   busPIRone (BUSPAR) 10 MG tablet Take 1 tablet by mouth 2 times daily 5/4/21  Yes Radha Duenas MD   ferrous sulfate (IRON 325) 325 (65 Fe) MG tablet Take 1 tablet by mouth every other day Indications: pt taking every day bid Monday, wed, fri  Patient taking differently: Take 325 mg by mouth 2 times daily Indications: pt taking every day bid  5/4/21  Yes Radha Duenas MD   dilTIAZem (CARDIZEM CD) 120 MG extended release capsule Take 1 capsule by mouth daily 1/18/21  Yes Jacinta Candelaria MD   metoprolol succinate (TOPROL XL) 25 MG extended release tablet Take 1 tablet by mouth daily 1/18/21  Yes Jacinta Candelaria MD   clonazePAM (KLONOPIN) 1 MG disintegrating tablet Take 1 mg by mouth 3 times daily as needed.     Yes Historical Provider, MD   theophylline (MIAH-24) 200 MG extended release capsule Take 400 mg by mouth daily   Yes Historical Provider, MD   budesonide-formoterol (SYMBICORT) 160-4.5 MCG/ACT AERO USE 2 INHALATIONS TWICE A DAY 10/2/20  Yes Marc Harris MD   guaiFENesin (MUCINEX) 600 MG extended release tablet Take 1 tablet by mouth 2 times daily 7/21/20  Yes Marc aHrris MD   montelukast (SINGULAIR) 10 MG tablet Take 1 tablet by mouth daily 7/21/20  Yes Marc Harris MD   levothyroxine (SYNTHROID) 100 MCG tablet Take 1 tablet by mouth Daily 7/21/20  Yes Marc Harris MD   folic acid (FOLVITE) 1 MG tablet Take 1 tablet by mouth daily 7/16/20  Yes Becka Ramirez MD   vitamin B-1 100 MG tablet Take 1 tablet by mouth daily 7/16/20  Yes Becka Ramirez MD   albuterol-ipratropium (COMBIVENT RESPIMAT)  MCG/ACT AERS inhaler Inhale 1 puff into the lungs every 4 hours as needed for Wheezing 7/1/20  Yes Marc Harris MD   DULoxetine (CYMBALTA) 60 MG extended release capsule Take 60 mg by mouth daily    Yes Historical Provider, MD   traZODone (DESYREL) 50 MG tablet Take 100 mg by mouth nightly    Yes Historical Provider, MD   fluticasone (FLONASE) 50 MCG/ACT nasal spray 2 sprays by Nasal route daily 3/28/18  Yes Ranjit Chowdhury MD   carBAMazepine (TEGRETOL) 200 MG tablet Take 1 tablet by mouth daily for 14 days 12/25/21 3/1/22  Shivani Daley PA-C   lactobacillus (CULTURELLE) capsule Take 1 capsule by mouth daily (with breakfast) 11/27/20   Dominique Batista MD        Patient Active Problem List   Diagnosis    Centrilobular emphysema ([address]    Hypertension    Hyperlipidemia LDL goal <100    Abnormal EKG    Palpitations    Anxiety    FH: CAD (coronary artery disease)    Fibromyalgia    Back pain at L4-L5 level    Sleep apnea    COPD exacerbation ([address]    Electrolyte imbalance    Epigastric pain    COPD (chronic obstructive pulmonary disease) ([address]    Spinal stenosis of lumbar region with radiculopathy    Spinal stenosis, lumbar region, without neurogenic claudication    COPD with acute exacerbation (HCC)    Pneumonia due to infectious organism    SVT (supraventricular tachycardia) (HCC)    Class 1 obesity due to excess calories with body mass index (BMI) of 31.0 to 31.9 in adult    Pneumonia    Pleural effusion    Atelectasis    Pulmonary nodules    Respiratory failure with hypoxia and hypercapnia (HCC)    Anemia    COPD with exacerbation (HCC)    Acquired hemolytic anemia, unspecified (HCC)    Leucocytosis    Chronic kidney disease, stage I    Hyponatremia    PNA (pneumonia)    Sepsis (HCC)    Pericardial effusion    Acute acalculous cholecystitis    SIRS (systemic inflammatory response syndrome) (HCC)    Chest pain    Abnormal nuclear stress test    Abnormal stress test    Status post laparoscopic cholecystectomy    Moderate malnutrition (HCC)    LUZ MARIA (acute kidney injury) (HCC)    Dizziness    Stage 3a chronic kidney disease (HCC)    Adrenal insufficiency (HCC)    Trochanteric bursitis of right hip    Hypokalemia    Chest pressure         SURGICAL HISTORY       Past Surgical History:   Procedure Laterality Date    BACK SURGERY  03/2017    \"surgery on low back L5-6- not sure if they put any metal in \"    CARDIAC CATHETERIZATION      10/2019    CARDIAC CATHETERIZATION      per old chart had cath done 11/2020    CARPAL TUNNEL RELEASE Right 1985    CHOLECYSTECTOMY, LAPAROSCOPIC N/A 10/25/2020    CHOLECYSTECTOMY LAPAROSCOPIC WITH IOC performed by Frank Gonsalez MD at [facility] COLONOSCOPY N/A 12/12/2019    COLONOSCOPY DIAGNOSTIC performed [facility] Brian [facility] MD at [facility] [place], DIAGNOSTIC      per old chart had egd done 1/2018    TUBAL LIGATION  1987    UPPER GASTROINTESTINAL ENDOSCOPY N/A 1/7/2021    EGD BIOPSY performed by Brian Urena MD at [facility]       Current Discharge Medication List      CONTINUE these medications which have NOT CHANGED    Details   magnesium oxide (MAG-OX) 400 MG tablet Take 1 tablet by mouth daily  Qty: 30 tablet, Refills: 1      potassium chloride (KLOR-CON M) 20 MEQ extended release tablet Take 1 tablet by mouth daily  Qty: 90 tablet, Refills: 1      atorvastatin (LIPITOR) 80 MG tablet Take 80 mg by mouth daily      dicyclomine (BENTYL) 10 MG capsule Take 2 capsules by mouth 4 times daily (before meals and nightly)  Qty: 40 capsule, Refills: 0      ondansetron (ZOFRAN ODT) 4 MG disintegrating tablet Take 1 tablet by mouth every 6 hours  Qty: 20 tablet, Refills: 0      diclofenac sodium (VOLTAREN) 1 % GEL Apply 4 g topically 4 times daily  Qty: 350 g, Refills: 2    Associated Diagnoses: Trochanteric bursitis of right hip      meloxicam (MOBIC) 15 MG tablet Take 1 tablet by mouth daily  Qty: 30 tablet, Refills: 1      hydrALAZINE (APRESOLINE) 50 MG tablet Take 1.5 tablets by mouth 3 times daily  Qty: 135 tablet, Refills: 5      ipratropium-albuterol (DUONEB) 0.5-2.5 (3) MG/3ML SOLN nebulizer solution Take 3 mLs by nebulization 4 times daily  Qty: 360 mL, Refills: 3      acetaminophen (TYLENOL 8 HOUR ARTHRITIS PAIN) 650 MG extended release tablet Take 650 mg by mouth as needed for Pain      vitamin D (CHOLECALCIFEROL) 25 MCG (1000 UT) TABS tablet Take 1,000 Units by mouth daily      busPIRone (BUSPAR) 10 MG tablet Take 1 tablet by mouth 2 times daily  Qty: 1 tablet, Refills: 0      ferrous sulfate (IRON 325) 325 (65 Fe) MG tablet Take 1 tablet by mouth every other day Indications: pt taking every day bid Monday, wed, fri  Qty: 30 tablet, Refills: 3      dilTIAZem (CARDIZEM CD) 120 MG extended release capsule Take 1 capsule by mouth daily  Qty: 90 capsule, Refills: 3      metoprolol succinate (TOPROL XL) 25 MG extended release tablet Take 1 tablet by mouth daily  Qty: 30 tablet, Refills: 3      clonazePAM (KLONOPIN) 1 MG disintegrating tablet Take 1 mg by mouth 3 times daily as needed.        theophylline (MIAH-24) 200 MG extended release capsule Take 400 mg by mouth daily      budesonide-formoterol (SYMBICORT) 160-4.5 MCG/ACT AERO USE 2 INHALATIONS TWICE A DAY  Qty: 30.6 g, Refills: 3      guaiFENesin (MUCINEX) 600 MG extended release tablet Take 1 tablet by mouth 2 times daily  Qty: 30 tablet, Refills: 0      montelukast (SINGULAIR) 10 MG tablet Take 1 tablet by mouth daily  Qty: 30 tablet, Refills: 3      levothyroxine (SYNTHROID) 100 MCG tablet Take 1 tablet by mouth Daily  Qty: 30 tablet, Refills: 2      folic acid (FOLVITE) 1 MG tablet Take 1 tablet by mouth daily  Qty: 30 tablet, Refills: 3      vitamin B-1 100 MG tablet Take 1 tablet by mouth daily  Qty: 30 tablet, Refills: 3      albuterol-ipratropium (COMBIVENT RESPIMAT)  MCG/ACT AERS inhaler Inhale 1 puff into the lungs every 4 hours as needed for Wheezing  Qty: 3 Inhaler, Refills: 0      DULoxetine (CYMBALTA) 60 MG extended release capsule Take 60 mg by mouth daily       traZODone (DESYREL) 50 MG tablet Take 100 mg by mouth nightly       fluticasone (FLONASE) 50 MCG/ACT nasal spray 2 sprays by Nasal route daily  Qty: 1 Bottle, Refills: 0      carBAMazepine (TEGRETOL) 200 MG tablet Take 1 tablet by mouth daily for 14 days  Qty: 14 tablet, Refills: 0      lactobacillus (CULTURELLE) capsule Take 1 capsule by mouth daily (with breakfast)  Qty: 30 capsule, Refills: 0             ALLERGIES     Lisinopril    FAMILY HISTORY       Family History   Problem Relation Age of Onset    Asthma Mother         COPD    Heart Disease Father           SOCIAL HISTORY       Social History     Socioeconomic History    Marital status:      Spouse name: None    Number of children: None    Years of education: None    Highest education level: None   Occupational History    None   Tobacco Use    Smoking status: Former Smoker     Packs/day: 1.50     Years: 20.00     Pack years: 30.00     Types: Cigarettes     Quit date: 2008     Years since quittin.2    Smokeless tobacco: Never Used   Vaping Use    Vaping Use: Never used   Substance and Sexual Activity    Alcohol use: Not Currently     Alcohol/week: 2.0 standard drinks     Types: 2 Shots of liquor per week     Comment: per pt on 1/5/2021\"quit drinking back Oct 2020\"use to drink wine coolers - 3 times per week \"/caffeine 2-3 coffees aday 1 pop b    Drug use: No    Sexual activity: Yes     Partners: Male   Other Topics Concern    None   Social History Narrative    None     Social Determinants of Health     Financial Resource Strain:     Difficulty of Paying Living Expenses: Not on file   Food Insecurity:     Worried About Running Out of Food in the Last Year: Not on file    Donny of Food in the Last Year: Not on file   Transportation Needs:     Lack of Transportation (Medical): Not on file    Lack of Transportation (Non-Medical):  Not on file   Physical Activity:     Days of Exercise per Week: Not on file    Minutes of Exercise per Session: Not on file   Stress:     Feeling of Stress : Not on file   Social Connections:     Frequency of Communication with Friends and Family: Not on file    Frequency of Social Gatherings with Friends and Family: Not on file    Attends Islam Services: Not on file    Active Member of [address] [place] [address] [employer] or Organizations: Not on file    Attends Club or Organization Meetings: Not on file    Marital Status: Not on file   Intimate Partner Violence:     Fear of Current or Ex-Partner: Not on file    Emotionally Abused: Not on file    Physically Abused: Not on file    Sexually Abused: Not on file   Housing Stability: [address] Unable to Pay for Housing in the Last Year: No    Number of [place] in the Last Year: 1    Unstable Housing in the Last Year: No       SCREENINGS    [place] Coma Scale  Eye Opening: Spontaneous  Best Verbal Response: Oriented  Best Motor Response: Obeys commands  Jit[place] Coma Scale Score: 15          PHYSICAL EXAM    (up to 7 for level 4, 8 or more for level 5)     ED Triage Vitals   BP Temp Temp Source Pulse Resp SpO2 Height Weight   03/14/22 2248 03/14/22 2248 03/14/22 2248 03/14/22 2245 03/14/22 2245 03/14/22 2245 03/14/22 2245 03/14/22 2245   (!) 187/89 98.3 °F (36.8 °C) Oral 94 18 100 % 5' 3\" (1.6 m) 198 lb (89.8 kg)       Physical Exam  Vitals reviewed. Constitutional:       Appearance: She is not ill-appearing or toxic-appearing. HENT:      Head: Normocephalic and atraumatic. Nose: No congestion or rhinorrhea. Mouth/Throat:      Mouth: Mucous membranes are moist.      Pharynx: No oropharyngeal exudate or posterior oropharyngeal erythema. Eyes:      General:         Right eye: No discharge. Left eye: No discharge. Extraocular Movements: Extraocular movements intact. Pupils: Pupils are equal, round, and reactive to light. Cardiovascular:      Rate and Rhythm: Normal rate. Pulses: Normal pulses. Heart sounds: No friction rub. No gallop. Pulmonary:      Effort: Pulmonary effort is normal.      Breath sounds: Rhonchi present. Comments: Diminished breath sounds bilaterally  Chest:      Chest wall: No tenderness. Musculoskeletal:         General: No tenderness or signs of injury. Cervical back: Normal range of motion and neck supple. Right lower leg: Edema present. Left lower leg: Edema present. Skin:     General: Skin is warm. Capillary Refill: Capillary refill takes less than 2 seconds. Neurological:      General: No focal deficit present. Mental Status: She is alert.          DIAGNOSTIC RESULTS     Labs Reviewed   CBC WITH AUTO DIFFERENTIAL - Abnormal; Notable for the following components:       Result Value    WBC 10.8 (*)     RBC 3.50 (*)     Hemoglobin 10.2 (*)     Hematocrit 34.9 (*)     MCHC 29.2 (*)     Segs Relative 72.8 (*)     Lymphocytes % 13.4 (*)     Monocytes % 9.3 (*)     Eosinophils % 3.2 (*)     Immature Neutrophil % 0.5 (*)     All other components within normal limits   COMPREHENSIVE METABOLIC PANEL - Abnormal; Notable for the following components:    Sodium 131 (*)     Chloride 90 (*)     Glucose 131 (*)     All other components within normal limits   BRAIN NATRIURETIC PEPTIDE - Abnormal; Notable for the following components:    Pro-.5 (*)     All other components within normal limits   STREP PNEUMONIAE ANTIGEN   LEGIONELLA ANTIGEN, URINE   TROPONIN   TROPONIN   TROPONIN   TROPONIN   TROPONIN   PROCALCITONIN          EKG: All EKG's are interpreted by the Emergency Department Physician who either signs or Co-signs this chart in the absence of a cardiologist.       EKG Interpretation    Interpreted by emergency department physician    EKG is interpreted by me. EKG shows sinus rhythm at 101 beats per minutes, axis appears to be nondeviated, there is a right bundle branch block, no remarkable system elevations or depressions, there are inverted T waves in lateral leads and in precordial leads, NE interval 152, QRS duration 138, . Final impression, sinus tachycardia, nonspecific EKG. From review of records, she has had right bundle branch block previously    Alondra Montenegro MD     RADIOLOGY:     Non-plain film images such as CT, Ultrasound and MRI are read by the radiologist. Plain radiographic images are visualized and preliminarily interpreted by the emergency physician. Interpretation per the Radiologist below, if available at the time of this note:    XR CHEST PORTABLE   Final Result   Right lower lobe pulmonary infiltrate.                ED BEDSIDE ULTRASOUND:   Performed by ED Physician Alondra Montenergo MD       LABS:  Labs Reviewed   CBC WITH AUTO DIFFERENTIAL - Abnormal; Notable for the following components:       Result Value    WBC 10.8 (*)     RBC 3.50 (*)     Hemoglobin 10.2 (*)     Hematocrit 34.9 (*)     MCHC 29.2 (*)     Segs Relative 72.8 (*)     Lymphocytes % 13.4 (*)     Monocytes % 9.3 (*)     Eosinophils % 3.2 (*)     Immature Neutrophil % 0.5 (*)     All other components within normal limits   COMPREHENSIVE METABOLIC PANEL - Abnormal; Notable for the following components:    Sodium 131 (*)     Chloride 90 (*)     Glucose 131 (*)     All other components within normal limits   BRAIN NATRIURETIC PEPTIDE - Abnormal; Notable for the following components:    Pro-.5 (*)     All other components within normal limits   STREP PNEUMONIAE ANTIGEN   LEGIONELLA ANTIGEN, URINE   TROPONIN   TROPONIN   TROPONIN   TROPONIN   TROPONIN   PROCALCITONIN       All other labs were within normal range or not returned as of this dictation. EMERGENCY DEPARTMENT COURSE and DIFFERENTIAL DIAGNOSIS/MDM:   Vitals:    Vitals:    03/15/22 0230 03/15/22 0300 03/15/22 0445 03/15/22 0500   BP: (!) 152/75 (!) 155/68 (!) 175/88 (!) 167/83   Pulse: 86 81 87 85   Resp: 14 21 18 15   Temp:   98 °F (36.7 °C)    TempSrc:   Oral    SpO2: 100% 99% 100%    Weight:   211 lb 8 oz (95.9 kg)    Height:   5' 3\" (1.6 m)            MDM  Number of Diagnoses or Management Options  Chest pain, unspecified type  Pneumonia due to infectious organism, unspecified laterality, unspecified part of lung  Diagnosis management comments: 57-year-old female with a history of COPD, chronic respiratory failure presents complaining of some lower chest wall pain. Denies any remarkable infectious symptoms like sputum production. No fevers or chills. Denies any injury to her chest wall. Denies that she has established of CAD or MI. She presents elevated blood pressure. Vitals otherwise unremarkable. She is on her home oxygen amount with saturations of 100%. She believes she might be volume overloaded. She does complain of some leg swelling. Labs and imaging are obtained. Chest x-ray is concerning for right lower lobe infiltrate. White count 10.8. Troponin undetectable. Letter lites are unremarkable.   Patient did have antibiotics for the pneumonia as well as admitted for trended troponins. She is agreeable to admission. Amount and/or Complexity of Data Reviewed  Decide to obtain previous medical records or to obtain history from someone other than the patient: yes        -  Patient seen and evaluated in the emergency department. -  Triage and nursing notes reviewed and incorporated. -  Old chart records reviewed and incorporated. -  Work-up included:  See above  -  Results discussed with patient. CONSULTS:  IP CONSULT TO HOSPITALIST    PROCEDURES:  None performed unless otherwise noted below     Procedures        FINAL IMPRESSION      1. Chest pain, unspecified type    2. Pneumonia due to infectious organism, unspecified laterality, unspecified part of lung          DISPOSITION/PLAN   DISPOSITION Admitted 03/15/2022 02:26:58 AM      PATIENT REFERRED TO:  No follow-up provider specified. DISCHARGE MEDICATIONS:  Current Discharge Medication List          ED Provider Disposition Time  DISPOSITION Admitted 03/15/2022 02:26:58 AM      Appropriate personal protective equipment was worn during the patient's evaluation. These included surgical, eye protection, surgical mask or in 95 respirator and gloves. The patient was also placed in a surgical mask for the prevention of possible spread of respiratory viral illnesses. The Patient was instructed to read the package inserts with any medication that was prescribed. Major potential reactions and medication interactions were discussed. The Patient understands that there are numerous possible adverse reactions not covered. The patient was also instructed to arrange follow-up with his or her primary care provider for review of any pending labwork or incidental findings on any radiology results that were obtained. All efforts were made to discuss any incidental findings that require further monitoring. Controlled Substances Monitoring:     No flowsheet data found.     (Please note that portions of this note were completed with a voice recognition program.  Efforts were made to edit the dictations but occasionally words are mis-transcribed.)    Scot Young MD (electronically signed)  Attending Emergency Physician           Scot Young MD  03/15/22 8112

## 2022-06-03 ENCOUNTER — OFFICE VISIT (OUTPATIENT)
Dept: CARDIOLOGY CLINIC | Age: 62
End: 2022-06-03
Payer: COMMERCIAL

## 2022-06-03 VITALS
BODY MASS INDEX: 38.59 KG/M2 | SYSTOLIC BLOOD PRESSURE: 118 MMHG | OXYGEN SATURATION: 96 % | DIASTOLIC BLOOD PRESSURE: 70 MMHG | HEART RATE: 93 BPM | HEIGHT: 61 IN | WEIGHT: 204.4 LBS

## 2022-06-03 DIAGNOSIS — I10 PRIMARY HYPERTENSION: ICD-10-CM

## 2022-06-03 DIAGNOSIS — E66.09 CLASS 1 OBESITY DUE TO EXCESS CALORIES WITHOUT SERIOUS COMORBIDITY WITH BODY MASS INDEX (BMI) OF 31.0 TO 31.9 IN ADULT: ICD-10-CM

## 2022-06-03 DIAGNOSIS — E78.5 HYPERLIPIDEMIA LDL GOAL <100: ICD-10-CM

## 2022-06-03 DIAGNOSIS — I47.1 SVT (SUPRAVENTRICULAR TACHYCARDIA) (HCC): Primary | ICD-10-CM

## 2022-06-03 DIAGNOSIS — R60.0 LEG EDEMA: ICD-10-CM

## 2022-06-03 PROCEDURE — 1036F TOBACCO NON-USER: CPT | Performed by: NURSE PRACTITIONER

## 2022-06-03 PROCEDURE — 3017F COLORECTAL CA SCREEN DOC REV: CPT | Performed by: NURSE PRACTITIONER

## 2022-06-03 PROCEDURE — G8417 CALC BMI ABV UP PARAM F/U: HCPCS | Performed by: NURSE PRACTITIONER

## 2022-06-03 PROCEDURE — 99214 OFFICE O/P EST MOD 30 MIN: CPT | Performed by: NURSE PRACTITIONER

## 2022-06-03 PROCEDURE — 36415 COLL VENOUS BLD VENIPUNCTURE: CPT | Performed by: NURSE PRACTITIONER

## 2022-06-03 PROCEDURE — G8428 CUR MEDS NOT DOCUMENT: HCPCS | Performed by: NURSE PRACTITIONER

## 2022-06-03 RX ORDER — POTASSIUM CHLORIDE 20 MEQ/1
20 TABLET, EXTENDED RELEASE ORAL DAILY
Qty: 90 TABLET | Refills: 1 | Status: CANCELLED | OUTPATIENT
Start: 2022-06-03

## 2022-06-03 RX ORDER — MAGNESIUM OXIDE 400 MG/1
400 TABLET ORAL DAILY
Qty: 30 TABLET | Refills: 1 | Status: CANCELLED | OUTPATIENT
Start: 2022-06-03

## 2022-06-03 RX ORDER — METHOCARBAMOL 500 MG/1
750 TABLET, FILM COATED ORAL 2 TIMES DAILY
COMMUNITY
Start: 2022-06-02

## 2022-06-03 RX ORDER — GABAPENTIN 300 MG/1
400 CAPSULE ORAL 3 TIMES DAILY
COMMUNITY
Start: 2022-06-02

## 2022-06-03 ASSESSMENT — ENCOUNTER SYMPTOMS: SHORTNESS OF BREATH: 1

## 2022-06-03 NOTE — PROGRESS NOTES
[facility] [address] [facility],Third Floor  Phone: [phone]    Fax [phone]                  Sonma Polk MD, Yair Cardenas MD, Luis Eduardo Allred MD, MD Roge Parham MD, Diane Chatman MD, Neil Cormier MD, [address] [facility]        Cardiology Progress Note      6/3/2022    RE: Westley Yanez  (1960)                             Primary cardiologist: Dr. Roge Meza       Subjective:  CC:   1. SVT (supraventricular tachycardia) ([facility]    2. Primary hypertension    3. Hyperlipidemia LDL goal <100    4. Leg edema    5. Class 1 obesity due to excess calories without serious comorbidity with body mass index (BMI) of 31.0 to 31.9 in adult        HPI: Westley Yanez, who is a  63y.o. year old female with a past medical history as listed below. Patient presents to the office for follow up on leg edema, SVT,HTN, and hyperlipidemia. Patient has chronic hypoxia requiring continuous oxygen. She recently complained of lower extremity edema and was placed on course of Lasix for 7 days. Patient reports there was no relief of edema. Patient denies any chest pain, shortness of breath, dizziness, syncope, or palpitations.     Past Medical History:   Diagnosis Date    Anemia     Anxiety 02/16/2017    follows with Dr Marques Carver Back pain at L4-L5 level 2/16/2017    Dr Ailyn Fernandez [facility])     per pt on 2/5/2021\"never told I have bipolar\"    COPD (chronic obstructive pulmonary disease) ([facility]     follows with Dr Sangeetha Valencia Emphysema of lung ([address]     FH: CAD (coronary artery disease) 2/16/2017    Father had in his forties, sister in her thirties    Perez Rizwan Fibromyalgia 02/16/2017    Full dentures     full upper plate and partial on the bottom    Gastric ulcer     \"had stomach ulder back fall 2019\"  H/O Doppler ultrasound 04/05/2019    venous- no DVT or reflux    H/O echocardiogram 01/14/2015    EF50-55% Normal- see media    History of blood transfusion     Hyperlipidemia LDL goal <100     Hypertension     Dr Juan Carlos Betancur Irregular heartbeat     \"they said I have irreg heart beat before- back when they drained fluid around my heart \"    Obstructive sleep apnea     \"have bipap I use at home\"    On home oxygen therapy     \"on oxygen all the time at home and keep it on 2.5 liters\"    Osteoarthritis     Pericardial effusion     per old chart had pericardial effusion 10/2020    Spinal stenosis     hx per old chart    Thyroid disease     Wears glasses     \"suppose to wear glasses\"       Current Outpatient Medications   Medication Sig Dispense Refill    gabapentin (NEURONTIN) 300 MG capsule Take 300 mg by mouth 3 times daily.       methocarbamol (ROBAXIN) 500 MG tablet Take 500 mg by mouth in the morning and at bedtime      albuterol sulfate  (90 Base) MCG/ACT inhaler 01/29/2014 Albuterol HFA Inhaler 90 mcg/actuation  By inhalation 2.0 INHALATION(S) as directed  01/29/2014  active      esomeprazole (NEXIUM) 40 MG delayed release capsule 1 capsule      Ferrous Fumarate 324 (106 Fe) MG TABS Take one tablet 2 times a day      MAGNESIUM-OXIDE 400 (241.3 Mg) MG TABS tablet       cyclobenzaprine (FLEXERIL) 5 MG tablet TAKE 1 TO 2 TABLETS BY MOUTH EVERY DAY AS NEEDED      budesonide-formoterol (SYMBICORT) 160-4.5 MCG/ACT AERO Inhale 2 puffs into the lungs 2 times daily 3 each 3    albuterol-ipratropium (COMBIVENT RESPIMAT)  MCG/ACT AERS inhaler Inhale 1 puff into the lungs every 4 hours as needed for Wheezing 3 each 3    ipratropium-albuterol (DUONEB) 0.5-2.5 (3) MG/3ML SOLN nebulizer solution Take 3 mLs by nebulization 4 times daily 360 mL 3    pantoprazole (PROTONIX) 40 MG tablet Take 1 tablet by mouth every morning (before breakfast) 90 tablet 0    sucralfate (CARAFATE) 1 GM/10ML suspension Take 10 mLs by mouth 4 times daily 420 mL 0    tiZANidine (ZANAFLEX) 2 MG tablet Take 2 mg by mouth 3 times daily as needed      magnesium oxide (MAG-OX) 400 MG tablet Take 1 tablet by mouth daily 30 tablet 1    potassium chloride (KLOR-CON M) 20 MEQ extended release tablet Take 1 tablet by mouth daily 90 tablet 1    atorvastatin (LIPITOR) 80 MG tablet Take 80 mg by mouth daily      dicyclomine (BENTYL) 10 MG capsule Take 2 capsules by mouth 4 times daily (before meals and nightly) 40 capsule 0    ondansetron (ZOFRAN ODT) 4 MG disintegrating tablet Take 1 tablet by mouth every 6 hours 20 tablet 0    diclofenac sodium (VOLTAREN) 1 % GEL Apply 4 g topically 4 times daily 350 g 2    hydrALAZINE (APRESOLINE) 50 MG tablet Take 1.5 tablets by mouth 3 times daily 135 tablet 5    acetaminophen (TYLENOL 8 HOUR ARTHRITIS PAIN) 650 MG extended release tablet Take 650 mg by mouth as needed for Pain      vitamin D (CHOLECALCIFEROL) 25 MCG (1000 UT) TABS tablet Take 1,000 Units by mouth daily      busPIRone (BUSPAR) 10 MG tablet Take 1 tablet by mouth 2 times daily 1 tablet 0    ferrous sulfate (IRON 325) 325 (65 Fe) MG tablet Take 1 tablet by mouth every other day Indications: pt taking every day bid Monday, wed, fri (Patient taking differently: Take 325 mg by mouth 2 times daily Indications: pt taking every day bid ) 30 tablet 3    dilTIAZem (CARDIZEM CD) 120 MG extended release capsule Take 1 capsule by mouth daily 90 capsule 3    metoprolol succinate (TOPROL XL) 25 MG extended release tablet Take 1 tablet by mouth daily 30 tablet 3    clonazePAM (KLONOPIN) 1 MG disintegrating tablet Take 1 mg by mouth 3 times daily as needed.        lactobacillus (CULTURELLE) capsule Take 1 capsule by mouth daily (with breakfast) 30 capsule 0    theophylline (MIAH-24) 200 MG extended release capsule Take 400 mg by mouth daily      guaiFENesin (MUCINEX) 600 MG extended release tablet Take 1 tablet by mouth 2 times daily 30 tablet 0    montelukast (SINGULAIR) 10 MG tablet Take 1 tablet by mouth daily 30 tablet 3    levothyroxine (SYNTHROID) 100 MCG tablet Take 1 tablet by mouth Daily 30 tablet 2    folic acid (FOLVITE) 1 MG tablet Take 1 tablet by mouth daily 30 tablet 3    vitamin B-1 100 MG tablet Take 1 tablet by mouth daily 30 tablet 3    DULoxetine (CYMBALTA) 60 MG extended release capsule Take 60 mg by mouth daily       traZODone (DESYREL) 50 MG tablet Take 100 mg by mouth nightly       fluticasone (FLONASE) 50 MCG/ACT nasal spray 2 sprays by Nasal route daily 1 Bottle 0     No current facility-administered medications for this visit. Review of Systems:  Review of Systems   Respiratory: Positive for shortness of breath. Cardiovascular: Positive for leg swelling. Negative for chest pain and palpitations. Musculoskeletal: Negative. Skin: Negative. Neurological: Negative for dizziness and weakness. All other systems reviewed and are negative. Objective:      Physical Exam:  /70 (Site: Right Upper Arm, Position: Sitting, Cuff Size: Large Adult)   Pulse 93   Ht 5' 1\" (1.549 m)   Wt 204 lb 6.4 oz (92.7 kg)   LMP 01/05/2010   SpO2 96%   BMI 38.62 kg/m²   Wt Readings from Last 3 Encounters:   06/03/22 204 lb 6.4 oz (92.7 kg)   04/19/22 213 lb (96.6 kg)   04/21/22 211 lb (95.7 kg)     Body mass index is 38.62 kg/m². Physical exam:  Physical Exam  Constitutional:       Appearance: She is well-developed. Cardiovascular:      Rate and Rhythm: Normal rate and regular rhythm. Pulses: Intact distal pulses. Dorsalis pedis pulses are 2+ on the right side and 2+ on the left side. Posterior tibial pulses are 2+ on the right side and 2+ on the left side. Heart sounds: Normal heart sounds, S1 normal and S2 normal.   Pulmonary:      Effort: Pulmonary effort is normal.      Breath sounds: Normal breath sounds.    Musculoskeletal:         General: Normal range of motion. Right lower leg: No edema. Left lower leg: No edema. Skin:     General: Skin is warm and dry. Neurological:      Mental Status: She is alert and oriented to person, place, and time. DATA:  Lab Results   Component Value Date    CKTOTAL 126 05/03/2021    TROPONINI 0.007 03/25/2014     BNP:  No results found for: BNP  PT/INR:  No results found for: [employer]  Lab Results   Component Value Date    LABA1C 5.1 06/21/2019     Lab Results   Component Value Date    CHOL 166 11/23/2020    TRIG 111 11/23/2020    HDL 59 11/23/2020    LDLDIRECT 87 11/23/2020     Lab Results   Component Value Date    ALT 12 03/14/2022    AST 17 03/14/2022     TSH:  No results found for: TSH    Vitals:    06/03/22 1608   BP: 118/70   Pulse: 93   SpO2: 96%       Echo:8/6/21  Technically limited study due to COPD. Left ventricular function is normal, EF is estimated at 55-60%. Mild left ventricular hypertrophy. No evidence of diastolic dysfunction. No regional wall motion abnormalities were detected. Mildly dilated left atrium. Mild mitral and tricuspid regurgitation is present. RVSP is 26 mmHg. There is a small (trivial) pericardial effusion. Fat pad present. Cath:11/24/20   Angiographically almost normal coronary arteries. 2. Normal LV systolic function. LVEF is 60 to 65 %. The 10-year ASCVD risk score (Cherry Ma et al., 2013) is: 2.1%    Values used to calculate the score:      Age: 63 years      Sex: Female      Is Non- : No      Diabetic: No      Tobacco smoker: No      Systolic Blood Pressure: 196 mmHg      Is BP treated: No      HDL Cholesterol: 59 MG/DL      Total Cholesterol: 166 MG/DL      Assessment/ Plan:     SVT (supraventricular tachycardia) (HCC)   - Denies any palpitations. Continue with Cardizem 120 mg daily and Toprol XL 25 mg. Leg edema   - Patient recently started on Lasix 20 mg daily and potassium 20 MEQ daily for 7 days.   She denies any relief with edema. Scant amount of edema present. We will get venous reflux study and BMP. Hypertension   - Stable, continue with Apresoline 75 mg 3 times daily,Toprol-XL 25 mg daily, Cardizem 120 mg daily    Hyperlipidemia LDL goal <100   -At or near goal Yes, lab slip given   -She is to continue current medications (Lipitor 80 mg) Hepatic function panel WNL. No abdominal pain. No myalgias.     -The nature of cardiac risk has been fully discussed with this patient. I have made her aware of her LDL target goal given her cardiovascular risk analysis. I have discussed the appropriate diet. The need for lifelong compliance in order to reduce risk is stressed. A regular exercise program is recommended to help achieve and maintain normal body weight, fitness and improve lipid balance. A written copy of a low fat, low cholesterol diet has been given to the patient. Obesity  -Discussed the importance of diet and exercise and assisting with weight loss. Patient informed of ideal body weight and high risk mortality associated with obesity. Patient voices understanding. Patient seen, interviewed and examined. Testing was reviewed. Patient is encouraged to exercise even a brisk walk for 30 minutes at least 3 to 4 times a week. Lifestyle and risk factor modificatons discussed. Various goals are discussed and questions answered. Continue current medications. Appropriate prescriptions are addressed. Questions answered and patient verbalizes understanding. Call for any problems, questions, or concerns. Pt is to follow up in 1 months for Cardiac management    Electronically signed by [facility].  BRIAN Alvarez CNP on 6/3/2022 at 4:34 PM

## 2022-06-03 NOTE — PATIENT INSTRUCTIONS
Please be informed that if you contact our office outside of normal business hours the physician on call cannot help with any scheduling or rescheduling issues, procedure instruction questions or any type of medication issue. We advise you for any urgent/emergency that you go to the nearest emergency room! PLEASE CALL OUR OFFICE DURING NORMAL BUSINESS HOURS    Monday - Friday   8 am to 5 pm    [place]: [phone]    [place]:  [phone]    **It is YOUR responsibilty to bring medication bottles and/or updated medication list to [address] [place] [id number].  This will allow us to better serve you and all your healthcare needs**

## 2022-06-03 NOTE — ASSESSMENT & PLAN NOTE
- Stable, continue with Apresoline 75 mg 3 times daily, Lasix 20 mg daily, Toprol-XL 25 mg daily, Cardizem 120 mg daily

## 2022-06-06 ENCOUNTER — HOSPITAL ENCOUNTER (OUTPATIENT)
Age: 62
Discharge: HOME | End: 2022-06-06

## 2022-06-07 ENCOUNTER — HOSPITAL ENCOUNTER (OUTPATIENT)
Age: 62
Discharge: HOME | End: 2022-06-07
Payer: COMMERCIAL

## 2022-06-07 LAB
ANION GAP SERPL CALCULATED.3IONS-SCNC: 8 MMOL/L (ref 4–16)
BUN BLDV-MCNC: 8 MG/DL (ref 6–23)
CALCIUM SERPL-MCNC: 9.6 MG/DL (ref 8.3–10.6)
CHLORIDE BLD-SCNC: 92 MMOL/L (ref 99–110)
CHOLESTEROL: 192 MG/DL
CO2: 36 MMOL/L (ref 21–32)
CREAT SERPL-MCNC: 0.7 MG/DL (ref 0.6–1.1)
GFR AFRICAN AMERICAN: >60 ML/MIN/1.73M2
GFR NON-AFRICAN AMERICAN: >60 ML/MIN/1.73M2
GLUCOSE BLD-MCNC: 96 MG/DL (ref 70–99)
HDLC SERPL-MCNC: 67 MG/DL
LDL CHOLESTEROL CALCULATED: 110 MG/DL
MAGNESIUM: 1.8 MG/DL (ref 1.8–2.4)
POTASSIUM SERPL-SCNC: 4.3 MMOL/L (ref 3.5–5.1)
SODIUM BLD-SCNC: 136 MMOL/L (ref 135–145)
TRIGL SERPL-MCNC: 77 MG/DL

## 2022-06-07 PROCEDURE — 83735 ASSAY OF MAGNESIUM: CPT

## 2022-06-07 PROCEDURE — 80048 BASIC METABOLIC PNL TOTAL CA: CPT

## 2022-06-07 PROCEDURE — 36415 COLL VENOUS BLD VENIPUNCTURE: CPT

## 2022-06-07 PROCEDURE — 80061 LIPID PANEL: CPT

## 2022-06-08 ENCOUNTER — TELEPHONE (OUTPATIENT)
Dept: CARDIOLOGY CLINIC | Age: 62
End: 2022-06-08

## 2022-06-08 NOTE — TELEPHONE ENCOUNTER
----- Message from [address] [place] [address]. BRIAN Dean - CNP sent at 6/8/2022 11:15 AM EDT -----  No changes to current medications at this time    Pt answered, relayed lab results and to continue current medications. Pt verbally understood.   [address] [facility]

## 2022-06-13 ENCOUNTER — OFFICE VISIT (OUTPATIENT)
Dept: CARDIOLOGY CLINIC | Age: 62
End: 2022-06-13
Payer: COMMERCIAL

## 2022-06-13 VITALS
WEIGHT: 207.5 LBS | OXYGEN SATURATION: 91 % | HEIGHT: 61 IN | HEART RATE: 97 BPM | DIASTOLIC BLOOD PRESSURE: 76 MMHG | RESPIRATION RATE: 14 BRPM | BODY MASS INDEX: 39.18 KG/M2 | SYSTOLIC BLOOD PRESSURE: 136 MMHG

## 2022-06-13 DIAGNOSIS — M79.89 LEG SWELLING: Primary | ICD-10-CM

## 2022-06-13 PROCEDURE — 3017F COLORECTAL CA SCREEN DOC REV: CPT | Performed by: INTERNAL MEDICINE

## 2022-06-13 PROCEDURE — G8427 DOCREV CUR MEDS BY ELIG CLIN: HCPCS | Performed by: INTERNAL MEDICINE

## 2022-06-13 PROCEDURE — G8417 CALC BMI ABV UP PARAM F/U: HCPCS | Performed by: INTERNAL MEDICINE

## 2022-06-13 PROCEDURE — 1036F TOBACCO NON-USER: CPT | Performed by: INTERNAL MEDICINE

## 2022-06-13 PROCEDURE — 99214 OFFICE O/P EST MOD 30 MIN: CPT | Performed by: INTERNAL MEDICINE

## 2022-06-13 NOTE — PROGRESS NOTES
Vero Deleon MD        OFFICE  FOLLOWUP NOTE    Chief complaints: patient is here for management of leg swelling,SVT, bipolar, fibromyalgia, chest pain, rt lung, end stage lung disease and COPD, pericardial drainage and [facility],? PAF, bleeding gastritis. Intracranial hemorrhage      F/u after [facility] ED FOR COPD exacerbation (not CHF), ECHO WAS  NORMAL, CT HEAD ALSO DONE AND CTA ruled out    Subjective: +  palpitations    HPI Kamari Oquendo is a 63 y. o.year old who  has a past medical history of Anemia, Anxiety, Arthritis, Back pain at L4-L5 level, Bipolar 1 disorder (HCC), COPD (chronic obstructive pulmonary disease) ([address] Emphysema of lung ([address] FH: CAD (coronary artery disease), Fibromyalgia, Full dentures, Gastric ulcer, H/O Doppler ultrasound, H/O echocardiogram, History of blood transfusion, Hyperlipidemia LDL goal <100, Hypertension, Irregular heartbeat, Obstructive sleep apnea, On home oxygen therapy, Osteoarthritis, Pericardial effusion, Spinal stenosis, Thyroid disease, and Wears glasses. and presents for management of leg swelling,SVT, bipolar, fibromyalgia, chest pain, rt lung, end stage lung disease and COPD, pericardial drainage and [facility],? PAF, bleeding gastritis. Intracranial hemorrhage , which are well controlled    She feels palpitations some times,her left knee sx was cancelled. Current Outpatient Medications   Medication Sig Dispense Refill    gabapentin (NEURONTIN) 300 MG capsule Take 300 mg by mouth 3 times daily.       methocarbamol (ROBAXIN) 500 MG tablet Take 500 mg by mouth in the morning and at bedtime      albuterol sulfate  (90 Base) MCG/ACT inhaler 01/29/2014 Albuterol HFA Inhaler 90 mcg/actuation  By inhalation 2.0 INHALATION(S) as directed  01/29/2014  active      esomeprazole (NEXIUM) 40 MG delayed release capsule 1 capsule      Ferrous Fumarate 324 (106 Fe) MG TABS Take one tablet 2 times a day      MAGNESIUM-OXIDE 400 (241.3 Mg) MG TABS tablet       cyclobenzaprine (FLEXERIL) 5 MG tablet TAKE 1 TO 2 TABLETS BY MOUTH EVERY DAY AS NEEDED      budesonide-formoterol (SYMBICORT) 160-4.5 MCG/ACT AERO Inhale 2 puffs into the lungs 2 times daily 3 each 3    albuterol-ipratropium (COMBIVENT RESPIMAT)  MCG/ACT AERS inhaler Inhale 1 puff into the lungs every 4 hours as needed for Wheezing 3 each 3    ipratropium-albuterol (DUONEB) 0.5-2.5 (3) MG/3ML SOLN nebulizer solution Take 3 mLs by nebulization 4 times daily 360 mL 3    pantoprazole (PROTONIX) 40 MG tablet Take 1 tablet by mouth every morning (before breakfast) 90 tablet 0    sucralfate (CARAFATE) 1 GM/10ML suspension Take 10 mLs by mouth 4 times daily 420 mL 0    tiZANidine (ZANAFLEX) 2 MG tablet Take 2 mg by mouth 3 times daily as needed      magnesium oxide (MAG-OX) 400 MG tablet Take 1 tablet by mouth daily 30 tablet 1    potassium chloride (KLOR-CON M) 20 MEQ extended release tablet Take 1 tablet by mouth daily 90 tablet 1    atorvastatin (LIPITOR) 80 MG tablet Take 80 mg by mouth daily      dicyclomine (BENTYL) 10 MG capsule Take 2 capsules by mouth 4 times daily (before meals and nightly) 40 capsule 0    ondansetron (ZOFRAN ODT) 4 MG disintegrating tablet Take 1 tablet by mouth every 6 hours 20 tablet 0    diclofenac sodium (VOLTAREN) 1 % GEL Apply 4 g topically 4 times daily 350 g 2    hydrALAZINE (APRESOLINE) 50 MG tablet Take 1.5 tablets by mouth 3 times daily 135 tablet 5    acetaminophen (TYLENOL 8 HOUR ARTHRITIS PAIN) 650 MG extended release tablet Take 650 mg by mouth as needed for Pain      vitamin D (CHOLECALCIFEROL) 25 MCG (1000 UT) TABS tablet Take 1,000 Units by mouth daily      busPIRone (BUSPAR) 10 MG tablet Take 1 tablet by mouth 2 times daily 1 tablet 0    ferrous sulfate (IRON 325) 325 (65 Fe) MG tablet Take 1 tablet by mouth every other day Indications: pt taking every day bid Monday, wed, fri (Patient taking differently: Take 325 mg by mouth 2 times daily Indications: venous- no DVT or reflux    H/O echocardiogram 2015    EF50-55% Normal- see media    History of blood transfusion     Hyperlipidemia LDL goal <100     Hypertension     Dr Desire Garcia Irregular heartbeat     \"they said I have irreg heart beat before- back when they drained fluid around my heart \"    Obstructive sleep apnea     \"have bipap I use at home\"    On home oxygen therapy     \"on oxygen all the time at home and keep it on 2.5 liters\"    Osteoarthritis     Pericardial effusion     per old chart had pericardial effusion 10/2020    Spinal stenosis     hx per old chart    Thyroid disease     Wears glasses     \"suppose to wear glasses\"     Past Surgical History:   Procedure Laterality Date    BACK SURGERY  2017    \"surgery on low back L5-6- not sure if they put any metal in \"   [address]      10/2019    CARDIAC CATHETERIZATION      per old chart had cath done 2020    CARPAL TUNNEL RELEASE Right 1985    CHOLECYSTECTOMY, LAPAROSCOPIC N/A 10/25/2020    CHOLECYSTECTOMY LAPAROSCOPIC WITH IOC performed by Janine Duenas MD at [place] [address] COLONOSCOPY N/A 2019    COLONOSCOPY DIAGNOSTIC performed by Andria Jo MD at [address] [facility], DIAGNOSTIC      per old chart had egd done 2018    TUBAL LIGATION  1987    UPPER GASTROINTESTINAL ENDOSCOPY N/A 2021    EGD BIOPSY performed by Andria Jo MD at [facility] ENDOSCOPY     Family History   Problem Relation Age of Onset    Asthma Mother         COPD    Heart Disease Father      Social History     Tobacco Use    Smoking status: Former Smoker     Packs/day: 1.50     Years: 20.00     Pack years: 30.00     Types: Cigarettes     Quit date: 2008     Years since quittin.4    Smokeless tobacco: Never Used   Substance Use Topics    Alcohol use: Not Currently     Alcohol/week: 2.0 standard drinks     Types: 2 Shots of liquor per week     Comment: per pt on 2021\"quit drinking back Oct 2020\"use to drink wine coolers - 3 times per week \"/caffeine 2-3 coffees aday 1 pop b      [unfilled]  Review of Systems:   · Constitutional: No Fever or Weight Loss   · Eyes: No Decreased Vision  · ENT: No Headaches, Hearing Loss or Vertigo  · Cardiovascular: No chest pain, dyspnea on exertion, +palpitations or loss of consciousness  · Respiratory: No cough or wheezing    · Gastrointestinal: No abdominal pain, appetite loss, blood in stools, constipation, diarrhea or heartburn  · Genitourinary: No dysuria, trouble voiding, or hematuria  · Musculoskeletal:  No gait disturbance, weakness or joint complaints  · Integumentary: No rash or pruritis  · Neurological: No TIA or stroke symptoms  · Psychiatric: No anxiety or depression  · Endocrine: No malaise, fatigue or temperature intolerance  · Hematologic/Lymphatic: No bleeding problems, blood clots or swollen lymph nodes  · Allergic/Immunologic: No nasal congestion or hives  All systems negative except as marked. Objective:  /76   Pulse 97   Resp 14   Ht 5' 1\" (1.549 m)   Wt 207 lb 8 oz (94.1 kg)   LMP 01/05/2010   SpO2 91%   Breastfeeding No   BMI 39.21 kg/m²   Wt Readings from Last 3 Encounters:   06/13/22 207 lb 8 oz (94.1 kg)   06/03/22 204 lb 6.4 oz (92.7 kg)   04/19/22 213 lb (96.6 kg)     Body mass index is 39.21 kg/m². GENERAL - Alert, oriented, pleasant, in no apparent distress,normal grooming  HEENT  pupils are intact, cornea intact, conjunctive normal color, ears are normal in exam,  Neck - Supple. No jugular venous distention noted. No carotid bruits, no apical -carotid delay  Respiratory: reduced breath sounds, No respiratory distress, No wheezing, No chest tenderness. ,no use of accessory muscles, diaphragm movement is normal  Cardiovascular: (PMI) apex non displaced,no lifts no thrills, no s3,no s4, Normal heart rate, Normal rhythm, No murmurs, No rubs, No gallops.  Carotid arteries pulse and amplitude are normal no bruit, no abdominal bruit noted ( normal abdominal aorta ausculation),   Extremities - No cyanosis, clubbing, or significant edema, no varicose veins    Abdomen  No masses, tenderness, or organomegaly, no hepato-splenomegally, no bruits  Musculoskeletal }+ Mild edema, no kyphosis or scoliosis, no deformity in any extremity noted, muscle strength and tone are normal  Skin: no ulcer,no scar,no stasis dermatitis   Neurologic  alert oriented times 3,Cranial nerves II through XII are grossly intact. There were no gross focal neurologic abnormalities. Psychiatric: normal mood and affect    Lab Results   Component Value Date    CKTOTAL 126 05/03/2021    TROPONINI 0.007 03/25/2014     BNP:  No results found for: BNP  PT/INR:  No results found for: [employer]  Lab Results   Component Value Date    LABA1C 5.1 06/21/2019     Lab Results   Component Value Date    CHOL 192 06/07/2022    TRIG 77 06/07/2022    HDL 67 06/07/2022    LDLCALC 110 (H) 06/07/2022    LDLDIRECT 87 11/23/2020     Lab Results   Component Value Date    ALT 12 03/14/2022    AST 17 03/14/2022     TSH:  No results found for: TSH    Impression:  Nitza Cao is a 63 y. o.year old who  has a past medical history of Anemia, Anxiety, Arthritis, Back pain at L4-L5 level, Bipolar 1 disorder (HCC), COPD (chronic obstructive pulmonary disease) ([address] Emphysema of lung ([facility] FH: CAD (coronary artery disease), Fibromyalgia, Full dentures, Gastric ulcer, H/O Doppler ultrasound, H/O echocardiogram, History of blood transfusion, Hyperlipidemia LDL goal <100, Hypertension, Irregular heartbeat, Obstructive sleep apnea, On home oxygen therapy, Osteoarthritis, Pericardial effusion, Spinal stenosis, Thyroid disease, and Wears glasses. and presents with     Plan:  1. Intracranial hemorrhage: stable, OFF aspirin  2. preo for knee: it was cancelled, she was already cleared on moderate risk  3. Palpitations: she had 3 episiodes, so far, she declined heart monitor  4. preop for cataract: ok for sx  5.  CHF, [facility] noted reviewed use lasix PRN  LAST YR [facility] at [facility] showed fluid overload with PCWP of 35,, she should lose 10 lbs,   6. Leg swelling: venous doppler ordered  7. HTN: continue toprol xl 25mg daily,  8. Bleeding gastritis and anemia: on aspirin, need to be carefull, see Dr. Xavier Christianson  9. ? PAF, OFF aspirin, change cardizem to cardizem 120mg cd, RECHECKed ECHO is normal  10. Normal LHC  11. S/p pericardiocentesis  12. endstage  Lung disease: on home oxygen  13. Sinus tachycardia: seen by EP  In hospital, had SVT and sinus tachycardia and was started on cardizem, will continue it  14. End stage COPD:continue oxygen stable, continue inhalers, and steroids  15. Bipolar: stable  Fibromyalgia: stableHealth All labs, medications and tests reviewed, All labs, medications and tests reviewed, continue all other medications of all above medical condition listed as is.     [unfilled]

## 2022-06-26 ENCOUNTER — HOSPITAL ENCOUNTER (INPATIENT)
Age: 62
LOS: 4 days | Discharge: HOME | DRG: 190 | End: 2022-06-30
Attending: EMERGENCY MEDICINE | Admitting: INTERNAL MEDICINE
Payer: COMMERCIAL

## 2022-06-26 ENCOUNTER — APPOINTMENT (OUTPATIENT)
Dept: GENERAL RADIOLOGY | Age: 62
DRG: 190 | End: 2022-06-26
Payer: COMMERCIAL

## 2022-06-26 ENCOUNTER — APPOINTMENT (OUTPATIENT)
Dept: CT IMAGING | Age: 62
DRG: 190 | End: 2022-06-26
Payer: COMMERCIAL

## 2022-06-26 DIAGNOSIS — R00.2 PALPITATIONS: ICD-10-CM

## 2022-06-26 DIAGNOSIS — R94.31 QT PROLONGATION: ICD-10-CM

## 2022-06-26 DIAGNOSIS — J44.1 COPD EXACERBATION (HCC): ICD-10-CM

## 2022-06-26 DIAGNOSIS — R06.00 DYSPNEA, UNSPECIFIED TYPE: Primary | ICD-10-CM

## 2022-06-26 DIAGNOSIS — R79.89 D-DIMER, ELEVATED: ICD-10-CM

## 2022-06-26 DIAGNOSIS — R77.8 TROPONIN LEVEL ELEVATED: ICD-10-CM

## 2022-06-26 PROBLEM — J96.21 ACUTE ON CHRONIC RESPIRATORY FAILURE WITH HYPOXIA (HCC): Status: ACTIVE | Noted: 2022-01-01

## 2022-06-26 LAB
ADENOVIRUS DETECTION BY PCR: NOT DETECTED
ALBUMIN SERPL-MCNC: 4.1 GM/DL (ref 3.4–5)
ALP BLD-CCNC: 96 IU/L (ref 40–129)
ALT SERPL-CCNC: 17 U/L (ref 10–40)
ANION GAP SERPL CALCULATED.3IONS-SCNC: 7 MMOL/L (ref 4–16)
AST SERPL-CCNC: 14 IU/L (ref 15–37)
BACTERIA: NEGATIVE /HPF
BASE EXCESS MIXED: 14.5 (ref 0–2.3)
BASOPHILS ABSOLUTE: 0.1 K/CU MM
BASOPHILS RELATIVE PERCENT: 0.4 % (ref 0–1)
BILIRUB SERPL-MCNC: 0.1 MG/DL (ref 0–1)
BILIRUBIN URINE: NEGATIVE MG/DL
BLOOD, URINE: NEGATIVE
BORDETELLA PARAPERTUSSIS BY PCR: NOT DETECTED
BORDETELLA PERTUSSIS PCR: NOT DETECTED
BUN BLDV-MCNC: 8 MG/DL (ref 6–23)
CALCIUM SERPL-MCNC: 9.6 MG/DL (ref 8.3–10.6)
CHLAMYDOPHILA PNEUMONIA PCR: NOT DETECTED
CHLORIDE BLD-SCNC: 96 MMOL/L (ref 99–110)
CLARITY: CLEAR
CO2: 34 MMOL/L (ref 21–32)
COLOR: YELLOW
CORONAVIRUS 229E PCR: NOT DETECTED
CORONAVIRUS HKU1 PCR: NOT DETECTED
CORONAVIRUS NL63 PCR: NOT DETECTED
CORONAVIRUS OC43 PCR: NOT DETECTED
CREAT SERPL-MCNC: 0.7 MG/DL (ref 0.6–1.1)
D DIMER: 473 NG/ML(DDU)
DIFFERENTIAL TYPE: ABNORMAL
DOSE AMOUNT: ABNORMAL
DOSE TIME: ABNORMAL
EOSINOPHILS ABSOLUTE: 0.3 K/CU MM
EOSINOPHILS RELATIVE PERCENT: 2.1 % (ref 0–3)
GFR AFRICAN AMERICAN: >60 ML/MIN/1.73M2
GFR NON-AFRICAN AMERICAN: >60 ML/MIN/1.73M2
GLUCOSE BLD-MCNC: 111 MG/DL (ref 70–99)
GLUCOSE, URINE: NEGATIVE MG/DL
HCO3 VENOUS: 43 MMOL/L (ref 19–25)
HCT VFR BLD CALC: 36 % (ref 37–47)
HEMOGLOBIN: 10.6 GM/DL (ref 12.5–16)
HUMAN METAPNEUMOVIRUS PCR: NOT DETECTED
IMMATURE NEUTROPHIL %: 0.4 % (ref 0–0.43)
INFLUENZA A BY PCR: NOT DETECTED
INFLUENZA A H1 (2009) PCR: NOT DETECTED
INFLUENZA A H1 PANDEMIC PCR: NOT DETECTED
INFLUENZA A H3 PCR: NOT DETECTED
INFLUENZA B BY PCR: NOT DETECTED
KETONES, URINE: NEGATIVE MG/DL
LEUKOCYTE ESTERASE, URINE: ABNORMAL
LIPASE: 21 IU/L (ref 13–60)
LYMPHOCYTES ABSOLUTE: 1.2 K/CU MM
LYMPHOCYTES RELATIVE PERCENT: 9.5 % (ref 24–44)
MAGNESIUM: 1.9 MG/DL (ref 1.8–2.4)
MCH RBC QN AUTO: 29.4 PG (ref 27–31)
MCHC RBC AUTO-ENTMCNC: 29.4 % (ref 32–36)
MCV RBC AUTO: 99.7 FL (ref 78–100)
MONOCYTES ABSOLUTE: 1.1 K/CU MM
MONOCYTES RELATIVE PERCENT: 8.5 % (ref 0–4)
MYCOPLASMA PNEUMONIAE PCR: NOT DETECTED
NITRITE URINE, QUANTITATIVE: NEGATIVE
NUCLEATED RBC %: 0 %
O2 SAT, VEN: 95.7 % (ref 50–70)
PARAINFLUENZA 1 PCR: NOT DETECTED
PARAINFLUENZA 2 PCR: NOT DETECTED
PARAINFLUENZA 3 PCR: NOT DETECTED
PARAINFLUENZA 4 PCR: NOT DETECTED
PCO2, VEN: 71 MMHG (ref 38–52)
PDW BLD-RTO: 13.2 % (ref 11.7–14.9)
PH VENOUS: 7.39 (ref 7.32–7.42)
PH, URINE: 6 (ref 5–8)
PLATELET # BLD: 229 K/CU MM (ref 140–440)
PMV BLD AUTO: 10 FL (ref 7.5–11.1)
PO2, VEN: 190 MMHG (ref 28–48)
POTASSIUM SERPL-SCNC: 3.8 MMOL/L (ref 3.5–5.1)
PRO-BNP: 426.9 PG/ML
PROTEIN UA: NEGATIVE MG/DL
RAPID INFLUENZA  B AGN: NEGATIVE
RAPID INFLUENZA A AGN: NEGATIVE
RBC # BLD: 3.61 M/CU MM (ref 4.2–5.4)
RBC URINE: ABNORMAL /HPF (ref 0–6)
RHINOVIRUS ENTEROVIRUS PCR: NOT DETECTED
RSV PCR: NOT DETECTED
SARS-COV-2, NAAT: NOT DETECTED
SARS-COV-2: NOT DETECTED
SEGMENTED NEUTROPHILS ABSOLUTE COUNT: 10 K/CU MM
SEGMENTED NEUTROPHILS RELATIVE PERCENT: 79.1 % (ref 36–66)
SODIUM BLD-SCNC: 137 MMOL/L (ref 135–145)
SOURCE: NORMAL
SPECIFIC GRAVITY UA: <1.005 (ref 1–1.03)
SQUAMOUS EPITHELIAL: 1 /HPF
T4 FREE: 1.19 NG/DL (ref 0.9–1.8)
THEOPHYLLINE LEVEL: 8.3 UG/ML (ref 10–20)
TOTAL IMMATURE NEUTOROPHIL: 0.05 K/CU MM
TOTAL NUCLEATED RBC: 0 K/CU MM
TOTAL PROTEIN: 6.9 GM/DL (ref 6.4–8.2)
TRANSITIONAL EPITHELIAL: <1 /HPF
TRICHOMONAS: ABNORMAL /HPF
TROPONIN T: 0.03 NG/ML
TROPONIN T: <0.01 NG/ML
TROPONIN T: <0.01 NG/ML
TSH HIGH SENSITIVITY: 15.19 UIU/ML (ref 0.27–4.2)
UROBILINOGEN, URINE: 0.2 MG/DL (ref 0.2–1)
WBC # BLD: 12.7 K/CU MM (ref 4–10.5)
WBC UA: <1 /HPF (ref 0–5)

## 2022-06-26 PROCEDURE — 2700000000 HC OXYGEN THERAPY PER DAY

## 2022-06-26 PROCEDURE — 6370000000 HC RX 637 (ALT 250 FOR IP): Performed by: INTERNAL MEDICINE

## 2022-06-26 PROCEDURE — 71045 X-RAY EXAM CHEST 1 VIEW: CPT

## 2022-06-26 PROCEDURE — 6370000000 HC RX 637 (ALT 250 FOR IP): Performed by: NURSE PRACTITIONER

## 2022-06-26 PROCEDURE — 6360000002 HC RX W HCPCS: Performed by: EMERGENCY MEDICINE

## 2022-06-26 PROCEDURE — 96365 THER/PROPH/DIAG IV INF INIT: CPT

## 2022-06-26 PROCEDURE — 85025 COMPLETE CBC W/AUTO DIFF WBC: CPT

## 2022-06-26 PROCEDURE — 94640 AIRWAY INHALATION TREATMENT: CPT

## 2022-06-26 PROCEDURE — 84443 ASSAY THYROID STIM HORMONE: CPT

## 2022-06-26 PROCEDURE — 94761 N-INVAS EAR/PLS OXIMETRY MLT: CPT

## 2022-06-26 PROCEDURE — 71275 CT ANGIOGRAPHY CHEST: CPT

## 2022-06-26 PROCEDURE — 81001 URINALYSIS AUTO W/SCOPE: CPT

## 2022-06-26 PROCEDURE — 96375 TX/PRO/DX INJ NEW DRUG ADDON: CPT

## 2022-06-26 PROCEDURE — 87804 INFLUENZA ASSAY W/OPTIC: CPT

## 2022-06-26 PROCEDURE — 36415 COLL VENOUS BLD VENIPUNCTURE: CPT

## 2022-06-26 PROCEDURE — 82805 BLOOD GASES W/O2 SATURATION: CPT

## 2022-06-26 PROCEDURE — 99285 EMERGENCY DEPT VISIT HI MDM: CPT

## 2022-06-26 PROCEDURE — 6360000002 HC RX W HCPCS: Performed by: NURSE PRACTITIONER

## 2022-06-26 PROCEDURE — 84439 ASSAY OF FREE THYROXINE: CPT

## 2022-06-26 PROCEDURE — 93005 ELECTROCARDIOGRAM TRACING: CPT | Performed by: EMERGENCY MEDICINE

## 2022-06-26 PROCEDURE — 6370000000 HC RX 637 (ALT 250 FOR IP): Performed by: EMERGENCY MEDICINE

## 2022-06-26 PROCEDURE — 87635 SARS-COV-2 COVID-19 AMP PRB: CPT

## 2022-06-26 PROCEDURE — 83880 ASSAY OF NATRIURETIC PEPTIDE: CPT

## 2022-06-26 PROCEDURE — 80198 ASSAY OF THEOPHYLLINE: CPT

## 2022-06-26 PROCEDURE — 94660 CPAP INITIATION&MGMT: CPT

## 2022-06-26 PROCEDURE — 84484 ASSAY OF TROPONIN QUANT: CPT

## 2022-06-26 PROCEDURE — 83735 ASSAY OF MAGNESIUM: CPT

## 2022-06-26 PROCEDURE — 0202U NFCT DS 22 TRGT SARS-COV-2: CPT

## 2022-06-26 PROCEDURE — 2580000003 HC RX 258: Performed by: NURSE PRACTITIONER

## 2022-06-26 PROCEDURE — 83690 ASSAY OF LIPASE: CPT

## 2022-06-26 PROCEDURE — 6360000004 HC RX CONTRAST MEDICATION: Performed by: EMERGENCY MEDICINE

## 2022-06-26 PROCEDURE — 6360000002 HC RX W HCPCS: Performed by: INTERNAL MEDICINE

## 2022-06-26 PROCEDURE — 1200000000 HC SEMI PRIVATE

## 2022-06-26 PROCEDURE — 85379 FIBRIN DEGRADATION QUANT: CPT

## 2022-06-26 PROCEDURE — 80053 COMPREHEN METABOLIC PANEL: CPT

## 2022-06-26 PROCEDURE — 96366 THER/PROPH/DIAG IV INF ADDON: CPT

## 2022-06-26 RX ORDER — IPRATROPIUM BROMIDE AND ALBUTEROL SULFATE 2.5; .5 MG/3ML; MG/3ML
1 SOLUTION RESPIRATORY (INHALATION)
Status: DISCONTINUED | OUTPATIENT
Start: 2022-06-26 | End: 2022-06-26

## 2022-06-26 RX ORDER — ONDANSETRON 2 MG/ML
4 INJECTION INTRAMUSCULAR; INTRAVENOUS EVERY 6 HOURS PRN
Status: DISCONTINUED | OUTPATIENT
Start: 2022-06-26 | End: 2022-06-30 | Stop reason: HOSPADM

## 2022-06-26 RX ORDER — METOPROLOL SUCCINATE 25 MG/1
25 TABLET, EXTENDED RELEASE ORAL DAILY
Status: DISCONTINUED | OUTPATIENT
Start: 2022-06-26 | End: 2022-06-30 | Stop reason: HOSPADM

## 2022-06-26 RX ORDER — LEVOTHYROXINE SODIUM 0.1 MG/1
100 TABLET ORAL DAILY
Status: DISCONTINUED | OUTPATIENT
Start: 2022-06-26 | End: 2022-06-30 | Stop reason: HOSPADM

## 2022-06-26 RX ORDER — GABAPENTIN 300 MG/1
300 CAPSULE ORAL 3 TIMES DAILY
Status: DISCONTINUED | OUTPATIENT
Start: 2022-06-26 | End: 2022-06-30 | Stop reason: HOSPADM

## 2022-06-26 RX ORDER — LIDOCAINE 4 G/G
1 PATCH TOPICAL DAILY
Status: DISCONTINUED | OUTPATIENT
Start: 2022-06-26 | End: 2022-06-30 | Stop reason: HOSPADM

## 2022-06-26 RX ORDER — GUAIFENESIN 100 MG/5ML
200 SOLUTION ORAL EVERY 4 HOURS PRN
Status: DISCONTINUED | OUTPATIENT
Start: 2022-06-26 | End: 2022-06-27

## 2022-06-26 RX ORDER — ASPIRIN 81 MG/1
324 TABLET, CHEWABLE ORAL ONCE
Status: COMPLETED | OUTPATIENT
Start: 2022-06-26 | End: 2022-06-26

## 2022-06-26 RX ORDER — MAGNESIUM SULFATE IN WATER 40 MG/ML
2000 INJECTION, SOLUTION INTRAVENOUS ONCE
Status: COMPLETED | OUTPATIENT
Start: 2022-06-26 | End: 2022-06-26

## 2022-06-26 RX ORDER — LANOLIN ALCOHOL/MO/W.PET/CERES
400 CREAM (GRAM) TOPICAL DAILY
Status: DISCONTINUED | OUTPATIENT
Start: 2022-06-26 | End: 2022-06-30 | Stop reason: HOSPADM

## 2022-06-26 RX ORDER — IPRATROPIUM BROMIDE AND ALBUTEROL SULFATE 2.5; .5 MG/3ML; MG/3ML
1 SOLUTION RESPIRATORY (INHALATION) EVERY 4 HOURS PRN
Status: DISCONTINUED | OUTPATIENT
Start: 2022-06-26 | End: 2022-06-27

## 2022-06-26 RX ORDER — ACETAMINOPHEN 325 MG/1
650 TABLET ORAL PRN
Status: DISCONTINUED | OUTPATIENT
Start: 2022-06-26 | End: 2022-06-30 | Stop reason: HOSPADM

## 2022-06-26 RX ORDER — TRAZODONE HYDROCHLORIDE 50 MG/1
100 TABLET ORAL NIGHTLY
Status: DISCONTINUED | OUTPATIENT
Start: 2022-06-26 | End: 2022-06-30 | Stop reason: HOSPADM

## 2022-06-26 RX ORDER — LANOLIN ALCOHOL/MO/W.PET/CERES
100 CREAM (GRAM) TOPICAL DAILY
Status: DISCONTINUED | OUTPATIENT
Start: 2022-06-27 | End: 2022-06-30 | Stop reason: HOSPADM

## 2022-06-26 RX ORDER — SODIUM CHLORIDE 9 MG/ML
INJECTION, SOLUTION INTRAVENOUS PRN
Status: DISCONTINUED | OUTPATIENT
Start: 2022-06-26 | End: 2022-06-30 | Stop reason: HOSPADM

## 2022-06-26 RX ORDER — PREDNISONE 20 MG/1
40 TABLET ORAL DAILY
Status: DISCONTINUED | OUTPATIENT
Start: 2022-06-28 | End: 2022-06-30 | Stop reason: HOSPADM

## 2022-06-26 RX ORDER — CLONAZEPAM 1 MG/1
1 TABLET ORAL 3 TIMES DAILY PRN
Status: DISCONTINUED | OUTPATIENT
Start: 2022-06-26 | End: 2022-06-27

## 2022-06-26 RX ORDER — SODIUM CHLORIDE 0.9 % (FLUSH) 0.9 %
5-40 SYRINGE (ML) INJECTION EVERY 12 HOURS SCHEDULED
Status: DISCONTINUED | OUTPATIENT
Start: 2022-06-26 | End: 2022-06-30 | Stop reason: HOSPADM

## 2022-06-26 RX ORDER — ACETAMINOPHEN 325 MG/1
650 TABLET ORAL EVERY 6 HOURS PRN
Status: DISCONTINUED | OUTPATIENT
Start: 2022-06-26 | End: 2022-06-26

## 2022-06-26 RX ORDER — OXYCODONE HYDROCHLORIDE AND ACETAMINOPHEN 5; 325 MG/1; MG/1
1 TABLET ORAL EVERY 6 HOURS PRN
Status: DISCONTINUED | OUTPATIENT
Start: 2022-06-26 | End: 2022-06-30 | Stop reason: HOSPADM

## 2022-06-26 RX ORDER — ALBUTEROL SULFATE 90 UG/1
2 AEROSOL, METERED RESPIRATORY (INHALATION) EVERY 6 HOURS PRN
Status: DISCONTINUED | OUTPATIENT
Start: 2022-06-26 | End: 2022-06-30 | Stop reason: HOSPADM

## 2022-06-26 RX ORDER — GUAIFENESIN 600 MG/1
600 TABLET, EXTENDED RELEASE ORAL 2 TIMES DAILY
Status: DISCONTINUED | OUTPATIENT
Start: 2022-06-26 | End: 2022-06-30 | Stop reason: HOSPADM

## 2022-06-26 RX ORDER — FERROUS SULFATE 325(65) MG
325 TABLET ORAL 2 TIMES DAILY WITH MEALS
Status: DISCONTINUED | OUTPATIENT
Start: 2022-06-26 | End: 2022-06-30 | Stop reason: HOSPADM

## 2022-06-26 RX ORDER — ENOXAPARIN SODIUM 100 MG/ML
40 INJECTION SUBCUTANEOUS DAILY
Status: DISCONTINUED | OUTPATIENT
Start: 2022-06-26 | End: 2022-06-30 | Stop reason: HOSPADM

## 2022-06-26 RX ORDER — HYDROCODONE BITARTRATE AND ACETAMINOPHEN 5; 325 MG/1; MG/1
1 TABLET ORAL ONCE
Status: COMPLETED | OUTPATIENT
Start: 2022-06-26 | End: 2022-06-26

## 2022-06-26 RX ORDER — BUDESONIDE AND FORMOTEROL FUMARATE DIHYDRATE 160; 4.5 UG/1; UG/1
2 AEROSOL RESPIRATORY (INHALATION) 2 TIMES DAILY
Status: DISCONTINUED | OUTPATIENT
Start: 2022-06-26 | End: 2022-06-30 | Stop reason: HOSPADM

## 2022-06-26 RX ORDER — METHYLPREDNISOLONE SODIUM SUCCINATE 40 MG/ML
40 INJECTION, POWDER, LYOPHILIZED, FOR SOLUTION INTRAMUSCULAR; INTRAVENOUS EVERY 6 HOURS
Status: COMPLETED | OUTPATIENT
Start: 2022-06-26 | End: 2022-06-28

## 2022-06-26 RX ORDER — GUAIFENESIN 100 MG/5ML
200 SOLUTION ORAL ONCE
Status: COMPLETED | OUTPATIENT
Start: 2022-06-26 | End: 2022-06-26

## 2022-06-26 RX ORDER — POTASSIUM CHLORIDE 20 MEQ/1
20 TABLET, EXTENDED RELEASE ORAL DAILY
Status: DISCONTINUED | OUTPATIENT
Start: 2022-06-26 | End: 2022-06-26

## 2022-06-26 RX ORDER — ALBUTEROL SULFATE 90 UG/1
2 AEROSOL, METERED RESPIRATORY (INHALATION) ONCE
Status: COMPLETED | OUTPATIENT
Start: 2022-06-26 | End: 2022-06-26

## 2022-06-26 RX ORDER — PANTOPRAZOLE SODIUM 40 MG/1
40 TABLET, DELAYED RELEASE ORAL
Status: DISCONTINUED | OUTPATIENT
Start: 2022-06-26 | End: 2022-06-30 | Stop reason: HOSPADM

## 2022-06-26 RX ORDER — PROMETHAZINE HYDROCHLORIDE 12.5 MG/1
12.5 TABLET ORAL EVERY 6 HOURS PRN
Status: DISCONTINUED | OUTPATIENT
Start: 2022-06-26 | End: 2022-06-30 | Stop reason: HOSPADM

## 2022-06-26 RX ORDER — FUROSEMIDE 10 MG/ML
20 INJECTION INTRAMUSCULAR; INTRAVENOUS ONCE
Status: COMPLETED | OUTPATIENT
Start: 2022-06-26 | End: 2022-06-26

## 2022-06-26 RX ORDER — DULOXETIN HYDROCHLORIDE 30 MG/1
60 CAPSULE, DELAYED RELEASE ORAL DAILY
Status: DISCONTINUED | OUTPATIENT
Start: 2022-06-26 | End: 2022-06-30 | Stop reason: HOSPADM

## 2022-06-26 RX ORDER — BUSPIRONE HYDROCHLORIDE 5 MG/1
10 TABLET ORAL 2 TIMES DAILY
Status: DISCONTINUED | OUTPATIENT
Start: 2022-06-26 | End: 2022-06-30 | Stop reason: HOSPADM

## 2022-06-26 RX ORDER — SODIUM CHLORIDE 0.9 % (FLUSH) 0.9 %
5-40 SYRINGE (ML) INJECTION PRN
Status: DISCONTINUED | OUTPATIENT
Start: 2022-06-26 | End: 2022-06-30 | Stop reason: HOSPADM

## 2022-06-26 RX ORDER — DILTIAZEM HYDROCHLORIDE 120 MG/1
120 CAPSULE, COATED, EXTENDED RELEASE ORAL DAILY
Status: DISCONTINUED | OUTPATIENT
Start: 2022-06-26 | End: 2022-06-30 | Stop reason: HOSPADM

## 2022-06-26 RX ORDER — POLYETHYLENE GLYCOL 3350 17 G/17G
17 POWDER, FOR SOLUTION ORAL DAILY PRN
Status: DISCONTINUED | OUTPATIENT
Start: 2022-06-26 | End: 2022-06-30 | Stop reason: HOSPADM

## 2022-06-26 RX ORDER — VITAMIN B COMPLEX
1000 TABLET ORAL DAILY
Status: DISCONTINUED | OUTPATIENT
Start: 2022-06-26 | End: 2022-06-30 | Stop reason: HOSPADM

## 2022-06-26 RX ORDER — FOLIC ACID 1 MG/1
1 TABLET ORAL DAILY
Status: DISCONTINUED | OUTPATIENT
Start: 2022-06-27 | End: 2022-06-30 | Stop reason: HOSPADM

## 2022-06-26 RX ORDER — BENZONATATE 100 MG/1
100 CAPSULE ORAL ONCE
Status: COMPLETED | OUTPATIENT
Start: 2022-06-26 | End: 2022-06-26

## 2022-06-26 RX ORDER — ALBUTEROL SULFATE 90 UG/1
2 AEROSOL, METERED RESPIRATORY (INHALATION) 4 TIMES DAILY
Status: DISCONTINUED | OUTPATIENT
Start: 2022-06-26 | End: 2022-06-27

## 2022-06-26 RX ORDER — MONTELUKAST SODIUM 10 MG/1
10 TABLET ORAL DAILY
Status: DISCONTINUED | OUTPATIENT
Start: 2022-06-26 | End: 2022-06-30 | Stop reason: HOSPADM

## 2022-06-26 RX ORDER — ASPIRIN 81 MG/1
81 TABLET, CHEWABLE ORAL DAILY
Status: DISCONTINUED | OUTPATIENT
Start: 2022-06-27 | End: 2022-06-26

## 2022-06-26 RX ORDER — METHYLPREDNISOLONE SODIUM SUCCINATE 125 MG/2ML
125 INJECTION, POWDER, LYOPHILIZED, FOR SOLUTION INTRAMUSCULAR; INTRAVENOUS ONCE
Status: COMPLETED | OUTPATIENT
Start: 2022-06-26 | End: 2022-06-26

## 2022-06-26 RX ORDER — ATORVASTATIN CALCIUM 40 MG/1
80 TABLET, FILM COATED ORAL NIGHTLY
Status: DISCONTINUED | OUTPATIENT
Start: 2022-06-26 | End: 2022-06-30 | Stop reason: HOSPADM

## 2022-06-26 RX ADMIN — OXYCODONE AND ACETAMINOPHEN 1 TABLET: 5; 325 TABLET ORAL at 18:06

## 2022-06-26 RX ADMIN — ALBUTEROL SULFATE 2 PUFF: 90 AEROSOL, METERED RESPIRATORY (INHALATION) at 11:43

## 2022-06-26 RX ADMIN — FUROSEMIDE 20 MG: 10 INJECTION, SOLUTION INTRAVENOUS at 12:26

## 2022-06-26 RX ADMIN — Medication 400 MG: at 11:58

## 2022-06-26 RX ADMIN — BUSPIRONE HYDROCHLORIDE 10 MG: 5 TABLET ORAL at 11:57

## 2022-06-26 RX ADMIN — ASPIRIN 81 MG CHEWABLE TABLET 324 MG: 81 TABLET CHEWABLE at 05:54

## 2022-06-26 RX ADMIN — DULOXETINE 60 MG: 30 CAPSULE, DELAYED RELEASE ORAL at 12:02

## 2022-06-26 RX ADMIN — GUAIFENESIN 200 MG: 200 SOLUTION ORAL at 04:39

## 2022-06-26 RX ADMIN — METHYLPREDNISOLONE SODIUM SUCCINATE 40 MG: 40 INJECTION, POWDER, FOR SOLUTION INTRAMUSCULAR; INTRAVENOUS at 17:39

## 2022-06-26 RX ADMIN — IPRATROPIUM BROMIDE AND ALBUTEROL SULFATE 1 AMPULE: .5; 3 SOLUTION RESPIRATORY (INHALATION) at 20:24

## 2022-06-26 RX ADMIN — HYDROCODONE BITARTRATE AND ACETAMINOPHEN 1 TABLET: 5; 325 TABLET ORAL at 04:39

## 2022-06-26 RX ADMIN — FERROUS SULFATE TAB 325 MG (65 MG ELEMENTAL FE) 325 MG: 325 (65 FE) TAB at 16:55

## 2022-06-26 RX ADMIN — PANTOPRAZOLE SODIUM 40 MG: 40 TABLET, DELAYED RELEASE ORAL at 10:32

## 2022-06-26 RX ADMIN — METHYLPREDNISOLONE SODIUM SUCCINATE 40 MG: 40 INJECTION, POWDER, FOR SOLUTION INTRAMUSCULAR; INTRAVENOUS at 20:48

## 2022-06-26 RX ADMIN — DILTIAZEM HYDROCHLORIDE 120 MG: 120 CAPSULE, COATED, EXTENDED RELEASE ORAL at 12:17

## 2022-06-26 RX ADMIN — ATORVASTATIN CALCIUM 80 MG: 40 TABLET, FILM COATED ORAL at 20:48

## 2022-06-26 RX ADMIN — ALBUTEROL SULFATE 2 PUFF: 90 AEROSOL, METERED RESPIRATORY (INHALATION) at 04:20

## 2022-06-26 RX ADMIN — MAGNESIUM SULFATE HEPTAHYDRATE 2000 MG: 2 INJECTION, SOLUTION INTRAVENOUS at 05:59

## 2022-06-26 RX ADMIN — METOPROLOL SUCCINATE 25 MG: 25 TABLET, EXTENDED RELEASE ORAL at 12:01

## 2022-06-26 RX ADMIN — THEOPHYLLINE ANHYDROUS 400 MG: 200 CAPSULE, EXTENDED RELEASE ORAL at 12:14

## 2022-06-26 RX ADMIN — SODIUM CHLORIDE, PRESERVATIVE FREE 10 ML: 5 INJECTION INTRAVENOUS at 11:24

## 2022-06-26 RX ADMIN — TRAZODONE HYDROCHLORIDE 100 MG: 50 TABLET ORAL at 20:48

## 2022-06-26 RX ADMIN — BUDESONIDE AND FORMOTEROL FUMARATE DIHYDRATE 2 PUFF: 160; 4.5 AEROSOL RESPIRATORY (INHALATION) at 20:30

## 2022-06-26 RX ADMIN — LEVOTHYROXINE SODIUM 100 MCG: 0.1 TABLET ORAL at 12:01

## 2022-06-26 RX ADMIN — Medication 1000 UNITS: at 12:17

## 2022-06-26 RX ADMIN — ALBUTEROL SULFATE 2 PUFF: 90 AEROSOL, METERED RESPIRATORY (INHALATION) at 15:04

## 2022-06-26 RX ADMIN — METHYLPREDNISOLONE SODIUM SUCCINATE 125 MG: 125 INJECTION, POWDER, FOR SOLUTION INTRAMUSCULAR; INTRAVENOUS at 04:40

## 2022-06-26 RX ADMIN — ENOXAPARIN SODIUM 40 MG: 100 INJECTION SUBCUTANEOUS at 11:57

## 2022-06-26 RX ADMIN — HYDRALAZINE HYDROCHLORIDE 75 MG: 50 TABLET, FILM COATED ORAL at 11:58

## 2022-06-26 RX ADMIN — SODIUM CHLORIDE, PRESERVATIVE FREE 10 ML: 5 INJECTION INTRAVENOUS at 20:48

## 2022-06-26 RX ADMIN — Medication 2 PUFF: at 04:20

## 2022-06-26 RX ADMIN — Medication 2 PUFF: at 15:04

## 2022-06-26 RX ADMIN — GUAIFENESIN 600 MG: 600 TABLET, EXTENDED RELEASE ORAL at 20:48

## 2022-06-26 RX ADMIN — IOPAMIDOL 75 ML: 755 INJECTION, SOLUTION INTRAVENOUS at 05:44

## 2022-06-26 RX ADMIN — CLONAZEPAM 1 MG: 1 TABLET ORAL at 12:01

## 2022-06-26 RX ADMIN — GABAPENTIN 300 MG: 300 CAPSULE ORAL at 16:55

## 2022-06-26 RX ADMIN — ACETAMINOPHEN 650 MG: 325 TABLET ORAL at 21:43

## 2022-06-26 RX ADMIN — BENZONATATE 100 MG: 100 CAPSULE ORAL at 04:39

## 2022-06-26 RX ADMIN — GUAIFENESIN 600 MG: 600 TABLET, EXTENDED RELEASE ORAL at 12:18

## 2022-06-26 RX ADMIN — MONTELUKAST 10 MG: 10 TABLET, FILM COATED ORAL at 17:01

## 2022-06-26 RX ADMIN — METHYLPREDNISOLONE SODIUM SUCCINATE 40 MG: 40 INJECTION, POWDER, FOR SOLUTION INTRAMUSCULAR; INTRAVENOUS at 12:24

## 2022-06-26 RX ADMIN — FERROUS SULFATE TAB 325 MG (65 MG ELEMENTAL FE) 325 MG: 325 (65 FE) TAB at 12:14

## 2022-06-26 RX ADMIN — HYDRALAZINE HYDROCHLORIDE 75 MG: 50 TABLET, FILM COATED ORAL at 20:48

## 2022-06-26 RX ADMIN — GABAPENTIN 300 MG: 300 CAPSULE ORAL at 13:21

## 2022-06-26 RX ADMIN — BUSPIRONE HYDROCHLORIDE 10 MG: 5 TABLET ORAL at 20:48

## 2022-06-26 RX ADMIN — Medication 2 PUFF: at 11:50

## 2022-06-26 RX ADMIN — OXYCODONE AND ACETAMINOPHEN 1 TABLET: 5; 325 TABLET ORAL at 12:00

## 2022-06-26 RX ADMIN — HYDRALAZINE HYDROCHLORIDE 75 MG: 50 TABLET, FILM COATED ORAL at 16:55

## 2022-06-26 RX ADMIN — GUAIFENESIN 200 MG: 100 SOLUTION ORAL at 21:43

## 2022-06-26 RX ADMIN — GABAPENTIN 300 MG: 300 CAPSULE ORAL at 11:59

## 2022-06-26 ASSESSMENT — PAIN SCALES - GENERAL
PAINLEVEL_OUTOF10: 7
PAINLEVEL_OUTOF10: 5
PAINLEVEL_OUTOF10: 6
PAINLEVEL_OUTOF10: 0
PAINLEVEL_OUTOF10: 7
PAINLEVEL_OUTOF10: 4
PAINLEVEL_OUTOF10: 6

## 2022-06-26 ASSESSMENT — ENCOUNTER SYMPTOMS
RHINORRHEA: 0
DIARRHEA: 0
SHORTNESS OF BREATH: 1
ALLERGIC/IMMUNOLOGIC NEGATIVE: 1
GASTROINTESTINAL NEGATIVE: 1
COUGH: 1
CHEST TIGHTNESS: 1
BACK PAIN: 1
WHEEZING: 1
VOMITING: 0
WHEEZING: 0
NAUSEA: 0
CONSTIPATION: 0
ABDOMINAL DISTENTION: 0
BACK PAIN: 0
EYES NEGATIVE: 1

## 2022-06-26 ASSESSMENT — PAIN DESCRIPTION - PAIN TYPE
TYPE: ACUTE PAIN
TYPE: CHRONIC PAIN

## 2022-06-26 ASSESSMENT — PAIN DESCRIPTION - LOCATION
LOCATION: BACK
LOCATION: BACK;LEG
LOCATION: BACK
LOCATION: BACK;KNEE

## 2022-06-26 ASSESSMENT — PAIN DESCRIPTION - DESCRIPTORS
DESCRIPTORS: ACHING
DESCRIPTORS: ACHING

## 2022-06-26 ASSESSMENT — PAIN DESCRIPTION - ORIENTATION: ORIENTATION: RIGHT;LEFT

## 2022-06-26 ASSESSMENT — PAIN DESCRIPTION - ONSET: ONSET: ON-GOING

## 2022-06-26 ASSESSMENT — PAIN DESCRIPTION - FREQUENCY: FREQUENCY: CONTINUOUS

## 2022-06-26 NOTE — ED PROVIDER NOTES
[facility]      TRIAGE CHIEF COMPLAINT:   Palpitations (tachycardia per pt personal pulse ox, hx afib), Shortness of Breath, and Leg Swelling (bilateral, hx chf)      [place]:  Melissa Elizabeth is a 63 y.o. female that presents with complaint of palpitations cough shortness of breath back pain peripheral edema. Patient has a history of COPD, CHF she is on oxygen 3 to 4 L all the time. She is been having more palpitations prior to arrival she states heart was in the 1/13 she took her Cardizem but not getting better. She has chronic back pain getting worse. Denies any fevers nausea vomiting abdominal pain no other questions or concerns. REVIEW OF SYSTEMS:  At least 10 systems reviewed and otherwise acutely negative except as in the [address] Review of Systems   Constitutional: Positive for activity change, appetite change, chills and fatigue. HENT: Positive for congestion. Eyes: Negative. Respiratory: Positive for cough, chest tightness, shortness of breath and wheezing. Cardiovascular: Positive for palpitations and leg swelling. Gastrointestinal: Negative. Endocrine: Negative. Genitourinary: Negative. Musculoskeletal: Positive for arthralgias, back pain and myalgias. Skin: Negative. Allergic/Immunologic: Negative. Neurological: Negative. Hematological: Negative. Psychiatric/Behavioral: Negative. All other systems reviewed and are negative.       Past Medical History:   Diagnosis Date    Anemia     Anxiety 02/16/2017    follows with Dr Matilde Valenzuela Back pain at L4-L5 level 2/16/2017    Dr Michelle Grimes [facility])     per pt on 2/5/2021\"never told I have bipolar\"    COPD (chronic obstructive pulmonary disease) ([facility] [address]     follows with Dr Brenda Reid Emphysema of lung ([facility] [address]     FH: CAD (coronary artery disease) 2/16/2017    Father had in his forties, sister in her thirties    Michi Anglican Fibromyalgia 02/16/2017    Full dentures full upper plate and partial on the bottom    Gastric ulcer     \"had stomach ulder back fall 2019\"    H/O Doppler ultrasound 04/05/2019    venous- no DVT or reflux    H/O echocardiogram 01/14/2015    EF50-55% Normal- see media    History of blood transfusion     Hyperlipidemia LDL goal <100     Hypertension     Dr Emeka Davenport Irregular heartbeat     \"they said I have irreg heart beat before- back when they drained fluid around my heart \"    Obstructive sleep apnea     \"have bipap I use at home\"    On home oxygen therapy     \"on oxygen all the time at home and keep it on 2.5 liters\"    Osteoarthritis     Pericardial effusion     per old chart had pericardial effusion 10/2020    Spinal stenosis     hx per old chart    Thyroid disease     Wears glasses     \"suppose to wear glasses\"     Past Surgical History:   Procedure Laterality Date    BACK SURGERY  03/2017    \"surgery on low back L5-6- not sure if they put any metal in \"    CARDIAC CATHETERIZATION      10/2019    CARDIAC CATHETERIZATION      per old chart had cath done 11/2020    CARPAL TUNNEL RELEASE Right 1985    CHOLECYSTECTOMY, LAPAROSCOPIC N/A 10/25/2020    CHOLECYSTECTOMY LAPAROSCOPIC WITH IOC performed by Joan Richey MD at [place] [address] COLONOSCOPY N/A 12/12/2019    COLONOSCOPY DIAGNOSTIC performed by Maci Echeverria MD at [address] [place] [address], DIAGNOSTIC      per old chart had egd done 1/2018    TUBAL LIGATION  1987    UPPER GASTROINTESTINAL ENDOSCOPY N/A 1/7/2021    EGD BIOPSY performed by Mac[facility]verria MD at [facility] ENDOSCOPY     Family History   Problem Relation Age of Onset    Asthma Mother         COPD    Heart Disease Father      Social History     Socioeconomic History    Marital status:      Spouse name: Not on file    Number of children: Not on file    Years of education: Not on file    Highest education level: Not on file   Occupational History    Not on file   Tobacco Use    Smoking status: Former Smoker     Packs/day: 1.50     Years: 20.00     Pack years: 30.00     Types: Cigarettes     Quit date: 2008     Years since quittin.4    Smokeless tobacco: Never Used   Vaping Use    Vaping Use: Never used   Substance and Sexual Activity    Alcohol use: Not Currently     Alcohol/week: 2.0 standard drinks     Types: 2 Shots of liquor per week     Comment: per pt on 2021\"quit drinking back Oct 2020\"use to drink wine coolers - 3 times per week \"/caffeine 2-3 coffees aday 1 pop b    Drug use: No    Sexual activity: Yes     Partners: Male   Other Topics Concern    Not on file   Social History Narrative    Not on file     Social Determinants of Health     Financial Resource Strain:     Difficulty of Paying Living Expenses: Not on file   Food Insecurity:     Worried About Running Out of Food in the Last Year: Not on file    Donny of Food in the Last Year: Not on file   Transportation Needs:     Lack of Transportation (Medical): Not on file    Lack of Transportation (Non-Medical):  Not on file   Physical Activity:     Days of Exercise per Week: Not on file    Minutes of Exercise per Session: Not on file   Stress:     Feeling of Stress : Not on file   Social Connections:     Frequency of Communication with Friends and Family: Not on file    Frequency of Social Gatherings with Friends and Family: Not on file    Attends Hoahaoism Services: Not on file    Active Member of Clubs or Organizations: Not on file    Attends Club or Organization Meetings: Not on file    Marital Status: Not on file   Intimate Partner Violence:     Fear of Current or Ex-Partner: Not on file    Emotionally Abused: Not on file    Physically Abused: Not on file    Sexually Abused: Not on file   Housing Stability: [address] Unable to Pay for Housing in the Last Year: No    Number of [place] in the Last Year: 1    Unstable Housing in the Last Year: No     Current Facility-Administered Medications   Medication Dose Route Frequency Provider Last Rate Last Admin    lidocaine 4 % external patch 1 patch  1 patch TransDERmal Daily Salas Ceja DO   1 patch at 06/26/22 0439    magnesium sulfate 2000 mg in 50 mL IVPB premix  2,000 mg IntraVENous Once Salas Ceja DO        aspirin chewable tablet 324 mg  324 mg Oral Once Salas Ceja DO         Current Outpatient Medications   Medication Sig Dispense Refill    gabapentin (NEURONTIN) 300 MG capsule Take 300 mg by mouth 3 times daily.       methocarbamol (ROBAXIN) 500 MG tablet Take 500 mg by mouth in the morning and at bedtime      albuterol sulfate  (90 Base) MCG/ACT inhaler 01/29/2014 Albuterol HFA Inhaler 90 mcg/actuation  By inhalation 2.0 INHALATION(S) as directed  01/29/2014  active      esomeprazole (NEXIUM) 40 MG delayed release capsule 1 capsule      Ferrous Fumarate 324 (106 Fe) MG TABS Take one tablet 2 times a day      MAGNESIUM-OXIDE 400 (241.3 Mg) MG TABS tablet       cyclobenzaprine (FLEXERIL) 5 MG tablet TAKE 1 TO 2 TABLETS BY MOUTH EVERY DAY AS NEEDED      budesonide-formoterol (SYMBICORT) 160-4.5 MCG/ACT AERO Inhale 2 puffs into the lungs 2 times daily 3 each 3    albuterol-ipratropium (COMBIVENT RESPIMAT)  MCG/ACT AERS inhaler Inhale 1 puff into the lungs every 4 hours as needed for Wheezing 3 each 3    ipratropium-albuterol (DUONEB) 0.5-2.5 (3) MG/3ML SOLN nebulizer solution Take 3 mLs by nebulization 4 times daily 360 mL 3    pantoprazole (PROTONIX) 40 MG tablet Take 1 tablet by mouth every morning (before breakfast) 90 tablet 0    sucralfate (CARAFATE) 1 GM/10ML suspension Take 10 mLs by mouth 4 times daily 420 mL 0    tiZANidine (ZANAFLEX) 2 MG tablet Take 2 mg by mouth 3 times daily as needed      magnesium oxide (MAG-OX) 400 MG tablet Take 1 tablet by mouth daily 30 tablet 1    potassium chloride (KLOR-CON M) 20 MEQ extended release tablet Take 1 tablet by mouth daily 90 tablet 1    atorvastatin (LIPITOR) 80 MG tablet Take 80 mg by mouth daily      dicyclomine (BENTYL) 10 MG capsule Take 2 capsules by mouth 4 times daily (before meals and nightly) 40 capsule 0    ondansetron (ZOFRAN ODT) 4 MG disintegrating tablet Take 1 tablet by mouth every 6 hours 20 tablet 0    diclofenac sodium (VOLTAREN) 1 % GEL Apply 4 g topically 4 times daily 350 g 2    hydrALAZINE (APRESOLINE) 50 MG tablet Take 1.5 tablets by mouth 3 times daily 135 tablet 5    acetaminophen (TYLENOL 8 HOUR ARTHRITIS PAIN) 650 MG extended release tablet Take 650 mg by mouth as needed for Pain      vitamin D (CHOLECALCIFEROL) 25 MCG (1000 UT) TABS tablet Take 1,000 Units by mouth daily      busPIRone (BUSPAR) 10 MG tablet Take 1 tablet by mouth 2 times daily 1 tablet 0    ferrous sulfate (IRON 325) 325 (65 Fe) MG tablet Take 1 tablet by mouth every other day Indications: pt taking every day bid Monday, wed, fri (Patient taking differently: Take 325 mg by mouth 2 times daily Indications: pt taking every day bid ) 30 tablet 3    dilTIAZem (CARDIZEM CD) 120 MG extended release capsule Take 1 capsule by mouth daily 90 capsule 3    metoprolol succinate (TOPROL XL) 25 MG extended release tablet Take 1 tablet by mouth daily 30 tablet 3    clonazePAM (KLONOPIN) 1 MG disintegrating tablet Take 1 mg by mouth 3 times daily as needed.        lactobacillus (CULTURELLE) capsule Take 1 capsule by mouth daily (with breakfast) 30 capsule 0    theophylline (MIAH-24) 200 MG extended release capsule Take 400 mg by mouth daily      guaiFENesin (MUCINEX) 600 MG extended release tablet Take 1 tablet by mouth 2 times daily 30 tablet 0    montelukast (SINGULAIR) 10 MG tablet Take 1 tablet by mouth daily 30 tablet 3    levothyroxine (SYNTHROID) 100 MCG tablet Take 1 tablet by mouth Daily 30 tablet 2    folic acid (FOLVITE) 1 MG tablet Take 1 tablet by mouth daily 30 tablet 3    vitamin B-1 100 MG tablet Take 1 tablet by mouth daily 30 tablet 3    DULoxetine (CYMBALTA) 60 MG extended release capsule Take 60 mg by mouth daily       traZODone (DESYREL) 50 MG tablet Take 100 mg by mouth nightly       fluticasone (FLONASE) 50 MCG/ACT nasal spray 2 sprays by Nasal route daily 1 Bottle 0      Allergies   Allergen Reactions    Lisinopril Swelling and Rash     angioedema     Current Facility-Administered Medications   Medication Dose Route Frequency Provider Last Rate Last Admin    lidocaine 4 % external patch 1 patch  1 patch TransDERmal Daily Cathryne Beams, DO   1 patch at 06/26/22 0439    magnesium sulfate 2000 mg in 50 mL IVPB premix  2,000 mg IntraVENous Once Cathryne Beams, DO        aspirin chewable tablet 324 mg  324 mg Oral Once Cathryne Beams, DO         Current Outpatient Medications   Medication Sig Dispense Refill    gabapentin (NEURONTIN) 300 MG capsule Take 300 mg by mouth 3 times daily.       methocarbamol (ROBAXIN) 500 MG tablet Take 500 mg by mouth in the morning and at bedtime      albuterol sulfate  (90 Base) MCG/ACT inhaler 01/29/2014 Albuterol HFA Inhaler 90 mcg/actuation  By inhalation 2.0 INHALATION(S) as directed  01/29/2014  active      esomeprazole (NEXIUM) 40 MG delayed release capsule 1 capsule      Ferrous Fumarate 324 (106 Fe) MG TABS Take one tablet 2 times a day      MAGNESIUM-OXIDE 400 (241.3 Mg) MG TABS tablet       cyclobenzaprine (FLEXERIL) 5 MG tablet TAKE 1 TO 2 TABLETS BY MOUTH EVERY DAY AS NEEDED      budesonide-formoterol (SYMBICORT) 160-4.5 MCG/ACT AERO Inhale 2 puffs into the lungs 2 times daily 3 each 3    albuterol-ipratropium (COMBIVENT RESPIMAT)  MCG/ACT AERS inhaler Inhale 1 puff into the lungs every 4 hours as needed for Wheezing 3 each 3    ipratropium-albuterol (DUONEB) 0.5-2.5 (3) MG/3ML SOLN nebulizer solution Take 3 mLs by nebulization 4 times daily 360 mL 3    pantoprazole (PROTONIX) 40 MG tablet Take 1 tablet by mouth every morning (before breakfast) 90 tablet 0    sucralfate (CARAFATE) 1 GM/10ML suspension Take 10 mLs by mouth 4 times daily 420 mL 0    tiZANidine (ZANAFLEX) 2 MG tablet Take 2 mg by mouth 3 times daily as needed      magnesium oxide (MAG-OX) 400 MG tablet Take 1 tablet by mouth daily 30 tablet 1    potassium chloride (KLOR-CON M) 20 MEQ extended release tablet Take 1 tablet by mouth daily 90 tablet 1    atorvastatin (LIPITOR) 80 MG tablet Take 80 mg by mouth daily      dicyclomine (BENTYL) 10 MG capsule Take 2 capsules by mouth 4 times daily (before meals and nightly) 40 capsule 0    ondansetron (ZOFRAN ODT) 4 MG disintegrating tablet Take 1 tablet by mouth every 6 hours 20 tablet 0    diclofenac sodium (VOLTAREN) 1 % GEL Apply 4 g topically 4 times daily 350 g 2    hydrALAZINE (APRESOLINE) 50 MG tablet Take 1.5 tablets by mouth 3 times daily 135 tablet 5    acetaminophen (TYLENOL 8 HOUR ARTHRITIS PAIN) 650 MG extended release tablet Take 650 mg by mouth as needed for Pain      vitamin D (CHOLECALCIFEROL) 25 MCG (1000 UT) TABS tablet Take 1,000 Units by mouth daily      busPIRone (BUSPAR) 10 MG tablet Take 1 tablet by mouth 2 times daily 1 tablet 0    ferrous sulfate (IRON 325) 325 (65 Fe) MG tablet Take 1 tablet by mouth every other day Indications: pt taking every day bid Monday, wed, fri (Patient taking differently: Take 325 mg by mouth 2 times daily Indications: pt taking every day bid ) 30 tablet 3    dilTIAZem (CARDIZEM CD) 120 MG extended release capsule Take 1 capsule by mouth daily 90 capsule 3    metoprolol succinate (TOPROL XL) 25 MG extended release tablet Take 1 tablet by mouth daily 30 tablet 3    clonazePAM (KLONOPIN) 1 MG disintegrating tablet Take 1 mg by mouth 3 times daily as needed.        lactobacillus (CULTURELLE) capsule Take 1 capsule by mouth daily (with breakfast) 30 capsule 0    theophylline (MIAH-24) 200 MG extended release capsule Take 400 mg by mouth daily      guaiFENesin (MUCINEX) 600 MG extended release tablet Take 1 tablet by mouth 2 times Pulmonary:      Effort: Pulmonary effort is normal. No respiratory distress. Breath sounds: No stridor. Wheezing and rhonchi present. No rales. Abdominal:      General: Bowel sounds are normal. There is no distension. Palpations: Abdomen is soft. There is no mass. Tenderness: There is no abdominal tenderness. There is no guarding or rebound. Negative signs include Soto's sign, Rovsing's sign and McBurney's sign. Hernia: No hernia is present. Musculoskeletal:         General: Swelling and tenderness present. No deformity or signs of injury. Normal range of motion. Cervical back: Normal range of motion. No rigidity. Right lower leg: Edema present. Left lower leg: Edema present. Skin:     General: Skin is warm. Coloration: Skin is not jaundiced or pale. Findings: No bruising, erythema, lesion or rash. Neurological:      General: No focal deficit present. Mental Status: She is alert and oriented to person, place, and time. GCS: GCS eye subscore is 4. GCS verbal subscore is 5. GCS motor subscore is 6. Cranial Nerves: Cranial nerves are intact. No cranial nerve deficit, dysarthria or facial asymmetry. Sensory: Sensation is intact. No sensory deficit. Motor: Motor function is intact. No weakness, tremor, atrophy, abnormal muscle tone or seizure activity. Coordination: Coordination normal.   Psychiatric:         Mood and Affect: Mood normal.         Behavior: Behavior normal. Behavior is cooperative. Thought Content:  Thought content normal.         Judgment: Judgment normal.           I have reviewed andinterpreted all of the currently available lab results from this visit (if applicable):         Radiographs (if obtained):  [] The following radiograph was interpreted by myself in the absence of a radiologist:  [x] Radiologist's Report Reviewed:    CXR, CT PE    EKG (if obtained): (All EKG's are interpreted by myself in the absence of a cardiologist)      12 lead EKG per my interpretation:  Sinus Tachycardia 104  Axis is   Normal  QTc is  536  There is specific T wave changes appreciated. Inverted T wave in V3  There is no specific ST wave changes appreciated. Prior EKG to compare with was not available         Zee Hilliard DO  06/26/22 0212    Total critical care time today provided was at least 20 minutes. This excludes seperately billable procedure. Critical care time provided for reviewing labs, images giving breathing treatments, steroids, oxygen, consulting medicine that required close evaluation and/or intervention with concern for patient decompensation. MDM:    Patient with complaint of cough shortness of breath palpitations peripheral edema. Again she has a history of COPD, A. fib she is on Cardizem she is on oxygen 3 to 4 L all the time. States tonight she had more shortness of breath or palpitations heart rate of 113. She took her Cardizem no relief. She states she has been coughing wheezing short of breath on arrival she otherwise appears well she is mildly tachycardic but has coughing wheezing rhonchi diffusely. We will give her breathing treatment, steroids she does have peripheral edema we will check labs and imaging did do a D-dimer. D-dimer is positive we will scan her chest troponin levels mildly elevated or so as normal it is not. EKG is otherwise negative patient will need to be admitted for  exacerbation, abnormal lab work, elevated troponin, cardiac enzymes. QTC is prolonged we will give her magnesium. Also given aspirin for elevated troponin. Pending PE study however patient may need admission for COPD exacerbation, elevated troponin, palpitations, dyspnea. I will sign out patient to Dr. Sheffield Members who will follow up with imaging and disposition. Also QTC was prolonged given magnesium.     CLINICAL IMPRESSION:  Final diagnoses:   Dyspnea, unspecified type   Palpitations   COPD exacerbation (HCC)   Troponin level elevated   D-dimer, elevated   QT prolongation       (Please note that portions of this note may have been completed with a voice recognition program. Efforts were made to edit the dictations but occasionally words aremis-transcribed.)    DISPOSITION REFERRAL (if applicable):  No follow-up provider specified.     DISPOSITION MEDICATIONS (if applicable):  New Prescriptions    No medications on file          Jose Adame, [id number] [facility],   06/26/22 2001

## 2022-06-26 NOTE — H&P
V2.0  History and Physical      Name:  Anette Mclaughlin /Age/Sex: 1960  (63 y.o. female)   MRN & CSN:  [id number] & [id number] Encounter Date/Time: 2022 7:10 AM EDT   Location:  ED18/ED-18 PCP: Dread Stephenson MD       Hospital Day: 1    Assessment and Plan:   Anette Mclaughlin is a 63 y.o. female with a pmh of COPD, CHF, Bipolar 1, CAD, HLD, SVT, DALTON, HTN, Anemia, back pain and obesity who presents with palpitation, leg swelling, and shortness of breath. Acute on chronic respiratory failure with hypoxia 2/2 COPD exacerbation  # Chronic hypoxia on 3 liters at home, requiring 4 liters in ED  # Detectable troponin likely demand ischemia  # Leukocytosis likely reactive. CTA pulmonary with no sign of pneumonia  SARS-Cov-2, NAAT not detected. Influenza A & B negative. Elevated D-dimer, but CTA pulmonary negative for PE. CXR concerning for vascular congestion without overt edema. Former smoker. Ph:  7.39, HC03: 43.0, and pCO2: 71. Last echo with EF 55-60% (2021).   -Hold Antibiotics for now  -Start IV solumedrol  -Continue Singulair, Mucinex,  and Symbicort  -Duoneb  -Encourage deep breathing and coughing  -Incentive spirometry  -Resp. panel  -Trend troponin  - Consult pulmonology, pt known to Dr. See Betancourt    Accidental pulmonary nodule measuring 2 mm noted on CT  -Monitor and f/u as outpatient per guidelines    Hypothyroidism  TSH 15,190  -T4 free pending  - On Synthroid 100 mcg daily, adjust dose as need and f/u with endocrinologist  -Tele and trend troponin as above    PAF, stable  -Tachycardic on arrival   -Continue Cardizem and Metoprolol  -Tele    Obesity class 2 with BMI 38.85   -Educated about lifestyle medications    DALTON  -On BIPAP at home    Intracranial hemorrhage, stable  -Off of ASA      Other chronic Issues, stable  -HTN  -HLD  -NEVIN- Hgb stable at 10.6  -OA  -Bleeding gastritis    Disposition:   Current Living situation: Home  Expected Disposition: Home  Estimated D/C: 22    Diet Regular    DVT Prophylaxis [x] Lovenox, []  Heparin, [] SCDs, [] Ambulation,  [] Eliquis, [] Xarelto   Code Status Full    Surrogate Decision Maker/ POA Self     History from:     Patient    History of Present Illness:     Chief Complaint: <principal problem not specified>  Anette Mclauglhin is a 63 y.o. female with pmh of COPD, CHF, Bipolar 1, CAD, HLD, SVT, DALTON, HTN, Anemia, back pain and obesity who presents with palpitation, leg swelling, and shortness of breath that has been going on for the past 2 days. Other associated symptoms are dry cough and chest pain. PT states she took Cardizem without improvement and was afraid she would end up with another stroke and decided to come to the hospital. PT tachycardic on arrival to ED with prolonged QTc on EKG. She received 2 mg of magnesium and Lasix 20 mg x1 in ED with improvement. Imaging negative for PE or infection. Detectable troponin noted and PT has hx of A-fib and SVT. She follows up with Dr. Antonieta Wade. She was recently treated with Lasix 20 mg daily x 7 days for leg edema. Denies fever, chills, hemoptysis, abdominal pain, N/V/D, or urinary symptoms. PT would be admitted for COPD exacerbation, cardiac and Thyroid evaluation. Review of Systems: Need 10 Elements   Review of Systems   Constitutional: Negative for chills, diaphoresis, fever and unexpected weight change. HENT: Negative for congestion and rhinorrhea. Eyes: Negative for visual disturbance. Respiratory: Positive for cough, chest tightness and shortness of breath. Negative for wheezing. Dry cough   Cardiovascular: Positive for palpitations and leg swelling. Gastrointestinal: Negative for abdominal distention, constipation, diarrhea, nausea and vomiting. Endocrine: Negative for polydipsia, polyphagia and polyuria. Genitourinary: Negative for frequency, hematuria, urgency, vaginal bleeding and vaginal discharge.    Musculoskeletal: Negative for back pain, joint swelling and myalgias. Skin: Negative for pallor and wound. Neurological: Negative for dizziness, tremors, syncope, weakness and headaches. Psychiatric/Behavioral: Negative for confusion and suicidal ideas. Objective:   No intake or output data in the 24 hours ending 06/26/22 0710   Vitals:   Vitals:    06/26/22 0151 06/26/22 0155 06/26/22 0419 06/26/22 0420   BP:  (!) 147/77     Pulse: (!) 116 (!) 105 90 93   Resp:  19 17 18   Temp:  97.8 °F (36.6 °C)     TempSrc:  Oral     SpO2: 94% 100% 95%    Weight:  209 lb (94.8 kg)     Height:  5' 1.5\" (1.562 m)         Medications Prior to Admission     Prior to Admission medications    Medication Sig Start Date End Date Taking? Authorizing Provider   gabapentin (NEURONTIN) 300 MG capsule Take 300 mg by mouth 3 times daily.  6/2/22   Historical Provider, MD   methocarbamol (ROBAXIN) 500 MG tablet Take 500 mg by mouth in the morning and at bedtime 6/2/22   Historical Provider, MD   albuterol sulfate  (90 Base) MCG/ACT inhaler 01/29/2014 Albuterol HFA Inhaler 90 mcg/actuation  By inhalation 2.0 INHALATION(S) as directed  01/29/2014  active 1/29/14   Historical Provider, MD   esomeprazole (NEXIUM) 40 MG delayed release capsule 1 capsule 4/24/12   Historical Provider, MD   Ferrous Fumarate 324 (106 Fe) MG TABS Take one tablet 2 times a day 4/12/22   Historical Provider, MD   MAGNESIUM-OXIDE 400 (241.3 Mg) MG TABS tablet  3/29/22   Historical Provider, MD   cyclobenzaprine (FLEXERIL) 5 MG tablet TAKE 1 TO 2 TABLETS BY MOUTH EVERY DAY AS NEEDED 4/6/22   Historical Provider, MD   budesonide-formoterol (SYMBICORT) 160-4.5 MCG/ACT AERO Inhale 2 puffs into the lungs 2 times daily 4/19/22   Kaur Leavitt MD   albuterol-ipratropium (COMBIVENT RESPIMAT)  MCG/ACT AERS inhaler Inhale 1 puff into the lungs every 4 hours as needed for Wheezing 4/19/22   Kaur Leavitt MD   ipratropium-albuterol (DUONEB) 0.5-2.5 (3) MG/3ML SOLN nebulizer solution Take 3 mLs by nebulization 4 times daily 4/19/22   Yves Weinberg MD   pantoprazole (PROTONIX) 40 MG tablet Take 1 tablet by mouth every morning (before breakfast) 3/16/22   Timothy Gutierrez MD   sucralfate (CARAFATE) 1 GM/10ML suspension Take 10 mLs by mouth 4 times daily 3/16/22   Timothy Gutierrez MD   tiZANidine (ZANAFLEX) 2 MG tablet Take 2 mg by mouth 3 times daily as needed    Historical Provider, MD   magnesium oxide (MAG-OX) 400 MG tablet Take 1 tablet by mouth daily 3/1/22   Jennifer Abernathy MD   potassium chloride (KLOR-CON M) 20 MEQ extended release tablet Take 1 tablet by mouth daily 3/1/22   Jennifer Abernathy MD   atorvastatin (LIPITOR) 80 MG tablet Take 80 mg by mouth daily    Historical Provider, MD   dicyclomine (BENTYL) 10 MG capsule Take 2 capsules by mouth 4 times daily (before meals and nightly) 12/29/21   Vanessa Ragsdale PA-C   ondansetron (ZOFRAN ODT) 4 MG disintegrating tablet Take 1 tablet by mouth every 6 hours 12/29/21   Vanessa Ragsdale PA-C   diclofenac sodium (VOLTAREN) 1 % GEL Apply 4 g topically 4 times daily 12/8/21   Laurie Valera DO   hydrALAZINE (APRESOLINE) 50 MG tablet Take 1.5 tablets by mouth 3 times daily 10/5/21   BRIAN Sneed - CNP   acetaminophen (TYLENOL 8 HOUR ARTHRITIS PAIN) 650 MG extended release tablet Take 650 mg by mouth as needed for Pain    Historical Provider, MD   vitamin D (CHOLECALCIFEROL) 25 MCG (1000 UT) TABS tablet Take 1,000 Units by mouth daily    Historical Provider, MD   busPIRone (BUSPAR) 10 MG tablet Take 1 tablet by mouth 2 times daily 5/4/21   Valerie Doe MD   ferrous sulfate (IRON 325) 325 (65 Fe) MG tablet Take 1 tablet by mouth every other day Indications: pt taking every day bid Monday, wed, fri  Patient taking differently: Take 325 mg by mouth 2 times daily Indications: pt taking every day bid  5/4/21   Valerie Doe MD   dilTIAZem (CARDIZEM CD) 120 MG extended release capsule Take 1 capsule by mouth daily 1/18/21 Dianna Lemon MD   metoprolol succinate (TOPROL XL) 25 MG extended release tablet Take 1 tablet by mouth daily 1/18/21   Dianna Lemon MD   clonazePAM (KLONOPIN) 1 MG disintegrating tablet Take 1 mg by mouth 3 times daily as needed. Historical Provider, MD   lactobacillus (CULTURELLE) capsule Take 1 capsule by mouth daily (with breakfast) 11/27/20   Vida Martinez MD   theophylline (MIAH-24) 200 MG extended release capsule Take 400 mg by mouth daily    Historical Provider, MD   guaiFENesin (MUCINEX) 600 MG extended release tablet Take 1 tablet by mouth 2 times daily 7/21/20   Giana Olvera MD   montelukast (SINGULAIR) 10 MG tablet Take 1 tablet by mouth daily 7/21/20   Giana Olvera MD   levothyroxine (SYNTHROID) 100 MCG tablet Take 1 tablet by mouth Daily 7/21/20   Giana Olvera MD   folic acid (FOLVITE) 1 MG tablet Take 1 tablet by mouth daily 7/16/20   Stephenie Resendiz MD   vitamin B-1 100 MG tablet Take 1 tablet by mouth daily 7/16/20   Stephenie Resendiz MD   DULoxetine (CYMBALTA) 60 MG extended release capsule Take 60 mg by mouth daily     Historical Provider, MD   traZODone (DESYREL) 50 MG tablet Take 100 mg by mouth nightly     Historical Provider, MD   fluticasone (FLONASE) 50 MCG/ACT nasal spray 2 sprays by Nasal route daily 3/28/18   Joanna Lema MD       Physical Exam: Need 8 Elements   Physical Exam  Constitutional:       Appearance: She is obese. HENT:      Head: Normocephalic and atraumatic. Right Ear: Tympanic membrane normal.      Left Ear: Tympanic membrane normal.      Nose: No rhinorrhea. Mouth/Throat:      Mouth: Mucous membranes are dry. Eyes:      Extraocular Movements: Extraocular movements intact. Pupils: Pupils are equal, round, and reactive to light. Cardiovascular:      Rate and Rhythm: Tachycardia present. Heart sounds: No murmur heard.       Pulmonary:      Effort: Pulmonary effort is normal.      Breath sounds:  Wears glasses     \"suppose to wear glasses\"     PSHX:  has a past surgical history that includes Carpal tunnel release (Right, ); Tubal ligation (); back surgery (2017); Cholecystectomy, laparoscopic (N/A, 10/25/2020); Colonoscopy (N/A, 2019); Endoscopy, colon, diagnostic; Cardiac catheterization; Cardiac catheterization; and Upper gastrointestinal endoscopy (N/A, 2021). Allergies: Allergies   Allergen Reactions    Lisinopril Swelling and Rash     angioedema     Fam HX:  family history includes Asthma in her mother; Heart Disease in her father. Soc HX:   Social History     Socioeconomic History    Marital status:      Spouse name: None    Number of children: None    Years of education: None    Highest education level: None   Occupational History    None   Tobacco Use    Smoking status: Former Smoker     Packs/day: 1.50     Years: 20.00     Pack years: 30.00     Types: Cigarettes     Quit date: 2008     Years since quittin.4    Smokeless tobacco: Never Used   Vaping Use    Vaping Use: Never used   Substance and Sexual Activity    Alcohol use: Not Currently     Alcohol/week: 2.0 standard drinks     Types: 2 Shots of liquor per week     Comment: per pt on 2021\"quit drinking back Oct 2020\"use to drink wine coolers - 3 times per week \"/caffeine 2-3 coffees aday 1 pop b    Drug use: No    Sexual activity: Yes     Partners: Male   Other Topics Concern    None   Social History Narrative    None     Social Determinants of Health     Financial Resource Strain:     Difficulty of Paying Living Expenses: Not on file   Food Insecurity:     Worried About Running Out of Food in the Last Year: Not on file    Donny of Food in the Last Year: Not on file   Transportation Needs:     Lack of Transportation (Medical): Not on file    Lack of Transportation (Non-Medical):  Not on file   Physical Activity:     Days of Exercise per Week: Not on file    Minutes of Exercise per Session: Not on file   Stress:     Feeling of Stress : Not on file   Social Connections:     Frequency of Communication with Friends and Family: Not on file    Frequency of Social Gatherings with Friends and Family: Not on file    Attends [employer] Services: Not on file    Active Member of Clubs or Organizations: Not on file    Attends Club or Organization Meetings: Not on file    Marital Status: Not on file   Intimate Partner Violence:     Fear of Current or Ex-Partner: Not on file    Emotionally Abused: Not on file    Physically Abused: Not on file    Sexually Abused: Not on file   Housing Stability: [address] Unable to Pay for Housing in the Last Year: No    Number of Places Lived in the Last Year: 1    Unstable Housing in the Last Year: No       Medications:   Medications:    lidocaine  1 patch TransDERmal Daily    magnesium sulfate  2,000 mg IntraVENous Once      Infusions:   PRN Meds:      Labs      CBC:   Recent Labs     06/26/22  0350   WBC 12.7*   HGB 10.6*        BMP:    Recent Labs     06/26/22  0350      K 3.8   CL 96*   CO2 34*   BUN 8   CREATININE 0.7   GLUCOSE 111*     Hepatic:   Recent Labs     06/26/22  0350   AST 14*   ALT 17   BILITOT 0.1   ALKPHOS 96     Lipids:   Lab Results   Component Value Date    CHOL 192 06/07/2022    HDL 67 06/07/2022    TRIG 77 06/07/2022     Hemoglobin A1C:   Lab Results   Component Value Date    LABA1C 5.1 06/21/2019     TSH: No results found for: TSH  Troponin:   Lab Results   Component Value Date    TROPONINT 0.028 06/26/2022    TROPONINT <0.010 03/16/2022    TROPONINT <0.010 03/16/2022     Lactic Acid: No results for input(s): LACTA in the last 72 hours.   BNP:   Recent Labs     06/26/22  0350   PROBNP 426.9*     UA:  Lab Results   Component Value Date    NITRU NEGATIVE 06/26/2022    COLORU YELLOW 06/26/2022    WBCUA <1 06/26/2022    RBCUA NONE SEEN 06/26/2022    MUCUS RARE 11/03/2020    TRICHOMONAS NONE SEEN 06/26/2022    BACTERIA NEGATIVE 06/26/2022    CLARITYU CLEAR 06/26/2022    SPECGRAV <1.005 06/26/2022    LEUKOCYTESUR TRACE 06/26/2022    UROBILINOGEN 0.2 06/26/2022    BILIRUBINUR NEGATIVE 06/26/2022    BLOODU NEGATIVE 06/26/2022    KETUA NEGATIVE 06/26/2022     Urine Cultures: No results found for: LABURIN  Blood Cultures: No results found for: BC  No results found for: BLOODCULT2  Organism:   Lab Results   Component Value Date    ORG BBRO 12/15/2018       Imaging/Diagnostics Last 24 Hours   XR CHEST PORTABLE    Result Date: 6/26/2022  EXAMINATION: ONE XRAY VIEW OF THE CHEST 6/26/2022 3:08 am COMPARISON: 03/14/2022 HISTORY: ORDERING SYSTEM PROVIDED HISTORY: dyspnea TECHNOLOGIST PROVIDED HISTORY: Reason for exam:->dyspnea Reason for Exam: Palpitations; Shortness of Breath; Leg Swelling Additional signs and symptoms: Palpitations; Shortness of Breath; Leg Swelling FINDINGS: Cardiac and mediastinal contours appear unchanged. Mild vascular congestion without overt edema. No consolidation, pneumothorax or effusion. Old lateral left rib fractures again noted. Vascular congestion without overt edema. CTA PULMONARY W CONTRAST    Result Date: 6/26/2022  EXAMINATION: CTA OF THE CHEST 6/26/2022 5:43 am TECHNIQUE: CTA of the chest was performed after the administration of intravenous contrast.  Multiplanar reformatted images are provided for review. MIP images are provided for review. Automated exposure control, iterative reconstruction, and/or weight based adjustment of the mA/kV was utilized to reduce the radiation dose to as low as reasonably achievable.  COMPARISON: 11/22/2020 HISTORY: ORDERING SYSTEM PROVIDED HISTORY: dyspnea/d dimer elevated TECHNOLOGIST PROVIDED HISTORY: Reason for exam:->dyspnea/d dimer elevated Decision Support Exception - unselect if not a suspected or confirmed emergency medical condition->Emergency Medical Condition (MA) Reason for Exam: dyspnea/d dimer elevated FINDINGS: Pulmonary Arteries: Pulmonary Radiology 2017 http://pubs. rsna.org/doi/full/10.1148/radiol. 3203070794       Personally reviewed Lab Studies, Imaging, and discussed case with Dr. Martin Cason    Electronically signed by Linnie Epley, APRN - CNP on 6/26/2022 at 7:10 AM

## 2022-06-26 NOTE — ED PROVIDER NOTES
eMERGENCY dEPARTMENT eNCOUnter    ATTENDING SIGN OUT NOTE    HPI/Physical Exam/Medical Decision Making  Time: 06:00 am  I have received sign out of [facility] [place]  Emergency Department care from Dr. Jace Patterson. We discussed the history, physical exam, completed/pending test results (if obtained) and current treatment plan. Please refer to his/her chart for further details. In brief, the patient is a 63 y.o. female who presented to the ED with a cough and shortness of breath. The patient has a history of COPD and CHF. She uses oxygen at baseline (2 to 3 L). She has been having palpitations at times. Work-up thus far has been consistent with a COPD exacerbation. She has been treated with IV Solu-Medrol, inhalers, Tessalon Perles, Robitussin, Lidoderm patch, Norco, magnesium and aspirin. She is pending a CTA of the chest.  I am asked to follow-up this result and disposition the patient. Vitals: /61   Pulse 99   Temp 97.5 °F (36.4 °C) (Oral)   Resp 20   Ht 5' 1\" (1.549 m)   Wt 209 lb 12.8 oz (95.2 kg)   LMP 01/05/2010   SpO2 96%   Breastfeeding No   BMI 39.64 kg/m²     On my exam, the patient is afebrile and nontoxic appearing. She is hypertensive with a known history of hypertension and is asymptomatic. She is otherwise hemodynamically stable and neurologically intact. Airway is patent. Neck is supple. Heart sounds have a regular rate and rhythm. Lungs are diminished to auscultation bilaterally with end expiratory wheezes. She is not in respiratory distress and is not hypoxic. Labs are reviewed and are significant for leukocytosis, anemia, a detectable troponin of 0.028, and elevated D-dimer, an elevated TSH, and a normal venous pH with an elevated PCO2 of 71. Chest x-ray was obtained and shows vascular congestion without overt edema. CTA of the chest is negative for pulmonary arterial embolism. There is no thoracic aortic aneurysm or dissection.   Pulmonary emphysema is present with some areas of linear atelectasis or scarring in the middle lobe, lingula and lower lobes. There is no sign of pneumonia. A 2 mm noncalcified nodule is noted in the right upper lobe. In a high risk patient, she could have an optional repeat follow-up CT at 12 months. I have reassessed the patient and discussed the results with her. She is not in respiratory distress but is still having end expiratory wheezes. I suspect she is having a COPD exacerbation. I recommended admission to the hospital for further treatment and the patient was agreeable. I spoke with the hospitalist who will admit the patient. Diagnostics:       CTA PULMONARY W CONTRAST (Final result)  Result time 06/26/22 06:06:38  Final result by Can Andino (06/26/22 06:06:38)                Impression:    1.  No evidence of pulmonary arterial embolism.  No thoracic aortic aneurysm   or dissection. 2.  Pulmonary emphysema is present with some areas of linear   atelectasis/scarring in the middle lobe, lingula, and lower lobes.  No sign   of pneumonia. 3.  A 2 mm noncalcified nodule in the right upper lobe.  In a high-risk   patient could have an optional follow-up CT at 12 months. Fleischner Society guidelines for follow-up and management of incidentally   detected pulmonary nodules:     Single Solid Nodule:     Nodule size less than 6 mm   In a low-risk patient, no routine follow-up. In a high-risk patient, optional CT at 12 months. - Low risk patients include individuals with minimal or absent history of   smoking and other known risk factors. - High risk patients include individuals with a history or smoking or known   risk factors. Radiology 2017 http://pubs. rsna.org/doi/full/10.1148/radiol. 4883766170             Narrative:    EXAMINATION:   CTA OF THE CHEST 6/26/2022 5:43 am     TECHNIQUE:   CTA of the chest was performed after the administration of intravenous   contrast.  Multiplanar reformatted (06/26/22 03:49:45)                Impression:    Vascular congestion without overt edema. Narrative:    EXAMINATION:   ONE XRAY VIEW OF THE CHEST     6/26/2022 3:08 am     COMPARISON:   03/14/2022     HISTORY:   ORDERING SYSTEM PROVIDED HISTORY: dyspnea   TECHNOLOGIST PROVIDED HISTORY:   Reason for exam:->dyspnea   Reason for Exam: Palpitations; Shortness of Breath; Leg Swelling   Additional signs and symptoms: Palpitations; Shortness of Breath; Leg Swelling     FINDINGS:   Cardiac and mediastinal contours appear unchanged.  Mild vascular congestion   without overt edema.  No consolidation, pneumothorax or effusion.  Old   lateral left rib fractures again noted. Labs Reviewed   CBC WITH AUTO DIFFERENTIAL - Abnormal; Notable for the following components:       Result Value    WBC 12.7 (*)     RBC 3.61 (*)     Hemoglobin 10.6 (*)     Hematocrit 36.0 (*)     MCHC 29.4 (*)     Segs Relative 79.1 (*)     Lymphocytes % 9.5 (*)     Monocytes % 8.5 (*)     All other components within normal limits   COMPREHENSIVE METABOLIC PANEL - Abnormal; Notable for the following components:    Chloride 96 (*)     CO2 34 (*)     Glucose 111 (*)     AST 14 (*)     All other components within normal limits   TROPONIN - Abnormal; Notable for the following components:    Troponin T 0.028 (*)     All other components within normal limits   BRAIN NATRIURETIC PEPTIDE - Abnormal; Notable for the following components:    Pro-.9 (*)     All other components within normal limits   D-DIMER, QUANTITATIVE - Abnormal; Notable for the following components:    D-Dimer, Quant 473 (*)     All other components within normal limits       Clinical Impression:  1. Dyspnea, unspecified type    2. Palpitations    3. COPD exacerbation ([address]    4. Troponin level elevated    5. D-dimer, elevated    6.  QT prolongation        Comment: Please note this report has been produced using speech recognition software and may contain errors related to that system including errors in grammar, punctuation, and spelling, as well as words and phrases that may be inappropriate. If there are any questions or concerns please feel free to contact the dictating provider for clarification.         Rose Barnard MD  06/27/22 2001

## 2022-06-27 LAB
BASOPHILS ABSOLUTE: 0 K/CU MM
BASOPHILS RELATIVE PERCENT: 0.1 % (ref 0–1)
DIFFERENTIAL TYPE: ABNORMAL
EKG ATRIAL RATE: 104 BPM
EKG DIAGNOSIS: NORMAL
EKG P AXIS: 71 DEGREES
EKG P-R INTERVAL: 144 MS
EKG Q-T INTERVAL: 408 MS
EKG QRS DURATION: 152 MS
EKG QTC CALCULATION (BAZETT): 536 MS
EKG R AXIS: 114 DEGREES
EKG T AXIS: 17 DEGREES
EKG VENTRICULAR RATE: 104 BPM
EOSINOPHILS ABSOLUTE: 0 K/CU MM
EOSINOPHILS RELATIVE PERCENT: 0 % (ref 0–3)
HCT VFR BLD CALC: 36.7 % (ref 37–47)
HEMOGLOBIN: 10.8 GM/DL (ref 12.5–16)
IMMATURE NEUTROPHIL %: 0.5 % (ref 0–0.43)
LV EF: 58 %
LVEF MODALITY: NORMAL
LYMPHOCYTES ABSOLUTE: 0.5 K/CU MM
LYMPHOCYTES RELATIVE PERCENT: 3.8 % (ref 24–44)
MCH RBC QN AUTO: 28.5 PG (ref 27–31)
MCHC RBC AUTO-ENTMCNC: 29.4 % (ref 32–36)
MCV RBC AUTO: 96.8 FL (ref 78–100)
MONOCYTES ABSOLUTE: 0.5 K/CU MM
MONOCYTES RELATIVE PERCENT: 3.8 % (ref 0–4)
NUCLEATED RBC %: 0 %
PDW BLD-RTO: 13 % (ref 11.7–14.9)
PLATELET # BLD: 245 K/CU MM (ref 140–440)
PMV BLD AUTO: 10.2 FL (ref 7.5–11.1)
RBC # BLD: 3.79 M/CU MM (ref 4.2–5.4)
SEGMENTED NEUTROPHILS ABSOLUTE COUNT: 11 K/CU MM
SEGMENTED NEUTROPHILS RELATIVE PERCENT: 91.8 % (ref 36–66)
TOTAL IMMATURE NEUTOROPHIL: 0.06 K/CU MM
TOTAL NUCLEATED RBC: 0 K/CU MM
TROPONIN T: <0.01 NG/ML
WBC # BLD: 12 K/CU MM (ref 4–10.5)

## 2022-06-27 PROCEDURE — 94761 N-INVAS EAR/PLS OXIMETRY MLT: CPT

## 2022-06-27 PROCEDURE — 2700000000 HC OXYGEN THERAPY PER DAY

## 2022-06-27 PROCEDURE — 36415 COLL VENOUS BLD VENIPUNCTURE: CPT

## 2022-06-27 PROCEDURE — 6370000000 HC RX 637 (ALT 250 FOR IP): Performed by: INTERNAL MEDICINE

## 2022-06-27 PROCEDURE — 93306 TTE W/DOPPLER COMPLETE: CPT

## 2022-06-27 PROCEDURE — 6370000000 HC RX 637 (ALT 250 FOR IP): Performed by: EMERGENCY MEDICINE

## 2022-06-27 PROCEDURE — 94640 AIRWAY INHALATION TREATMENT: CPT

## 2022-06-27 PROCEDURE — 6370000000 HC RX 637 (ALT 250 FOR IP): Performed by: HOSPITALIST

## 2022-06-27 PROCEDURE — 2580000003 HC RX 258: Performed by: INTERNAL MEDICINE

## 2022-06-27 PROCEDURE — 6370000000 HC RX 637 (ALT 250 FOR IP): Performed by: NURSE PRACTITIONER

## 2022-06-27 PROCEDURE — 84484 ASSAY OF TROPONIN QUANT: CPT

## 2022-06-27 PROCEDURE — 85025 COMPLETE CBC W/AUTO DIFF WBC: CPT

## 2022-06-27 PROCEDURE — 6360000002 HC RX W HCPCS: Performed by: INTERNAL MEDICINE

## 2022-06-27 PROCEDURE — 1200000000 HC SEMI PRIVATE

## 2022-06-27 PROCEDURE — 93010 ELECTROCARDIOGRAM REPORT: CPT | Performed by: INTERNAL MEDICINE

## 2022-06-27 PROCEDURE — 2580000003 HC RX 258: Performed by: NURSE PRACTITIONER

## 2022-06-27 PROCEDURE — 6360000002 HC RX W HCPCS: Performed by: NURSE PRACTITIONER

## 2022-06-27 RX ORDER — LACTULOSE 10 G/15ML
20 SOLUTION ORAL 3 TIMES DAILY PRN
Status: DISCONTINUED | OUTPATIENT
Start: 2022-06-27 | End: 2022-06-30 | Stop reason: HOSPADM

## 2022-06-27 RX ORDER — POLYETHYLENE GLYCOL 3350 17 G/17G
17 POWDER, FOR SOLUTION ORAL DAILY
Status: DISCONTINUED | OUTPATIENT
Start: 2022-06-27 | End: 2022-06-30 | Stop reason: HOSPADM

## 2022-06-27 RX ORDER — ALBUTEROL SULFATE 90 UG/1
2 AEROSOL, METERED RESPIRATORY (INHALATION) EVERY 4 HOURS PRN
Status: DISCONTINUED | OUTPATIENT
Start: 2022-06-27 | End: 2022-06-30 | Stop reason: HOSPADM

## 2022-06-27 RX ORDER — IPRATROPIUM BROMIDE AND ALBUTEROL SULFATE 2.5; .5 MG/3ML; MG/3ML
1 SOLUTION RESPIRATORY (INHALATION)
Status: DISCONTINUED | OUTPATIENT
Start: 2022-06-27 | End: 2022-06-30 | Stop reason: HOSPADM

## 2022-06-27 RX ORDER — GUAIFENESIN/DEXTROMETHORPHAN 100-10MG/5
10 SYRUP ORAL EVERY 4 HOURS PRN
Status: DISCONTINUED | OUTPATIENT
Start: 2022-06-27 | End: 2022-06-30 | Stop reason: HOSPADM

## 2022-06-27 RX ORDER — LORAZEPAM 0.5 MG/1
0.5 TABLET ORAL EVERY 6 HOURS PRN
Status: DISCONTINUED | OUTPATIENT
Start: 2022-06-27 | End: 2022-06-30 | Stop reason: HOSPADM

## 2022-06-27 RX ORDER — FLUTICASONE PROPIONATE 50 MCG
1 SPRAY, SUSPENSION (ML) NASAL DAILY
Status: DISCONTINUED | OUTPATIENT
Start: 2022-06-27 | End: 2022-06-30 | Stop reason: HOSPADM

## 2022-06-27 RX ADMIN — METOPROLOL SUCCINATE 25 MG: 25 TABLET, EXTENDED RELEASE ORAL at 09:07

## 2022-06-27 RX ADMIN — PANTOPRAZOLE SODIUM 40 MG: 40 TABLET, DELAYED RELEASE ORAL at 06:24

## 2022-06-27 RX ADMIN — FERROUS SULFATE TAB 325 MG (65 MG ELEMENTAL FE) 325 MG: 325 (65 FE) TAB at 17:57

## 2022-06-27 RX ADMIN — METHYLPREDNISOLONE SODIUM SUCCINATE 40 MG: 40 INJECTION, POWDER, FOR SOLUTION INTRAMUSCULAR; INTRAVENOUS at 21:56

## 2022-06-27 RX ADMIN — Medication 1000 UNITS: at 09:06

## 2022-06-27 RX ADMIN — SODIUM CHLORIDE 25 ML: 9 INJECTION, SOLUTION INTRAVENOUS at 12:36

## 2022-06-27 RX ADMIN — BUSPIRONE HYDROCHLORIDE 10 MG: 5 TABLET ORAL at 09:05

## 2022-06-27 RX ADMIN — MONTELUKAST 10 MG: 10 TABLET, FILM COATED ORAL at 17:57

## 2022-06-27 RX ADMIN — ATORVASTATIN CALCIUM 80 MG: 40 TABLET, FILM COATED ORAL at 21:56

## 2022-06-27 RX ADMIN — METHYLPREDNISOLONE SODIUM SUCCINATE 40 MG: 40 INJECTION, POWDER, FOR SOLUTION INTRAMUSCULAR; INTRAVENOUS at 03:20

## 2022-06-27 RX ADMIN — THEOPHYLLINE ANHYDROUS 400 MG: 200 CAPSULE, EXTENDED RELEASE ORAL at 09:05

## 2022-06-27 RX ADMIN — GUAIFENESIN AND DEXTROMETHORPHAN 10 ML: 100; 10 SYRUP ORAL at 21:56

## 2022-06-27 RX ADMIN — TRAZODONE HYDROCHLORIDE 100 MG: 50 TABLET ORAL at 21:56

## 2022-06-27 RX ADMIN — IPRATROPIUM BROMIDE AND ALBUTEROL SULFATE 1 AMPULE: .5; 3 SOLUTION RESPIRATORY (INHALATION) at 19:11

## 2022-06-27 RX ADMIN — HYDRALAZINE HYDROCHLORIDE 75 MG: 50 TABLET, FILM COATED ORAL at 09:05

## 2022-06-27 RX ADMIN — FLUTICASONE PROPIONATE 1 SPRAY: 50 SPRAY, METERED NASAL at 12:27

## 2022-06-27 RX ADMIN — GABAPENTIN 300 MG: 300 CAPSULE ORAL at 17:57

## 2022-06-27 RX ADMIN — GUAIFENESIN AND DEXTROMETHORPHAN 10 ML: 100; 10 SYRUP ORAL at 12:27

## 2022-06-27 RX ADMIN — Medication 400 MG: at 09:06

## 2022-06-27 RX ADMIN — LORAZEPAM 0.5 MG: 0.5 TABLET ORAL at 12:28

## 2022-06-27 RX ADMIN — OXYCODONE AND ACETAMINOPHEN 1 TABLET: 5; 325 TABLET ORAL at 15:11

## 2022-06-27 RX ADMIN — OXYCODONE AND ACETAMINOPHEN 1 TABLET: 5; 325 TABLET ORAL at 09:07

## 2022-06-27 RX ADMIN — BUSPIRONE HYDROCHLORIDE 10 MG: 5 TABLET ORAL at 21:56

## 2022-06-27 RX ADMIN — ENOXAPARIN SODIUM 40 MG: 100 INJECTION SUBCUTANEOUS at 09:07

## 2022-06-27 RX ADMIN — GUAIFENESIN 600 MG: 600 TABLET, EXTENDED RELEASE ORAL at 09:05

## 2022-06-27 RX ADMIN — METHYLPREDNISOLONE SODIUM SUCCINATE 40 MG: 40 INJECTION, POWDER, FOR SOLUTION INTRAMUSCULAR; INTRAVENOUS at 09:07

## 2022-06-27 RX ADMIN — POLYETHYLENE GLYCOL (3350) 17 G: 17 POWDER, FOR SOLUTION ORAL at 17:56

## 2022-06-27 RX ADMIN — GABAPENTIN 300 MG: 300 CAPSULE ORAL at 09:05

## 2022-06-27 RX ADMIN — GABAPENTIN 300 MG: 300 CAPSULE ORAL at 12:28

## 2022-06-27 RX ADMIN — LEVOTHYROXINE SODIUM 100 MCG: 0.1 TABLET ORAL at 06:24

## 2022-06-27 RX ADMIN — GUAIFENESIN 600 MG: 600 TABLET, EXTENDED RELEASE ORAL at 21:56

## 2022-06-27 RX ADMIN — BUDESONIDE AND FORMOTEROL FUMARATE DIHYDRATE 2 PUFF: 160; 4.5 AEROSOL RESPIRATORY (INHALATION) at 19:05

## 2022-06-27 RX ADMIN — HYDRALAZINE HYDROCHLORIDE 75 MG: 50 TABLET, FILM COATED ORAL at 15:11

## 2022-06-27 RX ADMIN — BUDESONIDE AND FORMOTEROL FUMARATE DIHYDRATE 2 PUFF: 160; 4.5 AEROSOL RESPIRATORY (INHALATION) at 07:42

## 2022-06-27 RX ADMIN — DILTIAZEM HYDROCHLORIDE 120 MG: 120 CAPSULE, COATED, EXTENDED RELEASE ORAL at 09:06

## 2022-06-27 RX ADMIN — DULOXETINE 60 MG: 30 CAPSULE, DELAYED RELEASE ORAL at 09:06

## 2022-06-27 RX ADMIN — IPRATROPIUM BROMIDE AND ALBUTEROL SULFATE 1 AMPULE: .5; 3 SOLUTION RESPIRATORY (INHALATION) at 16:03

## 2022-06-27 RX ADMIN — HYDRALAZINE HYDROCHLORIDE 75 MG: 50 TABLET, FILM COATED ORAL at 21:56

## 2022-06-27 RX ADMIN — SODIUM CHLORIDE, PRESERVATIVE FREE 10 ML: 5 INJECTION INTRAVENOUS at 21:57

## 2022-06-27 RX ADMIN — METHYLPREDNISOLONE SODIUM SUCCINATE 40 MG: 40 INJECTION, POWDER, FOR SOLUTION INTRAMUSCULAR; INTRAVENOUS at 15:12

## 2022-06-27 RX ADMIN — Medication 100 MG: at 09:06

## 2022-06-27 RX ADMIN — IPRATROPIUM BROMIDE AND ALBUTEROL SULFATE 1 AMPULE: .5; 3 SOLUTION RESPIRATORY (INHALATION) at 11:55

## 2022-06-27 RX ADMIN — FERROUS SULFATE TAB 325 MG (65 MG ELEMENTAL FE) 325 MG: 325 (65 FE) TAB at 09:06

## 2022-06-27 RX ADMIN — FOLIC ACID 1 MG: 1 TABLET ORAL at 09:07

## 2022-06-27 RX ADMIN — LORAZEPAM 0.5 MG: 0.5 TABLET ORAL at 23:21

## 2022-06-27 RX ADMIN — AZITHROMYCIN MONOHYDRATE 500 MG: 500 INJECTION, POWDER, LYOPHILIZED, FOR SOLUTION INTRAVENOUS at 12:36

## 2022-06-27 RX ADMIN — OXYCODONE AND ACETAMINOPHEN 1 TABLET: 5; 325 TABLET ORAL at 21:56

## 2022-06-27 RX ADMIN — SODIUM CHLORIDE, PRESERVATIVE FREE 10 ML: 5 INJECTION INTRAVENOUS at 09:05

## 2022-06-27 RX ADMIN — IPRATROPIUM BROMIDE AND ALBUTEROL SULFATE 1 AMPULE: .5; 3 SOLUTION RESPIRATORY (INHALATION) at 07:42

## 2022-06-27 ASSESSMENT — PAIN SCALES - GENERAL
PAINLEVEL_OUTOF10: 10
PAINLEVEL_OUTOF10: 8
PAINLEVEL_OUTOF10: 7
PAINLEVEL_OUTOF10: 10
PAINLEVEL_OUTOF10: 0
PAINLEVEL_OUTOF10: 8

## 2022-06-27 NOTE — PROGRESS NOTES
[facility] HOSPITALIST PROGRESS NOTE      PCP: Elfego Harrell MD    Date of Admission: 6/26/2022    Subjective: feels anxious and short of breath      Brief Hospital summary The patient is a 72-year-old female with history of COPD, CHF, bipolar disorder, coronary artery disease, hyperlipidemia, obstructive sleep apnea, hypertension, anemia and back pain who was admitted with shortness of breath. Patient was requiring 4 L of oxygen, baseline 3, CTA negative for PE, influenza and SARS rule out  IV Solu-Medrol Symbicort Singulair started  tsh 15, free T4 ordered, endocrinology consulted    Vitals signs:  Afebrile,, heart rate 70s to 104 range, blood pressure 129/64, on 3 L of oxygen    Medications: Lipitor, Symbicort, buspirone, diltiazem, Cymbalta, Lovenox, folic acid, gabapentin, hydralazine, DuoNebs, levothyroxine, methylprednisolone, metoprolol, montelukast, pantoprazole, theophylline, trazodone, thiamine    Antibiotics: None    Fluid status: 200    Labs:   Labs from yesterday reviewed, free T4 1.19    Imaging: CTA  Impression:        1.  No evidence of pulmonary arterial embolism.  No thoracic aortic aneurysm   or dissection. 2.  Pulmonary emphysema is present with some areas of linear   atelectasis/scarring in the middle lobe, lingula, and lower lobes.  No sign   of pneumonia. 3.  A 2 mm noncalcified nodule in the right upper lobe.  In a high-risk   patient could have an optional follow-up CT at 12 months.         Assessment/Plan:     Acute on chronic hypoxic respiratory failure  Leukocytosis  Pulmonary nodule  Hypothyroidism  Paroxysmal A. fib  Morbid obesity  Obstructive sleep apnea  Hypertension  Hyperlipidemia  Mood disorder    Admitted with dyspnea, requiring 4 L of oxygen, CTA ruled out PE  2 cm nodule seen incidentally will need repeat CT in 1 year  Continue Symbicort, Solu-Medrol, theophylline duo nebs, check proBNP if elevated consider Lasix  TSH 15.5, free T4 normal, continue Synthroid, repeat TSH in 6 to 8 weeks    Continue metoprolol, diltiazem, and aspirin  Continue buspirone and Celexa    DVT prophlaxis:   Lovenox        Physical Exam Performed:       /64   Pulse 77   Temp 98.7 °F (37.1 °C) (Oral)   Resp 18   Ht 5' 1\" (1.549 m)   Wt 209 lb 12.8 oz (95.2 kg)   LMP 01/05/2010   SpO2 98%   Breastfeeding No   BMI 39.64 kg/m²     Physical Exam  Constitutional:       General: She is not in acute distress. Appearance: Normal appearance. HENT:      Head: Normocephalic and atraumatic. Right Ear: External ear normal.      Left Ear: External ear normal.   Eyes:      Extraocular Movements: Extraocular movements intact. Pupils: Pupils are equal, round, and reactive to light. Cardiovascular:      Rate and Rhythm: Normal rate and regular rhythm. Heart sounds: No murmur heard. Pulmonary:      Effort: Pulmonary effort is normal. No respiratory distress. Breath sounds: Normal breath sounds. No wheezing. Abdominal:      General: Bowel sounds are normal. There is no distension. Palpations: Abdomen is soft. Tenderness: There is no abdominal tenderness. Musculoskeletal:         General: No swelling. Cervical back: Normal range of motion. Skin:     General: Skin is warm. Neurological:      General: No focal deficit present. Mental Status: She is alert and oriented to person, place, and time. Cranial Nerves: No cranial nerve deficit. Psychiatric:         Mood and Affect: Mood normal.         Labs:   Recent Labs     06/26/22  0350 06/27/22  0409   WBC 12.7* 12.0*   HGB 10.6* 10.8*   HCT 36.0* 36.7*    245     Recent Labs     06/26/22  0350      K 3.8   CL 96*   CO2 34*   BUN 8   CREATININE 0.7   CALCIUM 9.6     Recent Labs     06/26/22  0350   AST 14*   ALT 17   BILITOT 0.1   ALKPHOS 96     No results for input(s): INR in the last 72 hours.   No results for input(s): Nata Blankenship in the last 72 hours.    Urinalysis:      Lab Results   Component Value Date    NITRU NEGATIVE 06/26/2022    WBCUA <1 06/26/2022    BACTERIA NEGATIVE 06/26/2022    RBCUA NONE SEEN 06/26/2022    BLOODU NEGATIVE 06/26/2022    SPECGRAV <1.005 06/26/2022       Radiology:  CTA PULMONARY W CONTRAST   Final Result   1. No evidence of pulmonary arterial embolism. No thoracic aortic aneurysm   or dissection. 2.  Pulmonary emphysema is present with some areas of linear   atelectasis/scarring in the middle lobe, lingula, and lower lobes. No sign   of pneumonia. 3.  A 2 mm noncalcified nodule in the right upper lobe. In a high-risk   patient could have an optional follow-up CT at 12 months. Fleischner Society guidelines for follow-up and management of incidentally   detected pulmonary nodules:      Single Solid Nodule:      Nodule size less than 6 mm   In a low-risk patient, no routine follow-up. In a high-risk patient, optional CT at 12 months. - Low risk patients include individuals with minimal or absent history of   smoking and other known risk factors. - High risk patients include individuals with a history or smoking or known   risk factors. Radiology 2017 http://pubs. rsna.org/doi/full/10.1148/radiol. 9007322307         XR CHEST PORTABLE   Final Result   Vascular congestion without overt edema.                  Afshan Harley MD  6/27/2022 8:02 AM

## 2022-06-27 NOTE — CONSULTS
[address] [place] [address], [address] [facility]                                  CONSULTATION    PATIENT NAME: Olivia Nicholas                :        1960  MED REC NO:   [id number]                          ROOM:       4006  ACCOUNT NO:   [de-identified]                           ADMIT DATE: 2022  PROVIDER:     Zoraida Quiroz MD    CONSULT DATE:  2022    HISTORY OF PRESENT ILLNESS:  The patient is a 24-year-old lady with  multiple medical problems including COPD, chronic respiratory failure on  home oxygen, congestive heart failure, bipolar disorder, who was  admitted through the emergency room with complaints of increasing  shortness of breath, nonproductive cough, and swelling of the lower  extremities. She was also complaining of some palpitations. In the  emergency room, she was given breathing treatment and was requiring more  oxygen than baseline. She was also given diuretics. She had a CAT scan  of the chest which showed no evidence of PE, COPD changes and incidental  2-mm noncalcified pulmonary nodule. The patient was subsequently  admitted to the hospital.    PAST MEDICAL HISTORY:  Significant for COPD, chronic respiratory failure  on home oxygen, bipolar disorder, osteoarthritis, hypertension,  fibromyalgia. PAST SURGICAL HISTORY:  Remarkable for back surgery, cardiac  catheterization, carpal tunnel release surgery, laparoscopic  cholecystectomy, colonoscopy, tubal ligation, and upper endoscopy. FAMILY HISTORY:  Reveals that her mother had asthma. Father had heart  disease. SOCIAL HISTORY:  Reveals that she quit smoking in  but used to smoke  one and a half packs per day for 20 years prior to that. She has a  history of drinking in the past but has not been drinking since . No history of drug abuse. MEDICATIONS:  Her medications were reviewed.     ALLERGIES:  She is allergic to lisinopril. REVIEW OF SYSTEMS:  A 10- to 14-point review of systems were reviewed  and are negative except for what is mentioned in the history of present  illness. PHYSICAL EXAMINATION:  GENERAL:  The patient is alert, oriented x3, in no acute respiratory  distress. VITAL SIGNS:  Her blood pressure is 193/79 mmHg, pulse of 90 per minute,  and respiratory rate of 18 per minute. She is afebrile. Her saturation  is 96% on 4 liters nasal cannula. HEENT:  Exam is essentially unremarkable. There is no JVD, no  lymphadenopathy. NECK:  Supple. LUNGS:  Exam revealed diminished breath sounds, very occasional rhonchi. HEART:  Exam showed normal S1, S2. There was no S3, S4 noted. ABDOMEN:  Exam is benign. There is no evidence of any organomegaly. The bowel sounds are present. NEUROLOGIC:  Exam reveals that she is awake and responsive, answering  questions appropriately, moving her extremities. LABORATORY AND DIAGNOSTIC DATA:  Her CBC showed a white count 12.0,  hemoglobin 10.8, hematocrit 36.7. Her respiratory disease panel PCR was  negative. Her electrolytes showed a sodium 137, potassium 3.8, chloride  96, carbon dioxide 34, BUN 8, creatinine 0.7. Her CAT scan of the chest  as mentioned in the history of present illness. Her venous blood gases  showed a pH of 7.39, pCO2 of 71, pO2 of 190, with 95% saturation. IMPRESSION:  1. Acute on chronic respiratory failure secondary to acute exacerbation  of COPD. 2.  Acute exacerbation of COPD. 3.  Possible fluid overload. PLAN:  1. Continue DuoNeb treatment. 2.  We will start Zithromax 500 mg once a day. 3.  Sputum for culture sensitivity. 4.  Check theophylline level. 5.  Continue Solu-Medrol. 6.  Check mag and phos. 7.  Increase activity. 8.  Continue BiPAP as ordered. 9.  As per orders.         Blade Santos MD    D: 06/27/2022 10:27:47       T: 06/27/2022 10:30:13     [email or web address]  Job#: [id number]     Doc#: [id number]    CC:

## 2022-06-27 NOTE — CARE COORDINATION
Chart reviewed and met w/ pt to initiate discharge planning. CM introduced self and explained role. Pt is from home with her , brother, sister and son. Pt has PCP and insurance with affordable RX copays. Pt reports she has home O2 from [employer] (wears 3-4L cont.) and has bipap from [address]. Pt reports that she is having trouble getting replacement parts from [address]. CM spoke with pulmonary functioning, Parvez Tyson, who stated they do not assist with replacement parts. CM was instructed by Parvez Tyson to have pt call [facility], CM advised pt of information. Pt declined any HHC currently and does not feel she will need at discharge. Pt declined any other needs from CM at this time. CM available should needs present.

## 2022-06-28 LAB
ALBUMIN SERPL-MCNC: 4 GM/DL (ref 3.4–5)
ALP BLD-CCNC: 88 IU/L (ref 40–128)
ALT SERPL-CCNC: 22 U/L (ref 10–40)
ANION GAP SERPL CALCULATED.3IONS-SCNC: 9 MMOL/L (ref 4–16)
AST SERPL-CCNC: 18 IU/L (ref 15–37)
BASOPHILS ABSOLUTE: 0 K/CU MM
BASOPHILS RELATIVE PERCENT: 0.1 % (ref 0–1)
BILIRUB SERPL-MCNC: 0.2 MG/DL (ref 0–1)
BUN BLDV-MCNC: 12 MG/DL (ref 6–23)
CALCIUM SERPL-MCNC: 9.4 MG/DL (ref 8.3–10.6)
CHLORIDE BLD-SCNC: 90 MMOL/L (ref 99–110)
CO2: 34 MMOL/L (ref 21–32)
CREAT SERPL-MCNC: 0.8 MG/DL (ref 0.6–1.1)
DIFFERENTIAL TYPE: ABNORMAL
DOHLE BODIES: PRESENT
DOSE AMOUNT: ABNORMAL
DOSE TIME: ABNORMAL
EOSINOPHILS ABSOLUTE: 0 K/CU MM
EOSINOPHILS RELATIVE PERCENT: 0 % (ref 0–3)
GFR AFRICAN AMERICAN: >60 ML/MIN/1.73M2
GFR NON-AFRICAN AMERICAN: >60 ML/MIN/1.73M2
GLUCOSE BLD-MCNC: 224 MG/DL (ref 70–99)
HCT VFR BLD CALC: 32.3 % (ref 37–47)
HEMOGLOBIN: 9.6 GM/DL (ref 12.5–16)
IMMATURE NEUTROPHIL %: 0.5 % (ref 0–0.43)
LYMPHOCYTES ABSOLUTE: 0.3 K/CU MM
LYMPHOCYTES RELATIVE PERCENT: 1.6 % (ref 24–44)
MAGNESIUM: 2 MG/DL (ref 1.8–2.4)
MCH RBC QN AUTO: 29.2 PG (ref 27–31)
MCHC RBC AUTO-ENTMCNC: 29.7 % (ref 32–36)
MCV RBC AUTO: 98.2 FL (ref 78–100)
MONOCYTES ABSOLUTE: 0.4 K/CU MM
MONOCYTES RELATIVE PERCENT: 2.5 % (ref 0–4)
PDW BLD-RTO: 13 % (ref 11.7–14.9)
PHOSPHORUS: 2.4 MG/DL (ref 2.5–4.9)
PLATELET # BLD: 235 K/CU MM (ref 140–440)
PMV BLD AUTO: 10 FL (ref 7.5–11.1)
POTASSIUM SERPL-SCNC: 4.7 MMOL/L (ref 3.5–5.1)
PRO-BNP: 1092 PG/ML
PROCALCITONIN: 0.05
RBC # BLD: 3.29 M/CU MM (ref 4.2–5.4)
SEGMENTED NEUTROPHILS ABSOLUTE COUNT: 16.3 K/CU MM
SEGMENTED NEUTROPHILS RELATIVE PERCENT: 95.3 % (ref 36–66)
SODIUM BLD-SCNC: 133 MMOL/L (ref 135–145)
THEOPHYLLINE LEVEL: 8 UG/ML (ref 10–20)
TOTAL IMMATURE NEUTOROPHIL: 0.09 K/CU MM
TOTAL PROTEIN: 5.8 GM/DL (ref 6.4–8.2)
TROPONIN T: <0.01 NG/ML
WBC # BLD: 17.1 K/CU MM (ref 4–10.5)
WBC # BLD: ABNORMAL 10*3/UL

## 2022-06-28 PROCEDURE — 6370000000 HC RX 637 (ALT 250 FOR IP): Performed by: INTERNAL MEDICINE

## 2022-06-28 PROCEDURE — 6370000000 HC RX 637 (ALT 250 FOR IP): Performed by: NURSE PRACTITIONER

## 2022-06-28 PROCEDURE — 6360000002 HC RX W HCPCS: Performed by: INTERNAL MEDICINE

## 2022-06-28 PROCEDURE — 94660 CPAP INITIATION&MGMT: CPT

## 2022-06-28 PROCEDURE — 2700000000 HC OXYGEN THERAPY PER DAY

## 2022-06-28 PROCEDURE — 6360000002 HC RX W HCPCS: Performed by: HOSPITALIST

## 2022-06-28 PROCEDURE — 2580000003 HC RX 258: Performed by: NURSE PRACTITIONER

## 2022-06-28 PROCEDURE — 36415 COLL VENOUS BLD VENIPUNCTURE: CPT

## 2022-06-28 PROCEDURE — 6370000000 HC RX 637 (ALT 250 FOR IP): Performed by: HOSPITALIST

## 2022-06-28 PROCEDURE — 85025 COMPLETE CBC W/AUTO DIFF WBC: CPT

## 2022-06-28 PROCEDURE — 80198 ASSAY OF THEOPHYLLINE: CPT

## 2022-06-28 PROCEDURE — 84100 ASSAY OF PHOSPHORUS: CPT

## 2022-06-28 PROCEDURE — 80053 COMPREHEN METABOLIC PANEL: CPT

## 2022-06-28 PROCEDURE — 2580000003 HC RX 258: Performed by: INTERNAL MEDICINE

## 2022-06-28 PROCEDURE — 94640 AIRWAY INHALATION TREATMENT: CPT

## 2022-06-28 PROCEDURE — 83880 ASSAY OF NATRIURETIC PEPTIDE: CPT

## 2022-06-28 PROCEDURE — 94761 N-INVAS EAR/PLS OXIMETRY MLT: CPT

## 2022-06-28 PROCEDURE — 84145 PROCALCITONIN (PCT): CPT

## 2022-06-28 PROCEDURE — 84484 ASSAY OF TROPONIN QUANT: CPT

## 2022-06-28 PROCEDURE — 6370000000 HC RX 637 (ALT 250 FOR IP): Performed by: EMERGENCY MEDICINE

## 2022-06-28 PROCEDURE — 83735 ASSAY OF MAGNESIUM: CPT

## 2022-06-28 PROCEDURE — 1200000000 HC SEMI PRIVATE

## 2022-06-28 PROCEDURE — 6360000002 HC RX W HCPCS: Performed by: NURSE PRACTITIONER

## 2022-06-28 RX ORDER — FUROSEMIDE 10 MG/ML
40 INJECTION INTRAMUSCULAR; INTRAVENOUS ONCE
Status: COMPLETED | OUTPATIENT
Start: 2022-06-28 | End: 2022-06-28

## 2022-06-28 RX ADMIN — IPRATROPIUM BROMIDE AND ALBUTEROL SULFATE 1 AMPULE: .5; 3 SOLUTION RESPIRATORY (INHALATION) at 11:07

## 2022-06-28 RX ADMIN — GABAPENTIN 300 MG: 300 CAPSULE ORAL at 13:23

## 2022-06-28 RX ADMIN — OXYCODONE AND ACETAMINOPHEN 1 TABLET: 5; 325 TABLET ORAL at 05:01

## 2022-06-28 RX ADMIN — LORAZEPAM 0.5 MG: 0.5 TABLET ORAL at 05:10

## 2022-06-28 RX ADMIN — SODIUM CHLORIDE, PRESERVATIVE FREE 10 ML: 5 INJECTION INTRAVENOUS at 22:36

## 2022-06-28 RX ADMIN — DILTIAZEM HYDROCHLORIDE 120 MG: 120 CAPSULE, COATED, EXTENDED RELEASE ORAL at 10:01

## 2022-06-28 RX ADMIN — LACTULOSE 20 G: 10 SOLUTION ORAL at 18:01

## 2022-06-28 RX ADMIN — SODIUM CHLORIDE, PRESERVATIVE FREE 10 ML: 5 INJECTION INTRAVENOUS at 10:19

## 2022-06-28 RX ADMIN — GABAPENTIN 300 MG: 300 CAPSULE ORAL at 18:00

## 2022-06-28 RX ADMIN — GABAPENTIN 300 MG: 300 CAPSULE ORAL at 10:43

## 2022-06-28 RX ADMIN — METOPROLOL SUCCINATE 25 MG: 25 TABLET, EXTENDED RELEASE ORAL at 10:01

## 2022-06-28 RX ADMIN — GUAIFENESIN AND DEXTROMETHORPHAN 10 ML: 100; 10 SYRUP ORAL at 05:01

## 2022-06-28 RX ADMIN — GUAIFENESIN 600 MG: 600 TABLET, EXTENDED RELEASE ORAL at 22:35

## 2022-06-28 RX ADMIN — HYDRALAZINE HYDROCHLORIDE 75 MG: 50 TABLET, FILM COATED ORAL at 09:59

## 2022-06-28 RX ADMIN — Medication 1000 UNITS: at 10:00

## 2022-06-28 RX ADMIN — LEVOTHYROXINE SODIUM 100 MCG: 0.1 TABLET ORAL at 10:48

## 2022-06-28 RX ADMIN — Medication 400 MG: at 09:59

## 2022-06-28 RX ADMIN — OXYCODONE AND ACETAMINOPHEN 1 TABLET: 5; 325 TABLET ORAL at 22:35

## 2022-06-28 RX ADMIN — FERROUS SULFATE TAB 325 MG (65 MG ELEMENTAL FE) 325 MG: 325 (65 FE) TAB at 10:43

## 2022-06-28 RX ADMIN — FLUTICASONE PROPIONATE 1 SPRAY: 50 SPRAY, METERED NASAL at 10:47

## 2022-06-28 RX ADMIN — TRAZODONE HYDROCHLORIDE 100 MG: 50 TABLET ORAL at 22:36

## 2022-06-28 RX ADMIN — BUSPIRONE HYDROCHLORIDE 10 MG: 5 TABLET ORAL at 10:00

## 2022-06-28 RX ADMIN — Medication 100 MG: at 09:59

## 2022-06-28 RX ADMIN — THEOPHYLLINE ANHYDROUS 400 MG: 200 CAPSULE, EXTENDED RELEASE ORAL at 09:59

## 2022-06-28 RX ADMIN — IPRATROPIUM BROMIDE AND ALBUTEROL SULFATE 1 AMPULE: .5; 3 SOLUTION RESPIRATORY (INHALATION) at 15:18

## 2022-06-28 RX ADMIN — BUSPIRONE HYDROCHLORIDE 10 MG: 5 TABLET ORAL at 22:35

## 2022-06-28 RX ADMIN — METHYLPREDNISOLONE SODIUM SUCCINATE 40 MG: 40 INJECTION, POWDER, FOR SOLUTION INTRAMUSCULAR; INTRAVENOUS at 05:01

## 2022-06-28 RX ADMIN — BUDESONIDE AND FORMOTEROL FUMARATE DIHYDRATE 2 PUFF: 160; 4.5 AEROSOL RESPIRATORY (INHALATION) at 20:23

## 2022-06-28 RX ADMIN — DULOXETINE 60 MG: 30 CAPSULE, DELAYED RELEASE ORAL at 09:58

## 2022-06-28 RX ADMIN — MONTELUKAST 10 MG: 10 TABLET, FILM COATED ORAL at 18:00

## 2022-06-28 RX ADMIN — IPRATROPIUM BROMIDE AND ALBUTEROL SULFATE 1 AMPULE: .5; 3 SOLUTION RESPIRATORY (INHALATION) at 20:22

## 2022-06-28 RX ADMIN — ENOXAPARIN SODIUM 40 MG: 100 INJECTION SUBCUTANEOUS at 10:01

## 2022-06-28 RX ADMIN — BUDESONIDE AND FORMOTEROL FUMARATE DIHYDRATE 2 PUFF: 160; 4.5 AEROSOL RESPIRATORY (INHALATION) at 07:20

## 2022-06-28 RX ADMIN — GUAIFENESIN 600 MG: 600 TABLET, EXTENDED RELEASE ORAL at 10:01

## 2022-06-28 RX ADMIN — HYDRALAZINE HYDROCHLORIDE 75 MG: 50 TABLET, FILM COATED ORAL at 13:23

## 2022-06-28 RX ADMIN — FERROUS SULFATE TAB 325 MG (65 MG ELEMENTAL FE) 325 MG: 325 (65 FE) TAB at 16:16

## 2022-06-28 RX ADMIN — ATORVASTATIN CALCIUM 80 MG: 40 TABLET, FILM COATED ORAL at 22:35

## 2022-06-28 RX ADMIN — LORAZEPAM 0.5 MG: 0.5 TABLET ORAL at 16:15

## 2022-06-28 RX ADMIN — HYDRALAZINE HYDROCHLORIDE 75 MG: 50 TABLET, FILM COATED ORAL at 22:35

## 2022-06-28 RX ADMIN — FOLIC ACID 1 MG: 1 TABLET ORAL at 10:01

## 2022-06-28 RX ADMIN — PANTOPRAZOLE SODIUM 40 MG: 40 TABLET, DELAYED RELEASE ORAL at 10:48

## 2022-06-28 RX ADMIN — AZITHROMYCIN MONOHYDRATE 500 MG: 500 INJECTION, POWDER, LYOPHILIZED, FOR SOLUTION INTRAVENOUS at 10:58

## 2022-06-28 RX ADMIN — IPRATROPIUM BROMIDE AND ALBUTEROL SULFATE 1 AMPULE: .5; 3 SOLUTION RESPIRATORY (INHALATION) at 05:06

## 2022-06-28 RX ADMIN — ALBUTEROL SULFATE 2 PUFF: 90 AEROSOL, METERED RESPIRATORY (INHALATION) at 07:21

## 2022-06-28 RX ADMIN — PREDNISONE 40 MG: 20 TABLET ORAL at 10:43

## 2022-06-28 RX ADMIN — POLYETHYLENE GLYCOL (3350) 17 G: 17 POWDER, FOR SOLUTION ORAL at 10:19

## 2022-06-28 RX ADMIN — OXYCODONE AND ACETAMINOPHEN 1 TABLET: 5; 325 TABLET ORAL at 16:15

## 2022-06-28 RX ADMIN — LORAZEPAM 0.5 MG: 0.5 TABLET ORAL at 22:36

## 2022-06-28 RX ADMIN — FUROSEMIDE 40 MG: 10 INJECTION, SOLUTION INTRAMUSCULAR; INTRAVENOUS at 10:43

## 2022-06-28 ASSESSMENT — PAIN SCALES - GENERAL
PAINLEVEL_OUTOF10: 5
PAINLEVEL_OUTOF10: 4
PAINLEVEL_OUTOF10: 7

## 2022-06-28 ASSESSMENT — PAIN DESCRIPTION - LOCATION
LOCATION: BACK
LOCATION: ABDOMEN;BACK
LOCATION: BACK;HIP;KNEE
LOCATION: BACK

## 2022-06-28 ASSESSMENT — PAIN SCALES - WONG BAKER: WONGBAKER_NUMERICALRESPONSE: 0

## 2022-06-28 ASSESSMENT — PAIN DESCRIPTION - FREQUENCY
FREQUENCY: CONTINUOUS
FREQUENCY: CONTINUOUS

## 2022-06-28 ASSESSMENT — PAIN DESCRIPTION - ORIENTATION
ORIENTATION: LOWER

## 2022-06-28 ASSESSMENT — PAIN DESCRIPTION - DESCRIPTORS
DESCRIPTORS: ACHING
DESCRIPTORS: ACHING;SORE

## 2022-06-28 ASSESSMENT — PAIN - FUNCTIONAL ASSESSMENT: PAIN_FUNCTIONAL_ASSESSMENT: PREVENTS OR INTERFERES WITH MANY ACTIVE NOT PASSIVE ACTIVITIES

## 2022-06-28 ASSESSMENT — PAIN DESCRIPTION - ONSET: ONSET: ON-GOING

## 2022-06-28 NOTE — PROGRESS NOTES
Placed IV -  22g right antecubital     Patient last BM was Thursday 23rd.      Patient had PRN lactulose

## 2022-06-28 NOTE — PROGRESS NOTES
[facility] HOSPITALIST PROGRESS NOTE      PCP: Iveth Da Silva MD    Date of Admission: 6/26/2022    Subjective: feels anxious and short of breath      Brief Hospital summary The patient is a 53-year-old female with history of COPD, CHF, bipolar disorder, coronary artery disease, hyperlipidemia, obstructive sleep apnea, hypertension, anemia and back pain who was admitted with shortness of breath.    Patient was requiring 4 L of oxygen, baseline 3, CTA negative for PE, influenza and SARS rule out  IV Solu-Medrol Symbicort Singulair started  tsh 15, free T4 ordered, endocrinology consulted    Vitals signs:  Afebrile,, heart rate 70s to 104 range, blood pressure 129/64, on 3 L of oxygen    Medications: Lipitor, Symbicort, buspirone, diltiazem, Cymbalta, Lovenox, folic acid, gabapentin, hydralazine, DuoNebs, levothyroxine, methylprednisolone, metoprolol, montelukast, pantoprazole, theophylline, trazodone, thiamine    Antibiotics: None    Fluid status: 200    Labs:   Sodium 133, potassium 4.7, chloride 90, bicarb 34, creatinine 0.8  LFTs normal      Imaging: CTA did not show any acute PE, no thoracic aneurysm or dissection  Emphysema and 2 mm noncalcified nodule in the right upper lung lobe seen      Assessment/Plan:     Acute on chronic hypoxic respiratory failure  Leukocytosis  Pulmonary nodule  Hypothyroidism  Paroxysmal A. fib  Morbid obesity  Obstructive sleep apnea  Hypertension  Hyperlipidemia  Mood disorder    Admitted with dyspnea, requiring 4 L of oxygen, CTA ruled out PE  2 cm nodule seen incidentally will need repeat CT in 1 year  Continue Symbicort, Solu-Medrol, theophylline duo nebs,   proBNP elevated  Lasix x1  Input output record, daily weight, salt and fluid restriction  Continue azithromycin  Down to 3 L of oxygen which is baseline for patient  TSH 15.5, free T4 normal, continue Synthroid, repeat TSH in 6 to 8 weeks  ECHO normal     Continue metoprolol, diltiazem, and aspirin  Continue buspirone and Celexa    DVT prophlaxis:   Lovenox        Physical Exam Performed:       BP (!) 155/83   Pulse 92   Temp 98.2 °F (36.8 °C) (Oral)   Resp 18   Ht 5' 1\" (1.549 m)   Wt 209 lb 12.8 oz (95.2 kg)   LMP 01/05/2010   SpO2 96%   Breastfeeding No   BMI 39.64 kg/m²     Physical Exam  Constitutional:       General: She is not in acute distress. Appearance: Normal appearance. HENT:      Head: Normocephalic and atraumatic. Right Ear: External ear normal.      Left Ear: External ear normal.   Eyes:      Extraocular Movements: Extraocular movements intact. Pupils: Pupils are equal, round, and reactive to light. Cardiovascular:      Rate and Rhythm: Normal rate and regular rhythm. Heart sounds: No murmur heard. Pulmonary:      Effort: Pulmonary effort is normal. No respiratory distress. Breath sounds: Normal breath sounds. No wheezing. Abdominal:      General: Bowel sounds are normal. There is no distension. Palpations: Abdomen is soft. Tenderness: There is no abdominal tenderness. Musculoskeletal:         General: No swelling. Cervical back: Normal range of motion. Skin:     General: Skin is warm. Neurological:      General: No focal deficit present. Mental Status: She is alert and oriented to person, place, and time. Cranial Nerves: No cranial nerve deficit. Psychiatric:         Mood and Affect: Mood normal.         Labs:   Recent Labs     06/26/22  0350 06/27/22  0409 06/28/22  0625   WBC 12.7* 12.0* 17.1*   HGB 10.6* 10.8* 9.6*   HCT 36.0* 36.7* 32.3*    245 235     Recent Labs     06/26/22  0350 06/28/22  0625    133*   K 3.8 4.7   CL 96* 90*   CO2 34* 34*   BUN 8 12   CREATININE 0.7 0.8   CALCIUM 9.6 9.4   PHOS  --  2.4*     Recent Labs     06/26/22  0350 06/28/22  0625   AST 14* 18   ALT 17 22   BILITOT 0.1 0.2   ALKPHOS 96 88     No results for input(s): INR in the last 72 hours.   No results for input(s): Raymond Beuno in the last 72 hours. Urinalysis:      Lab Results   Component Value Date    NITRU NEGATIVE 06/26/2022    WBCUA <1 06/26/2022    BACTERIA NEGATIVE 06/26/2022    RBCUA NONE SEEN 06/26/2022    BLOODU NEGATIVE 06/26/2022    SPECGRAV <1.005 06/26/2022       Radiology:  CTA PULMONARY W CONTRAST   Final Result   1. No evidence of pulmonary arterial embolism. No thoracic aortic aneurysm   or dissection. 2.  Pulmonary emphysema is present with some areas of linear   atelectasis/scarring in the middle lobe, lingula, and lower lobes. No sign   of pneumonia. 3.  A 2 mm noncalcified nodule in the right upper lobe. In a high-risk   patient could have an optional follow-up CT at 12 months. Fleischner Society guidelines for follow-up and management of incidentally   detected pulmonary nodules:      Single Solid Nodule:      Nodule size less than 6 mm   In a low-risk patient, no routine follow-up. In a high-risk patient, optional CT at 12 months. - Low risk patients include individuals with minimal or absent history of   smoking and other known risk factors. - High risk patients include individuals with a history or smoking or known   risk factors. Radiology 2017 http://pubs. rsna.org/doi/full/10.1148/radiol. 4989627337         XR CHEST PORTABLE   Final Result   Vascular congestion without overt edema.                  Anna Nicholson MD  6/28/2022 9:09 AM

## 2022-06-28 NOTE — PROGRESS NOTES
06/28/22 0720   NIV Type   $NIV $Daily Charge   NIV Started/Stopped Off   Equipment Type v60 standby   Mask Type Full face mask   Settings/Measurements   IPAP 12 cmH20   CPAP/EPAP 5 cmH2O   Resp 18   O2 Flow Rate (L/min) 3 L/min   FiO2  35 %   Pt wore last night.     Pt has home  O2 from [address] and BiPap from [facility]

## 2022-06-28 NOTE — PROGRESS NOTES
Physician Progress Note      PATIENT:               Raf Subramanian  CSN #:                  [id number]  :                       1960  ADMIT DATE:       2022 3:17 AM  DISCH DATE:  RESPONDING  PROVIDER #:        PIETER Olvera MD          QUERY TEXT:    Pt admitted with Acute respiratory failure . Noted documentation of AECOPD on   2022 Consult note by ordered Pulmonology consultant. If possible,   please document in progress notes and discharge summary:    The medical record reflects the following:  Risk Factors: COPD, Pulmonary edema  Clinical Indicators: \"Acute exacerbation of COPD\" noted in consult note  Treatment: DuoNeb treatment, Zithromax 500 mg once a day, Sputum for culture   sensitivity, Check theophylline level, Continue Solu-Medro, Check mag and   phos, Continue BiPAP as ordered, CXR, CTA chest, ECHO    Thank you Bailey Seip RN, CDS ([phone])  Options provided:  -- AECOPD confirmed present on admission  -- AECOPD confirmed not present on admission  -- AECOPD ruled out  -- Defer to Pulmonology consultant documentation regarding AECOPD  -- Other - I will add my own diagnosis  -- Disagree - Not applicable / Not valid  -- Disagree - Clinically unable to determine / Unknown  -- Refer to Clinical Documentation Reviewer    PROVIDER RESPONSE TEXT:    The diagnosis of AECOPD was confirmed as present on admission.     Query created by: Chica Garland on 2022 10:54 AM      Electronically signed by:  Nikolas Lr MD 2022 11:00 AM

## 2022-06-28 NOTE — PROGRESS NOTES
Pulmonary and Critical Care  Progress Note    Subjective: The patient has improved. Shortness of breath has improved. Chest pain none. Addressing respiratory complaints Patient is negative for  hemoptysis and cyanosis. CONSTITUTIONAL:  negative for fevers and chills. Past Medical History:     has a past medical history of Anemia, Anxiety, Arthritis, Back pain at L4-L5 level, Bipolar 1 disorder (HCC), COPD (chronic obstructive pulmonary disease) ([facility] [address] Emphysema of lung ([facility] [address] FH: CAD (coronary artery disease), Fibromyalgia, Full dentures, Gastric ulcer, H/O Doppler ultrasound, H/O echocardiogram, History of blood transfusion, Hyperlipidemia LDL goal <100, Hypertension, Irregular heartbeat, Obstructive sleep apnea, On home oxygen therapy, Osteoarthritis, Pericardial effusion, Spinal stenosis, Thyroid disease, and Wears glasses. has a past surgical history that includes Carpal tunnel release (Right, 1985); Tubal ligation (1987); back surgery (03/2017); Cholecystectomy, laparoscopic (N/A, 10/25/2020); Colonoscopy (N/A, 12/12/2019); Endoscopy, colon, diagnostic; Cardiac catheterization; Cardiac catheterization; and Upper gastrointestinal endoscopy (N/A, 1/7/2021). reports that she quit smoking about 14 years ago. Her smoking use included cigarettes. She has a 30.00 pack-year smoking history. She has never used smokeless tobacco. She reports previous alcohol use of about 2.0 standard drinks of alcohol per week. She reports that she does not use drugs. Family history:  family history includes Asthma in her mother; Heart Disease in her father.     Allergies   Allergen Reactions    Lisinopril Swelling and Rash     angioedema     Social History:    Reviewed; no changes    Objective:   PHYSICAL EXAM:        VITALS:  BP (!) 155/83   Pulse 92   Temp 98.2 °F (36.8 °C) (Oral)   Resp 18   Ht 5' 1\" (1.549 m)   Wt 209 lb 12.8 oz (95.2 kg)   LMP 01/05/2010   SpO2 96%   Breastfeeding No   BMI 39.64 kg/m² 24HR INTAKE/OUTPUT:      Intake/Output Summary (Last 24 hours) at 6/28/2022 1035  Last data filed at 6/28/2022 0754  Gross per 24 hour   Intake --   Output 1500 ml   Net -1500 ml       CONSTITUTIONAL:  awake, alert, cooperative, no apparent distress, and appears stated age  LUNGS: Decreased BS. Occ rhonchii. CARDIOVASCULAR:  normal S1 and S2 and negative JVD  ABD:Abdomen soft, non-tender. BS normal. No masses,  No organomegaly  NEURO:Alert and oriented x3. Gait normal. Reflexes and motor strength normal and symmetric. Cranial nerves 2-12 and sensation grossly intact. DATA:    CBC:  Recent Labs     06/26/22  0350 06/27/22  0409 06/28/22  0625   WBC 12.7* 12.0* 17.1*   RBC 3.61* 3.79* 3.29*   HGB 10.6* 10.8* 9.6*   HCT 36.0* 36.7* 32.3*    245 235   MCV 99.7 96.8 98.2   MCH 29.4 28.5 29.2   MCHC 29.4* 29.4* 29.7*   RDW 13.2 13.0 13.0   SEGSPCT 79.1* 91.8*  --       BMP:  Recent Labs     06/26/22  0350 06/28/22  0625    133*   K 3.8 4.7   CL 96* 90*   CO2 34* 34*   BUN 8 12   CREATININE 0.7 0.8   CALCIUM 9.6 9.4   GLUCOSE 111* 224*      ABG:  No results for input(s): PH, PO2ART, SOO7DKF, HCO3, BEART, O2SAT in the last 72 hours. Lab Results   Component Value Date    PROBNP 1,092 (H) 06/28/2022    PROBNP 426.9 (H) 06/26/2022    PROBNP 304.5 (H) 03/14/2022    THEOPH 8.0 (L) 06/28/2022     No results found for: [facility]    Radiology Review:  Pertinent images / reports were reviewed as a part of this visit.     Assessment:     Patient Active Problem List   Diagnosis    Centrilobular emphysema ([address]    Hypertension    Hyperlipidemia LDL goal <100    Abnormal EKG    Palpitations    Anxiety    FH: CAD (coronary artery disease)    Fibromyalgia    Back pain at L4-L5 level    Sleep apnea    COPD exacerbation (HCC)    Electrolyte imbalance    Epigastric pain    COPD (chronic obstructive pulmonary disease) ([facility] [address]    Spinal stenosis of lumbar region with radiculopathy    Spinal stenosis, lumbar region, without neurogenic claudication    COPD with acute exacerbation (HCC)    Pneumonia due to infectious organism    SVT (supraventricular tachycardia) (HCC)    Class 1 obesity due to excess calories with body mass index (BMI) of 31.0 to 31.9 in adult    Pneumonia    Pleural effusion    Atelectasis    Pulmonary nodules    Respiratory failure with hypoxia and hypercapnia (HCC)    Anemia    COPD with exacerbation (HCC)    Acquired hemolytic anemia, unspecified (HCC)    Leucocytosis    Chronic kidney disease, stage I    Hyponatremia    PNA (pneumonia)    Sepsis (HCC)    Pericardial effusion    Acute acalculous cholecystitis    SIRS (systemic inflammatory response syndrome) (HCC)    Chest pain    Abnormal nuclear stress test    Abnormal stress test    Status post laparoscopic cholecystectomy    Moderate malnutrition (HCC)    LUZ MARIA (acute kidney injury) (HCC)    Dizziness    Stage 3a chronic kidney disease (HCC)    Adrenal insufficiency (HCC)    Trochanteric bursitis of right hip    Hypokalemia    Chest pressure    Leg edema    Acute on chronic respiratory failure with hypoxia (HCC)       Plan:   1. Overall the patient has improved. 2. Inc. Activity.       Araceli Brown MD   6/28/2022  10:35 AM

## 2022-06-29 PROCEDURE — 6370000000 HC RX 637 (ALT 250 FOR IP): Performed by: INTERNAL MEDICINE

## 2022-06-29 PROCEDURE — 6370000000 HC RX 637 (ALT 250 FOR IP): Performed by: HOSPITALIST

## 2022-06-29 PROCEDURE — 2580000003 HC RX 258: Performed by: NURSE PRACTITIONER

## 2022-06-29 PROCEDURE — 2700000000 HC OXYGEN THERAPY PER DAY

## 2022-06-29 PROCEDURE — 6360000002 HC RX W HCPCS: Performed by: NURSE PRACTITIONER

## 2022-06-29 PROCEDURE — 6360000002 HC RX W HCPCS: Performed by: INTERNAL MEDICINE

## 2022-06-29 PROCEDURE — 1200000000 HC SEMI PRIVATE

## 2022-06-29 PROCEDURE — 94640 AIRWAY INHALATION TREATMENT: CPT

## 2022-06-29 PROCEDURE — 2580000003 HC RX 258: Performed by: INTERNAL MEDICINE

## 2022-06-29 PROCEDURE — 6370000000 HC RX 637 (ALT 250 FOR IP): Performed by: NURSE PRACTITIONER

## 2022-06-29 PROCEDURE — 76937 US GUIDE VASCULAR ACCESS: CPT

## 2022-06-29 PROCEDURE — 6370000000 HC RX 637 (ALT 250 FOR IP): Performed by: EMERGENCY MEDICINE

## 2022-06-29 PROCEDURE — 94761 N-INVAS EAR/PLS OXIMETRY MLT: CPT

## 2022-06-29 RX ADMIN — FERROUS SULFATE TAB 325 MG (65 MG ELEMENTAL FE) 325 MG: 325 (65 FE) TAB at 17:12

## 2022-06-29 RX ADMIN — TRAZODONE HYDROCHLORIDE 100 MG: 50 TABLET ORAL at 20:20

## 2022-06-29 RX ADMIN — IPRATROPIUM BROMIDE AND ALBUTEROL SULFATE 1 AMPULE: .5; 3 SOLUTION RESPIRATORY (INHALATION) at 15:33

## 2022-06-29 RX ADMIN — LORAZEPAM 0.5 MG: 0.5 TABLET ORAL at 06:43

## 2022-06-29 RX ADMIN — OXYCODONE AND ACETAMINOPHEN 1 TABLET: 5; 325 TABLET ORAL at 12:48

## 2022-06-29 RX ADMIN — ALBUTEROL SULFATE 2 PUFF: 90 AEROSOL, METERED RESPIRATORY (INHALATION) at 11:50

## 2022-06-29 RX ADMIN — AZITHROMYCIN MONOHYDRATE 500 MG: 500 INJECTION, POWDER, LYOPHILIZED, FOR SOLUTION INTRAVENOUS at 10:46

## 2022-06-29 RX ADMIN — SODIUM CHLORIDE, PRESERVATIVE FREE 5 ML: 5 INJECTION INTRAVENOUS at 20:27

## 2022-06-29 RX ADMIN — PREDNISONE 40 MG: 20 TABLET ORAL at 08:30

## 2022-06-29 RX ADMIN — BUSPIRONE HYDROCHLORIDE 10 MG: 5 TABLET ORAL at 08:31

## 2022-06-29 RX ADMIN — OXYCODONE AND ACETAMINOPHEN 1 TABLET: 5; 325 TABLET ORAL at 06:43

## 2022-06-29 RX ADMIN — FOLIC ACID 1 MG: 1 TABLET ORAL at 08:31

## 2022-06-29 RX ADMIN — LORAZEPAM 0.5 MG: 0.5 TABLET ORAL at 12:48

## 2022-06-29 RX ADMIN — Medication 400 MG: at 08:30

## 2022-06-29 RX ADMIN — Medication 1000 UNITS: at 08:30

## 2022-06-29 RX ADMIN — LORAZEPAM 0.5 MG: 0.5 TABLET ORAL at 20:32

## 2022-06-29 RX ADMIN — GUAIFENESIN AND DEXTROMETHORPHAN 10 ML: 100; 10 SYRUP ORAL at 06:43

## 2022-06-29 RX ADMIN — ENOXAPARIN SODIUM 40 MG: 100 INJECTION SUBCUTANEOUS at 08:30

## 2022-06-29 RX ADMIN — IPRATROPIUM BROMIDE AND ALBUTEROL SULFATE 1 AMPULE: .5; 3 SOLUTION RESPIRATORY (INHALATION) at 19:33

## 2022-06-29 RX ADMIN — METOPROLOL SUCCINATE 25 MG: 25 TABLET, EXTENDED RELEASE ORAL at 08:30

## 2022-06-29 RX ADMIN — GUAIFENESIN 600 MG: 600 TABLET, EXTENDED RELEASE ORAL at 20:21

## 2022-06-29 RX ADMIN — GABAPENTIN 300 MG: 300 CAPSULE ORAL at 12:48

## 2022-06-29 RX ADMIN — FLUTICASONE PROPIONATE 1 SPRAY: 50 SPRAY, METERED NASAL at 08:41

## 2022-06-29 RX ADMIN — DILTIAZEM HYDROCHLORIDE 120 MG: 120 CAPSULE, COATED, EXTENDED RELEASE ORAL at 08:31

## 2022-06-29 RX ADMIN — GUAIFENESIN AND DEXTROMETHORPHAN 10 ML: 100; 10 SYRUP ORAL at 20:44

## 2022-06-29 RX ADMIN — HYDRALAZINE HYDROCHLORIDE 75 MG: 50 TABLET, FILM COATED ORAL at 20:20

## 2022-06-29 RX ADMIN — POLYETHYLENE GLYCOL (3350) 17 G: 17 POWDER, FOR SOLUTION ORAL at 08:29

## 2022-06-29 RX ADMIN — ATORVASTATIN CALCIUM 80 MG: 40 TABLET, FILM COATED ORAL at 20:22

## 2022-06-29 RX ADMIN — SODIUM CHLORIDE, PRESERVATIVE FREE 10 ML: 5 INJECTION INTRAVENOUS at 08:28

## 2022-06-29 RX ADMIN — GABAPENTIN 300 MG: 300 CAPSULE ORAL at 17:12

## 2022-06-29 RX ADMIN — IPRATROPIUM BROMIDE AND ALBUTEROL SULFATE 1 AMPULE: .5; 3 SOLUTION RESPIRATORY (INHALATION) at 10:56

## 2022-06-29 RX ADMIN — THEOPHYLLINE ANHYDROUS 400 MG: 200 CAPSULE, EXTENDED RELEASE ORAL at 08:30

## 2022-06-29 RX ADMIN — PANTOPRAZOLE SODIUM 40 MG: 40 TABLET, DELAYED RELEASE ORAL at 06:43

## 2022-06-29 RX ADMIN — OXYCODONE AND ACETAMINOPHEN 1 TABLET: 5; 325 TABLET ORAL at 20:21

## 2022-06-29 RX ADMIN — DULOXETINE 60 MG: 30 CAPSULE, DELAYED RELEASE ORAL at 08:31

## 2022-06-29 RX ADMIN — LEVOTHYROXINE SODIUM 100 MCG: 0.1 TABLET ORAL at 06:43

## 2022-06-29 RX ADMIN — BUDESONIDE AND FORMOTEROL FUMARATE DIHYDRATE 2 PUFF: 160; 4.5 AEROSOL RESPIRATORY (INHALATION) at 19:34

## 2022-06-29 RX ADMIN — GUAIFENESIN 600 MG: 600 TABLET, EXTENDED RELEASE ORAL at 08:31

## 2022-06-29 RX ADMIN — BUSPIRONE HYDROCHLORIDE 10 MG: 5 TABLET ORAL at 20:21

## 2022-06-29 RX ADMIN — GABAPENTIN 300 MG: 300 CAPSULE ORAL at 08:31

## 2022-06-29 RX ADMIN — HYDRALAZINE HYDROCHLORIDE 75 MG: 50 TABLET, FILM COATED ORAL at 08:31

## 2022-06-29 RX ADMIN — HYDRALAZINE HYDROCHLORIDE 75 MG: 50 TABLET, FILM COATED ORAL at 15:10

## 2022-06-29 RX ADMIN — Medication 100 MG: at 08:30

## 2022-06-29 RX ADMIN — MONTELUKAST 10 MG: 10 TABLET, FILM COATED ORAL at 17:12

## 2022-06-29 RX ADMIN — FERROUS SULFATE TAB 325 MG (65 MG ELEMENTAL FE) 325 MG: 325 (65 FE) TAB at 08:31

## 2022-06-29 RX ADMIN — BUDESONIDE AND FORMOTEROL FUMARATE DIHYDRATE 2 PUFF: 160; 4.5 AEROSOL RESPIRATORY (INHALATION) at 10:56

## 2022-06-29 ASSESSMENT — PAIN DESCRIPTION - ONSET
ONSET: ON-GOING

## 2022-06-29 ASSESSMENT — PAIN DESCRIPTION - ORIENTATION
ORIENTATION: LOWER
ORIENTATION: RIGHT;LOWER
ORIENTATION: LOWER;LEFT;RIGHT
ORIENTATION: LOWER

## 2022-06-29 ASSESSMENT — PAIN DESCRIPTION - FREQUENCY
FREQUENCY: CONTINUOUS

## 2022-06-29 ASSESSMENT — PAIN DESCRIPTION - LOCATION
LOCATION: BACK;HEAD;KNEE
LOCATION: ARM;BACK
LOCATION: BACK

## 2022-06-29 ASSESSMENT — PAIN - FUNCTIONAL ASSESSMENT
PAIN_FUNCTIONAL_ASSESSMENT: ACTIVITIES ARE NOT PREVENTED

## 2022-06-29 ASSESSMENT — PAIN DESCRIPTION - DESCRIPTORS
DESCRIPTORS: ACHING;DISCOMFORT
DESCRIPTORS: ACHING;DISCOMFORT
DESCRIPTORS: SHARP
DESCRIPTORS: ACHING;DISCOMFORT
DESCRIPTORS: ACHING

## 2022-06-29 ASSESSMENT — PAIN DESCRIPTION - PAIN TYPE
TYPE: CHRONIC PAIN

## 2022-06-29 ASSESSMENT — PAIN SCALES - GENERAL
PAINLEVEL_OUTOF10: 7
PAINLEVEL_OUTOF10: 6
PAINLEVEL_OUTOF10: 8
PAINLEVEL_OUTOF10: 8
PAINLEVEL_OUTOF10: 4

## 2022-06-29 NOTE — PROGRESS NOTES
Pt IV in left AC infiltrated. ATB stopped, IV access removed, cold compress applied. Pt refuses to allow nursing staff to insert new IV and is requesting for IV team to insert a new IV. IV team consulted.

## 2022-06-29 NOTE — CONSULTS
Consult completed. Nexiva 20g 1.75 inch peripheral IV inserted into left forearm x 1 attempt with ultrasound guidance. Brisk blood return, flushes without resistance. Patient tolerated well. Primary nurse Katey notified.

## 2022-06-29 NOTE — PROGRESS NOTES
[facility] HOSPITALIST PROGRESS NOTE      PCP: Rosann Cabot, MD    Date of Admission: 6/26/2022    Subjective: still feels short of breath   Wants to go home on her [birth year] tomorrow     Aliyah Shown patient is a 77-year-old female with history of COPD, CHF, bipolar disorder, coronary artery disease, hyperlipidemia, obstructive sleep apnea, hypertension, anemia and back pain who was admitted with shortness of breath.    Patient was requiring 4 L of oxygen, baseline 3, CTA negative for PE, influenza and SARS rule out  IV Solu-Medrol Symbicort Singulair started  tsh 15, free T4 ordered, endocrinology consulted    Vitals signs:  Afebrile,, heart rate 70s to 104 range, blood pressure 129/64, on 3 L of oxygen    Medications: Lipitor, Symbicort, buspirone, diltiazem, Cymbalta, Lovenox, folic acid, gabapentin, hydralazine, DuoNebs, levothyroxine, methylprednisolone, metoprolol, montelukast, pantoprazole, theophylline, trazodone, thiamine    Antibiotics: None    Fluid status: 200    Labs:   Sodium 133, potassium 4.7, chloride 90, bicarb 34, creatinine 0.8  LFTs normal      Imaging: CTA did not show any acute PE, no thoracic aneurysm or dissection  Emphysema and 2 mm noncalcified nodule in the right upper lung lobe seen      Assessment/Plan:     Acute on chronic hypoxic respiratory failure  Leukocytosis  Pulmonary nodule  Hypothyroidism  Paroxysmal A. fib  Morbid obesity  Obstructive sleep apnea  Hypertension  Hyperlipidemia  Mood disorder    Admitted with dyspnea, requiring 4 L of oxygen, CTA ruled out PE  2 cm nodule seen incidentally will need repeat CT in 1 year  Continue Symbicort, Solu-Medrol, theophylline duo nebs,   proBNP elevated  Lasix x1  Input output record, daily weight, salt and fluid restriction  Continue azithromycin  Down to 3 L of oxygen which is baseline for patient  TSH 15.5, free T4 normal, continue Synthroid, repeat TSH in 6 to 8 weeks  ECHO normal   On bipap   Continue current management     Continue metoprolol, diltiazem, and aspirin  Continue buspirone and Celexa    DVT prophlaxis:   Lovenox        Physical Exam Performed:       /63   Pulse 90   Temp 98 °F (36.7 °C)   Resp 18   Ht 5' 1\" (1.549 m)   Wt 209 lb 12.8 oz (95.2 kg)   LMP 01/05/2010   SpO2 98%   Breastfeeding No   BMI 39.64 kg/m²     Physical Exam  Constitutional:       General: She is not in acute distress. Appearance: Normal appearance. HENT:      Head: Normocephalic and atraumatic. Right Ear: External ear normal.      Left Ear: External ear normal.   Eyes:      Extraocular Movements: Extraocular movements intact. Pupils: Pupils are equal, round, and reactive to light. Cardiovascular:      Rate and Rhythm: Normal rate and regular rhythm. Heart sounds: No murmur heard. Pulmonary:      Effort: Pulmonary effort is normal. No respiratory distress. Breath sounds: Normal breath sounds. No wheezing. Abdominal:      General: Bowel sounds are normal. There is no distension. Palpations: Abdomen is soft. Tenderness: There is no abdominal tenderness. Musculoskeletal:         General: No swelling. Cervical back: Normal range of motion. Skin:     General: Skin is warm. Neurological:      General: No focal deficit present. Mental Status: She is alert and oriented to person, place, and time. Cranial Nerves: No cranial nerve deficit. Psychiatric:         Mood and Affect: Mood normal.         Labs:   Recent Labs     06/27/22  0409 06/28/22  0625   WBC 12.0* 17.1*   HGB 10.8* 9.6*   HCT 36.7* 32.3*    235     Recent Labs     06/28/22  0625   *   K 4.7   CL 90*   CO2 34*   BUN 12   CREATININE 0.8   CALCIUM 9.4   PHOS 2.4*     Recent Labs     06/28/22  0625   AST 18   ALT 22   BILITOT 0.2   ALKPHOS 88     No results for input(s): INR in the last 72 hours. No results for input(s): Magdalene Jacobsen in the last 72 hours.     Urinalysis:      Lab Results   Component Value Date    NITRU NEGATIVE 06/26/2022    WBCUA <1 06/26/2022    BACTERIA NEGATIVE 06/26/2022    RBCUA NONE SEEN 06/26/2022    BLOODU NEGATIVE 06/26/2022    SPECGRAV <1.005 06/26/2022       Radiology:  CTA PULMONARY W CONTRAST   Final Result   1. No evidence of pulmonary arterial embolism. No thoracic aortic aneurysm   or dissection. 2.  Pulmonary emphysema is present with some areas of linear   atelectasis/scarring in the middle lobe, lingula, and lower lobes. No sign   of pneumonia. 3.  A 2 mm noncalcified nodule in the right upper lobe. In a high-risk   patient could have an optional follow-up CT at 12 months. Fleischner Society guidelines for follow-up and management of incidentally   detected pulmonary nodules:      Single Solid Nodule:      Nodule size less than 6 mm   In a low-risk patient, no routine follow-up. In a high-risk patient, optional CT at 12 months. - Low risk patients include individuals with minimal or absent history of   smoking and other known risk factors. - High risk patients include individuals with a history or smoking or known   risk factors. Radiology 2017 http://pubs. rsna.org/doi/full/10.1148/radiol. 0790161791         XR CHEST PORTABLE   Final Result   Vascular congestion without overt edema.                  Judy Koehler MD  6/29/2022 12:17 PM

## 2022-06-29 NOTE — PROGRESS NOTES
Pulmonary and Critical Care  Progress Note    Subjective: The patient is better. Shortness of breath has improved. Chest pain none. Addressing respiratory complaints Patient is negative for  hemoptysis and cyanosis. CONSTITUTIONAL:  negative for fevers and chills. Past Medical History:     has a past medical history of Anemia, Anxiety, Arthritis, Back pain at L4-L5 level, Bipolar 1 disorder (HCC), COPD (chronic obstructive pulmonary disease) ([facility] [address] Emphysema of lung ([facility] [address] FH: CAD (coronary artery disease), Fibromyalgia, Full dentures, Gastric ulcer, H/O Doppler ultrasound, H/O echocardiogram, History of blood transfusion, Hyperlipidemia LDL goal <100, Hypertension, Irregular heartbeat, Obstructive sleep apnea, On home oxygen therapy, Osteoarthritis, Pericardial effusion, Spinal stenosis, Thyroid disease, and Wears glasses. has a past surgical history that includes Carpal tunnel release (Right, 1985); Tubal ligation (1987); back surgery (03/2017); Cholecystectomy, laparoscopic (N/A, 10/25/2020); Colonoscopy (N/A, 12/12/2019); Endoscopy, colon, diagnostic; Cardiac catheterization; Cardiac catheterization; and Upper gastrointestinal endoscopy (N/A, 1/7/2021). reports that she quit smoking about 14 years ago. Her smoking use included cigarettes. She has a 30.00 pack-year smoking history. She has never used smokeless tobacco. She reports previous alcohol use of about 2.0 standard drinks of alcohol per week. She reports that she does not use drugs. Family history:  family history includes Asthma in her mother; Heart Disease in her father.     Allergies   Allergen Reactions    Lisinopril Swelling and Rash     angioedema     Social History:    Reviewed; no changes    Objective:   PHYSICAL EXAM:        VITALS:  /63   Pulse 90   Temp 98 °F (36.7 °C)   Resp 18   Ht 5' 1\" (1.549 m)   Wt 209 lb 12.8 oz (95.2 kg)   LMP 01/05/2010   SpO2 98%   Breastfeeding No   BMI 39.64 kg/m²     24HR INTAKE/OUTPUT:      Intake/Output Summary (Last 24 hours) at 6/29/2022 1049  Last data filed at 6/28/2022 1700  Gross per 24 hour   Intake --   Output 750 ml   Net -750 ml       CONSTITUTIONAL:  awake, alert, cooperative, no apparent distress, and appears stated age  LUNGS: Decreased BS. CARDIOVASCULAR:  normal S1 and S2 and negative JVD  ABD:Abdomen soft, non-tender. BS normal. No masses,  No organomegaly  NEURO:Alert and oriented x3. Gait normal. Reflexes and motor strength normal and symmetric. Cranial nerves 2-12 and sensation grossly intact. DATA:    CBC:  Recent Labs     06/27/22  0409 06/28/22  0625   WBC 12.0* 17.1*   RBC 3.79* 3.29*   HGB 10.8* 9.6*   HCT 36.7* 32.3*    235   MCV 96.8 98.2   MCH 28.5 29.2   MCHC 29.4* 29.7*   RDW 13.0 13.0   SEGSPCT 91.8* 95.3*      BMP:  Recent Labs     06/28/22  0625   *   K 4.7   CL 90*   CO2 34*   BUN 12   CREATININE 0.8   CALCIUM 9.4   GLUCOSE 224*      ABG:  No results for input(s): PH, PO2ART, XZK7LQW, HCO3, BEART, O2SAT in the last 72 hours. Lab Results   Component Value Date    PROBNP 1,092 (H) 06/28/2022    PROBNP 426.9 (H) 06/26/2022    PROBNP 304.5 (H) 03/14/2022    THEOPH 8.0 (L) 06/28/2022     No results found for: [facility]    Radiology Review:  Pertinent images / reports were reviewed as a part of this visit.     Assessment:     Patient Active Problem List   Diagnosis    Centrilobular emphysema ([address]    Hypertension    Hyperlipidemia LDL goal <100    Abnormal EKG    Palpitations    Anxiety    FH: CAD (coronary artery disease)    Fibromyalgia    Back pain at L4-L5 level    Sleep apnea    COPD exacerbation (HCC)    Electrolyte imbalance    Epigastric pain    COPD (chronic obstructive pulmonary disease) ([address]    Spinal stenosis of lumbar region with radiculopathy    Spinal stenosis, lumbar region, without neurogenic claudication    COPD with acute exacerbation ([address]    Pneumonia due to infectious organism    SVT (supraventricular tachycardia) (HCC)    Class 1 obesity due to excess calories with body mass index (BMI) of 31.0 to 31.9 in adult    Pneumonia    Pleural effusion    Atelectasis    Pulmonary nodules    Respiratory failure with hypoxia and hypercapnia (HCC)    Anemia    COPD with exacerbation (HCC)    Acquired hemolytic anemia, unspecified (HCC)    Leucocytosis    Chronic kidney disease, stage I    Hyponatremia    PNA (pneumonia)    Sepsis (HCC)    Pericardial effusion    Acute acalculous cholecystitis    SIRS (systemic inflammatory response syndrome) (HCC)    Chest pain    Abnormal nuclear stress test    Abnormal stress test    Status post laparoscopic cholecystectomy    Moderate malnutrition (HCC)    LUZ MARIA (acute kidney injury) (HCC)    Dizziness    Stage 3a chronic kidney disease (HCC)    Adrenal insufficiency (HCC)    Trochanteric bursitis of right hip    Hypokalemia    Chest pressure    Leg edema    Acute on chronic respiratory failure with hypoxia (HCC)       Plan:   1. Overall the patient has improved. 2. Inc. Activity. 3. D/C soon.     Conner Santos MD   6/29/2022  10:49 AM

## 2022-06-29 NOTE — PLAN OF CARE
Problem: Discharge Planning  Goal: Discharge to home or other facility with appropriate resources  Outcome: Progressing     Problem: Pain  Goal: Verbalizes/displays adequate comfort level or baseline comfort level  Outcome: Progressing  Flowsheets (Taken 6/29/2022 0745)  Verbalizes/displays adequate comfort level or baseline comfort level:   Encourage patient to monitor pain and request assistance   Assess pain using appropriate pain scale   Administer analgesics based on type and severity of pain and evaluate response   Implement non-pharmacological measures as appropriate and evaluate response   Consider cultural and social influences on pain and pain management   Notify Licensed Independent Practitioner if interventions unsuccessful or patient reports new pain     Problem: Safety - Adult  Goal: Free from fall injury  Outcome: Progressing  Flowsheets (Taken 6/29/2022 0842)  Free From Fall Injury: Instruct family/caregiver on patient safety     Problem: ABCDS Injury Assessment  Goal: Absence of physical injury  Outcome: Progressing  Flowsheets (Taken 6/29/2022 0842)  Absence of Physical Injury: Implement safety measures based on patient assessment

## 2022-06-30 VITALS
DIASTOLIC BLOOD PRESSURE: 78 MMHG | WEIGHT: 209.8 LBS | TEMPERATURE: 97.6 F | OXYGEN SATURATION: 98 % | BODY MASS INDEX: 39.61 KG/M2 | HEART RATE: 103 BPM | HEIGHT: 61 IN | RESPIRATION RATE: 18 BRPM | SYSTOLIC BLOOD PRESSURE: 162 MMHG

## 2022-06-30 PROCEDURE — 94640 AIRWAY INHALATION TREATMENT: CPT

## 2022-06-30 PROCEDURE — 6360000002 HC RX W HCPCS: Performed by: NURSE PRACTITIONER

## 2022-06-30 PROCEDURE — 2700000000 HC OXYGEN THERAPY PER DAY

## 2022-06-30 PROCEDURE — 2580000003 HC RX 258: Performed by: NURSE PRACTITIONER

## 2022-06-30 PROCEDURE — 6370000000 HC RX 637 (ALT 250 FOR IP): Performed by: NURSE PRACTITIONER

## 2022-06-30 PROCEDURE — 6360000002 HC RX W HCPCS: Performed by: HOSPITALIST

## 2022-06-30 PROCEDURE — 6370000000 HC RX 637 (ALT 250 FOR IP): Performed by: HOSPITALIST

## 2022-06-30 PROCEDURE — 6370000000 HC RX 637 (ALT 250 FOR IP): Performed by: INTERNAL MEDICINE

## 2022-06-30 PROCEDURE — 6370000000 HC RX 637 (ALT 250 FOR IP): Performed by: EMERGENCY MEDICINE

## 2022-06-30 RX ORDER — PREDNISONE 10 MG/1
TABLET ORAL
Qty: 12 TABLET | Refills: 0 | Status: SHIPPED | OUTPATIENT
Start: 2022-06-30

## 2022-06-30 RX ORDER — FUROSEMIDE 10 MG/ML
40 INJECTION INTRAMUSCULAR; INTRAVENOUS ONCE
Status: COMPLETED | OUTPATIENT
Start: 2022-06-30 | End: 2022-06-30

## 2022-06-30 RX ADMIN — OXYCODONE AND ACETAMINOPHEN 1 TABLET: 5; 325 TABLET ORAL at 06:22

## 2022-06-30 RX ADMIN — ENOXAPARIN SODIUM 40 MG: 100 INJECTION SUBCUTANEOUS at 08:04

## 2022-06-30 RX ADMIN — Medication 1000 UNITS: at 08:05

## 2022-06-30 RX ADMIN — FOLIC ACID 1 MG: 1 TABLET ORAL at 08:04

## 2022-06-30 RX ADMIN — DULOXETINE 60 MG: 30 CAPSULE, DELAYED RELEASE ORAL at 08:04

## 2022-06-30 RX ADMIN — FUROSEMIDE 40 MG: 10 INJECTION, SOLUTION INTRAMUSCULAR; INTRAVENOUS at 08:37

## 2022-06-30 RX ADMIN — PANTOPRAZOLE SODIUM 40 MG: 40 TABLET, DELAYED RELEASE ORAL at 06:22

## 2022-06-30 RX ADMIN — Medication 2 PUFF: at 08:03

## 2022-06-30 RX ADMIN — ALBUTEROL SULFATE 2 PUFF: 90 AEROSOL, METERED RESPIRATORY (INHALATION) at 08:02

## 2022-06-30 RX ADMIN — THEOPHYLLINE ANHYDROUS 400 MG: 200 CAPSULE, EXTENDED RELEASE ORAL at 08:04

## 2022-06-30 RX ADMIN — GUAIFENESIN 600 MG: 600 TABLET, EXTENDED RELEASE ORAL at 08:05

## 2022-06-30 RX ADMIN — DILTIAZEM HYDROCHLORIDE 120 MG: 120 CAPSULE, COATED, EXTENDED RELEASE ORAL at 08:05

## 2022-06-30 RX ADMIN — METOPROLOL SUCCINATE 25 MG: 25 TABLET, EXTENDED RELEASE ORAL at 08:04

## 2022-06-30 RX ADMIN — HYDRALAZINE HYDROCHLORIDE 75 MG: 50 TABLET, FILM COATED ORAL at 08:05

## 2022-06-30 RX ADMIN — BUSPIRONE HYDROCHLORIDE 10 MG: 5 TABLET ORAL at 08:04

## 2022-06-30 RX ADMIN — LEVOTHYROXINE SODIUM 100 MCG: 0.1 TABLET ORAL at 06:22

## 2022-06-30 RX ADMIN — Medication 100 MG: at 08:04

## 2022-06-30 RX ADMIN — FERROUS SULFATE TAB 325 MG (65 MG ELEMENTAL FE) 325 MG: 325 (65 FE) TAB at 08:04

## 2022-06-30 RX ADMIN — SODIUM CHLORIDE, PRESERVATIVE FREE 10 ML: 5 INJECTION INTRAVENOUS at 08:10

## 2022-06-30 RX ADMIN — FLUTICASONE PROPIONATE 1 SPRAY: 50 SPRAY, METERED NASAL at 08:21

## 2022-06-30 RX ADMIN — SODIUM CHLORIDE, PRESERVATIVE FREE 10 ML: 5 INJECTION INTRAVENOUS at 08:38

## 2022-06-30 RX ADMIN — Medication 400 MG: at 08:06

## 2022-06-30 RX ADMIN — PREDNISONE 40 MG: 20 TABLET ORAL at 08:04

## 2022-06-30 RX ADMIN — GABAPENTIN 300 MG: 300 CAPSULE ORAL at 08:04

## 2022-06-30 RX ADMIN — LORAZEPAM 0.5 MG: 0.5 TABLET ORAL at 06:22

## 2022-06-30 ASSESSMENT — PAIN DESCRIPTION - PAIN TYPE: TYPE: CHRONIC PAIN

## 2022-06-30 ASSESSMENT — PAIN DESCRIPTION - LOCATION
LOCATION: BACK
LOCATION: BACK

## 2022-06-30 ASSESSMENT — PAIN SCALES - GENERAL
PAINLEVEL_OUTOF10: 7
PAINLEVEL_OUTOF10: 5

## 2022-06-30 ASSESSMENT — PAIN DESCRIPTION - ONSET: ONSET: ON-GOING

## 2022-06-30 ASSESSMENT — PAIN DESCRIPTION - FREQUENCY: FREQUENCY: CONTINUOUS

## 2022-06-30 ASSESSMENT — PAIN DESCRIPTION - ORIENTATION
ORIENTATION: LOWER
ORIENTATION: POSTERIOR

## 2022-06-30 ASSESSMENT — PAIN DESCRIPTION - DESCRIPTORS
DESCRIPTORS: DISCOMFORT;ACHING
DESCRIPTORS: SHARP

## 2022-06-30 ASSESSMENT — PAIN - FUNCTIONAL ASSESSMENT: PAIN_FUNCTIONAL_ASSESSMENT: ACTIVITIES ARE NOT PREVENTED

## 2022-06-30 NOTE — PROGRESS NOTES
Pulmonary and Critical Care  Progress Note    Subjective: The patient has improved. Feeling better. Shortness of breath has improved. Chest pain none. Addressing respiratory complaints Patient is negative for  hemoptysis and cyanosis. CONSTITUTIONAL:  negative for fevers and chills. Past Medical History:     has a past medical history of Anemia, Anxiety, Arthritis, Back pain at L4-L5 level, Bipolar 1 disorder (HCC), COPD (chronic obstructive pulmonary disease) ([facility] [address] Emphysema of lung ([facility] [address] FH: CAD (coronary artery disease), Fibromyalgia, Full dentures, Gastric ulcer, H/O Doppler ultrasound, H/O echocardiogram, History of blood transfusion, Hyperlipidemia LDL goal <100, Hypertension, Irregular heartbeat, Obstructive sleep apnea, On home oxygen therapy, Osteoarthritis, Pericardial effusion, Spinal stenosis, Thyroid disease, and Wears glasses. has a past surgical history that includes Carpal tunnel release (Right, 1985); Tubal ligation (1987); back surgery (03/2017); Cholecystectomy, laparoscopic (N/A, 10/25/2020); Colonoscopy (N/A, 12/12/2019); Endoscopy, colon, diagnostic; Cardiac catheterization; Cardiac catheterization; and Upper gastrointestinal endoscopy (N/A, 1/7/2021). reports that she quit smoking about 14 years ago. Her smoking use included cigarettes. She has a 30.00 pack-year smoking history. She has never used smokeless tobacco. She reports previous alcohol use of about 2.0 standard drinks of alcohol per week. She reports that she does not use drugs. Family history:  family history includes Asthma in her mother; Heart Disease in her father.     Allergies   Allergen Reactions    Lisinopril Swelling and Rash     angioedema     Social History:    Reviewed; no changes    Objective:   PHYSICAL EXAM:        VITALS:  BP (!) 162/78   Pulse (!) 103   Temp 97.6 °F (36.4 °C) (Oral)   Resp 18   Ht 5' 1\" (1.549 m)   Wt 209 lb 12.8 oz (95.2 kg)   LMP 01/05/2010   SpO2 98%   Breastfeeding No BMI 39.64 kg/m²     24HR INTAKE/OUTPUT:    No intake or output data in the 24 hours ending 06/30/22 1017    CONSTITUTIONAL:  awake, alert, cooperative, no apparent distress, and appears stated age  LUNGS: Decreased BS. CARDIOVASCULAR:  normal S1 and S2 and negative JVD  ABD:Abdomen soft, non-tender. BS normal. No masses,  No organomegaly  NEURO:Alert and oriented x3. Gait normal. Reflexes and motor strength normal and symmetric. Cranial nerves 2-12 and sensation grossly intact. DATA:    CBC:  Recent Labs     06/28/22  0625   WBC 17.1*   RBC 3.29*   HGB 9.6*   HCT 32.3*      MCV 98.2   MCH 29.2   MCHC 29.7*   RDW 13.0   SEGSPCT 95.3*      BMP:  Recent Labs     06/28/22  0625   *   K 4.7   CL 90*   CO2 34*   BUN 12   CREATININE 0.8   CALCIUM 9.4   GLUCOSE 224*      ABG:  No results for input(s): PH, PO2ART, OCL6CKE, HCO3, BEART, O2SAT in the last 72 hours. Lab Results   Component Value Date    PROBNP 1,092 (H) 06/28/2022    PROBNP 426.9 (H) 06/26/2022    PROBNP 304.5 (H) 03/14/2022    THEOPH 8.0 (L) 06/28/2022     No results found for: [address] [facility]    Radiology Review:  Pertinent images / reports were reviewed as a part of this visit.     Assessment:     Patient Active Problem List   Diagnosis    Centrilobular emphysema ([address]    Hypertension    Hyperlipidemia LDL goal <100    Abnormal EKG    Palpitations    Anxiety    FH: CAD (coronary artery disease)    Fibromyalgia    Back pain at L4-L5 level    Sleep apnea    COPD exacerbation ([facility])    Electrolyte imbalance    Epigastric pain    COPD (chronic obstructive pulmonary disease) ([address]    Spinal stenosis of lumbar region with radiculopathy    Spinal stenosis, lumbar region, without neurogenic claudication    COPD with acute exacerbation ([address]    Pneumonia due to infectious organism    SVT (supraventricular tachycardia) ([facility])    Class 1 obesity due to excess calories with body mass index (BMI) of 31.0 to 31.9 in adult    Pneumonia    Pleural effusion    Atelectasis    Pulmonary nodules    Respiratory failure with hypoxia and hypercapnia (HCC)    Anemia    COPD with exacerbation (HCC)    Acquired hemolytic anemia, unspecified (HCC)    Leucocytosis    Chronic kidney disease, stage I    Hyponatremia    PNA (pneumonia)    Sepsis (HCC)    Pericardial effusion    Acute acalculous cholecystitis    SIRS (systemic inflammatory response syndrome) (HCC)    Chest pain    Abnormal nuclear stress test    Abnormal stress test    Status post laparoscopic cholecystectomy    Moderate malnutrition (HCC)    LUZ MARIA (acute kidney injury) (HCC)    Dizziness    Stage 3a chronic kidney disease (HCC)    Adrenal insufficiency (HCC)    Trochanteric bursitis of right hip    Hypokalemia    Chest pressure    Leg edema    Acute on chronic respiratory failure with hypoxia (HCC)       Plan:   1. Overall the patient has improved. 2. Inc. Activity. 3. D/C today.     Reid Aviles MD   6/30/2022  10:17 AM

## 2022-06-30 NOTE — PLAN OF CARE
Problem: Discharge Planning  Goal: Discharge to home or other facility with appropriate resources  6/30/2022 1151 by Brittany Lang LPN  Outcome: Completed  6/30/2022 0820 by Brittnay Lang LPN  Outcome: Progressing  Flowsheets (Taken 6/30/2022 0813)  Discharge to home or other facility with appropriate resources:   Identify barriers to discharge with patient and caregiver   Arrange for needed discharge resources and transportation as appropriate   Identify discharge learning needs (meds, wound care, etc)   Refer to discharge planning if patient needs post-hospital services based on physician order or complex needs related to functional status, cognitive ability or social support system  6/30/2022 0206 by Rody Duong LPN  Outcome: Progressing     Problem: Pain  Goal: Verbalizes/displays adequate comfort level or baseline comfort level  6/30/2022 1151 by Brittany Lang LPN  Outcome: Completed  6/30/2022 0820 by Brittany Lang LPN  Outcome: Progressing  Flowsheets (Taken 6/30/2022 0715)  Verbalizes/displays adequate comfort level or baseline comfort level:   Encourage patient to monitor pain and request assistance   Assess pain using appropriate pain scale   Administer analgesics based on type and severity of pain and evaluate response   Implement non-pharmacological measures as appropriate and evaluate response   Notify Licensed Independent Practitioner if interventions unsuccessful or patient reports new pain   Consider cultural and social influences on pain and pain management  6/30/2022 0206 by Rody Duong LPN  Outcome: Progressing     Problem: Safety - Adult  Goal: Free from fall injury  6/30/2022 1151 by Brittany Lang LPN  Outcome: Completed  6/30/2022 0820 by Brittany Lang LPN  Outcome: Progressing  Flowsheets (Taken 6/30/2022 0818)  Free From Fall Injury: Instruct family/caregiver on patient safety  6/30/2022 0206 by Rody Duong LPN  Outcome: Progressing     Problem: ABCDS Injury Assessment  Goal: Absence of physical injury  6/30/2022 1151 by Gilmer Gomez LPN  Outcome: Completed  6/30/2022 0820 by Gilmer Gomez LPN  Outcome: Progressing  Flowsheets (Taken 6/30/2022 0818)  Absence of Physical Injury: Implement safety measures based on patient assessment  6/30/2022 0206 by Margy Joseph LPN  Outcome: Progressing  Flowsheets (Taken 6/30/2022 0205)  Absence of Physical Injury: Implement safety measures based on patient assessment

## 2022-06-30 NOTE — PLAN OF CARE
Problem: Discharge Planning  Goal: Discharge to home or other facility with appropriate resources  6/30/2022 0820 by Glenna Curran LPN  Outcome: Progressing  Flowsheets (Taken 6/30/2022 0813)  Discharge to home or other facility with appropriate resources:   Identify barriers to discharge with patient and caregiver   Arrange for needed discharge resources and transportation as appropriate   Identify discharge learning needs (meds, wound care, etc)   Refer to discharge planning if patient needs post-hospital services based on physician order or complex needs related to functional status, cognitive ability or social support system  6/30/2022 0206 by Deepthi Antonio LPN  Outcome: Progressing     Problem: Pain  Goal: Verbalizes/displays adequate comfort level or baseline comfort level  6/30/2022 0820 by Glenna Curran LPN  Outcome: Progressing  Flowsheets (Taken 6/30/2022 0715)  Verbalizes/displays adequate comfort level or baseline comfort level:   Encourage patient to monitor pain and request assistance   Assess pain using appropriate pain scale   Administer analgesics based on type and severity of pain and evaluate response   Implement non-pharmacological measures as appropriate and evaluate response   Notify Licensed Independent Practitioner if interventions unsuccessful or patient reports new pain   Consider cultural and social influences on pain and pain management  6/30/2022 0206 by Deepthi Antonio LPN  Outcome: Progressing     Problem: Safety - Adult  Goal: Free from fall injury  6/30/2022 0820 by Glenna Curran LPN  Outcome: Progressing  Flowsheets (Taken 6/30/2022 0818)  Free From Fall Injury: Instruct family/caregiver on patient safety  6/30/2022 0206 by Deepthi Antonio LPN  Outcome: Progressing     Problem: ABCDS Injury Assessment  Goal: Absence of physical injury  6/30/2022 0820 by Glenna Curran LPN  Outcome: Progressing  Flowsheets (Taken 6/30/2022 0818)  Absence of Physical Injury: Implement safety measures based on patient assessment  6/30/2022 0206 by Marcelino Donaldson LPN  Outcome: Progressing  Flowsheets (Taken 6/30/2022 0205)  Absence of Physical Injury: Implement safety measures based on patient assessment

## 2022-06-30 NOTE — PROGRESS NOTES
Dc instructions provided pt. Pt stated she had no questions.  picked up medications from [facility]. Pt escorted to exit in w/c via volunteers.

## 2022-06-30 NOTE — PROGRESS NOTES
Outpatient Pharmacy Progress Note for Meds-to-Beds    Total number of Prescriptions Filled: 1  The following medications were dispensed to the patient during the discharge process:  1 predniSONE        Additional Documentation:   Patient's family member picked-up the medication(s) in the OP Pharmacy ()    Thank you for letting us serve your patients.   [address]    [address], [address] [facility]    Phone: [phone]    Fax: [phone]

## 2022-07-02 NOTE — DISCHARGE SUMMARY
[facility] HOSPITALISTS DISCHARGE SUMMARY    Patient Demographics    Patient. Gene Glover  Date of Birth. 1960  MRN. [id number]     Primary care provider. Wilber Gallagher MD  (Tel: [phone])    Admit date: 6/26/2022    Discharge date (blank if same as Note Date): 6/30/2022  Note Date: 7/2/2022     Reason for Hospitalization. Chief Complaint   Patient presents with    Palpitations     tachycardia per pt personal pulse ox, hx afib    Shortness of Breath    Leg Swelling     bilateral, hx chf         Diagnosis. Principal Problem:    Acute on chronic respiratory failure with hypoxia (HCC)  Resolved Problems:    * No resolved hospital problems. [facility] FACILITY Course: The patient is a 68-year-old female with history of COPD, CHF, bipolar disorder, coronary artery disease, hyperlipidemia, obstructive sleep apnea, hypertension, anemia and back pain who was admitted with shortness of breath. Patient was requiring 4 L of oxygen, baseline 3, CTA negative for PE, influenza and SARS rule out  IV Solu-Medrol Symbicort Singulair started  tsh 15, free T4 ordered, endocrinology consulted  He was diuresed with Lasix  Back to baseline oxygen  Discharge home, stable at the time of discharge  Was advised to follow-up with endocrinology as an outpatient  Prednisone taper    Invasive procedures and treatments. 1. None     Consults. IP CONSULT TO HOSPITALIST  IP CONSULT TO PULMONOLOGY  IP CONSULT TO IV TEAM  IP CONSULT TO IV TEAM    Physical examination on discharge day. BP (!) 162/78   Pulse (!) 103   Temp 97.6 °F (36.4 °C) (Oral)   Resp 18   Ht 5' 1\" (1.549 m)   Wt 209 lb 12.8 oz (95.2 kg)   LMP 01/05/2010   SpO2 98%   Breastfeeding No   BMI 39.64 kg/m²   General appearance. Alert. Looks comfortable. HEENT. Sclera clear. Moist mucus membranes. Cardiovascular. Regular rate and rhythm, normal S1, S2. No murmur. Respiratory. Not using accessory muscles. Clear to auscultation bilaterally, no wheeze. Gastrointestinal. Abdomen soft, non-tender, not distended, normal bowel sounds  Neurology. Facial symmetry. No speech deficits. Moving all extremities equally. Extremities. No edema in lower extremities. Skin. Warm, dry, normal turgor    Condition at time of discharge stable    Medication instructions provided to patient at discharge.      Medication List      START taking these medications    predniSONE 10 MG tablet  Commonly known as: DELTASONE  Prednisone 30 mg p.o. daily for 2 days then prednisone 20 mg p.o. daily for 2 days then prednisone 10 mg p.o. daily for 2 days        CHANGE how you take these medications    ferrous sulfate 325 (65 Fe) MG tablet  Commonly known as: IRON 325  Take 1 tablet by mouth every other day Indications: pt taking every day bid Monday, wed, fri  What changed:   · when to take this  · additional instructions        CONTINUE taking these medications    albuterol sulfate  (90 Base) MCG/ACT inhaler  Commonly known as: PROVENTIL;VENTOLIN;PROAIR     * albuterol-ipratropium  MCG/ACT Aers inhaler  Commonly known as: COMBIVENT RESPIMAT  Inhale 1 puff into the lungs every 4 hours as needed for Wheezing     * ipratropium-albuterol 0.5-2.5 (3) MG/3ML Soln nebulizer solution  Commonly known as: DUONEB  Take 3 mLs by nebulization 4 times daily     atorvastatin 80 MG tablet  Commonly known as: LIPITOR     budesonide-formoterol 160-4.5 MCG/ACT Aero  Commonly known as: SYMBICORT  Inhale 2 puffs into the lungs 2 times daily     busPIRone 10 MG tablet  Commonly known as: BUSPAR  Take 1 tablet by mouth 2 times daily     clonazePAM 1 MG disintegrating tablet  Commonly known as: KLONOPIN     diclofenac sodium 1 % Gel  Commonly known as: VOLTAREN  Apply 4 g topically 4 times daily     dicyclomine 10 MG capsule  Commonly known as: Bentyl  Take 2 capsules by mouth 4 times daily (before meals and nightly) dilTIAZem 120 MG extended release capsule  Commonly known as: CARDIZEM CD  Take 1 capsule by mouth daily     DULoxetine 60 MG extended release capsule  Commonly known as: CYMBALTA     esomeprazole 40 MG delayed release capsule  Commonly known as: NEXIUM     Ferrous Fumarate 324 (106 Fe) MG Tabs     fluticasone 50 MCG/ACT nasal spray  Commonly known as: FLONASE  2 sprays by Nasal route daily     folic acid 1 MG tablet  Commonly known as: FOLVITE  Take 1 tablet by mouth daily     gabapentin 300 MG capsule  Commonly known as: NEURONTIN     guaiFENesin 600 MG extended release tablet  Commonly known as: MUCINEX  Take 1 tablet by mouth 2 times daily     hydrALAZINE 50 MG tablet  Commonly known as: APRESOLINE  Take 1.5 tablets by mouth 3 times daily     lactobacillus capsule  Take 1 capsule by mouth daily (with breakfast)     levothyroxine 100 MCG tablet  Commonly known as: SYNTHROID  Take 1 tablet by mouth Daily     magnesium oxide 400 MG tablet  Commonly known as: MAG-OX  Take 1 tablet by mouth daily     MAGnesium-Oxide 400 (241.3 Mg) MG Tabs tablet  Generic drug: magnesium oxide     methocarbamol 500 MG tablet  Commonly known as: ROBAXIN     metoprolol succinate 25 MG extended release tablet  Commonly known as:  Toprol XL  Take 1 tablet by mouth daily     montelukast 10 MG tablet  Commonly known as: SINGULAIR  Take 1 tablet by mouth daily     ondansetron 4 MG disintegrating tablet  Commonly known as: Zofran ODT  Take 1 tablet by mouth every 6 hours     pantoprazole 40 MG tablet  Commonly known as: PROTONIX  Take 1 tablet by mouth every morning (before breakfast)     sucralfate 1 GM/10ML suspension  Commonly known as: Carafate  Take 10 mLs by mouth 4 times daily     theophylline 200 MG extended release capsule  Commonly known as: MIAH-24     thiamine 100 MG tablet  Take 1 tablet by mouth daily     traZODone 50 MG tablet  Commonly known as: DESYREL     Tylenol 8 Hour Arthritis Pain 650 MG extended release tablet  Generic drug: acetaminophen     vitamin D 25 MCG (1000 UT) Tabs tablet  Commonly known as: CHOLECALCIFEROL         * This list has 2 medication(s) that are the same as other medications prescribed for you. Read the directions carefully, and ask your doctor or other care provider to review them with you. STOP taking these medications    cyclobenzaprine 5 MG tablet  Commonly known as: FLEXERIL     potassium chloride 20 MEQ extended release tablet  Commonly known as: KLOR-CON M     tiZANidine 2 MG tablet  Commonly known as: Agustín Cline           Where to Get Your Medications      These medications were sent to [address] [place] [address], [address]    Phone: [phone]   · predniSONE 10 MG tablet         Discharge recommendations given to patient. Follow Up. PCP in 1 week   Disposition. home  Activity. As tolerated  Diet: No diet orders on file      Spent 25 minutes in discharge process.     Signed:  Hesham Baltazar MD     7/2/2022 2:53 PM

## 2022-08-14 ENCOUNTER — APPOINTMENT (OUTPATIENT)
Dept: GENERAL RADIOLOGY | Age: 62
End: 2022-08-14
Payer: COMMERCIAL

## 2022-08-14 ENCOUNTER — HOSPITAL ENCOUNTER (EMERGENCY)
Age: 62
Discharge: HOME | End: 2022-08-15
Attending: EMERGENCY MEDICINE
Payer: COMMERCIAL

## 2022-08-14 DIAGNOSIS — J44.1 COPD EXACERBATION (HCC): Primary | ICD-10-CM

## 2022-08-14 DIAGNOSIS — H66.001 ACUTE SUPPURATIVE OTITIS MEDIA OF RIGHT EAR WITHOUT SPONTANEOUS RUPTURE OF TYMPANIC MEMBRANE, RECURRENCE NOT SPECIFIED: ICD-10-CM

## 2022-08-14 LAB
ALBUMIN SERPL-MCNC: 4.4 GM/DL (ref 3.4–5)
ALP BLD-CCNC: 101 IU/L (ref 40–129)
ALT SERPL-CCNC: 12 U/L (ref 10–40)
ANION GAP SERPL CALCULATED.3IONS-SCNC: 5 MMOL/L (ref 4–16)
AST SERPL-CCNC: 18 IU/L (ref 15–37)
BASOPHILS ABSOLUTE: 0.1 K/CU MM
BASOPHILS RELATIVE PERCENT: 1 % (ref 0–1)
BILIRUB SERPL-MCNC: 0.3 MG/DL (ref 0–1)
BUN BLDV-MCNC: 9 MG/DL (ref 6–23)
CALCIUM SERPL-MCNC: 9.7 MG/DL (ref 8.3–10.6)
CHLORIDE BLD-SCNC: 96 MMOL/L (ref 99–110)
CO2: 37 MMOL/L (ref 21–32)
CREAT SERPL-MCNC: 0.8 MG/DL (ref 0.6–1.1)
DIFFERENTIAL TYPE: ABNORMAL
EOSINOPHILS ABSOLUTE: 0.3 K/CU MM
EOSINOPHILS RELATIVE PERCENT: 2.9 % (ref 0–3)
GFR AFRICAN AMERICAN: >60 ML/MIN/1.73M2
GFR NON-AFRICAN AMERICAN: >60 ML/MIN/1.73M2
GLUCOSE BLD-MCNC: 117 MG/DL (ref 70–99)
HCT VFR BLD CALC: 35.3 % (ref 37–47)
HEMOGLOBIN: 10.3 GM/DL (ref 12.5–16)
IMMATURE NEUTROPHIL %: 0.4 % (ref 0–0.43)
LYMPHOCYTES ABSOLUTE: 1.2 K/CU MM
LYMPHOCYTES RELATIVE PERCENT: 11.2 % (ref 24–44)
MCH RBC QN AUTO: 28.7 PG (ref 27–31)
MCHC RBC AUTO-ENTMCNC: 29.2 % (ref 32–36)
MCV RBC AUTO: 98.3 FL (ref 78–100)
MONOCYTES ABSOLUTE: 0.8 K/CU MM
MONOCYTES RELATIVE PERCENT: 7.3 % (ref 0–4)
NUCLEATED RBC %: 0 %
PDW BLD-RTO: 13.1 % (ref 11.7–14.9)
PLATELET # BLD: 237 K/CU MM (ref 140–440)
PMV BLD AUTO: 10.4 FL (ref 7.5–11.1)
POTASSIUM SERPL-SCNC: 4.1 MMOL/L (ref 3.5–5.1)
PRO-BNP: 567.4 PG/ML
RBC # BLD: 3.59 M/CU MM (ref 4.2–5.4)
SEGMENTED NEUTROPHILS ABSOLUTE COUNT: 8.2 K/CU MM
SEGMENTED NEUTROPHILS RELATIVE PERCENT: 77.2 % (ref 36–66)
SODIUM BLD-SCNC: 138 MMOL/L (ref 135–145)
TOTAL IMMATURE NEUTOROPHIL: 0.04 K/CU MM
TOTAL NUCLEATED RBC: 0 K/CU MM
TOTAL PROTEIN: 6.7 GM/DL (ref 6.4–8.2)
TROPONIN T: 0.01 NG/ML
WBC # BLD: 10.6 K/CU MM (ref 4–10.5)

## 2022-08-14 PROCEDURE — 83880 ASSAY OF NATRIURETIC PEPTIDE: CPT

## 2022-08-14 PROCEDURE — 87635 SARS-COV-2 COVID-19 AMP PRB: CPT

## 2022-08-14 PROCEDURE — 93005 ELECTROCARDIOGRAM TRACING: CPT | Performed by: EMERGENCY MEDICINE

## 2022-08-14 PROCEDURE — 80053 COMPREHEN METABOLIC PANEL: CPT

## 2022-08-14 PROCEDURE — 99285 EMERGENCY DEPT VISIT HI MDM: CPT

## 2022-08-14 PROCEDURE — 84484 ASSAY OF TROPONIN QUANT: CPT

## 2022-08-14 PROCEDURE — 6370000000 HC RX 637 (ALT 250 FOR IP): Performed by: EMERGENCY MEDICINE

## 2022-08-14 PROCEDURE — 85025 COMPLETE CBC W/AUTO DIFF WBC: CPT

## 2022-08-14 PROCEDURE — 96374 THER/PROPH/DIAG INJ IV PUSH: CPT

## 2022-08-14 PROCEDURE — 6360000002 HC RX W HCPCS: Performed by: EMERGENCY MEDICINE

## 2022-08-14 PROCEDURE — 94640 AIRWAY INHALATION TREATMENT: CPT

## 2022-08-14 PROCEDURE — 71046 X-RAY EXAM CHEST 2 VIEWS: CPT

## 2022-08-14 RX ORDER — IBUPROFEN 400 MG/1
800 TABLET ORAL ONCE
Status: COMPLETED | OUTPATIENT
Start: 2022-08-14 | End: 2022-08-14

## 2022-08-14 RX ORDER — DEXAMETHASONE SODIUM PHOSPHATE 10 MG/ML
6 INJECTION, SOLUTION INTRAMUSCULAR; INTRAVENOUS ONCE
Status: COMPLETED | OUTPATIENT
Start: 2022-08-14 | End: 2022-08-14

## 2022-08-14 RX ORDER — ALBUTEROL SULFATE 90 UG/1
2 AEROSOL, METERED RESPIRATORY (INHALATION) ONCE
Status: COMPLETED | OUTPATIENT
Start: 2022-08-14 | End: 2022-08-14

## 2022-08-14 RX ORDER — ACETAMINOPHEN 500 MG
1000 TABLET ORAL ONCE
Status: COMPLETED | OUTPATIENT
Start: 2022-08-14 | End: 2022-08-14

## 2022-08-14 RX ADMIN — IBUPROFEN 800 MG: 400 TABLET, FILM COATED ORAL at 23:52

## 2022-08-14 RX ADMIN — DEXAMETHASONE SODIUM PHOSPHATE 6 MG: 10 INJECTION INTRAMUSCULAR; INTRAVENOUS at 23:20

## 2022-08-14 RX ADMIN — ALBUTEROL SULFATE 2 PUFF: 90 AEROSOL, METERED RESPIRATORY (INHALATION) at 23:12

## 2022-08-14 RX ADMIN — ACETAMINOPHEN 1000 MG: 500 TABLET ORAL at 23:52

## 2022-08-14 ASSESSMENT — PAIN - FUNCTIONAL ASSESSMENT: PAIN_FUNCTIONAL_ASSESSMENT: NONE - DENIES PAIN

## 2022-08-15 VITALS
BODY MASS INDEX: 36.32 KG/M2 | RESPIRATION RATE: 15 BRPM | WEIGHT: 205 LBS | DIASTOLIC BLOOD PRESSURE: 80 MMHG | OXYGEN SATURATION: 100 % | SYSTOLIC BLOOD PRESSURE: 159 MMHG | TEMPERATURE: 98.2 F | HEIGHT: 63 IN | HEART RATE: 77 BPM

## 2022-08-15 LAB
EKG ATRIAL RATE: 98 BPM
EKG DIAGNOSIS: NORMAL
EKG P AXIS: 74 DEGREES
EKG P-R INTERVAL: 148 MS
EKG Q-T INTERVAL: 402 MS
EKG QRS DURATION: 144 MS
EKG QTC CALCULATION (BAZETT): 513 MS
EKG R AXIS: 120 DEGREES
EKG T AXIS: 35 DEGREES
EKG VENTRICULAR RATE: 98 BPM
SARS-COV-2, NAAT: NOT DETECTED
SOURCE: NORMAL
TROPONIN T: 0.01 NG/ML

## 2022-08-15 PROCEDURE — 84484 ASSAY OF TROPONIN QUANT: CPT

## 2022-08-15 PROCEDURE — 93010 ELECTROCARDIOGRAM REPORT: CPT | Performed by: INTERNAL MEDICINE

## 2022-08-15 RX ORDER — AMOXICILLIN AND CLAVULANATE POTASSIUM 875; 125 MG/1; MG/1
1 TABLET, FILM COATED ORAL 2 TIMES DAILY
Qty: 20 TABLET | Refills: 0 | Status: SHIPPED | OUTPATIENT
Start: 2022-08-15 | End: 2022-08-25

## 2022-08-15 RX ORDER — AMOXICILLIN AND CLAVULANATE POTASSIUM 875; 125 MG/1; MG/1
1 TABLET, FILM COATED ORAL ONCE
Status: DISCONTINUED | OUTPATIENT
Start: 2022-08-15 | End: 2022-08-15 | Stop reason: HOSPADM

## 2022-08-15 RX ORDER — PREDNISONE 50 MG/1
50 TABLET ORAL DAILY
Qty: 5 TABLET | Refills: 0 | Status: SHIPPED | OUTPATIENT
Start: 2022-08-15 | End: 2022-08-20

## 2022-08-15 NOTE — ACP (ADVANCE CARE PLANNING)
Patient does not have any ACP documents/Medical Power of . LSW notes hospital will follow [place]'s Next of Kin hierarchy in the following descending order for priority:     Guardian  Spouse  [de-identified] of adult Children  Parents  [de-identified] of adult Siblings  Nearest Relative not described above     Per [place] Next of Kin hierarchy: Patients' spouse will be Primary Healthcare Decision Maker.

## 2022-08-15 NOTE — ED PROVIDER NOTES
CHIEF COMPLAINT    Chief Complaint   Patient presents with    Shortness of Breath     States was sob and started \"Sweating\"        HPI  Irma Barnard is a 63 y.o. female with history of bipolar disorder, COPD with chronic respiratory failure and oxygen dependency, hypertension, hyperlipidemia, fibromyalgia who presents to the ED with complaints of bilateral ear pressure worse on right compared to left and some increasing shortness of breath above baseline. Patient states her ear pressure and pain has been present over the last 3 to 4 days. The pressure is rated as moderate in severity with episodes of stabbing and aching on the right side. Nothing makes this better or worse. Her shortness of breath is rated as moderate to severe and exacerbated with exertion unrelieved by home nebulizers and her home 3 L of nasal cannula oxygen. She is unaware of any recent fevers. She feels that her coughing frequency has been slightly more than usual but remains nonproductive. Symptoms are constant. Denies fevers, chills, chest pain, dizziness, lightheadedness, nausea, vomiting. REVIEW OF SYSTEMS  Constitutional: No fever, chills   Eye: No visual changes  HENT: Complains of ear pain  Resp: Complains of shortness of breath  Cardio: No chest pain or palpitations. GI: No abdominal pain, nausea, vomiting, constipation or diarrhea. No melena. : No dysuria, urgency or frequency. Endocrine: No heat intolerance, no cold intolerance, no polydipsia   Lymphatics: No adenopathy  Musculoskeletal: No new muscle aches or joint pain. Neuro: No headaches. Psych: No homicidal or suicidal thoughts  Skin: No rash, No itching. ?  ?   PAST MEDICAL HISTORY  Past Medical History:   Diagnosis Date    Anemia     Anxiety 02/16/2017    follows with Dr Bob Chan     Back pain at L4-L5 level 2/16/2017    Dr Alphonse Woodruff 1 disorder [facility])     per pt on 2/5/2021\"never told I have bipolar\"    COPD (chronic obstructive pulmonary disease) ([facility] [address]     follows with Dr Deepthi Cardenas    Emphysema of lung [facility])     FH: CAD (coronary artery disease) 2017    Father had in his forties, sister in her thirties     Fibromyalgia 2017    Full dentures     full upper plate and partial on the bottom    Gastric ulcer     \"had stomach ulder back fall \"    H/O Doppler ultrasound 2019    venous- no DVT or reflux    H/O echocardiogram 2015    EF50-55% Normal- see media    History of blood transfusion     Hyperlipidemia LDL goal <100     Hypertension     Dr Sindy Reddy    Irregular heartbeat     \"they said I have irreg heart beat before- back when they drained fluid around my heart \"    Obstructive sleep apnea     \"have bipap I use at home\"    On home oxygen therapy     \"on oxygen all the time at home and keep it on 2.5 liters\"    Osteoarthritis     Pericardial effusion     per old chart had pericardial effusion 10/2020    Spinal stenosis     hx per old chart    Thyroid disease     Wears glasses     \"suppose to wear glasses\"     FAMILY HISTORY  Family History   Problem Relation Age of Onset    Asthma Mother         COPD    Heart Disease Father      SOCIAL HISTORY  Social History     Socioeconomic History    Marital status:      Spouse name: None    Number of children: None    Years of education: None    Highest education level: None   Tobacco Use    Smoking status: Former     Packs/day: 1.50     Years: 20.00     Pack years: 30.00     Types: Cigarettes     Quit date: 2008     Years since quittin.6    Smokeless tobacco: Never   Vaping Use    Vaping Use: Never used   Substance and Sexual Activity    Alcohol use: Not Currently     Alcohol/week: 2.0 standard drinks     Types: 2 Shots of liquor per week     Comment: per pt on 2021\"quit drinking back Oct 2020\"use to drink wine coolers - 3 times per week \"/caffeine 2-3 coffees aday 1 pop b    Drug use: No    Sexual activity: Yes     Partners: Male     Social Determinants of Health     Housing Stability: Low Risk     Unable to Pay for Housing in the Last Year: No    Number of Places Lived in the Last Year: 1    Unstable Housing in the Last Year: No       SURGICAL HISTORY  Past Surgical History:   Procedure Laterality Date    BACK SURGERY  03/2017    \"surgery on low back L5-6- not sure if they put any metal in \"    CARDIAC CATHETERIZATION      10/2019    CARDIAC CATHETERIZATION      per old chart had cath done 11/2020    CARPAL TUNNEL RELEASE Right 1985    CHOLECYSTECTOMY, LAPAROSCOPIC N/A 10/25/2020    CHOLECYSTECTOMY LAPAROSCOPIC WITH IOC performed by Tao La MD at [address] [facility] [address] N/A 12/12/2019    COLONOSCOPY DIAGNOSTIC performed by Susan Blackwell MD at [address] [place] [address], [place], DIAGNOSTIC      per old chart had egd done 1/2018    TUBAL LIGATION  1987    UPPER GASTROINTESTINAL ENDOSCOPY N/A 1/7/2021    EGD BIOPSY performed by Susan Blackwell MD at [address] [facility]  Discharge Medication List as of 8/15/2022  1:45 AM        CONTINUE these medications which have NOT CHANGED    Details   LAGEVRIO 200 MG capsule take 4 capsules by mouth every 12 hours for 5 days, DAWHistorical Med      !! predniSONE (DELTASONE) 10 MG tablet Prednisone 30 mg p.o. daily for 2 days then prednisone 20 mg p.o. daily for 2 days then prednisone 10 mg p.o. daily for 2 days, Disp-12 tablet, R-0Normal      gabapentin (NEURONTIN) 300 MG capsule Take 300 mg by mouth 3 times daily. Historical Med      methocarbamol (ROBAXIN) 500 MG tablet Take 500 mg by mouth in the morning and at bedtimeHistorical Med      albuterol sulfate  (90 Base) MCG/ACT inhaler 01/29/2014 Albuterol HFA Inhaler 90 mcg/actuation  By inhalation 2.0 INHALATION(S) as directed  01/29/2014  activeHistorical Med      esomeprazole (NEXIUM) 40 MG delayed release capsule 1 capsuleHistorical Med      Ferrous Fumarate 324 (106 Fe) MG TABS Take one tablet 2 times a dayHistorical Med      MAGNESIUM-OXIDE 400 (241.3 Mg) MG TABS tablet DAWHistorical Med      budesonide-formoterol (SYMBICORT) 160-4.5 MCG/ACT AERO Inhale 2 puffs into the lungs 2 times daily, Disp-3 each, R-3Normal      albuterol-ipratropium (COMBIVENT RESPIMAT)  MCG/ACT AERS inhaler Inhale 1 puff into the lungs every 4 hours as needed for Wheezing, Disp-3 each, R-3Normal      ipratropium-albuterol (DUONEB) 0.5-2.5 (3) MG/3ML SOLN nebulizer solution Take 3 mLs by nebulization 4 times daily, Disp-360 mL, R-3Normal      pantoprazole (PROTONIX) 40 MG tablet Take 1 tablet by mouth every morning (before breakfast), Disp-90 tablet, R-0Normal      sucralfate (CARAFATE) 1 GM/10ML suspension Take 10 mLs by mouth 4 times daily, Disp-420 mL, R-0Normal      magnesium oxide (MAG-OX) 400 MG tablet Take 1 tablet by mouth daily, Disp-30 tablet, R-1Normal      atorvastatin (LIPITOR) 80 MG tablet Take 80 mg by mouth dailyHistorical Med      dicyclomine (BENTYL) 10 MG capsule Take 2 capsules by mouth 4 times daily (before meals and nightly), Disp-40 capsule, R-0Normal      ondansetron (ZOFRAN ODT) 4 MG disintegrating tablet Take 1 tablet by mouth every 6 hours, Disp-20 tablet, R-0Normal      diclofenac sodium (VOLTAREN) 1 % GEL Apply 4 g topically 4 times daily, Topical, 4 TIMES DAILY Starting Wed 12/8/2021, Disp-350 g, R-2, Normal      hydrALAZINE (APRESOLINE) 50 MG tablet Take 1.5 tablets by mouth 3 times daily, Disp-135 tablet, R-5Normal      acetaminophen (TYLENOL 8 HOUR ARTHRITIS PAIN) 650 MG extended release tablet Take 650 mg by mouth as needed for PainHistorical Med      vitamin D (CHOLECALCIFEROL) 25 MCG (1000 UT) TABS tablet Take 1,000 Units by mouth dailyHistorical Med      busPIRone (BUSPAR) 10 MG tablet Take 1 tablet by mouth 2 times daily, Disp-1 tablet, R-0NO PRINT      ferrous sulfate (IRON 325) 325 (65 Fe) MG tablet Take 1 tablet by mouth every other day Indications: pt taking every day bid Monday, wed, fri, Disp-30 tablet, R-3NO PRINT      dilTIAZem (CARDIZEM CD) 120 MG extended release capsule Take 1 capsule by mouth daily, Disp-90 capsule, R-3Normal      metoprolol succinate (TOPROL XL) 25 MG extended release tablet Take 1 tablet by mouth daily, Disp-30 tablet, R-3Normal      clonazePAM (KLONOPIN) 1 MG disintegrating tablet Take 1 mg by mouth 3 times daily as needed. Historical Med      lactobacillus (CULTURELLE) capsule Take 1 capsule by mouth daily (with breakfast), Disp-30 capsule,R-0Normal      theophylline (MIAH-24) 200 MG extended release capsule Take 400 mg by mouth dailyHistorical Med      guaiFENesin (MUCINEX) 600 MG extended release tablet Take 1 tablet by mouth 2 times daily, Disp-30 tablet,R-0Normal      montelukast (SINGULAIR) 10 MG tablet Take 1 tablet by mouth daily, Disp-30 tablet,R-3Normal      levothyroxine (SYNTHROID) 100 MCG tablet Take 1 tablet by mouth Daily, Disp-30 DIMGUA,[id number]      folic acid (FOLVITE) 1 MG tablet Take 1 tablet by mouth daily, Disp-30 tablet,R-3Normal      vitamin B-1 100 MG tablet Take 1 tablet by mouth daily, Disp-30 tablet,R-3Normal      DULoxetine (CYMBALTA) 60 MG extended release capsule Take 60 mg by mouth daily Historical Med      traZODone (DESYREL) 50 MG tablet Take 100 mg by mouth nightly Historical Med      fluticasone (FLONASE) 50 MCG/ACT nasal spray 2 sprays by Nasal route daily, Disp-1 Bottle, R-0Print       !! - Potential duplicate medications found. Please discuss with provider. ALLERGIES  Allergies   Allergen Reactions    Lisinopril Swelling and Rash     angioedema       Nursing notes reviewed by myself for past medical history, family history, social history, surgical history, current medications, and allergies.     PHYSICAL EXAM  VITAL SIGNS: Triage VS:    ED Triage Vitals [08/14/22 2108]   Enc Vitals Group      BP (!) 154/81      Heart Rate (!) 102      Resp 20      Temp 98.2 °F (36.8 °C)      Temp src       SpO2 97 %      Weight 205 lb (93 kg)      Height 5' 2.5\" (1.588 m)      Head Circumference       Peak Flow       Pain Score       Pain Loc       Pain Edu? Excl. in [address]? Constitutional: Well developed, Well nourished, nontoxic appearing  HENT: Normocephalic, Atraumatic, Bilateral external ears normal, left tympanic membrane is clear, small amount of erythema and bulging to anterior portion of right tympanic membrane without rupture, oropharynx moist, No oral exudates, Nose normal.   Eyes: PERRL, EOMI, Conjunctiva normal, No discharge. No scleral icterus. Neck: Normal range of motion, No tenderness, Supple. Lymphatic: No lymphadenopathy noted. Cardiovascular: Normal heart rate, Normal rhythm, No murmurs, gallops or rubs. Thorax & Lungs: Diminished breath sounds bilaterally with end expiratory wheezing. No conversational dyspnea  Abdomen: Soft, No tenderness, No masses, No pulsatile masses, No distention, Normal bowel sounds  Skin: Warm, Dry, Pink, No mottling, No erythema, No rash. Back: No tenderness, No CVA tenderness. Extremities: No edema, No tenderness, No cyanosis, Normal perfusion, No clubbing. Musculoskeletal: Good range of motion in all major joints as observed. No major deformities noted. Neurologic: Alert & oriented x 3, Normal motor function, Normal sensory function, CN II-XII grossly intact as tested, No focal deficits noted. Psychiatric: Affect normal, Judgment normal, Mood normal.   EKG  Per my interpretation demonstrates normal sinus rhythm at a rate of 98 bpm.  Right axis deviation present. Bifascicular block with prolonged QTc interval.  No acute T-segment changes. Appears similar compared to previous EKGs.   RADIOLOGY  Labs Reviewed   CBC WITH AUTO DIFFERENTIAL - Abnormal; Notable for the following components:       Result Value    WBC 10.6 (*)     RBC 3.59 (*)     Hemoglobin 10.3 (*)     Hematocrit 35.3 (*)     MCHC 29.2 (*)     Segs Relative 77.2 (*)     Lymphocytes % 11.2 (*)     Monocytes % 7.3 (*)     All other components within normal limits COMPREHENSIVE METABOLIC PANEL W/ REFLEX TO MG FOR LOW K - Abnormal; Notable for the following components:    Chloride 96 (*)     CO2 37 (*)     Glucose 117 (*)     All other components within normal limits   TROPONIN - Abnormal; Notable for the following components:    Troponin T 0.015 (*)     All other components within normal limits   BRAIN NATRIURETIC PEPTIDE - Abnormal; Notable for the following components:    Pro-.4 (*)     All other components within normal limits   TROPONIN - Abnormal; Notable for the following components:    Troponin T 0.011 (*)     All other components within normal limits   COVID-19, RAPID     I personally reviewed the images. The radiologist's interpretation reveals:  Last Imaging results   XR CHEST (2 VW)   Final Result   1. No acute cardiopulmonary process identified. MEDS GIVEN IN ED:  Medications   amoxicillin-clavulanate (AUGMENTIN) 875-125 MG per tablet 1 tablet (has no administration in time range)   dexamethasone (PF) (DECADRON) injection 6 mg (6 mg IntraVENous Given 8/14/22 2320)   albuterol sulfate HFA (PROVENTIL;VENTOLIN;PROAIR) 108 (90 Base) MCG/ACT inhaler 2 puff (2 puffs Inhalation Given 8/14/22 2312)   acetaminophen (TYLENOL) tablet 1,000 mg (1,000 mg Oral Given 8/14/22 2352)   ibuprofen (ADVIL;MOTRIN) tablet 800 mg (800 mg Oral Given 8/14/22 2352)     [facility]    This is a 53-year-old female that presents to emergency department complaints of bilateral ear pressure and pain worse on the right compared to the left as well as some worsening shortness of breath with a known history of COPD and chronic respiratory failure. On my evaluation she has some end expiratory wheezing and diminished breath sounds bilaterally but is saturating at 94% above on her home 3 L nasal cannula oxygen. She is currently afebrile here and without tachycardia.   She does have evidence of right-sided otitis media on exam with left tympanic membrane being clear. At this time we will obtain CBC, CMP, EKG, troponin, x-ray, BNP, 19 testing. In the interim albuterol inhaler and Decadron ordered for the patient. Her EKG is without acute ischemic changes. Initial troponin is mildly elevated at 0.015 although patient is without chest pain I suspect she likely has a mildly elevated troponin due to her increased work of breathing. A repeat troponin was obtained 2 hours later and decreased to 0.011. Her aeration and shortness of breath improved with aforementioned inventions. COVID-19 testing is negative and chest x-ray is without acute cardiopulmonary pathology. At this time she was initiated on Augmentin therapy for treatment of otitis media and will be discharged home with a prescription for prednisone for COPD exacerbation as well as Augmentin for otitis media. Discharged with strict return precautions. Amount and/or Complexity of Data Reviewed  Clinical lab tests: reviewed  Decide to obtain previous medical records or to obtain history from someone other than the patient: yes       -  Patient seen and evaluated in the emergency department. -  Triage and nursing notes reviewed and incorporated. -  Old chart records reviewed and incorporated. -  Work-up included:  See above  -  Results discussed with patient. Appropriate PPE utilized as indicated for entire patient encounter? Time of Disposition: See timeline  ? Discharge Medication List as of 8/15/2022  1:45 AM        START taking these medications    Details   ! ! predniSONE (DELTASONE) 50 MG tablet Take 1 tablet by mouth in the morning for 5 days. , Disp-5 tablet, R-0Normal      amoxicillin-clavulanate (AUGMENTIN) 875-125 MG per tablet Take 1 tablet by mouth in the morning and 1 tablet before bedtime. Do all this for 10 days. , Disp-20 tablet, R-0Normal       !! - Potential duplicate medications found. Please discuss with provider. FINAL IMPRESSION  1. COPD exacerbation ([address]    2.  Acute suppurative otitis media of right ear without spontaneous rupture of tympanic membrane, recurrence not specified      Electronically signed by:  Andree Whitmore DO, 8/15/2022         Andree Whitmore DO  08/15/22 [address],   08/15/22 4536

## 2022-08-15 NOTE — ED NOTES
Patient presents to Ed with complaints of shortness of breath, states she feels as if her head is under water, reports chronic neck pain states wears o2 at home.       Ryan Gonsalez RN  08/14/22 2112

## 2022-08-15 NOTE — ED NOTES
Discharged instruction reviewed with patient. IV discontinued. All questions addressed. Patient alert and oriented x4 at discharge. Patient verbalized understanding.        Saba Thornton RN  08/15/22 0155

## 2022-09-22 ENCOUNTER — OFFICE VISIT (OUTPATIENT)
Dept: CARDIOLOGY CLINIC | Age: 62
End: 2022-09-22
Payer: COMMERCIAL

## 2022-09-22 VITALS — SYSTOLIC BLOOD PRESSURE: 120 MMHG | HEART RATE: 81 BPM | DIASTOLIC BLOOD PRESSURE: 60 MMHG

## 2022-09-22 DIAGNOSIS — E66.09 CLASS 1 OBESITY DUE TO EXCESS CALORIES WITHOUT SERIOUS COMORBIDITY WITH BODY MASS INDEX (BMI) OF 31.0 TO 31.9 IN ADULT: ICD-10-CM

## 2022-09-22 DIAGNOSIS — I47.1 SVT (SUPRAVENTRICULAR TACHYCARDIA) (HCC): Primary | ICD-10-CM

## 2022-09-22 DIAGNOSIS — E78.5 HYPERLIPIDEMIA LDL GOAL <100: ICD-10-CM

## 2022-09-22 DIAGNOSIS — I10 PRIMARY HYPERTENSION: ICD-10-CM

## 2022-09-22 PROCEDURE — 3017F COLORECTAL CA SCREEN DOC REV: CPT | Performed by: NURSE PRACTITIONER

## 2022-09-22 PROCEDURE — 99214 OFFICE O/P EST MOD 30 MIN: CPT | Performed by: NURSE PRACTITIONER

## 2022-09-22 PROCEDURE — G8417 CALC BMI ABV UP PARAM F/U: HCPCS | Performed by: NURSE PRACTITIONER

## 2022-09-22 PROCEDURE — G8427 DOCREV CUR MEDS BY ELIG CLIN: HCPCS | Performed by: NURSE PRACTITIONER

## 2022-09-22 PROCEDURE — 1036F TOBACCO NON-USER: CPT | Performed by: NURSE PRACTITIONER

## 2022-09-22 ASSESSMENT — ENCOUNTER SYMPTOMS: SHORTNESS OF BREATH: 1

## 2022-09-22 NOTE — PROGRESS NOTES
[facility] ([facility] PHYSICAL [facility]    [address], [address]  Phone: [phone]    Fax [phone]                  Klever Francois MD, Susan Shine MD, Grant Garcia MD, MD Awilda Saldana MD, Kalpana Morin MD, Starr Lima MD, [address] [facility], [facility], [facility]  [place], [facility]        Cardiology Progress Note      9/22/2022    RE: Frances Keenan  (1960)                             Primary cardiologist: Dr. Awilda Lino       Subjective:  CC:   1. SVT (supraventricular tachycardia) ([facility] [address]    2. Primary hypertension    3. Hyperlipidemia LDL goal <100    4. Class 1 obesity due to excess calories without serious comorbidity with body mass index (BMI) of 31.0 to 31.9 in adult          HPI: Frances Keenan, who is a  63y.o. year old female with a past medical history as listed below. Patient presents to the office for follow up on leg edema, SVT,HTN, and hyperlipidemia. Patient has chronic hypoxia requiring continuous oxygen. Recently had intracranial hemorrhage and was treated at [facility]. Patient denies any chest pain, shortness of breath, dizziness, syncope, or palpitations.     Past Medical History:   Diagnosis Date    Anemia     Anxiety 02/16/2017    follows with Dr Shoshana Starr     Back pain at L4-L5 level 2/16/2017    Dr Bety Matthew 1 [facility])     per pt on 2/5/2021\"never told I have bipolar\"    COPD (chronic obstructive pulmonary disease) ([facility] [address]     follows with Dr Héctor Soria    Emphysema of lung [facility])     FH: CAD (coronary artery disease) 2/16/2017    Father had in his forties, sister in her thirties     Fibromyalgia 02/16/2017    Full dentures     full upper plate and partial on the bottom    Gastric ulcer     \"had stomach ulder back fall 2019\"    H/O Doppler ultrasound 04/05/2019    venous- no DVT or reflux    H/O echocardiogram 01/14/2015 EF50-55% Normal- see media    History of blood transfusion     Hyperlipidemia LDL goal <100     Hypertension     Dr Petros Martinez    Irregular heartbeat     \"they said I have irreg heart beat before- back when they drained fluid around my heart \"    Obstructive sleep apnea     \"have bipap I use at home\"    On home oxygen therapy     \"on oxygen all the time at home and keep it on 2.5 liters\"    Osteoarthritis     Pericardial effusion     per old chart had pericardial effusion 10/2020    Spinal stenosis     hx per old chart    Thyroid disease     Wears glasses     \"suppose to wear glasses\"       Current Outpatient Medications   Medication Sig Dispense Refill    LAGEVRIO 200 MG capsule take 4 capsules by mouth every 12 hours for 5 days      predniSONE (DELTASONE) 10 MG tablet Prednisone 30 mg p.o. daily for 2 days then prednisone 20 mg p.o. daily for 2 days then prednisone 10 mg p.o. daily for 2 days 12 tablet 0    gabapentin (NEURONTIN) 300 MG capsule Take 400 mg by mouth 3 times daily.       methocarbamol (ROBAXIN) 500 MG tablet Take 750 mg by mouth in the morning and at bedtime      albuterol sulfate  (90 Base) MCG/ACT inhaler 01/29/2014 Albuterol HFA Inhaler 90 mcg/actuation  By inhalation 2.0 INHALATION(S) as directed  01/29/2014  active      MAGNESIUM-OXIDE 400 (241.3 Mg) MG TABS tablet       budesonide-formoterol (SYMBICORT) 160-4.5 MCG/ACT AERO Inhale 2 puffs into the lungs 2 times daily 3 each 3    albuterol-ipratropium (COMBIVENT RESPIMAT)  MCG/ACT AERS inhaler Inhale 1 puff into the lungs every 4 hours as needed for Wheezing 3 each 3    ipratropium-albuterol (DUONEB) 0.5-2.5 (3) MG/3ML SOLN nebulizer solution Take 3 mLs by nebulization 4 times daily 360 mL 3    pantoprazole (PROTONIX) 40 MG tablet Take 1 tablet by mouth every morning (before breakfast) 90 tablet 0    magnesium oxide (MAG-OX) 400 MG tablet Take 1 tablet by mouth daily 30 tablet 1    atorvastatin (LIPITOR) 80 MG tablet Take 80 mg by mouth daily      ondansetron (ZOFRAN ODT) 4 MG disintegrating tablet Take 1 tablet by mouth every 6 hours 20 tablet 0    diclofenac sodium (VOLTAREN) 1 % GEL Apply 4 g topically 4 times daily 350 g 2    hydrALAZINE (APRESOLINE) 50 MG tablet Take 1.5 tablets by mouth 3 times daily 135 tablet 5    acetaminophen (TYLENOL) 650 MG extended release tablet Take 650 mg by mouth as needed for Pain      vitamin D (CHOLECALCIFEROL) 25 MCG (1000 UT) TABS tablet Take 1,000 Units by mouth daily      ferrous sulfate (IRON 325) 325 (65 Fe) MG tablet Take 1 tablet by mouth every other day Indications: pt taking every day bid Monday, wed, fri (Patient taking differently: Take 325 mg by mouth 2 times daily Indications: pt taking every day bid) 30 tablet 3    dilTIAZem (CARDIZEM CD) 120 MG extended release capsule Take 1 capsule by mouth daily 90 capsule 3    metoprolol succinate (TOPROL XL) 25 MG extended release tablet Take 1 tablet by mouth daily 30 tablet 3    clonazePAM (KLONOPIN) 1 MG disintegrating tablet Take 0.5 mg by mouth 2 times daily as needed. lactobacillus (CULTURELLE) capsule Take 1 capsule by mouth daily (with breakfast) 30 capsule 0    theophylline (MIAH-24) 200 MG extended release capsule Take 400 mg by mouth daily      guaiFENesin (MUCINEX) 600 MG extended release tablet Take 1 tablet by mouth 2 times daily 30 tablet 0    montelukast (SINGULAIR) 10 MG tablet Take 1 tablet by mouth daily 30 tablet 3    levothyroxine (SYNTHROID) 100 MCG tablet Take 1 tablet by mouth Daily 30 tablet 2    folic acid (FOLVITE) 1 MG tablet Take 1 tablet by mouth daily 30 tablet 3    vitamin B-1 100 MG tablet Take 1 tablet by mouth daily 30 tablet 3    DULoxetine (CYMBALTA) 60 MG extended release capsule Take 60 mg by mouth daily       fluticasone (FLONASE) 50 MCG/ACT nasal spray 2 sprays by Nasal route daily 1 Bottle 0     No current facility-administered medications for this visit.        Review of Systems:  Review of Systems Respiratory:  Positive for shortness of breath. Cardiovascular:  Positive for leg swelling. Negative for chest pain and palpitations. Musculoskeletal: Negative. Skin: Negative. Neurological:  Negative for dizziness and weakness. All other systems reviewed and are negative. Objective:      Physical Exam:  /60 (Site: Left Upper Arm, Position: Sitting, Cuff Size: Large Adult)   Pulse 81   LMP 01/05/2010   Wt Readings from Last 3 Encounters:   08/14/22 205 lb (93 kg)   08/09/22 209 lb (94.8 kg)   06/27/22 209 lb 12.8 oz (95.2 kg)     There is no height or weight on file to calculate BMI. Physical exam:  Physical Exam  Constitutional:       Appearance: She is well-developed. Cardiovascular:      Rate and Rhythm: Normal rate and regular rhythm. Pulses: Intact distal pulses. Dorsalis pedis pulses are 2+ on the right side and 2+ on the left side. Posterior tibial pulses are 2+ on the right side and 2+ on the left side. Heart sounds: Normal heart sounds, S1 normal and S2 normal.   Pulmonary:      Effort: Pulmonary effort is normal.      Breath sounds: Normal breath sounds. Musculoskeletal:         General: Normal range of motion. Right lower leg: No edema. Left lower leg: No edema. Skin:     General: Skin is warm and dry. Neurological:      Mental Status: She is alert and oriented to person, place, and time.         DATA:  Lab Results   Component Value Date/Time    CKTOTAL 126 05/03/2021 02:49 AM    TROPONINI 0.007 03/25/2014 06:22 PM     BNP:  No results found for: BNP  PT/INR:  No results found for: PTINR  Lab Results   Component Value Date    LABA1C 5.1 06/21/2019     Lab Results   Component Value Date    CHOL 192 06/07/2022    TRIG 77 06/07/2022    HDL 67 06/07/2022    LDLCALC 110 (H) 06/07/2022    LDLDIRECT 87 11/23/2020     Lab Results   Component Value Date    ALT 12 08/14/2022    AST 18 08/14/2022     TSH:  No results found for: TSH    Vitals:    09/22/22 1650   BP: 120/60   Pulse: 81         Echo:8/6/21  Technically limited study due to COPD. Left ventricular function is normal, EF is estimated at 55-60%. Mild left ventricular hypertrophy. No evidence of diastolic dysfunction. No regional wall motion abnormalities were detected. Mildly dilated left atrium. Mild mitral and tricuspid regurgitation is present. RVSP is 26 mmHg. There is a small (trivial) pericardial effusion. Fat pad present. Echo:6/8/22  Left ventricle: The cavity size was normal. Wall thickness was      mildly increased. Systolic function was normal. The estimated      ejection fraction was in the range of 55% to 60%. Wall motion      was normal; there were no regional wall motion abnormalities. Left ventricular diastolic function parameters were normal.   2. Right ventricle: The cavity size was normal. Wall thickness was      normal. Systolic function was normal.   3. Pericardium, extracardiac: A trivial pericardial effusion was      identified, with no evidence of tamponade. Cath:11/24/20   Angiographically almost normal coronary arteries. 2. Normal LV systolic function. LVEF is 60 to 65 %. The 10-year ASCVD risk score (Milton Quintero, et al., 2013) is: 2.5%    Values used to calculate the score:      Age: 63 years      Sex: Female      Is Non- : No      Diabetic: No      Tobacco smoker: No      Systolic Blood Pressure: 833 mmHg      Is BP treated: No      HDL Cholesterol: 67 MG/DL      Total Cholesterol: 192 MG/DL      Assessment/ Plan:     SVT (supraventricular tachycardia) (HCC)   - Denies any palpitations. Continue with Cardizem 120 mg daily and Toprol XL 25 mg.      Hypertension   - Stable, continue with Apresoline 75 mg 3 times daily,Toprol-XL 25 mg daily, Cardizem 120 mg daily    Hyperlipidemia LDL goal <100   -At or near goal Yes,   -She is to continue current medications (Lipitor 80 mg) Hepatic function panel WNL. No abdominal pain. No myalgias.     -The nature of cardiac risk has been fully discussed with this patient. I have made her aware of her LDL target goal given her cardiovascular risk analysis. I have discussed the appropriate diet. The need for lifelong compliance in order to reduce risk is stressed. A regular exercise program is recommended to help achieve and maintain normal body weight, fitness and improve lipid balance. A written copy of a low fat, low cholesterol diet has been given to the patient. Obesity  -Discussed the importance of diet and exercise and assisting with weight loss. Patient informed of ideal body weight and high risk mortality associated with obesity. Patient voices understanding. Patient seen, interviewed and examined. Testing was reviewed. Patient is encouraged to exercise even a brisk walk for 30 minutes at least 3 to 4 times a week. Lifestyle and risk factor modificatons discussed. Various goals are discussed and questions answered. Continue current medications. Appropriate prescriptions are addressed. Questions answered and patient verbalizes understanding. Call for any problems, questions, or concerns. Pt is to follow up in 6 months for Cardiac management    Electronically signed by Melinda Prakash.  BRIAN Alva CNP on 9/22/2022 at 5:07 PM

## 2022-10-21 ENCOUNTER — HOSPITAL ENCOUNTER (OUTPATIENT)
Age: 62
Discharge: HOME | End: 2022-10-21
Payer: COMMERCIAL

## 2022-10-21 DIAGNOSIS — M48.061 SPINAL STENOSIS OF LUMBAR REGION WITH RADICULOPATHY: ICD-10-CM

## 2022-10-21 DIAGNOSIS — I10 PRIMARY HYPERTENSION: ICD-10-CM

## 2022-10-21 DIAGNOSIS — F41.9 ANXIETY: ICD-10-CM

## 2022-10-21 DIAGNOSIS — N18.1 CHRONIC KIDNEY DISEASE, STAGE I: ICD-10-CM

## 2022-10-21 DIAGNOSIS — J43.8 OTHER EMPHYSEMA (HCC): ICD-10-CM

## 2022-10-21 DIAGNOSIS — E87.6 HYPOKALEMIA: ICD-10-CM

## 2022-10-21 DIAGNOSIS — M54.16 SPINAL STENOSIS OF LUMBAR REGION WITH RADICULOPATHY: ICD-10-CM

## 2022-10-21 LAB
ALBUMIN SERPL-MCNC: 4.5 GM/DL (ref 3.4–5)
ANION GAP SERPL CALCULATED.3IONS-SCNC: 9 MMOL/L (ref 4–16)
BILIRUBIN URINE: NEGATIVE MG/DL
BLOOD, URINE: NEGATIVE
BUN BLDV-MCNC: 10 MG/DL (ref 6–23)
CALCIUM SERPL-MCNC: 9.9 MG/DL (ref 8.3–10.6)
CHLORIDE BLD-SCNC: 92 MMOL/L (ref 99–110)
CLARITY: CLEAR
CO2: 39 MMOL/L (ref 21–32)
COLOR: YELLOW
CREAT SERPL-MCNC: 0.8 MG/DL (ref 0.6–1.1)
CREATININE URINE: 15.9 MG/DL (ref 28–217)
GFR SERPL CREATININE-BSD FRML MDRD: >60 ML/MIN/1.73M2
GLUCOSE BLD-MCNC: 196 MG/DL (ref 70–99)
GLUCOSE, URINE: NEGATIVE MG/DL
KETONES, URINE: NEGATIVE MG/DL
LEUKOCYTE ESTERASE, URINE: NEGATIVE
MAGNESIUM: 1.8 MG/DL (ref 1.8–2.4)
NITRITE URINE, QUANTITATIVE: NEGATIVE
PH, URINE: 6.5 (ref 5–8)
PHOSPHORUS: 2.8 MG/DL (ref 2.5–4.9)
POTASSIUM SERPL-SCNC: 4.1 MMOL/L (ref 3.5–5.1)
PROT/CREAT RATIO, UR: 0.3
PROTEIN UA: NEGATIVE MG/DL
SODIUM BLD-SCNC: 140 MMOL/L (ref 135–145)
SPECIFIC GRAVITY UA: <1.005 (ref 1–1.03)
URINE TOTAL PROTEIN: 4 MG/DL
UROBILINOGEN, URINE: 0.2 MG/DL (ref 0.2–1)

## 2022-10-21 PROCEDURE — 82570 ASSAY OF URINE CREATININE: CPT

## 2022-10-21 PROCEDURE — 81003 URINALYSIS AUTO W/O SCOPE: CPT

## 2022-10-21 PROCEDURE — 84100 ASSAY OF PHOSPHORUS: CPT

## 2022-10-21 PROCEDURE — 83735 ASSAY OF MAGNESIUM: CPT

## 2022-10-21 PROCEDURE — 82040 ASSAY OF SERUM ALBUMIN: CPT

## 2022-10-21 PROCEDURE — 80048 BASIC METABOLIC PNL TOTAL CA: CPT

## 2022-10-21 PROCEDURE — 36415 COLL VENOUS BLD VENIPUNCTURE: CPT

## 2022-10-21 PROCEDURE — 84156 ASSAY OF PROTEIN URINE: CPT

## 2022-10-25 RX ORDER — HYDRALAZINE HYDROCHLORIDE 50 MG/1
50 TABLET, FILM COATED ORAL 3 TIMES DAILY
Qty: 180 TABLET | Refills: 5 | Status: ON HOLD | OUTPATIENT
Start: 2022-10-25

## 2022-10-27 ENCOUNTER — HOSPITAL ENCOUNTER (OUTPATIENT)
Dept: WOMENS IMAGING | Age: 62
Discharge: HOME | End: 2022-10-27
Payer: COMMERCIAL

## 2022-10-27 DIAGNOSIS — Z12.31 ENCOUNTER FOR SCREENING MAMMOGRAM FOR MALIGNANT NEOPLASM OF BREAST: ICD-10-CM

## 2022-10-27 DIAGNOSIS — Z12.31 SCREENING MAMMOGRAM, ENCOUNTER FOR: ICD-10-CM

## 2022-10-27 PROCEDURE — 77067 SCR MAMMO BI INCL CAD: CPT

## 2022-10-29 ENCOUNTER — TRANSCRIBE ORDERS (OUTPATIENT)
Dept: ADMINISTRATIVE | Age: 62
End: 2022-10-29

## 2022-10-29 DIAGNOSIS — Z13.820 SCREENING FOR OSTEOPOROSIS: Primary | ICD-10-CM

## 2022-11-05 ENCOUNTER — APPOINTMENT (OUTPATIENT)
Dept: GENERAL RADIOLOGY | Age: 62
DRG: 871 | End: 2022-11-05
Payer: COMMERCIAL

## 2022-11-05 ENCOUNTER — HOSPITAL ENCOUNTER (INPATIENT)
Age: 62
LOS: 5 days | Discharge: HOME HEALTH | DRG: 871 | End: 2022-11-11
Attending: SURGERY | Admitting: SURGERY
Payer: COMMERCIAL

## 2022-11-05 ENCOUNTER — APPOINTMENT (OUTPATIENT)
Dept: CT IMAGING | Age: 62
DRG: 871 | End: 2022-11-05
Payer: COMMERCIAL

## 2022-11-05 DIAGNOSIS — J18.9 PNEUMONIA OF BOTH LOWER LOBES DUE TO INFECTIOUS ORGANISM: ICD-10-CM

## 2022-11-05 DIAGNOSIS — E87.1 HYPONATREMIA: ICD-10-CM

## 2022-11-05 DIAGNOSIS — J96.22 ACUTE ON CHRONIC RESPIRATORY FAILURE WITH HYPERCAPNIA (HCC): Primary | ICD-10-CM

## 2022-11-05 DIAGNOSIS — Z79.52 LONG TERM CURRENT USE OF SYSTEMIC STEROIDS: ICD-10-CM

## 2022-11-05 DIAGNOSIS — D72.829 LEUKOCYTOSIS, UNSPECIFIED TYPE: ICD-10-CM

## 2022-11-05 LAB
ADENOVIRUS DETECTION BY PCR: NOT DETECTED
ALBUMIN SERPL-MCNC: 4 GM/DL (ref 3.4–5)
ALP BLD-CCNC: 93 IU/L (ref 40–129)
ALT SERPL-CCNC: 17 U/L (ref 10–40)
ANION GAP SERPL CALCULATED.3IONS-SCNC: 7 MMOL/L (ref 4–16)
AST SERPL-CCNC: 19 IU/L (ref 15–37)
BASE EXCESS MIXED: 13.1 (ref 0–2.3)
BASOPHILS ABSOLUTE: 0.1 K/CU MM
BASOPHILS RELATIVE PERCENT: 0.3 % (ref 0–1)
BILIRUB SERPL-MCNC: 0.3 MG/DL (ref 0–1)
BORDETELLA PARAPERTUSSIS BY PCR: NOT DETECTED
BORDETELLA PERTUSSIS PCR: NOT DETECTED
BUN BLDV-MCNC: 9 MG/DL (ref 6–23)
CALCIUM SERPL-MCNC: 9.9 MG/DL (ref 8.3–10.6)
CHLAMYDOPHILA PNEUMONIA PCR: NOT DETECTED
CHLORIDE BLD-SCNC: 92 MMOL/L (ref 99–110)
CO2: 37 MMOL/L (ref 21–32)
COMMENT: ABNORMAL
CORONAVIRUS 229E PCR: NOT DETECTED
CORONAVIRUS HKU1 PCR: NOT DETECTED
CORONAVIRUS NL63 PCR: NOT DETECTED
CORONAVIRUS OC43 PCR: NOT DETECTED
CREAT SERPL-MCNC: 0.7 MG/DL (ref 0.6–1.1)
DIFFERENTIAL TYPE: ABNORMAL
EOSINOPHILS ABSOLUTE: 0.1 K/CU MM
EOSINOPHILS RELATIVE PERCENT: 0.3 % (ref 0–3)
GFR SERPL CREATININE-BSD FRML MDRD: >60 ML/MIN/1.73M2
GLUCOSE BLD-MCNC: 138 MG/DL (ref 70–99)
HCO3 VENOUS: 44.2 MMOL/L (ref 19–25)
HCT VFR BLD CALC: 37.2 % (ref 37–47)
HEMOGLOBIN: 10.7 GM/DL (ref 12.5–16)
HUMAN METAPNEUMOVIRUS PCR: NOT DETECTED
IMMATURE NEUTROPHIL %: 0.7 % (ref 0–0.43)
INFLUENZA A BY PCR: NOT DETECTED
INFLUENZA A H1 (2009) PCR: NOT DETECTED
INFLUENZA A H1 PANDEMIC PCR: NOT DETECTED
INFLUENZA A H3 PCR: NOT DETECTED
INFLUENZA B BY PCR: NOT DETECTED
LACTATE: 0.6 MMOL/L (ref 0.4–2)
LYMPHOCYTES ABSOLUTE: 0.9 K/CU MM
LYMPHOCYTES RELATIVE PERCENT: 3.8 % (ref 24–44)
MCH RBC QN AUTO: 27.2 PG (ref 27–31)
MCHC RBC AUTO-ENTMCNC: 28.8 % (ref 32–36)
MCV RBC AUTO: 94.4 FL (ref 78–100)
MONOCYTES ABSOLUTE: 1.4 K/CU MM
MONOCYTES RELATIVE PERCENT: 6.2 % (ref 0–4)
MYCOPLASMA PNEUMONIAE PCR: NOT DETECTED
NUCLEATED RBC %: 0 %
O2 SAT, VEN: 94.9 % (ref 50–70)
PARAINFLUENZA 1 PCR: NOT DETECTED
PARAINFLUENZA 2 PCR: NOT DETECTED
PARAINFLUENZA 3 PCR: NOT DETECTED
PARAINFLUENZA 4 PCR: NOT DETECTED
PCO2, VEN: 94 MMHG (ref 38–52)
PDW BLD-RTO: 13.7 % (ref 11.7–14.9)
PH VENOUS: 7.28 (ref 7.32–7.42)
PLATELET # BLD: 179 K/CU MM (ref 140–440)
PMV BLD AUTO: 10.3 FL (ref 7.5–11.1)
PO2, VEN: 144 MMHG (ref 28–48)
POTASSIUM SERPL-SCNC: 3.9 MMOL/L (ref 3.5–5.1)
PRO-BNP: 527 PG/ML
RAPID INFLUENZA  B AGN: NEGATIVE
RAPID INFLUENZA A AGN: NEGATIVE
RBC # BLD: 3.94 M/CU MM (ref 4.2–5.4)
RHINOVIRUS ENTEROVIRUS PCR: NOT DETECTED
RSV PCR: NOT DETECTED
SARS-COV-2, NAAT: NOT DETECTED
SARS-COV-2: NOT DETECTED
SEGMENTED NEUTROPHILS ABSOLUTE COUNT: 20.4 K/CU MM
SEGMENTED NEUTROPHILS RELATIVE PERCENT: 88.7 % (ref 36–66)
SODIUM BLD-SCNC: 136 MMOL/L (ref 135–145)
SOURCE: NORMAL
TOTAL IMMATURE NEUTOROPHIL: 0.15 K/CU MM
TOTAL NUCLEATED RBC: 0 K/CU MM
TOTAL PROTEIN: 7 GM/DL (ref 6.4–8.2)
TROPONIN T: 0.01 NG/ML
WBC # BLD: 23 K/CU MM (ref 4–10.5)

## 2022-11-05 PROCEDURE — 96365 THER/PROPH/DIAG IV INF INIT: CPT

## 2022-11-05 PROCEDURE — 82805 BLOOD GASES W/O2 SATURATION: CPT

## 2022-11-05 PROCEDURE — 2700000000 HC OXYGEN THERAPY PER DAY

## 2022-11-05 PROCEDURE — 80053 COMPREHEN METABOLIC PANEL: CPT

## 2022-11-05 PROCEDURE — 6370000000 HC RX 637 (ALT 250 FOR IP): Performed by: PHYSICIAN ASSISTANT

## 2022-11-05 PROCEDURE — 96375 TX/PRO/DX INJ NEW DRUG ADDON: CPT

## 2022-11-05 PROCEDURE — 93005 ELECTROCARDIOGRAM TRACING: CPT | Performed by: PHYSICIAN ASSISTANT

## 2022-11-05 PROCEDURE — 0202U NFCT DS 22 TRGT SARS-COV-2: CPT

## 2022-11-05 PROCEDURE — 85025 COMPLETE CBC W/AUTO DIFF WBC: CPT

## 2022-11-05 PROCEDURE — 87635 SARS-COV-2 COVID-19 AMP PRB: CPT

## 2022-11-05 PROCEDURE — 6360000002 HC RX W HCPCS: Performed by: PHYSICIAN ASSISTANT

## 2022-11-05 PROCEDURE — 71045 X-RAY EXAM CHEST 1 VIEW: CPT

## 2022-11-05 PROCEDURE — 94664 DEMO&/EVAL PT USE INHALER: CPT

## 2022-11-05 PROCEDURE — 83880 ASSAY OF NATRIURETIC PEPTIDE: CPT

## 2022-11-05 PROCEDURE — 84484 ASSAY OF TROPONIN QUANT: CPT

## 2022-11-05 PROCEDURE — 94660 CPAP INITIATION&MGMT: CPT

## 2022-11-05 PROCEDURE — 87040 BLOOD CULTURE FOR BACTERIA: CPT

## 2022-11-05 PROCEDURE — 87804 INFLUENZA ASSAY W/OPTIC: CPT

## 2022-11-05 PROCEDURE — 94640 AIRWAY INHALATION TREATMENT: CPT

## 2022-11-05 PROCEDURE — 2580000003 HC RX 258: Performed by: PHYSICIAN ASSISTANT

## 2022-11-05 PROCEDURE — 99285 EMERGENCY DEPT VISIT HI MDM: CPT

## 2022-11-05 PROCEDURE — 83605 ASSAY OF LACTIC ACID: CPT

## 2022-11-05 RX ORDER — ACETAMINOPHEN 500 MG
1000 TABLET ORAL ONCE
Status: COMPLETED | OUTPATIENT
Start: 2022-11-05 | End: 2022-11-05

## 2022-11-05 RX ORDER — METHYLPREDNISOLONE SODIUM SUCCINATE 125 MG/2ML
80 INJECTION, POWDER, LYOPHILIZED, FOR SOLUTION INTRAMUSCULAR; INTRAVENOUS ONCE
Status: COMPLETED | OUTPATIENT
Start: 2022-11-05 | End: 2022-11-05

## 2022-11-05 RX ORDER — IPRATROPIUM BROMIDE AND ALBUTEROL SULFATE 2.5; .5 MG/3ML; MG/3ML
3 SOLUTION RESPIRATORY (INHALATION) ONCE
Status: COMPLETED | OUTPATIENT
Start: 2022-11-05 | End: 2022-11-05

## 2022-11-05 RX ADMIN — AZITHROMYCIN DIHYDRATE 500 MG: 500 INJECTION, POWDER, LYOPHILIZED, FOR SOLUTION INTRAVENOUS at 20:46

## 2022-11-05 RX ADMIN — CEFTRIAXONE SODIUM 1000 MG: 1 INJECTION, POWDER, FOR SOLUTION INTRAMUSCULAR; INTRAVENOUS at 20:18

## 2022-11-05 RX ADMIN — IPRATROPIUM BROMIDE AND ALBUTEROL SULFATE 3 AMPULE: .5; 2.5 SOLUTION RESPIRATORY (INHALATION) at 19:00

## 2022-11-05 RX ADMIN — ACETAMINOPHEN 1000 MG: 500 TABLET ORAL at 20:46

## 2022-11-05 RX ADMIN — METHYLPREDNISOLONE SODIUM SUCCINATE 80 MG: 125 INJECTION, POWDER, FOR SOLUTION INTRAMUSCULAR; INTRAVENOUS at 18:28

## 2022-11-05 ASSESSMENT — PAIN SCALES - GENERAL: PAINLEVEL_OUTOF10: 5

## 2022-11-05 ASSESSMENT — PAIN DESCRIPTION - LOCATION: LOCATION: HEAD

## 2022-11-05 NOTE — ED PROVIDER NOTES
EMERGENCY DEPARTMENT [place]    41 Pending sale to [facility] EMERGENCY DEPARTMENT        TRIAGE CHIEF COMPLAINT:   Shortness of Breath      [place]:  Hayes Ruelas is a 63 y.o. female that presents for shortness of breath. Onset was prior to arrival that began this morning. Context is past medical history includes COPD with baseline requirement of 3 L, pretension, hyperlipidemia, thyroid disease, coronary artery disease, CHF. He is compliant with all of her home medications including diuretics. States she began feeling significantly short of breath this morning, is on current 4 L nasal cannula. Denying any pleuritic exertional chest pain. Has had a productive cough with green sputum today. Has had subjective chills at home but no recent sick contacts or known influenza/COVID positive contacts. No increasing swelling of the lower legs. States symptoms feel similar to when she has had COPD exacerbations in the past.  No abdominal or urinary complaints. Any other sore throat nasal congestion or ear pain.      ROS:  General:  No fevers, subjective chills    Cardiovascular:  No chest pain, no palpitations  Respiratory:  See HPI    Gastrointestinal:  No pain, no nausea, no vomiting, no diarrhea  Musculoskeletal:  No muscle pain, no joint pain  Skin:  No rash, no pruritis, no easy bruising  Neurologic:  No speech problems, no headache, no extremity numbness, no extremity tingling, no extremity weakness  Psychiatric:  No anxiety  Genitourinary:  No dysuria, no hematuria  Extremities:  No edema    Past Medical History:   Diagnosis Date    Anemia     Anxiety 02/16/2017    follows with Dr Jason Johnson    Arthritis     Back pain at L4-L5 level 2/16/2017    Dr Mayelin Barlow    Bipolar 1 disorder ([facility] [address]     per pt on 2/5/2021\"never told I have bipolar\"    COPD (chronic obstructive pulmonary disease) ([facility] [address]     follows with Dr Tenzin Esteban    Emphysema of lung [facility])     FH: CAD (coronary artery disease) 2/16/2017    Father had in his forties, sister in her thirties     Fibromyalgia 02/16/2017    Full dentures     full upper plate and partial on the bottom    Gastric ulcer     \"had stomach ulder back fall 2019\"    H/O Doppler ultrasound 04/05/2019    venous- no DVT or reflux    H/O echocardiogram 01/14/2015    EF50-55% Normal- see media    History of blood transfusion     Hyperlipidemia LDL goal <100     Hypertension     Dr France Astorga    Irregular heartbeat     \"they said I have irreg heart beat before- back when they drained fluid around my heart \"    Obstructive sleep apnea     \"have bipap I use at home\"    On home oxygen therapy     \"on oxygen all the time at home and keep it on 2.5 liters\"    Osteoarthritis     Pericardial effusion     per old chart had pericardial effusion 10/2020    Spinal stenosis     hx per old chart    Thyroid disease     Wears glasses     \"suppose to wear glasses\"     Past Surgical History:   Procedure Laterality Date    BACK SURGERY  03/2017    \"surgery on low back L5-6- not sure if they put any metal in \"    CARDIAC CATHETERIZATION      10/2019    CARDIAC CATHETERIZATION      per old chart had cath done 11/2020    CARPAL TUNNEL RELEASE Right 1985    CHOLECYSTECTOMY, LAPAROSCOPIC N/A 10/25/2020    CHOLECYSTECTOMY LAPAROSCOPIC WITH IOC performed by Gerardo Alvarez MD at [facility] OR    COLONOSCOPY N/A 12/12/2019    COLONOSCOPY DIAGNOSTIC performed by Chica Merida MD at [address] [place] [address], DIAGNOSTIC      per old chart had egd done 1/2018    TUBAL LIGATION  1987    UPPER GASTROINTESTINAL ENDOSCOPY N/A 1/7/2021    EGD BIOPSY performed by Chica Merida MD at [facility] ENDOSCOPY     Family History   Problem Relation Age of Onset    Asthma Mother         COPD    Heart Disease Father     Breast Cancer Neg Hx      Social History     Socioeconomic History    Marital status:      Spouse name: Not on file    Number of children: Not on file    Years of education: Not on file    Highest education level: Not on file   Occupational History    Not on file   Tobacco Use    Smoking status: Former     Packs/day: 1.50     Years: 20.00     Pack years: 30.00     Types: Cigarettes     Quit date: 2008     Years since quittin.8    Smokeless tobacco: Never   Vaping Use    Vaping Use: Never used   Substance and Sexual Activity    Alcohol use: Not Currently     Alcohol/week: 2.0 standard drinks     Types: 2 Shots of liquor per week     Comment: per pt on 2021\"quit drinking back Oct 2020\"use to drink wine coolers - 3 times per week \"/caffeine 2-3 coffees aday 1 pop b    Drug use: No    Sexual activity: Yes     Partners: Male   Other Topics Concern    Not on file   Social History Narrative    Not on file     Social Determinants of Health     Financial Resource Strain: Not on file   Food Insecurity: Not on file   Transportation Needs: Not on file   Physical Activity: Not on file   Stress: Not on file   Social Connections: Not on file   Intimate Partner Violence: Not on file   Housing Stability: Not on file     Current Facility-Administered Medications   Medication Dose Route Frequency Provider Last Rate Last Admin    ipratropium-albuterol (DUONEB) nebulizer solution 3 ampule  3 ampule Inhalation Once Basil Griffin PA-C        cefTRIAXone (ROCEPHIN) 1,000 mg in dextrose 5 % 50 mL IVPB mini-bag  1,000 mg IntraVENous Once Basil Griffin PA-C        azithromycin (ZITHROMAX) 500 mg in dextrose 5 % 250 mL IVPB ([id number])  500 mg IntraVENous Once Basil Griffin PA-C         Current Outpatient Medications   Medication Sig Dispense Refill    ipratropium-albuterol (DUONEB) 0.5-2.5 (3) MG/3ML SOLN nebulizer solution Take 3 mLs by nebulization 4 times daily 360 mL 3    hydrALAZINE (APRESOLINE) 50 MG tablet Take 1 tablet by mouth 3 times daily 180 tablet 5    budesonide-formoterol (SYMBICORT) 160-4.5 MCG/ACT AERO Inhale 2 puffs into the lungs 2 times daily 3 each 3    LAGEVRIO 200 MG capsule take 4 capsules by mouth every 12 hours for 5 days      predniSONE (DELTASONE) 10 MG tablet Prednisone 30 mg p.o. daily for 2 days then prednisone 20 mg p.o. daily for 2 days then prednisone 10 mg p.o. daily for 2 days 12 tablet 0    gabapentin (NEURONTIN) 300 MG capsule Take 400 mg by mouth 3 times daily.       methocarbamol (ROBAXIN) 500 MG tablet Take 750 mg by mouth in the morning and at bedtime      albuterol sulfate  (90 Base) MCG/ACT inhaler 01/29/2014 Albuterol HFA Inhaler 90 mcg/actuation  By inhalation 2.0 INHALATION(S) as directed  01/29/2014  active      MAGNESIUM-OXIDE 400 (241.3 Mg) MG TABS tablet       albuterol-ipratropium (COMBIVENT RESPIMAT)  MCG/ACT AERS inhaler Inhale 1 puff into the lungs every 4 hours as needed for Wheezing 3 each 3    pantoprazole (PROTONIX) 40 MG tablet Take 1 tablet by mouth every morning (before breakfast) 90 tablet 0    magnesium oxide (MAG-OX) 400 MG tablet Take 1 tablet by mouth daily 30 tablet 1    atorvastatin (LIPITOR) 80 MG tablet Take 80 mg by mouth daily      ondansetron (ZOFRAN ODT) 4 MG disintegrating tablet Take 1 tablet by mouth every 6 hours 20 tablet 0    diclofenac sodium (VOLTAREN) 1 % GEL Apply 4 g topically 4 times daily 350 g 2    acetaminophen (TYLENOL) 650 MG extended release tablet Take 650 mg by mouth as needed for Pain      vitamin D (CHOLECALCIFEROL) 25 MCG (1000 UT) TABS tablet Take 1,000 Units by mouth daily      ferrous sulfate (IRON 325) 325 (65 Fe) MG tablet Take 1 tablet by mouth every other day Indications: pt taking every day bid Monday, wed, fri (Patient taking differently: Take 325 mg by mouth 2 times daily Indications: pt taking every day bid) 30 tablet 3    dilTIAZem (CARDIZEM CD) 120 MG extended release capsule Take 1 capsule by mouth daily 90 capsule 3    metoprolol succinate (TOPROL XL) 25 MG extended release tablet Take 1 tablet by mouth daily 30 tablet 3    clonazePAM (KLONOPIN) 1 MG disintegrating tablet Take 0.5 mg by mouth 2 times daily as needed. lactobacillus (CULTURELLE) capsule Take 1 capsule by mouth daily (with breakfast) 30 capsule 0    theophylline (MIAH-24) 200 MG extended release capsule Take 400 mg by mouth daily      guaiFENesin (MUCINEX) 600 MG extended release tablet Take 1 tablet by mouth 2 times daily 30 tablet 0    montelukast (SINGULAIR) 10 MG tablet Take 1 tablet by mouth daily 30 tablet 3    levothyroxine (SYNTHROID) 100 MCG tablet Take 1 tablet by mouth Daily 30 tablet 2    folic acid (FOLVITE) 1 MG tablet Take 1 tablet by mouth daily 30 tablet 3    vitamin B-1 100 MG tablet Take 1 tablet by mouth daily 30 tablet 3    DULoxetine (CYMBALTA) 60 MG extended release capsule Take 60 mg by mouth daily       fluticasone (FLONASE) 50 MCG/ACT nasal spray 2 sprays by Nasal route daily 1 Bottle 0     Allergies   Allergen Reactions    Lisinopril Swelling and Rash     angioedema       Nursing Notes Reviewed  PHYSICAL EXAM    VITAL SIGNS: BP (!) 168/71   Pulse 97   Temp 98.6 °F (37 °C) (Oral)   Resp 20   LMP 01/05/2010   SpO2 99%    Constitutional:  Well developed, appears older than stated age, moderate acute distress. Head:  Normocephalic, Atraumatic  Eyes:  EOMI. Sclera clear. Conjunctiva normal, No discharge. Neck/Lymphatics: Supple, no JVD,    Cardiovascular:  RRR, Normal S1 & S2  No murmurs/gallops/rubs  Peripheral Vascular: Distal pulses 2+, Capillary refill <2seconds  Respiratory:  Respirations moderately labored, 24 breaths/min, speaking 2-3 word sentences. 99% on 4 L. Very tight poor air exchange bilaterally, minimal expiratory wheezing audible in the bibasilar regions. No retractions accessory muscle use. No stridor    Abdomen: Bowel sounds normal in all quadrants, Soft, Non tender/Nondistended, No palpable abdominal masses. Musculoskeletal: BUE/BLE symmetrical without atrophy or deformities. Trace pitting edema in the lower legs.   Calves are supple nontender  Integument:  Warm, Dry, Intact, Skin turgor and texture normal  Neurologic:  Alert & oriented x3 , No focal deficits noted. Cranial nerves II through XII grossly intact. No slurred speech. No facial droop. Normal gross motor coordination & motor strength bilateral upper and lower extremities.    Psychiatric:  Affect appropriate      I have reviewed and interpreted all of the currently available lab results from this visit (if applicable):  Results for orders placed or performed during the hospital encounter of 11/05/22   CBC with Auto Differential   Result Value Ref Range    WBC 23.0 (H) 4.0 - 10.5 K/CU MM    RBC 3.94 (L) 4.2 - 5.4 M/CU MM    Hemoglobin 10.7 (L) 12.5 - 16.0 GM/DL    Hematocrit 37.2 37 - 47 %    MCV 94.4 78 - 100 FL    MCH 27.2 27 - 31 PG    MCHC 28.8 (L) 32.0 - 36.0 %    RDW 13.7 11.7 - 14.9 %    Platelets 109 232 - 167 K/CU MM    MPV 10.3 7.5 - 11.1 FL    Differential Type AUTOMATED DIFFERENTIAL     Segs Relative 88.7 (H) 36 - 66 %    Lymphocytes % 3.8 (L) 24 - 44 %    Monocytes % 6.2 (H) 0 - 4 %    Eosinophils % 0.3 0 - 3 %    Basophils % 0.3 0 - 1 %    Segs Absolute 20.4 K/CU MM    Lymphocytes Absolute 0.9 K/CU MM    Monocytes Absolute 1.4 K/CU MM    Eosinophils Absolute 0.1 K/CU MM    Basophils Absolute 0.1 K/CU MM    Nucleated RBC % 0.0 %    Total Nucleated RBC 0.0 K/CU MM    Total Immature Neutrophil 0.15 K/CU MM    Immature Neutrophil % 0.7 (H) 0 - 0.43 %   Blood Gas, Venous   Result Value Ref Range    pH, [place]her 7.28 (L) 7.32 - 7.42    pCO2, [place]her 94 (H) 38 - 52 mmHG    pO2, [place]her 144 (H) 28 - 48 mmHG    Base Exc, Mixed 13.1 (H) 0 - 2.3    HCO3, Venous 44.2 (H) 19 - 25 MMOL/L    O2 Sat, Topher 94.9 (H) 50 - 70 %    Comment VBG    EKG 12 Lead   Result Value Ref Range    Ventricular Rate 94 BPM    Atrial Rate 94 BPM    P-R Interval 138 ms    QRS Duration 144 ms    Q-T Interval 398 ms    QTc Calculation (Bazett) 497 ms    P Axis 83 degrees    R Axis 91 degrees    T Axis 58 degrees    Diagnosis       Normal sinus rhythm  Right bundle branch block  Abnormal ECG  When compared with ECG of 14-AUG-2022 21:13,  No significant change was found          Radiographs (if obtained):  [] The following radiograph was interpreted by myself in the absence of a radiologist:   [] Radiologist's Report Reviewed:  XR CHEST PORTABLE    (Results Pending)       EKG Interpretation  Please see ED physician's note - Dr. Meghan Hale - for EKG interpretation        Chart review shows recent radiographs:  NIKI ISABELL DIGITAL SCREEN SELF REFERRAL W OR WO CAD BILATERAL    Result Date: 10/28/2022  EXAMINATION: SCREENING DIGITAL BILATERAL MAMMOGRAM WITH TOMOSYNTHESIS, 10/27/2022 2:11 pm TECHNIQUE: Screening mammography of the bilateral breasts was performed with tomosynthesis. 2D standard and 3D tomosynthesis combination imaging performed through both breasts in the MLO and CC projection. Computer aided detection was utilized in the interpretation of this exam. COMPARISON: 10/23/2019 HISTORY: Screening. FINDINGS: There are scattered areas of fibroglandular density. There is no new dominant mass, suspicious microcalcification, or area of architectural distortion. No mammographic evidence for malignancy. BIRADS: BIRADS - CATEGORY 1 Negative, no evidence of malignancy. Normal interval follow-up is recommended in 12 months. OVERALL ASSESSMENT - NEGATIVE A letter of notification will be sent to the patient regarding the results. The [employer] [facility] [employer] recommends annual mammograms for women 40 years and older. ED COURSE & MEDICAL DECISION MAKING       Vital signs and nursing notes reviewed during ED course. I have independently evaluated this patient . Supervising physician - Dr. Meghan Hale - was present in ED and available for consultation throughout entirety of patient's care. All pertinent Lab data and radiographic results reviewed with patient at bedside.        The patient and/or the family were informed of the results of any tests/labs/imaging, the treatment plan, and time was allotted to answer questions. Clinical Impression:  1. Acute on chronic respiratory failure with hypercapnia (HCC)    2. Leukocytosis, unspecified type        Patient presents for acute on chronic shortness of breath. On exam, fatigued ill-appearing 79-year-old female, appears older than stated age, 99% on 4 L nasal cannula which is increased from her baseline ox requirement. Moderate increased work of breathing, speaking 3-4 word sentences. Very tight poor air exchange with very minimal expiratory wheezing audible in the bibasilar regions. Trace pitting edema in the lower legs. Symmetrical pulses in the upper and lower extremities. Abdomen soft nontender. Patient placed onto telemetry and continuous pulse ox monitoring. Ordered duo nebs x3 as well as IV Solu-Medrol. CBC shows leukocytosis of 23, hemoglobin 10.7, noted left shift. Venous blood gas showed pH acidotic 7.28 with PCO2 of 94. At this time, did add on blood cultures, lactic acid. Did contact our team patient was started on BiPAP for acute on chronic respiratory failure with hypercapnia. Anticipate admission following completion of labs and imaging studies. Did start empirically on IV Rocephin/azithromycin given her elevated white count and green sputum production    11/5/2022 1900 PM EDT I have signed out Liana Torres's Emergency Department care to PRIMO Fortune PA-C. We discussed the history, physical exam, completed/pending test results (if obtained) and current treatment plan. Please refer to his/her chart for the patients further details, remaining Emergency Department course, final disposition and diagnosis. CRITICAL CARE NOTE:  There was a high probability of clinically significant life-threatening deterioration of the patient's condition requiring my urgent intervention due to acute respiratory failure, hypercapnia.   Telemetry, continuous pulse ox monitoring, initiation of BiPAP, IV steroids, duo nebs, lab work, chart review was performed to address this. Total critical care time is at least  17 minutes. This includes vital sign monitoring, pulse oximetry monitoring, telemetry monitoring, clinical response to the IV medications, reviewing the nursing notes, consultation time, dictation/documentation time, and interpretation of the lab work. This time excludes time spent performing procedures and separately billable procedures and family discussion time. Disposition referral (if applicable):  No follow-up provider specified.     Disposition medications (if applicable):  New Prescriptions    No medications on file         (Please note that portions of this note may have been completed with a voice recognition program. Efforts were made to edit the dictations but occasionally words are mis-transcribed.)          Luiz Loco PA-C  11/05/22 [id number]

## 2022-11-05 NOTE — ED PROVIDER NOTES
ED attending EKG interpretation (I otherwise did not participate in the care of this patient)    EKG Interpretation  Interpreted by me  Compared to 8/14/2022  Rhythm: normal sinus   Rate: normal 94  Axis: normal  Ectopy: none  Conduction: Chronic right bundle branch block  ST Segments: no acute change  T Waves: no acute change  Clinical Impression: normal sinus rhythm, chronic right bundle branch block     Sherryle Rimes, MD  11/05/22 0811

## 2022-11-06 ENCOUNTER — APPOINTMENT (OUTPATIENT)
Dept: CT IMAGING | Age: 62
DRG: 871 | End: 2022-11-06
Payer: COMMERCIAL

## 2022-11-06 LAB
ALBUMIN SERPL-MCNC: 4.1 GM/DL (ref 3.4–5)
ALP BLD-CCNC: 101 IU/L (ref 40–129)
ALT SERPL-CCNC: 17 U/L (ref 10–40)
ANION GAP SERPL CALCULATED.3IONS-SCNC: 9 MMOL/L (ref 4–16)
AST SERPL-CCNC: 19 IU/L (ref 15–37)
BASE EXCESS MIXED: 16.4 (ref 0–2.3)
BASOPHILS ABSOLUTE: 0 K/CU MM
BASOPHILS RELATIVE PERCENT: 0.1 % (ref 0–1)
BILIRUB SERPL-MCNC: 0.2 MG/DL (ref 0–1)
BUN BLDV-MCNC: 8 MG/DL (ref 6–23)
CALCIUM SERPL-MCNC: 10.1 MG/DL (ref 8.3–10.6)
CARBON MONOXIDE, BLOOD: 1.5 % (ref 0–5)
CHLORIDE BLD-SCNC: 92 MMOL/L (ref 99–110)
CO2 CONTENT: 49 MMOL/L (ref 19–24)
CO2: 37 MMOL/L (ref 21–32)
COMMENT: ABNORMAL
CREAT SERPL-MCNC: 0.5 MG/DL (ref 0.6–1.1)
DIFFERENTIAL TYPE: ABNORMAL
EOSINOPHILS ABSOLUTE: 0 K/CU MM
EOSINOPHILS RELATIVE PERCENT: 0 % (ref 0–3)
GFR SERPL CREATININE-BSD FRML MDRD: >60 ML/MIN/1.73M2
GLUCOSE BLD-MCNC: 124 MG/DL (ref 70–99)
HCO3 ARTERIAL: 46.4 MMOL/L (ref 18–23)
HCT VFR BLD CALC: 35.9 % (ref 37–47)
HEMOGLOBIN: 10.4 GM/DL (ref 12.5–16)
IMMATURE NEUTROPHIL %: 0.7 % (ref 0–0.43)
LEGIONELLA URINARY AG: NEGATIVE
LYMPHOCYTES ABSOLUTE: 0.5 K/CU MM
LYMPHOCYTES RELATIVE PERCENT: 2.1 % (ref 24–44)
MCH RBC QN AUTO: 26.8 PG (ref 27–31)
MCHC RBC AUTO-ENTMCNC: 29 % (ref 32–36)
MCV RBC AUTO: 92.5 FL (ref 78–100)
METHEMOGLOBIN ARTERIAL: 1.2 %
MONOCYTES ABSOLUTE: 0.5 K/CU MM
MONOCYTES RELATIVE PERCENT: 2.1 % (ref 0–4)
NUCLEATED RBC %: 0 %
O2 SATURATION: 93.9 % (ref 96–97)
PCO2 ARTERIAL: 84 MMHG (ref 32–45)
PDW BLD-RTO: 13.7 % (ref 11.7–14.9)
PH BLOOD: 7.35 (ref 7.34–7.45)
PLATELET # BLD: 203 K/CU MM (ref 140–440)
PMV BLD AUTO: 10.2 FL (ref 7.5–11.1)
PO2 ARTERIAL: 72 MMHG (ref 75–100)
POTASSIUM SERPL-SCNC: 4.3 MMOL/L (ref 3.5–5.1)
PROCALCITONIN: 0.11
RBC # BLD: 3.88 M/CU MM (ref 4.2–5.4)
SEGMENTED NEUTROPHILS ABSOLUTE COUNT: 21 K/CU MM
SEGMENTED NEUTROPHILS RELATIVE PERCENT: 95 % (ref 36–66)
SODIUM BLD-SCNC: 138 MMOL/L (ref 135–145)
STREP PNEUMONIAE ANTIGEN: NORMAL
TOTAL IMMATURE NEUTOROPHIL: 0.15 K/CU MM
TOTAL NUCLEATED RBC: 0 K/CU MM
TOTAL PROTEIN: 6.5 GM/DL (ref 6.4–8.2)
TROPONIN T: <0.01 NG/ML
WBC # BLD: 22.1 K/CU MM (ref 4–10.5)

## 2022-11-06 PROCEDURE — 6370000000 HC RX 637 (ALT 250 FOR IP): Performed by: SURGERY

## 2022-11-06 PROCEDURE — 94640 AIRWAY INHALATION TREATMENT: CPT

## 2022-11-06 PROCEDURE — 84484 ASSAY OF TROPONIN QUANT: CPT

## 2022-11-06 PROCEDURE — 2580000003 HC RX 258: Performed by: SURGERY

## 2022-11-06 PROCEDURE — 6370000000 HC RX 637 (ALT 250 FOR IP): Performed by: STUDENT IN AN ORGANIZED HEALTH CARE EDUCATION/TRAINING PROGRAM

## 2022-11-06 PROCEDURE — 2700000000 HC OXYGEN THERAPY PER DAY

## 2022-11-06 PROCEDURE — 87449 NOS EACH ORGANISM AG IA: CPT

## 2022-11-06 PROCEDURE — 94660 CPAP INITIATION&MGMT: CPT

## 2022-11-06 PROCEDURE — 36415 COLL VENOUS BLD VENIPUNCTURE: CPT

## 2022-11-06 PROCEDURE — 36600 WITHDRAWAL OF ARTERIAL BLOOD: CPT

## 2022-11-06 PROCEDURE — 87899 AGENT NOS ASSAY W/OPTIC: CPT

## 2022-11-06 PROCEDURE — 2140000000 HC CCU INTERMEDIATE R&B

## 2022-11-06 PROCEDURE — 82805 BLOOD GASES W/O2 SATURATION: CPT

## 2022-11-06 PROCEDURE — 6360000002 HC RX W HCPCS: Performed by: INTERNAL MEDICINE

## 2022-11-06 PROCEDURE — 84145 PROCALCITONIN (PCT): CPT

## 2022-11-06 PROCEDURE — 94761 N-INVAS EAR/PLS OXIMETRY MLT: CPT

## 2022-11-06 PROCEDURE — 6370000000 HC RX 637 (ALT 250 FOR IP): Performed by: INTERNAL MEDICINE

## 2022-11-06 PROCEDURE — 2580000003 HC RX 258: Performed by: INTERNAL MEDICINE

## 2022-11-06 PROCEDURE — 80053 COMPREHEN METABOLIC PANEL: CPT

## 2022-11-06 PROCEDURE — 71275 CT ANGIOGRAPHY CHEST: CPT

## 2022-11-06 PROCEDURE — 82803 BLOOD GASES ANY COMBINATION: CPT

## 2022-11-06 PROCEDURE — 6370000000 HC RX 637 (ALT 250 FOR IP): Performed by: FAMILY MEDICINE

## 2022-11-06 PROCEDURE — 6360000002 HC RX W HCPCS: Performed by: SURGERY

## 2022-11-06 PROCEDURE — 6360000004 HC RX CONTRAST MEDICATION: Performed by: PHYSICIAN ASSISTANT

## 2022-11-06 PROCEDURE — 85025 COMPLETE CBC W/AUTO DIFF WBC: CPT

## 2022-11-06 RX ORDER — CLONAZEPAM 0.5 MG/1
0.5 TABLET ORAL 2 TIMES DAILY PRN
Status: DISCONTINUED | OUTPATIENT
Start: 2022-11-06 | End: 2022-11-11 | Stop reason: HOSPADM

## 2022-11-06 RX ORDER — ACETAMINOPHEN 650 MG/1
650 SUPPOSITORY RECTAL EVERY 6 HOURS PRN
Status: DISCONTINUED | OUTPATIENT
Start: 2022-11-06 | End: 2022-11-11 | Stop reason: HOSPADM

## 2022-11-06 RX ORDER — BUDESONIDE AND FORMOTEROL FUMARATE DIHYDRATE 160; 4.5 UG/1; UG/1
2 AEROSOL RESPIRATORY (INHALATION) 2 TIMES DAILY
Status: DISCONTINUED | OUTPATIENT
Start: 2022-11-06 | End: 2022-11-11 | Stop reason: HOSPADM

## 2022-11-06 RX ORDER — DILTIAZEM HYDROCHLORIDE 120 MG/1
120 CAPSULE, COATED, EXTENDED RELEASE ORAL DAILY
Status: DISCONTINUED | OUTPATIENT
Start: 2022-11-06 | End: 2022-11-06

## 2022-11-06 RX ORDER — DULOXETIN HYDROCHLORIDE 30 MG/1
60 CAPSULE, DELAYED RELEASE ORAL DAILY
Status: DISCONTINUED | OUTPATIENT
Start: 2022-11-06 | End: 2022-11-11 | Stop reason: HOSPADM

## 2022-11-06 RX ORDER — METOPROLOL SUCCINATE 25 MG/1
25 TABLET, EXTENDED RELEASE ORAL DAILY
Status: DISCONTINUED | OUTPATIENT
Start: 2022-11-06 | End: 2022-11-07

## 2022-11-06 RX ORDER — SODIUM CHLORIDE 0.9 % (FLUSH) 0.9 %
5-40 SYRINGE (ML) INJECTION EVERY 12 HOURS SCHEDULED
Status: DISCONTINUED | OUTPATIENT
Start: 2022-11-06 | End: 2022-11-11 | Stop reason: HOSPADM

## 2022-11-06 RX ORDER — ONDANSETRON 2 MG/ML
4 INJECTION INTRAMUSCULAR; INTRAVENOUS EVERY 6 HOURS PRN
Status: DISCONTINUED | OUTPATIENT
Start: 2022-11-06 | End: 2022-11-11 | Stop reason: HOSPADM

## 2022-11-06 RX ORDER — METHYLPREDNISOLONE SODIUM SUCCINATE 40 MG/ML
40 INJECTION, POWDER, LYOPHILIZED, FOR SOLUTION INTRAMUSCULAR; INTRAVENOUS EVERY 6 HOURS
Status: DISCONTINUED | OUTPATIENT
Start: 2022-11-06 | End: 2022-11-08

## 2022-11-06 RX ORDER — SODIUM CHLORIDE 9 MG/ML
INJECTION, SOLUTION INTRAVENOUS PRN
Status: DISCONTINUED | OUTPATIENT
Start: 2022-11-06 | End: 2022-11-11 | Stop reason: HOSPADM

## 2022-11-06 RX ORDER — PREDNISONE 20 MG/1
40 TABLET ORAL DAILY
Status: DISCONTINUED | OUTPATIENT
Start: 2022-11-06 | End: 2022-11-06

## 2022-11-06 RX ORDER — SODIUM CHLORIDE 0.9 % (FLUSH) 0.9 %
5-40 SYRINGE (ML) INJECTION PRN
Status: DISCONTINUED | OUTPATIENT
Start: 2022-11-06 | End: 2022-11-11 | Stop reason: HOSPADM

## 2022-11-06 RX ORDER — ROFLUMILAST 500 UG/1
500 TABLET ORAL DAILY
Status: DISCONTINUED | OUTPATIENT
Start: 2022-11-06 | End: 2022-11-11 | Stop reason: HOSPADM

## 2022-11-06 RX ORDER — LANOLIN ALCOHOL/MO/W.PET/CERES
400 CREAM (GRAM) TOPICAL DAILY
Status: DISCONTINUED | OUTPATIENT
Start: 2022-11-06 | End: 2022-11-11

## 2022-11-06 RX ORDER — GUAIFENESIN/DEXTROMETHORPHAN 100-10MG/5
5 SYRUP ORAL EVERY 6 HOURS PRN
Status: DISCONTINUED | OUTPATIENT
Start: 2022-11-06 | End: 2022-11-11 | Stop reason: HOSPADM

## 2022-11-06 RX ORDER — METHOCARBAMOL 500 MG/1
750 TABLET, FILM COATED ORAL 2 TIMES DAILY
Status: DISCONTINUED | OUTPATIENT
Start: 2022-11-06 | End: 2022-11-11 | Stop reason: HOSPADM

## 2022-11-06 RX ORDER — MONTELUKAST SODIUM 10 MG/1
10 TABLET ORAL DAILY
Status: DISCONTINUED | OUTPATIENT
Start: 2022-11-06 | End: 2022-11-11 | Stop reason: HOSPADM

## 2022-11-06 RX ORDER — ACETAMINOPHEN 325 MG/1
650 TABLET ORAL EVERY 6 HOURS PRN
Status: DISCONTINUED | OUTPATIENT
Start: 2022-11-06 | End: 2022-11-11 | Stop reason: HOSPADM

## 2022-11-06 RX ORDER — ATORVASTATIN CALCIUM 40 MG/1
80 TABLET, FILM COATED ORAL DAILY
Status: DISCONTINUED | OUTPATIENT
Start: 2022-11-06 | End: 2022-11-11 | Stop reason: HOSPADM

## 2022-11-06 RX ORDER — DILTIAZEM HYDROCHLORIDE 240 MG/1
240 CAPSULE, COATED, EXTENDED RELEASE ORAL DAILY
Status: DISCONTINUED | OUTPATIENT
Start: 2022-11-06 | End: 2022-11-08

## 2022-11-06 RX ORDER — VITAMIN B COMPLEX
1000 TABLET ORAL DAILY
Status: DISCONTINUED | OUTPATIENT
Start: 2022-11-06 | End: 2022-11-11 | Stop reason: HOSPADM

## 2022-11-06 RX ORDER — LACTOBACILLUS RHAMNOSUS GG 10B CELL
1 CAPSULE ORAL
Status: DISCONTINUED | OUTPATIENT
Start: 2022-11-06 | End: 2022-11-11 | Stop reason: HOSPADM

## 2022-11-06 RX ORDER — HYDRALAZINE HYDROCHLORIDE 50 MG/1
50 TABLET, FILM COATED ORAL 3 TIMES DAILY
Status: DISCONTINUED | OUTPATIENT
Start: 2022-11-06 | End: 2022-11-11 | Stop reason: HOSPADM

## 2022-11-06 RX ORDER — OXYCODONE HYDROCHLORIDE AND ACETAMINOPHEN 5; 325 MG/1; MG/1
1 TABLET ORAL EVERY 8 HOURS PRN
Status: DISCONTINUED | OUTPATIENT
Start: 2022-11-06 | End: 2022-11-11 | Stop reason: HOSPADM

## 2022-11-06 RX ORDER — ONDANSETRON 4 MG/1
4 TABLET, ORALLY DISINTEGRATING ORAL EVERY 8 HOURS PRN
Status: DISCONTINUED | OUTPATIENT
Start: 2022-11-06 | End: 2022-11-11 | Stop reason: HOSPADM

## 2022-11-06 RX ORDER — FOLIC ACID 1 MG/1
1 TABLET ORAL DAILY
Status: DISCONTINUED | OUTPATIENT
Start: 2022-11-06 | End: 2022-11-11 | Stop reason: HOSPADM

## 2022-11-06 RX ORDER — LANOLIN ALCOHOL/MO/W.PET/CERES
100 CREAM (GRAM) TOPICAL DAILY
Status: DISCONTINUED | OUTPATIENT
Start: 2022-11-06 | End: 2022-11-11 | Stop reason: HOSPADM

## 2022-11-06 RX ORDER — POLYETHYLENE GLYCOL 3350 17 G/17G
17 POWDER, FOR SOLUTION ORAL DAILY PRN
Status: DISCONTINUED | OUTPATIENT
Start: 2022-11-06 | End: 2022-11-11 | Stop reason: HOSPADM

## 2022-11-06 RX ORDER — AZITHROMYCIN 250 MG/1
500 TABLET, FILM COATED ORAL DAILY
Status: COMPLETED | OUTPATIENT
Start: 2022-11-06 | End: 2022-11-10

## 2022-11-06 RX ORDER — GUAIFENESIN 600 MG/1
600 TABLET, EXTENDED RELEASE ORAL 2 TIMES DAILY
Status: DISCONTINUED | OUTPATIENT
Start: 2022-11-06 | End: 2022-11-06

## 2022-11-06 RX ORDER — ENOXAPARIN SODIUM 100 MG/ML
40 INJECTION SUBCUTANEOUS DAILY
Status: DISCONTINUED | OUTPATIENT
Start: 2022-11-06 | End: 2022-11-11 | Stop reason: HOSPADM

## 2022-11-06 RX ORDER — IPRATROPIUM BROMIDE AND ALBUTEROL SULFATE 2.5; .5 MG/3ML; MG/3ML
1 SOLUTION RESPIRATORY (INHALATION)
Status: DISCONTINUED | OUTPATIENT
Start: 2022-11-06 | End: 2022-11-06

## 2022-11-06 RX ORDER — IPRATROPIUM BROMIDE AND ALBUTEROL SULFATE 2.5; .5 MG/3ML; MG/3ML
1 SOLUTION RESPIRATORY (INHALATION)
Status: DISCONTINUED | OUTPATIENT
Start: 2022-11-06 | End: 2022-11-11 | Stop reason: HOSPADM

## 2022-11-06 RX ORDER — LEVOTHYROXINE SODIUM 0.1 MG/1
100 TABLET ORAL DAILY
Status: DISCONTINUED | OUTPATIENT
Start: 2022-11-06 | End: 2022-11-11 | Stop reason: HOSPADM

## 2022-11-06 RX ORDER — GABAPENTIN 400 MG/1
400 CAPSULE ORAL 3 TIMES DAILY
Status: DISCONTINUED | OUTPATIENT
Start: 2022-11-06 | End: 2022-11-11 | Stop reason: HOSPADM

## 2022-11-06 RX ORDER — DILTIAZEM HYDROCHLORIDE 240 MG/1
240 CAPSULE, COATED, EXTENDED RELEASE ORAL DAILY
Status: ON HOLD | COMMUNITY
Start: 2022-10-24 | End: 2022-11-11 | Stop reason: HOSPADM

## 2022-11-06 RX ADMIN — DULOXETINE 60 MG: 30 CAPSULE, DELAYED RELEASE ORAL at 09:02

## 2022-11-06 RX ADMIN — IPRATROPIUM BROMIDE AND ALBUTEROL SULFATE 1 AMPULE: .5; 2.5 SOLUTION RESPIRATORY (INHALATION) at 20:01

## 2022-11-06 RX ADMIN — Medication 1000 UNITS: at 09:01

## 2022-11-06 RX ADMIN — LEVOTHYROXINE SODIUM 100 MCG: 0.1 TABLET ORAL at 06:22

## 2022-11-06 RX ADMIN — SODIUM CHLORIDE, PRESERVATIVE FREE 10 ML: 5 INJECTION INTRAVENOUS at 20:36

## 2022-11-06 RX ADMIN — METHYLPREDNISOLONE SODIUM SUCCINATE 40 MG: 40 INJECTION, POWDER, FOR SOLUTION INTRAMUSCULAR; INTRAVENOUS at 23:30

## 2022-11-06 RX ADMIN — AZITHROMYCIN MONOHYDRATE 500 MG: 250 TABLET ORAL at 20:35

## 2022-11-06 RX ADMIN — GABAPENTIN 400 MG: 400 CAPSULE ORAL at 09:01

## 2022-11-06 RX ADMIN — Medication 100 MG: at 09:01

## 2022-11-06 RX ADMIN — METHOCARBAMOL TABLETS 750 MG: 500 TABLET, COATED ORAL at 20:34

## 2022-11-06 RX ADMIN — METHOCARBAMOL TABLETS 750 MG: 500 TABLET, COATED ORAL at 09:02

## 2022-11-06 RX ADMIN — MAGNESIUM OXIDE TAB 400 MG (241.3 MG ELEMENTAL MG) 400 MG: 400 (241.3 MG) TAB at 09:02

## 2022-11-06 RX ADMIN — ATORVASTATIN CALCIUM 80 MG: 40 TABLET, FILM COATED ORAL at 09:01

## 2022-11-06 RX ADMIN — SODIUM CHLORIDE, PRESERVATIVE FREE 10 ML: 5 INJECTION INTRAVENOUS at 09:03

## 2022-11-06 RX ADMIN — ROFLUMILAST 500 MCG: 500 TABLET ORAL at 09:00

## 2022-11-06 RX ADMIN — METOPROLOL SUCCINATE 25 MG: 25 TABLET, FILM COATED, EXTENDED RELEASE ORAL at 09:01

## 2022-11-06 RX ADMIN — OXYCODONE AND ACETAMINOPHEN 1 TABLET: 5; 325 TABLET ORAL at 20:35

## 2022-11-06 RX ADMIN — HYDRALAZINE HYDROCHLORIDE 50 MG: 50 TABLET, FILM COATED ORAL at 20:34

## 2022-11-06 RX ADMIN — CLONAZEPAM 0.5 MG: 0.5 TABLET ORAL at 20:34

## 2022-11-06 RX ADMIN — METHYLPREDNISOLONE SODIUM SUCCINATE 40 MG: 40 INJECTION, POWDER, FOR SOLUTION INTRAMUSCULAR; INTRAVENOUS at 17:41

## 2022-11-06 RX ADMIN — MONTELUKAST 10 MG: 10 TABLET, FILM COATED ORAL at 09:02

## 2022-11-06 RX ADMIN — DILTIAZEM HYDROCHLORIDE 240 MG: 240 CAPSULE, COATED, EXTENDED RELEASE ORAL at 09:01

## 2022-11-06 RX ADMIN — BUDESONIDE AND FORMOTEROL FUMARATE DIHYDRATE 2 PUFF: 160; 4.5 AEROSOL RESPIRATORY (INHALATION) at 20:01

## 2022-11-06 RX ADMIN — IOPAMIDOL 75 ML: 755 INJECTION, SOLUTION INTRAVENOUS at 01:09

## 2022-11-06 RX ADMIN — GUAIFENESIN AND DEXTROMETHORPHAN 5 ML: 100; 10 SYRUP ORAL at 17:42

## 2022-11-06 RX ADMIN — IPRATROPIUM BROMIDE AND ALBUTEROL SULFATE 1 AMPULE: .5; 2.5 SOLUTION RESPIRATORY (INHALATION) at 07:21

## 2022-11-06 RX ADMIN — METHYLPREDNISOLONE SODIUM SUCCINATE 40 MG: 40 INJECTION, POWDER, FOR SOLUTION INTRAMUSCULAR; INTRAVENOUS at 12:40

## 2022-11-06 RX ADMIN — CEFTRIAXONE SODIUM 1000 MG: 1 INJECTION, POWDER, FOR SOLUTION INTRAMUSCULAR; INTRAVENOUS at 12:58

## 2022-11-06 RX ADMIN — IPRATROPIUM BROMIDE AND ALBUTEROL SULFATE 1 AMPULE: .5; 2.5 SOLUTION RESPIRATORY (INHALATION) at 15:28

## 2022-11-06 RX ADMIN — Medication 1 CAPSULE: at 09:01

## 2022-11-06 RX ADMIN — IPRATROPIUM BROMIDE AND ALBUTEROL SULFATE 1 AMPULE: .5; 2.5 SOLUTION RESPIRATORY (INHALATION) at 11:14

## 2022-11-06 RX ADMIN — PREDNISONE 40 MG: 20 TABLET ORAL at 09:01

## 2022-11-06 RX ADMIN — BUDESONIDE AND FORMOTEROL FUMARATE DIHYDRATE 2 PUFF: 160; 4.5 AEROSOL RESPIRATORY (INHALATION) at 11:27

## 2022-11-06 RX ADMIN — HYDRALAZINE HYDROCHLORIDE 50 MG: 50 TABLET, FILM COATED ORAL at 09:01

## 2022-11-06 RX ADMIN — GUAIFENESIN 600 MG: 600 TABLET, EXTENDED RELEASE ORAL at 09:01

## 2022-11-06 RX ADMIN — OXYCODONE AND ACETAMINOPHEN 1 TABLET: 5; 325 TABLET ORAL at 12:40

## 2022-11-06 RX ADMIN — FOLIC ACID 1 MG: 1 TABLET ORAL at 09:02

## 2022-11-06 RX ADMIN — GABAPENTIN 400 MG: 400 CAPSULE ORAL at 17:42

## 2022-11-06 RX ADMIN — ENOXAPARIN SODIUM 40 MG: 100 INJECTION SUBCUTANEOUS at 09:02

## 2022-11-06 RX ADMIN — HYDRALAZINE HYDROCHLORIDE 50 MG: 50 TABLET, FILM COATED ORAL at 12:40

## 2022-11-06 ASSESSMENT — PAIN DESCRIPTION - DESCRIPTORS
DESCRIPTORS: ACHING
DESCRIPTORS: ACHING
DESCRIPTORS: THROBBING
DESCRIPTORS: ACHING

## 2022-11-06 ASSESSMENT — PAIN DESCRIPTION - FREQUENCY
FREQUENCY: CONTINUOUS

## 2022-11-06 ASSESSMENT — PAIN DESCRIPTION - ORIENTATION: ORIENTATION: OTHER (COMMENT)

## 2022-11-06 ASSESSMENT — PAIN SCALES - GENERAL
PAINLEVEL_OUTOF10: 3
PAINLEVEL_OUTOF10: 7
PAINLEVEL_OUTOF10: 8
PAINLEVEL_OUTOF10: 7
PAINLEVEL_OUTOF10: 3
PAINLEVEL_OUTOF10: 7

## 2022-11-06 ASSESSMENT — PAIN DESCRIPTION - ONSET
ONSET: GRADUAL
ONSET: ON-GOING
ONSET: ON-GOING

## 2022-11-06 ASSESSMENT — PAIN - FUNCTIONAL ASSESSMENT
PAIN_FUNCTIONAL_ASSESSMENT: PREVENTS OR INTERFERES SOME ACTIVE ACTIVITIES AND ADLS
PAIN_FUNCTIONAL_ASSESSMENT: ACTIVITIES ARE NOT PREVENTED
PAIN_FUNCTIONAL_ASSESSMENT: PREVENTS OR INTERFERES SOME ACTIVE ACTIVITIES AND ADLS

## 2022-11-06 ASSESSMENT — PAIN DESCRIPTION - LOCATION
LOCATION: BACK;HIP
LOCATION: HEAD
LOCATION: BACK;HIP;HEAD
LOCATION: HEAD

## 2022-11-06 ASSESSMENT — PAIN DESCRIPTION - PAIN TYPE
TYPE: ACUTE PAIN

## 2022-11-06 NOTE — PROGRESS NOTES
4 Eyes Skin Assessment     NAME:  Alcira Stubbs  YOB: 1960  MEDICAL RECORD NUMBER:  [id number]    The patient is being assess for  Transfer to New Unit    I agree that 2 RN's have performed a thorough Head to Toe Skin Assessment on the patient. ALL assessment sites listed below have been assessed. Areas assessed by both nurses:    Head, Face, Ears, Shoulders, Back, Chest, Arms, Elbows, Hands, Sacrum. Buttock, Coccyx, Ischium, and Legs. Feet and Heels        Does the Patient have a Wound?  No noted wound(s)       Dom Prevention initiated:  Yes   Wound Care Orders initiated:  NA    Pressure Injury (Stage 3,4, Unstageable, DTI, NWPT, and Complex wounds) if present place referral/consult order under [de-identified] NA    New and Established Ostomies if present place consult order under : NA      Nurse 1 eSignature: Electronically signed by Destinee Pedro RN on 11/6/22 at 4:03 PM EST    **SHARE this note so that the co-signing nurse is able to place an eSignature**    Nurse 2 eSignature: Electronically signed by Marlena Santiago RN on 11/6/22 at 4:04 PM EST

## 2022-11-06 NOTE — ED NOTES
ED TO INPATIENT SBAR HANDOFF    Patient Name: Unknown Shabbir   :  1960  63 y.o. MRN:  [id number]  Preferred Name     ED Room #:  ED33/ED-33  Family/Caregiver Present no   Restraints no   Sitter no   Sepsis Risk Score Sepsis Risk Score: 7.46    Situation  Code Status: Prior No additional code details. Allergies: Lisinopril  Weight: No data found. Arrived from: home  Chief Complaint:   Chief Complaint   Patient presents with    Shortness of [place] Problem/Diagnosis:  Active Problems:    * No active hospital problems. *  Resolved Problems:    * No resolved hospital problems. *    Imaging:   CTA PULMONARY W CONTRAST   Preliminary Result   1. No pulmonary embolus. 2. Interval appearance of patchy irregular subsolid nodular opacities   bilaterally, right greater than left, and right base opacities. Findings are   most compatible with infection and appear new since 2022. Recommend   treating the patient and following to resolution with repeat imaging in 6-8   weeks. 3. Emphysema. 4. Prominent right hilar lymph nodes, likely reactive. XR CHEST PORTABLE   Final Result   Pulmonary vascular congestion. Small bilateral pleural effusions.            Abnormal labs:   Abnormal Labs Reviewed   CBC WITH AUTO DIFFERENTIAL - Abnormal; Notable for the following components:       Result Value    WBC 23.0 (*)     RBC 3.94 (*)     Hemoglobin 10.7 (*)     MCHC 28.8 (*)     Segs Relative 88.7 (*)     Lymphocytes % 3.8 (*)     Monocytes % 6.2 (*)     Immature Neutrophil % 0.7 (*)     All other components within normal limits   COMPREHENSIVE METABOLIC PANEL - Abnormal; Notable for the following components:    Chloride 92 (*)     CO2 37 (*)     Glucose 138 (*)     All other components within normal limits   TROPONIN - Abnormal; Notable for the following components:    Troponin T 0.012 (*)     All other components within normal limits   BRAIN NATRIURETIC PEPTIDE - Abnormal; Notable for the Resp: 23 28 21 21   Temp:  98.4 °F (36.9 °C) 98.4 °F (36.9 °C)    TempSrc:  Oral Oral    SpO2: 98% 92% 98% 99%     FiO2 (%): nasal cannula for respiratory distress, increased work of breathing, and desaturation  O2 Flow Rate: O2 Device: PAP (positive airway pressure)    Cardiac Rhythm:    Pain Assessment:   [x] Verbal [] Artelia Rashi Scale  Pain Scale: Pain Assessment  Pain Level: 5  Patient's Stated Pain Goal: 0 - No pain  Pain Location: Head  Last documented pain score (0-10 scale) Pain Level: 5  Last documented pain medication administered:    Mental Status: oriented, alert, coherent, logical, thought processes intact, and able to concentrate and follow conversation  NIH Score:    C-SSRS: Risk of Suicide: No Risk  Bedside swallow:    Jitendra Coma Scale (GCS): [place] Coma Scale  Eye Opening: Spontaneous  Best Verbal Response: Oriented  Best Motor Response: Obeys commands  [place] Coma Scale Score: 15  Active LDA's:   Peripheral IV 11/05/22 Left;Proximal;Anterior Forearm (Active)     PO Status: Regular  Pertinent or High Risk Medications/Drips: no   If Yes, please provide details:    Pending Blood Product Administration: no     You may also review the ED PT Care Timeline found under the Summary Nursing Index tab. Recommendation    Pending orders    Plan for Discharge (if known):    Additional Comments:     If any further questions, please call Sending RN at [phone]    Electronically signed by: Electronically signed by Bashir Unger RN on 11/6/2022 at 1:04 AM       Bashir Unger RN  11/06/22 [id number]

## 2022-11-06 NOTE — ED NOTES
ED TO INPATIENT SBAR HANDOFF    Patient Name: Sae Guerra   :  1960  63 y.o. MRN:  [id number]  Preferred Name     ED Room #:  ED33/ED-33  Family/Caregiver Present no   Restraints no   Sitter no   Sepsis Risk Score Sepsis Risk Score: 7.69    Situation  Code Status: Prior No additional code details. Allergies: Lisinopril  Weight: No data found. Arrived from: home  Chief Complaint:   Chief Complaint   Patient presents with    Shortness of [place] Problem/Diagnosis:  Active Problems:    * No active hospital problems. *  Resolved Problems:    * No resolved hospital problems. *    Imaging:   XR CHEST PORTABLE   Final Result   Pulmonary vascular congestion. Small bilateral pleural effusions.            Abnormal labs:   Abnormal Labs Reviewed   CBC WITH AUTO DIFFERENTIAL - Abnormal; Notable for the following components:       Result Value    WBC 23.0 (*)     RBC 3.94 (*)     Hemoglobin 10.7 (*)     MCHC 28.8 (*)     Segs Relative 88.7 (*)     Lymphocytes % 3.8 (*)     Monocytes % 6.2 (*)     Immature Neutrophil % 0.7 (*)     All other components within normal limits   COMPREHENSIVE METABOLIC PANEL - Abnormal; Notable for the following components:    Chloride 92 (*)     CO2 37 (*)     Glucose 138 (*)     All other components within normal limits   TROPONIN - Abnormal; Notable for the following components:    Troponin T 0.012 (*)     All other components within normal limits   BRAIN NATRIURETIC PEPTIDE - Abnormal; Notable for the following components:    Pro-.0 (*)     All other components within normal limits   BLOOD GAS, VENOUS - Abnormal; Notable for the following components:    pH, Topher 7.28 (*)     pCO2, Topher 94 (*)     pO2, Topher 144 (*)     Base Exc, Mixed 13.1 (*)     HCO3, Venous 44.2 (*)     O2 Sat, Topher 94.9 (*)     All other components within normal limits     Critical values: yes     Abnormal Assessment Findings: ph    Background  History:   Past Medical History:   Diagnosis Date    Anemia     Anxiety 02/16/2017    follows with Dr Kinsey Drake     Back pain at L4-L5 level 2/16/2017    Dr Rodrigez Esters 1 disorder [facility])     per pt on 2/5/2021\"never told I have bipolar\"    COPD (chronic obstructive pulmonary disease) ([facility] [address]     follows with Dr Curtis Guardado    Emphysema of lung [facility])     FH: CAD (coronary artery disease) 2/16/2017    Father had in his forties, sister in her thirties     Fibromyalgia 02/16/2017    Full dentures     full upper plate and partial on the bottom    Gastric ulcer     \"had stomach ulder back fall 2019\"    H/O Doppler ultrasound 04/05/2019    venous- no DVT or reflux    H/O echocardiogram 01/14/2015    EF50-55% Normal- see media    History of blood transfusion     Hyperlipidemia LDL goal <100     Hypertension     Dr Marco Cruz    Irregular heartbeat     \"they said I have irreg heart beat before- back when they drained fluid around my heart \"    Obstructive sleep apnea     \"have bipap I use at home\"    On home oxygen therapy     \"on oxygen all the time at home and keep it on 2.5 liters\"    Osteoarthritis     Pericardial effusion     per old chart had pericardial effusion 10/2020    Spinal stenosis     hx per old chart    Thyroid disease     Wears glasses     \"suppose to wear glasses\"       Assessment    Vitals/MEWS: MEWS Score: 3  Level of Consciousness: Alert (0)   Vitals:    11/05/22 1844 11/05/22 1853 11/05/22 1900 11/05/22 2000   BP:    (!) 176/79   Pulse:   96 (!) 106   Resp: 19 20 23 28   Temp:    98.4 °F (36.9 °C)   TempSrc:    Oral   SpO2:   98% 92%     FiO2 (%): placed on CPAP  O2 Flow Rate: O2 Device: PAP (positive airway pressure)    Cardiac Rhythm:    Pain Assessment:   [x] Verbal [] Jorge Marina Scale  Pain Scale:    Last documented pain score (0-10 scale)    Last documented pain medication administered:    Mental Status: oriented, alert, coherent, logical, thought processes intact, and able to concentrate and follow conversation  NIH Score: C-SSRS: Risk of Suicide: No Risk  Bedside swallow:    Nickerson Coma Scale (GCS): Jitendra Coma Scale  Eye Opening: Spontaneous  Best Verbal Response: Oriented  Best Motor Response: Obeys commands  Nickerson Coma Scale Score: 15  Active LDA's:   Peripheral IV 11/05/22 Left;Proximal;Anterior Forearm (Active)     PO Status: Regular  Pertinent or High Risk Medications/Drips: no   If Yes, please provide details:    Pending Blood Product Administration: no     You may also review the ED PT Care Timeline found under the Summary Nursing Index tab. Recommendation    Pending orders    Plan for Discharge (if known):    Additional Comments:     If any further questions, please call Sending RN at [phone]    Electronically signed by: Electronically signed by Radha Price RN on 11/5/2022 at 8:25 PM       Radha Price RN  11/05/22 2028

## 2022-11-06 NOTE — PROGRESS NOTES
11/06/22 0742   NIV Type   $NIV $Daily Charge   NIV Started/Stopped On   Equipment Type v60   Mode Bilevel   Mask Type Full face mask   Mask Size Medium   Settings/Measurements   PIP Observed 15 cm H20   IPAP 5 cmH20   Vt (Measured) 381 mL   Rate Ordered 12   Resp 21   Insp Rise Time (%) 3 %   O2 Flow Rate (L/min) 4 L/min   FiO2  32 %   I Time/ I Time % 1.2 s   Minute Volume (L/min) 8.4 Liters   Mask Leak (lpm) 0 lpm   Comfort Level Good   Using Accessory Muscles No   Patient's Home Machine No   Pt didn't wear Bi-pap last night, however, she did put it on this morning after her breathing treatment.

## 2022-11-06 NOTE — PROGRESS NOTES
Received call from respiratory with critical ABG results.  Dr Mando Richter and Dr Marco A Peacock both notified of results    Received call back from Dr Marco A Peacock- repeat ABG at 0900 tomorrow- 11/7/2022

## 2022-11-06 NOTE — PROGRESS NOTES
Hospitalist Progress Note      Name:  Kashif Rivera /Age/Sex: 1960  (63 y.o. female)   MRN & CSN:  [id number] & [id number] Admission Date/Time: 2022  5:45 PM   Location:  [address] [place] [address] PCP: Gopal Yadav MD         Hospital Day: 2    Assessment and Plan:   Kashif Rivera is a 63 y.o.  female  who presents with COPD exacerbation ([facility] [address]    COPD exacerbation 2/2 CAP - On 4L home O2. Leucocytosis. Pro-vinh / LA negative. ABG consistent with hypoxic Hypercapnic respiratory failure. CTA s/o Pneumonia. Blood culture, Strep & uLegionella pending. Resp panel negative. Steroids. Duoneb. BiPAP. Transfer to SD unit as patient might need cont BiPAP. Pulmonology following    2. DALTON/OHS - BiPAP at home. 3.   Hx of afib - Currently NSR, not in [facility] d/t unknown reason. Patient unable to explain why she isn't on blood thinners. 4.   CHF - despite CXR findings, patient does not seem fluid overloaded; denying orthopnea; borderline BNP, will defer diuresis at this time; recent EF normal, continue hydralazine, Toprol, Cardizem. 5. Elevated troponin - Has some chest tightness but no ischemic changes in EKG. Cardiology consulted. Trop trended down. Likely form CHF    6. Morbid Obesity: Counseled on Diet and wt loss. Chronic:     HTN - continue hydralazine, Toprol, Cardizem  CAD - asa, statin        Diet ADULT DIET; Regular; 4 carb choices (60 gm/meal);  No Added Salt (3-4 gm)   DVT Prophylaxis [] Lovenox, []  Heparin, [] SCDs, [] Ambulation   GI Prophylaxis [] PPI,  [] H2 Blocker,  [] Carafate,  [] Diet/Tube Feeds   Code Status Full Code   Disposition Patient requires continued admission due to Acute COPD   MDM [] Low, [] Moderate,[]  High  Patient's risk as above due to Multiple comorb     History of Present Illness:     Chief Complaint: COPD exacerbation ([facility] [address]  Kashif Rivera is a 63 y.o.  female  who presents with        Ten point ROS reviewed negative, unless as noted above    Objective: No intake or output data in the 24 hours ending 11/06/22 1320   Vitals:   Vitals:    11/06/22 1240   BP: (!) 162/85   Pulse:    Resp: 21   Temp:    SpO2:      Physical Exam:   GEN Awake female, sitting upright in bed. Mild Respiratory distress. Appears given age. EYES Pupils are equally round. No scleral erythema, discharge, or conjunctivitis. HENT Mucous membranes are moist. Oral pharynx without exudates, no evidence of thrush. NECK Supple, no apparent thyromegaly or masses. RESP B/L decreased Breath sounds. Wheeze +  CARDIO/VASC S1/S2 auscultated. Regular rate without appreciable murmurs, rubs, or gallops. No JVD or carotid bruits. Peripheral pulses equal bilaterally and palpable. No peripheral edema. GI Abdomen is soft without significant tenderness, masses, or guarding. Bowel sounds are normoactive. Rectal exam deferred.  No costovertebral angle tenderness. Normal appearing external genitalia. Mcallister catheter is not present. HEME/LYMPH No palpable cervical lymphadenopathy and no hepatosplenomegaly. No petechiae or ecchymoses. MSK No gross joint deformities. SKIN Normal coloration, warm, dry. NEURO Cranial nerves appear grossly intact, normal speech, no lateralizing weakness. PSYCH Awake, alert, oriented x 4. Affect appropriate.     Medications:   Medications:    atorvastatin  80 mg Oral Daily    DULoxetine  60 mg Oral Daily    folic acid  1 mg Oral Daily    gabapentin  400 mg Oral TID    guaiFENesin  600 mg Oral BID    hydrALAZINE  50 mg Oral TID    lactobacillus  1 capsule Oral Daily with breakfast    levothyroxine  100 mcg Oral Daily    magnesium oxide  400 mg Oral Daily    methocarbamol  750 mg Oral BID    metoprolol succinate  25 mg Oral Daily    montelukast  10 mg Oral Daily    Vitamin D  1,000 Units Oral Daily    thiamine  100 mg Oral Daily    sodium chloride flush  5-40 mL IntraVENous 2 times per day    enoxaparin  40 mg SubCUTAneous Daily    ipratropium-albuterol  1 ampule Inhalation Q4H WA    azithromycin  500 mg Oral Daily    Roflumilast  500 mcg Oral Daily    dilTIAZem  240 mg Oral Daily    budesonide-formoterol  2 puff Inhalation BID    methylPREDNISolone  40 mg IntraVENous Q6H      Infusions:    sodium chloride       PRN Meds: clonazePAM, 0.5 mg, BID PRN  sodium chloride flush, 5-40 mL, PRN  sodium chloride, , PRN  ondansetron, 4 mg, Q8H PRN   Or  ondansetron, 4 mg, Q6H PRN  polyethylene glycol, 17 g, Daily PRN  acetaminophen, 650 mg, Q6H PRN   Or  acetaminophen, 650 mg, Q6H PRN  oxyCODONE-acetaminophen, 1 tablet, Q8H PRN          Patient is still admitted because Acute COPD. The anticipated discharge is in greater than 24 hours.      Electronically signed by Elijah Bailey MD on 11/6/2022 at 1:20 PM

## 2022-11-06 NOTE — H&P
Disposition:   Current Living situation: home  Expected Disposition: home  Estimated D/C: 2 days    Diet No diet orders on file   DVT Prophylaxis [x] Lovenox, []  Heparin, [] SCDs, [] Ambulation,  [] Eliquis, [] Xarelto   Code Status Prior   Surrogate Decision Maker/ POA Next of kin     History from:     patient    History of Present Illness:     Chief Complaint: shortness of breath, productive cough  Michael Freire is a 63 y.o. female who p/w SOB. She has PMH of COPD with baseline O2 of 3 liters, HTN, HLD, CAD, CHF, thyroid disease. Began feeling yesterday morning. Requiring 4 liter NC in ED. Denies chest pain. Has productive cough with green sputum. Subjective chills as well. No recent sick contacts. Patient states that this feels like prior COPD exacerbations. Review of Systems: Need 10 Elements   Review of Systems    10 point ROS negative except as stated above     Objective:   No intake or output data in the 24 hours ending 11/06/22 0113   Vitals:   Vitals:    11/05/22 1900 11/05/22 2000 11/06/22 0100 11/06/22 0130   BP:  (!) 176/79 (!) 162/84    Pulse: 96 (!) 106 (!) 107 (!) 101   Resp: 23 28 21 21   Temp:  98.4 °F (36.9 °C) 98.4 °F (36.9 °C)    TempSrc:  Oral Oral    SpO2: 98% 92% 98% 99%       Medications Prior to Admission     Prior to Admission medications    Medication Sig Start Date End Date Taking? Authorizing Provider   ipratropium-albuterol (DUONEB) 0.5-2.5 (3) MG/3ML SOLN nebulizer solution Take 3 mLs by nebulization 4 times daily 11/1/22   Max Lewis MD   hydrALAZINE (APRESOLINE) 50 MG tablet Take 1 tablet by mouth 3 times daily 10/25/22   BRIAN Sneed - CNP   budesonide-formoterol (SYMBICORT) 160-4.5 MCG/ACT AERO Inhale 2 puffs into the lungs 2 times daily 10/6/22   Max Lewis MD   LAGEVRIO 200 MG capsule take 4 capsules by mouth every 12 hours for 5 days 7/29/22   Historical Provider, MD   predniSONE (DELTASONE) 10 MG tablet Prednisone 30 mg p.o. daily for 2 days then prednisone 20 mg p.o. daily for 2 days then prednisone 10 mg p.o. daily for 2 days 6/30/22   Asif Haynes Sandhoff, MD   gabapentin (NEURONTIN) 300 MG capsule Take 400 mg by mouth 3 times daily.  6/2/22   Historical Provider, MD   methocarbamol (ROBAXIN) 500 MG tablet Take 750 mg by mouth in the morning and at bedtime 6/2/22   Historical Provider, MD   albuterol sulfate  (90 Base) MCG/ACT inhaler 01/29/2014 Albuterol HFA Inhaler 90 mcg/actuation  By inhalation 2.0 INHALATION(S) as directed  01/29/2014  active 1/29/14   Historical Provider, MD   MAGNESIUM-OXIDE 400 (241.3 Mg) MG TABS tablet  3/29/22   Historical Provider, MD   albuterol-ipratropium (COMBIVENT RESPIMAT)  MCG/ACT AERS inhaler Inhale 1 puff into the lungs every 4 hours as needed for Wheezing 4/19/22   Jf Bolton MD   pantoprazole (PROTONIX) 40 MG tablet Take 1 tablet by mouth every morning (before breakfast) 3/16/22   Morgan Taylor MD   magnesium oxide (MAG-OX) 400 MG tablet Take 1 tablet by mouth daily 3/1/22   Princess Arriaga MD   atorvastatin (LIPITOR) 80 MG tablet Take 80 mg by mouth daily    Historical Provider, MD   ondansetron (ZOFRAN ODT) 4 MG disintegrating tablet Take 1 tablet by mouth every 6 hours 12/29/21   Jarad Wheat PA-C   diclofenac sodium (VOLTAREN) 1 % GEL Apply 4 g topically 4 times daily 12/8/21   Charlotte Rodass,    acetaminophen (TYLENOL) 650 MG extended release tablet Take 650 mg by mouth as needed for Pain    Historical Provider, MD   vitamin D (CHOLECALCIFEROL) 25 MCG (1000 UT) TABS tablet Take 1,000 Units by mouth daily    Historical Provider, MD   ferrous sulfate (IRON 325) 325 (65 Fe) MG tablet Take 1 tablet by mouth every other day Indications: pt taking every day bid Monday, wed, fri  Patient taking differently: Take 325 mg by mouth 2 times daily Indications: pt taking every day bid 5/4/21   Feliciano Mitchell MD   dilTIAZem (CARDIZEM CD) 120 MG extended release capsule Take 1 capsule by mouth daily 1/18/21   Wali Guerrero MD   metoprolol succinate (TOPROL XL) 25 MG extended release tablet Take 1 tablet by mouth daily 1/18/21   Wali Guerrero MD   clonazePAM (KLONOPIN) 1 MG disintegrating tablet Take 0.5 mg by mouth 2 times daily as needed. Historical Provider, MD   lactobacillus (CULTURELLE) capsule Take 1 capsule by mouth daily (with breakfast) 11/27/20   Clemente Venegas MD   theophylline (MIAH-24) 200 MG extended release capsule Take 400 mg by mouth daily    Historical Provider, MD   guaiFENesin (MUCINEX) 600 MG extended release tablet Take 1 tablet by mouth 2 times daily 7/21/20   Haylee Wallace MD   montelukast (SINGULAIR) 10 MG tablet Take 1 tablet by mouth daily 7/21/20   Haylee Wallace MD   levothyroxine (SYNTHROID) 100 MCG tablet Take 1 tablet by mouth Daily 7/21/20   Haylee Wallace MD   folic acid (FOLVITE) 1 MG tablet Take 1 tablet by mouth daily 7/16/20   Nenita Daly MD   vitamin B-1 100 MG tablet Take 1 tablet by mouth daily 7/16/20   Nenita Daly MD   DULoxetine (CYMBALTA) 60 MG extended release capsule Take 60 mg by mouth daily     Historical Provider, MD   fluticasone (FLONASE) 50 MCG/ACT nasal spray 2 sprays by Nasal route daily 3/28/18   Lory Vasques MD       Physical Exam: Need 8 Elements   Physical Exam     General: NAD  Eyes: EOMI  ENT: neck supple  Cardiovascular: Regular rate. Respiratory: expiratory wheezes and rhonchi  Gastrointestinal: Soft, non tender  Genitourinary: no suprapubic tenderness  Musculoskeletal: trace edema  Skin: warm, dry  Neuro: Alert. Psych: Mood appropriate.        Past Medical History:   PMHx   Past Medical History:   Diagnosis Date    Anemia     Anxiety 02/16/2017    follows with Dr Grant Primes     Back pain at L4-L5 level 2/16/2017    Dr Loco Alanis 1 disorder [facility])     per pt on 2/5/2021\"never told I have bipolar\"    COPD (chronic obstructive pulmonary disease) ([facility] [address]     follows with Dr Alexandra Bowen    Emphysema of lung [facility])     FH: CAD (coronary artery disease) 2017    Father had in his forties, sister in her thirties     Fibromyalgia 2017    Full dentures     full upper plate and partial on the bottom    Gastric ulcer     \"had stomach ulder back fall \"    H/O Doppler ultrasound 2019    venous- no DVT or reflux    H/O echocardiogram 2015    EF50-55% Normal- see media    History of blood transfusion     Hyperlipidemia LDL goal <100     Hypertension     Dr Henley Later    Irregular heartbeat     \"they said I have irreg heart beat before- back when they drained fluid around my heart \"    Obstructive sleep apnea     \"have bipap I use at home\"    On home oxygen therapy     \"on oxygen all the time at home and keep it on 2.5 liters\"    Osteoarthritis     Pericardial effusion     per old chart had pericardial effusion 10/2020    Spinal stenosis     hx per old chart    Thyroid disease     Wears glasses     \"suppose to wear glasses\"     PSHX:  has a past surgical history that includes Carpal tunnel release (Right, ); Tubal ligation (); back surgery (2017); Cholecystectomy, laparoscopic (N/A, 10/25/2020); Colonoscopy (N/A, 2019); Endoscopy, colon, diagnostic; Cardiac catheterization; Cardiac catheterization; and Upper gastrointestinal endoscopy (N/A, 2021). Allergies: Allergies   Allergen Reactions    Lisinopril Swelling and Rash     angioedema     Fam HX:  family history includes Asthma in her mother; Heart Disease in her father.   Soc HX:   Social History     Socioeconomic History    Marital status:    Tobacco Use    Smoking status: Former     Packs/day: 1.50     Years: 20.00     Pack years: 30.00     Types: Cigarettes     Quit date: 2008     Years since quittin.8    Smokeless tobacco: Never   Vaping Use    Vaping Use: Never used   Substance and Sexual Activity    Alcohol use: Not Currently     Alcohol/week: 2.0 standard drinks     Types: 2 Shots of liquor per week     Comment: per pt on 1/5/2021\"quit drinking back Oct 2020\"use to drink wine coolers - 3 times per week \"/caffeine 2-3 coffees aday 1 pop b    Drug use: No    Sexual activity: Yes     Partners: Male       Medications:   Medications:    Infusions:   PRN Meds:     Labs      CBC:   Recent Labs     11/05/22  1805   WBC 23.0*   HGB 10.7*        BMP:    Recent Labs     11/05/22  1805      K 3.9   CL 92*   CO2 37*   BUN 9   CREATININE 0.7   GLUCOSE 138*     Hepatic:   Recent Labs     11/05/22  1805   AST 19   ALT 17   BILITOT 0.3   ALKPHOS 93     Lipids:   Lab Results   Component Value Date/Time    CHOL 192 06/07/2022 03:25 PM    HDL 67 06/07/2022 03:25 PM    TRIG 77 06/07/2022 03:25 PM     Hemoglobin A1C:   Lab Results   Component Value Date/Time    LABA1C 5.1 06/21/2019 04:26 PM     TSH: No results found for: TSH  Troponin:   Lab Results   Component Value Date/Time    TROPONINT 0.012 11/05/2022 06:05 PM    TROPONINT 0.011 08/15/2022 12:35 AM    TROPONINT 0.015 08/14/2022 09:22 PM     Lactic Acid: No results for input(s): LACTA in the last 72 hours.   BNP:   Recent Labs     11/05/22  1805   PROBNP 527.0*     UA:  Lab Results   Component Value Date/Time    NITRU NEGATIVE 10/21/2022 01:29 PM    COLORU YELLOW 10/21/2022 01:29 PM    WBCUA <1 06/26/2022 05:17 AM    RBCUA NONE SEEN 06/26/2022 05:17 AM    MUCUS RARE 11/03/2020 03:35 PM    TRICHOMONAS NONE SEEN 06/26/2022 05:17 AM    BACTERIA NEGATIVE 06/26/2022 05:17 AM    CLARITYU CLEAR 10/21/2022 01:29 PM    [address] <1.005 10/21/2022 01:29 PM    LEUKOCYTESUR NEGATIVE 10/21/2022 01:29 PM    UROBILINOGEN 0.2 10/21/2022 01:29 PM    BILIRUBINUR NEGATIVE 10/21/2022 01:29 PM    BLOODU NEGATIVE 10/21/2022 01:29 PM    KETUA NEGATIVE 10/21/2022 01:29 PM     Urine Cultures: No results found for: LABURIN  Blood Cultures: No results found for: BC  No results found for: BLOODCULT2  Organism:   Lab Results   Component Value Date/Time    [facility] BBRO 12/15/2018 09:23 PM       Imaging/Diagnostics Last 24 Hours   XR CHEST PORTABLE    Result Date: 11/5/2022  EXAMINATION: ONE XRAY VIEW OF THE CHEST 11/5/2022 3:47 pm COMPARISON: 08/14/2022 HISTORY: ORDERING SYSTEM PROVIDED HISTORY: chest pain TECHNOLOGIST PROVIDED HISTORY: Reason for exam:->chest pain Reason for Exam: Chest pain FINDINGS: Cardial pericardial silhouette is prominent but stable. The pulmonary vasculature appears congested. Small bilateral pleural effusions and/or scarring noted. No pneumothorax is seen. No free air. No acute bony abnormality. Pulmonary vascular congestion. Small bilateral pleural effusions. CTA PULMONARY W CONTRAST    1. No pulmonary embolus. 2. Interval appearance of patchy irregular subsolid nodular opacities bilaterally, right greater than left, and right base opacities. Findings are most compatible with infection and appear new since June 2022. Recommend treating the patient and following to resolution with repeat imaging in 6-8 weeks. 3. Emphysema. 4. Prominent right hilar lymph nodes, likely reactive.          Electronically signed by Jesse Prado MD on 11/6/2022 at 1:13 AM

## 2022-11-06 NOTE — ED PROVIDER NOTES
Emergency Department Encounter  Location: [facility] 4N    Patient: Michael Freire  MRN: [id number]  : 1960  Date of evaluation: 2022  ED Provider: Brian Barber PA-C    1900 PM  Michael rFeire was checked out to me by Nathalia Buchanan PA-C. Please see his/her initial documentation for details of the patient's initial ED presentation, physical exam and completed studies. In brief, Michael Freire is a 63 y.o. female that presented to the emergency department for shortness of breath and cough productive of green sputum that began today. History of COPD on 3L NC at baseline. She is requiring BiPAP here in the department. Pending completion of labs/imaging at time of sign-out.         I have reviewed and interpreted all of the currently available lab results and diagnostics from this visit:  Results for orders placed or performed during the hospital encounter of 22   COVID-19, Rapid    Specimen: Nasopharyngeal   Result Value Ref Range    Source UNKNOWN     SARS-CoV-2, NAAT NOT DETECTED NOT DETECTED   Rapid influenza A/B antigens    Specimen: Nasopharyngeal   Result Value Ref Range    Rapid Influenza A Ag NEGATIVE NEGATIVE    Rapid Influenza B Ag NEGATIVE NEGATIVE   Respiratory Panel, Molecular, with COVID-19 (Restricted: peds pts or suitable admitted adults)    Specimen: Nasopharyngeal   Result Value Ref Range    Adenovirus Detection by PCR NOT DETECTED NOT DETECTED    Coronavirus 229E PCR NOT DETECTED NOT DETECTED    Coronavirus HKU1 PCR NOT DETECTED NOT DETECTED    Coronavirus NL63 PCR NOT DETECTED NOT DETECTED    Coronavirus OC43 PCR NOT DETECTED NOT DETECTED    SARS-CoV-2 NOT DETECTED NOT DETECTED    Human Metapneumovirus PCR NOT DETECTED NOT DETECTED    Rhinovirus Enterovirus PCR NOT DETECTED NOT DETECTED    Influenza A by PCR NOT DETECTED NOT DETECTED    Influenza A H1 Pandemic PCR NOT DETECTED NOT DETECTED    Influenza A H1 (2009) PCR NOT DETECTED NOT DETECTED    Influenza A H3 PCR NOT DETECTED NOT DETECTED    Influenza B by PCR NOT DETECTED NOT DETECTED    Parainfluenza 1 PCR NOT DETECTED NOT DETECTED    Parainfluenza 2 PCR NOT DETECTED NOT DETECTED    Parainfluenza 3 PCR NOT DETECTED NOT DETECTED    Parainfluenza 4 PCR NOT DETECTED NOT DETECTED    RSV PCR NOT DETECTED NOT DETECTED    Bordetella parapertussis by PCR NOT DETECTED NOT DETECTED    B Pertussis by PCR NOT DETECTED NOT DETECTED    Chlamydophila Pneumonia PCR NOT DETECTED NOT DETECTED    Mycoplasma pneumo by PCR NOT DETECTED NOT DETECTED   CBC with Auto Differential   Result Value Ref Range    WBC 23.0 (H) 4.0 - 10.5 K/CU MM    RBC 3.94 (L) 4.2 - 5.4 M/CU MM    Hemoglobin 10.7 (L) 12.5 - 16.0 GM/DL    Hematocrit 37.2 37 - 47 %    MCV 94.4 78 - 100 FL    MCH 27.2 27 - 31 PG    MCHC 28.8 (L) 32.0 - 36.0 %    RDW 13.7 11.7 - 14.9 %    Platelets 919 696 - 692 K/CU MM    MPV 10.3 7.5 - 11.1 FL    Differential Type AUTOMATED DIFFERENTIAL     Segs Relative 88.7 (H) 36 - 66 %    Lymphocytes % 3.8 (L) 24 - 44 %    Monocytes % 6.2 (H) 0 - 4 %    Eosinophils % 0.3 0 - 3 %    Basophils % 0.3 0 - 1 %    Segs Absolute 20.4 K/CU MM    Lymphocytes Absolute 0.9 K/CU MM    Monocytes Absolute 1.4 K/CU MM    Eosinophils Absolute 0.1 K/CU MM    Basophils Absolute 0.1 K/CU MM    Nucleated RBC % 0.0 %    Total Nucleated RBC 0.0 K/CU MM    Total Immature Neutrophil 0.15 K/CU MM    Immature Neutrophil % 0.7 (H) 0 - 0.43 %   Comprehensive Metabolic Panel   Result Value Ref Range    Sodium 136 135 - 145 MMOL/L    Potassium 3.9 3.5 - 5.1 MMOL/L    Chloride 92 (L) 99 - 110 mMol/L    CO2 37 (H) 21 - 32 MMOL/L    BUN 9 6 - 23 MG/DL    Creatinine 0.7 0.6 - 1.1 MG/DL    Est, Glom Filt Rate >60 >60 mL/min/1.73m2    Glucose 138 (H) 70 - 99 MG/DL    Calcium 9.9 8.3 - 10.6 MG/DL    Albumin 4.0 3.4 - 5.0 GM/DL    Total Protein 7.0 6.4 - 8.2 GM/DL    Total Bilirubin 0.3 0.0 - 1.0 MG/DL    ALT 17 10 - 40 U/L    AST 19 15 - 37 IU/L    Alkaline Phosphatase 93 40 - 129 IU/L    Anion Gap 7 4 - 16   Troponin   Result Value Ref Range    Troponin T 0.012 (H) <0.01 NG/ML   Brain Natriuretic Peptide   Result Value Ref Range    Pro-.0 (H) <300 PG/ML   Blood Gas, Venous   Result Value Ref Range    pH, Topher 7.28 (L) 7.32 - 7.42    pCO2, Topher 94 (H) 38 - 52 mmHG    pO2, Topher 144 (H) 28 - 48 mmHG    Base Exc, Mixed 13.1 (H) 0 - 2.3    HCO3, Venous 44.2 (H) 19 - 25 MMOL/L    O2 Sat, Topher 94.9 (H) 50 - 70 %    Comment VBG    Lactic Acid   Result Value Ref Range    Lactate 0.6 0.4 - 2.0 mMOL/L   EKG 12 Lead   Result Value Ref Range    Ventricular Rate 94 BPM    Atrial Rate 94 BPM    P-R Interval 138 ms    QRS Duration 144 ms    Q-T Interval 398 ms    QTc Calculation (Bazett) 497 ms    P Axis 83 degrees    R Axis 91 degrees    T Axis 58 degrees    Diagnosis       Normal sinus rhythm  Right bundle branch block  Abnormal ECG  When compared with ECG of 14-AUG-2022 21:13,  No significant change was found       XR CHEST PORTABLE    Result Date: 11/5/2022  EXAMINATION: ONE XRAY VIEW OF THE CHEST 11/5/2022 3:47 pm COMPARISON: 08/14/2022 HISTORY: ORDERING SYSTEM PROVIDED HISTORY: chest pain TECHNOLOGIST PROVIDED HISTORY: Reason for exam:->chest pain Reason for Exam: Chest pain FINDINGS: Cardial pericardial silhouette is prominent but stable. The pulmonary vasculature appears congested. Small bilateral pleural effusions and/or scarring noted. No pneumothorax is seen. No free air. No acute bony abnormality. Pulmonary vascular congestion. Small bilateral pleural effusions. CTA PULMONARY W CONTRAST    1. No pulmonary embolus. 2. Interval appearance of patchy irregular subsolid nodular opacities bilaterally, right greater than left, and right base opacities. Findings are most compatible with infection and appear new since June 2022. Recommend treating the patient and following to resolution with repeat imaging in 6-8 weeks. 3. Emphysema.  4. Prominent right hilar lymph nodes, likely reactive. Final ED Course and MDM:  -  Patient seen and evaluated in the emergency department. -  Triage and nursing notes reviewed and incorporated. -  Old chart records reviewed and incorporated. -  Supervising physician was Dr. Porfirio Torres. Patient was seen independently. -  Patient tolerating BiPAP well on reassessment. Respiratory panel is negative. She does have a leukocytosis of 23,000. Lactic WNL. CTA shows patchy opacities compatible with infection. Broad spectrum ABX started. I spoke with Dr. Omkar Martinez, hospitalist, who will see and admit. CRITICAL CARE NOTE:  There was a high probability of clinically significant life-threatening deterioration of the patient's condition requiring my urgent intervention due to hypoxia. Telemetry monitoring, review of labs and imaging, BiPAP, nebulizer treatments, IV ABX, consult hospitalist was performed to address this. Total critical care time is at least  50 minutes. This includes vital sign monitoring, pulse oximetry monitoring, telemetry monitoring, clinical response to the IV medications, reviewing the nursing notes, consultation time, dictation/documentation time, and interpretation of the lab work. This time excludes time spent performing procedures and separately billable procedures and family discussion time. In light of current events, I did utilize appropriate PPE (including N95 and surgical face mask, safety glasses, and gloves, as recommended by the health facility/national standard best practice, during my bedside interactions with the patient. Final Impression      1. Acute on chronic respiratory failure with hypercapnia (HCC)    2. Leukocytosis, unspecified type    3.  Pneumonia of both lower lobes due to infectious organism        DISPOSITION Admitted 11/06/2022 01:24:36 AM     (Please note that portions of this note may have been completed with a voice recognition program. Efforts were made to edit the dictations but occasionally words are mis-transcribed.)    Jayna Holloway PA-C  [address], [place]  11/06/22 8405

## 2022-11-07 ENCOUNTER — APPOINTMENT (OUTPATIENT)
Dept: GENERAL RADIOLOGY | Age: 62
DRG: 871 | End: 2022-11-07
Payer: COMMERCIAL

## 2022-11-07 LAB
ALBUMIN SERPL-MCNC: 3.9 GM/DL (ref 3.4–5)
ALP BLD-CCNC: 94 IU/L (ref 40–128)
ALT SERPL-CCNC: 17 U/L (ref 10–40)
ANION GAP SERPL CALCULATED.3IONS-SCNC: 11 MMOL/L (ref 4–16)
AST SERPL-CCNC: 18 IU/L (ref 15–37)
BASE EXCESS MIXED: 12.3 (ref 0–2.3)
BASE EXCESS MIXED: 17.1 (ref 0–2.3)
BASOPHILS ABSOLUTE: 0 K/CU MM
BASOPHILS RELATIVE PERCENT: 0.1 % (ref 0–1)
BILIRUB SERPL-MCNC: 0.2 MG/DL (ref 0–1)
BUN BLDV-MCNC: 15 MG/DL (ref 6–23)
CALCIUM SERPL-MCNC: 10.1 MG/DL (ref 8.3–10.6)
CARBON MONOXIDE, BLOOD: 1.8 % (ref 0–5)
CHLORIDE BLD-SCNC: 89 MMOL/L (ref 99–110)
CO2 CONTENT: 44.7 MMOL/L (ref 19–24)
CO2: 36 MMOL/L (ref 21–32)
COMMENT: ABNORMAL
COMMENT: ABNORMAL
CREAT SERPL-MCNC: 0.7 MG/DL (ref 0.6–1.1)
DIFFERENTIAL TYPE: ABNORMAL
EOSINOPHILS ABSOLUTE: 0 K/CU MM
EOSINOPHILS RELATIVE PERCENT: 0 % (ref 0–3)
GFR SERPL CREATININE-BSD FRML MDRD: >60 ML/MIN/1.73M2
GLUCOSE BLD-MCNC: 165 MG/DL (ref 70–99)
HCO3 ARTERIAL: 42.2 MMOL/L (ref 18–23)
HCO3 VENOUS: 47.5 MMOL/L (ref 19–25)
HCT VFR BLD CALC: 35.3 % (ref 37–47)
HEMOGLOBIN: 9.9 GM/DL (ref 12.5–16)
IMMATURE NEUTROPHIL %: 0.7 % (ref 0–0.43)
LV EF: 60 %
LVEF MODALITY: NORMAL
LYMPHOCYTES ABSOLUTE: 0.3 K/CU MM
LYMPHOCYTES RELATIVE PERCENT: 1.9 % (ref 24–44)
MAGNESIUM: 1.9 MG/DL (ref 1.8–2.4)
MCH RBC QN AUTO: 26.3 PG (ref 27–31)
MCHC RBC AUTO-ENTMCNC: 28 % (ref 32–36)
MCV RBC AUTO: 93.6 FL (ref 78–100)
METHEMOGLOBIN ARTERIAL: 0.8 %
MONOCYTES ABSOLUTE: 0.3 K/CU MM
MONOCYTES RELATIVE PERCENT: 1.5 % (ref 0–4)
NUCLEATED RBC %: 0 %
O2 SAT, VEN: 67.6 % (ref 50–70)
O2 SATURATION: 90.6 % (ref 96–97)
PCO2 ARTERIAL: 82 MMHG (ref 32–45)
PCO2, VEN: 88 MMHG (ref 38–52)
PDW BLD-RTO: 13.8 % (ref 11.7–14.9)
PH BLOOD: 7.32 (ref 7.34–7.45)
PH VENOUS: 7.34 (ref 7.32–7.42)
PHOSPHORUS: 3.1 MG/DL (ref 2.5–4.9)
PLATELET # BLD: 224 K/CU MM (ref 140–440)
PMV BLD AUTO: 10.1 FL (ref 7.5–11.1)
PO2 ARTERIAL: 57 MMHG (ref 75–100)
PO2, VEN: 36 MMHG (ref 28–48)
POTASSIUM SERPL-SCNC: 4.9 MMOL/L (ref 3.5–5.1)
PRO-BNP: 2114 PG/ML
PROCALCITONIN: 0.17
RBC # BLD: 3.77 M/CU MM (ref 4.2–5.4)
SEGMENTED NEUTROPHILS ABSOLUTE COUNT: 16.1 K/CU MM
SEGMENTED NEUTROPHILS RELATIVE PERCENT: 95.8 % (ref 36–66)
SODIUM BLD-SCNC: 136 MMOL/L (ref 135–145)
TOTAL IMMATURE NEUTOROPHIL: 0.11 K/CU MM
TOTAL NUCLEATED RBC: 0 K/CU MM
TOTAL PROTEIN: 6.3 GM/DL (ref 6.4–8.2)
WBC # BLD: 16.8 K/CU MM (ref 4–10.5)

## 2022-11-07 PROCEDURE — 93308 TTE F-UP OR LMTD: CPT

## 2022-11-07 PROCEDURE — 85025 COMPLETE CBC W/AUTO DIFF WBC: CPT

## 2022-11-07 PROCEDURE — 84100 ASSAY OF PHOSPHORUS: CPT

## 2022-11-07 PROCEDURE — 2140000000 HC CCU INTERMEDIATE R&B

## 2022-11-07 PROCEDURE — 94660 CPAP INITIATION&MGMT: CPT

## 2022-11-07 PROCEDURE — 6360000002 HC RX W HCPCS: Performed by: INTERNAL MEDICINE

## 2022-11-07 PROCEDURE — 71045 X-RAY EXAM CHEST 1 VIEW: CPT

## 2022-11-07 PROCEDURE — 6370000000 HC RX 637 (ALT 250 FOR IP): Performed by: SURGERY

## 2022-11-07 PROCEDURE — 6370000000 HC RX 637 (ALT 250 FOR IP)

## 2022-11-07 PROCEDURE — 36415 COLL VENOUS BLD VENIPUNCTURE: CPT

## 2022-11-07 PROCEDURE — 6360000002 HC RX W HCPCS: Performed by: SURGERY

## 2022-11-07 PROCEDURE — 83735 ASSAY OF MAGNESIUM: CPT

## 2022-11-07 PROCEDURE — 82805 BLOOD GASES W/O2 SATURATION: CPT

## 2022-11-07 PROCEDURE — 84145 PROCALCITONIN (PCT): CPT

## 2022-11-07 PROCEDURE — 2700000000 HC OXYGEN THERAPY PER DAY

## 2022-11-07 PROCEDURE — 94761 N-INVAS EAR/PLS OXIMETRY MLT: CPT

## 2022-11-07 PROCEDURE — 36600 WITHDRAWAL OF ARTERIAL BLOOD: CPT

## 2022-11-07 PROCEDURE — 6370000000 HC RX 637 (ALT 250 FOR IP): Performed by: FAMILY MEDICINE

## 2022-11-07 PROCEDURE — 87205 SMEAR GRAM STAIN: CPT

## 2022-11-07 PROCEDURE — 94640 AIRWAY INHALATION TREATMENT: CPT

## 2022-11-07 PROCEDURE — 80053 COMPREHEN METABOLIC PANEL: CPT

## 2022-11-07 PROCEDURE — 87070 CULTURE OTHR SPECIMN AEROBIC: CPT

## 2022-11-07 PROCEDURE — 2580000003 HC RX 258: Performed by: SURGERY

## 2022-11-07 PROCEDURE — 82803 BLOOD GASES ANY COMBINATION: CPT

## 2022-11-07 PROCEDURE — 99223 1ST HOSP IP/OBS HIGH 75: CPT | Performed by: INTERNAL MEDICINE

## 2022-11-07 PROCEDURE — 6370000000 HC RX 637 (ALT 250 FOR IP): Performed by: STUDENT IN AN ORGANIZED HEALTH CARE EDUCATION/TRAINING PROGRAM

## 2022-11-07 PROCEDURE — 83880 ASSAY OF NATRIURETIC PEPTIDE: CPT

## 2022-11-07 PROCEDURE — 6370000000 HC RX 637 (ALT 250 FOR IP): Performed by: INTERNAL MEDICINE

## 2022-11-07 RX ORDER — HYDROCHLOROTHIAZIDE 25 MG/1
25 TABLET ORAL DAILY
Status: DISCONTINUED | OUTPATIENT
Start: 2022-11-07 | End: 2022-11-10

## 2022-11-07 RX ORDER — PANTOPRAZOLE SODIUM 40 MG/1
40 TABLET, DELAYED RELEASE ORAL
Status: DISCONTINUED | OUTPATIENT
Start: 2022-11-07 | End: 2022-11-11 | Stop reason: HOSPADM

## 2022-11-07 RX ADMIN — PANTOPRAZOLE SODIUM 40 MG: 40 TABLET, DELAYED RELEASE ORAL at 05:31

## 2022-11-07 RX ADMIN — GABAPENTIN 400 MG: 400 CAPSULE ORAL at 08:55

## 2022-11-07 RX ADMIN — METHYLPREDNISOLONE SODIUM SUCCINATE 40 MG: 40 INJECTION, POWDER, FOR SOLUTION INTRAMUSCULAR; INTRAVENOUS at 11:34

## 2022-11-07 RX ADMIN — CLONAZEPAM 0.5 MG: 0.5 TABLET ORAL at 21:56

## 2022-11-07 RX ADMIN — GABAPENTIN 400 MG: 400 CAPSULE ORAL at 17:40

## 2022-11-07 RX ADMIN — FOLIC ACID 1 MG: 1 TABLET ORAL at 08:50

## 2022-11-07 RX ADMIN — BUDESONIDE AND FORMOTEROL FUMARATE DIHYDRATE 2 PUFF: 160; 4.5 AEROSOL RESPIRATORY (INHALATION) at 07:36

## 2022-11-07 RX ADMIN — OXYCODONE AND ACETAMINOPHEN 1 TABLET: 5; 325 TABLET ORAL at 04:54

## 2022-11-07 RX ADMIN — Medication 1000 UNITS: at 08:56

## 2022-11-07 RX ADMIN — HYDRALAZINE HYDROCHLORIDE 50 MG: 50 TABLET, FILM COATED ORAL at 21:55

## 2022-11-07 RX ADMIN — HYDROCHLOROTHIAZIDE 25 MG: 25 TABLET ORAL at 13:40

## 2022-11-07 RX ADMIN — METHOCARBAMOL TABLETS 750 MG: 500 TABLET, COATED ORAL at 08:51

## 2022-11-07 RX ADMIN — ROFLUMILAST 500 MCG: 500 TABLET ORAL at 08:50

## 2022-11-07 RX ADMIN — METOPROLOL SUCCINATE 25 MG: 25 TABLET, FILM COATED, EXTENDED RELEASE ORAL at 08:56

## 2022-11-07 RX ADMIN — LEVOTHYROXINE SODIUM 100 MCG: 0.1 TABLET ORAL at 05:00

## 2022-11-07 RX ADMIN — AZITHROMYCIN MONOHYDRATE 500 MG: 250 TABLET ORAL at 21:55

## 2022-11-07 RX ADMIN — IPRATROPIUM BROMIDE AND ALBUTEROL SULFATE 1 AMPULE: .5; 2.5 SOLUTION RESPIRATORY (INHALATION) at 11:03

## 2022-11-07 RX ADMIN — GUAIFENESIN AND DEXTROMETHORPHAN 5 ML: 100; 10 SYRUP ORAL at 21:54

## 2022-11-07 RX ADMIN — HYDRALAZINE HYDROCHLORIDE 50 MG: 50 TABLET, FILM COATED ORAL at 08:50

## 2022-11-07 RX ADMIN — MAGNESIUM OXIDE TAB 400 MG (241.3 MG ELEMENTAL MG) 400 MG: 400 (241.3 MG) TAB at 08:58

## 2022-11-07 RX ADMIN — DULOXETINE 60 MG: 30 CAPSULE, DELAYED RELEASE ORAL at 08:51

## 2022-11-07 RX ADMIN — OXYCODONE AND ACETAMINOPHEN 1 TABLET: 5; 325 TABLET ORAL at 17:40

## 2022-11-07 RX ADMIN — METHYLPREDNISOLONE SODIUM SUCCINATE 40 MG: 40 INJECTION, POWDER, FOR SOLUTION INTRAMUSCULAR; INTRAVENOUS at 17:40

## 2022-11-07 RX ADMIN — SODIUM CHLORIDE, PRESERVATIVE FREE 10 ML: 5 INJECTION INTRAVENOUS at 08:50

## 2022-11-07 RX ADMIN — Medication 100 MG: at 08:55

## 2022-11-07 RX ADMIN — GABAPENTIN 400 MG: 400 CAPSULE ORAL at 13:40

## 2022-11-07 RX ADMIN — HYDRALAZINE HYDROCHLORIDE 50 MG: 50 TABLET, FILM COATED ORAL at 13:40

## 2022-11-07 RX ADMIN — ATORVASTATIN CALCIUM 80 MG: 40 TABLET, FILM COATED ORAL at 08:52

## 2022-11-07 RX ADMIN — ENOXAPARIN SODIUM 40 MG: 100 INJECTION SUBCUTANEOUS at 08:58

## 2022-11-07 RX ADMIN — GUAIFENESIN AND DEXTROMETHORPHAN 5 ML: 100; 10 SYRUP ORAL at 05:30

## 2022-11-07 RX ADMIN — METHOCARBAMOL TABLETS 750 MG: 500 TABLET, COATED ORAL at 21:54

## 2022-11-07 RX ADMIN — METHYLPREDNISOLONE SODIUM SUCCINATE 40 MG: 40 INJECTION, POWDER, FOR SOLUTION INTRAMUSCULAR; INTRAVENOUS at 05:01

## 2022-11-07 RX ADMIN — IPRATROPIUM BROMIDE AND ALBUTEROL SULFATE 1 AMPULE: .5; 2.5 SOLUTION RESPIRATORY (INHALATION) at 07:20

## 2022-11-07 RX ADMIN — CLONAZEPAM 0.5 MG: 0.5 TABLET ORAL at 10:45

## 2022-11-07 RX ADMIN — Medication 1 CAPSULE: at 08:58

## 2022-11-07 RX ADMIN — MONTELUKAST 10 MG: 10 TABLET, FILM COATED ORAL at 08:58

## 2022-11-07 RX ADMIN — DILTIAZEM HYDROCHLORIDE 240 MG: 240 CAPSULE, COATED, EXTENDED RELEASE ORAL at 08:56

## 2022-11-07 RX ADMIN — IPRATROPIUM BROMIDE AND ALBUTEROL SULFATE 1 AMPULE: .5; 2.5 SOLUTION RESPIRATORY (INHALATION) at 16:01

## 2022-11-07 ASSESSMENT — PAIN DESCRIPTION - DESCRIPTORS
DESCRIPTORS: ACHING
DESCRIPTORS: ACHING

## 2022-11-07 ASSESSMENT — PAIN SCALES - GENERAL
PAINLEVEL_OUTOF10: 7
PAINLEVEL_OUTOF10: 8
PAINLEVEL_OUTOF10: 5
PAINLEVEL_OUTOF10: 8

## 2022-11-07 ASSESSMENT — PAIN DESCRIPTION - LOCATION
LOCATION: NECK
LOCATION: NECK
LOCATION: BACK

## 2022-11-07 ASSESSMENT — PAIN - FUNCTIONAL ASSESSMENT: PAIN_FUNCTIONAL_ASSESSMENT: ACTIVITIES ARE NOT PREVENTED

## 2022-11-07 NOTE — PROGRESS NOTES
11/06/22 2005   NIV Type   NIV Started/Stopped On   Equipment Type v60   Mode Bilevel   Mask Type Full face mask   Mask Size Medium   Bonnet size Medium   Settings/Measurements   PIP Observed 13 cm H20   IPAP 12 cmH20   CPAP/EPAP 5 cmH2O   Vt (Measured) 384 mL   Rate Ordered 12   Resp 20   FiO2  32 %   I Time/ I Time % 1.2 s   Minute Volume (L/min) 7.7 Liters   Mask Leak (lpm) 0 lpm   Comfort Level Good   Using Accessory Muscles No   SpO2 94   Patient's Home Machine No   Patient Observation   Observations Pt is alert and awake on the BIPAP   Alarm Settings   Alarms On Y   Low Pressure (cmH2O) 3 cmH2O   High Pressure (cmH2O) 25 cmH2O   Delay Alarm 20 sec(s)   Apnea (secs) 20 secs   RR Low (bpm) 13   RR High (bpm) 40 br/min

## 2022-11-07 NOTE — CONSULTS
[address] [place] [address], [address] [place] [address]                                  CONSULTATION    PATIENT NAME: Kilo Bradford                :        1960  MED REC NO:   [id number]                          ROOM:       3103  ACCOUNT NO:   [de-identified]                           ADMIT DATE: 2022  PROVIDER:     Danielle Diaz MD    CONSULT DATE:  2022    HISTORY OF PRESENT ILLNESS:  The patient is a 43-year-old lady with  multiple medical problems including COPD, fibromyalgia, coronary artery  disease, chronic respiratory failure, on home oxygen, bipolar disorder,  who was admitted through the emergency room with complaints of  increasing shortness of breath, cough productive of yellowish-green  phlegm. She denied any hemoptysis. She denied any fever or chills. She denied any nausea or vomiting. She denied any chest pains. In the emergency room, she was found to be in acute-on-chronic  respiratory failure with CO2 elevation. She was given breathing  treatment with some relief, but she continued to have shortness of  breath, so it was decided to admit her for aggressive therapy. Her  respiratory disease panel PCR was negative. Her rapid influenza A and B  were negative. Her CBC showed an elevated white count of 23.0. PAST MEDICAL HISTORY:  Significant for COPD, coronary artery disease,  fibromyalgia, hypertension, chronic respiratory failure, on home oxygen,  hyperlipidemia, hypertension. PAST SURGICAL HISTORY:  Remarkable for back surgery, cardiac  catheterization, carpal tunnel release surgery, laparoscopic  cholecystectomy, colonoscopy, tubal ligation, upper endoscopy. FAMILY HISTORY:  Her mother had asthma. Father had heart disease. SOCIAL HISTORY:  She quit smoking in , but used to smoke 1-1/2 packs  per day for 20 years prior to that.   She has history of alcohol abuse in  the past.  She does not drink at the present time. No history of drug  abuse. MEDICATIONS:  Reviewed. ALLERGIES:  She is allergic to LISINOPRIL. REVIEW OF SYSTEMS:  A 10- to 14-point review of systems were reviewed  and are negative except for what is mentioned in the history of present  illness. PHYSICAL EXAMINATION:  GENERAL:  The patient is alert, oriented x3, in no acute respiratory  distress. VITAL SIGNS:  Her blood pressure is 132/82 mmHg, pulse of 102 per minute  and respiratory rate of 20 per minute. She is afebrile. Her saturation  is 98% on 4 liters nasal cannula. HEENT:  Essentially unremarkable. NECK:  There is no JVD, no lymphadenopathy. The neck is supple. LUNGS:  Diminished breath sounds and occasional rhonchi. HEART:  Normal S1 and S2.  ABDOMEN:  Benign. There is no evidence of any organomegaly. The bowel  sounds are present. NEUROLOGIC:  She is awake and responsive, answering questions  appropriately and moving her extremities. LABORATORY DATA:  Her electrolytes showed a sodium 136, potassium 3.9,  chloride 92, carbon dioxide 37, BUN 9, creatinine 0.7. DIAGNOSTIC DATA:  Her chest x-ray showed pulmonary vascular congestion,  small bilateral pleural effusion. Her CAT scan of the chest showed no  evidence of PE. Patchy, irregular, subsolid, nodular densities  bilaterally in the lower lobes, right greater than the left, COPD  changes. Her CBC showed a white count of 23.0, hemoglobin 10.7,  hematocrit 37.2. Her respiratory disease panel PCR was negative. Her  rapid COVID test was negative. Her rapid influenza A and B were  negative. IMPRESSION:  1. Acute-on-chronic respiratory failure secondary to acute exacerbation  of COPD. 2.  Acute exacerbation of COPD. 3.  Community-acquired pneumonia. 4.  History of tobacco abuse in the past.    PLAN:  1. Continue Rocephin and Zithromax. 2.  DuoNeb q. 4 hours while awake. 3.  BiPAP 12/06 at all times except for meals.   4.  Repeat venous blood gases.  5.  Check mag and phos. 6.  Sputum for culture and sensitivity. 7.  As per orders.         Jami Reyes MD    D: 11/06/2022 11:45:01       T: 11/06/2022 11:48:31     [email or web address]  Job#: [id number]     Doc#: [id number]    CC:

## 2022-11-07 NOTE — PROGRESS NOTES
Pulmonary and Critical Care  Progress Note    Subjective: The patient has improved. Shortness of breath has improved. Chest pain none. Addressing respiratory complaints Patient is negative for  hemoptysis and cyanosis. CONSTITUTIONAL:  negative for fevers and chills. Past Medical History:     has a past medical history of Anemia, Anxiety, Arthritis, Back pain at L4-L5 level, Bipolar 1 disorder (HCC), COPD (chronic obstructive pulmonary disease) ([facility] [address] Emphysema of lung ([facility] [address] FH: CAD (coronary artery disease), Fibromyalgia, Full dentures, Gastric ulcer, H/O Doppler ultrasound, H/O echocardiogram, History of blood transfusion, Hyperlipidemia LDL goal <100, Hypertension, Irregular heartbeat, Obstructive sleep apnea, On home oxygen therapy, Osteoarthritis, Pericardial effusion, Spinal stenosis, Thyroid disease, and Wears glasses. has a past surgical history that includes Carpal tunnel release (Right, 1985); Tubal ligation (1987); back surgery (03/2017); Cholecystectomy, laparoscopic (N/A, 10/25/2020); Colonoscopy (N/A, 12/12/2019); Endoscopy, colon, diagnostic; Cardiac catheterization; Cardiac catheterization; and Upper gastrointestinal endoscopy (N/A, 1/7/2021). reports that she quit smoking about 14 years ago. Her smoking use included cigarettes. She has a 30.00 pack-year smoking history. She has never used smokeless tobacco. She reports that she does not currently use alcohol after a past usage of about 2.0 standard drinks per week. She reports that she does not use drugs. Family history:  family history includes Asthma in her mother; Heart Disease in her father.     Allergies   Allergen Reactions    Lisinopril Swelling and Rash     angioedema     Social History:    Reviewed; no changes    Objective:   PHYSICAL EXAM:        VITALS:  BP (!) 166/77   Pulse 97   Temp 97.8 °F (36.6 °C) (Oral)   Resp 21   Ht 5' 1\" (1.549 m)   Wt 204 lb 9 oz (92.8 kg)   LMP 01/05/2010   SpO2 98%   BMI 38.65 kg/m² 24HR INTAKE/OUTPUT:    No intake or output data in the 24 hours ending 11/07/22 1105      CONSTITUTIONAL:  awake, alert, cooperative, no apparent distress, and appears stated age  LUNGS: Decreased BS. Occ rhonchii. CARDIOVASCULAR:  normal S1 and S2 and negative JVD  ABD:Abdomen soft, non-tender. BS normal. No masses,  No organomegaly  NEURO:Alert and oriented x3. Gait normal. Reflexes and motor strength normal and symmetric. Cranial nerves 2-12 and sensation grossly intact. DATA:    CBC:  Recent Labs     11/05/22  1805 11/06/22  1059 11/07/22  0545   WBC 23.0* 22.1* 16.8*   RBC 3.94* 3.88* 3.77*   HGB 10.7* 10.4* 9.9*   HCT 37.2 35.9* 35.3*    203 224   MCV 94.4 92.5 93.6   MCH 27.2 26.8* 26.3*   MCHC 28.8* 29.0* 28.0*   RDW 13.7 13.7 13.8   SEGSPCT 88.7* 95.0* 95.8*      BMP:  Recent Labs     11/05/22  1805 11/06/22  1059 11/07/22  0545    138 136   K 3.9 4.3 4.9   CL 92* 92* 89*   CO2 37* 37* 36*   BUN 9 8 15   CREATININE 0.7 0.5* 0.7   CALCIUM 9.9 10.1 10.1   GLUCOSE 138* 124* 165*      ABG:  Recent Labs     11/06/22  1600 11/07/22  0900   PH 7.35 7.32*   PO2ART 72* 57*   FCF8KDF 84.0* 82.0*   O2SAT 93.9* 90.6*     Lab Results   Component Value Date    PROBNP 2,114 (H) 11/07/2022    PROBNP 527.0 (H) 11/05/2022    PROBNP 567.4 (H) 08/14/2022    THEOPH 8.0 (L) 06/28/2022     No results found for: [facility]    Radiology Review:  Pertinent images / reports were reviewed as a part of this visit.     Assessment:     Patient Active Problem List   Diagnosis    Centrilobular emphysema (HCC)    Hypertension    Hyperlipidemia LDL goal <100    Abnormal EKG    Palpitations    Anxiety    FH: CAD (coronary artery disease)    Fibromyalgia    Back pain at L4-L5 level    Sleep apnea    COPD exacerbation (HCC)    Electrolyte imbalance    Epigastric pain    COPD (chronic obstructive pulmonary disease) ([facility] [address]    Spinal stenosis of lumbar region with radiculopathy    Spinal stenosis, lumbar region, without neurogenic claudication    COPD with acute exacerbation (HCC)    Pneumonia due to infectious organism    SVT (supraventricular tachycardia) (HCC)    Class 1 obesity due to excess calories with body mass index (BMI) of 31.0 to 31.9 in adult    Pneumonia    Pleural effusion    Atelectasis    Pulmonary nodules    Respiratory failure with hypoxia and hypercapnia (HCC)    Anemia    COPD with exacerbation (HCC)    Acquired hemolytic anemia, unspecified (HCC)    Leucocytosis    Chronic kidney disease, stage I    Hyponatremia    PNA (pneumonia)    Sepsis (HCC)    Pericardial effusion    Acute acalculous cholecystitis    SIRS (systemic inflammatory response syndrome) (HCC)    Chest pain    Abnormal nuclear stress test    Abnormal stress test    Status post laparoscopic cholecystectomy    Moderate malnutrition (HCC)    LUZ MARIA (acute kidney injury) (HCC)    Dizziness    Stage 3a chronic kidney disease (HCC)    Adrenal insufficiency (HCC)    Trochanteric bursitis of right hip    Hypokalemia    Chest pressure    Leg edema    Acute on chronic respiratory failure with hypoxia ([address]       Plan:   1. Overall the patient has improved. 2. Inc. Activity.       Viraj Mcfarlane MD   11/7/2022  11:05 AM

## 2022-11-07 NOTE — PROGRESS NOTES
11/07/22 0400   NIV Type   $NIV $Daily Charge   NIV Started/Stopped On   Equipment Type v60   Mode Bilevel   Mask Type Full face mask   Mask Size Medium   Bonnet size Medium   Settings/Measurements   PIP Observed 12 cm H20   IPAP 12 cmH20   CPAP/EPAP 5 cmH2O   Vt (Measured) 514 mL   Rate Ordered 12   Resp 16   FiO2  70 %   I Time/ I Time % 1.2 s   Mask Leak (lpm) 0 lpm   Comfort Level Good   Using Accessory Muscles No   SpO2 100   Patient's Home Machine No   Patient Observation   Observations RRT decreased O2 to 50%   Alarm Settings   Alarms On Y   Low Pressure (cmH2O) 3 cmH2O   High Pressure (cmH2O) 25 cmH2O   Delay Alarm 20 sec(s)   Apnea (secs) 20 secs   RR Low (bpm) 13   RR High (bpm) 40 br/min

## 2022-11-07 NOTE — PROGRESS NOTES
Physician Progress Note      PATIENT:               Ellis Miller  CSN #:                  [id number]  :                       1960  ADMIT DATE:       2022 5:45 PM  [address] [place] DATE:  Alpesh Garcia  PROVIDER #:        Jhonathan Borrego MD          QUERY TEXT:    Pt admitted with AECOPD and pneumonia. Pt noted to have WBC 23.0-22.1-16. 8. If   possible, please document in the progress notes and discharge summary if you   are evaluating and /or treating any of the following: The medical record reflects the following:  Risk Factors: Pneumonia, acute-on-chronic respiratory failure with CO2   elevation, AECOPD  Clinical Indicators: WBC 23.0-22.1-16.8 , Tachycardia 102-107, respiratory   rate of 20-28, cough productive of yellowish-green phlegm, chest x-ray showed   pulmonary vascular congestion,  small bilateral pleural effusion. Her CAT scan of the chest showed noevidence   of PE. Patchy, irregular, subsolid, nodular densitiesbilaterally in the lower   lobes, right greater than the left  Treatment: Prednisone x5 d, Azithromycin x5 d,  CTX ,  Blood culture, Tx to SD   unit for closer monitoring for the Bipap, On 4L home O2 @3, Pulmonology   consult, CARDS consult    Thank you, Anastasia Christian RN, CDS ([phone])  Options provided:  -- Sepsis, present on admission  -- Sepsis, present on admission, now resolved  -- Pneumonia without Sepsis  -- Other - I will add my own diagnosis  -- Disagree - Not applicable / Not valid  -- Disagree - Clinically unable to determine / Unknown  -- Refer to Clinical Documentation Reviewer    PROVIDER RESPONSE TEXT:    This patient has sepsis which was present on admission.     Query created by: Viki Mead on 2022 2:41 PM      Electronically signed by:  Jhonathan Borrego MD 2022 3:19 PM

## 2022-11-07 NOTE — CONSULTS
INPATIENT CARDIOLOGY CONSULT NOTE         Reason for consultation:   Chest pain     Referring physician:  Dakota Romeo MD     Primary care physician: Donny Harris MD      Dear Dakota Romeo MD Thank you for the consult    Chief Complaint   Patient presents with    Shortness of Breath       History of present illness:Nelly is a 63 y. o.year old who  presents with   Chief Complaint   Patient presents with    Shortness of Breath     Patient is a pleasant 29-year-old female who was admitted to the hospital with chief complaint of shortness of breath and has been diagnosed with acute exacerbation of COPD due to pneumonia. Cardiology consulted to evaluate patient for chest pains. Patient has prior medical history significant for atrial fibrillation currently in sinus rhythm not maintained on oral anticoagulation as patient mention she was bruising and quit taking, history of morbid obesity, essential hypertension    Last left heart cardiac evaluation was in November 2020 which revealed normal coronary angiogram    EKG showed Normal sinus rhythm with right bundle branch block, no ischemic pattern  Cardiac troponin elevated 0.12, subsequently negative        Past medical history:    has a past medical history of Anemia, Anxiety, Arthritis, Back pain at L4-L5 level, Bipolar 1 disorder (HCC), COPD (chronic obstructive pulmonary disease) ([address] Emphysema of lung ([address] FH: CAD (coronary artery disease), Fibromyalgia, Full dentures, Gastric ulcer, H/O Doppler ultrasound, H/O echocardiogram, History of blood transfusion, Hyperlipidemia LDL goal <100, Hypertension, Irregular heartbeat, Obstructive sleep apnea, On home oxygen therapy, Osteoarthritis, Pericardial effusion, Spinal stenosis, Thyroid disease, and Wears glasses. Past surgical history:   has a past surgical history that includes Carpal tunnel release (Right, 1985); Tubal ligation (1987); back surgery (03/2017);  Cholecystectomy, laparoscopic (N/A, 10/25/2020); Colonoscopy (N/A, 12/12/2019); Endoscopy, colon, diagnostic; Cardiac catheterization; Cardiac catheterization; and Upper gastrointestinal endoscopy (N/A, 1/7/2021). Social History:   reports that she quit smoking about 14 years ago. Her smoking use included cigarettes. She has a 30.00 pack-year smoking history. She has never used smokeless tobacco. She reports that she does not currently use alcohol after a past usage of about 2.0 standard drinks per week. She reports that she does not use drugs.   Family history:   no family history of CAD, STROKE of DM    Allergies   Allergen Reactions    Lisinopril Swelling and Rash     angioedema       pantoprazole (PROTONIX) tablet 40 mg, QAM AC  atorvastatin (LIPITOR) tablet 80 mg, Daily  clonazePAM (KLONOPIN) tablet 0.5 mg, BID PRN  DULoxetine (CYMBALTA) extended release capsule 60 mg, Daily  folic acid (FOLVITE) tablet 1 mg, Daily  gabapentin (NEURONTIN) capsule 400 mg, TID  hydrALAZINE (APRESOLINE) tablet 50 mg, TID  lactobacillus (CULTURELLE) capsule 1 capsule, Daily with breakfast  levothyroxine (SYNTHROID) tablet 100 mcg, Daily  magnesium oxide (MAG-OX) tablet 400 mg, Daily  methocarbamol (ROBAXIN) tablet 750 mg, BID  metoprolol succinate (TOPROL XL) extended release tablet 25 mg, Daily  montelukast (SINGULAIR) tablet 10 mg, Daily  vitamin D (CHOLECALCIFEROL) tablet 1,000 Units, Daily  thiamine tablet 100 mg, Daily  sodium chloride flush 0.9 % injection 5-40 mL, 2 times per day  sodium chloride flush 0.9 % injection 5-40 mL, PRN  0.9 % sodium chloride infusion, PRN  ondansetron (ZOFRAN-ODT) disintegrating tablet 4 mg, Q8H PRN   Or  ondansetron (ZOFRAN) injection 4 mg, Q6H PRN  polyethylene glycol (GLYCOLAX) packet 17 g, Daily PRN  enoxaparin (LOVENOX) injection 40 mg, Daily  acetaminophen (TYLENOL) tablet 650 mg, Q6H PRN   Or  acetaminophen (TYLENOL) suppository 650 mg, Q6H PRN  ipratropium-albuterol (DUONEB) nebulizer solution 1 ampule, Q4H WA  azithromycin (ZITHROMAX) tablet 500 mg, Daily  Roflumilast (DALIRESP) tablet 500 mcg, Daily  dilTIAZem (CARDIZEM CD) extended release capsule 240 mg, Daily  oxyCODONE-acetaminophen (PERCOCET) 5-325 MG per tablet 1 tablet, Q8H PRN  budesonide-formoterol (SYMBICORT) 160-4.5 MCG/ACT inhaler 2 puff, BID  methylPREDNISolone sodium (SOLU-MEDROL) injection 40 mg, Q6H  guaiFENesin-dextromethorphan (ROBITUSSIN DM) 100-10 MG/5ML syrup 5 mL, Q6H PRN      Current Facility-Administered Medications   Medication Dose Route Frequency Provider Last Rate Last Admin    pantoprazole (PROTONIX) tablet 40 mg  40 mg Oral QAM  BRIAN Dias - CNP   40 mg at 11/07/22 0531    atorvastatin (LIPITOR) tablet 80 mg  80 mg Oral Daily Zay Blackwell MD   80 mg at 11/07/22 0852    clonazePAM (KLONOPIN) tablet 0.5 mg  0.5 mg Oral BID PRN Zay Blackwell MD   0.5 mg at 11/07/22 1045    DULoxetine (CYMBALTA) extended release capsule 60 mg  60 mg Oral Daily Zay Blackwell MD   60 mg at 74/00/16 5859    folic acid (FOLVITE) tablet 1 mg  1 mg Oral Daily Zay Blackwell MD   1 mg at 11/07/22 0850    gabapentin (NEURONTIN) capsule 400 mg  400 mg Oral TID Zay Blackwell MD   400 mg at 11/07/22 0855    hydrALAZINE (APRESOLINE) tablet 50 mg  50 mg Oral TID Zay Blackwell MD   50 mg at 11/07/22 0850    lactobacillus (CULTURELLE) capsule 1 capsule  1 capsule Oral Daily with breakfast Zay Blackwell MD   1 capsule at 11/07/22 0858    levothyroxine (SYNTHROID) tablet 100 mcg  100 mcg Oral Daily Zay Blakcwell MD   100 mcg at 11/07/22 0500    magnesium oxide (MAG-OX) tablet 400 mg  400 mg Oral Daily Zay Blackwell MD   400 mg at 11/07/22 0858    methocarbamol (ROBAXIN) tablet 750 mg  750 mg Oral BID Zay Blackwell MD   750 mg at 11/07/22 0851    metoprolol succinate (TOPROL XL) extended release tablet 25 mg  25 mg Oral Daily Zay Blackwell MD   25 mg at 11/07/22 0856    montelukast (SINGULAIR) tablet 10 mg  10 mg Oral Daily Zay Blackwell MD   10 mg at 11/07/22 0858    vitamin D (CHOLECALCIFEROL) tablet 1,000 Units  1,000 Units Oral Daily Valeria Uribe MD   1,000 Units at 11/07/22 0856    thiamine tablet 100 mg  100 mg Oral Daily Valeria Uribe MD   100 mg at 11/07/22 0855    sodium chloride flush 0.9 % injection 5-40 mL  5-40 mL IntraVENous 2 times per day Valeria Uribe MD   10 mL at 11/07/22 0850    sodium chloride flush 0.9 % injection 5-40 mL  5-40 mL IntraVENous PRN Valeria Uribe MD        0.9 % sodium chloride infusion   IntraVENous PRN Valeria Uribe MD        ondansetron (ZOFRAN-ODT) disintegrating tablet 4 mg  4 mg Oral Q8H PRN Valeria Uribe MD        Or    ondansetron [facility]) injection 4 mg  4 mg IntraVENous Q6H PRN Valeria Uribe MD        polyethylene glycol (GLYCOLAX) packet 17 g  17 g Oral Daily PRN Valeria Uribe MD        enoxaparin (LOVENOX) injection 40 mg  40 mg SubCUTAneous Daily Valeria Uribe MD   40 mg at 11/07/22 0858    acetaminophen (TYLENOL) tablet 650 mg  650 mg Oral Q6H PRN Valeria Uribe MD        Or    acetaminophen (TYLENOL) suppository 650 mg  650 mg Rectal Q6H PRN Valeria Uribe MD        ipratropium-albuterol (DUONEB) nebulizer solution 1 ampule  1 ampule Inhalation Q4H WA Valeria Uribe MD   1 ampule at 11/07/22 1103    azithromycin (ZITHROMAX) tablet 500 mg  500 mg Oral Daily Valeria Uribe MD   500 mg at 11/06/22 2035    Roflumilast (DALIRESP) tablet 500 mcg  500 mcg Oral Daily Valeria Uribe MD   500 mcg at 11/07/22 0850    dilTIAZem (CARDIZEM CD) extended release capsule 240 mg  240 mg Oral Daily BRIAN Santos CNP   240 mg at 11/07/22 0856    oxyCODONE-acetaminophen (PERCOCET) 5-325 MG per tablet 1 tablet  1 tablet Oral Q8H PRN Yady Roberts MD   1 tablet at 11/07/22 0454    budesonide-formoterol (SYMBICORT) 160-4.5 MCG/ACT inhaler 2 puff  2 puff Inhalation BID Sang Senior MD   2 puff at 11/07/22 0736    methylPREDNISolone sodium (SOLU-MEDROL) injection 40 mg  40 mg IntraVENous Q6H Max Mauricio MD   40 mg at 11/07/22 1134    guaiFENesin-dextromethorphan (ROBITUSSIN DM) 100-10 MG/5ML syrup 5 mL  5 mL Oral Q6H PRN Polly Acevedo MD   5 mL at 11/07/22 0530         Review of Systems:     Constitutional: No Fever or Weight Loss   Eyes: No Decreased Vision  ENT: No Headaches, Hearing Loss or Vertigo  Cardiovascular:   no chest pain, ++ dyspnea on exertion,  no palpitations or loss of consciousness  Respiratory: No cough or wheezing    Gastrointestinal: No abdominal pain, appetite loss, blood in stools, constipation, diarrhea or heartburn  Genitourinary: No dysuria, trouble voiding, or hematuria  Musculoskeletal:  No gait disturbance, weakness or joint complaints  Integumentary: No rash or pruritis  Neurological: No TIA or stroke symptoms  Psychiatric: No anxiety or depression  Endocrine: No malaise, fatigue or temperature intolerance  Hematologic/Lymphatic: No bleeding problems, blood clots or swollen lymph nodes  Allergic/Immunologic: No nasal congestion or hives    All other systems were reviewed and were negative otherwise. Physical Examination:      Vitals:    11/07/22 1104   BP:    Pulse:    Resp: 20   Temp:    SpO2:       Wt Readings from Last 3 Encounters:   11/06/22 204 lb 9 oz (92.8 kg)   08/14/22 205 lb (93 kg)   08/09/22 209 lb (94.8 kg)     Body mass index is 38.65 kg/m². General Appearance:  No distress, conversant  Constitutional:  Well developed, Well nourished  HEENT:  Normocephalic, Atraumatic, Oropharynx moist   Nose normal. Neck Supple Carotid: no carotid bruit  Eyes:  Conjunctiva normal, No discharge. Respiratory:    Bilateral wheezing on examination  Cardiovascular: S1-S2 No murmurs auscultated. No rubs, thrills or gallops. Normal  rhythm. Pedal pulses are normal. No pedal edema  GI:  Soft Non tender, non distended. Musculoskeletal:   No tenderness, No cyanosis, No clubbing. Integument:  Warm, Dry, No erythema, No rash. Lymphatic:  No lymphadenopathy noted. Neurologic:  Alert & oriented x 3  No focal deficits noted. Psychiatric:  Affect normal, Judgment normal, Mood normal.       Lab Review     Recent Labs     11/07/22  0545   WBC 16.8*   HGB 9.9*   HCT 35.3*         Recent Labs     11/07/22  0545      K 4.9   CL 89*   CO2 36*   PHOS 3.1   BUN 15   CREATININE 0.7     Recent Labs     11/07/22  0545   AST 18   ALT 17   BILITOT 0.2   ALKPHOS 94     No results for input(s): TROPONINI in the last 72 hours. No results found for: BNP  Lab Results   Component Value Date    INR 0.93 06/12/2021    PROTIME 11.3 (L) 06/12/2021         All labs, images, EKGs were personally reviewed      Assessment: 63 y. o.year old with PMH of  has a past medical history of Anemia, Anxiety, Arthritis, Back pain at L4-L5 level, Bipolar 1 disorder (HCC), COPD (chronic obstructive pulmonary disease) ([address] Emphysema of lung ([address] FH: CAD (coronary artery disease), Fibromyalgia, Full dentures, Gastric ulcer, H/O Doppler ultrasound, H/O echocardiogram, History of blood transfusion, Hyperlipidemia LDL goal <100, Hypertension, Irregular heartbeat, Obstructive sleep apnea, On home oxygen therapy, Osteoarthritis, Pericardial effusion, Spinal stenosis, Thyroid disease, and Wears glasses. Medical Decision Making :       Acute respiratory failure secondary to underlying COPD/pneumonia  Severe exacerbation of COPD with poor air entry, labored breathing, and bilateral wheezing    Management as per primary team, on a BiPAP, duo nebs, steroids    Prior history of nonobstructive CAD, angiogram 2020 reviewed: Advised low-dose aspirin  Atypical chest pain likely secondary to underlying COPD. Check echocardiogram, patient has history of pericardiocentesis    History of paroxysmal atrial fibrillation, not on oral anticoagulation given history of intracranial hemorrhage, GI bleeding and anemia. Patient remains in sinus rhythm. Continue with diltiazem 240 daily  History of supraventricular tachycardia.   Stable    Hyperlipidemia: Continue with Lipitor 80 mg daily    Essential hypertension: Continue with hydralazine 50 3 times daily , continue with diltiazem. We will stop Toprol-XL 25 daily as can precipitate bronchoconstriction, in setting of end-stage lung disease. Start patient on HCTZ.   Patient has history of angioedema with lisinopril    Thank you for the consult    Dr. Beti López  11/7/2022 11:59 AM

## 2022-11-07 NOTE — PROGRESS NOTES
Hospitalist Progress Note      Name:  Reno Cuenca /Age/Sex: 1960  (63 y.o. female)   MRN & CSN:  [id number] & [id number] Admission Date/Time: 2022  5:45 PM   Location:  [address] [place] [address] PCP: Oumou Perera MD         Hospital Day: 3    Assessment and Plan:   Reno Cuenca is a 63 y.o.  female  who presents with COPD exacerbation ([facility]    COPD exacerbation 2/2 CAP - On 4L home O2. Leucocytosis. Pro-vinh / LA negative. ABG consistent with hypoxic Hypercapnic respiratory failure. Some improvement in CO2 retention CTA s/o Pneumonia. Blood culture, Strep & uLegionella negative. Resp panel negative. Steroids. Duoneb. Continue intermittent BiPAP. Pulmonology following    2. DALTON/OHS - BiPAP at home. 3.   Hx of afib - Currently NSR, not in [facility] d/t unknown reason. No on AC given history of intracranial hemorrhage, GI bleeding. 4.   Elevated BNP with mild vascular congestion. No h/o CHF. recent EF normal. patient does not seemed to be  fluid overloaded; denying orthopnea. Chest xray showed improvement in vascular congestion. Will give one dose of Lasix 20 mg IV. continue hydralazine, Toprol, Cardizem. 5. Elevated troponin - Has some chest tightness but no ischemic changes in EKG. Likely Type II with elevated BNP. Trended down to normal. Cardiology consulted. 6.   Morbid Obesity: Counseled on Diet and wt loss. Chronic:     HTN - continue hydralazine, Toprol, Cardizem  CAD - asa, statin        Diet ADULT DIET; Regular; 4 carb choices (60 gm/meal);  No Added Salt (3-4 gm)   DVT Prophylaxis [] Lovenox, []  Heparin, [] SCDs, [] Ambulation   GI Prophylaxis [] PPI,  [] H2 Blocker,  [] Carafate,  [] Diet/Tube Feeds   Code Status Full Code   Disposition Patient requires continued admission due to Acute COPD   MDM [] Low, [] Moderate,[]  High  Patient's risk as above due to Multiple comorb     History of Present Illness:     Chief Complaint: [facility]ation [facility]  Reno Cuenca is a 63 y.o.  female  who presents with SOB    Patient only reported mild improvement in her SOB. Looks a little bit anxious about her condition. Patient calmed down after counseling. Ten point ROS reviewed negative, unless as noted above    Objective:   No intake or output data in the 24 hours ending 11/07/22 1125   Vitals:   Vitals:    11/07/22 1104   BP:    Pulse:    Resp: 20   Temp:    SpO2:      Physical Exam:   GEN Awake female, sitting upright in bed. Mild Respiratory distress. Appears given age. EYES Pupils are equally round. No scleral erythema, discharge, or conjunctivitis. HENT Mucous membranes are moist. Oral pharynx without exudates, no evidence of thrush. NECK Supple, no apparent thyromegaly or masses. RESP B/L decreased Breath sounds. Wheeze +  CARDIO/VASC S1/S2 auscultated. Regular rate without appreciable murmurs, rubs, or gallops. No JVD or carotid bruits. Peripheral pulses equal bilaterally and palpable. No peripheral edema. GI Abdomen is soft without significant tenderness, masses, or guarding. Bowel sounds are normoactive. Rectal exam deferred.  No costovertebral angle tenderness. Normal appearing external genitalia. Mcallister catheter is not present. HEME/LYMPH No palpable cervical lymphadenopathy and no hepatosplenomegaly. No petechiae or ecchymoses. MSK No gross joint deformities. SKIN Normal coloration, warm, dry. NEURO Cranial nerves appear grossly intact, normal speech, no lateralizing weakness. PSYCH Awake, alert, oriented x 4. Affect appropriate.     Medications:   Medications:    pantoprazole  40 mg Oral QAM AC    atorvastatin  80 mg Oral Daily    DULoxetine  60 mg Oral Daily    folic acid  1 mg Oral Daily    gabapentin  400 mg Oral TID    hydrALAZINE  50 mg Oral TID    lactobacillus  1 capsule Oral Daily with breakfast    levothyroxine  100 mcg Oral Daily    magnesium oxide  400 mg Oral Daily    methocarbamol  750 mg Oral BID    metoprolol succinate  25 mg Oral Daily montelukast  10 mg Oral Daily    Vitamin D  1,000 Units Oral Daily    thiamine  100 mg Oral Daily    sodium chloride flush  5-40 mL IntraVENous 2 times per day    enoxaparin  40 mg SubCUTAneous Daily    ipratropium-albuterol  1 ampule Inhalation Q4H WA    azithromycin  500 mg Oral Daily    Roflumilast  500 mcg Oral Daily    dilTIAZem  240 mg Oral Daily    budesonide-formoterol  2 puff Inhalation BID    methylPREDNISolone  40 mg IntraVENous Q6H      Infusions:    sodium chloride       PRN Meds: clonazePAM, 0.5 mg, BID PRN  sodium chloride flush, 5-40 mL, PRN  sodium chloride, , PRN  ondansetron, 4 mg, Q8H PRN   Or  ondansetron, 4 mg, Q6H PRN  polyethylene glycol, 17 g, Daily PRN  acetaminophen, 650 mg, Q6H PRN   Or  acetaminophen, 650 mg, Q6H PRN  oxyCODONE-acetaminophen, 1 tablet, Q8H PRN  guaiFENesin-dextromethorphan, 5 mL, Q6H PRN        Patient is still admitted because Acute COPD. The anticipated discharge is in greater than 24 hours.      Electronically signed by Juventino Babinski, MD on 11/7/2022 at 11:25 AM

## 2022-11-07 NOTE — PROGRESS NOTES
Was called to try a missed ABG. As soon as this RT put needle in wrist to obtain blood, patient requested for me to remove needle and not try again.   RT Nicole was notified

## 2022-11-08 LAB
ANION GAP SERPL CALCULATED.3IONS-SCNC: 6 MMOL/L (ref 4–16)
BASE EXCESS MIXED: 15.3 (ref 0–2.3)
BASOPHILS ABSOLUTE: 0 K/CU MM
BASOPHILS RELATIVE PERCENT: 0 % (ref 0–1)
BUN BLDV-MCNC: 17 MG/DL (ref 6–23)
CALCIUM SERPL-MCNC: 10.2 MG/DL (ref 8.3–10.6)
CHLORIDE BLD-SCNC: 87 MMOL/L (ref 99–110)
CO2: 41 MMOL/L (ref 21–32)
COMMENT: ABNORMAL
CREAT SERPL-MCNC: 0.7 MG/DL (ref 0.6–1.1)
DIFFERENTIAL TYPE: ABNORMAL
EKG ATRIAL RATE: 94 BPM
EKG DIAGNOSIS: NORMAL
EKG P AXIS: 83 DEGREES
EKG P-R INTERVAL: 138 MS
EKG Q-T INTERVAL: 398 MS
EKG QRS DURATION: 144 MS
EKG QTC CALCULATION (BAZETT): 497 MS
EKG R AXIS: 91 DEGREES
EKG T AXIS: 58 DEGREES
EKG VENTRICULAR RATE: 94 BPM
EOSINOPHILS ABSOLUTE: 0 K/CU MM
EOSINOPHILS RELATIVE PERCENT: 0 % (ref 0–3)
GFR SERPL CREATININE-BSD FRML MDRD: >60 ML/MIN/1.73M2
GLUCOSE BLD-MCNC: 142 MG/DL (ref 70–99)
HCO3 VENOUS: 44.5 MMOL/L (ref 19–25)
HCT VFR BLD CALC: 33.3 % (ref 37–47)
HEMOGLOBIN: 9.5 GM/DL (ref 12.5–16)
IMMATURE NEUTROPHIL %: 0.6 % (ref 0–0.43)
LYMPHOCYTES ABSOLUTE: 0.3 K/CU MM
LYMPHOCYTES RELATIVE PERCENT: 2.7 % (ref 24–44)
MCH RBC QN AUTO: 26.5 PG (ref 27–31)
MCHC RBC AUTO-ENTMCNC: 28.5 % (ref 32–36)
MCV RBC AUTO: 92.8 FL (ref 78–100)
MONOCYTES ABSOLUTE: 0.3 K/CU MM
MONOCYTES RELATIVE PERCENT: 3.1 % (ref 0–4)
NUCLEATED RBC %: 0 %
O2 SAT, VEN: 93.6 % (ref 50–70)
PCO2, VEN: 77 MMHG (ref 38–52)
PDW BLD-RTO: 13.5 % (ref 11.7–14.9)
PH VENOUS: 7.37 (ref 7.32–7.42)
PLATELET # BLD: 237 K/CU MM (ref 140–440)
PMV BLD AUTO: 10.2 FL (ref 7.5–11.1)
PO2, VEN: 78 MMHG (ref 28–48)
POTASSIUM SERPL-SCNC: 4.7 MMOL/L (ref 3.5–5.1)
RBC # BLD: 3.59 M/CU MM (ref 4.2–5.4)
SEGMENTED NEUTROPHILS ABSOLUTE COUNT: 9.5 K/CU MM
SEGMENTED NEUTROPHILS RELATIVE PERCENT: 93.6 % (ref 36–66)
SODIUM BLD-SCNC: 134 MMOL/L (ref 135–145)
TOTAL IMMATURE NEUTOROPHIL: 0.06 K/CU MM
TOTAL NUCLEATED RBC: 0 K/CU MM
WBC # BLD: 10.2 K/CU MM (ref 4–10.5)

## 2022-11-08 PROCEDURE — 6370000000 HC RX 637 (ALT 250 FOR IP): Performed by: SURGERY

## 2022-11-08 PROCEDURE — 6360000002 HC RX W HCPCS: Performed by: STUDENT IN AN ORGANIZED HEALTH CARE EDUCATION/TRAINING PROGRAM

## 2022-11-08 PROCEDURE — 36415 COLL VENOUS BLD VENIPUNCTURE: CPT

## 2022-11-08 PROCEDURE — 99233 SBSQ HOSP IP/OBS HIGH 50: CPT | Performed by: INTERNAL MEDICINE

## 2022-11-08 PROCEDURE — 2700000000 HC OXYGEN THERAPY PER DAY

## 2022-11-08 PROCEDURE — 85025 COMPLETE CBC W/AUTO DIFF WBC: CPT

## 2022-11-08 PROCEDURE — 6370000000 HC RX 637 (ALT 250 FOR IP): Performed by: STUDENT IN AN ORGANIZED HEALTH CARE EDUCATION/TRAINING PROGRAM

## 2022-11-08 PROCEDURE — 2580000003 HC RX 258: Performed by: SURGERY

## 2022-11-08 PROCEDURE — 6360000002 HC RX W HCPCS: Performed by: INTERNAL MEDICINE

## 2022-11-08 PROCEDURE — 93010 ELECTROCARDIOGRAM REPORT: CPT | Performed by: INTERNAL MEDICINE

## 2022-11-08 PROCEDURE — APPSS45 APP SPLIT SHARED TIME 31-45 MINUTES: Performed by: NURSE PRACTITIONER

## 2022-11-08 PROCEDURE — 6360000002 HC RX W HCPCS: Performed by: SURGERY

## 2022-11-08 PROCEDURE — 94640 AIRWAY INHALATION TREATMENT: CPT

## 2022-11-08 PROCEDURE — 2580000003 HC RX 258: Performed by: STUDENT IN AN ORGANIZED HEALTH CARE EDUCATION/TRAINING PROGRAM

## 2022-11-08 PROCEDURE — 94660 CPAP INITIATION&MGMT: CPT

## 2022-11-08 PROCEDURE — 6370000000 HC RX 637 (ALT 250 FOR IP): Performed by: NURSE PRACTITIONER

## 2022-11-08 PROCEDURE — 80048 BASIC METABOLIC PNL TOTAL CA: CPT

## 2022-11-08 PROCEDURE — 94664 DEMO&/EVAL PT USE INHALER: CPT

## 2022-11-08 PROCEDURE — 82805 BLOOD GASES W/O2 SATURATION: CPT

## 2022-11-08 PROCEDURE — 6370000000 HC RX 637 (ALT 250 FOR IP): Performed by: FAMILY MEDICINE

## 2022-11-08 PROCEDURE — 2140000000 HC CCU INTERMEDIATE R&B

## 2022-11-08 PROCEDURE — 6370000000 HC RX 637 (ALT 250 FOR IP): Performed by: INTERNAL MEDICINE

## 2022-11-08 PROCEDURE — 94761 N-INVAS EAR/PLS OXIMETRY MLT: CPT

## 2022-11-08 PROCEDURE — 6370000000 HC RX 637 (ALT 250 FOR IP)

## 2022-11-08 RX ORDER — METHYLPREDNISOLONE SODIUM SUCCINATE 40 MG/ML
40 INJECTION, POWDER, LYOPHILIZED, FOR SOLUTION INTRAMUSCULAR; INTRAVENOUS EVERY 12 HOURS
Status: DISCONTINUED | OUTPATIENT
Start: 2022-11-08 | End: 2022-11-11 | Stop reason: HOSPADM

## 2022-11-08 RX ORDER — POLYVINYL ALCOHOL 14 MG/ML
1 SOLUTION/ DROPS OPHTHALMIC
Status: DISCONTINUED | OUTPATIENT
Start: 2022-11-08 | End: 2022-11-11 | Stop reason: HOSPADM

## 2022-11-08 RX ORDER — DILTIAZEM HYDROCHLORIDE 180 MG/1
360 CAPSULE, COATED, EXTENDED RELEASE ORAL DAILY
Status: DISCONTINUED | OUTPATIENT
Start: 2022-11-09 | End: 2022-11-11 | Stop reason: HOSPADM

## 2022-11-08 RX ADMIN — METHOCARBAMOL TABLETS 750 MG: 500 TABLET, COATED ORAL at 21:40

## 2022-11-08 RX ADMIN — CLONAZEPAM 0.5 MG: 0.5 TABLET ORAL at 08:31

## 2022-11-08 RX ADMIN — HYDRALAZINE HYDROCHLORIDE 50 MG: 50 TABLET, FILM COATED ORAL at 21:40

## 2022-11-08 RX ADMIN — GUAIFENESIN AND DEXTROMETHORPHAN 5 ML: 100; 10 SYRUP ORAL at 11:41

## 2022-11-08 RX ADMIN — BUDESONIDE AND FORMOTEROL FUMARATE DIHYDRATE 2 PUFF: 160; 4.5 AEROSOL RESPIRATORY (INHALATION) at 07:38

## 2022-11-08 RX ADMIN — DILTIAZEM HYDROCHLORIDE 30 MG: 30 TABLET, FILM COATED ORAL at 12:17

## 2022-11-08 RX ADMIN — Medication 1000 UNITS: at 08:17

## 2022-11-08 RX ADMIN — BUDESONIDE AND FORMOTEROL FUMARATE DIHYDRATE 2 PUFF: 160; 4.5 AEROSOL RESPIRATORY (INHALATION) at 21:20

## 2022-11-08 RX ADMIN — MONTELUKAST 10 MG: 10 TABLET, FILM COATED ORAL at 08:16

## 2022-11-08 RX ADMIN — Medication 100 MG: at 08:17

## 2022-11-08 RX ADMIN — ROFLUMILAST 500 MCG: 500 TABLET ORAL at 08:16

## 2022-11-08 RX ADMIN — ATORVASTATIN CALCIUM 80 MG: 40 TABLET, FILM COATED ORAL at 08:17

## 2022-11-08 RX ADMIN — Medication 1 CAPSULE: at 08:17

## 2022-11-08 RX ADMIN — DILTIAZEM HYDROCHLORIDE 30 MG: 30 TABLET, FILM COATED ORAL at 18:12

## 2022-11-08 RX ADMIN — IPRATROPIUM BROMIDE AND ALBUTEROL SULFATE 1 AMPULE: .5; 2.5 SOLUTION RESPIRATORY (INHALATION) at 07:24

## 2022-11-08 RX ADMIN — GABAPENTIN 400 MG: 400 CAPSULE ORAL at 12:18

## 2022-11-08 RX ADMIN — IPRATROPIUM BROMIDE AND ALBUTEROL SULFATE 1 AMPULE: .5; 2.5 SOLUTION RESPIRATORY (INHALATION) at 21:12

## 2022-11-08 RX ADMIN — OXYCODONE AND ACETAMINOPHEN 1 TABLET: 5; 325 TABLET ORAL at 06:26

## 2022-11-08 RX ADMIN — ENOXAPARIN SODIUM 40 MG: 100 INJECTION SUBCUTANEOUS at 08:18

## 2022-11-08 RX ADMIN — IPRATROPIUM BROMIDE AND ALBUTEROL SULFATE 1 AMPULE: .5; 2.5 SOLUTION RESPIRATORY (INHALATION) at 15:38

## 2022-11-08 RX ADMIN — METHYLPREDNISOLONE SODIUM SUCCINATE 40 MG: 40 INJECTION, POWDER, FOR SOLUTION INTRAMUSCULAR; INTRAVENOUS at 06:28

## 2022-11-08 RX ADMIN — HYDROCHLOROTHIAZIDE 25 MG: 25 TABLET ORAL at 08:16

## 2022-11-08 RX ADMIN — AZITHROMYCIN MONOHYDRATE 500 MG: 250 TABLET ORAL at 21:40

## 2022-11-08 RX ADMIN — METHYLPREDNISOLONE SODIUM SUCCINATE 40 MG: 40 INJECTION, POWDER, FOR SOLUTION INTRAMUSCULAR; INTRAVENOUS at 00:30

## 2022-11-08 RX ADMIN — IPRATROPIUM BROMIDE AND ALBUTEROL SULFATE 1 AMPULE: .5; 2.5 SOLUTION RESPIRATORY (INHALATION) at 11:24

## 2022-11-08 RX ADMIN — LEVOTHYROXINE SODIUM 100 MCG: 0.1 TABLET ORAL at 06:28

## 2022-11-08 RX ADMIN — CEFTRIAXONE SODIUM 1000 MG: 1 INJECTION, POWDER, FOR SOLUTION INTRAMUSCULAR; INTRAVENOUS at 09:02

## 2022-11-08 RX ADMIN — OXYCODONE AND ACETAMINOPHEN 1 TABLET: 5; 325 TABLET ORAL at 15:20

## 2022-11-08 RX ADMIN — HYDRALAZINE HYDROCHLORIDE 50 MG: 50 TABLET, FILM COATED ORAL at 12:17

## 2022-11-08 RX ADMIN — SODIUM CHLORIDE, PRESERVATIVE FREE 10 ML: 5 INJECTION INTRAVENOUS at 00:32

## 2022-11-08 RX ADMIN — MAGNESIUM OXIDE TAB 400 MG (241.3 MG ELEMENTAL MG) 400 MG: 400 (241.3 MG) TAB at 08:17

## 2022-11-08 RX ADMIN — FOLIC ACID 1 MG: 1 TABLET ORAL at 08:16

## 2022-11-08 RX ADMIN — SODIUM CHLORIDE, PRESERVATIVE FREE 10 ML: 5 INJECTION INTRAVENOUS at 21:43

## 2022-11-08 RX ADMIN — GABAPENTIN 400 MG: 400 CAPSULE ORAL at 18:13

## 2022-11-08 RX ADMIN — GABAPENTIN 400 MG: 400 CAPSULE ORAL at 08:17

## 2022-11-08 RX ADMIN — CLONAZEPAM 0.5 MG: 0.5 TABLET ORAL at 21:40

## 2022-11-08 RX ADMIN — METHOCARBAMOL TABLETS 750 MG: 500 TABLET, COATED ORAL at 08:17

## 2022-11-08 RX ADMIN — DILTIAZEM HYDROCHLORIDE 240 MG: 240 CAPSULE, COATED, EXTENDED RELEASE ORAL at 08:20

## 2022-11-08 RX ADMIN — METHYLPREDNISOLONE SODIUM SUCCINATE 40 MG: 40 INJECTION, POWDER, FOR SOLUTION INTRAMUSCULAR; INTRAVENOUS at 18:12

## 2022-11-08 RX ADMIN — HYDRALAZINE HYDROCHLORIDE 50 MG: 50 TABLET, FILM COATED ORAL at 08:17

## 2022-11-08 RX ADMIN — SODIUM CHLORIDE, PRESERVATIVE FREE 10 ML: 5 INJECTION INTRAVENOUS at 08:24

## 2022-11-08 RX ADMIN — PANTOPRAZOLE SODIUM 40 MG: 40 TABLET, DELAYED RELEASE ORAL at 06:28

## 2022-11-08 RX ADMIN — DULOXETINE 60 MG: 30 CAPSULE, DELAYED RELEASE ORAL at 08:16

## 2022-11-08 ASSESSMENT — PAIN DESCRIPTION - DESCRIPTORS
DESCRIPTORS: ACHING
DESCRIPTORS: ACHING
DESCRIPTORS: ACHING;OTHER (COMMENT)

## 2022-11-08 ASSESSMENT — PAIN SCALES - GENERAL
PAINLEVEL_OUTOF10: 3
PAINLEVEL_OUTOF10: 7
PAINLEVEL_OUTOF10: 4

## 2022-11-08 ASSESSMENT — PAIN DESCRIPTION - FREQUENCY
FREQUENCY: INTERMITTENT
FREQUENCY: CONTINUOUS

## 2022-11-08 ASSESSMENT — PAIN - FUNCTIONAL ASSESSMENT
PAIN_FUNCTIONAL_ASSESSMENT: ACTIVITIES ARE NOT PREVENTED

## 2022-11-08 ASSESSMENT — PAIN DESCRIPTION - ONSET
ONSET: ON-GOING
ONSET: ON-GOING

## 2022-11-08 ASSESSMENT — PAIN DESCRIPTION - PAIN TYPE
TYPE: ACUTE PAIN
TYPE: ACUTE PAIN

## 2022-11-08 ASSESSMENT — PAIN DESCRIPTION - ORIENTATION
ORIENTATION: MID
ORIENTATION: MID

## 2022-11-08 ASSESSMENT — PAIN DESCRIPTION - LOCATION
LOCATION: BACK
LOCATION: CHEST
LOCATION: CHEST

## 2022-11-08 NOTE — PROGRESS NOTES
Cardiology Progress Note     Today's Plan: monitor heart rate    Admit Date:  11/5/2022    Consult reason/ Seen today for: shortness of breath and chest pain    Subjective and  Overnight Events:  patient is visably working hard to breath. She feels anxious and states that she feels short of breath. Oxygenation on NC is good. Chest pain yes - some but not terrible  Shortness of breath yes - improved from admission  Palpitations no  Dizziness no  Swelling no    Assessment and Plan:  Shortness of breath: COPD exasperation. Echo does not have VHD. She does not want to wear bipap. Chest pain with history of Non obstructive CAD: troponin is resolved. Likely due to COPD. Echo does not have WMA or appear to have pericarditis. No stress test planned due to respiratory status. PAF: in sinus. Monitor for afib RVR given respiratory status. Recommend keeping potassium 4-4.5 and magnesium 2-2.2 in patients with atrial fibrillation. HTN: elevated currently. Will increase her cardizem. Will avoid increasing hydralazine due to possible reflex tachycardia. Telemetry Reviewed:   sinus tachycardia    ECHO :   Echocardiogram 11/7/2022   Summary   This is a limited echocardiogram.   Left ventricular systolic function is normal.   Ejection fraction is visually estimated at 60%. No significant valvular disease noted. No evidence of any pericardial effusion. History of Presenting Illness:    Chief complain on admission : 63 y. o.year old who is admitted for  Chief Complaint   Patient presents with    Shortness of Breath        Past medical history:    has a past medical history of Anemia, Anxiety, Arthritis, Back pain at L4-L5 level, Bipolar 1 disorder (HCC), COPD (chronic obstructive pulmonary disease) ([address] Emphysema of lung ([address] FH: CAD (coronary artery disease), Fibromyalgia, Full dentures, Gastric ulcer, H/O Doppler ultrasound, H/O echocardiogram, History of blood transfusion, Hyperlipidemia LDL goal <100, Hypertension, Irregular heartbeat, Obstructive sleep apnea, On home oxygen therapy, Osteoarthritis, Pericardial effusion, Spinal stenosis, Thyroid disease, and Wears glasses. Past surgical history:   has a past surgical history that includes Carpal tunnel release (Right, 1985); Tubal ligation (1987); back surgery (03/2017); Cholecystectomy, laparoscopic (N/A, 10/25/2020); Colonoscopy (N/A, 12/12/2019); Endoscopy, colon, diagnostic; Cardiac catheterization; Cardiac catheterization; and Upper gastrointestinal endoscopy (N/A, 1/7/2021). Social History:   reports that she quit smoking about 14 years ago. Her smoking use included cigarettes. She has a 30.00 pack-year smoking history. She has never used smokeless tobacco. She reports that she does not currently use alcohol after a past usage of about 2.0 standard drinks per week. She reports that she does not use drugs. Family history:  family history includes Asthma in her mother; Heart Disease in her father. Allergies   Allergen Reactions    Lisinopril Swelling and Rash     angioedema       Review of Systems   All 14 systems were reviewed and are negative  Except for the positive findings  which as documented     BP (!) 151/83   Pulse 94   Temp 97.8 °F (36.6 °C) (Axillary)   Resp 16   Ht 5' 1\" (1.549 m)   Wt 204 lb 9 oz (92.8 kg)   LMP 01/05/2010   SpO2 95%   BMI 38.65 kg/m²     Intake/Output Summary (Last 24 hours) at 11/8/2022 1145  Last data filed at 11/8/2022 0511  Gross per 24 hour   Intake --   Output 1150 ml   Net -1150 ml       Physical Exam  Vitals reviewed. Constitutional:       General: She is not in acute distress. Appearance: Normal appearance. She is obese. She is ill-appearing. HENT:      Head: Atraumatic. Cardiovascular:      Rate and Rhythm: Normal rate and regular rhythm. Pulses: Normal pulses. Heart sounds: Normal heart sounds. No murmur heard.   Pulmonary:      Effort: Tachypnea and accessory muscle usage present. Breath sounds: Examination of the left-lower field reveals decreased breath sounds. Decreased breath sounds and wheezing present. Comments: Labored breathing  Musculoskeletal:      Cervical back: No muscular tenderness. Neurological:      Mental Status: She is alert. Medications:    cefTRIAXone (ROCEPHIN) IV  1,000 mg IntraVENous Q24H    methylPREDNISolone  40 mg IntraVENous Q12H    pantoprazole  40 mg Oral QAM AC    hydroCHLOROthiazide  25 mg Oral Daily    atorvastatin  80 mg Oral Daily    DULoxetine  60 mg Oral Daily    folic acid  1 mg Oral Daily    gabapentin  400 mg Oral TID    hydrALAZINE  50 mg Oral TID    lactobacillus  1 capsule Oral Daily with breakfast    levothyroxine  100 mcg Oral Daily    magnesium oxide  400 mg Oral Daily    methocarbamol  750 mg Oral BID    montelukast  10 mg Oral Daily    Vitamin D  1,000 Units Oral Daily    thiamine  100 mg Oral Daily    sodium chloride flush  5-40 mL IntraVENous 2 times per day    enoxaparin  40 mg SubCUTAneous Daily    ipratropium-albuterol  1 ampule Inhalation Q4H WA    azithromycin  500 mg Oral Daily    Roflumilast  500 mcg Oral Daily    dilTIAZem  240 mg Oral Daily    budesonide-formoterol  2 puff Inhalation BID      sodium chloride       polyvinyl alcohol, clonazePAM, sodium chloride flush, sodium chloride, ondansetron **OR** ondansetron, polyethylene glycol, acetaminophen **OR** acetaminophen, oxyCODONE-acetaminophen, guaiFENesin-dextromethorphan    Lab Data:  CBC:   Recent Labs     11/06/22  1059 11/07/22  0545 11/08/22  0553   WBC 22.1* 16.8* 10.2   HGB 10.4* 9.9* 9.5*   HCT 35.9* 35.3* 33.3*   MCV 92.5 93.6 92.8    224 237     BMP:   Recent Labs     11/06/22  1059 11/07/22  0545 11/08/22  0553    136 134*   K 4.3 4.9 4.7   CL 92* 89* 87*   CO2 37* 36* 41*   PHOS  --  3.1  --    BUN 8 15 17   CREATININE 0.5* 0.7 0.7     PT/INR: No results for input(s): PROTIME, INR in the last 72 hours.   BNP:    Recent Labs 11/05/22  1805 11/07/22  0545   PROBNP 527.0* 2,114*     TROPONIN:   Recent Labs     11/05/22  1805 11/06/22  1059   TROPONINT 0.012* <0.010               All labs, medications and tests reviewed by myself , continue all other medications of all above medical condition listed as is except for changes mentioned above. Thank you very much for consult , please call with questions. Electronically signed by BRIAN Arzola CNP on 11/8/2022 at 11:45 AM           CARDIOLOGY ATTENDING ADDENDUM    I have seen, spoken to and examined this patient personally, independent of the NP/PAC. I have reviewed the hospital care given to date and reviewed all pertinent labs and imaging. I have spoken with patient, nursing staff and provided written and verbal instructions . The above note has been reviewed. I have spent substantive amount of time in formulating patient care. Physical Exam:    General:   Awake, alert  Head:normal  Eye:normal  Chest:   coarse   Cardiovascular:  S1S2           MEDICAL DECISION MAKING :       Acute respiratory failure secondary to underlying COPD/pneumonia  Severe exacerbation of COPD with poor air entry, labored breathing, and bilateral wheezing     Management as per primary team, on a BiPAP, duo nebs, steroids     Prior history of nonobstructive CAD, angiogram 2020 reviewed: Advised low-dose aspirin  Atypical chest pain likely secondary to underlying COPD. Check echocardiogram, patient has history of pericardiocentesis     History of paroxysmal atrial fibrillation, not on oral anticoagulation given history of intracranial hemorrhage, GI bleeding and anemia. Patient remains in sinus rhythm. Continue with diltiazem    History of supraventricular tachycardia.   Stable     Hyperlipidemia: Continue with Lipitor 80 mg daily     Essential hypertension:    Patient had some rebound tachycardia, will readjust medications  Increase her Cardizem  Continue with hydralazine  Continue with HCTZ  History of angioedema with lisinopril         Dr. Gisele Briggs MD

## 2022-11-08 NOTE — PROGRESS NOTES
V2.0  [facility] [place] Progress Note      Name:  Sae Guerra /Age/Sex: 1960  (63 y.o. female)   MRN & CSN:  [id number] & [id number] Encounter Date/Time: 2022 2:23 PM EST    Location:  [address] [place] [address]-A PCP: Dg Jackson MD       Hospital Day: 4      Subjective:     Chief Complaint: Shortness of Breath     Pt appears anxiouis today. Complaints of SOB and cough; states not improving since she came in. Her O2 and CO2 have improved now back to her baseline. Pt seems concerned over her pain and anxiety meds. She received her home Klonopin dose this morning. Denies lightheadedness, dizziness, fever, night sweats, chills, palpitations, abd pain, nausea, vomiting, diarrhea, dysuria. Assessment and Plan:   Sae Guerra is a 63 y.o. female with pmh of COPD, afib, anxiety, DALTON/OHS who presents with COPD exacerbation ([facility] [address]      Plan:  Acute hypoxic hypercapnic resp failure 2/2 COPD exacerbation 2/2 CAP. Was on BIPAP now on home baseline 4L. CTA suggestive of pneumonia. Blood culture, Strep & uLegionella negative. Resp panel negative. - continue Steroids; frequency changed to BID  - continue breathing treatment  - continue abx for CAP (azithro + ceftriaxone)  - continue BIPHP prn QHS and naps  - Pulmonology following; appreciate recs     DALTON/OHS - BiPAP at home. - continue     Hx of afib - Currently NSR, Not on AC given history of intracranial hemorrhage, GI bleeding.  - home  diltiazem increased by card  - cardiology consulted; appreciate recs      Elevated BNP with mild vascular congestion. No h/o CHF. recent EF normal. patient does not seemed to be  fluid overloaded; denying orthopnea. Received lasix 20 mg IV. - no need for further diuresis      Elevated troponin - Has some chest tightness but no ischemic changes in EKG. Likely Type II with elevated BNP. Trended down to normal. Cardiology following     Morbid Obesity: Counseled on Diet and wt loss.        HTN - BP in the high side 160's.   -  continue hydralazine  - Toprol  - continue Cardizem (home dose increased by cardiology)    CAD - asa, statin       Diet ADULT DIET; Regular; 4 carb choices (60 gm/meal); No Added Salt (3-4 gm)   DVT Prophylaxis [x] Lovenox, []  Heparin, [] SCDs, [] Ambulation,  [] Eliquis, [] Xarelto  [] Coumadin   Code Status Full Code   Disposition From: home  Expected Disposition: TBD  Estimated Date of Discharge: 1-2 days  Patient requires continued admission due to COPD excarbation   Surrogate Decision Maker/ POA      Review of Systems:    Review of Systems    See above    Objective: Intake/Output Summary (Last 24 hours) at 11/8/2022 1434  Last data filed at 11/8/2022 0511  Gross per 24 hour   Intake --   Output 1150 ml   Net -1150 ml        Vitals:   Vitals:    11/08/22 1157   BP: (!) 166/76   Pulse: (!) 113   Resp: 23   Temp: 98.5 °F (36.9 °C)   SpO2: 94%       Physical Exam:     General: NAD  Eyes: EOMI  ENT: neck supple  Cardiovascular: Regular rate. Respiratory: wheezing, bilateral rhonchi, decreased breathing sounds  Gastrointestinal: Soft, non tender  Genitourinary: no suprapubic tenderness  Musculoskeletal: No edema  Skin: warm, dry  Neuro: Alert. Psych: Mood appropriate.      Medications:   Medications:    cefTRIAXone (ROCEPHIN) IV  1,000 mg IntraVENous Q24H    methylPREDNISolone  40 mg IntraVENous Q12H    dilTIAZem  30 mg Oral 4 times per day    [START ON 11/9/2022] dilTIAZem  360 mg Oral Daily    pantoprazole  40 mg Oral QAM AC    hydroCHLOROthiazide  25 mg Oral Daily    atorvastatin  80 mg Oral Daily    DULoxetine  60 mg Oral Daily    folic acid  1 mg Oral Daily    gabapentin  400 mg Oral TID    hydrALAZINE  50 mg Oral TID    lactobacillus  1 capsule Oral Daily with breakfast    levothyroxine  100 mcg Oral Daily    magnesium oxide  400 mg Oral Daily    methocarbamol  750 mg Oral BID    montelukast  10 mg Oral Daily    Vitamin D  1,000 Units Oral Daily    thiamine  100 mg Oral Daily    sodium chloride flush  5-40 mL IntraVENous 2 times per day    enoxaparin  40 mg SubCUTAneous Daily    ipratropium-albuterol  1 ampule Inhalation Q4H WA    azithromycin  500 mg Oral Daily    Roflumilast  500 mcg Oral Daily    budesonide-formoterol  2 puff Inhalation BID      Infusions:    sodium chloride       PRN Meds: polyvinyl alcohol, 1 drop, Q2H PRN  clonazePAM, 0.5 mg, BID PRN  sodium chloride flush, 5-40 mL, PRN  sodium chloride, , PRN  ondansetron, 4 mg, Q8H PRN   Or  ondansetron, 4 mg, Q6H PRN  polyethylene glycol, 17 g, Daily PRN  acetaminophen, 650 mg, Q6H PRN   Or  acetaminophen, 650 mg, Q6H PRN  oxyCODONE-acetaminophen, 1 tablet, Q8H PRN  guaiFENesin-dextromethorphan, 5 mL, Q6H PRN      Labs      Recent Results (from the past 24 hour(s))   Blood Gas, Venous    Collection Time: 11/07/22  5:38 PM   Result Value Ref Range    pH, Topher 7.34 7.32 - 7.42    pCO2, Topher 88 (H) 38 - 52 mmHG    pO2, Topher 36 28 - 48 mmHG    Base Exc, Mixed 17.1 (H) 0 - 2.3    HCO3, Venous 47.5 (H) 19 - 25 MMOL/L    O2 Sat, Topher 67.6 50 - 70 %    Comment VBG    CBC with Auto Differential    Collection Time: 11/08/22  5:53 AM   Result Value Ref Range    WBC 10.2 4.0 - 10.5 K/CU MM    RBC 3.59 (L) 4.2 - 5.4 M/CU MM    Hemoglobin 9.5 (L) 12.5 - 16.0 GM/DL    Hematocrit 33.3 (L) 37 - 47 %    MCV 92.8 78 - 100 FL    MCH 26.5 (L) 27 - 31 PG    MCHC 28.5 (L) 32.0 - 36.0 %    RDW 13.5 11.7 - 14.9 %    Platelets 912 109 - 693 K/CU MM    MPV 10.2 7.5 - 11.1 FL    Differential Type AUTOMATED DIFFERENTIAL     Segs Relative 93.6 (H) 36 - 66 %    Lymphocytes % 2.7 (L) 24 - 44 %    Monocytes % 3.1 0 - 4 %    Eosinophils % 0.0 0 - 3 %    Basophils % 0.0 0 - 1 %    Segs Absolute 9.5 K/CU MM    Lymphocytes Absolute 0.3 K/CU MM    Monocytes Absolute 0.3 K/CU MM    Eosinophils Absolute 0.0 K/CU MM    Basophils Absolute 0.0 K/CU MM    Nucleated RBC % 0.0 %    Total Nucleated RBC 0.0 K/CU MM    Total Immature Neutrophil 0.06 K/CU MM    Immature Neutrophil % 0.6 (H) 0 - 0.43 %   Basic Metabolic Panel    Collection Time: 11/08/22  5:53 AM   Result Value Ref Range    Sodium 134 (L) 135 - 145 MMOL/L    Potassium 4.7 3.5 - 5.1 MMOL/L    Chloride 87 (L) 99 - 110 mMol/L    CO2 41 (H) 21 - 32 MMOL/L    Anion Gap 6 4 - 16    BUN 17 6 - 23 MG/DL    Creatinine 0.7 0.6 - 1.1 MG/DL    Est, Glom Filt Rate >60 >60 mL/min/1.73m2    Glucose 142 (H) 70 - 99 MG/DL    Calcium 10.2 8.3 - 10.6 MG/DL   Blood Gas, Venous    Collection Time: 11/08/22 12:07 PM   Result Value Ref Range    pH, Topher 7.37 7.32 - 7.42    pCO2, Topher 77 (H) 38 - 52 mmHG    pO2, Topher 78 (H) 28 - 48 mmHG    Base Exc, Mixed 15.3 (H) 0 - 2.3    HCO3, Venous 44.5 (H) 19 - 25 MMOL/L    O2 Sat, Topher 93.6 (H) 50 - 70 %    Comment VBG         Imaging/Diagnostics Last 24 Hours   XR CHEST PORTABLE    Result Date: 11/7/2022  EXAMINATION: ONE XRAY VIEW OF THE CHEST 11/7/2022 7:26 am COMPARISON: 11/05/2022 radiograph HISTORY: ORDERING SYSTEM PROVIDED HISTORY: sob TECHNOLOGIST PROVIDED HISTORY: Reason for exam:->sob Reason for Exam: sob FINDINGS: The heart, mediastinum and pulmonary vascular markings are normal.  Improved aeration centrally from prior imaging. Mild bibasilar ground-glass opacities persist with probable trace pleural effusions. The lungs are otherwise clear. No significant skeletal finding. Improving vascular congestion centrally. Bibasilar pattern of trace pleural fluid with edema/atelectasis stable.        Electronically signed by Hermelinda Amaro MD on 11/8/2022 at 2:34 PM

## 2022-11-08 NOTE — PROGRESS NOTES
pulmonary      SUBJECTIVE:  still with severe cough and sob     OBJECTIVE    VITALS:  BP (!) 151/83   Pulse 94   Temp 97.8 °F (36.6 °C) (Axillary)   Resp 16   Ht 5' 1\" (1.549 m)   Wt 204 lb 9 oz (92.8 kg)   LMP 01/05/2010   SpO2 95%   BMI 38.65 kg/m²   HEAD AND FACE EXAM:  No throat injection, no active exudate,no thrush  NECK EXAM;No JVD, no masses, symmetrical  CHEST EXAM; Expansion equal and symmetrical, no masses  LUNG EXAM; Good breath sounds bilaterally.  There are expiratory wheezes both lungs, there are crackles at both lung bases  CARDIOVASCULAR EXAM: Positive S1 and S2, no S3 or S4, no clicks ,no murmurs  RIGHT AND LEFT LOWER EXTRIMITY EXAM: No edema, no swelling, no inflamation  CNS EXAM: Alert and oriented X3          LABS   Lab Results   Component Value Date    WBC 10.2 11/08/2022    HGB 9.5 (L) 11/08/2022    HCT 33.3 (L) 11/08/2022    MCV 92.8 11/08/2022     11/08/2022     Lab Results   Component Value Date    CREATININE 0.7 11/08/2022    BUN 17 11/08/2022     (L) 11/08/2022    K 4.7 11/08/2022    CL 87 (L) 11/08/2022    CO2 41 (H) 11/08/2022     Lab Results   Component Value Date    INR 0.93 06/12/2021    PROTIME 11.3 (L) 06/12/2021          Lab Results   Component Value Date/Time    PHOS 3.1 11/07/2022 05:45 AM    PHOS 2.8 10/21/2022 01:29 PM    PHOS 2.4 06/28/2022 06:25 AM        Recent Labs     11/06/22  1600 11/07/22  0900   PH 7.35 7.32*   PO2ART 72* 57*   [id number] 84.0* 82.0*   O2SAT 93.9* 90.6*         Wt Readings from Last 3 Encounters:   11/06/22 204 lb 9 oz (92.8 kg)   08/14/22 205 lb (93 kg)   08/09/22 209 lb (94.8 kg)               ASSESMENT  Ac copd  Pneumonia  eric        PLAN  On iv steroids  Cough suppression  Cont o2  Cont bipap at [facility]    11/8/2022  Joshua Rob MD, MAC.

## 2022-11-09 LAB
ANION GAP SERPL CALCULATED.3IONS-SCNC: 8 MMOL/L (ref 4–16)
BASOPHILS ABSOLUTE: 0 K/CU MM
BASOPHILS RELATIVE PERCENT: 0.1 % (ref 0–1)
BUN BLDV-MCNC: 14 MG/DL (ref 6–23)
CALCIUM SERPL-MCNC: 10.1 MG/DL (ref 8.3–10.6)
CHLORIDE BLD-SCNC: 80 MMOL/L (ref 99–110)
CO2: 45 MMOL/L (ref 21–32)
CREAT SERPL-MCNC: 0.6 MG/DL (ref 0.6–1.1)
DIFFERENTIAL TYPE: ABNORMAL
EOSINOPHILS ABSOLUTE: 0 K/CU MM
EOSINOPHILS RELATIVE PERCENT: 0 % (ref 0–3)
GFR SERPL CREATININE-BSD FRML MDRD: >60 ML/MIN/1.73M2
GLUCOSE BLD-MCNC: 151 MG/DL (ref 70–99)
HCT VFR BLD CALC: 34.4 % (ref 37–47)
HEMOGLOBIN: 9.9 GM/DL (ref 12.5–16)
IMMATURE NEUTROPHIL %: 1 % (ref 0–0.43)
LYMPHOCYTES ABSOLUTE: 0.3 K/CU MM
LYMPHOCYTES RELATIVE PERCENT: 3.5 % (ref 24–44)
MCH RBC QN AUTO: 26.6 PG (ref 27–31)
MCHC RBC AUTO-ENTMCNC: 28.8 % (ref 32–36)
MCV RBC AUTO: 92.5 FL (ref 78–100)
MONOCYTES ABSOLUTE: 0.7 K/CU MM
MONOCYTES RELATIVE PERCENT: 7.3 % (ref 0–4)
NUCLEATED RBC %: 0 %
PDW BLD-RTO: 13.3 % (ref 11.7–14.9)
PLATELET # BLD: 248 K/CU MM (ref 140–440)
PMV BLD AUTO: 9.8 FL (ref 7.5–11.1)
POTASSIUM SERPL-SCNC: 4.2 MMOL/L (ref 3.5–5.1)
RBC # BLD: 3.72 M/CU MM (ref 4.2–5.4)
SEGMENTED NEUTROPHILS ABSOLUTE COUNT: 8 K/CU MM
SEGMENTED NEUTROPHILS RELATIVE PERCENT: 88.1 % (ref 36–66)
SODIUM BLD-SCNC: 133 MMOL/L (ref 135–145)
TOTAL IMMATURE NEUTOROPHIL: 0.09 K/CU MM
TOTAL NUCLEATED RBC: 0 K/CU MM
WBC # BLD: 9.1 K/CU MM (ref 4–10.5)

## 2022-11-09 PROCEDURE — 2140000000 HC CCU INTERMEDIATE R&B

## 2022-11-09 PROCEDURE — 94667 MNPJ CHEST WALL 1ST: CPT

## 2022-11-09 PROCEDURE — 2580000003 HC RX 258: Performed by: SURGERY

## 2022-11-09 PROCEDURE — 94640 AIRWAY INHALATION TREATMENT: CPT

## 2022-11-09 PROCEDURE — 6370000000 HC RX 637 (ALT 250 FOR IP): Performed by: SURGERY

## 2022-11-09 PROCEDURE — 6370000000 HC RX 637 (ALT 250 FOR IP)

## 2022-11-09 PROCEDURE — 6360000002 HC RX W HCPCS: Performed by: SURGERY

## 2022-11-09 PROCEDURE — 94660 CPAP INITIATION&MGMT: CPT

## 2022-11-09 PROCEDURE — 97116 GAIT TRAINING THERAPY: CPT

## 2022-11-09 PROCEDURE — 6360000002 HC RX W HCPCS: Performed by: STUDENT IN AN ORGANIZED HEALTH CARE EDUCATION/TRAINING PROGRAM

## 2022-11-09 PROCEDURE — 80048 BASIC METABOLIC PNL TOTAL CA: CPT

## 2022-11-09 PROCEDURE — 6370000000 HC RX 637 (ALT 250 FOR IP): Performed by: INTERNAL MEDICINE

## 2022-11-09 PROCEDURE — 85025 COMPLETE CBC W/AUTO DIFF WBC: CPT

## 2022-11-09 PROCEDURE — 97162 PT EVAL MOD COMPLEX 30 MIN: CPT

## 2022-11-09 PROCEDURE — 36415 COLL VENOUS BLD VENIPUNCTURE: CPT

## 2022-11-09 PROCEDURE — 6370000000 HC RX 637 (ALT 250 FOR IP): Performed by: STUDENT IN AN ORGANIZED HEALTH CARE EDUCATION/TRAINING PROGRAM

## 2022-11-09 PROCEDURE — 97166 OT EVAL MOD COMPLEX 45 MIN: CPT

## 2022-11-09 PROCEDURE — 2700000000 HC OXYGEN THERAPY PER DAY

## 2022-11-09 PROCEDURE — 6370000000 HC RX 637 (ALT 250 FOR IP): Performed by: NURSE PRACTITIONER

## 2022-11-09 PROCEDURE — 94761 N-INVAS EAR/PLS OXIMETRY MLT: CPT

## 2022-11-09 PROCEDURE — 36600 WITHDRAWAL OF ARTERIAL BLOOD: CPT

## 2022-11-09 PROCEDURE — 97535 SELF CARE MNGMENT TRAINING: CPT

## 2022-11-09 PROCEDURE — 2580000003 HC RX 258: Performed by: STUDENT IN AN ORGANIZED HEALTH CARE EDUCATION/TRAINING PROGRAM

## 2022-11-09 PROCEDURE — 99233 SBSQ HOSP IP/OBS HIGH 50: CPT | Performed by: INTERNAL MEDICINE

## 2022-11-09 PROCEDURE — APPSS45 APP SPLIT SHARED TIME 31-45 MINUTES: Performed by: NURSE PRACTITIONER

## 2022-11-09 RX ADMIN — Medication 100 MG: at 09:17

## 2022-11-09 RX ADMIN — METHOCARBAMOL TABLETS 750 MG: 500 TABLET, COATED ORAL at 09:15

## 2022-11-09 RX ADMIN — Medication 1 CAPSULE: at 09:17

## 2022-11-09 RX ADMIN — GABAPENTIN 400 MG: 400 CAPSULE ORAL at 13:33

## 2022-11-09 RX ADMIN — IPRATROPIUM BROMIDE AND ALBUTEROL SULFATE 1 AMPULE: .5; 2.5 SOLUTION RESPIRATORY (INHALATION) at 19:18

## 2022-11-09 RX ADMIN — CLONAZEPAM 0.5 MG: 0.5 TABLET ORAL at 09:16

## 2022-11-09 RX ADMIN — ROFLUMILAST 500 MCG: 500 TABLET ORAL at 09:15

## 2022-11-09 RX ADMIN — BUDESONIDE AND FORMOTEROL FUMARATE DIHYDRATE 2 PUFF: 160; 4.5 AEROSOL RESPIRATORY (INHALATION) at 07:30

## 2022-11-09 RX ADMIN — MAGNESIUM OXIDE TAB 400 MG (241.3 MG ELEMENTAL MG) 400 MG: 400 (241.3 MG) TAB at 09:18

## 2022-11-09 RX ADMIN — Medication 1000 UNITS: at 09:18

## 2022-11-09 RX ADMIN — HYDROCHLOROTHIAZIDE 25 MG: 25 TABLET ORAL at 09:17

## 2022-11-09 RX ADMIN — THEOPHYLLINE ANHYDROUS 100 MG: 100 CAPSULE, EXTENDED RELEASE ORAL at 21:00

## 2022-11-09 RX ADMIN — METHYLPREDNISOLONE SODIUM SUCCINATE 40 MG: 40 INJECTION, POWDER, FOR SOLUTION INTRAMUSCULAR; INTRAVENOUS at 18:51

## 2022-11-09 RX ADMIN — CLONAZEPAM 0.5 MG: 0.5 TABLET ORAL at 21:00

## 2022-11-09 RX ADMIN — GUAIFENESIN AND DEXTROMETHORPHAN 5 ML: 100; 10 SYRUP ORAL at 11:53

## 2022-11-09 RX ADMIN — IPRATROPIUM BROMIDE AND ALBUTEROL SULFATE 1 AMPULE: .5; 2.5 SOLUTION RESPIRATORY (INHALATION) at 11:55

## 2022-11-09 RX ADMIN — FOLIC ACID 1 MG: 1 TABLET ORAL at 09:16

## 2022-11-09 RX ADMIN — DILTIAZEM HYDROCHLORIDE 360 MG: 180 CAPSULE, COATED, EXTENDED RELEASE ORAL at 09:15

## 2022-11-09 RX ADMIN — DILTIAZEM HYDROCHLORIDE 30 MG: 30 TABLET, FILM COATED ORAL at 00:09

## 2022-11-09 RX ADMIN — HYDRALAZINE HYDROCHLORIDE 50 MG: 50 TABLET, FILM COATED ORAL at 09:17

## 2022-11-09 RX ADMIN — GABAPENTIN 400 MG: 400 CAPSULE ORAL at 09:15

## 2022-11-09 RX ADMIN — SODIUM CHLORIDE, PRESERVATIVE FREE 5 ML: 5 INJECTION INTRAVENOUS at 09:45

## 2022-11-09 RX ADMIN — METHYLPREDNISOLONE SODIUM SUCCINATE 40 MG: 40 INJECTION, POWDER, FOR SOLUTION INTRAMUSCULAR; INTRAVENOUS at 06:37

## 2022-11-09 RX ADMIN — METHOCARBAMOL TABLETS 750 MG: 500 TABLET, COATED ORAL at 21:00

## 2022-11-09 RX ADMIN — GABAPENTIN 400 MG: 400 CAPSULE ORAL at 18:51

## 2022-11-09 RX ADMIN — THEOPHYLLINE ANHYDROUS 100 MG: 100 CAPSULE, EXTENDED RELEASE ORAL at 11:49

## 2022-11-09 RX ADMIN — METOPROLOL TARTRATE 12.5 MG: 25 TABLET, FILM COATED ORAL at 21:00

## 2022-11-09 RX ADMIN — BUDESONIDE AND FORMOTEROL FUMARATE DIHYDRATE 2 PUFF: 160; 4.5 AEROSOL RESPIRATORY (INHALATION) at 19:19

## 2022-11-09 RX ADMIN — OXYCODONE AND ACETAMINOPHEN 1 TABLET: 5; 325 TABLET ORAL at 09:27

## 2022-11-09 RX ADMIN — ATORVASTATIN CALCIUM 80 MG: 40 TABLET, FILM COATED ORAL at 09:16

## 2022-11-09 RX ADMIN — AZITHROMYCIN MONOHYDRATE 500 MG: 250 TABLET ORAL at 21:00

## 2022-11-09 RX ADMIN — HYDRALAZINE HYDROCHLORIDE 50 MG: 50 TABLET, FILM COATED ORAL at 21:00

## 2022-11-09 RX ADMIN — ENOXAPARIN SODIUM 40 MG: 100 INJECTION SUBCUTANEOUS at 09:18

## 2022-11-09 RX ADMIN — LEVOTHYROXINE SODIUM 100 MCG: 0.1 TABLET ORAL at 06:38

## 2022-11-09 RX ADMIN — CEFTRIAXONE SODIUM 1000 MG: 1 INJECTION, POWDER, FOR SOLUTION INTRAMUSCULAR; INTRAVENOUS at 09:30

## 2022-11-09 RX ADMIN — IPRATROPIUM BROMIDE AND ALBUTEROL SULFATE 1 AMPULE: .5; 2.5 SOLUTION RESPIRATORY (INHALATION) at 15:38

## 2022-11-09 RX ADMIN — MONTELUKAST 10 MG: 10 TABLET, FILM COATED ORAL at 09:17

## 2022-11-09 RX ADMIN — PANTOPRAZOLE SODIUM 40 MG: 40 TABLET, DELAYED RELEASE ORAL at 06:38

## 2022-11-09 RX ADMIN — SODIUM CHLORIDE, PRESERVATIVE FREE 10 ML: 5 INJECTION INTRAVENOUS at 21:05

## 2022-11-09 RX ADMIN — IPRATROPIUM BROMIDE AND ALBUTEROL SULFATE 1 AMPULE: .5; 2.5 SOLUTION RESPIRATORY (INHALATION) at 07:30

## 2022-11-09 RX ADMIN — DULOXETINE 60 MG: 30 CAPSULE, DELAYED RELEASE ORAL at 09:17

## 2022-11-09 RX ADMIN — OXYCODONE AND ACETAMINOPHEN 1 TABLET: 5; 325 TABLET ORAL at 21:00

## 2022-11-09 ASSESSMENT — PAIN DESCRIPTION - LOCATION
LOCATION: SHOULDER
LOCATION: CHEST

## 2022-11-09 ASSESSMENT — PAIN SCALES - GENERAL
PAINLEVEL_OUTOF10: 3
PAINLEVEL_OUTOF10: 10
PAINLEVEL_OUTOF10: 7

## 2022-11-09 ASSESSMENT — PAIN DESCRIPTION - DESCRIPTORS
DESCRIPTORS: ACHING;DISCOMFORT
DESCRIPTORS: ACHING

## 2022-11-09 ASSESSMENT — PAIN - FUNCTIONAL ASSESSMENT: PAIN_FUNCTIONAL_ASSESSMENT: ACTIVITIES ARE NOT PREVENTED

## 2022-11-09 ASSESSMENT — PAIN DESCRIPTION - ORIENTATION
ORIENTATION: MID
ORIENTATION: RIGHT;LEFT

## 2022-11-09 NOTE — PROGRESS NOTES
V2.0  [facility] [place] Hospital[place] Progress Note      Name:  Myrtle Scott /Age/Sex: 1960  (63 y.o. female)   MRN & CSN:  [id number] & [id number] Encounter Date/Time: 2022 2:23 PM EST    Location:  [facility]/3103-A PCP: Sandy Archibald MD       Hospital Day: 5      Subjective:     Chief Complaint: Shortness of Breath     Pt states her breathing is the same. Reports some improvement in her cough with yellow/green sputum. Pt on home baseline O2 and BIPAP for nights/naps. Denies lightheadedness, dizziness, fever, night sweats, chills, palpitations, abd pain, nausea, vomiting, diarrhea, dysuria. Assessment and Plan:   Myrtle Scott is a 63 y.o. female with pmh of COPD, afib, anxiety, DALTON/OHS who presents with COPD exacerbation ([facility] [address]      Plan:  Acute hypoxic hypercapnic resp failure 2/2 COPD exacerbation 2/2 CAP. Was on BIPAP now on home baseline 4L. CTA suggestive of pneumonia. Blood culture, Strep & uLegionella negative. Resp panel negative. - continue Steroids  - continue breathing treatment  - continue abx for CAP (azithro + ceftriaxone)  - continue BIPHP prn QHS and naps  - Pulmonology following; appreciate recs  - continue roflumilast         DALTON/OHS - BiPAP at home. - continue     Hx of afib - Currently NSR, Not on AC given history of intracranial hemorrhage, GI bleeding.  - home  diltiazem increased by card  - cardiology consulted; appreciate recs      Elevated BNP with mild vascular congestion. No h/o CHF. recent EF normal. patient does not seemed to be  fluid overloaded; denying orthopnea. Received lasix 20 mg IV. - no need for further diuresis      Elevated troponin - Has some chest tightness but no ischemic changes in EKG. Likely Type II with elevated BNP. Trended down to normal. Cardiology following     Morbid Obesity: Counseled on Diet and wt loss.        HTN - BP in the high side 180's but that was before AM meds  -  continue hydralazine  - continue HCTZ  - continue Cardizem (home dose increased by cardiology)    CAD - asa, statin         Diet ADULT DIET; Regular; 4 carb choices (60 gm/meal); No Added Salt (3-4 gm)   DVT Prophylaxis [x] Lovenox, []  Heparin, [] SCDs, [] Ambulation,  [] Eliquis, [] Xarelto  [] Coumadin   Code Status Full Code   Disposition From: home  Expected Disposition: TBD  Estimated Date of Discharge: 1-2 days  Patient requires continued admission due to COPD excarbation   Surrogate Decision Maker/ POA      Review of Systems:    Review of Systems    See above    Objective: Intake/Output Summary (Last 24 hours) at 11/9/2022 1206  Last data filed at 11/9/2022 0400  Gross per 24 hour   Intake --   Output 1100 ml   Net -1100 ml          Vitals:   Vitals:    11/09/22 1159   BP:    Pulse:    Resp:    Temp:    SpO2: 93%       Physical Exam:     General: NAD  Eyes: EOMI  ENT: neck supple  Cardiovascular: Regular rate. Respiratory: wheezing, bilateral rhonchi, decreased breathing sounds  Gastrointestinal: Soft, non tender  Genitourinary: no suprapubic tenderness  Musculoskeletal: No edema  Skin: warm, dry  Neuro: Alert. Psych: Mood appropriate.      Medications:   Medications:    theophylline  100 mg Oral BID    cefTRIAXone (ROCEPHIN) IV  1,000 mg IntraVENous Q24H    methylPREDNISolone  40 mg IntraVENous Q12H    dilTIAZem  360 mg Oral Daily    pantoprazole  40 mg Oral QAM AC    hydroCHLOROthiazide  25 mg Oral Daily    atorvastatin  80 mg Oral Daily    DULoxetine  60 mg Oral Daily    folic acid  1 mg Oral Daily    gabapentin  400 mg Oral TID    hydrALAZINE  50 mg Oral TID    lactobacillus  1 capsule Oral Daily with breakfast    levothyroxine  100 mcg Oral Daily    magnesium oxide  400 mg Oral Daily    methocarbamol  750 mg Oral BID    montelukast  10 mg Oral Daily    Vitamin D  1,000 Units Oral Daily    thiamine  100 mg Oral Daily    sodium chloride flush  5-40 mL IntraVENous 2 times per day    enoxaparin  40 mg SubCUTAneous Daily    ipratropium-albuterol  1 ampule Inhalation Q4H WA    azithromycin  500 mg Oral Daily    Roflumilast  500 mcg Oral Daily    budesonide-formoterol  2 puff Inhalation BID      Infusions:    sodium chloride       PRN Meds: polyvinyl alcohol, 1 drop, Q2H PRN  clonazePAM, 0.5 mg, BID PRN  sodium chloride flush, 5-40 mL, PRN  sodium chloride, , PRN  ondansetron, 4 mg, Q8H PRN   Or  ondansetron, 4 mg, Q6H PRN  polyethylene glycol, 17 g, Daily PRN  acetaminophen, 650 mg, Q6H PRN   Or  acetaminophen, 650 mg, Q6H PRN  oxyCODONE-acetaminophen, 1 tablet, Q8H PRN  guaiFENesin-dextromethorphan, 5 mL, Q6H PRN      Labs      Recent Results (from the past 24 hour(s))   Blood Gas, Venous    Collection Time: 11/08/22 12:07 PM   Result Value Ref Range    pH, Topher 7.37 7.32 - 7.42    pCO2, Topher 77 (H) 38 - 52 mmHG    pO2, Topher 78 (H) 28 - 48 mmHG    Base Exc, Mixed 15.3 (H) 0 - 2.3    HCO3, Venous 44.5 (H) 19 - 25 MMOL/L    O2 Sat, Topher 93.6 (H) 50 - 70 %    Comment VBG    CBC with Auto Differential    Collection Time: 11/09/22  7:04 AM   Result Value Ref Range    WBC 9.1 4.0 - 10.5 K/CU MM    RBC 3.72 (L) 4.2 - 5.4 M/CU MM    Hemoglobin 9.9 (L) 12.5 - 16.0 GM/DL    Hematocrit 34.4 (L) 37 - 47 %    MCV 92.5 78 - 100 FL    MCH 26.6 (L) 27 - 31 PG    MCHC 28.8 (L) 32.0 - 36.0 %    RDW 13.3 11.7 - 14.9 %    Platelets 636 475 - 105 K/CU MM    MPV 9.8 7.5 - 11.1 FL    Differential Type AUTOMATED DIFFERENTIAL     Segs Relative 88.1 (H) 36 - 66 %    Lymphocytes % 3.5 (L) 24 - 44 %    Monocytes % 7.3 (H) 0 - 4 %    Eosinophils % 0.0 0 - 3 %    Basophils % 0.1 0 - 1 %    Segs Absolute 8.0 K/CU MM    Lymphocytes Absolute 0.3 K/CU MM    Monocytes Absolute 0.7 K/CU MM    Eosinophils Absolute 0.0 K/CU MM    Basophils Absolute 0.0 K/CU MM    Nucleated RBC % 0.0 %    Total Nucleated RBC 0.0 K/CU MM    Total Immature Neutrophil 0.09 K/CU MM    Immature Neutrophil % 1.0 (H) 0 - 0.43 %   Basic Metabolic Panel    Collection Time: 11/09/22  7:04 AM   Result Value Ref Range    Sodium 133 (L) 135 - 145 MMOL/L    Potassium 4.2 3.5 - 5.1 MMOL/L    Chloride 80 (L) 99 - 110 mMol/L    CO2 45 (H) 21 - 32 MMOL/L    Anion Gap 8 4 - 16    BUN 14 6 - 23 MG/DL    Creatinine 0.6 0.6 - 1.1 MG/DL    Est, Glom Filt Rate >60 >60 mL/min/1.73m2    Glucose 151 (H) 70 - 99 MG/DL    Calcium 10.1 8.3 - 10.6 MG/DL        Imaging/Diagnostics Last 24 Hours   XR CHEST PORTABLE    Result Date: 11/7/2022  EXAMINATION: ONE XRAY VIEW OF THE CHEST 11/7/2022 7:26 am COMPARISON: 11/05/2022 radiograph HISTORY: ORDERING SYSTEM PROVIDED HISTORY: sob TECHNOLOGIST PROVIDED HISTORY: Reason for exam:->sob Reason for Exam: sob FINDINGS: The heart, mediastinum and pulmonary vascular markings are normal.  Improved aeration centrally from prior imaging. Mild bibasilar ground-glass opacities persist with probable trace pleural effusions. The lungs are otherwise clear. No significant skeletal finding. Improving vascular congestion centrally. Bibasilar pattern of trace pleural fluid with edema/atelectasis stable.        Electronically signed by Antonietta Barnes MD on 11/9/2022 at 12:06 PM

## 2022-11-09 NOTE — CONSULTS
[facility] PHYSICAL THERAPY EVALUATION  Luisa Johnson, 1960, 3103/3103-A, 11/9/2022    History  [place]:  The primary encounter diagnosis was Acute on chronic respiratory failure with hypercapnia ([address] Diagnoses of Leukocytosis, unspecified type and Pneumonia of both lower lobes due to infectious organism were also pertinent to this visit. Patient  has a past medical history of Anemia, Anxiety, Arthritis, Back pain at L4-L5 level, Bipolar 1 disorder (HCC), COPD (chronic obstructive pulmonary disease) ([address] Emphysema of lung ([address] FH: CAD (coronary artery disease), Fibromyalgia, Full dentures, Gastric ulcer, H/O Doppler ultrasound, H/O echocardiogram, History of blood transfusion, Hyperlipidemia LDL goal <100, Hypertension, Irregular heartbeat, Obstructive sleep apnea, On home oxygen therapy, Osteoarthritis, Pericardial effusion, Spinal stenosis, Thyroid disease, and Wears glasses. Patient  has a past surgical history that includes Carpal tunnel release (Right, 1985); Tubal ligation (1987); back surgery (03/2017); Cholecystectomy, laparoscopic (N/A, 10/25/2020); Colonoscopy (N/A, 12/12/2019); Endoscopy, colon, diagnostic; Cardiac catheterization; Cardiac catheterization; and Upper gastrointestinal endoscopy (N/A, 1/7/2021). Subjective:  Patient states:  \"I am just so tired. \"    Pain:  states pain in bilateral LEs, does not numerically rate .     Communication with other providers:  Handoff to RN, OT  Restrictions: general precautions, fall risk, O2    Home Setup/Prior level of function  Social/Functional History  Lives With: Spouse, Son  Type of Home: House  Home Layout: One level  Home Access: Stairs to enter with rails  Entrance Stairs - Number of Steps: 3  Bathroom Shower/Tub: Walk-in shower, Shower chair with back  [employer]: Handicap height  Bathroom Equipment: Grab bars in shower, Grab bars around toilet  Bathroom Accessibility: [facility], Not accessible  Home Equipment: Rollator  Has the patient had two or more falls in the past year or any fall with injury in the past year?: No (slid out of bed)  Receives Help From: Family  ADL Assistance: [address]: Needs assistance (family helps)  Homemaking Responsibilities: No  Ambulation Assistance: Independent (with FWW)  Transfer Assistance: Independent  Active : No  Patient's  Info:  or daughter  Occupation: On disability    Examination of body systems (includes body structures/functions, activity/participation limitations):  Observation:  Pt supine in bed upon arrival and agreeable to therapy  Vision:  WFL  [facility]  Cardiopulmonary:  2L O2, 4L with movement; this is at baseline  Cognition: impaired, see OT/SLP note for further evaluation. Musculoskeletal  ROM R/L:  WFL. Strength R/L:  4/5, minimal impaired in function and endurance. Neuro:  WFL      Mobility:  Rolling L/R:  SBA  Supine to sit:  SBA  Sit to supine: SBA  Transfers: pt completed STS to/from EOB and commode CGA with cues for sequencing  Sitting balance:  good. Standing balance:  fair+. Gait: pt ambulated 10' + 10' with RW with decreased debbie and maintained walker too far forward. Cues provided for pathway and walker management throughout    [facility] 6 Clicks Inpatient Mobility:  AM-PAC Inpatient Mobility Raw Score : 18    Safety: patient left supine in bed (denied chair), call light within reach, RN notified, gait belt used. Assessment:  Pt is a 63 y.o. female admitted to the hospital for COPD exacerbation. Pt is typically mod I with RW for all ambulation and transfers. Pt is presenting with decreased endurance, impaired bed mobility, impaired transfers, impaired gait, decreased strength, impaired cognition. Pt would benefit from continued acute care as well [place] [id number] with 24/7 supervision upon discharge.     Complexity: moderate    Prognosis: Good, no significant barriers to participation at this time.      General Plan: 3-5 times per week/week     Equipment: none, pt owns RW    Goals:  Short Term Goals  Time Frame for Short Term Goals: 1 week  Short Term Goal 1: Pt to complete all bed mobility mod I  Short Term Goal 2: Pt to complete all STS transfers to/from bed, commode, and chair mod I  Short Term Goal 3: pt to ambulate 48' with LRAD and supervision  Short Term Goal 4: Pt to ascend/descedn 3 stairs with LRAD and supervision to simulate home set up       Treatment plan:  Bed mobility, transfers, balance, gait, TA, TX    Recommendations for NURSING mobility: amb with gait belt and RW    Time:   Time in: 1100  Time out: 1122  Timed treatment minutes: 12  Total time: 22    Electronically signed by:    Juju Farrell PT  11/9/2022, 11:49 AM

## 2022-11-09 NOTE — PROGRESS NOTES
Pulmonary and Critical Care  Progress Note    Subjective: The patient is better. Shortness of breath has improved. Chest pain none. Addressing respiratory complaints Patient is negative for  hemoptysis and cyanosis. CONSTITUTIONAL:  negative for fevers and chills. Past Medical History:     has a past medical history of Anemia, Anxiety, Arthritis, Back pain at L4-L5 level, Bipolar 1 disorder (HCC), COPD (chronic obstructive pulmonary disease) ([facility] [address] Emphysema of lung ([facility] [address] FH: CAD (coronary artery disease), Fibromyalgia, Full dentures, Gastric ulcer, H/O Doppler ultrasound, H/O echocardiogram, History of blood transfusion, Hyperlipidemia LDL goal <100, Hypertension, Irregular heartbeat, Obstructive sleep apnea, On home oxygen therapy, Osteoarthritis, Pericardial effusion, Spinal stenosis, Thyroid disease, and Wears glasses. has a past surgical history that includes Carpal tunnel release (Right, 1985); Tubal ligation (1987); back surgery (03/2017); Cholecystectomy, laparoscopic (N/A, 10/25/2020); Colonoscopy (N/A, 12/12/2019); Endoscopy, colon, diagnostic; Cardiac catheterization; Cardiac catheterization; and Upper gastrointestinal endoscopy (N/A, 1/7/2021). reports that she quit smoking about 14 years ago. Her smoking use included cigarettes. She has a 30.00 pack-year smoking history. She has never used smokeless tobacco. She reports that she does not currently use alcohol after a past usage of about 2.0 standard drinks per week. She reports that she does not use drugs. Family history:  family history includes Asthma in her mother; Heart Disease in her father.     Allergies   Allergen Reactions    Lisinopril Swelling and Rash     angioedema     Social History:    Reviewed; no changes    Objective:   PHYSICAL EXAM:        VITALS:  BP (!) 176/88   Pulse (!) 107   Temp 98.4 °F (36.9 °C) (Oral)   Resp 20   Ht 5' 1\" (1.549 m)   Wt 208 lb 6.4 oz (94.5 kg)   LMP 01/05/2010   SpO2 90%   BMI 39.38 kg/m²     24HR INTAKE/OUTPUT:      Intake/Output Summary (Last 24 hours) at 11/9/2022 1048  Last data filed at 11/9/2022 0400  Gross per 24 hour   Intake --   Output 1100 ml   Net -1100 ml         CONSTITUTIONAL:  awake, alert, cooperative, no apparent distress, and appears stated age  LUNGS: Decreased BS. Occ rhonchii. CARDIOVASCULAR:  normal S1 and S2 and negative JVD  ABD:Abdomen soft, non-tender. BS normal. No masses,  No organomegaly  NEURO:Alert and oriented x3. Gait normal. Reflexes and motor strength normal and symmetric. Cranial nerves 2-12 and sensation grossly intact. DATA:    CBC:  Recent Labs     11/07/22  0545 11/08/22  0553 11/09/22  0704   WBC 16.8* 10.2 9.1   RBC 3.77* 3.59* 3.72*   HGB 9.9* 9.5* 9.9*   HCT 35.3* 33.3* 34.4*    237 248   MCV 93.6 92.8 92.5   MCH 26.3* 26.5* 26.6*   MCHC 28.0* 28.5* 28.8*   RDW 13.8 13.5 13.3   SEGSPCT 95.8* 93.6* 88.1*      BMP:  Recent Labs     11/07/22  0545 11/08/22  0553 11/09/22  0704    134* 133*   K 4.9 4.7 4.2   CL 89* 87* 80*   CO2 36* 41* 45*   BUN 15 17 14   CREATININE 0.7 0.7 0.6   CALCIUM 10.1 10.2 10.1   GLUCOSE 165* 142* 151*      ABG:  Recent Labs     11/06/22  1600 11/07/22  0900   PH 7.35 7.32*   PO2ART 72* 57*   QDP0MHN 84.0* 82.0*   O2SAT 93.9* 90.6*     Lab Results   Component Value Date    PROBNP 2,114 (H) 11/07/2022    PROBNP 527.0 (H) 11/05/2022    PROBNP 567.4 (H) 08/14/2022    THEOPH 8.0 (L) 06/28/2022     No results found for: [facility]    Radiology Review:  Pertinent images / reports were reviewed as a part of this visit.     Assessment:     Patient Active Problem List   Diagnosis    Centrilobular emphysema (HCC)    Hypertension    Hyperlipidemia LDL goal <100    Abnormal EKG    Palpitations    Anxiety    FH: CAD (coronary artery disease)    Fibromyalgia    Back pain at L4-L5 level    Sleep apnea    COPD exacerbation (HCC)    Electrolyte imbalance    Epigastric pain    COPD (chronic obstructive pulmonary disease) ([facility] [address] Spinal stenosis of lumbar region with radiculopathy    Spinal stenosis, lumbar region, without neurogenic claudication    COPD with acute exacerbation ([address]    Pneumonia due to infectious organism    SVT (supraventricular tachycardia) (HCC)    Class 1 obesity due to excess calories with body mass index (BMI) of 31.0 to 31.9 in adult    Pneumonia    Pleural effusion    Atelectasis    Pulmonary nodules    Respiratory failure with hypoxia and hypercapnia (HCC)    Anemia    COPD with exacerbation (HCC)    Acquired hemolytic anemia, unspecified (HCC)    Leucocytosis    Chronic kidney disease, stage I    Hyponatremia    PNA (pneumonia)    Sepsis (HCC)    Pericardial effusion    Acute acalculous cholecystitis    SIRS (systemic inflammatory response syndrome) (HCC)    Chest pain    Abnormal nuclear stress test    Abnormal stress test    Status post laparoscopic cholecystectomy    Moderate malnutrition (HCC)    LUZ MARIA (acute kidney injury) (HCC)    Dizziness    Stage 3a chronic kidney disease (HCC)    Adrenal insufficiency (HCC)    Trochanteric bursitis of right hip    Hypokalemia    Chest pressure    Leg edema    Acute on chronic respiratory failure with hypoxia ([address]       Plan:   1. Overall the patient has improved. 2. Inc. Activity. 3. Add theodur.     Radha Diaz MD   11/9/2022  10:48 AM

## 2022-11-09 NOTE — CONSULTS
[address] [facility],[address] ACUTE CARE OCCUPATIONAL THERAPY EVALUATION    Lizzie Jones, 1960, 3103/3103-A, 11/9/2022      Discharge Recommendation: SNF v Home with 24/7 Sup and C OT     History:  [place]:  The primary encounter diagnosis was Acute on chronic respiratory failure with hypercapnia ([facility] [address] Diagnoses of Leukocytosis, unspecified type and Pneumonia of both lower lobes due to infectious organism were also pertinent to this visit.   Pt   Past Medical History:   Diagnosis Date    Anemia     Anxiety 02/16/2017    follows with Dr Owen Leos     Back pain at L4-L5 level 2/16/2017    Dr Tennille Ambriz 1 disorder [facility])     per pt on 2/5/2021\"never told I have bipolar\"    COPD (chronic obstructive pulmonary disease) ([facility] [address]     follows with Dr Alexandra Bowen    Emphysema of lung [facility])     FH: CAD (coronary artery disease) 2/16/2017    Father had in his forties, sister in her thirties     Fibromyalgia 02/16/2017    Full dentures     full upper plate and partial on the bottom    Gastric ulcer     \"had stomach ulder back fall 2019\"    H/O Doppler ultrasound 04/05/2019    venous- no DVT or reflux    H/O echocardiogram 01/14/2015    EF50-55% Normal- see media    History of blood transfusion     Hyperlipidemia LDL goal <100     Hypertension     Dr Henley Later    Irregular heartbeat     \"they said I have irreg heart beat before- back when they drained fluid around my heart \"    Obstructive sleep apnea     \"have bipap I use at home\"    On home oxygen therapy     \"on oxygen all the time at home and keep it on 2.5 liters\"    Osteoarthritis     Pericardial effusion     per old chart had pericardial effusion 10/2020    Spinal stenosis     hx per old chart    Thyroid disease     Wears glasses     \"suppose to wear glasses\"         Subjective:  Patient states: \"I am feeling so tired\" - pt repeatedly stated words irrelevant to conversation and demo'd difficulty with word finding   Pain: c/o BLE pain without rating Communication with other providers: Coeval with PT for pt safety and tolerance, RN handoff   Restrictions: general precautions, fall risk, tele, O2 supp   No one at bedside    Home Setup/Prior level of function:  Social/Functional History  Lives With: Spouse, Son  Type of Home: House  Home Layout: One level  Home Access: Stairs to enter with rails  Entrance Stairs - Number of Steps: 3  Bathroom Shower/Tub: Walk-in shower, Shower chair with back  [employer]: Handicap height  Bathroom Equipment: Grab bars in shower, Grab bars around toilet  Bathroom Accessibility: Walker accessible, Not accessible  Home Equipment: Rollator  Has the patient had two or more falls in the past year or any fall with injury in the past year?: No (slid out of bed)  Receives Help From: Family  ADL Assistance: Independent  Homemaking Assistance: Needs assistance (family helps)  Homemaking Responsibilities: No  Ambulation Assistance: Independent (with FWW)  Transfer Assistance: Independent  Active : No  Patient's  Info:  or daughter  Occupation: On disability  Pt is questionable historian at this time and would benefit from confirmation with family    Examination:  Observation: Pt was in bed upon arrival, agreeable to session  Vision: WFL (glasses)   Hearing: WFL  Objective Measures: With activity, pt dipped to 88%. NC inc to 4L O2 and demo'd 94-98%. Pt reported to typically wear 4L O2 at baseline with activity.      Body Systems and functions:  ROM: WFL in BUE  Strength: 4/5 in BUE  Sensation: WFL  Tone: normotonic in BUE  Coordination: movements fluid and coordinated  Posture: normal posture  Activity Tolerance: fair     Activities of Daily Living (ADLs):  Feeding: SetupA   Grooming: SetupA   Toileting: CGA   UB dressing: SetupA   LB dressing: Ulises   UB bathing: Ulises  LB bathing: Ulises       *pt ADL function inferred from gross functional assessment of mobility, balance, posture, safety awareness, activity tolerance (unless otherwise indicated)    Cognitive and Psychosocial Functioning:  Overall cognitive status:    11/09/22 1133   Orientation   Orientation Level Oriented X4   Cognition   Overall Cognitive Status Exceptions   Arousal/Alertness Delayed responses to stimuli   Following Commands Follows one step commands with increased time   Attention Span Attends with cues to redirect; Difficulty attending to directions; Difficulty dividing attention   Memory Decreased recall of recent events;Decreased short term memory   Safety Judgement Decreased awareness of need for safety;Decreased awareness of need for assistance   Problem Solving Decreased awareness of errors   Insights Fully aware of deficits   Initiation Requires cues for some   Sequencing Requires cues for some   Cognition Comment Pt demo delayed processing and difficulty with word finding this date     Affect: normal     Balance:   Sitting: good, SBA   Standing: CGA at [facility]     Functional Mobility:  Rolling Laterally in Bed: SBA   Supine to Sit: SBA   Sit to Stand: CGA    Ambulation: CGA using FWW      AM-PAC 6 click short form for inpatient daily activity:   How much help from another person does the patient currently need. .. Unable  Dep A Lot  Max A A Lot   Mod A A Little  Min A A Little   CGA  SBA None   Mod I  Indep  Sup   1. Putting on and taking off regular lower body clothing? [] 1    [] 2   [] 2   [x] 3   [] 3   [] 4      2. Bathing (including washing, rinsing, drying)? [] 1   [] 2   [] 2 [x] 3 [] 3 [] 4   3. Toileting, which includes using toilet, bedpan, or urinal? [] 1    [] 2   [] 2   [] 3   [x] 3   [] 4     4. Putting on and taking off regular upper body clothing? [] 1   [] 2   [] 2   [] 3   [x] 3    [] 4      5. Taking care of personal grooming such as brushing teeth? [] 1   [] 2    [] 2 [] 3    [x] 3   [] 4      6. Eating meals?    [] 1   [] 2   [] 2   [] 3   [x] 3   [] 4        Raw Score:  18      24/24 = unimpaired  23/24 = 1-20% impaired   20/24-22/24 = 21-40% impaired  15/24-19/24 = 41-59% impaired   10/24-14/24 = 60%-79% impaired  7/24-9/24 = 80%-99% impaired  6/24 = 100% impaired     Treatment:  Therapeutic Activity Training:   Therapeutic activity training was instructed today. Cues were given for safety, sequence, UE/LE placement, visual cues, and balance. Activities performed today included:    Bed mobility:  Pt completed sup to/from sit SBA. Pt completed scooting to EOB SBA. Pt tolerated EOB well without evidence of lean SBA. Pt completed scooting in bed for repositioning to HOB SBA. STS:  Pt completed from/to EOB CGA with use of FWW. Pt completed to/from standard commode CGA with use of FWW. Ambulation:  Pt ambulated ~8' + ~8' CGA with FWW to/from bathroom. Pt demo'd fatigue with this activity. See PT note for further details regarding ambulation. Self Care Training:   Self care training was performed today. Cues were given for safety, sequence, UE/LE placement, visual cues, and balance. Activities performed today included:    LB dressing:   Pt donned bilat nonslip socks in long-seated with SetupA and inc time. Toileting:   Pt completed over standard commode CGA. Pt managed depends and ingrid care with SetupA. Education: Role of OT, OT POC, discharge needs, safety, benefits of EOB/OOB activity, AE needs    Safety Measures: Gait belt used for safety of pt and therapist, Left in bed per request, Alarm in place, call light and phone within reach, lines managed, needs met     Assessment:  Pt is a 63 yr old female from home who presents with COPD exacerbation. Prior to admission, pt was independent of ADLs, ambulating with FWW, and receiving assist for IADLs. Pt currently performed bed mobility SBA, transfers CGA, ambulating short HH distance CGA with FWW, and SetupA for ADLs. Pt unable to tolerate ambulating a functional HH distance this date and demo'd confusion.  Based upon eval presentation, pt would be unsafe to return home without 24/7 Supervision. Pending improvement and confirmation with family to provide 24/7 Sup, SNF would be recommended if improvement does not occur and if family could not provide 24/7 Sup. Pt would benefit from continued IP OT services during their stay, and discharge to SNF v Home with 24/7 Sup and Donna  OT S4 pending improvement and confirmation of 24/7 Sup. Complexity: Moderate   Prognosis: Good   Barriers: strength, endurance, SOB, confusion     Plan:  Plan: 3+/week    Treatment to include: Strengthening, ROM, Balance Training, Functional Mobility Training, Endurance Training, Gait Training, Pain Management, Safety Education and Training, Patient+Caregiver Education and Training, Equipment Evaluation Education and Procurement, Positioning, Self Care Training, Home Management Training, Coordination Training    Pt would benefit from continued edu on: falls prevention   Adaptive Equipment Recommendations: Will continue to monitor needs.      Goals:  Time frame for goals: 2 weeks  Pt will complete grooming tasks with independence at standing level   Pt will complete toileting tasks with independence using elevated commode and grab bars   Pt will complete UB dressing tasks with independence seated EOB   Pt will complete LB dressing tasks with [Mod] independence seated EOB and AE PRN   Pt will complete UB bathing tasks with [Mod] independence seated and AE PRN   Pt will complete LB bathing tasks with [Mod] independence seated and AE PRN   Pt will complete therapeutic exercise/activity to increase independence in ADL/IADL function  Pt will practice functional transfers and mobility with AD for increased safety and independence    Time:   Time in: 1100  Time out: 1122  Treatment Minutes: 12  Evaluation Minutes: 10  Total time: 22    Electronically signed by:    KENIA Padgett/L   License: [id number]  04/4/3540, 11:18 AM

## 2022-11-09 NOTE — PLAN OF CARE
Problem: Discharge Planning  Goal: Discharge to home or other facility with appropriate resources  Outcome: Progressing  Flowsheets (Taken 11/9/2022 0916)  Discharge to home or other facility with appropriate resources:   Identify barriers to discharge with patient and caregiver   Arrange for needed discharge resources and transportation as appropriate   Identify discharge learning needs (meds, wound care, etc)   Arrange for interpreters to assist at discharge as needed   Refer to discharge planning if patient needs post-hospital services based on physician order or complex needs related to functional status, cognitive ability or social support system     Problem: Pain  Goal: Verbalizes/displays adequate comfort level or baseline comfort level  Outcome: Progressing     Problem: Safety - Adult  Goal: Free from fall injury  Outcome: Progressing     Problem: Skin/Tissue Integrity  Goal: Absence of new skin breakdown  Description: 1. Monitor for areas of redness and/or skin breakdown  2. Assess vascular access sites hourly  3. Every 4-6 hours minimum:  Change oxygen saturation probe site  4. Every 4-6 hours:  If on nasal continuous positive airway pressure, respiratory therapy assess nares and determine need for appliance change or resting period.   Outcome: Progressing     Problem: ABCDS Injury Assessment  Goal: Absence of physical injury  Outcome: Progressing

## 2022-11-09 NOTE — PROGRESS NOTES
Cardiology Progress Note     Today's Plan: monitor heart rate    Admit Date:  11/5/2022    Consult reason/ Seen today for: shortness of breath and chest pain    Subjective and  Overnight Events: patient states that she is feeling a little bit better. She reports that breathing this morning is improved and she is felling less anxious. Chest pain no  Shortness of breath yes - improved   Palpitations no  Dizziness no  Swelling no    Assessment and Plan:  Shortness of breath: COPD exasperation. Echo does not have VHD. Chest pain with history of Non obstructive CAD: troponin is resolved. Likely due to COPD. Echo does not have WMA or appear to have pericarditis. No stress test planned due to respiratory status. PAF: in sinus. Monitor for afib RVR given respiratory status. Recommend keeping potassium 4-4.5 and magnesium 2-2.2 in patients with atrial fibrillation. HTN: Improved slightly with increase in cardizem. Continue cardizem 360 mg daily. Will resume small dose metoprolol tartrate  12.5 mg BID. Monitor for bronchospasms. Telemetry Reviewed:   sinus tachycardia    ECHO :   Echocardiogram 11/7/2022   Summary   This is a limited echocardiogram.   Left ventricular systolic function is normal.   Ejection fraction is visually estimated at 60%. No significant valvular disease noted. No evidence of any pericardial effusion. History of Presenting Illness:    Chief complain on admission : 63 y. o.year old who is admitted for  Chief Complaint   Patient presents with    Shortness of Breath          Past medical history:    has a past medical history of Anemia, Anxiety, Arthritis, Back pain at L4-L5 level, Bipolar 1 disorder (HCC), COPD (chronic obstructive pulmonary disease) ([address] Emphysema of lung ([address] FH: CAD (coronary artery disease), Fibromyalgia, Full dentures, Gastric ulcer, H/O Doppler ultrasound, H/O echocardiogram, History of blood transfusion, Hyperlipidemia LDL goal <100, Hypertension, Irregular heartbeat, Obstructive sleep apnea, On home oxygen therapy, Osteoarthritis, Pericardial effusion, Spinal stenosis, Thyroid disease, and Wears glasses. Past surgical history:   has a past surgical history that includes Carpal tunnel release (Right, 1985); Tubal ligation (1987); back surgery (03/2017); Cholecystectomy, laparoscopic (N/A, 10/25/2020); Colonoscopy (N/A, 12/12/2019); Endoscopy, colon, diagnostic; Cardiac catheterization; Cardiac catheterization; and Upper gastrointestinal endoscopy (N/A, 1/7/2021). Social History:   reports that she quit smoking about 14 years ago. Her smoking use included cigarettes. She has a 30.00 pack-year smoking history. She has never used smokeless tobacco. She reports that she does not currently use alcohol after a past usage of about 2.0 standard drinks per week. She reports that she does not use drugs. Family history:  family history includes Asthma in her mother; Heart Disease in her father. Allergies   Allergen Reactions    Lisinopril Swelling and Rash     angioedema       Review of Systems   All 14 systems were reviewed and are negative  Except for the positive findings  which as documented     /61   Pulse (!) 106   Temp 98.6 °F (37 °C) (Oral)   Resp 17   Ht 5' 1\" (1.549 m)   Wt 208 lb 6.4 oz (94.5 kg)   LMP 01/05/2010   SpO2 93%   BMI 39.38 kg/m²     Intake/Output Summary (Last 24 hours) at 11/9/2022 1427  Last data filed at 11/9/2022 0400  Gross per 24 hour   Intake --   Output 1100 ml   Net -1100 ml         Physical Exam  Vitals reviewed. Constitutional:       General: She is not in acute distress. Appearance: Normal appearance. She is obese. She is not ill-appearing. HENT:      Head: Atraumatic. Neck:      Vascular: No carotid bruit. Cardiovascular:      Rate and Rhythm: Regular rhythm. Tachycardia present. Pulses: Normal pulses. Heart sounds: Normal heart sounds. No murmur heard.   Pulmonary:      Effort: Pulmonary effort is normal. No respiratory distress. Breath sounds: Decreased breath sounds present. Musculoskeletal:         General: No swelling or deformity. Cervical back: Neck supple. No muscular tenderness. Neurological:      Mental Status: She is alert. Medications:    theophylline  100 mg Oral BID    cefTRIAXone (ROCEPHIN) IV  1,000 mg IntraVENous Q24H    methylPREDNISolone  40 mg IntraVENous Q12H    dilTIAZem  360 mg Oral Daily    pantoprazole  40 mg Oral QAM AC    hydroCHLOROthiazide  25 mg Oral Daily    atorvastatin  80 mg Oral Daily    DULoxetine  60 mg Oral Daily    folic acid  1 mg Oral Daily    gabapentin  400 mg Oral TID    hydrALAZINE  50 mg Oral TID    lactobacillus  1 capsule Oral Daily with breakfast    levothyroxine  100 mcg Oral Daily    magnesium oxide  400 mg Oral Daily    methocarbamol  750 mg Oral BID    montelukast  10 mg Oral Daily    Vitamin D  1,000 Units Oral Daily    thiamine  100 mg Oral Daily    sodium chloride flush  5-40 mL IntraVENous 2 times per day    enoxaparin  40 mg SubCUTAneous Daily    ipratropium-albuterol  1 ampule Inhalation Q4H WA    azithromycin  500 mg Oral Daily    Roflumilast  500 mcg Oral Daily    budesonide-formoterol  2 puff Inhalation BID      sodium chloride       polyvinyl alcohol, clonazePAM, sodium chloride flush, sodium chloride, ondansetron **OR** ondansetron, polyethylene glycol, acetaminophen **OR** acetaminophen, oxyCODONE-acetaminophen, guaiFENesin-dextromethorphan    Lab Data:  CBC:   Recent Labs     11/07/22  0545 11/08/22  0553 11/09/22  0704   WBC 16.8* 10.2 9.1   HGB 9.9* 9.5* 9.9*   HCT 35.3* 33.3* 34.4*   MCV 93.6 92.8 92.5    237 248       BMP:   Recent Labs     11/07/22  0545 11/08/22  0553 11/09/22  0704    134* 133*   K 4.9 4.7 4.2   CL 89* 87* 80*   CO2 36* 41* 45*   PHOS 3.1  --   --    BUN 15 17 14   CREATININE 0.7 0.7 0.6       PT/INR: No results for input(s): PROTIME, INR in the last 72 hours.   BNP:    Recent Labs     11/07/22  0545   PROBNP 2,114*       TROPONIN:   No results for input(s): TROPONINT in the last 72 hours. All labs, medications and tests reviewed by myself , continue all other medications of all above medical condition listed as is except for changes mentioned above. Thank you very much for consult , please call with questions. Electronically signed by BRIAN Toledo CNP on 11/9/2022 at 2:27 PM        CARDIOLOGY ATTENDING ADDENDUM    I have seen, spoken to and examined this patient personally, independent of the NP/PAC. I have reviewed the hospital care given to date and reviewed all pertinent labs and imaging. I have spoken with patient, nursing staff and provided written and verbal instructions . The above note has been reviewed. I have spent substantive amount of time in formulating patient care. Physical Exam:    General:    Awake, alert  Head:normal  Eye:normal  Chest:   Poor respiratory effort, bilateral wheezing, on a BiPAP  Cardiovascular:  S1S2 sinus tachycardia  Abdomen: soft   Extremities:  + edema  Pulses; palpable       MEDICAL DECISION MAKING :         Acute respiratory failure secondary to underlying COPD/pneumonia  Severe exacerbation of COPD with poor air entry, labored breathing, and bilateral wheezing     Management as per primary team, on a BiPAP, duo nebs, steroids     Prior history of nonobstructive CAD, angiogram 2020 reviewed: Advised low-dose aspirin  Atypical chest pain likely secondary to underlying COPD. Check echocardiogram, patient has history of pericardiocentesis     History of paroxysmal atrial fibrillation, not on oral anticoagulation given history of intracranial hemorrhage, GI bleeding and anemia. Patient remains in sinus rhythm. Continue with diltiazem    History of supraventricular tachycardia.   Stable     Hyperlipidemia: Continue with Lipitor 80 mg daily     Essential hypertension:     Patient had some rebound tachycardia, will readjust medications  Continue Cardizem  Restart patient on small dose of metoprolol tartrate 12.5 mg p.o. twice daily  Continue with hydralazine  Continue with HCTZ  History of angioedema with lisinopril      Dr. Sindy Jones MD

## 2022-11-10 LAB
ANION GAP SERPL CALCULATED.3IONS-SCNC: 6 MMOL/L (ref 4–16)
BASOPHILS ABSOLUTE: 0 K/CU MM
BASOPHILS RELATIVE PERCENT: 0.2 % (ref 0–1)
BUN BLDV-MCNC: 15 MG/DL (ref 6–23)
CALCIUM SERPL-MCNC: 10.1 MG/DL (ref 8.3–10.6)
CHLORIDE BLD-SCNC: 77 MMOL/L (ref 99–110)
CO2: 48 MMOL/L (ref 21–32)
CREAT SERPL-MCNC: 0.6 MG/DL (ref 0.6–1.1)
CULTURE: NORMAL
CULTURE: NORMAL
DIFFERENTIAL TYPE: ABNORMAL
EOSINOPHILS ABSOLUTE: 0 K/CU MM
EOSINOPHILS RELATIVE PERCENT: 0 % (ref 0–3)
GFR SERPL CREATININE-BSD FRML MDRD: >60 ML/MIN/1.73M2
GLUCOSE BLD-MCNC: 118 MG/DL (ref 70–99)
HCT VFR BLD CALC: 33.6 % (ref 37–47)
HEMOGLOBIN: 10 GM/DL (ref 12.5–16)
IMMATURE NEUTROPHIL %: 1.6 % (ref 0–0.43)
LYMPHOCYTES ABSOLUTE: 0.6 K/CU MM
LYMPHOCYTES RELATIVE PERCENT: 5.8 % (ref 24–44)
Lab: NORMAL
Lab: NORMAL
MCH RBC QN AUTO: 27.1 PG (ref 27–31)
MCHC RBC AUTO-ENTMCNC: 29.8 % (ref 32–36)
MCV RBC AUTO: 91.1 FL (ref 78–100)
MONOCYTES ABSOLUTE: 1.2 K/CU MM
MONOCYTES RELATIVE PERCENT: 11.3 % (ref 0–4)
NUCLEATED RBC %: 0 %
PDW BLD-RTO: 13.3 % (ref 11.7–14.9)
PLATELET # BLD: 255 K/CU MM (ref 140–440)
PMV BLD AUTO: 10.1 FL (ref 7.5–11.1)
POTASSIUM SERPL-SCNC: 3.6 MMOL/L (ref 3.5–5.1)
RBC # BLD: 3.69 M/CU MM (ref 4.2–5.4)
SEGMENTED NEUTROPHILS ABSOLUTE COUNT: 8.9 K/CU MM
SEGMENTED NEUTROPHILS RELATIVE PERCENT: 81.1 % (ref 36–66)
SODIUM BLD-SCNC: 131 MMOL/L (ref 135–145)
SPECIMEN: NORMAL
SPECIMEN: NORMAL
TOTAL IMMATURE NEUTOROPHIL: 0.17 K/CU MM
TOTAL NUCLEATED RBC: 0 K/CU MM
WBC # BLD: 10.9 K/CU MM (ref 4–10.5)

## 2022-11-10 PROCEDURE — 6370000000 HC RX 637 (ALT 250 FOR IP): Performed by: INTERNAL MEDICINE

## 2022-11-10 PROCEDURE — 94669 MECHANICAL CHEST WALL OSCILL: CPT

## 2022-11-10 PROCEDURE — 6360000002 HC RX W HCPCS: Performed by: STUDENT IN AN ORGANIZED HEALTH CARE EDUCATION/TRAINING PROGRAM

## 2022-11-10 PROCEDURE — 6370000000 HC RX 637 (ALT 250 FOR IP): Performed by: SURGERY

## 2022-11-10 PROCEDURE — 6370000000 HC RX 637 (ALT 250 FOR IP): Performed by: STUDENT IN AN ORGANIZED HEALTH CARE EDUCATION/TRAINING PROGRAM

## 2022-11-10 PROCEDURE — 94640 AIRWAY INHALATION TREATMENT: CPT

## 2022-11-10 PROCEDURE — 85025 COMPLETE CBC W/AUTO DIFF WBC: CPT

## 2022-11-10 PROCEDURE — 2580000003 HC RX 258: Performed by: SURGERY

## 2022-11-10 PROCEDURE — 99223 1ST HOSP IP/OBS HIGH 75: CPT | Performed by: INTERNAL MEDICINE

## 2022-11-10 PROCEDURE — 2700000000 HC OXYGEN THERAPY PER DAY

## 2022-11-10 PROCEDURE — 6360000002 HC RX W HCPCS: Performed by: SURGERY

## 2022-11-10 PROCEDURE — 36415 COLL VENOUS BLD VENIPUNCTURE: CPT

## 2022-11-10 PROCEDURE — 94761 N-INVAS EAR/PLS OXIMETRY MLT: CPT

## 2022-11-10 PROCEDURE — APPSS45 APP SPLIT SHARED TIME 31-45 MINUTES: Performed by: NURSE PRACTITIONER

## 2022-11-10 PROCEDURE — 6370000000 HC RX 637 (ALT 250 FOR IP): Performed by: NURSE PRACTITIONER

## 2022-11-10 PROCEDURE — 80048 BASIC METABOLIC PNL TOTAL CA: CPT

## 2022-11-10 PROCEDURE — 2580000003 HC RX 258: Performed by: STUDENT IN AN ORGANIZED HEALTH CARE EDUCATION/TRAINING PROGRAM

## 2022-11-10 PROCEDURE — 6370000000 HC RX 637 (ALT 250 FOR IP)

## 2022-11-10 PROCEDURE — 2140000000 HC CCU INTERMEDIATE R&B

## 2022-11-10 RX ORDER — AZITHROMYCIN 500 MG/1
500 TABLET, FILM COATED ORAL DAILY
Qty: 2 TABLET | Refills: 0 | Status: CANCELLED | OUTPATIENT
Start: 2022-11-10 | End: 2022-11-12

## 2022-11-10 RX ORDER — SODIUM CHLORIDE 9 MG/ML
INJECTION, SOLUTION INTRAVENOUS CONTINUOUS
Status: ACTIVE | OUTPATIENT
Start: 2022-11-10 | End: 2022-11-10

## 2022-11-10 RX ADMIN — THEOPHYLLINE ANHYDROUS 100 MG: 100 CAPSULE, EXTENDED RELEASE ORAL at 10:20

## 2022-11-10 RX ADMIN — METHOCARBAMOL TABLETS 750 MG: 500 TABLET, COATED ORAL at 10:19

## 2022-11-10 RX ADMIN — MAGNESIUM OXIDE TAB 400 MG (241.3 MG ELEMENTAL MG) 400 MG: 400 (241.3 MG) TAB at 10:20

## 2022-11-10 RX ADMIN — METHOCARBAMOL TABLETS 750 MG: 500 TABLET, COATED ORAL at 20:35

## 2022-11-10 RX ADMIN — HYDRALAZINE HYDROCHLORIDE 50 MG: 50 TABLET, FILM COATED ORAL at 15:10

## 2022-11-10 RX ADMIN — Medication 100 MG: at 09:39

## 2022-11-10 RX ADMIN — METHYLPREDNISOLONE SODIUM SUCCINATE 40 MG: 40 INJECTION, POWDER, FOR SOLUTION INTRAMUSCULAR; INTRAVENOUS at 06:32

## 2022-11-10 RX ADMIN — LEVOTHYROXINE SODIUM 100 MCG: 0.1 TABLET ORAL at 10:20

## 2022-11-10 RX ADMIN — IPRATROPIUM BROMIDE AND ALBUTEROL SULFATE 1 AMPULE: .5; 2.5 SOLUTION RESPIRATORY (INHALATION) at 10:45

## 2022-11-10 RX ADMIN — METOPROLOL TARTRATE 25 MG: 25 TABLET, FILM COATED ORAL at 20:35

## 2022-11-10 RX ADMIN — HYDRALAZINE HYDROCHLORIDE 50 MG: 50 TABLET, FILM COATED ORAL at 09:39

## 2022-11-10 RX ADMIN — SODIUM CHLORIDE, PRESERVATIVE FREE 10 ML: 5 INJECTION INTRAVENOUS at 20:36

## 2022-11-10 RX ADMIN — ENOXAPARIN SODIUM 40 MG: 100 INJECTION SUBCUTANEOUS at 09:39

## 2022-11-10 RX ADMIN — HYDROCHLOROTHIAZIDE 25 MG: 25 TABLET ORAL at 10:20

## 2022-11-10 RX ADMIN — SODIUM CHLORIDE, PRESERVATIVE FREE 10 ML: 5 INJECTION INTRAVENOUS at 10:28

## 2022-11-10 RX ADMIN — HYDRALAZINE HYDROCHLORIDE 50 MG: 50 TABLET, FILM COATED ORAL at 20:35

## 2022-11-10 RX ADMIN — ATORVASTATIN CALCIUM 80 MG: 40 TABLET, FILM COATED ORAL at 09:38

## 2022-11-10 RX ADMIN — FOLIC ACID 1 MG: 1 TABLET ORAL at 09:38

## 2022-11-10 RX ADMIN — BUDESONIDE AND FORMOTEROL FUMARATE DIHYDRATE 2 PUFF: 160; 4.5 AEROSOL RESPIRATORY (INHALATION) at 07:04

## 2022-11-10 RX ADMIN — MONTELUKAST 10 MG: 10 TABLET, FILM COATED ORAL at 10:20

## 2022-11-10 RX ADMIN — CEFTRIAXONE SODIUM 1000 MG: 1 INJECTION, POWDER, FOR SOLUTION INTRAMUSCULAR; INTRAVENOUS at 09:58

## 2022-11-10 RX ADMIN — CLONAZEPAM 0.5 MG: 0.5 TABLET ORAL at 15:11

## 2022-11-10 RX ADMIN — GABAPENTIN 400 MG: 400 CAPSULE ORAL at 10:20

## 2022-11-10 RX ADMIN — OXYCODONE AND ACETAMINOPHEN 1 TABLET: 5; 325 TABLET ORAL at 15:11

## 2022-11-10 RX ADMIN — ROFLUMILAST 500 MCG: 500 TABLET ORAL at 09:38

## 2022-11-10 RX ADMIN — BUDESONIDE AND FORMOTEROL FUMARATE DIHYDRATE 2 PUFF: 160; 4.5 AEROSOL RESPIRATORY (INHALATION) at 20:22

## 2022-11-10 RX ADMIN — SODIUM CHLORIDE: 9 INJECTION, SOLUTION INTRAVENOUS at 15:18

## 2022-11-10 RX ADMIN — AZITHROMYCIN MONOHYDRATE 500 MG: 250 TABLET ORAL at 20:35

## 2022-11-10 RX ADMIN — DILTIAZEM HYDROCHLORIDE 360 MG: 180 CAPSULE, COATED, EXTENDED RELEASE ORAL at 09:38

## 2022-11-10 RX ADMIN — THEOPHYLLINE ANHYDROUS 100 MG: 100 CAPSULE, EXTENDED RELEASE ORAL at 20:35

## 2022-11-10 RX ADMIN — DULOXETINE 60 MG: 30 CAPSULE, DELAYED RELEASE ORAL at 09:38

## 2022-11-10 RX ADMIN — METOPROLOL TARTRATE 12.5 MG: 25 TABLET, FILM COATED ORAL at 10:21

## 2022-11-10 RX ADMIN — IPRATROPIUM BROMIDE AND ALBUTEROL SULFATE 1 AMPULE: .5; 2.5 SOLUTION RESPIRATORY (INHALATION) at 20:14

## 2022-11-10 RX ADMIN — PANTOPRAZOLE SODIUM 40 MG: 40 TABLET, DELAYED RELEASE ORAL at 10:20

## 2022-11-10 RX ADMIN — IPRATROPIUM BROMIDE AND ALBUTEROL SULFATE 1 AMPULE: .5; 2.5 SOLUTION RESPIRATORY (INHALATION) at 15:29

## 2022-11-10 RX ADMIN — Medication 1 CAPSULE: at 10:20

## 2022-11-10 RX ADMIN — Medication 1000 UNITS: at 09:39

## 2022-11-10 RX ADMIN — IPRATROPIUM BROMIDE AND ALBUTEROL SULFATE 1 AMPULE: .5; 2.5 SOLUTION RESPIRATORY (INHALATION) at 07:03

## 2022-11-10 ASSESSMENT — PAIN SCALES - GENERAL
PAINLEVEL_OUTOF10: 5
PAINLEVEL_OUTOF10: 8

## 2022-11-10 ASSESSMENT — PAIN DESCRIPTION - DESCRIPTORS: DESCRIPTORS: ACHING

## 2022-11-10 ASSESSMENT — PAIN DESCRIPTION - LOCATION: LOCATION: GENERALIZED

## 2022-11-10 ASSESSMENT — PAIN - FUNCTIONAL ASSESSMENT: PAIN_FUNCTIONAL_ASSESSMENT: PREVENTS OR INTERFERES WITH MANY ACTIVE NOT PASSIVE ACTIVITIES

## 2022-11-10 NOTE — PROGRESS NOTES
Occupational Therapy ATTEMPT Treatment Note  Name: Lo Shea MRN: [id number] :   1960   Date:  11/10/2022   Admission Date: 2022 Room:  3103/3103-A     Primary Problem:  The primary encounter diagnosis was Acute on chronic respiratory failure with hypercapnia ([facility] [address] Diagnoses of Leukocytosis, unspecified type and Pneumonia of both lower lobes due to infectious organism were also pertinent to this visit. Past Medical History:   Diagnosis Date    Anemia     Anxiety 2017    follows with Dr Saintclair Michaels     Back pain at L4-L5 level 2017    Dr Naveen Matamoros 1 disorder [facility])     per pt on 2021\"never told I have bipolar\"    COPD (chronic obstructive pulmonary disease) ([facility]     follows with Dr Kwadwo Campbell    Emphysema of lung [facility])     FH: CAD (coronary artery disease) 2017    Father had in his forties, sister in her thirties     Fibromyalgia 2017    Full dentures     full upper plate and partial on the bottom    Gastric ulcer     \"had stomach ulder back fall \"    H/O Doppler ultrasound 2019    venous- no DVT or reflux    H/O echocardiogram 2015    EF50-55% Normal- see media    History of blood transfusion     Hyperlipidemia LDL goal <100     Hypertension     Dr Presley More    Irregular heartbeat     \"they said I have irreg heart beat before- back when they drained fluid around my heart \"    Obstructive sleep apnea     \"have bipap I use at home\"    On home oxygen therapy     \"on oxygen all the time at home and keep it on 2.5 liters\"    Osteoarthritis     Pericardial effusion     per old chart had pericardial effusion 10/2020    Spinal stenosis     hx per old chart    Thyroid disease     Wears glasses     \"suppose to wear glasses\"             OT attempted to see pt at this time. Pt in long-seated with lunch and  present. Pt declined d/t lunch being present.  Pt perseverative regarding IV despite that it was still actively running, pt wanted it removed. This OT notified pt's RN regarding IV and pt wanting RN to check on it. Pt left in bed, will re-attempt as able.            Electronically signed by:    KENIA Brenner/L  License: [id number]  76/72/2607, 11:42 AM

## 2022-11-10 NOTE — PROGRESS NOTES
V2.0  [facility] Hospitalist Progress Note      Name:  Sae Guerra /Age/Sex: 1960  (63 y.o. female)   MRN & CSN:  [id number] & [id number] Encounter Date/Time: 11/10/2022 2:23 PM EST    Location:  [address] [place] [address]-A PCP: Dg Jackson MD       Hospital Day: 6      Subjective:     Chief Complaint: Shortness of Breath     Pt states her breathing has improved today. Still has cough but not worsened. Pt on home baseline O2 and BIPAP for nights/naps. Denies lightheadedness, dizziness, fever, night sweats, chills, palpitations, abd pain, nausea, vomiting, diarrhea, dysuria. Assessment and Plan:   Sae Guerra is a 63 y.o. female with pmh of COPD, afib, anxiety, DALTON/OHS who presents with COPD exacerbation ([facility] [address]      Plan:  Acute hypoxic hypercapnic resp failure 2/2 COPD exacerbation 2/2 CAP. Was on BIPAP now on home baseline 4L. CTA suggestive of pneumonia. Blood culture, Strep & uLegionella negative. Resp panel negative. - continue Steroids  - continue breathing treatment  - continue abx for CAP (azithro + ceftriaxone)  - continue BIPHP prn QHS and naps  - Pulmonology following; appreciate recs  - continue roflumilast     Hypochloremic hyponatremia. Cl 77, Na 131. Has been trending down. Likely 2/2 dehydration.  - give IVF  for 10 hrs      DALTON/OHS - BiPAP at home. - continue     Hx of afib - Currently NSR, Not on [facility] given history of intracranial hemorrhage, GI bleeding.  - home  diltiazem increased by card  - increase metoprolol to 25 mg BID as below  - cardiology consulted; appreciate recs      Elevated BNP with mild vascular congestion. No h/o CHF. recent EF normal. patient does not seemed to be  fluid overloaded; denying orthopnea. Received lasix 20 mg IV. - no need for further diuresis      Elevated troponin - Has some chest tightness but no ischemic changes in EKG. Likely Type II with elevated BNP.  Trended down to normal. Cardiology following     Morbid Obesity: Counseled on Diet and wt loss. HTN - BP in the high side 170's but that was before AM meds  -  continue hydralazine  - d/c  HCTZ given hyponatremia  - will increase metoprolol to 25mg BID  - continue Cardizem (home dose increased by cardiology)    CAD - asa, statin         Diet ADULT DIET; Regular; 4 carb choices (60 gm/meal); No Added Salt (3-4 gm)   DVT Prophylaxis [x] Lovenox, []  Heparin, [] SCDs, [] Ambulation,  [] Eliquis, [] Xarelto  [] Coumadin   Code Status Full Code   Disposition From: home  Expected Disposition: TBD  Estimated Date of Discharge: 11/11  Patient requires continued admission due Hypochloremic hyponatremia   Surrogate Decision Maker/ POA      Review of Systems:    Review of Systems    See above    Objective: Intake/Output Summary (Last 24 hours) at 11/10/2022 1354  Last data filed at 11/9/2022 1853  Gross per 24 hour   Intake --   Output 600 ml   Net -600 ml        Vitals:   Vitals:    11/10/22 1019   BP: (!) 174/74   Pulse: 88   Resp:    Temp:    SpO2:        Physical Exam:     General: NAD  Eyes: EOMI  ENT: neck supple  Cardiovascular: Regular rate. Respiratory:  bilateral rhonchi improved, decreased breathing sounds  Gastrointestinal: Soft, non tender  Genitourinary: no suprapubic tenderness  Musculoskeletal: No edema  Skin: warm, dry  Neuro: Alert. Psych: Mood appropriate.      Medications:   Medications:    metoprolol tartrate  25 mg Oral BID    theophylline  100 mg Oral BID    cefTRIAXone (ROCEPHIN) IV  1,000 mg IntraVENous Q24H    methylPREDNISolone  40 mg IntraVENous Q12H    dilTIAZem  360 mg Oral Daily    pantoprazole  40 mg Oral QAM AC    atorvastatin  80 mg Oral Daily    DULoxetine  60 mg Oral Daily    folic acid  1 mg Oral Daily    gabapentin  400 mg Oral TID    hydrALAZINE  50 mg Oral TID    lactobacillus  1 capsule Oral Daily with breakfast    levothyroxine  100 mcg Oral Daily    magnesium oxide  400 mg Oral Daily    methocarbamol  750 mg Oral BID    montelukast  10 mg Oral Daily Vitamin D  1,000 Units Oral Daily    thiamine  100 mg Oral Daily    sodium chloride flush  5-40 mL IntraVENous 2 times per day    enoxaparin  40 mg SubCUTAneous Daily    ipratropium-albuterol  1 ampule Inhalation Q4H WA    azithromycin  500 mg Oral Daily    Roflumilast  500 mcg Oral Daily    budesonide-formoterol  2 puff Inhalation BID      Infusions:    sodium chloride      sodium chloride       PRN Meds: polyvinyl alcohol, 1 drop, Q2H PRN  clonazePAM, 0.5 mg, BID PRN  sodium chloride flush, 5-40 mL, PRN  sodium chloride, , PRN  ondansetron, 4 mg, Q8H PRN   Or  ondansetron, 4 mg, Q6H PRN  polyethylene glycol, 17 g, Daily PRN  acetaminophen, 650 mg, Q6H PRN   Or  acetaminophen, 650 mg, Q6H PRN  oxyCODONE-acetaminophen, 1 tablet, Q8H PRN  guaiFENesin-dextromethorphan, 5 mL, Q6H PRN      Labs      Recent Results (from the past 24 hour(s))   CBC with Auto Differential    Collection Time: 11/10/22  3:38 AM   Result Value Ref Range    WBC 10.9 (H) 4.0 - 10.5 K/CU MM    RBC 3.69 (L) 4.2 - 5.4 M/CU MM    Hemoglobin 10.0 (L) 12.5 - 16.0 GM/DL    Hematocrit 33.6 (L) 37 - 47 %    MCV 91.1 78 - 100 FL    MCH 27.1 27 - 31 PG    MCHC 29.8 (L) 32.0 - 36.0 %    RDW 13.3 11.7 - 14.9 %    Platelets 817 726 - 866 K/CU MM    MPV 10.1 7.5 - 11.1 FL    Differential Type AUTOMATED DIFFERENTIAL     Segs Relative 81.1 (H) 36 - 66 %    Lymphocytes % 5.8 (L) 24 - 44 %    Monocytes % 11.3 (H) 0 - 4 %    Eosinophils % 0.0 0 - 3 %    Basophils % 0.2 0 - 1 %    Segs Absolute 8.9 K/CU MM    Lymphocytes Absolute 0.6 K/CU MM    Monocytes Absolute 1.2 K/CU MM    Eosinophils Absolute 0.0 K/CU MM    Basophils Absolute 0.0 K/CU MM    Nucleated RBC % 0.0 %    Total Nucleated RBC 0.0 K/CU MM    Total Immature Neutrophil 0.17 K/CU MM    Immature Neutrophil % 1.6 (H) 0 - 0.43 %   Basic Metabolic Panel    Collection Time: 11/10/22  3:38 AM   Result Value Ref Range    Sodium 131 (L) 135 - 145 MMOL/L    Potassium 3.6 3.5 - 5.1 MMOL/L    Chloride 77 (L) 99 - 110 mMol/L    CO2 48 (H) 21 - 32 MMOL/L    Anion Gap 6 4 - 16    BUN 15 6 - 23 MG/DL    Creatinine 0.6 0.6 - 1.1 MG/DL    Est, Glom Filt Rate >60 >60 mL/min/1.73m2    Glucose 118 (H) 70 - 99 MG/DL    Calcium 10.1 8.3 - 10.6 MG/DL        Imaging/Diagnostics Last 24 Hours   XR CHEST PORTABLE    Result Date: 11/7/2022  EXAMINATION: ONE XRAY VIEW OF THE CHEST 11/7/2022 7:26 am COMPARISON: 11/05/2022 radiograph HISTORY: ORDERING SYSTEM PROVIDED HISTORY: sob TECHNOLOGIST PROVIDED HISTORY: Reason for exam:->sob Reason for Exam: sob FINDINGS: The heart, mediastinum and pulmonary vascular markings are normal.  Improved aeration centrally from prior imaging. Mild bibasilar ground-glass opacities persist with probable trace pleural effusions. The lungs are otherwise clear. No significant skeletal finding. Improving vascular congestion centrally. Bibasilar pattern of trace pleural fluid with edema/atelectasis stable.        Electronically signed by Amanda Palumbo MD on 11/10/2022 at 1:54 PM

## 2022-11-10 NOTE — PROGRESS NOTES
Cardiology Progress Note     Today's Plan: continue monitoring    Admit Date:  11/5/2022    Consult reason/ Seen today for: shortness of breath and chest pain    Subjective and  Overnight Events: Patient reports that she is a little more short of breath today than yesterday. Reviewed HR improved with addition of metoprolol. She remains in sinus and oxygen demand is improved. Chest pain no  Shortness of breath yes - improved   Palpitations no  Dizziness no  Swelling no          Telemetry Reviewed:   sinus tachycardia    ECHO :   Echocardiogram 11/7/2022   Summary   This is a limited echocardiogram.   Left ventricular systolic function is normal.   Ejection fraction is visually estimated at 60%. No significant valvular disease noted. No evidence of any pericardial effusion. History of Presenting Illness:    Chief complain on admission : 63 y. o.year old who is admitted for  Chief Complaint   Patient presents with    Shortness of Breath          Past medical history:    has a past medical history of Anemia, Anxiety, Arthritis, Back pain at L4-L5 level, Bipolar 1 disorder (HCC), COPD (chronic obstructive pulmonary disease) ([address] Emphysema of lung ([facility] [address] FH: CAD (coronary artery disease), Fibromyalgia, Full dentures, Gastric ulcer, H/O Doppler ultrasound, H/O echocardiogram, History of blood transfusion, Hyperlipidemia LDL goal <100, Hypertension, Irregular heartbeat, Obstructive sleep apnea, On home oxygen therapy, Osteoarthritis, Pericardial effusion, Spinal stenosis, Thyroid disease, and Wears glasses. Past surgical history:   has a past surgical history that includes Carpal tunnel release (Right, 1985); Tubal ligation (1987); back surgery (03/2017); Cholecystectomy, laparoscopic (N/A, 10/25/2020); Colonoscopy (N/A, 12/12/2019); Endoscopy, colon, diagnostic; Cardiac catheterization; Cardiac catheterization; and Upper gastrointestinal endoscopy (N/A, 1/7/2021).   Social History:   reports that she quit smoking about 14 years ago. Her smoking use included cigarettes. She has a 30.00 pack-year smoking history. She has never used smokeless tobacco. She reports that she does not currently use alcohol after a past usage of about 2.0 standard drinks per week. She reports that she does not use drugs. Family history:  family history includes Asthma in her mother; Heart Disease in her father. Allergies   Allergen Reactions    Lisinopril Swelling and Rash     angioedema       Review of Systems   All 14 systems were reviewed and are negative  Except for the positive findings  which as documented     BP (!) 174/74   Pulse 88   Temp 97.7 °F (36.5 °C) (Oral)   Resp 19   Ht 5' 1\" (1.549 m)   Wt 208 lb 6.4 oz (94.5 kg)   LMP 01/05/2010   SpO2 97%   BMI 39.38 kg/m²     Intake/Output Summary (Last 24 hours) at 11/10/2022 1422  Last data filed at 11/9/2022 1853  Gross per 24 hour   Intake --   Output 600 ml   Net -600 ml         Physical Exam  Vitals reviewed. Constitutional:       General: She is not in acute distress. Appearance: Normal appearance. She is obese. She is not ill-appearing. HENT:      Head: Atraumatic. Neck:      Vascular: No carotid bruit. Cardiovascular:      Rate and Rhythm: Regular rhythm. Tachycardia present. Pulses: Normal pulses. Heart sounds: Normal heart sounds. No murmur heard. Pulmonary:      Effort: Pulmonary effort is normal. No respiratory distress. Breath sounds: Decreased breath sounds and wheezing (I/E fine) present. Musculoskeletal:         General: No swelling or deformity. Cervical back: Neck supple. No muscular tenderness. Neurological:      Mental Status: She is alert.            Medications:    metoprolol tartrate  25 mg Oral BID    theophylline  100 mg Oral BID    cefTRIAXone (ROCEPHIN) IV  1,000 mg IntraVENous Q24H    methylPREDNISolone  40 mg IntraVENous Q12H    dilTIAZem  360 mg Oral Daily    pantoprazole  40 mg Oral QAM AC    atorvastatin  80 mg Oral Daily    DULoxetine  60 mg Oral Daily    folic acid  1 mg Oral Daily    gabapentin  400 mg Oral TID    hydrALAZINE  50 mg Oral TID    lactobacillus  1 capsule Oral Daily with breakfast    levothyroxine  100 mcg Oral Daily    magnesium oxide  400 mg Oral Daily    methocarbamol  750 mg Oral BID    montelukast  10 mg Oral Daily    Vitamin D  1,000 Units Oral Daily    thiamine  100 mg Oral Daily    sodium chloride flush  5-40 mL IntraVENous 2 times per day    enoxaparin  40 mg SubCUTAneous Daily    ipratropium-albuterol  1 ampule Inhalation Q4H WA    azithromycin  500 mg Oral Daily    Roflumilast  500 mcg Oral Daily    budesonide-formoterol  2 puff Inhalation BID      sodium chloride      sodium chloride       polyvinyl alcohol, clonazePAM, sodium chloride flush, sodium chloride, ondansetron **OR** ondansetron, polyethylene glycol, acetaminophen **OR** acetaminophen, oxyCODONE-acetaminophen, guaiFENesin-dextromethorphan    Lab Data:  CBC:   Recent Labs     11/08/22 0553 11/09/22  0704 11/10/22  0338   WBC 10.2 9.1 10.9*   HGB 9.5* 9.9* 10.0*   HCT 33.3* 34.4* 33.6*   MCV 92.8 92.5 91.1    248 255       BMP:   Recent Labs     11/08/22 0553 11/09/22  0704 11/10/22  0338   * 133* 131*   K 4.7 4.2 3.6   CL 87* 80* 77*   CO2 41* 45* 48*   BUN 17 14 15   CREATININE 0.7 0.6 0.6       PT/INR: No results for input(s): PROTIME, INR in the last 72 hours. BNP:    No results for input(s): PROBNP in the last 72 hours. TROPONIN:   No results for input(s): TROPONINT in the last 72 hours. All labs, medications and tests reviewed by myself , continue all other medications of all above medical condition listed as is except for changes mentioned above. Thank you very much for consult , please call with questions.     Electronically signed by BRIAN Erwin CNP on 11/10/2022 at 2:22 PM          [address] [facility]    I have seen, spoken to and examined this patient personally, independent of the NP/PAC. I have reviewed the hospital care given to date and reviewed all pertinent labs and imaging. I have spoken with patient, nursing staff and provided written and verbal instructions . The above note has been reviewed. I have spent substantive amount of time in formulating patient care. Physical Exam:    General:   Awake, alert  Head:normal  Eye:normal  Chest:  clear         MEDICAL DECISION MAKING :         Shortness of breath: COPD exasperation. Echo does not have VHD. Chest pain with history of Non obstructive CAD: troponin is resolved. Likely due to COPD. Echo does not have WMA or appear to have pericarditis. No stress test planned due to respiratory status. PAF: in sinus. Tachycardia improved after restarting low dose metoporlol. Monitor for afib RVR given respiratory status. Recommend keeping potassium 4-4.5 and magnesium 2-2.2 in patients with atrial fibrillation. HTN: elevated still. Given low sodium primary stopped HCTZ and increased metoprolol to 25 mg BID. Continue cardizem 360 mg daily. Monitor for bronchospasms.           Dr. Leona Cardona MD

## 2022-11-10 NOTE — CARE COORDINATION
.CM met with pt for d/c planning. Introduced self and updated white board. Pt lives with spouse, son and brother and is independent with ADL's. Spouse provides her transportation. She has a PCP, has insurance, and is able to afford her medication. Pt uses home O2, a bi-pap, walker,shower seat, and grab bars. HHC offered and pt is agreeable. Pt does not have a HHC preference. Informed her of the hospital affiliation with [address] [place] [address] OF [facility] and she is agreeable. Pt denies other d/c needs at this time. Referral made to [address] [place] [address] liaison/Brea via PS. D/c plan is home with spouse and [facility]. Notify CM if any new d/c needs arise. TE      .Please notify [facility] upon discharge at [phone] and fax the AVS and  Donna Damon orders to [phone].

## 2022-11-10 NOTE — CARE COORDINATION
[address] Liaison spoke with pt and pt is agreeable to [facility] at discharge. Verified pcp, address and number. No wounds/IVs noted.  Please place inpatient consult to home health needs order in [facility] [place].

## 2022-11-10 NOTE — PROGRESS NOTES
Pulmonary and Critical Care  Progress Note    Subjective: The patient has improved. Shortness of breath has improved. Chest pain none. Addressing respiratory complaints Patient is negative for  hemoptysis and cyanosis. CONSTITUTIONAL:  negative for fevers and chills. Past Medical History:     has a past medical history of Anemia, Anxiety, Arthritis, Back pain at L4-L5 level, Bipolar 1 disorder (HCC), COPD (chronic obstructive pulmonary disease) ([facility] [address] Emphysema of lung ([facility] [address] FH: CAD (coronary artery disease), Fibromyalgia, Full dentures, Gastric ulcer, H/O Doppler ultrasound, H/O echocardiogram, History of blood transfusion, Hyperlipidemia LDL goal <100, Hypertension, Irregular heartbeat, Obstructive sleep apnea, On home oxygen therapy, Osteoarthritis, Pericardial effusion, Spinal stenosis, Thyroid disease, and Wears glasses. has a past surgical history that includes Carpal tunnel release (Right, 1985); Tubal ligation (1987); back surgery (03/2017); Cholecystectomy, laparoscopic (N/A, 10/25/2020); Colonoscopy (N/A, 12/12/2019); Endoscopy, colon, diagnostic; Cardiac catheterization; Cardiac catheterization; and Upper gastrointestinal endoscopy (N/A, 1/7/2021). reports that she quit smoking about 14 years ago. Her smoking use included cigarettes. She has a 30.00 pack-year smoking history. She has never used smokeless tobacco. She reports that she does not currently use alcohol after a past usage of about 2.0 standard drinks per week. She reports that she does not use drugs. Family history:  family history includes Asthma in her mother; Heart Disease in her father.     Allergies   Allergen Reactions    Lisinopril Swelling and Rash     angioedema     Social History:    Reviewed; no changes    Objective:   PHYSICAL EXAM:        VITALS:  BP (!) 174/74   Pulse 88   Temp 97.7 °F (36.5 °C) (Oral)   Resp 19   Ht 5' 1\" (1.549 m)   Wt 208 lb 6.4 oz (94.5 kg)   LMP 01/05/2010   SpO2 97%   BMI 39.38 kg/m² 24HR INTAKE/OUTPUT:      Intake/Output Summary (Last 24 hours) at 11/10/2022 1128  Last data filed at 11/9/2022 1853  Gross per 24 hour   Intake --   Output 600 ml   Net -600 ml         CONSTITUTIONAL:  awake, alert, cooperative, no apparent distress, and appears stated age  LUNGS: Moving air better. CARDIOVASCULAR:  normal S1 and S2 and negative JVD  ABD:Abdomen soft, non-tender. BS normal. No masses,  No organomegaly  NEURO:Alert and oriented x3. Gait normal. Reflexes and motor strength normal and symmetric. Cranial nerves 2-12 and sensation grossly intact. DATA:    CBC:  Recent Labs     11/08/22  0553 11/09/22  0704 11/10/22  0338   WBC 10.2 9.1 10.9*   RBC 3.59* 3.72* 3.69*   HGB 9.5* 9.9* 10.0*   HCT 33.3* 34.4* 33.6*    248 255   MCV 92.8 92.5 91.1   MCH 26.5* 26.6* 27.1   MCHC 28.5* 28.8* 29.8*   RDW 13.5 13.3 13.3   SEGSPCT 93.6* 88.1* 81.1*      BMP:  Recent Labs     11/08/22  0553 11/09/22  0704 11/10/22  0338   * 133* 131*   K 4.7 4.2 3.6   CL 87* 80* 77*   CO2 41* 45* 48*   BUN 17 14 15   CREATININE 0.7 0.6 0.6   CALCIUM 10.2 10.1 10.1   GLUCOSE 142* 151* 118*      ABG:  No results for input(s): PH, PO2ART, [id number], HCO3, BEART, O2SAT in the last 72 hours. Lab Results   Component Value Date    PROBNP 2,114 (H) 11/07/2022    PROBNP 527.0 (H) 11/05/2022    PROBNP 567.4 (H) 08/14/2022    THEOPH 8.0 (L) 06/28/2022     No results found for: [facility]    Radiology Review:  Pertinent images / reports were reviewed as a part of this visit.     Assessment:     Patient Active Problem List   Diagnosis    Centrilobular emphysema (HCC)    Hypertension    Hyperlipidemia LDL goal <100    Abnormal EKG    Palpitations    Anxiety    FH: CAD (coronary artery disease)    Fibromyalgia    Back pain at L4-L5 level    Sleep apnea    COPD exacerbation (HCC)    Electrolyte imbalance    Epigastric pain    COPD (chronic obstructive pulmonary disease) ([facility] [address]    Spinal stenosis of lumbar region with radiculopathy Spinal stenosis, lumbar region, without neurogenic claudication    COPD with acute exacerbation ([address]    Pneumonia due to infectious organism    SVT (supraventricular tachycardia) (HCC)    Class 1 obesity due to excess calories with body mass index (BMI) of 31.0 to 31.9 in adult    Pneumonia    Pleural effusion    Atelectasis    Pulmonary nodules    Respiratory failure with hypoxia and hypercapnia (HCC)    Anemia    COPD with exacerbation (HCC)    Acquired hemolytic anemia, unspecified (HCC)    Leucocytosis    Chronic kidney disease, stage I    Hyponatremia    PNA (pneumonia)    Sepsis (HCC)    Pericardial effusion    Acute acalculous cholecystitis    SIRS (systemic inflammatory response syndrome) (HCC)    Chest pain    Abnormal nuclear stress test    Abnormal stress test    Status post laparoscopic cholecystectomy    Moderate malnutrition (HCC)    LUZ MARIA (acute kidney injury) (HCC)    Dizziness    Stage 3a chronic kidney disease (HCC)    Adrenal insufficiency (HCC)    Trochanteric bursitis of right hip    Hypokalemia    Chest pressure    Leg edema    Acute on chronic respiratory failure with hypoxia ([address]       Plan:   1. Overall the patient has improved. 2. Inc. Activity. 3. Check jay jay level.     Ayla Hale MD   11/10/2022  11:28 AM

## 2022-11-11 VITALS
WEIGHT: 208.4 LBS | DIASTOLIC BLOOD PRESSURE: 94 MMHG | HEIGHT: 61 IN | HEART RATE: 98 BPM | RESPIRATION RATE: 20 BRPM | OXYGEN SATURATION: 94 % | BODY MASS INDEX: 39.35 KG/M2 | SYSTOLIC BLOOD PRESSURE: 201 MMHG | TEMPERATURE: 98 F

## 2022-11-11 PROBLEM — E44.0 MODERATE MALNUTRITION (HCC): Chronic | Status: RESOLVED | Noted: 2020-12-28 | Resolved: 2022-01-01

## 2022-11-11 LAB
ANION GAP SERPL CALCULATED.3IONS-SCNC: ABNORMAL MMOL/L (ref 4–16)
BASE EXCESS MIXED: 34.1 (ref 0–2.3)
BUN BLDV-MCNC: 15 MG/DL (ref 6–23)
CALCIUM SERPL-MCNC: 9.9 MG/DL (ref 8.3–10.6)
CARBON MONOXIDE, BLOOD: 1.2 % (ref 0–5)
CHLORIDE BLD-SCNC: 73 MMOL/L (ref 99–110)
CO2 CONTENT: 66.1 MMOL/L (ref 19–24)
CO2: >50 MMOL/L (ref 21–32)
COMMENT: ABNORMAL
CREAT SERPL-MCNC: 0.6 MG/DL (ref 0.6–1.1)
DOSE AMOUNT: ABNORMAL
DOSE TIME: ABNORMAL
GFR SERPL CREATININE-BSD FRML MDRD: >60 ML/MIN/1.73M2
GLUCOSE BLD-MCNC: 160 MG/DL (ref 70–99)
HCO3 ARTERIAL: 63.7 MMOL/L (ref 18–23)
HCT VFR BLD CALC: 37.3 % (ref 37–47)
HEMOGLOBIN: 11.3 GM/DL (ref 12.5–16)
MAGNESIUM: 1.5 MG/DL (ref 1.8–2.4)
MCH RBC QN AUTO: 26.7 PG (ref 27–31)
MCHC RBC AUTO-ENTMCNC: 30.3 % (ref 32–36)
MCV RBC AUTO: 88.2 FL (ref 78–100)
METHEMOGLOBIN ARTERIAL: 0.8 %
O2 SATURATION: 95.3 % (ref 96–97)
PCO2 ARTERIAL: 78 MMHG (ref 32–45)
PDW BLD-RTO: 13.1 % (ref 11.7–14.9)
PH BLOOD: 7.52 (ref 7.34–7.45)
PLATELET # BLD: 307 K/CU MM (ref 140–440)
PMV BLD AUTO: 10.1 FL (ref 7.5–11.1)
PO2 ARTERIAL: 79 MMHG (ref 75–100)
POTASSIUM SERPL-SCNC: 3.2 MMOL/L (ref 3.5–5.1)
RBC # BLD: 4.23 M/CU MM (ref 4.2–5.4)
SODIUM BLD-SCNC: 128 MMOL/L (ref 135–145)
THEOPHYLLINE LEVEL: 4 UG/ML (ref 10–20)
WBC # BLD: 16.6 K/CU MM (ref 4–10.5)

## 2022-11-11 PROCEDURE — 6370000000 HC RX 637 (ALT 250 FOR IP): Performed by: SURGERY

## 2022-11-11 PROCEDURE — 36600 WITHDRAWAL OF ARTERIAL BLOOD: CPT

## 2022-11-11 PROCEDURE — 97535 SELF CARE MNGMENT TRAINING: CPT

## 2022-11-11 PROCEDURE — 6370000000 HC RX 637 (ALT 250 FOR IP): Performed by: NURSE PRACTITIONER

## 2022-11-11 PROCEDURE — 6370000000 HC RX 637 (ALT 250 FOR IP): Performed by: STUDENT IN AN ORGANIZED HEALTH CARE EDUCATION/TRAINING PROGRAM

## 2022-11-11 PROCEDURE — 94640 AIRWAY INHALATION TREATMENT: CPT

## 2022-11-11 PROCEDURE — 80198 ASSAY OF THEOPHYLLINE: CPT

## 2022-11-11 PROCEDURE — 6360000002 HC RX W HCPCS: Performed by: STUDENT IN AN ORGANIZED HEALTH CARE EDUCATION/TRAINING PROGRAM

## 2022-11-11 PROCEDURE — 36415 COLL VENOUS BLD VENIPUNCTURE: CPT

## 2022-11-11 PROCEDURE — 97530 THERAPEUTIC ACTIVITIES: CPT

## 2022-11-11 PROCEDURE — 2700000000 HC OXYGEN THERAPY PER DAY

## 2022-11-11 PROCEDURE — 80048 BASIC METABOLIC PNL TOTAL CA: CPT

## 2022-11-11 PROCEDURE — 99233 SBSQ HOSP IP/OBS HIGH 50: CPT | Performed by: INTERNAL MEDICINE

## 2022-11-11 PROCEDURE — 2580000003 HC RX 258: Performed by: STUDENT IN AN ORGANIZED HEALTH CARE EDUCATION/TRAINING PROGRAM

## 2022-11-11 PROCEDURE — 83735 ASSAY OF MAGNESIUM: CPT

## 2022-11-11 PROCEDURE — 6370000000 HC RX 637 (ALT 250 FOR IP)

## 2022-11-11 PROCEDURE — 6370000000 HC RX 637 (ALT 250 FOR IP): Performed by: INTERNAL MEDICINE

## 2022-11-11 PROCEDURE — 85027 COMPLETE CBC AUTOMATED: CPT

## 2022-11-11 PROCEDURE — 97116 GAIT TRAINING THERAPY: CPT

## 2022-11-11 PROCEDURE — 82803 BLOOD GASES ANY COMBINATION: CPT

## 2022-11-11 PROCEDURE — 94761 N-INVAS EAR/PLS OXIMETRY MLT: CPT

## 2022-11-11 PROCEDURE — APPSS45 APP SPLIT SHARED TIME 31-45 MINUTES: Performed by: NURSE PRACTITIONER

## 2022-11-11 RX ORDER — IPRATROPIUM BROMIDE AND ALBUTEROL SULFATE 2.5; .5 MG/3ML; MG/3ML
3 SOLUTION RESPIRATORY (INHALATION)
Qty: 360 ML | Refills: 0 | Status: ON HOLD | OUTPATIENT
Start: 2022-11-11 | End: 2022-12-11

## 2022-11-11 RX ORDER — GUAIFENESIN/DEXTROMETHORPHAN 100-10MG/5
5 SYRUP ORAL EVERY 6 HOURS PRN
Qty: 120 ML | Refills: 0 | Status: SHIPPED | OUTPATIENT
Start: 2022-11-11 | End: 2022-11-21

## 2022-11-11 RX ORDER — SPIRONOLACTONE 25 MG/1
25 TABLET ORAL DAILY
Qty: 30 TABLET | Refills: 3 | Status: ON HOLD | OUTPATIENT
Start: 2022-11-12

## 2022-11-11 RX ORDER — ROFLUMILAST 500 UG/1
500 TABLET ORAL DAILY
Qty: 30 TABLET | Refills: 0 | Status: ON HOLD | OUTPATIENT
Start: 2022-11-11 | End: 2022-12-11

## 2022-11-11 RX ORDER — AMOXICILLIN AND CLAVULANATE POTASSIUM 875; 125 MG/1; MG/1
1 TABLET, FILM COATED ORAL 2 TIMES DAILY
Qty: 8 TABLET | Refills: 0 | Status: SHIPPED | OUTPATIENT
Start: 2022-11-11 | End: 2022-11-15

## 2022-11-11 RX ORDER — POTASSIUM CHLORIDE 20 MEQ/1
40 TABLET, EXTENDED RELEASE ORAL 2 TIMES DAILY WITH MEALS
Qty: 60 TABLET | Refills: 3 | Status: CANCELLED | OUTPATIENT
Start: 2022-11-11 | End: 2022-11-18

## 2022-11-11 RX ORDER — POTASSIUM CHLORIDE 20 MEQ/1
40 TABLET, EXTENDED RELEASE ORAL 2 TIMES DAILY WITH MEALS
Status: DISCONTINUED | OUTPATIENT
Start: 2022-11-11 | End: 2022-11-11 | Stop reason: HOSPADM

## 2022-11-11 RX ORDER — POTASSIUM CHLORIDE 20 MEQ/1
40 TABLET, EXTENDED RELEASE ORAL 2 TIMES DAILY
Qty: 28 TABLET | Refills: 0 | Status: SHIPPED | OUTPATIENT
Start: 2022-11-11 | End: 2022-11-11 | Stop reason: SDUPTHER

## 2022-11-11 RX ORDER — ACETAZOLAMIDE 500 MG/5ML
500 INJECTION, POWDER, LYOPHILIZED, FOR SOLUTION INTRAVENOUS ONCE
Status: COMPLETED | OUTPATIENT
Start: 2022-11-11 | End: 2022-11-11

## 2022-11-11 RX ORDER — DILTIAZEM HYDROCHLORIDE 360 MG/1
360 CAPSULE, EXTENDED RELEASE ORAL DAILY
Qty: 30 CAPSULE | Refills: 3 | Status: ON HOLD | OUTPATIENT
Start: 2022-11-11

## 2022-11-11 RX ORDER — MAGNESIUM OXIDE 400 MG/1
400 TABLET ORAL 2 TIMES DAILY
Qty: 30 TABLET | Refills: 1 | Status: ON HOLD | OUTPATIENT
Start: 2022-11-11

## 2022-11-11 RX ORDER — ACETAZOLAMIDE 250 MG/1
250 TABLET ORAL 2 TIMES DAILY
Qty: 14 TABLET | Refills: 0 | Status: SHIPPED | OUTPATIENT
Start: 2022-11-12 | End: 2022-11-11 | Stop reason: HOSPADM

## 2022-11-11 RX ORDER — PREDNISONE 10 MG/1
TABLET ORAL
Qty: 30 TABLET | Refills: 0 | Status: ON HOLD | OUTPATIENT
Start: 2022-11-11

## 2022-11-11 RX ORDER — MAGNESIUM SULFATE IN WATER 40 MG/ML
2000 INJECTION, SOLUTION INTRAVENOUS ONCE
Status: COMPLETED | OUTPATIENT
Start: 2022-11-11 | End: 2022-11-11

## 2022-11-11 RX ORDER — SPIRONOLACTONE 50 MG/1
25 TABLET, FILM COATED ORAL DAILY
Status: DISCONTINUED | OUTPATIENT
Start: 2022-11-11 | End: 2022-11-11 | Stop reason: HOSPADM

## 2022-11-11 RX ORDER — LANOLIN ALCOHOL/MO/W.PET/CERES
400 CREAM (GRAM) TOPICAL 2 TIMES DAILY
Status: DISCONTINUED | OUTPATIENT
Start: 2022-11-11 | End: 2022-11-11 | Stop reason: HOSPADM

## 2022-11-11 RX ORDER — POTASSIUM CHLORIDE 20 MEQ/1
20 TABLET, EXTENDED RELEASE ORAL 2 TIMES DAILY
Qty: 14 TABLET | Refills: 0 | Status: ON HOLD | OUTPATIENT
Start: 2022-11-11 | End: 2022-11-18

## 2022-11-11 RX ADMIN — ATORVASTATIN CALCIUM 80 MG: 40 TABLET, FILM COATED ORAL at 10:27

## 2022-11-11 RX ADMIN — Medication 100 MG: at 10:27

## 2022-11-11 RX ADMIN — MONTELUKAST 10 MG: 10 TABLET, FILM COATED ORAL at 19:54

## 2022-11-11 RX ADMIN — CEFTRIAXONE SODIUM 1000 MG: 1 INJECTION, POWDER, FOR SOLUTION INTRAMUSCULAR; INTRAVENOUS at 10:46

## 2022-11-11 RX ADMIN — HYDRALAZINE HYDROCHLORIDE 50 MG: 50 TABLET, FILM COATED ORAL at 10:25

## 2022-11-11 RX ADMIN — POTASSIUM CHLORIDE 40 MEQ: 1500 TABLET, EXTENDED RELEASE ORAL at 16:32

## 2022-11-11 RX ADMIN — METHOCARBAMOL TABLETS 750 MG: 500 TABLET, COATED ORAL at 19:54

## 2022-11-11 RX ADMIN — HYDRALAZINE HYDROCHLORIDE 50 MG: 50 TABLET, FILM COATED ORAL at 15:32

## 2022-11-11 RX ADMIN — LEVOTHYROXINE SODIUM 100 MCG: 0.1 TABLET ORAL at 10:40

## 2022-11-11 RX ADMIN — IPRATROPIUM BROMIDE AND ALBUTEROL SULFATE 1 AMPULE: .5; 2.5 SOLUTION RESPIRATORY (INHALATION) at 05:14

## 2022-11-11 RX ADMIN — CLONAZEPAM 0.5 MG: 0.5 TABLET ORAL at 06:14

## 2022-11-11 RX ADMIN — PANTOPRAZOLE SODIUM 40 MG: 40 TABLET, DELAYED RELEASE ORAL at 10:39

## 2022-11-11 RX ADMIN — METOPROLOL TARTRATE 25 MG: 25 TABLET, FILM COATED ORAL at 19:55

## 2022-11-11 RX ADMIN — METHYLPREDNISOLONE SODIUM SUCCINATE 40 MG: 40 INJECTION, POWDER, FOR SOLUTION INTRAMUSCULAR; INTRAVENOUS at 10:39

## 2022-11-11 RX ADMIN — SPIRONOLACTONE 25 MG: 50 TABLET ORAL at 10:27

## 2022-11-11 RX ADMIN — THEOPHYLLINE ANHYDROUS 100 MG: 100 CAPSULE, EXTENDED RELEASE ORAL at 19:54

## 2022-11-11 RX ADMIN — METOPROLOL TARTRATE 25 MG: 25 TABLET, FILM COATED ORAL at 10:27

## 2022-11-11 RX ADMIN — GABAPENTIN 400 MG: 400 CAPSULE ORAL at 19:54

## 2022-11-11 RX ADMIN — THEOPHYLLINE ANHYDROUS 100 MG: 100 CAPSULE, EXTENDED RELEASE ORAL at 10:27

## 2022-11-11 RX ADMIN — ROFLUMILAST 500 MCG: 500 TABLET ORAL at 10:26

## 2022-11-11 RX ADMIN — Medication 1 CAPSULE: at 10:27

## 2022-11-11 RX ADMIN — MAGNESIUM OXIDE TAB 400 MG (241.3 MG ELEMENTAL MG) 400 MG: 400 (241.3 MG) TAB at 10:27

## 2022-11-11 RX ADMIN — DILTIAZEM HYDROCHLORIDE 360 MG: 180 CAPSULE, COATED, EXTENDED RELEASE ORAL at 10:26

## 2022-11-11 RX ADMIN — GUAIFENESIN AND DEXTROMETHORPHAN 5 ML: 100; 10 SYRUP ORAL at 10:37

## 2022-11-11 RX ADMIN — ACETAZOLAMIDE 500 MG: 500 INJECTION, POWDER, LYOPHILIZED, FOR SOLUTION INTRAVENOUS at 18:04

## 2022-11-11 RX ADMIN — BUDESONIDE AND FORMOTEROL FUMARATE DIHYDRATE 2 PUFF: 160; 4.5 AEROSOL RESPIRATORY (INHALATION) at 08:58

## 2022-11-11 RX ADMIN — DULOXETINE 60 MG: 30 CAPSULE, DELAYED RELEASE ORAL at 10:27

## 2022-11-11 RX ADMIN — Medication 1000 UNITS: at 10:26

## 2022-11-11 RX ADMIN — MAGNESIUM OXIDE TAB 400 MG (241.3 MG ELEMENTAL MG) 400 MG: 400 (241.3 MG) TAB at 19:54

## 2022-11-11 RX ADMIN — FOLIC ACID 1 MG: 1 TABLET ORAL at 10:27

## 2022-11-11 RX ADMIN — MAGNESIUM SULFATE HEPTAHYDRATE 2000 MG: 2 INJECTION, SOLUTION INTRAVENOUS at 18:12

## 2022-11-11 RX ADMIN — IPRATROPIUM BROMIDE AND ALBUTEROL SULFATE 1 AMPULE: .5; 2.5 SOLUTION RESPIRATORY (INHALATION) at 11:51

## 2022-11-11 RX ADMIN — HYDRALAZINE HYDROCHLORIDE 50 MG: 50 TABLET, FILM COATED ORAL at 19:55

## 2022-11-11 ASSESSMENT — PAIN SCALES - GENERAL: PAINLEVEL_OUTOF10: 0

## 2022-11-11 NOTE — CONSULTS
Consult cancelled. Staff obtained PIV access that meets patient's therapeutic needs. Please consult IV/PICC team for questions, concerns, or patient's needs change.

## 2022-11-11 NOTE — PROGRESS NOTES
Physical Therapy Treatment Note  Name: Sae Guerra MRN: [id number] :   1960   Date:  2022   Admission Date: 2022 Room:  [address] [place], [place] [address]     Restrictions/Precautions:          general precautions, fall risk    Communication with other providers:  OT    Subjective:  Patient states:  \"I'm not doing great\"  Pain:   Location, Type, Intensity (0/10 to 10/10):  denies pain    Objective:    Observation:  Pt up in chair upon entry and agreeable to session    Treatment, including education/measures:    Transfers: pt completed STS to/from chair x2 trials and to/from commode CGA with cues for hand placement and sequencing    Gait: pt ambulated 61' + 61' + 10' with RW CGA with decreased debbie, forward flexed posture, and chair follow for safety. Cues provided for walker management, pathway negotiation, and pursed lip breathing. SpO2 87-97% throughout on 4L O2. Pt back on 3L O2 at rest.    Assessment / Impression:    Pt up in chair at end of session with needs in reach and alarm on.     Patient's tolerance of treatment:  fair   Adverse Reaction: n/a  Significant change in status and impact:  n/a  Barriers to improvement:  decreased endurance, impaired processing, impaired gait    Plan for Next Session:    Continue to address transfer training and gait training in future sessions    Time in:  1056  Time out:  1119  Timed treatment minutes:  23  Total treatment time:  23    Previously filed items:  Social/Functional History  Lives With: Spouse, Son  Type of Home: House  Home Layout: One level  Home Access: Stairs to enter with rails  Entrance Stairs - Number of Steps: 3  Bathroom Shower/Tub: Walk-in shower, Shower chair with back  [employer]: Handicap height  Bathroom Equipment: Grab bars in shower, Grab bars around toilet  Bathroom Accessibility: Walker accessible, Not accessible  Home Equipment: Rollator  Has the patient had two or more falls in the past year or any fall with injury in the past year?: No (slid out of bed)  Receives Help From: Family  ADL Assistance: [address]: Needs assistance (family helps)  Homemaking Responsibilities: No  Ambulation Assistance: Independent (with FWW)  Transfer Assistance: Independent  Active : No  Patient's  Info:  or daughter  Occupation: On disability        Short Term Goals  Time Frame for Short Term Goals: 1 week  Short Term Goal 1: Pt to complete all bed mobility mod I  Short Term Goal 2: Pt to complete all STS transfers to/from bed, commode, and chair mod I  Short Term Goal 3: pt to ambulate 48' with LRAD and supervision  Short Term Goal 4: Pt to ascend/descedn 3 stairs with LRAD and supervision to simulate home set up    Electronically signed by:    Rachel Mitchell PT  11/11/2022, 12:39 PM

## 2022-11-11 NOTE — PROGRESS NOTES
Cardiology Progress Note     Today's Plan: continue monitoring    Admit Date:  11/5/2022    Consult reason/ Seen today for: shortness of breath and chest pain    Subjective and  Overnight Events: She states that she is more short of breath. She is on more oxygen this morning. She is worried about going home. Chest pain no  Shortness of breath yes   Palpitations no  Dizziness no  Swelling no        Telemetry Reviewed:   sinus rhythm    ECHO :   Echocardiogram 11/7/2022   Summary   This is a limited echocardiogram.   Left ventricular systolic function is normal.   Ejection fraction is visually estimated at 60%. No significant valvular disease noted. No evidence of any pericardial effusion. History of Presenting Illness:    Chief complain on admission : 63 y. o.year old who is admitted for  Chief Complaint   Patient presents with    Shortness of Breath          Past medical history:    has a past medical history of Anemia, Anxiety, Arthritis, Back pain at L4-L5 level, Bipolar 1 disorder (HCC), COPD (chronic obstructive pulmonary disease) ([facility] [address] Emphysema of lung ([facility] [address] FH: CAD (coronary artery disease), Fibromyalgia, Full dentures, Gastric ulcer, H/O Doppler ultrasound, H/O echocardiogram, History of blood transfusion, Hyperlipidemia LDL goal <100, Hypertension, Irregular heartbeat, Obstructive sleep apnea, On home oxygen therapy, Osteoarthritis, Pericardial effusion, Spinal stenosis, Thyroid disease, and Wears glasses. Past surgical history:   has a past surgical history that includes Carpal tunnel release (Right, 1985); Tubal ligation (1987); back surgery (03/2017); Cholecystectomy, laparoscopic (N/A, 10/25/2020); Colonoscopy (N/A, 12/12/2019); Endoscopy, colon, diagnostic; Cardiac catheterization; Cardiac catheterization; and Upper gastrointestinal endoscopy (N/A, 1/7/2021). Social History:   reports that she quit smoking about 14 years ago. Her smoking use included cigarettes.  She has a 30.00 pack-year smoking history. She has never used smokeless tobacco. She reports that she does not currently use alcohol after a past usage of about 2.0 standard drinks per week. She reports that she does not use drugs. Family history:  family history includes Asthma in her mother; Heart Disease in her father. Allergies   Allergen Reactions    Lisinopril Swelling and Rash     angioedema       Review of Systems   All 14 systems were reviewed and are negative  Except for the positive findings  which as documented     BP (!) 169/83   Pulse 78   Temp 98 °F (36.7 °C) (Oral)   Resp 17   Ht 5' 1\" (1.549 m)   Wt 208 lb 6.4 oz (94.5 kg)   LMP 01/05/2010   SpO2 96%   BMI 39.38 kg/m²     Intake/Output Summary (Last 24 hours) at 11/11/2022 1448  Last data filed at 11/11/2022 0533  Gross per 24 hour   Intake 510 ml   Output 1100 ml   Net -590 ml         Physical Exam  Vitals reviewed. Constitutional:       General: She is not in acute distress. Appearance: Normal appearance. She is obese. She is not ill-appearing. HENT:      Head: Atraumatic. Neck:      Vascular: No carotid bruit. Cardiovascular:      Rate and Rhythm: Normal rate and regular rhythm. Pulses: Normal pulses. Heart sounds: Normal heart sounds. No murmur heard. Pulmonary:      Effort: Pulmonary effort is normal. No respiratory distress. Breath sounds: Decreased breath sounds present. Musculoskeletal:         General: No swelling or deformity. Cervical back: Neck supple. No muscular tenderness. Neurological:      Mental Status: She is alert.            Medications:    spironolactone  25 mg Oral Daily    metoprolol tartrate  25 mg Oral BID    theophylline  100 mg Oral BID    cefTRIAXone (ROCEPHIN) IV  1,000 mg IntraVENous Q24H    methylPREDNISolone  40 mg IntraVENous Q12H    dilTIAZem  360 mg Oral Daily    pantoprazole  40 mg Oral QAM AC    atorvastatin  80 mg Oral Daily    DULoxetine  60 mg Oral Daily    folic acid  1 mg Oral Daily    gabapentin  400 mg Oral TID    hydrALAZINE  50 mg Oral TID    lactobacillus  1 capsule Oral Daily with breakfast    levothyroxine  100 mcg Oral Daily    magnesium oxide  400 mg Oral Daily    methocarbamol  750 mg Oral BID    montelukast  10 mg Oral Daily    Vitamin D  1,000 Units Oral Daily    thiamine  100 mg Oral Daily    sodium chloride flush  5-40 mL IntraVENous 2 times per day    enoxaparin  40 mg SubCUTAneous Daily    ipratropium-albuterol  1 ampule Inhalation Q4H WA    Roflumilast  500 mcg Oral Daily    budesonide-formoterol  2 puff Inhalation BID      sodium chloride       polyvinyl alcohol, clonazePAM, sodium chloride flush, sodium chloride, ondansetron **OR** ondansetron, polyethylene glycol, acetaminophen **OR** acetaminophen, oxyCODONE-acetaminophen, guaiFENesin-dextromethorphan    Lab Data:  CBC:   Recent Labs     11/09/22  0704 11/10/22  0338 11/11/22  1202   WBC 9.1 10.9* 16.6*   HGB 9.9* 10.0* 11.3*   HCT 34.4* 33.6* 37.3   MCV 92.5 91.1 88.2    255 307       BMP:   Recent Labs     11/09/22  0704 11/10/22  0338 11/11/22  1202   * 131* 128*   K 4.2 3.6 3.2*   CL 80* 77* 73*   CO2 45* 48* >50*   BUN 14 15 15   CREATININE 0.6 0.6 0.6       PT/INR: No results for input(s): PROTIME, INR in the last 72 hours. BNP:    No results for input(s): PROBNP in the last 72 hours. TROPONIN:   No results for input(s): TROPONINT in the last 72 hours. All labs, medications and tests reviewed by myself , continue all other medications of all above medical condition listed as is except for changes mentioned above. Thank you very much for consult , please call with questions. Electronically signed by BRIAN Luna CNP on 11/11/2022 at 2:48 PM          CARDIOLOGY ATTENDING ADDENDUM    I have seen, spoken to and examined this patient personally, independent of the NP/PAC. I have reviewed the hospital care given to date and reviewed all pertinent labs and imaging.  I have spoken with patient, nursing staff and provided written and verbal instructions . The above note has been reviewed. I have spent substantive amount of time in formulating patient care. Physical Exam:    General:   Awake, alert  Head:normal  Eye:normal  Chest: Poor respiratory effort, wheezing on examination      MEDICAL DECISION MAKING :      Shortness of breath: COPD exacerbation. Echo does not have VHD. Chest pain with history of Non obstructive CAD: troponin is resolved. Likely due to COPD. Echo does not have WMA or appear to have pericarditis. No stress test planned due to respiratory status. Consider as outpatient. PAF: in sinus. Tachycardia improved after restarting low dose metoporlol. Monitor for afib RVR given respiratory status. Recommend keeping potassium 4-4.5 and magnesium 2-2.2 in patients with atrial fibrillation. HTN: elevated still. Start aldactone 25 mg daily. Continue metoprolol to 25 mg BID and cardizem 360 mg daily. Monitor for bronchospasms.         Dr. Geraldine Echevarria MD

## 2022-11-11 NOTE — PROGRESS NOTES
Pt is on her own Cpap mashine. With a full face mask, +5.0 pressure, 373 Vt, Leak 19, pip 10.6, map 7.0, 1:1.7, 34.7 peak flow, 7.7 MV. Pt tolerating well.

## 2022-11-11 NOTE — PROGRESS NOTES
Occupational Therapy Treatment Note  Name: Carson Conner MRN: [id number] :   1960   Date:  2022   Admission Date: 2022 Room:  3103/3103-A     Primary Problem:  The primary encounter diagnosis was Acute on chronic respiratory failure with hypercapnia ([facility] [address] Diagnoses of Leukocytosis, unspecified type, Pneumonia of both lower lobes due to infectious organism, and Long term current use of systemic steroids were also pertinent to this visit. Past Medical History:   Diagnosis Date    Anemia     Anxiety 2017    follows with Dr Galindo Kaplan     Back pain at L4-L5 level 2017    Dr Bryan Pelaez 1 disorder [facility])     per pt on 2021\"never told I have bipolar\"    COPD (chronic obstructive pulmonary disease) ([facility] [address]     follows with Dr Abril Art    Emphysema of lung [facility])     FH: CAD (coronary artery disease) 2017    Father had in his forties, sister in her thirties     Fibromyalgia 2017    Full dentures     full upper plate and partial on the bottom    Gastric ulcer     \"had stomach ulder back fall \"    H/O Doppler ultrasound 2019    venous- no DVT or reflux    H/O echocardiogram 2015    EF50-55% Normal- see media    History of blood transfusion     Hyperlipidemia LDL goal <100     Hypertension     Dr Yves Bell    Irregular heartbeat     \"they said I have irreg heart beat before- back when they drained fluid around my heart \"    Obstructive sleep apnea     \"have bipap I use at home\"    On home oxygen therapy     \"on oxygen all the time at home and keep it on 2.5 liters\"    Osteoarthritis     Pericardial effusion     per old chart had pericardial effusion 10/2020    Spinal stenosis     hx per old chart    Thyroid disease     Wears glasses     \"suppose to wear glasses\"         Communication with other providers:   CoTx with PT for pt safety and tolerance, RN handoff     Subjective:  Patient states:  \"I need my pole to walk\" - pt stated this and PT figured out pt meant gait belt   Pain: no c/o   Restrictions: general, fall, tele, O2 supp, IV pole   No one at bedside    Objective:    Observation:  pt was seated in recliner upon arrival, agreeable to session  Objective Measures:  With activity on 3L O2, pt dipped to 88% SpO2. With O2 supp inc to 4L O2, pt demo 94% SpO2. When seated with 3L O2 at conclusion of session, pt at 97% SpO2. Treatment, including education:    Therapeutic Activity Training:   Therapeutic activity training was instructed today. Cues were given for safety, sequence, UE/LE placement, visual cues, and balance. Activities performed today included:    STS:  Pt completed from/to recliner CGA with use of FWW. Pt completed to/from standard commode CGA with good eccentric control with use of R grab bar. Ambulation:  Pt ambulated 3 bouts of short HH distances CGA with FWW. Pt provided recliner follow and required 1 prolonged seated rest break prior to ambulating back to room to use bathroom. Pt returned to recliner from bathroom CGA with FWW. Pt provided cues for deep breathing throughout. See PT note for further details regarding ambulation. Self Care Training:   Self care training was performed today. Cues were given for safety, sequence, UE/LE placement, visual cues, and balance. Activities performed today included: Toileting:  Pt completed over standard commode with CGA for transfer. Pt provided close SBA/ supervision d/t confusion. Pt managed depends and ingrid care.      Pt declined need for other self-care offered, she reported she already completed self-care this AM.       Education: Role of OT, OT POC, safety, benefits of EOB/OOB activity, rationale for treatment  Safety Measures: Gait belt used for safety of pt and therapist, Left in recliner, Alarm in place, call light and phone within reach, lines managed, needs met     Assessment / Impression:    Pt cont to demo confusion throughout session and provided redirecting cues.      Patient's tolerance of treatment: Good   Adverse Reaction: none   Significant change in status and impact:  none   Barriers to improvement: strength, endurance, confusion     Plan for Next Session:    Continue OT POC    Time in:  1056  Time out:  1119  Timed treatment minutes:  23  Total treatment time:  23    Electronically signed by:    KENIA Morales/L  License: [id number]  75/52/1531, 12:13 PM

## 2022-11-11 NOTE — PROGRESS NOTES
Pulmonary and Critical Care  Progress Note    Subjective: The patient is better. Shortness of breath better. Chest pain none. Addressing respiratory complaints Patient is negative for  hemoptysis and cyanosis. CONSTITUTIONAL:  negative for fevers and chills. Past Medical History:     has a past medical history of Anemia, Anxiety, Arthritis, Back pain at L4-L5 level, Bipolar 1 disorder (HCC), COPD (chronic obstructive pulmonary disease) ([facility] [address] Emphysema of lung ([facility] [address] FH: CAD (coronary artery disease), Fibromyalgia, Full dentures, Gastric ulcer, H/O Doppler ultrasound, H/O echocardiogram, History of blood transfusion, Hyperlipidemia LDL goal <100, Hypertension, Irregular heartbeat, Obstructive sleep apnea, On home oxygen therapy, Osteoarthritis, Pericardial effusion, Spinal stenosis, Thyroid disease, and Wears glasses. has a past surgical history that includes Carpal tunnel release (Right, 1985); Tubal ligation (1987); back surgery (03/2017); Cholecystectomy, laparoscopic (N/A, 10/25/2020); Colonoscopy (N/A, 12/12/2019); Endoscopy, colon, diagnostic; Cardiac catheterization; Cardiac catheterization; and Upper gastrointestinal endoscopy (N/A, 1/7/2021). reports that she quit smoking about 14 years ago. Her smoking use included cigarettes. She has a 30.00 pack-year smoking history. She has never used smokeless tobacco. She reports that she does not currently use alcohol after a past usage of about 2.0 standard drinks per week. She reports that she does not use drugs. Family history:  family history includes Asthma in her mother; Heart Disease in her father.     Allergies   Allergen Reactions    Lisinopril Swelling and Rash     angioedema     Social History:    Reviewed; no changes    Objective:   PHYSICAL EXAM:        VITALS:  BP (!) 169/83   Pulse 78   Temp 98 °F (36.7 °C) (Oral)   Resp 20   Ht 5' 1\" (1.549 m)   Wt 208 lb 6.4 oz (94.5 kg)   LMP 01/05/2010   SpO2 98%   BMI 39.38 kg/m²     24HR INTAKE/OUTPUT:      Intake/Output Summary (Last 24 hours) at 11/11/2022 1222  Last data filed at 11/11/2022 0533  Gross per 24 hour   Intake 610 ml   Output 1100 ml   Net -490 ml         CONSTITUTIONAL:  awake, alert, cooperative, no apparent distress, and appears stated age  LUNGS: Decreased BS. CARDIOVASCULAR:  normal S1 and S2 and negative JVD  ABD:Abdomen soft, non-tender. BS normal. No masses,  No organomegaly  NEURO:Alert and oriented x3. Gait normal. Reflexes and motor strength normal and symmetric. Cranial nerves 2-12 and sensation grossly intact. DATA:    CBC:  Recent Labs     11/09/22  0704 11/10/22  0338   WBC 9.1 10.9*   RBC 3.72* 3.69*   HGB 9.9* 10.0*   HCT 34.4* 33.6*    255   MCV 92.5 91.1   MCH 26.6* 27.1   MCHC 28.8* 29.8*   RDW 13.3 13.3   SEGSPCT 88.1* 81.1*      BMP:  Recent Labs     11/09/22  0704 11/10/22  0338   * 131*   K 4.2 3.6   CL 80* 77*   CO2 45* 48*   BUN 14 15   CREATININE 0.6 0.6   CALCIUM 10.1 10.1   GLUCOSE 151* 118*      ABG:  No results for input(s): PH, PO2ART, [id number], HCO3, BEART, O2SAT in the last 72 hours. Lab Results   Component Value Date    PROBNP 2,114 (H) 11/07/2022    PROBNP 527.0 (H) 11/05/2022    PROBNP 567.4 (H) 08/14/2022    THEOPH 8.0 (L) 06/28/2022     No results found for: [facility]    Radiology Review:  Pertinent images / reports were reviewed as a part of this visit.     Assessment:     Patient Active Problem List   Diagnosis    Centrilobular emphysema (HCC)    Hypertension    Hyperlipidemia LDL goal <100    Abnormal EKG    Palpitations    Anxiety    FH: CAD (coronary artery disease)    Fibromyalgia    Back pain at L4-L5 level    Sleep apnea    COPD exacerbation (HCC)    Electrolyte imbalance    Epigastric pain    COPD (chronic obstructive pulmonary disease) ([facility] [address]    Spinal stenosis of lumbar region with radiculopathy    Spinal stenosis, lumbar region, without neurogenic claudication    COPD with acute exacerbation ([facility] [address]    Pneumonia due to

## 2022-11-11 NOTE — PROGRESS NOTES
Comprehensive Nutrition Assessment    Type and Reason for Visit:  Initial (los 6 d)    Nutrition Recommendations/Plan:   Continue No Added Salt Diet  D/C Carb Control Diet  Outpt counseling for wt gain appropriate, please refer to [facility] as needed     Malnutrition Assessment:  Malnutrition Status:  No malnutrition (11/11/22 1304)    Context:  Chronic Illness       Nutrition Assessment:    Pt admitted with COPD exacerbation. H/O CHF, COPD with baseline O2 of 3 liters, HTN, HLD, CAD, CHF, thyroid disease. Fair to adequate po intake here, consuming generally greater than half of diabetic, no added salt diet. No h/o DM noted, will liberalize diet some. Wt gain noted over the past year. Pt is n/a during my visit. Will continue to follow as moderate nutrition risk. Nutrition Related Findings:    Na 131, Glu 118, A1c 5.1   Wound Type: None       Current Nutrition Intake & Therapies:    Average Meal Intake: 51-75%, 26-50%  Average Supplements Intake: None Ordered  ADULT DIET; Regular; 4 carb choices (60 gm/meal); No Added Salt (3-4 gm)    Anthropometric Measures:  Height: 5' 1\" (154.9 cm)  Ideal Body Weight (IBW): 105 lbs (48 kg)    Admission Body Weight: 204 lb 9 oz (92.8 kg)  Current Body Weight: 208 lb 6.4 oz (94.5 kg), 198.5 % IBW.     Current BMI (kg/m2): 39.4  Usual Body Weight: 188 lb 13 oz (85.6 kg) (8/30/21)  % Weight Change (Calculated): 10.4                    BMI Categories: Obese Class 2 (BMI 35.0 -39.9)    Estimated Daily Nutrient Needs:  Energy Requirements Based On: Formula  Weight Used for Energy Requirements: Current  Energy (kcal/day): 1744 (MSJ)  Weight Used for Protein Requirements: Ideal  Protein (g/day): 48-57 (1-1.2 g/kg IBW)  Method Used for Fluid Requirements: 1 ml/kcal  Fluid (ml/day): 1750    Nutrition Diagnosis:   Predicted inadequate energy intake related to cardiac dysfunction as evidenced by intake 26-50%, intake 51-75%    Nutrition Interventions:   Food and/or Nutrient Delivery: Modify Current Diet  Nutrition Education/Counseling: No recommendation at this time  Coordination of Nutrition Care: Continue to monitor while inpatient       Goals:     Goals: Meet at least 75% of estimated needs       Nutrition Monitoring and Evaluation:   Behavioral-Environmental Outcomes: None Identified  Food/Nutrient Intake Outcomes: Food and Nutrient Intake  Physical Signs/Symptoms Outcomes: Biochemical Data, Fluid Status or Edema, Weight    Discharge Planning:    No discharge needs at this time     Kylah Funes, [address], [address]: [phone]

## 2022-11-12 NOTE — PROGRESS NOTES
Patient discharged to home with , home bipap taken to patients vehicle. Discharge instructions given to patient. Patient transported to front door via wheelchair and O2 @ 3L by [place], [place]. Discharged to private vehicle.

## 2022-11-12 NOTE — DISCHARGE SUMMARY
Discharge Summary    Name:  Kashif Rivera /Age/Sex: 1960  (63 y.o. female)   MRN & CSN:  [id number] & [id number] Admission Date/Time: 2022  5:45 PM   Attending:  No att. providers found Discharging Physician: Jan Feliciano MD     Hospital Course:   Kashif Rivera is a 63 y.o.  female  who presents with COPD exacerbation ([address]    HPI  Buela Mayor Malissa Robertson is a 63 y.o. female who p/w SOB. She has PMH of COPD with baseline O2 of 3 liters, HTN, HLD, CAD, CHF, thyroid disease. Began feeling yesterday morning. Requiring 4 liter NC in ED. Denies chest pain. Has productive cough with green sputum. Subjective chills as well. No recent sick contacts. Patient states that this feels like prior COPD exacerbations. \"       The following problems have been addressed during this hospitalization. Acute hypoxic hypercapnic resp failure 2/2 COPD exacerbation 2/2 CAP. Was on BIPAP now on home baseline 4L. CTA suggestive of pneumonia. Blood culture, Strep & uLegionella negative. Resp panel negative. Resp acidosis improved  - received IV Steroids; discharged on prednisone taper  - received breathing treatment  - received abx for CAP (azithro + ceftriaxone); discharged on Augmentin  - continue BIPHP prn QHS and naps  - Pulmonology was following; pt to follow up outpatient  - pt to continue roflumilast      Hypochloremic hyponatremia. Cl 74, Na 128. Has been trending down. Was on HCTZ likely the cause. - Received IVF  - follow up with nephrology outpatient        [facility] - BiPAP at home. - continue     Hx of afib - Currently NSR, Not on AC given history of intracranial hemorrhage, GI bleeding.  - home  diltiazem increased by card  - metoprolol increased 25 mg BID   - cardiology consulted; pt to follow up outpatient     Elevated BNP with mild vascular congestion. No h/o CHF. recent EF normal. patient does not seemed to be  fluid overloaded; denying orthopnea. Received lasix 20 mg IV.    - no need for further diuresis      Elevated troponin - Has some chest tightness but no ischemic changes in EKG. Likely Type II with elevated BNP. Trended down to normal. Cardiology following     HTN - BP was in the high side; med changes were made during admission. HCTZ d/marcos given hyponatremia  - pt to continue hydralazine  - metoprolol 25mg BID  - Cardizem (home dose increased by cardiology)  - started on aldactone 25 mg     CAD - asa, statin        Patient was seen and examined at bedside on day of discharge. This note can be used as the progress note for today's visit. Pt states her breathing is the same but denied any worsening. States her cough has improved. Pt was asking when she can go home. Pt was cleared for discharge by cardiology and pulmonology. Labs were obtained late in the day which showed Na 128, K 3.2, HCO3 >50, Mg 1.5. Pt received acetazolamide, potassium replacement, magnesium replacement. Pt was taken off HCTZ the day before. Pt was advised to go to lab on Monday and to book an appointment to see nephrology as soon as possible. Pt denies chest pain, nausea, vomiting. She has no other symptoms or concerns. Her O2 is back to baseline. Physical Exam  Vitals:   Vitals:    11/11/22 2000   BP: (!) 201/94   Pulse: 98   Resp: 20   Temp: 98 °F (36.7 °C)   SpO2: 94%       General: NAD  Eyes: EOMI  ENT: neck supple  Cardiovascular: Regular rate. Respiratory:  bilateral rhonchi improved, decreased breathing sounds  Gastrointestinal: Soft, non tender  Genitourinary: no suprapubic tenderness  Musculoskeletal: No edema  Skin: warm, dry  Neuro: Alert. Psych: Mood appropriate. The patient expressed appropriate understanding of and agreement with the discharge recommendations, medications, and plan.      Consults this admission:  IP CONSULT TO PULMONOLOGY  IP CONSULT TO CARDIOLOGY  IP CONSULT TO IV TEAM  IP CONSULT TO HOME CARE NEEDS  IP CONSULT TO IV TEAM      Discharge Instruction:   Handoff to PCP:   - monitor renal labs  - monitor blood pressure    Follow up appointments: nephrology, cardiology, pulm    Primary care physician: Daniel Angel MD      Diet:  cardiac diet   Activity: activity as tolerated  Disposition: Discharged to:   []Home, [x]HHC, []SNF, []Acute Rehab, []Hospice   Condition on discharge: Stable    Discharge Medications:        Medication List        START taking these medications      albuterol (5 MG/ML) 0.5% nebulizer solution  Commonly known as: PROVENTIL  Take 1 mL by nebulization 4 times daily as needed for Wheezing  Replaces: albuterol sulfate  (90 Base) MCG/ACT inhaler     amoxicillin-clavulanate 875-125 MG per tablet  Commonly known as: AUGMENTIN  Take 1 tablet by mouth 2 times daily for 4 days     guaiFENesin-dextromethorphan 100-10 MG/5ML syrup  Commonly known as: ROBITUSSIN DM  Take 5 mLs by mouth every 6 hours as needed for Cough     metoprolol tartrate 25 MG tablet  Commonly known as: LOPRESSOR  Take 1 tablet by mouth 2 times daily     potassium chloride 20 MEQ extended release tablet  Commonly known as: KLOR-CON M  Take 1 tablet by mouth 2 times daily for 7 days     Roflumilast 500 MCG tablet  Commonly known as: DALIRESP  Take 1 tablet by mouth daily     spironolactone 25 MG tablet  Commonly known as: ALDACTONE  Take 1 tablet by mouth daily            CHANGE how you take these medications      dilTIAZem 360 MG extended release capsule  Commonly known as: CARDIZEM CD  Take 1 capsule by mouth daily  What changed:   medication strength  how much to take  Another medication with the same name was removed. Continue taking this medication, and follow the directions you see here. * ipratropium-albuterol 0.5-2.5 (3) MG/3ML Soln nebulizer solution  Commonly known as: DUONEB  Take 3 mLs by nebulization 4 times daily  What changed:   Another medication with the same name was added. Make sure you understand how and when to take each. Another medication with the same name was removed.  Continue SINGULAIR  Take 1 tablet by mouth daily     ondansetron 4 MG disintegrating tablet  Commonly known as: Zofran ODT  Take 1 tablet by mouth every 6 hours     pantoprazole 40 MG tablet  Commonly known as: PROTONIX  Take 1 tablet by mouth every morning (before breakfast)     thiamine 100 MG tablet  Take 1 tablet by mouth daily            STOP taking these medications      albuterol sulfate  (90 Base) MCG/ACT inhaler  Commonly known as: PROVENTIL;VENTOLIN;PROAIR  Replaced by: albuterol (5 MG/ML) 0.5% nebulizer solution     albuterol-ipratropium  MCG/ACT Aers inhaler  Commonly known as: COMBIVENT RESPIMAT  Replaced by: ipratropium-albuterol 0.5-2.5 (3) MG/3ML Soln nebulizer solution  You also have another medication with the same name that you need to continue taking as instructed. Lagevrio 200 MG capsule  Generic drug: molnupiravir     MAGnesium-Oxide 400 (241.3 Mg) MG Tabs tablet  Generic drug: magnesium oxide     metoprolol succinate 25 MG extended release tablet  Commonly known as:  Toprol XL     theophylline 200 MG extended release capsule  Commonly known as: MIAH-24            ASK your doctor about these medications      diclofenac sodium 1 % Gel  Commonly known as: VOLTAREN  Apply 4 g topically 4 times daily     ferrous sulfate 325 (65 Fe) MG tablet  Commonly known as: IRON 325  Take 1 tablet by mouth every other day Indications: pt taking every day bid Monday, wed, fri     folic acid 1 MG tablet  Commonly known as: FOLVITE  Take 1 tablet by mouth daily     guaiFENesin 600 MG extended release tablet  Commonly known as: MUCINEX  Take 1 tablet by mouth 2 times daily     methocarbamol 500 MG tablet  Commonly known as: ROBAXIN     vitamin D 25 MCG (1000 UT) Tabs tablet  Commonly known as: CHOLECALCIFEROL               Where to Get Your Medications        These medications were sent to [facility] [place] [facility]", [address]  [address] [place] [address] [place] [address]      Phone: [phone]   albuterol (5 MG/ML) 0.5% nebulizer solution  amoxicillin-clavulanate 875-125 MG per tablet  dilTIAZem 360 MG extended release capsule  guaiFENesin-dextromethorphan 100-10 MG/5ML syrup  ipratropium-albuterol 0.5-2.5 (3) MG/3ML Soln nebulizer solution  magnesium oxide 400 MG tablet  metoprolol tartrate 25 MG tablet  potassium chloride 20 MEQ extended release tablet  Roflumilast 500 MCG tablet  spironolactone 25 MG tablet       You can get these medications from any pharmacy    Bring a paper prescription for each of these medications  predniSONE 10 MG tablet         Objective Findings at Discharge:       BMP/CBC  Recent Labs     11/10/22  0338 11/11/22  1202   * 128*   K 3.6 3.2*   CL 77* 73*   CO2 48* >50*   BUN 15 15   CREATININE 0.6 0.6   WBC 10.9* 16.6*   HCT 33.6* 37.3    307       IMAGING:      Additional Information: Patient seen and examined day of discharge.  For more information regarding patient's care please contact [facility] records [phone]    Discharge Time of 40 minutes    Electronically signed by Fadia Sampson MD on 11/12/2022 at 6:47 PM

## 2022-11-12 NOTE — PLAN OF CARE
Problem: Discharge Planning  Goal: Discharge to home or other facility with appropriate resources  11/11/2022 1939 by Irma Wakefield RN  Outcome: Completed  11/11/2022 1301 by Bora Verde RN  Outcome: Progressing     Problem: Pain  Goal: Verbalizes/displays adequate comfort level or baseline comfort level  11/11/2022 1939 by Irma Wakefield RN  Outcome: Completed  11/11/2022 1301 by Bora Verde RN  Outcome: Progressing     Problem: Safety - Adult  Goal: Free from fall injury  11/11/2022 1939 by Irma Wakefield RN  Outcome: Completed  11/11/2022 1301 by Bora Verde RN  Outcome: Progressing     Problem: Skin/Tissue Integrity  Goal: Absence of new skin breakdown  Description: 1. Monitor for areas of redness and/or skin breakdown  2. Assess vascular access sites hourly  3. Every 4-6 hours minimum:  Change oxygen saturation probe site  4. Every 4-6 hours:  If on nasal continuous positive airway pressure, respiratory therapy assess nares and determine need for appliance change or resting period.   11/11/2022 1939 by Irma Wakefield RN  Outcome: Completed  11/11/2022 1301 by Bora Verde RN  Outcome: Progressing     Problem: ABCDS Injury Assessment  Goal: Absence of physical injury  11/11/2022 1939 by Irma Wakefield RN  Outcome: Completed  11/11/2022 1301 by Bora Verde RN  Outcome: Progressing     Problem: Nutrition Deficit:  Goal: Optimize nutritional status  Outcome: Completed

## 2022-11-14 ENCOUNTER — HOSPITAL ENCOUNTER (OUTPATIENT)
Age: 62
Setting detail: SPECIMEN
Discharge: HOME | End: 2022-11-14
Payer: COMMERCIAL

## 2022-11-14 LAB
ANION GAP SERPL CALCULATED.3IONS-SCNC: 4 MMOL/L (ref 4–16)
BASOPHILS ABSOLUTE: 0 K/CU MM
BASOPHILS RELATIVE PERCENT: 0.1 % (ref 0–1)
BUN BLDV-MCNC: 30 MG/DL (ref 6–23)
CALCIUM SERPL-MCNC: 9.7 MG/DL (ref 8.3–10.6)
CHLORIDE BLD-SCNC: 82 MMOL/L (ref 99–110)
CO2: 42 MMOL/L (ref 21–32)
CREAT SERPL-MCNC: 1 MG/DL (ref 0.6–1.1)
DIFFERENTIAL TYPE: ABNORMAL
EOSINOPHILS ABSOLUTE: 0.3 K/CU MM
EOSINOPHILS RELATIVE PERCENT: 1.7 % (ref 0–3)
GFR SERPL CREATININE-BSD FRML MDRD: >60 ML/MIN/1.73M2
GLUCOSE BLD-MCNC: 113 MG/DL (ref 70–99)
HCT VFR BLD CALC: 38.8 % (ref 37–47)
HEMOGLOBIN: 11.2 GM/DL (ref 12.5–16)
IMMATURE NEUTROPHIL %: 2.4 % (ref 0–0.43)
LYMPHOCYTES ABSOLUTE: 1 K/CU MM
LYMPHOCYTES RELATIVE PERCENT: 6.6 % (ref 24–44)
MCH RBC QN AUTO: 26.7 PG (ref 27–31)
MCHC RBC AUTO-ENTMCNC: 28.9 % (ref 32–36)
MCV RBC AUTO: 92.6 FL (ref 78–100)
MONOCYTES ABSOLUTE: 1.2 K/CU MM
MONOCYTES RELATIVE PERCENT: 8.2 % (ref 0–4)
NUCLEATED RBC %: 0 %
PDW BLD-RTO: 13.5 % (ref 11.7–14.9)
PLATELET # BLD: 315 K/CU MM (ref 140–440)
PMV BLD AUTO: 10.7 FL (ref 7.5–11.1)
POTASSIUM SERPL-SCNC: 3.5 MMOL/L (ref 3.5–5.1)
RBC # BLD: 4.19 M/CU MM (ref 4.2–5.4)
SEGMENTED NEUTROPHILS ABSOLUTE COUNT: 11.6 K/CU MM
SEGMENTED NEUTROPHILS RELATIVE PERCENT: 81 % (ref 36–66)
SODIUM BLD-SCNC: 128 MMOL/L (ref 135–145)
TOTAL IMMATURE NEUTOROPHIL: 0.34 K/CU MM
TOTAL NUCLEATED RBC: 0 K/CU MM
WBC # BLD: 14.3 K/CU MM (ref 4–10.5)

## 2022-11-14 PROCEDURE — 80048 BASIC METABOLIC PNL TOTAL CA: CPT

## 2022-11-14 PROCEDURE — 85025 COMPLETE CBC W/AUTO DIFF WBC: CPT

## 2022-11-15 LAB
CULTURE: NORMAL
GRAM SMEAR: NORMAL
Lab: NORMAL
SPECIMEN: NORMAL

## 2022-11-17 ENCOUNTER — TELEPHONE (OUTPATIENT)
Dept: CARDIOLOGY CLINIC | Age: 62
End: 2022-11-17

## 2022-11-30 PROBLEM — I46.9 CARDIAC ARREST (HCC): Status: ACTIVE | Noted: 2022-01-01

## 2022-11-30 PROBLEM — G93.1 ANOXIC ENCEPHALOPATHY (HCC): Status: ACTIVE | Noted: 2022-01-01

## 2022-11-30 PROBLEM — G40.89 OTHER SEIZURES (HCC): Status: ACTIVE | Noted: 2022-01-01

## 2022-11-30 NOTE — PROGRESS NOTES
Routine Inpatient EEG completed 11/30/22. EEG is now awaiting interpretation. Physician has been notified via [facility].

## 2022-11-30 NOTE — PROCEDURES
ROUTINE ELECTROENCEPHALOGRAM    Identifying Information:  Name: Ana Luisa Pitt  MRN: [id number]  : 1960  Interpreting Physician: Douglas Michaud DO  Referring Provider: BRIAN Ordonez CNP  Date of EE22  Procedure Location: Inpatient      Clinical History:  Ana Luisa Pitt is a 63 y.o. female s/p cardiac arrest with concerns for seizure like activity. Current Medications:    sodium chloride flush  5-40 mL IntraVENous 2 times per day    enoxaparin  40 mg SubCUTAneous Daily    chlorhexidine  15 mL Mouth/Throat BID    polyvinyl alcohol  1 drop Both Eyes Q4H    And    artificial tears   Both Eyes Q4H    pantoprazole  40 mg IntraVENous BID    piperacillin-tazobactam  3,375 mg IntraVENous Q8H    multivitamin  1 tablet Oral Daily    folic acid  1 mg Oral Daily    atorvastatin  80 mg Oral Nightly        Indication:  Rule out seizure/seizure disorder     Technical Summary:  28 channels of EEG were recorded in a digital format on a patient who is reported to be intubated and unresponsive during the recording. The patient was not sleep deprived prior to the EEG. The background consists of a burst suppression pattern. The bursts consist of 2-3 Hz activity in the delta frequency range lasting 2-3 seconds with interval diffuse suppression lasting 8-10 seconds. It is symmetric and discontinuous. Posterior dominant rhythm (PDR) is absent and the background is not reactive to stimulation. Photic stimulation was performed and did not produce any abnormalities. During the recording stage II sleep  was not seen. The EKG lead revealed no rhythm abnormalties. EEG Interpretation:   The EEG was abnormal due to the presence of:   Burst suppression pattern. This is a non-specific finding but is consistent with a generalized disturbance of cerebral function.  It may be seen in a variety of conditions, such as toxic, metabolic, post-anoxic, multi-focal or diffuse structural abnormalities. No electrographic seizures or non-convulsive status epilepticus was seen over the entire monitoring period. Clinical correlation recommended.      Referic Otto DO   Epileptologist  11/30/2022 3:56 PM

## 2022-11-30 NOTE — PROGRESS NOTES
V2.0  [facility] Hospitalist Progress Note      Name:  Sana Suarez /Age/Sex: 1960  (63 y.o. female)   MRN & CSN:  [id number] & [id number] Encounter Date/Time: 2022 4:17 PM EST    Location:  -A PCP: Navneet Keith MD       Hospital Day: 1    Assessment and Plan:   Sana Suarez is a 63 y.o. female with pmh of COPD on 3L at baseline, OHS, HTN, HLD, CAD, and CHF who presented with Cardiac arrest [facility]) via EMS after being found unresponsive. Plan:  #Cardiac arrest  #Acute hypoxic and hypercabic failure  #Hypoxic encephalopathy  --Currently intubated, on vent with adjustments to optimize acid-base status  --Zosyn to cover aspiration pneumonia  --Albuterol as needed, and frequent arterial blood gases   --Cardiac arrest, TTM to trial she is placed on Arctic sun with fever prevention, repeat echo pending. Troponemia status post arrest  --CT of the head preliminarily highly suggestive for early global anoxic injury, will follow final read  --Cerebel suspicious for seizures, EEG performed showing burst suppression [place] but no electrographic seizures or nonconvulsive status epilepticus seen over the entire monitoring period. --CTA chest abdomen and pelvis with contrast prelim read is showing no acute vascular injury including dissection, no pulmonary embolus, airspace opacities in the lung suggestive of aspiration or infection superimposed on chronic lung changes including emphysema    #? GIB  --NG tube in place, monitor output.   Protonix twice daily at this time  --Trend CBC, transfuse for hemoglobin less than 7    #COPD  --Currently intubated and on vent support in [place] to hypoxic and hypercapnic respiratory failure in the setting of cardiac arrest  --Albuterol as needed    #Hypothyroidism  --TSH elevated  --Started on stress dose steroids, confirm home dose of levothyroxine if she is on it    #HTN  --Currently on 2 pressors, hold antihypertensives    #HLD  --Continue Lipitor    Care by critical care team at this time    Diet Diet NPO   DVT Prophylaxis [x] Lovenox, []  Heparin, [] SCDs, [] Ambulation,  [] Eliquis, [] Xarelto  [] Coumadin   Code Status Full Code   Disposition From: Home  Expected Disposition: TBD  Estimated Date of Discharge: TBD  Patient requires continued admission due to cardiac arrest requiring critical care   Surrogate Decision Maker/ POA      Subjective:     Chief Complaint: Cardiac Arrest       Patient seen and examined at bedside with family present. She is intubated and on the vent. Review of Systems:    Review of Systems   Reason unable to perform ROS: Intubated and unresponsive. Objective: Intake/Output Summary (Last 24 hours) at 11/30/2022 1617  Last data filed at 11/30/2022 1430  Gross per 24 hour   Intake 1296.6 ml   Output --   Net 1296.6 ml        Vitals:   Vitals:    11/30/22 1500   BP: (!) 149/91   Pulse: (!) 139   Resp: 27   Temp:    SpO2: 99%       Physical Exam:   General: Intubated and unresponsive  Eyes: Unable to assess  HENT: ETT in place  Cardiovascular: Tachycardic. Respiratory: Rhonchorous breath sounds. Gastrointestinal: Soft, non tender  Genitourinary: no suprapubic tenderness  Musculoskeletal: No edema  Skin: warm, dry  Neuro: Unresponsive. Psych: Unable to assess.      Medications:   Medications:    sodium chloride flush  5-40 mL IntraVENous 2 times per day    enoxaparin  40 mg SubCUTAneous Daily    chlorhexidine  15 mL Mouth/Throat BID    polyvinyl alcohol  1 drop Both Eyes Q4H    And    artificial tears   Both Eyes Q4H    pantoprazole  40 mg IntraVENous BID    piperacillin-tazobactam  3,375 mg IntraVENous Q8H    multivitamin  1 tablet Oral Daily    folic acid  1 mg Oral Daily    atorvastatin  80 mg Oral Nightly      Infusions:    norepinephrine 19 mcg/min (11/30/22 1453)    sodium chloride      vasopressin (Septic Shock) infusion 0.03 Units/min (11/30/22 1447)    dextrose      propofol Stopped (11/30/22 1000)     PRN Meds: sodium chloride flush, 5-40 mL, PRN  sodium chloride, , PRN  ondansetron, 4 mg, Q8H PRN   Or  ondansetron, 4 mg, Q6H PRN  polyethylene glycol, 17 g, Daily PRN  acetaminophen, 650 mg, Q6H PRN   Or  acetaminophen, 650 mg, Q6H PRN  albuterol, 2.5 mg, Q4H PRN  glucose, 4 tablet, PRN  dextrose bolus, 125 mL, PRN   Or  dextrose bolus, 250 mL, PRN  glucagon (rDNA), 1 mg, PRN  dextrose, , Continuous PRN        Labs      Recent Results (from the past 24 hour(s))   CBC with Auto Differential    Collection Time: 11/30/22  2:43 AM   Result Value Ref Range    WBC 10.5 4.0 - 10.5 K/CU MM    RBC 3.62 (L) 4.2 - 5.4 M/CU MM    Hemoglobin 9.8 (L) 12.5 - 16.0 GM/DL    Hematocrit 38.0 37 - 47 %    .0 (H) 78 - 100 FL    MCH 27.1 27 - 31 PG    MCHC 25.8 (L) 32.0 - 36.0 %    RDW 14.7 11.7 - 14.9 %    Platelets 117 811 - 899 K/CU MM    MPV 11.1 7.5 - 11.1 FL    Metamyelocytes Relative 1 (H) 0.0 %    Bands Relative 11 5 - 11 %    Segs Relative 64.0 36 - 66 %    Lymphocytes % 16.0 (L) 24 - 44 %    Monocytes % 8.0 (H) 0 - 4 %    Metamyelocytes Absolute 0.11 K/CU MM    Bands Absolute 1.16 K/CU MM    Segs Absolute 6.7 K/CU MM    Lymphocytes Absolute 1.7 K/CU MM    Monocytes Absolute 0.8 K/CU MM    Differential Type MANUAL DIFFERENTIAL     Dohle Bodies PRESENT     Atypical Lymphocytes Absolute 1+    Basic Metabolic Panel    Collection Time: 11/30/22  2:43 AM   Result Value Ref Range    Sodium 137 135 - 145 MMOL/L    Potassium 6.1 (HH) 3.5 - 5.1 MMOL/L    Chloride 90 (L) 99 - 110 mMol/L    CO2 35 (H) 21 - 32 MMOL/L    Anion Gap 12 4 - 16    BUN 5 (L) 6 - 23 MG/DL    Creatinine 1.0 0.6 - 1.1 MG/DL    Est, Glom Filt Rate >60 >60 mL/min/1.73m2    Glucose 234 (H) 70 - 99 MG/DL    Calcium 9.4 8.3 - 10.6 MG/DL   Hepatic Function Panel    Collection Time: 11/30/22  2:43 AM   Result Value Ref Range    Albumin 3.3 (L) 3.4 - 5.0 GM/DL    Total Bilirubin 0.2 0.0 - 1.0 MG/DL    Bilirubin, Direct 0.2 0.0 - 0.3 MG/DL    Bilirubin, Indirect 0.0 0 - 0.7 MG/DL    Alkaline Phosphatase 127 40 - 129 IU/L    AST 39 (H) 15 - 37 IU/L    ALT 31 10 - 40 U/L    Total Protein 5.8 (L) 6.4 - 8.2 GM/DL   Lipase    Collection Time: 11/30/22  2:43 AM   Result Value Ref Range    Lipase 22 13 - 60 IU/L   Magnesium    Collection Time: 11/30/22  2:43 AM   Result Value Ref Range    Magnesium 2.5 (H) 1.8 - 2.4 mg/dl   Lactic Acid    Collection Time: 11/30/22  2:43 AM   Result Value Ref Range    Lactate 8.5 (HH) 0.4 - 2.0 mMOL/L   Protime-INR    Collection Time: 11/30/22  2:43 AM   Result Value Ref Range    Protime 10.5 (L) 11.7 - 14.5 SECONDS    INR 0.82 INDEX   PTT    Collection Time: 11/30/22  2:43 AM   Result Value Ref Range    aPTT 41.8 (H) 25.1 - 37.1 SECONDS   Troponin    Collection Time: 11/30/22  2:43 AM   Result Value Ref Range    Troponin T 0.024 (H) <0.01 NG/ML   Brain Natriuretic Peptide    Collection Time: 11/30/22  2:43 AM   Result Value Ref Range    Pro-BNP 3,978 (H) <300 PG/ML   POC CRITICAL CARE PROFILE    Collection Time: 11/30/22  4:09 AM   Result Value Ref Range    pH, Bld 7.28 (L) 7.34 - 7.45    pCO2, Arterial 80.3 (H) 32 - 45 MMHG    pO2, Arterial 552.2 (H) 75 - 100 MMHG    HCO3, Arterial 37.5 (H) 18 - 23 MMOL/L    Base Excess HIDE 0 - 2.4    Base Exc, Mixed 7.6 (H) 0 - 2.3    O2 Sat 100.0 (H) 96 - 97 %    CO2 40 (H) 21 - 32 MMOL/L    Sodium 138 138 - 146 MMOL/L    Potassium 5.5 (H) 3.5 - 4.5 MMOL/L    POC CALCIUM 1.14 1.12 - 1.32 MMOL/L    POC Glucose 161 (H) 70 - 99 MG/DL    Hematocrit 40.0 37 - 47 %    Hemoglobin 13.5 12.5 - 16.0 GM/DL    POC Chloride 94 (L) 98 - 109 MMOL/L    POC Creatinine 1.2 (H) 0.6 - 1.1 MG/DL    eGFR, POC 52 (L) >60 mL/min/1.73m2    Source: Arterial    Blood gas, arterial    Collection Time: 11/30/22  4:15 AM   Result Value Ref Range    pH, Bld 7.33 (L) 7.34 - 7.45    pCO2, Arterial 69.0 (H) 32 - 45 MMHG    pO2, Arterial 372 (H) 75 - 100 MMHG    Base Exc, Mixed 7.9 (H) 0 - 2.3    HCO3, Arterial 36.4 (H) 18 - 23 MMOL/L    CO2 Content 38.5 (H) 19 - 24 MMOL/L    O2 Sat 97.0 96 - 97 %    Carbon Monoxide, Blood 2.7 0 - 5 %    Methemoglobin, Arterial 1.2 <1.5 %    Comment ACVC 22    Critical Care Panel    Collection Time: 11/30/22  4:45 AM   Result Value Ref Range    Sodium 136 135 - 145 MMOL/L    Potassium 5.2 (H) 3.5 - 5.1 MMOL/L    Chloride 93 (L) 99 - 110 mMol/L    CO2 31 21 - 32 MMOL/L    Anion Gap 12 4 - 16    BUN 10 6 - 23 MG/DL    Creatinine 0.9 0.6 - 1.1 MG/DL    Est, Glom Filt Rate >60 >60 mL/min/1.73m2    Glucose 152 (H) 70 - 99 MG/DL    Calcium 8.6 8.3 - 10.6 MG/DL    Phosphorus 5.1 (H) 2.5 - 4.9 MG/DL    Magnesium 1.7 (L) 1.8 - 2.4 mg/dl   Lactate, Sepsis    Collection Time: 11/30/22  4:45 AM   Result Value Ref Range    Lactic Acid, Sepsis 3.6 (HH) 0.5 - 1.9 mMOL/L   Hepatic Function Panel    Collection Time: 11/30/22  4:45 AM   Result Value Ref Range    Albumin 3.3 (L) 3.4 - 5.0 GM/DL    Total Bilirubin 0.2 0.0 - 1.0 MG/DL    Bilirubin, Direct 0.2 0.0 - 0.3 MG/DL    Bilirubin, Indirect 0.0 0 - 0.7 MG/DL    Alkaline Phosphatase 124 40 - 129 IU/L    AST 51 (H) 15 - 37 IU/L    ALT 35 10 - 40 U/L    Total Protein 5.8 (L) 6.4 - 8.2 GM/DL   Protime/INR & PTT    Collection Time: 11/30/22  4:45 AM   Result Value Ref Range    Protime 10.2 (L) 11.7 - 14.5 SECONDS    INR 0.79 INDEX    aPTT 29.0 25.1 - 37.1 SECONDS   TSH    Collection Time: 11/30/22  4:45 AM   Result Value Ref Range    TSH, High Sensitivity 15.660 (H) 0.270 - 4.20 uIu/ml   Lipid Panel    Collection Time: 11/30/22  4:45 AM   Result Value Ref Range    Triglycerides 87 <150 MG/DL    Cholesterol 143 <200 MG/DL    HDL 88 >40 MG/DL    LDL Calculated 38 <100 MG/DL   Hemoglobin A1C    Collection Time: 11/30/22  4:45 AM   Result Value Ref Range    Hemoglobin A1C 5.6 4.2 - 6.3 %    eAG 114 mg/dL   Brain Natriuretic Peptide    Collection Time: 11/30/22  4:45 AM   Result Value Ref Range    Pro-BNP 4,074 (H) <300 PG/ML   Procalcitonin    Collection Time: 11/30/22  4:45 AM   Result Value Ref Range Procalcitonin 0.257    CK    Collection Time: 11/30/22  4:45 AM   Result Value Ref Range    Total CK 92 26 - 140 IU/L   Respiratory Panel, Molecular, with COVID-19 (Restricted: peds pts or suitable admitted adults)    Collection Time: 11/30/22  4:55 AM    Specimen: Nasopharyngeal   Result Value Ref Range    Adenovirus Detection by PCR NOT DETECTED NOT DETECTED    Coronavirus 229E PCR NOT DETECTED NOT DETECTED    Coronavirus HKU1 PCR NOT DETECTED NOT DETECTED    Coronavirus NL63 PCR NOT DETECTED NOT DETECTED    Coronavirus OC43 PCR NOT DETECTED NOT DETECTED    SARS-CoV-2 NOT DETECTED NOT DETECTED    Human Metapneumovirus PCR NOT DETECTED NOT DETECTED    Rhinovirus Enterovirus PCR NOT DETECTED NOT DETECTED    Influenza A by PCR NOT DETECTED NOT DETECTED    Influenza A H1 Pandemic PCR NOT DETECTED NOT DETECTED    Influenza A H1 (2009) PCR NOT DETECTED NOT DETECTED    Influenza A H3 PCR NOT DETECTED NOT DETECTED    Influenza B by PCR NOT DETECTED NOT DETECTED    Parainfluenza 1 PCR NOT DETECTED NOT DETECTED    Parainfluenza 2 PCR NOT DETECTED NOT DETECTED    Parainfluenza 3 PCR NOT DETECTED NOT DETECTED    Parainfluenza 4 PCR NOT DETECTED NOT DETECTED    RSV PCR DETECTED BY PCR (A) NOT DETECTED    Bordetella parapertussis by PCR NOT DETECTED NOT DETECTED    B Pertussis by PCR NOT DETECTED NOT DETECTED    Chlamydophila Pneumonia PCR NOT DETECTED NOT DETECTED    Mycoplasma pneumo by PCR NOT DETECTED NOT DETECTED   Urinalysis with Reflex to Culture    Collection Time: 11/30/22  5:00 AM    Specimen: Urine   Result Value Ref Range    Color, UA YELLOW YELLOW    Clarity, UA CLEAR CLEAR    Glucose, Urine NEGATIVE NEGATIVE MG/DL    Bilirubin Urine NEGATIVE NEGATIVE MG/DL    Ketones, Urine NEGATIVE NEGATIVE MG/DL    Specific Gravity, UA >1.030 1.001 - 1.035    Blood, Urine TRACE (A) NEGATIVE    pH, Urine 6.0 5.0 - 8.0    Protein,  (A) NEGATIVE MG/DL    Urobilinogen, Urine 0.2 0.2 - 1.0 MG/DL    Nitrite Urine, Quantitative NEGATIVE NEGATIVE    Leukocyte Esterase, Urine NEGATIVE NEGATIVE    RBC, UA 21 (H) 0 - 6 /HPF    WBC, UA 21 (H) 0 - 5 /HPF    Bacteria, UA OCCASIONAL (A) NEGATIVE /HPF    Yeast, UA OCCASIONAL /HPF    Squam Epithel, UA 9 /HPF    Mucus, UA MANY (A) NEGATIVE HPF    Trichomonas, UA NONE SEEN NONE SEEN /HPF   POC CRITICAL CARE PROFILE    Collection Time: 11/30/22  5:11 AM   Result Value Ref Range    pH, Bld 7.27 (L) 7.34 - 7.45    pCO2, Arterial 81.0 (H) 32 - 45 MMHG    pO2, Arterial 54.8 (L) 75 - 100 MMHG    HCO3, Arterial 37.2 (H) 18 - 23 MMOL/L    Base Excess HIDE 0 - 2.4    Base Exc, Mixed 7.0 (H) 0 - 2.3    O2 Sat 81.4 (L) 96 - 97 %    CO2 40 (H) 21 - 32 MMOL/L    Sodium 139 138 - 146 MMOL/L    Potassium 5.1 (H) 3.5 - 4.5 MMOL/L    POC CALCIUM 1.19 1.12 - 1.32 MMOL/L    POC Glucose 168 (H) 70 - 99 MG/DL    Hematocrit 42.0 37 - 47 %    Hemoglobin 14.3 12.5 - 16.0 GM/DL    POC Chloride 94 (L) 98 - 109 MMOL/L    POC Creatinine 1.0 0.6 - 1.1 MG/DL    eGFR, POC >60 >60 mL/min/1.73m2    Source: Venous    CBC with Auto Differential    Collection Time: 11/30/22  6:20 AM   Result Value Ref Range    WBC 8.4 4.0 - 10.5 K/CU MM    RBC 3.83 (L) 4.2 - 5.4 M/CU MM    Hemoglobin 10.5 (L) 12.5 - 16.0 GM/DL    Hematocrit 38.0 37 - 47 %    MCV 99.2 78 - 100 FL    MCH 27.4 27 - 31 PG    MCHC 27.6 (L) 32.0 - 36.0 %    RDW 15.0 (H) 11.7 - 14.9 %    Platelets 957 324 - 733 K/CU MM    MPV 10.5 7.5 - 11.1 FL    Differential Type AUTOMATED DIFFERENTIAL     Segs Relative 89.3 (H) 36 - 66 %    Lymphocytes % 5.6 (L) 24 - 44 %    Monocytes % 3.7 0 - 4 %    Eosinophils % 0.0 0 - 3 %    Basophils % 0.2 0 - 1 %    Segs Absolute 7.5 K/CU MM    Lymphocytes Absolute 0.5 K/CU MM    Monocytes Absolute 0.3 K/CU MM    Eosinophils Absolute 0.0 K/CU MM    Basophils Absolute 0.0 K/CU MM    Nucleated RBC % 0.0 %    Total Nucleated RBC 0.0 K/CU MM    Total Immature Neutrophil 0.10 K/CU MM    Immature Neutrophil % 1.2 (H) 0 - 0.43 %        Imaging/Diagnostics Last 24 Hours   CT HEAD WO CONTRAST    Result Date: 11/30/2022  EXAMINATION: CT OF THE HEAD WITHOUT CONTRAST  11/30/2022 3:35 am TECHNIQUE: CT of the head was performed without the administration of intravenous contrast. Automated exposure control, iterative reconstruction, and/or weight based adjustment of the mA/kV was utilized to reduce the radiation dose to as low as reasonably achievable. COMPARISON: December 25, 2021 HISTORY: ORDERING SYSTEM PROVIDED HISTORY: Cardiac arrest TECHNOLOGIST PROVIDED HISTORY: Reason for exam:->Cardiac arrest Has a \"code stroke\" or \"stroke alert\" been called? ->No Decision Support Exception - unselect if not a suspected or confirmed emergency medical condition->Emergency Medical Condition (MA) Reason for Exam: Cardiac arrest FINDINGS: BRAIN/VENTRICLES: Compared to the prior study, there is subtle loss of gray-white matter differentiation. Additionally, there is increased sulci effacement and the ventricles are smaller in size. 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 No midline shift. No downward herniation. ORBITS: The visualized portion of the orbits demonstrate no acute abnormality. SINUSES: Ethmoid and maxillary sinus disease is seen. There is a small left mastoid effusion. SOFT TISSUES/SKULL:  No acute abnormality of the visualized skull or soft tissues. CT findings are highly suggestive of early global anoxic injury as described above. Sinus disease and a nonspecific left mastoid effusion is noted. Findings were discussed with Dr. Alexander Castor on 11/30/2022 at 4:09 a.m.     XR CHEST PORTABLE    Result Date: 11/30/2022  Ryan Jefferson MD     11/30/2022  6:00 AM Central Venous Catheter Insertion Procedure Note Procedure:  Insertion of Central Venous Catheter Procedure Clinician: Ryan Jefferson MD Procedure Assistant: None Indications: Vasoactive agents Size and location: 7Fr TLC, right subclavian vein Attempts: 1 Procedure Details: Emergent Under sterile conditions the skin above the Right subclavian veing was prepped with chlorhexidine and covered with a sterile drape. Local anesthesia was applied to the skin and subcutaneous tissues. Ultrasound was used to localize the vein and an 18-gauge needle was then inserted into the vein. A drop test was performed and was negative for any evidence of arterial flow. A guide wire was then passed easily through the catheter. There were no arrhythmias. Ultrasound was used to identify the guidewire once in the vein. The catheter was then withdrawn. A 7Fr TLC was then inserted into the vessel over the guide wire. The guidewire was then removed with the tip intact, and witnessed by bedside nurse. The catheter was sutured into place. Findings: There were no changes to vital signs. Catheter was flushed with 20 mL NS. Patient tolerated the procedure well. Lung ultrasound: Pleural sliding - yes Lung point - no A lines - yes B lines - no Consolidation? Not assessed Pleural effusion? Not assessed Recommendations: CXR ordered to verify placement. CTA CHEST ABDOMEN PELVIS W CONTRAST    Result Date: 11/30/2022  EXAMINATION: CTA OF THE CHEST, ABDOMEN AND PELVIS WITH CONTRAST 11/30/2022 3:35 am: TECHNIQUE: CTA of the chest, abdomen and pelvis was performed after the administration of intravenous contrast.  Multiplanar reformatted images are provided for review. MIP images are provided for review. Automated exposure control, iterative reconstruction, and/or weight based adjustment of the mA/kV was utilized to reduce the radiation dose to as low as reasonably achievable. COMPARISON: November 6, 2022. December 29, 2021.  HISTORY: ORDERING SYSTEM PROVIDED HISTORY: Cardiac arrest with reported PNA recently and dark colored emesis TECHNOLOGIST PROVIDED HISTORY: Reason for exam:->Cardiac arrest with reported PNA recently and dark colored emesis Decision Support Exception - unselect if not a suspected or confirmed emergency medical condition->Emergency Medical Condition (MA) Reason for Exam: Cardiac arrest with reported PNA recently and dark colored emesis FINDINGS: CTA CHEST: Thoracic aorta: No evidence of thoracic aortic aneurysm or dissection. No acute abnormality of the aorta. Mediastinum: The heart is normal in size without a pericardial effusion. Moderate to severe coronary artery atherosclerosis is present. The aorta, arch branches, and main pulmonary artery are normal in course and caliber. No pulmonary embolus is seen. Lungs/Pleura: The lungs demonstrate patchy airspace opacities superimposed on emphysema. No pleural effusions. Central airways are patent. An endotracheal tube is in place. Diffuse bronchial wall thickening. No significant bronchiectasis. No dominant or suspicious pulmonary nodules. Soft Tissues/Bones: No acute bone or soft tissue abnormality. CTA ABDOMEN: Abdominal aorta/Branches: The abdominal aorta is patent and normal in course. Moderate atherosclerosis is seen. The celiac trunk and its branches, SMA, bilateral renal arteries, and YOVANA are patent. The iliac vasculature is patent with moderate atherosclerosis. Organs: Status post cholecystectomy. No intra or extrahepatic biliary dilatation. The liver, spleen, pancreas, adrenal glands, and kidneys demonstrate no acute abnormality. The collecting systems are normal.  Right renal atrophy is present. GI/Bowel: Nasogastric tube tip terminates in mid stomach. The small and large bowel are normal in course and caliber without evidence of wall thickening or obstruction. Diverticulosis is noted without evidence of acute diverticulitis. Pelvic organs: Normal bladder. Uterus is unremarkable. No adnexal masses. Peritoneum/Retroperitoneum: Small amount of free fluid is identified in the pelvis and adjacent to the liver and spleen. No free air. No pathologic lymphadenopathy. Bones/Soft Tissues: No acute or aggressive osseous lesion. Mild bony demineralization. 1. No acute vascular injury including dissection. 2. No pulmonary embolus. 3. Airspace opacities in the lungs suggestive of aspiration or infection superimposed on chronic lung changes including emphysema. 4. No acute intra-abdominal abnormality. 5. Small volume ascites of unclear etiology. 6. Diverticulosis.        Electronically signed by Suzie Gaytan MD on 11/30/2022 at 4:17 PM

## 2022-11-30 NOTE — PROGRESS NOTES
[address] [facility]  consulted by Dr. Beatrice Lundberg for monitoring and adjustment. Indication for treatment: Vancomycin indication: Other: Organs preservation  Goal trough: Trough Goal: 15-20 mcg/mL  AUC/RACHEL: 400-600    Risk Factors for MRSA Identified:   Recent hospitalization and prior IV antibiotic within 90 days    Pertinent Laboratory Values:   Temp Readings from Last 3 Encounters:   11/30/22 (!) 95.9 °F (35.5 °C) (Bladder)   11/11/22 98 °F (36.7 °C) (Oral)   08/14/22 98.2 °F (36.8 °C)     Recent Labs     11/30/22  0243   WBC 10.5   LACTATE 8.5*     Recent Labs     11/30/22  0243 11/30/22  0409 11/30/22  0511   BUN 5*  --   --    CREATININE 1.0 1.2* 1.0     Estimated Creatinine Clearance: 61 mL/min (based on SCr of 1 mg/dL). No intake or output data in the 24 hours ending 11/30/22 0619    Pertinent Cultures:   Date    Source    Results  11/30   Blood    Ordered  11/30   Resp panel PCR  Ordered  11/30   MRSA Nasal   Ordered      Vancomycin level:   TROUGH:  No results for input(s): VANCOTROUGH in the last 72 hours. RANDOM:  No results for input(s): VANCORANDOM in the last 72 hours. Assessment:  HPI: A 63 y.o female with pmh of COPD, CAD, HTN and thyroid disease who was brought in by EMS after she was found unresponsive in [facility]  SCr, BUN, and urine output: Scr = 1.0  Day(s) of therapy: 1 of 14  Vancomycin concentration: Pending    Plan:  Vancomycin 1000 mg x1, followed by 750 mg IVPB q12h  Predicted trough of 14.1 and AUC: 421 at steady-state  Pharmacy will continue to monitor patient and adjust therapy as indicated    Lula 3 12/01 @5685    Thank you for the consult.   Franck Tucker, [facility]  11/30/2022 6:19 AM

## 2022-11-30 NOTE — PROCEDURES
CONTINUOUS ELECTROENCEPHALOGRAM (cvEEG) REPORT    Identifying Information:  Name: Elly Perez  MRN: [id number]  : 1960  Interpreting Physician: Ellen Rob DO  Referring Provider: Bianka Dominguez MD      Clinical History:  Elly Perez is an 63 y.o. female with jerking concerning for seizure.       Past Medical History:  Past Medical History:   Diagnosis Date    Anemia     Anxiety 2017    follows with Dr Dre Suarez     Back pain at L4-L5 level 2017    Dr Gabriela Crandall 1 disorder [facility])     per pt on 2021\"never told I have bipolar\"    COPD (chronic obstructive pulmonary disease) ([address]     follows with Dr Morgan Bernheim    Emphysema of lung [facility])     FH: CAD (coronary artery disease) 2017    Father had in his forties, sister in her thirties     Fibromyalgia 2017    Full dentures     full upper plate and partial on the bottom    Gastric ulcer     \"had stomach ulder back fall \"    H/O Doppler ultrasound 2019    venous- no DVT or reflux    H/O echocardiogram 2015    EF50-55% Normal- see media    History of blood transfusion     Hyperlipidemia LDL goal <100     Hypertension     Dr Milvia Levy    Irregular heartbeat     \"they said I have irreg heart beat before- back when they drained fluid around my heart \"    Obstructive sleep apnea     \"have bipap I use at home\"    On home oxygen therapy     \"on oxygen all the time at home and keep it on 2.5 liters\"    Osteoarthritis     Pericardial effusion     per old chart had pericardial effusion 10/2020    Spinal stenosis     hx per old chart    Thyroid disease     Wears glasses     \"suppose to wear glasses\"        Current Medications:    sodium chloride flush  5-40 mL IntraVENous 2 times per day    enoxaparin  40 mg SubCUTAneous Daily    chlorhexidine  15 mL Mouth/Throat BID    polyvinyl alcohol  1 drop Both Eyes Q4H    And    artificial tears   Both Eyes Q4H    pantoprazole  40 mg IntraVENous BID piperacillin-tazobactam  3,375 mg IntraVENous Q8H    multivitamin  1 tablet Oral Daily    folic acid  1 mg Oral Daily    atorvastatin  80 mg Oral Nightly        cEEG start date & time: 11/30/22 at 0435  cEEG end date & time: 11/30/22 at 1008     Indication:  cEEG was initiated for monitoring and localization of seizures as the etiology of the encephalopathy/altered mental status. It was available for review at the bedside as well as via remote access for the entire period of monitoring. Technical Summary:  8 channels of continuous EEG over the bilateral frontopolar, temporal and occipital head regions were recorded in a digital format on a patient who is reported to be intubated and unresponsive during the recording. The background consists of a burst suppression pattern with bursts consisting of 1-3 Hz activity in the delta frequency range lasting ~ 1 second with interval diffuse suppression of 3-4 seconds initially. The diffuse suppression periods then increase to 6-34 minutes in the second half of the recording. It is symmetric and discontinuous. Posterior dominant rhythm (PDR) is absent and the background is not reactive to stimulation. The recording is remarkable for the presence of:   Clinical events of facial jerking reported. No clear epileptiform discharges are seen during the EEG. At the beginning of the recording, there is EMG twitch artifact seen. Video is not available for review to confirm. EEG Interpretation: This EEG is abnormal due to the presence of:   Burst suppression pattern. This is a non-specific finding but is consistent with a generalized disturbance of cerebral function. It may be seen in a variety of conditions, such as toxic, metabolic, post-anoxic, multi-focal or diffuse structural abnormalities. Clinical events of facial jerking reported.  No clear epileptiform discharges are seen during the EEG, but there is presence of EMG twitch artifact at the beginning of the recording. This is consistent with non-epileptic events. No electrographic seizures or non-convulsive status epilepticus was seen over the entire monitoring period. Of note, this EEG is limited due to the number of electrodes and lack of parasaggital brain region coverage. If clinical suspicions persist for seizure as an etiology for the patient's symptoms, or if suspicion remains high for an underlying seizure disorder, additional EEG testing utilizing the standard 10-20 system of electrode placement for full cerebral coverage should be considered. Clinical correlation recommended.      Carlee Mcbride DO   Epileptologist  11/30/2022 12:16 PM

## 2022-11-30 NOTE — PROCEDURES
Central Venous Catheter Insertion Procedure Note   Procedure: Insertion of Central Venous Catheter   Procedure Clinician: Yoanna Gaines MD   Procedure Assistant: None   Indications: Vasoactive agents   Size and location: 7Fr TLC, right subclavian vein  Attempts: 1  Procedure Details:   Emergent    Under sterile conditions the skin above the Right subclavian veing was prepped with chlorhexidine and covered with a sterile drape. Local anesthesia was applied to the skin and subcutaneous tissues. Ultrasound was used to localize the vein and an 18-gauge needle was then inserted into the vein. A drop test was performed and was negative for any evidence of arterial flow. A guide wire was then passed easily through the catheter. There were no arrhythmias. Ultrasound was used to identify the guidewire once in the vein. The catheter was then withdrawn. A 7Fr TLC was then inserted into the vessel over the guide wire. The guidewire was then removed with the tip intact, and witnessed by bedside nurse. The catheter was sutured into place. Findings: There were no changes to vital signs. Catheter was flushed with 20 mL NS. Patient tolerated the procedure well. Lung ultrasound:   Pleural sliding - yes  Lung point - no  A lines - yes  B lines - no  Consolidation? Not assessed   Pleural effusion? Not assessed     Recommendations:   CXR ordered to verify placement.

## 2022-11-30 NOTE — PLAN OF CARE
Problem: Discharge Planning  Goal: Discharge to home or other facility with appropriate resources  Outcome: Progressing  Flowsheets (Taken 11/30/2022 0800)  Discharge to home or other facility with appropriate resources: Identify barriers to discharge with patient and caregiver     Problem: Neurosensory - Adult  Goal: Remains free of injury related to seizures activity  Outcome: Progressing     Problem: Respiratory - Adult  Goal: Achieves optimal ventilation and oxygenation  Outcome: Progressing     Problem: Skin/Tissue Integrity - Adult  Goal: Oral mucous membranes remain intact  Outcome: Progressing     Problem: Genitourinary - Adult  Goal: Urinary catheter remains patent  Outcome: Progressing     Problem: Infection - Adult  Goal: Absence of infection during hospitalization  Outcome: Progressing     Problem: Metabolic/Fluid and Electrolytes - Adult  Goal: Electrolytes maintained within normal limits  Outcome: Progressing

## 2022-11-30 NOTE — ED PROVIDER NOTES
oxygen all the time at home and keep it on 2.5 liters\"    Osteoarthritis     Pericardial effusion     per old chart had pericardial effusion 10/2020    Spinal stenosis     hx per old chart    Thyroid disease     Wears glasses     \"suppose to wear glasses\"     FAMILY HISTORY  Family History   Problem Relation Age of Onset    Asthma Mother         COPD    Heart Disease Father     Breast Cancer Neg Hx      SOCIAL HISTORY  Social History     Socioeconomic History    Marital status:    Tobacco Use    Smoking status: Former     Packs/day: 1.50     Years: 20.00     Pack years: 30.00     Types: Cigarettes     Quit date: 2008     Years since quittin.9    Smokeless tobacco: Never   Vaping Use    Vaping Use: Never used   Substance and Sexual Activity    Alcohol use: Not Currently     Alcohol/week: 2.0 standard drinks     Types: 2 Shots of liquor per week     Comment: per pt on 2021\"quit drinking back Oct 2020\"use to drink wine coolers - 3 times per week \"/caffeine 2-3 coffees aday 1 pop b    Drug use: No    Sexual activity: Yes     Partners: Male       SURGICAL HISTORY  Past Surgical History:   Procedure Laterality Date    BACK SURGERY  2017    \"surgery on low back L5-6- not sure if they put any metal in \"    CARDIAC CATHETERIZATION      10/2019    CARDIAC CATHETERIZATION      per old chart had cath done 2020    CARPAL TUNNEL RELEASE Right 1985    CHOLECYSTECTOMY, LAPAROSCOPIC N/A 10/25/2020    CHOLECYSTECTOMY LAPAROSCOPIC WITH IOC performed by Kaiser Palmer MD at [place] [address] N/A 2019    COLONOSCOPY DIAGNOSTIC performed by Ilya Abdullahi MD at [address] [place] [address], DIAGNOSTIC      per old chart had egd done 2018    TUBAL LIGATION  1987    UPPER GASTROINTESTINAL ENDOSCOPY N/A 2021    EGD BIOPSY performed by Ilya Abdullahi MD at [address] [place] [address]  Current Discharge Medication List        CONTINUE these medications which have NOT CHANGED Details   dilTIAZem (CARDIZEM CD) 360 MG extended release capsule Take 1 capsule by mouth daily  Qty: 30 capsule, Refills: 3      predniSONE (DELTASONE) 10 MG tablet 4 tablets once daily for 3 days then 3 tablets once daily for 3 days then 2 tablets once daily for 3 days then 1 tablet once daily for 3 days. Qty: 30 tablet, Refills: 0      metoprolol tartrate (LOPRESSOR) 25 MG tablet Take 1 tablet by mouth 2 times daily  Qty: 60 tablet, Refills: 3      Roflumilast (DALIRESP) 500 MCG tablet Take 1 tablet by mouth daily  Qty: 30 tablet, Refills: 0      magnesium oxide (MAG-OX) 400 MG tablet Take 1 tablet by mouth 2 times daily  Qty: 30 tablet, Refills: 1      spironolactone (ALDACTONE) 25 MG tablet Take 1 tablet by mouth daily  Qty: 30 tablet, Refills: 3      potassium chloride (KLOR-CON M) 20 MEQ extended release tablet Take 1 tablet by mouth 2 times daily for 7 days  Qty: 14 tablet, Refills: 0      !! ipratropium-albuterol (DUONEB) 0.5-2.5 (3) MG/3ML SOLN nebulizer solution Inhale 3 mLs into the lungs every 4 hours (while awake)  Qty: 360 mL, Refills: 0      albuterol (PROVENTIL) (5 MG/ML) 0.5% nebulizer solution Take 1 mL by nebulization 4 times daily as needed for Wheezing  Qty: 120 each, Refills: 3      !! ipratropium-albuterol (DUONEB) 0.5-2.5 (3) MG/3ML SOLN nebulizer solution Take 3 mLs by nebulization 4 times daily  Qty: 360 mL, Refills: 3      hydrALAZINE (APRESOLINE) 50 MG tablet Take 1 tablet by mouth 3 times daily  Qty: 180 tablet, Refills: 5      budesonide-formoterol (SYMBICORT) 160-4.5 MCG/ACT AERO Inhale 2 puffs into the lungs 2 times daily  Qty: 3 each, Refills: 3      gabapentin (NEURONTIN) 300 MG capsule Take 400 mg by mouth 3 times daily.       methocarbamol (ROBAXIN) 500 MG tablet Take 750 mg by mouth in the morning and at bedtime      pantoprazole (PROTONIX) 40 MG tablet Take 1 tablet by mouth every morning (before breakfast)  Qty: 90 tablet, Refills: 0      atorvastatin (LIPITOR) 80 MG tablet Take 80 mg by mouth daily      ondansetron (ZOFRAN ODT) 4 MG disintegrating tablet Take 1 tablet by mouth every 6 hours  Qty: 20 tablet, Refills: 0      diclofenac sodium (VOLTAREN) 1 % GEL Apply 4 g topically 4 times daily  Qty: 350 g, Refills: 2    Associated Diagnoses: Trochanteric bursitis of right hip      acetaminophen (TYLENOL) 650 MG extended release tablet Take 650 mg by mouth as needed for Pain      vitamin D (CHOLECALCIFEROL) 25 MCG (1000 UT) TABS tablet Take 1,000 Units by mouth daily      ferrous sulfate (IRON 325) 325 (65 Fe) MG tablet Take 1 tablet by mouth every other day Indications: pt taking every day bid Monday, wed, fri  Qty: 30 tablet, Refills: 3      clonazePAM (KLONOPIN) 1 MG disintegrating tablet Take 0.5 mg by mouth 2 times daily as needed. lactobacillus (CULTURELLE) capsule Take 1 capsule by mouth daily (with breakfast)  Qty: 30 capsule, Refills: 0      guaiFENesin (MUCINEX) 600 MG extended release tablet Take 1 tablet by mouth 2 times daily  Qty: 30 tablet, Refills: 0      montelukast (SINGULAIR) 10 MG tablet Take 1 tablet by mouth daily  Qty: 30 tablet, Refills: 3      levothyroxine (SYNTHROID) 100 MCG tablet Take 1 tablet by mouth Daily  Qty: 30 tablet, Refills: 2      folic acid (FOLVITE) 1 MG tablet Take 1 tablet by mouth daily  Qty: 30 tablet, Refills: 3      vitamin B-1 100 MG tablet Take 1 tablet by mouth daily  Qty: 30 tablet, Refills: 3      DULoxetine (CYMBALTA) 60 MG extended release capsule Take 60 mg by mouth daily       fluticasone (FLONASE) 50 MCG/ACT nasal spray 2 sprays by Nasal route daily  Qty: 1 Bottle, Refills: 0       !! - Potential duplicate medications found. Please discuss with provider. ALLERGIES  Allergies   Allergen Reactions    Lisinopril Swelling and Rash     angioedema       Nursing notes reviewed by myself for past medical history, family history, social history, surgical history, current medications, and allergies.     PHYSICAL EXAM  VITAL SIGNS: Triage VS:    ED Triage Vitals   Enc Vitals Group      BP 11/30/22 0307 (!) 53/34      Heart Rate 11/30/22 0306 78      Resp --       Temp --       Temp src --       SpO2 11/30/22 0313 100 %      Weight --       Height --       Head Circumference --       Peak Flow --       Pain Score --       Pain Loc --       Pain Edu? --       Excl. in [address]? --      Constitutional: Well developed, Well nourished, patient's face covered in dried emesis  HENT: Normocephalic, Atraumatic, Bilateral external ears normal, LMA in place, nose normal.   Eyes: Pulls are fixed and mid dilated, conjunctiva normal, No discharge. No scleral icterus. Neck: Normal range of motion, No tenderness, Supple. Lymphatic: No lymphadenopathy noted. Cardiovascular: Pulseless without heartbeat  Thorax & Lungs: Scattered rhonchi with bagging through LMA  Abdomen: Soft, No masses, No pulsatile masses, moderately distended, normal bowel sound  Skin: Warm, Dry, Pink  Extremities: No cyanosis, No clubbing. Musculoskeletal: No major deformities noted. Neurologic: GCS 3 T  Psychiatric: Unresponsive  EKG  Per my interpretation demonstrates atrial flutter at a rate of 131 bpm.  Right axis deviation. Right bundle branch block present.   Prolonged QTc interval 496 ms  RADIOLOGY  Labs Reviewed   RESPIRATORY PANEL, MOLECULAR, WITH COVID-19 - Abnormal; Notable for the following components:       Result Value    RSV PCR DETECTED BY PCR (*)     All other components within normal limits   CULTURE, URINE - Abnormal; Notable for the following components:    Culture ESCHERICHIA COLI >100,000 CFU/ml (*)     All other components within normal limits    Narrative:     SETUP DATE/TIME:  11/30/2022 0925   CBC WITH AUTO DIFFERENTIAL - Abnormal; Notable for the following components:    RBC 3.62 (*)     Hemoglobin 9.8 (*)     .0 (*)     MCHC 25.8 (*)     Metamyelocytes Relative 1 (*)     Lymphocytes % 16.0 (*)     Monocytes % 8.0 (*)     All other components within normal limits   BASIC METABOLIC PANEL - Abnormal; Notable for the following components:    Potassium 6.1 (*)     Chloride 90 (*)     CO2 35 (*)     BUN 5 (*)     Glucose 234 (*)     All other components within normal limits   HEPATIC FUNCTION PANEL - Abnormal; Notable for the following components:    Albumin 3.3 (*)     AST 39 (*)     Total Protein 5.8 (*)     All other components within normal limits   MAGNESIUM - Abnormal; Notable for the following components:    Magnesium 2.5 (*)     All other components within normal limits   LACTIC ACID - Abnormal; Notable for the following components:    Lactate 8.5 (*)     All other components within normal limits   PROTIME-INR - Abnormal; Notable for the following components:    Protime 10.5 (*)     All other components within normal limits   APTT - Abnormal; Notable for the following components:    aPTT 41.8 (*)     All other components within normal limits   TROPONIN - Abnormal; Notable for the following components:    Troponin T 0.024 (*)     All other components within normal limits   BRAIN NATRIURETIC PEPTIDE - Abnormal; Notable for the following components:    Pro-BNP 3,978 (*)     All other components within normal limits   CRITICAL CARE PANEL - Abnormal; Notable for the following components:    Potassium 5.2 (*)     Chloride 93 (*)     Glucose 152 (*)     Phosphorus 5.1 (*)     Magnesium 1.7 (*)     All other components within normal limits   LACTATE, SEPSIS - Abnormal; Notable for the following components:    Lactic Acid, Sepsis 3.6 (*)     All other components within normal limits   HEPATIC FUNCTION PANEL - Abnormal; Notable for the following components:    Albumin 3.3 (*)     AST 51 (*)     Total Protein 5.8 (*)     All other components within normal limits   PROTIME/INR & PTT - Abnormal; Notable for the following components:    Protime 10.2 (*)     All other components within normal limits   URINALYSIS WITH REFLEX TO CULTURE - Abnormal; Notable for the following components:    Blood, Urine TRACE (*)     Protein,  (*)     RBC, UA 21 (*)     WBC, UA 21 (*)     Bacteria, UA OCCASIONAL (*)     Mucus, UA MANY (*)     All other components within normal limits   TSH - Abnormal; Notable for the following components:    TSH, High Sensitivity 15.660 (*)     All other components within normal limits   BLOOD GAS, ARTERIAL - Abnormal; Notable for the following components:    pH, Bld 7.33 (*)     pCO2, Arterial 69.0 (*)     pO2, Arterial 372 (*)     Base Exc, Mixed 7.9 (*)     HCO3, Arterial 36.4 (*)     CO2 Content 38.5 (*)     All other components within normal limits   POC CRITICAL CARE PROFILE - Abnormal; Notable for the following components:    pH, Bld 7.28 (*)     pCO2, Arterial 80.3 (*)     pO2, Arterial 552.2 (*)     HCO3, Arterial 37.5 (*)     Base Exc, Mixed 7.6 (*)     O2 Sat 100.0 (*)     CO2 40 (*)     Potassium 5.5 (*)     POC Glucose 161 (*)     POC Chloride 94 (*)     POC Creatinine 1.2 (*)     eGFR, POC 52 (*)     All other components within normal limits   POC CRITICAL CARE PROFILE - Abnormal; Notable for the following components:    pH, Bld 7.27 (*)     pCO2, Arterial 81.0 (*)     pO2, Arterial 54.8 (*)     HCO3, Arterial 37.2 (*)     Base Exc, Mixed 7.0 (*)     O2 Sat 81.4 (*)     CO2 40 (*)     Potassium 5.1 (*)     POC Glucose 168 (*)     POC Chloride 94 (*)     All other components within normal limits   BRAIN NATRIURETIC PEPTIDE - Abnormal; Notable for the following components:    Pro-BNP 4,074 (*)     All other components within normal limits   CBC WITH AUTO DIFFERENTIAL - Abnormal; Notable for the following components:    RBC 3.83 (*)     Hemoglobin 10.5 (*)     MCHC 27.6 (*)     RDW 15.0 (*)     Segs Relative 89.3 (*)     Lymphocytes % 5.6 (*)     Immature Neutrophil % 1.2 (*)     All other components within normal limits   CBC WITH AUTO DIFFERENTIAL - Abnormal; Notable for the following components:    WBC 16.6 (*)     RBC 3.74 (*) Hemoglobin 10.0 (*)     Hematocrit 34.6 (*)     MCH 26.7 (*)     MCHC 28.9 (*)     RDW 15.5 (*)     Metamyelocytes Relative 2 (*)     Bands Relative 12 (*)     Lymphocytes % 16.0 (*)     All other components within normal limits   CRITICAL CARE PANEL - Abnormal; Notable for the following components:    Potassium 5.7 (*)     Chloride 92 (*)     CO2 33 (*)     Creatinine 1.4 (*)     Est, Glom Filt Rate 43 (*)     Glucose 191 (*)     Phosphorus 2.4 (*)     Magnesium 1.6 (*)     All other components within normal limits   CRITICAL CARE PANEL - Abnormal; Notable for the following components:    Potassium 5.5 (*)     Chloride 94 (*)     CO2 33 (*)     BUN 25 (*)     Creatinine 1.6 (*)     Est, Glom Filt Rate 37 (*)     Glucose 141 (*)     Phosphorus 5.9 (*)     Magnesium 2.5 (*)     All other components within normal limits   BLOOD GAS, ARTERIAL - Abnormal; Notable for the following components:    pCO2, Arterial 66.0 (*)     pO2, Arterial 197 (*)     Base Exc, Mixed 7.4 (*)     HCO3, Arterial 35.6 (*)     CO2 Content 37.6 (*)     All other components within normal limits   CBC WITH AUTO DIFFERENTIAL - Abnormal; Notable for the following components:    WBC 10.7 (*)     RBC 3.32 (*)     Hemoglobin 8.8 (*)     Hematocrit 29.7 (*)     MCH 26.5 (*)     MCHC 29.6 (*)     RDW 15.9 (*)     Segs Relative 88.0 (*)     Lymphocytes % 4.8 (*)     Monocytes % 6.8 (*)     All other components within normal limits   CRITICAL CARE PANEL - Abnormal; Notable for the following components:    Chloride 93 (*)     CO2 33 (*)     BUN 40 (*)     Creatinine 2.3 (*)     Est, Glom Filt Rate 24 (*)     Glucose 168 (*)     Phosphorus 5.1 (*)     Magnesium 2.6 (*)     All other components within normal limits   CBC - Abnormal; Notable for the following components:    WBC 10.7 (*)     RBC 3.36 (*)     Hemoglobin 9.3 (*)     Hematocrit 30.0 (*)     MCHC 31.0 (*)     RDW 15.9 (*)     All other components within normal limits   COMPREHENSIVE METABOLIC PANEL - Abnormal; Notable for the following components:    Chloride 93 (*)     CO2 35 (*)     BUN 47 (*)     Creatinine 2.7 (*)     Est, Glom Filt Rate 20 (*)     Glucose 162 (*)     Albumin 3.1 (*)     Total Protein 5.6 (*)     ALT 44 (*)      (*)     All other components within normal limits   MAGNESIUM - Abnormal; Notable for the following components:    Magnesium 2.7 (*)     All other components within normal limits   URINALYSIS - Abnormal; Notable for the following components:    Clarity, UA SLIGHTLY CLOUDY (*)     Blood, Urine TRACE (*)     Protein,  (*)     All other components within normal limits   MICROSCOPIC URINALYSIS - Abnormal; Notable for the following components:    RBC, UA 44 (*)     WBC, UA 22 (*)     Mucus, UA RARE (*)     All other components within normal limits   LACTATE DEHYDROGENASE - Abnormal; Notable for the following components:     (*)     All other components within normal limits   TROPONIN - Abnormal; Notable for the following components:    Troponin T 0.060 (*)     All other components within normal limits   BLOOD GAS, ARTERIAL - Abnormal; Notable for the following components:    pH, Bld 7.58 (*)     pO2, Arterial 156 (*)     Base Exc, Mixed 14.2 (*)     HCO3, Arterial 37.5 (*)     CO2 Content 38.7 (*)     All other components within normal limits   BLOOD GAS, ARTERIAL - Abnormal; Notable for the following components:    pH, Bld 7.53 (*)     pCO2, Arterial 47.0 (*)     Base Exc, Mixed 14.6 (*)     HCO3, Arterial 39.3 (*)     CO2 Content 40.7 (*)     O2 Sat 95.7 (*)     All other components within normal limits   MAGNESIUM - Abnormal; Notable for the following components:    Magnesium 2.8 (*)     All other components within normal limits   MAGNESIUM - Abnormal; Notable for the following components:    Magnesium 2.8 (*)     All other components within normal limits   COMPREHENSIVE METABOLIC PANEL - Abnormal; Notable for the following components:    Chloride 91 (*) CO2 34 (*)     BUN 49 (*)     Creatinine 2.6 (*)     Est, Glom Filt Rate 20 (*)     Glucose 178 (*)     Albumin 3.1 (*)     Total Protein 5.5 (*)     ALT 45 (*)      (*)     All other components within normal limits   COMPREHENSIVE METABOLIC PANEL - Abnormal; Notable for the following components:    Chloride 94 (*)     CO2 34 (*)     BUN 49 (*)     Creatinine 2.6 (*)     Est, Glom Filt Rate 20 (*)     Glucose 176 (*)     Albumin 3.0 (*)     Total Protein 5.4 (*)     ALT 46 (*)      (*)     All other components within normal limits   CBC - Abnormal; Notable for the following components:    RBC 3.28 (*)     Hemoglobin 8.8 (*)     Hematocrit 29.1 (*)     MCH 26.8 (*)     MCHC 30.2 (*)     RDW 16.0 (*)     All other components within normal limits   URINALYSIS - Abnormal; Notable for the following components:    Clarity, UA CLOUDY (*)     Blood, Urine MODERATE NUMBER OR AMOUNT OBSERVED (*)     Protein,  (*)     Leukocyte Esterase, Urine TRACE (*)     All other components within normal limits   MICROSCOPIC URINALYSIS - Abnormal; Notable for the following components:    RBC, UA 39 (*)     WBC, UA 31 (*)     Bacteria, UA RARE (*)     All other components within normal limits   CULTURE, MRSA, SCREENING    Narrative:     SETUP DATE/TIME:  11/30/2022 0926   CULTURE, BLOOD 1    Narrative:     SETUP DATE/TIME:  11/30/2022 0655   CULTURE, BLOOD 2    Narrative:     SETUP DATE/TIME:  11/30/2022 0655   CULTURE, BLOOD 1   CULTURE, BLOOD 2   RESPIRATORY PANEL, MOLECULAR, WITH COVID-19   CULTURE, URINE   CULTURE, BLOOD 2   LIPASE   LIPID PANEL   HEMOGLOBIN A1C   PROCALCITONIN   CK   LACTIC ACID   PROTIME/INR & PTT   PHOSPHORUS   BILIRUBIN, DIRECT   LACTIC ACID   AMYLASE   BLOOD GAS, ARTERIAL   CK   GAMMA GT   LIPASE   BILIRUBIN, DIRECT   BILIRUBIN, DIRECT   PHOSPHORUS   PHOSPHORUS   PROTIME/INR & PTT   PROTIME/INR & PTT   VANCOMYCIN LEVEL, RANDOM   LACTATE DEHYDROGENASE   GAMMA GT   CK   TROPONIN   LIPASE   BLOOD GAS, ARTERIAL   BILIRUBIN, DIRECT   MAGNESIUM   PHOSPHORUS   PROTIME/INR & PTT   COMPREHENSIVE METABOLIC PANEL   BLOOD GAS, ARTERIAL   BLOOD GAS, ARTERIAL   CBC   BILIRUBIN, DIRECT   MAGNESIUM   PHOSPHORUS   PROTIME/INR & PTT   COMPREHENSIVE METABOLIC PANEL   TYPE AND SCREEN     I personally reviewed the images. The radiologist's interpretation reveals:  Last Imaging results   XR CHEST PORTABLE   Final Result   1. Blunting of the bilateral costophrenic angles appears chronic, however   there is suspected superimposed small pleural effusions. 2.  No focal airspace disease. XR CHEST PORTABLE   Final Result   1. Stable support equipment as above. 2. Clear lungs. XR CHEST PORTABLE   Final Result   1. Right subclavian central venous catheter tip terminates in the superior   vena cava. 2. Endotracheal tube tip terminates 6.4 cm above the denys. 3. Patchy airspace opacities suggestive of infection or aspiration, better   appreciated on prior CT. 4. COPD. CT HEAD WO CONTRAST   Final Result   CT findings are highly suggestive of early global anoxic injury as described   above. Sinus disease and a nonspecific left mastoid effusion is noted. Findings were discussed with Dr. Qian Sierra on 11/30/2022 at 4:09 a.m. CTA CHEST ABDOMEN PELVIS W CONTRAST   Final Result   1. No acute vascular injury including dissection. 2. No pulmonary embolus. 3. Airspace opacities in the lungs suggestive of aspiration or infection   superimposed on chronic lung changes including emphysema. 4. No acute intra-abdominal abnormality. 5. Small volume ascites of unclear etiology. 6. Diverticulosis.          XR CHEST PORTABLE    (Results Pending)       MEDS GIVEN IN ED:  Medications   sodium chloride flush 0.9 % injection 5-40 mL (10 mLs IntraVENous Given 12/3/22 0844)   sodium chloride flush 0.9 % injection 5-40 mL (has no administration in time range)   0.9 % sodium chloride infusion (has no administration in time range)   ondansetron (ZOFRAN-ODT) disintegrating tablet 4 mg (has no administration in time range)     Or   ondansetron (ZOFRAN) injection 4 mg (has no administration in time range)   polyethylene glycol (GLYCOLAX) packet 17 g (has no administration in time range)   acetaminophen (TYLENOL) tablet 650 mg (has no administration in time range)     Or   acetaminophen (TYLENOL) suppository 650 mg (has no administration in time range)   chlorhexidine (PERIDEX) 0.12 % solution 15 mL (15 mLs Mouth/Throat Given 12/3/22 0843)   polyvinyl alcohol (LIQUIFILM TEARS) 1.4 % ophthalmic solution 1 drop (1 drop Both Eyes Given 12/3/22 0846)     And   lubrifresh P.M. (artificial tears) ophthalmic ointment ( Both Eyes Given 12/3/22 0847)   pantoprazole (PROTONIX) injection 40 mg (40 mg IntraVENous Given 12/3/22 0843)   albuterol (PROVENTIL) nebulizer solution 2.5 mg (has no administration in time range)   piperacillin-tazobactam (ZOSYN) 3,375 mg in dextrose 5 % 50 mL IVPB extended infusion (mini-bag) (0 mg IntraVENous Stopped 12/3/22 0847)   therapeutic multivitamin-minerals 1 tablet (1 tablet Oral Given 43/7/37 5042)   folic acid (FOLVITE) tablet 1 mg (1 mg Oral Given 12/3/22 0843)   atorvastatin (LIPITOR) tablet 80 mg (80 mg Oral Given 12/2/22 2051)   glucose chewable tablet 16 g (has no administration in time range)   dextrose bolus 10% 125 mL (has no administration in time range)     Or   dextrose bolus 10% 250 mL (has no administration in time range)   glucagon (rDNA) injection 1 mg (has no administration in time range)   dextrose 10 % infusion (has no administration in time range)   heparin (porcine) injection 5,000 Units (5,000 Units SubCUTAneous Given 12/3/22 0454)   0.9 % sodium chloride bolus (0 mLs IntraVENous Stopped 11/30/22 0607)   EPINEPHrine 1 MG/10ML injection (10 mcg IntraVENous Given 11/30/22 0313)   iopamidol (ISOVUE-370) 76 % injection 75 mL (75 mLs IntraVENous Given 11/30/22 0336) vancomycin (VANCOCIN) 1,000 mg in dextrose 5 % 250 mL IVPB ([id number]) (0 mg IntraVENous Stopped 11/30/22 0651)   vasopressin (VASOSTRICT) 0.2 UNIT/ML (  Canceled Entry 11/30/22 0553)   0.9 % sodium chloride bolus (0 mLs IntraVENous Stopped 11/30/22 2040)   magnesium sulfate 2000 mg in 50 mL IVPB premix (0 mg IntraVENous Stopped 12/1/22 0012)   sodium phosphate 30 mmol in sodium chloride 0.9 % 250 mL IVPB (0 mmol IntraVENous Stopped 12/1/22 0504)   sodium zirconium cyclosilicate (LOKELMA) oral suspension 15 g (15 g Oral Given 12/1/22 0116)     Followed by   sodium zirconium cyclosilicate (LOKELMA) oral suspension 5 g (5 g Oral Given 12/1/22 2054)     COURSE & MEDICAL DECISION MAKING  68-year-old female presents emergency department via EMS with reports of cardiac arrest.  They were called for unresponsiveness with patient last known normal around 9:30 PM.  She arrives here with CPR in progress and unable to provide history. It does seem she was discharged in 11/12 following hospitalization for respiratory failure secondary to COPD exacerbation and pneumonia on 11/12. She received multiple rounds of epinephrine prior to arrival with 1 transient episode of ROSC. On my exam she has face covered in dried emesis with LMA in place. She is neurologically GCS 3 T. We performed multiple rounds of ACLS here with patient provided with epinephrine. She initially had cardiac standstill on bedside echo performed myself with PEA as her presenting rhythm. We did obtain return of spontaneous circulation at which time a twelve-lead EKG was obtained and shows atrial flutter without obvious ischemic changes. She does remain GCS 3 T. As we obtained return of spontaneous circulation ICU team did arrive and agreed to take care of the patient from here. We discussed the diagnostics would be ordered but had not yet been performed. ICU voiced understanding of this.   The patient did have some blood pressures that began to trend downward with systolic as low as 60 and therefore I did order Levophed infusion prior to leaving the room in addition to diagnostics including CT of the head and CTA the chest and abdomen/pelvis. CBC, BMP, hepatic panel, lactic acid, blood cultures, VBG, troponin, and BNP have been ordered as well. Patient was hospitalized by ICU team for further management with all aforementioned diagnostics pending. Critical Care Time: I have personally provided 20 minutes of critical care time (excluding separately listed billable procedures) through obtaining a history, examining the patient, reviewing relevant medical records, discussing care with family and/or other providers, performing multiple reassessments and directing care. Amount and/or Complexity of Data Reviewed  Clinical lab tests: reviewed  Decide to obtain previous medical records or to obtain history from someone other than the patient: yes       -  Patient seen and evaluated in the emergency department. -  Triage and nursing notes reviewed and incorporated. -  Old chart records reviewed and incorporated. -  Work-up included:  See above  -  Results discussed with patient. Appropriate PPE utilized as indicated for entire patient encounter? Time of Disposition: See timeline  ? Current Discharge Medication List        FINAL IMPRESSION  1. Cardiac arrest [facility])        Electronically signed by:  Nathalia Davenport DO, 12/3/2022         Nathalia Davenport DO  12/03/22 [id number]

## 2022-11-30 NOTE — PROCEDURES
Critical Care Intubation Note   Pre-Intubation     Reason for consultation: Acute respiratory failure     Interventions prior to Critical care arrival: IV access, AmbuBag     Procedure: Endotracheal intubation   [X] Airway equipment was checked. [X] Suction available.   [X] Monitors including pulse oximetry, NIBP, and ECG monitoring in place or applied. [X] The patient was preoxygenated. Ventilation   Ventilation: Easy   Cricoid Pressure: Yes   Rapid sequence induction: Yes   Cervical collar present: No   In line stabilization or cervical collar left in place: No     Induction: none required, patient obtunded. Induction was smooth with minimal changes in vital signs     Endotracheal Intubation   Intubation: was successful on 1 attempt.  ETT exchange with removal of Laryngeal airway  Route: Oral   Blade: CMac 4   Adjuncts used: None   View of cords: Grade 1 view   ETT Size: 8.0 cuffed   Depth: 23 cm at the teeth   Confirmation: Colormetric change on ETCO2 detector x6, quantitative ETCO2   Secured with: ETT Montoya   Complications: None   Intubation performed by: Jan Dennis MD, Critical Care Attending

## 2022-11-30 NOTE — PROGRESS NOTES
Pt from ED to 2113. Bedside report received from [facility]. Levophed gtt infusing. 2 person skin assessment performed by this RN and Summer Melton RN, redness/excoriation in abdominal/ingrid folds and abrasion to middle chest from dalton device noted. Pt displaying myoclonic jerking of face, Dr Dennis Sun at bedside and ceribell placed.  Family in waiting area and updated by MD.

## 2022-11-30 NOTE — FLOWSHEET NOTE
Pt with low uop this shift, Erika Vega NP made aware. Orders for saline bolus and morning labs received.

## 2022-11-30 NOTE — H&P
History and Physical 22        NAME: Domi Norris  : 1960  MRN: [id number]      Assessment/Plan:  Domi Norris is a 63 y.o. female with a history of COPD on 3L at baseline, OHS, HTN, HLD, CAD, CHF ? Thyroid disease who presented  who presented to [facility] 2022 via EMS after she was found unresponsive in [facility]. Last known well at 9:30 pm. Admit to ICU for further care. Problem list  Cardiac arrest, etiology   Acute hypoxic and hypercarbic respiratory failure  Hypoxic encephalopathy  Myoclonus? Hyperkalemia  GIB? Coffee ground emesis  COPD  Hypothyroid        Neuro: Hypoxic encephalopathy, CT show evidence of anoxia. Lat known well around 9:30 PM. Q1hr neuro checks. Poor prognosis. Neuro exam: pupils dilated non reactive, no cough, gag or corneal reflex, no response to verbal stimuli, no response to painful stimuli. Myoclonus given anoxic brain injury, Cerebel and EEG ordered. Neurology consulted. Recs appreciated. Cardio:  S/P cardiac arrest, Bedside echo show hyperdynamic LV, per TTM 2 trial place on [facility] sun with fever prevention 37.5C. repeat Echo pending. Trend labs. Troponemia, in setting of cardiac arrest, elevated, CTA CAP performed, no obvious injuries pending official read. Resp: Acute resp failure, on Vent, adjust vent to optimize acid base status. Likely aspiration given that she was covered in vomit of presentation. Albuterol PRN. Frequent ABGs. GI: coffee ground emesis on presentation. NGT with low output. Protonix BID. : Lactic acidosis, from poor perfusion. No evidence of Acute kidney injury at this time, but given her prolonged down time, poor perfusion it is expected to have a worsening Cr. Noted  Hyperkalemia on repeat and treat as needed. Monitor other electrolytes and replenish as needed  Heme:  no acute issues. DVT ppx. Lovenox. Monitor for bleeding and transfuse as needed. ID: Broad spectrum antibiotics.  F/U cultures, MRSA, nares, Procal, upper resp PCR. Narrow when able. Concern for aspiration pneumonitis on presentation, but given acuity of her disease continue coverage with broad spectrum antibiotics. Endo: no acute issues. Stress dose steroids. ISS PRN, POC -180 mg/dL. MSK: no acute issues    Tubes/Lines/Drains:   Virgie, CVC, ETT, degroot    Code Status: Full    Chief Complaint:    Cardiac arrest    History of Present Illness:  62 yo  F with PMH of COPD on 3L at baseline, OHS, DALTON, HTN, HLD, CAD, CHF ? Thyroid disease who presented to the ED via EMS with out of hospital cardiac arrest. Per Ems patient family was checked on her and found her unresponsive. Chart review show recent admission and discharge 11/12/22 for COPD exacerbation for CAP requiring BiPAP/ she was started on IV steroid, Antibiotics and discharge home with follow up and prednisone taper. ROS: unable to obtain given clinical condition.      Past Medical, Surgical, Social, Family History:   Past Medical History:   Diagnosis Date    Anemia     Anxiety 02/16/2017    follows with Dr Rolland Gowers     Back pain at L4-L5 level 2/16/2017    Dr Do Cage 1 disorder [facility])     per pt on 2/5/2021\"never told I have bipolar\"    COPD (chronic obstructive pulmonary disease) ([facility] [address]     follows with Dr Elnea Lozano    Emphysema of lung [facility])     FH: CAD (coronary artery disease) 2/16/2017    Father had in his forties, sister in her thirties     Fibromyalgia 02/16/2017    Full dentures     full upper plate and partial on the bottom    Gastric ulcer     \"had stomach ulder back fall 2019\"    H/O Doppler ultrasound 04/05/2019    venous- no DVT or reflux    H/O echocardiogram 01/14/2015    EF50-55% Normal- see media    History of blood transfusion     Hyperlipidemia LDL goal <100     Hypertension     Dr Marco Wakefield    Irregular heartbeat     \"they said I have irreg heart beat before- back when they drained fluid around my heart \"    Obstructive sleep apnea     \"have bipap I use at home\"    On home oxygen therapy     \"on oxygen all the time at home and keep it on 2.5 liters\"    Osteoarthritis     Pericardial effusion     per old chart had pericardial effusion 10/2020    Spinal stenosis     hx per old chart    Thyroid disease     Wears glasses     \"suppose to wear glasses\"     Past Surgical History:   Procedure Laterality Date    BACK SURGERY  2017    \"surgery on low back L5-6- not sure if they put any metal in \"    CARDIAC CATHETERIZATION      10/2019    CARDIAC CATHETERIZATION      per old chart had cath done 2020    CARPAL TUNNEL RELEASE Right 1985    CHOLECYSTECTOMY, LAPAROSCOPIC N/A 10/25/2020    CHOLECYSTECTOMY LAPAROSCOPIC WITH IOC performed by Vanesa Unger MD at [facility] N/A 2019    COLONOSCOPY DIAGNOSTIC performed [facility] Radhika Mckeon MD [facility] [place] DI[facility]IC      per old chart had egd done 2018    TUBAL LIGATION  1987    UPPER GASTROINTESTINAL ENDOSCOPY N/A 2021    EGD BIOPSY performed by Radhika Mckeon MD at [address] History    Marital status:      Spouse name: Not on file    Number of children: Not on file    Years of education: Not on file    Highest education level: Not on file   Occupational History    Not on file   Tobacco Use    Smoking status: Former     Packs/day: 1.50     Years: 20.00     Pack years: 30.00     Types: Cigarettes     Quit date: 2008     Years since quittin.9    Smokeless tobacco: Never   Vaping Use    Vaping Use: Never used   Substance and Sexual Activity    Alcohol use: Not Currently     Alcohol/week: 2.0 standard drinks     Types: 2 Shots of liquor per week     Comment: per pt on 2021\"quit drinking back Oct 2020\"use to drink wine coolers - 3 times per week \"/caffeine 2-3 coffees aday 1 pop b    Drug use: No    Sexual activity: Yes     Partners: Male   Other Topics Concern    Not on file   Social History Narrative    Not on file Social Determinants of Health     Financial Resource Strain: Not on file   Food Insecurity: Not on file   Transportation Needs: Not on file   Physical Activity: Not on file   Stress: Not on file   Social Connections: Not on file   Intimate Partner Violence: Not on file   Housing Stability: Not on file     Family History   Problem Relation Age of Onset    Asthma Mother         COPD    Heart Disease Father     Breast Cancer Neg Hx        Home Medications:  Prior to Admission medications    Medication Sig Start Date End Date Taking? Authorizing Provider   dilTIAZem (CARDIZEM CD) 360 MG extended release capsule Take 1 capsule by mouth daily 11/11/22   Hermelinda Amaro MD   predniSONE (DELTASONE) 10 MG tablet 4 tablets once daily for 3 days then 3 tablets once daily for 3 days then 2 tablets once daily for 3 days then 1 tablet once daily for 3 days.  11/11/22   Hermelinda Amaro MD   metoprolol tartrate (LOPRESSOR) 25 MG tablet Take 1 tablet by mouth 2 times daily 11/11/22   Hermelinda Amaro MD   Roflumilast (DALIRESP) 500 MCG tablet Take 1 tablet by mouth daily 11/11/22 12/11/22  Hermelinda Amaro MD   magnesium oxide (MAG-OX) 400 MG tablet Take 1 tablet by mouth 2 times daily 11/11/22   Hermelinda Amaro MD   spironolactone (ALDACTONE) 25 MG tablet Take 1 tablet by mouth daily 11/12/22   Hermelinda Amaro MD   potassium chloride (KLOR-CON M) 20 MEQ extended release tablet Take 1 tablet by mouth 2 times daily for 7 days 11/11/22 11/18/22  Hermelinda Amaro MD   ipratropium-albuterol (DUONEB) 0.5-2.5 (3) MG/3ML SOLN nebulizer solution Inhale 3 mLs into the lungs every 4 hours (while awake) 11/11/22 12/11/22  Hermelinda Amaro MD   albuterol (PROVENTIL) (5 MG/ML) 0.5% nebulizer solution Take 1 mL by nebulization 4 times daily as needed for Wheezing 11/11/22   Hermelinda Amaro MD   ipratropium-albuterol (DUONEB) 0.5-2.5 (3) MG/3ML SOLN nebulizer solution Take 3 mLs by nebulization 4 times daily 11/1/22   Carlie Christianson MD   hydrALAZINE (APRESOLINE) 50 MG tablet Take 1 tablet by mouth 3 times daily 10/25/22   Jatin Velazquez, APRN - CNP   budesonide-formoterol (SYMBICORT) 160-4.5 MCG/ACT AERO Inhale 2 puffs into the lungs 2 times daily 10/6/22   Ashly Das MD   gabapentin (NEURONTIN) 300 MG capsule Take 400 mg by mouth 3 times daily. 6/2/22   Historical Provider, MD   methocarbamol (ROBAXIN) 500 MG tablet Take 750 mg by mouth in the morning and at bedtime  Patient not taking: Reported on 11/6/2022 6/2/22   Historical Provider, MD   pantoprazole (PROTONIX) 40 MG tablet Take 1 tablet by mouth every morning (before breakfast) 3/16/22   Kilo Avalos MD   atorvastatin (LIPITOR) 80 MG tablet Take 80 mg by mouth daily    Historical Provider, MD   ondansetron (ZOFRAN ODT) 4 MG disintegrating tablet Take 1 tablet by mouth every 6 hours 12/29/21   Karishma Selby PA-C   diclofenac sodium (VOLTAREN) 1 % GEL Apply 4 g topically 4 times daily  Patient not taking: Reported on 11/6/2022 12/8/21   River Killian DO   acetaminophen (TYLENOL) 650 MG extended release tablet Take 650 mg by mouth as needed for Pain    Historical Provider, MD   vitamin D (CHOLECALCIFEROL) 25 MCG (1000 UT) TABS tablet Take 1,000 Units by mouth daily  Patient not taking: Reported on 11/6/2022    Historical Provider, MD   ferrous sulfate (IRON 325) 325 (65 Fe) MG tablet Take 1 tablet by mouth every other day Indications: pt taking every day bid Monday, wed, fri  Patient not taking: Reported on 11/6/2022 5/4/21   Nadia Akins MD   clonazePAM (KLONOPIN) 1 MG disintegrating tablet Take 0.5 mg by mouth 2 times daily as needed.     Historical Provider, MD   lactobacillus (CULTURELLE) capsule Take 1 capsule by mouth daily (with breakfast) 11/27/20   Loc Alejandro MD   guaiFENesin (MUCINEX) 600 MG extended release tablet Take 1 tablet by mouth 2 times daily  Patient not taking: Reported on 11/6/2022 7/21/20   Ashly Das MD   montelukast (SINGULAIR) 10 MG tablet Take 1 tablet by mouth daily 7/21/20   Bjorn Peters MD   levothyroxine (SYNTHROID) 100 MCG tablet Take 1 tablet by mouth Daily 7/21/20   Bjorn Peters MD   folic acid (FOLVITE) 1 MG tablet Take 1 tablet by mouth daily  Patient not taking: Reported on 11/6/2022 7/16/20   Oj Hudson MD   vitamin B-1 100 MG tablet Take 1 tablet by mouth daily 7/16/20   Oj Hudson MD   DULoxetine (CYMBALTA) 60 MG extended release capsule Take 60 mg by mouth daily     Historical Provider, MD   fluticasone (FLONASE) 50 MCG/ACT nasal spray 2 sprays by Nasal route daily 3/28/18   Daysi Wilson MD         Physical Exam:     /80   Pulse (!) 138   LMP 01/05/2010   SpO2 100%     General: NAD  Eyes: Fixed and dilated  ENT: neck supple  Cardiovascular: Regular rate. Respiratory: Clear to auscultation, ETT  Gastrointestinal: Soft, non tender, distended. Genitourinary: no suprapubic tenderness  Musculoskeletal: No edema  Skin: warm, dry  Neuro: Pupils fixed and dilated, no response to verbal or painful stimuli, no cough, gag or corneal reflex. Labs, Imaging, and Studies reviewed:    No results found. CBC:   Recent Labs     11/30/22 0243   WBC 10.5   HGB 9.8*        BMP:    Recent Labs     11/30/22 0243      K 6.1*   CL 90*   CO2 35*   BUN 5*   CREATININE 1.0   GLUCOSE 234*     Hepatic:   Recent Labs     11/30/22 0243   AST 39*   ALT 31   BILITOT 0.2   ALKPHOS 127     Lipids:   Lab Results   Component Value Date/Time    CHOL 192 06/07/2022 03:25 PM    HDL 67 06/07/2022 03:25 PM    TRIG 77 06/07/2022 03:25 PM     Hemoglobin A1C:   Lab Results   Component Value Date/Time    LABA1C 5.1 06/21/2019 04:26 PM     TSH: No results found for: TSH  Troponin:   Lab Results   Component Value Date/Time    TROPONINT 0.024 11/30/2022 02:43 AM    TROPONINT <0.010 11/06/2022 10:59 AM    TROPONINT 0.012 11/05/2022 06:05 PM     Lactic Acid: No results for input(s): LACTA in the last 72 hours.   BNP:   Recent Labs 11/30/22  0243   PROBNP 3,978*     UA:  Lab Results   Component Value Date/Time    NITRU NEGATIVE 10/21/2022 01:29 PM    COLORU YELLOW 10/21/2022 01:29 PM    WBCUA <1 06/26/2022 05:17 AM    RBCUA NONE SEEN 06/26/2022 05:17 AM    MUCUS RARE 11/03/2020 03:35 PM    TRICHOMONAS NONE SEEN 06/26/2022 05:17 AM    BACTERIA NEGATIVE 06/26/2022 05:17 AM    CLARITYU CLEAR 10/21/2022 01:29 PM    [address] <1.005 10/21/2022 01:29 PM    LEUKOCYTESUR NEGATIVE 10/21/2022 01:29 PM    UROBILINOGEN 0.2 10/21/2022 01:29 PM    BILIRUBINUR NEGATIVE 10/21/2022 01:29 PM    BLOODU NEGATIVE 10/21/2022 01:29 PM    KETUA NEGATIVE 10/21/2022 01:29 PM     Urine Cultures: No results found for: Nguyễn Byers  Blood Cultures: No results found for: BC  No results found for: BLOODCULT2  Organism:   Lab Results   Component Value Date/Time    [facility] BBRO 12/15/2018 09:23 PM       Critical care attestation:    I spent 75 cumulative minutes (excluding procedures), in full attention to this critically ill patient. I have personally reviewed the patient's history and interval events in the EMR, along with vitals, labs and radiology imaging. Critical care time was spent personally providing care for this patient, excluding billable procedures, and no overlapping with any other provider. This includes documenting my assessment and plan of care and developing the care plan to treat the problems below.     The high probability of deterioration required my full and direct attention, intervention, and personal management due to do to underlying diagnosis and clinical problems including the treatment of active or impending:  [x] Neurological monitoring and treatment  [x] Respiratory failure -assessment of ventilator support, adjustment, ventilator weaning  [x] Hemodynamic and volume assessment with volume resuscitation  [x] Mechanical and/or chemical support of the circulation,  [x] Frequent vasoactive agent adjustment,  [] Renal replacement therapy,  [x] For rapid decompensation,  [] Electrolyte instability  [] Suctioning every 2 hours  [x] Every hour neuro checks  [] Every hour neurovascular checks  [x] Frequent evaluation of patient's response to treatment and titration of therapies,  [x] Interpretation of laboratory and radiological data,  [x] Application and interpretation of advanced monitoring technologies,  [x] Extensive interpretation of multiple databases,  [x] Development of treatment plan with patient, surrogate, or consultants.   [] Others:       Electronically signed by Beryle Formosa, MD on 11/30/2022 at 3:47 AM

## 2022-11-30 NOTE — PROCEDURES
Arterial Catheter Insertion Procedure Note   Procedure: Insertion of Arterial Line   Procedure Clinician: Susan Wilson MD   Procedure Assistant: None   Indications: Cardiac arrest   Size and location: 20 g, right femoral artery  Attempts: 1  Procedure Details:   Emergent     Under sterile conditions the skin above the Right femoral artery was prepped with chlorhexidine. Local anesthesia was applied to the skin and subcutaneous tissues. An Arrow kit was used to insert a 20-gauge needle into the artery. A guide wire was then passed easily through the needle without resistance. A 20 gauge catheter was then inserted into the vessel over the guide wire. The guidewire was then removed with the tip intact. The catheter was then secured into place. Findings: There were no changes to vital signs. Catheter was flushed with 20 mL NS. Patient tolerated the procedure well.

## 2022-11-30 NOTE — CARE COORDINATION
Chart reviewed. Pt was found down and went into cardiac arrest. Pt was intubated on 11/30. Pt is on pressors times 3. Pt lives at home with  and children. CM will follow and be available as needs arise.

## 2022-11-30 NOTE — CONSULTS
Neurology Service Consult Note  [place] [address]   Patient Name: Elayne Davies  : 1960        Subjective:   Reason for consult: S/P cardiac arrest, anoxic brain injury  63 y.o. female with history of COPD on 3L O2, HTN, HLD, CAD, CHF, DALTON presenting to [place] [address]  via EMS after she was found unresponsive in [facility] for an unknown amount of time. EMS noted ACLS with 5 round of Epi prior to arrival in ED with continued ACLS for at least 10 more minutes before ROSC. CT head was completed noting concern for global anoxic injury. Chart was reviewed in detail. Patient was seen and assessed. She is intubated and sedated with propofol which was held for exam. She is noted to have movements consistent with myoclonic jerking on the left > the right. She is not responsive. Family (grandchildren) at bedside and report that patient had not been herself yesterday and was pulling at her oxygen. RN's at bedside provide immediate history. Bert Crank was placed at some time overnight and reportedly noted 60% seizure burden. Discussed with Dr. José Miguel Curran directly who was able to review Ceribell. No seizure or epileptiform discharges were present. Will complete routine EEG to for better evaluation.        Past Medical History:   Diagnosis Date    Anemia     Anxiety 2017    follows with Dr Marlena Madrid     Back pain at L4-L5 level 2017    Dr Jennifer Cooley [facility])     per pt on 2021\"never told I have bipolar\"    COPD (chronic obstructive pulmonary disease) ([facility] [address]     follows with Dr Christa Palumbo    Emphysema of lung [facility])     FH: CAD (coronary artery disease) 2017    Father had in his forties, sister in her thirties     Fibromyalgia 2017    Full dentures     full upper plate and partial on the bottom    Gastric ulcer     \"had stomach ulder back fall \"    H/O Doppler ultrasound 2019    venous- no DVT or reflux    H/O echocardiogram 01/14/2015    EF50-55% Normal- see media    History of blood transfusion     Hyperlipidemia LDL goal <100     Hypertension     Dr Natalee Saini    Irregular heartbeat     \"they said I have irreg heart beat before- back when they drained fluid around my heart \"    Obstructive sleep apnea     \"have bipap I use at home\"    On home oxygen therapy     \"on oxygen all the time at home and keep it on 2.5 liters\"    Osteoarthritis     Pericardial effusion     per old chart had pericardial effusion 10/2020    Spinal stenosis     hx per old chart    Thyroid disease     Wears glasses     \"suppose to wear glasses\"    :   Past Surgical History:   Procedure Laterality Date    BACK SURGERY  03/2017    \"surgery on low back L5-6- not sure if they put any metal in \"    CARDIAC CATHETERIZATION      10/2019    CARDIAC CATHETERIZATION      per old chart had cath done 11/2020    CARPAL TUNNEL RELEASE Right 1985    CHOLECYSTECTOMY, LAPAROSCOPIC N/A 10/25/2020    CHOLECYSTECTOMY LAPAROSCOPIC WITH IOC performed by Samantha Ha MD at [address] N/A 12/12/2019    [facility]      per old chart had egd done 1/2018    TUBAL LIGATION  1987    UPPER GASTROINTESTINAL ENDOSCOPY N/A 1/7/2021    EGD BIOPSY performed by Janes Newton MD at [facility] ENDOSCOPY     Medications:  Scheduled Meds:   sodium chloride flush  5-40 mL IntraVENous 2 times per day    enoxaparin  40 mg SubCUTAneous Daily    chlorhexidine  15 mL Mouth/Throat BID    polyvinyl alcohol  1 drop Both Eyes Q4H    And    artificial tears   Both Eyes Q4H    pantoprazole  40 mg IntraVENous BID    piperacillin-tazobactam  3,375 mg IntraVENous Q8H    thiamine  250 mg IntraVENous 3 times per day    multivitamin  1 tablet Oral Daily    folic acid  1 mg Oral Daily    atorvastatin  80 mg Oral Nightly    methylPREDNISolone  1,400 mg IntraVENous Q12H    vancomycin  750 mg IntraVENous Q12H     Continuous Infusions:   norepinephrine 30 mcg/min (22 0451)    EPINEPHrine infusion Stopped (22 0604)    sodium chloride      vasopressin (Septic Shock) infusion 0.03 Units/min (22 0722)    dextrose      levothyroxine (SYNTHROID) infusion 20 mcg/hr (22 0720)    propofol 35 mcg/kg/min (22 0558)     PRN Meds:.sodium chloride flush, sodium chloride, ondansetron **OR** ondansetron, polyethylene glycol, acetaminophen **OR** acetaminophen, albuterol, glucose, dextrose bolus **OR** dextrose bolus, glucagon (rDNA), dextrose    Allergies   Allergen Reactions    Lisinopril Swelling and Rash     angioedema     Social History     Socioeconomic History    Marital status:      Spouse name: Not on file    Number of children: Not on file    Years of education: Not on file    Highest education level: Not on file   Occupational History    Not on file   Tobacco Use    Smoking status: Former     Packs/day: 1.50     Years: 20.00     Pack years: 30.00     Types: Cigarettes     Quit date: 2008     Years since quittin.9    Smokeless tobacco: Never   Vaping Use    Vaping Use: Never used   Substance and Sexual Activity    Alcohol use: Not Currently     Alcohol/week: 2.0 standard drinks     Types: 2 Shots of liquor per week     Comment: per pt on 2021\"quit drinking back Oct 2020\"use to drink wine coolers - 3 times per week \"/caffeine 2-3 coffees aday 1 pop b    Drug use: No    Sexual activity: Yes     Partners: Male   Other Topics Concern    Not on file   Social History Narrative    Not on file     Social Determinants of Health     Financial Resource Strain: Not on file   Food Insecurity: Not on file   Transportation Needs: Not on file   Physical Activity: Not on file   Stress: Not on file   Social Connections: Not on file   Intimate Partner Violence: Not on file   Housing Stability: Not on file      Family History   Problem Relation Age of Onset    Asthma Mother         COPD    Heart Disease Father Breast Cancer Neg Hx          ROS (10 systems)  Intubated and unresponsive,  unable to answer    Physical Exam:      Wt Readings from Last 3 Encounters:   11/30/22 200 lb 2.8 oz (90.8 kg)   11/09/22 208 lb 6.4 oz (94.5 kg)   08/14/22 205 lb (93 kg)     Temp Readings from Last 3 Encounters:   11/30/22 (!) 95.9 °F (35.5 °C) (Bladder)   11/11/22 98 °F (36.7 °C) (Oral)   08/14/22 98.2 °F (36.8 °C)     BP Readings from Last 3 Encounters:   11/30/22 (!) 102/49   11/11/22 (!) 201/94   09/22/22 120/60     Pulse Readings from Last 3 Encounters:   11/30/22 (!) 132   11/11/22 98   09/22/22 81        Gen: Intubated, unresponsive  HEENT: NC/AT, EOMI, PERRL 1 mm bilaterally, mmm,  neck supple, no meningeal signs  Heart: ST  Lungs: Ventilator support  Ext: no edema, no calf tenderness b/l  Psych: non interactive  Skin: no rashes or lesions    NEUROLOGIC EXAM:    Mental Status: Intubated, sedated on propofol held for exam. Patient is unresponsive, unable to follow commands. Cranial Nerve Exam:   CN II-XII: PERRL 1 mm bilaterally, + occulocephalic, reflex + corneal reflex, VFF, no nystagmus, no gaze paresis, ET tube limites evaluation of palate and tongue, no cough with deep suction.      Motor Exam:       Strength 0/5 UE's/LE's b/l  Tone and bulk normal   Not able to told to antigravity    Deep Tendon Reflexes: 1/4 biceps, triceps, brachioradialis, patellar, and achilles b/l; flexor plantar responses b/l    Sensation: Triple flexion to  left lower extremity  Slight extensor posturing in the left upper extremities    Coordination/Cerebellum:       Tremors--Myoclonus      Rapidly alternating movements: TWAN               Heel-to-Shin: TWAN      Finger-to-Nose: TWAN    Gait and stance:      Gait: deferred      LABS:     Recent Labs     11/30/22  0243 11/30/22  0409 11/30/22  0445 11/30/22  0511 11/30/22  0620   WBC 10.5  --   --   --  8.4    138 136 139  --    K 6.1* 5.5* 5.2* 5.1*  --    CL 90*  --  93*  --   --    CO2 35* 40* 31 40*  --    BUN 5*  --  10  --   --    CREATININE 1.0 1.2* 0.9 1.0  --    GLUCOSE 234*  --  152*  --   --    INR 0.82  --  0.79  --   --        IMAGING:    CT head w/o contrast:  Impression   CT findings are highly suggestive of early global anoxic injury as described   above. Sinus disease and a nonspecific left mastoid effusion is noted. cEEG:  EEG Interpretation: This EEG is abnormal due to the presence of:   Burst suppression pattern. This is a non-specific finding but is consistent with a generalized disturbance of cerebral function. It may be seen in a variety of conditions, such as toxic, metabolic, post-anoxic, multi-focal or diffuse structural abnormalities. Clinical events of facial jerking reported. No clear epileptiform discharges are seen during the EEG, but there is presence of EMG twitch artifact at the beginning of the recording. This is consistent with non-epileptic events. No electrographic seizures or non-convulsive status epilepticus was seen over the entire monitoring period. Of note, this EEG is limited due to the number of electrodes and lack of parasaggital brain region coverage. If clinical suspicions persist for seizure as an etiology for the patient's symptoms, or if suspicion remains high for an underlying seizure disorder, additional EEG testing utilizing the standard 10-20 system of electrode placement for full cerebral coverage should be considered. Routine EEG:  EEG Interpretation:   The EEG was abnormal due to the presence of:   Burst suppression pattern. This is a non-specific finding but is consistent with a generalized disturbance of cerebral function. It may be seen in a variety of conditions, such as toxic, metabolic, post-anoxic, multi-focal or diffuse structural abnormalities. No electrographic seizures or non-convulsive status epilepticus was seen over the entire monitoring period.        All imaging was personally reviewed      ASSESSMENT/PLAN:   63year old female presents after extended cardiac arrest. Patient is non responsive, intubated, sedation off since this am. Pupils are 1 mm and reactive bilaterally. Corneal and oculocephalic reflexes are present. She does not appear to be breathing over the vent. There is no cough of gag with deep suction. Left upper extremity has slight extensor posturing, none exhibited on the right. Left lower extremity with weak triple flexion, no withdraw on the right. Anoxic brain injury secondary to prolonged cardiac arrest  [facility] imaging as above notes concern for global anoxic injury  Will consider repeat CT head if no improvement  EEG as above - no evidence for seizure or epileptiform discharge  Recommend holding sedation for neuro prognostication  Can consider depakote or Keppra if myoclonus continue to worsen  Will continue to follow pending neurologic improvement, neuro prognosis very guarded    > 65 minutes of time spent included chart review, obtaining history, patient examination, developing plan of care, and documentation. Patient was discussed with attending neurologist Dr. Aviva Pickard. Thank you for allowing us to participate in the care of your patient. If there are any questions regarding evaluation please feel free to contact us. BRIAN Tavares CNP, 11/30/2022     Attending Note:  I have rounded on this patient with BRIAN Tavares CNP. I have reviewed the chart and we have discussed this case in detail. The patient was seen and examined by myself. Pertinent labs and imaging have been personally reviewed. Changes were made to the note as appropriate. I concur with the above assessment and plan. Patient presents status postcardiac arrest, suspect hypoxic anoxic injury. CT head initially does demonstrate findings concerning for global anoxic injury. Her exam demonstrates retained brainstem with very minimal cortical function.   EEG without any evidence to support nonconvulsive status, her facial movement and occasional extremity movement not consistent with seizure. Propofol has been discontinued this morning. I did discuss with family at bedside, patient's grand children, that she has suffered a severe injury based off from her exam and imaging. At this point, she does not meet brain death criteria. We will continue to monitor with serial neuro exams. If myoclonus becomes excessive to the point of ventilator compliance issues, would recommend Keppra or Depakote as initial treatment options rather than sedation to facilitate further neuro exams and assessments.     [place] Deras, DO 11/30/2022 8:50 PM  Neurology

## 2022-11-30 NOTE — CONSULTS
Initiate nutrition support, within 2 days       Nutrition Monitoring and Evaluation:   Behavioral-Environmental Outcomes: None Identified  Food/Nutrient Intake Outcomes: Enteral Nutrition Intake/Tolerance  Physical Signs/Symptoms Outcomes: Biochemical Data, GI Status, Hemodynamic Status, Weight    Discharge Planning:     Too soon to determine     [place] Fabian [address], [address] [place] [address],   Contact: [phone]

## 2022-12-01 NOTE — CONSULTS
Comprehensive Nutrition Assessment    Type and Reason for Visit:  Reassess, Consult    Nutrition Recommendations/Plan:   Switch TF formula to Vital 1.2 at 55 mL/hr, provides 1584 kcal and 99 g protein  Continue 30 mL flush Q4H  Advance tube feed to goal rate as able  Monitor for GI tolerance, nutrition status and poc     Malnutrition Assessment:  Malnutrition Status:  Insufficient data (11/30/22 1533)    Context:  Acute Illness       Nutrition Assessment:    Pt still intubated but no vasopressin or sedation and MAP greater than 65. Recommend switching TF formula to Vital 1.2 at 55 mL/hr, pt has OG tube in place. Continue to follow as high nutrition risk. Nutrition Related Findings:    hgb 10, hct 34.6, k 5.5, phos 5.9, mg 2.5, glu 141, bun 25, cr 1.6, egfr 37 Wound Type: None       Current Nutrition Intake & Therapies:    Average Meal Intake: NPO  Average Supplements Intake: None Ordered  Current Tube Feeding (TF) Orders:  Feeding Route: Orogastric  Formula: Standard with Fiber  Schedule: Continuous  Feeding Regimen: 10 mL/hr  Additives/Modulars: None  Water Flushes: 30 mL Q4H, 180 mL total  Current TF & Flush Orders Provides: Jevity 1.5 at 10 mL/hr - 360 kcal, 15 g protein, 362 mL water  Goal TF & Flush Orders Provides: Jevity 1.5 at 40 mL/hr - 1440 kcal, 61 g protein, 910 mL water    Anthropometric Measures:  Height: 5' 0.98\" (154.9 cm)  Ideal Body Weight (IBW): 105 lbs (48 kg)       Current Body Weight: 200 lb 2.8 oz (90.8 kg), 190.6 % IBW.  Weight Source: Bed Scale  Current BMI (kg/m2): 37.8        Weight Adjustment For: No Adjustment                 BMI Categories: Obese Class 2 (BMI 35.0 -39.9)    Estimated Daily Nutrient Needs:  Energy Requirements Based On: Formula  Weight Used for Energy Requirements: Current  Energy (kcal/day): 1700 kcal ([facility] 2010)  Weight Used for Protein Requirements: Ideal  Protein (g/day): 57-95 (1.2-2.0 g/kg IBW)     Fluid (ml/day):      Nutrition Diagnosis:   Inadequate oral intake related to acute injury/trauma as evidenced by NPO or clear liquid status due to medical condition    Nutrition Interventions:   Food and/or Nutrient Delivery: Modify Tube Feeding  Nutrition Education/Counseling: No recommendation at this time  Coordination of Nutrition Care: Continue to monitor while inpatient       Goals:  Previous Goal Met: Goal(s) Achieved  Goals: Tolerate nutrition support at goal rate       Nutrition Monitoring and Evaluation:   Behavioral-Environmental Outcomes: None Identified  Food/Nutrient Intake Outcomes: Enteral Nutrition Intake/Tolerance  Physical Signs/Symptoms Outcomes: Biochemical Data, GI Status, Nausea or Vomiting, Fluid Status or Edema, Hemodynamic Status, Nutrition Focused Physical Findings, Skin, Weight    Discharge Planning:     Too soon to determine     Pansy Inks  Contact: [phone]

## 2022-12-01 NOTE — PROGRESS NOTES
Reviewed situation with two grandchildren at bedside. I informed that the patient will have no meaningful neurological recovery in light of severity of anoxic brain injury (\"not brain dead\" however). They acknowledged this, noting that an Lorayne Kaushal has requested continued care for approximately \"7 days\" (although not all family members are in agreement, note the patient does have a spouse who would be the medical legal proxy). I encouraged the family to discuss further the situation.     Elizabeth Barnes  [phone]

## 2022-12-01 NOTE — CARE COORDINATION
Cm in to see pt. Pt intubated 11/30 and remains on vent. Physician progress notes that pt has anoxic brain injury and that family would like to continue current care for 5-7+ days to see how she does and before deciding next steps. Son and sister in law at bedside. Son resides locally and works in [address] [place] [address]. Cm introduced self and role of CM. Son states that pt, her ,  her dgt and other son reside together. They were at home with pt when she arrested. Cm will follow pt course and be available to pt/family to assist in anyway needed.

## 2022-12-01 NOTE — ACP (ADVANCE CARE PLANNING)
Had a long discussion with family at bedside about patients current condition and state. Explained to them in detail all aspects of her current state and poor prognosis. Recommended that if patient had another cardiac arrest that no additonal resuscitation be performed and allow patient to pass on her own. Family verbalized understanding and wish to proceed with this recommendation. Otherwise son would like to give his mother 7 days on the ventilator for a potential chance for recovery. I spent more than 16 minutes at bedside having this discussion with family.

## 2022-12-01 NOTE — PLAN OF CARE
Problem: Discharge Planning  Goal: Discharge to home or other facility with appropriate resources  Outcome: Progressing  Flowsheets (Taken 12/1/2022 0800)  Discharge to home or other facility with appropriate resources: Identify barriers to discharge with patient and caregiver     Problem: Neurosensory - Adult  Goal: Remains free of injury related to seizures activity  Outcome: Progressing     Problem: Respiratory - Adult  Goal: Achieves optimal ventilation and oxygenation  Outcome: Progressing     Problem: Skin/Tissue Integrity - Adult  Goal: Oral mucous membranes remain intact  Outcome: Progressing     Problem: Genitourinary - Adult  Goal: Urinary catheter remains patent  Outcome: Progressing     Problem: Infection - Adult  Goal: Absence of infection during hospitalization  Outcome: Progressing  Flowsheets (Taken 12/1/2022 0800)  Absence of infection during hospitalization:   Assess and monitor for signs and symptoms of infection   Monitor lab/diagnostic results   Monitor all insertion sites i.e., indwelling lines, tubes and drains   Monitor endotracheal (as able) and nasal secretions for changes in amount and color   [place] appropriate cooling/warming therapies per order   Administer medications as ordered     Problem: Metabolic/Fluid and Electrolytes - Adult  Goal: Electrolytes maintained within normal limits  Outcome: Progressing     Problem: Pain  Goal: Verbalizes/displays adequate comfort level or baseline comfort level  Outcome: Progressing     Problem: Nutrition Deficit:  Goal: Optimize nutritional status  Outcome: Progressing     Problem: Skin/Tissue Integrity  Goal: Absence of new skin breakdown  Description: 1. Monitor for areas of redness and/or skin breakdown  2. Assess vascular access sites hourly  3. Every 4-6 hours minimum:  Change oxygen saturation probe site  4.   Every 4-6 hours:  If on nasal continuous positive airway pressure, respiratory therapy assess nares and determine need for appliance change or resting period.   Outcome: Progressing     Problem: Safety - Adult  Goal: Free from fall injury  Outcome: Progressing     Problem: ABCDS Injury Assessment  Goal: Absence of physical injury  Outcome: Progressing

## 2022-12-01 NOTE — FLOWSHEET NOTE
Monica Christianson at bedside updated him on POC. Conversation started about code status and explained to him that patient is a full code at this time and that if she were to arrest again, CPR would be performed with compressions, medications, and shocks, if warranted. Monica Christianson states that he is ok with continuing the care we are currently providing but doesn't want us to carryout CPR if pt were to arrest again.   Will inform Critical Care team.

## 2022-12-01 NOTE — PROGRESS NOTES
V2.0  [facility] Hospitalist Progress Note      Name:  Hayes Ruelas /Age/Sex: 1960  (63 y.o. female)   MRN & CSN:  [id number] & [id number] Encounter Date/Time: 2022 4:17 PM EST    Location:  -A PCP: Vivi Gilbert MD       Hospital Day: 2    Assessment and Plan:   Hayes Ruelas is a 63 y.o. female with pmh of COPD on 3L at baseline, OHS, HTN, HLD, CAD, and CHF who presented with Cardiac arrest [facility]) via EMS after being found unresponsive. Plan:  #Cardiac arrest  #Acute hypoxic and hypercabic failure  #Hypoxic encephalopathy  --Currently intubated, on vent with adjustments to optimize acid-base status  --Zosyn to cover aspiration pneumonia  --Albuterol as needed, and frequent arterial blood gases   --Cardiac arrest, TTM to trial she is placed on Arctic sun with fever prevention, repeat echo pending. Troponemia status post arrest  --CT of the head preliminarily highly suggestive for early global anoxic injury, will follow final read  --Cerebel suspicious for seizures, EEG performed showing burst suppression Patson but no electrographic seizures or nonconvulsive status epilepticus seen over the entire monitoring period. --CTA chest abdomen and pelvis with contrast prelim read is showing no acute vascular injury including dissection, no pulmonary embolus, airspace opacities in the lung suggestive of aspiration or infection superimposed on chronic lung changes including emphysema    #? GIB  --NG tube in place, monitor output. Protonix twice daily at this time  --H/H stable at 10, trend CBC, transfuse for hemoglobin less than 7    #E. Coli UTI  --Currently on zosyn    #Decreased urine output  #LUZ MARIA  --Output down to 100cc overnight into this morning, Cr bumped up to 1.6 from 0.9  --Consider starting on IVF rehydration and monitor I/O strictly    #Hyperkalemia  --Patient s/p lokelma, continue to monitor    #COPD  --Currently intubated and on vent support secondary to hypoxic and hypercapnic respiratory failure in the setting of cardiac arrest  --Albuterol as needed    #Hypothyroidism  --TSH elevated  --Connfirm home dose of levothyroxine if she is on it and restart   --Re-eval when she is more stable    #HTN  --Weaned off vasopressors, monitor BP and adjust therapy as appropriate    #H/O SVT  --Currently tachycardic to 120s, consider restarting diltiazem if BP will tolerate    #HLD  --Continue Lipitor    Care by critical care team at this time    Diet Diet NPO  ADULT TUBE FEEDING; Orogastric; Peptide Based; Continuous; 20; Yes; 10; Q 4 hours; 55; 30; Q 4 hours   DVT Prophylaxis [x] Lovenox, []  Heparin, [] SCDs, [] Ambulation,  [] Eliquis, [] Xarelto  [] Coumadin   Code Status Limited   Disposition From: Home  Expected Disposition: TBD  Estimated Date of Discharge: TBD  Patient requires continued admission due to cardiac arrest requiring critical care   Surrogate Decision Maker/ POA      Subjective:     Chief Complaint: Cardiac Arrest       Patient seen and examined at bedside, nursing at bedside. She is intubated and on the vent. Review of Systems:    Review of Systems   Reason unable to perform ROS: Intubated. Objective: Intake/Output Summary (Last 24 hours) at 12/1/2022 1452  Last data filed at 12/1/2022 0704  Gross per 24 hour   Intake 154.2 ml   Output 250 ml   Net -95.8 ml          Vitals:   Vitals:    12/01/22 1400   BP:    Pulse: (!) 127   Resp: 26   Temp:    SpO2: 100%       Physical Exam:   General: Intubated and unresponsive  Eyes: Unable to assess  HENT: ETT in place  Cardiovascular: Tachycardic. Respiratory: Rhonchorous breath sounds. Gastrointestinal: Soft, non tender  Genitourinary: no suprapubic tenderness  Musculoskeletal: No edema  Skin: warm, dry  Neuro: right foot responds to pain with slight twitch of toes. Psych: Unable to assess.      Medications:   Medications:    sodium zirconium cyclosilicate  5 g Oral TID    sodium chloride flush  5-40 mL IntraVENous 2 times per day    enoxaparin  40 mg SubCUTAneous Daily    chlorhexidine  15 mL Mouth/Throat BID    polyvinyl alcohol  1 drop Both Eyes Q4H    And    artificial tears   Both Eyes Q4H    pantoprazole  40 mg IntraVENous BID    piperacillin-tazobactam  3,375 mg IntraVENous Q8H    multivitamin  1 tablet Oral Daily    folic acid  1 mg Oral Daily    atorvastatin  80 mg Oral Nightly      Infusions:    sodium chloride      dextrose       PRN Meds: sodium chloride flush, 5-40 mL, PRN  sodium chloride, , PRN  ondansetron, 4 mg, Q8H PRN   Or  ondansetron, 4 mg, Q6H PRN  polyethylene glycol, 17 g, Daily PRN  acetaminophen, 650 mg, Q6H PRN   Or  acetaminophen, 650 mg, Q6H PRN  albuterol, 2.5 mg, Q4H PRN  glucose, 4 tablet, PRN  dextrose bolus, 125 mL, PRN   Or  dextrose bolus, 250 mL, PRN  glucagon (rDNA), 1 mg, PRN  dextrose, , Continuous PRN      Labs      Recent Results (from the past 24 hour(s))   Blood gas, arterial    Collection Time: 11/30/22  8:30 PM   Result Value Ref Range    pH, Bld 7.34 7.34 - 7.45    pCO2, Arterial 66.0 (H) 32 - 45 MMHG    pO2, Arterial 197 (H) 75 - 100 MMHG    Base Exc, Mixed 7.4 (H) 0 - 2.3    HCO3, Arterial 35.6 (H) 18 - 23 MMOL/L    CO2 Content 37.6 (H) 19 - 24 MMOL/L    O2 Sat 96.9 96 - 97 %    Carbon Monoxide, Blood 1.2 0 - 5 %    Methemoglobin, Arterial 1.1 <1.5 %    Comment AC26 340 60% 5peep    Critical Care Panel    Collection Time: 11/30/22  8:55 PM   Result Value Ref Range    Sodium 135 135 - 145 MMOL/L    Potassium 5.7 (HH) 3.5 - 5.1 MMOL/L    Chloride 92 (L) 99 - 110 mMol/L    CO2 33 (H) 21 - 32 MMOL/L    Anion Gap 10 4 - 16    BUN 19 6 - 23 MG/DL    Creatinine 1.4 (H) 0.6 - 1.1 MG/DL    Est, Glom Filt Rate 43 (L) >60 mL/min/1.73m2    Glucose 191 (H) 70 - 99 MG/DL    Calcium 8.5 8.3 - 10.6 MG/DL    Phosphorus 2.4 (L) 2.5 - 4.9 MG/DL    Magnesium 1.6 (L) 1.8 - 2.4 mg/dl   Lactic Acid    Collection Time: 11/30/22  8:55 PM   Result Value Ref Range    Lactate 1.5 0.4 - 2.0 mMOL/L   CBC with Auto Differential    Collection Time: 12/01/22  5:05 AM   Result Value Ref Range    WBC 16.6 (H) 4.0 - 10.5 K/CU MM    RBC 3.74 (L) 4.2 - 5.4 M/CU MM    Hemoglobin 10.0 (L) 12.5 - 16.0 GM/DL    Hematocrit 34.6 (L) 37 - 47 %    MCV 92.5 78 - 100 FL    MCH 26.7 (L) 27 - 31 PG    MCHC 28.9 (L) 32.0 - 36.0 %    RDW 15.5 (H) 11.7 - 14.9 %    Platelets 155 413 - 124 K/CU MM    MPV 10.8 7.5 - 11.1 FL    Metamyelocytes Relative 2 (H) 0.0 %    Bands Relative 12 (H) 5 - 11 %    Segs Relative 66.0 36 - 66 %    Lymphocytes % 16.0 (L) 24 - 44 %    Monocytes % 4.0 0 - 4 %    Metamyelocytes Absolute 0.33 K/CU MM    Bands Absolute 1.99 K/CU MM    Segs Absolute 10.9 K/CU MM    Lymphocytes Absolute 2.7 K/CU MM    Monocytes Absolute 0.7 K/CU MM    Differential Type MANUAL DIFFERENTIAL     Atypical Lymphocytes Absolute 1+    Critical Care Panel    Collection Time: 12/01/22  5:05 AM   Result Value Ref Range    Sodium 138 135 - 145 MMOL/L    Potassium 5.5 (HH) 3.5 - 5.1 MMOL/L    Chloride 94 (L) 99 - 110 mMol/L    CO2 33 (H) 21 - 32 MMOL/L    Anion Gap 11 4 - 16    BUN 25 (H) 6 - 23 MG/DL    Creatinine 1.6 (H) 0.6 - 1.1 MG/DL    Est, Glom Filt Rate 37 (L) >60 mL/min/1.73m2    Glucose 141 (H) 70 - 99 MG/DL    Calcium 8.4 8.3 - 10.6 MG/DL    Phosphorus 5.9 (H) 2.5 - 4.9 MG/DL    Magnesium 2.5 (H) 1.8 - 2.4 mg/dl        Imaging/Diagnostics Last 24 Hours   CT HEAD WO CONTRAST    Result Date: 11/30/2022  EXAMINATION: CT OF THE HEAD WITHOUT CONTRAST  11/30/2022 3:35 am TECHNIQUE: CT of the head was performed without the administration of intravenous contrast. Automated exposure control, iterative reconstruction, and/or weight based adjustment of the mA/kV was utilized to reduce the radiation dose to as low as reasonably achievable.  COMPARISON: December 25, 2021 HISTORY: ORDERING SYSTEM PROVIDED HISTORY: Cardiac arrest TECHNOLOGIST PROVIDED HISTORY: Reason for exam:->Cardiac arrest Has a \"code stroke\" or \"stroke alert\" been called? ->No Decision Support Exception - unselect if not a suspected or confirmed emergency medical condition->Emergency Medical Condition (MA) Reason for Exam: Cardiac arrest FINDINGS: BRAIN/VENTRICLES: Compared to the prior study, there is subtle loss of gray-white matter differentiation. Additionally, there is increased sulci effacement and the ventricles are smaller in size. 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 No midline shift. No downward herniation. ORBITS: The visualized portion of the orbits demonstrate no acute abnormality. SINUSES: Ethmoid and maxillary sinus disease is seen. There is a small left mastoid effusion. SOFT TISSUES/SKULL:  No acute abnormality of the visualized skull or soft tissues. CT findings are highly suggestive of early global anoxic injury as described above. Sinus disease and a nonspecific left mastoid effusion is noted. Findings were discussed with Dr. Maria Eugenia Domínguez on 11/30/2022 at 4:09 a.m.     XR CHEST PORTABLE    Result Date: 11/30/2022  Yasmine Aguilar MD     11/30/2022  6:00 AM Central Venous Catheter Insertion Procedure Note Procedure: Insertion of Central Venous Catheter Procedure Clinician: Yasmine Aguilar MD Procedure Assistant: None Indications: Vasoactive agents Size and location: 7Fr TLC, right subclavian vein Attempts: 1 Procedure Details: Emergent Under sterile conditions the skin above the Right subclavian veing was prepped with chlorhexidine and covered with a sterile drape. Local anesthesia was applied to the skin and subcutaneous tissues. Ultrasound was used to localize the vein and an 18-gauge needle was then inserted into the vein. A drop test was performed and was negative for any evidence of arterial flow. A guide wire was then passed easily through the catheter. There were no arrhythmias. Ultrasound was used to identify the guidewire once in the vein. The catheter was then withdrawn.  A 7Fr TLC was then inserted into the vessel over the guide wire. The guidewire was then removed with the tip intact, and witnessed by bedside nurse. The catheter was sutured into place. Findings: There were no changes to vital signs. Catheter was flushed with 20 mL NS. Patient tolerated the procedure well. Lung ultrasound: Pleural sliding - yes Lung point - no A lines - yes B lines - no Consolidation? Not assessed Pleural effusion? Not assessed Recommendations: CXR ordered to verify placement. CTA CHEST ABDOMEN PELVIS W CONTRAST    Result Date: 11/30/2022  EXAMINATION: CTA OF THE CHEST, ABDOMEN AND PELVIS WITH CONTRAST 11/30/2022 3:35 am: TECHNIQUE: CTA of the chest, abdomen and pelvis was performed after the administration of intravenous contrast.  Multiplanar reformatted images are provided for review. MIP images are provided for review. Automated exposure control, iterative reconstruction, and/or weight based adjustment of the mA/kV was utilized to reduce the radiation dose to as low as reasonably achievable. COMPARISON: November 6, 2022. December 29, 2021. HISTORY: ORDERING SYSTEM PROVIDED HISTORY: Cardiac arrest with reported PNA recently and dark colored emesis TECHNOLOGIST PROVIDED HISTORY: Reason for exam:->Cardiac arrest with reported PNA recently and dark colored emesis Decision Support Exception - unselect if not a suspected or confirmed emergency medical condition->Emergency Medical Condition (MA) Reason for Exam: Cardiac arrest with reported PNA recently and dark colored emesis FINDINGS: CTA CHEST: Thoracic aorta: No evidence of thoracic aortic aneurysm or dissection. No acute abnormality of the aorta. Mediastinum: The heart is normal in size without a pericardial effusion. Moderate to severe coronary artery atherosclerosis is present. The aorta, arch branches, and main pulmonary artery are normal in course and caliber. No pulmonary embolus is seen. Lungs/Pleura: The lungs demonstrate patchy airspace opacities superimposed on emphysema. No pleural effusions. Central airways are patent. An endotracheal tube is in place. Diffuse bronchial wall thickening. No significant bronchiectasis. No dominant or suspicious pulmonary nodules. Soft Tissues/Bones: No acute bone or soft tissue abnormality. CTA ABDOMEN: Abdominal aorta/Branches: The abdominal aorta is patent and normal in course. Moderate atherosclerosis is seen. The celiac trunk and its branches, SMA, bilateral renal arteries, and YOVANA are patent. The iliac vasculature is patent with moderate atherosclerosis. Organs: Status post cholecystectomy. No intra or extrahepatic biliary dilatation. The liver, spleen, pancreas, adrenal glands, and kidneys demonstrate no acute abnormality. The collecting systems are normal.  Right renal atrophy is present. GI/Bowel: Nasogastric tube tip terminates in mid stomach. The small and large bowel are normal in course and caliber without evidence of wall thickening or obstruction. Diverticulosis is noted without evidence of acute diverticulitis. Pelvic organs: Normal bladder. Uterus is unremarkable. No adnexal masses. Peritoneum/Retroperitoneum: Small amount of free fluid is identified in the pelvis and adjacent to the liver and spleen. No free air. No pathologic lymphadenopathy. Bones/Soft Tissues: No acute or aggressive osseous lesion. Mild bony demineralization. 1. No acute vascular injury including dissection. 2. No pulmonary embolus. 3. Airspace opacities in the lungs suggestive of aspiration or infection superimposed on chronic lung changes including emphysema. 4. No acute intra-abdominal abnormality. 5. Small volume ascites of unclear etiology. 6. Diverticulosis.        Electronically signed by Hoa Roberts MD on 12/1/2022 at 2:52 PM

## 2022-12-01 NOTE — PROGRESS NOTES
Neurology Service Progress Note  [address]   Patient Name: Matt De La Torre  : 1960        Subjective:   CC: S/P cardiac arrest, anoxic brain injury  Chart was reviewed in detail. Patient was seen and assessed. She remains unresponsive, intubated with no sedation running. Patient does not wake to voice or loud sound. Sister-in-law is at bedside, she was present during exam.  Had long conversation with her regarding the exam findings and continued lack of cortical brain function. Patient continues to have brainstem reflexes although dysfunction is present.       Past Medical History:   Diagnosis Date    Anemia     Anxiety 2017    follows with Dr Filipe Mcgrath     Back pain at L4-L5 level 2017    Dr Marisa Najera 1 disorder [facility])     per pt on 2021\"never told I have bipolar\"    COPD (chronic obstructive pulmonary disease) ([facility] [address]     follows with Dr Joseph Rubio    Emphysema of lung [facility])     FH: CAD (coronary artery disease) 2017    Father had in his forties, sister in her thirties     Fibromyalgia 2017    Full dentures     full upper plate and partial on the bottom    Gastric ulcer     \"had stomach ulder back fall \"    H/O Doppler ultrasound 2019    venous- no DVT or reflux    H/O echocardiogram 2015    EF50-55% Normal- see media    History of blood transfusion     Hyperlipidemia LDL goal <100     Hypertension     Dr Felix Alvarez    Irregular heartbeat     \"they said I have irreg heart beat before- back when they drained fluid around my heart \"    Obstructive sleep apnea     \"have bipap I use at home\"    On home oxygen therapy     \"on oxygen all the time at home and keep it on 2.5 liters\"    Osteoarthritis     Pericardial effusion     per old chart had pericardial effusion 10/2020    Spinal stenosis     hx per old chart    Thyroid disease     Wears glasses     \"suppose to wear glasses\"    :   Past Surgical History:   Procedure Laterality Date    BACK SURGERY  03/2017    \"surgery on low back L5-6- not sure if they put any metal in \"    CARDIAC CATHETERIZATION      10/2019    CARDIAC CATHETERIZATION      per old chart had cath done 11/2020    CARPAL TUNNEL RELEASE Right 1985    CHOLECYSTECTOMY, LAPAROSCOPIC N/A 10/25/2020    CHOLECYSTECTOMY LAPAROSCOPIC WITH IOC performed by Tonie Perez MD at [address] [facility] N/A 12/12/2019    COLONOSCOPY DIAGNOSTIC performed by Avis Lilly MD at [address] [facility], COLON, DIAGNOSTIC      per old chart had egd done 1/2018    TUBAL LIGATION  1987    UPPER GASTROINTESTINAL ENDOSCOPY N/A 1/7/2021    EGD BIOPSY performed by Avis Lilly MD at [facility] ENDOSCOPY     Medications:  Scheduled Meds:   sodium zirconium cyclosilicate  5 g Oral TID    sodium chloride flush  5-40 mL IntraVENous 2 times per day    enoxaparin  40 mg SubCUTAneous Daily    chlorhexidine  15 mL Mouth/Throat BID    polyvinyl alcohol  1 drop Both Eyes Q4H    And    artificial tears   Both Eyes Q4H    pantoprazole  40 mg IntraVENous BID    piperacillin-tazobactam  3,375 mg IntraVENous Q8H    multivitamin  1 tablet Oral Daily    folic acid  1 mg Oral Daily    atorvastatin  80 mg Oral Nightly     Continuous Infusions:   norepinephrine Stopped (11/30/22 2201)    sodium chloride      vasopressin (Septic Shock) infusion Stopped (12/01/22 0115)    dextrose      propofol Stopped (11/30/22 1000)     PRN Meds:.sodium chloride flush, sodium chloride, ondansetron **OR** ondansetron, polyethylene glycol, acetaminophen **OR** acetaminophen, albuterol, glucose, dextrose bolus **OR** dextrose bolus, glucagon (rDNA), dextrose    Allergies   Allergen Reactions    Lisinopril Swelling and Rash     angioedema     Social History     Socioeconomic History    Marital status:      Spouse name: Not on file    Number of children: Not on file    Years of education: Not on file    Highest education level: Not on file   Occupational History    Not on file Tobacco Use    Smoking status: Former     Packs/day: 1.50     Years: 20.00     Pack years: 30.00     Types: Cigarettes     Quit date: 2008     Years since quittin.9    Smokeless tobacco: Never   Vaping Use    Vaping Use: Never used   Substance and Sexual Activity    Alcohol use: Not Currently     Alcohol/week: 2.0 standard drinks     Types: 2 Shots of liquor per week     Comment: per pt on 2021\"quit drinking back Oct 2020\"use to drink wine coolers - 3 times per week \"/caffeine 2-3 coffees aday 1 pop b    Drug use: No    Sexual activity: Yes     Partners: Male   Other Topics Concern    Not on file   Social History Narrative    Not on file     Social Determinants of Health     Financial Resource Strain: Not on file   Food Insecurity: Not on file   Transportation Needs: Not on file   Physical Activity: Not on file   Stress: Not on file   Social Connections: Not on file   Intimate Partner Violence: Not on file   Housing Stability: Not on file      Family History   Problem Relation Age of Onset    Asthma Mother         COPD    Heart Disease Father     Breast Cancer Neg Hx          ROS (10 systems)  Intubated and unresponsive,  unable to answer    Physical Exam:      Wt Readings from Last 3 Encounters:   22 200 lb 2.8 oz (90.8 kg)   22 208 lb 6.4 oz (94.5 kg)   22 205 lb (93 kg)     Temp Readings from Last 3 Encounters:   22 99.5 °F (37.5 °C) (Bladder)   22 98 °F (36.7 °C) (Oral)   22 98.2 °F (36.8 °C)     BP Readings from Last 3 Encounters:   22 131/74   22 (!) 201/94   22 120/60     Pulse Readings from Last 3 Encounters:   22 (!) 124   22 98   22 81        Gen:  Intubated, unresponsive  HEENT: NC/AT, EOMI, PERRL 1 mm bilaterally, mmm,  neck supple, no meningeal signs  Heart: ST  Lungs: Ventilator support  Ext: no edema, no calf tenderness b/l  Psych: non interactive  Skin: no rashes or lesions    NEUROLOGIC EXAM:    Mental Status: Intubated, sedated on propofol held for exam. Patient is unresponsive, unable to follow commands. Cranial Nerve Exam:   CN II-XII: PERRL 1 mm bilaterally, + occulocephalic, reflex + corneal reflex, VFF, no nystagmus, no gaze paresis, ET tube limites evaluation of palate and tongue, no cough with deep suction. Motor Exam:       Strength 0/5 UE's/LE's b/l  Tone and bulk normal   Not able to told to antigravity    Deep Tendon Reflexes: 1/4 biceps, triceps, brachioradialis, patellar, and achilles b/l; flexor plantar responses b/l    Sensation: Triple flexion to bilateral lower extremities  More pronounced extensor posturing in the bilateral upper extremities    Coordination/Cerebellum:       Tremors--Myoclonus noted most around the mouth, no significant movements to the upper or lower extremities. Rapidly alternating movements: TWAN               Heel-to-Shin: TWAN      Finger-to-Nose: TWAN    Gait and stance:      Gait: deferred      LABS:     Recent Labs     11/30/22  0243 11/30/22  0409 11/30/22  0445 11/30/22  0511 11/30/22  0620 11/30/22  2055 12/01/22  0505   WBC 10.5  --   --   --  8.4  --  16.6*      < > 136 139  --  135 138   K 6.1*   < > 5.2* 5.1*  --  5.7* 5.5*   CL 90*  --  93*  --   --  92* 94*   CO2 35*   < > 31 40*  --  33* 33*   BUN 5*  --  10  --   --  19 25*   CREATININE 1.0   < > 0.9 1.0  --  1.4* 1.6*   GLUCOSE 234*  --  152*  --   --  191* 141*   INR 0.82  --  0.79  --   --   --   --     < > = values in this interval not displayed. IMAGING:    CT head w/o contrast:  Impression   CT findings are highly suggestive of early global anoxic injury as described   above. Sinus disease and a nonspecific left mastoid effusion is noted. cEEG:  EEG Interpretation: This EEG is abnormal due to the presence of:   Burst suppression pattern. This is a non-specific finding but is consistent with a generalized disturbance of cerebral function.  It may be seen in a variety of conditions, such as toxic, metabolic, post-anoxic, multi-focal or diffuse structural abnormalities. Clinical events of facial jerking reported. No clear epileptiform discharges are seen during the EEG, but there is presence of EMG twitch artifact at the beginning of the recording. This is consistent with non-epileptic events. No electrographic seizures or non-convulsive status epilepticus was seen over the entire monitoring period. Of note, this EEG is limited due to the number of electrodes and lack of parasaggital brain region coverage. If clinical suspicions persist for seizure as an etiology for the patient's symptoms, or if suspicion remains high for an underlying seizure disorder, additional EEG testing utilizing the standard 10-20 system of electrode placement for full cerebral coverage should be considered. Routine EEG:  EEG Interpretation:   The EEG was abnormal due to the presence of:   Burst suppression pattern. This is a non-specific finding but is consistent with a generalized disturbance of cerebral function. It may be seen in a variety of conditions, such as toxic, metabolic, post-anoxic, multi-focal or diffuse structural abnormalities. No electrographic seizures or non-convulsive status epilepticus was seen over the entire monitoring period. All imaging was personally reviewed      ASSESSMENT/PLAN:   63year old female presents after extended cardiac arrest. Patient is non responsive, intubated, sedation off since this am. Pupils are 1 mm and reactive bilaterally. Corneal and oculocephalic reflexes are present. She does not appear to be breathing over the vent. There is no cough of gag with deep suction. Upper extremities with more pronounced extensor posturing with painful stimulus. Triple flexion to the bilateral lower extremities with painful stimulus. No evidence of cortical function.   No significant neurologic improvement on today's exam.  Anoxic brain injury secondary to prolonged cardiac arrest  Neruo imaging as above notes concern for global anoxic injury  Will consider repeat CT head if no improvement  EEG as above - no evidence for seizure or epileptiform discharge  Recommend holding sedation for neuro prognostication  Can consider depakote or Keppra if myoclonus continue to worsen  Will continue to follow pending neurologic improvement, neuro prognosis very guarded    Discussed with sister-in-law at bedside during exam.  She is realistic about meaningful neurologic recovery. She states that patient's  is also realistic however patient's son is requesting 7 days to see if the patient recovers in that time. It does seem as if there is some disagreement within the family about next steps to take    > 35 minutes of time spent included chart review, obtaining history, patient examination, developing plan of care, and documentation. Patient was discussed with attending neurologist Dr. Aneta Renteria. Thank you for allowing us to participate in the care of your patient. If there are any questions regarding evaluation please feel free to contact us.          Roseanne Austin, APRN - [address] [facility] 12/1/2022 7:48 AM  Neurology

## 2022-12-01 NOTE — PROGRESS NOTES
Endotracheal tube pushed in to [email or web address] teeth from [email or web address] teeth, per Margaret Castillo CNP order.

## 2022-12-01 NOTE — PROGRESS NOTES
Critical Care Progress Note 12/1/2022        Alcira tSubbs  1960  [id number]      Assessment/Plan:    Acute respiratory failure with hypoxia hypercapnia  Severe COPD O2 reliant in home setting  RSV pneumonia  Status post prolonged pulseless electrical activity cardiac arrest with late return of circulation  Anoxic brain injury  Acute kidney injury post arrest    Continue ventilatory support  Patient currently free of any sedation, continue the same  Maintain temperature below 37 °C  DVT, ulcer prophylaxis    Culture negative data, discontinue empiric antibiotic therapy (Zosyn). Per discussion with Dr. Phoebe Loya who reviewed the situation over the evening hours with the family, they wish to continue care for the next 5 to 7 days perhaps beyond that timeframe discontinuing aggressive medical measures assuming no improvement of the patient's clinical status. Complex medical decisions required for today's evaluation and management, reviewed during critical care rounds. The patient currently suffers from critical illness including respiratory failure anoxic brain injury. Without current sustained support modalities including mechanical ventilatory support, progressive decline in clinical status, worsening of critical organ function and untoward mortality. 31 critical care time devoted to today's evaluation and management independent of any time required for the performance of procedures. Subjective:    Low-level ventilatory support. Successfully titrated off pressor administration overnight.   Acceptable hemodynamics, rhythm, urine output      Review of Systems     Unable to obtain in light of patient's current clinical circumstances, orally intubated plus severe neurologic impairment    Physical Exam:          /74   Pulse (!) 124   Temp 99.5 °F (37.5 °C) (Bladder)   Resp 26   Ht 5' 0.98\" (1.549 m)   Wt 200 lb 2.8 oz (90.8 kg)   LMP 01/05/2010   SpO2 100%   BMI 37.84 kg/m² General: Ill-appearing female, appears older than stated age, orally intubated vitals reviewed    Eyes: Motor not intact, pupils non-reactive  ENT: neck supple  Cardiovascular: Regular rate, gallop. Respiratory: Clear to auscultation  Gastrointestinal: Soft, non tender  Genitourinary: no suprapubic tenderness  Musculoskeletal: No edema. Skin: warm, dry  Neuro: Lasko coma scale 3  Psych: Mood able to evaluate    Current Medications:   sodium zirconium cyclosilicate  5 g Oral TID    sodium chloride flush  5-40 mL IntraVENous 2 times per day    enoxaparin  40 mg SubCUTAneous Daily    chlorhexidine  15 mL Mouth/Throat BID    polyvinyl alcohol  1 drop Both Eyes Q4H    And    artificial tears   Both Eyes Q4H    pantoprazole  40 mg IntraVENous BID    piperacillin-tazobactam  3,375 mg IntraVENous Q8H    multivitamin  1 tablet Oral Daily    folic acid  1 mg Oral Daily    atorvastatin  80 mg Oral Nightly       Labs, Imaging and Studies reviewed:    CT HEAD WO CONTRAST    Result Date: 12/1/2022  EXAMINATION: CT OF THE HEAD WITHOUT CONTRAST  11/30/2022 3:35 am TECHNIQUE: CT of the head was performed without the administration of intravenous contrast. Automated exposure control, iterative reconstruction, and/or weight based adjustment of the mA/kV was utilized to reduce the radiation dose to as low as reasonably achievable. COMPARISON: December 25, 2021 HISTORY: ORDERING SYSTEM PROVIDED HISTORY: Cardiac arrest TECHNOLOGIST PROVIDED HISTORY: Reason for exam:->Cardiac arrest Has a \"code stroke\" or \"stroke alert\" been called? ->No Decision Support Exception - unselect if not a suspected or confirmed emergency medical condition->Emergency Medical Condition (MA) Reason for Exam: Cardiac arrest FINDINGS: BRAIN/VENTRICLES: Compared to the prior study, there is subtle loss of gray-white matter differentiation. Additionally, there is increased sulci effacement and the ventricles are smaller in size.   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 No midline shift. No downward herniation. ORBITS: The visualized portion of the orbits demonstrate no acute abnormality. SINUSES: Ethmoid and maxillary sinus disease is seen. There is a small left mastoid effusion. SOFT TISSUES/SKULL:  No acute abnormality of the visualized skull or soft tissues. CT findings are highly suggestive of early global anoxic injury as described above. Sinus disease and a nonspecific left mastoid effusion is noted. Findings were discussed with Dr. Alexander Castro on 11/30/2022 at 4:09 a.m.     XR CHEST PORTABLE    Result Date: 12/1/2022  EXAMINATION: ONE XRAY VIEW OF THE CHEST 11/30/2022 5:32 am COMPARISON: November 7, 2022 HISTORY: ORDERING SYSTEM PROVIDED HISTORY: CVC TECHNOLOGIST PROVIDED HISTORY: Reason for exam:->CVC Reason for Exam: CVC FINDINGS: Right subclavian central venous catheter tip terminates in the superior vena cava. Endotracheal tube tip terminates 6.4 cm above the denys. Nasogastric tube tip terminates off the inferior margin of the radiograph. Stable cardiomediastinal silhouette. The lungs demonstrate patchy airspace opacities better appreciated on CT. Emphysematous changes are also seen. No new osseous abnormality. 1. Right subclavian central venous catheter tip terminates in the superior vena cava. 2. Endotracheal tube tip terminates 6.4 cm above the denys. 3. Patchy airspace opacities suggestive of infection or aspiration, better appreciated on prior CT. 4. COPD. CTA CHEST ABDOMEN PELVIS W CONTRAST    Result Date: 12/1/2022  EXAMINATION: CTA OF THE CHEST, ABDOMEN AND PELVIS WITH CONTRAST 11/30/2022 3:35 am: TECHNIQUE: CTA of the chest, abdomen and pelvis was performed after the administration of intravenous contrast.  Multiplanar reformatted images are provided for review. MIP images are provided for review.  Automated exposure control, iterative reconstruction, and/or weight based adjustment of the mA/kV was utilized to reduce the radiation dose to as low as reasonably achievable. COMPARISON: November 6, 2022. December 29, 2021. HISTORY: ORDERING SYSTEM PROVIDED HISTORY: Cardiac arrest with reported PNA recently and dark colored emesis TECHNOLOGIST PROVIDED HISTORY: Reason for exam:->Cardiac arrest with reported PNA recently and dark colored emesis Decision Support Exception - unselect if not a suspected or confirmed emergency medical condition->Emergency Medical Condition (MA) Reason for Exam: Cardiac arrest with reported PNA recently and dark colored emesis FINDINGS: CTA CHEST: Thoracic aorta: No evidence of thoracic aortic aneurysm or dissection. No acute abnormality of the aorta. Mediastinum: The heart is normal in size without a pericardial effusion. Moderate to severe coronary artery atherosclerosis is present. The aorta, arch branches, and main pulmonary artery are normal in course and caliber. No pulmonary embolus is seen. Lungs/Pleura: The lungs demonstrate patchy airspace opacities superimposed on emphysema. No pleural effusions. Central airways are patent. An endotracheal tube is in place. Diffuse bronchial wall thickening. No significant bronchiectasis. No dominant or suspicious pulmonary nodules. Soft Tissues/Bones: No acute bone or soft tissue abnormality. CTA ABDOMEN: Abdominal aorta/Branches: The abdominal aorta is patent and normal in course. Moderate atherosclerosis is seen. The celiac trunk and its branches, SMA, bilateral renal arteries, and YOVANA are patent. The iliac vasculature is patent with moderate atherosclerosis. Organs: Status post cholecystectomy. No intra or extrahepatic biliary dilatation. The liver, spleen, pancreas, adrenal glands, and kidneys demonstrate no acute abnormality. The collecting systems are normal.  Right renal atrophy is present. GI/Bowel: Nasogastric tube tip terminates in mid stomach.   The small and large bowel are normal in course and caliber without evidence of wall thickening or obstruction. Diverticulosis is noted without evidence of acute diverticulitis. Pelvic organs: Normal bladder. Uterus is unremarkable. No adnexal masses. Peritoneum/Retroperitoneum: Small amount of free fluid is identified in the pelvis and adjacent to the liver and spleen. No free air. No pathologic lymphadenopathy. Bones/Soft Tissues: No acute or aggressive osseous lesion. Mild bony demineralization. 1. No acute vascular injury including dissection. 2. No pulmonary embolus. 3. Airspace opacities in the lungs suggestive of aspiration or infection superimposed on chronic lung changes including emphysema. 4. No acute intra-abdominal abnormality. 5. Small volume ascites of unclear etiology. 6. Diverticulosis.        Recent Results (from the past 24 hour(s))   Blood gas, arterial    Collection Time: 11/30/22  8:30 PM   Result Value Ref Range    pH, Bld 7.34 7.34 - 7.45    pCO2, Arterial 66.0 (H) 32 - 45 MMHG    pO2, Arterial 197 (H) 75 - 100 MMHG    Base Exc, Mixed 7.4 (H) 0 - 2.3    HCO3, Arterial 35.6 (H) 18 - 23 MMOL/L    CO2 Content 37.6 (H) 19 - 24 MMOL/L    O2 Sat 96.9 96 - 97 %    Carbon Monoxide, Blood 1.2 0 - 5 %    Methemoglobin, Arterial 1.1 <1.5 %    Comment AC26 340 60% 5peep    Critical Care Panel    Collection Time: 11/30/22  8:55 PM   Result Value Ref Range    Sodium 135 135 - 145 MMOL/L    Potassium 5.7 (HH) 3.5 - 5.1 MMOL/L    Chloride 92 (L) 99 - 110 mMol/L    CO2 33 (H) 21 - 32 MMOL/L    Anion Gap 10 4 - 16    BUN 19 6 - 23 MG/DL    Creatinine 1.4 (H) 0.6 - 1.1 MG/DL    Est, Glom Filt Rate 43 (L) >60 mL/min/1.73m2    Glucose 191 (H) 70 - 99 MG/DL    Calcium 8.5 8.3 - 10.6 MG/DL    Phosphorus 2.4 (L) 2.5 - 4.9 MG/DL    Magnesium 1.6 (L) 1.8 - 2.4 mg/dl   Lactic Acid    Collection Time: 11/30/22  8:55 PM   Result Value Ref Range    Lactate 1.5 0.4 - 2.0 mMOL/L   CBC with Auto Differential    Collection Time: 12/01/22  5:05 AM   Result Value Ref Range    WBC 16.6 (H) 4.0 - 10.5 K/CU MM    RBC 3.74 (L) 4.2 - 5.4 M/CU MM    Hemoglobin 10.0 (L) 12.5 - 16.0 GM/DL    Hematocrit 34.6 (L) 37 - 47 %    MCV 92.5 78 - 100 FL    MCH 26.7 (L) 27 - 31 PG    MCHC 28.9 (L) 32.0 - 36.0 %    RDW 15.5 (H) 11.7 - 14.9 %    Platelets 336 382 - 753 K/CU MM    MPV 10.8 7.5 - 11.1 FL    Metamyelocytes Relative 2 (H) 0.0 %    Bands Relative 12 (H) 5 - 11 %    Segs Relative 66.0 36 - 66 %    Lymphocytes % 16.0 (L) 24 - 44 %    Monocytes % 4.0 0 - 4 %    Metamyelocytes Absolute 0.33 K/CU MM    Bands Absolute 1.99 K/CU MM    Segs Absolute 10.9 K/CU MM    Lymphocytes Absolute 2.7 K/CU MM    Monocytes Absolute 0.7 K/CU MM    Differential Type MANUAL DIFFERENTIAL     Atypical Lymphocytes Absolute 1+    Critical Care Panel    Collection Time: 12/01/22  5:05 AM   Result Value Ref Range    Sodium 138 135 - 145 MMOL/L    Potassium 5.5 (HH) 3.5 - 5.1 MMOL/L    Chloride 94 (L) 99 - 110 mMol/L    CO2 33 (H) 21 - 32 MMOL/L    Anion Gap 11 4 - 16    BUN 25 (H) 6 - 23 MG/DL    Creatinine 1.6 (H) 0.6 - 1.1 MG/DL    Est, Glom Filt Rate 37 (L) >60 mL/min/1.73m2    Glucose 141 (H) 70 - 99 MG/DL    Calcium 8.4 8.3 - 10.6 MG/DL    Phosphorus 5.9 (H) 2.5 - 4.9 MG/DL    Magnesium 2.5 (H) 1.8 - 2.4 mg/dl

## 2022-12-02 NOTE — PROGRESS NOTES
Physician Progress Note      PATIENTRamsey Nava  CSN #:                  [id number]  :                       1960  ADMIT DATE:       2022 2:34 AM  DISCH DATE:  RESPONDING  PROVIDER #:        Dylan Lainez DO          QUERY TEXT:    Patient admitted following cardiac arrest.  If possible, please document in   progress notes and discharge summary the cause of cardiac arrest:    The medical record reflects the following:  Risk Factors: COPD, RSV PNA, UTI  Clinical Indicators: H&P out of hospital cardiac arrest and Chart review show   recent admission and discharge 22 for COPD exacerbation for CAP   requiring BiPAP/ she was started on IV steroid, Antibiotics\", WBC 8.4 - 16.6,   Bands 12, procal 0.257, temp 95.9 from bladder (1st temp recorded at 0340 on   ) cooling measures ordered on  at 0415. Treatment: Vent, IV Solu-Medrol, IV Zosyn, cooling measures. Thank you,  WILDA Lam, RN, CDS  [phone]  Options provided:  -- Cardiac Arrest due to Acute Respiratory Failure  -- Cardiac Arrest due to sepsis  -- Cardiac Arrest due to, please specify. -- Other - I will add my own diagnosis  -- Disagree - Not applicable / Not valid  -- Disagree - Clinically unable to determine / Unknown  -- Refer to Clinical Documentation Reviewer    PROVIDER RESPONSE TEXT:    This patient had cardiac arrest due to acute respiratory failure.     Query created by: Mick Brito on 2022 2:55 PM      Electronically signed by:  Dane Carrion DO 2022 4:13 PM

## 2022-12-02 NOTE — PROGRESS NOTES
approached and states they collectively decided to withdraw care tomorrow once they're able to get the rest of nearby family in to see pt. He states he will call with a time they can meet at bedside to compassionately wean pt from vent. Number for unit and charge nurse provided.  also states he would like pt to be transported to [facility] home upon death.

## 2022-12-02 NOTE — PROGRESS NOTES
V2.0  [facility]      Name:  Antonio Estrada /Age/Sex: 1960  (63 y.o. female)   MRN & CSN:  [id number] & [id number] Encounter Date/Time: 2022 4:17 PM EST    Location:  -A PCP: Abbey Brunner MD       Hospital Day: 3    Assessment and Plan:   Antonio Estrada is a 63 y.o. female with pmh of COPD on 3L at baseline, OHS, HTN, HLD, CAD, and CHF who presented with Cardiac arrest [facility]) via EMS after being found unresponsive. Plan:  #Cardiac arrest  #Acute hypoxic and hypercabic failure  #Hypoxic encephalopathy  --Currently intubated, on vent with adjustments to optimize acid-base status. She remains unresponsive except to painful stimuli  --Zosyn to cover aspiration pneumonia, albuterol as needed, and frequent arterial blood gases   --She remains on Arctic sun with fever prevention, repeat echo pending. Troponemia status post arrest  --CT of the head  highly suggestive for early global anoxic injury  --Cerebel suspicious for seizures, EEG performed showing burst suppression pattern but no electrographic seizures or nonconvulsive status epilepticus seen over the entire monitoring period. --CTA chest abdomen and pelvis with contrast showing no acute vascular injury including dissection, no pulmonary embolus, airspace opacities in the lung suggestive of aspiration or infection superimposed on chronic lung changes including emphysema. No acute intra-abdominal abnormality, diverticulosis and small volume ascites    #? GIB  --NG tube in place, monitor output. Protonix twice daily at this time  --H/H drop to 8, however all cell lines dropped, favoring possible dilution, trend CBC, transfuse for hemoglobin less than 7    #E. Coli UTI  --Currently on zosyn    #Decreased urine output  #LUZ MARIA  --Output remains minimal, Cr 2.3 this AM  --Consider starting on IVF rehydration and monitor I/O strictly    #Hyperkalemia  --Patient s/p lokelma, K down to 4.7    #COPD  --Currently intubated and on vent support secondary to hypoxic and hypercapnic respiratory failure in the setting of cardiac arrest  --Albuterol as needed    #Hypothyroidism  --TSH elevated  --Connfirm home dose of levothyroxine if she is on it and restart   --Re-eval when she is more stable    #HTN  --Weaned off vasopressors, monitor BP and adjust therapy as appropriate    #H/O SVT  --remains tachycardic to 100s, consider restarting diltiazem if BP will tolerate    #HLD  --Continue Lipitor    Care by critical care team at this time    Diet Diet NPO  ADULT TUBE FEEDING; Orogastric; Peptide Based; Continuous; 20; Yes; 10; Q 4 hours; 55; 30; Q 4 hours   DVT Prophylaxis [x] Lovenox, []  Heparin, [] SCDs, [] Ambulation,  [] Eliquis, [] Xarelto  [] Coumadin   Code Status Limited   Disposition From: Home  Expected Disposition: TBD  Estimated Date of Discharge: TBD  Patient requires continued admission due to cardiac arrest requiring critical care   Surrogate Decision Maker/ POA      Subjective:     Chief Complaint: Cardiac Arrest       Patient seen and examined at bedside, nursing at bedside. She is intubated and on the vent. Review of Systems:    Review of Systems   Reason unable to perform ROS: Intubated. Objective: Intake/Output Summary (Last 24 hours) at 12/2/2022 1546  Last data filed at 12/2/2022 1200  Gross per 24 hour   Intake 231.96 ml   Output 655 ml   Net -423.04 ml          Vitals:   Vitals:    12/02/22 1527   BP:    Pulse: (!) 108   Resp: 26   Temp:    SpO2: 100%       Physical Exam:   General: Intubated and unresponsive  Eyes: Unable to assess  HENT: ETT in place  Cardiovascular: Tachycardic. Respiratory: Rhonchorous breath sounds. Gastrointestinal: Soft, non tender  Genitourinary: no suprapubic tenderness  Musculoskeletal: No edema  Skin: warm, dry  Neuro: right foot responds to pain with slight twitch of toes. Psych: Unable to assess.      Medications:   Medications:    [START ON 12/3/2022] lansoprazole  30 mg Per NG tube Daily    [START ON 12/3/2022] heparin (porcine)  5,000 Units SubCUTAneous 3 times per day    sodium chloride flush  5-40 mL IntraVENous 2 times per day    chlorhexidine  15 mL Mouth/Throat BID    polyvinyl alcohol  1 drop Both Eyes Q4H    And    artificial tears   Both Eyes Q4H    pantoprazole  40 mg IntraVENous BID    piperacillin-tazobactam  3,375 mg IntraVENous Q8H    multivitamin  1 tablet Oral Daily    folic acid  1 mg Oral Daily    atorvastatin  80 mg Oral Nightly      Infusions:    sodium chloride      dextrose       PRN Meds: sodium chloride flush, 5-40 mL, PRN  sodium chloride, , PRN  ondansetron, 4 mg, Q8H PRN   Or  ondansetron, 4 mg, Q6H PRN  polyethylene glycol, 17 g, Daily PRN  acetaminophen, 650 mg, Q6H PRN   Or  acetaminophen, 650 mg, Q6H PRN  albuterol, 2.5 mg, Q4H PRN  glucose, 4 tablet, PRN  dextrose bolus, 125 mL, PRN   Or  dextrose bolus, 250 mL, PRN  glucagon (rDNA), 1 mg, PRN  dextrose, , Continuous PRN      Labs      Recent Results (from the past 24 hour(s))   CBC with Auto Differential    Collection Time: 12/02/22  6:45 AM   Result Value Ref Range    WBC 10.7 (H) 4.0 - 10.5 K/CU MM    RBC 3.32 (L) 4.2 - 5.4 M/CU MM    Hemoglobin 8.8 (L) 12.5 - 16.0 GM/DL    Hematocrit 29.7 (L) 37 - 47 %    MCV 89.5 78 - 100 FL    MCH 26.5 (L) 27 - 31 PG    MCHC 29.6 (L) 32.0 - 36.0 %    RDW 15.9 (H) 11.7 - 14.9 %    Platelets 862 421 - 362 K/CU MM    MPV 10.3 7.5 - 11.1 FL    Differential Type AUTOMATED DIFFERENTIAL     Segs Relative 88.0 (H) 36 - 66 %    Lymphocytes % 4.8 (L) 24 - 44 %    Monocytes % 6.8 (H) 0 - 4 %    Eosinophils % 0.0 0 - 3 %    Basophils % 0.0 0 - 1 %    Segs Absolute 9.4 K/CU MM    Lymphocytes Absolute 0.5 K/CU MM    Monocytes Absolute 0.7 K/CU MM    Eosinophils Absolute 0.0 K/CU MM    Basophils Absolute 0.0 K/CU MM    Nucleated RBC % 0.0 %    Total Nucleated RBC 0.0 K/CU MM    Total Immature Neutrophil 0.04 K/CU MM    Immature Neutrophil % 0.4 0 - 0.43 % Critical Care Panel    Collection Time: 12/02/22  6:45 AM   Result Value Ref Range    Sodium 139 135 - 145 MMOL/L    Potassium 4.7 3.5 - 5.1 MMOL/L    Chloride 93 (L) 99 - 110 mMol/L    CO2 33 (H) 21 - 32 MMOL/L    Anion Gap 13 4 - 16    BUN 40 (H) 6 - 23 MG/DL    Creatinine 2.3 (H) 0.6 - 1.1 MG/DL    Est, Glom Filt Rate 24 (L) >60 mL/min/1.73m2    Glucose 168 (H) 70 - 99 MG/DL    Calcium 8.4 8.3 - 10.6 MG/DL    Phosphorus 5.1 (H) 2.5 - 4.9 MG/DL    Magnesium 2.6 (H) 1.8 - 2.4 mg/dl        Imaging/Diagnostics Last 24 Hours   CT HEAD WO CONTRAST    Result Date: 11/30/2022  EXAMINATION: CT OF THE HEAD WITHOUT CONTRAST  11/30/2022 3:35 am TECHNIQUE: CT of the head was performed without the administration of intravenous contrast. Automated exposure control, iterative reconstruction, and/or weight based adjustment of the mA/kV was utilized to reduce the radiation dose to as low as reasonably achievable. COMPARISON: December 25, 2021 HISTORY: ORDERING SYSTEM PROVIDED HISTORY: Cardiac arrest TECHNOLOGIST PROVIDED HISTORY: Reason for exam:->Cardiac arrest Has a \"code stroke\" or \"stroke alert\" been called? ->No Decision Support Exception - unselect if not a suspected or confirmed emergency medical condition->Emergency Medical Condition (MA) Reason for Exam: Cardiac arrest FINDINGS: BRAIN/VENTRICLES: Compared to the prior study, there is subtle loss of gray-white matter differentiation. Additionally, there is increased sulci effacement and the ventricles are smaller in size. 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 No midline shift. No downward herniation. ORBITS: The visualized portion of the orbits demonstrate no acute abnormality. SINUSES: Ethmoid and maxillary sinus disease is seen. There is a small left mastoid effusion. SOFT TISSUES/SKULL:  No acute abnormality of the visualized skull or soft tissues.      CT findings are highly suggestive of early global anoxic injury as described above. Sinus disease and a nonspecific left mastoid effusion is noted. Findings were discussed with Dr. Tosin Neal on 11/30/2022 at 4:09 a.m.     XR CHEST PORTABLE    Result Date: 11/30/2022  Bettina Jack MD     11/30/2022  6:00 AM Central Venous Catheter Insertion Procedure Note Procedure: Insertion of Central Venous Catheter Procedure Clinician: Bettina Jack MD Procedure Assistant: None Indications: Vasoactive agents Size and location: 7Fr TLC, right subclavian vein Attempts: 1 Procedure Details: Emergent Under sterile conditions the skin above the Right subclavian veing was prepped with chlorhexidine and covered with a sterile drape. Local anesthesia was applied to the skin and subcutaneous tissues. Ultrasound was used to localize the vein and an 18-gauge needle was then inserted into the vein. A drop test was performed and was negative for any evidence of arterial flow. A guide wire was then passed easily through the catheter. There were no arrhythmias. Ultrasound was used to identify the guidewire once in the vein. The catheter was then withdrawn. A 7Fr TLC was then inserted into the vessel over the guide wire. The guidewire was then removed with the tip intact, and witnessed by bedside nurse. The catheter was sutured into place. Findings: There were no changes to vital signs. Catheter was flushed with 20 mL NS. Patient tolerated the procedure well. Lung ultrasound: Pleural sliding - yes Lung point - no A lines - yes B lines - no Consolidation? Not assessed Pleural effusion? Not assessed Recommendations: CXR ordered to verify placement. CTA CHEST ABDOMEN PELVIS W CONTRAST    Result Date: 11/30/2022  EXAMINATION: CTA OF THE CHEST, ABDOMEN AND PELVIS WITH CONTRAST 11/30/2022 3:35 am: TECHNIQUE: CTA of the chest, abdomen and pelvis was performed after the administration of intravenous contrast.  Multiplanar reformatted images are provided for review. MIP images are provided for review.  Automated exposure control, iterative reconstruction, and/or weight based adjustment of the mA/kV was utilized to reduce the radiation dose to as low as reasonably achievable. COMPARISON: November 6, 2022. December 29, 2021. HISTORY: ORDERING SYSTEM PROVIDED HISTORY: Cardiac arrest with reported PNA recently and dark colored emesis TECHNOLOGIST PROVIDED HISTORY: Reason for exam:->Cardiac arrest with reported PNA recently and dark colored emesis Decision Support Exception - unselect if not a suspected or confirmed emergency medical condition->Emergency Medical Condition (MA) Reason for Exam: Cardiac arrest with reported PNA recently and dark colored emesis FINDINGS: CTA CHEST: Thoracic aorta: No evidence of thoracic aortic aneurysm or dissection. No acute abnormality of the aorta. Mediastinum: The heart is normal in size without a pericardial effusion. Moderate to severe coronary artery atherosclerosis is present. The aorta, arch branches, and main pulmonary artery are normal in course and caliber. No pulmonary embolus is seen. Lungs/Pleura: The lungs demonstrate patchy airspace opacities superimposed on emphysema. No pleural effusions. Central airways are patent. An endotracheal tube is in place. Diffuse bronchial wall thickening. No significant bronchiectasis. No dominant or suspicious pulmonary nodules. Soft Tissues/Bones: No acute bone or soft tissue abnormality. CTA ABDOMEN: Abdominal aorta/Branches: The abdominal aorta is patent and normal in course. Moderate atherosclerosis is seen. The celiac trunk and its branches, SMA, bilateral renal arteries, and YOVANA are patent. The iliac vasculature is patent with moderate atherosclerosis. Organs: Status post cholecystectomy. No intra or extrahepatic biliary dilatation. The liver, spleen, pancreas, adrenal glands, and kidneys demonstrate no acute abnormality. The collecting systems are normal.  Right renal atrophy is present.  GI/Bowel: Nasogastric tube tip terminates in mid stomach. The small and large bowel are normal in course and caliber without evidence of wall thickening or obstruction. Diverticulosis is noted without evidence of acute diverticulitis. Pelvic organs: Normal bladder. Uterus is unremarkable. No adnexal masses. Peritoneum/Retroperitoneum: Small amount of free fluid is identified in the pelvis and adjacent to the liver and spleen. No free air. No pathologic lymphadenopathy. Bones/Soft Tissues: No acute or aggressive osseous lesion. Mild bony demineralization. 1. No acute vascular injury including dissection. 2. No pulmonary embolus. 3. Airspace opacities in the lungs suggestive of aspiration or infection superimposed on chronic lung changes including emphysema. 4. No acute intra-abdominal abnormality. 5. Small volume ascites of unclear etiology. 6. Diverticulosis.        Electronically signed by Hoa Roberts MD on 12/2/2022 at 3:46 PM

## 2022-12-02 NOTE — PROGRESS NOTES
Neurology Service Progress Note  [address]   Patient Name: Sherrie Hein  : 1960        Subjective:   CC: S/P cardiac arrest, anoxic brain injury  Chart was reviewed in detail. Patient was seen and assessed. Daughter and granddaughter at bedside today during evaluation. Patient remains intubated with no sedation. She is unresponsive. She does seem to have slight increase in myoclonus with stimulation during exam.  Exam is essentially unchanged from yesterday. Family seems reasonable and appears to understand patient is not likely to have meaningful neurologic recovery.       Past Medical History:   Diagnosis Date    Anemia     Anxiety 2017    follows with Dr Maciel Villalobos     Back pain at L4-L5 level 2017    Dr Bejarano Lighter 1 disorder [facility])     per pt on 2021\"never told I have bipolar\"    COPD (chronic obstructive pulmonary disease) ([facility] [address]     follows with Dr Luke Jones    Emphysema of lung [facility])     FH: CAD (coronary artery disease) 2017    Father had in his forties, sister in her thirties     Fibromyalgia 2017    Full dentures     full upper plate and partial on the bottom    Gastric ulcer     \"had stomach ulder back fall \"    H/O Doppler ultrasound 2019    venous- no DVT or reflux    H/O echocardiogram 2015    EF50-55% Normal- see media    History of blood transfusion     Hyperlipidemia LDL goal <100     Hypertension     Dr Nicole Bearden    Irregular heartbeat     \"they said I have irreg heart beat before- back when they drained fluid around my heart \"    Obstructive sleep apnea     \"have bipap I use at home\"    On home oxygen therapy     \"on oxygen all the time at home and keep it on 2.5 liters\"    Osteoarthritis     Pericardial effusion     per old chart had pericardial effusion 10/2020    Spinal stenosis     hx per old chart    Thyroid disease     Wears glasses     \"suppose to wear glasses\"    :   Past Surgical History: Procedure Laterality Date    BACK SURGERY  03/2017    \"surgery on low back L5-6- not sure if they put any metal in \"    CARDIAC CATHETERIZATION      10/2019    CARDIAC CATHETERIZATION      per old chart had cath done 11/2020    CARPAL TUNNEL RELEASE Right 1985    CHOLECYSTECTOMY, LAPAROSCOPIC N/A 10/25/2020    CHOLECYSTECTOMY LAPAROSCOPIC WITH IOC performed by Gerardo Alvarez MD at [address] [facility] N/A 12/12/2019    [facility]      per old chart had egd done 1/2018    TUBAL LIGATION  1987    UPPER GASTROINTESTINAL ENDOSCOPY N/A 1/7/2021    EGD BIOPSY performed by Chica Merida MD at [address], [place]. \H[facility].\"" ENDOSCOPY     Medications:  Scheduled Meds:   [START ON 12/3/2022] lansoprazole  30 mg Per NG tube Daily    [START ON 12/3/2022] heparin (porcine)  5,000 Units SubCUTAneous 3 times per day    sodium chloride flush  5-40 mL IntraVENous 2 times per day    chlorhexidine  15 mL Mouth/Throat BID    polyvinyl alcohol  1 drop Both Eyes Q4H    And    artificial tears   Both Eyes Q4H    pantoprazole  40 mg IntraVENous BID    piperacillin-tazobactam  3,375 mg IntraVENous Q8H    multivitamin  1 tablet Oral Daily    folic acid  1 mg Oral Daily    atorvastatin  80 mg Oral Nightly     Continuous Infusions:   sodium chloride      dextrose       PRN Meds:.sodium chloride flush, sodium chloride, ondansetron **OR** ondansetron, polyethylene glycol, acetaminophen **OR** acetaminophen, albuterol, glucose, dextrose bolus **OR** dextrose bolus, glucagon (rDNA), dextrose    Allergies   Allergen Reactions    Lisinopril Swelling and Rash     angioedema     Social History     Socioeconomic History    Marital status:      Spouse name: Not on file    Number of children: Not on file    Years of education: Not on file    Highest education level: Not on file   Occupational History    Not on file   Tobacco Use    Smoking status: Former     Packs/day: 1.50 Years: 20.00     Pack years: 30.00     Types: Cigarettes     Quit date: 2008     Years since quittin.9    Smokeless tobacco: Never   Vaping Use    Vaping Use: Never used   Substance and Sexual Activity    Alcohol use: Not Currently     Alcohol/week: 2.0 standard drinks     Types: 2 Shots of liquor per week     Comment: per pt on 2021\"quit drinking back Oct 2020\"use to drink wine coolers - 3 times per week \"/caffeine 2-3 coffees aday 1 pop b    Drug use: No    Sexual activity: Yes     Partners: Male   Other Topics Concern    Not on file   Social History Narrative    Not on file     Social Determinants of Health     Financial Resource Strain: Not on file   Food Insecurity: Not on file   Transportation Needs: Not on file   Physical Activity: Not on file   Stress: Not on file   Social Connections: Not on file   Intimate Partner Violence: Not on file   Housing Stability: Not on file      Family History   Problem Relation Age of Onset    Asthma Mother         COPD    Heart Disease Father     Breast Cancer Neg Hx          ROS (10 systems)  Intubated and unresponsive,  unable to answer    Physical Exam:      Wt Readings from Last 3 Encounters:   22 200 lb 2.8 oz (90.8 kg)   22 208 lb 6.4 oz (94.5 kg)   22 205 lb (93 kg)     Temp Readings from Last 3 Encounters:   22 99.5 °F (37.5 °C) (Core)   22 98 °F (36.7 °C) (Oral)   22 98.2 °F (36.8 °C)     BP Readings from Last 3 Encounters:   22 126/75   22 (!) 201/94   22 120/60     Pulse Readings from Last 3 Encounters:   22 (!) 107   22 98   22 81        Gen:  Intubated, unresponsive  HEENT: NC/AT, EOMI, PERRL 1 mm bilaterally, mmm,  neck supple, no meningeal signs  Heart: ST  Lungs: Ventilator support  Ext: no edema, no calf tenderness b/l  Psych: non interactive  Skin: no rashes or lesions    NEUROLOGIC EXAM:    Mental Status: Intubated, sedated on propofol held for exam. Patient is unresponsive, unable to follow commands. Cranial Nerve Exam:   CN II-XII: PERRL 1 mm bilaterally, + occulocephalic, reflex + corneal reflex, VFF, no nystagmus, no gaze paresis, ET tube limites evaluation of palate and tongue, no cough with deep suction. Motor Exam:       Strength 0/5 UE's/LE's b/l  Tone and bulk normal   Not able to told to antigravity    Deep Tendon Reflexes: 1/4 biceps, triceps, brachioradialis, patellar, and achilles b/l; flexor plantar responses b/l    Sensation: Triple flexion to bilateral lower extremities  More pronounced extensor posturing in the bilateral upper extremities    Coordination/Cerebellum:       Tremors--Myoclonus noted most around the mouth, no significant movements to the upper or lower extremities. Rapidly alternating movements: TWAN               Heel-to-Shin: TWAN      Finger-to-Nose: TWAN    Gait and stance:      Gait: deferred      LABS:     Recent Labs     11/30/22  0243 11/30/22  0409 11/30/22  0445 11/30/22  0511 11/30/22  0620 11/30/22  2055 12/01/22  0505 12/02/22  0645   WBC 10.5  --   --   --  8.4  --  16.6* 10.7*      < > 136   < >  --  135 138 139   K 6.1*   < > 5.2*   < >  --  5.7* 5.5* 4.7   CL 90*  --  93*  --   --  92* 94* 93*   CO2 35*   < > 31   < >  --  33* 33* 33*   BUN 5*  --  10  --   --  19 25* 40*   CREATININE 1.0   < > 0.9   < >  --  1.4* 1.6* 2.3*   GLUCOSE 234*  --  152*  --   --  191* 141* 168*   INR 0.82  --  0.79  --   --   --   --   --     < > = values in this interval not displayed. IMAGING:    CT head w/o contrast:  Impression   CT findings are highly suggestive of early global anoxic injury as described   above. Sinus disease and a nonspecific left mastoid effusion is noted. cEEG:  EEG Interpretation: This EEG is abnormal due to the presence of:   Burst suppression pattern. This is a non-specific finding but is consistent with a generalized disturbance of cerebral function.  It may be seen in a variety of conditions, such as toxic, metabolic, post-anoxic, multi-focal or diffuse structural abnormalities. Clinical events of facial jerking reported. No clear epileptiform discharges are seen during the EEG, but there is presence of EMG twitch artifact at the beginning of the recording. This is consistent with non-epileptic events. No electrographic seizures or non-convulsive status epilepticus was seen over the entire monitoring period. Of note, this EEG is limited due to the number of electrodes and lack of parasaggital brain region coverage. If clinical suspicions persist for seizure as an etiology for the patient's symptoms, or if suspicion remains high for an underlying seizure disorder, additional EEG testing utilizing the standard 10-20 system of electrode placement for full cerebral coverage should be considered. Routine EEG:  EEG Interpretation:   The EEG was abnormal due to the presence of:   Burst suppression pattern. This is a non-specific finding but is consistent with a generalized disturbance of cerebral function. It may be seen in a variety of conditions, such as toxic, metabolic, post-anoxic, multi-focal or diffuse structural abnormalities. No electrographic seizures or non-convulsive status epilepticus was seen over the entire monitoring period. All imaging was personally reviewed      ASSESSMENT/PLAN:   63year old female presents after extended cardiac arrest. Patient is non responsive, intubated, sedation for greater than 24 hours. Pupils are 1 mm and reactive bilaterally. Corneal and oculocephalic reflexes are present. Not breathing over vent settings. There is no cough of gag with deep suction. Upper extremities extensor posturing with painful stimulus bilaterally. Triple flexion to the bilateral lower extremities with painful stimulus. No evidence of cortical function.   No significant neurologic improvement on today's exam.  Anoxic brain injury secondary to prolonged cardiac arrest  Neruo imaging as above notes concern for global anoxic injury  Will consider repeat CT head if no improvement  EEG as above - no evidence for seizure or epileptiform discharge  Recommend holding sedation for neuro prognostication  Can consider depakote or Keppra if myoclonus continue to worsen  Will continue to follow pending neurologic improvement, neuro prognosis very guarded    > 35 minutes of time spent included chart review, obtaining history, patient examination, developing plan of care, and documentation. Patient was discussed with attending neurologist Dr. Domingo Wade. Thank you for allowing us to participate in the care of your patient. If there are any questions regarding evaluation please feel free to contact us.          BRIAN Garcia - CNP 12/2/2022 4:09 PM  Neurology

## 2022-12-02 NOTE — PROGRESS NOTES
Critical Care Progress Note 12/2/2022        Ana Luisa Pitt  1960  [id number]      Assessment/Plan:    Acute respiratory failure with hypoxia hypercapnia  Severe COPD O2 reliant in home setting  RSV pneumonia  Status post prolonged pulseless electrical activity cardiac arrest with late return of circulation  Anoxic brain injury  Acute kidney injury post arrest  E Coli urinary infection    Continue ventilatory support  Patient currently free of any sedation, continue the same  Maintain temperature below 37 °C  ATB directed to treatment of urinary infection pending results of sensitivity  DVT, ulcer prophylaxis      Per discussion with the family, they wish to continue care for the next 5 to 7 days perhaps beyond that timeframe discontinuing aggressive medical measures assuming no improvement of the patient's clinical neurological status. Complex medical decisions required for today's evaluation and management, reviewed during critical care rounds. The patient currently suffers from critical illness including respiratory failure anoxic brain injury. Without current sustained support modalities including mechanical ventilatory support, progressive decline in clinical status, worsening of critical organ function and untoward mortality. 32 critical care time devoted to today's evaluation and management independent of any time required for the performance of procedures. Subjective:    Low-level ventilatory support.     Acceptable hemodynamics, rhythm, urine output      Review of Systems     Unable to obtain in light of patient's current clinical circumstances, orally intubated plus severe neurologic impairment    Physical Exam:          /71 Comment: ARTERIAL BP  Pulse (!) 115   Temp 99.5 °F (37.5 °C) (Core)   Resp 23   Ht 5' 0.98\" (1.549 m)   Wt 200 lb 2.8 oz (90.8 kg)   LMP 01/05/2010   SpO2 100%   BMI 37.84 kg/m²     General: Ill-appearing female, appears older than stated age, orally intubated vitals reviewed    Eyes: Motor not intact, pupils non-reactive  ENT: Unable to evaluate hearing. Absence nasal lesions. Neck supple  Cardiovascular: Regular rate, gallop. Respiratory: Clear to auscultation  Gastrointestinal: Soft, non tender  Genitourinary: no suprapubic tenderness  Musculoskeletal: No edema. Skin: warm, dry  Neuro: [place] coma scale 3  Psych: Mood able to evaluate    Current Medications:   sodium chloride flush  5-40 mL IntraVENous 2 times per day    enoxaparin  40 mg SubCUTAneous Daily    chlorhexidine  15 mL Mouth/Throat BID    polyvinyl alcohol  1 drop Both Eyes Q4H    And    artificial tears   Both Eyes Q4H    pantoprazole  40 mg IntraVENous BID    piperacillin-tazobactam  3,375 mg IntraVENous Q8H    multivitamin  1 tablet Oral Daily    folic acid  1 mg Oral Daily    atorvastatin  80 mg Oral Nightly       Labs, Imaging and Studies reviewed:    CT HEAD WO CONTRAST    Result Date: 12/1/2022  EXAMINATION: CT OF THE HEAD WITHOUT CONTRAST  11/30/2022 3:35 am TECHNIQUE: CT of the head was performed without the administration of intravenous contrast. Automated exposure control, iterative reconstruction, and/or weight based adjustment of the mA/kV was utilized to reduce the radiation dose to as low as reasonably achievable. COMPARISON: December 25, 2021 HISTORY: ORDERING SYSTEM PROVIDED HISTORY: Cardiac arrest TECHNOLOGIST PROVIDED HISTORY: Reason for exam:->Cardiac arrest Has a \"code stroke\" or \"stroke alert\" been called? ->No Decision Support Exception - unselect if not a suspected or confirmed emergency medical condition->Emergency Medical Condition (MA) Reason for Exam: Cardiac arrest FINDINGS: BRAIN/VENTRICLES: Compared to the prior study, there is subtle loss of gray-white matter differentiation. Additionally, there is increased sulci effacement and the ventricles are smaller in size. 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 No midline shift. No downward herniation. ORBITS: The visualized portion of the orbits demonstrate no acute abnormality. SINUSES: Ethmoid and maxillary sinus disease is seen. There is a small left mastoid effusion. SOFT TISSUES/SKULL:  No acute abnormality of the visualized skull or soft tissues. CT findings are highly suggestive of early global anoxic injury as described above. Sinus disease and a nonspecific left mastoid effusion is noted. Findings were discussed with Dr. Latoya Cordero on 11/30/2022 at 4:09 a.m.     XR CHEST PORTABLE    Result Date: 12/1/2022  EXAMINATION: ONE XRAY VIEW OF THE CHEST 11/30/2022 5:32 am COMPARISON: November 7, 2022 HISTORY: ORDERING SYSTEM PROVIDED HISTORY: CVC TECHNOLOGIST PROVIDED HISTORY: Reason for exam:->CVC Reason for Exam: CVC FINDINGS: Right subclavian central venous catheter tip terminates in the superior vena cava. Endotracheal tube tip terminates 6.4 cm above the denys. Nasogastric tube tip terminates off the inferior margin of the radiograph. Stable cardiomediastinal silhouette. The lungs demonstrate patchy airspace opacities better appreciated on CT. Emphysematous changes are also seen. No new osseous abnormality. 1. Right subclavian central venous catheter tip terminates in the superior vena cava. 2. Endotracheal tube tip terminates 6.4 cm above the deyns. 3. Patchy airspace opacities suggestive of infection or aspiration, better appreciated on prior CT. 4. COPD. CTA CHEST ABDOMEN PELVIS W CONTRAST    Result Date: 12/1/2022  EXAMINATION: CTA OF THE CHEST, ABDOMEN AND PELVIS WITH CONTRAST 11/30/2022 3:35 am: TECHNIQUE: CTA of the chest, abdomen and pelvis was performed after the administration of intravenous contrast.  Multiplanar reformatted images are provided for review. MIP images are provided for review.  Automated exposure control, iterative reconstruction, and/or weight based adjustment of the mA/kV was utilized to reduce the radiation dose to as low as reasonably achievable. COMPARISON: November 6, 2022. December 29, 2021. HISTORY: ORDERING SYSTEM PROVIDED HISTORY: Cardiac arrest with reported PNA recently and dark colored emesis TECHNOLOGIST PROVIDED HISTORY: Reason for exam:->Cardiac arrest with reported PNA recently and dark colored emesis Decision Support Exception - unselect if not a suspected or confirmed emergency medical condition->Emergency Medical Condition (MA) Reason for Exam: Cardiac arrest with reported PNA recently and dark colored emesis FINDINGS: CTA CHEST: Thoracic aorta: No evidence of thoracic aortic aneurysm or dissection. No acute abnormality of the aorta. Mediastinum: The heart is normal in size without a pericardial effusion. Moderate to severe coronary artery atherosclerosis is present. The aorta, arch branches, and main pulmonary artery are normal in course and caliber. No pulmonary embolus is seen. Lungs/Pleura: The lungs demonstrate patchy airspace opacities superimposed on emphysema. No pleural effusions. Central airways are patent. An endotracheal tube is in place. Diffuse bronchial wall thickening. No significant bronchiectasis. No dominant or suspicious pulmonary nodules. Soft Tissues/Bones: No acute bone or soft tissue abnormality. CTA ABDOMEN: Abdominal aorta/Branches: The abdominal aorta is patent and normal in course. Moderate atherosclerosis is seen. The celiac trunk and its branches, SMA, bilateral renal arteries, and YOVANA are patent. The iliac vasculature is patent with moderate atherosclerosis. Organs: Status post cholecystectomy. No intra or extrahepatic biliary dilatation. The liver, spleen, pancreas, adrenal glands, and kidneys demonstrate no acute abnormality. The collecting systems are normal.  Right renal atrophy is present. GI/Bowel: Nasogastric tube tip terminates in mid stomach. The small and large bowel are normal in course and caliber without evidence of wall thickening or obstruction.   Diverticulosis is noted without evidence of acute diverticulitis. Pelvic organs: Normal bladder. Uterus is unremarkable. No adnexal masses. Peritoneum/Retroperitoneum: Small amount of free fluid is identified in the pelvis and adjacent to the liver and spleen. No free air. No pathologic lymphadenopathy. Bones/Soft Tissues: No acute or aggressive osseous lesion. Mild bony demineralization. 1. No acute vascular injury including dissection. 2. No pulmonary embolus. 3. Airspace opacities in the lungs suggestive of aspiration or infection superimposed on chronic lung changes including emphysema. 4. No acute intra-abdominal abnormality. 5. Small volume ascites of unclear etiology. 6. Diverticulosis.        Recent Results (from the past 24 hour(s))   CBC with Auto Differential    Collection Time: 12/02/22  6:45 AM   Result Value Ref Range    WBC 10.7 (H) 4.0 - 10.5 K/CU MM    RBC 3.32 (L) 4.2 - 5.4 M/CU MM    Hemoglobin 8.8 (L) 12.5 - 16.0 GM/DL    Hematocrit 29.7 (L) 37 - 47 %    MCV 89.5 78 - 100 FL    MCH 26.5 (L) 27 - 31 PG    MCHC 29.6 (L) 32.0 - 36.0 %    RDW 15.9 (H) 11.7 - 14.9 %    Platelets 758 538 - 097 K/CU MM    MPV 10.3 7.5 - 11.1 FL    Differential Type AUTOMATED DIFFERENTIAL     Segs Relative 88.0 (H) 36 - 66 %    Lymphocytes % 4.8 (L) 24 - 44 %    Monocytes % 6.8 (H) 0 - 4 %    Eosinophils % 0.0 0 - 3 %    Basophils % 0.0 0 - 1 %    Segs Absolute 9.4 K/CU MM    Lymphocytes Absolute 0.5 K/CU MM    Monocytes Absolute 0.7 K/CU MM    Eosinophils Absolute 0.0 K/CU MM    Basophils Absolute 0.0 K/CU MM    Nucleated RBC % 0.0 %    Total Nucleated RBC 0.0 K/CU MM    Total Immature Neutrophil 0.04 K/CU MM    Immature Neutrophil % 0.4 0 - 0.43 %   Critical Care Panel    Collection Time: 12/02/22  6:45 AM   Result Value Ref Range    Sodium 139 135 - 145 MMOL/L    Potassium 4.7 3.5 - 5.1 MMOL/L    Chloride 93 (L) 99 - 110 mMol/L    CO2 33 (H) 21 - 32 MMOL/L    Anion Gap 13 4 - 16    BUN 40 (H) 6 - 23 MG/DL    Creatinine 2.3 (H) 0.6 - 1.1 MG/DL    Est, Glom Filt Rate 24 (L) >60 mL/min/1.73m2    Glucose 168 (H) 70 - 99 MG/DL    Calcium 8.4 8.3 - 10.6 MG/DL    Phosphorus 5.1 (H) 2.5 - 4.9 MG/DL    Magnesium 2.6 (H) 1.8 - 2.4 mg/dl

## 2022-12-03 NOTE — PROGRESS NOTES
Spoke with Randee Barron from the [facility] office. Per Dr. Khadra Merino patient will not be a coroners case. [facility] [address] [place] [address] Dr has full permission for organ, tissue and eye donation without restriction. Patient/Caregiver provided printed discharge information.

## 2022-12-03 NOTE — PLAN OF CARE
Problem: Discharge Planning  Goal: Discharge to home or other facility with appropriate resources  Outcome: Progressing  Flowsheets (Taken 12/2/2022 1600 by Violeta Lynch RN)  Discharge to home or other facility with appropriate resources: Identify barriers to discharge with patient and caregiver     Problem: Neurosensory - Adult  Goal: Remains free of injury related to seizures activity  Outcome: Progressing  Flowsheets (Taken 12/2/2022 2000)  Remains free of injury related to seizure activity: Maintain airway, patient safety  and administer oxygen as ordered     Problem: Respiratory - Adult  Goal: Achieves optimal ventilation and oxygenation  Outcome: Progressing  Flowsheets (Taken 12/2/2022 2000)  Achieves optimal ventilation and oxygenation: Assess for changes in respiratory status     Problem: Skin/Tissue Integrity - Adult  Goal: Oral mucous membranes remain intact  Outcome: Progressing  Flowsheets (Taken 12/2/2022 2000)  Oral Mucous Membranes Remain Intact: Assess oral mucosa and hygiene practices     Problem: Genitourinary - Adult  Goal: Urinary catheter remains patent  Outcome: Progressing  Flowsheets (Taken 12/2/2022 2000)  Urinary catheter remains patent: Assess patency of urinary catheter     Problem: Infection - Adult  Goal: Absence of infection during hospitalization  Outcome: Progressing  Flowsheets  Taken 12/2/2022 2000 by Brandon Schmidt RN  Absence of infection during hospitalization: Assess and monitor for signs and symptoms of infection  Taken 12/2/2022 1600 by Violeta Lynch RN  Absence of infection during hospitalization: Assess and monitor for signs and symptoms of infection     Problem: Metabolic/Fluid and Electrolytes - Adult  Goal: Electrolytes maintained within normal limits  Outcome: Progressing  Flowsheets (Taken 12/2/2022 2000)  Electrolytes maintained within normal limits: Monitor labs and assess patient for signs and symptoms of electrolyte imbalances     Problem: Pain  Goal: Verbalizes/displays adequate comfort level or baseline comfort level  Outcome: Progressing  Flowsheets (Taken 12/2/2022 2000)  Verbalizes/displays adequate comfort level or baseline comfort level: Assess pain using appropriate pain scale     Problem: Nutrition Deficit:  Goal: Optimize nutritional status  Outcome: Progressing     Problem: Skin/Tissue Integrity  Goal: Absence of new skin breakdown  Description: 1. Monitor for areas of redness and/or skin breakdown  2. Assess vascular access sites hourly  3. Every 4-6 hours minimum:  Change oxygen saturation probe site  4. Every 4-6 hours:  If on nasal continuous positive airway pressure, respiratory therapy assess nares and determine need for appliance change or resting period.   Outcome: Progressing     Problem: Safety - Adult  Goal: Free from fall injury  Outcome: Progressing     Problem: ABCDS Injury Assessment  Goal: Absence of physical injury  Outcome: Progressing

## 2022-12-03 NOTE — PROGRESS NOTES
monitor I/O strictly  --Consider nephrology consult    #Hyperkalemia - Resolved  --K stable, continue to monitor    #COPD  --Currently intubated and on vent support secondary to hypoxic and hypercapnic respiratory failure in the setting of cardiac arrest  --Albuterol as needed    #Hypothyroidism  --TSH elevated  --Connfirm home dose of levothyroxine if she is on it and restart   --Re-eval when she is more stable    #HTN  --Weaned off vasopressors, monitor BP and adjust antihypertensive therapy as appropriate    #H/O SVT  --remains tachycardic to 100s, consider restarting diltiazem if BP will tolerate    #HLD  --Continue Lipitor    Care by critical care team at this time    Diet Diet NPO  ADULT TUBE FEEDING; Orogastric; Peptide Based; Continuous; 20; Yes; 10; Q 4 hours; 55; 30; Q 4 hours   DVT Prophylaxis [x] Lovenox, []  Heparin, [] SCDs, [] Ambulation,  [] Eliquis, [] Xarelto  [] Coumadin   Code Status Limited   Disposition From: Home  Expected Disposition: TBD  Estimated Date of Discharge: TBD  Patient requires continued admission due to cardiac arrest requiring critical care   Surrogate Decision Maker/ POA      Subjective:     Chief Complaint: Cardiac Arrest       Patient seen and examined at bedside, nursing at bedside. She is intubated and on the vent. Review of Systems:    Review of Systems   Reason unable to perform ROS: Intubated. Objective: Intake/Output Summary (Last 24 hours) at 12/3/2022 1153  Last data filed at 12/3/2022 0954  Gross per 24 hour   Intake 976 ml   Output 785 ml   Net 191 ml          Vitals:   Vitals:    12/03/22 0955   BP:    Pulse: (!) 105   Resp: 26   Temp: 99.5 °F (37.5 °C)   SpO2: 98%       Physical Exam:   General: Intubated and unresponsive  Eyes: Unable to assess  HENT: ETT in place  Cardiovascular: Tachycardic. Respiratory: Rhonchorous breath sounds. Gastrointestinal: Soft, non tender  Genitourinary:  Mcallister in place  Musculoskeletal: No edema  Skin: warm, dry  Neuro: Both feet responds to pain with slight twitch of toes. Psych: Unable to assess.      Medications:   Medications:    heparin (porcine)  5,000 Units SubCUTAneous 3 times per day    sodium chloride flush  5-40 mL IntraVENous 2 times per day    chlorhexidine  15 mL Mouth/Throat BID    polyvinyl alcohol  1 drop Both Eyes Q4H    And    artificial tears   Both Eyes Q4H    pantoprazole  40 mg IntraVENous BID    piperacillin-tazobactam  3,375 mg IntraVENous Q8H    multivitamin  1 tablet Oral Daily    folic acid  1 mg Oral Daily    atorvastatin  80 mg Oral Nightly      Infusions:    sodium chloride      dextrose       PRN Meds: sodium chloride flush, 5-40 mL, PRN  sodium chloride, , PRN  ondansetron, 4 mg, Q8H PRN   Or  ondansetron, 4 mg, Q6H PRN  polyethylene glycol, 17 g, Daily PRN  acetaminophen, 650 mg, Q6H PRN   Or  acetaminophen, 650 mg, Q6H PRN  albuterol, 2.5 mg, Q4H PRN  glucose, 4 tablet, PRN  dextrose bolus, 125 mL, PRN   Or  dextrose bolus, 250 mL, PRN  glucagon (rDNA), 1 mg, PRN  dextrose, , Continuous PRN      Labs      Recent Results (from the past 24 hour(s))   CBC    Collection Time: 12/02/22 10:54 PM   Result Value Ref Range    WBC 10.7 (H) 4.0 - 10.5 K/CU MM    RBC 3.36 (L) 4.2 - 5.4 M/CU MM    Hemoglobin 9.3 (L) 12.5 - 16.0 GM/DL    Hematocrit 30.0 (L) 37 - 47 %    MCV 89.3 78 - 100 FL    MCH 27.7 27 - 31 PG    MCHC 31.0 (L) 32.0 - 36.0 %    RDW 15.9 (H) 11.7 - 14.9 %    Platelets 604 874 - 615 K/CU MM    MPV 10.9 7.5 - 11.1 FL   Comprehensive Metabolic Panel    Collection Time: 12/02/22 10:54 PM   Result Value Ref Range    Sodium 140 135 - 145 MMOL/L    Potassium 4.4 3.5 - 5.1 MMOL/L    Chloride 93 (L) 99 - 110 mMol/L    CO2 35 (H) 21 - 32 MMOL/L    BUN 47 (H) 6 - 23 MG/DL    Creatinine 2.7 (H) 0.6 - 1.1 MG/DL    Est, Glom Filt Rate 20 (L) >60 mL/min/1.73m2    Glucose 162 (H) 70 - 99 MG/DL    Calcium 8.9 8.3 - 10.6 MG/DL    Albumin 3.1 (L) 3.4 - 5.0 GM/DL    Total Protein 5.6 (L) 6.4 - 8.2 GM/DL    Total Bilirubin 0.2 0.0 - 1.0 MG/DL    ALT 44 (H) 10 - 40 U/L     (H) 15 - 37 IU/L    Alkaline Phosphatase 87 40 - 129 IU/L    Anion Gap 12 4 - 16   Protime/INR & PTT    Collection Time: 12/02/22 10:54 PM   Result Value Ref Range    Protime 12.7 11.7 - 14.5 SECONDS    INR 0.98 INDEX    aPTT 32.3 25.1 - 37.1 SECONDS   Magnesium    Collection Time: 12/02/22 10:54 PM   Result Value Ref Range    Magnesium 2.7 (H) 1.8 - 2.4 mg/dl   Phosphorus    Collection Time: 12/02/22 10:54 PM   Result Value Ref Range    Phosphorus 4.6 2.5 - 4.9 MG/DL   Bilirubin, Direct    Collection Time: 12/02/22 10:54 PM   Result Value Ref Range    Bilirubin, Direct 0.2 0.0 - 0.3 MG/DL   TYPE AND SCREEN    Collection Time: 12/02/22 10:54 PM   Result Value Ref Range    ABO/Rh O POSITIVE     Antibody Screen NEGATIVE    Amylase    Collection Time: 12/02/22 10:54 PM   Result Value Ref Range    Amylase 115 25 - 115 U/L   CK    Collection Time: 12/02/22 10:54 PM   Result Value Ref Range    Total CK 32 26 - 140 IU/L   Gamma GT    Collection Time: 12/02/22 10:54 PM   Result Value Ref Range    GGT 35 5 - 36 IU/L   Lactate Dehydrogenase    Collection Time: 12/02/22 10:54 PM   Result Value Ref Range     (H) 120 - 246 IU/L   Lipase    Collection Time: 12/02/22 10:54 PM   Result Value Ref Range    Lipase 44 13 - 60 IU/L   Troponin    Collection Time: 12/02/22 10:54 PM   Result Value Ref Range    Troponin T 0.060 (H) <0.01 NG/ML   Urinalysis    Collection Time: 12/02/22 11:03 PM   Result Value Ref Range    Color, UA YELLOW YELLOW    Clarity, UA SLIGHTLY CLOUDY (A) CLEAR    Glucose, Urine NEGATIVE NEGATIVE MG/DL    Bilirubin Urine NEGATIVE NEGATIVE MG/DL    Ketones, Urine NEGATIVE NEGATIVE MG/DL    Specific Gravity, UA 1.020 1.001 - 1.035    Blood, Urine TRACE (A) NEGATIVE    pH, Urine 5.5 5.0 - 8.0    Protein,  (A) NEGATIVE MG/DL    Urobilinogen, Urine 0.2 0.2 - 1.0 MG/DL    Nitrite Urine, Quantitative NEGATIVE NEGATIVE    Leukocyte Esterase, Urine NEGATIVE NEGATIVE   Microscopic Urinalysis    Collection Time: 12/02/22 11:03 PM   Result Value Ref Range    RBC, UA 44 (H) 0 - 6 /HPF    WBC, UA 22 (H) 0 - 5 /HPF    Bacteria, UA NEGATIVE NEGATIVE /HPF    WBC Clumps, UA RARE /HPF    Yeast, UA MANY /HPF    Squam Epithel, UA 2 /HPF    Mucus, UA RARE (A) NEGATIVE HPF    Trichomonas, UA NONE SEEN NONE SEEN /HPF    Ca Oxalate Justine, UA FEW /HPF   Bilirubin, Direct    Collection Time: 12/03/22  4:45 AM   Result Value Ref Range    Bilirubin, Direct 0.2 0.0 - 0.3 MG/DL   Magnesium    Collection Time: 12/03/22  4:45 AM   Result Value Ref Range    Magnesium 2.8 (H) 1.8 - 2.4 mg/dl   Phosphorus    Collection Time: 12/03/22  4:45 AM   Result Value Ref Range    Phosphorus 4.4 2.5 - 4.9 MG/DL   Protime/INR & PTT    Collection Time: 12/03/22  4:45 AM   Result Value Ref Range    Protime 12.1 11.7 - 14.5 SECONDS    INR 0.94 INDEX    aPTT 29.4 25.1 - 37.1 SECONDS   Comprehensive Metabolic Panel    Collection Time: 12/03/22  4:45 AM   Result Value Ref Range    Sodium 139 135 - 145 MMOL/L    Potassium 4.2 3.5 - 5.1 MMOL/L    Chloride 91 (L) 99 - 110 mMol/L    CO2 34 (H) 21 - 32 MMOL/L    BUN 49 (H) 6 - 23 MG/DL    Creatinine 2.6 (H) 0.6 - 1.1 MG/DL    Est, Glom Filt Rate 20 (L) >60 mL/min/1.73m2    Glucose 178 (H) 70 - 99 MG/DL    Calcium 8.9 8.3 - 10.6 MG/DL    Albumin 3.1 (L) 3.4 - 5.0 GM/DL    Total Protein 5.5 (L) 6.4 - 8.2 GM/DL    Total Bilirubin 0.2 0.0 - 1.0 MG/DL    ALT 45 (H) 10 - 40 U/L     (H) 15 - 37 IU/L    Alkaline Phosphatase 89 40 - 128 IU/L    Anion Gap 14 4 - 16   Urinalysis    Collection Time: 12/03/22  5:30 AM   Result Value Ref Range    Color, UA YELLOW YELLOW    Clarity, UA CLOUDY (A) CLEAR    Glucose, Urine NEGATIVE NEGATIVE MG/DL    Bilirubin Urine NEGATIVE NEGATIVE MG/DL    Ketones, Urine NEGATIVE NEGATIVE MG/DL    Specific Gravity, UA 1.020 1.001 - 1.035    Blood, Urine MODERATE NUMBER OR AMOUNT OBSERVED (A) NEGATIVE    pH, Urine 5.5 5.0 - 8.0    Protein,  (A) NEGATIVE MG/DL    Urobilinogen, Urine 0.2 0.2 - 1.0 MG/DL    Nitrite Urine, Quantitative NEGATIVE NEGATIVE    Leukocyte Esterase, Urine TRACE (A) NEGATIVE   Microscopic Urinalysis    Collection Time: 12/03/22  5:30 AM   Result Value Ref Range    RBC, UA 39 (H) 0 - 6 /HPF    WBC, UA 31 (H) 0 - 5 /HPF    Bacteria, UA RARE (A) NEGATIVE /HPF    WBC Clumps, UA OCCASIONAL /HPF    Yeast, UA MANY /HPF    Hyphenated Yeast RARE /HPF    Trichomonas, UA NONE SEEN NONE SEEN /HPF   Blood gas, arterial    Collection Time: 12/03/22  7:00 AM   Result Value Ref Range    pH, Bld 7.58 (H) 7.34 - 7.45    pCO2, Arterial 40.0 32 - 45 MMHG    pO2, Arterial 156 (H) 75 - 100 MMHG    Base Exc, Mixed 14.2 (H) 0 - 2.3    HCO3, Arterial 37.5 (H) 18 - 23 MMOL/L    CO2 Content 38.7 (H) 19 - 24 MMOL/L    O2 Sat 97.0 96 - 97 %    Carbon Monoxide, Blood 1.0 0 - 5 %    Methemoglobin, Arterial 0.8 <1.5 %    Comment ARTLINE    Blood gas, arterial    Collection Time: 12/03/22 11:00 AM   Result Value Ref Range    pH, Bld 7.53 (H) 7.34 - 7.45    pCO2, Arterial 47.0 (H) 32 - 45 MMHG    pO2, Arterial 99 75 - 100 MMHG    Base Exc, Mixed 14.6 (H) 0 - 2.3    HCO3, Arterial 39.3 (H) 18 - 23 MMOL/L    CO2 Content 40.7 (H) 19 - 24 MMOL/L    O2 Sat 95.7 (L) 96 - 97 %    Carbon Monoxide, Blood 1.0 0 - 5 %    Methemoglobin, Arterial 1.2 <1.5 %    Comment ACVC 26/340/5/.30    Protime/INR & PTT    Collection Time: 12/03/22 11:25 AM   Result Value Ref Range    Protime 12.3 11.7 - 14.5 SECONDS    INR 0.95 INDEX   CBC    Collection Time: 12/03/22 11:25 AM   Result Value Ref Range    WBC 10.1 4.0 - 10.5 K/CU MM    RBC 3.28 (L) 4.2 - 5.4 M/CU MM    Hemoglobin 8.8 (L) 12.5 - 16.0 GM/DL    Hematocrit 29.1 (L) 37 - 47 %    MCV 88.7 78 - 100 FL    MCH 26.8 (L) 27 - 31 PG    MCHC 30.2 (L) 32.0 - 36.0 %    RDW 16.0 (H) 11.7 - 14.9 %    Platelets 168 255 - 869 K/CU MM    MPV 10.7 7.5 - 11.1 FL        Imaging/Diagnostics Last 24 Hours   CT HEAD WO CONTRAST    Result Date: 11/30/2022  EXAMINATION: CT OF THE HEAD WITHOUT CONTRAST  11/30/2022 3:35 am TECHNIQUE: CT of the head was performed without the administration of intravenous contrast. Automated exposure control, iterative reconstruction, and/or weight based adjustment of the mA/kV was utilized to reduce the radiation dose to as low as reasonably achievable. COMPARISON: December 25, 2021 HISTORY: ORDERING SYSTEM PROVIDED HISTORY: Cardiac arrest TECHNOLOGIST PROVIDED HISTORY: Reason for exam:->Cardiac arrest Has a \"code stroke\" or \"stroke alert\" been called? ->No Decision Support Exception - unselect if not a suspected or confirmed emergency medical condition->Emergency Medical Condition (MA) Reason for Exam: Cardiac arrest FINDINGS: BRAIN/VENTRICLES: Compared to the prior study, there is subtle loss of gray-white matter differentiation. Additionally, there is increased sulci effacement and the ventricles are smaller in size. 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 No midline shift. No downward herniation. ORBITS: The visualized portion of the orbits demonstrate no acute abnormality. SINUSES: Ethmoid and maxillary sinus disease is seen. There is a small left mastoid effusion. SOFT TISSUES/SKULL:  No acute abnormality of the visualized skull or soft tissues. CT findings are highly suggestive of early global anoxic injury as described above. Sinus disease and a nonspecific left mastoid effusion is noted. Findings were discussed with Dr. Mark Bella on 11/30/2022 at 4:09 a.m.     XR CHEST PORTABLE    Result Date: 11/30/2022  Bard Nimisha MD     11/30/2022  6:00 AM Central Venous Catheter Insertion Procedure Note Procedure:  Insertion of Central Venous Catheter Procedure Clinician: Bard Nimisha MD Procedure Assistant: None Indications: Vasoactive agents Size and location: 7Fr TLC, right subclavian vein Attempts: 1 Procedure Details: Emergent Under sterile conditions the skin above the Right subclavian veing was prepped with chlorhexidine and covered with a sterile drape. Local anesthesia was applied to the skin and subcutaneous tissues. Ultrasound was used to localize the vein and an 18-gauge needle was then inserted into the vein. A drop test was performed and was negative for any evidence of arterial flow. A guide wire was then passed easily through the catheter. There were no arrhythmias. Ultrasound was used to identify the guidewire once in the vein. The catheter was then withdrawn. A 7Fr TLC was then inserted into the vessel over the guide wire. The guidewire was then removed with the tip intact, and witnessed by bedside nurse. The catheter was sutured into place. Findings: There were no changes to vital signs. Catheter was flushed with 20 mL NS. Patient tolerated the procedure well. Lung ultrasound: Pleural sliding - yes Lung point - no A lines - yes B lines - no Consolidation? Not assessed Pleural effusion? Not assessed Recommendations: CXR ordered to verify placement. CTA CHEST ABDOMEN PELVIS W CONTRAST    Result Date: 11/30/2022  EXAMINATION: CTA OF THE CHEST, ABDOMEN AND PELVIS WITH CONTRAST 11/30/2022 3:35 am: TECHNIQUE: CTA of the chest, abdomen and pelvis was performed after the administration of intravenous contrast.  Multiplanar reformatted images are provided for review. MIP images are provided for review. Automated exposure control, iterative reconstruction, and/or weight based adjustment of the mA/kV was utilized to reduce the radiation dose to as low as reasonably achievable. COMPARISON: November 6, 2022. December 29, 2021.  HISTORY: ORDERING SYSTEM PROVIDED HISTORY: Cardiac arrest with reported PNA recently and dark colored emesis TECHNOLOGIST PROVIDED HISTORY: Reason for exam:->Cardiac arrest with reported PNA recently and dark colored emesis Decision Support Exception - unselect if not a suspected or confirmed emergency medical condition->Emergency Medical Condition (MA) Reason for Exam: Cardiac arrest with reported PNA recently and dark colored emesis FINDINGS: CTA CHEST: Thoracic aorta: No evidence of thoracic aortic aneurysm or dissection. No acute abnormality of the aorta. Mediastinum: The heart is normal in size without a pericardial effusion. Moderate to severe coronary artery atherosclerosis is present. The aorta, arch branches, and main pulmonary artery are normal in course and caliber. No pulmonary embolus is seen. Lungs/Pleura: The lungs demonstrate patchy airspace opacities superimposed on emphysema. No pleural effusions. Central airways are patent. An endotracheal tube is in place. Diffuse bronchial wall thickening. No significant bronchiectasis. No dominant or suspicious pulmonary nodules. Soft Tissues/Bones: No acute bone or soft tissue abnormality. CTA ABDOMEN: Abdominal aorta/Branches: The abdominal aorta is patent and normal in course. Moderate atherosclerosis is seen. The celiac trunk and its branches, SMA, bilateral renal arteries, and YOVANA are patent. The iliac vasculature is patent with moderate atherosclerosis. Organs: Status post cholecystectomy. No intra or extrahepatic biliary dilatation. The liver, spleen, pancreas, adrenal glands, and kidneys demonstrate no acute abnormality. The collecting systems are normal.  Right renal atrophy is present. GI/Bowel: Nasogastric tube tip terminates in mid stomach. The small and large bowel are normal in course and caliber without evidence of wall thickening or obstruction. Diverticulosis is noted without evidence of acute diverticulitis. Pelvic organs: Normal bladder. Uterus is unremarkable. No adnexal masses. Peritoneum/Retroperitoneum: Small amount of free fluid is identified in the pelvis and adjacent to the liver and spleen. No free air. No pathologic lymphadenopathy. Bones/Soft Tissues: No acute or aggressive osseous lesion. Mild bony demineralization.      1. No acute vascular injury including dissection. 2. No pulmonary embolus. 3. Airspace opacities in the lungs suggestive of aspiration or infection superimposed on chronic lung changes including emphysema. 4. No acute intra-abdominal abnormality. 5. Small volume ascites of unclear etiology. 6. Diverticulosis.        Electronically signed by Corbin Sicard, MD on 12/3/2022 at 11:53 AM

## 2022-12-03 NOTE — PROGRESS NOTES
V2.0  [facility] [place] Critical Care Progress Note      Name:  Sae Guerra /Age/Sex: 1960  (63 y.o. female)   MRN & CSN:  [id number] & [id number] Encounter Date/Time: 12/3/2022 8:41 AM EST    Location:  -A PCP: Dg Jackson MD       Hospital Day: 4    Assessment and Plan:   Sae Guerra is a 63 y.o. female with pmh of COPD, hypertension, CAD who presents with Cardiac arrest ([facility] [address]    Anoxic brain injury  -Likely plan for DCD, organ procurement.  -Neurology following. Acute respiratory failure with hypoxia hypercapnia  Severe COPD O2 reliant in home setting  RSV pneumonia  -Continue full mechanical ventilation at this time. Status post prolonged pulseless electrical activity cardiac arrest with late return of circulation  -Maintain temperature below 37 °C    Acute kidney injury post arrest  E Coli urinary infection  -Continue Zosyn,  pending results of sensitivity    Diet Diet NPO  ADULT TUBE FEEDING; Orogastric; Peptide Based; Continuous; 20; Yes; 10; Q 4 hours; 55; 30; Q 4 hours   DVT Prophylaxis [] Lovenox, []  Heparin, [] SCDs, [] Ambulation,  [] Eliquis, [] Xarelto  [] Coumadin   Code Status Limited   Disposition Plan for organ procurement pending. Surrogate Decision Maker/ POA Spouse     Subjective:     Chief Complaint: Cardiac Arrest     Patient seen and examined this AM.  She remains unresponsive. Labs and vitals reviewed. She is not receiving any sedation, on ventilator. Review of systems impossible given mental status. Plan of care discussed with bedside RN         Review of Systems:    Review of Systems    As above    Objective: Intake/Output Summary (Last 24 hours) at 12/3/2022 0841  Last data filed at 12/3/2022 0500  Gross per 24 hour   Intake 946 ml   Output 785 ml   Net 161 ml        Vitals:   Vitals:    22 0500   BP:    Pulse: (!) 102   Resp: 26   Temp:    SpO2: 98%       Physical Exam:     General: NAD  Eyes: EOMI  ENT: neck supple.   ETT and OG in place  Cardiovascular: Regular rate. Respiratory: Clear to auscultation  Gastrointestinal: Soft, non tender  Genitourinary: no suprapubic tenderness. Mcallister catheter in place  Musculoskeletal: No edema  Skin: warm, dry  Neuro: Does not follow commands  Psych: Unable to assess.     Medications:   Medications:    lansoprazole  30 mg Per NG tube Daily    heparin (porcine)  5,000 Units SubCUTAneous 3 times per day    sodium chloride flush  5-40 mL IntraVENous 2 times per day    chlorhexidine  15 mL Mouth/Throat BID    polyvinyl alcohol  1 drop Both Eyes Q4H    And    artificial tears   Both Eyes Q4H    pantoprazole  40 mg IntraVENous BID    piperacillin-tazobactam  3,375 mg IntraVENous Q8H    multivitamin  1 tablet Oral Daily    folic acid  1 mg Oral Daily    atorvastatin  80 mg Oral Nightly      Infusions:    sodium chloride      dextrose       PRN Meds: sodium chloride flush, 5-40 mL, PRN  sodium chloride, , PRN  ondansetron, 4 mg, Q8H PRN   Or  ondansetron, 4 mg, Q6H PRN  polyethylene glycol, 17 g, Daily PRN  acetaminophen, 650 mg, Q6H PRN   Or  acetaminophen, 650 mg, Q6H PRN  albuterol, 2.5 mg, Q4H PRN  glucose, 4 tablet, PRN  dextrose bolus, 125 mL, PRN   Or  dextrose bolus, 250 mL, PRN  glucagon (rDNA), 1 mg, PRN  dextrose, , Continuous PRN        Labs      Recent Results (from the past 24 hour(s))   CBC    Collection Time: 12/02/22 10:54 PM   Result Value Ref Range    WBC 10.7 (H) 4.0 - 10.5 K/CU MM    RBC 3.36 (L) 4.2 - 5.4 M/CU MM    Hemoglobin 9.3 (L) 12.5 - 16.0 GM/DL    Hematocrit 30.0 (L) 37 - 47 %    MCV 89.3 78 - 100 FL    MCH 27.7 27 - 31 PG    MCHC 31.0 (L) 32.0 - 36.0 %    RDW 15.9 (H) 11.7 - 14.9 %    Platelets 108 309 - 890 K/CU MM    MPV 10.9 7.5 - 11.1 FL   Comprehensive Metabolic Panel    Collection Time: 12/02/22 10:54 PM   Result Value Ref Range    Sodium 140 135 - 145 MMOL/L    Potassium 4.4 3.5 - 5.1 MMOL/L    Chloride 93 (L) 99 - 110 mMol/L    CO2 35 (H) 21 - 32 MMOL/L    BUN 47 (H) 6 - 23 MG/DL    Creatinine 2.7 (H) 0.6 - 1.1 MG/DL    Est, Glom Filt Rate 20 (L) >60 mL/min/1.73m2    Glucose 162 (H) 70 - 99 MG/DL    Calcium 8.9 8.3 - 10.6 MG/DL    Albumin 3.1 (L) 3.4 - 5.0 GM/DL    Total Protein 5.6 (L) 6.4 - 8.2 GM/DL    Total Bilirubin 0.2 0.0 - 1.0 MG/DL    ALT 44 (H) 10 - 40 U/L     (H) 15 - 37 IU/L    Alkaline Phosphatase 87 40 - 129 IU/L    Anion Gap 12 4 - 16   Protime/INR & PTT    Collection Time: 12/02/22 10:54 PM   Result Value Ref Range    Protime 12.7 11.7 - 14.5 SECONDS    INR 0.98 INDEX    aPTT 32.3 25.1 - 37.1 SECONDS   Magnesium    Collection Time: 12/02/22 10:54 PM   Result Value Ref Range    Magnesium 2.7 (H) 1.8 - 2.4 mg/dl   Phosphorus    Collection Time: 12/02/22 10:54 PM   Result Value Ref Range    Phosphorus 4.6 2.5 - 4.9 MG/DL   Bilirubin, Direct    Collection Time: 12/02/22 10:54 PM   Result Value Ref Range    Bilirubin, Direct 0.2 0.0 - 0.3 MG/DL   TYPE AND SCREEN    Collection Time: 12/02/22 10:54 PM   Result Value Ref Range    ABO/Rh O POSITIVE     Antibody Screen NEGATIVE    Amylase    Collection Time: 12/02/22 10:54 PM   Result Value Ref Range    Amylase 115 25 - 115 U/L   CK    Collection Time: 12/02/22 10:54 PM   Result Value Ref Range    Total CK 32 26 - 140 IU/L   Gamma GT    Collection Time: 12/02/22 10:54 PM   Result Value Ref Range    GGT 35 5 - 36 IU/L   Lactate Dehydrogenase    Collection Time: 12/02/22 10:54 PM   Result Value Ref Range     (H) 120 - 246 IU/L   Lipase    Collection Time: 12/02/22 10:54 PM   Result Value Ref Range    Lipase 44 13 - 60 IU/L   Troponin    Collection Time: 12/02/22 10:54 PM   Result Value Ref Range    Troponin T 0.060 (H) <0.01 NG/ML   Urinalysis    Collection Time: 12/02/22 11:03 PM   Result Value Ref Range    Color, UA YELLOW YELLOW    Clarity, UA SLIGHTLY CLOUDY (A) CLEAR    Glucose, Urine NEGATIVE NEGATIVE MG/DL    Bilirubin Urine NEGATIVE NEGATIVE MG/DL    Ketones, Urine NEGATIVE NEGATIVE MG/DL    Specific Gravity, UA 1.020 1.001 - 1.035    Blood, Urine TRACE (A) NEGATIVE    pH, Urine 5.5 5.0 - 8.0    Protein,  (A) NEGATIVE MG/DL    Urobilinogen, Urine 0.2 0.2 - 1.0 MG/DL    Nitrite Urine, Quantitative NEGATIVE NEGATIVE    Leukocyte Esterase, Urine NEGATIVE NEGATIVE   Microscopic Urinalysis    Collection Time: 12/02/22 11:03 PM   Result Value Ref Range    RBC, UA 44 (H) 0 - 6 /HPF    WBC, UA 22 (H) 0 - 5 /HPF    Bacteria, UA NEGATIVE NEGATIVE /HPF    WBC Clumps, UA RARE /HPF    Yeast, UA MANY /HPF    Squam Epithel, UA 2 /HPF    Mucus, UA RARE (A) NEGATIVE HPF    Trichomonas, UA NONE SEEN NONE SEEN /HPF    Ca Oxalate Justine, UA FEW /HPF   Bilirubin, Direct    Collection Time: 12/03/22  4:45 AM   Result Value Ref Range    Bilirubin, Direct 0.2 0.0 - 0.3 MG/DL   Magnesium    Collection Time: 12/03/22  4:45 AM   Result Value Ref Range    Magnesium 2.8 (H) 1.8 - 2.4 mg/dl   Phosphorus    Collection Time: 12/03/22  4:45 AM   Result Value Ref Range    Phosphorus 4.4 2.5 - 4.9 MG/DL   Protime/INR & PTT    Collection Time: 12/03/22  4:45 AM   Result Value Ref Range    Protime 12.1 11.7 - 14.5 SECONDS    INR 0.94 INDEX    aPTT 29.4 25.1 - 37.1 SECONDS   Comprehensive Metabolic Panel    Collection Time: 12/03/22  4:45 AM   Result Value Ref Range    Sodium 139 135 - 145 MMOL/L    Potassium 4.2 3.5 - 5.1 MMOL/L    Chloride 91 (L) 99 - 110 mMol/L    CO2 34 (H) 21 - 32 MMOL/L    BUN 49 (H) 6 - 23 MG/DL    Creatinine 2.6 (H) 0.6 - 1.1 MG/DL    Est, Glom Filt Rate 20 (L) >60 mL/min/1.73m2    Glucose 178 (H) 70 - 99 MG/DL    Calcium 8.9 8.3 - 10.6 MG/DL    Albumin 3.1 (L) 3.4 - 5.0 GM/DL    Total Protein 5.5 (L) 6.4 - 8.2 GM/DL    Total Bilirubin 0.2 0.0 - 1.0 MG/DL    ALT 45 (H) 10 - 40 U/L     (H) 15 - 37 IU/L    Alkaline Phosphatase 89 40 - 128 IU/L    Anion Gap 14 4 - 16   Urinalysis    Collection Time: 12/03/22  5:30 AM   Result Value Ref Range    Color, UA YELLOW YELLOW    Clarity, UA CLOUDY (A) CLEAR    Glucose, Urine NEGATIVE NEGATIVE MG/DL    Bilirubin Urine NEGATIVE NEGATIVE MG/DL    Ketones, Urine NEGATIVE NEGATIVE MG/DL    Specific Gravity, UA 1.020 1.001 - 1.035    Blood, Urine MODERATE NUMBER OR AMOUNT OBSERVED (A) NEGATIVE    pH, Urine 5.5 5.0 - 8.0    Protein,  (A) NEGATIVE MG/DL    Urobilinogen, Urine 0.2 0.2 - 1.0 MG/DL    Nitrite Urine, Quantitative NEGATIVE NEGATIVE    Leukocyte Esterase, Urine TRACE (A) NEGATIVE   Microscopic Urinalysis    Collection Time: 12/03/22  5:30 AM   Result Value Ref Range    RBC, UA 39 (H) 0 - 6 /HPF    WBC, UA 31 (H) 0 - 5 /HPF    Bacteria, UA RARE (A) NEGATIVE /HPF    WBC Clumps, UA OCCASIONAL /HPF    Yeast, UA MANY /HPF    Hyphenated Yeast RARE /HPF    Trichomonas, UA NONE SEEN NONE SEEN /HPF        Imaging/Diagnostics Last 24 Hours   XR CHEST PORTABLE    Result Date: 12/3/2022  EXAMINATION: ONE XRAY VIEW OF THE CHEST 12/3/2022 2:39 am COMPARISON: 11/30/2022 HISTORY: ORDERING SYSTEM PROVIDED HISTORY: Organ Procurement TECHNOLOGIST PROVIDED HISTORY: Reason for exam:->Organ Procurement Reason for Exam: Organ Procurement FINDINGS: Endotracheal tube terminates 4.2 cm above the denys. Enteric tube courses below the diaphragm with the tip and side-port not imaged. Right subclavian central venous catheter tip remains in the upper SVC clear lungs. No pneumothorax or pleural effusion, although the left costophrenic sulcus is not included on this image. Cardiac and mediastinal contours normal.  No acute osseous abnormality. 1. Stable support equipment as above. 2. Clear lungs.        Electronically signed by BRIAN Mary CNP on 12/3/2022 at 8:41 AM

## 2022-12-03 NOTE — PROGRESS NOTES
Family coordinator from [address] [place] [address] followed up with this nurse in regards to referral. [facility] has given permission to move forward with organ donation at this time.

## 2022-12-03 NOTE — PROGRESS NOTES
Ntae from [employer] [facility] contacted this nurse to further discuss previous referral. Despite previous communication, it was determined that the patient is an organ donor and [employer] would be moving forward with the process.

## 2022-12-04 NOTE — SIGNIFICANT EVENT
I was present in the operating room for performance of declaration of cardiac death. Life-sustaining support was terminated on 12/4/2022 at 1452 hour. Patient was noted to be in PEA arrest at 1521 hour. 5 minutes of auto resuscitation was observed. Patient was subsequently pronounced dead at 12/4/2022 at 1526 hrs.      Ananya Reyes  [phone]

## 2022-12-04 NOTE — PROGRESS NOTES
12/04/22 0803   Patient Observation   Heart Rate 95   Resp 26   SpO2 98 %   Vent Information   Ventilator ID [id number]   Equipment Changed HME   Ventilator Initiate Yes   Vent Mode AC/VC   $Ventilation $Subsequent Day   Ventilator Settings   FiO2  30 %   Vent Patient Data (Readings)   Flow Sensitivity 3 L/min   Vent Alarm Settings   Low Minute Volume (lpm) 2.5 L/min   High Minute Volume (lpm) 20 L/min   Low Exhaled Vt (ml) 250 mL   High Exhaled Vt (ml) 1000 mL   RR High (bpm) 40 br/min   Apnea (secs) 20 secs   Additional Respiratoray Assessments   Humidification Source HME   Circuit Condensation Drained   Ambu Bag With Mask At Bedside Yes   Airway Clearance   Suction ET Tube   Subglottic Suction Done Yes   Sputum Method Obtained Endotracheal   Sputum Amount Scant   $Obtained Sample $Induced Sputum (charge not used for Bronchoscopy)   ETT    Placement Date: 11/30/22   Placed By: In ED  Airway Type: Cuffed  Airway Tube Size: 8 mm  Location: Oral  Secured At: 23 cm  Measured From: Teeth   Secured At 25 cm   Measured From Lips   ETT Placement Right   Secured By Commercial tube [email or web address]   Site Assessment Dry

## 2022-12-04 NOTE — PROGRESS NOTES
12/04/22 1127   Patient Observation   Heart Rate 94   Resp 24   SpO2 98 %   Vent Information   Ventilator ID [id number]   Vent Mode AC/VC   Ventilator Settings   Vt (Set, mL) 340 mL   Resp Rate (Set) 26 bmp   PEEP/CPAP (cmH2O) 5   FiO2  30 %   Vent Patient Data (Readings)   Vt (Measured) 358 mL   Peak Inspiratory Pressure (cmH2O) 40 cmH2O   Rate Measured 26 br/min   Mean Airway Pressure (cmH2O) 20 cmH20   I:E Ratio 1:1.3   Backup Apnea On   Vent Alarm Settings   High Pressure (cmH2O) 45 cmH2O   Low Minute Volume (lpm) 2.5 L/min   High Minute Volume (lpm) 20 L/min   Low Exhaled Vt (ml) 250 mL   High Exhaled Vt (ml) 1000 mL   RR High (bpm) 40 br/min   Apnea (secs) 20 secs   Additional Respiratoray Assessments   Humidification Source HME   Circuit Condensation Drained   Ambu Bag With Mask At Bedside Yes   Airway Clearance   Suction Device Inline suction catheter   Sputum Method Obtained Endotracheal   Sputum Amount Small   Sputum Color/Odor White   $Obtained Sample $Induced Sputum (charge not used for Bronchoscopy)   ETT    Placement Date: 11/30/22   Placed By: In ED  Airway Type: Cuffed  Airway Tube Size: 8 mm  Location: Oral  Secured At: 23 cm  Measured From: Teeth   Secured At 25 cm   Measured From Lips   ETT Placement Right   Secured By Commercial tube [email or web address]   Site Assessment Dry

## 2022-12-04 NOTE — PROGRESS NOTES
PT EXTUBATED  [address]  INTENSIVIST 1526  TIMEOUT PERFORMED 1528  PROCEDURE START [place]  LFT KIDNEY OUT 1550  PT BODY TAKEN TO [facility] BY THIS NURSE AND Khoi Grossman, RN AT [id number]

## 2022-12-04 NOTE — PROGRESS NOTES
FINDINGS:   The endotracheal tube, nasogastric tube and right subclavian catheter remain. A pneumothorax is not identified. Heart size is normal.  There is no focal   consolidation. There is blunting of the costophrenic angles, as before.    ETT location unchanged

## 2022-12-04 NOTE — PROGRESS NOTES
V2.0  [facility] Critical Care Progress Note      Name:  Sindhu Khanna /Age/Sex: 1960  (63 y.o. female)   MRN & CSN:  [id number] & [id number] Encounter Date/Time: 2022 8:41 AM EST    Location:  -A PCP: Dell Gallagher MD       Hospital Day: 5    Assessment and Plan:   Sindhu Khanna is a 63 y.o. female with pmh of COPD, hypertension, CAD who presents with Cardiac arrest ([facility] [address]    Anoxic brain injury  -Plan for DCD, organ procurement this afternoon   -Neurology following. Acute respiratory failure with hypoxia hypercapnia  Severe COPD O2 reliant in home setting  RSV pneumonia  -Continue full mechanical ventilation at this time. Status post prolonged pulseless electrical activity cardiac arrest with late return of circulation  -Maintain temperature below 37 °C    Acute kidney injury post arrest  E Coli urinary infection  -Continue Zosyn,  pending results of sensitivity    Diet Diet NPO  ADULT TUBE FEEDING; Orogastric; Peptide Based; Continuous; 20; Yes; 10; Q 4 hours; 55; 30; Q 4 hours   DVT Prophylaxis [] Lovenox, []  Heparin, [] SCDs, [] Ambulation,  [] Eliquis, [] Xarelto  [] Coumadin   Code Status Limited   Disposition Plan for organ procurement pending. Surrogate Decision Maker/ POA Spouse     Subjective:     Chief Complaint: Cardiac Arrest     Patient seen and examined this AM.  She remains unresponsive. Labs and vitals reviewed. She is not receiving any sedation, on ventilator. Review of systems impossible given mental status. Plan of care discussed with bedside RN. Plan for organ procurement this afternoon. Review of Systems:    Review of Systems    As above    Objective:      Intake/Output Summary (Last 24 hours) at 2022 0800  Last data filed at 2022 0700  Gross per 24 hour   Intake 503.26 ml   Output 950 ml   Net -446.74 ml          Vitals:   Vitals:    22 0700   BP:    Pulse: 97   Resp: 26   Temp: 99.5 °F (37.5 °C)   SpO2: 98%       Physical Exam:     General: NAD  Eyes: EOMI  ENT: neck supple. ETT and OG in place  Cardiovascular: Regular rate. Respiratory: Clear to auscultation  Gastrointestinal: Soft, non tender  Genitourinary: no suprapubic tenderness. Mcallister catheter in place  Musculoskeletal: No edema  Skin: warm, dry  Neuro: Does not follow commands  Psych: Unable to assess.     Medications:   Medications:    heparin (porcine)  5,000 Units SubCUTAneous 3 times per day    sodium chloride flush  5-40 mL IntraVENous 2 times per day    chlorhexidine  15 mL Mouth/Throat BID    polyvinyl alcohol  1 drop Both Eyes Q4H    And    artificial tears   Both Eyes Q4H    pantoprazole  40 mg IntraVENous BID    piperacillin-tazobactam  3,375 mg IntraVENous Q8H    multivitamin  1 tablet Oral Daily    folic acid  1 mg Oral Daily    atorvastatin  80 mg Oral Nightly      Infusions:    sodium chloride 100 mL/hr at 12/04/22 0450    dextrose       PRN Meds: sodium chloride flush, 5-40 mL, PRN  sodium chloride, , PRN  ondansetron, 4 mg, Q8H PRN   Or  ondansetron, 4 mg, Q6H PRN  polyethylene glycol, 17 g, Daily PRN  acetaminophen, 650 mg, Q6H PRN   Or  acetaminophen, 650 mg, Q6H PRN  albuterol, 2.5 mg, Q4H PRN  glucose, 4 tablet, PRN  dextrose bolus, 125 mL, PRN   Or  dextrose bolus, 250 mL, PRN  glucagon (rDNA), 1 mg, PRN  dextrose, , Continuous PRN      Labs      Recent Results (from the past 24 hour(s))   Blood gas, arterial    Collection Time: 12/03/22 11:00 AM   Result Value Ref Range    pH, Bld 7.53 (H) 7.34 - 7.45    pCO2, Arterial 47.0 (H) 32 - 45 MMHG    pO2, Arterial 99 75 - 100 MMHG    Base Exc, Mixed 14.6 (H) 0 - 2.3    HCO3, Arterial 39.3 (H) 18 - 23 MMOL/L    CO2 Content 40.7 (H) 19 - 24 MMOL/L    O2 Sat 95.7 (L) 96 - 97 %    Carbon Monoxide, Blood 1.0 0 - 5 %    Methemoglobin, Arterial 1.2 <1.5 %    Comment ACVC 26/340/5/.30    Bilirubin, Direct    Collection Time: 12/03/22 11:25 AM   Result Value Ref Range    Bilirubin, Direct 0.2 0.0 - 0.3 MG/DL Magnesium    Collection Time: 12/03/22 11:25 AM   Result Value Ref Range    Magnesium 2.8 (H) 1.8 - 2.4 mg/dl   Phosphorus    Collection Time: 12/03/22 11:25 AM   Result Value Ref Range    Phosphorus 4.1 2.5 - 4.9 MG/DL   Protime/INR & PTT    Collection Time: 12/03/22 11:25 AM   Result Value Ref Range    Protime 12.3 11.7 - 14.5 SECONDS    INR 0.95 INDEX    aPTT 29.2 25.1 - 37.1 SECONDS   Comprehensive Metabolic Panel    Collection Time: 12/03/22 11:25 AM   Result Value Ref Range    Sodium 141 135 - 145 MMOL/L    Potassium 4.0 3.5 - 5.1 MMOL/L    Chloride 94 (L) 99 - 110 mMol/L    CO2 34 (H) 21 - 32 MMOL/L    BUN 49 (H) 6 - 23 MG/DL    Creatinine 2.6 (H) 0.6 - 1.1 MG/DL    Est, Glom Filt Rate 20 (L) >60 mL/min/1.73m2    Glucose 176 (H) 70 - 99 MG/DL    Calcium 9.0 8.3 - 10.6 MG/DL    Albumin 3.0 (L) 3.4 - 5.0 GM/DL    Total Protein 5.4 (L) 6.4 - 8.2 GM/DL    Total Bilirubin 0.2 0.0 - 1.0 MG/DL    ALT 46 (H) 10 - 40 U/L     (H) 15 - 37 IU/L    Alkaline Phosphatase 85 40 - 128 IU/L    Anion Gap 13 4 - 16   CBC    Collection Time: 12/03/22 11:25 AM   Result Value Ref Range    WBC 10.1 4.0 - 10.5 K/CU MM    RBC 3.28 (L) 4.2 - 5.4 M/CU MM    Hemoglobin 8.8 (L) 12.5 - 16.0 GM/DL    Hematocrit 29.1 (L) 37 - 47 %    MCV 88.7 78 - 100 FL    MCH 26.8 (L) 27 - 31 PG    MCHC 30.2 (L) 32.0 - 36.0 %    RDW 16.0 (H) 11.7 - 14.9 %    Platelets 075 260 - 155 K/CU MM    MPV 10.7 7.5 - 11.1 FL   Blood gas, arterial    Collection Time: 12/03/22  3:00 PM   Result Value Ref Range    pH, Bld 7.56 (H) 7.34 - 7.45    pCO2, Arterial 43.0 32 - 45 MMHG    pO2, Arterial 114 (H) 75 - 100 MMHG    Base Exc, Mixed 14.6 (H) 0 - 2.3    HCO3, Arterial 38.5 (H) 18 - 23 MMOL/L    CO2 Content 39.8 (H) 19 - 24 MMOL/L    O2 Sat 95.9 (L) 96 - 97 %    Carbon Monoxide, Blood 0.9 0 - 5 %    Methemoglobin, Arterial 1.4 <1.5 %    Comment NA    Bilirubin, Direct    Collection Time: 12/03/22  6:04 PM   Result Value Ref Range    Bilirubin, Direct 0.2 0.0 - 0.3 MG/DL   Magnesium    Collection Time: 12/03/22  6:04 PM   Result Value Ref Range    Magnesium 2.7 (H) 1.8 - 2.4 mg/dl   Phosphorus    Collection Time: 12/03/22  6:04 PM   Result Value Ref Range    Phosphorus 3.7 2.5 - 4.9 MG/DL   Protime/INR & PTT    Collection Time: 12/03/22  6:04 PM   Result Value Ref Range    Protime 13.2 11.7 - 14.5 SECONDS    INR 1.02 INDEX    aPTT 29.0 25.1 - 37.1 SECONDS   Comprehensive Metabolic Panel    Collection Time: 12/03/22  6:04 PM   Result Value Ref Range    Sodium 140 135 - 145 MMOL/L    Potassium 3.8 3.5 - 5.1 MMOL/L    Chloride 93 (L) 99 - 110 mMol/L    CO2 36 (H) 21 - 32 MMOL/L    BUN 50 (H) 6 - 23 MG/DL    Creatinine 2.5 (H) 0.6 - 1.1 MG/DL    Est, Glom Filt Rate 21 (L) >60 mL/min/1.73m2    Glucose 138 (H) 70 - 99 MG/DL    Calcium 8.9 8.3 - 10.6 MG/DL    Albumin 3.1 (L) 3.4 - 5.0 GM/DL    Total Protein 5.6 (L) 6.4 - 8.2 GM/DL    Total Bilirubin 0.2 0.0 - 1.0 MG/DL    ALT 47 (H) 10 - 40 U/L     (H) 15 - 37 IU/L    Alkaline Phosphatase 83 40 - 128 IU/L    Anion Gap 11 4 - 16   COVID-19, Rapid    Collection Time: 12/03/22  6:05 PM    Specimen: Nasopharyngeal   Result Value Ref Range    Source NARES     SARS-CoV-2, NAAT NOT DETECTED NOT DETECTED   Blood gas, arterial    Collection Time: 12/03/22  7:00 PM   Result Value Ref Range    pH, Bld 7.55 (H) 7.34 - 7.45    pCO2, Arterial 43.0 32 - 45 MMHG    pO2, Arterial 119 (H) 75 - 100 MMHG    Base Exc, Mixed 13.7 (H) 0 - 2.3    HCO3, Arterial 37.6 (H) 18 - 23 MMOL/L    CO2 Content 38.9 (H) 19 - 24 MMOL/L    O2 Sat 96.1 96 - 97 %    Carbon Monoxide, Blood 0.8 0 - 5 %    Methemoglobin, Arterial 1.1 <1.5 %   Blood gas, arterial    Collection Time: 12/03/22 11:00 PM   Result Value Ref Range    pH, Bld 7.56 (H) 7.34 - 7.45    pCO2, Arterial 43.0 32 - 45 MMHG    pO2, Arterial 183 (H) 75 - 100 MMHG    Base Exc, Mixed 14.6 (H) 0 - 2.3    HCO3, Arterial 38.5 (H) 18 - 23 MMOL/L    CO2 Content 39.8 (H) 19 - 24 MMOL/L    O2 Sat 96.4 96 - 97 %    Carbon Monoxide, Blood 1.0 0 - 5 %    Methemoglobin, Arterial 1.3 <1.5 %   Bilirubin, Direct    Collection Time: 12/04/22 12:30 AM   Result Value Ref Range    Bilirubin, Direct 0.2 0.0 - 0.3 MG/DL   Magnesium    Collection Time: 12/04/22 12:30 AM   Result Value Ref Range    Magnesium 2.7 (H) 1.8 - 2.4 mg/dl   Phosphorus    Collection Time: 12/04/22 12:30 AM   Result Value Ref Range    Phosphorus 3.7 2.5 - 4.9 MG/DL   Protime/INR & PTT    Collection Time: 12/04/22 12:30 AM   Result Value Ref Range    Protime 12.9 11.7 - 14.5 SECONDS    INR 1.00 INDEX    aPTT 29.8 25.1 - 37.1 SECONDS   Comprehensive Metabolic Panel    Collection Time: 12/04/22 12:30 AM   Result Value Ref Range    Sodium 142 135 - 145 MMOL/L    Potassium 3.7 3.5 - 5.1 MMOL/L    Chloride 96 (L) 99 - 110 mMol/L    CO2 34 (H) 21 - 32 MMOL/L    BUN 48 (H) 6 - 23 MG/DL    Creatinine 2.4 (H) 0.6 - 1.1 MG/DL    Est, Glom Filt Rate 23 (L) >60 mL/min/1.73m2    Glucose 144 (H) 70 - 99 MG/DL    Calcium 8.8 8.3 - 10.6 MG/DL    Albumin 2.9 (L) 3.4 - 5.0 GM/DL    Total Protein 5.5 (L) 6.4 - 8.2 GM/DL    Total Bilirubin 0.3 0.0 - 1.0 MG/DL    ALT 49 (H) 10 - 40 U/L     (H) 15 - 37 IU/L    Alkaline Phosphatase 81 40 - 128 IU/L    Anion Gap 12 4 - 16   CBC with Auto Differential    Collection Time: 12/04/22 12:30 AM   Result Value Ref Range    WBC 9.2 4.0 - 10.5 K/CU MM    RBC 3.27 (L) 4.2 - 5.4 M/CU MM    Hemoglobin 8.8 (L) 12.5 - 16.0 GM/DL    Hematocrit 29.3 (L) 37 - 47 %    MCV 89.6 78 - 100 FL    MCH 26.9 (L) 27 - 31 PG    MCHC 30.0 (L) 32.0 - 36.0 %    RDW 16.0 (H) 11.7 - 14.9 %    Platelets 831 943 - 502 K/CU MM    MPV 10.9 7.5 - 11.1 FL    Differential Type AUTOMATED DIFFERENTIAL     Segs Relative 79.3 (H) 36 - 66 %    Lymphocytes % 9.3 (L) 24 - 44 %    Monocytes % 8.7 (H) 0 - 4 %    Eosinophils % 0.2 0 - 3 %    Basophils % 0.2 0 - 1 %    Segs Absolute 7.3 K/CU MM    Lymphocytes Absolute 0.9 K/CU MM    Monocytes Absolute 0.8 K/CU MM Eosinophils Absolute 0.0 K/CU MM    Basophils Absolute 0.0 K/CU MM    Nucleated RBC % 0.0 %    Total Nucleated RBC 0.0 K/CU MM    Total Immature Neutrophil 0.21 K/CU MM    Immature Neutrophil % 2.3 (H) 0 - 0.43 %   Urinalysis    Collection Time: 12/04/22  5:00 AM   Result Value Ref Range    Color, UA YELLOW YELLOW    Clarity, UA CLEAR CLEAR    Glucose, Urine NEGATIVE NEGATIVE MG/DL    Bilirubin Urine NEGATIVE NEGATIVE MG/DL    Ketones, Urine NEGATIVE NEGATIVE MG/DL    Specific Gravity, UA 1.010 1.001 - 1.035    Blood, Urine MODERATE NUMBER OR AMOUNT OBSERVED (A) NEGATIVE    pH, Urine 7.0 5.0 - 8.0    Protein,  (A) NEGATIVE MG/DL    Urobilinogen, Urine 0.2 0.2 - 1.0 MG/DL    Nitrite Urine, Quantitative NEGATIVE NEGATIVE    Leukocyte Esterase, Urine SMALL NUMBER OR AMOUNT OBSERVED (A) NEGATIVE   Microscopic Urinalysis    Collection Time: 12/04/22  5:00 AM   Result Value Ref Range    RBC, UA 33 (H) 0 - 6 /HPF    WBC, UA 22 (H) 0 - 5 /HPF    Bacteria, UA NEGATIVE NEGATIVE /HPF    Yeast, UA MANY /HPF    Trichomonas, UA NONE SEEN NONE SEEN /HPF   Bilirubin, Direct    Collection Time: 12/04/22  5:15 AM   Result Value Ref Range    Bilirubin, Direct 0.2 0.0 - 0.3 MG/DL   Magnesium    Collection Time: 12/04/22  5:15 AM   Result Value Ref Range    Magnesium 2.7 (H) 1.8 - 2.4 mg/dl   Phosphorus    Collection Time: 12/04/22  5:15 AM   Result Value Ref Range    Phosphorus 3.7 2.5 - 4.9 MG/DL   Protime/INR & PTT    Collection Time: 12/04/22  5:15 AM   Result Value Ref Range    Protime 12.8 11.7 - 14.5 SECONDS    INR 0.99 INDEX    aPTT 28.3 25.1 - 37.1 SECONDS   Comprehensive Metabolic Panel    Collection Time: 12/04/22  5:15 AM   Result Value Ref Range    Sodium 144 135 - 145 MMOL/L    Potassium 3.6 3.5 - 5.1 MMOL/L    Chloride 98 (L) 99 - 110 mMol/L    CO2 35 (H) 21 - 32 MMOL/L    BUN 50 (H) 6 - 23 MG/DL    Creatinine 2.3 (H) 0.6 - 1.1 MG/DL    Est, Glom Filt Rate 24 (L) >60 mL/min/1.73m2    Glucose 161 (H) 70 - 99 MG/DL Calcium 8.9 8.3 - 10.6 MG/DL    Albumin 3.0 (L) 3.4 - 5.0 GM/DL    Total Protein 5.7 (L) 6.4 - 8.2 GM/DL    Total Bilirubin 0.2 0.0 - 1.0 MG/DL    ALT 51 (H) 10 - 40 U/L     (H) 15 - 37 IU/L    Alkaline Phosphatase 82 40 - 129 IU/L    Anion Gap 11 4 - 16        Imaging/Diagnostics Last 24 Hours   XR CHEST PORTABLE    Result Date: 12/3/2022  EXAMINATION: ONE XRAY VIEW OF THE CHEST 12/3/2022 2:39 am COMPARISON: 11/30/2022 HISTORY: ORDERING SYSTEM PROVIDED HISTORY: Organ Procurement TECHNOLOGIST PROVIDED HISTORY: Reason for exam:->Organ Procurement Reason for Exam: Organ Procurement FINDINGS: Endotracheal tube terminates 4.2 cm above the denys. Enteric tube courses below the diaphragm with the tip and side-port not imaged. Right subclavian central venous catheter tip remains in the upper SVC clear lungs. No pneumothorax or pleural effusion, although the left costophrenic sulcus is not included on this image. Cardiac and mediastinal contours normal.  No acute osseous abnormality. 1. Stable support equipment as above. 2. Clear lungs.        Electronically signed by BRIAN Pelayo CNP on 12/4/2022 at 8:00 AM

## 2022-12-04 NOTE — ACP (ADVANCE CARE PLANNING)
Goals of care discussions held with family over the last several few days. Family wishing to withdraw care, life sustaining measure and  proceed with organ donation via DCD.

## 2022-12-04 NOTE — PROGRESS NOTES
V2.0  [facility] Hospitalist Progress Note      Name:  Tito Saldana /Age/Sex: 1960  (63 y.o. female)   MRN & CSN:  [id number] & [id number] Encounter Date/Time: 2022 4:17 PM EST    Location:  -A PCP: Real Iniguez MD       Hospital Day: 5    Assessment and Plan:   Tito Saldana is a 63 y.o. female with pmh of COPD on 3L at baseline, OHS, HTN, HLD, CAD, and CHF who presented with Cardiac arrest [facility]) via EMS after being found unresponsive. epilepticus seen over the entire monitoring period. Plan:  #Cardiac arrest  #Acute hypoxic and hypercabic failure  #Hypoxic encephalopathy  --Currently intubated, on vent with adjustments to optimize acid-base status. She remains unsedated but unresponsive except to painful stimuli  --She remains on Arctic sun with fever prevention, repeat echo showed EF of 70%, it was a limited study and LVSF was noted to be hyperdynamic. Troponemia status post arrest  --CT of the head  highly suggestive for early global anoxic injury. Cerebel suspicious for seizures, EEG performed showing burst suppression pattern but no electrographic seizures or nonconvulsive status   --CTA chest abdomen and pelvis with contrast showing no acute vascular injury including dissection, no pulmonary embolus, airspace opacities in the lung suggestive of aspiration or infection superimposed on chronic lung changes including emphysema. No acute intra-abdominal abnormality, diverticulosis and small volume ascites  --Zosyn D4 to cover aspiration pneumonia, albuterol as needed   --MRSA swab neg, Bcx neg  --Family is agreed to DCD organ procurement, planned for OR at 2pm today    #? GIB  --NG tube in place, monitor output. Protonix twice daily at this time  --H/H stable, trend CBC, transfuse for hemoglobin less than 7    #E. Coli UTI  --Currently on zosyn    #Decreased urine output  #LUZ MARIA  --Output remains minimal, Cr 2.3 this AM, could be secondary to ATN  --Consider starting on IVF rehydration and monitor I/O strictly  --Consider nephrology consult    #Hyperkalemia - Resolved  --K stable, continue to monitor    #COPD  --Currently intubated and on vent support secondary to hypoxic and hypercapnic respiratory failure in the setting of cardiac arrest  --Albuterol as needed    #Hypothyroidism  --TSH elevated  --Connfirm home dose of levothyroxine if she is on it and restart   --Re-eval when she is more stable    #HTN  --Weaned off vasopressors, monitor BP and adjust antihypertensive therapy as appropriate    #H/O SVT  --remains tachycardic to 100s, consider restarting diltiazem if BP will tolerate    #HLD  --Continue Lipitor    Care by critical care team at this time    Diet Diet NPO  ADULT TUBE FEEDING; Orogastric; Peptide Based; Continuous; 20; Yes; 10; Q 4 hours; 55; 30; Q 4 hours   DVT Prophylaxis [x] Lovenox, []  Heparin, [] SCDs, [] Ambulation,  [] Eliquis, [] Xarelto  [] Coumadin   Code Status Limited   Disposition From: Home  Expected Disposition: TBD  Estimated Date of Discharge: TBD  Patient requires continued admission due to cardiac arrest requiring critical care   Surrogate Decision Maker/ POA      Subjective:     Chief Complaint: Cardiac Arrest       Patient seen and examined at bedside, nursing at bedside. She is intubated and on the vent. Review of Systems:    Review of Systems   Reason unable to perform ROS: Intubated, unsedated but unresponsive. Objective: Intake/Output Summary (Last 24 hours) at 12/4/2022 1120  Last data filed at 12/4/2022 0834  Gross per 24 hour   Intake 533.26 ml   Output 950 ml   Net -416.74 ml          Vitals:   Vitals:    12/04/22 1100   BP:    Pulse: 96   Resp: 26   Temp: 99.5 °F (37.5 °C)   SpO2: 98%       Physical Exam:   General: Intubated and unresponsive  Eyes: Unable to assess  HENT: ETT in place  Cardiovascular: Tachycardic. Respiratory: Rhonchorous breath sounds. Gastrointestinal: Soft, non tender  Genitourinary:  Mcallister in place  Musculoskeletal: No edema  Skin: warm, dry  Neuro: Both feet seem to respond to pain with slight twitch of toes. Psych: Unable to assess.      Medications:   Medications:    heparin (porcine)  5,000 Units SubCUTAneous 3 times per day    sodium chloride flush  5-40 mL IntraVENous 2 times per day    chlorhexidine  15 mL Mouth/Throat BID    polyvinyl alcohol  1 drop Both Eyes Q4H    And    artificial tears   Both Eyes Q4H    pantoprazole  40 mg IntraVENous BID    piperacillin-tazobactam  3,375 mg IntraVENous Q8H    multivitamin  1 tablet Oral Daily    folic acid  1 mg Oral Daily    atorvastatin  80 mg Oral Nightly      Infusions:    sodium chloride 100 mL/hr at 12/04/22 0450    dextrose       PRN Meds: sodium chloride flush, 5-40 mL, PRN  sodium chloride, , PRN  ondansetron, 4 mg, Q8H PRN   Or  ondansetron, 4 mg, Q6H PRN  polyethylene glycol, 17 g, Daily PRN  acetaminophen, 650 mg, Q6H PRN   Or  acetaminophen, 650 mg, Q6H PRN  albuterol, 2.5 mg, Q4H PRN  glucose, 4 tablet, PRN  dextrose bolus, 125 mL, PRN   Or  dextrose bolus, 250 mL, PRN  glucagon (rDNA), 1 mg, PRN  dextrose, , Continuous PRN      Labs      Recent Results (from the past 24 hour(s))   Bilirubin, Direct    Collection Time: 12/03/22 11:25 AM   Result Value Ref Range    Bilirubin, Direct 0.2 0.0 - 0.3 MG/DL   Magnesium    Collection Time: 12/03/22 11:25 AM   Result Value Ref Range    Magnesium 2.8 (H) 1.8 - 2.4 mg/dl   Phosphorus    Collection Time: 12/03/22 11:25 AM   Result Value Ref Range    Phosphorus 4.1 2.5 - 4.9 MG/DL   Protime/INR & PTT    Collection Time: 12/03/22 11:25 AM   Result Value Ref Range    Protime 12.3 11.7 - 14.5 SECONDS    INR 0.95 INDEX    aPTT 29.2 25.1 - 37.1 SECONDS   Comprehensive Metabolic Panel    Collection Time: 12/03/22 11:25 AM   Result Value Ref Range    Sodium 141 135 - 145 MMOL/L    Potassium 4.0 3.5 - 5.1 MMOL/L    Chloride 94 (L) 99 - 110 mMol/L    CO2 34 (H) 21 - 32 MMOL/L    BUN 49 (H) 6 - 23 MG/DL Creatinine 2.6 (H) 0.6 - 1.1 MG/DL    Est, Glom Filt Rate 20 (L) >60 mL/min/1.73m2    Glucose 176 (H) 70 - 99 MG/DL    Calcium 9.0 8.3 - 10.6 MG/DL    Albumin 3.0 (L) 3.4 - 5.0 GM/DL    Total Protein 5.4 (L) 6.4 - 8.2 GM/DL    Total Bilirubin 0.2 0.0 - 1.0 MG/DL    ALT 46 (H) 10 - 40 U/L     (H) 15 - 37 IU/L    Alkaline Phosphatase 85 40 - 128 IU/L    Anion Gap 13 4 - 16   CBC    Collection Time: 12/03/22 11:25 AM   Result Value Ref Range    WBC 10.1 4.0 - 10.5 K/CU MM    RBC 3.28 (L) 4.2 - 5.4 M/CU MM    Hemoglobin 8.8 (L) 12.5 - 16.0 GM/DL    Hematocrit 29.1 (L) 37 - 47 %    MCV 88.7 78 - 100 FL    MCH 26.8 (L) 27 - 31 PG    MCHC 30.2 (L) 32.0 - 36.0 %    RDW 16.0 (H) 11.7 - 14.9 %    Platelets 202 292 - 019 K/CU MM    MPV 10.7 7.5 - 11.1 FL   Blood gas, arterial    Collection Time: 12/03/22  3:00 PM   Result Value Ref Range    pH, Bld 7.56 (H) 7.34 - 7.45    pCO2, Arterial 43.0 32 - 45 MMHG    pO2, Arterial 114 (H) 75 - 100 MMHG    Base Exc, Mixed 14.6 (H) 0 - 2.3    HCO3, Arterial 38.5 (H) 18 - 23 MMOL/L    CO2 Content 39.8 (H) 19 - 24 MMOL/L    O2 Sat 95.9 (L) 96 - 97 %    Carbon Monoxide, Blood 0.9 0 - 5 %    Methemoglobin, Arterial 1.4 <1.5 %    Comment NA    Bilirubin, Direct    Collection Time: 12/03/22  6:04 PM   Result Value Ref Range    Bilirubin, Direct 0.2 0.0 - 0.3 MG/DL   Magnesium    Collection Time: 12/03/22  6:04 PM   Result Value Ref Range    Magnesium 2.7 (H) 1.8 - 2.4 mg/dl   Phosphorus    Collection Time: 12/03/22  6:04 PM   Result Value Ref Range    Phosphorus 3.7 2.5 - 4.9 MG/DL   Protime/INR & PTT    Collection Time: 12/03/22  6:04 PM   Result Value Ref Range    Protime 13.2 11.7 - 14.5 SECONDS    INR 1.02 INDEX    aPTT 29.0 25.1 - 37.1 SECONDS   Comprehensive Metabolic Panel    Collection Time: 12/03/22  6:04 PM   Result Value Ref Range    Sodium 140 135 - 145 MMOL/L    Potassium 3.8 3.5 - 5.1 MMOL/L    Chloride 93 (L) 99 - 110 mMol/L    CO2 36 (H) 21 - 32 MMOL/L    BUN 50 (H) 6 - 23 MG/DL    Creatinine 2.5 (H) 0.6 - 1.1 MG/DL    Est, Glom Filt Rate 21 (L) >60 mL/min/1.73m2    Glucose 138 (H) 70 - 99 MG/DL    Calcium 8.9 8.3 - 10.6 MG/DL    Albumin 3.1 (L) 3.4 - 5.0 GM/DL    Total Protein 5.6 (L) 6.4 - 8.2 GM/DL    Total Bilirubin 0.2 0.0 - 1.0 MG/DL    ALT 47 (H) 10 - 40 U/L     (H) 15 - 37 IU/L    Alkaline Phosphatase 83 40 - 128 IU/L    Anion Gap 11 4 - 16   COVID-19, Rapid    Collection Time: 12/03/22  6:05 PM    Specimen: Nasopharyngeal   Result Value Ref Range    Source NARES     SARS-CoV-2, NAAT NOT DETECTED NOT DETECTED   Blood gas, arterial    Collection Time: 12/03/22  7:00 PM   Result Value Ref Range    pH, Bld 7.55 (H) 7.34 - 7.45    pCO2, Arterial 43.0 32 - 45 MMHG    pO2, Arterial 119 (H) 75 - 100 MMHG    Base Exc, Mixed 13.7 (H) 0 - 2.3    HCO3, Arterial 37.6 (H) 18 - 23 MMOL/L    CO2 Content 38.9 (H) 19 - 24 MMOL/L    O2 Sat 96.1 96 - 97 %    Carbon Monoxide, Blood 0.8 0 - 5 %    Methemoglobin, Arterial 1.1 <1.5 %   Blood gas, arterial    Collection Time: 12/03/22 11:00 PM   Result Value Ref Range    pH, Bld 7.56 (H) 7.34 - 7.45    pCO2, Arterial 43.0 32 - 45 MMHG    pO2, Arterial 183 (H) 75 - 100 MMHG    Base Exc, Mixed 14.6 (H) 0 - 2.3    HCO3, Arterial 38.5 (H) 18 - 23 MMOL/L    CO2 Content 39.8 (H) 19 - 24 MMOL/L    O2 Sat 96.4 96 - 97 %    Carbon Monoxide, Blood 1.0 0 - 5 %    Methemoglobin, Arterial 1.3 <1.5 %   Bilirubin, Direct    Collection Time: 12/04/22 12:30 AM   Result Value Ref Range    Bilirubin, Direct 0.2 0.0 - 0.3 MG/DL   Magnesium    Collection Time: 12/04/22 12:30 AM   Result Value Ref Range    Magnesium 2.7 (H) 1.8 - 2.4 mg/dl   Phosphorus    Collection Time: 12/04/22 12:30 AM   Result Value Ref Range    Phosphorus 3.7 2.5 - 4.9 MG/DL   Protime/INR & PTT    Collection Time: 12/04/22 12:30 AM   Result Value Ref Range    Protime 12.9 11.7 - 14.5 SECONDS    INR 1.00 INDEX    aPTT 29.8 25.1 - 37.1 SECONDS   Comprehensive Metabolic Panel    Collection Time: 12/04/22 12:30 AM   Result Value Ref Range    Sodium 142 135 - 145 MMOL/L    Potassium 3.7 3.5 - 5.1 MMOL/L    Chloride 96 (L) 99 - 110 mMol/L    CO2 34 (H) 21 - 32 MMOL/L    BUN 48 (H) 6 - 23 MG/DL    Creatinine 2.4 (H) 0.6 - 1.1 MG/DL    Est, Glom Filt Rate 23 (L) >60 mL/min/1.73m2    Glucose 144 (H) 70 - 99 MG/DL    Calcium 8.8 8.3 - 10.6 MG/DL    Albumin 2.9 (L) 3.4 - 5.0 GM/DL    Total Protein 5.5 (L) 6.4 - 8.2 GM/DL    Total Bilirubin 0.3 0.0 - 1.0 MG/DL    ALT 49 (H) 10 - 40 U/L     (H) 15 - 37 IU/L    Alkaline Phosphatase 81 40 - 128 IU/L    Anion Gap 12 4 - 16   CBC with Auto Differential    Collection Time: 12/04/22 12:30 AM   Result Value Ref Range    WBC 9.2 4.0 - 10.5 K/CU MM    RBC 3.27 (L) 4.2 - 5.4 M/CU MM    Hemoglobin 8.8 (L) 12.5 - 16.0 GM/DL    Hematocrit 29.3 (L) 37 - 47 %    MCV 89.6 78 - 100 FL    MCH 26.9 (L) 27 - 31 PG    MCHC 30.0 (L) 32.0 - 36.0 %    RDW 16.0 (H) 11.7 - 14.9 %    Platelets 219 343 - 256 K/CU MM    MPV 10.9 7.5 - 11.1 FL    Differential Type AUTOMATED DIFFERENTIAL     Segs Relative 79.3 (H) 36 - 66 %    Lymphocytes % 9.3 (L) 24 - 44 %    Monocytes % 8.7 (H) 0 - 4 %    Eosinophils % 0.2 0 - 3 %    Basophils % 0.2 0 - 1 %    Segs Absolute 7.3 K/CU MM    Lymphocytes Absolute 0.9 K/CU MM    Monocytes Absolute 0.8 K/CU MM    Eosinophils Absolute 0.0 K/CU MM    Basophils Absolute 0.0 K/CU MM    Nucleated RBC % 0.0 %    Total Nucleated RBC 0.0 K/CU MM    Total Immature Neutrophil 0.21 K/CU MM    Immature Neutrophil % 2.3 (H) 0 - 0.43 %   Urinalysis    Collection Time: 12/04/22  5:00 AM   Result Value Ref Range    Color, UA YELLOW YELLOW    Clarity, UA CLEAR CLEAR    Glucose, Urine NEGATIVE NEGATIVE MG/DL    Bilirubin Urine NEGATIVE NEGATIVE MG/DL    Ketones, Urine NEGATIVE NEGATIVE MG/DL    Specific Gravity, UA 1.010 1.001 - 1.035    Blood, Urine MODERATE NUMBER OR AMOUNT OBSERVED (A) NEGATIVE    pH, Urine 7.0 5.0 - 8.0    Protein,  (A) NEGATIVE MG/DL Urobilinogen, Urine 0.2 0.2 - 1.0 MG/DL    Nitrite Urine, Quantitative NEGATIVE NEGATIVE    Leukocyte Esterase, Urine SMALL NUMBER OR AMOUNT OBSERVED (A) NEGATIVE   Microscopic Urinalysis    Collection Time: 12/04/22  5:00 AM   Result Value Ref Range    RBC, UA 33 (H) 0 - 6 /HPF    WBC, UA 22 (H) 0 - 5 /HPF    Bacteria, UA NEGATIVE NEGATIVE /HPF    Yeast, UA MANY /HPF    Trichomonas, UA NONE SEEN NONE SEEN /HPF   Bilirubin, Direct    Collection Time: 12/04/22  5:15 AM   Result Value Ref Range    Bilirubin, Direct 0.2 0.0 - 0.3 MG/DL   Magnesium    Collection Time: 12/04/22  5:15 AM   Result Value Ref Range    Magnesium 2.7 (H) 1.8 - 2.4 mg/dl   Phosphorus    Collection Time: 12/04/22  5:15 AM   Result Value Ref Range    Phosphorus 3.7 2.5 - 4.9 MG/DL   Protime/INR & PTT    Collection Time: 12/04/22  5:15 AM   Result Value Ref Range    Protime 12.8 11.7 - 14.5 SECONDS    INR 0.99 INDEX    aPTT 28.3 25.1 - 37.1 SECONDS   Comprehensive Metabolic Panel    Collection Time: 12/04/22  5:15 AM   Result Value Ref Range    Sodium 144 135 - 145 MMOL/L    Potassium 3.6 3.5 - 5.1 MMOL/L    Chloride 98 (L) 99 - 110 mMol/L    CO2 35 (H) 21 - 32 MMOL/L    BUN 50 (H) 6 - 23 MG/DL    Creatinine 2.3 (H) 0.6 - 1.1 MG/DL    Est, Glom Filt Rate 24 (L) >60 mL/min/1.73m2    Glucose 161 (H) 70 - 99 MG/DL    Calcium 8.9 8.3 - 10.6 MG/DL    Albumin 3.0 (L) 3.4 - 5.0 GM/DL    Total Protein 5.7 (L) 6.4 - 8.2 GM/DL    Total Bilirubin 0.2 0.0 - 1.0 MG/DL    ALT 51 (H) 10 - 40 U/L     (H) 15 - 37 IU/L    Alkaline Phosphatase 82 40 - 129 IU/L    Anion Gap 11 4 - 16   Blood gas, arterial    Collection Time: 12/04/22  9:00 AM   Result Value Ref Range    pH, Bld 7.53 (H) 7.34 - 7.45    pCO2, Arterial 41.0 32 - 45 MMHG    pO2, Arterial 115 (H) 75 - 100 MMHG    Base Exc, Mixed 10.5 (H) 0 - 2.3    HCO3, Arterial 34.3 (H) 18 - 23 MMOL/L    CO2 Content 35.6 (H) 19 - 24 MMOL/L    O2 Sat 96.5 96 - 97 %    Carbon Monoxide, Blood 0.6 0 - 5 % Methemoglobin, Arterial 0.8 <1.5 %   CBC    Collection Time: 12/04/22 10:40 AM   Result Value Ref Range    WBC 7.9 4.0 - 10.5 K/CU MM    RBC 3.26 (L) 4.2 - 5.4 M/CU MM    Hemoglobin 8.8 (L) 12.5 - 16.0 GM/DL    Hematocrit 28.8 (L) 37 - 47 %    MCV 88.3 78 - 100 FL    MCH 27.0 27 - 31 PG    MCHC 30.6 (L) 32.0 - 36.0 %    RDW 16.0 (H) 11.7 - 14.9 %    Platelets 772 919 - 072 K/CU MM    MPV 10.3 7.5 - 11.1 FL        Imaging/Diagnostics Last 24 Hours   CT HEAD WO CONTRAST    Result Date: 11/30/2022  EXAMINATION: CT OF THE HEAD WITHOUT CONTRAST  11/30/2022 3:35 am TECHNIQUE: CT of the head was performed without the administration of intravenous contrast. Automated exposure control, iterative reconstruction, and/or weight based adjustment of the mA/kV was utilized to reduce the radiation dose to as low as reasonably achievable. COMPARISON: December 25, 2021 HISTORY: ORDERING SYSTEM PROVIDED HISTORY: Cardiac arrest TECHNOLOGIST PROVIDED HISTORY: Reason for exam:->Cardiac arrest Has a \"code stroke\" or \"stroke alert\" been called? ->No Decision Support Exception - unselect if not a suspected or confirmed emergency medical condition->Emergency Medical Condition (MA) Reason for Exam: Cardiac arrest FINDINGS: BRAIN/VENTRICLES: Compared to the prior study, there is subtle loss of gray-white matter differentiation. Additionally, there is increased sulci effacement and the ventricles are smaller in size. 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 No midline shift. No downward herniation. ORBITS: The visualized portion of the orbits demonstrate no acute abnormality. SINUSES: Ethmoid and maxillary sinus disease is seen. There is a small left mastoid effusion. SOFT TISSUES/SKULL:  No acute abnormality of the visualized skull or soft tissues. CT findings are highly suggestive of early global anoxic injury as described above. Sinus disease and a nonspecific left mastoid effusion is noted.  Findings were discussed with Dr. Marbin Kay on 11/30/2022 at 4:09 a.m.     XR CHEST PORTABLE    Result Date: 11/30/2022  Kaykay Farmer MD     11/30/2022  6:00 AM Central Venous Catheter Insertion Procedure Note Procedure: Insertion of Central Venous Catheter Procedure Clinician: Kaykay Farmer MD Procedure Assistant: None Indications: Vasoactive agents Size and location: 7Fr TLC, right subclavian vein Attempts: 1 Procedure Details: Emergent Under sterile conditions the skin above the Right subclavian veing was prepped with chlorhexidine and covered with a sterile drape. Local anesthesia was applied to the skin and subcutaneous tissues. Ultrasound was used to localize the vein and an 18-gauge needle was then inserted into the vein. A drop test was performed and was negative for any evidence of arterial flow. A guide wire was then passed easily through the catheter. There were no arrhythmias. Ultrasound was used to identify the guidewire once in the vein. The catheter was then withdrawn. A 7Fr TLC was then inserted into the vessel over the guide wire. The guidewire was then removed with the tip intact, and witnessed by bedside nurse. The catheter was sutured into place. Findings: There were no changes to vital signs. Catheter was flushed with 20 mL NS. Patient tolerated the procedure well. Lung ultrasound: Pleural sliding - yes Lung point - no A lines - yes B lines - no Consolidation? Not assessed Pleural effusion? Not assessed Recommendations: CXR ordered to verify placement. CTA CHEST ABDOMEN PELVIS W CONTRAST    Result Date: 11/30/2022  EXAMINATION: CTA OF THE CHEST, ABDOMEN AND PELVIS WITH CONTRAST 11/30/2022 3:35 am: TECHNIQUE: CTA of the chest, abdomen and pelvis was performed after the administration of intravenous contrast.  Multiplanar reformatted images are provided for review. MIP images are provided for review.  Automated exposure control, iterative reconstruction, and/or weight based adjustment of the mA/kV was utilized to reduce the radiation dose to as low as reasonably achievable. COMPARISON: November 6, 2022. December 29, 2021. HISTORY: ORDERING SYSTEM PROVIDED HISTORY: Cardiac arrest with reported PNA recently and dark colored emesis TECHNOLOGIST PROVIDED HISTORY: Reason for exam:->Cardiac arrest with reported PNA recently and dark colored emesis Decision Support Exception - unselect if not a suspected or confirmed emergency medical condition->Emergency Medical Condition (MA) Reason for Exam: Cardiac arrest with reported PNA recently and dark colored emesis FINDINGS: CTA CHEST: Thoracic aorta: No evidence of thoracic aortic aneurysm or dissection. No acute abnormality of the aorta. Mediastinum: The heart is normal in size without a pericardial effusion. Moderate to severe coronary artery atherosclerosis is present. The aorta, arch branches, and main pulmonary artery are normal in course and caliber. No pulmonary embolus is seen. Lungs/Pleura: The lungs demonstrate patchy airspace opacities superimposed on emphysema. No pleural effusions. Central airways are patent. An endotracheal tube is in place. Diffuse bronchial wall thickening. No significant bronchiectasis. No dominant or suspicious pulmonary nodules. Soft Tissues/Bones: No acute bone or soft tissue abnormality. CTA ABDOMEN: Abdominal aorta/Branches: The abdominal aorta is patent and normal in course. Moderate atherosclerosis is seen. The celiac trunk and its branches, SMA, bilateral renal arteries, and YOVANA are patent. The iliac vasculature is patent with moderate atherosclerosis. Organs: Status post cholecystectomy. No intra or extrahepatic biliary dilatation. The liver, spleen, pancreas, adrenal glands, and kidneys demonstrate no acute abnormality. The collecting systems are normal.  Right renal atrophy is present. GI/Bowel: Nasogastric tube tip terminates in mid stomach.   The small and large bowel are normal in course and caliber without evidence of wall thickening or obstruction. Diverticulosis is noted without evidence of acute diverticulitis. Pelvic organs: Normal bladder. Uterus is unremarkable. No adnexal masses. Peritoneum/Retroperitoneum: Small amount of free fluid is identified in the pelvis and adjacent to the liver and spleen. No free air. No pathologic lymphadenopathy. Bones/Soft Tissues: No acute or aggressive osseous lesion. Mild bony demineralization. 1. No acute vascular injury including dissection. 2. No pulmonary embolus. 3. Airspace opacities in the lungs suggestive of aspiration or infection superimposed on chronic lung changes including emphysema. 4. No acute intra-abdominal abnormality. 5. Small volume ascites of unclear etiology. 6. Diverticulosis.        Electronically signed by Pee Novoa MD on 12/4/2022 at 11:20 AM

## 2022-12-04 NOTE — DISCHARGE SUMMARY
This 29-year-old female with significant past medical history including advanced COPD (O2 reliant in home setting), obesity hypoventilation syndrome, coronary artery disease, diastolic heart dysfunction, hypertension, hyperlipidemia and thyroid disease presented to the hospital emergency room 11/30/2022 via EMS. She was unresponsive PEA arrest during early morning hour 11/30/2022. The patient underwent a very prolonged resuscitation in the emergency room was actually initially pronounced dead at 0239 hours however did recover spontaneous circulation and was subsequently admitted to critical care unit setting. Patient was maintained on mechanical ventilatory support, multiple pressor agent administration for hemodynamic support. Eventually the patient was successfully transitioned off pressor agent administration. Neurologic evaluation was highly suggestive of significant anoxic brain injury. Patient was monitored for a number of days however there was no meaningful recovery of neurologic function. Multiple conversations meetings ensued with the family and ultimately, the family decided to proceed with organ procurement evaluation and pursuit of declaration by cardiac death. To the same end, the patient was transported to the operating room on 12/4/2022. I was asked to assist with the declaration of cardiac death. I have summarized the time of death in my separate significant event note. Time of death 1526 hours, 12/4/2022.     Buchanan May  [phone]

## 2022-12-05 LAB
CULTURE: NORMAL
CULTURE: NORMAL
Lab: NORMAL
Lab: NORMAL
SPECIMEN: NORMAL
SPECIMEN: NORMAL

## 2022-12-06 LAB
CULTURE: ABNORMAL
CULTURE: ABNORMAL
Lab: ABNORMAL
SPECIMEN: ABNORMAL

## 2022-12-08 LAB
CULTURE: NORMAL
Lab: NORMAL
SPECIMEN: NORMAL

## 2022-12-09 LAB
CULTURE: NORMAL
Lab: NORMAL
SPECIMEN: NORMAL

## 2022-12-14 ENCOUNTER — TELEPHONE (OUTPATIENT)
Dept: CARDIOLOGY CLINIC | Age: 62
End: 2022-12-14

## 2022-12-14 NOTE — TELEPHONE ENCOUNTER
-  DOS 11/10/22-11/11/2022-   we received another letter from [address] [place] [address] denying these DOS. I did faxed the Progress Notes, from the same letter, but they are still denying it. I sent it to [email or web address]  To review.

## 2023-02-09 ENCOUNTER — TELEPHONE (OUTPATIENT)
Dept: CARDIOLOGY CLINIC | Age: 63
End: 2023-02-09

## 2023-02-09 NOTE — TELEPHONE ENCOUNTER
Faxed  medical records to [employer] insurance for DOS 11/10/2022-11/11/2022 progress notes and a copy of the claim form proving the DOS are correct and should be process for payment. . I previously sent this to [email or web address]  But this is still not paid and we rec'vd another letter from [facility].

## 2023-02-27 ENCOUNTER — TELEPHONE (OUTPATIENT)
Dept: CARDIOLOGY CLINIC | Age: 63
End: 2023-02-27

## 2023-02-27 NOTE — TELEPHONE ENCOUNTER
Rec'vd letter from [facility] for [facility] for DOS 11/10/2022 - 11/11/2022- they are reviewing the payment dispute and will review it and make a decision within 12 business days.

## 2023-06-12 NOTE — PROGRESS NOTES
Patient has been disconnecting themselves from telemetry and going to the restroom, night shift has been allowing her to do this. Discussed safety issues and that she is not suppose to be off telemetry when going to restroom. Patient became angry and states that everyone else has not had a problem with this and she will continue to do this. She needs to make sure and take her allopurinol daily for her gout--uric acid is high. Also, I am going to add on Farxiga to hopefully help slow down her kidney failure. Other labs are good.

## 2023-06-23 ENCOUNTER — TELEPHONE (OUTPATIENT)
Dept: CARDIOLOGY CLINIC | Age: 63
End: 2023-06-23

## 2023-06-23 NOTE — TELEPHONE ENCOUNTER
[facility] denial- DOS 11/10/2022- I emailed the letter of denial to billing with this message->  Attached is a letter from [address] [place] [address]- This patient is . If this can be reviewed and rebilled at a lower level of service.

## 2023-07-06 NOTE — PROGRESS NOTES
Critical result Glucose 757, reported to [address] [place] [address]  07/06/23 1946 183 (H) 10 - 40 U/L    AST 48 (H) 15 - 37 IU/L    Alkaline Phosphatase 101 40 - 129 IU/L    GFR Non-African American >60 >60 mL/min/1.73m2    GFR African American >60 >60 mL/min/1.73m2    Anion Gap 10 4 - 16   CBC Auto Differential    Collection Time: 10/26/20  5:30 AM   Result Value Ref Range    WBC 12.5 (H) 4.0 - 10.5 K/CU MM    RBC 2.98 (L) 4.2 - 5.4 M/CU MM    Hemoglobin 8.4 (L) 12.5 - 16.0 GM/DL    Hematocrit 30.2 (L) 37 - 47 %    .3 (H) 78 - 100 FL    MCH 28.2 27 - 31 PG    MCHC 27.8 (L) 32.0 - 36.0 %    RDW 13.5 11.7 - 14.9 %    Platelets 827 859 - 701 K/CU MM    MPV 9.6 7.5 - 11.1 FL    Differential Type AUTOMATED DIFFERENTIAL     Segs Relative 91.0 (H) 36 - 66 %    Lymphocytes % 2.7 (L) 24 - 44 %    Monocytes % 5.5 (H) 0 - 4 %    Eosinophils % 0.0 0 - 3 %    Basophils % 0.1 0 - 1 %    Segs Absolute 11.4 K/CU MM    Lymphocytes Absolute 0.3 K/CU MM    Monocytes Absolute 0.7 K/CU MM    Eosinophils Absolute 0.0 K/CU MM    Basophils Absolute 0.0 K/CU MM    Nucleated RBC % 0.0 %    Total Nucleated RBC 0.0 K/CU MM    Total Immature Neutrophil 0.09 K/CU MM    Immature Neutrophil % 0.7 (H) 0 - 0.43 %   Brain Natriuretic Peptide    Collection Time: 10/26/20  5:30 AM   Result Value Ref Range    Pro-BNP 4,841 (H) <300 PG/ML   Blood Gas, Arterial    Collection Time: 10/26/20 11:00 AM   Result Value Ref Range    pH, Bld 7.37 7.34 - 7.45    pCO2, Arterial 80.0 (H) 32 - 45 MMHG    pO2, Arterial 62 (L) 75 - 100 MMHG    Base Exc, Mixed 16.7 (H) 0 - 2.3    HCO3, Arterial 46.2 (H) 18 - 23 MMOL/L    CO2 Content 48.7 (H) 19 - 24 MMOL/L    O2 Sat 92.1 (L) 96 - 97 %    Carbon Monoxide, Blood 2.9 0 - 5 %    Methemoglobin, Arterial 1.2 <1.5 %   Protime-INR    Collection Time: 10/26/20 12:10 PM   Result Value Ref Range    Protime 13.2 11.7 - 14.5 SECONDS    INR 1.09 INDEX   Comprehensive Metabolic Panel w/ Reflex to MG    Collection Time: 10/27/20  5:41 AM   Result Value Ref Range    Sodium 140 135 - 145 MMOL/L    Potassium Continue DVT prophylaxis with Teds and SCDs and SC Lovenox. Continue GI prophylaxis with Protonix IV till able to tolerate PO.   Continue ingrid-op Abx for 24 hrs after surgery then dc.    ___________________________________________    BRIAN Armenta- CNP    10/27/2020  9:05 AM

## 2023-07-17 ENCOUNTER — TELEPHONE (OUTPATIENT)
Dept: CARDIOLOGY CLINIC | Age: 63
End: 2023-07-17

## 2023-07-17 NOTE — TELEPHONE ENCOUNTER
Faxed [facility] [place] letter of denial from [address] 11/10/2022- I emailed it to  [email or web address]   And also  [email or web address]

## 2024-05-30 NOTE — H&P
History and Physical      Name:  Tiffanie Sparks /Age/Sex: 1960  (64 y.o. female)   MRN & CSN:  [id number] & [id number] Admission Date/Time: 10/19/2020 10:07 PM   Location:  ED14/ED-14 PCP: Fernando Waters MD       Hospital Day: 2    Assessment and Plan:   Tiffanie Sparks is a 64 y.o.  female  who presents with Sepsis ([address]    1. Sepsis  2. Right lower lobe pneumonia  3. COPD with chronic hypoxic respiratory failure on home O2  4. Leukocytosis  5. Lactic acid elevation   Patient was given 500 cc bolus in ED. We are holding off on the fluid given pericardial effusion as stated below. Patient also has a history of heart failure with refluxing contrast into the have attic vein and IVC which is commonly seen CHF   Patient is on home O2 and is not decompensating at this time. At home requirement. Denying active shortness of breath. Continue home COPD medications.  Patient was started on azithromycin and ceftriaxone in ED, we will continue these antibiotics   COVID-19 pending, COVID-19 precautions   Blood cultures x2   If COVID-19 results come back positive we will go ahead and order COVID-19 lab work protocol    6. Pericardial effusion, mild to moderate, hemodynamically stable   Moderate pericardial effusion seen on CT of the abdomen pelvis   Patient is hemodynamically stable   Place patient on telemetry   Cardiology consulted, would appreciate recommendations   Patient is septic and we are holding fluids other than bolus in ED unless clinically apparent dehydration.   Patient also has a history of heart failure    Other chronic medical conditions:   Fibromyalgia: Continue home medications   Anxiety: Continue home medications   GERD: Continue home meds   Hypertension: Continue home meds   Insomnia: Continue home meds   Hypothyroidism: Continue home meds   SVT: Continue home meds, currently stable    Diet No diet orders on file   DVT Prophylaxis [x] Lovenox, []  Heparin, [] SCDs, I was paged to admit this patient    Severe hyponatremia with sodium level of 116, with altered mental status, unknown acutely, likely acute, urine drug screening negative  Severe hypokalemia with potassium level of 2.4, with EKG changes, prolonged QTC    Patient is not appropriate for medical floor  Recommend ICU admission for close monitoring, nephrology consultation for hypertonic saline administration    Lobo Self MD  Internal Medicine Hospitalist  Pager: [phone]       [] Ambulation   GI Prophylaxis [] PPI,  [x] H2 Blocker,  [] Carafate,  [] Diet/Tube Feeds   Code Status Prior   Disposition Patient requires continued admission due to Sepsis ([facility]   MDM [] Low, [x] Moderate,[]  High  Patient's risk as above due to acuity of condition with potential for decompensation. History of Present Illness:     Chief Complaint: Sepsis ([facility] [facility]    Abbey Newton is a 64 y.o.  female with a PMH as stated above, who presents with chills, diaphoresis, fatigue, abdominal pain with nausea and vomiting. Patient states symptoms started last night with abdominal pain and this morning she woke up with chills and diaphoresis and associated nausea with vomiting. Patient states she generally felt unwell and fatigued. Patient states I think I am getting pneumonia. \"  Patient states she has had this multiple times and this is how she feels whenever she normally gets it. Patient endorses emesis as nonbilious and nonbloody and denies any diarrhea or constipation. Patient denies any worsening shortness of breath, chest pain, fevers, burning with urination, frequency, or lower extremity edema. Patient does endorse stress urinary incontinence but states she is seeing OB/GYN in 1 month. Discussed case with ED provider. Review of Systems   Constitutional: Positive for chills, diaphoresis and fatigue. Negative for appetite change and fever. HENT: Negative for congestion, rhinorrhea and sore throat. Eyes: Negative for visual disturbance. Respiratory: Positive for shortness of breath (At baseline). Negative for cough, chest tightness and wheezing. Cardiovascular: Negative for chest pain and palpitations. Gastrointestinal: Positive for abdominal pain and nausea. Negative for diarrhea and vomiting. Genitourinary: Negative for dysuria, frequency and urgency. Incontinence   Musculoskeletal: Negative for arthralgias and back pain. Skin: Negative for color change, pallor and rash. less than 2 seconds. Neurological:      General: No focal deficit present. Mental Status: She is alert and oriented to person, place, and time. Mental status is at baseline. Cranial Nerves: No cranial nerve deficit, dysarthria or facial asymmetry. Motor: No weakness, tremor or seizure activity. Psychiatric:         Mood and Affect: Mood normal.         Behavior: Behavior normal. Behavior is cooperative. Past Medical History:      Past Medical History:   Diagnosis Date    Anxiety 2/16/2017    Arthritis     Back pain at L4-L5 level 2/16/2017    Dr Jl Montilla   [facility] Bipolar 1 disorder ([facility]     COPD (chronic obstructive pulmonary disease) ([facility]     Emphysema of lung ([facility]     FH: CAD (coronary artery disease) 2/16/2017    Father had in his forties, sister in her thirties    [facility] Fibromyalgia 2/16/2017    H/O Doppler ultrasound 04/05/2019    venous- no DVT or reflux    H/O echocardiogram 01/14/2015    EF50-55% Normal- see media    History of blood transfusion     Hyperlipidemia LDL goal <100     Hypertension     Obstructive sleep apnea     Osteoarthritis     Thyroid disease      PSHX:  has a past surgical history that includes Carpal tunnel release; Tubal ligation; back surgery; Cardiac catheterization; and Colonoscopy (N/A, 12/12/2019). Allergies: Allergies   Allergen Reactions    Lisinopril Swelling and Rash     angioedema       FAM HX: family history includes No Known Problems in her father and mother.   Soc HX:   Social History     Socioeconomic History    Marital status:      Spouse name: None    Number of children: None    Years of education: None    Highest education level: None   Occupational History    None   Social Needs    Financial resource strain: None    Food insecurity     Worry: None     Inability: None    Transportation needs     Medical: None     Non-medical: None   Tobacco Use    Smoking status: Former Smoker     Packs/day: 1.50     Years: 20.00 Pack years: 30.00     Last attempt to quit: 2008     Years since quittin.8    Smokeless tobacco: Never Used   Substance and Sexual Activity    Alcohol use: Not Currently     Alcohol/week: 2.0 standard drinks     Types: 2 Shots of liquor per week    Drug use: No    Sexual activity: Yes     Partners: Male   Lifestyle    Physical activity     Days per week: None     Minutes per session: None    Stress: None   Relationships    Social connections     Talks on phone: None     Gets together: None     Attends Mandaen service: None     Active member of club or organization: None     Attends meetings of clubs or organizations: None     Relationship status: None    Intimate partner violence     Fear of current or ex partner: None     Emotionally abused: None     Physically abused: None     Forced sexual activity: None   Other Topics Concern    None   Social History Narrative    None       Medications:   Medications:    cefTRIAXone (ROCEPHIN) IV  1 g Intravenous Once    sodium chloride flush  10 mL Intravenous BID      Infusions:   PRN Meds:      Electronically signed by Siena Steiner MD on 10/20/2020 at 1:02 AM

## 2025-03-26 NOTE — ED NOTES
Bed: ED-14  Expected date: 10/19/20  Expected time:   Means of arrival:   Comments:  [id number], RN  10/19/20 [id number]
Paged hosp @ [phone]  10/20/20 0022
Report given to [facility] RN to assume care of pt at this time      Momo Coleman RN  10/19/20 [id number]
No

## (undated) DEVICE — INTENDED FOR TISSUE SEPARATION, AND OTHER PROCEDURES THAT REQUIRE A SHARP SURGICAL BLADE TO PUNCTURE OR CUT.: Brand: BARD-PARKER ® CARBON RIB-BACK BLADES

## (undated) DEVICE — TUBING, SUCTION, 9/32" X 10', STRAIGHT: Brand: MEDLINE

## (undated) DEVICE — CONTAINER,SPECIMEN,OR STERILE,4OZ: Brand: MEDLINE

## (undated) DEVICE — Z DISCONTINUED NO SUB IDED TUBING ETCO2 AD L6.5FT NSL ORAL CVD PRNG NONFLARED TIP OVR

## (undated) DEVICE — 3M™ IOBAN™ 2 ANTIMICROBIAL INCISE DRAPE 6650EZ: Brand: IOBAN™ 2

## (undated) DEVICE — Z DISCONTINUED (USE MFG CAT MVABO)  TUBING GAS SAMPLING STD 6.5 FT FEMALE CONN SMRT CAPNOLINE

## (undated) DEVICE — SUTURE PERMA-HAND SZ 2-0 L30IN NONABSORBABLE BLK L26MM SH K833H

## (undated) DEVICE — INTENDED FOR TISSUE SEPARATION, AND OTHER PROCEDURES THAT REQUIRE A SHARP SURGICAL BLADE TO PUNCTURE OR CUT.: Brand: BARD-PARKER ® STAINLESS STEEL BLADES

## (undated) DEVICE — SPONGE LAP W18XL18IN WHT COT 4 PLY FLD STRUNG RADPQ DISP ST

## (undated) DEVICE — SOLUTION IRRIG 1000ML 0.9% SOD CHL USP POUR PLAS BTL

## (undated) DEVICE — TUBING INSUFFLATOR HEAT HI FLO SET PNEUMOCLEAR

## (undated) DEVICE — COVER,TABLE,44X90,STERILE: Brand: MEDLINE

## (undated) DEVICE — SUTURE PERMAHAND SZ 0 L30IN NONABSORBABLE BLK SILK BRAID A306H

## (undated) DEVICE — CATHETER CHOLGM 4.5FR L18IN W/ MTL SUPP TB

## (undated) DEVICE — Z INACTIVE USE 2735373 APPLICATOR FBR LAIN COT WOOD TIP ECONOMICAL

## (undated) DEVICE — SUTURE PROL SZ 4-0 L36IN NONABSORBABLE BLU L26MM SH 1/2 CIR 8521H

## (undated) DEVICE — TROCAR: Brand: KII® SLEEVE

## (undated) DEVICE — DRAPE SLUSH DISC W44XL66IN ST FOR RND BSIN HUSH SLUSH SYS

## (undated) DEVICE — TOWEL,OR,DSP,ST,BLUE,STD,6/PK,12PK/CS: Brand: MEDLINE

## (undated) DEVICE — SUTURE PROL SZ 5-0 L36IN NONABSORBABLE BLU L13MM C-1 3/8 8720H

## (undated) DEVICE — PENCIL ES L3M BTTN SWCH HOLSTER W/ BLDE ELECTRD EDGE

## (undated) DEVICE — GLOVE ORANGE PI 7   MSG9070

## (undated) DEVICE — ASPIRATOR FLR L72IN SUCT TB W/ 1 DETACH FISH STK PUSH HNDL

## (undated) DEVICE — SHEET,DRAPE,53X77,STERILE: Brand: MEDLINE

## (undated) DEVICE — TROCARS: Brand: KII® BALLOON BLUNT TIP SYSTEM

## (undated) DEVICE — SUTURE PERMAHAND SZ 3-0 L30IN NONABSORBABLE BLK SH L26MM K832H

## (undated) DEVICE — TROCAR: Brand: KII FIOS FIRST ENTRY

## (undated) DEVICE — 3M™ STERI-DRAPE™ INSTRUMENT POUCH 1018: Brand: STERI-DRAPE™

## (undated) DEVICE — APPLIER CLP M/L SHFT DIA5MM 15 LIG LIGAMAX 5

## (undated) DEVICE — DRESSING TRNSPAR W2XL2.75IN FLM SHT SEMIPERMEABLE WIND

## (undated) DEVICE — SUTURE PERMAHAND SZ 2-0 L30IN 10X30IN TIE NONABSORBABLE BLK SA85H

## (undated) DEVICE — BLADE SAW W10XL54MM FOR PRI REPEAT STRNOTMY

## (undated) DEVICE — CATHETER CHOLANGIOGRAM 4.5 FRX3 IN W/ 20018M55 TAUT INTRO

## (undated) DEVICE — FORCEPS BX L240CM JAW DIA2.8MM L CAP W/ NDL MIC MESH TOOTH

## (undated) DEVICE — BLADE SAW W10XL35MM THK0.6MM STRNM FOR PRI AND REPEAT

## (undated) DEVICE — DRAPE,UTILITY,XL,4/PK,STERILE: Brand: MEDLINE

## (undated) DEVICE — DRAPE,UTILTY,TAPE,15X26, 4EA/PK: Brand: MEDLINE

## (undated) DEVICE — MARKER SURG SKIN UTIL REGULAR/FINE 2 TIP W/ RUL AND 9 LBL

## (undated) DEVICE — SUTURE ETHLN SZ 2-0 L20IN NONABSORBABLE BLK L75MM LR 3/8 460T

## (undated) DEVICE — SUTURE NONABSORBABLE  MERSILINE TP1 SIZE 5

## (undated) DEVICE — Z INACTIVE USE 2641838 CLIP INT L ORNG TI TRNSVRS GRV CHEVRON SHP W/ PRECIS TIP TO

## (undated) DEVICE — Device

## (undated) DEVICE — SYRINGE IRRIG 60ML SFT PLIABLE BLB EZ TO GRP 1 HND USE W/

## (undated) DEVICE — DECANTER BAG 9": Brand: MEDLINE INDUSTRIES, INC.

## (undated) DEVICE — COUNTER NDL 30 COUNT FOAM STRP SGL MAG

## (undated) DEVICE — COVER,C-ARM,41X74: Brand: MEDLINE

## (undated) DEVICE — PACK SURG ABD SVMMC

## (undated) DEVICE — SUTURE PERMAHAND SZ 3-0 L30IN NONABSORBABLE BLK SILK BRAID A304H

## (undated) DEVICE — PACK,BASIC,SIRUS,V: Brand: MEDLINE

## (undated) DEVICE — DRAPE,ABDOMINAL,MAJOR,STERILE: Brand: MEDLINE

## (undated) DEVICE — SUTURE PERMAHAND SZ 2-0 L30IN NONABSORBABLE BLK SILK W/O A305H

## (undated) DEVICE — PROVIDES A STERILE INTERFACE BETWEEN THE OPERATING ROOM SURGICAL LAMPS (NON-STERILE) AND THE SURGEON OR NURSE (STERILE).: Brand: STERION®CLAMP COVER FABRIC

## (undated) DEVICE — SUTURE PERMAHAND SZ 0 L30IN NONABSORBABLE BLK L26MM SH 1/2 K834H

## (undated) DEVICE — BAG SPEC REM 224ML W4XL6IN DIA10MM 1 HND GYN DISP ENDOPCH

## (undated) DEVICE — SUTURE ETHBND EXCEL SZ 0 L36IN NONABSORBABLE GRN SH L26MM X524H

## (undated) DEVICE — GOWN,ECLIPSE,POLYRNF,BRTHSLV,XL,30/CS: Brand: MEDLINE

## (undated) DEVICE — SUTURE PROL SZ 2-0 L30IN NONABSORBABLE BLU L26MM SH 1/2 CIR 8833H